# Patient Record
Sex: FEMALE | Race: WHITE | NOT HISPANIC OR LATINO | Employment: UNEMPLOYED | URBAN - NONMETROPOLITAN AREA
[De-identification: names, ages, dates, MRNs, and addresses within clinical notes are randomized per-mention and may not be internally consistent; named-entity substitution may affect disease eponyms.]

---

## 2017-01-10 ENCOUNTER — OFFICE VISIT (OUTPATIENT)
Dept: PAIN MEDICINE | Facility: CLINIC | Age: 57
End: 2017-01-10

## 2017-01-10 VITALS
BODY MASS INDEX: 36.66 KG/M2 | WEIGHT: 214.7 LBS | HEIGHT: 64 IN | DIASTOLIC BLOOD PRESSURE: 70 MMHG | SYSTOLIC BLOOD PRESSURE: 138 MMHG

## 2017-01-10 DIAGNOSIS — M79.2 NEUROPATHIC PAIN: ICD-10-CM

## 2017-01-10 DIAGNOSIS — Z79.899 HIGH RISK MEDICATIONS (NOT ANTICOAGULANTS) LONG-TERM USE: ICD-10-CM

## 2017-01-10 DIAGNOSIS — M25.9 MULTIPLE JOINT COMPLAINTS: ICD-10-CM

## 2017-01-10 DIAGNOSIS — M47.817 LUMBOSACRAL SPONDYLOSIS WITHOUT MYELOPATHY: Primary | ICD-10-CM

## 2017-01-10 PROCEDURE — 99214 OFFICE O/P EST MOD 30 MIN: CPT | Performed by: PAIN MEDICINE

## 2017-01-10 RX ORDER — CYCLOBENZAPRINE HCL 5 MG
5 TABLET ORAL 2 TIMES DAILY PRN
Qty: 60 TABLET | Refills: 1 | Status: SHIPPED | OUTPATIENT
Start: 2017-01-10 | End: 2017-02-09

## 2017-01-10 RX ORDER — OXYCODONE HYDROCHLORIDE AND ACETAMINOPHEN 5; 325 MG/1; MG/1
1 TABLET ORAL 3 TIMES DAILY
Qty: 90 TABLET | Refills: 0 | Status: SHIPPED | OUTPATIENT
Start: 2017-01-10 | End: 2017-07-27

## 2017-01-10 RX ORDER — OXYCODONE HYDROCHLORIDE AND ACETAMINOPHEN 5; 325 MG/1; MG/1
1 TABLET ORAL 3 TIMES DAILY
Qty: 90 TABLET | Refills: 0 | Status: SHIPPED | OUTPATIENT
Start: 2017-01-10 | End: 2017-02-09

## 2017-01-10 NOTE — PROGRESS NOTES
"Amira Shannon is a 56 y.o. female.   1960    HPI:   Location: right hip, bilateral knee and bilateral foot  Quality: throbbing and tingling  Severity: 7/10  Timing: constant  Alleviating: nothing  Aggravating: ambulating      Opioid still providing relief.  No side effects.  Requesting a muscle relaxant for some mild cramping at night.  C/o what sounds like bladder spasms.    The following portions of the patient's history were reviewed by me and updated as appropriate: allergies, current medications, past family history, past medical history, past social history, past surgical history and problem list.    Past Medical History   Diagnosis Date   • Altered bowel function    • Arthropathy of lumbar facet joint    • Constipation    • Corns and callus    • Disease related peripheral neuropathy    • Epigastric pain    • Hammer toe    • Headache    • Hyperlipidemia    • Knee pain    • Localized, primary osteoarthritis of the ankle and foot      Localized, primary osteoarthritis of the ankle and/or foot   • Mendoza's metatarsalgia      Mendoza's metatarsalgia - 2nd interspace on right   • Nausea and vomiting    • Neuralgia and neuritis      Neuralgia, neuritis, and radiculitis, unspecified   • Obstructive sleep apnea      Obstructive sleep apnea (adult) (pediatric)    • CHAI on CPAP      \"C-Pap at night  (unconfirmed)\"   • Osteoarthritis    • Pain in foot      Pain in unspecified foot - sees a podiatrist   • Pain in joint, ankle and foot      Joint pain in ankle and foot      • Pain radiating to back      Pain radiating to lumbar region of back   • Plantar fasciitis    • Secondary hypertension      Secondary hypertension, unspecified       Social History     Social History   • Marital status:      Spouse name: N/A   • Number of children: N/A   • Years of education: N/A     Occupational History   • Not on file.     Social History Main Topics   • Smoking status: Former Smoker     Types: Cigarettes   • " Smokeless tobacco: Not on file   • Alcohol use No   • Drug use: No   • Sexual activity: Defer      Comment: Marital status:      Other Topics Concern   • Not on file     Social History Narrative       No family history on file.      Current Outpatient Prescriptions:   •  DULoxetine (CYMBALTA) 30 MG capsule, Take 30 mg by mouth every night. at bedtime; capsule,delayed release, Disp: , Rfl:   •  esomeprazole (NEXIUM) 40 MG capsule, Take 40 mg by mouth daily. delayed release(DR/EC), Disp: , Rfl:   •  furosemide (LASIX) 20 MG tablet, Take 20 mg by mouth daily., Disp: , Rfl:   •  gabapentin (NEURONTIN) 600 MG tablet, Take  by mouth. tab(s), Disp: , Rfl:   •  linaclotide (LINZESS) 290 MCG capsule capsule, Take 145 mcg by mouth 2 (two) times a day., Disp: , Rfl:   •  metoclopramide (REGLAN) 5 MG/5ML solution, Take  by mouth 4 (four) times a day. 1 tab(s), Disp: , Rfl:   •  ondansetron (ZOFRAN) 4 MG tablet, Take 4 mg by mouth as needed., Disp: , Rfl:   •  oxyCODONE-acetaminophen (PERCOCET) 5-325 MG per tablet, Take 1 tablet by mouth 3 (Three) Times a Day., Disp: 90 tablet, Rfl: 0  •  traMADol (ULTRAM) 50 MG tablet, Take 1 tablet by mouth every 6 (six) hours as needed., Disp: , Rfl:   •  valsartan-hydrochlorothiazide (DIOVAN HCT) 160-12.5 MG per tablet, Take 1 tablet by mouth daily., Disp: , Rfl:   •  cyclobenzaprine (FLEXERIL) 5 MG tablet, Take 1 tablet by mouth 2 (Two) Times a Day As Needed for muscle spasms for up to 30 days., Disp: 60 tablet, Rfl: 1  •  oxyCODONE-acetaminophen (PERCOCET) 5-325 MG per tablet, Take 1 tablet by mouth 3 (Three) Times a Day for 30 days., Disp: 90 tablet, Rfl: 0    Allergies   Allergen Reactions   • Other      Pt states that taking steroids either in pill or injection form make her have blisters in her mouth and she feels like she is on fire on the inside   • Sulfa Antibiotics Rash     Sulfa (Sulfonamide Antibiotics)         Review of Systems   Musculoskeletal:        Right hip,  bilateral knee, bilateral  foot     10 system review of systems was reviewed and negative except for above.    Physical Exam   Constitutional: She appears well-developed and well-nourished. No distress.   Musculoskeletal:        Lumbar back: She exhibits decreased range of motion (right facet loading).   Neurological: She is alert.   Psychiatric: She has a normal mood and affect. Her behavior is normal. Judgment normal.       Amira was seen today for hip pain, knee pain and foot pain.    Diagnoses and all orders for this visit:    Lumbosacral spondylosis without myelopathy    Neuropathic pain    Multiple joint complaints    High risk medications (not anticoagulants) long-term use    Other orders  -     oxyCODONE-acetaminophen (PERCOCET) 5-325 MG per tablet; Take 1 tablet by mouth 3 (Three) Times a Day.  -     oxyCODONE-acetaminophen (PERCOCET) 5-325 MG per tablet; Take 1 tablet by mouth 3 (Three) Times a Day for 30 days.  -     cyclobenzaprine (FLEXERIL) 5 MG tablet; Take 1 tablet by mouth 2 (Two) Times a Day As Needed for muscle spasms for up to 30 days.        Medication: Patient reports no negative side effects, Patient reports appropriate usage and storage habits, Patient's opioid provides enough reflief to be more active and perform activities of daily living with less discomfort. and Refill opioid medication as above.  Flexeril prescribed.  Giving 5mg to try.  If this does not help may try 10mg.  Warned of possible side effect.  Suggested she discuss bladder sx's with pcp.    Interventional: none at this time    Rehab: none at this time    Behavioral: No aberrant behavior noted. [facility] Report #[id number]  was reviewed and is consistent with stated history    Urine drug screen Reviewed from last visit and is appropriate          This document has been electronically signed by Hua Westfall MD on January 10, 2017 2:21 PM

## 2017-03-07 ENCOUNTER — OFFICE VISIT (OUTPATIENT)
Dept: PAIN MEDICINE | Facility: CLINIC | Age: 57
End: 2017-03-07

## 2017-03-07 VITALS
WEIGHT: 222.5 LBS | HEIGHT: 64 IN | BODY MASS INDEX: 37.98 KG/M2 | DIASTOLIC BLOOD PRESSURE: 78 MMHG | SYSTOLIC BLOOD PRESSURE: 118 MMHG

## 2017-03-07 DIAGNOSIS — M47.817 LUMBOSACRAL SPONDYLOSIS WITHOUT MYELOPATHY: ICD-10-CM

## 2017-03-07 DIAGNOSIS — M79.2 NEUROPATHIC PAIN: ICD-10-CM

## 2017-03-07 DIAGNOSIS — Z79.899 HIGH RISK MEDICATIONS (NOT ANTICOAGULANTS) LONG-TERM USE: ICD-10-CM

## 2017-03-07 DIAGNOSIS — M25.9 MULTIPLE JOINT COMPLAINTS: Primary | ICD-10-CM

## 2017-03-07 PROCEDURE — 99213 OFFICE O/P EST LOW 20 MIN: CPT | Performed by: PAIN MEDICINE

## 2017-03-07 RX ORDER — OXYCODONE HYDROCHLORIDE AND ACETAMINOPHEN 5; 325 MG/1; MG/1
1 TABLET ORAL 4 TIMES DAILY
Qty: 120 TABLET | Refills: 0 | Status: SHIPPED | OUTPATIENT
Start: 2017-03-07 | End: 2017-04-06

## 2017-03-07 RX ORDER — OXYCODONE HYDROCHLORIDE AND ACETAMINOPHEN 5; 325 MG/1; MG/1
1 TABLET ORAL 3 TIMES DAILY
Qty: 90 TABLET | Refills: 0 | Status: SHIPPED | OUTPATIENT
Start: 2017-03-07 | End: 2017-03-07 | Stop reason: SDUPTHER

## 2017-03-07 RX ORDER — CYCLOBENZAPRINE HCL 10 MG
10 TABLET ORAL 2 TIMES DAILY PRN
Qty: 60 TABLET | Refills: 3 | Status: SHIPPED | OUTPATIENT
Start: 2017-03-07 | End: 2017-04-06

## 2017-03-07 NOTE — PROGRESS NOTES
"Amira Shannon is a 56 y.o. female.   1960    HPI:   Location: right hip, bilateral knee and bilateral foot  Quality: throbbing and tingling  Severity: 7-8/10  Timing: constant  Alleviating: nothing  Aggravating: ambulating     Patient reports that the opioid medication still provides her good relief and allows increased activity than she would have without the opioid medication, she states it just not cover her very well throughout the day.  She denies side effects.      The following portions of the patient's history were reviewed by me and updated as appropriate: allergies, current medications, past family history, past medical history, past social history, past surgical history and problem list.    Past Medical History   Diagnosis Date   • Altered bowel function    • Arthropathy of lumbar facet joint    • Constipation    • Corns and callus    • Disease related peripheral neuropathy    • Epigastric pain    • Hammer toe    • Headache    • Hyperlipidemia    • Knee pain    • Localized, primary osteoarthritis of the ankle and foot      Localized, primary osteoarthritis of the ankle and/or foot   • Mendoza's metatarsalgia      Mendoza's metatarsalgia - 2nd interspace on right   • Nausea and vomiting    • Neuralgia and neuritis      Neuralgia, neuritis, and radiculitis, unspecified   • Obstructive sleep apnea      Obstructive sleep apnea (adult) (pediatric)    • CHAI on CPAP      \"C-Pap at night  (unconfirmed)\"   • Osteoarthritis    • Pain in foot      Pain in unspecified foot - sees a podiatrist   • Pain in joint, ankle and foot      Joint pain in ankle and foot      • Pain radiating to back      Pain radiating to lumbar region of back   • Plantar fasciitis    • Secondary hypertension      Secondary hypertension, unspecified       Social History     Social History   • Marital status:      Spouse name: N/A   • Number of children: N/A   • Years of education: N/A     Occupational History   • Not on file. "     Social History Main Topics   • Smoking status: Former Smoker     Types: Cigarettes   • Smokeless tobacco: Not on file   • Alcohol use No   • Drug use: No   • Sexual activity: Defer      Comment: Marital status:      Other Topics Concern   • Not on file     Social History Narrative       No family history on file.      Current Outpatient Prescriptions:   •  DULoxetine (CYMBALTA) 30 MG capsule, Take 30 mg by mouth every night. at bedtime; capsule,delayed release, Disp: , Rfl:   •  esomeprazole (NEXIUM) 40 MG capsule, Take 40 mg by mouth daily. delayed release(DR/EC), Disp: , Rfl:   •  furosemide (LASIX) 20 MG tablet, Take 20 mg by mouth daily., Disp: , Rfl:   •  gabapentin (NEURONTIN) 600 MG tablet, Take  by mouth. tab(s), Disp: , Rfl:   •  linaclotide (LINZESS) 290 MCG capsule capsule, Take 145 mcg by mouth 2 (two) times a day., Disp: , Rfl:   •  oxyCODONE-acetaminophen (PERCOCET) 5-325 MG per tablet, Take 1 tablet by mouth 3 (Three) Times a Day., Disp: 90 tablet, Rfl: 0  •  valsartan-hydrochlorothiazide (DIOVAN HCT) 160-12.5 MG per tablet, Take 1 tablet by mouth daily., Disp: , Rfl:   •  cyclobenzaprine (FLEXERIL) 10 MG tablet, Take 1 tablet by mouth 2 (Two) Times a Day As Needed for muscle spasms for up to 30 days., Disp: 60 tablet, Rfl: 3  •  ondansetron (ZOFRAN) 4 MG tablet, Take 4 mg by mouth as needed., Disp: , Rfl:   •  oxyCODONE-acetaminophen (PERCOCET) 5-325 MG per tablet, Take 1 tablet by mouth 4 (Four) Times a Day for 30 days., Disp: 120 tablet, Rfl: 0  •  oxyCODONE-acetaminophen (PERCOCET) 5-325 MG per tablet, Take 1 tablet by mouth 4 (Four) Times a Day for 30 days., Disp: 120 tablet, Rfl: 0    Allergies   Allergen Reactions   • Other      Pt states that taking steroids either in pill or injection form make her have blisters in her mouth and she feels like she is on fire on the inside   • Sulfa Antibiotics Rash     Sulfa (Sulfonamide Antibiotics)         Review of Systems   Musculoskeletal:         Right hip pain  Bilateral knee pain  Bilateral foot pain     10 system review of systems was reviewed and negative except for above.    Physical Exam   Constitutional: She appears well-developed and well-nourished. No distress.   Musculoskeletal:        Lumbar back: She exhibits decreased range of motion (right facet loading).   Painful full arom b knees     Neurological: She is alert.   Psychiatric: She has a normal mood and affect. Her behavior is normal. Judgment normal.       Amira was seen today for hip pain, knee pain and foot pain.    Diagnoses and all orders for this visit:    Multiple joint complaints    Lumbosacral spondylosis without myelopathy    Neuropathic pain    High risk medications (not anticoagulants) long-term use    Other orders  -     Discontinue: oxyCODONE-acetaminophen (PERCOCET) 5-325 MG per tablet; Take 1 tablet by mouth 3 (Three) Times a Day for 30 days.  -     Discontinue: oxyCODONE-acetaminophen (PERCOCET) 5-325 MG per tablet; Take 1 tablet by mouth 3 (Three) Times a Day for 30 days.  -     cyclobenzaprine (FLEXERIL) 10 MG tablet; Take 1 tablet by mouth 2 (Two) Times a Day As Needed for muscle spasms for up to 30 days.  -     oxyCODONE-acetaminophen (PERCOCET) 5-325 MG per tablet; Take 1 tablet by mouth 4 (Four) Times a Day for 30 days.  -     oxyCODONE-acetaminophen (PERCOCET) 5-325 MG per tablet; Take 1 tablet by mouth 4 (Four) Times a Day for 30 days.        Medication: Patient reports no negative side effects, Patient reports appropriate usage and storage habits, Patient's opioid provides enough reflief to be more active and perform activities of daily living with less discomfort. and Refill opioid medication as above.  Refill flexeril as well.  This is an increase in percocet to give better coverage.    Interventional: none at this time    Rehab: none at this time    Behavioral: No aberrant behavior noted. [facility] Report #[id number]  was reviewed and is consistent with stated  history    Urine drug screen None at this time          This document has been electronically signed by Hua Westfall MD on March 7, 2017 1:46 PM

## 2017-05-10 ENCOUNTER — OFFICE VISIT (OUTPATIENT)
Dept: PAIN MEDICINE | Facility: CLINIC | Age: 57
End: 2017-05-10

## 2017-05-10 ENCOUNTER — APPOINTMENT (OUTPATIENT)
Dept: LAB | Facility: HOSPITAL | Age: 57
End: 2017-05-10

## 2017-05-10 VITALS
WEIGHT: 227.3 LBS | HEIGHT: 64 IN | BODY MASS INDEX: 38.8 KG/M2 | DIASTOLIC BLOOD PRESSURE: 90 MMHG | SYSTOLIC BLOOD PRESSURE: 140 MMHG

## 2017-05-10 DIAGNOSIS — M79.2 NEUROPATHIC PAIN: ICD-10-CM

## 2017-05-10 DIAGNOSIS — M79.672 BILATERAL FOOT PAIN: Primary | ICD-10-CM

## 2017-05-10 DIAGNOSIS — M79.671 BILATERAL FOOT PAIN: Primary | ICD-10-CM

## 2017-05-10 DIAGNOSIS — M47.817 LUMBOSACRAL SPONDYLOSIS WITHOUT MYELOPATHY: ICD-10-CM

## 2017-05-10 DIAGNOSIS — Z79.899 HIGH RISK MEDICATIONS (NOT ANTICOAGULANTS) LONG-TERM USE: ICD-10-CM

## 2017-05-10 PROCEDURE — 99214 OFFICE O/P EST MOD 30 MIN: CPT | Performed by: PAIN MEDICINE

## 2017-05-10 PROCEDURE — 80307 DRUG TEST PRSMV CHEM ANLYZR: CPT | Performed by: PAIN MEDICINE

## 2017-05-10 PROCEDURE — G0481 DRUG TEST DEF 8-14 CLASSES: HCPCS | Performed by: PAIN MEDICINE

## 2017-05-10 RX ORDER — OXYCODONE AND ACETAMINOPHEN 7.5; 325 MG/1; MG/1
1 TABLET ORAL 3 TIMES DAILY
Qty: 90 TABLET | Refills: 0 | Status: SHIPPED | OUTPATIENT
Start: 2017-05-10 | End: 2017-06-09

## 2017-05-10 RX ORDER — OXYCODONE HYDROCHLORIDE AND ACETAMINOPHEN 5; 325 MG/1; MG/1
1 TABLET ORAL 3 TIMES DAILY
Qty: 90 TABLET | Refills: 0 | Status: SHIPPED | OUTPATIENT
Start: 2017-05-10 | End: 2017-06-09

## 2017-05-19 LAB — CONV REPORT SUMMARY: NORMAL

## 2017-07-11 ENCOUNTER — OFFICE VISIT (OUTPATIENT)
Dept: PAIN MEDICINE | Facility: CLINIC | Age: 57
End: 2017-07-11

## 2017-07-11 VITALS
DIASTOLIC BLOOD PRESSURE: 84 MMHG | SYSTOLIC BLOOD PRESSURE: 138 MMHG | HEIGHT: 64 IN | BODY MASS INDEX: 39.11 KG/M2 | WEIGHT: 229.1 LBS

## 2017-07-11 DIAGNOSIS — Z79.899 HIGH RISK MEDICATIONS (NOT ANTICOAGULANTS) LONG-TERM USE: ICD-10-CM

## 2017-07-11 DIAGNOSIS — M79.672 HEEL PAIN, BILATERAL: ICD-10-CM

## 2017-07-11 DIAGNOSIS — M79.671 HEEL PAIN, BILATERAL: ICD-10-CM

## 2017-07-11 DIAGNOSIS — M79.2 NEUROPATHIC PAIN: Primary | ICD-10-CM

## 2017-07-11 PROCEDURE — 99214 OFFICE O/P EST MOD 30 MIN: CPT | Performed by: PAIN MEDICINE

## 2017-07-11 RX ORDER — OXYCODONE AND ACETAMINOPHEN 7.5; 325 MG/1; MG/1
1 TABLET ORAL 3 TIMES DAILY
Qty: 90 TABLET | Refills: 0 | Status: SHIPPED | OUTPATIENT
Start: 2017-07-11 | End: 2017-07-13

## 2017-07-11 NOTE — PROGRESS NOTES
"Amira Shannon is a 56 y.o. female.   1960    HPI:   Location: lower back, right hip, bilateral knee and bilateral foot  Quality: throbbing and tingling  Severity: 7/10  Timing: constant  Alleviating: nothing  Aggravating: ambulating     Patient reports that the opioid medication still provides her good relief and allows increased activity than she would have without the opioid medication.  She denies side effects..  Having tongue abcess removed soon.  May need supplemental opioid from surgeon.     The following portions of the patient's history were reviewed by me and updated as appropriate: allergies, current medications, past family history, past medical history, past social history, past surgical history and problem list.    Past Medical History:   Diagnosis Date   • Altered bowel function    • Arthropathy of lumbar facet joint    • Constipation    • Corns and callus    • Disease related peripheral neuropathy    • Epigastric pain    • Hammer toe    • Headache    • Hyperlipidemia    • Knee pain    • Localized, primary osteoarthritis of the ankle and foot     Localized, primary osteoarthritis of the ankle and/or foot   • Mendoza's metatarsalgia     Mendoza's metatarsalgia - 2nd interspace on right   • Nausea and vomiting    • Neuralgia and neuritis     Neuralgia, neuritis, and radiculitis, unspecified   • Obstructive sleep apnea     Obstructive sleep apnea (adult) (pediatric)    • CHAI on CPAP     \"C-Pap at night  (unconfirmed)\"   • Osteoarthritis    • Pain in foot     Pain in unspecified foot - sees a podiatrist   • Pain in joint, ankle and foot     Joint pain in ankle and foot      • Pain radiating to back     Pain radiating to lumbar region of back   • Plantar fasciitis    • Secondary hypertension     Secondary hypertension, unspecified       Social History     Social History   • Marital status:      Spouse name: N/A   • Number of children: N/A   • Years of education: N/A     Occupational History "   • Not on file.     Social History Main Topics   • Smoking status: Former Smoker     Types: Cigarettes   • Smokeless tobacco: Never Used   • Alcohol use No   • Drug use: No   • Sexual activity: Defer      Comment: Marital status:      Other Topics Concern   • Not on file     Social History Narrative       No family history on file.      Current Outpatient Prescriptions:   •  DULoxetine (CYMBALTA) 30 MG capsule, Take 30 mg by mouth every night. at bedtime; capsule,delayed release, Disp: , Rfl:   •  esomeprazole (NEXIUM) 40 MG capsule, Take 40 mg by mouth daily. delayed release(DR/EC), Disp: , Rfl:   •  furosemide (LASIX) 20 MG tablet, Take 20 mg by mouth 2 (Two) Times a Day., Disp: , Rfl:   •  gabapentin (NEURONTIN) 600 MG tablet, Take  by mouth. tab(s), Disp: , Rfl:   •  HYDROcodone-acetaminophen (VICODIN) 7.5-500 MG per tablet, Take 1 tablet by mouth., Disp: , Rfl:   •  linaclotide (LINZESS) 290 MCG capsule capsule, Take 145 mcg by mouth 2 (two) times a day., Disp: , Rfl:   •  ondansetron (ZOFRAN) 4 MG tablet, Take 4 mg by mouth as needed., Disp: , Rfl:   •  oxyCODONE-acetaminophen (PERCOCET) 5-325 MG per tablet, Take 1 tablet by mouth 3 (Three) Times a Day., Disp: 90 tablet, Rfl: 0  •  valsartan-hydrochlorothiazide (DIOVAN HCT) 160-12.5 MG per tablet, Take 1 tablet by mouth daily., Disp: , Rfl:   •  oxyCODONE-acetaminophen (PERCOCET) 7.5-325 MG per tablet, Take 1 tablet by mouth 3 (Three) Times a Day for 30 days., Disp: 90 tablet, Rfl: 0  •  oxyCODONE-acetaminophen (PERCOCET) 7.5-325 MG per tablet, Take 1 tablet by mouth 3 (Three) Times a Day., Disp: 90 tablet, Rfl: 0    Allergies   Allergen Reactions   • Other      Pt states that taking steroids either in pill or injection form make her have blisters in her mouth and she feels like she is on fire on the inside   • Sulfa Antibiotics Rash     Sulfa (Sulfonamide Antibiotics)       Review of Systems   Musculoskeletal: Positive for back pain.         R.hip,b.knee,b.foot   All other systems reviewed and are negative.    All systems reviewed and negative except for above.    Physical Exam   Constitutional: She appears well-developed and well-nourished. No distress.   Musculoskeletal:   Painful full arom b knees    Subjective foot pain in heels bilaterally with ambulation.     Neurological: She is alert.   Psychiatric: She has a normal mood and affect. Her behavior is normal. Judgment normal.       Amira was seen today for back pain and pain.    Diagnoses and all orders for this visit:    Neuropathic pain    Heel pain, bilateral    High risk medications (not anticoagulants) long-term use    Other orders  -     oxyCODONE-acetaminophen (PERCOCET) 7.5-325 MG per tablet; Take 1 tablet by mouth 3 (Three) Times a Day for 30 days.  -     oxyCODONE-acetaminophen (PERCOCET) 7.5-325 MG per tablet; Take 1 tablet by mouth 3 (Three) Times a Day.        Medication: Patient reports no negative side effects, Patient reports appropriate usage and storage habits, Patient's opioid provides enough reflief to be more active and perform activities of daily living with less discomfort. and Refill opioid medication as above.    Interventional: none at this time.  Tongue procedure soon.     Rehab: none at this time    Behavioral: No aberrant behavior noted. [facility] Report #[id number]  was reviewed and is consistent with stated history    Urine drug screen Reviewed from last visit and is appropriate          This document has been electronically signed by Hua Westfall MD on July 11, 2017 2:28 PM

## 2017-07-13 ENCOUNTER — OFFICE VISIT (OUTPATIENT)
Dept: GASTROENTEROLOGY | Facility: CLINIC | Age: 57
End: 2017-07-13

## 2017-07-13 VITALS
DIASTOLIC BLOOD PRESSURE: 79 MMHG | SYSTOLIC BLOOD PRESSURE: 129 MMHG | BODY MASS INDEX: 39.11 KG/M2 | WEIGHT: 229.1 LBS | HEART RATE: 73 BPM | HEIGHT: 64 IN

## 2017-07-13 DIAGNOSIS — K75.81 NASH (NONALCOHOLIC STEATOHEPATITIS): Primary | ICD-10-CM

## 2017-07-13 PROCEDURE — 99203 OFFICE O/P NEW LOW 30 MIN: CPT | Performed by: INTERNAL MEDICINE

## 2017-07-13 RX ORDER — OMEPRAZOLE 20 MG/1
20 CAPSULE, DELAYED RELEASE ORAL DAILY
COMMUNITY
End: 2017-07-27

## 2017-07-13 RX ORDER — GABAPENTIN 800 MG/1
800 TABLET ORAL 3 TIMES DAILY
COMMUNITY
End: 2017-07-27

## 2017-07-13 RX ORDER — AMLODIPINE BESYLATE 10 MG/1
10 TABLET ORAL DAILY
COMMUNITY
End: 2017-07-27

## 2017-07-13 RX ORDER — CLINDAMYCIN HYDROCHLORIDE 300 MG/1
300 CAPSULE ORAL 3 TIMES DAILY
COMMUNITY
End: 2017-07-27

## 2017-07-13 RX ORDER — FUROSEMIDE 40 MG/1
40 TABLET ORAL 2 TIMES DAILY
COMMUNITY
End: 2017-07-27

## 2017-07-13 RX ORDER — CYCLOBENZAPRINE HCL 10 MG
10 TABLET ORAL 2 TIMES DAILY PRN
COMMUNITY
End: 2017-07-27

## 2017-07-13 RX ORDER — POTASSIUM CHLORIDE 750 MG/1
10 TABLET, FILM COATED, EXTENDED RELEASE ORAL 2 TIMES DAILY
COMMUNITY
End: 2017-07-27

## 2017-07-13 NOTE — PROGRESS NOTES
"[facility]      Chief Complaint:   Chief Complaint   Patient presents with   • Liver Evashley Barr       Subjective     HPI:   Patient with elevated liver enzymes.  Patient is moderately obese.  Patient states she has recently gained this weight stating that episodes of bowel obstruction and constipation and made her eat more.  Patient has her mother who  of cirrhosis of liver.    Plan; we'll do ultrasound LFTs Judge fiber sure have patient follow-up will consider doing hemochromatosis evaluation and other causes of hereditary cirrhosis depending on results of current blood work.    Past Medical History:   Past Medical History:   Diagnosis Date   • Altered bowel function    • Arthropathy of lumbar facet joint    • Constipation    • Corns and callus    • Disease related peripheral neuropathy    • Epigastric pain    • Hammer toe    • Headache    • Hyperlipidemia    • Knee pain    • Localized, primary osteoarthritis of the ankle and foot     Localized, primary osteoarthritis of the ankle and/or foot   • Mendoza's metatarsalgia     Mendoza's metatarsalgia - 2nd interspace on right   • Nausea and vomiting    • Neuralgia and neuritis     Neuralgia, neuritis, and radiculitis, unspecified   • Obstructive sleep apnea     Obstructive sleep apnea (adult) (pediatric)    • CHAI on CPAP     \"C-Pap at night  (unconfirmed)\"   • Osteoarthritis    • Pain in foot     Pain in unspecified foot - sees a podiatrist   • Pain in joint, ankle and foot     Joint pain in ankle and foot      • Pain radiating to back     Pain radiating to lumbar region of back   • Plantar fasciitis    • Secondary hypertension     Secondary hypertension, unspecified       Family History:  History reviewed. No pertinent family history.    Social History:   reports that she has quit smoking. Her smoking use included Cigarettes. She has never used smokeless tobacco. She reports that she does not drink alcohol or use illicit " "drugs.    Medications:   Current Outpatient Prescriptions   Medication Sig Dispense Refill   • amLODIPine (NORVASC) 10 MG tablet Take 10 mg by mouth Daily.     • clindamycin (CLEOCIN) 300 MG capsule Take 300 mg by mouth 3 (Three) Times a Day.     • cyclobenzaprine (FLEXERIL) 10 MG tablet Take 10 mg by mouth 2 (Two) Times a Day As Needed for Muscle Spasms.     • DULoxetine (CYMBALTA) 30 MG capsule Take 30 mg by mouth every night. at bedtime; capsule,delayed release     • furosemide (LASIX) 40 MG tablet Take 40 mg by mouth 2 (Two) Times a Day.     • gabapentin (NEURONTIN) 800 MG tablet Take 800 mg by mouth 3 (Three) Times a Day.     • IRON, FERROUS GLUCONATE, PO Take  by mouth.     • linaclotide (LINZESS) 145 MCG capsule capsule Take 145 mcg by mouth Every Morning Before Breakfast.     • omeprazole (priLOSEC) 20 MG capsule Take 20 mg by mouth Daily.     • oxyCODONE-acetaminophen (PERCOCET) 5-325 MG per tablet Take 1 tablet by mouth 3 (Three) Times a Day. 90 tablet 0   • potassium chloride (K-DUR) 10 MEQ CR tablet Take 10 mEq by mouth 2 (Two) Times a Day.       No current facility-administered medications for this visit.        Allergies:  Other and Sulfa antibiotics    ROS:    Review of Systems   Constitutional: Negative for activity change, appetite change, chills, diaphoresis, fatigue, fever and unexpected weight change.   HENT: Negative for sore throat and trouble swallowing.    Respiratory: Negative for shortness of breath.    Gastrointestinal: Negative for abdominal distention, abdominal pain, anal bleeding, blood in stool, constipation, diarrhea, nausea, rectal pain and vomiting.   Musculoskeletal: Negative for arthralgias.   Skin: Negative for pallor.   Neurological: Negative for light-headedness.     Objective     Blood pressure 129/79, pulse 73, height 64\" (162.6 cm), weight 229 lb 1.6 oz (104 kg).    Physical Exam   Constitutional: She is oriented to person, place, and time. She appears well-developed and " well-nourished. No distress.   HENT:   Head: Normocephalic and atraumatic.   Cardiovascular: Normal rate, regular rhythm, normal heart sounds and intact distal pulses.  Exam reveals no gallop and no friction rub.    No murmur heard.  Pulmonary/Chest: Breath sounds normal. No respiratory distress. She has no wheezes. She has no rales. She exhibits no tenderness.   Abdominal: Soft. Bowel sounds are normal. She exhibits no distension and no mass. There is no tenderness. There is no rebound and no guarding. No hernia.   Musculoskeletal: Normal range of motion. She exhibits no edema.   Neurological: She is alert and oriented to person, place, and time.   Skin: Skin is warm and dry. No rash noted. She is not diaphoretic. No erythema. No pallor.   Psychiatric: She has a normal mood and affect. Her behavior is normal. Judgment and thought content normal.        Assessment/Plan   Amira was seen today for liver eval.    Diagnoses and all orders for this visit:    MUSA (nonalcoholic steatohepatitis)  -     Comprehensive Metabolic Panel  -     CBC & Differential  -     MUSA Fibrosure  -     US Abdomen Complete; Future        * Surgery not found *     Diagnosis Plan   1. MUSA (nonalcoholic steatohepatitis)  Comprehensive Metabolic Panel    CBC & Differential    MUSA Fibrosure    US Abdomen Complete       Anticipated Surgical Procedure:  Orders Placed This Encounter   Procedures   • US Abdomen Complete     Standing Status:   Future     Standing Expiration Date:   7/13/2018     Order Specific Question:   Reason for Exam:     Answer:   musa   • Comprehensive Metabolic Panel   • MUSA Fibrosure   • CBC & Differential     Order Specific Question:   Manual Differential     Answer:   No       The risks, benefits, and alternatives of this procedure have been discussed with the patient or the responsible party- the patient understands and agrees to proceed.

## 2017-07-17 LAB
ALBUMIN SERPL-MCNC: 3.9 G/DL (ref 3.4–4.8)
ALBUMIN/GLOB SERPL: 1.4 G/DL (ref 1.1–1.8)
ALP SERPL-CCNC: 110 U/L (ref 38–126)
ALT SERPL W P-5'-P-CCNC: 65 U/L (ref 9–52)
ANION GAP SERPL CALCULATED.3IONS-SCNC: 9 MMOL/L (ref 5–15)
AST SERPL-CCNC: 60 U/L (ref 14–36)
BASOPHILS # BLD AUTO: 0.02 10*3/MM3 (ref 0–0.2)
BASOPHILS NFR BLD AUTO: 0.5 % (ref 0–2)
BILIRUB SERPL-MCNC: 0.8 MG/DL (ref 0.2–1.3)
BUN BLD-MCNC: 10 MG/DL (ref 7–21)
BUN/CREAT SERPL: 11 (ref 7–25)
CALCIUM SPEC-SCNC: 9 MG/DL (ref 8.4–10.2)
CHLORIDE SERPL-SCNC: 105 MMOL/L (ref 95–110)
CO2 SERPL-SCNC: 26 MMOL/L (ref 22–31)
CREAT BLD-MCNC: 0.91 MG/DL (ref 0.5–1)
DEPRECATED RDW RBC AUTO: 51.6 FL (ref 36.4–46.3)
EOSINOPHIL # BLD AUTO: 0.15 10*3/MM3 (ref 0–0.7)
EOSINOPHIL NFR BLD AUTO: 3.6 % (ref 0–7)
ERYTHROCYTE [DISTWIDTH] IN BLOOD BY AUTOMATED COUNT: 17.6 % (ref 11.5–14.5)
GFR SERPL CREATININE-BSD FRML MDRD: 64 ML/MIN/1.73 (ref 51–120)
GLOBULIN UR ELPH-MCNC: 2.8 GM/DL (ref 2.3–3.5)
GLUCOSE BLD-MCNC: 86 MG/DL (ref 60–100)
HCT VFR BLD AUTO: 41.8 % (ref 35–45)
HGB BLD-MCNC: 13.3 G/DL (ref 12–15.5)
IMM GRANULOCYTES # BLD: 0.01 10*3/MM3 (ref 0–0.02)
IMM GRANULOCYTES NFR BLD: 0.2 % (ref 0–0.5)
LYMPHOCYTES # BLD AUTO: 1.1 10*3/MM3 (ref 0.6–4.2)
LYMPHOCYTES NFR BLD AUTO: 26.1 % (ref 10–50)
MCH RBC QN AUTO: 25.5 PG (ref 26.5–34)
MCHC RBC AUTO-ENTMCNC: 31.8 G/DL (ref 31.4–36)
MCV RBC AUTO: 80.1 FL (ref 80–98)
MONOCYTES # BLD AUTO: 0.28 10*3/MM3 (ref 0–0.9)
MONOCYTES NFR BLD AUTO: 6.6 % (ref 0–12)
NEUTROPHILS # BLD AUTO: 2.66 10*3/MM3 (ref 2–8.6)
NEUTROPHILS NFR BLD AUTO: 63 % (ref 37–80)
PLATELET # BLD AUTO: 58 10*3/MM3 (ref 150–450)
PMV BLD AUTO: ABNORMAL FL (ref 8–12)
POTASSIUM BLD-SCNC: 3.9 MMOL/L (ref 3.5–5.1)
PROT SERPL-MCNC: 6.7 G/DL (ref 6.3–8.6)
RBC # BLD AUTO: 5.22 10*6/MM3 (ref 3.77–5.16)
SODIUM BLD-SCNC: 140 MMOL/L (ref 137–145)
WBC NRBC COR # BLD: 4.22 10*3/MM3 (ref 3.2–9.8)

## 2017-07-17 PROCEDURE — 83883 ASSAY NEPHELOMETRY NOT SPEC: CPT | Performed by: INTERNAL MEDICINE

## 2017-07-17 PROCEDURE — 82465 ASSAY BLD/SERUM CHOLESTEROL: CPT | Performed by: INTERNAL MEDICINE

## 2017-07-17 PROCEDURE — 82947 ASSAY GLUCOSE BLOOD QUANT: CPT | Performed by: INTERNAL MEDICINE

## 2017-07-17 PROCEDURE — 85025 COMPLETE CBC W/AUTO DIFF WBC: CPT | Performed by: INTERNAL MEDICINE

## 2017-07-17 PROCEDURE — 82977 ASSAY OF GGT: CPT | Performed by: INTERNAL MEDICINE

## 2017-07-17 PROCEDURE — 82247 BILIRUBIN TOTAL: CPT | Performed by: INTERNAL MEDICINE

## 2017-07-17 PROCEDURE — 84460 ALANINE AMINO (ALT) (SGPT): CPT | Performed by: INTERNAL MEDICINE

## 2017-07-17 PROCEDURE — 80053 COMPREHEN METABOLIC PANEL: CPT | Performed by: INTERNAL MEDICINE

## 2017-07-17 PROCEDURE — 83010 ASSAY OF HAPTOGLOBIN QUANT: CPT | Performed by: INTERNAL MEDICINE

## 2017-07-17 PROCEDURE — 84450 TRANSFERASE (AST) (SGOT): CPT | Performed by: INTERNAL MEDICINE

## 2017-07-17 PROCEDURE — 84478 ASSAY OF TRIGLYCERIDES: CPT | Performed by: INTERNAL MEDICINE

## 2017-07-19 ENCOUNTER — PREP FOR SURGERY (OUTPATIENT)
Dept: OTHER | Facility: HOSPITAL | Age: 57
End: 2017-07-19

## 2017-07-19 ENCOUNTER — TELEPHONE (OUTPATIENT)
Dept: GASTROENTEROLOGY | Facility: CLINIC | Age: 57
End: 2017-07-19

## 2017-07-19 ENCOUNTER — OFFICE VISIT (OUTPATIENT)
Dept: OTOLARYNGOLOGY | Facility: CLINIC | Age: 57
End: 2017-07-19

## 2017-07-19 VITALS — BODY MASS INDEX: 37.56 KG/M2 | HEIGHT: 64 IN | WEIGHT: 220 LBS | TEMPERATURE: 98.3 F

## 2017-07-19 DIAGNOSIS — K14.8 TONGUE LESION: Primary | ICD-10-CM

## 2017-07-19 LAB
A2 MACROGLOB SERPL-MCNC: 478 MG/DL (ref 110–276)
ALT SERPL W P-5'-P-CCNC: 51 IU/L (ref 0–40)
APO A-I SERPL-MCNC: 113 MG/DL (ref 116–209)
AST SERPL W P-5'-P-CCNC: 59 IU/L (ref 0–40)
BILIRUB SERPL-MCNC: 0.6 MG/DL (ref 0–1.2)
CHOLEST SERPL-MCNC: 148 MG/DL (ref 100–199)
FIBROSIS SCORING:: ABNORMAL
FIBROSIS STAGE SERPL QL: ABNORMAL
GGT SERPL-CCNC: 48 IU/L (ref 0–60)
GLUCOSE SERPL-MCNC: 89 MG/DL (ref 65–99)
HAPTOGLOB SERPL-MCNC: 29 MG/DL (ref 34–200)
INTERPRETATIONS: (REFERENCE): ABNORMAL
LABORATORY COMMENT REPORT: ABNORMAL
LIMITATIONS: (REFERENCE): ABNORMAL
LIVER FIBR SCORE SERPL CALC.FIBROSURE: 0.88 (ref 0–0.21)
NASH GRADE (REFERENCE): ABNORMAL
NASH SCORE (REFERENCE): 0.75
NASH SCORING (REFERENCE): ABNORMAL
STEATOSIS GRADE (REFERENCE): ABNORMAL
STEATOSIS GRADING (REFERENCE): ABNORMAL
STEATOSIS SCORE (REFERENCE): 0.78 (ref 0–0.3)
TRIGL SERPL-MCNC: 121 MG/DL (ref 0–149)
WEIGHT: (REFERENCE): 229 LBS

## 2017-07-19 PROCEDURE — 99203 OFFICE O/P NEW LOW 30 MIN: CPT | Performed by: OTOLARYNGOLOGY

## 2017-07-19 NOTE — PROGRESS NOTES
Subjective   Amira Shannon is a 56 y.o. female.   Chief complaint: Persistent left tongue lesion  History of Present Illness   Patient has persistent left tongue lesion which is been biopsied that showed inflammation but no definitive diagnosis is made.  She recommended by another surgeon have it rebiopsy that she preferred not to do that in his office because of discomfort from the previous biopsy.  She is some difficulty swallowing because of this and discomfort no major voice changes no adenopathy in the neck.  She does have a history of smoking the past and not really much alcohol use..  Note cervical adenopathy or tenderness in her neck she does wear dentures and are somewhat loose.        The following portions of the patient's history were reviewed and updated as appropriate: allergies, current medications, past family history, past medical history, past social history, past surgical history and problem list.      Amira Shannon reports that she has quit smoking. Her smoking use included Cigarettes. She has never used smokeless tobacco. She reports that she does not drink alcohol or use illicit drugs.  Patient is not a tobacco user and has been counseled for use of tobacco products    Family History   Problem Relation Age of Onset   • Cancer Other    • Diabetes Other    • Heart disease Other          Current Outpatient Prescriptions:   •  amLODIPine (NORVASC) 10 MG tablet, Take 10 mg by mouth Daily., Disp: , Rfl:   •  clindamycin (CLEOCIN) 300 MG capsule, Take 300 mg by mouth 3 (Three) Times a Day., Disp: , Rfl:   •  cyclobenzaprine (FLEXERIL) 10 MG tablet, Take 10 mg by mouth 2 (Two) Times a Day As Needed for Muscle Spasms., Disp: , Rfl:   •  DULoxetine (CYMBALTA) 30 MG capsule, Take 30 mg by mouth every night. at bedtime; capsule,delayed release, Disp: , Rfl:   •  furosemide (LASIX) 40 MG tablet, Take 40 mg by mouth 2 (Two) Times a Day., Disp: , Rfl:   •  gabapentin (NEURONTIN) 800 MG tablet,  "Take 800 mg by mouth 3 (Three) Times a Day., Disp: , Rfl:   •  IRON, FERROUS GLUCONATE, PO, Take  by mouth., Disp: , Rfl:   •  linaclotide (LINZESS) 145 MCG capsule capsule, Take 145 mcg by mouth Every Morning Before Breakfast., Disp: , Rfl:   •  omeprazole (priLOSEC) 20 MG capsule, Take 20 mg by mouth Daily., Disp: , Rfl:   •  oxyCODONE-acetaminophen (PERCOCET) 5-325 MG per tablet, Take 1 tablet by mouth 3 (Three) Times a Day., Disp: 90 tablet, Rfl: 0  •  potassium chloride (K-DUR) 10 MEQ CR tablet, Take 10 mEq by mouth 2 (Two) Times a Day., Disp: , Rfl:       Past Medical History:   Diagnosis Date   • Acid reflux    • Altered bowel function    • Arthropathy of lumbar facet joint    • Constipation    • Corns and callus    • Disease related peripheral neuropathy    • Epigastric pain    • Fatty liver    • Hammer toe    • Headache    • Hiatal hernia    • Hyperlipidemia    • Knee pain    • Localized, primary osteoarthritis of the ankle and foot     Localized, primary osteoarthritis of the ankle and/or foot   • Mendoza's metatarsalgia     Mendoza's metatarsalgia - 2nd interspace on right   • Nausea and vomiting    • Neuralgia and neuritis     Neuralgia, neuritis, and radiculitis, unspecified   • Obstructive sleep apnea     Obstructive sleep apnea (adult) (pediatric)    • CHAI on CPAP     \"C-Pap at night  (unconfirmed)\"   • Osteoarthritis    • Pain in foot     Pain in unspecified foot - sees a podiatrist   • Pain in joint, ankle and foot     Joint pain in ankle and foot      • Pain radiating to back     Pain radiating to lumbar region of back   • Plantar fasciitis    • Restless leg syndrome    • Secondary hypertension     Secondary hypertension, unspecified   • Sinusitis          Review of Systems   HENT: Positive for sore throat.    Gastrointestinal: Positive for abdominal pain.   Endocrine:        Hot flashes   Genitourinary: Positive for frequency.   Musculoskeletal: Positive for back pain.   Neurological: Positive for " weakness.           Objective   Physical Exam   Constitutional: She is oriented to person, place, and time. She appears well-developed and well-nourished.   HENT:   Head: Normocephalic and atraumatic.   Right Ear: Hearing, tympanic membrane, external ear and ear canal normal.   Left Ear: Hearing, tympanic membrane, external ear and ear canal normal.   Nose: Nose normal. No mucosal edema, rhinorrhea, nasal deformity or septal deviation. No epistaxis. Right sinus exhibits no maxillary sinus tenderness and no frontal sinus tenderness. Left sinus exhibits no maxillary sinus tenderness and no frontal sinus tenderness.   Mouth/Throat: Uvula is midline, oropharynx is clear and moist and mucous membranes are normal. No trismus in the jaw. Normal dentition. No oropharyngeal exudate or posterior oropharyngeal edema. No tonsillar exudate.       Eyes: Conjunctivae and EOM are normal. Pupils are equal, round, and reactive to light.   Neck: Normal range of motion. Neck supple. No JVD present. No tracheal deviation present. No thyromegaly present.   Cardiovascular: Normal rate and regular rhythm.    Pulmonary/Chest: Effort normal and breath sounds normal.   Musculoskeletal: Normal range of motion.   Lymphadenopathy:        Head (right side): No submental, no submandibular, no tonsillar, no preauricular, no posterior auricular and no occipital adenopathy present.        Head (left side): No submental, no submandibular, no tonsillar, no preauricular, no posterior auricular and no occipital adenopathy present.     She has no cervical adenopathy.        Right cervical: No superficial cervical, no deep cervical and no posterior cervical adenopathy present.       Left cervical: No superficial cervical, no deep cervical and no posterior cervical adenopathy present.   Neurological: She is alert and oriented to person, place, and time. No cranial nerve deficit.   Skin: Skin is warm.   Psychiatric: She has a normal mood and affect. Her  speech is normal and behavior is normal. Thought content normal.   Nursing note and vitals reviewed.            Assessment/Plan   Amira was seen today for other.    Diagnoses and all orders for this visit:    Tongue lesion  -     XR Chest 2 View; Future  -     Protime-INR  -     aPTT; Future  -     CBC & Differential; Future  -     Basic Metabolic Panel; Future        Patient was to have biopsy done under general anesthesia.  Explained her the risk of bleeding infection scarring chronic pain speech and swallowing problems or need for possible further therapy pending results.  Other possible she may need new dentures as this is a traumatic lesion.  Concern is she has a prior smoker this is a nonhealing wound for several months.  She is agreement and understands risks benefits and consent was obtained   Relax the same time.  She has no palpable adenopathy that has a large neck CT Neck will done pending results of biopsy .

## 2017-07-19 NOTE — TELEPHONE ENCOUNTER
Office tried calling patient to see where she would like her ultrasound appointment made at. No voicemail set up and no answer.

## 2017-07-27 ENCOUNTER — APPOINTMENT (OUTPATIENT)
Dept: PREADMISSION TESTING | Facility: HOSPITAL | Age: 57
End: 2017-07-27

## 2017-07-27 ENCOUNTER — HOSPITAL ENCOUNTER (OUTPATIENT)
Dept: GENERAL RADIOLOGY | Facility: HOSPITAL | Age: 57
Discharge: HOME | End: 2017-07-27

## 2017-07-27 ENCOUNTER — HOSPITAL ENCOUNTER (OUTPATIENT)
Dept: ULTRASOUND IMAGING | Facility: HOSPITAL | Age: 57
Discharge: HOME | End: 2017-07-27
Admitting: INTERNAL MEDICINE

## 2017-07-27 ENCOUNTER — ANESTHESIA EVENT (OUTPATIENT)
Dept: PERIOP | Facility: HOSPITAL | Age: 57
End: 2017-07-27

## 2017-07-27 VITALS
HEART RATE: 76 BPM | OXYGEN SATURATION: 95 % | WEIGHT: 225 LBS | SYSTOLIC BLOOD PRESSURE: 124 MMHG | BODY MASS INDEX: 38.41 KG/M2 | HEIGHT: 64 IN | DIASTOLIC BLOOD PRESSURE: 78 MMHG | RESPIRATION RATE: 16 BRPM

## 2017-07-27 DIAGNOSIS — K75.81 NASH (NONALCOHOLIC STEATOHEPATITIS): ICD-10-CM

## 2017-07-27 DIAGNOSIS — K14.8 TONGUE LESION: ICD-10-CM

## 2017-07-27 LAB
ANION GAP SERPL CALCULATED.3IONS-SCNC: 15 MMOL/L (ref 5–15)
ANISOCYTOSIS BLD QL: NORMAL
APTT PPP: 32.3 SECONDS (ref 20–40.3)
BASOPHILS # BLD AUTO: 0.02 10*3/MM3 (ref 0–0.2)
BASOPHILS NFR BLD AUTO: 0.4 % (ref 0–2)
BUN BLD-MCNC: 11 MG/DL (ref 7–21)
BUN/CREAT SERPL: 12 (ref 7–25)
CALCIUM SPEC-SCNC: 9.4 MG/DL (ref 8.4–10.2)
CHLORIDE SERPL-SCNC: 105 MMOL/L (ref 95–110)
CO2 SERPL-SCNC: 20 MMOL/L (ref 22–31)
CREAT BLD-MCNC: 0.92 MG/DL (ref 0.5–1)
DEPRECATED RDW RBC AUTO: 53.3 FL (ref 36.4–46.3)
EOSINOPHIL # BLD AUTO: 0.18 10*3/MM3 (ref 0–0.7)
EOSINOPHIL NFR BLD AUTO: 3.6 % (ref 0–7)
ERYTHROCYTE [DISTWIDTH] IN BLOOD BY AUTOMATED COUNT: 18.1 % (ref 11.5–14.5)
GFR SERPL CREATININE-BSD FRML MDRD: 63 ML/MIN/1.73 (ref 60–120)
GLUCOSE BLD-MCNC: 81 MG/DL (ref 60–100)
HCT VFR BLD AUTO: 44.6 % (ref 35–45)
HGB BLD-MCNC: 14.3 G/DL (ref 12–15.5)
IMM GRANULOCYTES # BLD: 0.01 10*3/MM3 (ref 0–0.02)
IMM GRANULOCYTES NFR BLD: 0.2 % (ref 0–0.5)
INR PPP: 1.12 (ref 0.8–1.2)
LYMPHOCYTES # BLD AUTO: 1.56 10*3/MM3 (ref 0.6–4.2)
LYMPHOCYTES NFR BLD AUTO: 31.5 % (ref 10–50)
MCH RBC QN AUTO: 25.8 PG (ref 26.5–34)
MCHC RBC AUTO-ENTMCNC: 32.1 G/DL (ref 31.4–36)
MCV RBC AUTO: 80.5 FL (ref 80–98)
MONOCYTES # BLD AUTO: 0.25 10*3/MM3 (ref 0–0.9)
MONOCYTES NFR BLD AUTO: 5 % (ref 0–12)
NEUTROPHILS # BLD AUTO: 2.94 10*3/MM3 (ref 2–8.6)
NEUTROPHILS NFR BLD AUTO: 59.3 % (ref 37–80)
NRBC BLD MANUAL-RTO: 0 /100 WBC (ref 0–0)
PLATELET # BLD AUTO: 109 10*3/MM3 (ref 150–450)
PMV BLD AUTO: ABNORMAL FL (ref 8–12)
POTASSIUM BLD-SCNC: 4.2 MMOL/L (ref 3.5–5.1)
PROTHROMBIN TIME: 14.3 SECONDS (ref 11.1–15.3)
RBC # BLD AUTO: 5.54 10*6/MM3 (ref 3.77–5.16)
SMALL PLATELETS BLD QL SMEAR: NORMAL
SODIUM BLD-SCNC: 140 MMOL/L (ref 137–145)
WBC MORPH BLD: NORMAL
WBC NRBC COR # BLD: 4.96 10*3/MM3 (ref 3.2–9.8)

## 2017-07-27 PROCEDURE — 76700 US EXAM ABDOM COMPLETE: CPT

## 2017-07-27 PROCEDURE — 80048 BASIC METABOLIC PNL TOTAL CA: CPT | Performed by: OTOLARYNGOLOGY

## 2017-07-27 PROCEDURE — 93010 ELECTROCARDIOGRAM REPORT: CPT | Performed by: INTERNAL MEDICINE

## 2017-07-27 PROCEDURE — 71020 HC CHEST PA AND LATERAL: CPT

## 2017-07-27 PROCEDURE — 85007 BL SMEAR W/DIFF WBC COUNT: CPT | Performed by: OTOLARYNGOLOGY

## 2017-07-27 PROCEDURE — 85025 COMPLETE CBC W/AUTO DIFF WBC: CPT | Performed by: OTOLARYNGOLOGY

## 2017-07-27 PROCEDURE — 93005 ELECTROCARDIOGRAM TRACING: CPT

## 2017-07-27 PROCEDURE — 85730 THROMBOPLASTIN TIME PARTIAL: CPT | Performed by: OTOLARYNGOLOGY

## 2017-07-27 PROCEDURE — 36415 COLL VENOUS BLD VENIPUNCTURE: CPT

## 2017-07-27 PROCEDURE — 85610 PROTHROMBIN TIME: CPT | Performed by: OTOLARYNGOLOGY

## 2017-07-27 RX ORDER — HYDROCODONE BITARTRATE AND ACETAMINOPHEN 7.5; 325 MG/1; MG/1
1 TABLET ORAL 3 TIMES DAILY PRN
COMMUNITY
End: 2017-09-20 | Stop reason: SDUPTHER

## 2017-07-27 RX ORDER — FERROUS SULFATE 325(65) MG
325 TABLET ORAL
COMMUNITY
End: 2017-11-01 | Stop reason: ALTCHOICE

## 2017-07-27 RX ORDER — DULOXETIN HYDROCHLORIDE 30 MG/1
30 CAPSULE, DELAYED RELEASE ORAL DAILY
COMMUNITY
End: 2019-10-08

## 2017-07-27 RX ORDER — GABAPENTIN 800 MG/1
800 TABLET ORAL 4 TIMES DAILY
COMMUNITY
End: 2021-09-24 | Stop reason: HOSPADM

## 2017-07-27 RX ORDER — OMEPRAZOLE 20 MG/1
20 CAPSULE, DELAYED RELEASE ORAL DAILY
COMMUNITY
End: 2018-02-15 | Stop reason: DRUGHIGH

## 2017-07-27 RX ORDER — FUROSEMIDE 20 MG/1
20 TABLET ORAL 2 TIMES DAILY
COMMUNITY
End: 2017-09-14

## 2017-07-27 RX ORDER — CYCLOBENZAPRINE HCL 10 MG
10 TABLET ORAL 2 TIMES DAILY PRN
COMMUNITY
End: 2017-12-13 | Stop reason: SDUPTHER

## 2017-07-27 RX ORDER — SODIUM CHLORIDE, SODIUM GLUCONATE, SODIUM ACETATE, POTASSIUM CHLORIDE, AND MAGNESIUM CHLORIDE 526; 502; 368; 37; 30 MG/100ML; MG/100ML; MG/100ML; MG/100ML; MG/100ML
1000 INJECTION, SOLUTION INTRAVENOUS CONTINUOUS PRN
Status: CANCELLED | OUTPATIENT
Start: 2017-08-01

## 2017-07-27 RX ORDER — AMLODIPINE BESYLATE 10 MG/1
10 TABLET ORAL DAILY
COMMUNITY
End: 2017-10-27

## 2017-07-28 ENCOUNTER — TELEPHONE (OUTPATIENT)
Dept: OTOLARYNGOLOGY | Facility: CLINIC | Age: 57
End: 2017-07-28

## 2017-07-28 NOTE — TELEPHONE ENCOUNTER
: Spoke the patient about her abnormal platelets function she seen an hematologist -ulnar not to worry about this problem .  Dr. Curran and Darrel .for an he's told her not to worry about.  Her platelet count up now but I told her this could cause some more bleeding but we do something to help minimize that.    All questions were answered, if she is agreement with this and scheduled for procedure on Tuesday

## 2017-07-31 ENCOUNTER — TELEPHONE (OUTPATIENT)
Dept: GASTROENTEROLOGY | Facility: CLINIC | Age: 57
End: 2017-07-31

## 2017-07-31 NOTE — TELEPHONE ENCOUNTER
Office tried contacting patient with no success. We were calling to make appointment for Ultrasound closer to patients home.

## 2017-08-01 ENCOUNTER — HOSPITAL ENCOUNTER (OUTPATIENT)
Facility: HOSPITAL | Age: 57
Setting detail: HOSPITAL OUTPATIENT SURGERY
Discharge: HOME | End: 2017-08-01
Attending: OTOLARYNGOLOGY | Admitting: OTOLARYNGOLOGY

## 2017-08-01 ENCOUNTER — ANESTHESIA (OUTPATIENT)
Dept: PERIOP | Facility: HOSPITAL | Age: 57
End: 2017-08-01

## 2017-08-01 VITALS
DIASTOLIC BLOOD PRESSURE: 74 MMHG | BODY MASS INDEX: 38.43 KG/M2 | WEIGHT: 225.09 LBS | SYSTOLIC BLOOD PRESSURE: 165 MMHG | HEART RATE: 81 BPM | TEMPERATURE: 96.7 F | OXYGEN SATURATION: 95 % | RESPIRATION RATE: 16 BRPM | HEIGHT: 64 IN

## 2017-08-01 DIAGNOSIS — K14.8 TONGUE LESION: ICD-10-CM

## 2017-08-01 PROCEDURE — 88342 IMHCHEM/IMCYTCHM 1ST ANTB: CPT | Performed by: OTOLARYNGOLOGY

## 2017-08-01 PROCEDURE — 88305 TISSUE EXAM BY PATHOLOGIST: CPT | Performed by: PATHOLOGY

## 2017-08-01 PROCEDURE — 25010000002 SUCCINYLCHOLINE PER 20 MG: Performed by: NURSE ANESTHETIST, CERTIFIED REGISTERED

## 2017-08-01 PROCEDURE — 88323 CONSLTJ&REPRT MATRL PREP SLD: CPT

## 2017-08-01 PROCEDURE — 31525 DX LARYNGOSCOPY EXCL NB: CPT | Performed by: OTOLARYNGOLOGY

## 2017-08-01 PROCEDURE — 94799 UNLISTED PULMONARY SVC/PX: CPT

## 2017-08-01 PROCEDURE — 25010000002 MIDAZOLAM PER 1 MG: Performed by: NURSE ANESTHETIST, CERTIFIED REGISTERED

## 2017-08-01 PROCEDURE — 88312 SPECIAL STAINS GROUP 1: CPT

## 2017-08-01 PROCEDURE — 25010000002 NEOSTIGMINE 10 MG/10ML SOLUTION

## 2017-08-01 PROCEDURE — 25010000002 FENTANYL CITRATE (PF) 100 MCG/2ML SOLUTION: Performed by: NURSE ANESTHETIST, CERTIFIED REGISTERED

## 2017-08-01 PROCEDURE — 25010000002 ONDANSETRON PER 1 MG: Performed by: NURSE ANESTHETIST, CERTIFIED REGISTERED

## 2017-08-01 PROCEDURE — 88342 IMHCHEM/IMCYTCHM 1ST ANTB: CPT | Performed by: PATHOLOGY

## 2017-08-01 PROCEDURE — 41112 EXCISION OF TONGUE LESION: CPT | Performed by: OTOLARYNGOLOGY

## 2017-08-01 PROCEDURE — 80500 TISSUE PATHOLOGY EXAM: CPT | Performed by: PATHOLOGY

## 2017-08-01 PROCEDURE — 25010000002 PROPOFOL 10 MG/ML EMULSION: Performed by: NURSE ANESTHETIST, CERTIFIED REGISTERED

## 2017-08-01 PROCEDURE — 88305 TISSUE EXAM BY PATHOLOGIST: CPT | Performed by: OTOLARYNGOLOGY

## 2017-08-01 RX ORDER — SCOLOPAMINE TRANSDERMAL SYSTEM 1 MG/1
1 PATCH, EXTENDED RELEASE TRANSDERMAL ONCE
Status: DISCONTINUED | OUTPATIENT
Start: 2017-08-01 | End: 2017-08-01 | Stop reason: HOSPADM

## 2017-08-01 RX ORDER — ROCURONIUM BROMIDE 10 MG/ML
INJECTION, SOLUTION INTRAVENOUS AS NEEDED
Status: DISCONTINUED | OUTPATIENT
Start: 2017-08-01 | End: 2017-08-01 | Stop reason: SURG

## 2017-08-01 RX ORDER — ONDANSETRON 2 MG/ML
4 INJECTION INTRAMUSCULAR; INTRAVENOUS ONCE AS NEEDED
Status: DISCONTINUED | OUTPATIENT
Start: 2017-08-01 | End: 2017-08-01 | Stop reason: HOSPADM

## 2017-08-01 RX ORDER — LABETALOL HYDROCHLORIDE 5 MG/ML
5 INJECTION, SOLUTION INTRAVENOUS
Status: DISCONTINUED | OUTPATIENT
Start: 2017-08-01 | End: 2017-08-01 | Stop reason: HOSPADM

## 2017-08-01 RX ORDER — HYDROCODONE BITARTRATE AND ACETAMINOPHEN 5; 325 MG/1; MG/1
1 TABLET ORAL ONCE AS NEEDED
Status: DISCONTINUED | OUTPATIENT
Start: 2017-08-01 | End: 2017-08-01 | Stop reason: HOSPADM

## 2017-08-01 RX ORDER — FLUMAZENIL 0.1 MG/ML
0.2 INJECTION INTRAVENOUS AS NEEDED
Status: DISCONTINUED | OUTPATIENT
Start: 2017-08-01 | End: 2017-08-01 | Stop reason: HOSPADM

## 2017-08-01 RX ORDER — AMOXICILLIN 500 MG/1
500 CAPSULE ORAL 3 TIMES DAILY
Qty: 15 CAPSULE | Refills: 0 | Status: SHIPPED | OUTPATIENT
Start: 2017-08-01 | End: 2017-08-09

## 2017-08-01 RX ORDER — EPHEDRINE SULFATE 50 MG/ML
5 INJECTION, SOLUTION INTRAVENOUS ONCE AS NEEDED
Status: DISCONTINUED | OUTPATIENT
Start: 2017-08-01 | End: 2017-08-01 | Stop reason: HOSPADM

## 2017-08-01 RX ORDER — ACETAMINOPHEN 650 MG/1
650 SUPPOSITORY RECTAL ONCE AS NEEDED
Status: DISCONTINUED | OUTPATIENT
Start: 2017-08-01 | End: 2017-08-01 | Stop reason: HOSPADM

## 2017-08-01 RX ORDER — ACETAMINOPHEN 325 MG/1
650 TABLET ORAL ONCE AS NEEDED
Status: DISCONTINUED | OUTPATIENT
Start: 2017-08-01 | End: 2017-08-01 | Stop reason: HOSPADM

## 2017-08-01 RX ORDER — SODIUM CHLORIDE, SODIUM GLUCONATE, SODIUM ACETATE, POTASSIUM CHLORIDE, AND MAGNESIUM CHLORIDE 526; 502; 368; 37; 30 MG/100ML; MG/100ML; MG/100ML; MG/100ML; MG/100ML
1000 INJECTION, SOLUTION INTRAVENOUS CONTINUOUS PRN
Status: DISCONTINUED | OUTPATIENT
Start: 2017-08-01 | End: 2017-08-01 | Stop reason: HOSPADM

## 2017-08-01 RX ORDER — DIPHENHYDRAMINE HYDROCHLORIDE 50 MG/ML
12.5 INJECTION INTRAMUSCULAR; INTRAVENOUS
Status: DISCONTINUED | OUTPATIENT
Start: 2017-08-01 | End: 2017-08-01 | Stop reason: HOSPADM

## 2017-08-01 RX ORDER — GLYCOPYRROLATE 0.2 MG/ML
INJECTION INTRAMUSCULAR; INTRAVENOUS AS NEEDED
Status: DISCONTINUED | OUTPATIENT
Start: 2017-08-01 | End: 2017-08-01 | Stop reason: SURG

## 2017-08-01 RX ORDER — MIDAZOLAM HYDROCHLORIDE 1 MG/ML
INJECTION INTRAMUSCULAR; INTRAVENOUS AS NEEDED
Status: DISCONTINUED | OUTPATIENT
Start: 2017-08-01 | End: 2017-08-01 | Stop reason: SURG

## 2017-08-01 RX ORDER — IBUPROFEN 600 MG/1
600 TABLET ORAL EVERY 6 HOURS PRN
Status: DISCONTINUED | OUTPATIENT
Start: 2017-08-01 | End: 2017-08-01 | Stop reason: HOSPADM

## 2017-08-01 RX ORDER — FENTANYL CITRATE 50 UG/ML
INJECTION, SOLUTION INTRAMUSCULAR; INTRAVENOUS AS NEEDED
Status: DISCONTINUED | OUTPATIENT
Start: 2017-08-01 | End: 2017-08-01 | Stop reason: SURG

## 2017-08-01 RX ORDER — SUCCINYLCHOLINE CHLORIDE 20 MG/ML
INJECTION INTRAMUSCULAR; INTRAVENOUS AS NEEDED
Status: DISCONTINUED | OUTPATIENT
Start: 2017-08-01 | End: 2017-08-01 | Stop reason: SURG

## 2017-08-01 RX ORDER — ONDANSETRON 2 MG/ML
INJECTION INTRAMUSCULAR; INTRAVENOUS AS NEEDED
Status: DISCONTINUED | OUTPATIENT
Start: 2017-08-01 | End: 2017-08-01 | Stop reason: SURG

## 2017-08-01 RX ORDER — NALOXONE HCL 0.4 MG/ML
0.2 VIAL (ML) INJECTION AS NEEDED
Status: DISCONTINUED | OUTPATIENT
Start: 2017-08-01 | End: 2017-08-01 | Stop reason: HOSPADM

## 2017-08-01 RX ORDER — PROPOFOL 10 MG/ML
VIAL (ML) INTRAVENOUS AS NEEDED
Status: DISCONTINUED | OUTPATIENT
Start: 2017-08-01 | End: 2017-08-01 | Stop reason: SURG

## 2017-08-01 RX ORDER — CHLORHEXIDINE GLUCONATE 0.12 MG/ML
15 RINSE ORAL 4 TIMES DAILY
Qty: 600 ML | Refills: 0 | Status: SHIPPED | OUTPATIENT
Start: 2017-08-01 | End: 2017-09-14

## 2017-08-01 RX ADMIN — SCOPALAMINE 1 PATCH: 1 PATCH, EXTENDED RELEASE TRANSDERMAL at 09:09

## 2017-08-01 RX ADMIN — MIDAZOLAM 2 MG: 1 INJECTION INTRAMUSCULAR; INTRAVENOUS at 11:57

## 2017-08-01 RX ADMIN — GLYCOPYRROLATE 0.4 MG: 0.2 INJECTION, SOLUTION INTRAMUSCULAR; INTRAVENOUS at 12:35

## 2017-08-01 RX ADMIN — ONDANSETRON 4 MG: 2 INJECTION INTRAMUSCULAR; INTRAVENOUS at 12:31

## 2017-08-01 RX ADMIN — SODIUM CHLORIDE, SODIUM GLUCONATE, SODIUM ACETATE, POTASSIUM CHLORIDE, AND MAGNESIUM CHLORIDE 1000 ML: 526; 502; 368; 37; 30 INJECTION, SOLUTION INTRAVENOUS at 09:09

## 2017-08-01 RX ADMIN — ROCURONIUM BROMIDE 10 MG: 10 INJECTION INTRAVENOUS at 12:08

## 2017-08-01 RX ADMIN — SUCCINYLCHOLINE CHLORIDE 100 MG: 20 INJECTION, SOLUTION INTRAMUSCULAR; INTRAVENOUS at 12:08

## 2017-08-01 RX ADMIN — FENTANYL CITRATE 50 MCG: 50 INJECTION, SOLUTION INTRAMUSCULAR; INTRAVENOUS at 12:15

## 2017-08-01 RX ADMIN — FENTANYL CITRATE 50 MCG: 50 INJECTION, SOLUTION INTRAMUSCULAR; INTRAVENOUS at 12:08

## 2017-08-01 RX ADMIN — PROPOFOL 150 MG: 10 INJECTION, EMULSION INTRAVENOUS at 12:08

## 2017-08-01 NOTE — ANESTHESIA PROCEDURE NOTES
Airway  Urgency: elective    Airway not difficult    General Information and Staff    Patient location during procedure: OR    Indications and Patient Condition  Indications for airway management: airway protection    Preoxygenated: yes  Mask difficulty assessment: 2 - vent by mask + OA or adjuvant +/- NMBA    Final Airway Details  Final airway type: endotracheal airway      Successful airway: ETT  Cuffed: yes   Successful intubation technique: direct laryngoscopy  Endotracheal tube insertion site: oral  Blade: Jaspreet  Blade size: #3  ETT size: 6.5 mm  Cormack-Lehane Classification: grade I - full view of glottis  Placement verified by: chest auscultation and capnometry   Number of attempts at approach: 1

## 2017-08-01 NOTE — ANESTHESIA POSTPROCEDURE EVALUATION
Patient: Amira Shannon    Procedure Summary     Date Anesthesia Start Anesthesia Stop Room / Location    08/01/17 1202 1251  [facility] OR [facility] / [facility]       Procedure Diagnosis Surgeon Provider    DIRECT LARYNGOSCOPY AND (N/A Esophagus); EXCISION OF LEFT  TONGUE LESION WITH CLOSURE (N/A ) Tongue lesion  (Tongue lesion [K14.8]) MD Sanya Escobar MD          Anesthesia Type: general  Last vitals  BP        Temp        Pulse       Resp        SpO2          Post Anesthesia Care and Evaluation    Patient location during evaluation: PACU  Patient participation: complete - patient participated  Level of consciousness: sleepy but conscious  Pain score: 0  Pain management: adequate  Airway patency: patent  Anesthetic complications: No anesthetic complications  PONV Status: none  Cardiovascular status: acceptable  Respiratory status: acceptable  Hydration status: acceptable

## 2017-08-01 NOTE — PLAN OF CARE
Problem: Patient Care Overview (Adult)  Goal: Plan of Care Review  Outcome: Ongoing (interventions implemented as appropriate)    08/01/17 1313   Coping/Psychosocial Response Interventions   Plan Of Care Reviewed With patient   Patient Care Overview   Progress no change   Outcome Evaluation   Outcome Summary/Follow up Plan vss meets pacu dc criteria          Problem: Perioperative Period (Adult)  Goal: Signs and Symptoms of Listed Potential Problems Will be Absent or Manageable (Perioperative Period)  Outcome: Ongoing (interventions implemented as appropriate)

## 2017-08-01 NOTE — OP NOTE
OPERATIVE NOTE    Name:    Amira Shannon  YOB: 1960  Date of surgery:   8/1/2017    Pre-op Diagnosis:   Tongue lesion [K14.8]    Post-op Diagnosis:    Post-Op Diagnosis Codes:     * Tongue lesion [K14.8]    Procedure:  Procedure(s):  DIRECT LARYNGOSCOPY AND  EXCISION OF LEFT  TONGUE LESION WITH CLOSURE    Surgeon:  Live Bolton MD, AAOHNS    Anesthesia: General    Staff:   Circulator: Tanvi Chaudhari RN; Mazin Calderon RN  Scrub Person: Paola Jerome  Assistant: Sneha Montenegro    Estimated Blood Loss:   2ml    Specimens:                  ID Type Source Tests Collected by Time Destination   A : anterior stitch long, posterior stitch short, medial stitch middle Tissue Tongue TISSUE EXAM Live Bolton MD 8/1/2017 1226          Drains:           Findings:      Complications: None    IMPLANTS:   Nothing was implanted during the procedure    INDICATIONS:Has persistent lesion left time history of tobacco exposure past concerns because of nonhealing recurrent nature this may be malignancy.    PROCEDURE: She was taken to the operating room and placed in supine position.  Gen. anesthesia was carried out.  The timeout was carried out.  Mouth gag was placed mouth and using the Dedo laryngoscope the pharynx hypopharynx base of tongue epiglottis vallecula larynx were evaluated.  There is some thickened mucosa in general but no specific lesion, no pooling secretions, base of tongue was also evaluated near the lesion and no other mass was noted.    Attention was then taken tongue which was grasped and injected with 2 mL 1% lidocaine with 11 100,000 epinephrine is left pressure 10 minutes and then the lesion of concern on the left posterior tongue on the border was excised down through the skin subcutaneous tissue to the muscle.  Multilayer closure Vicryl and chromic was carried out patient will contact recovery room good condition without evidence of bleeding.     Instructions were given the  family and wound care patient tolerated procedure well without evidence complication with stable.        This document has been electronically signed by Live Bolton MD on August 1, 2017 3:55 PM

## 2017-08-01 NOTE — DISCHARGE INSTRUCTIONS
Rinse mouth post meals and at bed time for 1 week with Peridex  Call if increasing pain beyond what pain med is able to relieve  Soft diet 1 week  To ED for significant bleeding or airway breathing problems

## 2017-08-02 DIAGNOSIS — I25.2 MYOCARDIAL INFARCT, OLD: Primary | ICD-10-CM

## 2017-08-09 ENCOUNTER — OFFICE VISIT (OUTPATIENT)
Dept: OTOLARYNGOLOGY | Facility: CLINIC | Age: 57
End: 2017-08-09

## 2017-08-09 VITALS — TEMPERATURE: 97 F | BODY MASS INDEX: 38.41 KG/M2 | HEIGHT: 64 IN | WEIGHT: 225 LBS

## 2017-08-09 DIAGNOSIS — K14.8 TONGUE LESION: Primary | ICD-10-CM

## 2017-08-09 PROCEDURE — 99024 POSTOP FOLLOW-UP VISIT: CPT | Performed by: OTOLARYNGOLOGY

## 2017-08-09 NOTE — PROGRESS NOTES
Patient comes in status post left tongue lesion removal.   She has some soreness but she's taking adequate by mouth intake.    She's had no signs of infection.  Her wound is healing normally there is minimal resolving sutures no signs of infection no signs of inflammation her denture does rub on the area.    She is to minimize her   denture  usehaving readjusted so doesn't slip a move around some much irritating the tongue site.   The pathology is still not available is been sent to [facility] for second opinion.  I will call her with results when available.    Suggest that we have follow-up in 3 weeks where the other as well as her pain is improving.  Nathalie told her that she had an EKG done preoperatively which continues to show an abnormal EKG.  She is cleared by the anesthesiologist but she's not seeing cardiologist is now going to see a cardiologist about the abnormal EKG.  She has no chest pain.  Explained her that Dr. Bart Espitia she is seeing is a good cardiologist.  TIGRE Bolton M.D.

## 2017-08-15 ENCOUNTER — TELEPHONE (OUTPATIENT)
Dept: OTOLARYNGOLOGY | Facility: CLINIC | Age: 57
End: 2017-08-15

## 2017-08-15 NOTE — TELEPHONE ENCOUNTER
Patient was calling for pathology results, I spoke with path in the lab, the results are not back from the [facility] yet. The patient understood this and is to call with any questions or concerns.

## 2017-08-15 NOTE — TELEPHONE ENCOUNTER
I called the patient regarding the [facility] review that this was a benign lesion.  I explained her something worsens or recur slightly now.  I encouraged her to see her dentist about getting her dentures adjusted because I think that's her concerning factor.  Charlie brasher already  has a follow-up set up will see her back then all questions were answered.

## 2017-08-16 LAB
LAB AP CASE REPORT: NORMAL
LAB AP DIAGNOSIS COMMENT: NORMAL
LAB AP INTRADEPARTMENTAL CONSULT: NORMAL
LAB AP SPECIAL STAINS: NORMAL
Lab: NORMAL
PATH REPORT.FINAL DX SPEC: NORMAL
PATH REPORT.GROSS SPEC: NORMAL

## 2017-08-30 ENCOUNTER — OFFICE VISIT (OUTPATIENT)
Dept: OTOLARYNGOLOGY | Facility: CLINIC | Age: 57
End: 2017-08-30

## 2017-08-30 VITALS — HEIGHT: 64 IN | BODY MASS INDEX: 38.41 KG/M2 | WEIGHT: 225 LBS | TEMPERATURE: 97.4 F

## 2017-08-30 DIAGNOSIS — G47.33 OBSTRUCTIVE SLEEP APNEA SYNDROME: ICD-10-CM

## 2017-08-30 DIAGNOSIS — J34.89 NASAL VESTIBULITIS: Primary | ICD-10-CM

## 2017-08-30 PROCEDURE — 99213 OFFICE O/P EST LOW 20 MIN: CPT | Performed by: OTOLARYNGOLOGY

## 2017-08-30 RX ORDER — CETIRIZINE HYDROCHLORIDE 10 MG/1
10 TABLET ORAL DAILY PRN
Qty: 30 TABLET | Refills: 11 | COMMUNITY
End: 2017-08-31 | Stop reason: SDUPTHER

## 2017-08-30 NOTE — PROGRESS NOTES
Subjective   Amira Shannon is a 56 y.o. female.   CC: Patient seen for  other problems comes in with 2 other concerns regarding sleep apnea and nasal vestibulitis    History of Present Illness   Patient has a history of nasal vestibulitis is been treated with oral antibiotics but only in short-term response by another physician.  She like treatment for this is not having bleeding but has soreness of her nose she has no facial cellulitis or fever.  She's not complaining about headaches.  As her tongue is doing better but she has dentures in the baby irritated her tongue.  She is going to be seeking some assistance from a dentist about new dentures.  Relates that she has a known history of sleep apnea is been treated by another physician but she's not happy with the results.  She like referral to sleep specialist.  She Carlos has CPAP at home that has difficulty using it.          The following portions of the patient's history were reviewed and updated as appropriate: allergies, current medications, past family history, past medical history, past social history, past surgical history and problem list.      Current Outpatient Prescriptions:   •  amLODIPine (NORVASC) 10 MG tablet, Take 10 mg by mouth Daily., Disp: , Rfl:   •  chlorhexidine (PERIDEX) 0.12 % solution, Apply 15 mL to the mouth or throat 4 (Four) Times a Day., Disp: 600 mL, Rfl: 0  •  cyclobenzaprine (FLEXERIL) 10 MG tablet, Take 10 mg by mouth 2 (Two) Times a Day As Needed for Muscle Spasms., Disp: , Rfl:   •  DULoxetine (CYMBALTA) 30 MG capsule, Take 30 mg by mouth Daily., Disp: , Rfl:   •  ferrous sulfate 325 (65 FE) MG tablet, Take 325 mg by mouth Daily With Breakfast. Iron 65mg, Disp: , Rfl:   •  furosemide (LASIX) 20 MG tablet, Take 20 mg by mouth 2 (Two) Times a Day., Disp: , Rfl:   •  gabapentin (NEURONTIN) 800 MG tablet, Take 800 mg by mouth 4 (Four) Times a Day., Disp: , Rfl:   •  HYDROcodone-acetaminophen (NORCO) 7.5-325 MG per tablet, Take  1 tablet by mouth 3 (Three) Times a Day As Needed for Moderate Pain (4-6)., Disp: , Rfl:   •  linaclotide (LINZESS) 145 MCG capsule capsule, Take 145 mcg by mouth Every Night., Disp: , Rfl:   •  magnesium oxide (MAGOX) 400 (241.3 MG) MG tablet tablet, Take 400 mg by mouth Daily., Disp: , Rfl:   •  omeprazole (priLOSEC) 20 MG capsule, Take 20 mg by mouth Daily., Disp: , Rfl:   •  cetirizine (zyrTEC) 10 MG tablet, Take 1 tablet by mouth Daily As Needed for Allergies., Disp: 30 tablet, Rfl: 11    Current Facility-Administered Medications:   •  mupirocin (BACTROBAN) 2 % ointment, , Topical, Q12H, Live Bolton MD    Allergies   Allergen Reactions   • Other      Pt states that taking steroids either in pill or injection form make her have blisters in her mouth and she feels like she is on fire on the inside/ has hx of c diff and possible MRSA     • Sulfa Antibiotics Rash     Sulfa (Sulfonamide Antibiotics)       Review of Systems   Constitutional: Positive for fatigue. Negative for fever.   HENT: Positive for mouth sores. Negative for ear pain, nosebleeds and rhinorrhea.    Respiratory: Positive for apnea.    Hematological: Negative for adenopathy.           Objective   Physical Exam   Constitutional: She is oriented to person, place, and time. She appears well-developed and well-nourished.   HENT:   Head: Normocephalic and atraumatic.   Right Ear: Hearing, tympanic membrane, external ear and ear canal normal.   Left Ear: Hearing, tympanic membrane, external ear and ear canal normal.   Nose: Nose normal. No mucosal edema, rhinorrhea, nasal deformity or septal deviation. No epistaxis. Right sinus exhibits no maxillary sinus tenderness and no frontal sinus tenderness. Left sinus exhibits no maxillary sinus tenderness and no frontal sinus tenderness.   Mouth/Throat: Uvula is midline and oropharynx is clear and moist. No trismus in the jaw. Normal dentition. No oropharyngeal exudate or posterior oropharyngeal edema.    Eyes: Conjunctivae are normal.   Neck: Normal range of motion. Neck supple. No JVD present. No tracheal deviation present. No thyromegaly present.   Cardiovascular: Normal rate.    Pulmonary/Chest: Effort normal.   Musculoskeletal: Normal range of motion.   Lymphadenopathy:        Head (right side): No submental, no submandibular, no tonsillar, no preauricular, no posterior auricular and no occipital adenopathy present.        Head (left side): No submental, no submandibular, no tonsillar, no preauricular, no posterior auricular and no occipital adenopathy present.     She has no cervical adenopathy.        Right cervical: No superficial cervical, no deep cervical and no posterior cervical adenopathy present.       Left cervical: No superficial cervical, no deep cervical and no posterior cervical adenopathy present.   Neurological: She is alert and oriented to person, place, and time. No cranial nerve deficit.   Skin: Skin is warm.   Psychiatric: She has a normal mood and affect. Her speech is normal and behavior is normal. Thought content normal.   Nursing note and vitals reviewed.    Patient has a large tongue but no specific lesions are noted on the tongue minimal irritation her wound sites healing well.  Her nose shows no specific ulceration or purulence is just a minimal crustiness along the anterior nares     Assessment/Plan   Amira was seen today for follow-up.    Diagnoses and all orders for this visit:    Nasal vestibulitis  -     Ambulatory Referral to Sleep Medicine  -     Discontinue: mupirocin (BACTROBAN) 2 % ointment; Apply  topically Every 8 (Eight) Hours.  -     mupirocin (BACTROBAN) 2 % ointment; Apply  topically Every 12 (Twelve) Hours.    Obstructive sleep apnea syndrome      Referral to sleep specialist at her request.  I think they'll be able to help maximize her CPAP use.  I placed her on mupirocin ointment she is to use that for 4 weeks at least twice a day up to 3 times a day she is to  call if not improving within a week and we'll consider oral antibiotics otherwise will see her back in 6 weeks when she's been off of it for at least 2 weeks.   She'll call for questions or problems in the interim

## 2017-08-31 ENCOUNTER — TELEPHONE (OUTPATIENT)
Dept: OTOLARYNGOLOGY | Facility: CLINIC | Age: 57
End: 2017-08-31

## 2017-08-31 RX ORDER — CETIRIZINE HYDROCHLORIDE 10 MG/1
10 TABLET ORAL DAILY PRN
Qty: 30 TABLET | Refills: 11 | Status: SHIPPED | OUTPATIENT
Start: 2017-08-31 | End: 2018-10-29 | Stop reason: SDUPTHER

## 2017-09-12 ENCOUNTER — OFFICE VISIT (OUTPATIENT)
Dept: PAIN MEDICINE | Facility: CLINIC | Age: 57
End: 2017-09-12

## 2017-09-12 VITALS
BODY MASS INDEX: 38.8 KG/M2 | WEIGHT: 227.3 LBS | DIASTOLIC BLOOD PRESSURE: 74 MMHG | SYSTOLIC BLOOD PRESSURE: 140 MMHG | HEIGHT: 64 IN

## 2017-09-12 DIAGNOSIS — Z79.899 HIGH RISK MEDICATIONS (NOT ANTICOAGULANTS) LONG-TERM USE: ICD-10-CM

## 2017-09-12 DIAGNOSIS — M79.672 HEEL PAIN, BILATERAL: ICD-10-CM

## 2017-09-12 DIAGNOSIS — M79.2 NEUROPATHIC PAIN: Primary | ICD-10-CM

## 2017-09-12 DIAGNOSIS — M79.671 HEEL PAIN, BILATERAL: ICD-10-CM

## 2017-09-12 PROCEDURE — 99213 OFFICE O/P EST LOW 20 MIN: CPT | Performed by: PAIN MEDICINE

## 2017-09-12 RX ORDER — OXYCODONE AND ACETAMINOPHEN 7.5; 325 MG/1; MG/1
1 TABLET ORAL 3 TIMES DAILY
Qty: 90 TABLET | Refills: 0 | Status: SHIPPED | OUTPATIENT
Start: 2017-09-12 | End: 2017-09-20 | Stop reason: SDUPTHER

## 2017-09-12 RX ORDER — OXYCODONE AND ACETAMINOPHEN 7.5; 325 MG/1; MG/1
1 TABLET ORAL 3 TIMES DAILY
Qty: 90 TABLET | Refills: 0 | Status: SHIPPED | OUTPATIENT
Start: 2017-09-12 | End: 2017-10-12

## 2017-09-12 NOTE — PROGRESS NOTES
"Amira Shannon is a 56 y.o. female.   1960    HPI:   Location: lower back, right hip, bilateral knee and bilateral foot  Quality: throbbing and tingling  Severity: 7/10  Timing: constant  Alleviating: nothing  Aggravating: ambulating      Had tongue abcess removed in interim.  Patient reports that the opioid medication still provides good relief and allows the patient increased activity than without the opioid medication.  Side effects are denied.        The following portions of the patient's history were reviewed by me and updated as appropriate: allergies, current medications, past family history, past medical history, past social history, past surgical history and problem list.    Past Medical History:   Diagnosis Date   • Acid reflux    • Altered bowel function    • Arthropathy of lumbar facet joint    • Constipation    • Corns and callus    • Depression    • Disease related peripheral neuropathy    • Epigastric pain    • Fatty liver    • Hammer toe    • Headache    • Hiatal hernia    • Hyperlipidemia    • Knee pain    • Localized, primary osteoarthritis of the ankle and foot     Localized, primary osteoarthritis of the ankle and/or foot   • Mendoza's metatarsalgia     Mendoza's metatarsalgia - 2nd interspace on right   • Nausea and vomiting    • Neuralgia and neuritis     Neuralgia, neuritis, and radiculitis, unspecified   • Neuropathy    • Obstructive sleep apnea     Obstructive sleep apnea (adult) (pediatric)    • CHAI on CPAP     \"C-Pap at night  (unconfirmed)\"   • Osteoarthritis    • Pain in foot     Pain in unspecified foot - sees a podiatrist   • Pain in joint, ankle and foot     Joint pain in ankle and foot      • Pain radiating to back     Pain radiating to lumbar region of back   • Plantar fasciitis    • Restless leg syndrome    • Secondary hypertension     Secondary hypertension, unspecified   • Sinusitis    • Tongue anomaly     lesion       Social History     Social History   • Marital " status:      Spouse name: N/A   • Number of children: N/A   • Years of education: N/A     Occupational History   • Not on file.     Social History Main Topics   • Smoking status: Former Smoker     Types: Cigarettes   • Smokeless tobacco: Never Used   • Alcohol use No   • Drug use: No   • Sexual activity: Defer      Comment: Marital status:      Other Topics Concern   • Not on file     Social History Narrative       Family History   Problem Relation Age of Onset   • Cancer Other    • Diabetes Other    • Heart disease Other          Current Outpatient Prescriptions:   •  amLODIPine (NORVASC) 10 MG tablet, Take 10 mg by mouth Daily., Disp: , Rfl:   •  cetirizine (zyrTEC) 10 MG tablet, Take 1 tablet by mouth Daily As Needed for Allergies., Disp: 30 tablet, Rfl: 11  •  chlorhexidine (PERIDEX) 0.12 % solution, Apply 15 mL to the mouth or throat 4 (Four) Times a Day., Disp: 600 mL, Rfl: 0  •  cyclobenzaprine (FLEXERIL) 10 MG tablet, Take 10 mg by mouth 2 (Two) Times a Day As Needed for Muscle Spasms., Disp: , Rfl:   •  DULoxetine (CYMBALTA) 30 MG capsule, Take 30 mg by mouth Daily., Disp: , Rfl:   •  ferrous sulfate 325 (65 FE) MG tablet, Take 325 mg by mouth Daily With Breakfast. Iron 65mg, Disp: , Rfl:   •  furosemide (LASIX) 20 MG tablet, Take 20 mg by mouth 2 (Two) Times a Day., Disp: , Rfl:   •  gabapentin (NEURONTIN) 800 MG tablet, Take 800 mg by mouth 4 (Four) Times a Day., Disp: , Rfl:   •  HYDROcodone-acetaminophen (NORCO) 7.5-325 MG per tablet, Take 1 tablet by mouth 3 (Three) Times a Day As Needed for Moderate Pain (4-6)., Disp: , Rfl:   •  linaclotide (LINZESS) 145 MCG capsule capsule, Take 145 mcg by mouth Every Night., Disp: , Rfl:   •  magnesium oxide (MAGOX) 400 (241.3 MG) MG tablet tablet, Take 400 mg by mouth Daily., Disp: , Rfl:   •  mupirocin (BACTROBAN) 2 % ointment, Apply  topically 3 (Three) Times a Day., Disp: 1 g, Rfl: 2  •  omeprazole (priLOSEC) 20 MG capsule, Take 20 mg by mouth  Daily., Disp: , Rfl:   •  oxyCODONE-acetaminophen (PERCOCET) 7.5-325 MG per tablet, Take 1 tablet by mouth 3 (Three) Times a Day for 30 days., Disp: 90 tablet, Rfl: 0  •  oxyCODONE-acetaminophen (PERCOCET) 7.5-325 MG per tablet, Take 1 tablet by mouth 3 (Three) Times a Day., Disp: 90 tablet, Rfl: 0    Current Facility-Administered Medications:   •  mupirocin (BACTROBAN) 2 % ointment, , Topical, Q12H, Live Bolton MD    Allergies   Allergen Reactions   • Other      Pt states that taking steroids either in pill or injection form make her have blisters in her mouth and she feels like she is on fire on the inside/ has hx of c diff and possible MRSA     • Sulfa Antibiotics Rash     Sulfa (Sulfonamide Antibiotics)       Review of Systems   Musculoskeletal: Positive for back pain (lower).        Right hip pain bilateral knee pain bilateral  Foot pain     All other systems reviewed and are negative.    All systems reviewed and negative except for above.    Physical Exam   Constitutional: She appears well-developed and well-nourished. No distress.   Musculoskeletal:   Painful full arom b knees    Subjective foot pain in heels bilaterally with ambulation.     Neurological: She is alert.   Psychiatric: She has a normal mood and affect. Her behavior is normal. Judgment normal.       Amira was seen today for back pain, hip pain, knee pain and foot pain.    Diagnoses and all orders for this visit:    Neuropathic pain    Heel pain, bilateral    High risk medications (not anticoagulants) long-term use    Other orders  -     oxyCODONE-acetaminophen (PERCOCET) 7.5-325 MG per tablet; Take 1 tablet by mouth 3 (Three) Times a Day for 30 days.  -     oxyCODONE-acetaminophen (PERCOCET) 7.5-325 MG per tablet; Take 1 tablet by mouth 3 (Three) Times a Day.        Medication: Patient reports no negative side effects, Patient reports appropriate usage and storage habits, Patient's opioid provides enough reflief to be more active and  perform activities of daily living with less discomfort. and Refill opioid medication as above.    Interventional: none at this time    Rehab: none at this time    Behavioral: No aberrant behavior noted. NITO Report #[id number]  was reviewed and is consistent with stated history    Urine drug screen None at this time          This document has been electronically signed by Hua Westfall MD on September 12, 2017 11:21 AM

## 2017-09-14 ENCOUNTER — OFFICE VISIT (OUTPATIENT)
Dept: GASTROENTEROLOGY | Facility: CLINIC | Age: 57
End: 2017-09-14

## 2017-09-14 VITALS
HEART RATE: 78 BPM | DIASTOLIC BLOOD PRESSURE: 92 MMHG | WEIGHT: 225.25 LBS | HEIGHT: 64 IN | BODY MASS INDEX: 38.45 KG/M2 | SYSTOLIC BLOOD PRESSURE: 147 MMHG

## 2017-09-14 DIAGNOSIS — K75.81 NASH (NONALCOHOLIC STEATOHEPATITIS): Primary | ICD-10-CM

## 2017-09-14 PROCEDURE — 99213 OFFICE O/P EST LOW 20 MIN: CPT | Performed by: INTERNAL MEDICINE

## 2017-09-14 NOTE — PROGRESS NOTES
"[facility]      Chief Complaint:   Chief Complaint   Patient presents with   • MUSA       Subjective     HPI:   Patient with Musa syndrome.  Patient states that she has not been watching her weight and has been eating milk and soda.  Patient understands that she will develop cirrhosis of liver and liver failure and will attempt to lose some weight prior to next visit.  Lab repeat labwork and follow-up in 3 months.    Plan; follow-up in 3 months with repeat lab work    Past Medical History:   Past Medical History:   Diagnosis Date   • Acid reflux    • Altered bowel function    • Arthropathy of lumbar facet joint    • Constipation    • Corns and callus    • Depression    • Disease related peripheral neuropathy    • Epigastric pain    • Fatty liver    • Hammer toe    • Headache    • Hiatal hernia    • Hyperlipidemia    • Knee pain    • Localized, primary osteoarthritis of the ankle and foot     Localized, primary osteoarthritis of the ankle and/or foot   • Mendoza's metatarsalgia     Mendoza's metatarsalgia - 2nd interspace on right   • Nausea and vomiting    • Neuralgia and neuritis     Neuralgia, neuritis, and radiculitis, unspecified   • Neuropathy    • Obstructive sleep apnea     Obstructive sleep apnea (adult) (pediatric)    • CHAI on CPAP     \"C-Pap at night  (unconfirmed)\"   • Osteoarthritis    • Pain in foot     Pain in unspecified foot - sees a podiatrist   • Pain in joint, ankle and foot     Joint pain in ankle and foot      • Pain radiating to back     Pain radiating to lumbar region of back   • Plantar fasciitis    • Restless leg syndrome    • Secondary hypertension     Secondary hypertension, unspecified   • Sinusitis    • Tongue anomaly     lesion       Family History:  Family History   Problem Relation Age of Onset   • Cancer Other    • Diabetes Other    • Heart disease Other        Social History:   reports that she has quit smoking. Her smoking use included Cigarettes. She has " never used smokeless tobacco. She reports that she does not drink alcohol or use illicit drugs.    Medications:   Current Outpatient Prescriptions   Medication Sig Dispense Refill   • amLODIPine (NORVASC) 10 MG tablet Take 10 mg by mouth Daily.     • cetirizine (zyrTEC) 10 MG tablet Take 1 tablet by mouth Daily As Needed for Allergies. 30 tablet 11   • cyclobenzaprine (FLEXERIL) 10 MG tablet Take 10 mg by mouth 2 (Two) Times a Day As Needed for Muscle Spasms.     • DULoxetine (CYMBALTA) 30 MG capsule Take 30 mg by mouth Daily.     • ferrous sulfate 325 (65 FE) MG tablet Take 325 mg by mouth Daily With Breakfast. Iron 65mg     • gabapentin (NEURONTIN) 800 MG tablet Take 800 mg by mouth 4 (Four) Times a Day.     • HYDROcodone-acetaminophen (NORCO) 7.5-325 MG per tablet Take 1 tablet by mouth 3 (Three) Times a Day As Needed for Moderate Pain (4-6).     • linaclotide (LINZESS) 145 MCG capsule capsule Take 145 mcg by mouth Every Night.     • magnesium oxide (MAGOX) 400 (241.3 MG) MG tablet tablet Take 400 mg by mouth Daily.     • mupirocin (BACTROBAN) 2 % ointment Apply  topically 3 (Three) Times a Day. 1 g 2   • omeprazole (priLOSEC) 20 MG capsule Take 20 mg by mouth Daily.     • oxyCODONE-acetaminophen (PERCOCET) 7.5-325 MG per tablet Take 1 tablet by mouth 3 (Three) Times a Day for 30 days. 90 tablet 0   • oxyCODONE-acetaminophen (PERCOCET) 7.5-325 MG per tablet Take 1 tablet by mouth 3 (Three) Times a Day. 90 tablet 0     Current Facility-Administered Medications   Medication Dose Route Frequency Provider Last Rate Last Dose   • mupirocin (BACTROBAN) 2 % ointment   Topical Q12H Live oBlton MD           Allergies:  Other and Sulfa antibiotics    ROS:    Review of Systems   Constitutional: Negative for activity change, appetite change, chills, diaphoresis, fatigue, fever and unexpected weight change.   HENT: Negative for sore throat and trouble swallowing.    Respiratory: Negative for shortness of breath.   "  Gastrointestinal: Negative for abdominal distention, abdominal pain, anal bleeding, blood in stool, constipation, diarrhea, nausea, rectal pain and vomiting.   Musculoskeletal: Negative for arthralgias.   Skin: Negative for pallor.   Neurological: Negative for light-headedness.     Objective     Blood pressure 147/92, pulse 78, height 64\" (162.6 cm), weight 225 lb 4 oz (102 kg), not currently breastfeeding.    Physical Exam   Constitutional: She is oriented to person, place, and time. She appears well-developed and well-nourished. No distress.   HENT:   Head: Normocephalic and atraumatic.   Cardiovascular: Normal rate, regular rhythm, normal heart sounds and intact distal pulses.  Exam reveals no gallop and no friction rub.    No murmur heard.  Pulmonary/Chest: Breath sounds normal. No respiratory distress. She has no wheezes. She has no rales. She exhibits no tenderness.   Abdominal: Soft. Bowel sounds are normal. She exhibits no distension and no mass. There is no tenderness. There is no rebound and no guarding. No hernia.   Musculoskeletal: Normal range of motion. She exhibits no edema.   Neurological: She is alert and oriented to person, place, and time.   Skin: Skin is warm and dry. No rash noted. She is not diaphoretic. No erythema. No pallor.   Psychiatric: She has a normal mood and affect. Her behavior is normal. Judgment and thought content normal.        Assessment/Plan   Amira was seen today for lau.    Diagnoses and all orders for this visit:    LAU (nonalcoholic steatohepatitis)  -     Comprehensive Metabolic Panel  -     CBC & Differential  -     LAU Fibrosure        * Surgery not found *     Diagnosis Plan   1. LAU (nonalcoholic steatohepatitis)  Comprehensive Metabolic Panel    CBC & Differential    LAU Fibrosure       Anticipated Surgical Procedure:  Orders Placed This Encounter   Procedures   • Comprehensive Metabolic Panel   • LAU Fibrosure   • CBC & Differential     Order Specific " Question:   Manual Differential     Answer:   No       The risks, benefits, and alternatives of this procedure have been discussed with the patient or the responsible party- the patient understands and agrees to proceed.

## 2017-09-20 ENCOUNTER — APPOINTMENT (OUTPATIENT)
Dept: LAB | Facility: HOSPITAL | Age: 57
End: 2017-09-20

## 2017-09-20 ENCOUNTER — OFFICE VISIT (OUTPATIENT)
Dept: CARDIOLOGY | Facility: CLINIC | Age: 57
End: 2017-09-20

## 2017-09-20 VITALS
HEART RATE: 75 BPM | OXYGEN SATURATION: 97 % | SYSTOLIC BLOOD PRESSURE: 126 MMHG | WEIGHT: 226.6 LBS | HEIGHT: 64 IN | BODY MASS INDEX: 38.68 KG/M2 | DIASTOLIC BLOOD PRESSURE: 86 MMHG

## 2017-09-20 DIAGNOSIS — R07.2 PRECORDIAL PAIN: ICD-10-CM

## 2017-09-20 DIAGNOSIS — I73.9 CLAUDICATION (HCC): ICD-10-CM

## 2017-09-20 DIAGNOSIS — R60.0 LOCALIZED EDEMA: ICD-10-CM

## 2017-09-20 DIAGNOSIS — G47.33 OSA (OBSTRUCTIVE SLEEP APNEA): ICD-10-CM

## 2017-09-20 DIAGNOSIS — R94.31 ABNORMAL EKG: Primary | ICD-10-CM

## 2017-09-20 DIAGNOSIS — Z13.220 SCREENING FOR HYPERLIPIDEMIA: ICD-10-CM

## 2017-09-20 DIAGNOSIS — R06.09 DYSPNEA ON EXERTION: ICD-10-CM

## 2017-09-20 LAB
ALBUMIN SERPL-MCNC: 4.3 G/DL (ref 3.4–4.8)
ALBUMIN UR-MCNC: 1.7 MG/L
ALBUMIN/GLOB SERPL: 1.5 G/DL (ref 1.1–1.8)
ALP SERPL-CCNC: 125 U/L (ref 38–126)
ALT SERPL W P-5'-P-CCNC: 87 U/L (ref 9–52)
ANION GAP SERPL CALCULATED.3IONS-SCNC: 11 MMOL/L (ref 5–15)
ARTICHOKE IGE QN: 89 MG/DL (ref 1–129)
AST SERPL-CCNC: 88 U/L (ref 14–36)
BILIRUB SERPL-MCNC: 0.8 MG/DL (ref 0.2–1.3)
BUN BLD-MCNC: 11 MG/DL (ref 7–21)
BUN/CREAT SERPL: 11.2 (ref 7–25)
CALCIUM SPEC-SCNC: 9.3 MG/DL (ref 8.4–10.2)
CHLORIDE SERPL-SCNC: 102 MMOL/L (ref 95–110)
CHOLEST SERPL-MCNC: 149 MG/DL (ref 0–199)
CO2 SERPL-SCNC: 27 MMOL/L (ref 22–31)
CREAT BLD-MCNC: 0.98 MG/DL (ref 0.5–1)
GFR SERPL CREATININE-BSD FRML MDRD: 58 ML/MIN/1.73 (ref 51–120)
GLOBULIN UR ELPH-MCNC: 2.9 GM/DL (ref 2.3–3.5)
GLUCOSE BLD-MCNC: 81 MG/DL (ref 60–100)
HDLC SERPL-MCNC: 40 MG/DL (ref 60–200)
LDLC/HDLC SERPL: 1.78 {RATIO} (ref 0–3.22)
NT-PROBNP SERPL-MCNC: 129 PG/ML (ref 0–900)
POTASSIUM BLD-SCNC: 3.8 MMOL/L (ref 3.5–5.1)
PROT SERPL-MCNC: 7.2 G/DL (ref 6.3–8.6)
SODIUM BLD-SCNC: 140 MMOL/L (ref 137–145)
TRIGL SERPL-MCNC: 189 MG/DL (ref 20–199)

## 2017-09-20 PROCEDURE — 80061 LIPID PANEL: CPT | Performed by: INTERNAL MEDICINE

## 2017-09-20 PROCEDURE — 36415 COLL VENOUS BLD VENIPUNCTURE: CPT | Performed by: INTERNAL MEDICINE

## 2017-09-20 PROCEDURE — 80053 COMPREHEN METABOLIC PANEL: CPT | Performed by: INTERNAL MEDICINE

## 2017-09-20 PROCEDURE — 99202 OFFICE O/P NEW SF 15 MIN: CPT | Performed by: INTERNAL MEDICINE

## 2017-09-20 PROCEDURE — 82043 UR ALBUMIN QUANTITATIVE: CPT | Performed by: INTERNAL MEDICINE

## 2017-09-20 PROCEDURE — 83880 ASSAY OF NATRIURETIC PEPTIDE: CPT | Performed by: INTERNAL MEDICINE

## 2017-09-20 RX ORDER — POTASSIUM CHLORIDE 750 MG/1
10 TABLET, FILM COATED, EXTENDED RELEASE ORAL 2 TIMES DAILY
COMMUNITY
End: 2017-09-29 | Stop reason: SDUPTHER

## 2017-09-20 RX ORDER — FUROSEMIDE 40 MG/1
40 TABLET ORAL DAILY
COMMUNITY
End: 2017-10-27

## 2017-09-20 RX ORDER — METOLAZONE 2.5 MG/1
2.5 TABLET ORAL DAILY
Qty: 7 TABLET | Refills: 0 | Status: SHIPPED | OUTPATIENT
Start: 2017-09-20 | End: 2017-09-29

## 2017-09-20 NOTE — PROGRESS NOTES
"   Cardiovascular Medicine      Bart Espitia M.D., Ph.D., [facility]         Live Bolton MD  [address] [place] [address] [address]  [place] [address]  Thank you for asking me to see Amira Shannon for abnormal EKG.    History of Present Illness  This is a 56 y.o. female with:    1.  Abnormal EKG  A. unable to exclude inferior infarction  2.  Hypertension  3.  Former smoker  4. Edema  5. CHAI  A. CPAP non-compliant    Abnormal EKG/Dyspnea  The patient presents in consult for a possible MI. The patient was told she may have had a MI detected on EKG. She had a TTE with possible inferior infarction in 2013. This has remained on her EKGs. She was seen recently for a pre-op and had an EKG. She does have exertional dyspnea. She feels like she is \"full of fluid.\" She has had LE edema and pain. She has had mild CP. This is non-exertional. She has also had some shoulder pain.     LE pain and edema  She has been having LE pain and edema. This does worsen with walking. She is now on Lasix. She denies VTE. She is on Furosemide. She has been on Lasix for 10-12 years. She does have HTN for also about 10 years.       Review of Systems - History obtained from chart review and the patient  General ROS: negative  Respiratory ROS: positive for - shortness of breath  Cardiovascular ROS: negative for - chest pain.  All other systems were reviewed and were negative.    family history includes Cancer in her other; Diabetes in her other; Heart disease in her other.     reports that she has quit smoking. Her smoking use included Cigarettes. She has never used smokeless tobacco. She reports that she does not drink alcohol or use illicit drugs.    Allergies   Allergen Reactions   • Other      Pt states that taking steroids either in pill or injection form make her have blisters in her mouth and she feels like she is on fire on the inside/ has hx of c diff and possible MRSA     • Sulfa Antibiotics Rash     Sulfa (Sulfonamide Antibiotics) "         Current Outpatient Prescriptions:   •  amLODIPine (NORVASC) 10 MG tablet, Take 10 mg by mouth Daily., Disp: , Rfl:   •  cetirizine (zyrTEC) 10 MG tablet, Take 1 tablet by mouth Daily As Needed for Allergies., Disp: 30 tablet, Rfl: 11  •  cyclobenzaprine (FLEXERIL) 10 MG tablet, Take 10 mg by mouth 2 (Two) Times a Day As Needed for Muscle Spasms., Disp: , Rfl:   •  DULoxetine (CYMBALTA) 30 MG capsule, Take 30 mg by mouth Daily., Disp: , Rfl:   •  ferrous sulfate 325 (65 FE) MG tablet, Take 325 mg by mouth Daily With Breakfast. Iron 65mg, Disp: , Rfl:   •  gabapentin (NEURONTIN) 800 MG tablet, Take 800 mg by mouth 4 (Four) Times a Day., Disp: , Rfl:   •  HYDROcodone-acetaminophen (NORCO) 7.5-325 MG per tablet, Take 1 tablet by mouth 3 (Three) Times a Day As Needed for Moderate Pain (4-6)., Disp: , Rfl:   •  linaclotide (LINZESS) 145 MCG capsule capsule, Take 145 mcg by mouth Every Night., Disp: , Rfl:   •  magnesium oxide (MAGOX) 400 (241.3 MG) MG tablet tablet, Take 400 mg by mouth Daily., Disp: , Rfl:   •  mupirocin (BACTROBAN) 2 % ointment, Apply  topically 3 (Three) Times a Day., Disp: 1 g, Rfl: 2  •  omeprazole (priLOSEC) 20 MG capsule, Take 20 mg by mouth Daily., Disp: , Rfl:   •  oxyCODONE-acetaminophen (PERCOCET) 7.5-325 MG per tablet, Take 1 tablet by mouth 3 (Three) Times a Day for 30 days., Disp: 90 tablet, Rfl: 0  •  oxyCODONE-acetaminophen (PERCOCET) 7.5-325 MG per tablet, Take 1 tablet by mouth 3 (Three) Times a Day., Disp: 90 tablet, Rfl: 0    Current Facility-Administered Medications:   •  mupirocin (BACTROBAN) 2 % ointment, , Topical, Q12H, Live Bolton MD    Physical Exam:  Vitals:    09/20/17 1021   BP: 126/86   Pulse:    SpO2:      Body mass index is 38.9 kg/(m^2).    GEN: alert, appears stated age and cooperative  Body Habitus: overweight  Neuro: CN II-XII grossly intact.   HEENT: Head: Normocephalic, no lesions, without obvious abnormality.  Neck / Thyroid: Supple, no masses, nodes,  nodules or enlargement. No arcus senilis, xanthelasma or xanthomas. PERRL. Normal external ears. No drainage. No thyromegaly. Neck supple. No LAD. Trachea midline. Nose, normal.  JVP: 6 cm of water at 45 degrees HJR: absent      Carotid:  Upstroke: easily palpated bilaterally Volume: Normal.    Carotid Bruit:  None  Subclavian Bruit: Not present.    Lymph: No overt LAD.   Back: Normal.  Chest:  Normal Excursion: Good    I:E: Good  Pulmonary:clear to auscultation, no wheezes, rales or rhonchi, symmetric air entry. Equal chest excursion. Chest physical exam is normal. No tenderness.        Precordium:  No palpable heaves or thrusts. P2 is not palpable.   Gile:  normal size and placement Palpable S4: Not present.   Heart rate: normal  Heart Rhythm: regular     Heart Sounds: S1: normal intensity  S2: normal intensity  S3: absent   S4: absent  Opening Snap: absent  A2-OS:  N/A  Pericardial rub: absent    Ejection click: None      Murmurs: Systolic: none  Diastolic: none  Abdomen: Soft, non-tender, normal bowel sounds; no bruits, organomegaly or masses.  Extremity: no edema, cyanosis  Pulses: Right radial artery has 2+ (normal) pulse and Left radial artery has 2+ (normal) pulse    DATA REVIEWED:     EKG was personally interpreted today.  Sinus mechanism with sinus arrhythmia.  Unable to exclude prior inferior infarction.    2013  Test Reason :   Vent. Rate : 071 BPM     Atrial Rate : 071 BPM     P-R Int : 146 ms          QRS Dur : 084 ms      QT Int : 398 ms       P-R-T Axes : 050 000 031 degrees     QTc Int : 432 ms    Normal sinus rhythm  Inferior infarct , age undetermined  Abnormal ECG  No previous ECGs available  Confirmed by JAYMIE COOPER MD ([id number]),  MARQUITA JONES ([id number]) on     FINDINGS:        - lines/tubes: none    - cardiac: size within normal limits.    - mediastinum: contour within normal limits.     - lungs: no evidence of a focal air space process, pulmonary  interstitial edema. Scattered tiny  granulomata present. No  noncalcified nodules are identified    - pleura: no evidence of  fluid.      - osseous: unremarkable for age.        IMPRESSION:  CONCLUSION:  No acute cardiopulmonary process identified.       Assessment/Plan      1.  Abnormal EKG, unable to exclude prior inferior infarction.  -TTE, MPS    2. Cardiac Risk Assessment and need for statin therapy:  Unknown. Will check basic labs.    3. Dyspnea.  I suspect this is multifactorial.  -TTE, ischemia evaluation, 6MWT and PFTs  -Deferred consideration for sleep evaluation    4. LE pain and edema.  -ABIs and Duplex  -Start Zaroxolyn 2.5 mg for one week  -APRN one week    5. Tobacco status: Former smoker.    Plan for follow-up: 1 month; a week APRN          This document has been electronically signed by Bart Espitia MD PhD on September 20, 2017 10:32 AM

## 2017-09-29 ENCOUNTER — LAB (OUTPATIENT)
Dept: LAB | Facility: HOSPITAL | Age: 57
End: 2017-09-29

## 2017-09-29 ENCOUNTER — OFFICE VISIT (OUTPATIENT)
Dept: CARDIOLOGY | Facility: CLINIC | Age: 57
End: 2017-09-29

## 2017-09-29 VITALS
OXYGEN SATURATION: 96 % | HEART RATE: 93 BPM | DIASTOLIC BLOOD PRESSURE: 78 MMHG | WEIGHT: 222.06 LBS | BODY MASS INDEX: 37.91 KG/M2 | SYSTOLIC BLOOD PRESSURE: 130 MMHG | HEIGHT: 64 IN

## 2017-09-29 DIAGNOSIS — R60.0 BILATERAL LOWER EXTREMITY EDEMA: ICD-10-CM

## 2017-09-29 DIAGNOSIS — M17.0 PRIMARY OSTEOARTHRITIS OF BOTH KNEES: ICD-10-CM

## 2017-09-29 DIAGNOSIS — R60.0 BILATERAL LOWER EXTREMITY EDEMA: Primary | ICD-10-CM

## 2017-09-29 LAB
ANION GAP SERPL CALCULATED.3IONS-SCNC: 15 MMOL/L (ref 5–15)
BUN BLD-MCNC: 15 MG/DL (ref 7–21)
BUN/CREAT SERPL: 14.9 (ref 7–25)
CALCIUM SPEC-SCNC: 9.1 MG/DL (ref 8.4–10.2)
CHLORIDE SERPL-SCNC: 94 MMOL/L (ref 95–110)
CO2 SERPL-SCNC: 31 MMOL/L (ref 22–31)
CREAT BLD-MCNC: 1.01 MG/DL (ref 0.5–1)
GFR SERPL CREATININE-BSD FRML MDRD: 56 ML/MIN/1.73 (ref 60–120)
GLUCOSE BLD-MCNC: 149 MG/DL (ref 60–100)
POTASSIUM BLD-SCNC: 2.8 MMOL/L (ref 3.5–5.1)
SODIUM BLD-SCNC: 140 MMOL/L (ref 137–145)

## 2017-09-29 PROCEDURE — 80048 BASIC METABOLIC PNL TOTAL CA: CPT

## 2017-09-29 PROCEDURE — 99214 OFFICE O/P EST MOD 30 MIN: CPT | Performed by: NURSE PRACTITIONER

## 2017-09-29 PROCEDURE — 36415 COLL VENOUS BLD VENIPUNCTURE: CPT

## 2017-09-29 RX ORDER — LACTULOSE 10 G/15ML
20 SOLUTION ORAL 2 TIMES DAILY PRN
Qty: 120 ML | Refills: 0 | Status: SHIPPED | OUTPATIENT
Start: 2017-09-29 | End: 2017-11-07

## 2017-09-29 RX ORDER — ONDANSETRON 4 MG/1
4 TABLET, FILM COATED ORAL EVERY 8 HOURS PRN
COMMUNITY
Start: 2017-09-25 | End: 2018-10-12

## 2017-09-29 RX ORDER — POTASSIUM CHLORIDE 750 MG/1
10 TABLET, FILM COATED, EXTENDED RELEASE ORAL 2 TIMES DAILY
Qty: 60 TABLET | Refills: 3 | Status: SHIPPED | OUTPATIENT
Start: 2017-09-29 | End: 2017-11-07

## 2017-09-29 NOTE — PROGRESS NOTES
Subjective:     CC: Follow up edema 1wk post aggressive diuresis    History of Present Illness     LE pain and edema  She has been having LE pain and edema. This does worsen with walking. She denies VTE. She is on Furosemide. She has been on Lasix for 10-12 years. She does have HTN for also about 10 years.   Last week she saw Dr. Espitia who added Metolazone 2.5mg x7 days. She has lost 4lbs. Of note, her BNP is negative.     Constipation:  Chronic constipation worsened by aggressive diuresis. Sees Dr. Del Rio for OIC. She takes Linzess. She has a history of bowel obstructions. Had a abd xray yesterday at [facility] that was WNL per patient.       Review of Systems   Constitution: Positive for weight loss. Negative for chills, decreased appetite, fever and weakness.   HENT: Negative.    Eyes: Negative.    Cardiovascular: Positive for dyspnea on exertion and leg swelling. Negative for chest pain, claudication, irregular heartbeat and palpitations.   Respiratory: Positive for shortness of breath. Negative for cough and wheezing.    Endocrine: Negative.    Skin: Negative for dry skin, flushing and rash.   Musculoskeletal: Negative for falls and myalgias.   Gastrointestinal: Negative for abdominal pain, change in bowel habit and melena.   Genitourinary: Negative for frequency and hematuria.   Neurological: Negative for dizziness, light-headedness and loss of balance.   Psychiatric/Behavioral: Negative for altered mental status and memory loss. The patient is not nervous/anxious.        Current Outpatient Prescriptions   Medication Sig Dispense Refill   • amLODIPine (NORVASC) 10 MG tablet Take 10 mg by mouth Daily.     • cetirizine (zyrTEC) 10 MG tablet Take 1 tablet by mouth Daily As Needed for Allergies. 30 tablet 11   • cyclobenzaprine (FLEXERIL) 10 MG tablet Take 10 mg by mouth 2 (Two) Times a Day As Needed for Muscle Spasms.     • DULoxetine (CYMBALTA) 30 MG capsule Take 30 mg by mouth Daily.     • ferrous sulfate 325  "(65 FE) MG tablet Take 325 mg by mouth Daily With Breakfast. Iron 65mg     • furosemide (LASIX) 40 MG tablet Take 40 mg by mouth Daily.     • gabapentin (NEURONTIN) 800 MG tablet Take 800 mg by mouth 4 (Four) Times a Day.     • linaclotide (LINZESS) 145 MCG capsule capsule Take 145 mcg by mouth Every Night.     • magnesium oxide (MAGOX) 400 (241.3 MG) MG tablet tablet Take 400 mg by mouth Daily.     • mupirocin (BACTROBAN) 2 % ointment Apply  topically 3 (Three) Times a Day. 1 g 2   • omeprazole (priLOSEC) 20 MG capsule Take 20 mg by mouth Daily.     • ondansetron (ZOFRAN) 4 MG tablet      • oxyCODONE-acetaminophen (PERCOCET) 7.5-325 MG per tablet Take 1 tablet by mouth 3 (Three) Times a Day for 30 days. 90 tablet 0   • potassium chloride (K-DUR) 10 MEQ CR tablet Take 10 mEq by mouth 2 (Two) Times a Day.       Current Facility-Administered Medications   Medication Dose Route Frequency Provider Last Rate Last Dose   • mupirocin (BACTROBAN) 2 % ointment   Topical Q12H Live Bolton MD            Objective:     Vitals:    09/29/17 0857   BP: 130/78   BP Location: Left arm   Patient Position: Sitting   Cuff Size: Adult   Pulse: 93   SpO2: 96%   Weight: 222 lb 1 oz (101 kg)   Height: 64\" (162.6 cm)     Wt Readings from Last 3 Encounters:   09/29/17 222 lb 1 oz (101 kg)   09/20/17 226 lb 9.6 oz (103 kg)   09/14/17 225 lb 4 oz (102 kg)          Physical Exam   Constitutional: She is oriented to person, place, and time. She appears well-developed and well-nourished. No distress.   HENT:   Head: Normocephalic.   Neck: No JVD present.   Cardiovascular: Normal rate, regular rhythm, S1 normal, S2 normal, normal heart sounds and intact distal pulses.    No murmur heard.  Pulmonary/Chest: Effort normal and breath sounds normal. No respiratory distress. She has no wheezes. She has no rales.   Abdominal: Soft. Bowel sounds are normal.   Musculoskeletal: Normal range of motion. She exhibits no edema.   Neurological: She is alert " and oriented to person, place, and time.   Skin: Skin is warm and dry. No erythema.   Psychiatric: She has a normal mood and affect. Her behavior is normal. Judgment and thought content normal.       Cardiographics  Echocardiogram:   ECG:  Stress Testing:     Imaging  Chest x-ray:    Lab Review      Ref. Range 9/20/2017 11:24 9/29/2017 09:39   proBNP Latest Ref Range: 0.0 - 900.0 pg/mL 129.0    Glucose Latest Ref Range: 60 - 100 mg/dL 81 149 (H)   Sodium Latest Ref Range: 137 - 145 mmol/L 140 140   Potassium Latest Ref Range: 3.5 - 5.1 mmol/L 3.8 2.8 (L)   CO2 Latest Ref Range: 22.0 - 31.0 mmol/L 27.0 31.0   Chloride Latest Ref Range: 95 - 110 mmol/L 102 94 (L)   Anion Gap Latest Ref Range: 5.0 - 15.0 mmol/L 11.0 15.0   Creatinine Latest Ref Range: 0.50 - 1.00 mg/dL 0.98 1.01 (H)   BUN Latest Ref Range: 7 - 21 mg/dL 11 15   BUN/Creatinine Ratio Latest Ref Range: 7.0 - 25.0  11.2 14.9   Calcium Latest Ref Range: 8.4 - 10.2 mg/dL 9.3 9.1   eGFR Non African Amer Latest Ref Range: >60 mL/min/1.73 58 56 (L)          The following portions of the patient's history were reviewed and updated as appropriate: allergies, current medications, past family history, past medical history, past social history, past surgical history and problem list.     Assessment/Plan:      Diagnosis Plan   1. Bilateral lower extremity edema  Continue Lasix as prescribed previously. 40mg daily  BNP negative.  Awaiting testing ordered by Dr. Espitia for cause of edema.   At this point, diet and activity are a large culprit to her swelling. She will need to modify her lifestyle.     BMP today to check potassium and kidney function post Metolazone.     Compression stockings  Leg elevation    Hypokalemia: Potassium 10mEq tablets- take 4 now and before bed.   Take 2 tomorrow twice daily.       2. Constipation Lactulose 20gm/30ml x 4 doses. BID PRN          Follow up as scheduled with Dr. Espitia following testing.     Ortho referral for knee  arthritis with very limited mobility.

## 2017-10-02 ENCOUNTER — HOSPITAL ENCOUNTER (OUTPATIENT)
Dept: NUCLEAR MEDICINE | Facility: HOSPITAL | Age: 57
Discharge: HOME | End: 2017-10-02
Attending: INTERNAL MEDICINE

## 2017-10-02 PROCEDURE — A9500 TC99M SESTAMIBI: HCPCS | Performed by: INTERNAL MEDICINE

## 2017-10-02 PROCEDURE — 0 TECHNETIUM SESTAMIBI: Performed by: INTERNAL MEDICINE

## 2017-10-02 RX ADMIN — TECHNETIUM TC-99M SESTAMIBI 1 DOSE: 1 INJECTION INTRAVENOUS at 08:48

## 2017-10-03 ENCOUNTER — HOSPITAL ENCOUNTER (OUTPATIENT)
Dept: NUCLEAR MEDICINE | Facility: HOSPITAL | Age: 57
Discharge: HOME | End: 2017-10-03
Attending: INTERNAL MEDICINE

## 2017-10-03 ENCOUNTER — HOSPITAL ENCOUNTER (OUTPATIENT)
Dept: CARDIOLOGY | Facility: HOSPITAL | Age: 57
Discharge: HOME | End: 2017-10-03
Attending: INTERNAL MEDICINE

## 2017-10-03 LAB
BH CV STRESS BP STAGE 1: NORMAL
BH CV STRESS COMMENTS STAGE 1: NORMAL
BH CV STRESS DOSE REGADENOSON STAGE 1: 0.4
BH CV STRESS DURATION MIN STAGE 1: 0
BH CV STRESS DURATION SEC STAGE 1: 10
BH CV STRESS HR STAGE 1: 73
BH CV STRESS PROTOCOL 1: NORMAL
BH CV STRESS RECOVERY BP: NORMAL MMHG
BH CV STRESS RECOVERY HR: 80 BPM
BH CV STRESS STAGE 1: 1
LV EF NUC BP: 70 %
MAXIMAL PREDICTED HEART RATE: 163 BPM
PERCENT MAX PREDICTED HR: 50.92 %
STRESS BASELINE BP: NORMAL MMHG
STRESS BASELINE HR: 72 BPM
STRESS PERCENT HR: 60 %
STRESS POST ESTIMATED WORKLOAD: 1 METS
STRESS POST PEAK BP: NORMAL MMHG
STRESS POST PEAK HR: 83 BPM
STRESS TARGET HR: 139 BPM

## 2017-10-03 PROCEDURE — 78452 HT MUSCLE IMAGE SPECT MULT: CPT

## 2017-10-03 PROCEDURE — 25010000002 REGADENOSON 0.4 MG/5ML SOLUTION: Performed by: INTERNAL MEDICINE

## 2017-10-03 PROCEDURE — 78452 HT MUSCLE IMAGE SPECT MULT: CPT | Performed by: INTERNAL MEDICINE

## 2017-10-03 PROCEDURE — 93018 CV STRESS TEST I&R ONLY: CPT | Performed by: INTERNAL MEDICINE

## 2017-10-03 PROCEDURE — 93016 CV STRESS TEST SUPVJ ONLY: CPT | Performed by: INTERNAL MEDICINE

## 2017-10-03 PROCEDURE — A9500 TC99M SESTAMIBI: HCPCS | Performed by: INTERNAL MEDICINE

## 2017-10-03 PROCEDURE — 93017 CV STRESS TEST TRACING ONLY: CPT

## 2017-10-03 PROCEDURE — 0 TECHNETIUM SESTAMIBI: Performed by: INTERNAL MEDICINE

## 2017-10-03 RX ORDER — 0.9 % SODIUM CHLORIDE 0.9 %
10 VIAL (ML) INJECTION AS NEEDED
Status: DISCONTINUED | OUTPATIENT
Start: 2017-10-03 | End: 2017-10-04 | Stop reason: HOSPADM

## 2017-10-03 RX ADMIN — REGADENOSON 0.4 MG: 0.08 INJECTION, SOLUTION INTRAVENOUS at 09:26

## 2017-10-03 RX ADMIN — SODIUM CHLORIDE 10 ML: 9 INJECTION INTRAMUSCULAR; INTRAVENOUS; SUBCUTANEOUS at 09:27

## 2017-10-03 RX ADMIN — TECHNETIUM TC-99M SESTAMIBI 1 DOSE: 1 INJECTION INTRAVENOUS at 09:27

## 2017-10-05 DIAGNOSIS — M25.562 PAIN IN BOTH KNEES, UNSPECIFIED CHRONICITY: Primary | ICD-10-CM

## 2017-10-05 DIAGNOSIS — M25.561 PAIN IN BOTH KNEES, UNSPECIFIED CHRONICITY: Primary | ICD-10-CM

## 2017-10-06 ENCOUNTER — OFFICE VISIT (OUTPATIENT)
Dept: ORTHOPEDIC SURGERY | Facility: CLINIC | Age: 57
End: 2017-10-06

## 2017-10-06 VITALS — HEIGHT: 64 IN | BODY MASS INDEX: 38.07 KG/M2 | WEIGHT: 223 LBS

## 2017-10-06 DIAGNOSIS — M25.562 CHRONIC PAIN OF BOTH KNEES: Primary | ICD-10-CM

## 2017-10-06 DIAGNOSIS — E66.9 OBESITY WITH BODY MASS INDEX OF 30.0-39.9: ICD-10-CM

## 2017-10-06 DIAGNOSIS — M17.0 PRIMARY OSTEOARTHRITIS OF BOTH KNEES: ICD-10-CM

## 2017-10-06 DIAGNOSIS — M79.89 SWELLING OF BOTH LOWER EXTREMITIES: ICD-10-CM

## 2017-10-06 DIAGNOSIS — M77.31 BILATERAL CALCANEAL SPURS: ICD-10-CM

## 2017-10-06 DIAGNOSIS — M25.561 CHRONIC PAIN OF BOTH KNEES: Primary | ICD-10-CM

## 2017-10-06 DIAGNOSIS — M77.32 BILATERAL CALCANEAL SPURS: ICD-10-CM

## 2017-10-06 DIAGNOSIS — G89.29 CHRONIC PAIN OF BOTH KNEES: Primary | ICD-10-CM

## 2017-10-06 PROCEDURE — 99214 OFFICE O/P EST MOD 30 MIN: CPT | Performed by: NURSE PRACTITIONER

## 2017-10-06 NOTE — PROGRESS NOTES
"Amira Shannon is a 56 y.o. female returns for     Chief Complaint   Patient presents with   • Left Knee - Follow-up   • Right Knee - Follow-up   • Results     xray today.        HISTORY OF PRESENT ILLNESS: Patient presents to office for evaluation of bilateral knee pain, bilateral lower extremity swelling and bilateral foot pain.        CONCURRENT MEDICAL HISTORY:    Past Medical History:   Diagnosis Date   • Acid reflux    • Altered bowel function    • Arthropathy of lumbar facet joint    • Constipation    • Corns and callus    • Depression    • Disease related peripheral neuropathy    • Epigastric pain    • Fatty liver    • Hammer toe    • Headache    • Hiatal hernia    • Hyperlipidemia    • Knee pain    • Localized, primary osteoarthritis of the ankle and foot     Localized, primary osteoarthritis of the ankle and/or foot   • Mendoza's metatarsalgia     Mendoza's metatarsalgia - 2nd interspace on right   • Nausea and vomiting    • Neuralgia and neuritis     Neuralgia, neuritis, and radiculitis, unspecified   • Neuropathy    • Obstructive sleep apnea     Obstructive sleep apnea (adult) (pediatric)    • CHAI on CPAP     \"C-Pap at night  (unconfirmed)\"   • Osteoarthritis    • Pain in foot     Pain in unspecified foot - sees a podiatrist   • Pain in joint, ankle and foot     Joint pain in ankle and foot      • Pain radiating to back     Pain radiating to lumbar region of back   • Plantar fasciitis    • Restless leg syndrome    • Secondary hypertension     Secondary hypertension, unspecified   • Sinusitis    • Tongue anomaly     lesion       Allergies   Allergen Reactions   • Other      Pt states that taking steroids either in pill or injection form make her have blisters in her mouth and she feels like she is on fire on the inside/ has hx of c diff and possible MRSA     • Sulfa Antibiotics Rash     Sulfa (Sulfonamide Antibiotics)         Current Outpatient Prescriptions:   •  amLODIPine (NORVASC) 10 MG tablet, " Take 10 mg by mouth Daily., Disp: , Rfl:   •  cetirizine (zyrTEC) 10 MG tablet, Take 1 tablet by mouth Daily As Needed for Allergies., Disp: 30 tablet, Rfl: 11  •  cyclobenzaprine (FLEXERIL) 10 MG tablet, Take 10 mg by mouth 2 (Two) Times a Day As Needed for Muscle Spasms., Disp: , Rfl:   •  DULoxetine (CYMBALTA) 30 MG capsule, Take 30 mg by mouth Daily., Disp: , Rfl:   •  ferrous sulfate 325 (65 FE) MG tablet, Take 325 mg by mouth Daily With Breakfast. Iron 65mg, Disp: , Rfl:   •  furosemide (LASIX) 40 MG tablet, Take 40 mg by mouth Daily., Disp: , Rfl:   •  gabapentin (NEURONTIN) 800 MG tablet, Take 800 mg by mouth 4 (Four) Times a Day., Disp: , Rfl:   •  lactulose (CHRONULAC) 10 GM/15ML solution, Take 30 mL by mouth 2 (Two) Times a Day As Needed (constipation)., Disp: 120 mL, Rfl: 0  •  linaclotide (LINZESS) 145 MCG capsule capsule, Take 145 mcg by mouth Every Night., Disp: , Rfl:   •  magnesium oxide (MAGOX) 400 (241.3 MG) MG tablet tablet, Take 400 mg by mouth Daily., Disp: , Rfl:   •  mupirocin (BACTROBAN) 2 % ointment, Apply  topically 3 (Three) Times a Day., Disp: 1 g, Rfl: 2  •  omeprazole (priLOSEC) 20 MG capsule, Take 20 mg by mouth Daily., Disp: , Rfl:   •  ondansetron (ZOFRAN) 4 MG tablet, , Disp: , Rfl:   •  oxyCODONE-acetaminophen (PERCOCET) 7.5-325 MG per tablet, Take 1 tablet by mouth 3 (Three) Times a Day for 30 days., Disp: 90 tablet, Rfl: 0  •  potassium chloride (K-DUR) 10 MEQ CR tablet, Take 1 tablet by mouth 2 (Two) Times a Day., Disp: 60 tablet, Rfl: 3    Current Facility-Administered Medications:   •  mupirocin (BACTROBAN) 2 % ointment, , Topical, Q12H, Live Bolton MD    Past Surgical History:   Procedure Laterality Date   •  SECTION     • CHOLECYSTECTOMY     • COLONOSCOPY  2013   • DIRECT LARYNGOSCOPY, ESOPHAGOSCOPY, BRONCHOSCOPY N/A 2017    Procedure: DIRECT LARYNGOSCOPY AND;  Surgeon: Live Bolton MD;  Location: [facility];  Service:    • ENDOSCOPY  2013     "Colon endoscopy 73892 (1) - Internal & external hemorrhoids found. Stool found.   • ENDOSCOPY  07/01/2013    EGD w/ tube 64026 (1) - Normal esophagus. Gastritis in stomach. Biopsy taken. Normal dudoenum. Biopsy taken.   • FOOT SURGERY  02/26/2013    Foot/toes surgery procedure (1) - Arthroplasty of toes 4 and 5 of right foot.   • HERNIA REPAIR     • HERNIA REPAIR      hital   • HYSTERECTOMY     • LIVER BIOPSY     • NERVE BLOCK  07/25/2016    Injection for nerve block (1) - Lumbar medial branch block.   • OTHER SURGICAL HISTORY  12/03/2012    Inj(s) Tend-Sheath, Ligament, Single 20550 (1) - [facility] ([facility]    • OTHER SURGICAL HISTORY  06/24/2013    Small Joint Injection/Aspiration 20600 (2) - [facility] ([facility])    • OTHER SURGICAL HISTORY  2011    bowel obstruction x2   • OTHER SURGICAL HISTORY      gland removed from neck   • SUBLINGUAL SALIVARY CYST EXCISION N/A 8/1/2017    Procedure: EXCISION OF LEFT  TONGUE LESION WITH CLOSURE;  Surgeon: Live Bolton MD;  Location: [facility];  Service:    • TUBAL ABDOMINAL LIGATION     • UPPER GASTROINTESTINAL ENDOSCOPY  07/01/2013       ROS  No fevers or chills.  No chest pain or shortness of air.  No GI or  disturbances. Right foot pain. Left foot pain.     PHYSICAL EXAMINATION:       Ht 64\" (162.6 cm)  Wt 223 lb (101 kg)  LMP  (LMP Unknown)  BMI 38.28 kg/m2    Physical Exam   Constitutional: She is oriented to person, place, and time. Vital signs are normal. She appears well-developed and well-nourished.   HENT:   Head: Normocephalic.   Pulmonary/Chest: Effort normal. No respiratory distress.   Abdominal: Soft. She exhibits no distension.   Musculoskeletal:        Right knee: She exhibits no effusion.        Left knee: She exhibits no effusion.   Neurological: She is alert and oriented to person, place, and time. GCS eye subscore is 4. GCS verbal subscore is 5. GCS motor subscore is 6.   Skin: Skin is warm, dry and intact.   Psychiatric: She has " a normal mood and affect. Her speech is normal and behavior is normal. Judgment and thought content normal. Cognition and memory are normal.   Vitals reviewed.      GAIT:     []  Normal  []  Antalgic    Assistive device: []  None  []  Walker     []  Crutches  []  Cane     [x]  Wheelchair  []  Stretcher    Right Ankle Exam   Swelling: severe    Tenderness   Right ankle tenderness location: diffuse, mild.        Range of Motion   Dorsiflexion: abnormal   Plantar flexion: abnormal   Inversion: abnormal   Eversion: abnormal     Muscle Strength   Dorsiflexion:  4/5  Plantar flexion:  4/5  Other   Erythema: absent  Scars: absent  Sensation: normal  Pulse: present     Comments:  Severe swelling to lateral and medial ankle. Generalized diffuse swelling to lower extremity. Pain with ROM. Tenderness to plantar foot/heel.       Left Ankle Exam   Swelling: severe    Tenderness   Left ankle tenderness location: diffuse, mild.     Range of Motion   Dorsiflexion: abnormal   Plantar flexion: abnormal   Inversion: abnormal   Eversion: abnormal     Muscle Strength   Dorsiflexion:  4/5   Plantar flexion:  4/5     Other   Erythema: absent  Scars: absent  Sensation: normal  Pulse: present    Comments:  Severe swelling to lateral and medial ankle. Generalized diffuse swelling to lower extremity. Pain with ROM. Tenderness to plantar foot/heel.       Right Knee Exam     Tenderness   The patient is experiencing no tenderness.         Range of Motion   Extension: 0   Flexion: 120     Muscle Strength     The patient has normal right knee strength.    Tests   Farooq:  Medial - negative Lateral - negative  Varus: negative  Valgus: negative    Other   Erythema: absent  Scars: absent  Sensation: normal  Pulse: present  Swelling: mild  Other tests: no effusion present      Left Knee Exam     Tenderness   The patient is experiencing no tenderness.         Range of Motion   Extension: 0   Flexion: 120     Muscle Strength     The patient has normal  left knee strength.    Tests   Farooq:  Medial - negative Lateral - negative  Varus: negative  Valgus: negative    Other   Erythema: absent  Scars: absent  Sensation: normal  Pulse: present  Swelling: mild  Effusion: no effusion present              Xr Knee Bilateral Ap Standing    Result Date: 10/6/2017  Narrative: AP standing views of bilateral knees reveals degenerative changes bilaterally including medial compartmental joint space narrowing bilaterally, more pronounced on the left side. No evidence of fracture is noted.  No acute radiologic abnormalities are noted at this time. 10/06/17 at 1:18 PM by LIYAH Mahan     Xr Knee 1 Or 2 View Bilateral    Result Date: 10/6/2017  Narrative: Lateral views of bilateral knees reveals degenerative changes bilaterally with narrowing of the patellofemoral joint space to both left and right knee.  No evidence of fracture.  No acute radiologic abnormalities are noted at this time.10/06/17 at 1:16 PM by LIYAH Mahan         ASSESSMENT:    Diagnoses and all orders for this visit:    Chronic pain of both knees    Primary osteoarthritis of both knees    Bilateral calcaneal spurs  -     Ambulatory Referral to Podiatry    Swelling of both lower extremities    Obesity with body mass index of 30.0-39.9      PLAN    X-rays of bilateral knees reviewed today. After physical exam and long discussion with patient today, it was determined that her bilateral knee pain is mild and she primarily is complaining of chronic, bilateral lower extremity edema, pain in both her feet and ankles and severe swelling in her bilateral ankles. Patient states she is having difficulty walking due to the pain and swelling in her feet and ankles. Patient is currently in the middle of an extensive cardiac workup per Dr. Espitia to rule out cardiac sources for her lower extremity swelling. She is on diuretics, which help some, and she is supposed to be wearing supportive KARISSA hose but states she  "cannot wear them because they \"cut into her legs\". X-rays of bilateral feet done previously per Dr. Omer were reviewed and show large calcaneal spurs, a likely source of her plantar foot pain with weight-bearing. The patient is not a good candidate for oral NSAIDs due to her current problems with swelling and cardiac testing. She is not a good candidate for oral or injectable steroids due to previous adverse reactions and also stating \"they never help\". At this point, I have encouraged the patient to continue seeing Dr. Espitia so that cardiac sources of her lower extremity swelling can be ruled out. In regards to her ankle pain/swelling and calcaneal spurs, I will refer to podiatry so that she can discuss further treatment options. She reports she has taken injections into her feet before of steroids that did not improve her pain and symptoms. Follow up in about 6 weeks with orthopedics if needed for recheck of bilateral knee pain.     Return in about 6 weeks (around 11/17/2017) for Recheck.      This document has been electronically signed by LIYAH Mahan on October 9, 2017 7:45 AM      LIYAH Mahan  "

## 2017-10-08 PROBLEM — M77.32 BILATERAL CALCANEAL SPURS: Status: ACTIVE | Noted: 2017-10-08

## 2017-10-08 PROBLEM — M25.561 CHRONIC PAIN OF BOTH KNEES: Status: ACTIVE | Noted: 2017-10-08

## 2017-10-08 PROBLEM — M79.89 SWELLING OF BOTH LOWER EXTREMITIES: Status: ACTIVE | Noted: 2017-10-08

## 2017-10-08 PROBLEM — M77.31 BILATERAL CALCANEAL SPURS: Status: ACTIVE | Noted: 2017-10-08

## 2017-10-08 PROBLEM — M25.562 CHRONIC PAIN OF BOTH KNEES: Status: ACTIVE | Noted: 2017-10-08

## 2017-10-08 PROBLEM — G89.29 CHRONIC PAIN OF BOTH KNEES: Status: ACTIVE | Noted: 2017-10-08

## 2017-10-09 ENCOUNTER — HOSPITAL ENCOUNTER (OUTPATIENT)
Dept: NUCLEAR MEDICINE | Facility: HOSPITAL | Age: 57
End: 2017-10-09
Attending: INTERNAL MEDICINE

## 2017-10-09 ENCOUNTER — APPOINTMENT (OUTPATIENT)
Dept: NUCLEAR MEDICINE | Facility: HOSPITAL | Age: 57
End: 2017-10-09
Attending: INTERNAL MEDICINE

## 2017-10-09 PROBLEM — E66.9 OBESITY WITH BODY MASS INDEX OF 30.0-39.9: Status: ACTIVE | Noted: 2017-10-09

## 2017-10-10 ENCOUNTER — APPOINTMENT (OUTPATIENT)
Dept: NUCLEAR MEDICINE | Facility: HOSPITAL | Age: 57
End: 2017-10-10
Attending: INTERNAL MEDICINE

## 2017-10-10 ENCOUNTER — APPOINTMENT (OUTPATIENT)
Dept: CARDIOLOGY | Facility: HOSPITAL | Age: 57
End: 2017-10-10
Attending: INTERNAL MEDICINE

## 2017-10-18 ENCOUNTER — OFFICE VISIT (OUTPATIENT)
Dept: OTOLARYNGOLOGY | Facility: CLINIC | Age: 57
End: 2017-10-18

## 2017-10-18 VITALS — HEIGHT: 64 IN | WEIGHT: 223 LBS | TEMPERATURE: 96.8 F | BODY MASS INDEX: 38.07 KG/M2

## 2017-10-18 DIAGNOSIS — G47.33 OBSTRUCTIVE SLEEP APNEA SYNDROME: ICD-10-CM

## 2017-10-18 DIAGNOSIS — J34.89 NASAL VESTIBULITIS: Primary | ICD-10-CM

## 2017-10-18 LAB
BH CV ECHO MEAS - ACS: 2.2 CM
BH CV ECHO MEAS - AO ISTHMUS: 2.3 CM
BH CV ECHO MEAS - AO MAX PG (FULL): -0.94 MMHG
BH CV ECHO MEAS - AO MAX PG: 5.1 MMHG
BH CV ECHO MEAS - AO MEAN PG (FULL): -1 MMHG
BH CV ECHO MEAS - AO MEAN PG: 3 MMHG
BH CV ECHO MEAS - AO ROOT AREA (BSA CORRECTED): 1.2
BH CV ECHO MEAS - AO ROOT AREA: 4.9 CM^2
BH CV ECHO MEAS - AO ROOT DIAM: 2.5 CM
BH CV ECHO MEAS - AO V2 MAX: 113 CM/SEC
BH CV ECHO MEAS - AO V2 MEAN: 78.1 CM/SEC
BH CV ECHO MEAS - AO V2 VTI: 20.3 CM
BH CV ECHO MEAS - ASC AORTA: 2.5 CM
BH CV ECHO MEAS - AVA(I,A): 5 CM^2
BH CV ECHO MEAS - AVA(I,D): 5 CM^2
BH CV ECHO MEAS - AVA(V,A): 4.1 CM^2
BH CV ECHO MEAS - AVA(V,D): 4.1 CM^2
BH CV ECHO MEAS - BSA(HAYCOCK): 2.2 M^2
BH CV ECHO MEAS - BSA: 2 M^2
BH CV ECHO MEAS - BZI_BMI: 38.3 KILOGRAMS/M^2
BH CV ECHO MEAS - BZI_METRIC_HEIGHT: 162.6 CM
BH CV ECHO MEAS - BZI_METRIC_WEIGHT: 101.2 KG
BH CV ECHO MEAS - EDV(CUBED): 117.6 ML
BH CV ECHO MEAS - EDV(TEICH): 112.8 ML
BH CV ECHO MEAS - EF(CUBED): 92.1 %
BH CV ECHO MEAS - EF(TEICH): 87.2 %
BH CV ECHO MEAS - ESV(CUBED): 9.3 ML
BH CV ECHO MEAS - ESV(TEICH): 14.4 ML
BH CV ECHO MEAS - FS: 57.1 %
BH CV ECHO MEAS - IVS/LVPW: 1.3
BH CV ECHO MEAS - IVSD: 1 CM
BH CV ECHO MEAS - LA DIMENSION: 3.4 CM
BH CV ECHO MEAS - LA/AO: 1.4
BH CV ECHO MEAS - LV MASS(C)D: 153 GRAMS
BH CV ECHO MEAS - LV MASS(C)DI: 74.7 GRAMS/M^2
BH CV ECHO MEAS - LV MAX PG: 6.1 MMHG
BH CV ECHO MEAS - LV MEAN PG: 4 MMHG
BH CV ECHO MEAS - LV V1 MAX: 123 CM/SEC
BH CV ECHO MEAS - LV V1 MEAN: 93.4 CM/SEC
BH CV ECHO MEAS - LV V1 VTI: 26.5 CM
BH CV ECHO MEAS - LVIDD: 4.9 CM
BH CV ECHO MEAS - LVIDS: 2.1 CM
BH CV ECHO MEAS - LVOT AREA (M): 3.8 CM^2
BH CV ECHO MEAS - LVOT AREA: 3.8 CM^2
BH CV ECHO MEAS - LVOT DIAM: 2.2 CM
BH CV ECHO MEAS - LVPWD: 0.8 CM
BH CV ECHO MEAS - MV DEC SLOPE: 409 CM/SEC^2
BH CV ECHO MEAS - MV E MAX VEL: 112 CM/SEC
BH CV ECHO MEAS - MV MAX PG: 4.2 MMHG
BH CV ECHO MEAS - MV MEAN PG: 2 MMHG
BH CV ECHO MEAS - MV P1/2T MAX VEL: 105 CM/SEC
BH CV ECHO MEAS - MV P1/2T: 75.2 MSEC
BH CV ECHO MEAS - MV V2 MAX: 103 CM/SEC
BH CV ECHO MEAS - MV V2 MEAN: 62.9 CM/SEC
BH CV ECHO MEAS - MV V2 VTI: 33.5 CM
BH CV ECHO MEAS - MVA P1/2T LCG: 2.1 CM^2
BH CV ECHO MEAS - MVA(P1/2T): 2.9 CM^2
BH CV ECHO MEAS - MVA(VTI): 3 CM^2
BH CV ECHO MEAS - PA MAX PG: 2.8 MMHG
BH CV ECHO MEAS - PA V2 MAX: 84.2 CM/SEC
BH CV ECHO MEAS - PI END-D VEL: 117 CM/SEC
BH CV ECHO MEAS - RAP SYSTOLE: 5 MMHG
BH CV ECHO MEAS - RVDD: 3.9 CM
BH CV ECHO MEAS - RVSP: 29 MMHG
BH CV ECHO MEAS - SI(AO): 48.6 ML/M^2
BH CV ECHO MEAS - SI(CUBED): 52.9 ML/M^2
BH CV ECHO MEAS - SI(LVOT): 49.2 ML/M^2
BH CV ECHO MEAS - SI(TEICH): 48 ML/M^2
BH CV ECHO MEAS - SV(AO): 99.6 ML
BH CV ECHO MEAS - SV(CUBED): 108.4 ML
BH CV ECHO MEAS - SV(LVOT): 100.7 ML
BH CV ECHO MEAS - SV(TEICH): 98.4 ML
BH CV ECHO MEAS - TR MAX VEL: 232 CM/SEC
BH CV LOWER ARTERIAL LEFT ABI RATIO: 1.29
BH CV LOWER ARTERIAL LEFT DORSALIS PEDIS SYS MAX: 157 MMHG
BH CV LOWER ARTERIAL LEFT POST TIBIAL SYS MAX: 166 MMHG
BH CV LOWER ARTERIAL RIGHT ABI RATIO: 1.31
BH CV LOWER ARTERIAL RIGHT DORSALIS PEDIS SYS MAX: 165 MMHG
BH CV LOWER ARTERIAL RIGHT POST TIBIAL SYS MAX: 169 MMHG
BH CV LOWER VASCULAR LEFT COMMON FEMORAL AUGMENT: NORMAL
BH CV LOWER VASCULAR LEFT COMMON FEMORAL COMPETENT: NORMAL
BH CV LOWER VASCULAR LEFT COMMON FEMORAL COMPRESS: NORMAL
BH CV LOWER VASCULAR LEFT DISTAL FEMORAL AUGMENT: NORMAL
BH CV LOWER VASCULAR LEFT DISTAL FEMORAL COMPETENT: NORMAL
BH CV LOWER VASCULAR LEFT DISTAL FEMORAL COMPRESS: NORMAL
BH CV LOWER VASCULAR LEFT GREATER SAPH AK AUGMENT: NORMAL
BH CV LOWER VASCULAR LEFT GREATER SAPH AK COMPRESS: NORMAL
BH CV LOWER VASCULAR LEFT GREATER SAPH BK AUGMENT: NORMAL
BH CV LOWER VASCULAR LEFT GREATER SAPH BK COMPRESS: NORMAL
BH CV LOWER VASCULAR LEFT MID FEMORAL AUGMENT: NORMAL
BH CV LOWER VASCULAR LEFT MID FEMORAL COMPETENT: NORMAL
BH CV LOWER VASCULAR LEFT MID FEMORAL COMPRESS: NORMAL
BH CV LOWER VASCULAR LEFT PERONEAL AUGMENT: NORMAL
BH CV LOWER VASCULAR LEFT POPLITEAL AUGMENT: NORMAL
BH CV LOWER VASCULAR LEFT POPLITEAL COMPETENT: NORMAL
BH CV LOWER VASCULAR LEFT POPLITEAL COMPRESS: NORMAL
BH CV LOWER VASCULAR LEFT POSTERIOR TIBIAL AUGMENT: NORMAL
BH CV LOWER VASCULAR LEFT PROFUNDA FEMORAL AUGMENT: NORMAL
BH CV LOWER VASCULAR LEFT PROFUNDA FEMORAL COMPRESS: NORMAL
BH CV LOWER VASCULAR LEFT PROXIMAL FEMORAL AUGMENT: NORMAL
BH CV LOWER VASCULAR LEFT PROXIMAL FEMORAL COMPETENT: NORMAL
BH CV LOWER VASCULAR LEFT PROXIMAL FEMORAL COMPRESS: NORMAL
BH CV LOWER VASCULAR RIGHT COMMON FEMORAL AUGMENT: NORMAL
BH CV LOWER VASCULAR RIGHT COMMON FEMORAL COMPETENT: NORMAL
BH CV LOWER VASCULAR RIGHT COMMON FEMORAL COMPRESS: NORMAL
BH CV LOWER VASCULAR RIGHT DISTAL FEMORAL AUGMENT: NORMAL
BH CV LOWER VASCULAR RIGHT DISTAL FEMORAL COMPETENT: NORMAL
BH CV LOWER VASCULAR RIGHT DISTAL FEMORAL COMPRESS: NORMAL
BH CV LOWER VASCULAR RIGHT GREATER SAPH AK AUGMENT: NORMAL
BH CV LOWER VASCULAR RIGHT GREATER SAPH AK COMPRESS: NORMAL
BH CV LOWER VASCULAR RIGHT GREATER SAPH BK AUGMENT: NORMAL
BH CV LOWER VASCULAR RIGHT GREATER SAPH BK COMPRESS: NORMAL
BH CV LOWER VASCULAR RIGHT MID FEMORAL AUGMENT: NORMAL
BH CV LOWER VASCULAR RIGHT MID FEMORAL COMPETENT: NORMAL
BH CV LOWER VASCULAR RIGHT MID FEMORAL COMPRESS: NORMAL
BH CV LOWER VASCULAR RIGHT PERONEAL AUGMENT: NORMAL
BH CV LOWER VASCULAR RIGHT POPLITEAL AUGMENT: NORMAL
BH CV LOWER VASCULAR RIGHT POPLITEAL COMPETENT: NORMAL
BH CV LOWER VASCULAR RIGHT POPLITEAL COMPRESS: NORMAL
BH CV LOWER VASCULAR RIGHT POSTERIOR TIBIAL AUGMENT: NORMAL
BH CV LOWER VASCULAR RIGHT PROFUNDA FEMORAL AUGMENT: NORMAL
BH CV LOWER VASCULAR RIGHT PROXIMAL FEMORAL AUGMENT: NORMAL
BH CV LOWER VASCULAR RIGHT PROXIMAL FEMORAL COMPETENT: NORMAL
BH CV LOWER VASCULAR RIGHT PROXIMAL FEMORAL COMPRESS: NORMAL
UPPER ARTERIAL LEFT ARM BRACHIAL SYS MAX: 129 MMHG
UPPER ARTERIAL RIGHT ARM BRACHIAL SYS MAX: 126 MMHG

## 2017-10-18 PROCEDURE — 99213 OFFICE O/P EST LOW 20 MIN: CPT | Performed by: OTOLARYNGOLOGY

## 2017-10-18 RX ORDER — OXYCODONE AND ACETAMINOPHEN 7.5; 325 MG/1; MG/1
1 TABLET ORAL EVERY 8 HOURS PRN
COMMUNITY
Start: 2017-10-17 | End: 2017-11-22 | Stop reason: HOSPADM

## 2017-10-18 RX ORDER — FUROSEMIDE 20 MG/1
20 TABLET ORAL 2 TIMES DAILY
COMMUNITY
Start: 2017-10-11 | End: 2017-11-01 | Stop reason: ALTCHOICE

## 2017-10-18 RX ORDER — NYSTATIN 100000 [USP'U]/G
1 POWDER TOPICAL AS NEEDED
COMMUNITY
Start: 2017-10-11 | End: 2018-10-12

## 2017-10-19 NOTE — PROGRESS NOTES
Subjective   Amira Shannon is a 56 y.o. female.   Patient comes in follow-up for nasal vestibulitis    History of Present Illness     No complaints or nose no bleeding no pain discomfort no adenopathy in her neck.  Was done well she's happy with that    The following portions of the patient's history were reviewed and updated as appropriate: allergies, current medications, past family history, past medical history, past social history, past surgical history and problem list.      Current Outpatient Prescriptions:   •  amLODIPine (NORVASC) 10 MG tablet, Take 10 mg by mouth Daily., Disp: , Rfl:   •  cetirizine (zyrTEC) 10 MG tablet, Take 1 tablet by mouth Daily As Needed for Allergies., Disp: 30 tablet, Rfl: 11  •  cyclobenzaprine (FLEXERIL) 10 MG tablet, Take 10 mg by mouth 2 (Two) Times a Day As Needed for Muscle Spasms., Disp: , Rfl:   •  DULoxetine (CYMBALTA) 30 MG capsule, Take 30 mg by mouth Daily., Disp: , Rfl:   •  ferrous sulfate 325 (65 FE) MG tablet, Take 325 mg by mouth Daily With Breakfast. Iron 65mg, Disp: , Rfl:   •  furosemide (LASIX) 40 MG tablet, Take 40 mg by mouth Daily., Disp: , Rfl:   •  gabapentin (NEURONTIN) 800 MG tablet, Take 800 mg by mouth 4 (Four) Times a Day., Disp: , Rfl:   •  lactulose (CHRONULAC) 10 GM/15ML solution, Take 30 mL by mouth 2 (Two) Times a Day As Needed (constipation)., Disp: 120 mL, Rfl: 0  •  linaclotide (LINZESS) 145 MCG capsule capsule, Take 145 mcg by mouth Every Night., Disp: , Rfl:   •  magnesium oxide (MAGOX) 400 (241.3 MG) MG tablet tablet, Take 400 mg by mouth Daily., Disp: , Rfl:   •  mupirocin (BACTROBAN) 2 % ointment, Apply  topically 3 (Three) Times a Day., Disp: 1 g, Rfl: 2  •  omeprazole (priLOSEC) 20 MG capsule, Take 20 mg by mouth Daily., Disp: , Rfl:   •  ondansetron (ZOFRAN) 4 MG tablet, , Disp: , Rfl:   •  potassium chloride (K-DUR) 10 MEQ CR tablet, Take 1 tablet by mouth 2 (Two) Times a Day., Disp: 60 tablet, Rfl: 3  •  furosemide (LASIX) 20 MG  tablet, , Disp: , Rfl:   •  nystatin (MYCOSTATIN) 582720 UNIT/GM powder, , Disp: , Rfl:   •  oxyCODONE-acetaminophen (PERCOCET) 7.5-325 MG per tablet, , Disp: , Rfl:     Current Facility-Administered Medications:   •  mupirocin (BACTROBAN) 2 % ointment, , Topical, Q12H, Live Bolton MD    Allergies   Allergen Reactions   • Other      Pt states that taking steroids either in pill or injection form make her have blisters in her mouth and she feels like she is on fire on the inside/ has hx of c diff and possible MRSA     • Sulfa Antibiotics Rash     Sulfa (Sulfonamide Antibiotics)            Review of Systems   Constitutional: Negative for fever.   HENT: Negative for facial swelling, mouth sores, nosebleeds and postnasal drip.    Hematological: Negative for adenopathy.           Objective   Physical Exam   Constitutional: She is oriented to person, place, and time. She appears well-developed and well-nourished.   HENT:   Head: Normocephalic and atraumatic.   Right Ear: Hearing, tympanic membrane, external ear and ear canal normal.   Left Ear: Hearing, tympanic membrane, external ear and ear canal normal.   Nose: Nose normal. No mucosal edema, rhinorrhea, sinus tenderness, nasal deformity or septal deviation. No epistaxis. Right sinus exhibits no maxillary sinus tenderness and no frontal sinus tenderness. Left sinus exhibits no maxillary sinus tenderness and no frontal sinus tenderness.   Mouth/Throat: Uvula is midline, oropharynx is clear and moist and mucous membranes are normal. No trismus in the jaw. Normal dentition. No oropharyngeal exudate or posterior oropharyngeal edema.       Neck: Normal range of motion. Neck supple. No JVD present. No tracheal deviation present. No thyromegaly present.   Cardiovascular: Normal rate.    Pulmonary/Chest: Effort normal.   Musculoskeletal: Normal range of motion.   Lymphadenopathy:        Head (right side): No submental, no submandibular, no tonsillar, no preauricular, no  posterior auricular and no occipital adenopathy present.        Head (left side): No submental, no submandibular, no tonsillar, no preauricular, no posterior auricular and no occipital adenopathy present.     She has no cervical adenopathy.        Right cervical: No superficial cervical, no deep cervical and no posterior cervical adenopathy present.       Left cervical: No superficial cervical, no deep cervical and no posterior cervical adenopathy present.   Neurological: She is alert and oriented to person, place, and time. No cranial nerve deficit.   Skin: Skin is warm.   Psychiatric: She has a normal mood and affect. Her speech is normal and behavior is normal. Thought content normal.   Nursing note and vitals reviewed.          Assessment/Plan   Amira was seen today for follow-up.    Diagnoses and all orders for this visit:    Nasal vestibulitis    Obstructive sleep apnea syndrome    Other orders  -     mupirocin (BACTROBAN) 2 % ointment; Apply  topically 3 (Three) Times a Day.       Use Bactroban as needed call if not resolving.    All up in 3-4 months if questions or problems

## 2017-10-27 ENCOUNTER — OFFICE VISIT (OUTPATIENT)
Dept: CARDIOLOGY | Facility: CLINIC | Age: 57
End: 2017-10-27

## 2017-10-27 VITALS
HEIGHT: 64 IN | HEART RATE: 80 BPM | WEIGHT: 219.4 LBS | SYSTOLIC BLOOD PRESSURE: 124 MMHG | BODY MASS INDEX: 37.46 KG/M2 | OXYGEN SATURATION: 94 % | DIASTOLIC BLOOD PRESSURE: 66 MMHG

## 2017-10-27 DIAGNOSIS — Q20.8 ABNORMALITY OF RIGHT VENTRICLE OF HEART: ICD-10-CM

## 2017-10-27 DIAGNOSIS — R06.09 DYSPNEA ON EXERTION: Primary | ICD-10-CM

## 2017-10-27 DIAGNOSIS — M79.672 FOOT PAIN, BILATERAL: ICD-10-CM

## 2017-10-27 DIAGNOSIS — R60.0 LOCALIZED EDEMA: ICD-10-CM

## 2017-10-27 DIAGNOSIS — M79.671 FOOT PAIN, BILATERAL: ICD-10-CM

## 2017-10-27 PROCEDURE — 99214 OFFICE O/P EST MOD 30 MIN: CPT | Performed by: INTERNAL MEDICINE

## 2017-10-27 RX ORDER — CHLORTHALIDONE 25 MG/1
25 TABLET ORAL DAILY
Qty: 31 TABLET | Refills: 3 | Status: SHIPPED | OUTPATIENT
Start: 2017-10-27 | End: 2017-11-01 | Stop reason: SDUPTHER

## 2017-10-27 NOTE — PROGRESS NOTES
Cardiovascular Medicine      Bart Espitia M.D., Ph.D., [facility]           History of Present Illness  This is a 56 y.o. female with:    1.  Abnormal EKG  A. MPS low-risk, 2017  2.  Hypertension  3.  Former smoker  4. Edema  5. CHAI  A. CPAP non-compliant    Abnormal EKG/Dyspnea  The patient returns for  possible MI. The patient was told she may have had a MI detected on EKG. She had a TTE with possible inferior infarction in 2013. This has remained on her EKGs. She was seen recently for a pre-op and had an EKG. She does have exertional dyspnea. She has had LE edema and pain. She has had mild CP. This is non-exertional. She has also had some shoulder pain.     At her last visit, she was deemed to be a moderate-risk for obstructive CAD.  She was sent for an echocardiogram and myocardial perfusion stress.    LE pain and edema  She has been having LE pain and edema. This does worsen with walking. She is now on Lasix. She denies VTE. She is on Furosemide. She has been on Lasix for 10-12 years. She does have HTN for also about 10 years.     RV dilation  TTE showed mild RV dilation.       Review of Systems - History obtained from chart review and the patient  General ROS: negative  Respiratory ROS: positive for - shortness of breath  Cardiovascular ROS: negative for - chest pain.  All other systems were reviewed and were negative.    family history includes Cancer in her other; Diabetes in her other; Heart disease in her other.     reports that she has quit smoking. Her smoking use included Cigarettes. She has never used smokeless tobacco. She reports that she does not drink alcohol or use illicit drugs.    Allergies   Allergen Reactions   • Other      Pt states that taking steroids either in pill or injection form make her have blisters in her mouth and she feels like she is on fire on the inside/ has hx of c diff and possible MRSA     • Sulfa Antibiotics Rash     Sulfa (Sulfonamide Antibiotics)         Current  Outpatient Prescriptions:   •  amLODIPine (NORVASC) 10 MG tablet, Take 10 mg by mouth Daily., Disp: , Rfl:   •  cetirizine (zyrTEC) 10 MG tablet, Take 1 tablet by mouth Daily As Needed for Allergies., Disp: 30 tablet, Rfl: 11  •  cyclobenzaprine (FLEXERIL) 10 MG tablet, Take 10 mg by mouth 2 (Two) Times a Day As Needed for Muscle Spasms., Disp: , Rfl:   •  DULoxetine (CYMBALTA) 30 MG capsule, Take 30 mg by mouth Daily., Disp: , Rfl:   •  ferrous sulfate 325 (65 FE) MG tablet, Take 325 mg by mouth Daily With Breakfast. Iron 65mg, Disp: , Rfl:   •  furosemide (LASIX) 20 MG tablet, , Disp: , Rfl:   •  furosemide (LASIX) 40 MG tablet, Take 40 mg by mouth Daily., Disp: , Rfl:   •  gabapentin (NEURONTIN) 800 MG tablet, Take 800 mg by mouth 4 (Four) Times a Day., Disp: , Rfl:   •  lactulose (CHRONULAC) 10 GM/15ML solution, Take 30 mL by mouth 2 (Two) Times a Day As Needed (constipation)., Disp: 120 mL, Rfl: 0  •  linaclotide (LINZESS) 145 MCG capsule capsule, Take 145 mcg by mouth Every Night., Disp: , Rfl:   •  magnesium oxide (MAGOX) 400 (241.3 MG) MG tablet tablet, Take 400 mg by mouth Daily., Disp: , Rfl:   •  mupirocin (BACTROBAN) 2 % ointment, Apply  topically 3 (Three) Times a Day., Disp: 1 g, Rfl: 2  •  nystatin (MYCOSTATIN) 713338 UNIT/GM powder, , Disp: , Rfl:   •  omeprazole (priLOSEC) 20 MG capsule, Take 20 mg by mouth Daily., Disp: , Rfl:   •  ondansetron (ZOFRAN) 4 MG tablet, , Disp: , Rfl:   •  oxyCODONE-acetaminophen (PERCOCET) 7.5-325 MG per tablet, , Disp: , Rfl:   •  potassium chloride (K-DUR) 10 MEQ CR tablet, Take 1 tablet by mouth 2 (Two) Times a Day., Disp: 60 tablet, Rfl: 3    Current Facility-Administered Medications:   •  mupirocin (BACTROBAN) 2 % ointment, , Topical, Q12H, Live Bolton MD    Physical Exam:  Vitals:    10/27/17 1046   BP: 124/66   Pulse: 80   SpO2: 94%     Body mass index is 37.66 kg/(m^2).    GEN: alert, appears stated age and cooperative  Body Habitus: overweight  JVP: 6 cm  of water at 45 degrees HJR: absent      Pulmonary:clear to auscultation, no wheezes, rales or rhonchi, symmetric air entry. Equal chest excursion. Chest physical exam is normal. No tenderness.        Precordium:  No palpable heaves or thrusts. P2 is not palpable.   [place]:  normal size and placement Palpable S4: Not present.   Heart rate: normal  Heart Rhythm: regular     Heart Sounds: S1: normal intensity  S2: normal intensity  S3: absent   S4: absent  Opening Snap: absent  A2-OS:  N/A  Pericardial rub: absent    Ejection click: None      Murmurs: Systolic: none  Diastolic: none  Extremity: There is significant thickening of the foot just distal to the ankle which actually believe is adipose tissue.  With that said, there is 1+ lower extremity edema to the level of the ankles.  No overlying skin changes.    DATA REVIEWED:     [facility] HEART [facility] VASCULAR  [address] [place] [address]  [phone]             Amira Shannon   Echocardiogram   Order# [id number]   Reading physician: Bart Espitia MD PhD Ordering physician: Bart Espitia MD PhD Study date: 10/18/17   Patient Information   Patient Name MRN Sex  (Age)   Amira Shannon [id number] Female 1960 (56 y.o.)   Sedation Narrator Report   Sedation Narrator Report   Interpretation Summary   · Left Ventricle: Left ventricular systolic function is normal. Estimated EF appears to be in the range of 56 - 60%  · Left ventricular diastolic dysfunction is noted (grade III w/high LAP) consistent with reversible restrictive pattern. Elevated left atrial pressure.  · Right Ventricle: Normal right ventricular wall thickness, systolic function and septal motion noted. Right ventricular cavity is mildly dilated  · No evidence of pulmonary hypertension is present  · There is no evidence of pericardial effusion       [address]  [address]  [phone]             Amira Shannon   Noninvasive physiologic studies of upper/lower extremity arteries, single level, bilateral (eg, ankle/brachial indices, Doppler waveform analysis...)   Order# [id number]   Reading physician: Mazin Mills MD Ordering physician: Bart Espitia MD PhD Study date: 10/18/17   Patient Information   Patient Name MRN Sex  (Age)   Amira Shannon [id number] Female 1960 (56 y.o.)   Clinical Indication   claudication   Dx: Claudication [I73.9 (ICD-10-CM)]   Interpretation Summary   · Right Conclusion: The right CARYN appears normal.  · Left Conclusion: The left CARYN appears normal.  · Increased indices suggest calcification             EKG was personally interpreted today.  Sinus mechanism with sinus arrhythmia.  Unable to exclude prior inferior infarction.      Test Reason :   Vent. Rate : 071 BPM     Atrial Rate : 071 BPM     P-R Int : 146 ms          QRS Dur : 084 ms      QT Int : 398 ms       P-R-T Axes : 050 000 031 degrees     QTc Int : 432 ms    Normal sinus rhythm  Inferior infarct , age undetermined  Abnormal ECG  No previous ECGs available  Confirmed by ALLISON VAUGHAN, JAYMIE ([id number]),  MARQUITA JONES ([id number]) on     FINDINGS:        - lines/tubes: none    - cardiac: size within normal limits.    - mediastinum: contour within normal limits.     - lungs: no evidence of a focal air space process, pulmonary  interstitial edema. Scattered tiny granulomata present. No  noncalcified nodules are identified    - pleura: no evidence of  fluid.      - osseous: unremarkable for age.        IMPRESSION:  CONCLUSION:  No acute cardiopulmonary process identified.       Interpretation Summary   · Appears negative for DVT of the lower extremities  · Pulsatile venous flow           Interpretation Summary   · Nuclear Study Description: A 2-day rest/stress protocol myocardial perfusion imaging study was performed  · Nuclear Perfusion Images: Overall  image quality is excellent.  · Stress ECG: Stress ECG rhythm of sinus tachycardia noted. Normal ECG with no significant stress induced changes noted.  · Stress Description: A pharmacological stress test was performed using regadenoson without low-level exercise.  · Nuclear Perfusion Findings Study Impression: Myocardial perfusion imaging indicates a normal myocardial perfusion study with no evidence of ischemia  · Ventricle Size / Description: Left ventricular ejection fraction is normal (Calculated EF = 70%).  · Impressions are consistent with a low risk study.         proBNP 0.0 - 900.0 pg/mL 129.0         Assessment/Plan      1.  Abnormal EKG with low-risk MPS.  Her myocardial perfusion stress showed preserved LVEF, and no inducible ischemia and, importantly, no evidence of prior infarction.  Echocardiogram was structurally normal.  I recommended ongoing risk factor modifications.    2. Cardiac Risk Assessment and need for statin therapy: Low.  Her 10-year ASCVD risk was calculated based on her most recent lipid profile.  This was 3.4%.  -I did not recommend aspirin or statin at this time.    3. Dyspnea.  I suspect this is multifactorial.  TTE was restrictive.  I do have concerns that this could represent HFpEF. BNP was normal, but I am not reassured by this given her weight.  She's been diagnosed with CHAI in the past, but is noncompliant.  I spent some time discussing with her that her etiology is likely multifactorial, but at this time I cannot exclude a diagnosis of HFpEF, especially given her symptoms and abnormal diastolic function on most recent echocardiogram.  She also has an element of right ventricular dilation with these to be evaluated further to ensure that she does not have an ASD.  - 6MWT and PFTs  -I recommended a sleep evaluation and CPAP compliance  -I recommended a pulmonary referral  -Deferred consideration for RHC    4.  RV dilation.  This is mildly dilated.  I recommended an evaluation for  intracardiac shunt as her most recent PA pressures were normal.  -A AGUSTINA was recommended to the patient with MAC given my concerns for CHAI.   The indications and risks/benefits were discussed. This included risks of inadvertent tracheal intubation, hematoma, oropharyngeal bleeding, esophageal perforation.  The patient agreed to proceeding.  The risks of conscious sedation were also reviewed including allergy, anaphylaxis and hypoventilation requiring mechanical ventilation.      4. LE pain and edema.  ABIs were normal.  Lower extremity venous duplex without evidence of DVT.  I recommended ongoing diuretic therapy and compression stockings.  We did use Zaroxolyn for one week.  She tells me she had marked improvement in her edema, but he came back in 2 days afterwards.  I discussed with her today that amlodipine can certainly cause edema.  I think we should go ahead and discontinue this and change her diuretic regimen in the short-term.  I informed her that she should stop Lasix and start chlorthalidone with the intention of following up within one week with an APRN for evaluation of her chemistries and lower extremity edema.  She may very well require chlorthalidone and Lasix, but I would like to up-titrate her dose of chlorthalidone before we add a second medication.  -D/C Norvasc  -Start Chlorthalidone  -D/C Lasix  -f/u APRN one week    5. Tobacco status: Former smoker.    Plan for follow-up: 4 months with me; one week with APRN          This document has been electronically signed by Bart Espitia MD PhD on October 27, 2017 10:59 AM

## 2017-10-27 NOTE — PATIENT INSTRUCTIONS
Transesophageal Echocardiogram  Transesophageal echocardiography (AGUSTINA) is a special type of test that produces images of the heart by using sound waves (echocardiogram). This type of echocardiography can obtain better images of the heart than standard echocardiography. AGUSTINA is done by passing a flexible tube down the esophagus. The heart is located in front of the esophagus. Because the heart and esophagus are close to one another, your health care provider can take very clear, detailed pictures of the heart via ultrasound waves.  AGUSTINA may be done:  · If your health care provider needs more information based on standard echocardiography findings.  · If you had a stroke. This might have happened because a clot formed in your heart. AGUSTINA can visualize different areas of the heart and check for clots.  · To check valve anatomy and function.  · To check for infection on the inside of your heart (endocarditis).  · To evaluate the dividing wall (septum) of the heart and presence of a hole that did not close after birth (patent foramen ovale or atrial septal defect).  · To help diagnose a tear in the wall of the aorta (aortic dissection).  · During cardiac valve surgery. This allows the surgeon to assess the valve repair before closing the chest.  · During a variety of other cardiac procedures to guide positioning of catheters.  · Sometimes before a cardioversion, which is a shock to convert heart rhythm back to normal.  LET YOUR HEALTH CARE PROVIDER KNOW ABOUT:   · Any allergies you have.  · All medicines you are taking, including vitamins, herbs, eye drops, creams, and over-the-counter medicines.  · Previous problems you or members of your family have had with the use of anesthetics.  · Any blood disorders you have.  · Previous surgeries you have had.  · Medical conditions you have.  · Swallowing difficulties.  · An esophageal obstruction.  RISKS AND COMPLICATIONS   Generally, AGUSTINA is a safe procedure. However, as with any  procedure, complications can occur. Possible complications include an esophageal tear (rupture).  BEFORE THE PROCEDURE   · Do not eat or drink for 6 hours before the procedure or as directed by your health care provider.  · Arrange for someone to drive you home after the procedure. Do not drive yourself home. During the procedure, you will be given medicines that can continue to make you feel drowsy and can impair your reflexes.  · An IV access tube will be started in the arm.  PROCEDURE   · A medicine to help you relax (sedative) will be given through the IV access tube.  · A medicine may be sprayed or gargled to numb the back of the throat.  · Your blood pressure, heart rate, and breathing (vital signs) will be monitored during the procedure.  · The AGUSTINA probe is a long, flexible tube. The tip of the probe is placed into the back of the mouth, and you will be asked to swallow. This helps to pass the tip of the probe into the esophagus. Once the tip of the probe is in the correct area, your health care provider can take pictures of the heart.  · AGUSTINA is usually not a painful procedure. You may feel the probe press against the back of the throat. The probe does not enter the trachea and does not affect your breathing.  AFTER THE PROCEDURE   · You will be in bed, resting, until you have fully returned to consciousness.  · When you first awaken, your throat may feel slightly sore and will probably still feel numb. This will improve slowly over time.  · You will not be allowed to eat or drink until it is clear that the numbness has improved.  · Once you have been able to drink, urinate, and sit on the edge of the bed without feeling sick to your stomach (nausea) or dizzy, you may be cleared to go home.  · You should have a friend or family member with you for the next 24 hours after your procedure.     This information is not intended to replace advice given to you by your health care provider. Make sure you discuss any  "questions you have with your health care provider.     Document Released: 03/09/2004 Document Revised: 12/23/2014 Document Reviewed: 06/19/2014  [employer] Interactive Patient Education ©2017 [employer]  Pulmonary Function Tests  Pulmonary function tests (PFTs) measure how well your lungs are working. The tests can help to identify the causes of lung problems. They can also help your health care provider select the best treatment for you. Your health care provider may order pulmonary function for any of the following reasons:  · When an illness involving the lungs is suspected.  · To follow changes in your lung function over time if you are known to have a chronic lung disease.  · For industrial plant workers to examine the effects of being exposed to chemicals over a long period of time.  · To assess lung function prior to surgery or other procedures.  · For people who are smokers.  Your measured lung function will be compared to the expected lung function of someone with healthy lungs who is similar to you in age, gender, size, and other factors.  This is used to determine your \"percent predicted\" lung function, which is how your health care provider knows if your lung function is normal or abnormal. If you have had prior pulmonary function testing performed, your health care provider will also compare your current results with past tests to see if your lung function is better, worse, or staying the same. This can sometimes be useful to see if treatments are working.   LET YOUR HEALTH CARE PROVIDER KNOW ABOUT:  · Any allergies you have.  · All medicines you are taking, including inhaler or nebulizer medicines, vitamins, herbs, eye drops, creams, and over-the-counter medicines.  · Any blood disorders you have.  · Previous surgeries you have had, especially recent eye surgery, abdominal surgery, or chest surgery. These can make performing pulmonary function tests difficult or unsafe.  · Medical conditions you " have.  · Chest pain or heart problems.  · Tuberculosis or respiratory infections, such as pneumonia, a cold, or the flu.  If you think you will have difficulty performing any of the breathing maneuvers, ask your health care provider if you should reschedule the test.  RISKS AND COMPLICATIONS:  Generally, pulmonary function testing is a safe procedure. However, as with any procedure, complications can occur. Possible complications include:  · Lightheadedness due to overbreathing (hyperventilation).  · An asthmatic attack from deep breathing.  BEFORE THE PROCEDURE  · Take medicine as directed by your health care provider. If you take inhaler or nebulizer medicines, ask your health care provider which medicines you should take on the day of your test. Some inhaler medicines may interfere with pulmonary function tests, such as bronchodilator testing, if taken shortly before the test.  · Avoid eating a large meal before your test.  · Do not smoke before your test.  · Wear comfortable clothing which will not interfere with breathing.  PROCEDURE  · You will be given a soft nose clip to wear during the procedure. This is done so that all of your breaths will go through your mouth instead of your nose.  · You will be given a germ-free (sterile) mouthpiece. It will be attached to a spirometer. The spirometer is the machine that measures your breathing.  · You will be instructed to perform various breathing maneuvers. The maneuvers will be done by breathing in (inhaling) and breathing out (exhaling). Depending on what measurements are ordered, you may be asked to repeat the maneuvers several times before the test is completed.  · It is important to follow the instructions exactly to obtain accurate results. Make sure to blow as hard and as fast as you can when you are instructed to do so.  · You may be given a bronchodilator after testing has been performed. A bronchodilator is a medicine which makes the small air passages in  your lungs larger. These medicines usually make it easier to breathe. The tests are then repeated several minutes later after the bronchodilator has taken effect.  · You will be monitored carefully during the procedure for faintness, dizziness, difficulty breathing, or any other problems.  AFTER THE PROCEDURE   · You may resume your usual diet, medicines, and activities as directed by your health care provider.  · Your health care provider will go over your test results with you and determine what treatments may be helpful.     This information is not intended to replace advice given to you by your health care provider. Make sure you discuss any questions you have with your health care provider.     Document Released: 08/10/2005 Document Revised: 10/08/2014 Document Reviewed: 07/17/2014  [employer] Interactive Patient Education ©2017 [employer]  Sleep Apnea  Sleep apnea is a condition in which breathing pauses or becomes shallow during sleep. Episodes of sleep apnea usually last 10 seconds or longer, and they may occur as many as 20 times an hour. Sleep apnea disrupts your sleep and keeps your body from getting the rest that it needs. This condition can increase your risk of certain health problems, including:  · Heart attack.  · Stroke.  · Obesity.  · Diabetes.  · Heart failure.  · Irregular heartbeat.  There are three kinds of sleep apnea:  · Obstructive sleep apnea. This kind is caused by a blocked or collapsed airway.  · Central sleep apnea. This kind happens when the part of the brain that controls breathing does not send the correct signals to the muscles that control breathing.  · Mixed sleep apnea. This is a combination of obstructive and central sleep apnea.  CAUSES  The most common cause of this condition is a collapsed or blocked airway. An airway can collapse or become blocked if:  · Your throat muscles are abnormally relaxed.  · Your tongue and tonsils are larger than normal.  · You are  overweight.  · Your airway is smaller than normal.  RISK FACTORS  This condition is more likely to develop in people who:  · Are overweight.  · Smoke.  · Have a smaller than normal airway.  · Are elderly.  · Are male.  · Drink alcohol.  · Take sedatives or tranquilizers.  · Have a family history of sleep apnea.  SYMPTOMS  Symptoms of this condition include:  · Trouble staying asleep.  · Daytime sleepiness and tiredness.  · Irritability.  · Loud snoring.  · Morning headaches.  · Trouble concentrating.  · Forgetfulness.  · Decreased interest in sex.  · Unexplained sleepiness.  · Mood swings.  · Personality changes.  · Feelings of depression.  · Waking up often during the night to urinate.  · Dry mouth.  · Sore throat.  DIAGNOSIS  This condition may be diagnosed with:  · A medical history.  · A physical exam.  · A series of tests that are done while you are sleeping (sleep study). These tests are usually done in a sleep lab, but they may also be done at home.  TREATMENT  Treatment for this condition aims to restore normal breathing and to ease symptoms during sleep. It may involve managing health issues that can affect breathing, such as high blood pressure or obesity. Treatment may include:  · Sleeping on your side.  · Using a decongestant if you have nasal congestion.  · Avoiding the use of depressants, including alcohol, sedatives, and narcotics.  · Losing weight if you are overweight.  · Making changes to your diet.  · Quitting smoking.  · Using a device to open your airway while you sleep, such as:    An oral appliance. This is a custom-made mouthpiece that shifts your lower jaw forward.    A continuous positive airway pressure (CPAP) device. This device delivers oxygen to your airway through a mask.    A nasal expiratory positive airway pressure (EPAP) device. This device has valves that you put into each nostril.    A bi-level positive airway pressure (BPAP) device. This device delivers oxygen to your airway  through a mask.  · Surgery if other treatments do not work. During surgery, excess tissue is removed to create a wider airway.  It is important to get treatment for sleep apnea. Without treatment, this condition can lead to:  · High blood pressure.  · Coronary artery disease.  · (Men) An inability to achieve or maintain an erection (impotence).  · Reduced thinking abilities.  HOME CARE INSTRUCTIONS  · Make any lifestyle changes that your health care provider recommends.  · Eat a healthy, well-balanced diet.  · Take over-the-counter and prescription medicines only as told by your health care provider.  · Avoid using depressants, including alcohol, sedatives, and narcotics.  · Take steps to lose weight if you are overweight.  · If you were given a device to open your airway while you sleep, use it only as told by your health care provider.  · Do not use any tobacco products, such as cigarettes, chewing tobacco, and e-cigarettes. If you need help quitting, ask your health care provider.  · Keep all follow-up visits as told by your health care provider. This is important.  SEEK MEDICAL CARE IF:  · The device that you received to open your airway during sleep is uncomfortable or does not seem to be working.  · Your symptoms do not improve.  · Your symptoms get worse.  SEEK IMMEDIATE MEDICAL CARE IF:  · You develop chest pain.  · You develop shortness of breath.  · You develop discomfort in your back, arms, or stomach.  · You have trouble speaking.  · You have weakness on one side of your body.  · You have drooping in your face.  These symptoms may represent a serious problem that is an emergency. Do not wait to see if the symptoms will go away. Get medical help right away. Call your local emergency services (911 in the [place] Do not drive yourself to the hospital.     This information is not intended to replace advice given to you by your health care provider. Make sure you discuss any questions you have with your health  care provider.     Document Released: 12/08/2003 Document Revised: 04/10/2017 Document Reviewed: 09/26/2016  [employer] Interactive Patient Education ©2017 [employer]

## 2017-10-31 DIAGNOSIS — Z79.899 ON LONG TERM DRUG THERAPY: Primary | ICD-10-CM

## 2017-11-01 ENCOUNTER — HOSPITAL ENCOUNTER (OUTPATIENT)
Dept: PULMONOLOGY | Facility: HOSPITAL | Age: 57
Discharge: HOME | End: 2017-11-01
Attending: INTERNAL MEDICINE | Admitting: INTERNAL MEDICINE

## 2017-11-01 ENCOUNTER — LAB (OUTPATIENT)
Dept: LAB | Facility: HOSPITAL | Age: 57
End: 2017-11-01

## 2017-11-01 ENCOUNTER — OFFICE VISIT (OUTPATIENT)
Dept: CARDIOLOGY | Facility: CLINIC | Age: 57
End: 2017-11-01

## 2017-11-01 VITALS
BODY MASS INDEX: 37.42 KG/M2 | HEIGHT: 64 IN | WEIGHT: 219.2 LBS | DIASTOLIC BLOOD PRESSURE: 78 MMHG | SYSTOLIC BLOOD PRESSURE: 118 MMHG | OXYGEN SATURATION: 96 % | HEART RATE: 80 BPM

## 2017-11-01 DIAGNOSIS — R06.09 DYSPNEA ON EXERTION: ICD-10-CM

## 2017-11-01 DIAGNOSIS — Z79.899 ON LONG TERM DRUG THERAPY: ICD-10-CM

## 2017-11-01 DIAGNOSIS — Z79.899 DRUG THERAPY: ICD-10-CM

## 2017-11-01 DIAGNOSIS — R60.0 BILATERAL LOWER EXTREMITY EDEMA: Primary | ICD-10-CM

## 2017-11-01 DIAGNOSIS — I51.89 DIASTOLIC DYSFUNCTION: ICD-10-CM

## 2017-11-01 PROBLEM — R06.00 DYSPNEA: Status: ACTIVE | Noted: 2017-11-01

## 2017-11-01 LAB
ANION GAP SERPL CALCULATED.3IONS-SCNC: 14 MMOL/L (ref 5–15)
BUN BLD-MCNC: 13 MG/DL (ref 7–21)
BUN/CREAT SERPL: 13 (ref 7–25)
CALCIUM SPEC-SCNC: 9.2 MG/DL (ref 8.4–10.2)
CHLORIDE SERPL-SCNC: 100 MMOL/L (ref 95–110)
CO2 SERPL-SCNC: 29 MMOL/L (ref 22–31)
CREAT BLD-MCNC: 1 MG/DL (ref 0.5–1)
GFR SERPL CREATININE-BSD FRML MDRD: 57 ML/MIN/1.73 (ref 51–120)
GLUCOSE BLD-MCNC: 98 MG/DL (ref 60–100)
POTASSIUM BLD-SCNC: 3.7 MMOL/L (ref 3.5–5.1)
SODIUM BLD-SCNC: 143 MMOL/L (ref 137–145)

## 2017-11-01 PROCEDURE — 80048 BASIC METABOLIC PNL TOTAL CA: CPT

## 2017-11-01 PROCEDURE — 94729 DIFFUSING CAPACITY: CPT | Performed by: INTERNAL MEDICINE

## 2017-11-01 PROCEDURE — 94729 DIFFUSING CAPACITY: CPT

## 2017-11-01 PROCEDURE — 94727 GAS DIL/WSHOT DETER LNG VOL: CPT

## 2017-11-01 PROCEDURE — 94727 GAS DIL/WSHOT DETER LNG VOL: CPT | Performed by: INTERNAL MEDICINE

## 2017-11-01 PROCEDURE — 36415 COLL VENOUS BLD VENIPUNCTURE: CPT

## 2017-11-01 PROCEDURE — 94010 BREATHING CAPACITY TEST: CPT

## 2017-11-01 PROCEDURE — 94010 BREATHING CAPACITY TEST: CPT | Performed by: INTERNAL MEDICINE

## 2017-11-01 PROCEDURE — 99214 OFFICE O/P EST MOD 30 MIN: CPT | Performed by: NURSE PRACTITIONER

## 2017-11-01 RX ORDER — CHLORTHALIDONE 50 MG/1
50 TABLET ORAL DAILY
Qty: 30 TABLET | Refills: 3 | Status: SHIPPED | OUTPATIENT
Start: 2017-11-01 | End: 2018-04-02 | Stop reason: SDUPTHER

## 2017-11-01 RX ORDER — SPIRONOLACTONE 25 MG/1
25 TABLET ORAL DAILY
Qty: 30 TABLET | Refills: 3 | Status: SHIPPED | OUTPATIENT
Start: 2017-11-01 | End: 2018-04-02 | Stop reason: SDUPTHER

## 2017-11-01 NOTE — PROGRESS NOTES
Subjective:     CC: Follow up edema 1wk post aggressive diuresis    History of Present Illness     LE pain and edema  She has been having LE pain and edema. This does worsen with walking. She denies VTE. She is on Furosemide. She has been on Lasix for 10-12 years. She does have HTN for also about 10 years.   1 month ago, she saw Dr. Espitia who added Metolazone 2.5mg x7 days. She has lost 4lbs. Of note, her BNP is negative.   Follow up revealed return of edema. Dr. Espitia also ordered ischemic evaluation which was low risk. Last visit, he DC norvasc and lasix and added chlorthalidone. Improvement noted.         Review of Systems   Constitution: Positive for weight loss. Negative for chills, decreased appetite, fever and weakness.   HENT: Negative.    Eyes: Negative.    Cardiovascular: Positive for dyspnea on exertion and leg swelling. Negative for irregular heartbeat.   Respiratory: Negative for cough and wheezing.    Endocrine: Negative.    Skin: Negative for dry skin, flushing and rash.   Musculoskeletal: Negative for falls and myalgias.   Gastrointestinal: Negative for change in bowel habit and melena.   Genitourinary: Negative for frequency and hematuria.   Neurological: Negative for dizziness, light-headedness and loss of balance.   Psychiatric/Behavioral: Negative for altered mental status and memory loss. The patient is not nervous/anxious.        Current Outpatient Prescriptions   Medication Sig Dispense Refill   • cetirizine (zyrTEC) 10 MG tablet Take 1 tablet by mouth Daily As Needed for Allergies. 30 tablet 11   • chlorthalidone (HYGROTON) 25 MG tablet Take 1 tablet by mouth Daily. 31 tablet 3   • cyclobenzaprine (FLEXERIL) 10 MG tablet Take 10 mg by mouth 2 (Two) Times a Day As Needed for Muscle Spasms.     • DULoxetine (CYMBALTA) 30 MG capsule Take 30 mg by mouth Daily.     • gabapentin (NEURONTIN) 800 MG tablet Take 800 mg by mouth 4 (Four) Times a Day.     • lactulose (CHRONULAC) 10 GM/15ML  "solution Take 30 mL by mouth 2 (Two) Times a Day As Needed (constipation). 120 mL 0   • linaclotide (LINZESS) 145 MCG capsule capsule Take 145 mcg by mouth Every Night.     • magnesium oxide (MAGOX) 400 (241.3 MG) MG tablet tablet Take 400 mg by mouth Daily.     • mupirocin (BACTROBAN) 2 % ointment Apply  topically 3 (Three) Times a Day. 1 g 2   • nystatin (MYCOSTATIN) 383928 UNIT/GM powder      • omeprazole (priLOSEC) 20 MG capsule Take 20 mg by mouth Daily.     • ondansetron (ZOFRAN) 4 MG tablet      • oxyCODONE-acetaminophen (PERCOCET) 7.5-325 MG per tablet      • potassium chloride (K-DUR) 10 MEQ CR tablet Take 1 tablet by mouth 2 (Two) Times a Day. 60 tablet 3     Current Facility-Administered Medications   Medication Dose Route Frequency Provider Last Rate Last Dose   • mupirocin (BACTROBAN) 2 % ointment   Topical Q12H Live Bolton MD            Objective:     Vitals:    11/01/17 1017   BP: 118/78   BP Location: Left arm   Patient Position: Sitting   Cuff Size: Large Adult   Pulse: 80   SpO2: 96%   Weight: 219 lb 3.2 oz (99.4 kg)   Height: 64\" (162.6 cm)     Wt Readings from Last 3 Encounters:   11/01/17 219 lb 3.2 oz (99.4 kg)   10/27/17 219 lb 6.4 oz (99.5 kg)   10/18/17 223 lb (101 kg)          Physical Exam   Constitutional: She is oriented to person, place, and time. She appears well-developed and well-nourished. No distress.   HENT:   Head: Normocephalic.   Neck: No JVD present.   Cardiovascular: Normal rate, regular rhythm, S1 normal, S2 normal, normal heart sounds and intact distal pulses.    No murmur heard.  Pulmonary/Chest: Effort normal and breath sounds normal. No respiratory distress. She has no wheezes. She has no rales.   Abdominal: Soft. Bowel sounds are normal.   Musculoskeletal: Normal range of motion. She exhibits no edema.   Neurological: She is alert and oriented to person, place, and time.   Skin: Skin is warm and dry. No erythema.   Psychiatric: She has a normal mood and affect. Her " behavior is normal. Judgment and thought content normal.       DATA reviewed  ECHO 10/18/2017  Interpretation Summary   · Left Ventricle: Left ventricular systolic function is normal. Estimated EF appears to be in the range of 56 - 60%  · Left ventricular diastolic dysfunction is noted (grade III w/high LAP) consistent with reversible restrictive pattern. Elevated left atrial pressure.  · Right Ventricle: Normal right ventricular wall thickness, systolic function and septal motion noted. Right ventricular cavity is mildly dilated  · No evidence of pulmonary hypertension is present  · There is no evidence of pericardial effusion       Amira Shannon   Noninvasive physiologic studies of upper/lower extremity arteries, single level, bilateral (eg, ankle/brachial indices, Doppler waveform analysis...)   Order# [id number]   Reading physician: Mazin Mills MD Ordering physician: Bart Espitia MD PhD Study date: 10/18/17   Patient Information   Patient Name MRN Sex  (Age)   Amira Shannon [id number] Female 1960 (56 y.o.)   Clinical Indication   claudication   Dx: Claudication [I73.9 (ICD-10-CM)]   Interpretation Summary   · Right Conclusion: The right CARYN appears normal.  · Left Conclusion: The left CARYN appears normal.  · Increased indices suggest calcification             EKG was personally interpreted today.  Sinus mechanism with sinus arrhythmia.  Unable to exclude prior inferior infarction.      Test Reason :   Vent. Rate : 071 BPM     Atrial Rate : 071 BPM     P-R Int : 146 ms          QRS Dur : 084 ms      QT Int : 398 ms       P-R-T Axes : 050 000 031 degrees     QTc Int : 432 ms    Normal sinus rhythm  Inferior infarct , age undetermined  Abnormal ECG  No previous ECGs available  Confirmed by JAYMIE COOPER MD ([id number]),  MARQUITA JONES ([id number]) on     FINDINGS:        - lines/tubes: none    - cardiac: size within normal limits.    - mediastinum: contour within normal  limits.     - lungs: no evidence of a focal air space process, pulmonary  interstitial edema. Scattered tiny granulomata present. No  noncalcified nodules are identified    - pleura: no evidence of  fluid.      - osseous: unremarkable for age.        IMPRESSION:  CONCLUSION:  No acute cardiopulmonary process identified.       Interpretation Summary   · Appears negative for DVT of the lower extremities  · Pulsatile venous flow           Interpretation Summary   · Nuclear Study Description: A 2-day rest/stress protocol myocardial perfusion imaging study was performed  · Nuclear Perfusion Images: Overall image quality is excellent.  · Stress ECG: Stress ECG rhythm of sinus tachycardia noted. Normal ECG with no significant stress induced changes noted.  · Stress Description: A pharmacological stress test was performed using regadenoson without low-level exercise.  · Nuclear Perfusion Findings Study Impression: Myocardial perfusion imaging indicates a normal myocardial perfusion study with no evidence of ischemia  · Ventricle Size / Description: Left ventricular ejection fraction is normal (Calculated EF = 70%).  · Impressions are consistent with a low risk study.         proBNP 0.0 - 900.0 pg/mL 129.0                The following portions of the patient's history were reviewed and updated as appropriate: allergies, current medications, past family history, past medical history, past social history, past surgical history and problem list.     Assessment/Plan:   Weight down 4lbs.   Edema mostly in ankles, but there is a sock line under her knee.   Confirmed podiatry appointment. Esther Patrick referred and there was no appointment made. She has significant heel spurs.      Diagnosis Plan   1. Bilateral lower extremity edema  Increase Chlorthalidone to 50mg daily.   Add Aldactone 25mg   Taper off Kdur to once daily. May DC after labs on Friday.     Basic Metabolic Panel on Friday    Can try to wrap legs with ACE bandage for a  few hours during the day to see if improvement.        2. Dyspnea on exertion  Pulm referral, PFTs today. Sleep medicine appointment.  She also has an element of right ventricular dilation with these to be evaluated further to ensure that she does not have an ASD. AGUSTINA scheduled with Dr. Espitia. Dilation most likely from liver cirrhosis.        3. Diastolic dysfunction  BNP negative. Blood pressure controlled. DC Norvasc last visit. BP still stable.  Consideration for HFpEF, but edema appears to be more chronic than acute.        4. Drug therapy  Basic Metabolic Panel       Follow up with phone call on Friday about labs. Will see in 2 weeks prior to 2 other appointments.

## 2017-11-03 ENCOUNTER — LAB (OUTPATIENT)
Dept: LAB | Facility: CLINIC | Age: 57
End: 2017-11-03

## 2017-11-03 DIAGNOSIS — R60.0 BILATERAL LOWER EXTREMITY EDEMA: ICD-10-CM

## 2017-11-03 DIAGNOSIS — Z79.899 DRUG THERAPY: ICD-10-CM

## 2017-11-03 LAB
ANION GAP SERPL CALCULATED.3IONS-SCNC: 15 MMOL/L (ref 5–15)
BUN BLD-MCNC: 11 MG/DL (ref 7–21)
BUN/CREAT SERPL: 10.4 (ref 7–25)
CALCIUM SPEC-SCNC: 9.9 MG/DL (ref 8.4–10.2)
CHLORIDE SERPL-SCNC: 101 MMOL/L (ref 95–110)
CO2 SERPL-SCNC: 26 MMOL/L (ref 22–31)
CREAT BLD-MCNC: 1.06 MG/DL (ref 0.5–1)
GFR SERPL CREATININE-BSD FRML MDRD: 53 ML/MIN/1.73 (ref 51–120)
GLUCOSE BLD-MCNC: 115 MG/DL (ref 60–100)
POTASSIUM BLD-SCNC: 4 MMOL/L (ref 3.5–5.1)
SODIUM BLD-SCNC: 142 MMOL/L (ref 137–145)

## 2017-11-03 PROCEDURE — 80048 BASIC METABOLIC PNL TOTAL CA: CPT | Performed by: NURSE PRACTITIONER

## 2017-11-06 ENCOUNTER — TELEPHONE (OUTPATIENT)
Dept: CARDIOLOGY | Facility: CLINIC | Age: 57
End: 2017-11-06

## 2017-11-06 NOTE — TELEPHONE ENCOUNTER
Called with Lab results. Normal BMP on Aldactone 25mg and chlorthalidone 50mg. Potassium 4.0.     Can stop potassium.     AGUSTINA in am.

## 2017-11-07 ENCOUNTER — HOSPITAL ENCOUNTER (OUTPATIENT)
Dept: CARDIOLOGY | Facility: HOSPITAL | Age: 57
Discharge: HOME | End: 2017-11-07
Attending: INTERNAL MEDICINE | Admitting: INTERNAL MEDICINE

## 2017-11-07 ENCOUNTER — ANESTHESIA EVENT (OUTPATIENT)
Dept: CARDIOLOGY | Facility: HOSPITAL | Age: 57
End: 2017-11-07

## 2017-11-07 ENCOUNTER — ANESTHESIA (OUTPATIENT)
Dept: CARDIOLOGY | Facility: HOSPITAL | Age: 57
End: 2017-11-07

## 2017-11-07 VITALS
BODY MASS INDEX: 36.92 KG/M2 | HEART RATE: 75 BPM | RESPIRATION RATE: 18 BRPM | HEIGHT: 64 IN | WEIGHT: 216.27 LBS | SYSTOLIC BLOOD PRESSURE: 128 MMHG | TEMPERATURE: 96.8 F | DIASTOLIC BLOOD PRESSURE: 75 MMHG | OXYGEN SATURATION: 95 %

## 2017-11-07 PROCEDURE — 93312 ECHO TRANSESOPHAGEAL: CPT

## 2017-11-07 PROCEDURE — 25010000002 MIDAZOLAM PER 1 MG: Performed by: NURSE ANESTHETIST, CERTIFIED REGISTERED

## 2017-11-07 PROCEDURE — 93325 DOPPLER ECHO COLOR FLOW MAPG: CPT | Performed by: INTERNAL MEDICINE

## 2017-11-07 PROCEDURE — 93312 ECHO TRANSESOPHAGEAL: CPT | Performed by: INTERNAL MEDICINE

## 2017-11-07 PROCEDURE — 25010000002 PROPOFOL 10 MG/ML EMULSION: Performed by: NURSE ANESTHETIST, CERTIFIED REGISTERED

## 2017-11-07 PROCEDURE — 93321 DOPPLER ECHO F-UP/LMTD STD: CPT | Performed by: INTERNAL MEDICINE

## 2017-11-07 PROCEDURE — 93325 DOPPLER ECHO COLOR FLOW MAPG: CPT

## 2017-11-07 PROCEDURE — 93321 DOPPLER ECHO F-UP/LMTD STD: CPT

## 2017-11-07 RX ORDER — SCOLOPAMINE TRANSDERMAL SYSTEM 1 MG/1
1 PATCH, EXTENDED RELEASE TRANSDERMAL
Status: DISCONTINUED | OUTPATIENT
Start: 2017-11-07 | End: 2017-11-08 | Stop reason: HOSPADM

## 2017-11-07 RX ORDER — MIDAZOLAM HYDROCHLORIDE 1 MG/ML
INJECTION INTRAMUSCULAR; INTRAVENOUS AS NEEDED
Status: DISCONTINUED | OUTPATIENT
Start: 2017-11-07 | End: 2017-11-07 | Stop reason: SURG

## 2017-11-07 RX ORDER — SODIUM CHLORIDE 9 MG/ML
1000 INJECTION, SOLUTION INTRAVENOUS CONTINUOUS PRN
Status: DISCONTINUED | OUTPATIENT
Start: 2017-11-07 | End: 2017-11-08 | Stop reason: HOSPADM

## 2017-11-07 RX ORDER — PROPOFOL 10 MG/ML
VIAL (ML) INTRAVENOUS AS NEEDED
Status: DISCONTINUED | OUTPATIENT
Start: 2017-11-07 | End: 2017-11-07 | Stop reason: SURG

## 2017-11-07 RX ORDER — LIDOCAINE HYDROCHLORIDE 10 MG/ML
INJECTION, SOLUTION INFILTRATION; PERINEURAL AS NEEDED
Status: DISCONTINUED | OUTPATIENT
Start: 2017-11-07 | End: 2017-11-07 | Stop reason: SURG

## 2017-11-07 RX ADMIN — PROPOFOL 80 MG: 10 INJECTION, EMULSION INTRAVENOUS at 11:34

## 2017-11-07 RX ADMIN — SODIUM CHLORIDE 1000 ML: 9 INJECTION, SOLUTION INTRAVENOUS at 10:16

## 2017-11-07 RX ADMIN — PROPOFOL 50 MG: 10 INJECTION, EMULSION INTRAVENOUS at 11:37

## 2017-11-07 RX ADMIN — PROPOFOL 50 MG: 10 INJECTION, EMULSION INTRAVENOUS at 11:42

## 2017-11-07 RX ADMIN — LIDOCAINE HYDROCHLORIDE 60 MG: 10 INJECTION, SOLUTION INFILTRATION; PERINEURAL at 11:34

## 2017-11-07 RX ADMIN — SCOPALAMINE 1 PATCH: 1 PATCH, EXTENDED RELEASE TRANSDERMAL at 10:24

## 2017-11-07 RX ADMIN — MIDAZOLAM 2 MG: 1 INJECTION INTRAMUSCULAR; INTRAVENOUS at 11:28

## 2017-11-07 RX ADMIN — PROPOFOL 20 MG: 10 INJECTION, EMULSION INTRAVENOUS at 11:38

## 2017-11-07 NOTE — H&P (VIEW-ONLY)
Cardiovascular Medicine      Bart Espitia M.D., Ph.D., [facility]           History of Present Illness  This is a 56 y.o. female with:    1.  Abnormal EKG  A. MPS low-risk, 2017  2.  Hypertension  3.  Former smoker  4. Edema  5. CHAI  A. CPAP non-compliant    Abnormal EKG/Dyspnea  The patient returns for  possible MI. The patient was told she may have had a MI detected on EKG. She had a TTE with possible inferior infarction in 2013. This has remained on her EKGs. She was seen recently for a pre-op and had an EKG. She does have exertional dyspnea. She has had LE edema and pain. She has had mild CP. This is non-exertional. She has also had some shoulder pain.     At her last visit, she was deemed to be a moderate-risk for obstructive CAD.  She was sent for an echocardiogram and myocardial perfusion stress.    LE pain and edema  She has been having LE pain and edema. This does worsen with walking. She is now on Lasix. She denies VTE. She is on Furosemide. She has been on Lasix for 10-12 years. She does have HTN for also about 10 years.     RV dilation  TTE showed mild RV dilation.       Review of Systems - History obtained from chart review and the patient  General ROS: negative  Respiratory ROS: positive for - shortness of breath  Cardiovascular ROS: negative for - chest pain.  All other systems were reviewed and were negative.    family history includes Cancer in her other; Diabetes in her other; Heart disease in her other.     reports that she has quit smoking. Her smoking use included Cigarettes. She has never used smokeless tobacco. She reports that she does not drink alcohol or use illicit drugs.    Allergies   Allergen Reactions   • Other      Pt states that taking steroids either in pill or injection form make her have blisters in her mouth and she feels like she is on fire on the inside/ has hx of c diff and possible MRSA     • Sulfa Antibiotics Rash     Sulfa (Sulfonamide Antibiotics)         Current  Outpatient Prescriptions:   •  amLODIPine (NORVASC) 10 MG tablet, Take 10 mg by mouth Daily., Disp: , Rfl:   •  cetirizine (zyrTEC) 10 MG tablet, Take 1 tablet by mouth Daily As Needed for Allergies., Disp: 30 tablet, Rfl: 11  •  cyclobenzaprine (FLEXERIL) 10 MG tablet, Take 10 mg by mouth 2 (Two) Times a Day As Needed for Muscle Spasms., Disp: , Rfl:   •  DULoxetine (CYMBALTA) 30 MG capsule, Take 30 mg by mouth Daily., Disp: , Rfl:   •  ferrous sulfate 325 (65 FE) MG tablet, Take 325 mg by mouth Daily With Breakfast. Iron 65mg, Disp: , Rfl:   •  furosemide (LASIX) 20 MG tablet, , Disp: , Rfl:   •  furosemide (LASIX) 40 MG tablet, Take 40 mg by mouth Daily., Disp: , Rfl:   •  gabapentin (NEURONTIN) 800 MG tablet, Take 800 mg by mouth 4 (Four) Times a Day., Disp: , Rfl:   •  lactulose (CHRONULAC) 10 GM/15ML solution, Take 30 mL by mouth 2 (Two) Times a Day As Needed (constipation)., Disp: 120 mL, Rfl: 0  •  linaclotide (LINZESS) 145 MCG capsule capsule, Take 145 mcg by mouth Every Night., Disp: , Rfl:   •  magnesium oxide (MAGOX) 400 (241.3 MG) MG tablet tablet, Take 400 mg by mouth Daily., Disp: , Rfl:   •  mupirocin (BACTROBAN) 2 % ointment, Apply  topically 3 (Three) Times a Day., Disp: 1 g, Rfl: 2  •  nystatin (MYCOSTATIN) 437863 UNIT/GM powder, , Disp: , Rfl:   •  omeprazole (priLOSEC) 20 MG capsule, Take 20 mg by mouth Daily., Disp: , Rfl:   •  ondansetron (ZOFRAN) 4 MG tablet, , Disp: , Rfl:   •  oxyCODONE-acetaminophen (PERCOCET) 7.5-325 MG per tablet, , Disp: , Rfl:   •  potassium chloride (K-DUR) 10 MEQ CR tablet, Take 1 tablet by mouth 2 (Two) Times a Day., Disp: 60 tablet, Rfl: 3    Current Facility-Administered Medications:   •  mupirocin (BACTROBAN) 2 % ointment, , Topical, Q12H, Live Bolton MD    Physical Exam:  Vitals:    10/27/17 1046   BP: 124/66   Pulse: 80   SpO2: 94%     Body mass index is 37.66 kg/(m^2).    GEN: alert, appears stated age and cooperative  Body Habitus: overweight  JVP: 6 cm  of water at 45 degrees HJR: absent      Pulmonary:clear to auscultation, no wheezes, rales or rhonchi, symmetric air entry. Equal chest excursion. Chest physical exam is normal. No tenderness.        Precordium:  No palpable heaves or thrusts. P2 is not palpable.   Perkins:  normal size and placement Palpable S4: Not present.   Heart rate: normal  Heart Rhythm: regular     Heart Sounds: S1: normal intensity  S2: normal intensity  S3: absent   S4: absent  Opening Snap: absent  A2-OS:  N/A  Pericardial rub: absent    Ejection click: None      Murmurs: Systolic: none  Diastolic: none  Extremity: There is significant thickening of the foot just distal to the ankle which actually believe is adipose tissue.  With that said, there is 1+ lower extremity edema to the level of the ankles.  No overlying skin changes.    DATA REVIEWED:     [facility] HEART [facility] VASCULAR  [address] [place] [address]  [phone]             Amira Shannon   Echocardiogram   Order# [id number]   Reading physician: Bart Espitia MD PhD Ordering physician: Bart Espitia MD PhD Study date: 10/18/17   Patient Information   Patient Name MRN Sex  (Age)   Amira Shannon [id number] Female 1960 (56 y.o.)   Sedation Narrator Report   Sedation Narrator Report   Interpretation Summary   · Left Ventricle: Left ventricular systolic function is normal. Estimated EF appears to be in the range of 56 - 60%  · Left ventricular diastolic dysfunction is noted (grade III w/high LAP) consistent with reversible restrictive pattern. Elevated left atrial pressure.  · Right Ventricle: Normal right ventricular wall thickness, systolic function and septal motion noted. Right ventricular cavity is mildly dilated  · No evidence of pulmonary hypertension is present  · There is no evidence of pericardial effusion       [address]  [address]  [phone]             Amira Shannon   Noninvasive physiologic studies of upper/lower extremity arteries, single level, bilateral (eg, ankle/brachial indices, Doppler waveform analysis...)   Order# [id number]   Reading physician: Mazin Mills MD Ordering physician: Bart Espitia MD PhD Study date: 10/18/17   Patient Information   Patient Name MRN Sex  (Age)   Amira Shannon [id number] Female 1960 (56 y.o.)   Clinical Indication   claudication   Dx: Claudication [I73.9 (ICD-10-CM)]   Interpretation Summary   · Right Conclusion: The right CARYN appears normal.  · Left Conclusion: The left CARYN appears normal.  · Increased indices suggest calcification             EKG was personally interpreted today.  Sinus mechanism with sinus arrhythmia.  Unable to exclude prior inferior infarction.      Test Reason :   Vent. Rate : 071 BPM     Atrial Rate : 071 BPM     P-R Int : 146 ms          QRS Dur : 084 ms      QT Int : 398 ms       P-R-T Axes : 050 000 031 degrees     QTc Int : 432 ms    Normal sinus rhythm  Inferior infarct , age undetermined  Abnormal ECG  No previous ECGs available  Confirmed by ALLISON VAUGHAN, JAYMIE ([id number]),  MARQUITA JONES ([id number]) on     FINDINGS:        - lines/tubes: none    - cardiac: size within normal limits.    - mediastinum: contour within normal limits.     - lungs: no evidence of a focal air space process, pulmonary  interstitial edema. Scattered tiny granulomata present. No  noncalcified nodules are identified    - pleura: no evidence of  fluid.      - osseous: unremarkable for age.        IMPRESSION:  CONCLUSION:  No acute cardiopulmonary process identified.       Interpretation Summary   · Appears negative for DVT of the lower extremities  · Pulsatile venous flow           Interpretation Summary   · Nuclear Study Description: A 2-day rest/stress protocol myocardial perfusion imaging study was performed  · Nuclear Perfusion Images: Overall  image quality is excellent.  · Stress ECG: Stress ECG rhythm of sinus tachycardia noted. Normal ECG with no significant stress induced changes noted.  · Stress Description: A pharmacological stress test was performed using regadenoson without low-level exercise.  · Nuclear Perfusion Findings Study Impression: Myocardial perfusion imaging indicates a normal myocardial perfusion study with no evidence of ischemia  · Ventricle Size / Description: Left ventricular ejection fraction is normal (Calculated EF = 70%).  · Impressions are consistent with a low risk study.         proBNP 0.0 - 900.0 pg/mL 129.0         Assessment/Plan      1.  Abnormal EKG with low-risk MPS.  Her myocardial perfusion stress showed preserved LVEF, and no inducible ischemia and, importantly, no evidence of prior infarction.  Echocardiogram was structurally normal.  I recommended ongoing risk factor modifications.    2. Cardiac Risk Assessment and need for statin therapy: Low.  Her 10-year ASCVD risk was calculated based on her most recent lipid profile.  This was 3.4%.  -I did not recommend aspirin or statin at this time.    3. Dyspnea.  I suspect this is multifactorial.  TTE was restrictive.  I do have concerns that this could represent HFpEF. BNP was normal, but I am not reassured by this given her weight.  She's been diagnosed with CHAI in the past, but is noncompliant.  I spent some time discussing with her that her etiology is likely multifactorial, but at this time I cannot exclude a diagnosis of HFpEF, especially given her symptoms and abnormal diastolic function on most recent echocardiogram.  She also has an element of right ventricular dilation with these to be evaluated further to ensure that she does not have an ASD.  - 6MWT and PFTs  -I recommended a sleep evaluation and CPAP compliance  -I recommended a pulmonary referral  -Deferred consideration for RHC    4.  RV dilation.  This is mildly dilated.  I recommended an evaluation for  intracardiac shunt as her most recent PA pressures were normal.  -A AGUSTINA was recommended to the patient with MAC given my concerns for CHAI.   The indications and risks/benefits were discussed. This included risks of inadvertent tracheal intubation, hematoma, oropharyngeal bleeding, esophageal perforation.  The patient agreed to proceeding.  The risks of conscious sedation were also reviewed including allergy, anaphylaxis and hypoventilation requiring mechanical ventilation.      4. LE pain and edema.  ABIs were normal.  Lower extremity venous duplex without evidence of DVT.  I recommended ongoing diuretic therapy and compression stockings.  We did use Zaroxolyn for one week.  She tells me she had marked improvement in her edema, but he came back in 2 days afterwards.  I discussed with her today that amlodipine can certainly cause edema.  I think we should go ahead and discontinue this and change her diuretic regimen in the short-term.  I informed her that she should stop Lasix and start chlorthalidone with the intention of following up within one week with an APRN for evaluation of her chemistries and lower extremity edema.  She may very well require chlorthalidone and Lasix, but I would like to up-titrate her dose of chlorthalidone before we add a second medication.  -D/C Norvasc  -Start Chlorthalidone  -D/C Lasix  -f/u APRN one week    5. Tobacco status: Former smoker.    Plan for follow-up: 4 months with me; one week with APRN          This document has been electronically signed by Bart Espitia MD PhD on October 27, 2017 10:59 AM

## 2017-11-07 NOTE — ANESTHESIA PREPROCEDURE EVALUATION
Anesthesia Evaluation     Patient summary reviewed and Nursing notes reviewed   history of anesthetic complications (Scopalamine patch applied pre-op.): PONV  NPO Solid Status: > 8 hours       Airway   Mallampati: II  TM distance: >3 FB  Neck ROM: full  Dental    (+) upper dentures and lower dentures    Pulmonary - normal exam    breath sounds clear to auscultation  (+) a smoker Former, sleep apnea on CPAP,   Cardiovascular - normal exam    ECG reviewed  Rhythm: regular  Rate: normal    (+) hypertension well controlled, hyperlipidemia    ROS comment: EKG:NSR EF 55%    Neuro/Psych  (+) headaches, psychiatric history Depression,    GI/Hepatic/Renal/Endo    (+) obesity,  hiatal hernia, GERD well controlled, hepatitis (Non-alcoholic fatty liver disease.), liver disease (Fatty liver non-alcoholic),     ROS Comment: Platelet count 58K    Musculoskeletal     Abdominal   (+) obese,    Substance History - negative use     OB/GYN negative ob/gyn ROS         Other   (+) arthritis                                   Anesthesia Plan    ASA 3     MAC and general     intravenous induction   Anesthetic plan and risks discussed with patient and spouse/significant other.

## 2017-11-07 NOTE — ANESTHESIA POSTPROCEDURE EVALUATION
Patient: Amira Shannon    Procedure Summary     Date Anesthesia Start Anesthesia Stop Room / Location    11/07/17 1126 1145 [facility] CATH LAB       Procedure Diagnosis Scheduled Providers Provider    ADULT TRANSESOPHAGEAL ECHO (AGUSTINA) W/ CONT IF NECESSARY PER PROTOCOL Abnormality of right ventricle of heart  (Non-diagnostic Echo)  Sanya Melendez MD          Anesthesia Type: MAC, general  Last vitals  BP   158/88 (11/07/17 1128)   Temp   97.7 °F (36.5 °C) (11/07/17 0937)   Pulse   77 (11/07/17 1128)   Resp   16 (11/07/17 1128)     SpO2   99 % (11/07/17 1128)     Post Anesthesia Care and Evaluation    Patient location during evaluation: bedside  Patient participation: complete - patient participated  Level of consciousness: awake and awake and alert  Pain score: 0  Pain management: satisfactory to patient  Airway patency: patent  Anesthetic complications: No anesthetic complications  PONV Status: none  Cardiovascular status: acceptable and stable  Respiratory status: acceptable, room air, unassisted and spontaneous ventilation  Hydration status: acceptable

## 2017-11-07 NOTE — TREATMENT PLAN
CV Medicine  Status post successful and uncomplicated AGUSTINA  LVEF preserved  No evidence of ASD or VSD    Bart Espitia MD PhD

## 2017-11-09 ENCOUNTER — OFFICE VISIT (OUTPATIENT)
Dept: PAIN MEDICINE | Facility: CLINIC | Age: 57
End: 2017-11-09

## 2017-11-09 ENCOUNTER — APPOINTMENT (OUTPATIENT)
Dept: LAB | Facility: HOSPITAL | Age: 57
End: 2017-11-09

## 2017-11-09 VITALS
DIASTOLIC BLOOD PRESSURE: 90 MMHG | BODY MASS INDEX: 37.58 KG/M2 | SYSTOLIC BLOOD PRESSURE: 132 MMHG | HEIGHT: 64 IN | WEIGHT: 220.1 LBS

## 2017-11-09 DIAGNOSIS — M79.672 HEEL PAIN, BILATERAL: ICD-10-CM

## 2017-11-09 DIAGNOSIS — Z79.899 HIGH RISK MEDICATIONS (NOT ANTICOAGULANTS) LONG-TERM USE: ICD-10-CM

## 2017-11-09 DIAGNOSIS — M79.2 NEUROPATHIC PAIN: Primary | ICD-10-CM

## 2017-11-09 DIAGNOSIS — M79.671 HEEL PAIN, BILATERAL: ICD-10-CM

## 2017-11-09 PROCEDURE — 80307 DRUG TEST PRSMV CHEM ANLYZR: CPT | Performed by: PAIN MEDICINE

## 2017-11-09 PROCEDURE — G0481 DRUG TEST DEF 8-14 CLASSES: HCPCS | Performed by: PAIN MEDICINE

## 2017-11-09 PROCEDURE — 99214 OFFICE O/P EST MOD 30 MIN: CPT | Performed by: PAIN MEDICINE

## 2017-11-09 RX ORDER — OXYCODONE AND ACETAMINOPHEN 7.5; 325 MG/1; MG/1
1 TABLET ORAL 3 TIMES DAILY
Qty: 90 TABLET | Refills: 0 | Status: SHIPPED | OUTPATIENT
Start: 2017-11-09 | End: 2017-11-22 | Stop reason: HOSPADM

## 2017-11-09 RX ORDER — OXYCODONE AND ACETAMINOPHEN 7.5; 325 MG/1; MG/1
1 TABLET ORAL 3 TIMES DAILY
Qty: 90 TABLET | Refills: 0 | Status: ON HOLD | OUTPATIENT
Start: 2017-11-09 | End: 2017-11-22

## 2017-11-09 NOTE — PROGRESS NOTES
"Amira Shannon is a 57 y.o. female.   1960    HPI:   Location: lower back, bilateral hip, bilateral knee and bilateral foot  Quality: throbbing and tingling  Severity: 7/10  Timing: constant  Alleviating: nothing  Aggravating: ambulating     Patient denies side effects, she reports that her opioid medication still provides significant relief and allows her to be more active.  Going to new podiatrist soon.        The following portions of the patient's history were reviewed by me and updated as appropriate: allergies, current medications, past family history, past medical history, past social history, past surgical history and problem list.    Past Medical History:   Diagnosis Date   • Acid reflux    • Altered bowel function    • Arthropathy of lumbar facet joint    • Constipation    • Corns and callus    • Depression    • Disease related peripheral neuropathy    • Epigastric pain    • Fatty liver    • Hammer toe    • Headache    • Hiatal hernia    • Hyperlipidemia    • Knee pain    • Localized, primary osteoarthritis of the ankle and foot     Localized, primary osteoarthritis of the ankle and/or foot   • Mendoza's metatarsalgia     Mendoza's metatarsalgia - 2nd interspace on right   • Nausea and vomiting    • Neuralgia and neuritis     Neuralgia, neuritis, and radiculitis, unspecified   • Neuropathy    • Obstructive sleep apnea     Obstructive sleep apnea (adult) (pediatric)    • CHAI on CPAP     \"C-Pap at night  (unconfirmed)\"   • Osteoarthritis    • Pain in foot     Pain in unspecified foot - sees a podiatrist   • Pain in joint, ankle and foot     Joint pain in ankle and foot      • Pain radiating to back     Pain radiating to lumbar region of back   • Plantar fasciitis    • Restless leg syndrome    • Secondary hypertension     Secondary hypertension, unspecified   • Sinusitis    • Sleep apnea    • Tongue anomaly     lesion       Social History     Social History   • Marital status:      Spouse " name: N/A   • Number of children: N/A   • Years of education: N/A     Occupational History   • Not on file.     Social History Main Topics   • Smoking status: Former Smoker     Types: Cigarettes   • Smokeless tobacco: Never Used   • Alcohol use No   • Drug use: No   • Sexual activity: Defer      Comment: Marital status:      Other Topics Concern   • Not on file     Social History Narrative       Family History   Problem Relation Age of Onset   • Cancer Other    • Diabetes Other    • Heart disease Other          Current Outpatient Prescriptions:   •  cetirizine (zyrTEC) 10 MG tablet, Take 1 tablet by mouth Daily As Needed for Allergies., Disp: 30 tablet, Rfl: 11  •  chlorthalidone (HYGROTEN) 50 MG tablet, Take 1 tablet by mouth Daily., Disp: 30 tablet, Rfl: 3  •  cyclobenzaprine (FLEXERIL) 10 MG tablet, Take 10 mg by mouth 2 (Two) Times a Day As Needed for Muscle Spasms., Disp: , Rfl:   •  DULoxetine (CYMBALTA) 30 MG capsule, Take 30 mg by mouth Daily., Disp: , Rfl:   •  gabapentin (NEURONTIN) 800 MG tablet, Take 800 mg by mouth 4 (Four) Times a Day., Disp: , Rfl:   •  linaclotide (LINZESS) 145 MCG capsule capsule, Take 145 mcg by mouth Every Night., Disp: , Rfl:   •  magnesium oxide (MAGOX) 400 (241.3 MG) MG tablet tablet, Take 400 mg by mouth Daily., Disp: , Rfl:   •  mupirocin (BACTROBAN) 2 % ointment, Apply  topically 3 (Three) Times a Day. (Patient taking differently: Apply 1 application topically 3 (Three) Times a Day.), Disp: 1 g, Rfl: 2  •  nystatin (MYCOSTATIN) 426209 UNIT/GM powder, Apply 1 application topically As Needed., Disp: , Rfl:   •  omeprazole (priLOSEC) 20 MG capsule, Take 20 mg by mouth Daily., Disp: , Rfl:   •  ondansetron (ZOFRAN) 4 MG tablet, Take 4 mg by mouth Every 8 (Eight) Hours As Needed., Disp: , Rfl:   •  oxyCODONE-acetaminophen (PERCOCET) 7.5-325 MG per tablet, Take 1 tablet by mouth Every 8 (Eight) Hours As Needed., Disp: , Rfl:   •  spironolactone (ALDACTONE) 25 MG tablet,  Take 1 tablet by mouth Daily., Disp: 30 tablet, Rfl: 3  •  oxyCODONE-acetaminophen (PERCOCET) 7.5-325 MG per tablet, Take 1 tablet by mouth 3 (Three) Times a Day for 30 days., Disp: 90 tablet, Rfl: 0  •  oxyCODONE-acetaminophen (PERCOCET) 7.5-325 MG per tablet, Take 1 tablet by mouth 3 (Three) Times a Day for 30 days., Disp: 90 tablet, Rfl: 0    Allergies   Allergen Reactions   • Other      Pt states that taking steroids either in pill or injection form make her have blisters in her mouth and she feels like she is on fire on the inside/ has hx of c diff and possible MRSA     • Sulfa Antibiotics Rash     Sulfa (Sulfonamide Antibiotics)       Review of Systems   Musculoskeletal: Positive for back pain (lower).        Right hip pain  Bilateral knee pain  Bilateral foot pain   All other systems reviewed and are negative.    All systems reviewed and negative except for above.    Physical Exam   Constitutional: She appears well-developed and well-nourished. No distress.   Musculoskeletal:   Minimal pain with full arom b knees    Subjective foot pain in heels bilaterally with ambulation.     Neurological: She is alert.   Psychiatric: She has a normal mood and affect. Her behavior is normal. Judgment normal.       Amira was seen today for back pain, hip pain, knee pain and foot pain.    Diagnoses and all orders for this visit:    Neuropathic pain  -     ToxASSURE Select 13 (MW) - Urine, Clean Catch    Heel pain, bilateral  -     ToxASSURE Select 13 (MW) - Urine, Clean Catch    High risk medications (not anticoagulants) long-term use  -     ToxASSURE Select 13 (MW) - Urine, Clean Catch    Other orders  -     oxyCODONE-acetaminophen (PERCOCET) 7.5-325 MG per tablet; Take 1 tablet by mouth 3 (Three) Times a Day for 30 days.  -     oxyCODONE-acetaminophen (PERCOCET) 7.5-325 MG per tablet; Take 1 tablet by mouth 3 (Three) Times a Day for 30 days.      Medication: Patient reports no negative side effects, Patient reports  appropriate usage and storage habits, Patient's opioid provides enough relief to be more active and perform activities of daily living with less discomfort. and Refill opioid medication as above.    Interventional: none at this time.  Following with podiatry for foot pain.     Rehab: none at this time    Behavioral: No aberrant behavior noted. NITO Report #[id number] was reviewed and is consistent with stated history    Urine drug screen Ordered today to test for drugs of abuse and prescribed medications          This document has been electronically signed by Hua Westfall MD on November 9, 2017 11:58 AM

## 2017-11-13 ENCOUNTER — CONSULT (OUTPATIENT)
Dept: SLEEP MEDICINE | Facility: HOSPITAL | Age: 57
End: 2017-11-13

## 2017-11-13 ENCOUNTER — OFFICE VISIT (OUTPATIENT)
Dept: CARDIOLOGY | Facility: CLINIC | Age: 57
End: 2017-11-13

## 2017-11-13 ENCOUNTER — OFFICE VISIT (OUTPATIENT)
Dept: PODIATRY | Facility: CLINIC | Age: 57
End: 2017-11-13

## 2017-11-13 VITALS
OXYGEN SATURATION: 95 % | DIASTOLIC BLOOD PRESSURE: 86 MMHG | SYSTOLIC BLOOD PRESSURE: 131 MMHG | BODY MASS INDEX: 36.7 KG/M2 | HEIGHT: 64 IN | HEART RATE: 109 BPM | WEIGHT: 215 LBS

## 2017-11-13 VITALS
DIASTOLIC BLOOD PRESSURE: 86 MMHG | HEART RATE: 89 BPM | BODY MASS INDEX: 36.72 KG/M2 | HEIGHT: 64 IN | OXYGEN SATURATION: 97 % | WEIGHT: 215.06 LBS | SYSTOLIC BLOOD PRESSURE: 134 MMHG

## 2017-11-13 VITALS
HEIGHT: 64 IN | BODY MASS INDEX: 36.7 KG/M2 | HEART RATE: 86 BPM | WEIGHT: 215 LBS | DIASTOLIC BLOOD PRESSURE: 86 MMHG | OXYGEN SATURATION: 96 % | SYSTOLIC BLOOD PRESSURE: 131 MMHG

## 2017-11-13 DIAGNOSIS — M79.672 BILATERAL FOOT PAIN: Primary | ICD-10-CM

## 2017-11-13 DIAGNOSIS — G47.33 OBSTRUCTIVE SLEEP APNEA, ADULT: Primary | ICD-10-CM

## 2017-11-13 DIAGNOSIS — R39.15 URGENCY OF URINATION: ICD-10-CM

## 2017-11-13 DIAGNOSIS — M79.671 BILATERAL FOOT PAIN: Primary | ICD-10-CM

## 2017-11-13 DIAGNOSIS — M79.89 SWELLING OF BOTH LOWER EXTREMITIES: Primary | ICD-10-CM

## 2017-11-13 DIAGNOSIS — D69.6 THROMBOCYTOPENIA (HCC): ICD-10-CM

## 2017-11-13 DIAGNOSIS — I51.89 DIASTOLIC DYSFUNCTION: ICD-10-CM

## 2017-11-13 DIAGNOSIS — R53.82 CHRONIC FATIGUE: ICD-10-CM

## 2017-11-13 PROCEDURE — 99214 OFFICE O/P EST MOD 30 MIN: CPT | Performed by: NURSE PRACTITIONER

## 2017-11-13 PROCEDURE — 99203 OFFICE O/P NEW LOW 30 MIN: CPT | Performed by: PODIATRIST

## 2017-11-13 PROCEDURE — 99203 OFFICE O/P NEW LOW 30 MIN: CPT | Performed by: INTERNAL MEDICINE

## 2017-11-13 RX ORDER — MELOXICAM 15 MG/1
15 TABLET ORAL DAILY
Qty: 30 TABLET | Refills: 0 | Status: SHIPPED | OUTPATIENT
Start: 2017-11-13 | End: 2018-02-15 | Stop reason: SDUPTHER

## 2017-11-13 NOTE — PROGRESS NOTES
"New Patient Sleep Medicine Consultation    Encounter Date: 11/13/2017         Patient's PCP: Yobany Barr MD  Referring provider: Live Bolton MD  Reason for consultation: known CHAI , HTN, decreased libido, night sweats, sleeptalking    Amira Shannon is a 57 y.o. female who presents with above complaints for many years. She saw Dr. Orozco and was diagnosed with CHAI. She was not using the machine enough and hence could not get her supplies refilled. This was about 3-4 years ago.  She  admits to daytime fatigue, and non-restorative sleep. Her bedtime is ~ 2100. She  falls asleep after < 5 minutes, and is up 3-4 times per night. She wakes up ~ 0600. She drinks 2 cups of coffee, 1 teas, and 2 sodas per day. She drinks 0 alcoholic beverages per week. She has some drowsy driving, phone talking. She naps daily. She denies abnormal dreams, cataplexy, sleep paralysis, or hypnagogic hallucinations. She does not take sedatives or hypnotics.    [place] - 19    Past Medical History:   Diagnosis Date   • Acid reflux    • Altered bowel function    • Arthropathy of lumbar facet joint    • Bleeding disorder    • Constipation    • Corns and callus    • Depression    • Disease related peripheral neuropathy    • Epigastric pain    • Fatty liver    • Hammer toe    • Headache    • Hiatal hernia    • Hyperlipidemia    • Knee pain    • Localized, primary osteoarthritis of the ankle and foot     Localized, primary osteoarthritis of the ankle and/or foot   • Mendoza's metatarsalgia     Mendoza's metatarsalgia - 2nd interspace on right   • Nausea and vomiting    • Neuralgia and neuritis     Neuralgia, neuritis, and radiculitis, unspecified   • Neuropathy    • Obstructive sleep apnea     Obstructive sleep apnea (adult) (pediatric)    • CHAI on CPAP     \"C-Pap at night  (unconfirmed)\"   • Osteoarthritis    • Pain in foot     Pain in unspecified foot - sees a podiatrist   • Pain in joint, ankle and foot     Joint pain in ankle and " "foot      • Pain radiating to back     Pain radiating to lumbar region of back   • Plantar fasciitis    • Restless leg syndrome    • Secondary hypertension     Secondary hypertension, unspecified   • Sinusitis    • Sleep apnea    • Tongue anomaly     lesion     Social History     Social History   • Marital status:      Spouse name: N/A   • Number of children: N/A   • Years of education: N/A     Occupational History   • Not on file.     Social History Main Topics   • Smoking status: Former Smoker     Types: Cigarettes   • Smokeless tobacco: Never Used   • Alcohol use No   • Drug use: No   • Sexual activity: Defer      Comment: Marital status:      Other Topics Concern   • Not on file     Social History Narrative     Family History   Problem Relation Age of Onset   • Cancer Other    • Diabetes Other    • Heart disease Other    • Hypertension Mother    • Cancer Mother    • Diabetes Mother    • Hypertension Father    • Heart attack Father    • Cancer Father    • Heart disease Father    • Thyroid disease Maternal Aunt    , 1 son  2 sisters and 1 brother - mom  at 58 with cirrhosis and DM (non alcohol related) dad  at 77 with heart disease  Smoking history: smoked 1-2 ppds from age 15 until 36  FH of sleep disorders: none    Review of Systems:  Pertinent items are noted in HPI. Patient advised to discuss any positive ROS with PCP.      Vitals:    17 1443   BP: 131/86   Pulse: 109   SpO2: 95%     Body mass index is 36.9 kg/(m^2).  Neck circumference: 15\"            General: Alert. Cooperative. Well developed. No acute distress.             Head:  Normocephalic. Symmetrical. Atraumatic.              Eyes: Sclera clear. No icterus. PERRLA. Normal EOM.             Ears: No deformities. Normal hearing.             Nose: No septal deviation. No drainage.          Throat: No oral lesions. No thrush. Moist mucous membranes.    Tongue is normal    Dentition is fair       Pharynx: Posterior " pharyngeal pillars are narrow    Mallampati score of IV (only hard palate visible)    Pharynx is normal with unrermarkable tonsils   Chest Wall:  Normal shape. Symmetric expansion with respiration. No tenderness.             Neck:  Trachea midline.           Lungs:  Clear to auscultation bilaterally. No wheezes. No rhonchi. No rales. Respirations regular, even and unlabored.            Heart:  Regular rhythm and normal rate. Normal S1 and S2. No murmur.     Abdomen:  Soft, non-tender and non-distended. Normal bowel sounds. No masses.  Extremities:  Moves all extremities well. No edema.           Pulses: Pulses palpable and equal bilaterally.               Skin: Dry. Intact. No bleeding. No rash.           Neuro: Moves all 4 extremities and cranial nerves grossly intact.  Psychiatric: Normal mood and affect.        Current Outpatient Prescriptions:   •  cetirizine (zyrTEC) 10 MG tablet, Take 1 tablet by mouth Daily As Needed for Allergies., Disp: 30 tablet, Rfl: 11  •  chlorthalidone (HYGROTEN) 50 MG tablet, Take 1 tablet by mouth Daily., Disp: 30 tablet, Rfl: 3  •  cyclobenzaprine (FLEXERIL) 10 MG tablet, Take 10 mg by mouth 2 (Two) Times a Day As Needed for Muscle Spasms., Disp: , Rfl:   •  DULoxetine (CYMBALTA) 30 MG capsule, Take 30 mg by mouth Daily., Disp: , Rfl:   •  gabapentin (NEURONTIN) 800 MG tablet, Take 800 mg by mouth 4 (Four) Times a Day., Disp: , Rfl:   •  linaclotide (LINZESS) 145 MCG capsule capsule, Take 145 mcg by mouth Every Night., Disp: , Rfl:   •  magnesium oxide (MAGOX) 400 (241.3 MG) MG tablet tablet, Take 400 mg by mouth Daily., Disp: , Rfl:   •  meloxicam (MOBIC) 15 MG tablet, Take 1 tablet by mouth Daily. Take once daily., Disp: 30 tablet, Rfl: 0  •  mupirocin (BACTROBAN) 2 % ointment, Apply  topically 3 (Three) Times a Day. (Patient taking differently: Apply 1 application topically 3 (Three) Times a Day.), Disp: 1 g, Rfl: 2  •  nystatin (MYCOSTATIN) 989937 UNIT/GM powder, Apply 1  application topically As Needed., Disp: , Rfl:   •  omeprazole (priLOSEC) 20 MG capsule, Take 20 mg by mouth Daily., Disp: , Rfl:   •  ondansetron (ZOFRAN) 4 MG tablet, Take 4 mg by mouth Every 8 (Eight) Hours As Needed., Disp: , Rfl:   •  oxyCODONE-acetaminophen (PERCOCET) 7.5-325 MG per tablet, Take 1 tablet by mouth Every 8 (Eight) Hours As Needed., Disp: , Rfl:   •  oxyCODONE-acetaminophen (PERCOCET) 7.5-325 MG per tablet, Take 1 tablet by mouth 3 (Three) Times a Day for 30 days., Disp: 90 tablet, Rfl: 0  •  oxyCODONE-acetaminophen (PERCOCET) 7.5-325 MG per tablet, Take 1 tablet by mouth 3 (Three) Times a Day for 30 days., Disp: 90 tablet, Rfl: 0  •  spironolactone (ALDACTONE) 25 MG tablet, Take 1 tablet by mouth Daily., Disp: 30 tablet, Rfl: 3    ASSESSMENT:  2. Obstructive sleep apnea   1. Needs records from [facility]  2. Order for new supplies and compliance check  3. Follow up in 3 months       This document has been electronically signed by Nolan Barraza MD on November 13, 2017         CC: MD Hoang Muller Robert A, MD

## 2017-11-13 NOTE — PROGRESS NOTES
Subjective:     CC: Follow up edema 1wk post aggressive diuresis    Fatigue   This is a recurrent problem. The current episode started more than 1 year ago. The problem occurs daily. The problem has been rapidly worsening. Associated symptoms include arthralgias, joint swelling, myalgias and weakness. Pertinent negatives include no change in bowel habit, chills, coughing, fever or rash. The symptoms are aggravated by standing and walking. She has tried position changes, oral narcotics, heat and rest for the symptoms. The treatment provided mild relief.   Leg Swelling   This is a chronic problem. The current episode started more than 1 year ago. The problem occurs intermittently. The problem has been rapidly improving. Associated symptoms include arthralgias, joint swelling, myalgias and weakness. Pertinent negatives include no change in bowel habit, chills, coughing, fever or rash. She has tried rest and position changes (loop diuretics) for the symptoms. The treatment provided moderate relief.     She had been on Lasix for 10-12 years. She does have HTN for also about 10 years.   1 month ago, she saw Dr. Espitia who added Metolazone 2.5mg x7 days. She has lost 4lbs. Of note, her BNP is negative.   Follow up revealed return of edema after Metolazone was stopped. Dr. Espitia also ordered ischemic evaluation which was low risk.   Last visit, he DC norvasc and lasix and added chlorthalidone. Improvement noted.    I saw her on  11/1/2017 and Aldactone added and chlorthalidone increased.   Weight down and swelling has not returned.     Her complaint of fatigue and oral discomfort led me to anemia work up. Her tongue is very red and raw. She is without thrush.       Review of Systems   Constitution: Positive for weakness and weight loss. Negative for chills, decreased appetite and fever.   HENT: Negative.    Eyes: Negative.    Cardiovascular: Positive for dyspnea on exertion and leg swelling. Negative for irregular  heartbeat.   Respiratory: Negative for cough and wheezing.    Endocrine: Negative.    Skin: Negative for dry skin, flushing and rash.   Musculoskeletal: Positive for arthralgias, joint swelling and myalgias. Negative for falls.   Gastrointestinal: Negative for change in bowel habit and melena.   Genitourinary: Negative for frequency and hematuria.   Neurological: Negative for dizziness, light-headedness and loss of balance.   Psychiatric/Behavioral: Negative for altered mental status and memory loss. The patient is not nervous/anxious.        Current Outpatient Prescriptions   Medication Sig Dispense Refill   • cetirizine (zyrTEC) 10 MG tablet Take 1 tablet by mouth Daily As Needed for Allergies. 30 tablet 11   • chlorthalidone (HYGROTEN) 50 MG tablet Take 1 tablet by mouth Daily. 30 tablet 3   • cyclobenzaprine (FLEXERIL) 10 MG tablet Take 10 mg by mouth 2 (Two) Times a Day As Needed for Muscle Spasms.     • DULoxetine (CYMBALTA) 30 MG capsule Take 30 mg by mouth Daily.     • gabapentin (NEURONTIN) 800 MG tablet Take 800 mg by mouth 4 (Four) Times a Day.     • linaclotide (LINZESS) 145 MCG capsule capsule Take 145 mcg by mouth Every Night.     • magnesium oxide (MAGOX) 400 (241.3 MG) MG tablet tablet Take 400 mg by mouth Daily.     • mupirocin (BACTROBAN) 2 % ointment Apply  topically 3 (Three) Times a Day. (Patient taking differently: Apply 1 application topically 3 (Three) Times a Day.) 1 g 2   • nystatin (MYCOSTATIN) 813637 UNIT/GM powder Apply 1 application topically As Needed.     • omeprazole (priLOSEC) 20 MG capsule Take 20 mg by mouth Daily.     • ondansetron (ZOFRAN) 4 MG tablet Take 4 mg by mouth Every 8 (Eight) Hours As Needed.     • oxyCODONE-acetaminophen (PERCOCET) 7.5-325 MG per tablet Take 1 tablet by mouth Every 8 (Eight) Hours As Needed.     • oxyCODONE-acetaminophen (PERCOCET) 7.5-325 MG per tablet Take 1 tablet by mouth 3 (Three) Times a Day for 30 days. 90 tablet 0   • oxyCODONE-acetaminophen  "(PERCOCET) 7.5-325 MG per tablet Take 1 tablet by mouth 3 (Three) Times a Day for 30 days. 90 tablet 0   • spironolactone (ALDACTONE) 25 MG tablet Take 1 tablet by mouth Daily. 30 tablet 3     No current facility-administered medications for this visit.         Objective:     Vitals:    11/13/17 1037   BP: 134/86   BP Location: Left arm   Patient Position: Sitting   Cuff Size: Adult   Pulse: 89   SpO2: 97%   Weight: 215 lb 1 oz (97.6 kg)   Height: 64\" (162.6 cm)     Wt Readings from Last 3 Encounters:   11/13/17 215 lb 1 oz (97.6 kg)   11/09/17 220 lb 1.6 oz (99.8 kg)   11/07/17 216 lb 4.3 oz (98.1 kg)          Physical Exam   Constitutional: She is oriented to person, place, and time. She appears well-developed and well-nourished. No distress.   HENT:   Head: Normocephalic.   Mouth/Throat:       Neck: No JVD present.   Cardiovascular: Normal rate, regular rhythm, S1 normal, S2 normal, normal heart sounds and intact distal pulses.    No murmur heard.  Pulmonary/Chest: Effort normal and breath sounds normal. No respiratory distress. She has no wheezes. She has no rales.   Abdominal: Soft. Bowel sounds are normal.   Musculoskeletal: Normal range of motion. She exhibits no edema.   Neurological: She is alert and oriented to person, place, and time.   Skin: Skin is warm and dry. No erythema.   Psychiatric: She has a normal mood and affect. Her behavior is normal. Judgment and thought content normal.       DATA reviewed  ECHO 10/18/2017  Interpretation Summary   · Left Ventricle: Left ventricular systolic function is normal. Estimated EF appears to be in the range of 56 - 60%  · Left ventricular diastolic dysfunction is noted (grade III w/high LAP) consistent with reversible restrictive pattern. Elevated left atrial pressure.  · Right Ventricle: Normal right ventricular wall thickness, systolic function and septal motion noted. Right ventricular cavity is mildly dilated  · No evidence of pulmonary hypertension is " present  · There is no evidence of pericardial effusion       Amira Shannon   Noninvasive physiologic studies of upper/lower extremity arteries, single level, bilateral (eg, ankle/brachial indices, Doppler waveform analysis...)   Order# [id number]   Reading physician: Mazin Mills MD Ordering physician: Bart Espitia MD PhD Study date: 10/18/17   Patient Information   Patient Name MRN Sex  (Age)   Amira Shannon [id number] Female 1960 (56 y.o.)   Clinical Indication   claudication   Dx: Claudication [I73.9 (ICD-10-CM)]   Interpretation Summary   · Right Conclusion: The right CARYN appears normal.  · Left Conclusion: The left CARYN appears normal.  · Increased indices suggest calcification           EKG was personally interpreted today.  Sinus mechanism with sinus arrhythmia.  Unable to exclude prior inferior infarction.      Test Reason :   Vent. Rate : 071 BPM     Atrial Rate : 071 BPM     P-R Int : 146 ms          QRS Dur : 084 ms      QT Int : 398 ms       P-R-T Axes : 050 000 031 degrees     QTc Int : 432 ms    Normal sinus rhythm  Inferior infarct , age undetermined  Abnormal ECG  No previous ECGs available  Confirmed by JAYMIE COOPER MD ([id number]),  MARQUITA JONES ([id number]) on     FINDINGS:        - lines/tubes: none    - cardiac: size within normal limits.    - mediastinum: contour within normal limits.     - lungs: no evidence of a focal air space process, pulmonary  interstitial edema. Scattered tiny granulomata present. No  noncalcified nodules are identified    - pleura: no evidence of  fluid.      - osseous: unremarkable for age.        IMPRESSION:  CONCLUSION:  No acute cardiopulmonary process identified.       Interpretation Summary   · Appears negative for DVT of the lower extremities  · Pulsatile venous flow           Interpretation Summary   · Nuclear Study Description: A 2-day rest/stress protocol myocardial perfusion imaging study was performed  · Nuclear  Perfusion Images: Overall image quality is excellent.  · Stress ECG: Stress ECG rhythm of sinus tachycardia noted. Normal ECG with no significant stress induced changes noted.  · Stress Description: A pharmacological stress test was performed using regadenoson without low-level exercise.  · Nuclear Perfusion Findings Study Impression: Myocardial perfusion imaging indicates a normal myocardial perfusion study with no evidence of ischemia  · Ventricle Size / Description: Left ventricular ejection fraction is normal (Calculated EF = 70%).  · Impressions are consistent with a low risk study.         proBNP 0.0 - 900.0 pg/mL 129.0        Ref. Range 11/3/2017 09:57   Glucose Latest Ref Range: 60 - 100 mg/dL 115 (H)   Sodium Latest Ref Range: 137 - 145 mmol/L 142   Potassium Latest Ref Range: 3.5 - 5.1 mmol/L 4.0   CO2 Latest Ref Range: 22.0 - 31.0 mmol/L 26.0   Chloride Latest Ref Range: 95 - 110 mmol/L 101   Anion Gap Latest Ref Range: 5.0 - 15.0 mmol/L 15.0   Creatinine Latest Ref Range: 0.50 - 1.00 mg/dL 1.06 (H)   BUN Latest Ref Range: 7 - 21 mg/dL 11   BUN/Creatinine Ratio Latest Ref Range: 7.0 - 25.0  10.4   Calcium Latest Ref Range: 8.4 - 10.2 mg/dL 9.9   eGFR Non African Amer Latest Ref Range: 51 - 120 mL/min/1.73 53       The following portions of the patient's history were reviewed and updated as appropriate: allergies, current medications, past family history, past medical history, past social history, past surgical history and problem list.     Assessment/Plan:   Weight continues to stay down.  Edema mostly in ankles  Appointment today with Podiatry and Sleep medicine.  Concern for b12 deficiency with persistent fatigue and red/sore tongue.      Diagnosis Plan   1. Swelling of both lower extremities  Improved     Chlorthalidone 50mg daily.   Aldactone 25mg   DC Potassium supplements       2. Diastolic dysfunction  BNP negative. Blood pressure controlled. BP still stable without Norvasc.  Consideration for  HFpEF, but negative BNP. Her edema now looks to be from joint effusions.        3. Chronic fatigue  Vitamin B12    Folate    CBC (No Diff)    TSH    T4, Free       4. Urgency of urination  Urinalysis With Microscopic - Urine, Clean Catch  Recommend Urology visit in [facility]       5. Thrombocytopenia                                                        Follows with Dr. Curran in [place]      Follow up in 2 months during 6MWT appt.   She will CALL should any needs arise to be seen sooner rather than later.  Lab work results will be called to her and faxed to Dr. Barr in [place]- her PCP

## 2017-11-13 NOTE — PROGRESS NOTES
"Amira Shannon  1960  57 y.o. female     Patient presents today with a complaint of bilateral foot pain.    11/13/17    Chief Complaint   Patient presents with   • Left Foot - Pain   • Right Foot - Pain           History of Present Illness    Amira Shannon is a 57 y.o.female who presents to clinic today with chief complaint of bilateral foot and ankle pain.  She states the pain has been present for 4-5 years.  She has had multiple rounds of steroid injections which have not helped.  She currently rates her pain as an 8 out of 10.  She describes it as sharp, dull and achy.  She admits to numbness, burning and tingling in her feet.  Her pain is aggravated with prolonged weightbearing and relieved with rest.  She denies any recent injuries or trauma.  She has no other pedal complaints.          Past Medical History:   Diagnosis Date   • Acid reflux    • Altered bowel function    • Arthropathy of lumbar facet joint    • Bleeding disorder    • Constipation    • Corns and callus    • Depression    • Disease related peripheral neuropathy    • Epigastric pain    • Fatty liver    • Hammer toe    • Headache    • Hiatal hernia    • Hyperlipidemia    • Knee pain    • Localized, primary osteoarthritis of the ankle and foot     Localized, primary osteoarthritis of the ankle and/or foot   • Mendoza's metatarsalgia     Mendoza's metatarsalgia - 2nd interspace on right   • Nausea and vomiting    • Neuralgia and neuritis     Neuralgia, neuritis, and radiculitis, unspecified   • Neuropathy    • Obstructive sleep apnea     Obstructive sleep apnea (adult) (pediatric)    • CHAI on CPAP     \"C-Pap at night  (unconfirmed)\"   • Osteoarthritis    • Pain in foot     Pain in unspecified foot - sees a podiatrist   • Pain in joint, ankle and foot     Joint pain in ankle and foot      • Pain radiating to back     Pain radiating to lumbar region of back   • Plantar fasciitis    • Restless leg syndrome    • Secondary hypertension     " Secondary hypertension, unspecified   • Sinusitis    • Sleep apnea    • Tongue anomaly     lesion         Past Surgical History:   Procedure Laterality Date   •  SECTION     • CHOLECYSTECTOMY     • COLONOSCOPY  2013   • DIRECT LARYNGOSCOPY, ESOPHAGOSCOPY, BRONCHOSCOPY N/A 2017    Procedure: DIRECT LARYNGOSCOPY AND;  Surgeon: Live Bolton MD;  Location: [facility];  Service:    • ENDOSCOPY  2013    Colon endoscopy 65295 (1) - Internal & external hemorrhoids found. Stool found.   • ENDOSCOPY  2013    EGD w/ tube 67650 (1) - Normal esophagus. Gastritis in stomach. Biopsy taken. Normal dudoenum. Biopsy taken.   • FOOT SURGERY  2013    Foot/toes surgery procedure (1) - Arthroplasty of toes 4 and 5 of right foot.   • HERNIA REPAIR     • HERNIA REPAIR      hital   • HYSTERECTOMY     • LIVER BIOPSY     • NERVE BLOCK  2016    Injection for nerve block (1) - Lumbar medial branch block.   • OTHER SURGICAL HISTORY  2012    Inj(s) Tend-Sheath, Ligament, Single  (1) - [facility]    • OTHER SURGICAL HISTORY  2013    Small Joint Injection/Aspiration  (2) - [facility]ICK[facility] ([facility])    • OTHER SURGICAL HISTORY      bowel obstruction x2   • OTHER SURGICAL HISTORY      gland removed from neck   • SUBLINGUAL SALIVARY CYST EXCISION N/A 2017    Procedure: EXCISION OF LEFT  TONGUE LESION WITH CLOSURE;  Surgeon: Live Bolton MD;  Location: [facility];  Service:    • TUBAL ABDOMINAL LIGATION     • UPPER GASTROINTESTINAL ENDOSCOPY  2013         Family History   Problem Relation Age of Onset   • Cancer Other    • Diabetes Other    • Heart disease Other    • Hypertension Mother    • Cancer Mother    • Diabetes Mother    • Hypertension Father    • Heart attack Father    • Cancer Father    • Heart disease Father    • Thyroid disease Maternal Aunt        Allergies   Allergen Reactions   • Other      Pt states that taking steroids either in  pill or injection form make her have blisters in her mouth and she feels like she is on fire on the inside/ has hx of c diff and possible MRSA     • Sulfa Antibiotics Rash     Sulfa (Sulfonamide Antibiotics)       Social History     Social History   • Marital status:      Spouse name: N/A   • Number of children: N/A   • Years of education: N/A     Occupational History   • Not on file.     Social History Main Topics   • Smoking status: Former Smoker     Types: Cigarettes   • Smokeless tobacco: Never Used   • Alcohol use No   • Drug use: No   • Sexual activity: Defer      Comment: Marital status:      Other Topics Concern   • Not on file     Social History Narrative         Current Outpatient Prescriptions   Medication Sig Dispense Refill   • cetirizine (zyrTEC) 10 MG tablet Take 1 tablet by mouth Daily As Needed for Allergies. 30 tablet 11   • chlorthalidone (HYGROTEN) 50 MG tablet Take 1 tablet by mouth Daily. 30 tablet 3   • cyclobenzaprine (FLEXERIL) 10 MG tablet Take 10 mg by mouth 2 (Two) Times a Day As Needed for Muscle Spasms.     • DULoxetine (CYMBALTA) 30 MG capsule Take 30 mg by mouth Daily.     • gabapentin (NEURONTIN) 800 MG tablet Take 800 mg by mouth 4 (Four) Times a Day.     • linaclotide (LINZESS) 145 MCG capsule capsule Take 145 mcg by mouth Every Night.     • magnesium oxide (MAGOX) 400 (241.3 MG) MG tablet tablet Take 400 mg by mouth Daily.     • mupirocin (BACTROBAN) 2 % ointment Apply  topically 3 (Three) Times a Day. (Patient taking differently: Apply 1 application topically 3 (Three) Times a Day.) 1 g 2   • nystatin (MYCOSTATIN) 257666 UNIT/GM powder Apply 1 application topically As Needed.     • omeprazole (priLOSEC) 20 MG capsule Take 20 mg by mouth Daily.     • ondansetron (ZOFRAN) 4 MG tablet Take 4 mg by mouth Every 8 (Eight) Hours As Needed.     • oxyCODONE-acetaminophen (PERCOCET) 7.5-325 MG per tablet Take 1 tablet by mouth Every 8 (Eight) Hours As Needed.     •  "oxyCODONE-acetaminophen (PERCOCET) 7.5-325 MG per tablet Take 1 tablet by mouth 3 (Three) Times a Day for 30 days. 90 tablet 0   • oxyCODONE-acetaminophen (PERCOCET) 7.5-325 MG per tablet Take 1 tablet by mouth 3 (Three) Times a Day for 30 days. 90 tablet 0   • spironolactone (ALDACTONE) 25 MG tablet Take 1 tablet by mouth Daily. 30 tablet 3   • meloxicam (MOBIC) 15 MG tablet Take 1 tablet by mouth Daily. Take once daily. 30 tablet 0     No current facility-administered medications for this visit.          OBJECTIVE    /86  Pulse 86  Ht 64\" (162.6 cm)  Wt 215 lb (97.5 kg)  LMP  (LMP Unknown)  SpO2 96%  BMI 36.9 kg/m2      Review of Systems   Constitutional: Positive for fatigue.   HENT: Negative.    Eyes: Negative.    Respiratory: Positive for shortness of breath.    Cardiovascular: Positive for leg swelling.   Gastrointestinal: Positive for abdominal pain and constipation.   Endocrine: Negative.    Genitourinary: Positive for enuresis.   Musculoskeletal: Positive for back pain and joint swelling.        Bilateral foot/ankle pain  Joint pain   Skin: Negative.    Allergic/Immunologic: Negative.    Neurological: Negative.    Hematological: Negative.    Psychiatric/Behavioral: Negative.          Constitutional: well developed, well nourished    HEENT: Normocephalic and atraumatic, normal hearing    Respiratory: Non labored respirations noted    Cardiovascular:    DP/PT pulses palpable    CFT brisk  to all digits  Skin temp is warm to warm from proximal tibia to distal digits  Pedal hair growth decreased.   No erythema noted   Edema noted to bilateral LEs    Musculoskeletal:  Muscle strength is 4/5 for all muscle groups tested   Diffuse pain on palpation noted    POP to achillles tendon insertion bilateral  POP to medial tubercle of the calcaneus bilateral. Negative lateral squeeze test    Dermatological:   Skin is warm, dry and intact    Webspaces 1-4 bilateral are clean, dry and intact.   No subcutaneous " nodules or masses noted      Neurological:   Protective sensation decreased  Sensation intact to light touch    DTR intact    Psychiatric: A&O x 3 with normal mood and affect. NAD.     Radiographs: 3 views the right left foot were obtained today.  No acute osseous abnormalities noted.        Procedures        ASSESSMENT AND PLAN    Amira was seen today for pain and pain.    Diagnoses and all orders for this visit:    Bilateral foot pain  -     XR Foot 3+ View Bilateral    Other orders  -     meloxicam (MOBIC) 15 MG tablet; Take 1 tablet by mouth Daily. Take once daily.      - Comprehensive foot and ankle exam performed.   - x-rays taken and reviewed  - rx for mobic  - rx for custom orthotics  - Educated pt on proper stretching for heel pain  - recommended pt wear her compression stockings and take her diuretics as directed  - All questions were answered to the patients satisfaction.  - RTC 2 weeks after obtaining new orthotics.            This document has been electronically signed by Daren Marie DPM on November 14, 2017 1:07 PM     11/14/2017  1:07 PM

## 2017-11-13 NOTE — PATIENT INSTRUCTIONS
Let's leave the diuretics alone- Chlorthalidone 50mg and Spironolactone 25mg daily    Anemia work up for fatigue. Urinalysis for UTI    Follow with Urology in [place] for urgency and cramping.     Make appointment with Dr. Barr for the above.

## 2017-11-14 ENCOUNTER — TELEPHONE (OUTPATIENT)
Dept: CARDIOLOGY | Facility: CLINIC | Age: 57
End: 2017-11-14

## 2017-11-14 ENCOUNTER — TRANSCRIBE ORDERS (OUTPATIENT)
Dept: PODIATRY | Facility: CLINIC | Age: 57
End: 2017-11-14

## 2017-11-14 DIAGNOSIS — M79.672 FOOT PAIN, BILATERAL: Primary | ICD-10-CM

## 2017-11-14 DIAGNOSIS — R53.82 CHRONIC FATIGUE: Primary | ICD-10-CM

## 2017-11-14 DIAGNOSIS — M79.671 FOOT PAIN, BILATERAL: Primary | ICD-10-CM

## 2017-11-14 PROCEDURE — 81015 MICROSCOPIC EXAM OF URINE: CPT | Performed by: NURSE PRACTITIONER

## 2017-11-14 PROCEDURE — 82607 VITAMIN B-12: CPT | Performed by: NURSE PRACTITIONER

## 2017-11-14 PROCEDURE — 84443 ASSAY THYROID STIM HORMONE: CPT | Performed by: NURSE PRACTITIONER

## 2017-11-14 PROCEDURE — 84439 ASSAY OF FREE THYROXINE: CPT | Performed by: NURSE PRACTITIONER

## 2017-11-14 PROCEDURE — 82746 ASSAY OF FOLIC ACID SERUM: CPT | Performed by: NURSE PRACTITIONER

## 2017-11-14 PROCEDURE — 82306 VITAMIN D 25 HYDROXY: CPT | Performed by: NURSE PRACTITIONER

## 2017-11-14 PROCEDURE — 82728 ASSAY OF FERRITIN: CPT | Performed by: NURSE PRACTITIONER

## 2017-11-14 PROCEDURE — 85027 COMPLETE CBC AUTOMATED: CPT | Performed by: NURSE PRACTITIONER

## 2017-11-14 PROCEDURE — 80048 BASIC METABOLIC PNL TOTAL CA: CPT | Performed by: NURSE PRACTITIONER

## 2017-11-14 NOTE — TELEPHONE ENCOUNTER
Called with lab results.      Ref. Range 11/14/2017 09:46   Glucose Latest Ref Range: 60 - 100 mg/dL 124 (H)   Sodium Latest Ref Range: 137 - 145 mmol/L 139   Potassium Latest Ref Range: 3.5 - 5.1 mmol/L 3.5   CO2 Latest Ref Range: 22.0 - 31.0 mmol/L 27.0   Chloride Latest Ref Range: 95 - 110 mmol/L 101   Anion Gap Latest Ref Range: 5.0 - 15.0 mmol/L 11.0   Creatinine Latest Ref Range: 0.50 - 1.00 mg/dL 1.07 (H)   BUN Latest Ref Range: 7 - 21 mg/dL 13   BUN/Creatinine Ratio Latest Ref Range: 7.0 - 25.0  12.1   Calcium Latest Ref Range: 8.4 - 10.2 mg/dL 9.5   eGFR Non African Amer Latest Ref Range: 51 - 120 mL/min/1.73 53   TSH Baseline Latest Ref Range: 0.460 - 4.680 mIU/mL 1.560   Free T4 Latest Ref Range: 0.78 - 2.19 ng/dL 1.19   Folate Latest Ref Range: 2.76 - 21.00 ng/mL 8.17   Vitamin B-12 Latest Ref Range: 239 - 931 pg/mL 461   WBC Latest Ref Range: 3.20 - 9.80 10*3/mm3 5.49   RBC Latest Ref Range: 3.77 - 5.16 10*6/mm3 5.52 (H)   Hemoglobin Latest Ref Range: 12.0 - 15.5 g/dL 15.3   Hematocrit Latest Ref Range: 35.0 - 45.0 % 45.4 (H)   RDW Latest Ref Range: 11.5 - 14.5 % 14.8 (H)   MCV Latest Ref Range: 80.0 - 98.0 fL 82.2   MCH Latest Ref Range: 26.5 - 34.0 pg 27.7   MCHC Latest Ref Range: 31.4 - 36.0 g/dL 33.7   MPV Latest Ref Range: 8.0 - 12.0 fL See Comment   Platelets Latest Ref Range: 150 - 450 10*3/mm3 See Comment   RDW-SD Latest Ref Range: 36.4 - 46.3 fl 45.3   RBC, UA Latest Ref Range: None Seen /HPF 0-2 (A)   WBC, UA Latest Ref Range: None Seen, 0-2, 3-5 /HPF 0-2   Bacteria, UA Latest Ref Range: None Seen /HPF None Seen   Squamous Epithelial Cells, UA Latest Ref Range: None Seen, 0-2 /HPF 3-5 (A)   Hyaline Casts, UA Latest Ref Range: None Seen /LPF 7-12

## 2017-11-17 ENCOUNTER — APPOINTMENT (OUTPATIENT)
Dept: CT IMAGING | Facility: HOSPITAL | Age: 57
End: 2017-11-17

## 2017-11-17 ENCOUNTER — APPOINTMENT (OUTPATIENT)
Dept: GENERAL RADIOLOGY | Facility: HOSPITAL | Age: 57
End: 2017-11-17

## 2017-11-17 ENCOUNTER — HOSPITAL ENCOUNTER (INPATIENT)
Facility: HOSPITAL | Age: 57
LOS: 5 days | Discharge: HOME | End: 2017-11-22
Attending: EMERGENCY MEDICINE | Admitting: INTERNAL MEDICINE

## 2017-11-17 DIAGNOSIS — R10.9 ABDOMINAL PAIN, UNSPECIFIED ABDOMINAL LOCATION: Primary | ICD-10-CM

## 2017-11-17 DIAGNOSIS — K56.609 INTESTINAL OBSTRUCTION, UNSPECIFIED CAUSE, UNSPECIFIED WHETHER PARTIAL OR COMPLETE (HCC): ICD-10-CM

## 2017-11-17 DIAGNOSIS — R11.2 INTRACTABLE VOMITING WITH NAUSEA, UNSPECIFIED VOMITING TYPE: ICD-10-CM

## 2017-11-17 LAB
ALBUMIN SERPL-MCNC: 4.5 G/DL (ref 3.4–4.8)
ALBUMIN/GLOB SERPL: 1.3 G/DL (ref 1.1–1.8)
ALP SERPL-CCNC: 110 U/L (ref 38–126)
ALT SERPL W P-5'-P-CCNC: 76 U/L (ref 9–52)
ANION GAP SERPL CALCULATED.3IONS-SCNC: 15 MMOL/L (ref 5–15)
AST SERPL-CCNC: 80 U/L (ref 14–36)
BACTERIA UR QL AUTO: ABNORMAL /HPF
BASOPHILS # BLD AUTO: 0 10*3/MM3 (ref 0–0.2)
BASOPHILS NFR BLD AUTO: 0 % (ref 0–2)
BILIRUB SERPL-MCNC: 1.1 MG/DL (ref 0.2–1.3)
BILIRUB UR QL STRIP: NEGATIVE
BUN BLD-MCNC: 16 MG/DL (ref 7–21)
BUN/CREAT SERPL: 14.7 (ref 7–25)
CALCIUM SPEC-SCNC: 10.5 MG/DL (ref 8.4–10.2)
CHLORIDE SERPL-SCNC: 96 MMOL/L (ref 95–110)
CLARITY UR: CLEAR
CO2 SERPL-SCNC: 27 MMOL/L (ref 22–31)
COLOR UR: YELLOW
CREAT BLD-MCNC: 1.09 MG/DL (ref 0.5–1)
DEPRECATED RDW RBC AUTO: 42.4 FL (ref 36.4–46.3)
EOSINOPHIL # BLD AUTO: 0.11 10*3/MM3 (ref 0–0.7)
EOSINOPHIL NFR BLD AUTO: 1 % (ref 0–7)
ERYTHROCYTE [DISTWIDTH] IN BLOOD BY AUTOMATED COUNT: 14.7 % (ref 11.5–14.5)
GFR SERPL CREATININE-BSD FRML MDRD: 52 ML/MIN/1.73 (ref 51–120)
GLOBULIN UR ELPH-MCNC: 3.4 GM/DL (ref 2.3–3.5)
GLUCOSE BLD-MCNC: 124 MG/DL (ref 60–100)
GLUCOSE UR STRIP-MCNC: NEGATIVE MG/DL
HCT VFR BLD AUTO: 45.1 % (ref 35–45)
HGB BLD-MCNC: 15.7 G/DL (ref 12–15.5)
HGB UR QL STRIP.AUTO: NEGATIVE
HOLD SPECIMEN: NORMAL
HOLD SPECIMEN: NORMAL
HYALINE CASTS UR QL AUTO: ABNORMAL /LPF
IMM GRANULOCYTES # BLD: 0.02 10*3/MM3 (ref 0–0.02)
IMM GRANULOCYTES NFR BLD: 0.2 % (ref 0–0.5)
KETONES UR QL STRIP: NEGATIVE
LEUKOCYTE ESTERASE UR QL STRIP.AUTO: NEGATIVE
LIPASE SERPL-CCNC: 81 U/L (ref 23–300)
LYMPHOCYTES # BLD AUTO: 0.84 10*3/MM3 (ref 0.6–4.2)
LYMPHOCYTES NFR BLD AUTO: 7.6 % (ref 10–50)
MCH RBC QN AUTO: 27.7 PG (ref 26.5–34)
MCHC RBC AUTO-ENTMCNC: 34.8 G/DL (ref 31.4–36)
MCV RBC AUTO: 79.7 FL (ref 80–98)
MONOCYTES # BLD AUTO: 0.49 10*3/MM3 (ref 0–0.9)
MONOCYTES NFR BLD AUTO: 4.4 % (ref 0–12)
NEUTROPHILS # BLD AUTO: 9.57 10*3/MM3 (ref 2–8.6)
NEUTROPHILS NFR BLD AUTO: 86.8 % (ref 37–80)
NITRITE UR QL STRIP: NEGATIVE
PH UR STRIP.AUTO: 7 [PH] (ref 5–9)
PLATELET # BLD AUTO: 106 10*3/MM3 (ref 150–450)
PMV BLD AUTO: 11.5 FL (ref 8–12)
POTASSIUM BLD-SCNC: 3.9 MMOL/L (ref 3.5–5.1)
PROT SERPL-MCNC: 7.9 G/DL (ref 6.3–8.6)
PROT UR QL STRIP: ABNORMAL
RBC # BLD AUTO: 5.66 10*6/MM3 (ref 3.77–5.16)
RBC # UR: ABNORMAL /HPF
RBC MORPH BLD: NORMAL
REF LAB TEST METHOD: ABNORMAL
SMALL PLATELETS BLD QL SMEAR: NORMAL
SODIUM BLD-SCNC: 138 MMOL/L (ref 137–145)
SP GR UR STRIP: 1.01 (ref 1–1.03)
SQUAMOUS #/AREA URNS HPF: ABNORMAL /HPF
UROBILINOGEN UR QL STRIP: ABNORMAL
WBC MORPH BLD: NORMAL
WBC NRBC COR # BLD: 11.03 10*3/MM3 (ref 3.2–9.8)
WBC UR QL AUTO: ABNORMAL /HPF
WHOLE BLOOD HOLD SPECIMEN: NORMAL
WHOLE BLOOD HOLD SPECIMEN: NORMAL

## 2017-11-17 PROCEDURE — 99252 IP/OBS CONSLTJ NEW/EST SF 35: CPT | Performed by: SURGERY

## 2017-11-17 PROCEDURE — G0378 HOSPITAL OBSERVATION PER HR: HCPCS

## 2017-11-17 PROCEDURE — 74177 CT ABD & PELVIS W/CONTRAST: CPT

## 2017-11-17 PROCEDURE — 25010000002 MORPHINE PER 10 MG: Performed by: EMERGENCY MEDICINE

## 2017-11-17 PROCEDURE — 25010000002 ONDANSETRON PER 1 MG: Performed by: EMERGENCY MEDICINE

## 2017-11-17 PROCEDURE — 83690 ASSAY OF LIPASE: CPT | Performed by: EMERGENCY MEDICINE

## 2017-11-17 PROCEDURE — 25010000002 MORPHINE PER 10 MG: Performed by: INTERNAL MEDICINE

## 2017-11-17 PROCEDURE — 81001 URINALYSIS AUTO W/SCOPE: CPT | Performed by: EMERGENCY MEDICINE

## 2017-11-17 PROCEDURE — 85007 BL SMEAR W/DIFF WBC COUNT: CPT | Performed by: EMERGENCY MEDICINE

## 2017-11-17 PROCEDURE — 99284 EMERGENCY DEPT VISIT MOD MDM: CPT

## 2017-11-17 PROCEDURE — 0 IOPAMIDOL 61 % SOLUTION: Performed by: EMERGENCY MEDICINE

## 2017-11-17 PROCEDURE — 25010000002 HEPARIN (PORCINE) PER 1000 UNITS: Performed by: INTERNAL MEDICINE

## 2017-11-17 PROCEDURE — 74022 RADEX COMPL AQT ABD SERIES: CPT

## 2017-11-17 PROCEDURE — 80053 COMPREHEN METABOLIC PANEL: CPT | Performed by: EMERGENCY MEDICINE

## 2017-11-17 PROCEDURE — 85025 COMPLETE CBC W/AUTO DIFF WBC: CPT | Performed by: EMERGENCY MEDICINE

## 2017-11-17 RX ORDER — SODIUM CHLORIDE 9 MG/ML
125 INJECTION, SOLUTION INTRAVENOUS CONTINUOUS
Status: DISCONTINUED | OUTPATIENT
Start: 2017-11-17 | End: 2017-11-18

## 2017-11-17 RX ORDER — HYDROMORPHONE HCL IN 0.9% NACL 0.5 MG/ML
0.5 SYRINGE (ML) INTRAVENOUS EVERY 4 HOURS PRN
Status: DISCONTINUED | OUTPATIENT
Start: 2017-11-17 | End: 2017-11-17 | Stop reason: RX

## 2017-11-17 RX ORDER — SODIUM CHLORIDE 9 MG/ML
1000 INJECTION, SOLUTION INTRAVENOUS ONCE
Status: COMPLETED | OUTPATIENT
Start: 2017-11-17 | End: 2017-11-17

## 2017-11-17 RX ORDER — PANTOPRAZOLE SODIUM 40 MG/1
40 TABLET, DELAYED RELEASE ORAL
Status: DISCONTINUED | OUTPATIENT
Start: 2017-11-18 | End: 2017-11-20

## 2017-11-17 RX ORDER — MELOXICAM 15 MG/1
15 TABLET ORAL DAILY
Status: DISCONTINUED | OUTPATIENT
Start: 2017-11-18 | End: 2017-11-22 | Stop reason: HOSPADM

## 2017-11-17 RX ORDER — ONDANSETRON 2 MG/ML
4 INJECTION INTRAMUSCULAR; INTRAVENOUS EVERY 6 HOURS PRN
Status: DISCONTINUED | OUTPATIENT
Start: 2017-11-17 | End: 2017-11-22 | Stop reason: HOSPADM

## 2017-11-17 RX ORDER — DOCUSATE SODIUM 100 MG/1
200 CAPSULE, LIQUID FILLED ORAL 2 TIMES DAILY
Status: DISCONTINUED | OUTPATIENT
Start: 2017-11-18 | End: 2017-11-22 | Stop reason: HOSPADM

## 2017-11-17 RX ORDER — HYDRALAZINE HYDROCHLORIDE 20 MG/ML
10 INJECTION INTRAMUSCULAR; INTRAVENOUS EVERY 6 HOURS PRN
Status: DISCONTINUED | OUTPATIENT
Start: 2017-11-17 | End: 2017-11-17 | Stop reason: SDUPTHER

## 2017-11-17 RX ORDER — HYDRALAZINE HYDROCHLORIDE 20 MG/ML
10 INJECTION INTRAMUSCULAR; INTRAVENOUS EVERY 6 HOURS PRN
Status: DISCONTINUED | OUTPATIENT
Start: 2017-11-17 | End: 2017-11-22 | Stop reason: HOSPADM

## 2017-11-17 RX ORDER — HEPARIN SODIUM 5000 [USP'U]/ML
5000 INJECTION, SOLUTION INTRAVENOUS; SUBCUTANEOUS EVERY 8 HOURS SCHEDULED
Status: DISCONTINUED | OUTPATIENT
Start: 2017-11-17 | End: 2017-11-22 | Stop reason: HOSPADM

## 2017-11-17 RX ORDER — BISACODYL 10 MG
10 SUPPOSITORY, RECTAL RECTAL DAILY PRN
Status: DISCONTINUED | OUTPATIENT
Start: 2017-11-17 | End: 2017-11-22 | Stop reason: HOSPADM

## 2017-11-17 RX ORDER — DULOXETIN HYDROCHLORIDE 30 MG/1
30 CAPSULE, DELAYED RELEASE ORAL DAILY
Status: DISCONTINUED | OUTPATIENT
Start: 2017-11-18 | End: 2017-11-22 | Stop reason: HOSPADM

## 2017-11-17 RX ORDER — MORPHINE SULFATE 2 MG/ML
4 INJECTION, SOLUTION INTRAMUSCULAR; INTRAVENOUS ONCE
Status: COMPLETED | OUTPATIENT
Start: 2017-11-17 | End: 2017-11-17

## 2017-11-17 RX ORDER — ACETAMINOPHEN 325 MG/1
650 TABLET ORAL EVERY 4 HOURS PRN
Status: DISCONTINUED | OUTPATIENT
Start: 2017-11-17 | End: 2017-11-22 | Stop reason: HOSPADM

## 2017-11-17 RX ORDER — SODIUM CHLORIDE 0.9 % (FLUSH) 0.9 %
1-10 SYRINGE (ML) INJECTION AS NEEDED
Status: DISCONTINUED | OUTPATIENT
Start: 2017-11-17 | End: 2017-11-22 | Stop reason: HOSPADM

## 2017-11-17 RX ORDER — ONDANSETRON 4 MG/1
4 TABLET, ORALLY DISINTEGRATING ORAL EVERY 6 HOURS PRN
Status: DISCONTINUED | OUTPATIENT
Start: 2017-11-17 | End: 2017-11-22 | Stop reason: HOSPADM

## 2017-11-17 RX ORDER — OXYCODONE AND ACETAMINOPHEN 7.5; 325 MG/1; MG/1
1 TABLET ORAL 3 TIMES DAILY
Status: DISCONTINUED | OUTPATIENT
Start: 2017-11-17 | End: 2017-11-18

## 2017-11-17 RX ORDER — CHLORTHALIDONE 25 MG/1
50 TABLET ORAL DAILY
Status: DISCONTINUED | OUTPATIENT
Start: 2017-11-18 | End: 2017-11-22 | Stop reason: HOSPADM

## 2017-11-17 RX ORDER — ONDANSETRON 4 MG/1
4 TABLET, FILM COATED ORAL EVERY 6 HOURS PRN
Status: DISCONTINUED | OUTPATIENT
Start: 2017-11-17 | End: 2017-11-18

## 2017-11-17 RX ORDER — ONDANSETRON 4 MG/1
4 TABLET, FILM COATED ORAL EVERY 6 HOURS PRN
Status: DISCONTINUED | OUTPATIENT
Start: 2017-11-17 | End: 2017-11-22 | Stop reason: HOSPADM

## 2017-11-17 RX ORDER — MORPHINE SULFATE 2 MG/ML
6 INJECTION, SOLUTION INTRAMUSCULAR; INTRAVENOUS EVERY 4 HOURS PRN
Status: DISCONTINUED | OUTPATIENT
Start: 2017-11-17 | End: 2017-11-18 | Stop reason: CLARIF

## 2017-11-17 RX ORDER — OXYCODONE AND ACETAMINOPHEN 7.5; 325 MG/1; MG/1
1 TABLET ORAL 3 TIMES DAILY
Status: DISCONTINUED | OUTPATIENT
Start: 2017-11-17 | End: 2017-11-17 | Stop reason: SDUPTHER

## 2017-11-17 RX ORDER — SPIRONOLACTONE 25 MG/1
25 TABLET ORAL DAILY
Status: DISCONTINUED | OUTPATIENT
Start: 2017-11-18 | End: 2017-11-22 | Stop reason: HOSPADM

## 2017-11-17 RX ORDER — ONDANSETRON 2 MG/ML
4 INJECTION INTRAMUSCULAR; INTRAVENOUS ONCE
Status: COMPLETED | OUTPATIENT
Start: 2017-11-17 | End: 2017-11-17

## 2017-11-17 RX ORDER — FAMOTIDINE 20 MG/1
20 TABLET, FILM COATED ORAL 2 TIMES DAILY
Status: DISCONTINUED | OUTPATIENT
Start: 2017-11-18 | End: 2017-11-20

## 2017-11-17 RX ORDER — SODIUM CHLORIDE 0.9 % (FLUSH) 0.9 %
10 SYRINGE (ML) INJECTION AS NEEDED
Status: DISCONTINUED | OUTPATIENT
Start: 2017-11-17 | End: 2017-11-22 | Stop reason: HOSPADM

## 2017-11-17 RX ORDER — SODIUM CHLORIDE 9 MG/ML
100 INJECTION, SOLUTION INTRAVENOUS CONTINUOUS
Status: DISCONTINUED | OUTPATIENT
Start: 2017-11-17 | End: 2017-11-22 | Stop reason: HOSPADM

## 2017-11-17 RX ORDER — NICOTINE 21 MG/24HR
1 PATCH, TRANSDERMAL 24 HOURS TRANSDERMAL EVERY 24 HOURS
Status: DISCONTINUED | OUTPATIENT
Start: 2017-11-17 | End: 2017-11-22 | Stop reason: HOSPADM

## 2017-11-17 RX ADMIN — SODIUM CHLORIDE 125 ML/HR: 900 INJECTION, SOLUTION INTRAVENOUS at 19:35

## 2017-11-17 RX ADMIN — SODIUM CHLORIDE 125 ML/HR: 9 INJECTION, SOLUTION INTRAVENOUS at 17:26

## 2017-11-17 RX ADMIN — ONDANSETRON 4 MG: 2 INJECTION INTRAMUSCULAR; INTRAVENOUS at 17:26

## 2017-11-17 RX ADMIN — HEPARIN SODIUM 5000 UNITS: 5000 INJECTION, SOLUTION INTRAVENOUS; SUBCUTANEOUS at 23:18

## 2017-11-17 RX ADMIN — IOPAMIDOL 90 ML: 612 INJECTION, SOLUTION INTRAVENOUS at 19:02

## 2017-11-17 RX ADMIN — Medication 4 MG: at 18:43

## 2017-11-17 RX ADMIN — SODIUM CHLORIDE 125 ML/HR: 9 INJECTION, SOLUTION INTRAVENOUS at 20:35

## 2017-11-17 RX ADMIN — ONDANSETRON 4 MG: 2 INJECTION INTRAMUSCULAR; INTRAVENOUS at 20:35

## 2017-11-17 RX ADMIN — SODIUM CHLORIDE 1000 ML: 9 INJECTION, SOLUTION INTRAVENOUS at 17:26

## 2017-11-17 RX ADMIN — MORPHINE SULFATE 6 MG: 2 INJECTION, SOLUTION INTRAMUSCULAR; INTRAVENOUS at 23:48

## 2017-11-17 RX ADMIN — MORPHINE SULFATE 4 MG: 2 INJECTION, SOLUTION INTRAMUSCULAR; INTRAVENOUS at 20:34

## 2017-11-18 ENCOUNTER — APPOINTMENT (OUTPATIENT)
Dept: GENERAL RADIOLOGY | Facility: HOSPITAL | Age: 57
End: 2017-11-18

## 2017-11-18 LAB
ALBUMIN SERPL-MCNC: 3.6 G/DL (ref 3.4–4.8)
ALBUMIN/GLOB SERPL: 1.2 G/DL (ref 1.1–1.8)
ALP SERPL-CCNC: 87 U/L (ref 38–126)
ALT SERPL W P-5'-P-CCNC: 61 U/L (ref 9–52)
ANION GAP SERPL CALCULATED.3IONS-SCNC: 11 MMOL/L (ref 5–15)
AST SERPL-CCNC: 63 U/L (ref 14–36)
BASOPHILS # BLD AUTO: 0.01 10*3/MM3 (ref 0–0.2)
BASOPHILS NFR BLD AUTO: 0.1 % (ref 0–2)
BILIRUB SERPL-MCNC: 1.1 MG/DL (ref 0.2–1.3)
BILIRUB UR QL STRIP: NEGATIVE
BUN BLD-MCNC: 15 MG/DL (ref 7–21)
BUN/CREAT SERPL: 15.5 (ref 7–25)
CALCIUM SPEC-SCNC: 8.7 MG/DL (ref 8.4–10.2)
CHLORIDE SERPL-SCNC: 103 MMOL/L (ref 95–110)
CLARITY UR: CLEAR
CO2 SERPL-SCNC: 26 MMOL/L (ref 22–31)
COLOR UR: YELLOW
CONV REPORT SUMMARY: NORMAL
CREAT BLD-MCNC: 0.97 MG/DL (ref 0.5–1)
DEPRECATED RDW RBC AUTO: 45.7 FL (ref 36.4–46.3)
EOSINOPHIL # BLD AUTO: 0.2 10*3/MM3 (ref 0–0.7)
EOSINOPHIL NFR BLD AUTO: 3 % (ref 0–7)
ERYTHROCYTE [DISTWIDTH] IN BLOOD BY AUTOMATED COUNT: 15.3 % (ref 11.5–14.5)
GFR SERPL CREATININE-BSD FRML MDRD: 59 ML/MIN/1.73 (ref 51–120)
GLOBULIN UR ELPH-MCNC: 3 GM/DL (ref 2.3–3.5)
GLUCOSE BLD-MCNC: 89 MG/DL (ref 60–100)
GLUCOSE UR STRIP-MCNC: NEGATIVE MG/DL
HCT VFR BLD AUTO: 40.8 % (ref 35–45)
HGB BLD-MCNC: 13.9 G/DL (ref 12–15.5)
HGB UR QL STRIP.AUTO: NEGATIVE
IMM GRANULOCYTES # BLD: 0.01 10*3/MM3 (ref 0–0.02)
IMM GRANULOCYTES NFR BLD: 0.1 % (ref 0–0.5)
KETONES UR QL STRIP: NEGATIVE
LEUKOCYTE ESTERASE UR QL STRIP.AUTO: NEGATIVE
LYMPHOCYTES # BLD AUTO: 1.48 10*3/MM3 (ref 0.6–4.2)
LYMPHOCYTES NFR BLD AUTO: 22.1 % (ref 10–50)
MAGNESIUM SERPL-MCNC: 1.9 MG/DL (ref 1.6–2.3)
MCH RBC QN AUTO: 27.7 PG (ref 26.5–34)
MCHC RBC AUTO-ENTMCNC: 34.1 G/DL (ref 31.4–36)
MCV RBC AUTO: 81.4 FL (ref 80–98)
MONOCYTES # BLD AUTO: 0.37 10*3/MM3 (ref 0–0.9)
MONOCYTES NFR BLD AUTO: 5.5 % (ref 0–12)
NEUTROPHILS # BLD AUTO: 4.62 10*3/MM3 (ref 2–8.6)
NEUTROPHILS NFR BLD AUTO: 69.2 % (ref 37–80)
NITRITE UR QL STRIP: NEGATIVE
NRBC BLD MANUAL-RTO: 0 /100 WBC (ref 0–0)
PH UR STRIP.AUTO: 6 [PH] (ref 5–9)
PLATELET # BLD AUTO: 92 10*3/MM3 (ref 150–450)
PMV BLD AUTO: 11.9 FL (ref 8–12)
POTASSIUM BLD-SCNC: 3.4 MMOL/L (ref 3.5–5.1)
PROT SERPL-MCNC: 6.6 G/DL (ref 6.3–8.6)
PROT UR QL STRIP: NEGATIVE
RBC # BLD AUTO: 5.01 10*6/MM3 (ref 3.77–5.16)
SODIUM BLD-SCNC: 140 MMOL/L (ref 137–145)
SP GR UR STRIP: 1.02 (ref 1–1.03)
UROBILINOGEN UR QL STRIP: NORMAL
WBC NRBC COR # BLD: 6.69 10*3/MM3 (ref 3.2–9.8)

## 2017-11-18 PROCEDURE — 25010000003 MORPHINE 5 MG/ML SOLUTION: Performed by: SURGERY

## 2017-11-18 PROCEDURE — 81003 URINALYSIS AUTO W/O SCOPE: CPT | Performed by: INTERNAL MEDICINE

## 2017-11-18 PROCEDURE — 93010 ELECTROCARDIOGRAM REPORT: CPT | Performed by: INTERNAL MEDICINE

## 2017-11-18 PROCEDURE — 85025 COMPLETE CBC W/AUTO DIFF WBC: CPT | Performed by: INTERNAL MEDICINE

## 2017-11-18 PROCEDURE — G0378 HOSPITAL OBSERVATION PER HR: HCPCS

## 2017-11-18 PROCEDURE — 74022 RADEX COMPL AQT ABD SERIES: CPT

## 2017-11-18 PROCEDURE — 83735 ASSAY OF MAGNESIUM: CPT | Performed by: INTERNAL MEDICINE

## 2017-11-18 PROCEDURE — 99231 SBSQ HOSP IP/OBS SF/LOW 25: CPT | Performed by: SURGERY

## 2017-11-18 PROCEDURE — 25010000002 ONDANSETRON PER 1 MG: Performed by: INTERNAL MEDICINE

## 2017-11-18 PROCEDURE — 80053 COMPREHEN METABOLIC PANEL: CPT | Performed by: INTERNAL MEDICINE

## 2017-11-18 PROCEDURE — 93005 ELECTROCARDIOGRAM TRACING: CPT | Performed by: FAMILY MEDICINE

## 2017-11-18 PROCEDURE — 25010000002 HEPARIN (PORCINE) PER 1000 UNITS: Performed by: INTERNAL MEDICINE

## 2017-11-18 RX ORDER — MORPHINE SULFATE 5 MG/ML
6 INJECTION, SOLUTION INTRAMUSCULAR; INTRAVENOUS EVERY 4 HOURS PRN
Status: DISCONTINUED | OUTPATIENT
Start: 2017-11-18 | End: 2017-11-19 | Stop reason: CLARIF

## 2017-11-18 RX ADMIN — HEPARIN SODIUM 5000 UNITS: 5000 INJECTION, SOLUTION INTRAVENOUS; SUBCUTANEOUS at 06:51

## 2017-11-18 RX ADMIN — MORPHINE SULFATE 6 MG: 5 INJECTION, SOLUTION INTRAMUSCULAR; INTRAVENOUS at 08:46

## 2017-11-18 RX ADMIN — HEPARIN SODIUM 5000 UNITS: 5000 INJECTION, SOLUTION INTRAVENOUS; SUBCUTANEOUS at 16:56

## 2017-11-18 RX ADMIN — MORPHINE SULFATE 6 MG: 5 INJECTION, SOLUTION INTRAMUSCULAR; INTRAVENOUS at 17:12

## 2017-11-18 RX ADMIN — SODIUM CHLORIDE 100 ML/HR: 9 INJECTION, SOLUTION INTRAVENOUS at 17:12

## 2017-11-18 RX ADMIN — ONDANSETRON 4 MG: 2 INJECTION INTRAMUSCULAR; INTRAVENOUS at 17:12

## 2017-11-18 RX ADMIN — MORPHINE SULFATE 6 MG: 5 INJECTION, SOLUTION INTRAMUSCULAR; INTRAVENOUS at 12:58

## 2017-11-18 RX ADMIN — ONDANSETRON 4 MG: 2 INJECTION INTRAMUSCULAR; INTRAVENOUS at 23:39

## 2017-11-18 RX ADMIN — MORPHINE SULFATE 6 MG: 5 INJECTION, SOLUTION INTRAMUSCULAR; INTRAVENOUS at 21:13

## 2017-11-18 RX ADMIN — HEPARIN SODIUM 5000 UNITS: 5000 INJECTION, SOLUTION INTRAVENOUS; SUBCUTANEOUS at 23:28

## 2017-11-18 NOTE — PLAN OF CARE
Problem: Patient Care Overview (Adult)  Goal: Plan of Care Review  Outcome: Ongoing (interventions implemented as appropriate)    11/18/17 1506   Coping/Psychosocial Response Interventions   Plan Of Care Reviewed With patient;spouse   Patient Care Overview   Progress improving   Outcome Evaluation   Outcome Summary/Follow up Plan Patient has bowel sounds, passing gas; VSS; encouraged activity; pain controlled with morphine       Goal: Adult Individualization and Mutuality  Outcome: Ongoing (interventions implemented as appropriate)  Goal: Discharge Needs Assessment  Outcome: Ongoing (interventions implemented as appropriate)    Problem: Pain, Acute (Adult)  Goal: Identify Related Risk Factors and Signs and Symptoms  Outcome: Ongoing (interventions implemented as appropriate)  Goal: Acceptable Pain Control/Comfort Level  Outcome: Ongoing (interventions implemented as appropriate)    Problem: Fall Risk (Adult)  Goal: Identify Related Risk Factors and Signs and Symptoms  Outcome: Ongoing (interventions implemented as appropriate)  Goal: Absence of Falls  Outcome: Ongoing (interventions implemented as appropriate)

## 2017-11-18 NOTE — CONSULTS
Consults    Patient Care Team:  Yobany Barr MD as PCP - General  Daren Marie DPM as Consulting Physician (Podiatry)    Chief complaint:abdominal pain and distension    Subjective     Abdominal Pain   This is a recurrent problem. The current episode started yesterday. The onset quality is sudden. The problem occurs constantly. The problem has been unchanged. The pain is located in the generalized abdominal region. The pain is mild. The quality of the pain is colicky. The abdominal pain does not radiate. Associated symptoms include anorexia, belching, constipation, nausea and vomiting (feculant in nature). Pertinent negatives include no diarrhea, dysuria, fever, flatus, hematochezia, hematuria or weight loss. Nothing aggravates the pain. Relieved by: NG placement. She has tried nothing for the symptoms. Prior diagnostic workup includes CT scan (CT demonstarted evidence of SBO). Her past medical history is significant for abdominal surgery (multiple operations for SBO). There is no history of colon cancer, Crohn's disease, gallstones, GERD, irritable bowel syndrome, pancreatitis, PUD or ulcerative colitis.       Review of Systems   Constitutional: Negative for fever and weight loss.   Gastrointestinal: Positive for abdominal pain, anorexia, constipation, nausea and vomiting (feculant in nature). Negative for diarrhea, flatus and hematochezia.   Genitourinary: Negative for dysuria and hematuria.        Past Medical History:   Diagnosis Date   • Acid reflux    • Altered bowel function    • Arthropathy of lumbar facet joint    • Bleeding disorder    • Constipation    • Corns and callus    • Depression    • Disease related peripheral neuropathy    • Epigastric pain    • Fatty liver    • Hammer toe    • Headache    • Hiatal hernia    • Hyperlipidemia    • Knee pain    • Localized, primary osteoarthritis of the ankle and foot     Localized, primary osteoarthritis of the ankle and/or foot   • Mendoza's metatarsalgia      "Mendoza's metatarsalgia - 2nd interspace on right   • Nausea and vomiting    • Neuralgia and neuritis     Neuralgia, neuritis, and radiculitis, unspecified   • Neuropathy    • Obstructive sleep apnea     Obstructive sleep apnea (adult) (pediatric)    • CHAI on CPAP     \"C-Pap at night  (unconfirmed)\"   • Osteoarthritis    • Pain in foot     Pain in unspecified foot - sees a podiatrist   • Pain in joint, ankle and foot     Joint pain in ankle and foot      • Pain radiating to back     Pain radiating to lumbar region of back   • Plantar fasciitis    • Restless leg syndrome    • Secondary hypertension     Secondary hypertension, unspecified   • Sinusitis    • Sleep apnea    • Tongue anomaly     lesion   ,   Past Surgical History:   Procedure Laterality Date   •  SECTION     • CHOLECYSTECTOMY     • COLONOSCOPY  2013   • DIRECT LARYNGOSCOPY, ESOPHAGOSCOPY, BRONCHOSCOPY N/A 2017    Procedure: DIRECT LARYNGOSCOPY AND;  Surgeon: Live Bolton MD;  Location: [facility];  Service:    • ENDOSCOPY  2013    Colon endoscopy 69814 (1) - Internal & external hemorrhoids found. Stool found.   • ENDOSCOPY  2013    EGD w/ tube 41336 (1) - Normal esophagus. Gastritis in stomach. Biopsy taken. Normal dudoenum. Biopsy taken.   • FOOT SURGERY  2013    Foot/toes surgery procedure (1) - Arthroplasty of toes 4 and 5 of right foot.   • HERNIA REPAIR     • HERNIA REPAIR      hital   • HYSTERECTOMY     • LIVER BIOPSY     • NERVE BLOCK  2016    Injection for nerve block (1) - Lumbar medial branch block.   • OTHER SURGICAL HISTORY  2012    Inj(s) Tend-Sheath, Ligament, Single  (1) - [facility]ICK[facility] ([facility])    • OTHER SURGICAL HISTORY  2013    Small Joint Injection/Aspiration  (2) - [facility] NICKERSON ([facility])    • OTHER SURGICAL HISTORY      bowel obstruction x2   • OTHER SURGICAL HISTORY      gland removed from neck   • SUBLINGUAL SALIVARY CYST EXCISION N/A 2017    " Procedure: EXCISION OF LEFT  TONGUE LESION WITH CLOSURE;  Surgeon: Live Bolton MD;  Location: [facility];  Service:    • TUBAL ABDOMINAL LIGATION     • UPPER GASTROINTESTINAL ENDOSCOPY  07/01/2013   ,   Family History   Problem Relation Age of Onset   • Cancer Other    • Diabetes Other    • Heart disease Other    • Hypertension Mother    • Cancer Mother    • Diabetes Mother    • Hypertension Father    • Heart attack Father    • Cancer Father    • Heart disease Father    • Thyroid disease Maternal Aunt    ,   Social History   Substance Use Topics   • Smoking status: Former Smoker     Types: Cigarettes   • Smokeless tobacco: Never Used   • Alcohol use No   ,   Prescriptions Prior to Admission   Medication Sig Dispense Refill Last Dose   • cetirizine (zyrTEC) 10 MG tablet Take 1 tablet by mouth Daily As Needed for Allergies. 30 tablet 11    • chlorthalidone (HYGROTEN) 50 MG tablet Take 1 tablet by mouth Daily. 30 tablet 3    • cyclobenzaprine (FLEXERIL) 10 MG tablet Take 10 mg by mouth 2 (Two) Times a Day As Needed for Muscle Spasms.      • DULoxetine (CYMBALTA) 30 MG capsule Take 30 mg by mouth Daily.      • gabapentin (NEURONTIN) 800 MG tablet Take 800 mg by mouth 4 (Four) Times a Day.      • linaclotide (LINZESS) 145 MCG capsule capsule Take 145 mcg by mouth 2 (Two) Times a Day.      • magnesium oxide (MAGOX) 400 (241.3 MG) MG tablet tablet Take 400 mg by mouth Daily.      • meloxicam (MOBIC) 15 MG tablet Take 1 tablet by mouth Daily. Take once daily. 30 tablet 0    • mupirocin (BACTROBAN) 2 % ointment Apply  topically 3 (Three) Times a Day. (Patient taking differently: Apply 1 application topically 3 (Three) Times a Day.) 1 g 2 Taking   • nystatin (MYCOSTATIN) 031988 UNIT/GM powder Apply 1 application topically As Needed.   Taking   • omeprazole (priLOSEC) 20 MG capsule Take 20 mg by mouth Daily.      • ondansetron (ZOFRAN) 4 MG tablet Take 4 mg by mouth Every 8 (Eight) Hours As Needed.      •  oxyCODONE-acetaminophen (PERCOCET) 7.5-325 MG per tablet Take 1 tablet by mouth Every 8 (Eight) Hours As Needed.   Taking   • oxyCODONE-acetaminophen (PERCOCET) 7.5-325 MG per tablet Take 1 tablet by mouth 3 (Three) Times a Day for 30 days. 90 tablet 0 Taking   • oxyCODONE-acetaminophen (PERCOCET) 7.5-325 MG per tablet Take 1 tablet by mouth 3 (Three) Times a Day for 30 days. 90 tablet 0 Taking   • spironolactone (ALDACTONE) 25 MG tablet Take 1 tablet by mouth Daily. 30 tablet 3    , Scheduled Meds:   , Continuous Infusions:    sodium chloride 125 mL/hr Last Rate: 125 mL/hr (11/17/17 2035)   sodium chloride 125 mL/hr Last Rate: 125 mL/hr (11/17/17 1935)   , PRN Meds:  •  sodium chloride and Allergies:  Other and Sulfa antibiotics    Objective      Vital Signs  Temp:  [98.1 °F (36.7 °C)-98.2 °F (36.8 °C)] 98.2 °F (36.8 °C)  Heart Rate:  [] 83  Resp:  [18-20] 18  BP: (132-157)/(88-95) 157/88    Physical Exam   Constitutional: She is oriented to person, place, and time. She appears well-developed and well-nourished. No distress.   HENT:   Head: Normocephalic and atraumatic.   Eyes: EOM are normal. Pupils are equal, round, and reactive to light.   Neck: Normal range of motion. Neck supple. No tracheal deviation present.   Cardiovascular: Normal rate and regular rhythm.    Pulmonary/Chest: Effort normal and breath sounds normal. No respiratory distress.   Abdominal: Soft. Bowel sounds are normal. She exhibits distension. She exhibits no mass. There is no tenderness. There is no rebound and no guarding. A hernia (ventral hernia in the upper portion of the incision) is present.   Genitourinary: Rectal exam shows guaiac negative stool (no rectal masses).   Musculoskeletal: Normal range of motion.   Neurological: She is alert and oriented to person, place, and time.   Skin: She is not diaphoretic.   Psychiatric: She has a normal mood and affect. Her behavior is normal. Judgment and thought content normal.   Nursing  note and vitals reviewed.      Results Review:    I reviewed the patient's new clinical results.  I reviewed the patient's new imaging results and agree with the interpretation.        Assessment/Plan     Active Problems:    Abdominal pain      Assessment:    Condition: In stable condition.  Improving.   (1.) Small bowel obstruction, recurrent. No indication for immediate operation. ).     Plan:   NPO.  X-rays as ordered.  Give fluids.         I discussed the patients findings and my recommendations with patient, family, nursing staff and primary care team    Benjamin Troncoso MD  11/17/17  9:51 PM    Time: 20 minutes

## 2017-11-18 NOTE — H&P
[facility]  INPATIENT HISTORY AND PHYSICAL       Patient Care Team:  Yobany Barr MD as PCP - General  Daren Marie DPM as Consulting Physician (Podiatry)    Chief complaint   Chief Complaint   Patient presents with   • Abdominal Pain       Subjective     Patient is a 57 y.o. female presents with Complaint of having abdominal pain.  Patient states she is having constipation on and off for past few months however last bowel movement was 3 days ago.  Patient states she's been having abdominal pain on and off however had significantly worsened this evening which was causing her extreme discomfort and she decided to come to emergency further evaluation.  Patient denies any fever or chills associated with it.  No nausea or vomiting have been reported however persistency of time on pain was reported.  No weight loss we can have been reported.  Patient states she has been feeling well prior to this.  Patient states she has small bowel torsion and passed fresh required surgical intervention.    Review of Systems   Review of Systems   Constitutional: Positive for appetite change. Negative for chills, diaphoresis and fever.   HENT: Negative for congestion, rhinorrhea, sore throat and trouble swallowing.    Eyes: Negative for visual disturbance.   Respiratory: Negative for cough, chest tightness, shortness of breath and wheezing.    Cardiovascular: Negative for chest pain, palpitations and leg swelling.   Gastrointestinal: Positive for abdominal distention and abdominal pain. Negative for blood in stool, diarrhea, nausea and vomiting.   Endocrine: Negative for cold intolerance and heat intolerance.   Genitourinary: Negative for decreased urine volume and difficulty urinating.   Musculoskeletal: Negative for back pain, gait problem and neck pain.   Skin: Negative for rash.   Neurological: Negative for dizziness, syncope, weakness, light-headedness, numbness and  Spoke with Tonia and informed of message below. Tonia verbalized understanding and is going to  methimazole today.   RX sent to [facility] pharmacy.    "headaches.   Psychiatric/Behavioral: The patient is not nervous/anxious.        History  Past Medical History:   Diagnosis Date   • Acid reflux    • Altered bowel function    • Arthropathy of lumbar facet joint    • Bleeding disorder    • Constipation    • Corns and callus    • Depression    • Disease related peripheral neuropathy    • Epigastric pain    • Fatty liver    • Hammer toe    • Headache    • Hiatal hernia    • Hyperlipidemia    • Knee pain    • Localized, primary osteoarthritis of the ankle and foot     Localized, primary osteoarthritis of the ankle and/or foot   • Mendoza's metatarsalgia     Mendoza's metatarsalgia - 2nd interspace on right   • Nausea and vomiting    • Neuralgia and neuritis     Neuralgia, neuritis, and radiculitis, unspecified   • Neuropathy    • Obstructive sleep apnea     Obstructive sleep apnea (adult) (pediatric)    • CHAI on CPAP     \"C-Pap at night  (unconfirmed)\"   • Osteoarthritis    • Pain in foot     Pain in unspecified foot - sees a podiatrist   • Pain in joint, ankle and foot     Joint pain in ankle and foot      • Pain radiating to back     Pain radiating to lumbar region of back   • Plantar fasciitis    • Restless leg syndrome    • Secondary hypertension     Secondary hypertension, unspecified   • Sinusitis    • Sleep apnea    • Tongue anomaly     lesion     Past Surgical History:   Procedure Laterality Date   •  SECTION     • CHOLECYSTECTOMY     • COLONOSCOPY  2013   • DIRECT LARYNGOSCOPY, ESOPHAGOSCOPY, BRONCHOSCOPY N/A 2017    Procedure: DIRECT LARYNGOSCOPY AND;  Surgeon: Live Bolton MD;  Location: [facility];  Service:    • ENDOSCOPY  2013    Colon endoscopy 36196 (1) - Internal & external hemorrhoids found. Stool found.   • ENDOSCOPY  2013    EGD w/ tube 56780 (1) - Normal esophagus. Gastritis in stomach. Biopsy taken. Normal dudoenum. Biopsy taken.   • FOOT SURGERY  2013    Foot/toes surgery procedure (1) - Arthroplasty of toes 4 " and 5 of right foot.   • HERNIA REPAIR     • HERNIA REPAIR      hital   • HYSTERECTOMY     • LIVER BIOPSY     • NERVE BLOCK  07/25/2016    Injection for nerve block (1) - Lumbar medial branch block.   • OTHER SURGICAL HISTORY  12/03/2012    Inj(s) Tend-Sheath, Ligament, Single 20550 (1) - PORTER NICKERSON ([facility])    • OTHER SURGICAL HISTORY  06/24/2013    Small Joint Injection/Aspiration 20600 (2) - PORTER NICKERSON ([facility])    • OTHER SURGICAL HISTORY  2011    bowel obstruction x2   • OTHER SURGICAL HISTORY      gland removed from neck   • SUBLINGUAL SALIVARY CYST EXCISION N/A 8/1/2017    Procedure: EXCISION OF LEFT  TONGUE LESION WITH CLOSURE;  Surgeon: Live Bolton MD;  Location: [facility] [place];  Service:    • TUBAL ABDOMINAL LIGATION     • UPPER GASTROINTESTINAL ENDOSCOPY  07/01/2013     Family History   Problem Relation Age of Onset   • Cancer Other    • Diabetes Other    • Heart disease Other    • Hypertension Mother    • Cancer Mother    • Diabetes Mother    • Hypertension Father    • Heart attack Father    • Cancer Father    • Heart disease Father    • Thyroid disease Maternal Aunt      Social History   Substance Use Topics   • Smoking status: Former Smoker     Types: Cigarettes   • Smokeless tobacco: Never Used   • Alcohol use No     Prescriptions Prior to Admission   Medication Sig Dispense Refill Last Dose   • cetirizine (zyrTEC) 10 MG tablet Take 1 tablet by mouth Daily As Needed for Allergies. 30 tablet 11    • chlorthalidone (HYGROTEN) 50 MG tablet Take 1 tablet by mouth Daily. 30 tablet 3    • cyclobenzaprine (FLEXERIL) 10 MG tablet Take 10 mg by mouth 2 (Two) Times a Day As Needed for Muscle Spasms.      • DULoxetine (CYMBALTA) 30 MG capsule Take 30 mg by mouth Daily.      • gabapentin (NEURONTIN) 800 MG tablet Take 800 mg by mouth 4 (Four) Times a Day.      • linaclotide (LINZESS) 145 MCG capsule capsule Take 145 mcg by mouth 2 (Two) Times a Day.      • magnesium oxide (MAGOX) 400 (241.3 MG)  MG tablet tablet Take 400 mg by mouth Daily.      • meloxicam (MOBIC) 15 MG tablet Take 1 tablet by mouth Daily. Take once daily. 30 tablet 0    • mupirocin (BACTROBAN) 2 % ointment Apply  topically 3 (Three) Times a Day. (Patient taking differently: Apply 1 application topically 3 (Three) Times a Day.) 1 g 2 Taking   • nystatin (MYCOSTATIN) 375108 UNIT/GM powder Apply 1 application topically As Needed.   Taking   • omeprazole (priLOSEC) 20 MG capsule Take 20 mg by mouth Daily.      • ondansetron (ZOFRAN) 4 MG tablet Take 4 mg by mouth Every 8 (Eight) Hours As Needed.      • oxyCODONE-acetaminophen (PERCOCET) 7.5-325 MG per tablet Take 1 tablet by mouth Every 8 (Eight) Hours As Needed.   Taking   • oxyCODONE-acetaminophen (PERCOCET) 7.5-325 MG per tablet Take 1 tablet by mouth 3 (Three) Times a Day for 30 days. 90 tablet 0 Taking   • oxyCODONE-acetaminophen (PERCOCET) 7.5-325 MG per tablet Take 1 tablet by mouth 3 (Three) Times a Day for 30 days. 90 tablet 0 Taking   • spironolactone (ALDACTONE) 25 MG tablet Take 1 tablet by mouth Daily. 30 tablet 3      Allergies:  Other and Sulfa antibiotics  Prior to Admission medications    Medication Sig Start Date End Date Taking? Authorizing Provider   cetirizine (zyrTEC) 10 MG tablet Take 1 tablet by mouth Daily As Needed for Allergies. 8/31/17  Yes Live Bolton MD   chlorthalidone (HYGROTEN) 50 MG tablet Take 1 tablet by mouth Daily. 11/1/17  Yes LIYAH Melchor   cyclobenzaprine (FLEXERIL) 10 MG tablet Take 10 mg by mouth 2 (Two) Times a Day As Needed for Muscle Spasms.   Yes Historical Provider, MD   DULoxetine (CYMBALTA) 30 MG capsule Take 30 mg by mouth Daily.   Yes Historical Provider, MD   gabapentin (NEURONTIN) 800 MG tablet Take 800 mg by mouth 4 (Four) Times a Day.   Yes Historical Provider, MD   linaclotide (LINZESS) 145 MCG capsule capsule Take 145 mcg by mouth 2 (Two) Times a Day.   Yes Historical Provider, MD   magnesium oxide (MAGOX) 400 (241.3 MG)  MG tablet tablet Take 400 mg by mouth Daily.   Yes Historical Provider, MD   meloxicam (MOBIC) 15 MG tablet Take 1 tablet by mouth Daily. Take once daily. 11/13/17  Yes Daren Marie DPM   mupirocin (BACTROBAN) 2 % ointment Apply  topically 3 (Three) Times a Day.  Patient taking differently: Apply 1 application topically 3 (Three) Times a Day. 10/18/17  Yes Live Bolton MD   nystatin (MYCOSTATIN) 171415 UNIT/GM powder Apply 1 application topically As Needed. 10/11/17  Yes Historical Provider, MD   omeprazole (priLOSEC) 20 MG capsule Take 20 mg by mouth Daily.   Yes Historical Provider, MD   ondansetron (ZOFRAN) 4 MG tablet Take 4 mg by mouth Every 8 (Eight) Hours As Needed. 9/25/17  Yes Historical Provider, MD   oxyCODONE-acetaminophen (PERCOCET) 7.5-325 MG per tablet Take 1 tablet by mouth Every 8 (Eight) Hours As Needed. 10/17/17  Yes Historical Provider, MD   oxyCODONE-acetaminophen (PERCOCET) 7.5-325 MG per tablet Take 1 tablet by mouth 3 (Three) Times a Day for 30 days. 11/9/17 12/9/17 Yes Hua Westfall MD   oxyCODONE-acetaminophen (PERCOCET) 7.5-325 MG per tablet Take 1 tablet by mouth 3 (Three) Times a Day for 30 days. 11/9/17 12/9/17 Yes Hua Westfall MD   spironolactone (ALDACTONE) 25 MG tablet Take 1 tablet by mouth Daily. 11/1/17  Yes LIYAH Melchor       Objective     Vital Signs    Temp:  [98.1 °F (36.7 °C)-98.2 °F (36.8 °C)] 98.2 °F (36.8 °C)  Heart Rate:  [] 83  Resp:  [18-20] 18  BP: (132-157)/(88-95) 157/88    Physical Exam:      Physical Exam   Constitutional: She is oriented to person, place, and time. She appears well-developed and well-nourished.   HENT:   Head: Normocephalic and atraumatic.   Nose: Nose normal.   Eyes: Conjunctivae and EOM are normal. Pupils are equal, round, and reactive to light.   Neck: Normal range of motion. Neck supple. No JVD present. No tracheal deviation present. No thyromegaly present.   Cardiovascular: Normal rate, regular rhythm, normal  heart sounds and intact distal pulses.    Pulmonary/Chest: Effort normal and breath sounds normal. No respiratory distress. She has no wheezes. She has no rales. She exhibits no tenderness.   Abdominal: Soft. Bowel sounds are normal. She exhibits distension. She exhibits no mass. There is tenderness. There is guarding. There is no rebound.   Musculoskeletal: Normal range of motion. She exhibits no edema.   Lymphadenopathy:     She has no cervical adenopathy.   Neurological: She is alert and oriented to person, place, and time. She has normal reflexes. No cranial nerve deficit.   Skin: Skin is warm and dry.   Intact   Psychiatric: She has a normal mood and affect.   Nursing note and vitals reviewed.      Results Review:       Results from last 7 days  Lab Units 11/17/17  1740 11/14/17  0946   SODIUM mmol/L 138 139   POTASSIUM mmol/L 3.9 3.5   CHLORIDE mmol/L 96 101   CO2 mmol/L 27.0 27.0   BUN mg/dL 16 13   CREATININE mg/dL 1.09* 1.07*   GLUCOSE mg/dL 124* 124*   CALCIUM mg/dL 10.5* 9.5   BILIRUBIN mg/dL 1.1  --    ALK PHOS U/L 110  --    ALT (SGPT) U/L 76*  --    AST (SGOT) U/L 80*  --                Results from last 7 days  Lab Units 11/17/17  1713 11/14/17  0946   WBC 10*3/mm3 11.03* 5.49   HEMOGLOBIN g/dL 15.7* 15.3   HEMATOCRIT % 45.1* 45.4*   PLATELETS 10*3/mm3 106*  --            Imaging Results (last 7 days)     Procedure Component Value Units Date/Time    CT Abdomen Pelvis With Contrast [[id number]] Collected:  11/17/17 1857     Updated:  11/17/17 1936    Narrative:         EXAMINATION:  Computed Tomography           REGION:    Abdomen / Pelvis                     INDICATION:  Abdominal pain  HISTORY:    Bowel obstruction      RUTH. IMAGING:    none            TECHNIQUE:      - reconstructions:    axial, coronal, sagittal         - contrast:      oral:  No ;   intravenous: Isovue 300, 100 mL  This exam was performed according to the departmental  dose-optimization program which includes automated  exposure  control, adjustment of the mA and/or kV according to patient size  and/or use of iterative reconstruction technique.           COMMENTS:            - - - CT ABDOMEN - - -          THORAX (INFERIOR):      - LUNG BASES:  clear      - PLEURA:    no fluid or mass      - HEART:    normal size, no pericardial fluid     - MISC:      n/a          ABDOMEN:     - LIVER:    normal size / contour, no ductal dilatation , no  focal lesion   - GB:      Surgically absent    - CBD:      grossly negative   - SPLEEN:    normal size and contour   - PANCREAS:    normal in size, contour, no focal mass    - VISCERA:    - There are multiple fluid-filled small bowel loops at the upper  limits of normal/mildly enlarged in the proximal most aspect. In  the mid small bowel is a short segment of enhancing small bowel  wall of normal caliber (coronal 12 - 18/67, axial 61/111).  - Additionally, in the region of the terminal ileum there is a  suggestion of mild degree of edema associated with the wall of  the terminal ileum, and the diminished luminal caliber. No gross  evidence of serosal inflammatory changes or adjacent mesenteric  changes. (Coronal 23/67, 27/67).     - MESENTERY:  no mesenteric mass   - CAVITY:    no free abdominal fluid, no free intraperitoneal  air   - BODY WALL:  wnl   - OSSEOUS:    grossly negative for age   - MISC:                                      RETROPERITONEUM:   - KIDNEYS:    normal size / contour, no collecting system  dilation        no evidence of an enhancing mass   - URETERS:    normal course, caliber   - ADRENALS:    normal size, contour   - MISC:      no sig retroperitoneal adenopathy or mass   - VASCULAR:    aorta / iliacs: wnl for age     - - - CT PELVIS - - -      - VISCERA:    As above - MESENTERY:  no mass   - VASCULAR:    wnl for age   - CAVITY:    no free fluid / air   - BLADDER:    unremarkable   - OSSEOUS:    wnl    - MISC:   - UTERUS/OVARY:  Status post hysterectomy   .       Impression:         CONCLUSION:  1. The proximal small bowel is fluid-filled with a caliber at the  upper limits of normal/mildly enlarged.  2. There is a zone of soft transition in the mid abdomen in a  region of normal caliber bowel loop with a short segment of  focally enhancing wall.  3. Indeterminate findings in the terminal ileum suggesting mild  bowel wall edematous changes with luminal narrowing.    The constellation of findings is suspicious for the presence of  multifocal Crohn's disease.    Electronically signed by:  JONO Cobb MD  11/17/2017 7:35  PM CST Workstation: [id number]          Assessment/Plan     Active Problems:    Abdominal pain    Constipation    Acid reflux    Depression    Disease related peripheral neuropathy    Epigastric pain    Hyperlipidemia    Nausea and vomiting    Restless leg syndrome    Sleep apnea      -We'll keep NG tube.  -We'll follow surgical recommendation.  -We'll keep nothing by mouth at this point in time.  -We'll place patient on DVT and GI prophylaxis.  -We'll continue monitoring patient hospital setting and treat patient as course dictates.    I discussed the patients findings and my recommendations with patient and nursing staff.         This document has been electronically signed by Sherie Curran MD on November 17, 2017 10:24 PM        Total Time Spent: 45 mins    EMR Dragon/Transcription disclaimer:   Dictated utilizing Dragon dictation.

## 2017-11-18 NOTE — PROGRESS NOTES
"MEDICINE DAILY PROGRESS NOTE  NAME: Amira Shannon  : 1960  MRN: [id number]     LOS: 0 days     PROVIDER OF SERVICE: Josh Toth MD    Chief Complaint: Abdominal pain    Subjective:     Interval History:  History taken from: patient chart RN  . Patient feeling some better today. Still has some abdominal pain, but improving.  General surgery following.    Review of Systems:   Review of Systems   Constitutional: Positive for appetite change and fatigue. Negative for activity change and fever.   HENT: Negative for ear pain and sore throat.    Eyes: Negative for pain and visual disturbance.   Respiratory: Negative for cough and shortness of breath.    Cardiovascular: Negative for chest pain and palpitations.   Gastrointestinal: Positive for abdominal distention, abdominal pain and nausea. Negative for constipation, diarrhea and vomiting.   Endocrine: Negative for cold intolerance and heat intolerance.   Genitourinary: Negative for difficulty urinating and dysuria.   Musculoskeletal: Negative for arthralgias and gait problem.   Skin: Negative for color change and rash.   Neurological: Negative for dizziness, weakness and headaches.   Hematological: Negative for adenopathy. Does not bruise/bleed easily.   Psychiatric/Behavioral: Negative for agitation, confusion and sleep disturbance.       Objective:     Vital Signs  Vitals:    17 2120 17 0006 17 0357 17 0724   BP: 157/88  119/63    BP Location: Left arm  Left arm    Patient Position: Sitting  Lying    Pulse: 83 78 77 83   Resp: 18  18    Temp: 98.2 °F (36.8 °C)  96.4 °F (35.8 °C)    TempSrc: Temporal Artery   Oral    SpO2: 96%  91%    Weight: 218 lb 11.2 oz (99.2 kg)      Height: 64\" (162.6 cm)          Physical Exam  Physical Exam   Constitutional: She is oriented to person, place, and time. She appears well-developed and well-nourished. No distress.   HENT:   Head: Normocephalic and atraumatic.   Right Ear: External ear " normal.   Left Ear: External ear normal.   Nose: Nose normal.   Mouth/Throat: No oropharyngeal exudate.   Eyes: Conjunctivae and EOM are normal. Pupils are equal, round, and reactive to light.   Neck: Normal range of motion. Neck supple.   Cardiovascular: Normal rate, regular rhythm and normal heart sounds.  Exam reveals no gallop and no friction rub.    No murmur heard.  Pulmonary/Chest: Effort normal and breath sounds normal. No respiratory distress. She has no wheezes. She has no rales. She exhibits no tenderness.   Abdominal: Soft. Bowel sounds are normal. She exhibits distension. She exhibits no mass. There is tenderness. There is no rebound and no guarding.   Musculoskeletal: Normal range of motion.   Neurological: She is alert and oriented to person, place, and time.   Skin: Skin is warm and dry. No rash noted. She is not diaphoretic. No erythema. No pallor.   Psychiatric: She has a normal mood and affect. Her behavior is normal.   Nursing note and vitals reviewed.      Medication Review    Current Facility-Administered Medications:   •  acetaminophen (TYLENOL) tablet 650 mg, 650 mg, Oral, Q4H PRN, Sherie Curran MD  •  bisacodyl (DULCOLAX) suppository 10 mg, 10 mg, Rectal, Daily PRN, Sherie Curran MD  •  chlorthalidone (HYGROTON) tablet 50 mg, 50 mg, Oral, Daily, Sherie Curran MD  •  docusate sodium (COLACE) capsule 200 mg, 200 mg, Oral, BID, Sherie Curran MD  •  DULoxetine (CYMBALTA) DR capsule 30 mg, 30 mg, Oral, Daily, Sherie Curran MD  •  famotidine (PEPCID) tablet 20 mg, 20 mg, Oral, BID, Sherie Curran MD  •  heparin (porcine) 5000 UNIT/ML injection 5,000 Units, 5,000 Units, Subcutaneous, Q8H, Sherie Curran MD, 5,000 Units at 11/18/17 0651  •  hydrALAZINE (APRESOLINE) injection 10 mg, 10 mg, Intravenous, Q6H PRN, Sherie Curran MD  •  magnesium hydroxide (MILK OF MAGNESIA) suspension 2400 mg/10mL 10 mL, 10 mL, Oral, Daily PRN,  Sherie Curran MD  •  magnesium oxide (MAGOX) tablet 400 mg, 400 mg, Oral, Daily, Sherie Curran MD  •  meloxicam (MOBIC) tablet 15 mg, 15 mg, Oral, Daily, Sherie Curran MD  •  morphine injection 6 mg, 6 mg, Intravenous, Q4H PRN, Benjamin Troncoso MD  •  nicotine (NICODERM CQ) 21 MG/24HR patch 1 patch, 1 patch, Transdermal, Q24H, Sherie Curran MD  •  ondansetron (ZOFRAN) tablet 4 mg, 4 mg, Oral, Q6H PRN **OR** ondansetron ODT (ZOFRAN-ODT) disintegrating tablet 4 mg, 4 mg, Oral, Q6H PRN **OR** ondansetron (ZOFRAN) injection 4 mg, 4 mg, Intravenous, Q6H PRN, Sherie Curran MD  •  ondansetron (ZOFRAN) tablet 4 mg, 4 mg, Oral, Q6H PRN, Sherie Curran MD  •  oxyCODONE-acetaminophen (PERCOCET) 7.5-325 MG per tablet 1 tablet, 1 tablet, Oral, TID, Sherie Curran MD  •  pantoprazole (PROTONIX) EC tablet 40 mg, 40 mg, Oral, Q AM, Sherie Curran MD  •  sodium chloride 0.9 % flush 1-10 mL, 1-10 mL, Intravenous, PRN, Sherie Curran MD  •  sodium chloride 0.9 % flush 10 mL, 10 mL, Intravenous, PRN, Jamarcus Archibald MD  •  sodium chloride 0.9 % infusion, 125 mL/hr, Intravenous, Continuous, Jamarcus Archibald MD, Last Rate: 125 mL/hr at 11/17/17 2035, 125 mL/hr at 11/17/17 2035  •  sodium chloride 0.9 % infusion, 125 mL/hr, Intravenous, Continuous, Bimal Pederson MD, Last Rate: 125 mL/hr at 11/17/17 1935, 125 mL/hr at 11/17/17 1935  •  sodium chloride 0.9 % infusion, 100 mL/hr, Intravenous, Continuous, Sherie Curran MD, Last Rate: 100 mL/hr at 11/18/17 0120, 100 mL/hr at 11/18/17 0120  •  spironolactone (ALDACTONE) tablet 25 mg, 25 mg, Oral, Daily, Sherie Curran MD     Diagnostic Data    Lab Results (last 24 hours)     Procedure Component Value Units Date/Time    CBC Auto Differential [[id number]]  (Abnormal) Collected:  11/17/17 1713    Specimen:  Blood Updated:  11/17/17 1718     WBC 11.03 (H) 10*3/mm3      RBC 5.66 (H) 10*6/mm3       Hemoglobin 15.7 (H) g/dL      Hematocrit 45.1 (H) %      MCV 79.7 (L) fL      MCH 27.7 pg      MCHC 34.8 g/dL      RDW 14.7 (H) %      RDW-SD 42.4 fl      MPV 11.5 fL      Platelets 106 (L) 10*3/mm3      Neutrophil % 86.8 (H) %      Lymphocyte % 7.6 (L) %      Monocyte % 4.4 %      Eosinophil % 1.0 %      Basophil % 0.0 %      Immature Grans % 0.2 %      Neutrophils, Absolute 9.57 (H) 10*3/mm3      Lymphocytes, Absolute 0.84 10*3/mm3      Monocytes, Absolute 0.49 10*3/mm3      Eosinophils, Absolute 0.11 10*3/mm3      Basophils, Absolute 0.00 10*3/mm3      Immature Grans, Absolute 0.02 10*3/mm3     Lipase [[id number]]  (Normal) Collected:  11/17/17 1740    Specimen:  Blood from Arm, Left Updated:  11/17/17 1758     Lipase 81 U/L     Comprehensive Metabolic Panel [[id number]]  (Abnormal) Collected:  11/17/17 1740    Specimen:  Blood from Arm, Left Updated:  11/17/17 1759     Glucose 124 (H) mg/dL      BUN 16 mg/dL      Creatinine 1.09 (H) mg/dL      Sodium 138 mmol/L      Potassium 3.9 mmol/L      Chloride 96 mmol/L      CO2 27.0 mmol/L      Calcium 10.5 (H) mg/dL      Total Protein 7.9 g/dL      Albumin 4.50 g/dL      ALT (SGPT) 76 (H) U/L      AST (SGOT) 80 (H) U/L      Alkaline Phosphatase 110 U/L      Total Bilirubin 1.1 mg/dL      eGFR Non African Amer 52 mL/min/1.73      Globulin 3.4 gm/dL      A/G Ratio 1.3 g/dL      BUN/Creatinine Ratio 14.7     Anion Gap 15.0 mmol/L     CBC & Differential [[id number]] Collected:  11/17/17 1713    Specimen:  Blood Updated:  11/17/17 1804    Narrative:       The following orders were created for panel order CBC & Differential.  Procedure                               Abnormality         Status                     ---------                               -----------         ------                     Scan Slide[[id number]]                                       Final result               CBC Auto Differential[[id number]]        Abnormal            Final result                 Please  view results for these tests on the individual orders.    Scan Slide [[id number]] Collected:  11/17/17 1713    Specimen:  Blood Updated:  11/17/17 1804     RBC Morphology Normal     WBC Morphology Normal     Platelet Estimate Decreased    Light Blue Top [[id number]] Collected:  11/17/17 1713    Specimen:  Blood Updated:  11/17/17 1816     Extra Tube hold for add-on      Auto resulted       Lavender Top [[id number]] Collected:  11/17/17 1713    Specimen:  Blood Updated:  11/17/17 1816     Extra Tube hold for add-on      Auto resulted       Gold Top - SST [[id number]] Collected:  11/17/17 1713    Specimen:  Blood Updated:  11/17/17 1816     Extra Tube Hold for add-ons.      Auto resulted.       [place] Draw [[id number]] Collected:  11/17/17 1713    Specimen:  Blood Updated:  11/17/17 1846    Narrative:       The following orders were created for panel order [place] Draw.  Procedure                               Abnormality         Status                     ---------                               -----------         ------                     Light Blue Top[[id number]]                                   Final result               Green Top (Gel)[[id number]]                                  Final result               Lavender Top[[id number]]                                     Final result               Gold Top - SST[[id number]]                                   Final result                 Please view results for these tests on the individual orders.    Green Top (Gel) [[id number]] Collected:  11/17/17 1740    Specimen:  Blood from Arm, Left Updated:  11/17/17 1846     Extra Tube Hold for add-ons.      Auto resulted.       Urinalysis With / Culture If Indicated - Urine, Clean Catch [[id number]]  (Abnormal) Collected:  11/17/17 1831    Specimen:  Urine from Urine, Clean Catch Updated:  11/17/17 1851     Color, UA Yellow     Appearance, UA Clear     pH, UA 7.0     Specific Gravity, UA 1.015     Glucose, UA Negative     Ketones, UA  Negative     Bilirubin, UA Negative     Blood, UA Negative     Protein, UA 30 mg/dL (1+) (A)     Leuk Esterase, UA Negative     Nitrite, UA Negative     Urobilinogen, UA 0.2 E.U./dL    Urinalysis, Microscopic Only - Urine, Clean Catch [[id number]]  (Abnormal) Collected:  11/17/17 1831    Specimen:  Urine from Urine, Clean Catch Updated:  11/17/17 1859     RBC, UA 0-2 (A) /HPF      WBC, UA 0-2 /HPF      Bacteria, UA None Seen /HPF      Squamous Epithelial Cells, UA None Seen /HPF      Hyaline Casts, UA 0-2 /LPF      Methodology Automated Microscopy    Urinalysis With / Culture If Indicated - [[id number]]  (Normal) Collected:  11/18/17 0117    Specimen:  Urine Updated:  11/18/17 0148     Color, UA Yellow     Appearance, UA Clear     pH, UA 6.0     Specific Gravity, UA 1.020      Result obtained by Refractometer        Glucose, UA Negative     Ketones, UA Negative     Bilirubin, UA Negative     Blood, UA Negative     Protein, UA Negative     Leuk Esterase, UA Negative     Nitrite, UA Negative     Urobilinogen, UA 1.0 E.U./dL    Narrative:       Urine microscopic not indicated.    Comprehensive Metabolic Panel [[id number]]  (Abnormal) Collected:  11/18/17 0609    Specimen:  Blood Updated:  11/18/17 0711     Glucose 89 mg/dL      BUN 15 mg/dL      Creatinine 0.97 mg/dL      Sodium 140 mmol/L      Potassium 3.4 (L) mmol/L      Chloride 103 mmol/L      CO2 26.0 mmol/L      Calcium 8.7 mg/dL      Total Protein 6.6 g/dL      Albumin 3.60 g/dL      ALT (SGPT) 61 (H) U/L      AST (SGOT) 63 (H) U/L      Alkaline Phosphatase 87 U/L      Total Bilirubin 1.1 mg/dL      eGFR Non African Amer 59 mL/min/1.73      Globulin 3.0 gm/dL      A/G Ratio 1.2 g/dL      BUN/Creatinine Ratio 15.5     Anion Gap 11.0 mmol/L     Magnesium [[id number]]  (Normal) Collected:  11/18/17 0609    Specimen:  Blood Updated:  11/18/17 0711     Magnesium 1.9 mg/dL     CBC Auto Differential [[id number]]  (Abnormal) Collected:  11/18/17 0609    Specimen:  Blood  Updated:  11/18/17 0722     WBC 6.69 10*3/mm3      RBC 5.01 10*6/mm3      Hemoglobin 13.9 g/dL       SPECIMEN REANALYZED TO CONFIRM HGB RESULTS        Hematocrit 40.8 %      MCV 81.4 fL      MCH 27.7 pg      MCHC 34.1 g/dL      RDW 15.3 (H) %      RDW-SD 45.7 fl      MPV 11.9 fL      Platelets 92 (L) 10*3/mm3      Neutrophil % 69.2 %      Lymphocyte % 22.1 %      Monocyte % 5.5 %      Eosinophil % 3.0 %      Basophil % 0.1 %      Immature Grans % 0.1 %      Neutrophils, Absolute 4.62 10*3/mm3      Lymphocytes, Absolute 1.48 10*3/mm3      Monocytes, Absolute 0.37 10*3/mm3      Eosinophils, Absolute 0.20 10*3/mm3      Basophils, Absolute 0.01 10*3/mm3      Immature Grans, Absolute 0.01 10*3/mm3      nRBC 0.0 /100 WBC           I reviewed the patient's new clinical results.    Assessment/Plan:     Active Hospital Problems (** Indicates Principal Problem)    Diagnosis Date Noted   • **Abdominal pain [R10.9] 11/17/2017   • Constipation [K59.00]    • Acid reflux [K21.9]    • Depression [F32.9]    • Disease related peripheral neuropathy [G62.89]    • Epigastric pain [R10.13]    • Hyperlipidemia [E78.5]    • Nausea and vomiting [R11.2]    • Restless leg syndrome [G25.81]    • Sleep apnea [G47.30]       Resolved Hospital Problems    Diagnosis Date Noted Date Resolved   No resolved problems to display.     Initially admitted with abdominal pain.  Differential diagnosis includes small bowel obstruction, constipation, colitis.  General surgery has been consulted, NG tube is in place, patient started on intravenous fluids and nothing by mouth at present.  Abdominal x-ray for this morning showed no mechanical bowel obstruction, however moderate amount of retained feces in the colon.  Plan to give patient soapsuds enema in order to have her bowels move.  Patient does have bowel sounds this morning, flatus positive, no stool for 5 days.  Patient does have some abdominal distention as well however per the patient and nursing staff  this has improved overnight with the NG tube.    Patient is currently nothing by mouth as such her home by mouth medications are currently on hold.  As her diet is restarted will resume patient's home medications.    Will monitor patient's hospital course and adjust treatment as hospital course dictates.    DVT prophylaxis: Heparin  Code status is Full Code    Plan for disposition:Where: home and When:  1-3 days      Time:           This document has been electronically signed by Josh Toth MD on November 18, 2017 8:42 AM

## 2017-11-18 NOTE — PROGRESS NOTES
"   LOS: 0 days   Patient Care Team:  Yobany Barr MD as PCP - General  Daren Marie DPM as Consulting Physician (Podiatry)    Chief Complaint: Hospital day 2    Subjective     History of Present Illness    Subjective:  Symptoms:  Improved.    Diet:  NPO.  No nausea or vomiting.    Activity level: Impaired due to weakness.    Pain:  She complains of pain that is moderate.  She reports pain is improving.  Pain is well controlled.        History taken from: patient chart family    Objective     Vital Signs  Temp:  [96.4 °F (35.8 °C)-98.2 °F (36.8 °C)] 96.4 °F (35.8 °C)  Heart Rate:  [] 83  Resp:  [18-20] 18  BP: (119-157)/(63-95) 119/63    Objective:  General Appearance:  Comfortable.    Vital signs: (most recent): Blood pressure 119/63, pulse 83, temperature 96.4 °F (35.8 °C), temperature source Oral, resp. rate 18, height 64\" (162.6 cm), weight 218 lb 11.2 oz (99.2 kg), SpO2 91 %, not currently breastfeeding.  Vital signs are normal.  No fever.    Output: Producing urine.  Stool output assessment: flatus.    Abdomen: Abdomen is soft and non-distended.  (Much less distended)There is no abdominal tenderness.               Results Review:     I reviewed the patient's new clinical results.    Results for XUAN COLEMAN (MRN [id number]) as of 11/18/2017 10:41   Ref. Range 11/18/2017 06:09   Glucose Latest Ref Range: 60 - 100 mg/dL 89   Sodium Latest Ref Range: 137 - 145 mmol/L 140   Potassium Latest Ref Range: 3.5 - 5.1 mmol/L 3.4 (L)   CO2 Latest Ref Range: 22.0 - 31.0 mmol/L 26.0   Chloride Latest Ref Range: 95 - 110 mmol/L 103   Anion Gap Latest Ref Range: 5.0 - 15.0 mmol/L 11.0   Creatinine Latest Ref Range: 0.50 - 1.00 mg/dL 0.97   BUN Latest Ref Range: 7 - 21 mg/dL 15   BUN/Creatinine Ratio Latest Ref Range: 7.0 - 25.0  15.5   Calcium Latest Ref Range: 8.4 - 10.2 mg/dL 8.7   eGFR Non African Amer Latest Ref Range: 51 - 120 mL/min/1.73 59   Alkaline Phosphatase Latest Ref Range: 38 - 126 U/L 87 "   Total Protein Latest Ref Range: 6.3 - 8.6 g/dL 6.6   ALT (SGPT) Latest Ref Range: 9 - 52 U/L 61 (H)   AST (SGOT) Latest Ref Range: 14 - 36 U/L 63 (H)   Total Bilirubin Latest Ref Range: 0.2 - 1.3 mg/dL 1.1   Albumin Latest Ref Range: 3.40 - 4.80 g/dL 3.60   Globulin Latest Ref Range: 2.3 - 3.5 gm/dL 3.0   A/G Ratio Latest Ref Range: 1.1 - 1.8 g/dL 1.2   Magnesium Latest Ref Range: 1.6 - 2.3 mg/dL 1.9   WBC Latest Ref Range: 3.20 - 9.80 10*3/mm3 6.69   RBC Latest Ref Range: 3.77 - 5.16 10*6/mm3 5.01   Hemoglobin Latest Ref Range: 12.0 - 15.5 g/dL 13.9   Hematocrit Latest Ref Range: 35.0 - 45.0 % 40.8   RDW Latest Ref Range: 11.5 - 14.5 % 15.3 (H)   MCV Latest Ref Range: 80.0 - 98.0 fL 81.4   MCH Latest Ref Range: 26.5 - 34.0 pg 27.7   MCHC Latest Ref Range: 31.4 - 36.0 g/dL 34.1   MPV Latest Ref Range: 8.0 - 12.0 fL 11.9   Platelets Latest Ref Range: 150 - 450 10*3/mm3 92 (L)   RDW-SD Latest Ref Range: 36.4 - 46.3 fl 45.7   Neutrophil % Latest Ref Range: 37.0 - 80.0 % 69.2   Lymphocyte % Latest Ref Range: 10.0 - 50.0 % 22.1   Monocyte % Latest Ref Range: 0.0 - 12.0 % 5.5   Eosinophil % Latest Ref Range: 0.0 - 7.0 % 3.0   Basophil % Latest Ref Range: 0.0 - 2.0 % 0.1   Immature Grans % Latest Ref Range: 0.0 - 0.5 % 0.1   Neutrophils, Absolute Latest Ref Range: 2.00 - 8.60 10*3/mm3 4.62   Lymphocytes, Absolute Latest Ref Range: 0.60 - 4.20 10*3/mm3 1.48   Monocytes, Absolute Latest Ref Range: 0.00 - 0.90 10*3/mm3 0.37   Eosinophils, Absolute Latest Ref Range: 0.00 - 0.70 10*3/mm3 0.20   Basophils, Absolute Latest Ref Range: 0.00 - 0.20 10*3/mm3 0.01   Immature Grans, Absolute Latest Ref Range: 0.00 - 0.02 10*3/mm3 0.01   nRBC Latest Ref Range: 0.0 - 0.0 /100 WBC 0.0       Medication Review: done    Assessment/Plan     Active Problems:    Abdominal pain    Constipation    Acid reflux    Depression    Disease related peripheral neuropathy    Epigastric pain    Hyperlipidemia    Nausea and vomiting    Restless leg  syndrome    Sleep apnea      Assessment:    Condition: In stable condition.  Improving.   (Resolving SBO).     Plan:   X-rays as ordered.        Benjamin Troncoso MD  11/18/17  10:37 AM    Time: 5 minutes

## 2017-11-18 NOTE — ED PROVIDER NOTES
Subjective   HPI Comments: Patient presents with abdominal distention and abdominal pain today.  Patient has significant surgical history and history of bowel obstruction.  Patient's last obstruction was approximately one year ago.  Patient has surgery at that time with complicated by infection and wound VAC placement.  Patient has not had any recurrent obstruction since that time.  Patient notes intractable vomiting unable to tolerate by mouth at this time.  Patient notes very little gas passage nor moving her bowels.  Patient notes several hard stools in the last week.  She denies any black tarry stools.  There's been no history of fevers chills.  Has been no chest pain shortness of breath.  Symptoms in general feel like her prior obstructions.      History provided by:  Patient   used: No        Review of Systems   Constitutional: Negative.  Negative for appetite change, chills and fever.   HENT: Negative.  Negative for congestion.    Eyes: Negative.  Negative for photophobia and visual disturbance.   Respiratory: Negative.  Negative for cough, chest tightness and shortness of breath.    Cardiovascular: Negative.  Negative for chest pain and palpitations.   Gastrointestinal: Positive for abdominal pain, nausea and vomiting. Negative for constipation and diarrhea.   Endocrine: Negative.    Genitourinary: Negative.  Negative for decreased urine volume, dysuria, flank pain and hematuria.   Musculoskeletal: Negative.  Negative for arthralgias, back pain, myalgias, neck pain and neck stiffness.   Skin: Negative.  Negative for pallor.   Neurological: Negative.  Negative for dizziness, syncope, weakness, light-headedness, numbness and headaches.   Psychiatric/Behavioral: Negative.  Negative for confusion and suicidal ideas. The patient is not nervous/anxious.    All other systems reviewed and are negative.      Past Medical History:   Diagnosis Date   • Acid reflux    • Altered bowel function    •  "Arthropathy of lumbar facet joint    • Bleeding disorder    • Constipation    • Corns and callus    • Depression    • Disease related peripheral neuropathy    • Epigastric pain    • Fatty liver    • Hammer toe    • Headache    • Hiatal hernia    • Hyperlipidemia    • Knee pain    • Localized, primary osteoarthritis of the ankle and foot     Localized, primary osteoarthritis of the ankle and/or foot   • Mendoza's metatarsalgia     Mendoza's metatarsalgia - 2nd interspace on right   • Nausea and vomiting    • Neuralgia and neuritis     Neuralgia, neuritis, and radiculitis, unspecified   • Neuropathy    • Obstructive sleep apnea     Obstructive sleep apnea (adult) (pediatric)    • CHAI on CPAP     \"C-Pap at night  (unconfirmed)\"   • Osteoarthritis    • Pain in foot     Pain in unspecified foot - sees a podiatrist   • Pain in joint, ankle and foot     Joint pain in ankle and foot      • Pain radiating to back     Pain radiating to lumbar region of back   • Plantar fasciitis    • Restless leg syndrome    • Secondary hypertension     Secondary hypertension, unspecified   • Sinusitis    • Sleep apnea    • Tongue anomaly     lesion       Allergies   Allergen Reactions   • Other      Pt states that taking steroids either in pill or injection form make her have blisters in her mouth and she feels like she is on fire on the inside/ has hx of c diff and possible MRSA     • Sulfa Antibiotics Rash     Sulfa (Sulfonamide Antibiotics)       Past Surgical History:   Procedure Laterality Date   •  SECTION     • CHOLECYSTECTOMY     • COLONOSCOPY  2013   • DIRECT LARYNGOSCOPY, ESOPHAGOSCOPY, BRONCHOSCOPY N/A 2017    Procedure: DIRECT LARYNGOSCOPY AND;  Surgeon: Live Bolton MD;  Location: [facility] OR;  Service:    • ENDOSCOPY  2013    Colon endoscopy 99714 (1) - Internal & external hemorrhoids found. Stool found.   • ENDOSCOPY  2013    EGD w/ tube 07875 (1) - Normal esophagus. Gastritis in stomach. Biopsy " taken. Normal dudoenum. Biopsy taken.   • FOOT SURGERY  02/26/2013    Foot/toes surgery procedure (1) - Arthroplasty of toes 4 and 5 of right foot.   • HERNIA REPAIR     • HERNIA REPAIR      hital   • HYSTERECTOMY     • LIVER BIOPSY     • NERVE BLOCK  07/25/2016    Injection for nerve block (1) - Lumbar medial branch block.   • OTHER SURGICAL HISTORY  12/03/2012    Inj(s) Tend-Sheath, Ligament, Single 20550 (1) - [facility] NICK[facility] ([facility])    • OTHER SURGICAL HISTORY  06/24/2013    Small Joint Injection/Aspiration 20600 (2) - [facility] NICK[facility] ([facility])    • OTHER SURGICAL HISTORY  2011    bowel obstruction x2   • OTHER SURGICAL HISTORY      gland removed from neck   • SUBLINGUAL SALIVARY CYST EXCISION N/A 8/1/2017    Procedure: EXCISION OF LEFT  TONGUE LESION WITH CLOSURE;  Surgeon: Live Bolton MD;  Location: [facility];  Service:    • TUBAL ABDOMINAL LIGATION     • UPPER GASTROINTESTINAL ENDOSCOPY  07/01/2013       Family History   Problem Relation Age of Onset   • Cancer Other    • Diabetes Other    • Heart disease Other    • Hypertension Mother    • Cancer Mother    • Diabetes Mother    • Hypertension Father    • Heart attack Father    • Cancer Father    • Heart disease Father    • Thyroid disease Maternal Aunt        Social History     Social History   • Marital status:      Spouse name: N/A   • Number of children: N/A   • Years of education: N/A     Social History Main Topics   • Smoking status: Former Smoker     Types: Cigarettes   • Smokeless tobacco: Never Used   • Alcohol use No   • Drug use: No   • Sexual activity: Defer      Comment: Marital status:      Other Topics Concern   • None     Social History Narrative           Objective   Physical Exam   Constitutional: She is oriented to person, place, and time. She appears well-developed and well-nourished. No distress.   HENT:   Head: Normocephalic and atraumatic.   Nose: Nose normal.   Mouth/Throat: Oropharynx is clear and moist.    Eyes: Conjunctivae and EOM are normal. No scleral icterus.   Neck: Normal range of motion. Neck supple. No JVD present.   Cardiovascular: Regular rhythm, normal heart sounds and intact distal pulses.  Exam reveals no gallop and no friction rub.    No murmur heard.  Tachycardia   Pulmonary/Chest: Effort normal. No respiratory distress. She has no wheezes. She has no rales. She exhibits no tenderness.   Abdominal: She exhibits distension. She exhibits no mass. There is tenderness. There is no rebound and no guarding.   Patient with significant distention and dullness to percussion.  No focal peritoneal signs are noted.  Patient with negative heel tap and psoas signs.   Musculoskeletal: Normal range of motion. She exhibits no edema, tenderness or deformity.   Lymphadenopathy:     She has no cervical adenopathy.   Neurological: She is alert and oriented to person, place, and time. No cranial nerve deficit. She exhibits normal muscle tone.   Skin: Skin is warm and dry. No rash noted. She is not diaphoretic. No erythema. No pallor.   Psychiatric: She has a normal mood and affect. Her behavior is normal. Judgment and thought content normal.   Nursing note and vitals reviewed.      Procedures         ED Course  ED Course   Comment By Time   D/W  and  and patient is being admitted Bimal Pederson MD 11/17 2017      Labs Reviewed   COMPREHENSIVE METABOLIC PANEL - Abnormal; Notable for the following:        Result Value    Glucose 124 (*)     Creatinine 1.09 (*)     Calcium 10.5 (*)     ALT (SGPT) 76 (*)     AST (SGOT) 80 (*)     All other components within normal limits   CBC WITH AUTO DIFFERENTIAL - Abnormal; Notable for the following:     WBC 11.03 (*)     RBC 5.66 (*)     Hemoglobin 15.7 (*)     Hematocrit 45.1 (*)     MCV 79.7 (*)     RDW 14.7 (*)     Platelets 106 (*)     Neutrophil % 86.8 (*)     Lymphocyte % 7.6 (*)     Neutrophils, Absolute 9.57 (*)     All other components within normal limits   LIPASE  - Normal   RAINBOW DRAW    Narrative:     The following orders were created for panel order [place] Draw.  Procedure                               Abnormality         Status                     ---------                               -----------         ------                     Light Blue Top[[id number]]                                   Final result               Green Top (Gel)[[id number]]                                  Final result               Lavender Top[[id number]]                                     Final result               Gold Top - SST[[id number]]                                   Final result                 Please view results for these tests on the individual orders.   SCAN SLIDE   URINALYSIS W/ CULTURE IF INDICATED   CBC AND DIFFERENTIAL    Narrative:     The following orders were created for panel order CBC & Differential.  Procedure                               Abnormality         Status                     ---------                               -----------         ------                     Scan Slide[[id number]]                                       Final result               CBC Auto Differential[[id number]]        Abnormal            Final result                 Please view results for these tests on the individual orders.   LIGHT BLUE TOP   GREEN TOP   LAVENDER TOP   GOLD TOP - SST       CT Abdomen Pelvis With Contrast    (Results Pending)     Patient with signs and symptoms consistent with obstruction.  Awaiting CT scan.  We will proceed with NG tube should be it appear that the patient has obstruction.  To Dr. Pederson awaiting CT scan.            MDM    Final diagnoses:   Abdominal pain, unspecified abdominal location   Intestinal obstruction, unspecified cause, unspecified whether partial or complete   Intractable vomiting with nausea, unspecified vomiting type            Jamarcus Archibald MD  11/18/17 5417

## 2017-11-19 LAB
ALBUMIN SERPL-MCNC: 3.4 G/DL (ref 3.4–4.8)
ALBUMIN/GLOB SERPL: 1.1 G/DL (ref 1.1–1.8)
ALP SERPL-CCNC: 81 U/L (ref 38–126)
ALT SERPL W P-5'-P-CCNC: 56 U/L (ref 9–52)
ANION GAP SERPL CALCULATED.3IONS-SCNC: 11 MMOL/L (ref 5–15)
AST SERPL-CCNC: 55 U/L (ref 14–36)
BASOPHILS # BLD AUTO: 0.01 10*3/MM3 (ref 0–0.2)
BASOPHILS NFR BLD AUTO: 0.2 % (ref 0–2)
BILIRUB SERPL-MCNC: 0.9 MG/DL (ref 0.2–1.3)
BUN BLD-MCNC: 10 MG/DL (ref 7–21)
BUN/CREAT SERPL: 9.4 (ref 7–25)
CALCIUM SPEC-SCNC: 8.7 MG/DL (ref 8.4–10.2)
CHLORIDE SERPL-SCNC: 101 MMOL/L (ref 95–110)
CO2 SERPL-SCNC: 27 MMOL/L (ref 22–31)
CREAT BLD-MCNC: 1.06 MG/DL (ref 0.5–1)
DEPRECATED RDW RBC AUTO: 45.9 FL (ref 36.4–46.3)
EOSINOPHIL # BLD AUTO: 0.15 10*3/MM3 (ref 0–0.7)
EOSINOPHIL NFR BLD AUTO: 3.5 % (ref 0–7)
ERYTHROCYTE [DISTWIDTH] IN BLOOD BY AUTOMATED COUNT: 15 % (ref 11.5–14.5)
GFR SERPL CREATININE-BSD FRML MDRD: 53 ML/MIN/1.73 (ref 60–120)
GLOBULIN UR ELPH-MCNC: 3.1 GM/DL (ref 2.3–3.5)
GLUCOSE BLD-MCNC: 82 MG/DL (ref 60–100)
GLUCOSE BLDC GLUCOMTR-MCNC: 89 MG/DL (ref 70–130)
HCT VFR BLD AUTO: 41 % (ref 35–45)
HGB BLD-MCNC: 13.3 G/DL (ref 12–15.5)
IMM GRANULOCYTES # BLD: 0.01 10*3/MM3 (ref 0–0.02)
IMM GRANULOCYTES NFR BLD: 0.2 % (ref 0–0.5)
LYMPHOCYTES # BLD AUTO: 1.54 10*3/MM3 (ref 0.6–4.2)
LYMPHOCYTES NFR BLD AUTO: 35.9 % (ref 10–50)
MAGNESIUM SERPL-MCNC: 2 MG/DL (ref 1.6–2.3)
MCH RBC QN AUTO: 27 PG (ref 26.5–34)
MCHC RBC AUTO-ENTMCNC: 32.4 G/DL (ref 31.4–36)
MCV RBC AUTO: 83.2 FL (ref 80–98)
MONOCYTES # BLD AUTO: 0.29 10*3/MM3 (ref 0–0.9)
MONOCYTES NFR BLD AUTO: 6.8 % (ref 0–12)
NEUTROPHILS # BLD AUTO: 2.29 10*3/MM3 (ref 2–8.6)
NEUTROPHILS NFR BLD AUTO: 53.4 % (ref 37–80)
NRBC BLD MANUAL-RTO: 0 /100 WBC (ref 0–0)
PLATELET # BLD AUTO: 75 10*3/MM3 (ref 150–450)
PMV BLD AUTO: 11.4 FL (ref 8–12)
POTASSIUM BLD-SCNC: 3.6 MMOL/L (ref 3.5–5.1)
PROT SERPL-MCNC: 6.5 G/DL (ref 6.3–8.6)
RBC # BLD AUTO: 4.93 10*6/MM3 (ref 3.77–5.16)
SODIUM BLD-SCNC: 139 MMOL/L (ref 137–145)
WBC NRBC COR # BLD: 4.29 10*3/MM3 (ref 3.2–9.8)

## 2017-11-19 PROCEDURE — 85025 COMPLETE CBC W/AUTO DIFF WBC: CPT | Performed by: INTERNAL MEDICINE

## 2017-11-19 PROCEDURE — 25010000002 HEPARIN (PORCINE) PER 1000 UNITS: Performed by: INTERNAL MEDICINE

## 2017-11-19 PROCEDURE — 80053 COMPREHEN METABOLIC PANEL: CPT | Performed by: INTERNAL MEDICINE

## 2017-11-19 PROCEDURE — 82962 GLUCOSE BLOOD TEST: CPT

## 2017-11-19 PROCEDURE — 25010000002 ONDANSETRON PER 1 MG: Performed by: INTERNAL MEDICINE

## 2017-11-19 PROCEDURE — 83735 ASSAY OF MAGNESIUM: CPT | Performed by: INTERNAL MEDICINE

## 2017-11-19 PROCEDURE — 25010000002 MORPHINE PER 10 MG: Performed by: FAMILY MEDICINE

## 2017-11-19 PROCEDURE — 99232 SBSQ HOSP IP/OBS MODERATE 35: CPT | Performed by: SURGERY

## 2017-11-19 PROCEDURE — 25010000003 MORPHINE 5 MG/ML SOLUTION: Performed by: SURGERY

## 2017-11-19 RX ORDER — BISACODYL 10 MG
10 SUPPOSITORY, RECTAL RECTAL DAILY
Status: DISCONTINUED | OUTPATIENT
Start: 2017-11-19 | End: 2017-11-22 | Stop reason: HOSPADM

## 2017-11-19 RX ORDER — MORPHINE SULFATE 2 MG/ML
6 INJECTION, SOLUTION INTRAMUSCULAR; INTRAVENOUS EVERY 4 HOURS PRN
Status: DISCONTINUED | OUTPATIENT
Start: 2017-11-19 | End: 2017-11-21 | Stop reason: CLARIF

## 2017-11-19 RX ADMIN — MORPHINE SULFATE 6 MG: 2 INJECTION, SOLUTION INTRAMUSCULAR; INTRAVENOUS at 15:55

## 2017-11-19 RX ADMIN — MORPHINE SULFATE 6 MG: 2 INJECTION, SOLUTION INTRAMUSCULAR; INTRAVENOUS at 21:05

## 2017-11-19 RX ADMIN — MORPHINE SULFATE 6 MG: 5 INJECTION, SOLUTION INTRAMUSCULAR; INTRAVENOUS at 09:46

## 2017-11-19 RX ADMIN — SODIUM CHLORIDE 100 ML/HR: 9 INJECTION, SOLUTION INTRAVENOUS at 03:26

## 2017-11-19 RX ADMIN — MORPHINE SULFATE 6 MG: 5 INJECTION, SOLUTION INTRAMUSCULAR; INTRAVENOUS at 03:26

## 2017-11-19 RX ADMIN — BISACODYL 10 MG: 10 SUPPOSITORY RECTAL at 13:34

## 2017-11-19 RX ADMIN — SODIUM CHLORIDE 100 ML/HR: 9 INJECTION, SOLUTION INTRAVENOUS at 13:33

## 2017-11-19 RX ADMIN — MINERAL OIL, PETROLATUM, PHENYLEPHRINE HYDROCHLORIDE: 140; 749; 2.5 OINTMENT TOPICAL at 19:02

## 2017-11-19 RX ADMIN — HEPARIN SODIUM 5000 UNITS: 5000 INJECTION, SOLUTION INTRAVENOUS; SUBCUTANEOUS at 06:30

## 2017-11-19 RX ADMIN — ONDANSETRON 4 MG: 2 INJECTION INTRAMUSCULAR; INTRAVENOUS at 09:46

## 2017-11-19 NOTE — PLAN OF CARE
Problem: Patient Care Overview (Adult)  Goal: Plan of Care Review  Outcome: Ongoing (interventions implemented as appropriate)    11/18/17 1506 11/19/17 0346   Coping/Psychosocial Response Interventions   Plan Of Care Reviewed With --  patient;spouse   Patient Care Overview   Progress --  improving   Outcome Evaluation   Outcome Summary/Follow up Plan Patient has bowel sounds, passing gas; VSS; encouraged activity; pain controlled with morphine --        Goal: Adult Individualization and Mutuality  Outcome: Ongoing (interventions implemented as appropriate)    11/19/17 0346   Individualization   Patient Specific Interventions SSE x3 unsuccessful.        Goal: Discharge Needs Assessment  Outcome: Ongoing (interventions implemented as appropriate)    11/17/17 2128 11/17/17 2129 11/19/17 0346   Discharge Needs Assessment   Concerns To Be Addressed --  --  no discharge needs identified   Living Environment   Transportation Available --  car --    Self-Care   Equipment Currently Used at Home none --  --          Problem: Pain, Acute (Adult)  Goal: Identify Related Risk Factors and Signs and Symptoms  Outcome: Ongoing (interventions implemented as appropriate)    11/18/17 1506   Pain, Acute   Related Risk Factors (Acute Pain) disease process   Signs and Symptoms (Acute Pain) verbalization of pain descriptors       Goal: Acceptable Pain Control/Comfort Level  Outcome: Ongoing (interventions implemented as appropriate)    11/19/17 0346   Pain, Acute (Adult)   Acceptable Pain Control/Comfort Level making progress toward outcome         Problem: Fall Risk (Adult)  Goal: Identify Related Risk Factors and Signs and Symptoms  Outcome: Ongoing (interventions implemented as appropriate)    11/18/17 1506   Fall Risk   Fall Risk: Related Risk Factors sensory deficits;environment unfamiliar       Goal: Absence of Falls  Outcome: Ongoing (interventions implemented as appropriate)  No falls this shift.

## 2017-11-19 NOTE — PROGRESS NOTES
MEDICINE DAILY PROGRESS NOTE  NAME: Amira Shannon  : 1960  MRN: [id number]     LOS: 0 days     PROVIDER OF SERVICE: Josh Toth MD    Chief Complaint: Abdominal pain    Subjective:     Interval History:  History taken from: patient chart RN  . Patient improving clinically.  NGT in place.  Had small bowel movement overnight.  Nausea still present.    . Patient feeling some better today. Still has some abdominal pain, but improving.  General surgery following.    Review of Systems:   Review of Systems   Constitutional: Positive for appetite change and fatigue. Negative for activity change and fever.   HENT: Negative for ear pain and sore throat.    Eyes: Negative for pain and visual disturbance.   Respiratory: Negative for cough and shortness of breath.    Cardiovascular: Negative for chest pain and palpitations.   Gastrointestinal: Positive for abdominal distention, abdominal pain and nausea. Negative for constipation, diarrhea and vomiting.   Endocrine: Negative for cold intolerance and heat intolerance.   Genitourinary: Negative for difficulty urinating and dysuria.   Musculoskeletal: Negative for arthralgias and gait problem.   Skin: Negative for color change and rash.   Neurological: Negative for dizziness, weakness and headaches.   Hematological: Negative for adenopathy. Does not bruise/bleed easily.   Psychiatric/Behavioral: Negative for agitation, confusion and sleep disturbance.       Objective:     Vital Signs  Vitals:    17 1528 17 1941 17 0323 17 0849   BP: 114/65 125/74 127/66 124/76   BP Location: Left arm Left arm Left arm Left arm   Patient Position: Lying Lying Lying Lying   Pulse: 78 77 72 77   Resp:    Temp: 98 °F (36.7 °C) 96.7 °F (35.9 °C) 96.7 °F (35.9 °C) 98.4 °F (36.9 °C)   TempSrc: Oral Oral Oral Oral   SpO2: 94% 96% 92% 94%   Weight:       Height:           Physical Exam  Physical Exam   Constitutional: She is oriented to person,  place, and time. She appears well-developed and well-nourished. No distress.   HENT:   Head: Normocephalic and atraumatic.   Right Ear: External ear normal.   Left Ear: External ear normal.   Nose: Nose normal.   Mouth/Throat: No oropharyngeal exudate.   Eyes: Conjunctivae and EOM are normal. Pupils are equal, round, and reactive to light.   Neck: Normal range of motion. Neck supple.   Cardiovascular: Normal rate, regular rhythm and normal heart sounds.  Exam reveals no gallop and no friction rub.    No murmur heard.  Pulmonary/Chest: Effort normal and breath sounds normal. No respiratory distress. She has no wheezes. She has no rales. She exhibits no tenderness.   Abdominal: Bowel sounds are normal. She exhibits distension (Less distended today). She exhibits no mass. There is tenderness (Much less tenderness today). There is no rebound and no guarding.   Very soft abdomen today.   Musculoskeletal: Normal range of motion.   Neurological: She is alert and oriented to person, place, and time.   Skin: Skin is warm and dry. No rash noted. She is not diaphoretic. No erythema. No pallor.   Psychiatric: She has a normal mood and affect. Her behavior is normal.   Nursing note and vitals reviewed.      Medication Review    Current Facility-Administered Medications:   •  acetaminophen (TYLENOL) tablet 650 mg, 650 mg, Oral, Q4H PRN, Sherie Curran MD  •  bisacodyl (DULCOLAX) suppository 10 mg, 10 mg, Rectal, Daily PRN, Sherie Curran MD  •  chlorthalidone (HYGROTON) tablet 50 mg, 50 mg, Oral, Daily, Sherie Curran MD  •  docusate sodium (COLACE) capsule 200 mg, 200 mg, Oral, BID, Sherie Curran MD  •  DULoxetine (CYMBALTA) DR capsule 30 mg, 30 mg, Oral, Daily, Sherie Curran MD  •  famotidine (PEPCID) tablet 20 mg, 20 mg, Oral, BID, Sherie Curran MD  •  heparin (porcine) 5000 UNIT/ML injection 5,000 Units, 5,000 Units, Subcutaneous, Q8H, Sherie Curran MD,  Stopped at 11/19/17 1400  •  hydrALAZINE (APRESOLINE) injection 10 mg, 10 mg, Intravenous, Q6H PRN, Sherie Curran MD  •  influenza vac split quad (FLUZONE QUADRIVALENT) IM suspension 0.5 mL, 0.5 mL, Intramuscular, During Hospitalization, Josh Toth MD  •  magnesium hydroxide (MILK OF MAGNESIA) suspension 2400 mg/10mL 10 mL, 10 mL, Oral, Daily PRN, Sherie Curran MD  •  magnesium oxide (MAGOX) tablet 400 mg, 400 mg, Oral, Daily, Sherie Curran MD  •  meloxicam (MOBIC) tablet 15 mg, 15 mg, Oral, Daily, Sherie Curran MD  •  morphine injection 6 mg, 6 mg, Intravenous, Q4H PRN, Benjamin Troncoso MD, 6 mg at 11/19/17 0326  •  nicotine (NICODERM CQ) 21 MG/24HR patch 1 patch, 1 patch, Transdermal, Q24H, Sherie Curran MD  •  ondansetron (ZOFRAN) tablet 4 mg, 4 mg, Oral, Q6H PRN **OR** ondansetron ODT (ZOFRAN-ODT) disintegrating tablet 4 mg, 4 mg, Oral, Q6H PRN **OR** ondansetron (ZOFRAN) injection 4 mg, 4 mg, Intravenous, Q6H PRN, Sherie Curran MD, 4 mg at 11/18/17 0259  •  pantoprazole (PROTONIX) EC tablet 40 mg, 40 mg, Oral, Q AM, Sherie Curran MD  •  sodium chloride 0.9 % flush 1-10 mL, 1-10 mL, Intravenous, PRN, Sherie Curran MD  •  sodium chloride 0.9 % flush 10 mL, 10 mL, Intravenous, PRN, Jamarcus Archibald MD  •  sodium chloride 0.9 % infusion, 100 mL/hr, Intravenous, Continuous, Sherie Curran MD, Last Rate: 100 mL/hr at 11/19/17 0326, 100 mL/hr at 11/19/17 0326  •  spironolactone (ALDACTONE) tablet 25 mg, 25 mg, Oral, Daily, Sherie Curran MD     Diagnostic Data    Lab Results (last 24 hours)     Procedure Component Value Units Date/Time    CBC Auto Differential [[id number]]  (Abnormal) Collected:  11/19/17 0703    Specimen:  Blood Updated:  11/19/17 0713     WBC 4.29 10*3/mm3      RBC 4.93 10*6/mm3      Hemoglobin 13.3 g/dL      Hematocrit 41.0 %      MCV 83.2 fL      MCH 27.0 pg      MCHC 32.4 g/dL      RDW 15.0 (H) %       RDW-SD 45.9 fl      MPV 11.4 fL      Platelets 75 (L) 10*3/mm3      Neutrophil % 53.4 %      Lymphocyte % 35.9 %      Monocyte % 6.8 %      Eosinophil % 3.5 %      Basophil % 0.2 %      Immature Grans % 0.2 %      Neutrophils, Absolute 2.29 10*3/mm3      Lymphocytes, Absolute 1.54 10*3/mm3      Monocytes, Absolute 0.29 10*3/mm3      Eosinophils, Absolute 0.15 10*3/mm3      Basophils, Absolute 0.01 10*3/mm3      Immature Grans, Absolute 0.01 10*3/mm3      nRBC 0.0 /100 WBC     Comprehensive Metabolic Panel [[id number]]  (Abnormal) Collected:  11/19/17 0703    Specimen:  Blood Updated:  11/19/17 0724     Glucose 82 mg/dL      BUN 10 mg/dL      Creatinine 1.06 (H) mg/dL      Sodium 139 mmol/L      Potassium 3.6 mmol/L      Chloride 101 mmol/L      CO2 27.0 mmol/L      Calcium 8.7 mg/dL      Total Protein 6.5 g/dL      Albumin 3.40 g/dL      ALT (SGPT) 56 (H) U/L      AST (SGOT) 55 (H) U/L      Alkaline Phosphatase 81 U/L      Total Bilirubin 0.9 mg/dL      eGFR Non African Amer 53 (L) mL/min/1.73      Globulin 3.1 gm/dL      A/G Ratio 1.1 g/dL      BUN/Creatinine Ratio 9.4     Anion Gap 11.0 mmol/L     Magnesium [[id number]]  (Normal) Collected:  11/19/17 0703    Specimen:  Blood Updated:  11/19/17 0724     Magnesium 2.0 mg/dL           I reviewed the patient's new clinical results.    Assessment/Plan:     Active Hospital Problems (** Indicates Principal Problem)    Diagnosis Date Noted   • **Abdominal pain [R10.9] 11/17/2017   • Constipation [K59.00]    • Acid reflux [K21.9]    • Depression [F32.9]    • Disease related peripheral neuropathy [G62.89]    • Epigastric pain [R10.13]    • Hyperlipidemia [E78.5]    • Nausea and vomiting [R11.2]    • Restless leg syndrome [G25.81]    • Sleep apnea [G47.30]       Resolved Hospital Problems    Diagnosis Date Noted Date Resolved   No resolved problems to display.     11/19. Patient still has NG tube in place.  Abdomen is very soft today.  Still nothing by mouth.  Patient given  enemas overnight to attempt to get the stool burden eased from her large bowel.  Patient had 1 small bowel movement however still has not had a good what she calls a regular bowel movement yet.  Patient still nauseated but improved.  Clinically small bowel obstruction improving.  11/18. Initially admitted with abdominal pain.  Differential diagnosis includes small bowel obstruction, constipation, colitis.  General surgery has been consulted, NG tube is in place, patient started on intravenous fluids and nothing by mouth at present.  Abdominal x-ray for this morning showed no mechanical bowel obstruction, however moderate amount of retained feces in the colon.  Plan to give patient soapsuds enema in order to have her bowels move.  Patient does have bowel sounds this morning, flatus positive, no stool for 5 days.  Patient does have some abdominal distention as well however per the patient and nursing staff this has improved overnight with the NG tube.    11/19.  Patient still nothing by mouth however advanced ice chips.  11/18. Patient is currently nothing by mouth as such her home by mouth medications are currently on hold.  As her diet is restarted will resume patient's home medications.    Will monitor patient's hospital course and adjust treatment as hospital course dictates.    DVT prophylaxis: Heparin  Code status is Full Code    Plan for disposition:Where: home and When:  1-3 days      Time:           This document has been electronically signed by Josh Toth MD on November 19, 2017 9:04 AM

## 2017-11-19 NOTE — PROGRESS NOTES
"   LOS: 0 days   Patient Care Team:  Yobany Barr MD as PCP - General  Daren Marie DPM as Consulting Physician (Podiatry)    Chief Complaint: hospital day 3 sbo    Subjective     History of Present Illness    Subjective:  Symptoms:  Stable.  She reports cough.    Diet:  No nausea or vomiting.    Activity level: Impaired due to weakness.    Pain:  She complains of pain that is moderate.  She reports pain is improving.  Pain is well controlled.        History taken from: patient chart family    Objective     Vital Signs  Temp:  [96.7 °F (35.9 °C)-98.4 °F (36.9 °C)] 98 °F (36.7 °C)  Heart Rate:  [71-78] 71  Resp:  [18-19] 18  BP: (114-133)/(56-76) 133/67    Objective:  General Appearance:  Comfortable.    Vital signs: (most recent): Blood pressure 133/67, pulse 71, temperature 98 °F (36.7 °C), resp. rate 18, height 64\" (162.6 cm), weight 218 lb 11.2 oz (99.2 kg), SpO2 94 %, not currently breastfeeding.  Vital signs are normal.  No fever.    Output: Producing urine and no stool output.    Lungs:  Normal respiratory rate and normal effort.    Heart: Normal rate.  Regular rhythm.    Abdomen: Abdomen is soft and non-distended.  Hypoactive bowel sounds.   (Right sided).     Neurological: Patient is alert and oriented to person, place and time.              Results Review:     I reviewed the patient's new clinical results.     Results for XUAN COLEMAN (MRN [id number]) as of 11/19/2017 11:30   Ref. Range 11/19/2017 07:03   Glucose Latest Ref Range: 60 - 100 mg/dL 82   Sodium Latest Ref Range: 137 - 145 mmol/L 139   Potassium Latest Ref Range: 3.5 - 5.1 mmol/L 3.6   CO2 Latest Ref Range: 22.0 - 31.0 mmol/L 27.0   Chloride Latest Ref Range: 95 - 110 mmol/L 101   Anion Gap Latest Ref Range: 5.0 - 15.0 mmol/L 11.0   Creatinine Latest Ref Range: 0.50 - 1.00 mg/dL 1.06 (H)   BUN Latest Ref Range: 7 - 21 mg/dL 10   BUN/Creatinine Ratio Latest Ref Range: 7.0 - 25.0  9.4   Calcium Latest Ref Range: 8.4 - 10.2 mg/dL 8.7 "   eGFR Non  Amer Latest Ref Range: >60 mL/min/1.73 53 (L)   Alkaline Phosphatase Latest Ref Range: 38 - 126 U/L 81   Total Protein Latest Ref Range: 6.3 - 8.6 g/dL 6.5   ALT (SGPT) Latest Ref Range: 9 - 52 U/L 56 (H)   AST (SGOT) Latest Ref Range: 14 - 36 U/L 55 (H)   Total Bilirubin Latest Ref Range: 0.2 - 1.3 mg/dL 0.9   Albumin Latest Ref Range: 3.40 - 4.80 g/dL 3.40   Globulin Latest Ref Range: 2.3 - 3.5 gm/dL 3.1   A/G Ratio Latest Ref Range: 1.1 - 1.8 g/dL 1.1   Magnesium Latest Ref Range: 1.6 - 2.3 mg/dL 2.0   WBC Latest Ref Range: 3.20 - 9.80 10*3/mm3 4.29   RBC Latest Ref Range: 3.77 - 5.16 10*6/mm3 4.93   Hemoglobin Latest Ref Range: 12.0 - 15.5 g/dL 13.3   Hematocrit Latest Ref Range: 35.0 - 45.0 % 41.0   RDW Latest Ref Range: 11.5 - 14.5 % 15.0 (H)   MCV Latest Ref Range: 80.0 - 98.0 fL 83.2   MCH Latest Ref Range: 26.5 - 34.0 pg 27.0   MCHC Latest Ref Range: 31.4 - 36.0 g/dL 32.4   MPV Latest Ref Range: 8.0 - 12.0 fL 11.4   Platelets Latest Ref Range: 150 - 450 10*3/mm3 75 (L)   RDW-SD Latest Ref Range: 36.4 - 46.3 fl 45.9   Neutrophil % Latest Ref Range: 37.0 - 80.0 % 53.4   Lymphocyte % Latest Ref Range: 10.0 - 50.0 % 35.9   Monocyte % Latest Ref Range: 0.0 - 12.0 % 6.8   Eosinophil % Latest Ref Range: 0.0 - 7.0 % 3.5   Basophil % Latest Ref Range: 0.0 - 2.0 % 0.2   Immature Grans % Latest Ref Range: 0.0 - 0.5 % 0.2   Neutrophils, Absolute Latest Ref Range: 2.00 - 8.60 10*3/mm3 2.29   Lymphocytes, Absolute Latest Ref Range: 0.60 - 4.20 10*3/mm3 1.54   Monocytes, Absolute Latest Ref Range: 0.00 - 0.90 10*3/mm3 0.29   Eosinophils, Absolute Latest Ref Range: 0.00 - 0.70 10*3/mm3 0.15   Basophils, Absolute Latest Ref Range: 0.00 - 0.20 10*3/mm3 0.01   Immature Grans, Absolute Latest Ref Range: 0.00 - 0.02 10*3/mm3 0.01   nRBC Latest Ref Range: 0.0 - 0.0 /100 WBC 0.0       Medication Review: done    Assessment/Plan     Principal Problem:    Abdominal pain  Active Problems:    Constipation    Acid  reflux    Depression    Disease related peripheral neuropathy    Epigastric pain    Hyperlipidemia    Nausea and vomiting    Restless leg syndrome    Sleep apnea      Assessment:    Condition: In stable condition.  Improving.       Plan:   Sips/ice chips.  General Orders/Medications Plan: continue abx.   (1. dulcolax   2. Continue iv fluids).       Benjamin Troncoso MD  11/19/17  11:33 AM    Time: 10 mins

## 2017-11-19 NOTE — PAYOR COMM NOTE
"  Clinicals for review. Lea Smith RN   Phone: [phone] Fax: [phone]        Amira Coleman (57 y.o. Female)     Date of Birth Social Security Number Address Home Phone MRN    1960  [id number] [facility] [address] [phone] [id number]    Judaism Marital Status          Yarsani        Admission Date Admission Type Admitting Provider Attending Provider Department, Room/Bed    11/17/17 Emergency Josh Toth MD Gibson, Bryce, MD [facility] 3 EAST, 372/1    Discharge Date Discharge Disposition Discharge Destination                      Attending Provider: Josh Toth MD     Allergies:  Other, Sulfa Antibiotics    Isolation:  None   Infection:  None   Code Status:  FULL    Ht:  64\" (162.6 cm)   Wt:  218 lb 11.2 oz (99.2 kg)    Admission Cmt:  None   Principal Problem:  Abdominal pain [R10.9]                 Active Insurance as of 11/17/2017     Primary Coverage     Payor Plan Insurance Group Employer/Plan Group    HUMANA MEDICAID HU[employer]NA CARESOURCE [id number]     Payor Plan Address Payor Plan Phone Number Effective From Effective To    PO  [phone] 1/1/2014     [place] [address]       Subscriber Name Subscriber Birth Date Member ID       AMIRA COLEMAN 1960 [id number]                 Emergency Contacts      (Rel.) Home Phone Work Phone Mobile Phone    Ross Cloeman (Spouse) [phone]            Insurance Information                HUMANA MEDICAID/HUMANA CARESOURCE Phone: [phone]    Subscriber: Coleman Amira Starla Subscriber#: [id number]    Group#: [id number] Precert#:              History & Physical      Harshul Frank Curran MD at 11/17/2017 10:24 PM                [facility] Medicine Services  INPATIENT HISTORY AND PHYSICAL       Patient Care Team:  Yobany Barr MD as PCP - General  Daren Marie DPM as Consulting Physician " (Podiatry)    Chief complaint   Chief Complaint   Patient presents with   • Abdominal Pain       Subjective     Patient is a 57 y.o. female presents with Complaint of having abdominal pain.  Patient states she is having constipation on and off for past few months however last bowel movement was 3 days ago.  Patient states she's been having abdominal pain on and off however had significantly worsened this evening which was causing her extreme discomfort and she decided to come to emergency further evaluation.  Patient denies any fever or chills associated with it.  No nausea or vomiting have been reported however persistency of time on pain was reported.  No weight loss we can have been reported.  Patient states she has been feeling well prior to this.  Patient states she has small bowel torsion and passed fresh required surgical intervention.    Review of Systems   Review of Systems   Constitutional: Positive for appetite change. Negative for chills, diaphoresis and fever.   HENT: Negative for congestion, rhinorrhea, sore throat and trouble swallowing.    Eyes: Negative for visual disturbance.   Respiratory: Negative for cough, chest tightness, shortness of breath and wheezing.    Cardiovascular: Negative for chest pain, palpitations and leg swelling.   Gastrointestinal: Positive for abdominal distention and abdominal pain. Negative for blood in stool, diarrhea, nausea and vomiting.   Endocrine: Negative for cold intolerance and heat intolerance.   Genitourinary: Negative for decreased urine volume and difficulty urinating.   Musculoskeletal: Negative for back pain, gait problem and neck pain.   Skin: Negative for rash.   Neurological: Negative for dizziness, syncope, weakness, light-headedness, numbness and headaches.   Psychiatric/Behavioral: The patient is not nervous/anxious.        History  Past Medical History:   Diagnosis Date   • Acid reflux    • Altered bowel function    • Arthropathy of lumbar facet joint   "  • Bleeding disorder    • Constipation    • Corns and callus    • Depression    • Disease related peripheral neuropathy    • Epigastric pain    • Fatty liver    • Hammer toe    • Headache    • Hiatal hernia    • Hyperlipidemia    • Knee pain    • Localized, primary osteoarthritis of the ankle and foot     Localized, primary osteoarthritis of the ankle and/or foot   • Mendoza's metatarsalgia     Mendoza's metatarsalgia - 2nd interspace on right   • Nausea and vomiting    • Neuralgia and neuritis     Neuralgia, neuritis, and radiculitis, unspecified   • Neuropathy    • Obstructive sleep apnea     Obstructive sleep apnea (adult) (pediatric)    • CHAI on CPAP     \"C-Pap at night  (unconfirmed)\"   • Osteoarthritis    • Pain in foot     Pain in unspecified foot - sees a podiatrist   • Pain in joint, ankle and foot     Joint pain in ankle and foot      • Pain radiating to back     Pain radiating to lumbar region of back   • Plantar fasciitis    • Restless leg syndrome    • Secondary hypertension     Secondary hypertension, unspecified   • Sinusitis    • Sleep apnea    • Tongue anomaly     lesion     Past Surgical History:   Procedure Laterality Date   •  SECTION     • CHOLECYSTECTOMY     • COLONOSCOPY  2013   • DIRECT LARYNGOSCOPY, ESOPHAGOSCOPY, BRONCHOSCOPY N/A 2017    Procedure: DIRECT LARYNGOSCOPY AND;  Surgeon: Live Bolton MD;  Location: [facility];  Service:    • ENDOSCOPY  2013    Colon endoscopy 11368 (1) - Internal & external hemorrhoids found. Stool found.   • ENDOSCOPY  2013    EGD w/ tube 61171 (1) - Normal esophagus. Gastritis in stomach. Biopsy taken. Normal dudoenum. Biopsy taken.   • FOOT SURGERY  2013    Foot/toes surgery procedure (1) - Arthroplasty of toes 4 and 5 of right foot.   • HERNIA REPAIR     • HERNIA REPAIR      hital   • HYSTERECTOMY     • LIVER BIOPSY     • NERVE BLOCK  2016    Injection for nerve block (1) - Lumbar medial branch block.   • OTHER " SURGICAL HISTORY  12/03/2012    Inj(s) Tend-Sheath, Ligament, Single 20550 (1) - [facility])    • OTHER SURGICAL HISTORY  06/24/2013    Small Joint Injection/Aspiration 20600 (2) - [facility]ICK[facility] ([facility])    • OTHER SURGICAL HISTORY  2011    bowel obstruction x2   • OTHER SURGICAL HISTORY      gland removed from neck   • SUBLINGUAL SALIVARY CYST EXCISION N/A 8/1/2017    Procedure: EXCISION OF LEFT  TONGUE LESION WITH CLOSURE;  Surgeon: Live Bolton MD;  Location: [facility] OR;  Service:    • TUBAL ABDOMINAL LIGATION     • UPPER GASTROINTESTINAL ENDOSCOPY  07/01/2013     Family History   Problem Relation Age of Onset   • Cancer Other    • Diabetes Other    • Heart disease Other    • Hypertension Mother    • Cancer Mother    • Diabetes Mother    • Hypertension Father    • Heart attack Father    • Cancer Father    • Heart disease Father    • Thyroid disease Maternal Aunt      Social History   Substance Use Topics   • Smoking status: Former Smoker     Types: Cigarettes   • Smokeless tobacco: Never Used   • Alcohol use No     Prescriptions Prior to Admission   Medication Sig Dispense Refill Last Dose   • cetirizine (zyrTEC) 10 MG tablet Take 1 tablet by mouth Daily As Needed for Allergies. 30 tablet 11    • chlorthalidone (HYGROTEN) 50 MG tablet Take 1 tablet by mouth Daily. 30 tablet 3    • cyclobenzaprine (FLEXERIL) 10 MG tablet Take 10 mg by mouth 2 (Two) Times a Day As Needed for Muscle Spasms.      • DULoxetine (CYMBALTA) 30 MG capsule Take 30 mg by mouth Daily.      • gabapentin (NEURONTIN) 800 MG tablet Take 800 mg by mouth 4 (Four) Times a Day.      • linaclotide (LINZESS) 145 MCG capsule capsule Take 145 mcg by mouth 2 (Two) Times a Day.      • magnesium oxide (MAGOX) 400 (241.3 MG) MG tablet tablet Take 400 mg by mouth Daily.      • meloxicam (MOBIC) 15 MG tablet Take 1 tablet by mouth Daily. Take once daily. 30 tablet 0    • mupirocin (BACTROBAN) 2 % ointment Apply  topically 3 (Three)  Times a Day. (Patient taking differently: Apply 1 application topically 3 (Three) Times a Day.) 1 g 2 Taking   • nystatin (MYCOSTATIN) 251931 UNIT/GM powder Apply 1 application topically As Needed.   Taking   • omeprazole (priLOSEC) 20 MG capsule Take 20 mg by mouth Daily.      • ondansetron (ZOFRAN) 4 MG tablet Take 4 mg by mouth Every 8 (Eight) Hours As Needed.      • oxyCODONE-acetaminophen (PERCOCET) 7.5-325 MG per tablet Take 1 tablet by mouth Every 8 (Eight) Hours As Needed.   Taking   • oxyCODONE-acetaminophen (PERCOCET) 7.5-325 MG per tablet Take 1 tablet by mouth 3 (Three) Times a Day for 30 days. 90 tablet 0 Taking   • oxyCODONE-acetaminophen (PERCOCET) 7.5-325 MG per tablet Take 1 tablet by mouth 3 (Three) Times a Day for 30 days. 90 tablet 0 Taking   • spironolactone (ALDACTONE) 25 MG tablet Take 1 tablet by mouth Daily. 30 tablet 3      Allergies:  Other and Sulfa antibiotics  Prior to Admission medications    Medication Sig Start Date End Date Taking? Authorizing Provider   cetirizine (zyrTEC) 10 MG tablet Take 1 tablet by mouth Daily As Needed for Allergies. 8/31/17  Yes Live Bolton MD   chlorthalidone (HYGROTEN) 50 MG tablet Take 1 tablet by mouth Daily. 11/1/17  Yes LIYAH Melchor   cyclobenzaprine (FLEXERIL) 10 MG tablet Take 10 mg by mouth 2 (Two) Times a Day As Needed for Muscle Spasms.   Yes Historical Provider, MD   DULoxetine (CYMBALTA) 30 MG capsule Take 30 mg by mouth Daily.   Yes Historical Provider, MD   gabapentin (NEURONTIN) 800 MG tablet Take 800 mg by mouth 4 (Four) Times a Day.   Yes Historical Provider, MD   linaclotide (LINZESS) 145 MCG capsule capsule Take 145 mcg by mouth 2 (Two) Times a Day.   Yes Historical Provider, MD   magnesium oxide (MAGOX) 400 (241.3 MG) MG tablet tablet Take 400 mg by mouth Daily.   Yes Historical Provider, MD   meloxicam (MOBIC) 15 MG tablet Take 1 tablet by mouth Daily. Take once daily. 11/13/17  Yes Daren Marie DPM   mupirocin  (BACTROBAN) 2 % ointment Apply  topically 3 (Three) Times a Day.  Patient taking differently: Apply 1 application topically 3 (Three) Times a Day. 10/18/17  Yes Live Bolton MD   nystatin (MYCOSTATIN) 039109 UNIT/GM powder Apply 1 application topically As Needed. 10/11/17  Yes Historical Provider, MD   omeprazole (priLOSEC) 20 MG capsule Take 20 mg by mouth Daily.   Yes Historical Provider, MD   ondansetron (ZOFRAN) 4 MG tablet Take 4 mg by mouth Every 8 (Eight) Hours As Needed. 9/25/17  Yes Historical Provider, MD   oxyCODONE-acetaminophen (PERCOCET) 7.5-325 MG per tablet Take 1 tablet by mouth Every 8 (Eight) Hours As Needed. 10/17/17  Yes Historical Provider, MD   oxyCODONE-acetaminophen (PERCOCET) 7.5-325 MG per tablet Take 1 tablet by mouth 3 (Three) Times a Day for 30 days. 11/9/17 12/9/17 Yes Hua Westfall MD   oxyCODONE-acetaminophen (PERCOCET) 7.5-325 MG per tablet Take 1 tablet by mouth 3 (Three) Times a Day for 30 days. 11/9/17 12/9/17 Yes Hua Westfall MD   spironolactone (ALDACTONE) 25 MG tablet Take 1 tablet by mouth Daily. 11/1/17  Yes LIYAH Melchor       Objective     Vital Signs    Temp:  [98.1 °F (36.7 °C)-98.2 °F (36.8 °C)] 98.2 °F (36.8 °C)  Heart Rate:  [] 83  Resp:  [18-20] 18  BP: (132-157)/(88-95) 157/88    Physical Exam:      Physical Exam   Constitutional: She is oriented to person, place, and time. She appears well-developed and well-nourished.   HENT:   Head: Normocephalic and atraumatic.   Nose: Nose normal.   Eyes: Conjunctivae and EOM are normal. Pupils are equal, round, and reactive to light.   Neck: Normal range of motion. Neck supple. No JVD present. No tracheal deviation present. No thyromegaly present.   Cardiovascular: Normal rate, regular rhythm, normal heart sounds and intact distal pulses.    Pulmonary/Chest: Effort normal and breath sounds normal. No respiratory distress. She has no wheezes. She has no rales. She exhibits no tenderness.   Abdominal:  Soft. Bowel sounds are normal. She exhibits distension. She exhibits no mass. There is tenderness. There is guarding. There is no rebound.   Musculoskeletal: Normal range of motion. She exhibits no edema.   Lymphadenopathy:     She has no cervical adenopathy.   Neurological: She is alert and oriented to person, place, and time. She has normal reflexes. No cranial nerve deficit.   Skin: Skin is warm and dry.   Intact   Psychiatric: She has a normal mood and affect.   Nursing note and vitals reviewed.      Results Review:       Results from last 7 days  Lab Units 11/17/17  1740 11/14/17  0946   SODIUM mmol/L 138 139   POTASSIUM mmol/L 3.9 3.5   CHLORIDE mmol/L 96 101   CO2 mmol/L 27.0 27.0   BUN mg/dL 16 13   CREATININE mg/dL 1.09* 1.07*   GLUCOSE mg/dL 124* 124*   CALCIUM mg/dL 10.5* 9.5   BILIRUBIN mg/dL 1.1  --    ALK PHOS U/L 110  --    ALT (SGPT) U/L 76*  --    AST (SGOT) U/L 80*  --                Results from last 7 days  Lab Units 11/17/17  1713 11/14/17  0946   WBC 10*3/mm3 11.03* 5.49   HEMOGLOBIN g/dL 15.7* 15.3   HEMATOCRIT % 45.1* 45.4*   PLATELETS 10*3/mm3 106*  --            Imaging Results (last 7 days)     Procedure Component Value Units Date/Time    CT Abdomen Pelvis With Contrast [[id number]] Collected:  11/17/17 1857     Updated:  11/17/17 1936    Narrative:         EXAMINATION:  Computed Tomography           REGION:    Abdomen / Pelvis                     INDICATION:  Abdominal pain  HISTORY:    Bowel obstruction      RUTH. IMAGING:    none            TECHNIQUE:      - reconstructions:    axial, coronal, sagittal         - contrast:      oral:  No ;   intravenous: Isovue 300, 100 mL  This exam was performed according to the departmental  dose-optimization program which includes automated exposure  control, adjustment of the mA and/or kV according to patient size  and/or use of iterative reconstruction technique.           COMMENTS:            - - - CT ABDOMEN - - -          THORAX (INFERIOR):       - LUNG BASES:  clear      - PLEURA:    no fluid or mass      - HEART:    normal size, no pericardial fluid     - MISC:      n/a          ABDOMEN:     - LIVER:    normal size / contour, no ductal dilatation , no  focal lesion   - GB:      Surgically absent    - CBD:      grossly negative   - SPLEEN:    normal size and contour   - PANCREAS:    normal in size, contour, no focal mass    - VISCERA:    - There are multiple fluid-filled small bowel loops at the upper  limits of normal/mildly enlarged in the proximal most aspect. In  the mid small bowel is a short segment of enhancing small bowel  wall of normal caliber (coronal 12 - 18/67, axial 61/111).  - Additionally, in the region of the terminal ileum there is a  suggestion of mild degree of edema associated with the wall of  the terminal ileum, and the diminished luminal caliber. No gross  evidence of serosal inflammatory changes or adjacent mesenteric  changes. (Coronal 23/67, 27/67).     - MESENTERY:  no mesenteric mass   - CAVITY:    no free abdominal fluid, no free intraperitoneal  air   - BODY WALL:  wnl   - OSSEOUS:    grossly negative for age   - MISC:                                      RETROPERITONEUM:   - KIDNEYS:    normal size / contour, no collecting system  dilation        no evidence of an enhancing mass   - URETERS:    normal course, caliber   - ADRENALS:    normal size, contour   - MISC:      no sig retroperitoneal adenopathy or mass   - VASCULAR:    aorta / iliacs: wnl for age     - - - CT PELVIS - - -      - VISCERA:    As above - MESENTERY:  no mass   - VASCULAR:    wnl for age   - CAVITY:    no free fluid / air   - BLADDER:    unremarkable   - OSSEOUS:    wnl    - MISC:   - UTERUS/OVARY:  Status post hysterectomy   .       Impression:        CONCLUSION:  1. The proximal small bowel is fluid-filled with a caliber at the  upper limits of normal/mildly enlarged.  2. There is a zone of soft transition in the mid abdomen in a  region of normal  caliber bowel loop with a short segment of  focally enhancing wall.  3. Indeterminate findings in the terminal ileum suggesting mild  bowel wall edematous changes with luminal narrowing.    The constellation of findings is suspicious for the presence of  multifocal Crohn's disease.    Electronically signed by:  JONO Cobb MD  11/17/2017 7:35  PM CST Workstation: [employer]-PRIMARY          Assessment/Plan     Active Problems:    Abdominal pain    Constipation    Acid reflux    Depression    Disease related peripheral neuropathy    Epigastric pain    Hyperlipidemia    Nausea and vomiting    Restless leg syndrome    Sleep apnea      -We'll keep NG tube.  -We'll follow surgical recommendation.  -We'll keep nothing by mouth at this point in time.  -We'll place patient on DVT and GI prophylaxis.  -We'll continue monitoring patient hospital setting and treat patient as course dictates.    I discussed the patients findings and my recommendations with patient and nursing staff.         This document has been electronically signed by Sherie Curran MD on November 17, 2017 10:24 PM        Total Time Spent: 45 mins    EMR Dragon/Transcription disclaimer:   Dictated utilizing Dragon dictation.            Electronically signed by Sherie Curran MD at 11/18/2017 12:13 AM           Emergency Department Notes      Rebecca Mercedes RN at 11/17/2017  4:36 PM          Patient to ED c/o nausea and vomiting. Patient states that she was constipated last week.     Electronically signed by Rebecca Mercedes RN at 11/17/2017  4:37 PM      Jamarcus Archibald MD at 11/17/2017  6:40 PM          Subjective   HPI Comments: Patient presents with abdominal distention and abdominal pain today.  Patient has significant surgical history and history of bowel obstruction.  Patient's last obstruction was approximately one year ago.  Patient has surgery at that time with complicated by infection and wound VAC placement.  Patient has not had any  recurrent obstruction since that time.  Patient notes intractable vomiting unable to tolerate by mouth at this time.  Patient notes very little gas passage nor moving her bowels.  Patient notes several hard stools in the last week.  She denies any black tarry stools.  There's been no history of fevers chills.  Has been no chest pain shortness of breath.  Symptoms in general feel like her prior obstructions.      History provided by:  Patient   used: No        Review of Systems   Constitutional: Negative.  Negative for appetite change, chills and fever.   HENT: Negative.  Negative for congestion.    Eyes: Negative.  Negative for photophobia and visual disturbance.   Respiratory: Negative.  Negative for cough, chest tightness and shortness of breath.    Cardiovascular: Negative.  Negative for chest pain and palpitations.   Gastrointestinal: Positive for abdominal pain, nausea and vomiting. Negative for constipation and diarrhea.   Endocrine: Negative.    Genitourinary: Negative.  Negative for decreased urine volume, dysuria, flank pain and hematuria.   Musculoskeletal: Negative.  Negative for arthralgias, back pain, myalgias, neck pain and neck stiffness.   Skin: Negative.  Negative for pallor.   Neurological: Negative.  Negative for dizziness, syncope, weakness, light-headedness, numbness and headaches.   Psychiatric/Behavioral: Negative.  Negative for confusion and suicidal ideas. The patient is not nervous/anxious.    All other systems reviewed and are negative.      Past Medical History:   Diagnosis Date   • Acid reflux    • Altered bowel function    • Arthropathy of lumbar facet joint    • Bleeding disorder    • Constipation    • Corns and callus    • Depression    • Disease related peripheral neuropathy    • Epigastric pain    • Fatty liver    • Hammer toe    • Headache    • Hiatal hernia    • Hyperlipidemia    • Knee pain    • Localized, primary osteoarthritis of the ankle and foot      "Localized, primary osteoarthritis of the ankle and/or foot   • Mendoza's metatarsalgia     Mendoza's metatarsalgia - 2nd interspace on right   • Nausea and vomiting    • Neuralgia and neuritis     Neuralgia, neuritis, and radiculitis, unspecified   • Neuropathy    • Obstructive sleep apnea     Obstructive sleep apnea (adult) (pediatric)    • CHAI on CPAP     \"C-Pap at night  (unconfirmed)\"   • Osteoarthritis    • Pain in foot     Pain in unspecified foot - sees a podiatrist   • Pain in joint, ankle and foot     Joint pain in ankle and foot      • Pain radiating to back     Pain radiating to lumbar region of back   • Plantar fasciitis    • Restless leg syndrome    • Secondary hypertension     Secondary hypertension, unspecified   • Sinusitis    • Sleep apnea    • Tongue anomaly     lesion       Allergies   Allergen Reactions   • Other      Pt states that taking steroids either in pill or injection form make her have blisters in her mouth and she feels like she is on fire on the inside/ has hx of c diff and possible MRSA     • Sulfa Antibiotics Rash     Sulfa (Sulfonamide Antibiotics)       Past Surgical History:   Procedure Laterality Date   •  SECTION     • CHOLECYSTECTOMY     • COLONOSCOPY  2013   • DIRECT LARYNGOSCOPY, ESOPHAGOSCOPY, BRONCHOSCOPY N/A 2017    Procedure: DIRECT LARYNGOSCOPY AND;  Surgeon: Live Bolton MD;  Location: [facility] OR;  Service:    • ENDOSCOPY  2013    Colon endoscopy 14563 (1) - Internal & external hemorrhoids found. Stool found.   • ENDOSCOPY  2013    EGD w/ tube 69610 (1) - Normal esophagus. Gastritis in stomach. Biopsy taken. Normal dudoenum. Biopsy taken.   • FOOT SURGERY  2013    Foot/toes surgery procedure (1) - Arthroplasty of toes 4 and 5 of right foot.   • HERNIA REPAIR     • HERNIA REPAIR      hital   • HYSTERECTOMY     • LIVER BIOPSY     • NERVE BLOCK  2016    Injection for nerve block (1) - Lumbar medial branch block.   • OTHER SURGICAL " HISTORY  12/03/2012    Inj(s) Tend-Sheath, Ligament, Single 20550 (1) - [facility]TER NICKERSON ([facility])    • OTHER SURGICAL HISTORY  06/24/2013    Small Joint Injection/Aspiration 20600 (2) - [facility]TER NICKERSON ([facility])    • OTHER SURGICAL HISTORY  2011    bowel obstruction x2   • OTHER SURGICAL HISTORY      gland removed from neck   • SUBLINGUAL SALIVARY CYST EXCISION N/A 8/1/2017    Procedure: EXCISION OF LEFT  TONGUE LESION WITH CLOSURE;  Surgeon: Live Bolton MD;  Location: [facility] OR;  Service:    • TUBAL ABDOMINAL LIGATION     • UPPER GASTROINTESTINAL ENDOSCOPY  07/01/2013       Family History   Problem Relation Age of Onset   • Cancer Other    • Diabetes Other    • Heart disease Other    • Hypertension Mother    • Cancer Mother    • Diabetes Mother    • Hypertension Father    • Heart attack Father    • Cancer Father    • Heart disease Father    • Thyroid disease Maternal Aunt        Social History     Social History   • Marital status:      Spouse name: N/A   • Number of children: N/A   • Years of education: N/A     Social History Main Topics   • Smoking status: Former Smoker     Types: Cigarettes   • Smokeless tobacco: Never Used   • Alcohol use No   • Drug use: No   • Sexual activity: Defer      Comment: Marital status:      Other Topics Concern   • None     Social History Narrative           Objective   Physical Exam   Constitutional: She is oriented to person, place, and time. She appears well-developed and well-nourished. No distress.   HENT:   Head: Normocephalic and atraumatic.   Nose: Nose normal.   Mouth/Throat: Oropharynx is clear and moist.   Eyes: Conjunctivae and EOM are normal. No scleral icterus.   Neck: Normal range of motion. Neck supple. No JVD present.   Cardiovascular: Regular rhythm, normal heart sounds and intact distal pulses.  Exam reveals no gallop and no friction rub.    No murmur heard.  Tachycardia   Pulmonary/Chest: Effort normal. No respiratory distress. She has no  wheezes. She has no rales. She exhibits no tenderness.   Abdominal: She exhibits distension. She exhibits no mass. There is tenderness. There is no rebound and no guarding.   Patient with significant distention and dullness to percussion.  No focal peritoneal signs are noted.  Patient with negative heel tap and psoas signs.   Musculoskeletal: Normal range of motion. She exhibits no edema, tenderness or deformity.   Lymphadenopathy:     She has no cervical adenopathy.   Neurological: She is alert and oriented to person, place, and time. No cranial nerve deficit. She exhibits normal muscle tone.   Skin: Skin is warm and dry. No rash noted. She is not diaphoretic. No erythema. No pallor.   Psychiatric: She has a normal mood and affect. Her behavior is normal. Judgment and thought content normal.   Nursing note and vitals reviewed.      Procedures        ED Course  ED Course   Comment By Time   D/W  and  and patient is being admitted Bimal Pederson MD 11/17 2017      Labs Reviewed   COMPREHENSIVE METABOLIC PANEL - Abnormal; Notable for the following:        Result Value    Glucose 124 (*)     Creatinine 1.09 (*)     Calcium 10.5 (*)     ALT (SGPT) 76 (*)     AST (SGOT) 80 (*)     All other components within normal limits   CBC WITH AUTO DIFFERENTIAL - Abnormal; Notable for the following:     WBC 11.03 (*)     RBC 5.66 (*)     Hemoglobin 15.7 (*)     Hematocrit 45.1 (*)     MCV 79.7 (*)     RDW 14.7 (*)     Platelets 106 (*)     Neutrophil % 86.8 (*)     Lymphocyte % 7.6 (*)     Neutrophils, Absolute 9.57 (*)     All other components within normal limits   LIPASE - Normal   RAINBOW DRAW    Narrative:     The following orders were created for panel order [place] Draw.  Procedure                               Abnormality         Status                     ---------                               -----------         ------                     Light Blue Top[[id number]]                                   Final  result               Green Top (Gel)[[id number]]                                  Final result               Lavender Top[[id number]]                                     Final result               Gold Top - SST[[id number]]                                   Final result                 Please view results for these tests on the individual orders.   SCAN SLIDE   URINALYSIS W/ CULTURE IF INDICATED   CBC AND DIFFERENTIAL    Narrative:     The following orders were created for panel order CBC & Differential.  Procedure                               Abnormality         Status                     ---------                               -----------         ------                     Scan Slide[[id number]]                                       Final result               CBC Auto Differential[[id number]]        Abnormal            Final result                 Please view results for these tests on the individual orders.   LIGHT BLUE TOP   GREEN TOP   LAVENDER TOP   GOLD TOP - SST       CT Abdomen Pelvis With Contrast    (Results Pending)     Patient with signs and symptoms consistent with obstruction.  Awaiting CT scan.  We will proceed with NG tube should be it appear that the patient has obstruction.  To Dr. Pederson awaiting CT scan.            MDM    Final diagnoses:   Abdominal pain, unspecified abdominal location   Intestinal obstruction, unspecified cause, unspecified whether partial or complete   Intractable vomiting with nausea, unspecified vomiting type            Jamarcus Archibald MD  17 [phone]       Electronically signed by Jamarcus Archibald MD at 2017  7:08 AM           Physician Progress Notes (last 72 hours) (Notes from 2017  2:46 PM through 2017  2:46 PM)      Josh Toth MD at 2017  8:42 AM  Version 1 of          MEDICINE DAILY PROGRESS NOTE  NAME: Amira Shannon  : 1960  MRN: [id number]     LOS: 0 days     PROVIDER OF SERVICE: Josh Toth MD    Chief Complaint: Abdominal  "pain    Subjective:     Interval History:  History taken from: patient chart RN  11/18. Patient feeling some better today. Still has some abdominal pain, but improving.  General surgery following.    Review of Systems:   Review of Systems   Constitutional: Positive for appetite change and fatigue. Negative for activity change and fever.   HENT: Negative for ear pain and sore throat.    Eyes: Negative for pain and visual disturbance.   Respiratory: Negative for cough and shortness of breath.    Cardiovascular: Negative for chest pain and palpitations.   Gastrointestinal: Positive for abdominal distention, abdominal pain and nausea. Negative for constipation, diarrhea and vomiting.   Endocrine: Negative for cold intolerance and heat intolerance.   Genitourinary: Negative for difficulty urinating and dysuria.   Musculoskeletal: Negative for arthralgias and gait problem.   Skin: Negative for color change and rash.   Neurological: Negative for dizziness, weakness and headaches.   Hematological: Negative for adenopathy. Does not bruise/bleed easily.   Psychiatric/Behavioral: Negative for agitation, confusion and sleep disturbance.       Objective:     Vital Signs  Vitals:    11/17/17 2120 11/18/17 0006 11/18/17 0357 11/18/17 0724   BP: 157/88  119/63    BP Location: Left arm  Left arm    Patient Position: Sitting  Lying    Pulse: 83 78 77 83   Resp: 18  18    Temp: 98.2 °F (36.8 °C)  96.4 °F (35.8 °C)    TempSrc: Temporal Artery   Oral    SpO2: 96%  91%    Weight: 218 lb 11.2 oz (99.2 kg)      Height: 64\" (162.6 cm)          Physical Exam  Physical Exam   Constitutional: She is oriented to person, place, and time. She appears well-developed and well-nourished. No distress.   HENT:   Head: Normocephalic and atraumatic.   Right Ear: External ear normal.   Left Ear: External ear normal.   Nose: Nose normal.   Mouth/Throat: No oropharyngeal exudate.   Eyes: Conjunctivae and EOM are normal. Pupils are equal, round, and " reactive to light.   Neck: Normal range of motion. Neck supple.   Cardiovascular: Normal rate, regular rhythm and normal heart sounds.  Exam reveals no gallop and no friction rub.    No murmur heard.  Pulmonary/Chest: Effort normal and breath sounds normal. No respiratory distress. She has no wheezes. She has no rales. She exhibits no tenderness.   Abdominal: Soft. Bowel sounds are normal. She exhibits distension. She exhibits no mass. There is tenderness. There is no rebound and no guarding.   Musculoskeletal: Normal range of motion.   Neurological: She is alert and oriented to person, place, and time.   Skin: Skin is warm and dry. No rash noted. She is not diaphoretic. No erythema. No pallor.   Psychiatric: She has a normal mood and affect. Her behavior is normal.   Nursing note and vitals reviewed.      Medication Review    Current Facility-Administered Medications:   •  acetaminophen (TYLENOL) tablet 650 mg, 650 mg, Oral, Q4H PRN, Sherie Curran MD  •  bisacodyl (DULCOLAX) suppository 10 mg, 10 mg, Rectal, Daily PRN, Sherie Curran MD  •  chlorthalidone (HYGROTON) tablet 50 mg, 50 mg, Oral, Daily, Sherie Curran MD  •  docusate sodium (COLACE) capsule 200 mg, 200 mg, Oral, BID, Sherie Curran MD  •  DULoxetine (CYMBALTA) DR capsule 30 mg, 30 mg, Oral, Daily, Sherie Curran MD  •  famotidine (PEPCID) tablet 20 mg, 20 mg, Oral, BID, Sherie Curran MD  •  heparin (porcine) 5000 UNIT/ML injection 5,000 Units, 5,000 Units, Subcutaneous, Q8H, Sherie Curran MD, 5,000 Units at 11/18/17 0651  •  hydrALAZINE (APRESOLINE) injection 10 mg, 10 mg, Intravenous, Q6H PRN, Sherie Curran MD  •  magnesium hydroxide (MILK OF MAGNESIA) suspension 2400 mg/10mL 10 mL, 10 mL, Oral, Daily PRN, Sherie Curran MD  •  magnesium oxide (MAGOX) tablet 400 mg, 400 mg, Oral, Daily, Sherie Curran MD  •  meloxicam (MOBIC) tablet 15 mg, 15 mg, Oral, Daily,  Sherie Curran MD  •  morphine injection 6 mg, 6 mg, Intravenous, Q4H PRN, Benjamin Troncoso MD  •  nicotine (NICODERM CQ) 21 MG/24HR patch 1 patch, 1 patch, Transdermal, Q24H, Sherie Curran MD  •  ondansetron (ZOFRAN) tablet 4 mg, 4 mg, Oral, Q6H PRN **OR** ondansetron ODT (ZOFRAN-ODT) disintegrating tablet 4 mg, 4 mg, Oral, Q6H PRN **OR** ondansetron (ZOFRAN) injection 4 mg, 4 mg, Intravenous, Q6H PRN, Sherie Curran MD  •  ondansetron (ZOFRAN) tablet 4 mg, 4 mg, Oral, Q6H PRN, Sherie Curran MD  •  oxyCODONE-acetaminophen (PERCOCET) 7.5-325 MG per tablet 1 tablet, 1 tablet, Oral, TID, Sherie Curran MD  •  pantoprazole (PROTONIX) EC tablet 40 mg, 40 mg, Oral, Q AM, Sherie Curran MD  •  sodium chloride 0.9 % flush 1-10 mL, 1-10 mL, Intravenous, PRN, Sherie Curran MD  •  sodium chloride 0.9 % flush 10 mL, 10 mL, Intravenous, PRN, Jamarcus Archibald MD  •  sodium chloride 0.9 % infusion, 125 mL/hr, Intravenous, Continuous, Jamarcus Archibald MD, Last Rate: 125 mL/hr at 11/17/17 2035, 125 mL/hr at 11/17/17 2035  •  sodium chloride 0.9 % infusion, 125 mL/hr, Intravenous, Continuous, Bimal Pederson MD, Last Rate: 125 mL/hr at 11/17/17 1935, 125 mL/hr at 11/17/17 1935  •  sodium chloride 0.9 % infusion, 100 mL/hr, Intravenous, Continuous, Sherie Curran MD, Last Rate: 100 mL/hr at 11/18/17 0120, 100 mL/hr at 11/18/17 0120  •  spironolactone (ALDACTONE) tablet 25 mg, 25 mg, Oral, Daily, Sherie Curran MD     Diagnostic Data    Lab Results (last 24 hours)     Procedure Component Value Units Date/Time    CBC Auto Differential [[id number]]  (Abnormal) Collected:  11/17/17 1713    Specimen:  Blood Updated:  11/17/17 1718     WBC 11.03 (H) 10*3/mm3      RBC 5.66 (H) 10*6/mm3      Hemoglobin 15.7 (H) g/dL      Hematocrit 45.1 (H) %      MCV 79.7 (L) fL      MCH 27.7 pg      MCHC 34.8 g/dL      RDW 14.7 (H) %      RDW-SD 42.4 fl      MPV 11.5 fL       Platelets 106 (L) 10*3/mm3      Neutrophil % 86.8 (H) %      Lymphocyte % 7.6 (L) %      Monocyte % 4.4 %      Eosinophil % 1.0 %      Basophil % 0.0 %      Immature Grans % 0.2 %      Neutrophils, Absolute 9.57 (H) 10*3/mm3      Lymphocytes, Absolute 0.84 10*3/mm3      Monocytes, Absolute 0.49 10*3/mm3      Eosinophils, Absolute 0.11 10*3/mm3      Basophils, Absolute 0.00 10*3/mm3      Immature Grans, Absolute 0.02 10*3/mm3     Lipase [[id number]]  (Normal) Collected:  11/17/17 1740    Specimen:  Blood from Arm, Left Updated:  11/17/17 1758     Lipase 81 U/L     Comprehensive Metabolic Panel [[id number]]  (Abnormal) Collected:  11/17/17 1740    Specimen:  Blood from Arm, Left Updated:  11/17/17 1759     Glucose 124 (H) mg/dL      BUN 16 mg/dL      Creatinine 1.09 (H) mg/dL      Sodium 138 mmol/L      Potassium 3.9 mmol/L      Chloride 96 mmol/L      CO2 27.0 mmol/L      Calcium 10.5 (H) mg/dL      Total Protein 7.9 g/dL      Albumin 4.50 g/dL      ALT (SGPT) 76 (H) U/L      AST (SGOT) 80 (H) U/L      Alkaline Phosphatase 110 U/L      Total Bilirubin 1.1 mg/dL      eGFR Non African Amer 52 mL/min/1.73      Globulin 3.4 gm/dL      A/G Ratio 1.3 g/dL      BUN/Creatinine Ratio 14.7     Anion Gap 15.0 mmol/L     CBC & Differential [[id number]] Collected:  11/17/17 1713    Specimen:  Blood Updated:  11/17/17 1804    Narrative:       The following orders were created for panel order CBC & Differential.  Procedure                               Abnormality         Status                     ---------                               -----------         ------                     Scan Slide[[id number]]                                       Final result               CBC Auto Differential[[id number]]        Abnormal            Final result                 Please view results for these tests on the individual orders.    Scan Slide [[id number]] Collected:  11/17/17 1713    Specimen:  Blood Updated:  11/17/17 1804     RBC Morphology  Normal     WBC Morphology Normal     Platelet Estimate Decreased    Light Blue Top [[id number]] Collected:  11/17/17 1713    Specimen:  Blood Updated:  11/17/17 1816     Extra Tube hold for add-on      Auto resulted       Lavender Top [[id number]] Collected:  11/17/17 1713    Specimen:  Blood Updated:  11/17/17 1816     Extra Tube hold for add-on      Auto resulted       Gold Top - SST [[id number]] Collected:  11/17/17 1713    Specimen:  Blood Updated:  11/17/17 1816     Extra Tube Hold for add-ons.      Auto resulted.       [facility]vin Draw [[id number]] Collected:  11/17/17 1713    Specimen:  Blood Updated:  11/17/17 1846    Narrative:       The following orders were created for panel order [facility]vin Draw.  Procedure                               Abnormality         Status                     ---------                               -----------         ------                     Light Blue Top[[id number]]                                   Final result               Green Top (Gel)[[id number]]                                  Final result               Lavender Top[[id number]]                                     Final result               Gold Top - SST[[id number]]                                   Final result                 Please view results for these tests on the individual orders.    Green Top (Gel) [[id number]] Collected:  11/17/17 1740    Specimen:  Blood from Arm, Left Updated:  11/17/17 1846     Extra Tube Hold for add-ons.      Auto resulted.       Urinalysis With / Culture If Indicated - Urine, Clean Catch [[id number]]  (Abnormal) Collected:  11/17/17 1831    Specimen:  Urine from Urine, Clean Catch Updated:  11/17/17 1851     Color, UA Yellow     Appearance, UA Clear     pH, UA 7.0     Specific Gravity, UA 1.015     Glucose, UA Negative     Ketones, UA Negative     Bilirubin, UA Negative     Blood, UA Negative     Protein, UA 30 mg/dL (1+) (A)     Leuk Esterase, UA Negative     Nitrite, UA Negative     Urobilinogen, UA  0.2 E.U./dL    Urinalysis, Microscopic Only - Urine, Clean Catch [[id number]]  (Abnormal) Collected:  11/17/17 1831    Specimen:  Urine from Urine, Clean Catch Updated:  11/17/17 1859     RBC, UA 0-2 (A) /HPF      WBC, UA 0-2 /HPF      Bacteria, UA None Seen /HPF      Squamous Epithelial Cells, UA None Seen /HPF      Hyaline Casts, UA 0-2 /LPF      Methodology Automated Microscopy    Urinalysis With / Culture If Indicated - [[id number]]  (Normal) Collected:  11/18/17 0117    Specimen:  Urine Updated:  11/18/17 0148     Color, UA Yellow     Appearance, UA Clear     pH, UA 6.0     Specific Gravity, UA 1.020      Result obtained by Refractometer        Glucose, UA Negative     Ketones, UA Negative     Bilirubin, UA Negative     Blood, UA Negative     Protein, UA Negative     Leuk Esterase, UA Negative     Nitrite, UA Negative     Urobilinogen, UA 1.0 E.U./dL    Narrative:       Urine microscopic not indicated.    Comprehensive Metabolic Panel [[id number]]  (Abnormal) Collected:  11/18/17 0609    Specimen:  Blood Updated:  11/18/17 0711     Glucose 89 mg/dL      BUN 15 mg/dL      Creatinine 0.97 mg/dL      Sodium 140 mmol/L      Potassium 3.4 (L) mmol/L      Chloride 103 mmol/L      CO2 26.0 mmol/L      Calcium 8.7 mg/dL      Total Protein 6.6 g/dL      Albumin 3.60 g/dL      ALT (SGPT) 61 (H) U/L      AST (SGOT) 63 (H) U/L      Alkaline Phosphatase 87 U/L      Total Bilirubin 1.1 mg/dL      eGFR Non African Amer 59 mL/min/1.73      Globulin 3.0 gm/dL      A/G Ratio 1.2 g/dL      BUN/Creatinine Ratio 15.5     Anion Gap 11.0 mmol/L     Magnesium [[id number]]  (Normal) Collected:  11/18/17 0609    Specimen:  Blood Updated:  11/18/17 0711     Magnesium 1.9 mg/dL     CBC Auto Differential [[id number]]  (Abnormal) Collected:  11/18/17 0609    Specimen:  Blood Updated:  11/18/17 0722     WBC 6.69 10*3/mm3      RBC 5.01 10*6/mm3      Hemoglobin 13.9 g/dL       SPECIMEN REANALYZED TO CONFIRM HGB RESULTS        Hematocrit 40.8 %       MCV 81.4 fL      MCH 27.7 pg      MCHC 34.1 g/dL      RDW 15.3 (H) %      RDW-SD 45.7 fl      MPV 11.9 fL      Platelets 92 (L) 10*3/mm3      Neutrophil % 69.2 %      Lymphocyte % 22.1 %      Monocyte % 5.5 %      Eosinophil % 3.0 %      Basophil % 0.1 %      Immature Grans % 0.1 %      Neutrophils, Absolute 4.62 10*3/mm3      Lymphocytes, Absolute 1.48 10*3/mm3      Monocytes, Absolute 0.37 10*3/mm3      Eosinophils, Absolute 0.20 10*3/mm3      Basophils, Absolute 0.01 10*3/mm3      Immature Grans, Absolute 0.01 10*3/mm3      nRBC 0.0 /100 WBC           I reviewed the patient's new clinical results.    Assessment/Plan:     Active Hospital Problems (** Indicates Principal Problem)    Diagnosis Date Noted   • **Abdominal pain [R10.9] 11/17/2017   • Constipation [K59.00]    • Acid reflux [K21.9]    • Depression [F32.9]    • Disease related peripheral neuropathy [G62.89]    • Epigastric pain [R10.13]    • Hyperlipidemia [E78.5]    • Nausea and vomiting [R11.2]    • Restless leg syndrome [G25.81]    • Sleep apnea [G47.30]       Resolved Hospital Problems    Diagnosis Date Noted Date Resolved   No resolved problems to display.     Initially admitted with abdominal pain.  Differential diagnosis includes small bowel obstruction, constipation, colitis.  General surgery has been consulted, NG tube is in place, patient started on intravenous fluids and nothing by mouth at present.  Abdominal x-ray for this morning showed no mechanical bowel obstruction, however moderate amount of retained feces in the colon.  Plan to give patient soapsuds enema in order to have her bowels move.  Patient does have bowel sounds this morning, flatus positive, no stool for 5 days.  Patient does have some abdominal distention as well however per the patient and nursing staff this has improved overnight with the NG tube.    Patient is currently nothing by mouth as such her home by mouth medications are currently on hold.  As her diet is restarted  "will resume patient's home medications.    Will monitor patient's hospital course and adjust treatment as hospital course dictates.    DVT prophylaxis: Heparin  Code status is Full Code    Plan for disposition:Where: home and When:  1-3 days      Time:           This document has been electronically signed by Josh Toth MD on November 18, 2017 8:42 AM           Electronically signed by Josh Toth MD at 11/18/2017  1:55 PM      Benjamin Troncoso MD at 11/18/2017 10:37 AM  Version 1 of 1            LOS: 0 days   Patient Care Team:  Yobany Barr MD as PCP - General  Daren Marie DPM as Consulting Physician (Podiatry)    Chief Complaint: Hospital day 2    Subjective     History of Present Illness    Subjective:  Symptoms:  Improved.    Diet:  NPO.  No nausea or vomiting.    Activity level: Impaired due to weakness.    Pain:  She complains of pain that is moderate.  She reports pain is improving.  Pain is well controlled.        History taken from: patient chart family    Objective     Vital Signs  Temp:  [96.4 °F (35.8 °C)-98.2 °F (36.8 °C)] 96.4 °F (35.8 °C)  Heart Rate:  [] 83  Resp:  [18-20] 18  BP: (119-157)/(63-95) 119/63    Objective:  General Appearance:  Comfortable.    Vital signs: (most recent): Blood pressure 119/63, pulse 83, temperature 96.4 °F (35.8 °C), temperature source Oral, resp. rate 18, height 64\" (162.6 cm), weight 218 lb 11.2 oz (99.2 kg), SpO2 91 %, not currently breastfeeding.  Vital signs are normal.  No fever.    Output: Producing urine.  Stool output assessment: flatus.    Abdomen: Abdomen is soft and non-distended.  (Much less distended)There is no abdominal tenderness.               Results Review:     I reviewed the patient's new clinical results.    Results for XUAN COLEMAN (MRN [id number]) as of 11/18/2017 10:41   Ref. Range 11/18/2017 06:09   Glucose Latest Ref Range: 60 - 100 mg/dL 89   Sodium Latest Ref Range: 137 - 145 mmol/L 140   Potassium Latest Ref Range: 3.5 - " 5.1 mmol/L 3.4 (L)   CO2 Latest Ref Range: 22.0 - 31.0 mmol/L 26.0   Chloride Latest Ref Range: 95 - 110 mmol/L 103   Anion Gap Latest Ref Range: 5.0 - 15.0 mmol/L 11.0   Creatinine Latest Ref Range: 0.50 - 1.00 mg/dL 0.97   BUN Latest Ref Range: 7 - 21 mg/dL 15   BUN/Creatinine Ratio Latest Ref Range: 7.0 - 25.0  15.5   Calcium Latest Ref Range: 8.4 - 10.2 mg/dL 8.7   eGFR Non African Amer Latest Ref Range: 51 - 120 mL/min/1.73 59   Alkaline Phosphatase Latest Ref Range: 38 - 126 U/L 87   Total Protein Latest Ref Range: 6.3 - 8.6 g/dL 6.6   ALT (SGPT) Latest Ref Range: 9 - 52 U/L 61 (H)   AST (SGOT) Latest Ref Range: 14 - 36 U/L 63 (H)   Total Bilirubin Latest Ref Range: 0.2 - 1.3 mg/dL 1.1   Albumin Latest Ref Range: 3.40 - 4.80 g/dL 3.60   Globulin Latest Ref Range: 2.3 - 3.5 gm/dL 3.0   A/G Ratio Latest Ref Range: 1.1 - 1.8 g/dL 1.2   Magnesium Latest Ref Range: 1.6 - 2.3 mg/dL 1.9   WBC Latest Ref Range: 3.20 - 9.80 10*3/mm3 6.69   RBC Latest Ref Range: 3.77 - 5.16 10*6/mm3 5.01   Hemoglobin Latest Ref Range: 12.0 - 15.5 g/dL 13.9   Hematocrit Latest Ref Range: 35.0 - 45.0 % 40.8   RDW Latest Ref Range: 11.5 - 14.5 % 15.3 (H)   MCV Latest Ref Range: 80.0 - 98.0 fL 81.4   MCH Latest Ref Range: 26.5 - 34.0 pg 27.7   MCHC Latest Ref Range: 31.4 - 36.0 g/dL 34.1   MPV Latest Ref Range: 8.0 - 12.0 fL 11.9   Platelets Latest Ref Range: 150 - 450 10*3/mm3 92 (L)   RDW-SD Latest Ref Range: 36.4 - 46.3 fl 45.7   Neutrophil % Latest Ref Range: 37.0 - 80.0 % 69.2   Lymphocyte % Latest Ref Range: 10.0 - 50.0 % 22.1   Monocyte % Latest Ref Range: 0.0 - 12.0 % 5.5   Eosinophil % Latest Ref Range: 0.0 - 7.0 % 3.0   Basophil % Latest Ref Range: 0.0 - 2.0 % 0.1   Immature Grans % Latest Ref Range: 0.0 - 0.5 % 0.1   Neutrophils, Absolute Latest Ref Range: 2.00 - 8.60 10*3/mm3 4.62   Lymphocytes, Absolute Latest Ref Range: 0.60 - 4.20 10*3/mm3 1.48   Monocytes, Absolute Latest Ref Range: 0.00 - 0.90 10*3/mm3 0.37   Eosinophils,  Absolute Latest Ref Range: 0.00 - 0.70 10*3/mm3 0.20   Basophils, Absolute Latest Ref Range: 0.00 - 0.20 10*3/mm3 0.01   Immature Grans, Absolute Latest Ref Range: 0.00 - 0.02 10*3/mm3 0.01   nRBC Latest Ref Range: 0.0 - 0.0 /100 WBC 0.0       Medication Review: done    Assessment/Plan     Active Problems:    Abdominal pain    Constipation    Acid reflux    Depression    Disease related peripheral neuropathy    Epigastric pain    Hyperlipidemia    Nausea and vomiting    Restless leg syndrome    Sleep apnea      Assessment:    Condition: In stable condition.  Improving.   (Resolving SBO).     Plan:   X-rays as ordered.        Benjamin Troncoso MD  17  10:37 AM    Time: 5 minutes         Electronically signed by Benjamin Troncoso MD at 2017 10:42 AM      Josh Toth MD at 2017  9:04 AM  Version 1 of 1         MEDICINE DAILY PROGRESS NOTE  NAME: Amira Shannon  : 1960  MRN: [id number]     LOS: 0 days     PROVIDER OF SERVICE: Josh Toth MD    Chief Complaint: Abdominal pain    Subjective:     Interval History:  History taken from: patient chart RN  . Patient improving clinically.  NGT in place.  Had small bowel movement overnight.  Nausea still present.    . Patient feeling some better today. Still has some abdominal pain, but improving.  General surgery following.    Review of Systems:   Review of Systems   Constitutional: Positive for appetite change and fatigue. Negative for activity change and fever.   HENT: Negative for ear pain and sore throat.    Eyes: Negative for pain and visual disturbance.   Respiratory: Negative for cough and shortness of breath.    Cardiovascular: Negative for chest pain and palpitations.   Gastrointestinal: Positive for abdominal distention, abdominal pain and nausea. Negative for constipation, diarrhea and vomiting.   Endocrine: Negative for cold intolerance and heat intolerance.   Genitourinary: Negative for difficulty urinating and dysuria.    Musculoskeletal: Negative for arthralgias and gait problem.   Skin: Negative for color change and rash.   Neurological: Negative for dizziness, weakness and headaches.   Hematological: Negative for adenopathy. Does not bruise/bleed easily.   Psychiatric/Behavioral: Negative for agitation, confusion and sleep disturbance.       Objective:     Vital Signs  Vitals:    11/18/17 1528 11/18/17 1941 11/19/17 0323 11/19/17 0849   BP: 114/65 125/74 127/66 124/76   BP Location: Left arm Left arm Left arm Left arm   Patient Position: Lying Lying Lying Lying   Pulse: 78 77 72 77   Resp: 18 19 19 18   Temp: 98 °F (36.7 °C) 96.7 °F (35.9 °C) 96.7 °F (35.9 °C) 98.4 °F (36.9 °C)   TempSrc: Oral Oral Oral Oral   SpO2: 94% 96% 92% 94%   Weight:       Height:           Physical Exam  Physical Exam   Constitutional: She is oriented to person, place, and time. She appears well-developed and well-nourished. No distress.   HENT:   Head: Normocephalic and atraumatic.   Right Ear: External ear normal.   Left Ear: External ear normal.   Nose: Nose normal.   Mouth/Throat: No oropharyngeal exudate.   Eyes: Conjunctivae and EOM are normal. Pupils are equal, round, and reactive to light.   Neck: Normal range of motion. Neck supple.   Cardiovascular: Normal rate, regular rhythm and normal heart sounds.  Exam reveals no gallop and no friction rub.    No murmur heard.  Pulmonary/Chest: Effort normal and breath sounds normal. No respiratory distress. She has no wheezes. She has no rales. She exhibits no tenderness.   Abdominal: Bowel sounds are normal. She exhibits distension (Less distended today). She exhibits no mass. There is tenderness (Much less tenderness today). There is no rebound and no guarding.   Very soft abdomen today.   Musculoskeletal: Normal range of motion.   Neurological: She is alert and oriented to person, place, and time.   Skin: Skin is warm and dry. No rash noted. She is not diaphoretic. No erythema. No pallor.    Psychiatric: She has a normal mood and affect. Her behavior is normal.   Nursing note and vitals reviewed.      Medication Review    Current Facility-Administered Medications:   •  acetaminophen (TYLENOL) tablet 650 mg, 650 mg, Oral, Q4H PRN, Sherie Curran MD  •  bisacodyl (DULCOLAX) suppository 10 mg, 10 mg, Rectal, Daily PRN, Sherie Curran MD  •  chlorthalidone (HYGROTON) tablet 50 mg, 50 mg, Oral, Daily, Sherie Curran MD  •  docusate sodium (COLACE) capsule 200 mg, 200 mg, Oral, BID, Sherie Curran MD  •  DULoxetine (CYMBALTA) DR capsule 30 mg, 30 mg, Oral, Daily, Sherie Curran MD  •  famotidine (PEPCID) tablet 20 mg, 20 mg, Oral, BID, Sherie Curran MD  •  heparin (porcine) 5000 UNIT/ML injection 5,000 Units, 5,000 Units, Subcutaneous, Q8H, Sherie Curran MD, Stopped at 11/19/17 1400  •  hydrALAZINE (APRESOLINE) injection 10 mg, 10 mg, Intravenous, Q6H PRN, Sherie Curran MD  •  influenza vac split quad (FLUZONE QUADRIVALENT) IM suspension 0.5 mL, 0.5 mL, Intramuscular, During Hospitalization, Josh Toth MD  •  magnesium hydroxide (MILK OF MAGNESIA) suspension 2400 mg/10mL 10 mL, 10 mL, Oral, Daily PRN, Sherie Curran MD  •  magnesium oxide (MAGOX) tablet 400 mg, 400 mg, Oral, Daily, Sherie Curran MD  •  meloxicam (MOBIC) tablet 15 mg, 15 mg, Oral, Daily, Sherie Curran MD  •  morphine injection 6 mg, 6 mg, Intravenous, Q4H PRN, Benjamin Troncoso MD, 6 mg at 11/19/17 0326  •  nicotine (NICODERM CQ) 21 MG/24HR patch 1 patch, 1 patch, Transdermal, Q24H, Sherie Curran MD  •  ondansetron (ZOFRAN) tablet 4 mg, 4 mg, Oral, Q6H PRN **OR** ondansetron ODT (ZOFRAN-ODT) disintegrating tablet 4 mg, 4 mg, Oral, Q6H PRN **OR** ondansetron (ZOFRAN) injection 4 mg, 4 mg, Intravenous, Q6H PRN, Sherie Curran MD, 4 mg at 11/18/17 4975  •  pantoprazole (PROTONIX) EC tablet 40 mg, 40 mg, Oral, Q AM, Sherie  Frank Curran MD  •  sodium chloride 0.9 % flush 1-10 mL, 1-10 mL, Intravenous, PRN, Sherie Curran MD  •  sodium chloride 0.9 % flush 10 mL, 10 mL, Intravenous, PRN, Jamarcus Archibald MD  •  sodium chloride 0.9 % infusion, 100 mL/hr, Intravenous, Continuous, Sherie Curran MD, Last Rate: 100 mL/hr at 11/19/17 0326, 100 mL/hr at 11/19/17 0326  •  spironolactone (ALDACTONE) tablet 25 mg, 25 mg, Oral, Daily, Sherie Curran MD     Diagnostic Data    Lab Results (last 24 hours)     Procedure Component Value Units Date/Time    CBC Auto Differential [[id number]]  (Abnormal) Collected:  11/19/17 0703    Specimen:  Blood Updated:  11/19/17 0713     WBC 4.29 10*3/mm3      RBC 4.93 10*6/mm3      Hemoglobin 13.3 g/dL      Hematocrit 41.0 %      MCV 83.2 fL      MCH 27.0 pg      MCHC 32.4 g/dL      RDW 15.0 (H) %      RDW-SD 45.9 fl      MPV 11.4 fL      Platelets 75 (L) 10*3/mm3      Neutrophil % 53.4 %      Lymphocyte % 35.9 %      Monocyte % 6.8 %      Eosinophil % 3.5 %      Basophil % 0.2 %      Immature Grans % 0.2 %      Neutrophils, Absolute 2.29 10*3/mm3      Lymphocytes, Absolute 1.54 10*3/mm3      Monocytes, Absolute 0.29 10*3/mm3      Eosinophils, Absolute 0.15 10*3/mm3      Basophils, Absolute 0.01 10*3/mm3      Immature Grans, Absolute 0.01 10*3/mm3      nRBC 0.0 /100 WBC     Comprehensive Metabolic Panel [[id number]]  (Abnormal) Collected:  11/19/17 0703    Specimen:  Blood Updated:  11/19/17 0724     Glucose 82 mg/dL      BUN 10 mg/dL      Creatinine 1.06 (H) mg/dL      Sodium 139 mmol/L      Potassium 3.6 mmol/L      Chloride 101 mmol/L      CO2 27.0 mmol/L      Calcium 8.7 mg/dL      Total Protein 6.5 g/dL      Albumin 3.40 g/dL      ALT (SGPT) 56 (H) U/L      AST (SGOT) 55 (H) U/L      Alkaline Phosphatase 81 U/L      Total Bilirubin 0.9 mg/dL      eGFR Non African Amer 53 (L) mL/min/1.73      Globulin 3.1 gm/dL      A/G Ratio 1.1 g/dL      BUN/Creatinine Ratio 9.4     Anion Gap 11.0  mmol/L     Magnesium [[id number]]  (Normal) Collected:  11/19/17 0703    Specimen:  Blood Updated:  11/19/17 0724     Magnesium 2.0 mg/dL           I reviewed the patient's new clinical results.    Assessment/Plan:     Active Hospital Problems (** Indicates Principal Problem)    Diagnosis Date Noted   • **Abdominal pain [R10.9] 11/17/2017   • Constipation [K59.00]    • Acid reflux [K21.9]    • Depression [F32.9]    • Disease related peripheral neuropathy [G62.89]    • Epigastric pain [R10.13]    • Hyperlipidemia [E78.5]    • Nausea and vomiting [R11.2]    • Restless leg syndrome [G25.81]    • Sleep apnea [G47.30]       Resolved Hospital Problems    Diagnosis Date Noted Date Resolved   No resolved problems to display.     11/19. Patient still has NG tube in place.  Abdomen is very soft today.  Still nothing by mouth.  Patient given enemas overnight to attempt to get the stool burden eased from her large bowel.  Patient had 1 small bowel movement however still has not had a good what she calls a regular bowel movement yet.  Patient still nauseated but improved.  Clinically small bowel obstruction improving.  11/18. Initially admitted with abdominal pain.  Differential diagnosis includes small bowel obstruction, constipation, colitis.  General surgery has been consulted, NG tube is in place, patient started on intravenous fluids and nothing by mouth at present.  Abdominal x-ray for this morning showed no mechanical bowel obstruction, however moderate amount of retained feces in the colon.  Plan to give patient soapsuds enema in order to have her bowels move.  Patient does have bowel sounds this morning, flatus positive, no stool for 5 days.  Patient does have some abdominal distention as well however per the patient and nursing staff this has improved overnight with the NG tube.    11/19.  Patient still nothing by mouth however advanced ice chips.  11/18. Patient is currently nothing by mouth as such her home by mouth  "medications are currently on hold.  As her diet is restarted will resume patient's home medications.    Will monitor patient's hospital course and adjust treatment as hospital course dictates.    DVT prophylaxis: Heparin  Code status is Full Code    Plan for disposition:Where: home and When:  1-3 days      Time:           This document has been electronically signed by Josh Toth MD on November 19, 2017 9:04 AM           Electronically signed by Josh Toth MD at 11/19/2017 11:35 AM      Benjamin Troncoso MD at 11/19/2017 11:33 AM  Version 1 of 1            LOS: 0 days   Patient Care Team:  Yobany Barr MD as PCP - General  Daren Marie DPM as Consulting Physician (Podiatry)    Chief Complaint: hospital day 3 sbo    Subjective     History of Present Illness    Subjective:  Symptoms:  Stable.  She reports cough.    Diet:  No nausea or vomiting.    Activity level: Impaired due to weakness.    Pain:  She complains of pain that is moderate.  She reports pain is improving.  Pain is well controlled.        History taken from: patient chart family    Objective     Vital Signs  Temp:  [96.7 °F (35.9 °C)-98.4 °F (36.9 °C)] 98 °F (36.7 °C)  Heart Rate:  [71-78] 71  Resp:  [18-19] 18  BP: (114-133)/(56-76) 133/67    Objective:  General Appearance:  Comfortable.    Vital signs: (most recent): Blood pressure 133/67, pulse 71, temperature 98 °F (36.7 °C), resp. rate 18, height 64\" (162.6 cm), weight 218 lb 11.2 oz (99.2 kg), SpO2 94 %, not currently breastfeeding.  Vital signs are normal.  No fever.    Output: Producing urine and no stool output.    Lungs:  Normal respiratory rate and normal effort.    Heart: Normal rate.  Regular rhythm.    Abdomen: Abdomen is soft and non-distended.  Hypoactive bowel sounds.   (Right sided).     Neurological: Patient is alert and oriented to person, place and time.              Results Review:     I reviewed the patient's new clinical results.     Results for XUAN COLEMAN (MRN " [id number]) as of 11/19/2017 11:30   Ref. Range 11/19/2017 07:03   Glucose Latest Ref Range: 60 - 100 mg/dL 82   Sodium Latest Ref Range: 137 - 145 mmol/L 139   Potassium Latest Ref Range: 3.5 - 5.1 mmol/L 3.6   CO2 Latest Ref Range: 22.0 - 31.0 mmol/L 27.0   Chloride Latest Ref Range: 95 - 110 mmol/L 101   Anion Gap Latest Ref Range: 5.0 - 15.0 mmol/L 11.0   Creatinine Latest Ref Range: 0.50 - 1.00 mg/dL 1.06 (H)   BUN Latest Ref Range: 7 - 21 mg/dL 10   BUN/Creatinine Ratio Latest Ref Range: 7.0 - 25.0  9.4   Calcium Latest Ref Range: 8.4 - 10.2 mg/dL 8.7   eGFR Non African Amer Latest Ref Range: >60 mL/min/1.73 53 (L)   Alkaline Phosphatase Latest Ref Range: 38 - 126 U/L 81   Total Protein Latest Ref Range: 6.3 - 8.6 g/dL 6.5   ALT (SGPT) Latest Ref Range: 9 - 52 U/L 56 (H)   AST (SGOT) Latest Ref Range: 14 - 36 U/L 55 (H)   Total Bilirubin Latest Ref Range: 0.2 - 1.3 mg/dL 0.9   Albumin Latest Ref Range: 3.40 - 4.80 g/dL 3.40   Globulin Latest Ref Range: 2.3 - 3.5 gm/dL 3.1   A/G Ratio Latest Ref Range: 1.1 - 1.8 g/dL 1.1   Magnesium Latest Ref Range: 1.6 - 2.3 mg/dL 2.0   WBC Latest Ref Range: 3.20 - 9.80 10*3/mm3 4.29   RBC Latest Ref Range: 3.77 - 5.16 10*6/mm3 4.93   Hemoglobin Latest Ref Range: 12.0 - 15.5 g/dL 13.3   Hematocrit Latest Ref Range: 35.0 - 45.0 % 41.0   RDW Latest Ref Range: 11.5 - 14.5 % 15.0 (H)   MCV Latest Ref Range: 80.0 - 98.0 fL 83.2   MCH Latest Ref Range: 26.5 - 34.0 pg 27.0   MCHC Latest Ref Range: 31.4 - 36.0 g/dL 32.4   MPV Latest Ref Range: 8.0 - 12.0 fL 11.4   Platelets Latest Ref Range: 150 - 450 10*3/mm3 75 (L)   RDW-SD Latest Ref Range: 36.4 - 46.3 fl 45.9   Neutrophil % Latest Ref Range: 37.0 - 80.0 % 53.4   Lymphocyte % Latest Ref Range: 10.0 - 50.0 % 35.9   Monocyte % Latest Ref Range: 0.0 - 12.0 % 6.8   Eosinophil % Latest Ref Range: 0.0 - 7.0 % 3.5   Basophil % Latest Ref Range: 0.0 - 2.0 % 0.2   Immature Grans % Latest Ref Range: 0.0 - 0.5 % 0.2   Neutrophils, Absolute  Latest Ref Range: 2.00 - 8.60 10*3/mm3 2.29   Lymphocytes, Absolute Latest Ref Range: 0.60 - 4.20 10*3/mm3 1.54   Monocytes, Absolute Latest Ref Range: 0.00 - 0.90 10*3/mm3 0.29   Eosinophils, Absolute Latest Ref Range: 0.00 - 0.70 10*3/mm3 0.15   Basophils, Absolute Latest Ref Range: 0.00 - 0.20 10*3/mm3 0.01   Immature Grans, Absolute Latest Ref Range: 0.00 - 0.02 10*3/mm3 0.01   nRBC Latest Ref Range: 0.0 - 0.0 /100 WBC 0.0       Medication Review: done    Assessment/Plan     Principal Problem:    Abdominal pain  Active Problems:    Constipation    Acid reflux    Depression    Disease related peripheral neuropathy    Epigastric pain    Hyperlipidemia    Nausea and vomiting    Restless leg syndrome    Sleep apnea      Assessment:    Condition: In stable condition.  Improving.       Plan:   Sips/ice chips.  General Orders/Medications Plan: continue abx.   (1. dulcolax   2. Continue iv fluids).       Benjamin Troncoso MD  11/19/17  11:33 AM    Time: 10 mins         Electronically signed by Benjamin Troncoso MD at 11/19/2017 11:38 AM           Consult Notes (most recent note)      Benjamin Troncoso MD at 11/17/2017  9:51 PM  Version 1 of 1         Consults    Patient Care Team:  Yobany Barr MD as PCP - General  Daren Marie DPM as Consulting Physician (Podiatry)    Chief complaint:abdominal pain and distension    Subjective     Abdominal Pain   This is a recurrent problem. The current episode started yesterday. The onset quality is sudden. The problem occurs constantly. The problem has been unchanged. The pain is located in the generalized abdominal region. The pain is mild. The quality of the pain is colicky. The abdominal pain does not radiate. Associated symptoms include anorexia, belching, constipation, nausea and vomiting (feculant in nature). Pertinent negatives include no diarrhea, dysuria, fever, flatus, hematochezia, hematuria or weight loss. Nothing aggravates the pain. Relieved by: NG placement. She has tried  "nothing for the symptoms. Prior diagnostic workup includes CT scan (CT demonstarted evidence of SBO). Her past medical history is significant for abdominal surgery (multiple operations for SBO). There is no history of colon cancer, Crohn's disease, gallstones, GERD, irritable bowel syndrome, pancreatitis, PUD or ulcerative colitis.       Review of Systems   Constitutional: Negative for fever and weight loss.   Gastrointestinal: Positive for abdominal pain, anorexia, constipation, nausea and vomiting (feculant in nature). Negative for diarrhea, flatus and hematochezia.   Genitourinary: Negative for dysuria and hematuria.        Past Medical History:   Diagnosis Date   • Acid reflux    • Altered bowel function    • Arthropathy of lumbar facet joint    • Bleeding disorder    • Constipation    • Corns and callus    • Depression    • Disease related peripheral neuropathy    • Epigastric pain    • Fatty liver    • Hammer toe    • Headache    • Hiatal hernia    • Hyperlipidemia    • Knee pain    • Localized, primary osteoarthritis of the ankle and foot     Localized, primary osteoarthritis of the ankle and/or foot   • Mendoza's metatarsalgia     Mendoza's metatarsalgia - 2nd interspace on right   • Nausea and vomiting    • Neuralgia and neuritis     Neuralgia, neuritis, and radiculitis, unspecified   • Neuropathy    • Obstructive sleep apnea     Obstructive sleep apnea (adult) (pediatric)    • CHAI on CPAP     \"C-Pap at night  (unconfirmed)\"   • Osteoarthritis    • Pain in foot     Pain in unspecified foot - sees a podiatrist   • Pain in joint, ankle and foot     Joint pain in ankle and foot      • Pain radiating to back     Pain radiating to lumbar region of back   • Plantar fasciitis    • Restless leg syndrome    • Secondary hypertension     Secondary hypertension, unspecified   • Sinusitis    • Sleep apnea    • Tongue anomaly     lesion   ,   Past Surgical History:   Procedure Laterality Date   •  SECTION     • " CHOLECYSTECTOMY     • COLONOSCOPY  07/01/2013   • DIRECT LARYNGOSCOPY, ESOPHAGOSCOPY, BRONCHOSCOPY N/A 8/1/2017    Procedure: DIRECT LARYNGOSCOPY AND;  Surgeon: Live Bolton MD;  Location: [facility];  Service:    • ENDOSCOPY  07/01/2013    Colon endoscopy 46592 (1) - Internal & external hemorrhoids found. Stool found.   • ENDOSCOPY  07/01/2013    EGD w/ tube 78669 (1) - Normal esophagus. Gastritis in stomach. Biopsy taken. Normal dudoenum. Biopsy taken.   • FOOT SURGERY  02/26/2013    Foot/toes surgery procedure (1) - Arthroplasty of toes 4 and 5 of right foot.   • HERNIA REPAIR     • HERNIA REPAIR      hital   • HYSTERECTOMY     • LIVER BIOPSY     • NERVE BLOCK  07/25/2016    Injection for nerve block (1) - Lumbar medial branch block.   • OTHER SURGICAL HISTORY  12/03/2012    Inj(s) Tend-Sheath, Ligament, Single 20550 (1) - [facility] ([facility])    • OTHER SURGICAL HISTORY  06/24/2013    Small Joint Injection/Aspiration 20600 (2) - [facility]ICK[facility] ([facility])    • OTHER SURGICAL HISTORY  2011    bowel obstruction x2   • OTHER SURGICAL HISTORY      gland removed from neck   • SUBLINGUAL SALIVARY CYST EXCISION N/A 8/1/2017    Procedure: EXCISION OF LEFT  TONGUE LESION WITH CLOSURE;  Surgeon: Live Bolton MD;  Location: [facility];  Service:    • TUBAL ABDOMINAL LIGATION     • UPPER GASTROINTESTINAL ENDOSCOPY  07/01/2013   ,   Family History   Problem Relation Age of Onset   • Cancer Other    • Diabetes Other    • Heart disease Other    • Hypertension Mother    • Cancer Mother    • Diabetes Mother    • Hypertension Father    • Heart attack Father    • Cancer Father    • Heart disease Father    • Thyroid disease Maternal Aunt    ,   Social History   Substance Use Topics   • Smoking status: Former Smoker     Types: Cigarettes   • Smokeless tobacco: Never Used   • Alcohol use No   ,   Prescriptions Prior to Admission   Medication Sig Dispense Refill Last Dose   • cetirizine (zyrTEC) 10 MG tablet Take 1  tablet by mouth Daily As Needed for Allergies. 30 tablet 11    • chlorthalidone (HYGROTEN) 50 MG tablet Take 1 tablet by mouth Daily. 30 tablet 3    • cyclobenzaprine (FLEXERIL) 10 MG tablet Take 10 mg by mouth 2 (Two) Times a Day As Needed for Muscle Spasms.      • DULoxetine (CYMBALTA) 30 MG capsule Take 30 mg by mouth Daily.      • gabapentin (NEURONTIN) 800 MG tablet Take 800 mg by mouth 4 (Four) Times a Day.      • linaclotide (LINZESS) 145 MCG capsule capsule Take 145 mcg by mouth 2 (Two) Times a Day.      • magnesium oxide (MAGOX) 400 (241.3 MG) MG tablet tablet Take 400 mg by mouth Daily.      • meloxicam (MOBIC) 15 MG tablet Take 1 tablet by mouth Daily. Take once daily. 30 tablet 0    • mupirocin (BACTROBAN) 2 % ointment Apply  topically 3 (Three) Times a Day. (Patient taking differently: Apply 1 application topically 3 (Three) Times a Day.) 1 g 2 Taking   • nystatin (MYCOSTATIN) 254382 UNIT/GM powder Apply 1 application topically As Needed.   Taking   • omeprazole (priLOSEC) 20 MG capsule Take 20 mg by mouth Daily.      • ondansetron (ZOFRAN) 4 MG tablet Take 4 mg by mouth Every 8 (Eight) Hours As Needed.      • oxyCODONE-acetaminophen (PERCOCET) 7.5-325 MG per tablet Take 1 tablet by mouth Every 8 (Eight) Hours As Needed.   Taking   • oxyCODONE-acetaminophen (PERCOCET) 7.5-325 MG per tablet Take 1 tablet by mouth 3 (Three) Times a Day for 30 days. 90 tablet 0 Taking   • oxyCODONE-acetaminophen (PERCOCET) 7.5-325 MG per tablet Take 1 tablet by mouth 3 (Three) Times a Day for 30 days. 90 tablet 0 Taking   • spironolactone (ALDACTONE) 25 MG tablet Take 1 tablet by mouth Daily. 30 tablet 3    , Scheduled Meds:   , Continuous Infusions:    sodium chloride 125 mL/hr Last Rate: 125 mL/hr (11/17/17 2035)   sodium chloride 125 mL/hr Last Rate: 125 mL/hr (11/17/17 1935)   , PRN Meds:  •  sodium chloride and Allergies:  Other and Sulfa antibiotics    Objective      Vital Signs  Temp:  [98.1 °F (36.7 °C)-98.2 °F  (36.8 °C)] 98.2 °F (36.8 °C)  Heart Rate:  [] 83  Resp:  [18-20] 18  BP: (132-157)/(88-95) 157/88    Physical Exam   Constitutional: She is oriented to person, place, and time. She appears well-developed and well-nourished. No distress.   HENT:   Head: Normocephalic and atraumatic.   Eyes: EOM are normal. Pupils are equal, round, and reactive to light.   Neck: Normal range of motion. Neck supple. No tracheal deviation present.   Cardiovascular: Normal rate and regular rhythm.    Pulmonary/Chest: Effort normal and breath sounds normal. No respiratory distress.   Abdominal: Soft. Bowel sounds are normal. She exhibits distension. She exhibits no mass. There is no tenderness. There is no rebound and no guarding. A hernia (ventral hernia in the upper portion of the incision) is present.   Genitourinary: Rectal exam shows guaiac negative stool (no rectal masses).   Musculoskeletal: Normal range of motion.   Neurological: She is alert and oriented to person, place, and time.   Skin: She is not diaphoretic.   Psychiatric: She has a normal mood and affect. Her behavior is normal. Judgment and thought content normal.   Nursing note and vitals reviewed.      Results Review:    I reviewed the patient's new clinical results.  I reviewed the patient's new imaging results and agree with the interpretation.        Assessment/Plan     Active Problems:    Abdominal pain      Assessment:    Condition: In stable condition.  Improving.   (1.) Small bowel obstruction, recurrent. No indication for immediate operation. ).     Plan:   NPO.  X-rays as ordered.  Give fluids.         I discussed the patients findings and my recommendations with patient, family, nursing staff and primary care team    Benjamin Troncoso MD  11/17/17  9:51 PM    Time: 20 minutes         Electronically signed by Benjamin Troncoso MD at 11/17/2017  9:59 PM

## 2017-11-19 NOTE — PLAN OF CARE
Problem: Pain, Acute (Adult)  Goal: Identify Related Risk Factors and Signs and Symptoms  Outcome: Outcome(s) achieved Date Met:  11/19/17  Goal: Acceptable Pain Control/Comfort Level  Outcome: Ongoing (interventions implemented as appropriate)    Problem: Fall Risk (Adult)  Goal: Identify Related Risk Factors and Signs and Symptoms  Outcome: Outcome(s) achieved Date Met:  11/19/17  Goal: Absence of Falls  Outcome: Ongoing (interventions implemented as appropriate)    Problem: Bowel Obstruction (Adult)  Goal: Signs and Symptoms of Listed Potential Problems Will be Absent or Manageable (Bowel Obstruction)  Outcome: Ongoing (interventions implemented as appropriate)

## 2017-11-20 ENCOUNTER — APPOINTMENT (OUTPATIENT)
Dept: CT IMAGING | Facility: HOSPITAL | Age: 57
End: 2017-11-20

## 2017-11-20 ENCOUNTER — APPOINTMENT (OUTPATIENT)
Dept: PHYSICAL THERAPY | Facility: HOSPITAL | Age: 57
End: 2017-11-20
Attending: PODIATRIST

## 2017-11-20 ENCOUNTER — APPOINTMENT (OUTPATIENT)
Dept: GENERAL RADIOLOGY | Facility: HOSPITAL | Age: 57
End: 2017-11-20

## 2017-11-20 LAB
ALBUMIN SERPL-MCNC: 3.7 G/DL (ref 3.4–4.8)
ALBUMIN/GLOB SERPL: 1.2 G/DL (ref 1.1–1.8)
ALP SERPL-CCNC: 78 U/L (ref 38–126)
ALT SERPL W P-5'-P-CCNC: 56 U/L (ref 9–52)
ANION GAP SERPL CALCULATED.3IONS-SCNC: 10 MMOL/L (ref 5–15)
AST SERPL-CCNC: 58 U/L (ref 14–36)
BASOPHILS # BLD AUTO: 0.01 10*3/MM3 (ref 0–0.2)
BASOPHILS NFR BLD AUTO: 0.2 % (ref 0–2)
BILIRUB SERPL-MCNC: 0.9 MG/DL (ref 0.2–1.3)
BUN BLD-MCNC: 11 MG/DL (ref 7–21)
BUN/CREAT SERPL: 10.2 (ref 7–25)
CALCIUM SPEC-SCNC: 9 MG/DL (ref 8.4–10.2)
CHLORIDE SERPL-SCNC: 98 MMOL/L (ref 95–110)
CO2 SERPL-SCNC: 29 MMOL/L (ref 22–31)
CREAT BLD-MCNC: 1.08 MG/DL (ref 0.5–1)
DEPRECATED RDW RBC AUTO: 42.8 FL (ref 36.4–46.3)
EOSINOPHIL # BLD AUTO: 0.13 10*3/MM3 (ref 0–0.7)
EOSINOPHIL NFR BLD AUTO: 2.9 % (ref 0–7)
ERYTHROCYTE [DISTWIDTH] IN BLOOD BY AUTOMATED COUNT: 14.5 % (ref 11.5–14.5)
GFR SERPL CREATININE-BSD FRML MDRD: 52 ML/MIN/1.73 (ref 51–120)
GLOBULIN UR ELPH-MCNC: 3 GM/DL (ref 2.3–3.5)
GLUCOSE BLD-MCNC: 82 MG/DL (ref 60–100)
HCT VFR BLD AUTO: 42.1 % (ref 35–45)
HGB BLD-MCNC: 13.8 G/DL (ref 12–15.5)
IMM GRANULOCYTES # BLD: 0.01 10*3/MM3 (ref 0–0.02)
IMM GRANULOCYTES NFR BLD: 0.2 % (ref 0–0.5)
LYMPHOCYTES # BLD AUTO: 1.47 10*3/MM3 (ref 0.6–4.2)
LYMPHOCYTES NFR BLD AUTO: 32.6 % (ref 10–50)
MAGNESIUM SERPL-MCNC: 1.9 MG/DL (ref 1.6–2.3)
MCH RBC QN AUTO: 26.7 PG (ref 26.5–34)
MCHC RBC AUTO-ENTMCNC: 32.8 G/DL (ref 31.4–36)
MCV RBC AUTO: 81.4 FL (ref 80–98)
MONOCYTES # BLD AUTO: 0.25 10*3/MM3 (ref 0–0.9)
MONOCYTES NFR BLD AUTO: 5.5 % (ref 0–12)
NEUTROPHILS # BLD AUTO: 2.64 10*3/MM3 (ref 2–8.6)
NEUTROPHILS NFR BLD AUTO: 58.6 % (ref 37–80)
PLATELET # BLD AUTO: 90 10*3/MM3 (ref 150–450)
PMV BLD AUTO: 11.3 FL (ref 8–12)
POTASSIUM BLD-SCNC: 3.9 MMOL/L (ref 3.5–5.1)
PROT SERPL-MCNC: 6.7 G/DL (ref 6.3–8.6)
RBC # BLD AUTO: 5.17 10*6/MM3 (ref 3.77–5.16)
SODIUM BLD-SCNC: 137 MMOL/L (ref 137–145)
WBC NRBC COR # BLD: 4.51 10*3/MM3 (ref 3.2–9.8)

## 2017-11-20 PROCEDURE — 80053 COMPREHEN METABOLIC PANEL: CPT | Performed by: INTERNAL MEDICINE

## 2017-11-20 PROCEDURE — 74022 RADEX COMPL AQT ABD SERIES: CPT

## 2017-11-20 PROCEDURE — 85025 COMPLETE CBC W/AUTO DIFF WBC: CPT | Performed by: INTERNAL MEDICINE

## 2017-11-20 PROCEDURE — 99231 SBSQ HOSP IP/OBS SF/LOW 25: CPT | Performed by: SURGERY

## 2017-11-20 PROCEDURE — 25010000002 MORPHINE PER 10 MG: Performed by: FAMILY MEDICINE

## 2017-11-20 PROCEDURE — 25010000002 HEPARIN (PORCINE) PER 1000 UNITS: Performed by: INTERNAL MEDICINE

## 2017-11-20 PROCEDURE — 83735 ASSAY OF MAGNESIUM: CPT | Performed by: INTERNAL MEDICINE

## 2017-11-20 RX ORDER — PANTOPRAZOLE SODIUM 40 MG/10ML
40 INJECTION, POWDER, LYOPHILIZED, FOR SOLUTION INTRAVENOUS
Status: DISCONTINUED | OUTPATIENT
Start: 2017-11-21 | End: 2017-11-22 | Stop reason: HOSPADM

## 2017-11-20 RX ORDER — PANTOPRAZOLE SODIUM 40 MG/10ML
40 INJECTION, POWDER, LYOPHILIZED, FOR SOLUTION INTRAVENOUS ONCE
Status: COMPLETED | OUTPATIENT
Start: 2017-11-20 | End: 2017-11-20

## 2017-11-20 RX ADMIN — BISACODYL 10 MG: 10 SUPPOSITORY RECTAL at 08:26

## 2017-11-20 RX ADMIN — MORPHINE SULFATE 6 MG: 2 INJECTION, SOLUTION INTRAMUSCULAR; INTRAVENOUS at 14:12

## 2017-11-20 RX ADMIN — DULOXETINE HYDROCHLORIDE 30 MG: 30 CAPSULE, DELAYED RELEASE ORAL at 08:20

## 2017-11-20 RX ADMIN — MORPHINE SULFATE 6 MG: 2 INJECTION, SOLUTION INTRAMUSCULAR; INTRAVENOUS at 03:21

## 2017-11-20 RX ADMIN — PANTOPRAZOLE SODIUM 40 MG: 40 TABLET, DELAYED RELEASE ORAL at 05:49

## 2017-11-20 RX ADMIN — MELOXICAM 15 MG: 15 TABLET ORAL at 08:20

## 2017-11-20 RX ADMIN — SODIUM CHLORIDE 100 ML/HR: 9 INJECTION, SOLUTION INTRAVENOUS at 10:30

## 2017-11-20 RX ADMIN — PANTOPRAZOLE SODIUM 40 MG: 40 INJECTION, POWDER, FOR SOLUTION INTRAVENOUS at 12:27

## 2017-11-20 RX ADMIN — DOCUSATE SODIUM 200 MG: 100 CAPSULE, LIQUID FILLED ORAL at 08:20

## 2017-11-20 RX ADMIN — MORPHINE SULFATE 6 MG: 2 INJECTION, SOLUTION INTRAMUSCULAR; INTRAVENOUS at 07:23

## 2017-11-20 RX ADMIN — HEPARIN SODIUM 5000 UNITS: 5000 INJECTION, SOLUTION INTRAVENOUS; SUBCUTANEOUS at 05:48

## 2017-11-20 RX ADMIN — FAMOTIDINE 20 MG: 20 TABLET ORAL at 08:20

## 2017-11-20 RX ADMIN — HEPARIN SODIUM 5000 UNITS: 5000 INJECTION, SOLUTION INTRAVENOUS; SUBCUTANEOUS at 14:05

## 2017-11-20 RX ADMIN — SODIUM CHLORIDE 100 ML/HR: 9 INJECTION, SOLUTION INTRAVENOUS at 20:07

## 2017-11-20 RX ADMIN — CHLORTHALIDONE 50 MG: 25 TABLET ORAL at 08:20

## 2017-11-20 RX ADMIN — SPIRONOLACTONE 25 MG: 25 TABLET ORAL at 08:20

## 2017-11-20 RX ADMIN — MAGNESIUM OXIDE TAB 400 MG (241.3 MG ELEMENTAL MG) 400 MG: 400 (241.3 MG) TAB at 08:20

## 2017-11-20 RX ADMIN — DOCUSATE SODIUM 200 MG: 100 CAPSULE, LIQUID FILLED ORAL at 17:21

## 2017-11-20 RX ADMIN — SODIUM CHLORIDE 100 ML/HR: 9 INJECTION, SOLUTION INTRAVENOUS at 00:02

## 2017-11-20 RX ADMIN — MORPHINE SULFATE 6 MG: 2 INJECTION, SOLUTION INTRAMUSCULAR; INTRAVENOUS at 18:57

## 2017-11-20 NOTE — PLAN OF CARE
Problem: Patient Care Overview (Adult)  Goal: Plan of Care Review  Outcome: Ongoing (interventions implemented as appropriate)    11/20/17 1348   Coping/Psychosocial Response Interventions   Plan Of Care Reviewed With patient   Patient Care Overview   Progress no change   Outcome Evaluation   Outcome Summary/Follow up Plan bowel sounds active, patient complains of feeling of indigestion, NG output green/brown       Goal: Adult Individualization and Mutuality  Outcome: Ongoing (interventions implemented as appropriate)  Goal: Discharge Needs Assessment  Outcome: Ongoing (interventions implemented as appropriate)    Problem: Pain, Acute (Adult)  Goal: Acceptable Pain Control/Comfort Level  Outcome: Ongoing (interventions implemented as appropriate)    Problem: Fall Risk (Adult)  Goal: Absence of Falls  Outcome: Ongoing (interventions implemented as appropriate)    Problem: Bowel Obstruction (Adult)  Goal: Signs and Symptoms of Listed Potential Problems Will be Absent or Manageable (Bowel Obstruction)  Outcome: Ongoing (interventions implemented as appropriate)

## 2017-11-20 NOTE — PLAN OF CARE
Problem: Patient Care Overview (Adult)  Goal: Plan of Care Review  Outcome: Ongoing (interventions implemented as appropriate)    11/20/17 0310   Coping/Psychosocial Response Interventions   Plan Of Care Reviewed With patient   Patient Care Overview   Progress no change   Outcome Evaluation   Outcome Summary/Follow up Plan Bowel sounds normoactive, continuing to have bm's, pain controlled with morphine. Ng output light green.       Goal: Adult Individualization and Mutuality  Outcome: Ongoing (interventions implemented as appropriate)  Goal: Discharge Needs Assessment  Outcome: Ongoing (interventions implemented as appropriate)    Problem: Pain, Acute (Adult)  Goal: Acceptable Pain Control/Comfort Level  Outcome: Ongoing (interventions implemented as appropriate)    Problem: Fall Risk (Adult)  Goal: Absence of Falls  Outcome: Ongoing (interventions implemented as appropriate)    Problem: Bowel Obstruction (Adult)  Goal: Signs and Symptoms of Listed Potential Problems Will be Absent or Manageable (Bowel Obstruction)  Outcome: Ongoing (interventions implemented as appropriate)

## 2017-11-20 NOTE — PAYOR COMM NOTE
"Amira Coleman (57 y.o. Female)     Date of Birth Social Security Number Address Home Phone MRN    [id number]  [id number] [facility] [address] [phone] [id number]    Orthodoxy Marital Status          Jainism        Admission Date Admission Type Admitting Provider Attending Provider Department, Room/Bed    11/17/17 Emergency Kendall Chen MD Amsler, Haizia L, MD [address], 372/1    Discharge Date Discharge Disposition Discharge Destination                      Attending Provider: Kendall Chen MD     Allergies:  Other, Sulfa Antibiotics    Isolation:  None   Infection:  None   Code Status:  FULL    Ht:  64\" (162.6 cm)   Wt:  218 lb 11.2 oz (99.2 kg)    Admission Cmt:  None   Principal Problem:  Abdominal pain [R10.9]                 Active Insurance as of 11/17/2017     Primary Coverage     Payor Plan Insurance Group Employer/Plan Group    [employer] MEDICAID HU[employer]NA [employer] [facility]     Payor Plan Address Payor Plan Phone Number Effective From Effective To    PO  [phone] 1/1/2014     [place] [address]       Subscriber Name Subscriber Birth Date Member ID       AMIRA COLEMAN 1960 [id number]                 Emergency Contacts      (Rel.) Home Phone Work Phone Mobile Phone    Ross Coleman (Spouse) [phone]          Pt. Was observation 11/17/19 changed to inpatient admission 11/19/17    Call back [phone] fax [phone]      Problem List           Codes Noted - Resolved       Hospital    * (Principal)Abdominal pain ICD-10-CM: R10.9  ICD-9-CM: 789.00 11/17/2017 - Present    Constipation ICD-10-CM: K59.00  ICD-9-CM: 564.00 Unknown - Present    Acid reflux ICD-10-CM: K21.9  ICD-9-CM: 530.81 Unknown - Present    Depression ICD-10-CM: F32.9  ICD-9-CM: 311 Unknown - Present    Disease related peripheral neuropathy ICD-10-CM: G62.89  ICD-9-CM: 356.8 Unknown - Present    Epigastric pain " ICD-10-CM: R10.13  ICD-9-CM: 789.06 Unknown - Present    Hyperlipidemia ICD-10-CM: E78.5  ICD-9-CM: 272.4 Unknown - Present    Nausea and vomiting ICD-10-CM: R11.2  ICD-9-CM: 787.01 Unknown - Present    Restless leg syndrome ICD-10-CM: G25.81  ICD-9-CM: 333.94 Unknown - Present    Sleep apnea ICD-10-CM: G47.30  ICD-9-CM: 780.57 Unknown - Present       Non-Hospital    Chronic fatigue ICD-10-CM: R53.82  ICD-9-CM: 780.79 11/14/2017 - Present    Thrombocytopenia ICD-10-CM: D69.6  ICD-9-CM: 287.5 11/13/2017 - Present    Dyspnea ICD-10-CM: R06.00  ICD-9-CM: 786.09 11/1/2017 - Present    Diastolic dysfunction ICD-10-CM: I51.9  ICD-9-CM: 429.9 11/1/2017 - Present    Obesity with body mass index of 30.0-39.9 ICD-10-CM: E66.9  ICD-9-CM: 278.00 10/9/2017 - Present    Chronic pain of both knees ICD-10-CM: M25.561, M25.562, G89.29  ICD-9-CM: 719.46, 338.29 10/8/2017 - Present    Bilateral calcaneal spurs ICD-10-CM: M77.31, M77.32  ICD-9-CM: 726.73 10/8/2017 - Present    Swelling of both lower extremities ICD-10-CM: M79.89  ICD-9-CM: 729.81 10/8/2017 - Present    Bilateral lower extremity edema ICD-10-CM: R60.0  ICD-9-CM: 782.3 9/29/2017 - Present    Tongue lesion ICD-10-CM: K14.8  ICD-9-CM: 529.8 7/19/2017 - Present    MUSA (nonalcoholic steatohepatitis) ICD-10-CM: K75.81  ICD-9-CM: 571.8 7/13/2017 - Present    Bilateral foot pain ICD-10-CM: M79.671, M79.672  ICD-9-CM: 729.5 12/9/2016 - Present    Right hip pain ICD-10-CM: M25.551  ICD-9-CM: 719.45 12/9/2016 - Present    Acute pain of both knees ICD-10-CM: M25.561, M25.562  ICD-9-CM: 338.19, 719.46 12/9/2016 - Present    Plantar fasciitis, bilateral ICD-10-CM: M72.2  ICD-9-CM: 728.71 12/9/2016 - Present    Primary osteoarthritis of knee ICD-10-CM: M17.10  ICD-9-CM: 715.16 12/9/2016 - Present             History & Physical      Harshul Frank Curran MD at 11/17/2017 10:24 PM                [facility]  INPATIENT HISTORY AND  PHYSICAL       Patient Care Team:  Yobany Barr MD as PCP - General  Daren Marie DPM as Consulting Physician (Podiatry)    Chief complaint   Chief Complaint   Patient presents with   • Abdominal Pain       Subjective     Patient is a 57 y.o. female presents with Complaint of having abdominal pain.  Patient states she is having constipation on and off for past few months however last bowel movement was 3 days ago.  Patient states she's been having abdominal pain on and off however had significantly worsened this evening which was causing her extreme discomfort and she decided to come to emergency further evaluation.  Patient denies any fever or chills associated with it.  No nausea or vomiting have been reported however persistency of time on pain was reported.  No weight loss we can have been reported.  Patient states she has been feeling well prior to this.  Patient states she has small bowel torsion and passed fresh required surgical intervention.    Review of Systems   Review of Systems   Constitutional: Positive for appetite change. Negative for chills, diaphoresis and fever.   HENT: Negative for congestion, rhinorrhea, sore throat and trouble swallowing.    Eyes: Negative for visual disturbance.   Respiratory: Negative for cough, chest tightness, shortness of breath and wheezing.    Cardiovascular: Negative for chest pain, palpitations and leg swelling.   Gastrointestinal: Positive for abdominal distention and abdominal pain. Negative for blood in stool, diarrhea, nausea and vomiting.   Endocrine: Negative for cold intolerance and heat intolerance.   Genitourinary: Negative for decreased urine volume and difficulty urinating.   Musculoskeletal: Negative for back pain, gait problem and neck pain.   Skin: Negative for rash.   Neurological: Negative for dizziness, syncope, weakness, light-headedness, numbness and headaches.   Psychiatric/Behavioral: The patient is not nervous/anxious.        History  Past  "Medical History:   Diagnosis Date   • Acid reflux    • Altered bowel function    • Arthropathy of lumbar facet joint    • Bleeding disorder    • Constipation    • Corns and callus    • Depression    • Disease related peripheral neuropathy    • Epigastric pain    • Fatty liver    • Hammer toe    • Headache    • Hiatal hernia    • Hyperlipidemia    • Knee pain    • Localized, primary osteoarthritis of the ankle and foot     Localized, primary osteoarthritis of the ankle and/or foot   • Mendoza's metatarsalgia     Mendoza's metatarsalgia - 2nd interspace on right   • Nausea and vomiting    • Neuralgia and neuritis     Neuralgia, neuritis, and radiculitis, unspecified   • Neuropathy    • Obstructive sleep apnea     Obstructive sleep apnea (adult) (pediatric)    • CHAI on CPAP     \"C-Pap at night  (unconfirmed)\"   • Osteoarthritis    • Pain in foot     Pain in unspecified foot - sees a podiatrist   • Pain in joint, ankle and foot     Joint pain in ankle and foot      • Pain radiating to back     Pain radiating to lumbar region of back   • Plantar fasciitis    • Restless leg syndrome    • Secondary hypertension     Secondary hypertension, unspecified   • Sinusitis    • Sleep apnea    • Tongue anomaly     lesion     Past Surgical History:   Procedure Laterality Date   •  SECTION     • CHOLECYSTECTOMY     • COLONOSCOPY  2013   • DIRECT LARYNGOSCOPY, ESOPHAGOSCOPY, BRONCHOSCOPY N/A 2017    Procedure: DIRECT LARYNGOSCOPY AND;  Surgeon: Live Bolton MD;  Location: [facility];  Service:    • ENDOSCOPY  2013    Colon endoscopy 95653 (1) - Internal & external hemorrhoids found. Stool found.   • ENDOSCOPY  2013    EGD w/ tube 61858 (1) - Normal esophagus. Gastritis in stomach. Biopsy taken. Normal dudoenum. Biopsy taken.   • FOOT SURGERY  2013    Foot/toes surgery procedure (1) - Arthroplasty of toes 4 and 5 of right foot.   • HERNIA REPAIR     • HERNIA REPAIR      hital   • HYSTERECTOMY     • " LIVER BIOPSY     • NERVE BLOCK  07/25/2016    Injection for nerve block (1) - Lumbar medial branch block.   • OTHER SURGICAL HISTORY  12/03/2012    Inj(s) Tend-Sheath, Ligament, Single 20550 (1) - PORTER NICKERSON ([facility])    • OTHER SURGICAL HISTORY  06/24/2013    Small Joint Injection/Aspiration 20600 (2) - PORTER NICKERSON ([facility])    • OTHER SURGICAL HISTORY  2011    bowel obstruction x2   • OTHER SURGICAL HISTORY      gland removed from neck   • SUBLINGUAL SALIVARY CYST EXCISION N/A 8/1/2017    Procedure: EXCISION OF LEFT  TONGUE LESION WITH CLOSURE;  Surgeon: Live Bolton MD;  Location: [facility];  Service:    • TUBAL ABDOMINAL LIGATION     • UPPER GASTROINTESTINAL ENDOSCOPY  07/01/2013     Family History   Problem Relation Age of Onset   • Cancer Other    • Diabetes Other    • Heart disease Other    • Hypertension Mother    • Cancer Mother    • Diabetes Mother    • Hypertension Father    • Heart attack Father    • Cancer Father    • Heart disease Father    • Thyroid disease Maternal Aunt      Social History   Substance Use Topics   • Smoking status: Former Smoker     Types: Cigarettes   • Smokeless tobacco: Never Used   • Alcohol use No     Prescriptions Prior to Admission   Medication Sig Dispense Refill Last Dose   • cetirizine (zyrTEC) 10 MG tablet Take 1 tablet by mouth Daily As Needed for Allergies. 30 tablet 11    • chlorthalidone (HYGROTEN) 50 MG tablet Take 1 tablet by mouth Daily. 30 tablet 3    • cyclobenzaprine (FLEXERIL) 10 MG tablet Take 10 mg by mouth 2 (Two) Times a Day As Needed for Muscle Spasms.      • DULoxetine (CYMBALTA) 30 MG capsule Take 30 mg by mouth Daily.      • gabapentin (NEURONTIN) 800 MG tablet Take 800 mg by mouth 4 (Four) Times a Day.      • linaclotide (LINZESS) 145 MCG capsule capsule Take 145 mcg by mouth 2 (Two) Times a Day.      • magnesium oxide (MAGOX) 400 (241.3 MG) MG tablet tablet Take 400 mg by mouth Daily.      • meloxicam (MOBIC) 15 MG tablet Take 1  tablet by mouth Daily. Take once daily. 30 tablet 0    • mupirocin (BACTROBAN) 2 % ointment Apply  topically 3 (Three) Times a Day. (Patient taking differently: Apply 1 application topically 3 (Three) Times a Day.) 1 g 2 Taking   • nystatin (MYCOSTATIN) 851632 UNIT/GM powder Apply 1 application topically As Needed.   Taking   • omeprazole (priLOSEC) 20 MG capsule Take 20 mg by mouth Daily.      • ondansetron (ZOFRAN) 4 MG tablet Take 4 mg by mouth Every 8 (Eight) Hours As Needed.      • oxyCODONE-acetaminophen (PERCOCET) 7.5-325 MG per tablet Take 1 tablet by mouth Every 8 (Eight) Hours As Needed.   Taking   • oxyCODONE-acetaminophen (PERCOCET) 7.5-325 MG per tablet Take 1 tablet by mouth 3 (Three) Times a Day for 30 days. 90 tablet 0 Taking   • oxyCODONE-acetaminophen (PERCOCET) 7.5-325 MG per tablet Take 1 tablet by mouth 3 (Three) Times a Day for 30 days. 90 tablet 0 Taking   • spironolactone (ALDACTONE) 25 MG tablet Take 1 tablet by mouth Daily. 30 tablet 3      Allergies:  Other and Sulfa antibiotics  Prior to Admission medications    Medication Sig Start Date End Date Taking? Authorizing Provider   cetirizine (zyrTEC) 10 MG tablet Take 1 tablet by mouth Daily As Needed for Allergies. 8/31/17  Yes Live Bolton MD   chlorthalidone (HYGROTEN) 50 MG tablet Take 1 tablet by mouth Daily. 11/1/17  Yes LIYAH Melchor   cyclobenzaprine (FLEXERIL) 10 MG tablet Take 10 mg by mouth 2 (Two) Times a Day As Needed for Muscle Spasms.   Yes Historical Provider, MD   DULoxetine (CYMBALTA) 30 MG capsule Take 30 mg by mouth Daily.   Yes Historical Provider, MD   gabapentin (NEURONTIN) 800 MG tablet Take 800 mg by mouth 4 (Four) Times a Day.   Yes Historical Provider, MD   linaclotide (LINZESS) 145 MCG capsule capsule Take 145 mcg by mouth 2 (Two) Times a Day.   Yes Historical Provider, MD   magnesium oxide (MAGOX) 400 (241.3 MG) MG tablet tablet Take 400 mg by mouth Daily.   Yes Historical Provider, MD   meloxicam  (MOBIC) 15 MG tablet Take 1 tablet by mouth Daily. Take once daily. 11/13/17  Yes Daren Marie DPM   mupirocin (BACTROBAN) 2 % ointment Apply  topically 3 (Three) Times a Day.  Patient taking differently: Apply 1 application topically 3 (Three) Times a Day. 10/18/17  Yes Live Bolton MD   nystatin (MYCOSTATIN) 083576 UNIT/GM powder Apply 1 application topically As Needed. 10/11/17  Yes Historical Provider, MD   omeprazole (priLOSEC) 20 MG capsule Take 20 mg by mouth Daily.   Yes Historical Provider, MD   ondansetron (ZOFRAN) 4 MG tablet Take 4 mg by mouth Every 8 (Eight) Hours As Needed. 9/25/17  Yes Historical Provider, MD   oxyCODONE-acetaminophen (PERCOCET) 7.5-325 MG per tablet Take 1 tablet by mouth Every 8 (Eight) Hours As Needed. 10/17/17  Yes Historical Provider, MD   oxyCODONE-acetaminophen (PERCOCET) 7.5-325 MG per tablet Take 1 tablet by mouth 3 (Three) Times a Day for 30 days. 11/9/17 12/9/17 Yes Hua Westfall MD   oxyCODONE-acetaminophen (PERCOCET) 7.5-325 MG per tablet Take 1 tablet by mouth 3 (Three) Times a Day for 30 days. 11/9/17 12/9/17 Yes Hua Westfall MD   spironolactone (ALDACTONE) 25 MG tablet Take 1 tablet by mouth Daily. 11/1/17  Yes LIYAH Melchor       Objective     Vital Signs    Temp:  [98.1 °F (36.7 °C)-98.2 °F (36.8 °C)] 98.2 °F (36.8 °C)  Heart Rate:  [] 83  Resp:  [18-20] 18  BP: (132-157)/(88-95) 157/88    Physical Exam:      Physical Exam   Constitutional: She is oriented to person, place, and time. She appears well-developed and well-nourished.   HENT:   Head: Normocephalic and atraumatic.   Nose: Nose normal.   Eyes: Conjunctivae and EOM are normal. Pupils are equal, round, and reactive to light.   Neck: Normal range of motion. Neck supple. No JVD present. No tracheal deviation present. No thyromegaly present.   Cardiovascular: Normal rate, regular rhythm, normal heart sounds and intact distal pulses.    Pulmonary/Chest: Effort normal and breath  sounds normal. No respiratory distress. She has no wheezes. She has no rales. She exhibits no tenderness.   Abdominal: Soft. Bowel sounds are normal. She exhibits distension. She exhibits no mass. There is tenderness. There is guarding. There is no rebound.   Musculoskeletal: Normal range of motion. She exhibits no edema.   Lymphadenopathy:     She has no cervical adenopathy.   Neurological: She is alert and oriented to person, place, and time. She has normal reflexes. No cranial nerve deficit.   Skin: Skin is warm and dry.   Intact   Psychiatric: She has a normal mood and affect.   Nursing note and vitals reviewed.      Results Review:       Results from last 7 days  Lab Units 11/17/17  1740 11/14/17  0946   SODIUM mmol/L 138 139   POTASSIUM mmol/L 3.9 3.5   CHLORIDE mmol/L 96 101   CO2 mmol/L 27.0 27.0   BUN mg/dL 16 13   CREATININE mg/dL 1.09* 1.07*   GLUCOSE mg/dL 124* 124*   CALCIUM mg/dL 10.5* 9.5   BILIRUBIN mg/dL 1.1  --    ALK PHOS U/L 110  --    ALT (SGPT) U/L 76*  --    AST (SGOT) U/L 80*  --                Results from last 7 days  Lab Units 11/17/17  1713 11/14/17  0946   WBC 10*3/mm3 11.03* 5.49   HEMOGLOBIN g/dL 15.7* 15.3   HEMATOCRIT % 45.1* 45.4*   PLATELETS 10*3/mm3 106*  --            Imaging Results (last 7 days)     Procedure Component Value Units Date/Time    CT Abdomen Pelvis With Contrast [[id number]] Collected:  11/17/17 1857     Updated:  11/17/17 1936    Narrative:         EXAMINATION:  Computed Tomography           REGION:    Abdomen / Pelvis                     INDICATION:  Abdominal pain  HISTORY:    Bowel obstruction      RUTH. IMAGING:    none            TECHNIQUE:      - reconstructions:    axial, coronal, sagittal         - contrast:      oral:  No ;   intravenous: Isovue 300, 100 mL  This exam was performed according to the departmental  dose-optimization program which includes automated exposure  control, adjustment of the mA and/or kV according to patient size  and/or use of  iterative reconstruction technique.           COMMENTS:            - - - CT ABDOMEN - - -          THORAX (INFERIOR):      - LUNG BASES:  clear      - PLEURA:    no fluid or mass      - HEART:    normal size, no pericardial fluid     - MISC:      n/a          ABDOMEN:     - LIVER:    normal size / contour, no ductal dilatation , no  focal lesion   - GB:      Surgically absent    - CBD:      grossly negative   - SPLEEN:    normal size and contour   - PANCREAS:    normal in size, contour, no focal mass    - VISCERA:    - There are multiple fluid-filled small bowel loops at the upper  limits of normal/mildly enlarged in the proximal most aspect. In  the mid small bowel is a short segment of enhancing small bowel  wall of normal caliber (coronal 12 - 18/67, axial 61/111).  - Additionally, in the region of the terminal ileum there is a  suggestion of mild degree of edema associated with the wall of  the terminal ileum, and the diminished luminal caliber. No gross  evidence of serosal inflammatory changes or adjacent mesenteric  changes. (Coronal 23/67, 27/67).     - MESENTERY:  no mesenteric mass   - CAVITY:    no free abdominal fluid, no free intraperitoneal  air   - BODY WALL:  wnl   - OSSEOUS:    grossly negative for age   - MISC:                                      RETROPERITONEUM:   - KIDNEYS:    normal size / contour, no collecting system  dilation        no evidence of an enhancing mass   - URETERS:    normal course, caliber   - ADRENALS:    normal size, contour   - MISC:      no sig retroperitoneal adenopathy or mass   - VASCULAR:    aorta / iliacs: wnl for age     - - - CT PELVIS - - -      - VISCERA:    As above - MESENTERY:  no mass   - VASCULAR:    wnl for age   - CAVITY:    no free fluid / air   - BLADDER:    unremarkable   - OSSEOUS:    wnl    - MISC:   - UTERUS/OVARY:  Status post hysterectomy   .       Impression:        CONCLUSION:  1. The proximal small bowel is fluid-filled with a caliber at  the  upper limits of normal/mildly enlarged.  2. There is a zone of soft transition in the mid abdomen in a  region of normal caliber bowel loop with a short segment of  focally enhancing wall.  3. Indeterminate findings in the terminal ileum suggesting mild  bowel wall edematous changes with luminal narrowing.    The constellation of findings is suspicious for the presence of  multifocal Crohn's disease.    Electronically signed by:  JONO Cobb MD  11/17/2017 7:35  PM CST Workstation: [id number]          Assessment/Plan     Active Problems:    Abdominal pain    Constipation    Acid reflux    Depression    Disease related peripheral neuropathy    Epigastric pain    Hyperlipidemia    Nausea and vomiting    Restless leg syndrome    Sleep apnea      -We'll keep NG tube.  -We'll follow surgical recommendation.  -We'll keep nothing by mouth at this point in time.  -We'll place patient on DVT and GI prophylaxis.  -We'll continue monitoring patient hospital setting and treat patient as course dictates.    I discussed the patients findings and my recommendations with patient and nursing staff.         This document has been electronically signed by Sherie Curran MD on November 17, 2017 10:24 PM        Total Time Spent: 45 mins    EMR Dragon/Transcription disclaimer:   Dictated utilizing Dragon dictation.            Electronically signed by Sherie Curran MD at 11/18/2017 12:13 AM        Vital Signs (last 72 hrs)       11/17 0700  -  11/18 0659 11/18 0700  -  11/19 0659 11/19 0700  -  11/20 0659 11/20 0700  -  11/20 1056   Most Recent    Temp (°F) 96.4 -  98.2    96.7 -  98    97.1 -  98.4       97.1 (36.2)    Heart Rate 77 -  110    72 -  83    71 -  94       72    Resp 18 -  20 18 - 19 18       18    /63 -  157/88    114/65 -  127/66    124/60 -  142/86       130/78    SpO2 (%) 91 -  96    92 -  96    94 -  95       95          Intake & Output (last 3 days)       11/17 0701 -  11/18 0700 11/18 0701 - 11/19 0700 11/19 0701 - 11/20 0700 11/20 0701 - 11/21 0700    P.O.  0 0     I.V. (mL/kg)   1950 (19.7) 1000 (10.1)    Other 45 30      Total Intake(mL/kg) 45 (0.5) 30 (0.3) 1950 (19.7) 1000 (10.1)    Urine (mL/kg/hr) 150 900 (0.4) 900 (0.4)     Emesis/NG output  0 (0)      Other  800 (0.3) 1175 (0.5)     Stool  0 (0) 0 (0)     Total Output 150 1700 2075      Net -105 -1670 -125 +1000            Unmeasured Urine Occurrence 2 x 1 x 2 x 1 x    Unmeasured Stool Occurrence   6 x         Hospital Medications (all)       Dose Frequency Start End    acetaminophen (TYLENOL) tablet 650 mg 650 mg Every 4 Hours PRN 11/17/2017     Sig - Route: Take 2 tablets by mouth Every 4 (Four) Hours As Needed for Mild Pain . - Oral    bisacodyl (DULCOLAX) suppository 10 mg 10 mg Daily PRN 11/17/2017     Sig - Route: Insert 1 suppository into the rectum Daily As Needed for Constipation. - Rectal    bisacodyl (DULCOLAX) suppository 10 mg 10 mg Daily 11/19/2017     Sig - Route: Insert 1 suppository into the rectum Daily. - Rectal    chlorthalidone (HYGROTON) tablet 50 mg 50 mg Daily 11/18/2017     Sig - Route: Take 2 tablets by mouth Daily. - Oral    docusate sodium (COLACE) capsule 200 mg 200 mg 2 Times Daily 11/18/2017     Sig - Route: Take 2 capsules by mouth 2 (Two) Times a Day. - Oral    DULoxetine (CYMBALTA) DR capsule 30 mg 30 mg Daily 11/18/2017     Sig - Route: Take 1 capsule by mouth Daily. - Oral    famotidine (PEPCID) tablet 20 mg 20 mg 2 Times Daily 11/18/2017     Sig - Route: Take 1 tablet by mouth 2 (Two) Times a Day. - Oral    glycerin (ADULT) rectal suppository 2 g 2 g Once 11/19/2017     Sig - Route: Insert 1 each into the rectum 1 (One) Time. - Rectal    heparin (porcine) 5000 UNIT/ML injection 5,000 Units 5,000 Units Every 8 Hours Scheduled 11/17/2017     Sig - Route: Inject 1 mL under the skin Every 8 (Eight) Hours. - Subcutaneous    hydrALAZINE (APRESOLINE) injection 10 mg 10 mg Every 6 Hours PRN  11/17/2017     Sig - Route: Infuse 0.5 mL into a venous catheter Every 6 (Six) Hours As Needed for High Blood Pressure (SBP > 160). - Intravenous    influenza vac split quad (FLUZONE QUADRIVALENT) IM suspension 0.5 mL 0.5 mL During Hospitalization 11/18/2017     Sig - Route: Inject 0.5 mL into the shoulder, thigh, or buttocks During Hospitalization for Immunization. - Intramuscular    iopamidol (ISOVUE-300) 61 % injection 100 mL 100 mL Once in Imaging 11/17/2017 11/17/2017    Sig - Route: Infuse 100 mL into a venous catheter Once. - Intravenous    magnesium hydroxide (MILK OF MAGNESIA) suspension 2400 mg/10mL 10 mL 10 mL Daily PRN 11/17/2017     Sig - Route: Take 10 mL by mouth Daily As Needed for Constipation. - Oral    magnesium oxide (MAGOX) tablet 400 mg 400 mg Daily 11/18/2017     Sig - Route: Take 1 tablet by mouth Daily. - Oral    meloxicam (MOBIC) tablet 15 mg 15 mg Daily 11/18/2017     Sig - Route: Take 1 tablet by mouth Daily. - Oral    morphine injection 4 mg 4 mg Once 11/17/2017 11/17/2017    Sig - Route: Infuse 2 mL into a venous catheter 1 (One) Time. - Intravenous    morphine injection 4 mg 4 mg Once 11/17/2017 11/17/2017    Sig - Route: Infuse 2 mL into a venous catheter 1 (One) Time. - Intravenous    morphine injection 6 mg 6 mg Every 4 Hours PRN 11/19/2017 11/29/2017    Sig - Route: Infuse 3 mL into a venous catheter Every 4 (Four) Hours As Needed for Severe Pain . - Intravenous    nicotine (NICODERM CQ) 21 MG/24HR patch 1 patch 1 patch Every 24 Hours 11/17/2017     Sig - Route: Place 1 patch on the skin Daily. - Transdermal    ondansetron (ZOFRAN) injection 4 mg 4 mg Once 11/17/2017 11/17/2017    Sig - Route: Infuse 2 mL into a venous catheter 1 (One) Time. - Intravenous    ondansetron (ZOFRAN) injection 4 mg 4 mg Once 11/17/2017 11/17/2017    Sig - Route: Infuse 2 mL into a venous catheter 1 (One) Time. - Intravenous    ondansetron (ZOFRAN) injection 4 mg 4 mg Every 6 Hours PRN 11/17/2017     Sig  "- Route: Infuse 2 mL into a venous catheter Every 6 (Six) Hours As Needed for Nausea or Vomiting. - Intravenous    Linked Group 1:  \"Or\" Linked Group Details        ondansetron (ZOFRAN) tablet 4 mg 4 mg Every 6 Hours PRN 11/17/2017     Sig - Route: Take 1 tablet by mouth Every 6 (Six) Hours As Needed for Nausea or Vomiting. - Oral    Linked Group 1:  \"Or\" Linked Group Details        ondansetron ODT (ZOFRAN-ODT) disintegrating tablet 4 mg 4 mg Every 6 Hours PRN 11/17/2017     Sig - Route: Take 1 tablet by mouth Every 6 (Six) Hours As Needed for Nausea or Vomiting. - Oral    Linked Group 1:  \"Or\" Linked Group Details        pantoprazole (PROTONIX) EC tablet 40 mg 40 mg Every Early Morning 11/18/2017     Sig - Route: Take 1 tablet by mouth Every Morning. - Oral    phenylephrine-mineral oil-petrolatum 0.25-14-74.9 % hemorhoidal ointment  3 Times Daily PRN 11/19/2017     Sig - Route: Insert  into the rectum 3 (Three) Times a Day As Needed (rectal pain). - Rectal    sodium chloride 0.9 % flush 1-10 mL 1-10 mL As Needed 11/17/2017     Sig - Route: Infuse 1-10 mL into a venous catheter As Needed for Line Care. - Intravenous    sodium chloride 0.9 % flush 10 mL 10 mL As Needed 11/17/2017     Sig - Route: Infuse 10 mL into a venous catheter As Needed for Line Care. - Intravenous    sodium chloride 0.9 % infusion 1,000 mL 1,000 mL Once 11/17/2017 11/17/2017    Sig - Route: Infuse 1,000 mL into a venous catheter 1 (One) Time. - Intravenous    sodium chloride 0.9 % infusion 100 mL/hr Continuous 11/17/2017     Sig - Route: Infuse 100 mL/hr into a venous catheter Continuous. - Intravenous    spironolactone (ALDACTONE) tablet 25 mg 25 mg Daily 11/18/2017     Sig - Route: Take 1 tablet by mouth Daily. - Oral    hydrALAZINE (APRESOLINE) injection 10 mg (Discontinued) 10 mg Every 6 Hours PRN 11/17/2017 11/17/2017    Sig - Route: Infuse 0.5 mL into a venous catheter Every 6 (Six) Hours As Needed for High Blood Pressure (SBP > 160). - " Intravenous    Reason for Discontinue: Duplicate order    HYDROmorphone HCl-NaCl (DILAUDID) 0.5-0.9 MG/ML-% injection 0.5 mg (Discontinued) 0.5 mg Every 4 Hours PRN 11/17/2017 11/17/2017    Sig - Route: Infuse 1 mL into a venous catheter Every 4 (Four) Hours As Needed for Severe Pain . - Intravenous    Reason for Discontinue: Availability    morphine injection 6 mg (Discontinued) 6 mg Every 4 Hours PRN 11/17/2017 11/18/2017    Sig - Route: Infuse 3 mL into a venous catheter Every 4 (Four) Hours As Needed for Severe Pain . - Intravenous    Reason for Discontinue: Formulary change    morphine injection 6 mg (Discontinued) 6 mg Every 4 Hours PRN 11/18/2017 11/19/2017    Sig - Route: Infuse 1.2 mL into a venous catheter Every 4 (Four) Hours As Needed for Severe Pain . - Intravenous    Reason for Discontinue: Formulary change    ondansetron (ZOFRAN) tablet 4 mg (Discontinued) 4 mg Every 6 Hours PRN 11/17/2017 11/18/2017    Sig - Route: Take 1 tablet by mouth Every 6 (Six) Hours As Needed for Nausea or Vomiting. - Oral    oxyCODONE-acetaminophen (PERCOCET) 7.5-325 MG per tablet 1 tablet (Discontinued) 1 tablet 3 Times Daily 11/17/2017 11/18/2017    Sig - Route: Take 1 tablet by mouth 3 (Three) Times a Day. - Oral    oxyCODONE-acetaminophen (PERCOCET) 7.5-325 MG per tablet 1 tablet (Discontinued) 1 tablet 3 Times Daily 11/17/2017 11/17/2017    Sig - Route: Take 1 tablet by mouth 3 (Three) Times a Day. - Oral    Reason for Discontinue: Duplicate order    sodium chloride 0.9 % infusion (Discontinued) 125 mL/hr Continuous 11/17/2017 11/18/2017    Sig - Route: Infuse 125 mL/hr into a venous catheter Continuous. - Intravenous    sodium chloride 0.9 % infusion (Discontinued) 125 mL/hr Continuous 11/17/2017 11/18/2017    Sig - Route: Infuse 125 mL/hr into a venous catheter Continuous. - Intravenous          Lab Results (last 72 hours)     Procedure Component Value Units Date/Time    CBC Auto Differential [[id number]]  (Abnormal)  Collected:  11/17/17 1713    Specimen:  Blood Updated:  11/17/17 1718     WBC 11.03 (H) 10*3/mm3      RBC 5.66 (H) 10*6/mm3      Hemoglobin 15.7 (H) g/dL      Hematocrit 45.1 (H) %      MCV 79.7 (L) fL      MCH 27.7 pg      MCHC 34.8 g/dL      RDW 14.7 (H) %      RDW-SD 42.4 fl      MPV 11.5 fL      Platelets 106 (L) 10*3/mm3      Neutrophil % 86.8 (H) %      Lymphocyte % 7.6 (L) %      Monocyte % 4.4 %      Eosinophil % 1.0 %      Basophil % 0.0 %      Immature Grans % 0.2 %      Neutrophils, Absolute 9.57 (H) 10*3/mm3      Lymphocytes, Absolute 0.84 10*3/mm3      Monocytes, Absolute 0.49 10*3/mm3      Eosinophils, Absolute 0.11 10*3/mm3      Basophils, Absolute 0.00 10*3/mm3      Immature Grans, Absolute 0.02 10*3/mm3     Lipase [[id number]]  (Normal) Collected:  11/17/17 1740    Specimen:  Blood from Arm, Left Updated:  11/17/17 1758     Lipase 81 U/L     Comprehensive Metabolic Panel [[id number]]  (Abnormal) Collected:  11/17/17 1740    Specimen:  Blood from Arm, Left Updated:  11/17/17 1759     Glucose 124 (H) mg/dL      BUN 16 mg/dL      Creatinine 1.09 (H) mg/dL      Sodium 138 mmol/L      Potassium 3.9 mmol/L      Chloride 96 mmol/L      CO2 27.0 mmol/L      Calcium 10.5 (H) mg/dL      Total Protein 7.9 g/dL      Albumin 4.50 g/dL      ALT (SGPT) 76 (H) U/L      AST (SGOT) 80 (H) U/L      Alkaline Phosphatase 110 U/L      Total Bilirubin 1.1 mg/dL      eGFR Non African Amer 52 mL/min/1.73      Globulin 3.4 gm/dL      A/G Ratio 1.3 g/dL      BUN/Creatinine Ratio 14.7     Anion Gap 15.0 mmol/L     CBC & Differential [[id number]] Collected:  11/17/17 1713    Specimen:  Blood Updated:  11/17/17 1804    Narrative:       The following orders were created for panel order CBC & Differential.  Procedure                               Abnormality         Status                     ---------                               -----------         ------                     Scan Slide[[id number]]                                        Final result               CBC Auto Differential[[id number]]        Abnormal            Final result                 Please view results for these tests on the individual orders.    Scan Slide [[id number]] Collected:  11/17/17 1713    Specimen:  Blood Updated:  11/17/17 1804     RBC Morphology Normal     WBC Morphology Normal     Platelet Estimate Decreased    Light Blue Top [[id number]] Collected:  11/17/17 1713    Specimen:  Blood Updated:  11/17/17 1816     Extra Tube hold for add-on      Auto resulted       Lavender Top [[id number]] Collected:  11/17/17 1713    Specimen:  Blood Updated:  11/17/17 1816     Extra Tube hold for add-on      Auto resulted       Gold Top - SST [[id number]] Collected:  11/17/17 1713    Specimen:  Blood Updated:  11/17/17 1816     Extra Tube Hold for add-ons.      Auto resulted.       [place] Draw [[id number]] Collected:  11/17/17 1713    Specimen:  Blood Updated:  11/17/17 1846    Narrative:       The following orders were created for panel order [place] Draw.  Procedure                               Abnormality         Status                     ---------                               -----------         ------                     Light Blue Top[[id number]]                                   Final result               Green Top (Gel)[[id number]]                                  Final result               Lavender Top[[id number]]                                     Final result               Gold Top - SST[[id number]]                                   Final result                 Please view results for these tests on the individual orders.    Green Top (Gel) [[id number]] Collected:  11/17/17 1740    Specimen:  Blood from Arm, Left Updated:  11/17/17 1846     Extra Tube Hold for add-ons.      Auto resulted.       Urinalysis With / Culture If Indicated - Urine, Clean Catch [[id number]]  (Abnormal) Collected:  11/17/17 1831    Specimen:  Urine from Urine, Clean Catch Updated:  11/17/17 1851      Color, UA Yellow     Appearance, UA Clear     pH, UA 7.0     Specific Gravity, UA 1.015     Glucose, UA Negative     Ketones, UA Negative     Bilirubin, UA Negative     Blood, UA Negative     Protein, UA 30 mg/dL (1+) (A)     Leuk Esterase, UA Negative     Nitrite, UA Negative     Urobilinogen, UA 0.2 E.U./dL    Urinalysis, Microscopic Only - Urine, Clean Catch [[id number]]  (Abnormal) Collected:  11/17/17 1831    Specimen:  Urine from Urine, Clean Catch Updated:  11/17/17 1859     RBC, UA 0-2 (A) /HPF      WBC, UA 0-2 /HPF      Bacteria, UA None Seen /HPF      Squamous Epithelial Cells, UA None Seen /HPF      Hyaline Casts, UA 0-2 /LPF      Methodology Automated Microscopy    Urinalysis With / Culture If Indicated - [[id number]]  (Normal) Collected:  11/18/17 0117    Specimen:  Urine Updated:  11/18/17 0148     Color, UA Yellow     Appearance, UA Clear     pH, UA 6.0     Specific Gravity, UA 1.020      Result obtained by Refractometer        Glucose, UA Negative     Ketones, UA Negative     Bilirubin, UA Negative     Blood, UA Negative     Protein, UA Negative     Leuk Esterase, UA Negative     Nitrite, UA Negative     Urobilinogen, UA 1.0 E.U./dL    Narrative:       Urine microscopic not indicated.    Comprehensive Metabolic Panel [[id number]]  (Abnormal) Collected:  11/18/17 0609    Specimen:  Blood Updated:  11/18/17 0711     Glucose 89 mg/dL      BUN 15 mg/dL      Creatinine 0.97 mg/dL      Sodium 140 mmol/L      Potassium 3.4 (L) mmol/L      Chloride 103 mmol/L      CO2 26.0 mmol/L      Calcium 8.7 mg/dL      Total Protein 6.6 g/dL      Albumin 3.60 g/dL      ALT (SGPT) 61 (H) U/L      AST (SGOT) 63 (H) U/L      Alkaline Phosphatase 87 U/L      Total Bilirubin 1.1 mg/dL      eGFR Non African Amer 59 mL/min/1.73      Globulin 3.0 gm/dL      A/G Ratio 1.2 g/dL      BUN/Creatinine Ratio 15.5     Anion Gap 11.0 mmol/L     Magnesium [[id number]]  (Normal) Collected:  11/18/17 0609    Specimen:  Blood Updated:  11/18/17  0711     Magnesium 1.9 mg/dL     CBC Auto Differential [[id number]]  (Abnormal) Collected:  11/18/17 0609    Specimen:  Blood Updated:  11/18/17 0722     WBC 6.69 10*3/mm3      RBC 5.01 10*6/mm3      Hemoglobin 13.9 g/dL       SPECIMEN REANALYZED TO CONFIRM HGB RESULTS        Hematocrit 40.8 %      MCV 81.4 fL      MCH 27.7 pg      MCHC 34.1 g/dL      RDW 15.3 (H) %      RDW-SD 45.7 fl      MPV 11.9 fL      Platelets 92 (L) 10*3/mm3      Neutrophil % 69.2 %      Lymphocyte % 22.1 %      Monocyte % 5.5 %      Eosinophil % 3.0 %      Basophil % 0.1 %      Immature Grans % 0.1 %      Neutrophils, Absolute 4.62 10*3/mm3      Lymphocytes, Absolute 1.48 10*3/mm3      Monocytes, Absolute 0.37 10*3/mm3      Eosinophils, Absolute 0.20 10*3/mm3      Basophils, Absolute 0.01 10*3/mm3      Immature Grans, Absolute 0.01 10*3/mm3      nRBC 0.0 /100 WBC     CBC Auto Differential [[id number]]  (Abnormal) Collected:  11/19/17 0703    Specimen:  Blood Updated:  11/19/17 0713     WBC 4.29 10*3/mm3      RBC 4.93 10*6/mm3      Hemoglobin 13.3 g/dL      Hematocrit 41.0 %      MCV 83.2 fL      MCH 27.0 pg      MCHC 32.4 g/dL      RDW 15.0 (H) %      RDW-SD 45.9 fl      MPV 11.4 fL      Platelets 75 (L) 10*3/mm3      Neutrophil % 53.4 %      Lymphocyte % 35.9 %      Monocyte % 6.8 %      Eosinophil % 3.5 %      Basophil % 0.2 %      Immature Grans % 0.2 %      Neutrophils, Absolute 2.29 10*3/mm3      Lymphocytes, Absolute 1.54 10*3/mm3      Monocytes, Absolute 0.29 10*3/mm3      Eosinophils, Absolute 0.15 10*3/mm3      Basophils, Absolute 0.01 10*3/mm3      Immature Grans, Absolute 0.01 10*3/mm3      nRBC 0.0 /100 WBC     Comprehensive Metabolic Panel [[id number]]  (Abnormal) Collected:  11/19/17 0703    Specimen:  Blood Updated:  11/19/17 0724     Glucose 82 mg/dL      BUN 10 mg/dL      Creatinine 1.06 (H) mg/dL      Sodium 139 mmol/L      Potassium 3.6 mmol/L      Chloride 101 mmol/L      CO2 27.0 mmol/L      Calcium 8.7 mg/dL      Total  Protein 6.5 g/dL      Albumin 3.40 g/dL      ALT (SGPT) 56 (H) U/L      AST (SGOT) 55 (H) U/L      Alkaline Phosphatase 81 U/L      Total Bilirubin 0.9 mg/dL      eGFR Non African Amer 53 (L) mL/min/1.73      Globulin 3.1 gm/dL      A/G Ratio 1.1 g/dL      BUN/Creatinine Ratio 9.4     Anion Gap 11.0 mmol/L     Magnesium [[id number]]  (Normal) Collected:  11/19/17 0703    Specimen:  Blood Updated:  11/19/17 0724     Magnesium 2.0 mg/dL     POC Glucose Fingerstick [[id number]]  (Normal) Collected:  11/19/17 1056    Specimen:  Blood Updated:  11/19/17 1109     Glucose 89 mg/dL       Meter: [id number]: [id number] EVELIA MONCADA       CBC Auto Differential [[id number]]  (Abnormal) Collected:  11/20/17 0614    Specimen:  Blood Updated:  11/20/17 0639     WBC 4.51 10*3/mm3      RBC 5.17 (H) 10*6/mm3      Hemoglobin 13.8 g/dL      Hematocrit 42.1 %      MCV 81.4 fL      MCH 26.7 pg      MCHC 32.8 g/dL      RDW 14.5 %      RDW-SD 42.8 fl      MPV 11.3 fL      Platelets 90 (L) 10*3/mm3       Previously documented low platelet count.         Neutrophil % 58.6 %      Lymphocyte % 32.6 %      Monocyte % 5.5 %      Eosinophil % 2.9 %      Basophil % 0.2 %      Immature Grans % 0.2 %      Neutrophils, Absolute 2.64 10*3/mm3      Lymphocytes, Absolute 1.47 10*3/mm3      Monocytes, Absolute 0.25 10*3/mm3      Eosinophils, Absolute 0.13 10*3/mm3      Basophils, Absolute 0.01 10*3/mm3      Immature Grans, Absolute 0.01 10*3/mm3     Comprehensive Metabolic Panel [[id number]]  (Abnormal) Collected:  11/20/17 0614    Specimen:  Blood Updated:  11/20/17 0652     Glucose 82 mg/dL      BUN 11 mg/dL      Creatinine 1.08 (H) mg/dL      Sodium 137 mmol/L      Potassium 3.9 mmol/L      Chloride 98 mmol/L      CO2 29.0 mmol/L      Calcium 9.0 mg/dL      Total Protein 6.7 g/dL      Albumin 3.70 g/dL      ALT (SGPT) 56 (H) U/L      AST (SGOT) 58 (H) U/L      Alkaline Phosphatase 78 U/L      Total Bilirubin 0.9 mg/dL      eGFR Non  Amer  52 mL/min/1.73      Globulin 3.0 gm/dL      A/G Ratio 1.2 g/dL      BUN/Creatinine Ratio 10.2     Anion Gap 10.0 mmol/L     Magnesium [[id number]]  (Normal) Collected:  11/20/17 0614    Specimen:  Blood Updated:  11/20/17 0652     Magnesium 1.9 mg/dL           Imaging Results (last 72 hours)     Procedure Component Value Units Date/Time    CT Abdomen Pelvis With Contrast [[id number]] Collected:  11/17/17 1857     Updated:  11/17/17 1936    Narrative:         EXAMINATION:  Computed Tomography           REGION:    Abdomen / Pelvis                     INDICATION:  Abdominal pain  HISTORY:    Bowel obstruction      RUTH. IMAGING:    none            TECHNIQUE:      - reconstructions:    axial, coronal, sagittal         - contrast:      oral:  No ;   intravenous: Isovue 300, 100 mL  This exam was performed according to the departmental  dose-optimization program which includes automated exposure  control, adjustment of the mA and/or kV according to patient size  and/or use of iterative reconstruction technique.           COMMENTS:            - - - CT ABDOMEN - - -          THORAX (INFERIOR):      - LUNG BASES:  clear      - PLEURA:    no fluid or mass      - HEART:    normal size, no pericardial fluid     - MISC:      n/a          ABDOMEN:     - LIVER:    normal size / contour, no ductal dilatation , no  focal lesion   - GB:      Surgically absent    - CBD:      grossly negative   - SPLEEN:    normal size and contour   - PANCREAS:    normal in size, contour, no focal mass    - VISCERA:    - There are multiple fluid-filled small bowel loops at the upper  limits of normal/mildly enlarged in the proximal most aspect. In  the mid small bowel is a short segment of enhancing small bowel  wall of normal caliber (coronal 12 - 18/67, axial 61/111).  - Additionally, in the region of the terminal ileum there is a  suggestion of mild degree of edema associated with the wall of  the terminal ileum, and the diminished luminal caliber. No  gross  evidence of serosal inflammatory changes or adjacent mesenteric  changes. (Coronal 23/67, 27/67).     - MESENTERY:  no mesenteric mass   - CAVITY:    no free abdominal fluid, no free intraperitoneal  air   - BODY WALL:  wnl   - OSSEOUS:    grossly negative for age   - MISC:                                      RETROPERITONEUM:   - KIDNEYS:    normal size / contour, no collecting system  dilation        no evidence of an enhancing mass   - URETERS:    normal course, caliber   - ADRENALS:    normal size, contour   - MISC:      no sig retroperitoneal adenopathy or mass   - VASCULAR:    aorta / iliacs: wnl for age     - - - CT PELVIS - - -      - VISCERA:    As above - MESENTERY:  no mass   - VASCULAR:    wnl for age   - CAVITY:    no free fluid / air   - BLADDER:    unremarkable   - OSSEOUS:    wnl    - MISC:   - UTERUS/OVARY:  Status post hysterectomy   .       Impression:        CONCLUSION:  1. The proximal small bowel is fluid-filled with a caliber at the  upper limits of normal/mildly enlarged.  2. There is a zone of soft transition in the mid abdomen in a  region of normal caliber bowel loop with a short segment of  focally enhancing wall.  3. Indeterminate findings in the terminal ileum suggesting mild  bowel wall edematous changes with luminal narrowing.    The constellation of findings is suspicious for the presence of  multifocal Crohn's disease.    Electronically signed by:  JONO Cobb MD  11/17/2017 7:35  PM CST Workstation: [id number]    XR Abdomen 2 View With Chest 1 View [[id number]] Collected:  11/17/17 2221     Updated:  11/18/17 0613    Narrative:       Exam: Two views abdomen, PA chest    INDICATION: Small bowel obstruction    FINDINGS: PA chest. NG tube is in place. Heart size is normal.  Lungs are clear except for minimal left basilar atelectasis.    Two views abdomen. Status post cholecystectomy. There are mildly  dilated fluid-filled loops small bowel compatible with  partial  obstruction. Air and stool seen in colon. There is contrast in  bladder from a recent CT study. No identifiable free air.      Impression:       Findings compatible with a partial small bowel  obstruction        Electronically signed by:  Jostin Perales MD  11/18/2017 6:12 AM  CST Workstation: [id number]    XR Abdomen 2 View With Chest 1 View [[id number]] Collected:  11/18/17 1054     Updated:  11/18/17 1116    Narrative:       Acute Abdominal Series Withe Upright Chest.    History: Small bowel obstruction. Abdominal pain.    Upright frontal film of the chest and supine and upright films of  the abdomen were obtained.    Comparison: November 17, 2017    EKG leads.  The lungs are clear of an acute process.  Old granulomatous disease is present.  The heart is not enlarged.  The pulmonary vasculature is not increased.  No pleural effusion.  No pneumothorax.  No acute osseous abnormality.  Degenerative changes are present in the thoracic spine.    Nasogastric tube remains in place with tip in the distal body of  the stomach.  No free air.  No mechanical bowel obstruction.  Moderate amount retained feces in the colon.  No organomegaly.  Left pelvic phlebolith.  Minimal residual contrast in the urinary bladder from enhanced CT  11/17/2017.  No acute osseous abnormality.  Surgical clips right upper quadrant.      Impression:       Conclusion:  Nasogastric tube remains in place with tip in the distal body of  the stomach.  No mechanical bowel obstruction.  Moderate amount retained feces in the colon.  Minimal residual contrast in the urinary bladder from enhanced CT  11/17/2017.  Cholecystectomy.  No acute disease in the chest.    [id number]    Electronically signed by:  Walker Stevens MD  11/18/2017 11:15 AM  CST Workstation: [id number] Abdomen 2 View With Chest 1 View [[id number]] Updated:  11/20/17 0951             Physician Progress Notes (last 24 hours) (Notes from 11/19/2017 10:56 AM through 11/20/2017  "10:56 AM)      Benjamin Troncoso MD at 11/19/2017 11:33 AM  Version 1 of 1            LOS: 0 days   Patient Care Team:  Yobany Barr MD as PCP - General  Daren Marie DPM as Consulting Physician (Podiatry)    Chief Complaint: hospital day 3 sbo    Subjective     History of Present Illness    Subjective:  Symptoms:  Stable.  She reports cough.    Diet:  No nausea or vomiting.    Activity level: Impaired due to weakness.    Pain:  She complains of pain that is moderate.  She reports pain is improving.  Pain is well controlled.        History taken from: patient chart family    Objective     Vital Signs  Temp:  [96.7 °F (35.9 °C)-98.4 °F (36.9 °C)] 98 °F (36.7 °C)  Heart Rate:  [71-78] 71  Resp:  [18-19] 18  BP: (114-133)/(56-76) 133/67    Objective:  General Appearance:  Comfortable.    Vital signs: (most recent): Blood pressure 133/67, pulse 71, temperature 98 °F (36.7 °C), resp. rate 18, height 64\" (162.6 cm), weight 218 lb 11.2 oz (99.2 kg), SpO2 94 %, not currently breastfeeding.  Vital signs are normal.  No fever.    Output: Producing urine and no stool output.    Lungs:  Normal respiratory rate and normal effort.    Heart: Normal rate.  Regular rhythm.    Abdomen: Abdomen is soft and non-distended.  Hypoactive bowel sounds.   (Right sided).     Neurological: Patient is alert and oriented to person, place and time.              Results Review:     I reviewed the patient's new clinical results.     Results for XUAN COLEMAN (MRN [id number]) as of 11/19/2017 11:30   Ref. Range 11/19/2017 07:03   Glucose Latest Ref Range: 60 - 100 mg/dL 82   Sodium Latest Ref Range: 137 - 145 mmol/L 139   Potassium Latest Ref Range: 3.5 - 5.1 mmol/L 3.6   CO2 Latest Ref Range: 22.0 - 31.0 mmol/L 27.0   Chloride Latest Ref Range: 95 - 110 mmol/L 101   Anion Gap Latest Ref Range: 5.0 - 15.0 mmol/L 11.0   Creatinine Latest Ref Range: 0.50 - 1.00 mg/dL 1.06 (H)   BUN Latest Ref Range: 7 - 21 mg/dL 10   BUN/Creatinine Ratio Latest " Ref Range: 7.0 - 25.0  9.4   Calcium Latest Ref Range: 8.4 - 10.2 mg/dL 8.7   eGFR Non African Amer Latest Ref Range: >60 mL/min/1.73 53 (L)   Alkaline Phosphatase Latest Ref Range: 38 - 126 U/L 81   Total Protein Latest Ref Range: 6.3 - 8.6 g/dL 6.5   ALT (SGPT) Latest Ref Range: 9 - 52 U/L 56 (H)   AST (SGOT) Latest Ref Range: 14 - 36 U/L 55 (H)   Total Bilirubin Latest Ref Range: 0.2 - 1.3 mg/dL 0.9   Albumin Latest Ref Range: 3.40 - 4.80 g/dL 3.40   Globulin Latest Ref Range: 2.3 - 3.5 gm/dL 3.1   A/G Ratio Latest Ref Range: 1.1 - 1.8 g/dL 1.1   Magnesium Latest Ref Range: 1.6 - 2.3 mg/dL 2.0   WBC Latest Ref Range: 3.20 - 9.80 10*3/mm3 4.29   RBC Latest Ref Range: 3.77 - 5.16 10*6/mm3 4.93   Hemoglobin Latest Ref Range: 12.0 - 15.5 g/dL 13.3   Hematocrit Latest Ref Range: 35.0 - 45.0 % 41.0   RDW Latest Ref Range: 11.5 - 14.5 % 15.0 (H)   MCV Latest Ref Range: 80.0 - 98.0 fL 83.2   MCH Latest Ref Range: 26.5 - 34.0 pg 27.0   MCHC Latest Ref Range: 31.4 - 36.0 g/dL 32.4   MPV Latest Ref Range: 8.0 - 12.0 fL 11.4   Platelets Latest Ref Range: 150 - 450 10*3/mm3 75 (L)   RDW-SD Latest Ref Range: 36.4 - 46.3 fl 45.9   Neutrophil % Latest Ref Range: 37.0 - 80.0 % 53.4   Lymphocyte % Latest Ref Range: 10.0 - 50.0 % 35.9   Monocyte % Latest Ref Range: 0.0 - 12.0 % 6.8   Eosinophil % Latest Ref Range: 0.0 - 7.0 % 3.5   Basophil % Latest Ref Range: 0.0 - 2.0 % 0.2   Immature Grans % Latest Ref Range: 0.0 - 0.5 % 0.2   Neutrophils, Absolute Latest Ref Range: 2.00 - 8.60 10*3/mm3 2.29   Lymphocytes, Absolute Latest Ref Range: 0.60 - 4.20 10*3/mm3 1.54   Monocytes, Absolute Latest Ref Range: 0.00 - 0.90 10*3/mm3 0.29   Eosinophils, Absolute Latest Ref Range: 0.00 - 0.70 10*3/mm3 0.15   Basophils, Absolute Latest Ref Range: 0.00 - 0.20 10*3/mm3 0.01   Immature Grans, Absolute Latest Ref Range: 0.00 - 0.02 10*3/mm3 0.01   nRBC Latest Ref Range: 0.0 - 0.0 /100 WBC 0.0       Medication Review: done    Assessment/Plan      Principal Problem:    Abdominal pain  Active Problems:    Constipation    Acid reflux    Depression    Disease related peripheral neuropathy    Epigastric pain    Hyperlipidemia    Nausea and vomiting    Restless leg syndrome    Sleep apnea      Assessment:    Condition: In stable condition.  Improving.       Plan:   Sips/ice chips.  General Orders/Medications Plan: continue abx.   (1. dulcolax   2. Continue iv fluids).       Benjamin Troncoso MD  11/19/17  11:33 AM    Time: 10 mins         Electronically signed by Benjamin Troncoso MD at 11/19/2017 11:38 AM      Benjamin Troncoso MD at 11/20/2017  8:35 AM  Version 1 of 1            LOS: 1 day   Patient Care Team:  Yobany Barr MD as PCP - General  Daren Marie DPM as Consulting Physician (Podiatry)    Chief Complaint: Small bowel obstruction    Subjective     Abdominal Pain   The current episode started in the past 7 days. The onset quality is gradual. The problem occurs constantly. The problem has been gradually improving. The pain is located in the LLQ. The pain is at a severity of 4/10. The pain is mild. The quality of the pain is aching. The abdominal pain does not radiate. Associated symptoms include anorexia, constipation and weight loss (10 lbs). Pertinent negatives include no belching, diarrhea, fever, flatus, hematochezia, hematuria, nausea or vomiting. The pain is aggravated by movement and eating. The pain is relieved by nothing. She has tried oral narcotic analgesics for the symptoms. The treatment provided moderate relief. Her past medical history is significant for abdominal surgery, gallstones and GERD. There is no history of colon cancer, Crohn's disease, irritable bowel syndrome, pancreatitis, PUD or ulcerative colitis.       Subjective:  Symptoms:  She reports malaise, weakness and anorexia.  No shortness of breath, cough, chest pain, headache or diarrhea.    Diet:  NPO.  No nausea or vomiting.    Activity level: Normal with assistance.    Pain:  She  "complains of pain that is moderate.  She reports pain is improving.  Pain is well controlled.        History taken from: patient chart    Objective     Vital Signs  Temp:  [97.1 °F (36.2 °C)-98.4 °F (36.9 °C)] 97.1 °F (36.2 °C)  Heart Rate:  [71-94] 72  Resp:  [18] 18  BP: (124-142)/(60-86) 130/78    Objective:  General Appearance:  Uncomfortable.    Vital signs: (most recent): Blood pressure 130/78, pulse 72, temperature 97.1 °F (36.2 °C), temperature source Oral, resp. rate 18, height 64\" (162.6 cm), weight 218 lb 11.2 oz (99.2 kg), SpO2 95 %, not currently breastfeeding.  Vital signs are normal.  No fever.    Output: Producing urine and no stool output.    Lungs:  Normal effort.  She is not in respiratory distress.  No wheezes, rales or rhonchi.    Heart: Normal rate.  Regular rhythm.  S1 normal and S2 normal.  No murmur, gallop or friction rub.   Abdomen: Abdomen is soft and distended.  There are no signs of ascites.  Bowel sounds are normal.   There is no abdominal tenderness.     Extremities: Normal range of motion.    Pupils:  Pupils are equal, round, and reactive to light.    Skin:  Warm and dry.  No rash or ecchymosis.             Results Review:     I reviewed the patient's new clinical results.    Medication Review: Done    Assessment/Plan     Principal Problem:    Abdominal pain  Active Problems:    Constipation    Acid reflux    Depression    Disease related peripheral neuropathy    Epigastric pain    Hyperlipidemia    Nausea and vomiting    Restless leg syndrome    Sleep apnea    Results for XUAN COLEMAN (MRN [id number]) as of 11/20/2017 08:44   Ref. Range 11/20/2017 06:14   Glucose Latest Ref Range: 60 - 100 mg/dL 82   Sodium Latest Ref Range: 137 - 145 mmol/L 137   Potassium Latest Ref Range: 3.5 - 5.1 mmol/L 3.9   CO2 Latest Ref Range: 22.0 - 31.0 mmol/L 29.0   Chloride Latest Ref Range: 95 - 110 mmol/L 98   Anion Gap Latest Ref Range: 5.0 - 15.0 mmol/L 10.0   Creatinine Latest Ref Range: " 0.50 - 1.00 mg/dL 1.08 (H)   BUN Latest Ref Range: 7 - 21 mg/dL 11   BUN/Creatinine Ratio Latest Ref Range: 7.0 - 25.0  10.2   Calcium Latest Ref Range: 8.4 - 10.2 mg/dL 9.0   eGFR Non African Amer Latest Ref Range: 51 - 120 mL/min/1.73 52   Alkaline Phosphatase Latest Ref Range: 38 - 126 U/L 78   Total Protein Latest Ref Range: 6.3 - 8.6 g/dL 6.7   ALT (SGPT) Latest Ref Range: 9 - 52 U/L 56 (H)   AST (SGOT) Latest Ref Range: 14 - 36 U/L 58 (H)   Total Bilirubin Latest Ref Range: 0.2 - 1.3 mg/dL 0.9   Albumin Latest Ref Range: 3.40 - 4.80 g/dL 3.70   Globulin Latest Ref Range: 2.3 - 3.5 gm/dL 3.0   A/G Ratio Latest Ref Range: 1.1 - 1.8 g/dL 1.2   Magnesium Latest Ref Range: 1.6 - 2.3 mg/dL 1.9   WBC Latest Ref Range: 3.20 - 9.80 10*3/mm3 4.51   RBC Latest Ref Range: 3.77 - 5.16 10*6/mm3 5.17 (H)   Hemoglobin Latest Ref Range: 12.0 - 15.5 g/dL 13.8   Hematocrit Latest Ref Range: 35.0 - 45.0 % 42.1   RDW Latest Ref Range: 11.5 - 14.5 % 14.5   MCV Latest Ref Range: 80.0 - 98.0 fL 81.4   MCH Latest Ref Range: 26.5 - 34.0 pg 26.7   MCHC Latest Ref Range: 31.4 - 36.0 g/dL 32.8   MPV Latest Ref Range: 8.0 - 12.0 fL 11.3   Platelets Latest Ref Range: 150 - 450 10*3/mm3 90 (L)   RDW-SD Latest Ref Range: 36.4 - 46.3 fl 42.8   Neutrophil % Latest Ref Range: 37.0 - 80.0 % 58.6   Lymphocyte % Latest Ref Range: 10.0 - 50.0 % 32.6   Monocyte % Latest Ref Range: 0.0 - 12.0 % 5.5   Eosinophil % Latest Ref Range: 0.0 - 7.0 % 2.9   Basophil % Latest Ref Range: 0.0 - 2.0 % 0.2   Immature Grans % Latest Ref Range: 0.0 - 0.5 % 0.2   Neutrophils, Absolute Latest Ref Range: 2.00 - 8.60 10*3/mm3 2.64   Lymphocytes, Absolute Latest Ref Range: 0.60 - 4.20 10*3/mm3 1.47   Monocytes, Absolute Latest Ref Range: 0.00 - 0.90 10*3/mm3 0.25   Eosinophils, Absolute Latest Ref Range: 0.00 - 0.70 10*3/mm3 0.13   Basophils, Absolute Latest Ref Range: 0.00 - 0.20 10*3/mm3 0.01   Immature Grans, Absolute Latest Ref Range: 0.00 - 0.02 10*3/mm3 0.01        Assessment:    Condition: In stable condition.  Unchanged.       Plan:   X-rays as ordered.        Benjamin Troncoso MD  11/20/17  8:35 AM    Time: 10 min         Electronically signed by Benjamin Troncoso MD at 11/20/2017  8:44 AM

## 2017-11-20 NOTE — PROGRESS NOTES
[facility] Medicine Services  INPATIENT PROGRESS NOTE    Length of Stay: 1  Date of Admission: 11/17/2017  Primary Care Physician: Yobany Barr MD    Subjective   Chief Complaint:   Chief Complaint   Patient presents with   • Abdominal Pain       HPI  reporting heartburn     Review of Systems   Constitutional: Negative for activity change, appetite change, chills, diaphoresis, fatigue, fever and unexpected weight change.   HENT: Negative.  Negative for congestion, dental problem, drooling, ear discharge, ear pain, facial swelling, hearing loss, mouth sores, nosebleeds, postnasal drip, rhinorrhea, sinus pressure, sneezing, tinnitus, trouble swallowing and voice change.    Eyes: Negative for photophobia and discharge.   Respiratory: Negative for apnea, cough, choking, shortness of breath, wheezing and stridor.    Cardiovascular: Negative for chest pain, palpitations and leg swelling.   Gastrointestinal: Positive for constipation. Negative for abdominal pain, anal bleeding, blood in stool, diarrhea, nausea, rectal pain and vomiting.   Endocrine: Negative for cold intolerance, heat intolerance, polydipsia, polyphagia and polyuria.   Genitourinary: Negative for dysuria, enuresis, frequency, hematuria and urgency.   Musculoskeletal: Negative for arthralgias, back pain, joint swelling, myalgias, neck pain and neck stiffness.   Skin: Negative for color change, pallor, rash and wound.   Allergic/Immunologic: Negative for food allergies and immunocompromised state.   Neurological: Negative for dizziness, tremors, seizures, syncope, facial asymmetry, speech difficulty, weakness, light-headedness, numbness and headaches.   Hematological: Negative for adenopathy.   Psychiatric/Behavioral: Negative for agitation, behavioral problems, confusion, hallucinations, sleep disturbance and suicidal ideas. The patient is not nervous/anxious.         All pertinent negatives and positives are as  above. All other systems have been reviewed and are negative unless otherwise stated.     Objective    Temp:  [97.1 °F (36.2 °C)-97.9 °F (36.6 °C)] 97.1 °F (36.2 °C)  Heart Rate:  [72-94] 74  Resp:  [18] 18  BP: (124-150)/(60-86) 150/74  Physical Exam   Constitutional: She is oriented to person, place, and time. She appears well-developed and well-nourished.   HENT:   Head: Normocephalic and atraumatic.   Eyes: EOM are normal. Pupils are equal, round, and reactive to light.   Neck: Normal range of motion. Neck supple.   Pulmonary/Chest: Effort normal and breath sounds normal. She has no wheezes.   Abdominal: Soft. Bowel sounds are normal. She exhibits no distension and no mass. There is no tenderness. There is no guarding.   Musculoskeletal: Normal range of motion.   Neurological: She is alert and oriented to person, place, and time.   Skin: Skin is warm and dry.   Psychiatric: She has a normal mood and affect. Her behavior is normal. Judgment and thought content normal.           Results Review:  I have reviewed the labs, radiology results, and diagnostic studies.    Laboratory Data:   Lab Results (last 24 hours)     Procedure Component Value Units Date/Time    CBC Auto Differential [[id number]]  (Abnormal) Collected:  11/20/17 0614    Specimen:  Blood Updated:  11/20/17 0639     WBC 4.51 10*3/mm3      RBC 5.17 (H) 10*6/mm3      Hemoglobin 13.8 g/dL      Hematocrit 42.1 %      MCV 81.4 fL      MCH 26.7 pg      MCHC 32.8 g/dL      RDW 14.5 %      RDW-SD 42.8 fl      MPV 11.3 fL      Platelets 90 (L) 10*3/mm3       Previously documented low platelet count.         Neutrophil % 58.6 %      Lymphocyte % 32.6 %      Monocyte % 5.5 %      Eosinophil % 2.9 %      Basophil % 0.2 %      Immature Grans % 0.2 %      Neutrophils, Absolute 2.64 10*3/mm3      Lymphocytes, Absolute 1.47 10*3/mm3      Monocytes, Absolute 0.25 10*3/mm3      Eosinophils, Absolute 0.13 10*3/mm3      Basophils, Absolute 0.01 10*3/mm3      Immature  Grans, Absolute 0.01 10*3/mm3     Comprehensive Metabolic Panel [[id number]]  (Abnormal) Collected:  11/20/17 0614    Specimen:  Blood Updated:  11/20/17 0652     Glucose 82 mg/dL      BUN 11 mg/dL      Creatinine 1.08 (H) mg/dL      Sodium 137 mmol/L      Potassium 3.9 mmol/L      Chloride 98 mmol/L      CO2 29.0 mmol/L      Calcium 9.0 mg/dL      Total Protein 6.7 g/dL      Albumin 3.70 g/dL      ALT (SGPT) 56 (H) U/L      AST (SGOT) 58 (H) U/L      Alkaline Phosphatase 78 U/L      Total Bilirubin 0.9 mg/dL      eGFR Non African Amer 52 mL/min/1.73      Globulin 3.0 gm/dL      A/G Ratio 1.2 g/dL      BUN/Creatinine Ratio 10.2     Anion Gap 10.0 mmol/L     Magnesium [[id number]]  (Normal) Collected:  11/20/17 0614    Specimen:  Blood Updated:  11/20/17 0652     Magnesium 1.9 mg/dL           Culture Data:   No results found for: BLOODCX  No results found for: URINECX  No results found for: RESPCX  No results found for: WOUNDCX  No results found for: STOOLCX  No components found for: BODYFLD    Radiology Data:   Imaging Results (last 24 hours)     Procedure Component Value Units Date/Time    XR Abdomen 2 View With Chest 1 View [[id number]] Updated:  11/20/17 0951          I have reviewed the patient current medications.     Assessment/Plan     Active Hospital Problems (** Indicates Principal Problem)     Diagnosis Date Noted   • **Abdominal pain [R10.9] 11/17/2017   • Constipation [K59.00]     • Acid reflux [K21.9]     • Depression [F32.9]     • Disease related peripheral neuropathy [G62.89]     • Epigastric pain [R10.13]     • Hyperlipidemia [E78.5]     • Nausea and vomiting [R11.2]     • Restless leg syndrome [G25.81]     • Sleep apnea [G47.30]         Resolved Hospital Problems     Diagnosis Date Noted Date Resolved   No resolved problems to display.      11/19. Patient still has NG tube in place.  Abdomen is very soft today.  Still nothing by mouth.  Patient given enemas overnight to attempt to get the stool burden  eased from her large bowel.  Patient had 1 small bowel movement however still has not had a good what she calls a regular bowel movement yet.  Patient still nauseated but improved.  Clinically small bowel obstruction improving.  11/18. Initially admitted with abdominal pain.  Differential diagnosis includes small bowel obstruction, constipation, colitis.  General surgery has been consulted, NG tube is in place, patient started on intravenous fluids and nothing by mouth at present.  Abdominal x-ray for this morning showed no mechanical bowel obstruction, however moderate amount of retained feces in the colon.  Plan to give patient soapsuds enema in order to have her bowels move.  Patient does have bowel sounds this morning, flatus positive, no stool for 5 days.  Patient does have some abdominal distention as well however per the patient and nursing staff this has improved overnight with the NG tube.     11/19.  Patient still nothing by mouth however advanced ice chips.  11/18. Patient is currently nothing by mouth as such her home by mouth medications are currently on hold.  As her diet is restarted will resume patient's home medications.     Will monitor patient's hospital course and adjust treatment as hospital course dictates.     DVT prophylaxis: Heparin  Code status is Full Code     Plan for disposition:Where: home and When:  1-3 days       Kendall Chen MD   11/20/17   11:49 AM

## 2017-11-20 NOTE — PROGRESS NOTES
"   LOS: 1 day   Patient Care Team:  Yobany Barr MD as PCP - General  Daren Marie DPM as Consulting Physician (Podiatry)    Chief Complaint: Small bowel obstruction    Subjective     Abdominal Pain   The current episode started in the past 7 days. The onset quality is gradual. The problem occurs constantly. The problem has been gradually improving. The pain is located in the LLQ. The pain is at a severity of 4/10. The pain is mild. The quality of the pain is aching. The abdominal pain does not radiate. Associated symptoms include anorexia, constipation and weight loss (10 lbs). Pertinent negatives include no belching, diarrhea, fever, flatus, hematochezia, hematuria, nausea or vomiting. The pain is aggravated by movement and eating. The pain is relieved by nothing. She has tried oral narcotic analgesics for the symptoms. The treatment provided moderate relief. Her past medical history is significant for abdominal surgery, gallstones and GERD. There is no history of colon cancer, Crohn's disease, irritable bowel syndrome, pancreatitis, PUD or ulcerative colitis.       Subjective:  Symptoms:  She reports malaise, weakness and anorexia.  No shortness of breath, cough, chest pain, headache or diarrhea.    Diet:  NPO.  No nausea or vomiting.    Activity level: Normal with assistance.    Pain:  She complains of pain that is moderate.  She reports pain is improving.  Pain is well controlled.        History taken from: patient chart    Objective     Vital Signs  Temp:  [97.1 °F (36.2 °C)-98.4 °F (36.9 °C)] 97.1 °F (36.2 °C)  Heart Rate:  [71-94] 72  Resp:  [18] 18  BP: (124-142)/(60-86) 130/78    Objective:  General Appearance:  Uncomfortable.    Vital signs: (most recent): Blood pressure 130/78, pulse 72, temperature 97.1 °F (36.2 °C), temperature source Oral, resp. rate 18, height 64\" (162.6 cm), weight 218 lb 11.2 oz (99.2 kg), SpO2 95 %, not currently breastfeeding.  Vital signs are normal.  No fever.    Output: " Producing urine and no stool output.    Lungs:  Normal effort.  She is not in respiratory distress.  No wheezes, rales or rhonchi.    Heart: Normal rate.  Regular rhythm.  S1 normal and S2 normal.  No murmur, gallop or friction rub.   Abdomen: Abdomen is soft and distended.  There are no signs of ascites.  Bowel sounds are normal.   There is no abdominal tenderness.     Extremities: Normal range of motion.    Pupils:  Pupils are equal, round, and reactive to light.    Skin:  Warm and dry.  No rash or ecchymosis.             Results Review:     I reviewed the patient's new clinical results.    Medication Review: Done    Assessment/Plan     Principal Problem:    Abdominal pain  Active Problems:    Constipation    Acid reflux    Depression    Disease related peripheral neuropathy    Epigastric pain    Hyperlipidemia    Nausea and vomiting    Restless leg syndrome    Sleep apnea    Results for XUAN COLEMAN (MRN [id number]) as of 11/20/2017 08:44   Ref. Range 11/20/2017 06:14   Glucose Latest Ref Range: 60 - 100 mg/dL 82   Sodium Latest Ref Range: 137 - 145 mmol/L 137   Potassium Latest Ref Range: 3.5 - 5.1 mmol/L 3.9   CO2 Latest Ref Range: 22.0 - 31.0 mmol/L 29.0   Chloride Latest Ref Range: 95 - 110 mmol/L 98   Anion Gap Latest Ref Range: 5.0 - 15.0 mmol/L 10.0   Creatinine Latest Ref Range: 0.50 - 1.00 mg/dL 1.08 (H)   BUN Latest Ref Range: 7 - 21 mg/dL 11   BUN/Creatinine Ratio Latest Ref Range: 7.0 - 25.0  10.2   Calcium Latest Ref Range: 8.4 - 10.2 mg/dL 9.0   eGFR Non African Amer Latest Ref Range: 51 - 120 mL/min/1.73 52   Alkaline Phosphatase Latest Ref Range: 38 - 126 U/L 78   Total Protein Latest Ref Range: 6.3 - 8.6 g/dL 6.7   ALT (SGPT) Latest Ref Range: 9 - 52 U/L 56 (H)   AST (SGOT) Latest Ref Range: 14 - 36 U/L 58 (H)   Total Bilirubin Latest Ref Range: 0.2 - 1.3 mg/dL 0.9   Albumin Latest Ref Range: 3.40 - 4.80 g/dL 3.70   Globulin Latest Ref Range: 2.3 - 3.5 gm/dL 3.0   A/G Ratio Latest Ref  Range: 1.1 - 1.8 g/dL 1.2   Magnesium Latest Ref Range: 1.6 - 2.3 mg/dL 1.9   WBC Latest Ref Range: 3.20 - 9.80 10*3/mm3 4.51   RBC Latest Ref Range: 3.77 - 5.16 10*6/mm3 5.17 (H)   Hemoglobin Latest Ref Range: 12.0 - 15.5 g/dL 13.8   Hematocrit Latest Ref Range: 35.0 - 45.0 % 42.1   RDW Latest Ref Range: 11.5 - 14.5 % 14.5   MCV Latest Ref Range: 80.0 - 98.0 fL 81.4   MCH Latest Ref Range: 26.5 - 34.0 pg 26.7   MCHC Latest Ref Range: 31.4 - 36.0 g/dL 32.8   MPV Latest Ref Range: 8.0 - 12.0 fL 11.3   Platelets Latest Ref Range: 150 - 450 10*3/mm3 90 (L)   RDW-SD Latest Ref Range: 36.4 - 46.3 fl 42.8   Neutrophil % Latest Ref Range: 37.0 - 80.0 % 58.6   Lymphocyte % Latest Ref Range: 10.0 - 50.0 % 32.6   Monocyte % Latest Ref Range: 0.0 - 12.0 % 5.5   Eosinophil % Latest Ref Range: 0.0 - 7.0 % 2.9   Basophil % Latest Ref Range: 0.0 - 2.0 % 0.2   Immature Grans % Latest Ref Range: 0.0 - 0.5 % 0.2   Neutrophils, Absolute Latest Ref Range: 2.00 - 8.60 10*3/mm3 2.64   Lymphocytes, Absolute Latest Ref Range: 0.60 - 4.20 10*3/mm3 1.47   Monocytes, Absolute Latest Ref Range: 0.00 - 0.90 10*3/mm3 0.25   Eosinophils, Absolute Latest Ref Range: 0.00 - 0.70 10*3/mm3 0.13   Basophils, Absolute Latest Ref Range: 0.00 - 0.20 10*3/mm3 0.01   Immature Grans, Absolute Latest Ref Range: 0.00 - 0.02 10*3/mm3 0.01       Assessment:    Condition: In stable condition.  Unchanged.       Plan:   X-rays as ordered.        Benjamin Troncoso MD  11/20/17  8:35 AM    Time: 10 min

## 2017-11-21 ENCOUNTER — APPOINTMENT (OUTPATIENT)
Dept: CT IMAGING | Facility: HOSPITAL | Age: 57
End: 2017-11-21

## 2017-11-21 LAB
ALBUMIN SERPL-MCNC: 3.8 G/DL (ref 3.4–4.8)
ALBUMIN/GLOB SERPL: 1.2 G/DL (ref 1.1–1.8)
ALP SERPL-CCNC: 81 U/L (ref 38–126)
ALT SERPL W P-5'-P-CCNC: 56 U/L (ref 9–52)
ANION GAP SERPL CALCULATED.3IONS-SCNC: 14 MMOL/L (ref 5–15)
AST SERPL-CCNC: 55 U/L (ref 14–36)
BASOPHILS # BLD AUTO: 0.01 10*3/MM3 (ref 0–0.2)
BASOPHILS NFR BLD AUTO: 0.2 % (ref 0–2)
BILIRUB SERPL-MCNC: 1 MG/DL (ref 0.2–1.3)
BUN BLD-MCNC: 11 MG/DL (ref 7–21)
BUN/CREAT SERPL: 10.8 (ref 7–25)
CALCIUM SPEC-SCNC: 9.2 MG/DL (ref 8.4–10.2)
CHLORIDE SERPL-SCNC: 98 MMOL/L (ref 95–110)
CO2 SERPL-SCNC: 26 MMOL/L (ref 22–31)
CREAT BLD-MCNC: 1.02 MG/DL (ref 0.5–1)
DEPRECATED RDW RBC AUTO: 42.6 FL (ref 36.4–46.3)
EOSINOPHIL # BLD AUTO: 0.15 10*3/MM3 (ref 0–0.7)
EOSINOPHIL NFR BLD AUTO: 3 % (ref 0–7)
ERYTHROCYTE [DISTWIDTH] IN BLOOD BY AUTOMATED COUNT: 14.2 % (ref 11.5–14.5)
GFR SERPL CREATININE-BSD FRML MDRD: 56 ML/MIN/1.73 (ref 51–120)
GLOBULIN UR ELPH-MCNC: 3.1 GM/DL (ref 2.3–3.5)
GLUCOSE BLD-MCNC: 77 MG/DL (ref 60–100)
HCT VFR BLD AUTO: 42.3 % (ref 35–45)
HGB BLD-MCNC: 14.5 G/DL (ref 12–15.5)
IMM GRANULOCYTES # BLD: 0.01 10*3/MM3 (ref 0–0.02)
IMM GRANULOCYTES NFR BLD: 0.2 % (ref 0–0.5)
LYMPHOCYTES # BLD AUTO: 1.43 10*3/MM3 (ref 0.6–4.2)
LYMPHOCYTES NFR BLD AUTO: 28.9 % (ref 10–50)
MAGNESIUM SERPL-MCNC: 1.8 MG/DL (ref 1.6–2.3)
MCH RBC QN AUTO: 27.7 PG (ref 26.5–34)
MCHC RBC AUTO-ENTMCNC: 34.3 G/DL (ref 31.4–36)
MCV RBC AUTO: 80.9 FL (ref 80–98)
MONOCYTES # BLD AUTO: 0.3 10*3/MM3 (ref 0–0.9)
MONOCYTES NFR BLD AUTO: 6.1 % (ref 0–12)
NEUTROPHILS # BLD AUTO: 3.04 10*3/MM3 (ref 2–8.6)
NEUTROPHILS NFR BLD AUTO: 61.6 % (ref 37–80)
NRBC BLD MANUAL-RTO: 0 /100 WBC (ref 0–0)
PLATELET # BLD AUTO: 86 10*3/MM3 (ref 150–450)
PMV BLD AUTO: 11.7 FL (ref 8–12)
POTASSIUM BLD-SCNC: 4.1 MMOL/L (ref 3.5–5.1)
PROT SERPL-MCNC: 6.9 G/DL (ref 6.3–8.6)
RBC # BLD AUTO: 5.23 10*6/MM3 (ref 3.77–5.16)
RBC MORPH BLD: NORMAL
SMALL PLATELETS BLD QL SMEAR: NORMAL
SODIUM BLD-SCNC: 138 MMOL/L (ref 137–145)
WBC MORPH BLD: NORMAL
WBC NRBC COR # BLD: 4.94 10*3/MM3 (ref 3.2–9.8)

## 2017-11-21 PROCEDURE — 83735 ASSAY OF MAGNESIUM: CPT | Performed by: INTERNAL MEDICINE

## 2017-11-21 PROCEDURE — 0 DIATRIZOATE MEGLUMINE & SODIUM PER 1 ML: Performed by: INTERNAL MEDICINE

## 2017-11-21 PROCEDURE — 85007 BL SMEAR W/DIFF WBC COUNT: CPT | Performed by: INTERNAL MEDICINE

## 2017-11-21 PROCEDURE — 25010000002 MORPHINE PER 10 MG: Performed by: FAMILY MEDICINE

## 2017-11-21 PROCEDURE — 74177 CT ABD & PELVIS W/CONTRAST: CPT

## 2017-11-21 PROCEDURE — 85025 COMPLETE CBC W/AUTO DIFF WBC: CPT | Performed by: INTERNAL MEDICINE

## 2017-11-21 PROCEDURE — 25010000002 ONDANSETRON PER 1 MG: Performed by: INTERNAL MEDICINE

## 2017-11-21 PROCEDURE — 99231 SBSQ HOSP IP/OBS SF/LOW 25: CPT | Performed by: SURGERY

## 2017-11-21 PROCEDURE — 80053 COMPREHEN METABOLIC PANEL: CPT | Performed by: INTERNAL MEDICINE

## 2017-11-21 PROCEDURE — 25010000002 HYDROMORPHONE PER 4 MG: Performed by: INTERNAL MEDICINE

## 2017-11-21 PROCEDURE — 0 IOPAMIDOL 61 % SOLUTION: Performed by: INTERNAL MEDICINE

## 2017-11-21 RX ORDER — MORPHINE SULFATE 10 MG/ML
6 INJECTION INTRAMUSCULAR; INTRAVENOUS; SUBCUTANEOUS EVERY 4 HOURS PRN
Status: DISCONTINUED | OUTPATIENT
Start: 2017-11-21 | End: 2017-11-21

## 2017-11-21 RX ORDER — BISACODYL 10 MG
10 SUPPOSITORY, RECTAL RECTAL DAILY
Status: DISCONTINUED | OUTPATIENT
Start: 2017-11-21 | End: 2017-11-21 | Stop reason: SDUPTHER

## 2017-11-21 RX ORDER — HYDROMORPHONE HCL 110MG/55ML
1 PATIENT CONTROLLED ANALGESIA SYRINGE INTRAVENOUS EVERY 4 HOURS PRN
Status: DISCONTINUED | OUTPATIENT
Start: 2017-11-21 | End: 2017-11-22 | Stop reason: HOSPADM

## 2017-11-21 RX ADMIN — HYDROMORPHONE HYDROCHLORIDE 1 MG: 2 INJECTION, SOLUTION INTRAMUSCULAR; INTRAVENOUS; SUBCUTANEOUS at 20:40

## 2017-11-21 RX ADMIN — IOPAMIDOL 94 ML: 612 INJECTION, SOLUTION INTRAVENOUS at 06:32

## 2017-11-21 RX ADMIN — PANTOPRAZOLE SODIUM 40 MG: 40 INJECTION, POWDER, FOR SOLUTION INTRAVENOUS at 05:50

## 2017-11-21 RX ADMIN — MORPHINE SULFATE 6 MG: 2 INJECTION, SOLUTION INTRAMUSCULAR; INTRAVENOUS at 03:12

## 2017-11-21 RX ADMIN — SODIUM CHLORIDE 100 ML/HR: 9 INJECTION, SOLUTION INTRAVENOUS at 17:21

## 2017-11-21 RX ADMIN — BISACODYL 10 MG: 10 SUPPOSITORY RECTAL at 09:46

## 2017-11-21 RX ADMIN — MORPHINE SULFATE 6 MG: 2 INJECTION, SOLUTION INTRAMUSCULAR; INTRAVENOUS at 09:46

## 2017-11-21 RX ADMIN — MORPHINE SULFATE 6 MG: 10 INJECTION, SOLUTION INTRAMUSCULAR; INTRAVENOUS at 14:47

## 2017-11-21 RX ADMIN — ONDANSETRON 4 MG: 2 INJECTION INTRAMUSCULAR; INTRAVENOUS at 04:23

## 2017-11-21 RX ADMIN — DIATRIZOATE MEGLUMINE AND DIATRIZOATE SODIUM 40 ML: 660; 100 LIQUID ORAL; RECTAL at 04:00

## 2017-11-21 NOTE — PLAN OF CARE
Problem: Patient Care Overview (Adult)  Goal: Plan of Care Review  Outcome: Ongoing (interventions implemented as appropriate)    11/21/17 1512   Coping/Psychosocial Response Interventions   Plan Of Care Reviewed With patient;spouse   Patient Care Overview   Progress no change   Outcome Evaluation   Outcome Summary/Follow up Plan VSS. patient states she passed gas. output still active in NG tube.       Goal: Adult Individualization and Mutuality  Outcome: Ongoing (interventions implemented as appropriate)    11/21/17 1512   Mutuality/Individual Preferences   What Questions Do You Have About Your Health or Care? patient wanting to know when surgeon will decide if he is doing surgery or not. patient referred to MD to ask       Goal: Discharge Needs Assessment  Outcome: Ongoing (interventions implemented as appropriate)    11/21/17 1512   Discharge Needs Assessment   Concerns To Be Addressed no discharge needs identified         Problem: Pain, Acute (Adult)  Goal: Acceptable Pain Control/Comfort Level  Outcome: Ongoing (interventions implemented as appropriate)    11/21/17 1512   Pain, Acute (Adult)   Acceptable Pain Control/Comfort Level making progress toward outcome         Problem: Bowel Obstruction (Adult)  Goal: Signs and Symptoms of Listed Potential Problems Will be Absent or Manageable (Bowel Obstruction)  Outcome: Ongoing (interventions implemented as appropriate)    11/21/17 1512   Bowel Obstruction   Problems Assessed (Bowel Obstruction) all   Problems Present (Bowel Obstruction) pain   Patient states she passed gas

## 2017-11-21 NOTE — PAYOR COMM NOTE
"Amira Coleman (57 y.o. Female)     Date of Birth Social Security Number Address Home Phone MRN    [id number]  [id number] [facility] [phone] [id number]    [place] Mar[employer] Status          Bapt[employer]        Admission Date Admission Type Admitting Provider Attending Provider Department, Room/Bed    11/17/17 Emergency Kendall Chen MD Amsler, Haizia L, MD [address], 372/1    Discharge Date Discharge Disposition Discharge Destination                      Attending Provider: Kendall Chen MD     Allergies:  Other, Sulfa Antibiotics    Isolation:  None   Infection:  None   Code Status:  FULL    Ht:  64\" (162.6 cm)   Wt:  218 lb 11.2 oz (99.2 kg)    Admission Cmt:  None   Principal Problem:  Abdominal pain [R10.9]                 Active Insurance as of 11/17/2017     Primary Coverage     Payor Plan Insurance Group Employer/Plan Group    HU[employer]NA MEDICAID HU[employer]NA [employer] [facility]     Payor Plan Address Payor Plan Phone Number Effective From Effective To    PO  [phone] 1/1/2014     [place] [address]       Subscriber Name Subscriber Birth Date Member ID       AMIRA COLEMAN 1960 [id number]                 Emergency Contacts      (Rel.) Home Phone Work Phone Mobile Phone    Ross Coleman (Spouse) [phone]        Reference # [id number]  Call back [phone] fax [phone]      Problem List           Codes Noted - Resolved       Hospital    * (Principal)Abdominal pain ICD-10-CM: R10.9  ICD-9-CM: 789.00 11/17/2017 - Present    Constipation ICD-10-CM: K59.00  ICD-9-CM: 564.00 Unknown - Present    Acid reflux ICD-10-CM: K21.9  ICD-9-CM: 530.81 Unknown - Present    Depression ICD-10-CM: F32.9  ICD-9-CM: 311 Unknown - Present    Disease related peripheral neuropathy ICD-10-CM: G62.89  ICD-9-CM: 356.8 Unknown - Present    Epigastric pain ICD-10-CM: R10.13  ICD-9-CM: 789.06 Unknown - Present "    Hyperlipidemia ICD-10-CM: E78.5  ICD-9-CM: 272.4 Unknown - Present    Nausea and vomiting ICD-10-CM: R11.2  ICD-9-CM: 787.01 Unknown - Present    Restless leg syndrome ICD-10-CM: G25.81  ICD-9-CM: 333.94 Unknown - Present    Sleep apnea ICD-10-CM: G47.30  ICD-9-CM: 780.57 Unknown - Present       Non-Hospital    Chronic fatigue ICD-10-CM: R53.82  ICD-9-CM: 780.79 11/14/2017 - Present    Thrombocytopenia ICD-10-CM: D69.6  ICD-9-CM: 287.5 11/13/2017 - Present    Dyspnea ICD-10-CM: R06.00  ICD-9-CM: 786.09 11/1/2017 - Present    Diastolic dysfunction ICD-10-CM: I51.9  ICD-9-CM: 429.9 11/1/2017 - Present    Obesity with body mass index of 30.0-39.9 ICD-10-CM: E66.9  ICD-9-CM: 278.00 10/9/2017 - Present    Chronic pain of both knees ICD-10-CM: M25.561, M25.562, G89.29  ICD-9-CM: 719.46, 338.29 10/8/2017 - Present    Bilateral calcaneal spurs ICD-10-CM: M77.31, M77.32  ICD-9-CM: 726.73 10/8/2017 - Present    Swelling of both lower extremities ICD-10-CM: M79.89  ICD-9-CM: 729.81 10/8/2017 - Present    Bilateral lower extremity edema ICD-10-CM: R60.0  ICD-9-CM: 782.3 9/29/2017 - Present    Tongue lesion ICD-10-CM: K14.8  ICD-9-CM: 529.8 7/19/2017 - Present    MUSA (nonalcoholic steatohepatitis) ICD-10-CM: K75.81  ICD-9-CM: 571.8 7/13/2017 - Present    Bilateral foot pain ICD-10-CM: M79.671, M79.672  ICD-9-CM: 729.5 12/9/2016 - Present    Right hip pain ICD-10-CM: M25.551  ICD-9-CM: 719.45 12/9/2016 - Present    Acute pain of both knees ICD-10-CM: M25.561, M25.562  ICD-9-CM: 338.19, 719.46 12/9/2016 - Present    Plantar fasciitis, bilateral ICD-10-CM: M72.2  ICD-9-CM: 728.71 12/9/2016 - Present    Primary osteoarthritis of knee ICD-10-CM: M17.10  ICD-9-CM: 715.16 12/9/2016 - Present          Vital Signs (last 24 hours)       11/20 0700  -  11/21 0659 11/21 0700  -  11/21 1344   Most Recent    Temp (°F) 97 -  98.3    96.7 -  98.8     98.8 (37.1)    Heart Rate 74 -  82    70 -  75     75    Resp 16 -  18      18     18    BP  133/71 -  150/74    130/61 -  139/82     130/61    SpO2 (%) 92 -  94    91 -  92     91          Intake & Output (last day)       11/20 0701 - 11/21 0700 11/21 0701 - 11/22 0700    P.O. 480 60    I.V. (mL/kg) 1980 (20)     Other 30 30    Total Intake(mL/kg) 2490 (25.1) 90 (0.9)    Urine (mL/kg/hr) 0 (0)     Other 575 (0.2)     Stool 0 (0)     Total Output 575      Net +1915 +90          Unmeasured Urine Occurrence 8 x 1 x    Unmeasured Stool Occurrence 2 x         Lines, Drains & Airways    Active LDAs     Name:   Placement date:   Placement time:   Site:   Days:    Peripheral IV Line - Single Lumen metacarpal vein (top of hand), right 22 gauge                Naso/Oral/Gastric Tube 11/17/17 2002 nasogastric 16 right nostril  11/17/17 2002      3                Hospital Medications (active)       Dose Frequency Start End    acetaminophen (TYLENOL) tablet 650 mg 650 mg Every 4 Hours PRN 11/17/2017     Sig - Route: Take 2 tablets by mouth Every 4 (Four) Hours As Needed for Mild Pain . - Oral    bisacodyl (DULCOLAX) suppository 10 mg 10 mg Daily PRN 11/17/2017     Sig - Route: Insert 1 suppository into the rectum Daily As Needed for Constipation. - Rectal    bisacodyl (DULCOLAX) suppository 10 mg 10 mg Daily 11/19/2017     Sig - Route: Insert 1 suppository into the rectum Daily. - Rectal    chlorthalidone (HYGROTON) tablet 50 mg 50 mg Daily 11/18/2017     Sig - Route: Take 2 tablets by mouth Daily. - Oral    diatrizoate meglumine-sodium (GASTROGRAFIN) 66-10 % solution 40 mL 40 mL Once in Imaging 11/21/2017 11/21/2017    Sig - Route: Take 40 mL by mouth Once. - Oral    docusate sodium (COLACE) capsule 200 mg 200 mg 2 Times Daily 11/18/2017     Sig - Route: Take 2 capsules by mouth 2 (Two) Times a Day. - Oral    DULoxetine (CYMBALTA) DR capsule 30 mg 30 mg Daily 11/18/2017     Sig - Route: Take 1 capsule by mouth Daily. - Oral    glycerin (ADULT) rectal suppository 2 g 2 g Once 11/19/2017     Sig - Route: Insert 1 each  "into the rectum 1 (One) Time. - Rectal    heparin (porcine) 5000 UNIT/ML injection 5,000 Units 5,000 Units Every 8 Hours Scheduled 11/17/2017     Sig - Route: Inject 1 mL under the skin Every 8 (Eight) Hours. - Subcutaneous    hydrALAZINE (APRESOLINE) injection 10 mg 10 mg Every 6 Hours PRN 11/17/2017     Sig - Route: Infuse 0.5 mL into a venous catheter Every 6 (Six) Hours As Needed for High Blood Pressure (SBP > 160). - Intravenous    influenza vac split quad (FLUZONE QUADRIVALENT) IM suspension 0.5 mL 0.5 mL During Hospitalization 11/18/2017     Sig - Route: Inject 0.5 mL into the shoulder, thigh, or buttocks During Hospitalization for Immunization. - Intramuscular    iopamidol (ISOVUE-300) 61 % injection 100 mL 100 mL Once in Imaging 11/21/2017 11/21/2017    Sig - Route: Infuse 100 mL into a venous catheter Once. - Intravenous    magnesium hydroxide (MILK OF MAGNESIA) suspension 2400 mg/10mL 10 mL 10 mL Daily PRN 11/17/2017     Sig - Route: Take 10 mL by mouth Daily As Needed for Constipation. - Oral    magnesium oxide (MAGOX) tablet 400 mg 400 mg Daily 11/18/2017     Sig - Route: Take 1 tablet by mouth Daily. - Oral    meloxicam (MOBIC) tablet 15 mg 15 mg Daily 11/18/2017     Sig - Route: Take 1 tablet by mouth Daily. - Oral    morphine injection 6 mg 6 mg Every 4 Hours PRN 11/19/2017 11/29/2017    Sig - Route: Infuse 3 mL into a venous catheter Every 4 (Four) Hours As Needed for Severe Pain . - Intravenous    nicotine (NICODERM CQ) 21 MG/24HR patch 1 patch 1 patch Every 24 Hours 11/17/2017     Sig - Route: Place 1 patch on the skin Daily. - Transdermal    ondansetron (ZOFRAN) injection 4 mg 4 mg Every 6 Hours PRN 11/17/2017     Sig - Route: Infuse 2 mL into a venous catheter Every 6 (Six) Hours As Needed for Nausea or Vomiting. - Intravenous    Linked Group 1:  \"Or\" Linked Group Details        ondansetron (ZOFRAN) tablet 4 mg 4 mg Every 6 Hours PRN 11/17/2017     Sig - Route: Take 1 tablet by mouth Every 6 " "(Six) Hours As Needed for Nausea or Vomiting. - Oral    Linked Group 1:  \"Or\" Linked Group Details        ondansetron ODT (ZOFRAN-ODT) disintegrating tablet 4 mg 4 mg Every 6 Hours PRN 11/17/2017     Sig - Route: Take 1 tablet by mouth Every 6 (Six) Hours As Needed for Nausea or Vomiting. - Oral    Linked Group 1:  \"Or\" Linked Group Details        pantoprazole (PROTONIX) injection 40 mg 40 mg Every Early Morning 11/21/2017     Sig - Route: Infuse 10 mL into a venous catheter Every Morning. - Intravenous    phenylephrine-mineral oil-petrolatum 0.25-14-74.9 % hemorhoidal ointment  3 Times Daily PRN 11/19/2017     Sig - Route: Insert  into the rectum 3 (Three) Times a Day As Needed (rectal pain). - Rectal    sodium chloride 0.9 % flush 1-10 mL 1-10 mL As Needed 11/17/2017     Sig - Route: Infuse 1-10 mL into a venous catheter As Needed for Line Care. - Intravenous    sodium chloride 0.9 % flush 10 mL 10 mL As Needed 11/17/2017     Sig - Route: Infuse 10 mL into a venous catheter As Needed for Line Care. - Intravenous    sodium chloride 0.9 % infusion 100 mL/hr Continuous 11/17/2017     Sig - Route: Infuse 100 mL/hr into a venous catheter Continuous. - Intravenous    spironolactone (ALDACTONE) tablet 25 mg 25 mg Daily 11/18/2017     Sig - Route: Take 1 tablet by mouth Daily. - Oral            Lab Results (last 24 hours)     Procedure Component Value Units Date/Time    CBC Auto Differential [[id number]]  (Abnormal) Collected:  11/21/17 0610    Specimen:  Blood Updated:  11/21/17 0708     WBC 4.94 10*3/mm3      RBC 5.23 (H) 10*6/mm3      Hemoglobin 14.5 g/dL      Hematocrit 42.3 %      MCV 80.9 fL      MCH 27.7 pg      MCHC 34.3 g/dL      RDW 14.2 %      RDW-SD 42.6 fl      MPV 11.7 fL      Platelets 86 (L) 10*3/mm3       Previously documented low platelet count.         Neutrophil % 61.6 %      Lymphocyte % 28.9 %      Monocyte % 6.1 %      Eosinophil % 3.0 %      Basophil % 0.2 %      Immature Grans % 0.2 %      " Neutrophils, Absolute 3.04 10*3/mm3      Lymphocytes, Absolute 1.43 10*3/mm3      Monocytes, Absolute 0.30 10*3/mm3      Eosinophils, Absolute 0.15 10*3/mm3      Basophils, Absolute 0.01 10*3/mm3      Immature Grans, Absolute 0.01 10*3/mm3      nRBC 0.0 /100 WBC     Comprehensive Metabolic Panel [[id number]]  (Abnormal) Collected:  11/21/17 0610    Specimen:  Blood Updated:  11/21/17 0710     Glucose 77 mg/dL      BUN 11 mg/dL      Creatinine 1.02 (H) mg/dL      Sodium 138 mmol/L      Potassium 4.1 mmol/L      Chloride 98 mmol/L      CO2 26.0 mmol/L      Calcium 9.2 mg/dL      Total Protein 6.9 g/dL      Albumin 3.80 g/dL      ALT (SGPT) 56 (H) U/L      AST (SGOT) 55 (H) U/L      Alkaline Phosphatase 81 U/L      Total Bilirubin 1.0 mg/dL      eGFR Non African Amer 56 mL/min/1.73      Globulin 3.1 gm/dL      A/G Ratio 1.2 g/dL      BUN/Creatinine Ratio 10.8     Anion Gap 14.0 mmol/L     Magnesium [[id number]]  (Normal) Collected:  11/21/17 0610    Specimen:  Blood Updated:  11/21/17 0710     Magnesium 1.8 mg/dL     Scan Slide [[id number]] Collected:  11/21/17 0610    Specimen:  Blood Updated:  11/21/17 0801     RBC Morphology Normal     WBC Morphology Normal     Platelet Estimate Decreased      Few large platelets/ rare giant platelet.            Imaging Results (last 24 hours)     Procedure Component Value Units Date/Time    XR Abdomen 2 View With Chest 1 View [[id number]] Collected:  11/20/17 0937     Updated:  11/20/17 1735    Narrative:       Abdomen two view, chest       CLINICAL INDICATION: Abdominal pain, small bowel obstruction.       COMPARISON: Abdomen November 18, 2017.       FINDINGS: Upright PA chest Supine and upright views of the  abdomen (four    images).  Nasogastric tube in stomach.  Nonobstructive bowel gas pattern. No dilated loops of small bowel  or air-fluid levels and small bowel. No evidence of free air.  Diffuse increased stool in the colon.    The bones are unremarkable. Lung fields are clear.       Impression:       CONCLUSION: Unremarkable abdomen.    Similar appearance to prior  examination November 18, 2017.    Electronically signed by:  Blair Rojas MD  11/20/2017 5:34 PM CST  Workstation: [id number]    CT Abdomen Pelvis With Contrast [[id number]] Updated:  11/21/17 0636           Physician Progress Notes (last 24 hours) (Notes from 11/20/2017  1:44 PM through 11/21/2017  1:44 PM)      Benjamin Troncoso MD at 11/20/2017  6:52 PM  Version 1 of 1         Persistent SBO on films today. No bowel function to speak of. 4-500 cc of NG output.  WIll recheck a CT tomorrow.  If SBO persists, will need exploration.     Electronically signed by Benjamin Troncoso MD at 11/20/2017  6:54 PM      Kendall Chen MD at 11/21/2017 12:09 PM  Version 1 of 1             [facility] Medicine Services  INPATIENT PROGRESS NOTE    Length of Stay: 2  Date of Admission: 11/17/2017  Primary Care Physician: Yobany Barr MD    Subjective   Chief Complaint:   Chief Complaint   Patient presents with   • Abdominal Pain       HPI  Still NPo . Heart burn resolved   Review of Systems   Constitutional: Negative for activity change, appetite change, chills, diaphoresis, fatigue, fever and unexpected weight change.   HENT: Negative.  Negative for congestion, dental problem, drooling, ear discharge, ear pain, facial swelling, hearing loss, mouth sores, nosebleeds, postnasal drip, rhinorrhea, sinus pressure, sneezing, tinnitus, trouble swallowing and voice change.    Eyes: Negative for photophobia and discharge.   Respiratory: Negative for apnea, cough, choking, shortness of breath, wheezing and stridor.    Cardiovascular: Negative for chest pain, palpitations and leg swelling.   Gastrointestinal: Positive for abdominal distention and abdominal pain. Negative for anal bleeding, blood in stool, constipation, diarrhea, nausea, rectal pain and vomiting.   Endocrine: Negative for cold intolerance, heat intolerance,  polydipsia, polyphagia and polyuria.   Genitourinary: Negative for dysuria, enuresis, frequency, hematuria and urgency.   Musculoskeletal: Negative for arthralgias, back pain, joint swelling, myalgias, neck pain and neck stiffness.   Skin: Negative for color change, pallor, rash and wound.   Allergic/Immunologic: Negative for food allergies and immunocompromised state.   Neurological: Negative for dizziness, tremors, seizures, syncope, facial asymmetry, speech difficulty, weakness, light-headedness, numbness and headaches.   Hematological: Negative for adenopathy.   Psychiatric/Behavioral: Negative for agitation, behavioral problems, confusion, hallucinations, sleep disturbance and suicidal ideas. The patient is not nervous/anxious.         All pertinent negatives and positives are as above. All other systems have been reviewed and are negative unless otherwise stated.     Objective    Temp:  [96.7 °F (35.9 °C)-98.8 °F (37.1 °C)] 98.8 °F (37.1 °C)  Heart Rate:  [70-82] 75  Resp:  [16-18] 18  BP: (130-147)/(61-82) 130/61  Physical Exam   Constitutional: She is oriented to person, place, and time. She appears well-developed and well-nourished.   HENT:   Head: Normocephalic and atraumatic.   Eyes: EOM are normal. Pupils are equal, round, and reactive to light.   Neck: Normal range of motion. Neck supple.   Cardiovascular: Normal rate.  Exam reveals no friction rub.    No murmur heard.  Pulmonary/Chest: Effort normal.   Abdominal: Soft. Bowel sounds are normal. She exhibits distension. There is no tenderness.   Musculoskeletal: Normal range of motion.   Neurological: She is alert and oriented to person, place, and time.   Skin: Skin is warm and dry.   Psychiatric: She has a normal mood and affect. Her behavior is normal. Judgment and thought content normal.           Results Review:  I have reviewed the labs, radiology results, and diagnostic studies.    Laboratory Data:   Lab Results (last 24 hours)     Procedure  Component Value Units Date/Time    CBC Auto Differential [[id number]]  (Abnormal) Collected:  11/21/17 0610    Specimen:  Blood Updated:  11/21/17 0708     WBC 4.94 10*3/mm3      RBC 5.23 (H) 10*6/mm3      Hemoglobin 14.5 g/dL      Hematocrit 42.3 %      MCV 80.9 fL      MCH 27.7 pg      MCHC 34.3 g/dL      RDW 14.2 %      RDW-SD 42.6 fl      MPV 11.7 fL      Platelets 86 (L) 10*3/mm3       Previously documented low platelet count.         Neutrophil % 61.6 %      Lymphocyte % 28.9 %      Monocyte % 6.1 %      Eosinophil % 3.0 %      Basophil % 0.2 %      Immature Grans % 0.2 %      Neutrophils, Absolute 3.04 10*3/mm3      Lymphocytes, Absolute 1.43 10*3/mm3      Monocytes, Absolute 0.30 10*3/mm3      Eosinophils, Absolute 0.15 10*3/mm3      Basophils, Absolute 0.01 10*3/mm3      Immature Grans, Absolute 0.01 10*3/mm3      nRBC 0.0 /100 WBC     Comprehensive Metabolic Panel [[id number]]  (Abnormal) Collected:  11/21/17 0610    Specimen:  Blood Updated:  11/21/17 0710     Glucose 77 mg/dL      BUN 11 mg/dL      Creatinine 1.02 (H) mg/dL      Sodium 138 mmol/L      Potassium 4.1 mmol/L      Chloride 98 mmol/L      CO2 26.0 mmol/L      Calcium 9.2 mg/dL      Total Protein 6.9 g/dL      Albumin 3.80 g/dL      ALT (SGPT) 56 (H) U/L      AST (SGOT) 55 (H) U/L      Alkaline Phosphatase 81 U/L      Total Bilirubin 1.0 mg/dL      eGFR Non African Amer 56 mL/min/1.73      Globulin 3.1 gm/dL      A/G Ratio 1.2 g/dL      BUN/Creatinine Ratio 10.8     Anion Gap 14.0 mmol/L     Magnesium [[id number]]  (Normal) Collected:  11/21/17 0610    Specimen:  Blood Updated:  11/21/17 0710     Magnesium 1.8 mg/dL     Scan Slide [[id number]] Collected:  11/21/17 0610    Specimen:  Blood Updated:  11/21/17 0801     RBC Morphology Normal     WBC Morphology Normal     Platelet Estimate Decreased      Few large platelets/ rare giant platelet.              Culture Data:   No results found for: BLOODCX  No results found for: URINECX  No results found  for: RESPCX  No results found for: WOUNDCX  No results found for: STOOLCX  No components found for: BODYFLD    Radiology Data:   Imaging Results (last 24 hours)     Procedure Component Value Units Date/Time    XR Abdomen 2 View With Chest 1 View [[id number]] Collected:  11/20/17 0937     Updated:  11/20/17 1735    Narrative:       Abdomen two view, chest       CLINICAL INDICATION: Abdominal pain, small bowel obstruction.       COMPARISON: Abdomen November 18, 2017.       FINDINGS: Upright PA chest Supine and upright views of the  abdomen (four    images).  Nasogastric tube in stomach.  Nonobstructive bowel gas pattern. No dilated loops of small bowel  or air-fluid levels and small bowel. No evidence of free air.  Diffuse increased stool in the colon.    The bones are unremarkable. Lung fields are clear.      Impression:       CONCLUSION: Unremarkable abdomen.    Similar appearance to prior  examination November 18, 2017.    Electronically signed by:  Blair Rojas MD  11/20/2017 5:34 PM CST  Workstation: [employer]    CT Abdomen Pelvis With Contrast [[id number]] Updated:  11/21/17 0636          I have reviewed the patient current medications.     Assessment/Plan           Active Hospital Problems (** Indicates Principal Problem)      Diagnosis Date Noted   • **Abdominal pain [R10.9] 11/17/2017   • Constipation [K59.00]      • Acid reflux [K21.9]      • Depression [F32.9]      • Disease related peripheral neuropathy [G62.89]      • Epigastric pain [R10.13]      • Hyperlipidemia [E78.5]      • Nausea and vomiting [R11.2]      • Restless leg syndrome [G25.81]      • Sleep apnea [G47.30]           Resolved Hospital Problems      Diagnosis Date Noted Date Resolved   No resolved problems to display.     11/21 if not better will have  Surgical exploration   11/19. Patient still has NG tube in place.  Abdomen is very soft today.  Still nothing by mouth.  Patient given enemas overnight to attempt to get the stool burden eased from  her large bowel.  Patient had 1 small bowel movement however still has not had a good what she calls a regular bowel movement yet.  Patient still nauseated but improved.  Clinically small bowel obstruction improving.  11/18. Initially admitted with abdominal pain.  Differential diagnosis includes small bowel obstruction, constipation, colitis.  General surgery has been consulted, NG tube is in place, patient started on intravenous fluids and nothing by mouth at present.  Abdominal x-ray for this morning showed no mechanical bowel obstruction, however moderate amount of retained feces in the colon.  Plan to give patient soapsuds enema in order to have her bowels move.  Patient does have bowel sounds this morning, flatus positive, no stool for 5 days.  Patient does have some abdominal distention as well however per the patient and nursing staff this has improved overnight with the NG tube.      11/19.  Patient still nothing by mouth however advanced ice chips.  11/18. Patient is currently nothing by mouth as such her home by mouth medications are currently on hold.  As her diet is restarted will resume patient's home medications.      Will monitor patient's hospital course and adjust treatment as hospital course dictates.      DVT prophylaxis: Heparin  Code status is Full Code      Plan for disposition:Where: home and When:  1-3 days        Kendall Chen MD   11/21/17   12:09 PM         Electronically signed by Kendall Chen MD at 11/21/2017 12:19 PM        Consult Notes (last 24 hours) (Notes from 11/20/2017  1:44 PM through 11/21/2017  1:44 PM)     No notes of this type exist for this encounter.

## 2017-11-21 NOTE — PROGRESS NOTES
[facility] Medicine Services  INPATIENT PROGRESS NOTE    Length of Stay: 2  Date of Admission: 11/17/2017  Primary Care Physician: Yobany Barr MD    Subjective   Chief Complaint:   Chief Complaint   Patient presents with   • Abdominal Pain       HPI  Still NPo . Heart burn resolved   Review of Systems   Constitutional: Negative for activity change, appetite change, chills, diaphoresis, fatigue, fever and unexpected weight change.   HENT: Negative.  Negative for congestion, dental problem, drooling, ear discharge, ear pain, facial swelling, hearing loss, mouth sores, nosebleeds, postnasal drip, rhinorrhea, sinus pressure, sneezing, tinnitus, trouble swallowing and voice change.    Eyes: Negative for photophobia and discharge.   Respiratory: Negative for apnea, cough, choking, shortness of breath, wheezing and stridor.    Cardiovascular: Negative for chest pain, palpitations and leg swelling.   Gastrointestinal: Positive for abdominal distention and abdominal pain. Negative for anal bleeding, blood in stool, constipation, diarrhea, nausea, rectal pain and vomiting.   Endocrine: Negative for cold intolerance, heat intolerance, polydipsia, polyphagia and polyuria.   Genitourinary: Negative for dysuria, enuresis, frequency, hematuria and urgency.   Musculoskeletal: Negative for arthralgias, back pain, joint swelling, myalgias, neck pain and neck stiffness.   Skin: Negative for color change, pallor, rash and wound.   Allergic/Immunologic: Negative for food allergies and immunocompromised state.   Neurological: Negative for dizziness, tremors, seizures, syncope, facial asymmetry, speech difficulty, weakness, light-headedness, numbness and headaches.   Hematological: Negative for adenopathy.   Psychiatric/Behavioral: Negative for agitation, behavioral problems, confusion, hallucinations, sleep disturbance and suicidal ideas. The patient is not nervous/anxious.         All  pertinent negatives and positives are as above. All other systems have been reviewed and are negative unless otherwise stated.     Objective    Temp:  [96.7 °F (35.9 °C)-98.8 °F (37.1 °C)] 98.8 °F (37.1 °C)  Heart Rate:  [70-82] 75  Resp:  [16-18] 18  BP: (130-147)/(61-82) 130/61  Physical Exam   Constitutional: She is oriented to person, place, and time. She appears well-developed and well-nourished.   HENT:   Head: Normocephalic and atraumatic.   Eyes: EOM are normal. Pupils are equal, round, and reactive to light.   Neck: Normal range of motion. Neck supple.   Cardiovascular: Normal rate.  Exam reveals no friction rub.    No murmur heard.  Pulmonary/Chest: Effort normal.   Abdominal: Soft. Bowel sounds are normal. She exhibits distension. There is no tenderness.   Musculoskeletal: Normal range of motion.   Neurological: She is alert and oriented to person, place, and time.   Skin: Skin is warm and dry.   Psychiatric: She has a normal mood and affect. Her behavior is normal. Judgment and thought content normal.           Results Review:  I have reviewed the labs, radiology results, and diagnostic studies.    Laboratory Data:   Lab Results (last 24 hours)     Procedure Component Value Units Date/Time    CBC Auto Differential [[id number]]  (Abnormal) Collected:  11/21/17 0610    Specimen:  Blood Updated:  11/21/17 0708     WBC 4.94 10*3/mm3      RBC 5.23 (H) 10*6/mm3      Hemoglobin 14.5 g/dL      Hematocrit 42.3 %      MCV 80.9 fL      MCH 27.7 pg      MCHC 34.3 g/dL      RDW 14.2 %      RDW-SD 42.6 fl      MPV 11.7 fL      Platelets 86 (L) 10*3/mm3       Previously documented low platelet count.         Neutrophil % 61.6 %      Lymphocyte % 28.9 %      Monocyte % 6.1 %      Eosinophil % 3.0 %      Basophil % 0.2 %      Immature Grans % 0.2 %      Neutrophils, Absolute 3.04 10*3/mm3      Lymphocytes, Absolute 1.43 10*3/mm3      Monocytes, Absolute 0.30 10*3/mm3      Eosinophils, Absolute 0.15 10*3/mm3       Basophils, Absolute 0.01 10*3/mm3      Immature Grans, Absolute 0.01 10*3/mm3      nRBC 0.0 /100 WBC     Comprehensive Metabolic Panel [[id number]]  (Abnormal) Collected:  11/21/17 0610    Specimen:  Blood Updated:  11/21/17 0710     Glucose 77 mg/dL      BUN 11 mg/dL      Creatinine 1.02 (H) mg/dL      Sodium 138 mmol/L      Potassium 4.1 mmol/L      Chloride 98 mmol/L      CO2 26.0 mmol/L      Calcium 9.2 mg/dL      Total Protein 6.9 g/dL      Albumin 3.80 g/dL      ALT (SGPT) 56 (H) U/L      AST (SGOT) 55 (H) U/L      Alkaline Phosphatase 81 U/L      Total Bilirubin 1.0 mg/dL      eGFR Non African Amer 56 mL/min/1.73      Globulin 3.1 gm/dL      A/G Ratio 1.2 g/dL      BUN/Creatinine Ratio 10.8     Anion Gap 14.0 mmol/L     Magnesium [[id number]]  (Normal) Collected:  11/21/17 0610    Specimen:  Blood Updated:  11/21/17 0710     Magnesium 1.8 mg/dL     Scan Slide [[id number]] Collected:  11/21/17 0610    Specimen:  Blood Updated:  11/21/17 0801     RBC Morphology Normal     WBC Morphology Normal     Platelet Estimate Decreased      Few large platelets/ rare giant platelet.              Culture Data:   No results found for: BLOODCX  No results found for: URINECX  No results found for: RESPCX  No results found for: WOUNDCX  No results found for: STOOLCX  No components found for: BODYFLD    Radiology Data:   Imaging Results (last 24 hours)     Procedure Component Value Units Date/Time    XR Abdomen 2 View With Chest 1 View [[id number]] Collected:  11/20/17 0937     Updated:  11/20/17 1735    Narrative:       Abdomen two view, chest       CLINICAL INDICATION: Abdominal pain, small bowel obstruction.       COMPARISON: Abdomen November 18, 2017.       FINDINGS: Upright PA chest Supine and upright views of the  abdomen (four    images).  Nasogastric tube in stomach.  Nonobstructive bowel gas pattern. No dilated loops of small bowel  or air-fluid levels and small bowel. No evidence of free air.  Diffuse increased stool in  the colon.    The bones are unremarkable. Lung fields are clear.      Impression:       CONCLUSION: Unremarkable abdomen.    Similar appearance to prior  examination November 18, 2017.    Electronically signed by:  Blair Rojas MD  11/20/2017 5:34 PM CST  Workstation: [id number]    CT Abdomen Pelvis With Contrast [[id number]] Updated:  11/21/17 0636          I have reviewed the patient current medications.     Assessment/Plan           Active Hospital Problems (** Indicates Principal Problem)      Diagnosis Date Noted   • **Abdominal pain [R10.9] 11/17/2017   • Constipation [K59.00]      • Acid reflux [K21.9]      • Depression [F32.9]      • Disease related peripheral neuropathy [G62.89]      • Epigastric pain [R10.13]      • Hyperlipidemia [E78.5]      • Nausea and vomiting [R11.2]      • Restless leg syndrome [G25.81]      • Sleep apnea [G47.30]           Resolved Hospital Problems      Diagnosis Date Noted Date Resolved   No resolved problems to display.     11/21 if not better will have  Surgical exploration   11/19. Patient still has NG tube in place.  Abdomen is very soft today.  Still nothing by mouth.  Patient given enemas overnight to attempt to get the stool burden eased from her large bowel.  Patient had 1 small bowel movement however still has not had a good what she calls a regular bowel movement yet.  Patient still nauseated but improved.  Clinically small bowel obstruction improving.  11/18. Initially admitted with abdominal pain.  Differential diagnosis includes small bowel obstruction, constipation, colitis.  General surgery has been consulted, NG tube is in place, patient started on intravenous fluids and nothing by mouth at present.  Abdominal x-ray for this morning showed no mechanical bowel obstruction, however moderate amount of retained feces in the colon.  Plan to give patient soapsuds enema in order to have her bowels move.  Patient does have bowel sounds this morning, flatus positive, no stool  for 5 days.  Patient does have some abdominal distention as well however per the patient and nursing staff this has improved overnight with the NG tube.      11/19.  Patient still nothing by mouth however advanced ice chips.  11/18. Patient is currently nothing by mouth as such her home by mouth medications are currently on hold.  As her diet is restarted will resume patient's home medications.      Will monitor patient's hospital course and adjust treatment as hospital course dictates.      DVT prophylaxis: Heparin  Code status is Full Code      Plan for disposition:Where: home and When:  1-3 days        Kendall Chen MD   11/21/17   12:09 PM

## 2017-11-21 NOTE — PROGRESS NOTES
"   LOS: 2 days   Patient Care Team:  Yobany Barr MD as PCP - General  Daren Marie DPM as Consulting Physician (Podiatry)    Chief Complaint: SBO    Subjective     History of Present Illness    Subjective:  Symptoms:  Improved.    Diet:  NPO.  No nausea or vomiting.    Activity level: Impaired due to weakness.    Pain:  She reports no pain.        History taken from: patient chart    Objective     Vital Signs  Temp:  [96.7 °F (35.9 °C)-98.8 °F (37.1 °C)] 98.8 °F (37.1 °C)  Heart Rate:  [70-82] 75  Resp:  [16-18] 18  BP: (130-147)/(61-82) 130/61    Objective:  General Appearance:  Comfortable.    Vital signs: (most recent): Blood pressure 130/61, pulse 75, temperature 98.8 °F (37.1 °C), temperature source Temporal Artery , resp. rate 18, height 64\" (162.6 cm), weight 218 lb 11.2 oz (99.2 kg), SpO2 91 %, not currently breastfeeding.  Vital signs are normal.  No fever.    Output: Producing urine and no stool output.    Abdomen: Abdomen is soft and non-distended.  There are no signs of ascites.              Results Review:     I reviewed the patient's new clinical results.    Results for XUAN COLEMAN (MRN [id number]) as of 11/21/2017 17:59   Ref. Range 11/21/2017 06:10   Glucose Latest Ref Range: 60 - 100 mg/dL 77   Sodium Latest Ref Range: 137 - 145 mmol/L 138   Potassium Latest Ref Range: 3.5 - 5.1 mmol/L 4.1   CO2 Latest Ref Range: 22.0 - 31.0 mmol/L 26.0   Chloride Latest Ref Range: 95 - 110 mmol/L 98   Anion Gap Latest Ref Range: 5.0 - 15.0 mmol/L 14.0   Creatinine Latest Ref Range: 0.50 - 1.00 mg/dL 1.02 (H)   BUN Latest Ref Range: 7 - 21 mg/dL 11   BUN/Creatinine Ratio Latest Ref Range: 7.0 - 25.0  10.8   Calcium Latest Ref Range: 8.4 - 10.2 mg/dL 9.2   eGFR Non African Amer Latest Ref Range: 51 - 120 mL/min/1.73 56   Alkaline Phosphatase Latest Ref Range: 38 - 126 U/L 81   Total Protein Latest Ref Range: 6.3 - 8.6 g/dL 6.9   ALT (SGPT) Latest Ref Range: 9 - 52 U/L 56 (H)   AST (SGOT) Latest Ref " Range: 14 - 36 U/L 55 (H)   Total Bilirubin Latest Ref Range: 0.2 - 1.3 mg/dL 1.0   Albumin Latest Ref Range: 3.40 - 4.80 g/dL 3.80   Globulin Latest Ref Range: 2.3 - 3.5 gm/dL 3.1   A/G Ratio Latest Ref Range: 1.1 - 1.8 g/dL 1.2   Magnesium Latest Ref Range: 1.6 - 2.3 mg/dL 1.8   WBC Latest Ref Range: 3.20 - 9.80 10*3/mm3 4.94   RBC Latest Ref Range: 3.77 - 5.16 10*6/mm3 5.23 (H)   Hemoglobin Latest Ref Range: 12.0 - 15.5 g/dL 14.5   Hematocrit Latest Ref Range: 35.0 - 45.0 % 42.3   RDW Latest Ref Range: 11.5 - 14.5 % 14.2   MCV Latest Ref Range: 80.0 - 98.0 fL 80.9   MCH Latest Ref Range: 26.5 - 34.0 pg 27.7   MCHC Latest Ref Range: 31.4 - 36.0 g/dL 34.3   MPV Latest Ref Range: 8.0 - 12.0 fL 11.7   Platelets Latest Ref Range: 150 - 450 10*3/mm3 86 (L)   RDW-SD Latest Ref Range: 36.4 - 46.3 fl 42.6   Neutrophil % Latest Ref Range: 37.0 - 80.0 % 61.6   Lymphocyte % Latest Ref Range: 10.0 - 50.0 % 28.9   Monocyte % Latest Ref Range: 0.0 - 12.0 % 6.1   Eosinophil % Latest Ref Range: 0.0 - 7.0 % 3.0   Basophil % Latest Ref Range: 0.0 - 2.0 % 0.2   Immature Grans % Latest Ref Range: 0.0 - 0.5 % 0.2   Neutrophils, Absolute Latest Ref Range: 2.00 - 8.60 10*3/mm3 3.04   Lymphocytes, Absolute Latest Ref Range: 0.60 - 4.20 10*3/mm3 1.43   Monocytes, Absolute Latest Ref Range: 0.00 - 0.90 10*3/mm3 0.30   Eosinophils, Absolute Latest Ref Range: 0.00 - 0.70 10*3/mm3 0.15   Basophils, Absolute Latest Ref Range: 0.00 - 0.20 10*3/mm3 0.01   Immature Grans, Absolute Latest Ref Range: 0.00 - 0.02 10*3/mm3 0.01   WBC Morphology Latest Ref Range: Normal  Normal   RBC Morphology Latest Ref Range: Normal  Normal   Platelet Estimate Latest Ref Range: Normal  Decreased   nRBC Latest Ref Range: 0.0 - 0.0 /100 WBC 0.0     Study Result         Patient Name: XUAN COLEMAN     ORDERING: HALEIGH WATSON     ATTENDING: EDDIE AMADOR      REFERRING: HALEIGH WATSON     -----------------------  EXAM DESCRIPTION: CT ABDOMEN PELVIS W CONTRAST     HISTORY:  FU SBO, R10.9 Unspecified abdominal pain K56.609  Unspecified intestinal obstruction, unspecified as to partial  versus complete obstruction R11.2 Nausea with vomiting,  unspecified     COMPARISON: 11/17/2017     Dose Length Product: 593.70.     This exam was performed according to our departmental dose  optimization program, which includes automated exposure control,  adjustment of the mA and/or KV according to patient size and/or  use of iterative reconstruction technique.        CONTRAST:   Oral and 94 cc intravenous Isovue 300.       TECHNIQUE: Multiple contiguous contrast enhanced axial images are  obtained of the abdomen and pelvis.      FINDINGS:      LOWER CHEST: There is minimal basilar atelectasis. No parenchymal  consolidation or pleural effusion. No cardiac enlargement or  pericardial effusion.     HEPATOBILIARY: No suspicious hepatic lesion or ductal dilation.  Status post cholecystectomy. No suspicious extrahepatic biliary  dilation.  SPLEEN: Stable mild splenic enlargement.  PANCREAS: No acute or suspicious interval change. No pancreatic  ductal dilation.  ADRENAL GLANDS: Unremarkable.  KIDNEYS/URETERS: Renal cortical margins are smooth. No suspicious  renal mass. No renal/ureteral calcification, hydronephrosis or  hydroureter.     GASTROINTESTINAL: Esophagogastric tube tip is positioned within  the stomach. The stomach and duodenum otherwise are unremarkable.  The abnormal fluid distention of small bowel seen previously has  resolved. The oral contrast is seen throughout the length of the  small bowel in a nonobstructive manner. No obvious fold or wall  thickening identified. The ileocecal junction is unremarkable. No  inflammatory fat stranding about the terminal ileum. There is  moderate volume of stool scattered throughout the colon. No  findings of obstruction or wall thickening.  REPRODUCTIVE ORGANS: Status post hysterectomy.  URINARY BLADDER: Unremarkable     VASCULAR: Unremarkable  LYMPH  NODES: No pathologically enlarged nodes by size criteria.  PERITONEUM/RETROPERITONEUM: No free air or free fluid..      OSSEOUS STRUCTURES: No acute findings.     ADDITIONAL FINDINGS: None     IMPRESSION:  Esophagogastric tube tip is positioned within the stomach.     Previously noted abnormal fluid distention of proximal small  bowel has resolved. Oral contrast is seen scattered throughout  the small bowel in a nonobstructive manner.     Moderate volume of stool within the colon.     Status post cholecystectomy and hysterectomy.     Electronically signed by:  Colin Yoder MD  11/21/2017 4:46 PM  CST Workstation: [id number]       Imaging      CT Abdomen Pelvis With Contrast (Order #[id number]) on 11/20/2017 - Imaging Information           Medication Review: Done    Assessment/Plan     Principal Problem:    Abdominal pain  Active Problems:    Constipation    Acid reflux    Depression    Disease related peripheral neuropathy    Epigastric pain    Hyperlipidemia    Nausea and vomiting    Restless leg syndrome    Sleep apnea      Assessment:    Condition: In stable condition.  Improving.       Plan:   (Shade SHIN).       Benjamin Troncoso MD  11/21/17  5:58 PM    Time: 5 min

## 2017-11-21 NOTE — PROGRESS NOTES
Persistent SBO on films today. No bowel function to speak of. 4-500 cc of NG output.  WIll recheck a CT tomorrow.  If SBO persists, will need exploration.

## 2017-11-21 NOTE — PLAN OF CARE
Problem: Patient Care Overview (Adult)  Goal: Plan of Care Review  Outcome: Ongoing (interventions implemented as appropriate)    11/21/17 0402   Coping/Psychosocial Response Interventions   Plan Of Care Reviewed With patient   Patient Care Overview   Progress improving   Outcome Evaluation   Outcome Summary/Follow up Plan vss, pain controlled with IV meds, rested well tonight, CT this AM, bowel sounds active, low output from NG       Goal: Adult Individualization and Mutuality  Outcome: Ongoing (interventions implemented as appropriate)  Goal: Discharge Needs Assessment  Outcome: Ongoing (interventions implemented as appropriate)    Problem: Pain, Acute (Adult)  Goal: Acceptable Pain Control/Comfort Level  Outcome: Ongoing (interventions implemented as appropriate)    Problem: Fall Risk (Adult)  Goal: Absence of Falls  Outcome: Outcome(s) achieved Date Met:  11/21/17    Problem: Bowel Obstruction (Adult)  Goal: Signs and Symptoms of Listed Potential Problems Will be Absent or Manageable (Bowel Obstruction)  Outcome: Ongoing (interventions implemented as appropriate)

## 2017-11-21 NOTE — NURSING NOTE
Contacted radiology about forgetting to unhook suction when pt began drinking contrast. Unhooked prior to pt starting her 2nd cup. Only about 275 ml suctioned out in contanier from her first cup of contrast. Going to wait til 6am to do CT

## 2017-11-22 VITALS
RESPIRATION RATE: 18 BRPM | OXYGEN SATURATION: 94 % | BODY MASS INDEX: 37.34 KG/M2 | TEMPERATURE: 96.8 F | DIASTOLIC BLOOD PRESSURE: 67 MMHG | SYSTOLIC BLOOD PRESSURE: 139 MMHG | WEIGHT: 218.7 LBS | HEIGHT: 64 IN | HEART RATE: 76 BPM

## 2017-11-22 LAB
ALBUMIN SERPL-MCNC: 4 G/DL (ref 3.4–4.8)
ALBUMIN/GLOB SERPL: 1.3 G/DL (ref 1.1–1.8)
ALP SERPL-CCNC: 80 U/L (ref 38–126)
ALT SERPL W P-5'-P-CCNC: 53 U/L (ref 9–52)
ANION GAP SERPL CALCULATED.3IONS-SCNC: 10 MMOL/L (ref 5–15)
AST SERPL-CCNC: 55 U/L (ref 14–36)
BASOPHILS # BLD AUTO: 0.01 10*3/MM3 (ref 0–0.2)
BASOPHILS NFR BLD AUTO: 0.2 % (ref 0–2)
BILIRUB SERPL-MCNC: 1 MG/DL (ref 0.2–1.3)
BUN BLD-MCNC: 10 MG/DL (ref 7–21)
BUN/CREAT SERPL: 9.7 (ref 7–25)
CALCIUM SPEC-SCNC: 9.3 MG/DL (ref 8.4–10.2)
CHLORIDE SERPL-SCNC: 99 MMOL/L (ref 95–110)
CO2 SERPL-SCNC: 29 MMOL/L (ref 22–31)
CREAT BLD-MCNC: 1.03 MG/DL (ref 0.5–1)
DEPRECATED RDW RBC AUTO: 42.6 FL (ref 36.4–46.3)
EOSINOPHIL # BLD AUTO: 0.14 10*3/MM3 (ref 0–0.7)
EOSINOPHIL NFR BLD AUTO: 3.3 % (ref 0–7)
ERYTHROCYTE [DISTWIDTH] IN BLOOD BY AUTOMATED COUNT: 14.5 % (ref 11.5–14.5)
GFR SERPL CREATININE-BSD FRML MDRD: 55 ML/MIN/1.73 (ref 51–120)
GLOBULIN UR ELPH-MCNC: 3.1 GM/DL (ref 2.3–3.5)
GLUCOSE BLD-MCNC: 93 MG/DL (ref 60–100)
HCT VFR BLD AUTO: 43.3 % (ref 35–45)
HGB BLD-MCNC: 15 G/DL (ref 12–15.5)
IMM GRANULOCYTES # BLD: 0.01 10*3/MM3 (ref 0–0.02)
IMM GRANULOCYTES NFR BLD: 0.2 % (ref 0–0.5)
LYMPHOCYTES # BLD AUTO: 1.43 10*3/MM3 (ref 0.6–4.2)
LYMPHOCYTES NFR BLD AUTO: 33.3 % (ref 10–50)
MAGNESIUM SERPL-MCNC: 1.7 MG/DL (ref 1.6–2.3)
MCH RBC QN AUTO: 27.9 PG (ref 26.5–34)
MCHC RBC AUTO-ENTMCNC: 34.6 G/DL (ref 31.4–36)
MCV RBC AUTO: 80.6 FL (ref 80–98)
MONOCYTES # BLD AUTO: 0.34 10*3/MM3 (ref 0–0.9)
MONOCYTES NFR BLD AUTO: 7.9 % (ref 0–12)
NEUTROPHILS # BLD AUTO: 2.37 10*3/MM3 (ref 2–8.6)
NEUTROPHILS NFR BLD AUTO: 55.1 % (ref 37–80)
PLATELET # BLD AUTO: 99 10*3/MM3 (ref 150–450)
PMV BLD AUTO: 10.8 FL (ref 8–12)
POTASSIUM BLD-SCNC: 4.4 MMOL/L (ref 3.5–5.1)
PROT SERPL-MCNC: 7.1 G/DL (ref 6.3–8.6)
RBC # BLD AUTO: 5.37 10*6/MM3 (ref 3.77–5.16)
SODIUM BLD-SCNC: 138 MMOL/L (ref 137–145)
WBC NRBC COR # BLD: 4.3 10*3/MM3 (ref 3.2–9.8)

## 2017-11-22 PROCEDURE — 85025 COMPLETE CBC W/AUTO DIFF WBC: CPT | Performed by: INTERNAL MEDICINE

## 2017-11-22 PROCEDURE — 25010000002 HYDROMORPHONE PER 4 MG: Performed by: INTERNAL MEDICINE

## 2017-11-22 PROCEDURE — 80053 COMPREHEN METABOLIC PANEL: CPT | Performed by: INTERNAL MEDICINE

## 2017-11-22 PROCEDURE — 99231 SBSQ HOSP IP/OBS SF/LOW 25: CPT | Performed by: SURGERY

## 2017-11-22 PROCEDURE — 83735 ASSAY OF MAGNESIUM: CPT | Performed by: INTERNAL MEDICINE

## 2017-11-22 RX ORDER — OXYCODONE AND ACETAMINOPHEN 7.5; 325 MG/1; MG/1
1 TABLET ORAL 3 TIMES DAILY PRN
Qty: 90 TABLET | Refills: 0
Start: 2017-11-22 | End: 2017-12-22

## 2017-11-22 RX ORDER — MAGNESIUM CARB/ALUMINUM HYDROX 105-160MG
150 TABLET,CHEWABLE ORAL ONCE
Status: COMPLETED | OUTPATIENT
Start: 2017-11-22 | End: 2017-11-22

## 2017-11-22 RX ADMIN — DULOXETINE HYDROCHLORIDE 30 MG: 30 CAPSULE, DELAYED RELEASE ORAL at 08:41

## 2017-11-22 RX ADMIN — MELOXICAM 15 MG: 15 TABLET ORAL at 08:42

## 2017-11-22 RX ADMIN — SODIUM CHLORIDE 100 ML/HR: 9 INJECTION, SOLUTION INTRAVENOUS at 03:35

## 2017-11-22 RX ADMIN — PANTOPRAZOLE SODIUM 40 MG: 40 INJECTION, POWDER, FOR SOLUTION INTRAVENOUS at 05:44

## 2017-11-22 RX ADMIN — HYDROMORPHONE HYDROCHLORIDE 1 MG: 2 INJECTION, SOLUTION INTRAMUSCULAR; INTRAVENOUS; SUBCUTANEOUS at 01:34

## 2017-11-22 RX ADMIN — CHLORTHALIDONE 50 MG: 25 TABLET ORAL at 08:42

## 2017-11-22 RX ADMIN — BISACODYL 10 MG: 10 SUPPOSITORY RECTAL at 08:41

## 2017-11-22 RX ADMIN — MAGESIUM CITRATE 150 ML: 1.75 LIQUID ORAL at 08:48

## 2017-11-22 RX ADMIN — MAGNESIUM OXIDE TAB 400 MG (241.3 MG ELEMENTAL MG) 400 MG: 400 (241.3 MG) TAB at 08:41

## 2017-11-22 RX ADMIN — HYDROMORPHONE HYDROCHLORIDE 1 MG: 2 INJECTION, SOLUTION INTRAMUSCULAR; INTRAVENOUS; SUBCUTANEOUS at 12:45

## 2017-11-22 RX ADMIN — DOCUSATE SODIUM 200 MG: 100 CAPSULE, LIQUID FILLED ORAL at 08:41

## 2017-11-22 RX ADMIN — SPIRONOLACTONE 25 MG: 25 TABLET ORAL at 08:42

## 2017-11-22 NOTE — PLAN OF CARE
Problem: Patient Care Overview (Adult)  Goal: Plan of Care Review  Outcome: Ongoing (interventions implemented as appropriate)    11/22/17 0257   Coping/Psychosocial Response Interventions   Plan Of Care Reviewed With patient   Patient Care Overview   Progress improving   Outcome Evaluation   Outcome Summary/Follow up Plan bowel sounds active in all quadrents; passing gas; still no BM       Goal: Adult Individualization and Mutuality  Outcome: Ongoing (interventions implemented as appropriate)  Goal: Discharge Needs Assessment  Outcome: Ongoing (interventions implemented as appropriate)    Problem: Pain, Acute (Adult)  Goal: Acceptable Pain Control/Comfort Level  Outcome: Ongoing (interventions implemented as appropriate)    Problem: Bowel Obstruction (Adult)  Goal: Signs and Symptoms of Listed Potential Problems Will be Absent or Manageable (Bowel Obstruction)  Outcome: Ongoing (interventions implemented as appropriate)

## 2017-11-22 NOTE — CONSULTS
"Adult Nutrition  Assessment    Patient Name:  Amira Shannon  YOB: 1960  MRN: [id number]  Admit Date:  11/17/2017    Assessment Date:  11/22/2017    Comments:  RD consult r/t LOS. Pt admitted w/SBO which is resolving now. NGT is out. Pt's diet advanced to fulls today and she tolerated that at lunch, hopes to adv to solids soon. Per pt, she was eating well pta. RD will monitor and make recs pta.           Reason for Assessment       11/22/17 1332    Reason for Assessment    Reason For Assessment/Visit length of stay    Diagnosis Diagnosis    Gastrointestinal Bowel obstruction                Nutrition/Diet History       11/22/17 1338    Nutrition/Diet History    Typical Food/Fluid Intake Pt says she tolerated full liquid diet today. Denies n/v. Hopes to advance to solid foods soon. NGT out.              Labs/Tests/Procedures/Meds       11/22/17 1340    Labs/Tests/Procedures/Meds    Labs/Tests Review Reviewed    Medication Review Reviewed, pertinent              Estimated/Assessed Needs       11/22/17 1340    Calculation Measurements    Weight Used For Calculations 54.4 kg (120 lb)    Height Used for Calculations 1.626 m (5' 4\")    Estimated/Assessed Energy Needs    Energy Need Method Kcal/kg    kcal/kg 25    25 Kcal/Kg (kcal) 1360.8    Estimated Kcal Range  5766-8486    Estimated/Assessed Protein Needs    Weight Used for Protein Calculation 54.4 kg (120 lb)    Protein (gm/kg) 0.8    0.8 Gm Protein (gm) 43.55    Estimated/Assessed Fluid Needs    Fluid Need Method RDA method    RDA Method (mL)  1400            Nutrition Prescription Ordered       11/22/17 1346    Nutrition Prescription PO    Current PO Diet Full Liquid            Evaluation of Received Nutrient/Fluid Intake       11/22/17 1346    PO Evaluation    Number of Days PO Intake Evaluated Insufficient Data            Electronically signed by:  Krystyna Katz RD  11/22/17 1:49 PM  "

## 2017-11-22 NOTE — DISCHARGE SUMMARY
[facility] Medicine Services  DISCHARGE SUMMARY       Date of Admission: 11/17/2017  Date of Discharge:  11/22/2017  Primary Care Physician: Yobany Barr MD  Presenting  Problems :  Abdominal pain, unspecified abdominal location [R10.9]  Intractable vomiting with nausea, unspecified vomiting type [R11.2]  Intestinal obstruction, unspecified cause, unspecified whether partial or complete [K56.609]  Abdominal pain, unspecified abdominal location [R10.9]  Final Discharge Diagnoses:  sbo   Constipation     Consults:   Consults     Date and Time Order Name Status Description    11/17/2017 2019 Hospitalist (on-call MD unless specified)            Procedures Performed: none                 Pertinent Test Results:   Ct abdomen and pelvis with contrast   IMPRESSION:  Esophagogastric tube tip is positioned within the stomach.     Previously noted abnormal fluid distention of proximal small  bowel has resolved. Oral contrast is seen scattered throughout  the small bowel in a nonobstructive manner.     Moderate volume of stool within the colon.     Status post cholecystectomy and hysterectomy  Chief Complaint on Day of Discharge: none     Hospital Course:Patient is a 57 y.o. female presents with Complaint of having abdominal pain.  Patient states she is having constipation on and off for past few months however last bowel movement was 3 days ago.  Patient states she's been having abdominal pain on and off however had significantly worsened this evening which was causing her extreme discomfort and she decided to come to emergency further evaluation.  Patient denies any fever or chills associated with it.  No nausea or vomiting have been reported however persistency of time on pain was reported.  No weight loss we can have been reported.  Patient states she has been feeling well prior to this.  Patient states she has small bowel torsion and passed fresh required surgical  "intervention.   shewas kept NPO ,on iv fluids  And iv pain medication   we  placed an NGTube .   after a few days  We were able t o to advance her diet  And she passed some stools   She did not need any surgical intervention .  .      Condition on Discharge:  Stable     Physical Exam on Discharge:  /67 (BP Location: Left arm, Patient Position: Lying)  Pulse 76  Temp 96.8 °F (36 °C)  Resp 18  Ht 64\" (162.6 cm)  Wt 218 lb 11.2 oz (99.2 kg)  LMP  (LMP Unknown)  SpO2 94%  BMI 37.54 kg/m2  Physical Exam   Constitutional: She is oriented to person, place, and time. She appears well-developed and well-nourished.   HENT:   Head: Normocephalic and atraumatic.   Eyes: EOM are normal. Pupils are equal, round, and reactive to light.   Neck: Normal range of motion. Neck supple.   Cardiovascular: Normal rate and normal heart sounds.  Exam reveals no gallop and no friction rub.    No murmur heard.  Pulmonary/Chest: Effort normal and breath sounds normal.   Abdominal: Soft. She exhibits no distension. There is no tenderness.   Neurological: She is alert and oriented to person, place, and time.   Skin: Skin is warm and dry.   Psychiatric: She has a normal mood and affect. Her behavior is normal. Judgment and thought content normal.         Discharge Disposition:  Home or Self Care    Discharge Medications:   Amira Shannon   Home Medication Instructions DEAN:[id number]    Printed on:11/22/17 8658   Medication Information                      cetirizine (zyrTEC) 10 MG tablet  Take 1 tablet by mouth Daily As Needed for Allergies.             chlorthalidone (HYGROTEN) 50 MG tablet  Take 1 tablet by mouth Daily.             cyclobenzaprine (FLEXERIL) 10 MG tablet  Take 10 mg by mouth 2 (Two) Times a Day As Needed for Muscle Spasms.             DULoxetine (CYMBALTA) 30 MG capsule  Take 30 mg by mouth Daily.             gabapentin (NEURONTIN) 800 MG tablet  Take 800 mg by mouth 4 (Four) Times a Day.           "   linaclotide (LINZESS) 145 MCG capsule capsule  Take 145 mcg by mouth 2 (Two) Times a Day.             magnesium hydroxide (MILK OF MAGNESIA) 2400 MG/10ML suspension suspension  Take 10 mL by mouth Daily As Needed (constipation).             magnesium oxide (MAGOX) 400 (241.3 MG) MG tablet tablet  Take 400 mg by mouth Daily.             meloxicam (MOBIC) 15 MG tablet  Take 1 tablet by mouth Daily. Take once daily.             mupirocin (BACTROBAN) 2 % ointment  Apply  topically 3 (Three) Times a Day.             nystatin (MYCOSTATIN) 371913 UNIT/GM powder  Apply 1 application topically As Needed.             omeprazole (priLOSEC) 20 MG capsule  Take 20 mg by mouth Daily.             ondansetron (ZOFRAN) 4 MG tablet  Take 4 mg by mouth Every 8 (Eight) Hours As Needed.             oxyCODONE-acetaminophen (PERCOCET) 7.5-325 MG per tablet  Take 1 tablet by mouth 3 (Three) Times a Day As Needed for Severe Pain  for up to 30 days.             phenylephrine-mineral oil-petrolatum 0.25-14-74.9 % ointment hemorrhoidal ointment  Insert 1 application into the rectum 3 (Three) Times a Day As Needed (rectal pain).             spironolactone (ALDACTONE) 25 MG tablet  Take 1 tablet by mouth Daily.                 Discharge Diet:   Diet Instructions     Advance Diet As Tolerated                     Activity at Discharge:   Activity Instructions     Activity as Tolerated                     Discharge Care Plan/Instructions: take  Stool softners     Follow-up Appointments:   Future Appointments  Date Time Provider Department Center   12/13/2017 9:00 AM Hua Westfall MD [facility] PM [facility] None   12/14/2017 11:00 AM Russell Del Rio MD W GE MAD None   1/9/2018 10:00 AM Bart Espitia MD PhD [facility] CD [facility] None   1/9/2018 10:40 AM LIYAH [facility]chor [facility] [place] [facility] None   2/21/2018 10:15 AM Live Bolton MD [facility] None   3/1/2018 11:00 AM Bart Espitia MD PhD [facility] CD [facility] None   3/13/2018 11:00 AM Nolan Barraza MD [facility]  [facility] None       Test Results Pending at Discharge: none     Kendall Chen MD  11/22/17  5:19 PM    Time: 35min

## 2017-11-22 NOTE — PROGRESS NOTES
"   LOS: 3 days   Patient Care Team:  Yobany Barr MD as PCP - General  Daren Marie DPM as Consulting Physician (Podiatry)    Chief Complaint: Small bowel obstruction; resolving     Subjective     History of Present Illness    Subjective:  Symptoms:  Stable.    Pain:  She complains of pain that is moderate.  She reports pain is improving.  Pain is well controlled.        History taken from: patient chart    Objective     Vital Signs  Temp:  [96.7 °F (35.9 °C)-98.8 °F (37.1 °C)] 96.7 °F (35.9 °C)  Heart Rate:  [70-76] 73  Resp:  [18] 18  BP: (130-140)/(61-88) 138/88    Objective:  General Appearance:  Comfortable and well-appearing.    Vital signs: (most recent): Blood pressure 138/88, pulse 73, temperature 96.7 °F (35.9 °C), temperature source Oral, resp. rate 18, height 64\" (162.6 cm), weight 218 lb 11.2 oz (99.2 kg), SpO2 97 %, not currently breastfeeding.    Abdomen: Abdomen is soft and non-distended.  There are no signs of ascites.  Bowel sounds are normal.   There is left lower quadrant tenderness.            Results Review:     I reviewed the patient's new clinical results.   Results for XUAN COLEMAN (MRN [id number]) as of 11/22/2017 08:32   Ref. Range 11/22/2017 06:25   Glucose Latest Ref Range: 60 - 100 mg/dL 93   Sodium Latest Ref Range: 137 - 145 mmol/L 138   Potassium Latest Ref Range: 3.5 - 5.1 mmol/L 4.4   CO2 Latest Ref Range: 22.0 - 31.0 mmol/L 29.0   Chloride Latest Ref Range: 95 - 110 mmol/L 99   Anion Gap Latest Ref Range: 5.0 - 15.0 mmol/L 10.0   Creatinine Latest Ref Range: 0.50 - 1.00 mg/dL 1.03 (H)   BUN Latest Ref Range: 7 - 21 mg/dL 10   BUN/Creatinine Ratio Latest Ref Range: 7.0 - 25.0  9.7   Calcium Latest Ref Range: 8.4 - 10.2 mg/dL 9.3   eGFR Non African Amer Latest Ref Range: 51 - 120 mL/min/1.73 55   Alkaline Phosphatase Latest Ref Range: 38 - 126 U/L 80   Total Protein Latest Ref Range: 6.3 - 8.6 g/dL 7.1   ALT (SGPT) Latest Ref Range: 9 - 52 U/L 53 (H)   AST (SGOT) Latest " Ref Range: 14 - 36 U/L 55 (H)   Total Bilirubin Latest Ref Range: 0.2 - 1.3 mg/dL 1.0   Albumin Latest Ref Range: 3.40 - 4.80 g/dL 4.00   Globulin Latest Ref Range: 2.3 - 3.5 gm/dL 3.1   A/G Ratio Latest Ref Range: 1.1 - 1.8 g/dL 1.3   Magnesium Latest Ref Range: 1.6 - 2.3 mg/dL 1.7   WBC Latest Ref Range: 3.20 - 9.80 10*3/mm3 4.30   RBC Latest Ref Range: 3.77 - 5.16 10*6/mm3 5.37 (H)   Hemoglobin Latest Ref Range: 12.0 - 15.5 g/dL 15.0   Hematocrit Latest Ref Range: 35.0 - 45.0 % 43.3   RDW Latest Ref Range: 11.5 - 14.5 % 14.5   MCV Latest Ref Range: 80.0 - 98.0 fL 80.6   MCH Latest Ref Range: 26.5 - 34.0 pg 27.9   MCHC Latest Ref Range: 31.4 - 36.0 g/dL 34.6   MPV Latest Ref Range: 8.0 - 12.0 fL 10.8   Platelets Latest Ref Range: 150 - 450 10*3/mm3 99 (L)   RDW-SD Latest Ref Range: 36.4 - 46.3 fl 42.6   Neutrophil % Latest Ref Range: 37.0 - 80.0 % 55.1   Lymphocyte % Latest Ref Range: 10.0 - 50.0 % 33.3   Monocyte % Latest Ref Range: 0.0 - 12.0 % 7.9   Eosinophil % Latest Ref Range: 0.0 - 7.0 % 3.3   Basophil % Latest Ref Range: 0.0 - 2.0 % 0.2   Immature Grans % Latest Ref Range: 0.0 - 0.5 % 0.2   Neutrophils, Absolute Latest Ref Range: 2.00 - 8.60 10*3/mm3 2.37   Lymphocytes, Absolute Latest Ref Range: 0.60 - 4.20 10*3/mm3 1.43   Monocytes, Absolute Latest Ref Range: 0.00 - 0.90 10*3/mm3 0.34   Eosinophils, Absolute Latest Ref Range: 0.00 - 0.70 10*3/mm3 0.14   Basophils, Absolute Latest Ref Range: 0.00 - 0.20 10*3/mm3 0.01   Immature Grans, Absolute Latest Ref Range: 0.00 - 0.02 10*3/mm3 0.01       Medication Review: done    Assessment/Plan     Principal Problem:    Abdominal pain  Active Problems:    Constipation    Acid reflux    Depression    Disease related peripheral neuropathy    Epigastric pain    Hyperlipidemia    Nausea and vomiting    Restless leg syndrome    Sleep apnea      Assessment:    Condition: In stable condition.  Improving.   (1. No bowel movement ).     Plan:   (1. Full liquid diet  2.  Magnesium citrate solution ).       Benjamin Troncoso MD  11/22/17  8:30 AM    Time: 5 min

## 2017-11-29 ENCOUNTER — APPOINTMENT (OUTPATIENT)
Dept: PHYSICAL THERAPY | Facility: HOSPITAL | Age: 57
End: 2017-11-29
Attending: PODIATRIST

## 2017-12-12 ENCOUNTER — HOSPITAL ENCOUNTER (OUTPATIENT)
Dept: PHYSICAL THERAPY | Facility: HOSPITAL | Age: 57
Setting detail: THERAPIES SERIES
Discharge: HOME | End: 2017-12-12
Attending: PODIATRIST

## 2017-12-12 DIAGNOSIS — M77.31 BILATERAL CALCANEAL SPURS: ICD-10-CM

## 2017-12-12 DIAGNOSIS — M77.32 BILATERAL CALCANEAL SPURS: ICD-10-CM

## 2017-12-12 DIAGNOSIS — M72.2 PLANTAR FASCIITIS, BILATERAL: ICD-10-CM

## 2017-12-12 DIAGNOSIS — M79.672 BILATERAL FOOT PAIN: Primary | ICD-10-CM

## 2017-12-12 DIAGNOSIS — M79.671 BILATERAL FOOT PAIN: Primary | ICD-10-CM

## 2017-12-12 PROCEDURE — 97162 PT EVAL MOD COMPLEX 30 MIN: CPT | Performed by: PHYSICAL THERAPIST

## 2017-12-12 NOTE — THERAPY EVALUATION
Outpatient Physical Therapy Ortho Initial Evaluation  [facility]  Marcella Perales, PT, DPT, CSCS       Patient Name: Amira Shannon  : 1960  MRN: [id number]  Today's Date: 2017      Visit Date: 2017     Pt reports 7/10 pain pre treatment, 7/10 pain post treatment  Reports N/A% of improvement.  Attended  visits.  Insurance available: Approved for orthotics   Next MD appt: 2018.  Recertification: PRN    Patient Active Problem List   Diagnosis   • Bilateral foot pain   • Right hip pain   • Acute pain of both knees   • Plantar fasciitis, bilateral   • Primary osteoarthritis of knee   • MUSA (nonalcoholic steatohepatitis)   • Tongue lesion   • Bilateral lower extremity edema   • Chronic pain of both knees   • Bilateral calcaneal spurs   • Swelling of both lower extremities   • Obesity with body mass index of 30.0-39.9   • Dyspnea   • Diastolic dysfunction   • Thrombocytopenia   • Chronic fatigue   • Abdominal pain   • Constipation   • Acid reflux   • Depression   • Disease related peripheral neuropathy   • Epigastric pain   • Hyperlipidemia   • Nausea and vomiting   • Restless leg syndrome   • Sleep apnea        Past Medical History:   Diagnosis Date   • Acid reflux    • Altered bowel function    • Arthropathy of lumbar facet joint    • Bleeding disorder    • Constipation    • Corns and callus    • Depression    • Disease related peripheral neuropathy    • Epigastric pain    • Fatty liver    • Hammer toe    • Headache    • Hiatal hernia    • Hyperlipidemia    • Knee pain    • Localized, primary osteoarthritis of the ankle and foot     Localized, primary osteoarthritis of the ankle and/or foot   • Mendoza's metatarsalgia     Mendoza's metatarsalgia - 2nd interspace on right   • Nausea and vomiting    • Neuralgia and neuritis     Neuralgia, neuritis, and radiculitis, unspecified   • Neuropathy    • Obstructive sleep apnea     Obstructive sleep apnea (adult)  "(pediatric)    • CHAI on CPAP     \"C-Pap at night  (unconfirmed)\"   • Osteoarthritis    • Pain in foot     Pain in unspecified foot - sees a podiatrist   • Pain in joint, ankle and foot     Joint pain in ankle and foot      • Pain radiating to back     Pain radiating to lumbar region of back   • Plantar fasciitis    • Restless leg syndrome    • Secondary hypertension     Secondary hypertension, unspecified   • Sinusitis    • Sleep apnea    • Tongue anomaly     lesion        Past Surgical History:   Procedure Laterality Date   •  SECTION     • CHOLECYSTECTOMY     • COLONOSCOPY  2013   • DIRECT LARYNGOSCOPY, ESOPHAGOSCOPY, BRONCHOSCOPY N/A 2017    Procedure: DIRECT LARYNGOSCOPY AND;  Surgeon: Live oBlton MD;  Location: [facility] OR;  Service:    • ENDOSCOPY  2013    Colon endoscopy 60950 (1) - Internal & external hemorrhoids found. Stool found.   • ENDOSCOPY  2013    EGD w/ tube 35578 (1) - Normal esophagus. Gastritis in stomach. Biopsy taken. Normal dudoenum. Biopsy taken.   • FOOT SURGERY  2013    Foot/toes surgery procedure (1) - Arthroplasty of toes 4 and 5 of right foot.   • HERNIA REPAIR     • HERNIA REPAIR      hital   • HYSTERECTOMY     • LIVER BIOPSY     • NERVE BLOCK  2016    Injection for nerve block (1) - Lumbar medial branch block.   • OTHER SURGICAL HISTORY  2012    Inj(s) Tend-Sheath, Ligament, Single  (1) - [facility] NICK[facility] ([facility])    • OTHER SURGICAL HISTORY  2013    Small Joint Injection/Aspiration  (2) - PORTER NICKERSON ([facility])    • OTHER SURGICAL HISTORY      bowel obstruction x2   • OTHER SURGICAL HISTORY      gland removed from neck   • SUBLINGUAL SALIVARY CYST EXCISION N/A 2017    Procedure: EXCISION OF LEFT  TONGUE LESION WITH CLOSURE;  Surgeon: Live Bolton MD;  Location: [facility];  Service:    • TUBAL ABDOMINAL LIGATION     • UPPER GASTROINTESTINAL ENDOSCOPY  2013       Visit Dx:     ICD-10-CM " ICD-9-CM   1. Bilateral foot pain M79.671 729.5    M79.672    2. Bilateral calcaneal spurs M77.31 726.73    M77.32      Number of days off work: N/A    Patient is .    Patient has grown children.    Allergies     Sulfa AntibioticsRash     Colin as Reviewed Reviewed by You at 11:07 AM.        Outpatient Medications     cetirizine (zyrTEC) 10 MG tablet      chlorthalidone (HYGROTEN) 50 MG tablet      cyclobenzaprine (FLEXERIL) 10 MG tablet      DULoxetine (CYMBALTA) 30 MG capsule      gabapentin (NEURONTIN) 800 MG tablet      linaclotide (LINZESS) 145 MCG capsule capsule      magnesium hydroxide (MILK OF MAGNESIA) 2400 MG/10ML suspension suspension      magnesium oxide (MAGOX) 400 (241.3 MG) MG tablet tablet      meloxicam (MOBIC) 15 MG tablet      mupirocin (BACTROBAN) 2 % ointment      nystatin (MYCOSTATIN) 156539 UNIT/GM powder      omeprazole (priLOSEC) 20 MG capsule      ondansetron (ZOFRAN) 4 MG tablet      oxyCODONE-acetaminophen (PERCOCET) 7.5-325 MG per tablet      phenylephrine-mineral oil-petrolatum 0.25-14-74.9 % ointment hemorrhoidal ointment      spironolactone (ALDACTONE) 25 MG tablet                Patient History       12/12/17 1100          History    Chief Complaint Pain  -AJ      Type of Pain Foot pain  -AJ      Date Current Problem(s) Began --   4 years  -AJ      Brief Description of Current Complaint Patient reports that her foot pain has been increasing. She reports that she was told her has nerve damage in her feet from the neurologist.  -AJ      Previous treatment for THIS PROBLEM Injections;Medication  -AJ      Patient/Caregiver Goals Relieve pain  -AJ      Current Tobacco Use None  -AJ      Smoking Status Non-smoker  -AJ      Patient's Rating of General Health Good  -AJ      Occupation/sports/leisure activities Occupation: unemployed; Hobbies: gardening, flowers, mark, cleaning house  -AJ      Patient seeing anyone else for problem(s)? Yes  -AJ      How has patient tried to help  current problem? DPM  -AJ      What clinical tests have you had for this problem? X-ray  -AJ      Results of Clinical Tests B calcaneal spurs  -AJ      History of Previous Related Injuries None  -AJ      Are you or can you be pregnant No  -AJ      Pain     Pain Location Foot  -AJ      Pain at Present 7  -AJ      Pain at Best 5  -AJ      Pain at Worst 10  -AJ      Pain Frequency Constant/continuous  -AJ      Pain Description Aching;Throbbing  -AJ      What Performance Factors Make the Current Problem(s) WORSE? Walking  -AJ      What Performance Factors Make the Current Problem(s) BETTER? Rest  -AJ      Tolerance Time- Standing < 5 minutes  -AJ      Tolerance Time- Sitting Usually okay  -AJ      Tolerance Time- Walking < 5 minutes  -AJ      Is your sleep disturbed? Yes  -AJ      Is medication used to assist with sleep? Yes  -AJ      Difficulties at work? N/A  -AJ      Difficulties with ADL's? Standing to cook and shower  -AJ      Difficulties with recreational activities? Gardening, mark  -AJ      Fall Risk Assessment    Any falls in the past year: No  -AJ      Number of falls reported in the last 12 months 0  -AJ      Does patient have a fear of falling No  -AJ        User Key  (r) = Recorded By, (t) = Taken By, (c) = Cosigned By    Initials Name Provider Type    AJ Marcella Perales, PT Physical Therapist                PT Ortho       12/12/17 1100    Subjective Comments    Subjective Comments Patient is hoping to get rid of some of the pain.  -AJ    Subjective Pain    Able to rate subjective pain? yes  -AJ    Pre-Treatment Pain Level 7  -AJ    Post-Treatment Pain Level 7  -AJ    Posture/Observations    Posture/Observations Comments No acute distress.  -AJ    Special Tests/Palpation    Special Tests/Palpation --   Mod TTP at the calcaneous plantar surface  -AJ    ROM (Range of Motion)    General ROM Detail AROm for B feet and ankles all WNL.  -AJ    MMT (Manual Muscle Testing)    General MMT Assessment Detail  B ankles/feet WFL  -    Orthosis Management/Training    Orthosis Fabrication Detail B slipper casts molded for B tenderfoot orthotics.  -AJ    Orthosis Indications rest, reduce inflammation;rest, reduce pain  -    Orthosis Skin Assessment skin is intact, no issues w/ skin integrity  -    Transfers    Transfer, Comment I with all transfers  -    Gait Assessment/Treatment    Gait, [place] Level independent  -    Gait, Gait Deviations bilateral:;antalgic  -      User Key  (r) = Recorded By, (t) = Taken By, (c) = Cosigned By    Initials Name Provider Type    IRVING Perales, PT Physical Therapist            Therapy Education  Given: HEP, Symptoms/condition management (POC; Shoewear consultation)  Program: New  How Provided: Verbal  Provided to: Patient  Level of Understanding: Verbalized           PT OP Goals       12/12/17 1100       PT Short Term Goals    STG Date to Achieve 01/11/18  -AJ     STG 1 Patient ot be molded for B slipper cast orthotics.  -AJ     STG 2 Patient ot show proper shoewear choice.  -AJ     STG 3 Patient to have good fit of B orthotics.  -AJ     STG 4 patient to show understanding of use/care of B orthotics.  -AJ     STG 5 Patient to come in for adjustments prn.  -AJ     Time Calculation    PT Goal Re-Cert Due Date --   PRN  -       User Key  (r) = Recorded By, (t) = Taken By, (c) = Cosigned By    Initials Name Provider Type    IRVING Perales, PT Physical Therapist        Barriers to Rehab: Include significant or possible arthritic/degenerative changes that have occurred within the feet, The patient's obesity.    Safety Issues: None noted.          PT Assessment/Plan       12/12/17 1100       PT Assessment    Functional Limitations Impaired gait;Impaired locomotion;Limitations in community activities;Performance in leisure activities  -IRVING     Impairments Gait;Locomotion;Pain  -     Assessment Comments Patient had good mold for B orthotics.  -AJ     Rehab  Potential Excellent   for orthotics  -AJ     Patient/caregiver participated in establishment of treatment plan and goals Yes  -AJ     PT Plan    PT Frequency --   1+1 for a total of 2 visits  -AJ     Predicted Duration of Therapy Intervention (days/wks) Over a 4-8 week period.  -AJ     Planned CPT's? PT EVAL MOD COMPLELITY: 98846;PT THER SUPP EA 15 MIN   Level 2 orthotics  -AJ     Physical Therapy Interventions (Optional Details) patient/family education   Orthotic training/fit  -     PT Plan Comments Return for orthotic fitting.  -AJ       User Key  (r) = Recorded By, (t) = Taken By, (c) = Cosigned By    Initials Name Provider Type    IRVING Perales PT Physical Therapist       Other therapeutic activities and/or exercises will be prescribed depending on the patients progress or lack there of.            Exercises       12/12/17 1100          Subjective Comments    Subjective Comments Patient is hoping to get rid of some of the pain.  -IRVING      Subjective Pain    Able to rate subjective pain? yes  -AJ      Pre-Treatment Pain Level 7  -AJ      Post-Treatment Pain Level 7  -        User Key  (r) = Recorded By, (t) = Taken By, (c) = Cosigned By    Initials Name Provider Type     Marcella Perales PT Physical Therapist                                  Time Calculation:   Start Time: 1101  Stop Time: 1153  Time Calculation (min): 52 min     Therapy Charges for Today     Code Description Service Date Service Provider Modifiers Qty    00312153636  PT EVAL MOD COMPLEXITY 2 12/12/2017 Marcella Perales PT GP 1    [id number]  PT-CUSTOM ORTHOTICS-LEVEL 2 12/12/2017 Marcella Perales PT  1    [id number]  PT THER SUPP EA 15 MIN 12/12/2017 Marcella Perales PT GP 1                    Marcella Perales, PT, DPT, CSCS  12/12/2017

## 2017-12-13 ENCOUNTER — TELEPHONE (OUTPATIENT)
Dept: OTOLARYNGOLOGY | Facility: CLINIC | Age: 57
End: 2017-12-13

## 2017-12-13 ENCOUNTER — OFFICE VISIT (OUTPATIENT)
Dept: PAIN MEDICINE | Facility: CLINIC | Age: 57
End: 2017-12-13

## 2017-12-13 VITALS
DIASTOLIC BLOOD PRESSURE: 70 MMHG | BODY MASS INDEX: 37.68 KG/M2 | SYSTOLIC BLOOD PRESSURE: 120 MMHG | HEIGHT: 64 IN | WEIGHT: 220.7 LBS

## 2017-12-13 DIAGNOSIS — M79.672 HEEL PAIN, BILATERAL: ICD-10-CM

## 2017-12-13 DIAGNOSIS — M79.2 NEUROPATHIC PAIN: Primary | ICD-10-CM

## 2017-12-13 DIAGNOSIS — M79.671 HEEL PAIN, BILATERAL: ICD-10-CM

## 2017-12-13 DIAGNOSIS — Z79.899 HIGH RISK MEDICATIONS (NOT ANTICOAGULANTS) LONG-TERM USE: ICD-10-CM

## 2017-12-13 PROCEDURE — 99213 OFFICE O/P EST LOW 20 MIN: CPT | Performed by: PAIN MEDICINE

## 2017-12-13 RX ORDER — OXYCODONE AND ACETAMINOPHEN 7.5; 325 MG/1; MG/1
1 TABLET ORAL 3 TIMES DAILY
Qty: 90 TABLET | Refills: 0 | Status: SHIPPED | OUTPATIENT
Start: 2017-12-13 | End: 2018-01-12

## 2017-12-13 RX ORDER — CYCLOBENZAPRINE HCL 10 MG
10 TABLET ORAL 2 TIMES DAILY PRN
Qty: 60 TABLET | Refills: 3 | Status: SHIPPED | OUTPATIENT
Start: 2017-12-13 | End: 2018-08-07

## 2017-12-13 NOTE — TELEPHONE ENCOUNTER
----- Message from Britt Sandy sent at 12/13/2017 10:05 AM CST -----  Contact: [phone]  Patient called and said she is having trouble with her sinuses. She would like to know if she can have something called into the pharmacy.      Offered an appointment next week here in our [place] office, patient is going to call her PCP and try to see him before then. She will let us know if she wants to see us after speaking with that office.

## 2017-12-13 NOTE — PROGRESS NOTES
"Amira Shannon is a 57 y.o. female.   1960    HPI:   Location: lower back, bilateral hip, bilateral knee and bilateral foot  Quality: throbbing and tingling  Severity: 6/10  Timing: constant  Alleviating: nothing  Aggravating: ambulating        Patient reports that the opioid medication still provides her good relief and allows more activity than she would have without the opioid medication.  She denies side effects.  Pt has a script left at her pharmacy.  She is taking 2-3 per day. Was also in hospital for a week due to bowel obstruction.       The following portions of the patient's history were reviewed by me and updated as appropriate: allergies, current medications, past family history, past medical history, past social history, past surgical history and problem list.    Past Medical History:   Diagnosis Date   • Acid reflux    • Altered bowel function    • Arthropathy of lumbar facet joint    • Bleeding disorder    • Constipation    • Corns and callus    • Depression    • Disease related peripheral neuropathy    • Epigastric pain    • Fatty liver    • Hammer toe    • Headache    • Hiatal hernia    • Hyperlipidemia    • Knee pain    • Localized, primary osteoarthritis of the ankle and foot     Localized, primary osteoarthritis of the ankle and/or foot   • Mendoza's metatarsalgia     Mendoza's metatarsalgia - 2nd interspace on right   • Nausea and vomiting    • Neuralgia and neuritis     Neuralgia, neuritis, and radiculitis, unspecified   • Neuropathy    • Obstructive sleep apnea     Obstructive sleep apnea (adult) (pediatric)    • CHAI on CPAP     \"C-Pap at night  (unconfirmed)\"   • Osteoarthritis    • Pain in foot     Pain in unspecified foot - sees a podiatrist   • Pain in joint, ankle and foot     Joint pain in ankle and foot      • Pain radiating to back     Pain radiating to lumbar region of back   • Plantar fasciitis    • Restless leg syndrome    • Secondary hypertension     Secondary " hypertension, unspecified   • Sinusitis    • Sleep apnea    • Tongue anomaly     lesion       Social History     Social History   • Marital status:      Spouse name: N/A   • Number of children: N/A   • Years of education: N/A     Occupational History   • Not on file.     Social History Main Topics   • Smoking status: Former Smoker     Types: Cigarettes   • Smokeless tobacco: Never Used   • Alcohol use No   • Drug use: No   • Sexual activity: Defer      Comment: Marital status:      Other Topics Concern   • Not on file     Social History Narrative       Family History   Problem Relation Age of Onset   • Cancer Other    • Diabetes Other    • Heart disease Other    • Hypertension Mother    • Cancer Mother    • Diabetes Mother    • Hypertension Father    • Heart attack Father    • Cancer Father    • Heart disease Father    • Thyroid disease Maternal Aunt          Current Outpatient Prescriptions:   •  cetirizine (zyrTEC) 10 MG tablet, Take 1 tablet by mouth Daily As Needed for Allergies., Disp: 30 tablet, Rfl: 11  •  chlorthalidone (HYGROTEN) 50 MG tablet, Take 1 tablet by mouth Daily., Disp: 30 tablet, Rfl: 3  •  cyclobenzaprine (FLEXERIL) 10 MG tablet, Take 1 tablet by mouth 2 (Two) Times a Day As Needed for Muscle Spasms., Disp: 60 tablet, Rfl: 3  •  DULoxetine (CYMBALTA) 30 MG capsule, Take 30 mg by mouth Daily., Disp: , Rfl:   •  gabapentin (NEURONTIN) 800 MG tablet, Take 800 mg by mouth 4 (Four) Times a Day., Disp: , Rfl:   •  linaclotide (LINZESS) 145 MCG capsule capsule, Take 145 mcg by mouth 2 (Two) Times a Day., Disp: , Rfl:   •  magnesium hydroxide (MILK OF MAGNESIA) 2400 MG/10ML suspension suspension, Take 10 mL by mouth Daily As Needed (constipation)., Disp: 100 mL, Rfl: 2  •  magnesium oxide (MAGOX) 400 (241.3 MG) MG tablet tablet, Take 400 mg by mouth Daily., Disp: , Rfl:   •  meloxicam (MOBIC) 15 MG tablet, Take 1 tablet by mouth Daily. Take once daily., Disp: 30 tablet, Rfl: 0  •   mupirocin (BACTROBAN) 2 % ointment, Apply  topically 3 (Three) Times a Day. (Patient taking differently: Apply 1 application topically 3 (Three) Times a Day.), Disp: 1 g, Rfl: 2  •  nystatin (MYCOSTATIN) 927588 UNIT/GM powder, Apply 1 application topically As Needed., Disp: , Rfl:   •  omeprazole (priLOSEC) 20 MG capsule, Take 20 mg by mouth Daily., Disp: , Rfl:   •  ondansetron (ZOFRAN) 4 MG tablet, Take 4 mg by mouth Every 8 (Eight) Hours As Needed., Disp: , Rfl:   •  oxyCODONE-acetaminophen (PERCOCET) 7.5-325 MG per tablet, Take 1 tablet by mouth 3 (Three) Times a Day As Needed for Severe Pain  for up to 30 days., Disp: 90 tablet, Rfl: 0  •  phenylephrine-mineral oil-petrolatum 0.25-14-74.9 % ointment hemorrhoidal ointment, Insert 1 application into the rectum 3 (Three) Times a Day As Needed (rectal pain)., Disp: 28 g, Rfl: 1  •  spironolactone (ALDACTONE) 25 MG tablet, Take 1 tablet by mouth Daily., Disp: 30 tablet, Rfl: 3  •  oxyCODONE-acetaminophen (PERCOCET) 7.5-325 MG per tablet, Take 1 tablet by mouth 3 (Three) Times a Day for 30 days., Disp: 90 tablet, Rfl: 0  •  oxyCODONE-acetaminophen (PERCOCET) 7.5-325 MG per tablet, Take 1 tablet by mouth 3 (Three) Times a Day for 30 days., Disp: 90 tablet, Rfl: 0    Allergies   Allergen Reactions   • Other      Pt states that taking steroids either in pill or injection form make her have blisters in her mouth and she feels like she is on fire on the inside/ has hx of c diff and possible MRSA     • Sulfa Antibiotics Rash     Sulfa (Sulfonamide Antibiotics)       Review of Systems   Musculoskeletal: Positive for back pain.        B.hip b.knee b.foot   All other systems reviewed and are negative.    All systems reviewed and negative except for above.    Physical Exam   Constitutional: She appears well-developed and well-nourished. No distress.   Musculoskeletal:   Minimal pain with full arom b knees    Subjective foot pain in heels bilaterally with ambulation.      Neurological: She is alert.   Psychiatric: She has a normal mood and affect. Her behavior is normal. Judgment normal.       Amira was seen today for back pain and pain.    Diagnoses and all orders for this visit:    Neuropathic pain    High risk medications (not anticoagulants) long-term use    Heel pain, bilateral    Other orders  -     oxyCODONE-acetaminophen (PERCOCET) 7.5-325 MG per tablet; Take 1 tablet by mouth 3 (Three) Times a Day for 30 days.  -     oxyCODONE-acetaminophen (PERCOCET) 7.5-325 MG per tablet; Take 1 tablet by mouth 3 (Three) Times a Day for 30 days.  -     cyclobenzaprine (FLEXERIL) 10 MG tablet; Take 1 tablet by mouth 2 (Two) Times a Day As Needed for Muscle Spasms.        Medication: Patient reports no negative side effects, Patient reports appropriate usage and storage habits, Patient's opioid provides enough relief to be more active and perform activities of daily living with less discomfort. and Refill opioid medication as above.   Will call and cancel her second script at the pharmacy.  I will provide two new scripts. I explained that the pain clinic will be closing after the first of the year.  I let the pt know where I will be located.       Interventional: none at this time    Rehab: none at this time    Behavioral: No aberrant behavior noted. NITO Report #[id number]  was reviewed and is consistent with stated history    Urine drug screen None at this time          This document has been electronically signed by Hua Westfall MD on December 13, 2017 9:54 AM

## 2018-01-23 ENCOUNTER — OFFICE VISIT (OUTPATIENT)
Dept: CARDIOLOGY | Facility: CLINIC | Age: 58
End: 2018-01-23

## 2018-01-23 VITALS
OXYGEN SATURATION: 98 % | SYSTOLIC BLOOD PRESSURE: 142 MMHG | BODY MASS INDEX: 37.78 KG/M2 | DIASTOLIC BLOOD PRESSURE: 80 MMHG | HEART RATE: 102 BPM | HEIGHT: 64 IN | WEIGHT: 221.31 LBS

## 2018-01-23 DIAGNOSIS — I51.89 DIASTOLIC DYSFUNCTION: ICD-10-CM

## 2018-01-23 DIAGNOSIS — R60.0 BILATERAL LOWER EXTREMITY EDEMA: Primary | ICD-10-CM

## 2018-01-23 PROCEDURE — 99213 OFFICE O/P EST LOW 20 MIN: CPT | Performed by: NURSE PRACTITIONER

## 2018-01-23 RX ORDER — LEVOFLOXACIN 500 MG/1
TABLET, FILM COATED ORAL
COMMUNITY
Start: 2018-01-19 | End: 2018-01-31

## 2018-01-23 NOTE — PROGRESS NOTES
Subjective:     CC: Follow up edema 1wk post aggressive diuresis    Fatigue   This is a recurrent problem. The current episode started more than 1 year ago. The problem occurs daily. The problem has been rapidly worsening. Associated symptoms include arthralgias, fatigue, joint swelling, myalgias and weakness. Pertinent negatives include no change in bowel habit, chills, coughing, fever or rash. The symptoms are aggravated by standing and walking. She has tried position changes, oral narcotics, heat and rest for the symptoms. The treatment provided mild relief.   Leg Swelling   This is a chronic problem. The current episode started more than 1 year ago. The problem occurs intermittently. The problem has been rapidly improving. Associated symptoms include arthralgias, fatigue, joint swelling, myalgias and weakness. Pertinent negatives include no change in bowel habit, chills, coughing, fever or rash. She has tried rest and position changes (loop diuretics) for the symptoms. The treatment provided moderate relief.     She had been on Lasix for 10-12 years. She does have HTN for also about 10 years.   1 month ago, she saw Dr. Espitia who added Metolazone 2.5mg x7 days. She has lost 4lbs. Of note, her BNP is negative.   Follow up revealed return of edema after Metolazone was stopped. Dr. Espitia also ordered ischemic evaluation which was low risk.   Last visit, he DC norvasc and lasix and added chlorthalidone. Improvement noted.    I saw her on  11/1/2017 and Aldactone added and chlorthalidone increased.   Weight down and swelling has not returned.     Complaint since discharge for [facility] with presence of NG tube in right nare for 1 week with sinus pressure/green discharge/and sore throat. Has been on 3 antibiotics. Lastest one is Levaquin 500mg. Soreness in throat has improved, but sinus pressure still present. Has not had CT scan. Last fever/chill was last Friday. She is on day 4 of Levaquin. No more fever/chill  episodes. Since she is feeling better, will defer CT scan to Dr. Bolton's opinion.     Hospitalization:  11/17/2017-11/22/2017- [facility]    Review of Systems   Constitution: Positive for fatigue, weakness and weight loss. Negative for chills, decreased appetite and fever.   HENT: Negative.    Eyes: Negative.    Cardiovascular: Positive for dyspnea on exertion and leg swelling. Negative for irregular heartbeat.   Respiratory: Negative for cough and wheezing.    Endocrine: Negative.    Skin: Negative for dry skin, flushing and rash.   Musculoskeletal: Positive for arthralgias, joint swelling and myalgias. Negative for falls.   Gastrointestinal: Negative for change in bowel habit and melena.   Genitourinary: Negative for frequency and hematuria.   Neurological: Negative for dizziness, light-headedness and loss of balance.   Psychiatric/Behavioral: Negative for altered mental status and memory loss. The patient is not nervous/anxious.        Current Outpatient Prescriptions   Medication Sig Dispense Refill   • cetirizine (zyrTEC) 10 MG tablet Take 1 tablet by mouth Daily As Needed for Allergies. 30 tablet 11   • chlorthalidone (HYGROTEN) 50 MG tablet Take 1 tablet by mouth Daily. 30 tablet 3   • cyclobenzaprine (FLEXERIL) 10 MG tablet Take 1 tablet by mouth 2 (Two) Times a Day As Needed for Muscle Spasms. 60 tablet 3   • DULoxetine (CYMBALTA) 30 MG capsule Take 30 mg by mouth Daily.     • gabapentin (NEURONTIN) 800 MG tablet Take 800 mg by mouth 4 (Four) Times a Day.     • levoFLOXacin (LEVAQUIN) 500 MG tablet      • linaclotide (LINZESS) 145 MCG capsule capsule Take 145 mcg by mouth 2 (Two) Times a Day.     • magnesium hydroxide (MILK OF MAGNESIA) 2400 MG/10ML suspension suspension Take 10 mL by mouth Daily As Needed (constipation). 100 mL 2   • magnesium oxide (MAGOX) 400 (241.3 MG) MG tablet tablet Take 400 mg by mouth Daily.     • meloxicam (MOBIC) 15 MG tablet Take 1 tablet by mouth Daily. Take once daily. 30 tablet 0  "  • mupirocin (BACTROBAN) 2 % ointment Apply  topically 3 (Three) Times a Day. (Patient taking differently: Apply 1 application topically 3 (Three) Times a Day.) 1 g 2   • nystatin (MYCOSTATIN) 865225 UNIT/GM powder Apply 1 application topically As Needed.     • omeprazole (priLOSEC) 20 MG capsule Take 20 mg by mouth Daily.     • ondansetron (ZOFRAN) 4 MG tablet Take 4 mg by mouth Every 8 (Eight) Hours As Needed.     • phenylephrine-mineral oil-petrolatum 0.25-14-74.9 % ointment hemorrhoidal ointment Insert 1 application into the rectum 3 (Three) Times a Day As Needed (rectal pain). 28 g 1   • spironolactone (ALDACTONE) 25 MG tablet Take 1 tablet by mouth Daily. 30 tablet 3     No current facility-administered medications for this visit.         Objective:     Vitals:    01/23/18 1307   BP: 142/80   BP Location: Left arm   Patient Position: Sitting   Cuff Size: Adult   Pulse: 102   SpO2: 98%   Weight: 100 kg (221 lb 5 oz)   Height: 162.6 cm (64\")     Wt Readings from Last 3 Encounters:   01/23/18 100 kg (221 lb 5 oz)   12/13/17 100 kg (220 lb 11.2 oz)   11/17/17 99.2 kg (218 lb 11.2 oz)          Physical Exam   Constitutional: She is oriented to person, place, and time. She appears well-developed and well-nourished. No distress.   HENT:   Head: Normocephalic.   Mouth/Throat:       Neck: No JVD present.   Cardiovascular: Normal rate, regular rhythm, S1 normal, S2 normal, normal heart sounds and intact distal pulses.    No murmur heard.  Pulmonary/Chest: Effort normal and breath sounds normal. No respiratory distress. She has no wheezes. She has no rales.   Abdominal: Soft. Bowel sounds are normal.   Musculoskeletal: Normal range of motion. She exhibits no edema.   Neurological: She is alert and oriented to person, place, and time.   Skin: Skin is warm and dry. No erythema.   Psychiatric: She has a normal mood and affect. Her behavior is normal. Judgment and thought content normal.       DATA reviewed  ECHO " 10/18/2017  Interpretation Summary   · Left Ventricle: Left ventricular systolic function is normal. Estimated EF appears to be in the range of 56 - 60%  · Left ventricular diastolic dysfunction is noted (grade III w/high LAP) consistent with reversible restrictive pattern. Elevated left atrial pressure.  · Right Ventricle: Normal right ventricular wall thickness, systolic function and septal motion noted. Right ventricular cavity is mildly dilated  · No evidence of pulmonary hypertension is present  · There is no evidence of pericardial effusion       Amira Shannon   Noninvasive physiologic studies of upper/lower extremity arteries, single level, bilateral (eg, ankle/brachial indices, Doppler waveform analysis...)   Order# [id number]   Reading physician: Mazin Mills MD Ordering physician: Bart Espitia MD PhD Study date: 10/18/17   Patient Information   Patient Name MRN Sex  (Age)   Amira Shannon [id number] Female 1960 (56 y.o.)   Clinical Indication   claudication   Dx: Claudication [I73.9 (ICD-10-CM)]   Interpretation Summary   · Right Conclusion: The right CARYN appears normal.  · Left Conclusion: The left CARYN appears normal.  · Increased indices suggest calcification           EKG was personally interpreted today.  Sinus mechanism with sinus arrhythmia.  Unable to exclude prior inferior infarction.      Test Reason :   Vent. Rate : 071 BPM     Atrial Rate : 071 BPM     P-R Int : 146 ms          QRS Dur : 084 ms      QT Int : 398 ms       P-R-T Axes : 050 000 031 degrees     QTc Int : 432 ms    Normal sinus rhythm  Inferior infarct , age undetermined  Abnormal ECG  No previous ECGs available  Confirmed by ALLISON VAUGHAN, JAYMIE ([id number]),  MARQUITA JONES ([id number]) on     FINDINGS:        - lines/tubes: none    - cardiac: size within normal limits.    - mediastinum: contour within normal limits.     - lungs: no evidence of a focal air space process, pulmonary  interstitial  edema. Scattered tiny granulomata present. No  noncalcified nodules are identified    - pleura: no evidence of  fluid.      - osseous: unremarkable for age.        IMPRESSION:  CONCLUSION:  No acute cardiopulmonary process identified.       Interpretation Summary   · Appears negative for DVT of the lower extremities  · Pulsatile venous flow           Interpretation Summary   · Nuclear Study Description: A 2-day rest/stress protocol myocardial perfusion imaging study was performed  · Nuclear Perfusion Images: Overall image quality is excellent.  · Stress ECG: Stress ECG rhythm of sinus tachycardia noted. Normal ECG with no significant stress induced changes noted.  · Stress Description: A pharmacological stress test was performed using regadenoson without low-level exercise.  · Nuclear Perfusion Findings Study Impression: Myocardial perfusion imaging indicates a normal myocardial perfusion study with no evidence of ischemia  · Ventricle Size / Description: Left ventricular ejection fraction is normal (Calculated EF = 70%).  · Impressions are consistent with a low risk study.         proBNP 0.0 - 900.0 pg/mL 129.0       Lab Results   Component Value Date    GLUCOSE 93 11/22/2017    BUN 10 11/22/2017    CREATININE 1.03 (H) 11/22/2017    EGFRIFNONA 55 11/22/2017    BCR 9.7 11/22/2017    K 4.4 11/22/2017    CO2 29.0 11/22/2017    CALCIUM 9.3 11/22/2017    ALBUMIN 4.00 11/22/2017    LABIL2 1.3 11/22/2017    AST 55 (H) 11/22/2017    ALT 53 (H) 11/22/2017     Lab Results   Component Value Date    WBC 4.30 11/22/2017    HGB 15.0 11/22/2017    HCT 43.3 11/22/2017    MCV 80.6 11/22/2017    PLT 99 (L) 11/22/2017       The following portions of the patient's history were reviewed and updated as appropriate: allergies, current medications, past family history, past medical history, past social history, past surgical history and problem list.     Assessment/Plan:   Weight continues to stay down.  Edema mostly in ankles  Wearing  CPAP now. Finally received equipment.     Diagnosis Plan   1. Swelling of both lower extremities  Improved     Chlorthalidone 50mg daily.   Aldactone 25mg        2. Diastolic dysfunction  BNP negative. Blood pressure controlled. BP still stable without Norvasc.  Consideration for HFpEF, but negative BNP. Her edema now looks to be from joint effusions.     Unable to to complete 6MWT due to pain in feet.        Follow in 5 months. 2 months following Dr. Espitia's appt.

## 2018-01-24 ENCOUNTER — DOCUMENTATION (OUTPATIENT)
Dept: PHYSICAL THERAPY | Facility: HOSPITAL | Age: 58
End: 2018-01-24

## 2018-01-29 ENCOUNTER — HOSPITAL ENCOUNTER (OUTPATIENT)
Dept: PHYSICAL THERAPY | Facility: HOSPITAL | Age: 58
Setting detail: THERAPIES SERIES
Discharge: HOME | End: 2018-01-29
Attending: PODIATRIST

## 2018-01-29 DIAGNOSIS — M79.671 BILATERAL FOOT PAIN: ICD-10-CM

## 2018-01-29 DIAGNOSIS — M72.2 PLANTAR FASCIITIS, BILATERAL: ICD-10-CM

## 2018-01-29 DIAGNOSIS — M77.32 BILATERAL CALCANEAL SPURS: Primary | ICD-10-CM

## 2018-01-29 DIAGNOSIS — M79.672 BILATERAL FOOT PAIN: ICD-10-CM

## 2018-01-29 DIAGNOSIS — M77.31 BILATERAL CALCANEAL SPURS: Primary | ICD-10-CM

## 2018-01-29 NOTE — THERAPY DISCHARGE NOTE
"     Outpatient Physical Therapy Ortho Treatment Note/Discharge Summary  [facility]  Vale Katz PTA       Patient Name: Amira Shannon  : 1960  MRN: [id number]  Today's Date: 2018      Visit Date: 2018     Visits: 2/2  Insurance Visits Approved:   Recert Due: PRN  MD Appt: PRN  Pain: pretreatment \"my feet hurt so bad\"/10; post treatment \"does not rate\"/10  Improvement: pt is subjectively reporting N/A% improvement since initial evaluation    Visit Dx:    ICD-10-CM ICD-9-CM   1. Bilateral calcaneal spurs M77.31 726.73    M77.32    2. Bilateral foot pain M79.671 729.5    M79.672    3. Plantar fasciitis, bilateral M72.2 728.71       Patient Active Problem List   Diagnosis   • Bilateral foot pain   • Right hip pain   • Acute pain of both knees   • Plantar fasciitis, bilateral   • Primary osteoarthritis of knee   • MUSA (nonalcoholic steatohepatitis)   • Tongue lesion   • Bilateral lower extremity edema   • Chronic pain of both knees   • Bilateral calcaneal spurs   • Swelling of both lower extremities   • Obesity with body mass index of 30.0-39.9   • Dyspnea   • Diastolic dysfunction   • Thrombocytopenia   • Chronic fatigue   • Abdominal pain   • Constipation   • Acid reflux   • Depression   • Disease related peripheral neuropathy   • Epigastric pain   • Hyperlipidemia   • Nausea and vomiting   • Restless leg syndrome   • Sleep apnea        Past Medical History:   Diagnosis Date   • Acid reflux    • Altered bowel function    • Arthropathy of lumbar facet joint    • Bleeding disorder    • Constipation    • Corns and callus    • Depression    • Disease related peripheral neuropathy    • Epigastric pain    • Fatty liver    • Hammer toe    • Headache    • Hiatal hernia    • Hyperlipidemia    • Knee pain    • Localized, primary osteoarthritis of the ankle and foot     Localized, primary osteoarthritis of the ankle and/or foot   • Mendoza's metatarsalgia     Mendoza's " "metatarsalgia - 2nd interspace on right   • Nausea and vomiting    • Neuralgia and neuritis     Neuralgia, neuritis, and radiculitis, unspecified   • Neuropathy    • Obstructive sleep apnea     Obstructive sleep apnea (adult) (pediatric)    • CHAI on CPAP     \"C-Pap at night  (unconfirmed)\"   • Osteoarthritis    • Pain in foot     Pain in unspecified foot - sees a podiatrist   • Pain in joint, ankle and foot     Joint pain in ankle and foot      • Pain radiating to back     Pain radiating to lumbar region of back   • Plantar fasciitis    • Restless leg syndrome    • Secondary hypertension     Secondary hypertension, unspecified   • Sinusitis    • Sleep apnea    • Tongue anomaly     lesion        Past Surgical History:   Procedure Laterality Date   •  SECTION     • CHOLECYSTECTOMY     • COLONOSCOPY  2013   • DIRECT LARYNGOSCOPY, ESOPHAGOSCOPY, BRONCHOSCOPY N/A 2017    Procedure: DIRECT LARYNGOSCOPY AND;  Surgeon: Live Bolton MD;  Location: [facility];  Service:    • ENDOSCOPY  2013    Colon endoscopy 52228 (1) - Internal & external hemorrhoids found. Stool found.   • ENDOSCOPY  2013    EGD w/ tube 41333 (1) - Normal esophagus. Gastritis in stomach. Biopsy taken. Normal dudoenum. Biopsy taken.   • FOOT SURGERY  2013    Foot/toes surgery procedure (1) - Arthroplasty of toes 4 and 5 of right foot.   • HERNIA REPAIR     • HERNIA REPAIR      hital   • HYSTERECTOMY     • LIVER BIOPSY     • NERVE BLOCK  2016    Injection for nerve block (1) - Lumbar medial branch block.   • OTHER SURGICAL HISTORY  2012    Inj(s) Tend-Sheath, Ligament, Single  (1) - [facility]TER NICKERSON ([facility])    • OTHER SURGICAL HISTORY  2013    Small Joint Injection/Aspiration  (2) - PORTER NICKERSON ([facility])    • OTHER SURGICAL HISTORY      bowel obstruction x2   • OTHER SURGICAL HISTORY      gland removed from neck   • SUBLINGUAL SALIVARY CYST EXCISION N/A 2017    Procedure: " "EXCISION OF LEFT  TONGUE LESION WITH CLOSURE;  Surgeon: Live Bolton MD;  Location: [facility];  Service:    • TUBAL ABDOMINAL LIGATION     • UPPER GASTROINTESTINAL ENDOSCOPY  07/01/2013             PT Ortho       01/29/18 1300    Subjective Comments    Subjective Comments pt states that the condition of her feet have started affecting her knees as well as her hips. states that she really hopes that these help. states that she has been in so much pain with her feet.   -    Subjective Pain    Able to rate subjective pain? yes  -    Pre-Treatment Pain Level --   \"my feet hurt so  bad\"  -    Post-Treatment Pain Level --   does not rate  -    Posture/Observations    Posture/Observations Comments unsteady gait with knees remaining bent and apprehensive to put weight through bilateraly LE's  -    Orthosis Management/Training    Orthosis Skills Training purpose/goals of orthosis;recognizing skin issues related to orthosis;orthosis cleaning;orthosis maintenance  -    Orthosis Skills Training Comment verbalizes understanding  -    Orthosis Wear Schedule wear 2 hours on/2 hours off   increase as tolerates till wearing all day.   -    Orthosis Schedule Comment verbalizes understanding  -    Orthosis Skin Assessment skin is intact, no issues w/ skin integrity  -    Orthosis Skin Check Schedule every 2 hour skin checks   initially and then daily with orhtotic wear  -      User Key  (r) = Recorded By, (t) = Taken By, (c) = Cosigned By    Initials Name Provider Type     Vale Katz PTA Physical Therapy Assistant                            PT Assessment/Plan       01/29/18 1300       PT Assessment    Assessment Comments demonstrates good understanding of wear schedule and use and care of orthotics. good fit with no skin wear noted  -     PT Plan    PT Plan Comments discharge to independent management with orthotics, pt to call if has needs or concerns regarding orthotics  -       User Key  (r) = " "Recorded By, (t) = Taken By, (c) = Cosigned By    Initials Name Provider Type     Vale Katz PTA Physical Therapy Assistant                    Exercises       01/29/18 1300          Subjective Comments    Subjective Comments pt states that the condition of her feet have started affecting her knees as well as her hips. states that she really hopes that these help. states that she has been in so much pain with her feet.   -      Subjective Pain    Able to rate subjective pain? yes  -      Pre-Treatment Pain Level --   \"my feet hurt so  bad\"  -      Post-Treatment Pain Level --   does not rate  -        User Key  (r) = Recorded By, (t) = Taken By, (c) = Cosigned By    Initials Name Provider Type     Vale Katz PTA Physical Therapy Assistant                               PT OP Goals       01/29/18 1300       PT Short Term Goals    STG Date to Achieve 01/11/18  -     STG 1 Patient ot be molded for B slipper cast orthotics.  -     STG 1 Progress Met  -     STG 2 Patient ot show proper shoewear choice.  -     STG 2 Progress Met  -     STG 3 Patient to have good fit of B orthotics.  -     STG 3 Progress Met  -     STG 4 patient to show understanding of use/care of B orthotics.  -     STG 4 Progress Met  -     STG 5 Patient to come in for adjustments prn.  -     STG 5 Progress Met;Ongoing  -     Time Calculation    PT Goal Re-Cert Due Date --   PRN  -       User Key  (r) = Recorded By, (t) = Taken By, (c) = Cosigned By    Initials Name Provider Type     Vale Katz PTA Physical Therapy Assistant          Therapy Education  Given: Other (comment) (orthotic wear and care)  Program: New  How Provided: Verbal, Demonstration  Provided to: Patient  Level of Understanding: Verbalized              Time Calculation:   Start Time: 1300  Stop Time: 1325  Time Calculation (min): 25 min  PT Non-Billable Time (min): 25 min  Total Timed Code Minutes- PT: 0 minute(s)    Therapy Charges for " Today     Code Description Service Date Service Provider Modifiers Qty    [id number] HC PT THER SUPP EA 15 MIN 1/29/2018 Vale Katz, PTA GP 2                OP PT Discharge Summary  Date of Discharge: 01/29/18  Reason for Discharge: All goals achieved  Outcomes Achieved: Refer to plan of care for updates on goals achieved, Able to achieve all goals within established timeline  Discharge Destination: Home without follow-up, Other (comment) (orthotics only, to call if issues)      Vale Katz, LEOPOLDO  1/29/2018

## 2018-01-31 ENCOUNTER — OFFICE VISIT (OUTPATIENT)
Dept: OTOLARYNGOLOGY | Facility: CLINIC | Age: 58
End: 2018-01-31

## 2018-01-31 VITALS — WEIGHT: 222 LBS | HEIGHT: 64 IN | BODY MASS INDEX: 37.9 KG/M2 | TEMPERATURE: 98 F

## 2018-01-31 DIAGNOSIS — J04.0 LARYNGITIS: ICD-10-CM

## 2018-01-31 DIAGNOSIS — R49.0 HOARSENESS: ICD-10-CM

## 2018-01-31 DIAGNOSIS — K21.9 GASTROESOPHAGEAL REFLUX DISEASE WITHOUT ESOPHAGITIS: Primary | ICD-10-CM

## 2018-01-31 PROCEDURE — 99213 OFFICE O/P EST LOW 20 MIN: CPT | Performed by: OTOLARYNGOLOGY

## 2018-01-31 PROCEDURE — 31575 DIAGNOSTIC LARYNGOSCOPY: CPT | Performed by: OTOLARYNGOLOGY

## 2018-01-31 RX ORDER — OMEPRAZOLE 40 MG/1
40 CAPSULE, DELAYED RELEASE ORAL DAILY
Qty: 30 CAPSULE | Refills: 2 | Status: SHIPPED | OUTPATIENT
Start: 2018-01-31 | End: 2018-05-07 | Stop reason: SDUPTHER

## 2018-01-31 RX ORDER — RANITIDINE 150 MG/1
300 CAPSULE ORAL EVERY EVENING
Qty: 60 CAPSULE | Refills: 3 | Status: SHIPPED | OUTPATIENT
Start: 2018-01-31 | End: 2018-10-12 | Stop reason: CLARIF

## 2018-01-31 RX ORDER — OXYCODONE AND ACETAMINOPHEN 7.5; 325 MG/1; MG/1
1 TABLET ORAL 4 TIMES DAILY PRN
COMMUNITY
Start: 2018-01-24 | End: 2019-02-11

## 2018-01-31 NOTE — PROGRESS NOTES
Subjective   Amira Shannon is a 57 y.o. female.     Chief complaint persistent sore throat on the right side  History of Present Illness   A she has a history of being the hospital for bowel obstruction had an NG tube in 4 week when she was nothing by mouth she said she had sore throat ever since that time which was almost 8 weeks ago she's not losing weight not having hemoptysis does have some hoarseness and pain more on the right side and the left she's had no adenopathy in her neck and no fever chills she's been on 2 courses amoxicillin course of Levaquin without success.  Her bowel obstruction problem is resolved she has significant reflux symptoms    Denies nasal ear complaints or adenopathy in her neck  The following portions of the patient's history were reviewed and updated as appropriate: allergies, current medications, past family history, past medical history, past social history, past surgical history and problem list.      Current Outpatient Prescriptions:   •  cetirizine (zyrTEC) 10 MG tablet, Take 1 tablet by mouth Daily As Needed for Allergies., Disp: 30 tablet, Rfl: 11  •  chlorthalidone (HYGROTEN) 50 MG tablet, Take 1 tablet by mouth Daily., Disp: 30 tablet, Rfl: 3  •  cyclobenzaprine (FLEXERIL) 10 MG tablet, Take 1 tablet by mouth 2 (Two) Times a Day As Needed for Muscle Spasms., Disp: 60 tablet, Rfl: 3  •  DULoxetine (CYMBALTA) 30 MG capsule, Take 30 mg by mouth Daily., Disp: , Rfl:   •  gabapentin (NEURONTIN) 800 MG tablet, Take 800 mg by mouth 4 (Four) Times a Day., Disp: , Rfl:   •  linaclotide (LINZESS) 145 MCG capsule capsule, Take 145 mcg by mouth 2 (Two) Times a Day., Disp: , Rfl:   •  magnesium hydroxide (MILK OF MAGNESIA) 2400 MG/10ML suspension suspension, Take 10 mL by mouth Daily As Needed (constipation)., Disp: 100 mL, Rfl: 2  •  magnesium oxide (MAGOX) 400 (241.3 MG) MG tablet tablet, Take 400 mg by mouth Daily., Disp: , Rfl:   •  meloxicam (MOBIC) 15 MG tablet, Take 1 tablet  by mouth Daily. Take once daily., Disp: 30 tablet, Rfl: 0  •  mupirocin (BACTROBAN) 2 % ointment, Apply  topically 3 (Three) Times a Day. (Patient taking differently: Apply 1 application topically 3 (Three) Times a Day.), Disp: 1 g, Rfl: 2  •  nystatin (MYCOSTATIN) 926574 UNIT/GM powder, Apply 1 application topically As Needed., Disp: , Rfl:   •  omeprazole (priLOSEC) 20 MG capsule, Take 20 mg by mouth Daily., Disp: , Rfl:   •  ondansetron (ZOFRAN) 4 MG tablet, Take 4 mg by mouth Every 8 (Eight) Hours As Needed., Disp: , Rfl:   •  oxyCODONE-acetaminophen (PERCOCET) 7.5-325 MG per tablet, Take 1 tablet by mouth 3 (Three) Times a Day., Disp: , Rfl:   •  phenylephrine-mineral oil-petrolatum 0.25-14-74.9 % ointment hemorrhoidal ointment, Insert 1 application into the rectum 3 (Three) Times a Day As Needed (rectal pain)., Disp: 28 g, Rfl: 1  •  spironolactone (ALDACTONE) 25 MG tablet, Take 1 tablet by mouth Daily., Disp: 30 tablet, Rfl: 3  •  omeprazole (priLOSEC) 40 MG capsule, Take 1 capsule by mouth Daily., Disp: 30 capsule, Rfl: 2  •  ranitidine (ZANTAC) 150 MG capsule, Take 2 capsules by mouth Every Evening., Disp: 60 capsule, Rfl: 3    Allergies   Allergen Reactions   • Other      Pt states that taking steroids either in pill or injection form make her have blisters in her mouth and she feels like she is on fire on the inside/ has hx of c diff and possible MRSA     • Sulfa Antibiotics Rash     Sulfa (Sulfonamide Antibiotics)            Review of Systems   Constitutional: Negative for fever.   HENT: Positive for sore throat and voice change. Negative for congestion, mouth sores and trouble swallowing.    Gastrointestinal: Positive for abdominal distention.   Hematological: Negative for adenopathy.           Objective   Physical Exam   Constitutional: She is oriented to person, place, and time. She appears well-developed and well-nourished.   HENT:   Head: Normocephalic and atraumatic.   Right Ear: Hearing, tympanic  membrane, external ear and ear canal normal.   Left Ear: Hearing, tympanic membrane, external ear and ear canal normal.   Nose: Nose normal. No mucosal edema, rhinorrhea, sinus tenderness, nasal deformity or septal deviation. No epistaxis. Right sinus exhibits no maxillary sinus tenderness and no frontal sinus tenderness. Left sinus exhibits no maxillary sinus tenderness and no frontal sinus tenderness.   Mouth/Throat: Uvula is midline, oropharynx is clear and moist and mucous membranes are normal. No trismus in the jaw. Normal dentition. No oropharyngeal exudate or posterior oropharyngeal edema. Tonsils are 2+ on the right. Tonsils are 2+ on the left. No tonsillar exudate.       Eyes: Conjunctivae are normal.   Neck: Normal range of motion. Neck supple. No JVD present. No tracheal deviation present. No thyromegaly present.   Cardiovascular: Normal rate.    Pulmonary/Chest: Effort normal.   Musculoskeletal: Normal range of motion.   Lymphadenopathy:        Head (right side): No submental, no submandibular, no tonsillar, no preauricular, no posterior auricular and no occipital adenopathy present.        Head (left side): No submental, no submandibular, no tonsillar, no preauricular, no posterior auricular and no occipital adenopathy present.     She has no cervical adenopathy.        Right cervical: No superficial cervical, no deep cervical and no posterior cervical adenopathy present.       Left cervical: No superficial cervical, no deep cervical and no posterior cervical adenopathy present.   Neurological: She is alert and oriented to person, place, and time. No cranial nerve deficit.   Skin: Skin is warm.   Psychiatric: She has a normal mood and affect. Her speech is normal and behavior is normal. Thought content normal.   Nursing note and vitals reviewed.      Procedure Note    Pre-operative Diagnosis:   Chief Complaint   Patient presents with   • Follow-up     Sore Throat       Post-operative Diagnosis:  same    Anesthesia: topical with xylocaine and neosynephrine    Endoscopy Type:  Flexible Laryngoscopy    Procedure Details:    The patient was placed in the sitting position.  After topical anesthesia and decongestion, the 4 mm laryngoscope was passed.  The nasal cavities, nasopharynx, oropharynx, hypopharynx, and larynx were all examined.  Vocal cords were examined during respiration and phonation.  The following findings were noted:    Findings:  Swollen posterior larynx with on right arytenoid area vocal cords mobile with no lesions    Condition:  Stable.  Patient tolerated procedure well.    Complications:  None    Assessment/Plan   Amira was seen today for follow-up.    Diagnoses and all orders for this visit:    Gastroesophageal reflux disease without esophagitis    Hoarseness    Laryngitis    Other orders  -     omeprazole (priLOSEC) 40 MG capsule; Take 1 capsule by mouth Daily.  -     ranitidine (ZANTAC) 150 MG capsule; Take 2 capsules by mouth Every Evening.        GERD Precautions explained  , seemedication use and side effects discussed  All if No better in 2 weeks no better may need CT neck and/or barium swallow    Follow-up 4 Weeks otherwise

## 2018-02-15 ENCOUNTER — OFFICE VISIT (OUTPATIENT)
Dept: GASTROENTEROLOGY | Facility: CLINIC | Age: 58
End: 2018-02-15

## 2018-02-15 ENCOUNTER — OFFICE VISIT (OUTPATIENT)
Dept: PODIATRY | Facility: CLINIC | Age: 58
End: 2018-02-15

## 2018-02-15 VITALS — HEIGHT: 64 IN | BODY MASS INDEX: 36.7 KG/M2 | WEIGHT: 214.95 LBS

## 2018-02-15 VITALS
OXYGEN SATURATION: 97 % | DIASTOLIC BLOOD PRESSURE: 92 MMHG | BODY MASS INDEX: 36.7 KG/M2 | SYSTOLIC BLOOD PRESSURE: 138 MMHG | HEIGHT: 64 IN | HEART RATE: 94 BPM | WEIGHT: 215 LBS

## 2018-02-15 DIAGNOSIS — M72.2 PLANTAR FASCIITIS: ICD-10-CM

## 2018-02-15 DIAGNOSIS — M72.2 PLANTAR FASCIITIS, BILATERAL: ICD-10-CM

## 2018-02-15 DIAGNOSIS — K75.81 NASH (NONALCOHOLIC STEATOHEPATITIS): Primary | ICD-10-CM

## 2018-02-15 DIAGNOSIS — M79.671 RIGHT FOOT PAIN: Primary | ICD-10-CM

## 2018-02-15 DIAGNOSIS — M79.672 LEFT FOOT PAIN: ICD-10-CM

## 2018-02-15 PROCEDURE — 99213 OFFICE O/P EST LOW 20 MIN: CPT | Performed by: INTERNAL MEDICINE

## 2018-02-15 PROCEDURE — 20550 NJX 1 TENDON SHEATH/LIGAMENT: CPT | Performed by: PODIATRIST

## 2018-02-15 RX ORDER — DEXAMETHASONE SODIUM PHOSPHATE 4 MG/ML
2 INJECTION, SOLUTION INTRA-ARTICULAR; INTRALESIONAL; INTRAMUSCULAR; INTRAVENOUS; SOFT TISSUE ONCE
Status: COMPLETED | OUTPATIENT
Start: 2018-02-15 | End: 2018-02-15

## 2018-02-15 RX ORDER — TRIAMCINOLONE ACETONIDE 40 MG/ML
10 INJECTION, SUSPENSION INTRA-ARTICULAR; INTRAMUSCULAR ONCE
Status: COMPLETED | OUTPATIENT
Start: 2018-02-15 | End: 2018-02-15

## 2018-02-15 RX ORDER — MELOXICAM 15 MG/1
15 TABLET ORAL DAILY
Qty: 30 TABLET | Refills: 4 | Status: SHIPPED | OUTPATIENT
Start: 2018-02-15 | End: 2018-08-15

## 2018-02-15 RX ORDER — BUPIVACAINE HYDROCHLORIDE 5 MG/ML
1 INJECTION, SOLUTION EPIDURAL; INTRACAUDAL ONCE
Status: COMPLETED | OUTPATIENT
Start: 2018-02-15 | End: 2018-02-15

## 2018-02-15 RX ADMIN — DEXAMETHASONE SODIUM PHOSPHATE 2 MG: 4 INJECTION, SOLUTION INTRA-ARTICULAR; INTRALESIONAL; INTRAMUSCULAR; INTRAVENOUS; SOFT TISSUE at 15:38

## 2018-02-15 RX ADMIN — TRIAMCINOLONE ACETONIDE 10 MG: 40 INJECTION, SUSPENSION INTRA-ARTICULAR; INTRAMUSCULAR at 15:40

## 2018-02-15 RX ADMIN — TRIAMCINOLONE ACETONIDE 10 MG: 40 INJECTION, SUSPENSION INTRA-ARTICULAR; INTRAMUSCULAR at 15:38

## 2018-02-15 RX ADMIN — BUPIVACAINE HYDROCHLORIDE 1 ML: 5 INJECTION, SOLUTION EPIDURAL; INTRACAUDAL at 15:39

## 2018-02-15 RX ADMIN — BUPIVACAINE HYDROCHLORIDE 1 ML: 5 INJECTION, SOLUTION EPIDURAL; INTRACAUDAL at 15:36

## 2018-02-15 RX ADMIN — DEXAMETHASONE SODIUM PHOSPHATE 2 MG: 4 INJECTION, SOLUTION INTRA-ARTICULAR; INTRALESIONAL; INTRAMUSCULAR; INTRAVENOUS; SOFT TISSUE at 15:40

## 2018-02-15 NOTE — PROGRESS NOTES
"Amira Shannon  1960  57 y.o. female     Patient presents today for bilateral foot pain. She said she received her inserts, but they have made the pain worse.      Chief Complaint   Patient presents with   • Left Foot - Follow-up, Pain   • Right Foot - Follow-up, Pain           History of Present Illness    Patient presents to clinic today for follow-up of her bilateral foot pain.  She continues to have pain.  She rates her pain as 8 out of 10.  She states is located to the bottom of her heels.  The right foot hurts worse than left foot.  She has been wearing her custom orthotics.  She believes they're making her foot hurt worse.  She denies any new injuries.  She has no other pedal complaints.        Past Medical History:   Diagnosis Date   • Acid reflux    • Altered bowel function    • Arthropathy of lumbar facet joint    • Bleeding disorder    • Constipation    • Corns and callus    • Depression    • Disease related peripheral neuropathy    • Epigastric pain    • Fatty liver    • Hammer toe    • Headache    • Hiatal hernia    • Hyperlipidemia    • Knee pain    • Localized, primary osteoarthritis of the ankle and foot     Localized, primary osteoarthritis of the ankle and/or foot   • Mendoza's metatarsalgia     Mendoza's metatarsalgia - 2nd interspace on right   • Nausea and vomiting    • Neuralgia and neuritis     Neuralgia, neuritis, and radiculitis, unspecified   • Neuropathy    • Obstructive sleep apnea     Obstructive sleep apnea (adult) (pediatric)    • CHAI on CPAP     \"C-Pap at night  (unconfirmed)\"   • Osteoarthritis    • Pain in foot     Pain in unspecified foot - sees a podiatrist   • Pain in joint, ankle and foot     Joint pain in ankle and foot      • Pain radiating to back     Pain radiating to lumbar region of back   • Plantar fasciitis    • Restless leg syndrome    • Secondary hypertension     Secondary hypertension, unspecified   • Sinusitis    • Sleep apnea    • Tongue anomaly     lesion "         Past Surgical History:   Procedure Laterality Date   •  SECTION     • CHOLECYSTECTOMY     • COLONOSCOPY  2013   • DIRECT LARYNGOSCOPY, ESOPHAGOSCOPY, BRONCHOSCOPY N/A 2017    Procedure: DIRECT LARYNGOSCOPY AND;  Surgeon: Live Bolton MD;  Location: [facility];  Service:    • ENDOSCOPY  2013    Colon endoscopy 11664 (1) - Internal & external hemorrhoids found. Stool found.   • ENDOSCOPY  2013    EGD w/ tube 39894 (1) - Normal esophagus. Gastritis in stomach. Biopsy taken. Normal dudoenum. Biopsy taken.   • FOOT SURGERY  2013    Foot/toes surgery procedure (1) - Arthroplasty of toes 4 and 5 of right foot.   • HERNIA REPAIR     • HERNIA REPAIR      hital   • HYSTERECTOMY     • LIVER BIOPSY     • NERVE BLOCK  2016    Injection for nerve block (1) - Lumbar medial branch block.   • OTHER SURGICAL HISTORY  2012    Inj(s) Tend-Sheath, Ligament, Single  (1) - [facility]    • OTHER SURGICAL HISTORY  2013    Small Joint Injection/Aspiration  (2) - [facility]ICK[facility] ([facility])    • OTHER SURGICAL HISTORY      bowel obstruction x2   • OTHER SURGICAL HISTORY      gland removed from neck   • SUBLINGUAL SALIVARY CYST EXCISION N/A 2017    Procedure: EXCISION OF LEFT  TONGUE LESION WITH CLOSURE;  Surgeon: Live Bolton MD;  Location: [facility];  Service:    • TUBAL ABDOMINAL LIGATION     • UPPER GASTROINTESTINAL ENDOSCOPY  2013         Family History   Problem Relation Age of Onset   • Cancer Other    • Diabetes Other    • Heart disease Other    • Hypertension Mother    • Cancer Mother    • Diabetes Mother    • Hypertension Father    • Heart attack Father    • Cancer Father    • Heart disease Father    • Thyroid disease Maternal Aunt        Allergies   Allergen Reactions   • Other      Pt states that taking steroids either in pill or injection form make her have blisters in her mouth and she feels like she is on fire on the  inside/ has hx of c diff and possible MRSA     • Sulfa Antibiotics Rash     Sulfa (Sulfonamide Antibiotics)       Social History     Social History   • Marital status:      Spouse name: N/A   • Number of children: N/A   • Years of education: N/A     Occupational History   • Not on file.     Social History Main Topics   • Smoking status: Former Smoker     Packs/day: 2.00     Years: 15.00     Types: Cigarettes   • Smokeless tobacco: Never Used      Comment: 02/15/2018 - Patient states she ceased utilizationof tobacco products over 20 years prior.   • Alcohol use No   • Drug use: No   • Sexual activity: Defer      Comment: Marital Status:      Other Topics Concern   • Not on file     Social History Narrative         Current Outpatient Prescriptions   Medication Sig Dispense Refill   • cetirizine (zyrTEC) 10 MG tablet Take 1 tablet by mouth Daily As Needed for Allergies. 30 tablet 11   • chlorthalidone (HYGROTEN) 50 MG tablet Take 1 tablet by mouth Daily. 30 tablet 3   • cyclobenzaprine (FLEXERIL) 10 MG tablet Take 1 tablet by mouth 2 (Two) Times a Day As Needed for Muscle Spasms. 60 tablet 3   • DULoxetine (CYMBALTA) 30 MG capsule Take 30 mg by mouth Daily.     • gabapentin (NEURONTIN) 800 MG tablet Take 800 mg by mouth 4 (Four) Times a Day.     • linaclotide (LINZESS) 145 MCG capsule capsule Take 145 mcg by mouth 2 (Two) Times a Day.     • magnesium hydroxide (MILK OF MAGNESIA) 2400 MG/10ML suspension suspension Take 10 mL by mouth Daily As Needed (constipation). 100 mL 2   • magnesium oxide (MAGOX) 400 (241.3 MG) MG tablet tablet Take 400 mg by mouth Daily.     • meloxicam (MOBIC) 15 MG tablet Take 1 tablet by mouth Daily. 30 tablet 4   • mupirocin (BACTROBAN) 2 % ointment Apply  topically 3 (Three) Times a Day. (Patient taking differently: Apply 1 application topically 3 (Three) Times a Day.) 1 g 2   • nystatin (MYCOSTATIN) 021308 UNIT/GM powder Apply 1 application topically As Needed.     • omeprazole  "(priLOSEC) 40 MG capsule Take 1 capsule by mouth Daily. 30 capsule 2   • ondansetron (ZOFRAN) 4 MG tablet Take 4 mg by mouth Every 8 (Eight) Hours As Needed.     • oxyCODONE-acetaminophen (PERCOCET) 7.5-325 MG per tablet Take 1 tablet by mouth 3 (Three) Times a Day.     • phenylephrine-mineral oil-petrolatum 0.25-14-74.9 % ointment hemorrhoidal ointment Insert 1 application into the rectum 3 (Three) Times a Day As Needed (rectal pain). 28 g 1   • ranitidine (ZANTAC) 150 MG capsule Take 2 capsules by mouth Every Evening. 60 capsule 3   • spironolactone (ALDACTONE) 25 MG tablet Take 1 tablet by mouth Daily. 30 tablet 3     Current Facility-Administered Medications   Medication Dose Route Frequency Provider Last Rate Last Dose   • triamcinolone acetonide (KENALOG-40) injection 10 mg  10 mg Intramuscular Once Daren Marie DPM             OBJECTIVE    Ht 162.6 cm (64.02\")  Wt 97.5 kg (214 lb 15.2 oz)  LMP  (LMP Unknown)  BMI 36.88 kg/m2      Review of Systems   Constitutional: Positive for fatigue.   HENT: Negative.    Eyes: Negative.    Respiratory: Positive for shortness of breath.    Cardiovascular: Positive for leg swelling.   Gastrointestinal: Positive for abdominal pain and constipation.   Endocrine: Negative.    Genitourinary: Positive for enuresis.   Musculoskeletal: Positive for back pain and joint swelling.        Bilateral foot/ankle pain  Joint pain   Skin: Negative.    Allergic/Immunologic: Negative.    Neurological: Negative.    Hematological: Negative.    Psychiatric/Behavioral: Negative.          Constitutional: well developed, well nourished    HEENT: Normocephalic and atraumatic, normal hearing    Respiratory: Non labored respirations noted    Cardiovascular:    DP/PT pulses palpable    CFT brisk  to all digits  Skin temp is warm to warm from proximal tibia to distal digits  Pedal hair growth decreased.   No erythema noted   Edema noted to bilateral LEs    Musculoskeletal:  Muscle strength is 4/5 " for all muscle groups tested   Diffuse pain on palpation noted    POP to achillles tendon insertion bilateral  POP to medial tubercle of the calcaneus bilateral. Negative lateral squeeze test    Dermatological:   Skin is warm, dry and intact    Webspaces 1-4 bilateral are clean, dry and intact.   No subcutaneous nodules or masses noted      Neurological:   Protective sensation decreased  Sensation intact to light touch    DTR intact    Psychiatric: A&O x 3 with normal mood and affect. NAD.            Procedures    Plantar Fasciits Injection  Date/Time: 02/15/18  Performed by: MASSIEL PALENCIA  Authorized by: MASSIEL PALENCIA   Consent: Verbal consent obtained. Written consent obtained.  Risks and benefits: risks, benefits and alternatives were discussed  Consent given by: patient  Patient identity confirmed: verbally with patient  Indications: pain relief  Nerve block body site: left and right heel.  Sedation:  Patient sedated: no    Patient position: sitting  Needle size: 25 G  Local anesthetic: 0.5% Marcaine plain, Kenalog 40 mg/ml , Decadron 4 mg/mL.   Outcome: pain improved  Patient tolerance: Patient tolerated the procedure well with no immediate complications            ASSESSMENT AND PLAN    Amira was seen today for follow-up, pain, follow-up and pain.    Diagnoses and all orders for this visit:    Right foot pain  -     bupivacaine (PF) (MARCAINE) 0.5 % injection 1 mL; Inject 1 mL as directed 1 (One) Time.  -     dexamethasone (DECADRON) injection 2 mg; Inject 0.5 mL into the shoulder, thigh, or buttocks 1 (One) Time.  -     triamcinolone acetonide (KENALOG-40) injection 10 mg; Inject 0.25 mL into the shoulder, thigh, or buttocks 1 (One) Time.    Left foot pain  -     bupivacaine (PF) (MARCAINE) 0.5 % injection 1 mL; Inject 1 mL as directed 1 (One) Time.  -     dexamethasone (DECADRON) injection 2 mg; Inject 0.5 mL into the shoulder, thigh, or buttocks 1 (One) Time.  -     triamcinolone acetonide (KENALOG-40)  injection 10 mg; Inject 0.25 mL into the shoulder, thigh, or buttocks 1 (One) Time.    Plantar fasciitis    Plantar fasciitis, bilateral    Other orders  -     meloxicam (MOBIC) 15 MG tablet; Take 1 tablet by mouth Daily.    - Continue stretching, icing and custom orthotics.  - Recommended over-the-counter night splint  - Steroid injection to bilateral heels  - All questions were answered to the patients satisfaction.  - RTC 3 months            This document has been electronically signed by Daren Marie DPM on February 15, 2018 3:40 PM     2/15/2018  3:40 PM

## 2018-02-21 ENCOUNTER — OFFICE VISIT (OUTPATIENT)
Dept: OTOLARYNGOLOGY | Facility: CLINIC | Age: 58
End: 2018-02-21

## 2018-02-21 VITALS — WEIGHT: 214 LBS | BODY MASS INDEX: 36.54 KG/M2 | HEIGHT: 64 IN | TEMPERATURE: 97.8 F

## 2018-02-21 DIAGNOSIS — B37.9 CANDIDIASIS: ICD-10-CM

## 2018-02-21 DIAGNOSIS — K21.9 GASTROESOPHAGEAL REFLUX DISEASE WITHOUT ESOPHAGITIS: Primary | ICD-10-CM

## 2018-02-21 LAB
ALBUMIN SERPL-MCNC: 4 G/DL (ref 3.4–4.8)
ALBUMIN/GLOB SERPL: 1.3 G/DL (ref 1.1–1.8)
ALP SERPL-CCNC: 106 U/L (ref 38–126)
ALT SERPL W P-5'-P-CCNC: 70 U/L (ref 9–52)
ANION GAP SERPL CALCULATED.3IONS-SCNC: 13 MMOL/L (ref 5–15)
AST SERPL-CCNC: 80 U/L (ref 14–36)
BILIRUB SERPL-MCNC: 0.8 MG/DL (ref 0.2–1.3)
BUN BLD-MCNC: 23 MG/DL (ref 7–21)
BUN/CREAT SERPL: 24 (ref 7–25)
CALCIUM SPEC-SCNC: 9.4 MG/DL (ref 8.4–10.2)
CHLORIDE SERPL-SCNC: 103 MMOL/L (ref 95–110)
CO2 SERPL-SCNC: 26 MMOL/L (ref 22–31)
CREAT BLD-MCNC: 0.96 MG/DL (ref 0.5–1)
GFR SERPL CREATININE-BSD FRML MDRD: 60 ML/MIN/1.73 (ref 51–120)
GLOBULIN UR ELPH-MCNC: 3.2 GM/DL (ref 2.3–3.5)
GLUCOSE BLD-MCNC: 80 MG/DL (ref 60–100)
POTASSIUM BLD-SCNC: 4.1 MMOL/L (ref 3.5–5.1)
PROT SERPL-MCNC: 7.2 G/DL (ref 6.3–8.6)
SODIUM BLD-SCNC: 142 MMOL/L (ref 137–145)

## 2018-02-21 PROCEDURE — 83883 ASSAY NEPHELOMETRY NOT SPEC: CPT | Performed by: INTERNAL MEDICINE

## 2018-02-21 PROCEDURE — 82172 ASSAY OF APOLIPOPROTEIN: CPT | Performed by: INTERNAL MEDICINE

## 2018-02-21 PROCEDURE — 85025 COMPLETE CBC W/AUTO DIFF WBC: CPT | Performed by: INTERNAL MEDICINE

## 2018-02-21 PROCEDURE — 84450 TRANSFERASE (AST) (SGOT): CPT | Performed by: INTERNAL MEDICINE

## 2018-02-21 PROCEDURE — 82977 ASSAY OF GGT: CPT | Performed by: INTERNAL MEDICINE

## 2018-02-21 PROCEDURE — 82247 BILIRUBIN TOTAL: CPT | Performed by: INTERNAL MEDICINE

## 2018-02-21 PROCEDURE — 82947 ASSAY GLUCOSE BLOOD QUANT: CPT | Performed by: INTERNAL MEDICINE

## 2018-02-21 PROCEDURE — 84460 ALANINE AMINO (ALT) (SGPT): CPT | Performed by: INTERNAL MEDICINE

## 2018-02-21 PROCEDURE — 80053 COMPREHEN METABOLIC PANEL: CPT | Performed by: INTERNAL MEDICINE

## 2018-02-21 PROCEDURE — 99213 OFFICE O/P EST LOW 20 MIN: CPT | Performed by: OTOLARYNGOLOGY

## 2018-02-21 PROCEDURE — 83010 ASSAY OF HAPTOGLOBIN QUANT: CPT | Performed by: INTERNAL MEDICINE

## 2018-02-21 PROCEDURE — 85007 BL SMEAR W/DIFF WBC COUNT: CPT | Performed by: INTERNAL MEDICINE

## 2018-02-21 PROCEDURE — 82465 ASSAY BLD/SERUM CHOLESTEROL: CPT | Performed by: INTERNAL MEDICINE

## 2018-02-21 PROCEDURE — 84478 ASSAY OF TRIGLYCERIDES: CPT | Performed by: INTERNAL MEDICINE

## 2018-02-21 RX ORDER — CLOTRIMAZOLE 10 MG/1
10 LOZENGE ORAL; TOPICAL 4 TIMES DAILY
Qty: 40 TABLET | Refills: 0 | Status: SHIPPED | OUTPATIENT
Start: 2018-02-21 | End: 2018-05-23 | Stop reason: SDUPTHER

## 2018-02-21 NOTE — PROGRESS NOTES
Subjective   Amira Shannon is a 57 y.o. female.   Follow-up of hoarseness and reflux symptoms    History of Present Illness   Patient states she is doing quite well with her reflux versus sores or throat resolved.  She's been using reflux medicines carefully.  She states that she developed some mouth irritation in her tongue when she had a steroid injection she's had this happen the past she's had no treatment for this making little bit harder swallow which she has no breathing or swallowing problems      The following portions of the patient's history were reviewed and updated as appropriate: allergies, current medications, past family history, past medical history, past social history, past surgical history and problem list.      Current Outpatient Prescriptions:   •  cetirizine (zyrTEC) 10 MG tablet, Take 1 tablet by mouth Daily As Needed for Allergies., Disp: 30 tablet, Rfl: 11  •  chlorthalidone (HYGROTEN) 50 MG tablet, Take 1 tablet by mouth Daily., Disp: 30 tablet, Rfl: 3  •  cyclobenzaprine (FLEXERIL) 10 MG tablet, Take 1 tablet by mouth 2 (Two) Times a Day As Needed for Muscle Spasms., Disp: 60 tablet, Rfl: 3  •  DULoxetine (CYMBALTA) 30 MG capsule, Take 30 mg by mouth Daily., Disp: , Rfl:   •  gabapentin (NEURONTIN) 800 MG tablet, Take 800 mg by mouth 4 (Four) Times a Day., Disp: , Rfl:   •  linaclotide (LINZESS) 145 MCG capsule capsule, Take 145 mcg by mouth 2 (Two) Times a Day., Disp: , Rfl:   •  magnesium hydroxide (MILK OF MAGNESIA) 2400 MG/10ML suspension suspension, Take 10 mL by mouth Daily As Needed (constipation)., Disp: 100 mL, Rfl: 2  •  magnesium oxide (MAGOX) 400 (241.3 MG) MG tablet tablet, Take 400 mg by mouth Daily., Disp: , Rfl:   •  meloxicam (MOBIC) 15 MG tablet, Take 1 tablet by mouth Daily., Disp: 30 tablet, Rfl: 4  •  mupirocin (BACTROBAN) 2 % ointment, Apply  topically 3 (Three) Times a Day. (Patient taking differently: Apply 1 application topically 3 (Three) Times a Day.),  Disp: 1 g, Rfl: 2  •  nystatin (MYCOSTATIN) 655186 UNIT/GM powder, Apply 1 application topically As Needed., Disp: , Rfl:   •  omeprazole (priLOSEC) 40 MG capsule, Take 1 capsule by mouth Daily., Disp: 30 capsule, Rfl: 2  •  ondansetron (ZOFRAN) 4 MG tablet, Take 4 mg by mouth Every 8 (Eight) Hours As Needed., Disp: , Rfl:   •  oxyCODONE-acetaminophen (PERCOCET) 7.5-325 MG per tablet, Take 1 tablet by mouth 3 (Three) Times a Day., Disp: , Rfl:   •  phenylephrine-mineral oil-petrolatum 0.25-14-74.9 % ointment hemorrhoidal ointment, Insert 1 application into the rectum 3 (Three) Times a Day As Needed (rectal pain)., Disp: 28 g, Rfl: 1  •  ranitidine (ZANTAC) 150 MG capsule, Take 2 capsules by mouth Every Evening., Disp: 60 capsule, Rfl: 3  •  spironolactone (ALDACTONE) 25 MG tablet, Take 1 tablet by mouth Daily., Disp: 30 tablet, Rfl: 3  •  clotrimazole (MYCELEX) 10 MG dimitry, Take 1 tablet by mouth 4 (Four) Times a Day., Disp: 40 tablet, Rfl: 0    Allergies   Allergen Reactions   • Other      Pt states that taking steroids either in pill or injection form make her have blisters in her mouth and she feels like she is on fire on the inside/ has hx of c diff and possible MRSA     • Sulfa Antibiotics Rash     Sulfa (Sulfonamide Antibiotics)            Review of Systems   Constitutional: Negative for fever.   HENT: Positive for mouth sores. Negative for sore throat and voice change.    Hematological: Negative for adenopathy. Does not bruise/bleed easily.           Objective   Physical Exam   Constitutional: She is oriented to person, place, and time. She appears well-developed and well-nourished.   HENT:   Head: Normocephalic and atraumatic.   Right Ear: Hearing, tympanic membrane, external ear and ear canal normal.   Left Ear: Hearing, tympanic membrane, external ear and ear canal normal.   Nose: Nose normal. No mucosal edema, rhinorrhea, nasal deformity or septal deviation. No epistaxis. Right sinus exhibits no  maxillary sinus tenderness and no frontal sinus tenderness. Left sinus exhibits no maxillary sinus tenderness and no frontal sinus tenderness.   Mouth/Throat: Uvula is midline, oropharynx is clear and moist and mucous membranes are normal. No trismus in the jaw. Normal dentition. No oropharyngeal exudate or posterior oropharyngeal edema.       Eyes: Conjunctivae and EOM are normal.   Neck: Normal range of motion. Neck supple. No JVD present. No tracheal deviation present. No thyromegaly present.   Cardiovascular: Normal rate.    Pulmonary/Chest: Effort normal.   Musculoskeletal: Normal range of motion.   Lymphadenopathy:        Head (right side): No submental, no submandibular, no tonsillar, no preauricular, no posterior auricular and no occipital adenopathy present.        Head (left side): No submental, no submandibular, no tonsillar, no preauricular, no posterior auricular and no occipital adenopathy present.     She has no cervical adenopathy.        Right cervical: No superficial cervical, no deep cervical and no posterior cervical adenopathy present.       Left cervical: No superficial cervical, no deep cervical and no posterior cervical adenopathy present.   Neurological: She is alert and oriented to person, place, and time. No cranial nerve deficit.   Skin: Skin is warm.   Psychiatric: She has a normal mood and affect. Her speech is normal and behavior is normal. Thought content normal.   Nursing note and vitals reviewed.      Patient's voice is much improved      Indirect laryngoscopy reveals much improved laryngeal swelling essentially resolved    Assessment/Plan   Amira was seen today for follow-up.    Diagnoses and all orders for this visit:    Gastroesophageal reflux disease without esophagitis    Hoarseness    Candidiasis    Other orders  -     clotrimazole (MYCELEX) 10 MG dimitry; Take 1 tablet by mouth 4 (Four) Times a Day.    Please she's doing with reflux is going to start cutting back on her  medications in 2 months    Her voice is good now the new problems she's developed with a steroid injection is a candidiasis.  She's been placed some Mycelex dimitry she is explained she isn't side effects to call if not improving the next 5 days if she has we'll recheck her after she's cut back on reflux medicine see her back in 3 months all questions were answered she is agreement this approach

## 2018-02-22 LAB
BASOPHILS # BLD AUTO: 0.03 10*3/MM3 (ref 0–0.2)
BASOPHILS NFR BLD AUTO: 0.4 % (ref 0–2)
DEPRECATED RDW RBC AUTO: 47 FL (ref 36.4–46.3)
EOSINOPHIL # BLD AUTO: 0.23 10*3/MM3 (ref 0–0.7)
EOSINOPHIL NFR BLD AUTO: 3 % (ref 0–7)
ERYTHROCYTE [DISTWIDTH] IN BLOOD BY AUTOMATED COUNT: 15.2 % (ref 11.5–14.5)
HCT VFR BLD AUTO: 46.3 % (ref 35–45)
HGB BLD-MCNC: 15.4 G/DL (ref 12–15.5)
IMM GRANULOCYTES # BLD: 0.02 10*3/MM3 (ref 0–0.02)
IMM GRANULOCYTES NFR BLD: 0.3 % (ref 0–0.5)
LARGE PLATELETS: NORMAL
LYMPHOCYTES # BLD AUTO: 1.89 10*3/MM3 (ref 0.6–4.2)
LYMPHOCYTES NFR BLD AUTO: 24.5 % (ref 10–50)
MCH RBC QN AUTO: 27.8 PG (ref 26.5–34)
MCHC RBC AUTO-ENTMCNC: 33.3 G/DL (ref 31.4–36)
MCV RBC AUTO: 83.7 FL (ref 80–98)
MONOCYTES # BLD AUTO: 0.58 10*3/MM3 (ref 0–0.9)
MONOCYTES NFR BLD AUTO: 7.5 % (ref 0–12)
NEUTROPHILS # BLD AUTO: 4.96 10*3/MM3 (ref 2–8.6)
NEUTROPHILS NFR BLD AUTO: 64.3 % (ref 37–80)
NRBC BLD MANUAL-RTO: 0 /100 WBC (ref 0–0)
PLATELET # BLD AUTO: 71 10*3/MM3 (ref 150–450)
PMV BLD AUTO: ABNORMAL FL (ref 8–12)
RBC # BLD AUTO: 5.53 10*6/MM3 (ref 3.77–5.16)
RBC MORPH BLD: NORMAL
SMALL PLATELETS BLD QL SMEAR: NORMAL
WBC MORPH BLD: NORMAL
WBC NRBC COR # BLD: 7.71 10*3/MM3 (ref 3.2–9.8)

## 2018-02-24 LAB
A2 MACROGLOB SERPL-MCNC: 456 MG/DL (ref 110–276)
ALT SERPL W P-5'-P-CCNC: 61 IU/L (ref 0–40)
APO A-I SERPL-MCNC: 125 MG/DL (ref 116–209)
AST SERPL W P-5'-P-CCNC: 77 IU/L (ref 0–40)
BILIRUB SERPL-MCNC: 0.7 MG/DL (ref 0–1.2)
CHOLEST SERPL-MCNC: 139 MG/DL (ref 100–199)
FIBROSIS SCORING:: ABNORMAL
FIBROSIS STAGE SERPL QL: ABNORMAL
GGT SERPL-CCNC: 49 IU/L (ref 0–60)
GLUCOSE SERPL-MCNC: 79 MG/DL (ref 65–99)
HAPTOGLOB SERPL-MCNC: 57 MG/DL (ref 34–200)
INTERPRETATIONS: (REFERENCE): ABNORMAL
LABORATORY COMMENT REPORT: ABNORMAL
LIMITATIONS: (REFERENCE): ABNORMAL
LIVER FIBR SCORE SERPL CALC.FIBROSURE: 0.82 (ref 0–0.21)
NASH GRADE (REFERENCE): ABNORMAL
NASH SCORE (REFERENCE): 0.5
NASH SCORING (REFERENCE): ABNORMAL
STEATOSIS GRADE (REFERENCE): ABNORMAL
STEATOSIS GRADING (REFERENCE): ABNORMAL
STEATOSIS SCORE (REFERENCE): 0.48 (ref 0–0.3)
TRIGL SERPL-MCNC: 78 MG/DL (ref 0–149)
WEIGHT: (REFERENCE): 214 LBS

## 2018-03-01 ENCOUNTER — OFFICE VISIT (OUTPATIENT)
Dept: CARDIOLOGY | Facility: CLINIC | Age: 58
End: 2018-03-01

## 2018-03-01 VITALS
SYSTOLIC BLOOD PRESSURE: 130 MMHG | BODY MASS INDEX: 37.08 KG/M2 | WEIGHT: 217.2 LBS | HEART RATE: 92 BPM | DIASTOLIC BLOOD PRESSURE: 80 MMHG | HEIGHT: 64 IN | OXYGEN SATURATION: 98 %

## 2018-03-01 DIAGNOSIS — R60.0 LOCALIZED EDEMA: Primary | ICD-10-CM

## 2018-03-01 DIAGNOSIS — Q20.8 ABNORMALITY OF RIGHT VENTRICLE OF HEART: ICD-10-CM

## 2018-03-01 PROCEDURE — 99214 OFFICE O/P EST MOD 30 MIN: CPT | Performed by: INTERNAL MEDICINE

## 2018-04-04 RX ORDER — CHLORTHALIDONE 50 MG/1
TABLET ORAL
Qty: 30 TABLET | Refills: 3 | Status: SHIPPED | OUTPATIENT
Start: 2018-04-04 | End: 2018-08-15

## 2018-04-04 RX ORDER — SPIRONOLACTONE 25 MG/1
TABLET ORAL
Qty: 30 TABLET | Refills: 3 | Status: SHIPPED | OUTPATIENT
Start: 2018-04-04 | End: 2018-08-15

## 2018-05-07 RX ORDER — OMEPRAZOLE 40 MG/1
CAPSULE, DELAYED RELEASE ORAL
Qty: 30 CAPSULE | Refills: 2 | Status: SHIPPED | OUTPATIENT
Start: 2018-05-07 | End: 2018-08-15

## 2018-05-23 ENCOUNTER — OFFICE VISIT (OUTPATIENT)
Dept: OTOLARYNGOLOGY | Facility: CLINIC | Age: 58
End: 2018-05-23

## 2018-05-23 VITALS — TEMPERATURE: 97.5 F | HEIGHT: 64 IN | BODY MASS INDEX: 38.24 KG/M2 | WEIGHT: 224 LBS

## 2018-05-23 DIAGNOSIS — B37.9 CANDIDIASIS: Primary | ICD-10-CM

## 2018-05-23 DIAGNOSIS — K21.9 GASTROESOPHAGEAL REFLUX DISEASE WITHOUT ESOPHAGITIS: ICD-10-CM

## 2018-05-23 PROCEDURE — 99213 OFFICE O/P EST LOW 20 MIN: CPT | Performed by: OTOLARYNGOLOGY

## 2018-05-23 RX ORDER — CLOTRIMAZOLE 10 MG/1
10 LOZENGE ORAL; TOPICAL 3 TIMES DAILY
Qty: 40 TABLET | Refills: 0 | Status: SHIPPED | OUTPATIENT
Start: 2018-05-23 | End: 2018-10-12

## 2018-05-23 NOTE — PATIENT INSTRUCTIONS

## 2018-05-23 NOTE — PROGRESS NOTES
Subjective   Amira Shannon is a 57 y.o. female.     Follow-up glossitis and reflux  History of Present Illness   Doing better, says she only has minimal tongue symptoms particularly when she's spicy R or acidic foods like tomatoes.  She's not had any bleeding no neck masses he's not had any major problems swallowing she's not completely reflux or voice changes      The following portions of the patient's history were reviewed and updated as appropriate: allergies, current medications, past family history, past medical history, past social history, past surgical history and problem list.      Current Outpatient Prescriptions:   •  cetirizine (zyrTEC) 10 MG tablet, Take 1 tablet by mouth Daily As Needed for Allergies., Disp: 30 tablet, Rfl: 11  •  chlorthalidone (HYGROTEN) 50 MG tablet, TAKE ONE TABLET BY MOUTH ONCE DAILY, Disp: 30 tablet, Rfl: 3  •  clotrimazole (MYCELEX) 10 MG dimitry, Take 1 tablet by mouth 3 (Three) Times a Day., Disp: 40 tablet, Rfl: 0  •  cyclobenzaprine (FLEXERIL) 10 MG tablet, Take 1 tablet by mouth 2 (Two) Times a Day As Needed for Muscle Spasms., Disp: 60 tablet, Rfl: 3  •  DULoxetine (CYMBALTA) 30 MG capsule, Take 30 mg by mouth Daily., Disp: , Rfl:   •  gabapentin (NEURONTIN) 800 MG tablet, Take 800 mg by mouth 4 (Four) Times a Day., Disp: , Rfl:   •  linaclotide (LINZESS) 145 MCG capsule capsule, Take 145 mcg by mouth 2 (Two) Times a Day., Disp: , Rfl:   •  magnesium hydroxide (MILK OF MAGNESIA) 2400 MG/10ML suspension suspension, Take 10 mL by mouth Daily As Needed (constipation)., Disp: 100 mL, Rfl: 2  •  magnesium oxide (MAGOX) 400 (241.3 MG) MG tablet tablet, Take 400 mg by mouth Daily., Disp: , Rfl:   •  meloxicam (MOBIC) 15 MG tablet, Take 1 tablet by mouth Daily., Disp: 30 tablet, Rfl: 4  •  mupirocin (BACTROBAN) 2 % ointment, Apply  topically 3 (Three) Times a Day. (Patient taking differently: Apply 1 application topically 3 (Three) Times a Day.), Disp: 1 g, Rfl: 2  •   nystatin (MYCOSTATIN) 027102 UNIT/GM powder, Apply 1 application topically As Needed., Disp: , Rfl:   •  omeprazole (priLOSEC) 40 MG capsule, TAKE ONE CAPSULE BY MOUTH ONCE DAILY, Disp: 30 capsule, Rfl: 2  •  ondansetron (ZOFRAN) 4 MG tablet, Take 4 mg by mouth Every 8 (Eight) Hours As Needed., Disp: , Rfl:   •  oxyCODONE-acetaminophen (PERCOCET) 7.5-325 MG per tablet, Take 1 tablet by mouth 3 (Three) Times a Day., Disp: , Rfl:   •  phenylephrine-mineral oil-petrolatum 0.25-14-74.9 % ointment hemorrhoidal ointment, Insert 1 application into the rectum 3 (Three) Times a Day As Needed (rectal pain)., Disp: 28 g, Rfl: 1  •  ranitidine (ZANTAC) 150 MG capsule, Take 2 capsules by mouth Every Evening., Disp: 60 capsule, Rfl: 3  •  spironolactone (ALDACTONE) 25 MG tablet, TAKE ONE TABLET BY MOUTH ONCE DAILY, Disp: 30 tablet, Rfl: 3    Allergies   Allergen Reactions   • Other      Pt states that taking steroids either in pill or injection form make her have blisters in her mouth and she feels like she is on fire on the inside/ has hx of c diff and possible MRSA     • Corticosteroids Rash   • Kenalog  [Triamcinolone Acetonide] Rash   • Sulfa Antibiotics Rash     Sulfa (Sulfonamide Antibiotics)             Review of Systems   Constitutional: Negative for fever.   HENT: Negative for ear pain, mouth sores and trouble swallowing.    Respiratory: Negative for shortness of breath.    Allergic/Immunologic: Positive for food allergies.   Hematological: Negative for adenopathy.           Objective   Physical Exam   Constitutional: She is oriented to person, place, and time. She appears well-developed and well-nourished.   HENT:   Head: Normocephalic and atraumatic.   Right Ear: Hearing, tympanic membrane, external ear and ear canal normal.   Left Ear: Hearing, tympanic membrane, external ear and ear canal normal.   Nose: Nose normal. No mucosal edema, rhinorrhea, nasal deformity or septal deviation. No epistaxis. Right sinus exhibits  no maxillary sinus tenderness and no frontal sinus tenderness. Left sinus exhibits no maxillary sinus tenderness and no frontal sinus tenderness.   Mouth/Throat: Uvula is midline, oropharynx is clear and moist and mucous membranes are normal. No trismus in the jaw. Normal dentition. No oropharyngeal exudate or posterior oropharyngeal edema.       Eyes: Conjunctivae and EOM are normal.   Neck: Normal range of motion. Neck supple. No JVD present. No tracheal deviation present. No thyromegaly present.       Pulmonary/Chest: Effort normal.   Musculoskeletal: Normal range of motion.   Lymphadenopathy:        Head (right side): No submental, no submandibular, no tonsillar, no preauricular, no posterior auricular and no occipital adenopathy present.        Head (left side): No submental, no submandibular, no tonsillar, no preauricular, no posterior auricular and no occipital adenopathy present.     She has no cervical adenopathy.        Right cervical: No superficial cervical, no deep cervical and no posterior cervical adenopathy present.       Left cervical: No superficial cervical, no deep cervical and no posterior cervical adenopathy present.   Neurological: She is alert and oriented to person, place, and time. No cranial nerve deficit.   Skin: Skin is warm.   Psychiatric: She has a normal mood and affect. Her speech is normal and behavior is normal. Thought content normal.   Nursing note and vitals reviewed.          Assessment/Plan   Amira was seen today for follow-up.    Diagnoses and all orders for this visit:    Candidiasis    Gastroesophageal reflux disease without esophagitis    Other orders  -     clotrimazole (MYCELEX) 10 MG dimitry; Take 1 tablet by mouth 3 (Three) Times a Day.    Patient's to minimize her exposure to acidic foods    She still only using medicine for sharp rigid time was given a refill    She is to call for questions or problems in the meantime otherwise will recheck in 6 weeks

## 2018-06-15 ENCOUNTER — OFFICE VISIT (OUTPATIENT)
Dept: PODIATRY | Facility: CLINIC | Age: 58
End: 2018-06-15

## 2018-06-15 VITALS — OXYGEN SATURATION: 98 % | BODY MASS INDEX: 38.39 KG/M2 | WEIGHT: 224.87 LBS | HEART RATE: 99 BPM | HEIGHT: 64 IN

## 2018-06-15 DIAGNOSIS — M79.673 CHRONIC FOOT PAIN, UNSPECIFIED LATERALITY: Primary | ICD-10-CM

## 2018-06-15 DIAGNOSIS — G89.29 CHRONIC FOOT PAIN, UNSPECIFIED LATERALITY: Primary | ICD-10-CM

## 2018-06-15 PROCEDURE — 99213 OFFICE O/P EST LOW 20 MIN: CPT | Performed by: PODIATRIST

## 2018-06-15 NOTE — PROGRESS NOTES
"Amira Shannon  1960  57 y.o. female     Patient presents today for bilateral foot pain follow-up. She states she is still having pain when walking.  Patient rates her pain 8/10 today.        Chief Complaint   Patient presents with   • Left Foot - Follow-up, Pain   • Right Foot - Follow-up, Pain       History of Present Illness    Patient presents to clinic for follow-up of her bilateral foot pain.  States that she continues to have unbearable pain to both feet.  She rates as an 8 out of 10.  She has failed custom orthotics, steroid injections and medications.  Her neurologist sent her to [place] for additional testing.  She denies any new pedal complaints today.      Past Medical History:   Diagnosis Date   • Acid reflux    • Altered bowel function    • Arthropathy of lumbar facet joint    • Bleeding disorder    • Constipation    • Corns and callus    • Depression    • Disease related peripheral neuropathy    • Epigastric pain    • Fatty liver    • Hammer toe    • Headache    • Hiatal hernia    • Hyperlipidemia    • Knee pain    • Localized, primary osteoarthritis of the ankle and foot     Localized, primary osteoarthritis of the ankle and/or foot   • Mendoza's metatarsalgia     Mendoza's metatarsalgia - 2nd interspace on right   • Nausea and vomiting    • Neuralgia and neuritis     Neuralgia, neuritis, and radiculitis, unspecified   • Neuropathy    • Obstructive sleep apnea     Obstructive sleep apnea (adult) (pediatric)    • CHAI on CPAP     \"C-Pap at night  (unconfirmed)\"   • Osteoarthritis    • Pain in foot     Pain in unspecified foot - sees a podiatrist   • Pain in joint, ankle and foot     Joint pain in ankle and foot      • Pain radiating to back     Pain radiating to lumbar region of back   • Plantar fasciitis    • Restless leg syndrome    • Secondary hypertension     Secondary hypertension, unspecified   • Sinusitis    • Sleep apnea    • Tongue anomaly     lesion         Past Surgical History: "   Procedure Laterality Date   •  SECTION     • CHOLECYSTECTOMY     • COLONOSCOPY  2013   • DIRECT LARYNGOSCOPY, ESOPHAGOSCOPY, BRONCHOSCOPY N/A 2017    Procedure: DIRECT LARYNGOSCOPY AND;  Surgeon: Live Bolton MD;  Location: [facility];  Service:    • ENDOSCOPY  2013    Colon endoscopy 96915 (1) - Internal & external hemorrhoids found. Stool found.   • ENDOSCOPY  2013    EGD w/ tube 84670 (1) - Normal esophagus. Gastritis in stomach. Biopsy taken. Normal dudoenum. Biopsy taken.   • FOOT SURGERY  2013    Foot/toes surgery procedure (1) - Arthroplasty of toes 4 and 5 of right foot.   • HERNIA REPAIR     • HERNIA REPAIR      hital   • HYSTERECTOMY     • LIVER BIOPSY     • NERVE BLOCK  2016    Injection for nerve block (1) - Lumbar medial branch block.   • OTHER SURGICAL HISTORY  2012    Inj(s) Tend-Sheath, Ligament, Single  (1) - [facility]    • OTHER SURGICAL HISTORY  2013    Small Joint Injection/Aspiration  (2) - [facility]ICK[facility] ([facility]    • OTHER SURGICAL HISTORY      bowel obstruction x2   • OTHER SURGICAL HISTORY      gland removed from neck   • SUBLINGUAL SALIVARY CYST EXCISION N/A 2017    Procedure: EXCISION OF LEFT  TONGUE LESION WITH CLOSURE;  Surgeon: Live Bolton MD;  Location: [facility];  Service:    • TUBAL ABDOMINAL LIGATION     • UPPER GASTROINTESTINAL ENDOSCOPY  2013         Family History   Problem Relation Age of Onset   • Cancer Other    • Diabetes Other    • Heart disease Other    • Hypertension Mother    • Cancer Mother    • Diabetes Mother    • Hypertension Father    • Heart attack Father    • Cancer Father    • Heart disease Father    • Thyroid disease Maternal Aunt        Allergies   Allergen Reactions   • Other      Pt states that taking steroids either in pill or injection form make her have blisters in her mouth and she feels like she is on fire on the inside/ has hx of c diff and  possible MRSA     • Corticosteroids Rash   • Kenalog  [Triamcinolone Acetonide] Rash   • Sulfa Antibiotics Rash     Sulfa (Sulfonamide Antibiotics)       Social History     Social History   • Marital status:      Spouse name: N/A   • Number of children: N/A   • Years of education: N/A     Occupational History   • Not on file.     Social History Main Topics   • Smoking status: Former Smoker     Packs/day: 2.00     Years: 15.00     Types: Cigarettes   • Smokeless tobacco: Never Used      Comment: 02/15/2018 - Patient states she ceased utilizationof tobacco products over 20 years prior.   • Alcohol use No   • Drug use: No   • Sexual activity: Defer      Comment: Marital Status:      Other Topics Concern   • Not on file     Social History Narrative   • No narrative on file         Current Outpatient Prescriptions   Medication Sig Dispense Refill   • cetirizine (zyrTEC) 10 MG tablet Take 1 tablet by mouth Daily As Needed for Allergies. 30 tablet 11   • chlorthalidone (HYGROTEN) 50 MG tablet TAKE ONE TABLET BY MOUTH ONCE DAILY 30 tablet 3   • clotrimazole (MYCELEX) 10 MG dimitry Take 1 tablet by mouth 3 (Three) Times a Day. 40 tablet 0   • cyclobenzaprine (FLEXERIL) 10 MG tablet Take 1 tablet by mouth 2 (Two) Times a Day As Needed for Muscle Spasms. 60 tablet 3   • DULoxetine (CYMBALTA) 30 MG capsule Take 30 mg by mouth Daily.     • gabapentin (NEURONTIN) 800 MG tablet Take 800 mg by mouth 4 (Four) Times a Day.     • linaclotide (LINZESS) 145 MCG capsule capsule Take 145 mcg by mouth 2 (Two) Times a Day.     • magnesium hydroxide (MILK OF MAGNESIA) 2400 MG/10ML suspension suspension Take 10 mL by mouth Daily As Needed (constipation). 100 mL 2   • magnesium oxide (MAGOX) 400 (241.3 MG) MG tablet tablet Take 400 mg by mouth Daily.     • meloxicam (MOBIC) 15 MG tablet Take 1 tablet by mouth Daily. 30 tablet 4   • mupirocin (BACTROBAN) 2 % ointment Apply  topically 3 (Three) Times a Day. (Patient taking  "differently: Apply 1 application topically 3 (Three) Times a Day.) 1 g 2   • nystatin (MYCOSTATIN) 151456 UNIT/GM powder Apply 1 application topically As Needed.     • omeprazole (priLOSEC) 40 MG capsule TAKE ONE CAPSULE BY MOUTH ONCE DAILY 30 capsule 2   • ondansetron (ZOFRAN) 4 MG tablet Take 4 mg by mouth Every 8 (Eight) Hours As Needed.     • oxyCODONE-acetaminophen (PERCOCET) 7.5-325 MG per tablet Take 1 tablet by mouth 3 (Three) Times a Day.     • phenylephrine-mineral oil-petrolatum 0.25-14-74.9 % ointment hemorrhoidal ointment Insert 1 application into the rectum 3 (Three) Times a Day As Needed (rectal pain). 28 g 1   • ranitidine (ZANTAC) 150 MG capsule Take 2 capsules by mouth Every Evening. 60 capsule 3   • spironolactone (ALDACTONE) 25 MG tablet TAKE ONE TABLET BY MOUTH ONCE DAILY 30 tablet 3     No current facility-administered medications for this visit.          OBJECTIVE    Pulse 99   Ht 162.6 cm (64\")   Wt 102 kg (224 lb 13.9 oz)   LMP  (LMP Unknown)   SpO2 98%   BMI 38.60 kg/m²       Review of Systems   Constitutional: Positive for fatigue.   HENT: Negative.    Eyes: Negative.    Respiratory: Positive for shortness of breath.    Cardiovascular: Positive for leg swelling.   Gastrointestinal: Positive for abdominal pain and constipation.   Endocrine: Negative.    Genitourinary: Positive for enuresis.   Musculoskeletal: Positive for back pain and joint swelling.        Bilateral foot/ankle pain  Joint pain   Skin: Negative.    Allergic/Immunologic: Negative.    Neurological: Negative.    Hematological: Negative.    Psychiatric/Behavioral: Negative.          Constitutional: well developed, well nourished    HEENT: Normocephalic and atraumatic, normal hearing    Respiratory: Non labored respirations noted    Cardiovascular:    DP/PT pulses palpable    CFT brisk  to all digits  Skin temp is warm to warm from proximal tibia to distal digits  Pedal hair growth decreased.   No erythema noted   Edema " noted to bilateral LEs    Musculoskeletal:  Muscle strength is 4/5 for all muscle groups tested   Diffuse pain on palpation noted    POP to achillles tendon insertion bilateral  POP to medial tubercle of the calcaneus bilateral. Negative lateral squeeze test    Dermatological:   Skin is warm, dry and intact    Webspaces 1-4 bilateral are clean, dry and intact.   No subcutaneous nodules or masses noted      Neurological:   Protective sensation decreased  Sensation intact to light touch    DTR intact    Psychiatric: A&O x 3 with normal mood and affect. NAD.            Procedures          ASSESSMENT AND PLAN    Amira was seen today for follow-up, pain, follow-up and pain.    Diagnoses and all orders for this visit:    Chronic foot pain, unspecified laterality      - Continue custom orthotics  - We'll defer to provider in [place]  - All questions were answered to the patients satisfaction.  - RTC as needed             This document has been electronically signed by Daren Marie DPM on Kristie 15, 2018 12:52 PM     6/15/2018  12:52 PM

## 2018-06-25 ENCOUNTER — OFFICE VISIT (OUTPATIENT)
Dept: CARDIOLOGY | Facility: CLINIC | Age: 58
End: 2018-06-25

## 2018-06-25 VITALS
HEART RATE: 82 BPM | DIASTOLIC BLOOD PRESSURE: 74 MMHG | OXYGEN SATURATION: 98 % | BODY MASS INDEX: 37.47 KG/M2 | WEIGHT: 219.5 LBS | HEIGHT: 64 IN | SYSTOLIC BLOOD PRESSURE: 132 MMHG

## 2018-06-25 DIAGNOSIS — I51.89 DIASTOLIC DYSFUNCTION: ICD-10-CM

## 2018-06-25 DIAGNOSIS — R60.0 BILATERAL LOWER EXTREMITY EDEMA: Primary | ICD-10-CM

## 2018-06-25 PROCEDURE — 93000 ELECTROCARDIOGRAM COMPLETE: CPT | Performed by: INTERNAL MEDICINE

## 2018-06-25 PROCEDURE — 99214 OFFICE O/P EST MOD 30 MIN: CPT | Performed by: NURSE PRACTITIONER

## 2018-06-25 NOTE — PROGRESS NOTES
Subjective:     CC: Follow up edema 1wk post aggressive diuresis    Fatigue   This is a recurrent problem. The current episode started more than 1 year ago. The problem occurs daily. The problem has been rapidly worsening. Associated symptoms include arthralgias, fatigue, joint swelling, myalgias and weakness. Pertinent negatives include no change in bowel habit, chills, coughing, fever or rash. The symptoms are aggravated by standing and walking. She has tried position changes, oral narcotics, heat and rest for the symptoms. The treatment provided mild relief.   Leg Swelling   This is a chronic problem. The current episode started more than 1 year ago. The problem occurs intermittently. The problem has been rapidly improving. Associated symptoms include arthralgias, fatigue, joint swelling, myalgias and weakness. Pertinent negatives include no change in bowel habit, chills, coughing, fever or rash. She has tried rest and position changes (loop diuretics) for the symptoms. The treatment provided moderate relief.     She had been on Lasix for 10-12 years. She does have HTN for also about 10 years.    Of note, her BNP is negative.   Follow up revealed return of edema after Metolazone was stopped. Dr. Espitia also ordered ischemic evaluation which was low risk.   Last visit, he DC norvasc and lasix and added chlorthalidone. Improvement noted.    I saw her on  11/1/2017 and Aldactone added and chlorthalidone increased.   Weight down and swelling has not returned.     She complains of chronic fatigue. This is secondary to chronic pain and high doses of neurontin. [facility] for back and feet Thursday.     Hospitalization:  11/17/2017-11/22/2017- [facility]    Review of Systems   Constitution: Positive for fatigue, weakness and weight loss. Negative for chills, decreased appetite and fever.   HENT: Negative.    Eyes: Negative.    Cardiovascular: Positive for dyspnea on exertion and leg swelling. Negative for irregular  heartbeat.   Respiratory: Negative for cough and wheezing.    Endocrine: Negative.    Skin: Negative for dry skin, flushing and rash.   Musculoskeletal: Positive for arthralgias, joint swelling and myalgias. Negative for falls.   Gastrointestinal: Negative for change in bowel habit and melena.   Genitourinary: Negative for frequency and hematuria.   Neurological: Negative for dizziness, light-headedness and loss of balance.   Psychiatric/Behavioral: Negative for altered mental status and memory loss. The patient is not nervous/anxious.        Current Outpatient Prescriptions   Medication Sig Dispense Refill   • cetirizine (zyrTEC) 10 MG tablet Take 1 tablet by mouth Daily As Needed for Allergies. 30 tablet 11   • chlorthalidone (HYGROTEN) 50 MG tablet TAKE ONE TABLET BY MOUTH ONCE DAILY 30 tablet 3   • clotrimazole (MYCELEX) 10 MG dimitry Take 1 tablet by mouth 3 (Three) Times a Day. 40 tablet 0   • cyclobenzaprine (FLEXERIL) 10 MG tablet Take 1 tablet by mouth 2 (Two) Times a Day As Needed for Muscle Spasms. 60 tablet 3   • DULoxetine (CYMBALTA) 30 MG capsule Take 30 mg by mouth Daily.     • gabapentin (NEURONTIN) 800 MG tablet Take 800 mg by mouth 4 (Four) Times a Day.     • linaclotide (LINZESS) 145 MCG capsule capsule Take 145 mcg by mouth 2 (Two) Times a Day.     • magnesium hydroxide (MILK OF MAGNESIA) 2400 MG/10ML suspension suspension Take 10 mL by mouth Daily As Needed (constipation). 100 mL 2   • magnesium oxide (MAGOX) 400 (241.3 MG) MG tablet tablet Take 400 mg by mouth Daily.     • meloxicam (MOBIC) 15 MG tablet Take 1 tablet by mouth Daily. 30 tablet 4   • mupirocin (BACTROBAN) 2 % ointment Apply  topically 3 (Three) Times a Day. (Patient taking differently: Apply 1 application topically 3 (Three) Times a Day.) 1 g 2   • nystatin (MYCOSTATIN) 190050 UNIT/GM powder Apply 1 application topically As Needed.     • omeprazole (priLOSEC) 40 MG capsule TAKE ONE CAPSULE BY MOUTH ONCE DAILY 30 capsule 2   •  ondansetron (ZOFRAN) 4 MG tablet Take 4 mg by mouth Every 8 (Eight) Hours As Needed.     • oxyCODONE-acetaminophen (PERCOCET) 7.5-325 MG per tablet Take 1 tablet by mouth 3 (Three) Times a Day.     • phenylephrine-mineral oil-petrolatum 0.25-14-74.9 % ointment hemorrhoidal ointment Insert 1 application into the rectum 3 (Three) Times a Day As Needed (rectal pain). 28 g 1   • ranitidine (ZANTAC) 150 MG capsule Take 2 capsules by mouth Every Evening. 60 capsule 3   • spironolactone (ALDACTONE) 25 MG tablet TAKE ONE TABLET BY MOUTH ONCE DAILY 30 tablet 3     No current facility-administered medications for this visit.         Objective:     There were no vitals filed for this visit.  Wt Readings from Last 3 Encounters:   06/15/18 102 kg (224 lb 13.9 oz)   05/23/18 102 kg (224 lb)   03/01/18 98.5 kg (217 lb 3.2 oz)          Physical Exam   Constitutional: She is oriented to person, place, and time. She appears well-developed and well-nourished. No distress.   HENT:   Head: Normocephalic.   Neck: No JVD present.   Cardiovascular: Normal rate, regular rhythm, S1 normal, S2 normal, normal heart sounds and intact distal pulses.    No murmur heard.  Pulmonary/Chest: Effort normal and breath sounds normal. No respiratory distress. She has no wheezes. She has no rales.   Abdominal: Soft. Bowel sounds are normal.   Musculoskeletal: Normal range of motion. She exhibits no edema.   Neurological: She is alert and oriented to person, place, and time.   Skin: Skin is warm and dry. No erythema.   Psychiatric: She has a normal mood and affect. Her behavior is normal. Judgment and thought content normal.       DATA reviewed  ECHO 10/18/2017  Interpretation Summary   · Left Ventricle: Left ventricular systolic function is normal. Estimated EF appears to be in the range of 56 - 60%  · Left ventricular diastolic dysfunction is noted (grade III w/high LAP) consistent with reversible restrictive pattern. Elevated left atrial  pressure.  · Right Ventricle: Normal right ventricular wall thickness, systolic function and septal motion noted. Right ventricular cavity is mildly dilated  · No evidence of pulmonary hypertension is present  · There is no evidence of pericardial effusion       Amira Shannon   Noninvasive physiologic studies of upper/lower extremity arteries, single level, bilateral (eg, ankle/brachial indices, Doppler waveform analysis...)   Order# [id number]   Reading physician: Mazin Mills MD Ordering physician: Bart Espitia MD PhD Study date: 10/18/17   Patient Information   Patient Name MRN Sex  (Age)   Amira Shannon [id number] Female 1960 (56 y.o.)   Clinical Indication   claudication   Dx: Claudication [I73.9 (ICD-10-CM)]   Interpretation Summary   · Right Conclusion: The right CARYN appears normal.  · Left Conclusion: The left CARYN appears normal.  · Increased indices suggest calcification           EKG was personally interpreted today.  Sinus mechanism with sinus arrhythmia.  Unable to exclude prior inferior infarction.      Test Reason :   Vent. Rate : 071 BPM     Atrial Rate : 071 BPM     P-R Int : 146 ms          QRS Dur : 084 ms      QT Int : 398 ms       P-R-T Axes : 050 000 031 degrees     QTc Int : 432 ms    Normal sinus rhythm  Inferior infarct , age undetermined  Abnormal ECG  No previous ECGs available  Confirmed by JAYMIE COOPER MD ([id number]),  MARQUITA JONES ([id number]) on     FINDINGS:        - lines/tubes: none    - cardiac: size within normal limits.    - mediastinum: contour within normal limits.     - lungs: no evidence of a focal air space process, pulmonary  interstitial edema. Scattered tiny granulomata present. No  noncalcified nodules are identified    - pleura: no evidence of  fluid.      - osseous: unremarkable for age.        IMPRESSION:  CONCLUSION:  No acute cardiopulmonary process identified.    AGUSTINA   Interpretation Summary     · Transesophageal  Echocardiogram with limited Color and Doppler  · Left Ventricle: Left ventricular systolic function is normal. Estimated EF appears to be in the range of 61 - 65%.  · Right Ventricle: Normal right ventricular wall thickness, systolic function and septal motion noted. Right ventricular cavity is mildly dilated  · No evidence of a patent foramen ovale. No evidence of an atrial septal defect present. Saline test results are negative  · There is no evidence of pericardial effusion  · No evidence of VSD.             Interpretation Summary   · Appears negative for DVT of the lower extremities  · Pulsatile venous flow           Interpretation Summary   · Nuclear Study Description: A 2-day rest/stress protocol myocardial perfusion imaging study was performed  · Nuclear Perfusion Images: Overall image quality is excellent.  · Stress ECG: Stress ECG rhythm of sinus tachycardia noted. Normal ECG with no significant stress induced changes noted.  · Stress Description: A pharmacological stress test was performed using regadenoson without low-level exercise.  · Nuclear Perfusion Findings Study Impression: Myocardial perfusion imaging indicates a normal myocardial perfusion study with no evidence of ischemia  · Ventricle Size / Description: Left ventricular ejection fraction is normal (Calculated EF = 70%).  · Impressions are consistent with a low risk study.         proBNP 0.0 - 900.0 pg/mL 129.0       Lab Results   Component Value Date    GLUCOSE 80 02/21/2018    BUN 23 (H) 02/21/2018    CREATININE 0.96 02/21/2018    EGFRIFNONA 60 02/21/2018    BCR 24.0 02/21/2018    K 4.1 02/21/2018    CO2 26.0 02/21/2018    CALCIUM 9.4 02/21/2018    ALBUMIN 4.00 02/21/2018    LABIL2 1.3 02/21/2018    AST 80 (H) 02/21/2018    ALT 70 (H) 02/21/2018     Lab Results   Component Value Date    WBC 7.71 02/21/2018    HGB 15.4 02/21/2018    HCT 46.3 (H) 02/21/2018    MCV 83.7 02/21/2018    PLT 71 (L) 02/21/2018       The following portions of the  patient's history were reviewed and updated as appropriate: allergies, current medications, past family history, past medical history, past social history, past surgical history and problem list.     Assessment/Plan:   Weight continues to stay down.  Edema mostly in ankles  Wearing CPAP now. Finally received equipment.     Diagnosis Plan   1. Swelling of both lower extremities  Improved     Chlorthalidone 50mg daily.   Aldactone 25mg        2. Diastolic dysfunction , grade III BNP negative. Blood pressure controlled. BP still stable without Norvasc.  Consideration for HFpEF, but negative BNP. Her edema now looks to be from joint effusions.     Unable to to complete 6MWT due to pain in feet.        No CHF. Saw Dr. Espitia regarding RV dilation. AGUSTINA was without anomalies. Secondary to CHAI most likely.   Follow up in 6 months for HTN monitoring.

## 2018-07-03 ENCOUNTER — OFFICE VISIT (OUTPATIENT)
Dept: GASTROENTEROLOGY | Facility: CLINIC | Age: 58
End: 2018-07-03

## 2018-07-03 VITALS
HEART RATE: 96 BPM | WEIGHT: 218 LBS | BODY MASS INDEX: 37.22 KG/M2 | HEIGHT: 64 IN | DIASTOLIC BLOOD PRESSURE: 80 MMHG | SYSTOLIC BLOOD PRESSURE: 130 MMHG

## 2018-07-03 DIAGNOSIS — R74.8 ELEVATED LIVER ENZYMES: ICD-10-CM

## 2018-07-03 DIAGNOSIS — R11.0 NAUSEA: ICD-10-CM

## 2018-07-03 DIAGNOSIS — K59.00 CONSTIPATION, UNSPECIFIED CONSTIPATION TYPE: ICD-10-CM

## 2018-07-03 DIAGNOSIS — K75.81 NASH (NONALCOHOLIC STEATOHEPATITIS): Primary | ICD-10-CM

## 2018-07-03 DIAGNOSIS — K74.60 CIRRHOSIS OF LIVER WITHOUT ASCITES, UNSPECIFIED HEPATIC CIRRHOSIS TYPE (HCC): ICD-10-CM

## 2018-07-03 DIAGNOSIS — R10.84 GENERALIZED ABDOMINAL PAIN: ICD-10-CM

## 2018-07-03 PROCEDURE — 99214 OFFICE O/P EST MOD 30 MIN: CPT | Performed by: PHYSICIAN ASSISTANT

## 2018-07-03 NOTE — PATIENT INSTRUCTIONS
MyPlate from [employer]  The general, healthful diet is based on the 2010 Dietary Guidelines for Americans. The amount of food you need to eat from each food group depends on your age, sex, and level of physical activity and can be individualized by a dietitian. Go to ChooseMyPlate.gov for more information.  What do I need to know about the MyPlate plan?  · Enjoy your food, but eat less.  · Avoid oversized portions.  ? ½ of your plate should include fruits and vegetables.  ? ¼ of your plate should be grains.  ? ¼ of your plate should be protein.  Grains  · Make at least half of your grains whole grains.  · For a 2,000 calorie daily food plan, eat 6 oz every day.  · 1 oz is about 1 slice bread, 1 cup cereal, or ½ cup cooked rice, cereal, or pasta.  Vegetables  · Make half your plate fruits and vegetables.  · For a 2,000 calorie daily food plan, eat 2½ cups every day.  · 1 cup is about 1 cup raw or cooked vegetables or vegetable juice or 2 cups raw leafy greens.  Fruits  · Make half your plate fruits and vegetables.  · For a 2,000 calorie daily food plan, eat 2 cups every day.  · 1 cup is about 1 cup fruit or 100% fruit juice or ½ cup dried fruit.  Protein  · For a 2,000 calorie daily food plan, eat 5½ oz every day.  · 1 oz is about 1 oz meat, poultry, or fish, ¼ cup cooked beans, 1 egg, 1 Tbsp peanut butter, or ½ oz nuts or seeds.  Dairy  · Switch to fat-free or low-fat (1%) milk.  · For a 2,000 calorie daily food plan, eat 3 cups every day.  · 1 cup is about 1 cup milk or yogurt or soy milk (soy beverage), 1½ oz natural cheese, or 2 oz processed cheese.  Fats, Oils, and Empty Calories  · Only small amounts of oils are recommended.  · Empty calories are calories from solid fats or added sugars.  · Compare sodium in foods like soup, bread, and frozen meals. Choose the foods with lower numbers.  · Drink water instead of sugary drinks.  What foods can I eat?  Grains  Whole grains such as whole wheat, quinoa, millet, and  bulgur. Bread, rolls, and pasta made from whole grains. Brown or wild rice. Hot or cold cereals made from whole grains and without added sugar.  Vegetables  All fresh vegetables, especially fresh red, dark green, or orange vegetables. Peas and beans. Low-sodium frozen or canned vegetables prepared without added salt. Low-sodium vegetable juices.  Fruits  All fresh, frozen, and dried fruits. Canned fruit packed in water or fruit juice without added sugar. Fruit juices without added sugar.  Meats and Other Protein Sources  Boiled, baked, or grilled lean meat trimmed of fat. Skinless poultry. Fresh seafood and shellfish. Canned seafood packed in water. Unsalted nuts and unsalted nut butters. Tofu. Dried beans and pea. Eggs.  Dairy  Low-fat or fat-free milk, yogurt, and cheeses.  Sweets and Desserts  Frozen desserts made from low-fat milk.  Fats and Oils  Olive, peanut, and canola oils and margarine. Salad dressing and mayonnaise made from these oils.  Other  Soups and casseroles made from allowed ingredients and without added fat or salt.  The items listed above may not be a complete list of recommended foods or beverages. Contact your dietitian for more options.  What foods are not recommended?  Grains  Sweetened, low-fiber cereals. Packaged baked goods. Snack crackers and chips. Cheese crackers, butter crackers, and biscuits. Frozen waffles, sweet breads, doughnuts, pastries, packaged baking mixes, pancakes, cakes, and cookies.  Vegetables  Regular canned or frozen vegetables or vegetables prepared with salt. Canned tomatoes. Canned tomato sauce. Fried vegetables. Vegetables in cream sauce or cheese sauce.  Fruits  Fruits packed in syrup or made with added sugar.  Meats and Other Protein Sources  Marbled or fatty meats such as ribs. Poultry with skin. Fried meats, poultry, eggs, or fish. Sausages, hot dogs, and deli meats such as pastrami, bologna, or salami.  Dairy  Whole milk, cream, cheeses made from whole milk,  sour cream. Ice cream or yogurt made from whole milk or with added sugar.  Beverages  For adults, no more than one alcoholic drink per day. Regular soft drinks or other sugary beverages. Juice drinks.  Sweets and Desserts  Sugary or fatty desserts, candy, and other sweets.  Fats and Oils  Solid shortening or partially hydrogenated oils. Solid margarine. Margarine that contains trans fats. Butter.  The items listed above may not be a complete list of foods and beverages to avoid. Contact your dietitian for more information.  This information is not intended to replace advice given to you by your health care provider. Make sure you discuss any questions you have with your health care provider.  Document Released: 01/06/2009 Document Revised: 05/25/2017 Document Reviewed: 11/26/2014  [employer] Interactive Patient Education © 2018 [employer]  BMI for Adults  Body mass index (BMI) is a number that is calculated from a person's weight and height. In most adults, the number is used to find how much of an adult's weight is made up of fat. BMI is not as accurate as a direct measure of body fat.  How is BMI calculated?  BMI is calculated by dividing weight in kilograms by height in meters squared. It can also be calculated by dividing weight in pounds by height in inches squared, then multiplying the resulting number by 703. Charts are available to help you find your BMI quickly and easily without doing this calculation.  How is BMI interpreted?  Health care professionals use BMI charts to identify whether an adult is underweight, at a normal weight, or overweight based on the following guidelines:  · Underweight: BMI less than 18.5.  · Normal weight: BMI between 18.5 and 24.9.  · Overweight: BMI between 25 and 29.9.  · Obese: BMI of 30 and above.    BMI is usually interpreted the same for males and females.  Weight includes both fat and muscle, so someone with a muscular build, such as an athlete, may have a BMI that is  higher than 24.9. In cases like these, BMI may not accurately depict body fat. To determine if excess body fat is the cause of a BMI of 25 or higher, further assessments may need to be done by a health care provider.  Why is BMI a useful tool?  BMI is used to identify a possible weight problem that may be related to a medical problem or may increase the risk for medical problems. BMI can also be used to promote changes to reach a healthy weight.  This information is not intended to replace advice given to you by your health care provider. Make sure you discuss any questions you have with your health care provider.  Document Released: 08/29/2005 Document Revised: 04/27/2017 Document Reviewed: 05/15/2015  [employer] Interactive Patient Education © 2018 [employer]

## 2018-07-03 NOTE — PROGRESS NOTES
Chief Complaint   Patient presents with   • MUSA       ENDO PROCEDURE ORDERED:    Subjective    Amira Shannon is a 57 y.o. female. she is here today for follow-up.    History of Present Illness    Patient seen on a recheck of her MUSA. Previously saw Dr. Ivan on 02/15/2018. She presents today with 5 out of 10 abdominal pain. She has frequent nausea but no vomiting. She states she takes Prilosec 40 mg in the morning, Zantac 300 mg at night. She denied dysphagia. She tends to be constipated. She is apparently taking Linzess 145 mcg b.i.d. It is not clear who gave her this dosage. Weight is up 3 pounds since last visit. Last EGD/colonoscopy by Dr. Ivan on 07/01/2013 showed a gastritis with poor prep on colonoscopy with a lot of stool. Random colon biopsy was negative. Negative small bowel biopsy at that time. Patient states she has had previous bowel obstructions with surgery by Dr. Rosado and Dr. Howard several years ago. She states her last bowel obstruction was due to scar tissue. She had apparently seen Dr. Ying in 2016, although she was not forthcoming with this information. She admits she had elevated liver enzymes for at least 3-4 years. She denies in the past being told she had irritable bowel.     Last CT imaging abdomen/pelvis reviewed by Dr. Troncoso on 11/21/2017 showed mild splenic enlargement, orogastric tube, fat stranding around the terminal ileum, moderate stool, postcholecystectomy/hysterectomy. No other recent imaging.     Most recent laboratory on 02/21/2018: MUSA FibroSure 0.82/F4, steatosis 0.48/S2, and 0.50/N1. AST was 77, ALT 61. CMP showed BUN 23, AST 80, ALT 70, otherwise normal. CBC showed 71,000 platelets.     Prior MUSA FibroSure on 07/17/2017 was reviewed with the patient with 0.88/F4, steatosis 0.83/S3, and 0.75/N2.     ASSESSMENT/PLAN: Patient with cirrhosis based on previous studies. I could not find where previous evaluation was done for comorbid liver disease, although I suspect  "this has been done. I did recommend WHIT, AMA, SMA, ceruloplasmin, iron studies, alpha-1 antitrypsin, CBC, CMP, INR, hepatitis diagnostic panel, AFP with ultrasound of right upper quadrant as she is due for hepatoma screening given her cirrhosis. Would consider an EGD to evaluate for varices. Discussed possibly repeating a colonoscopy given the length of time since her last endoscopy. She would like to consider. Will see her in followup after the above, further pending clinical course and the results of the above.       The following portions of the patient's history were reviewed and updated as appropriate:   Past Medical History:   Diagnosis Date   • Acid reflux    • Altered bowel function    • Arthropathy of lumbar facet joint    • Bleeding disorder (CMS/HCC)    • Constipation    • Corns and callus    • Depression    • Disease related peripheral neuropathy    • Epigastric pain    • Fatty liver    • Hammer toe    • Headache    • Hiatal hernia    • Hyperlipidemia    • Knee pain    • Localized, primary osteoarthritis of the ankle and foot     Localized, primary osteoarthritis of the ankle and/or foot   • Mendoza's metatarsalgia     Mendoza's metatarsalgia - 2nd interspace on right   • Nausea and vomiting    • Neuralgia and neuritis     Neuralgia, neuritis, and radiculitis, unspecified   • Neuropathy    • Obstructive sleep apnea     Obstructive sleep apnea (adult) (pediatric)    • CHAI on CPAP     \"C-Pap at night  (unconfirmed)\"   • Osteoarthritis    • Pain in foot     Pain in unspecified foot - sees a podiatrist   • Pain in joint, ankle and foot     Joint pain in ankle and foot      • Pain radiating to back     Pain radiating to lumbar region of back   • Plantar fasciitis    • Restless leg syndrome    • Secondary hypertension     Secondary hypertension, unspecified   • Sinusitis    • Sleep apnea    • Tongue anomaly     lesion     Past Surgical History:   Procedure Laterality Date   •  SECTION     • " CHOLECYSTECTOMY     • COLONOSCOPY  07/01/2013   • DIRECT LARYNGOSCOPY, ESOPHAGOSCOPY, BRONCHOSCOPY N/A 8/1/2017    Procedure: DIRECT LARYNGOSCOPY AND;  Surgeon: Live Bolton MD;  Location: [facility], [place];  Service:    • ENDOSCOPY  07/01/2013    Colon endoscopy 83644 (1) - Internal & external hemorrhoids found. Stool found.   • ENDOSCOPY  07/01/2013    EGD w/ tube 85102 (1) - Normal esophagus. Gastritis in stomach. Biopsy taken. Normal dudoenum. Biopsy taken.   • FOOT SURGERY  02/26/2013    Foot/toes surgery procedure (1) - Arthroplasty of toes 4 and 5 of right foot.   • HERNIA REPAIR     • HERNIA REPAIR      hital   • HYSTERECTOMY     • LIVER BIOPSY     • NERVE BLOCK  07/25/2016    Injection for nerve block (1) - Lumbar medial branch block.   • OTHER SURGICAL HISTORY  12/03/2012    Inj(s) Tend-Sheath, Ligament, Single 20550 (1) - [facility] ([facility])    • OTHER SURGICAL HISTORY  06/24/2013    Small Joint Injection/Aspiration 20600 (2) - [facility] ([facility])    • OTHER SURGICAL HISTORY  2011    bowel obstruction x2   • OTHER SURGICAL HISTORY      gland removed from neck   • SUBLINGUAL SALIVARY CYST EXCISION N/A 8/1/2017    Procedure: EXCISION OF LEFT  TONGUE LESION WITH CLOSURE;  Surgeon: Live Bolton MD;  Location: [facility], [place];  Service:    • TUBAL ABDOMINAL LIGATION     • UPPER GASTROINTESTINAL ENDOSCOPY  07/01/2013     Family History   Problem Relation Age of Onset   • Cancer Other    • Diabetes Other    • Heart disease Other    • Hypertension Mother    • Cancer Mother    • Diabetes Mother    • Hypertension Father    • Heart attack Father    • Cancer Father    • Heart disease Father    • Thyroid disease Maternal Aunt      OB History     No data available        Allergies   Allergen Reactions   • Other      Pt states that taking steroids either in pill or injection form make her have blisters in her mouth and she feels like she is on fire on the inside/ has hx of c diff and possible MRSA     •  Corticosteroids Rash   • Kenalog  [Triamcinolone Acetonide] Rash   • Sulfa Antibiotics Rash     Sulfa (Sulfonamide Antibiotics)     Social History     Social History   • Marital status:      Social History Main Topics   • Smoking status: Former Smoker     Packs/day: 2.00     Years: 15.00     Types: Cigarettes   • Smokeless tobacco: Never Used      Comment: 02/15/2018 - Patient states she ceased utilizationof tobacco products over 20 years prior.   • Alcohol use No   • Drug use: No   • Sexual activity: Defer      Comment: Marital Status:      Other Topics Concern   • Not on file       Current Outpatient Prescriptions:   •  cetirizine (zyrTEC) 10 MG tablet, Take 1 tablet by mouth Daily As Needed for Allergies., Disp: 30 tablet, Rfl: 11  •  chlorthalidone (HYGROTEN) 50 MG tablet, TAKE ONE TABLET BY MOUTH ONCE DAILY, Disp: 30 tablet, Rfl: 3  •  clotrimazole (MYCELEX) 10 MG dimitry, Take 1 tablet by mouth 3 (Three) Times a Day., Disp: 40 tablet, Rfl: 0  •  cyclobenzaprine (FLEXERIL) 10 MG tablet, Take 1 tablet by mouth 2 (Two) Times a Day As Needed for Muscle Spasms., Disp: 60 tablet, Rfl: 3  •  DULoxetine (CYMBALTA) 30 MG capsule, Take 30 mg by mouth Daily., Disp: , Rfl:   •  gabapentin (NEURONTIN) 800 MG tablet, Take 800 mg by mouth 4 (Four) Times a Day., Disp: , Rfl:   •  linaclotide (LINZESS) 145 MCG capsule capsule, Take 145 mcg by mouth 2 (Two) Times a Day., Disp: , Rfl:   •  magnesium hydroxide (MILK OF MAGNESIA) 2400 MG/10ML suspension suspension, Take 10 mL by mouth Daily As Needed (constipation)., Disp: 100 mL, Rfl: 2  •  magnesium oxide (MAGOX) 400 (241.3 MG) MG tablet tablet, Take 400 mg by mouth Daily., Disp: , Rfl:   •  meloxicam (MOBIC) 15 MG tablet, Take 1 tablet by mouth Daily., Disp: 30 tablet, Rfl: 4  •  mupirocin (BACTROBAN) 2 % ointment, Apply  topically 3 (Three) Times a Day. (Patient taking differently: Apply 1 application topically 3 (Three) Times a Day.), Disp: 1 g, Rfl: 2  •   "nystatin (MYCOSTATIN) 426636 UNIT/GM powder, Apply 1 application topically As Needed., Disp: , Rfl:   •  omeprazole (priLOSEC) 40 MG capsule, TAKE ONE CAPSULE BY MOUTH ONCE DAILY, Disp: 30 capsule, Rfl: 2  •  ondansetron (ZOFRAN) 4 MG tablet, Take 4 mg by mouth Every 8 (Eight) Hours As Needed., Disp: , Rfl:   •  oxyCODONE-acetaminophen (PERCOCET) 7.5-325 MG per tablet, Take 1 tablet by mouth 3 (Three) Times a Day., Disp: , Rfl:   •  phenylephrine-mineral oil-petrolatum 0.25-14-74.9 % ointment hemorrhoidal ointment, Insert 1 application into the rectum 3 (Three) Times a Day As Needed (rectal pain)., Disp: 28 g, Rfl: 1  •  ranitidine (ZANTAC) 150 MG capsule, Take 2 capsules by mouth Every Evening., Disp: 60 capsule, Rfl: 3  •  spironolactone (ALDACTONE) 25 MG tablet, TAKE ONE TABLET BY MOUTH ONCE DAILY, Disp: 30 tablet, Rfl: 3  Review of Systems  Review of Systems       Objective    /80 (BP Location: Left arm, Patient Position: Sitting)   Pulse 96   Ht 162.6 cm (64\")   Wt 98.9 kg (218 lb)   LMP  (LMP Unknown)   BMI 37.42 kg/m²   Physical Exam   Constitutional: She is oriented to person, place, and time. She appears well-developed and well-nourished. No distress.   HENT:   Head: Normocephalic and atraumatic.   Eyes: EOM are normal. Pupils are equal, round, and reactive to light.   Neck: Normal range of motion.   Cardiovascular: Normal rate, regular rhythm and normal heart sounds.    Pulmonary/Chest: Effort normal and breath sounds normal.   Abdominal: Soft. Bowel sounds are normal. She exhibits no shifting dullness, no distension, no abdominal bruit, no ascites and no mass. There is no hepatosplenomegaly. There is tenderness. There is no rigidity, no rebound, no guarding and no CVA tenderness. No hernia. Hernia confirmed negative in the ventral area.   Obese, mild diffuse   Musculoskeletal: Normal range of motion.   Neurological: She is alert and oriented to person, place, and time.   Skin: Skin is warm and " dry.   Psychiatric: She has a normal mood and affect. Her behavior is normal. Judgment and thought content normal.   Nursing note and vitals reviewed.    Assessment/Plan      1. MUSA (nonalcoholic steatohepatitis)    2. Generalized abdominal pain    3. Constipation, unspecified constipation type    4. Elevated liver enzymes    5. Cirrhosis of liver without ascites, unspecified hepatic cirrhosis type (CMS/HCC)    6. Nausea    .   Amira was seen today for musa.    Diagnoses and all orders for this visit:    MUSA (nonalcoholic steatohepatitis)  -     Comprehensive Metabolic Panel  -     CBC Auto Differential  -     TSH  -     Iron and TIBC  -     Ferritin  -     AFP Tumor Marker  -     Alpha - 1 - Antitrypsin  -     Anti-Smooth Muscle Antibody Titer  -     Ceruloplasmin  -     Hepatitis Panel, Acute  -     Protime-INR  -     Nuclear Antigen Antibody, IFA  -     Mitochondrial Antibodies, M2  -     US Abdomen Limited    Generalized abdominal pain    Constipation, unspecified constipation type    Elevated liver enzymes  -     Comprehensive Metabolic Panel  -     CBC Auto Differential  -     TSH  -     Iron and TIBC  -     Ferritin  -     AFP Tumor Marker  -     Alpha - 1 - Antitrypsin  -     Anti-Smooth Muscle Antibody Titer  -     Ceruloplasmin  -     Hepatitis Panel, Acute  -     Protime-INR  -     Nuclear Antigen Antibody, IFA  -     Mitochondrial Antibodies, M2  -     US Abdomen Limited    Cirrhosis of liver without ascites, unspecified hepatic cirrhosis type (CMS/HCC)  -     Comprehensive Metabolic Panel  -     CBC Auto Differential  -     TSH  -     Iron and TIBC  -     Ferritin  -     AFP Tumor Marker  -     Alpha - 1 - Antitrypsin  -     Anti-Smooth Muscle Antibody Titer  -     Ceruloplasmin  -     Hepatitis Panel, Acute  -     Protime-INR  -     Nuclear Antigen Antibody, IFA  -     Mitochondrial Antibodies, M2  -     US Abdomen Limited    Nausea        Orders placed during this encounter include:  Orders Placed  This Encounter   Procedures   • US Abdomen Limited     Scheduling Instructions:      RUQ     Order Specific Question:   Reason for Exam:     Answer:   cirrhosis   • Comprehensive Metabolic Panel   • CBC Auto Differential   • TSH   • Iron and TIBC   • Ferritin   • AFP Tumor Marker   • Alpha - 1 - Antitrypsin   • Anti-Smooth Muscle Antibody Titer   • Ceruloplasmin   • Hepatitis Panel, Acute   • Protime-INR   • Nuclear Antigen Antibody, IFA   • Mitochondrial Antibodies, M2       Medications prescribed:  No orders of the defined types were placed in this encounter.      Requested Prescriptions      No prescriptions requested or ordered in this encounter       Review and/or summary of lab tests, radiology, procedures, medications. Review and summary of old records and obtaining of history. The risks and benefits of my recommendations, as well as other treatment options were discussed with the patient today. Questions were answered.    Follow-up: Return in about 6 weeks (around 8/14/2018), or if symptoms worsen or fail to improve.     * Surgery not found *      This document has been electronically signed by Blaine Perales PA-C on July 8, 2018 2:07 PM      Results for orders placed or performed in visit on 02/15/18   MUSA Fibrosure   Result Value Ref Range    Fibrosis Score (References) 0.82 (H) 0.00 - 0.21    Fibrosis Stage (Reference) Comment     Steatosis Score (Reference) 0.48 (H) 0.00 - 0.30    Steatosis Grade (Reference) S1 - Mild Steatosis     MUSA Score (Reference) 0.50 0.25    Musa Grade (Reference) Comment     Height: (Reference) 64 Inches    Weight: (Reference) 214 LBS    Alpha 2-Macroglobulins, Qn 456 (H) 110 - 276 mg/dL    Haptoglobin 57 34 - 200 mg/dL    Apolipoprotein A-1 125 116 - 209 mg/dL    Total Bilirubin 0.7 0.0 - 1.2 mg/dL    GGT 49 0 - 60 IU/L    ALT (SGPT) 61 (H) 0 - 40 IU/L    AST (SGOT) P5P (Reference) 77 (H) 0 - 40 IU/L    Cholesterol, Total (Reference) 139 100 - 199 mg/dL    Glucose, Serum  (Reference) 79 65 - 99 mg/dL    Triglycerides 78 0 - 149 mg/dL    Interpretations: (Reference) Comment     Fibrosis Scoring: Comment     Steatosis Grading (Reference) Comment     Judge Scoring (Reference) Comment     Limitations: (Reference) Comment     Comment (Reference) Comment    Scan Slide   Result Value Ref Range    RBC Morphology Normal Normal    WBC Morphology Normal Normal    Platelet Estimate Decreased Normal    Large Platelets Slight/1+ None Seen   CBC Auto Differential   Result Value Ref Range    WBC 7.71 3.20 - 9.80 10*3/mm3    RBC 5.53 (H) 3.77 - 5.16 10*6/mm3    Hemoglobin 15.4 12.0 - 15.5 g/dL    Hematocrit 46.3 (H) 35.0 - 45.0 %    MCV 83.7 80.0 - 98.0 fL    MCH 27.8 26.5 - 34.0 pg    MCHC 33.3 31.4 - 36.0 g/dL    RDW 15.2 (H) 11.5 - 14.5 %    RDW-SD 47.0 (H) 36.4 - 46.3 fl    MPV  8.0 - 12.0 fL    Platelets 71 (L) 150 - 450 10*3/mm3    Neutrophil % 64.3 37.0 - 80.0 %    Lymphocyte % 24.5 10.0 - 50.0 %    Monocyte % 7.5 0.0 - 12.0 %    Eosinophil % 3.0 0.0 - 7.0 %    Basophil % 0.4 0.0 - 2.0 %    Immature Grans % 0.3 0.0 - 0.5 %    Neutrophils, Absolute 4.96 2.00 - 8.60 10*3/mm3    Lymphocytes, Absolute 1.89 0.60 - 4.20 10*3/mm3    Monocytes, Absolute 0.58 0.00 - 0.90 10*3/mm3    Eosinophils, Absolute 0.23 0.00 - 0.70 10*3/mm3    Basophils, Absolute 0.03 0.00 - 0.20 10*3/mm3    Immature Grans, Absolute 0.02 0.00 - 0.02 10*3/mm3    nRBC 0.0 0.0 - 0.0 /100 WBC   Comprehensive Metabolic Panel   Result Value Ref Range    Glucose 80 60 - 100 mg/dL    BUN 23 (H) 7 - 21 mg/dL    Creatinine 0.96 0.50 - 1.00 mg/dL    Sodium 142 137 - 145 mmol/L    Potassium 4.1 3.5 - 5.1 mmol/L    Chloride 103 95 - 110 mmol/L    CO2 26.0 22.0 - 31.0 mmol/L    Calcium 9.4 8.4 - 10.2 mg/dL    Total Protein 7.2 6.3 - 8.6 g/dL    Albumin 4.00 3.40 - 4.80 g/dL    ALT (SGPT) 70 (H) 9 - 52 U/L    AST (SGOT) 80 (H) 14 - 36 U/L    Alkaline Phosphatase 106 38 - 126 U/L    Total Bilirubin 0.8 0.2 - 1.3 mg/dL    eGFR Non African Amer 60  51 - 120 mL/min/1.73    Globulin 3.2 2.3 - 3.5 gm/dL    A/G Ratio 1.3 1.1 - 1.8 g/dL    BUN/Creatinine Ratio 24.0 7.0 - 25.0    Anion Gap 13.0 5.0 - 15.0 mmol/L   Results for orders placed or performed during the hospital encounter of 11/17/17   Gold Top - SST   Result Value Ref Range    Extra Tube Hold for add-ons.    Green Top (Gel)   Result Value Ref Range    Extra Tube Hold for add-ons.    Scan Slide   Result Value Ref Range    RBC Morphology Normal Normal    WBC Morphology Normal Normal    Platelet Estimate Decreased Normal   Scan Slide   Result Value Ref Range    RBC Morphology Normal Normal    WBC Morphology Normal Normal    Platelet Estimate Decreased Normal   Urinalysis, Microscopic Only - Urine, Clean Catch   Result Value Ref Range    RBC, UA 0-2 (A) None Seen /HPF    WBC, UA 0-2 None Seen, 0-2, 3-5 /HPF    Bacteria, UA None Seen None Seen /HPF    Squamous Epithelial Cells, UA None Seen None Seen, 0-2 /HPF    Hyaline Casts, UA 0-2 None Seen /LPF    Methodology Automated Microscopy    Urinalysis With / Culture If Indicated -   Result Value Ref Range    Color, UA Yellow Yellow, Straw, Dark Yellow, Mikala    Appearance, UA Clear Clear    pH, UA 6.0 5.0 - 9.0    Specific Gravity, UA 1.020 1.003 - 1.030    Glucose, UA Negative Negative    Ketones, UA Negative Negative    Bilirubin, UA Negative Negative    Blood, UA Negative Negative    Protein, UA Negative Negative    Leuk Esterase, UA Negative Negative    Nitrite, UA Negative Negative    Urobilinogen, UA 1.0 E.U./dL 0.2 - 1.0 E.U./dL   Urinalysis With / Culture If Indicated - Urine, Clean Catch   Result Value Ref Range    Color, UA Yellow Yellow, Straw, Dark Yellow, Mikala    Appearance, UA Clear Clear    pH, UA 7.0 5.0 - 9.0    Specific [place], UA 1.015 1.003 - 1.030    Glucose, UA Negative Negative    Ketones, UA Negative Negative    Bilirubin, UA Negative Negative    Blood, UA Negative Negative    Protein, UA 30 mg/dL (1+) (A) Negative    Leuk Esterase, UA  Negative Negative    Nitrite, UA Negative Negative    Urobilinogen, UA 0.2 E.U./dL 0.2 - 1.0 E.U./dL     *Note: Due to a large number of results and/or encounters for the requested time period, some results have not been displayed. A complete set of results can be found in Results Review.       Some portions of this note have been dictated using voice recognition software and may contain errors and/or omissions.

## 2018-07-23 ENCOUNTER — TRANSCRIBE ORDERS (OUTPATIENT)
Dept: ORTHOPEDIC SURGERY | Facility: CLINIC | Age: 58
End: 2018-07-23

## 2018-07-23 DIAGNOSIS — G56.03 CARPAL TUNNEL SYNDROME, BILATERAL: Primary | ICD-10-CM

## 2018-07-31 LAB
ALBUMIN SERPL-MCNC: 4 G/DL (ref 3.4–4.8)
ALBUMIN/GLOB SERPL: 1.3 G/DL (ref 1.1–1.8)
ALP SERPL-CCNC: 83 U/L (ref 38–126)
ALT SERPL W P-5'-P-CCNC: 51 U/L (ref 9–52)
ANION GAP SERPL CALCULATED.3IONS-SCNC: 8 MMOL/L (ref 5–15)
AST SERPL-CCNC: 55 U/L (ref 14–36)
BASOPHILS # BLD AUTO: 0.01 10*3/MM3 (ref 0–0.2)
BASOPHILS NFR BLD AUTO: 0.2 % (ref 0–2)
BILIRUB SERPL-MCNC: 0.7 MG/DL (ref 0.2–1.3)
BUN BLD-MCNC: 14 MG/DL (ref 7–21)
BUN/CREAT SERPL: 15.6 (ref 7–25)
CALCIUM SPEC-SCNC: 9.1 MG/DL (ref 8.4–10.2)
CHLORIDE SERPL-SCNC: 103 MMOL/L (ref 95–110)
CO2 SERPL-SCNC: 27 MMOL/L (ref 22–31)
CREAT BLD-MCNC: 0.9 MG/DL (ref 0.5–1)
DEPRECATED RDW RBC AUTO: 42.8 FL (ref 36.4–46.3)
EOSINOPHIL # BLD AUTO: 0.13 10*3/MM3 (ref 0–0.7)
EOSINOPHIL NFR BLD AUTO: 2.8 % (ref 0–7)
ERYTHROCYTE [DISTWIDTH] IN BLOOD BY AUTOMATED COUNT: 14.7 % (ref 11.5–14.5)
FERRITIN SERPL-MCNC: 19.3 NG/ML (ref 11.1–264)
GFR SERPL CREATININE-BSD FRML MDRD: 64 ML/MIN/1.73 (ref 51–120)
GLOBULIN UR ELPH-MCNC: 3.1 GM/DL (ref 2.3–3.5)
GLUCOSE BLD-MCNC: 90 MG/DL (ref 60–100)
HCT VFR BLD AUTO: 42.1 % (ref 35–45)
HGB BLD-MCNC: 13.8 G/DL (ref 12–15.5)
IMM GRANULOCYTES # BLD: 0.01 10*3/MM3 (ref 0–0.02)
IMM GRANULOCYTES NFR BLD: 0.2 % (ref 0–0.5)
INR PPP: 1.22 (ref 0.8–1.2)
IRON 24H UR-MRATE: 81 MCG/DL (ref 37–170)
IRON SATN MFR SERPL: 17 % (ref 15–50)
LYMPHOCYTES # BLD AUTO: 1.25 10*3/MM3 (ref 0.6–4.2)
LYMPHOCYTES NFR BLD AUTO: 27.3 % (ref 10–50)
MCH RBC QN AUTO: 26.3 PG (ref 26.5–34)
MCHC RBC AUTO-ENTMCNC: 32.8 G/DL (ref 31.4–36)
MCV RBC AUTO: 80.3 FL (ref 80–98)
MONOCYTES # BLD AUTO: 0.24 10*3/MM3 (ref 0–0.9)
MONOCYTES NFR BLD AUTO: 5.2 % (ref 0–12)
NEUTROPHILS # BLD AUTO: 2.94 10*3/MM3 (ref 2–8.6)
NEUTROPHILS NFR BLD AUTO: 64.3 % (ref 37–80)
PLATELET # BLD AUTO: 67 10*3/MM3 (ref 150–450)
PMV BLD AUTO: 11.8 FL (ref 8–12)
POTASSIUM BLD-SCNC: 3.7 MMOL/L (ref 3.5–5.1)
PROT SERPL-MCNC: 7.1 G/DL (ref 6.3–8.6)
PROTHROMBIN TIME: 15.1 SECONDS (ref 11.1–15.3)
RBC # BLD AUTO: 5.24 10*6/MM3 (ref 3.77–5.16)
SODIUM BLD-SCNC: 138 MMOL/L (ref 137–145)
TIBC SERPL-MCNC: 481 MCG/DL (ref 265–497)
TSH SERPL DL<=0.05 MIU/L-ACNC: 1.95 MIU/ML (ref 0.46–4.68)
WBC NRBC COR # BLD: 4.58 10*3/MM3 (ref 3.2–9.8)

## 2018-07-31 PROCEDURE — 82105 ALPHA-FETOPROTEIN SERUM: CPT | Performed by: PHYSICIAN ASSISTANT

## 2018-07-31 PROCEDURE — 82390 ASSAY OF CERULOPLASMIN: CPT | Performed by: PHYSICIAN ASSISTANT

## 2018-07-31 PROCEDURE — 83516 IMMUNOASSAY NONANTIBODY: CPT | Performed by: PHYSICIAN ASSISTANT

## 2018-07-31 PROCEDURE — 84443 ASSAY THYROID STIM HORMONE: CPT | Performed by: PHYSICIAN ASSISTANT

## 2018-07-31 PROCEDURE — 85610 PROTHROMBIN TIME: CPT | Performed by: PHYSICIAN ASSISTANT

## 2018-07-31 PROCEDURE — 82728 ASSAY OF FERRITIN: CPT | Performed by: PHYSICIAN ASSISTANT

## 2018-07-31 PROCEDURE — 85025 COMPLETE CBC W/AUTO DIFF WBC: CPT | Performed by: PHYSICIAN ASSISTANT

## 2018-07-31 PROCEDURE — 80074 ACUTE HEPATITIS PANEL: CPT | Performed by: PHYSICIAN ASSISTANT

## 2018-07-31 PROCEDURE — 82103 ALPHA-1-ANTITRYPSIN TOTAL: CPT | Performed by: PHYSICIAN ASSISTANT

## 2018-07-31 PROCEDURE — 80053 COMPREHEN METABOLIC PANEL: CPT | Performed by: PHYSICIAN ASSISTANT

## 2018-07-31 PROCEDURE — 36415 COLL VENOUS BLD VENIPUNCTURE: CPT | Performed by: FAMILY MEDICINE

## 2018-07-31 PROCEDURE — 83540 ASSAY OF IRON: CPT | Performed by: PHYSICIAN ASSISTANT

## 2018-07-31 PROCEDURE — 83550 IRON BINDING TEST: CPT | Performed by: PHYSICIAN ASSISTANT

## 2018-07-31 PROCEDURE — 86038 ANTINUCLEAR ANTIBODIES: CPT | Performed by: PHYSICIAN ASSISTANT

## 2018-08-01 LAB
A1AT SERPL-MCNC: 99 MG/DL (ref 90–200)
ACTIN IGG SERPL-ACNC: 30 UNITS (ref 0–19)
AFP-TM SERPL-MCNC: 2.5 NG/ML (ref 0–8.3)
ANA SER QL IA: NEGATIVE
CERULOPLASMIN SERPL-MCNC: 23.2 MG/DL (ref 19–39)
DEPRECATED MITOCHONDRIA M2 IGG SER-ACNC: <20 UNITS (ref 0–20)
HAV IGM SERPL QL IA: NEGATIVE
HBV CORE IGM SERPL QL IA: NEGATIVE
HBV SURFACE AG SERPL QL IA: NEGATIVE
HCV AB SER DONR QL: NEGATIVE

## 2018-08-06 DIAGNOSIS — M25.532 BILATERAL WRIST PAIN: Primary | ICD-10-CM

## 2018-08-06 DIAGNOSIS — M25.531 BILATERAL WRIST PAIN: Primary | ICD-10-CM

## 2018-08-07 ENCOUNTER — OFFICE VISIT (OUTPATIENT)
Dept: ORTHOPEDIC SURGERY | Facility: CLINIC | Age: 58
End: 2018-08-07

## 2018-08-07 VITALS — WEIGHT: 211.6 LBS | BODY MASS INDEX: 36.12 KG/M2 | HEIGHT: 64 IN

## 2018-08-07 DIAGNOSIS — R20.2 NUMBNESS AND TINGLING IN BOTH HANDS: ICD-10-CM

## 2018-08-07 DIAGNOSIS — M25.531 BILATERAL WRIST PAIN: ICD-10-CM

## 2018-08-07 DIAGNOSIS — G56.01 CARPAL TUNNEL SYNDROME ON RIGHT: ICD-10-CM

## 2018-08-07 DIAGNOSIS — G56.02 CARPAL TUNNEL SYNDROME ON LEFT: Primary | ICD-10-CM

## 2018-08-07 DIAGNOSIS — E66.9 OBESITY WITH BODY MASS INDEX OF 30.0-39.9: ICD-10-CM

## 2018-08-07 DIAGNOSIS — M25.532 BILATERAL WRIST PAIN: ICD-10-CM

## 2018-08-07 DIAGNOSIS — R20.0 NUMBNESS AND TINGLING IN BOTH HANDS: ICD-10-CM

## 2018-08-07 DIAGNOSIS — I51.89 DIASTOLIC DYSFUNCTION: ICD-10-CM

## 2018-08-07 PROCEDURE — 99214 OFFICE O/P EST MOD 30 MIN: CPT | Performed by: ORTHOPAEDIC SURGERY

## 2018-08-07 NOTE — PROGRESS NOTES
"Amira Shannon is a 57 y.o. female   Primary provider:  Yobany Barr MD       Chief Complaint   Patient presents with   • Left Hand - Pain   • Right Hand - Pain   • Numbness   • Tingling       HISTORY OF PRESENT ILLNESS:    Pain   This is a recurrent problem. Episode onset: 4 years. The problem occurs constantly. Associated symptoms include joint swelling and numbness. Associated symptoms comments: Crushing, clicking, popping, aching burning, numbness, tingling.   Bilateral hand pain with the left side being worse than the right.  She complains of numbness and tingling as well as pain in both hands.  Her pain is worse at night and while driving.  Mostly her symptoms involve the thumb index and middle fingers on both hands.  She denies any injury.  She cannot take steroids because she is allergic.  Her symptoms have slowly been getting worse over the last several months.     CONCURRENT MEDICAL HISTORY:    Past Medical History:   Diagnosis Date   • Acid reflux    • Altered bowel function    • Arthropathy of lumbar facet joint    • Bleeding disorder (CMS/HCC)    • Constipation    • Corns and callus    • Depression    • Disease related peripheral neuropathy    • Epigastric pain    • Fatty liver    • Hammer toe    • Headache    • Hiatal hernia    • Hyperlipidemia    • Knee pain    • Localized, primary osteoarthritis of the ankle and foot     Localized, primary osteoarthritis of the ankle and/or foot   • Mendoza's metatarsalgia     Mendoza's metatarsalgia - 2nd interspace on right   • Nausea and vomiting    • Neuralgia and neuritis     Neuralgia, neuritis, and radiculitis, unspecified   • Neuropathy    • Obstructive sleep apnea     Obstructive sleep apnea (adult) (pediatric)    • CHAI on CPAP     \"C-Pap at night  (unconfirmed)\"   • Osteoarthritis    • Pain in foot     Pain in unspecified foot - sees a podiatrist   • Pain in joint, ankle and foot     Joint pain in ankle and foot      • Pain radiating to back     Pain " radiating to lumbar region of back   • Plantar fasciitis    • Restless leg syndrome    • Secondary hypertension     Secondary hypertension, unspecified   • Sinusitis    • Sleep apnea    • Tongue anomaly     lesion       Allergies   Allergen Reactions   • Other      Pt states that taking steroids either in pill or injection form make her have blisters in her mouth and she feels like she is on fire on the inside/ has hx of c diff and possible MRSA     • Corticosteroids Rash   • Kenalog  [Triamcinolone Acetonide] Rash   • Sulfa Antibiotics Rash     Sulfa (Sulfonamide Antibiotics)         Current Outpatient Prescriptions:   •  cetirizine (zyrTEC) 10 MG tablet, Take 1 tablet by mouth Daily As Needed for Allergies., Disp: 30 tablet, Rfl: 11  •  chlorthalidone (HYGROTEN) 50 MG tablet, TAKE ONE TABLET BY MOUTH ONCE DAILY, Disp: 30 tablet, Rfl: 3  •  clotrimazole (MYCELEX) 10 MG dimitry, Take 1 tablet by mouth 3 (Three) Times a Day., Disp: 40 tablet, Rfl: 0  •  DULoxetine (CYMBALTA) 30 MG capsule, Take 30 mg by mouth Daily., Disp: , Rfl:   •  gabapentin (NEURONTIN) 800 MG tablet, Take 800 mg by mouth 4 (Four) Times a Day., Disp: , Rfl:   •  linaclotide (LINZESS) 145 MCG capsule capsule, Take 145 mcg by mouth 2 (Two) Times a Day., Disp: , Rfl:   •  magnesium hydroxide (MILK OF MAGNESIA) 2400 MG/10ML suspension suspension, Take 10 mL by mouth Daily As Needed (constipation)., Disp: 100 mL, Rfl: 2  •  magnesium oxide (MAGOX) 400 (241.3 MG) MG tablet tablet, Take 400 mg by mouth Daily., Disp: , Rfl:   •  meloxicam (MOBIC) 15 MG tablet, Take 1 tablet by mouth Daily., Disp: 30 tablet, Rfl: 4  •  mupirocin (BACTROBAN) 2 % ointment, Apply  topically 3 (Three) Times a Day. (Patient taking differently: Apply 1 application topically 3 (Three) Times a Day.), Disp: 1 g, Rfl: 2  •  nystatin (MYCOSTATIN) 894982 UNIT/GM powder, Apply 1 application topically As Needed., Disp: , Rfl:   •  omeprazole (priLOSEC) 40 MG capsule, TAKE ONE CAPSULE BY  MOUTH ONCE DAILY, Disp: 30 capsule, Rfl: 2  •  ondansetron (ZOFRAN) 4 MG tablet, Take 4 mg by mouth Every 8 (Eight) Hours As Needed., Disp: , Rfl:   •  oxyCODONE-acetaminophen (PERCOCET) 7.5-325 MG per tablet, Take 1 tablet by mouth 3 (Three) Times a Day., Disp: , Rfl:   •  phenylephrine-mineral oil-petrolatum 0.25-14-74.9 % ointment hemorrhoidal ointment, Insert 1 application into the rectum 3 (Three) Times a Day As Needed (rectal pain)., Disp: 28 g, Rfl: 1  •  ranitidine (ZANTAC) 150 MG capsule, Take 2 capsules by mouth Every Evening., Disp: 60 capsule, Rfl: 3  •  spironolactone (ALDACTONE) 25 MG tablet, TAKE ONE TABLET BY MOUTH ONCE DAILY, Disp: 30 tablet, Rfl: 3    Past Surgical History:   Procedure Laterality Date   •  SECTION     • CHOLECYSTECTOMY     • COLONOSCOPY  2013   • DIRECT LARYNGOSCOPY, ESOPHAGOSCOPY, BRONCHOSCOPY N/A 2017    Procedure: DIRECT LARYNGOSCOPY AND;  Surgeon: Live Bolton MD;  Location: [facility];  Service:    • ENDOSCOPY  2013    Colon endoscopy 51053 (1) - Internal & external hemorrhoids found. Stool found.   • ENDOSCOPY  2013    EGD w/ tube 04437 (1) - Normal esophagus. Gastritis in stomach. Biopsy taken. Normal dudoenum. Biopsy taken.   • FOOT SURGERY  2013    Foot/toes surgery procedure (1) - Arthroplasty of toes 4 and 5 of right foot.   • HERNIA REPAIR     • HERNIA REPAIR      hital   • HYSTERECTOMY     • LIVER BIOPSY     • NERVE BLOCK  2016    Injection for nerve block (1) - Lumbar medial branch block.   • OTHER SURGICAL HISTORY  2012    Inj(s) Tend-Sheath, Ligament, Single  (1) - [facility]    • OTHER SURGICAL HISTORY  2013    Small Joint Injection/Aspiration  (2) - [facility] ([facility])    • OTHER SURGICAL HISTORY      bowel obstruction x2   • OTHER SURGICAL HISTORY      gland removed from neck   • SUBLINGUAL SALIVARY CYST EXCISION N/A 2017    Procedure: EXCISION OF LEFT  TONGUE  "LESION WITH CLOSURE;  Surgeon: Live Bolton MD;  Location: [facility];  Service:    • TUBAL ABDOMINAL LIGATION     • UPPER GASTROINTESTINAL ENDOSCOPY  07/01/2013       Family History   Problem Relation Age of Onset   • Cancer Other    • Diabetes Other    • Heart disease Other    • Hypertension Mother    • Cancer Mother    • Diabetes Mother    • Hypertension Father    • Heart attack Father    • Cancer Father    • Heart disease Father    • Thyroid disease Maternal Aunt         Social History     Social History   • Marital status:      Spouse name: N/A   • Number of children: N/A   • Years of education: N/A     Occupational History   • Not on file.     Social History Main Topics   • Smoking status: Former Smoker     Packs/day: 2.00     Years: 15.00     Types: Cigarettes   • Smokeless tobacco: Never Used      Comment: 02/15/2018 - Patient states she ceased utilizationof tobacco products over 20 years prior.   • Alcohol use No   • Drug use: No   • Sexual activity: Defer      Comment: Marital Status:      Other Topics Concern   • Not on file     Social History Narrative   • No narrative on file        Review of Systems   Musculoskeletal: Positive for joint swelling.   Neurological: Positive for numbness.        Tingling     Psychiatric/Behavioral: Positive for sleep disturbance.   All other systems reviewed and are negative.      PHYSICAL EXAMINATION:       Ht 162.6 cm (64\")   Wt 96 kg (211 lb 9.6 oz)   LMP  (LMP Unknown)   BMI 36.32 kg/m²     Physical Exam   Constitutional: She is oriented to person, place, and time. She appears well-developed and well-nourished. No distress.   Cardiovascular: Normal rate, regular rhythm and normal heart sounds.    Pulmonary/Chest: Effort normal and breath sounds normal.   Abdominal: Soft. Bowel sounds are normal.   Neurological: She is alert and oriented to person, place, and time.   Psychiatric: She has a normal mood and affect. Her behavior is normal. Judgment and " thought content normal.   Vitals reviewed.      GAIT:     []  Normal  [x]  Antalgic    Assistive device: [x]  None  []  Walker     []  Crutches  []  Cane     []  Wheelchair  []  Stretcher    Right Hand Exam     Tenderness   The patient is experiencing no tenderness.         Range of Motion   The patient has normal right wrist ROM.     Muscle Strength   : 4/5     Tests   Phalen’s Sign: positive  Tinel’s Sign (Medial Nerve): positive    Other   Erythema: absent  Sensation: normal  Pulse: present      Left Hand Exam     Tenderness   The patient is experiencing no tenderness.         Range of Motion   The patient has normal left wrist ROM.    Muscle Strength   :  4/5     Tests   Phalen’s Sign: positive  Tinel’s Sign (Medial Nerve): positive    Other   Erythema: absent  Sensation: normal  Pulse: present                [facility]  [facility]   [phone]    Nerve conduction test  6/28/18    Impression:  Bilateral moderate to severe distal median mononeuropathy at the wrist or carpal tunnel syndrome; the right hand is slightly worse than the left.    Right moderate to severe and chronic L5 and S1 lumbosacral radiculopathy.    Mild to moderate sensorimotor axonal peripheral neuropathy in the lower extremity.    Delgado Martinez MD      ASSESSMENT:    Diagnoses and all orders for this visit:    Carpal tunnel syndrome on left  -     Case Request; Standing  -     ceFAZolin (ANCEF) 2 g in sodium chloride 0.9 % 100 mL IVPB; Infuse 2 g into a venous catheter 1 (One) Time.  -     Case Request    Bilateral wrist pain  -     Case Request; Standing  -     ceFAZolin (ANCEF) 2 g in sodium chloride 0.9 % 100 mL IVPB; Infuse 2 g into a venous catheter 1 (One) Time.  -     Case Request    Obesity with body mass index of 30.0-39.9  -     Case Request; Standing  -     ceFAZolin (ANCEF) 2 g in sodium chloride 0.9 % 100 mL IVPB; Infuse 2 g into a venous catheter 1 (One) Time.  -     Case Request    Diastolic  dysfunction  -     Case Request; Standing  -     ceFAZolin (ANCEF) 2 g in sodium chloride 0.9 % 100 mL IVPB; Infuse 2 g into a venous catheter 1 (One) Time.  -     Case Request    Carpal tunnel syndrome on right  -     Case Request; Standing  -     ceFAZolin (ANCEF) 2 g in sodium chloride 0.9 % 100 mL IVPB; Infuse 2 g into a venous catheter 1 (One) Time.  -     Case Request    Numbness and tingling in both hands  -     Case Request; Standing  -     ceFAZolin (ANCEF) 2 g in sodium chloride 0.9 % 100 mL IVPB; Infuse 2 g into a venous catheter 1 (One) Time.  -     Case Request    Other orders  -     Follow Anesthesia Guidelines / Standing Orders; Future  -     Provide instructions to patient regarding NPO status  -     Follow Anesthesia Guidelines / Standing Orders; Standing  -     Verify NPO Status; Standing  -     Clip operative site; Standing  -     Obtain informed consent (if not collected inpatient or PAT); Standing          PLAN    The patient voiced understanding of the risks, benefits, and alternative forms of treatment that were discussed and the patient consents to proceed with surgery.  All risks, benefits and alternatives were discussed.  Risks including to but not exclusive to anesthetic complications, including death, MI, CVA, infection, bleeding DVT, fracture, residual pain and need for future surgery.  This discussion was held with the patient by Justus Lewis MD and all questions were answered.    Her symptoms are worse on the left than the right.  We discussed carpal tunnel release.  She prefers to do one side and then the other.  Proceed with carpal tunnel release on the left.    Patient's Body mass index is 36.32 kg/m². BMI is above normal parameters. Recommendations include: exercise counseling and nutrition counseling.      Return for Post-operative eval.    Justus Lewis MD

## 2018-08-09 RX ORDER — BUPIVACAINE HCL/0.9 % NACL/PF 0.1 %
2 PLASTIC BAG, INJECTION (ML) EPIDURAL ONCE
Status: CANCELLED | OUTPATIENT
Start: 2018-08-22 | End: 2018-08-22

## 2018-08-10 PROBLEM — R20.2 NUMBNESS AND TINGLING IN BOTH HANDS: Status: ACTIVE | Noted: 2018-08-10

## 2018-08-10 PROBLEM — M25.532 BILATERAL WRIST PAIN: Status: ACTIVE | Noted: 2018-08-10

## 2018-08-10 PROBLEM — R20.0 NUMBNESS AND TINGLING IN BOTH HANDS: Status: ACTIVE | Noted: 2018-08-10

## 2018-08-10 PROBLEM — G56.02 CARPAL TUNNEL SYNDROME ON LEFT: Status: ACTIVE | Noted: 2018-08-10

## 2018-08-10 PROBLEM — M25.531 BILATERAL WRIST PAIN: Status: ACTIVE | Noted: 2018-08-10

## 2018-08-10 PROBLEM — G56.01 CARPAL TUNNEL SYNDROME ON RIGHT: Status: ACTIVE | Noted: 2018-08-10

## 2018-08-14 ENCOUNTER — OFFICE VISIT (OUTPATIENT)
Dept: GASTROENTEROLOGY | Facility: CLINIC | Age: 58
End: 2018-08-14

## 2018-08-14 VITALS
HEIGHT: 64 IN | WEIGHT: 213 LBS | DIASTOLIC BLOOD PRESSURE: 80 MMHG | HEART RATE: 86 BPM | BODY MASS INDEX: 36.37 KG/M2 | SYSTOLIC BLOOD PRESSURE: 132 MMHG

## 2018-08-14 DIAGNOSIS — K74.60 CIRRHOSIS OF LIVER WITHOUT ASCITES, UNSPECIFIED HEPATIC CIRRHOSIS TYPE (HCC): ICD-10-CM

## 2018-08-14 DIAGNOSIS — K75.81 NASH (NONALCOHOLIC STEATOHEPATITIS): Primary | ICD-10-CM

## 2018-08-14 DIAGNOSIS — R74.8 ELEVATED LIVER ENZYMES: ICD-10-CM

## 2018-08-14 DIAGNOSIS — K59.00 CONSTIPATION, UNSPECIFIED CONSTIPATION TYPE: ICD-10-CM

## 2018-08-14 DIAGNOSIS — R10.84 GENERALIZED ABDOMINAL PAIN: ICD-10-CM

## 2018-08-14 PROCEDURE — 99214 OFFICE O/P EST MOD 30 MIN: CPT | Performed by: PHYSICIAN ASSISTANT

## 2018-08-14 RX ORDER — CYCLOBENZAPRINE HCL 10 MG
10 TABLET ORAL 3 TIMES DAILY PRN
COMMUNITY
End: 2018-10-12

## 2018-08-14 NOTE — PATIENT INSTRUCTIONS
Constipation, Adult  Constipation is when a person has fewer bowel movements in a week than normal, has difficulty having a bowel movement, or has stools that are dry, hard, or larger than normal. Constipation may be caused by an underlying condition. It may become worse with age if a person takes certain medicines and does not take in enough fluids.  Follow these instructions at home:  Eating and drinking    · Eat foods that have a lot of fiber, such as fresh fruits and vegetables, whole grains, and beans.  · Limit foods that are high in fat, low in fiber, or overly processed, such as french fries, hamburgers, cookies, candies, and soda.  · Drink enough fluid to keep your urine clear or pale yellow.  General instructions  · Exercise regularly or as told by your health care provider.  · Go to the restroom when you have the urge to go. Do not hold it in.  · Take over-the-counter and prescription medicines only as told by your health care provider. These include any fiber supplements.  · Practice pelvic floor retraining exercises, such as deep breathing while relaxing the lower abdomen and pelvic floor relaxation during bowel movements.  · Watch your condition for any changes.  · Keep all follow-up visits as told by your health care provider. This is important.  Contact a health care provider if:  · You have pain that gets worse.  · You have a fever.  · You do not have a bowel movement after 4 days.  · You vomit.  · You are not hungry.  · You lose weight.  · You are bleeding from the anus.  · You have thin, pencil-like stools.  Get help right away if:  · You have a fever and your symptoms suddenly get worse.  · You leak stool or have blood in your stool.  · Your abdomen is bloated.  · You have severe pain in your abdomen.  · You feel dizzy or you faint.  This information is not intended to replace advice given to you by your health care provider. Make sure you discuss any questions you have with your health care  provider.  Document Released: 09/15/2005 Document Revised: 07/07/2017 Document Reviewed: 06/07/2017  [employer] Patient Education © 2018 [employer]

## 2018-08-14 NOTE — PROGRESS NOTES
Chief Complaint   Patient presents with   • Judge   • Abdominal Pain   • Constipation   • Elevated Hepatic Enzymes       ENDO PROCEDURE ORDERED:    Subjective    Amira Shannon is a 57 y.o. female. she is here today for follow-up.    History of Present Illness    The patient is seen on a recheck of her GERD, abdominal pain, constipation, and JUDGE cirrhosis with elevated liver enzymes, F4/S2/N2. Last seen 07/03/2018. Patient has lost 5 pounds since last visit. She complains of 5/10 lower abdominal pain. She has been taking Linzess 145 mcg b.i.d. and states sometimes she has a good bowel movement, sometimes not. She is also on Mag-Ox and Milk of Magnesia and claims to be taking MiraLax. She is on Prilosec and Zantac for chronic GERD. Denied nausea, vomiting, and dysphagia. Last EGD/colonoscopy on 07/01/2013 showed gastritis with a poor prep on colonoscopy.     Patient had ultrasound of the right upper quadrant at [facility] on 07/31/2018 that showed prior cholecystectomy, fatty liver, and 4 mm common bile duct. Laboratories drawn at the clinic on the same date showed normal or negative WHIT, AMA, hepatitis diagnostic panel, ceruloplasmin, alpha-1 antitrypsin, AFP, iron studies, and TSH. CBC showed 67,000 platelets; INR 1.22, and SMA was high at 30; CMP showed an AST of 55, otherwise normal.     Patient states she is scheduled next week with Dr. Donohue to have surgery for carpal tunnel. She has a radiculopathy and neuropathy and is apparently seeing Dr. Martinez, Neurologist in [place]. Notes dated 05/24/2018 are reviewed and show a positive cryoglobulinemia, negative RA, MRI of the spine with multilevel degeneration.     A/P: Patient with mostly musculoskeletal complaints. It is not clear the significance of the positive cryoglobulinemia and positive SMA. These may be related to her musculoskeletal complaints. Certainly could be related to underlying liver disease. Would need to consider liver biopsy but have  "recommended she continue dietary modification and significant weight loss. She is encouraged to take the MiraLax more regularly for her constipation. She is overdue for colonoscopy, but given her impending surgeries it is unlikely she would be able to tolerate having a prep and procedure and be able to ambulate after her surgical treatments. She would like to defer them at present. She has a ventral diastasis and would not recommend surgery for that at this time. Will plan followup in a few months, further pending clinical course and the results of the above.     The patient was agreeable to the above.        The following portions of the patient's history were reviewed and updated as appropriate:   Past Medical History:   Diagnosis Date   • Acid reflux    • Altered bowel function    • Arthropathy of lumbar facet joint    • Bleeding disorder (CMS/HCC)    • C. difficile diarrhea 2015   • Constipation    • Corns and callus    • Depression    • Disease related peripheral neuropathy    • Epigastric pain    • Fatty liver    • Hammer toe    • Headache    • Hiatal hernia    • Hyperlipidemia    • Knee pain    • Localized, primary osteoarthritis of the ankle and foot     Localized, primary osteoarthritis of the ankle and/or foot   • Mendoza's metatarsalgia     Mendoza's metatarsalgia - 2nd interspace on right   • Nausea and vomiting    • Neuralgia and neuritis     Neuralgia, neuritis, and radiculitis, unspecified   • Neuropathy    • Obstructive sleep apnea     Obstructive sleep apnea (adult) (pediatric)    • CHAI on CPAP     \"C-Pap at night  (unconfirmed)\"   • Osteoarthritis    • Pain in foot     Pain in unspecified foot - sees a podiatrist   • Pain in joint, ankle and foot     Joint pain in ankle and foot      • Pain radiating to back     Pain radiating to lumbar region of back   • Plantar fasciitis    • PONV (postoperative nausea and vomiting)    • Restless leg syndrome    • Secondary hypertension     Secondary hypertension, " unspecified   • Sinusitis    • Sleep apnea    • Tongue anomaly     lesion     Past Surgical History:   Procedure Laterality Date   •  SECTION     • CHOLECYSTECTOMY     • COLONOSCOPY  2013   • DIRECT LARYNGOSCOPY, ESOPHAGOSCOPY, BRONCHOSCOPY N/A 2017    Procedure: DIRECT LARYNGOSCOPY AND;  Surgeon: Live Bolton MD;  Location: [facility];  Service:    • ENDOSCOPY  2013    Colon endoscopy 56461 (1) - Internal & external hemorrhoids found. Stool found.   • ENDOSCOPY  2013    EGD w/ tube 14907 (1) - Normal esophagus. Gastritis in stomach. Biopsy taken. Normal dudoenum. Biopsy taken.   • FOOT SURGERY  2013    Foot/toes surgery procedure (1) - Arthroplasty of toes 4 and 5 of right foot.   • HERNIA REPAIR     • HERNIA REPAIR      hital   • HYSTERECTOMY     • LIVER BIOPSY     • NERVE BLOCK  2016    Injection for nerve block (1) - Lumbar medial branch block.   • OTHER SURGICAL HISTORY  2012    Inj(s) Tend-Sheath, Ligament, Single  (1) - [facility]ICK[facility] ([facility]    • OTHER SURGICAL HISTORY  2013    Small Joint Injection/Aspiration  (2) - PORTER NICKERSON ([facility])    • OTHER SURGICAL HISTORY      bowel obstruction x2   • OTHER SURGICAL HISTORY      gland removed from neck   • SUBLINGUAL SALIVARY CYST EXCISION N/A 2017    Procedure: EXCISION OF LEFT  TONGUE LESION WITH CLOSURE;  Surgeon: Live Bolton MD;  Location: [facility];  Service:    • TUBAL ABDOMINAL LIGATION     • UPPER GASTROINTESTINAL ENDOSCOPY  2013     Family History   Problem Relation Age of Onset   • Cancer Other    • Diabetes Other    • Heart disease Other    • Hypertension Mother    • Cancer Mother    • Diabetes Mother    • Hypertension Father    • Heart attack Father    • Cancer Father    • Heart disease Father    • Thyroid disease Maternal Aunt      OB History     No data available        Allergies   Allergen Reactions   • Other      Pt states that taking steroids either  in pill or injection form make her have blisters in her mouth and she feels like she is on fire on the inside/ has hx of c diff and possible MRSA     • Corticosteroids Rash   • Kenalog  [Triamcinolone Acetonide] Rash   • Sulfa Antibiotics Rash     Sulfa (Sulfonamide Antibiotics)     Social History     Social History   • Marital status:      Social History Main Topics   • Smoking status: Former Smoker     Packs/day: 2.00     Years: 15.00     Types: Cigarettes     Quit date: 2000   • Smokeless tobacco: Never Used   • Alcohol use No   • Drug use: No   • Sexual activity: Defer      Comment: Marital Status:      Other Topics Concern   • Not on file       Current Outpatient Prescriptions:   •  cetirizine (zyrTEC) 10 MG tablet, Take 1 tablet by mouth Daily As Needed for Allergies., Disp: 30 tablet, Rfl: 11  •  clotrimazole (MYCELEX) 10 MG dimitry, Take 1 tablet by mouth 3 (Three) Times a Day., Disp: 40 tablet, Rfl: 0  •  cyclobenzaprine (FLEXERIL) 10 MG tablet, Take 10 mg by mouth 3 (Three) Times a Day As Needed for Muscle Spasms., Disp: , Rfl:   •  DULoxetine (CYMBALTA) 30 MG capsule, Take 30 mg by mouth Daily., Disp: , Rfl:   •  gabapentin (NEURONTIN) 800 MG tablet, Take 800 mg by mouth 4 (Four) Times a Day., Disp: , Rfl:   •  linaclotide (LINZESS) 145 MCG capsule capsule, Take 145 mcg by mouth 2 (Two) Times a Day., Disp: , Rfl:   •  magnesium hydroxide (MILK OF MAGNESIA) 2400 MG/10ML suspension suspension, Take 10 mL by mouth Daily As Needed (constipation)., Disp: 100 mL, Rfl: 2  •  magnesium oxide (MAGOX) 400 (241.3 MG) MG tablet tablet, Take 400 mg by mouth Daily., Disp: , Rfl:   •  nystatin (MYCOSTATIN) 520765 UNIT/GM powder, Apply 1 application topically As Needed., Disp: , Rfl:   •  ondansetron (ZOFRAN) 4 MG tablet, Take 4 mg by mouth Every 8 (Eight) Hours As Needed., Disp: , Rfl:   •  oxyCODONE-acetaminophen (PERCOCET) 7.5-325 MG per tablet, Take 1 tablet by mouth 3 (Three) Times a Day., Disp: , Rfl:  "  •  phenylephrine-mineral oil-petrolatum 0.25-14-74.9 % ointment hemorrhoidal ointment, Insert 1 application into the rectum 3 (Three) Times a Day As Needed (rectal pain)., Disp: 28 g, Rfl: 1  •  ranitidine (ZANTAC) 150 MG capsule, Take 2 capsules by mouth Every Evening., Disp: 60 capsule, Rfl: 3  •  chlorthalidone (HYGROTEN) 50 MG tablet, Take 50 mg by mouth Daily., Disp: , Rfl:   •  meloxicam (MOBIC) 15 MG tablet, Take 15 mg by mouth Every Night., Disp: , Rfl:   •  mupirocin (BACTROBAN) 2 % ointment, Apply  topically to the appropriate area as directed 3 (Three) Times a Day As Needed., Disp: , Rfl:   •  omeprazole (priLOSEC) 40 MG capsule, Take 40 mg by mouth Daily., Disp: , Rfl:   •  spironolactone (ALDACTONE) 25 MG tablet, Take 25 mg by mouth Daily., Disp: , Rfl:   Review of Systems  Review of Systems       Objective    /80 (BP Location: Left arm)   Pulse 86   Ht 162.6 cm (64\")   Wt 96.6 kg (213 lb)   LMP  (LMP Unknown)   BMI 36.56 kg/m²   Physical Exam   Constitutional: She is oriented to person, place, and time. She appears well-developed and well-nourished. No distress.   HENT:   Head: Normocephalic and atraumatic.   Eyes: Pupils are equal, round, and reactive to light. EOM are normal.   Neck: Normal range of motion.   Cardiovascular: Normal rate, regular rhythm and normal heart sounds.    Pulmonary/Chest: Effort normal and breath sounds normal.   Abdominal: Soft. Bowel sounds are normal. She exhibits no shifting dullness, no distension, no abdominal bruit, no ascites and no mass. There is no hepatosplenomegaly. There is tenderness. There is no rigidity, no rebound, no guarding and no CVA tenderness. No hernia. Hernia confirmed negative in the ventral area.   Obese, mild diffuse   Musculoskeletal: Normal range of motion.   Neurological: She is alert and oriented to person, place, and time.   Skin: Skin is warm and dry.   Psychiatric: She has a normal mood and affect. Her behavior is normal. " Judgment and thought content normal.   Nursing note and vitals reviewed.    Assessment/Plan      1. MUSA (nonalcoholic steatohepatitis)    2. Generalized abdominal pain    3. Constipation, unspecified constipation type    4. Elevated liver enzymes    5. Cirrhosis of liver without ascites, unspecified hepatic cirrhosis type (CMS/HCC)    .   Amira was seen today for musa, abdominal pain, constipation and elevated hepatic enzymes.    Diagnoses and all orders for this visit:    MUSA (nonalcoholic steatohepatitis)  Comments:  F4/S2/N2    Generalized abdominal pain    Constipation, unspecified constipation type    Elevated liver enzymes    Cirrhosis of liver without ascites, unspecified hepatic cirrhosis type (CMS/HCC)        Orders placed during this encounter include:  No orders of the defined types were placed in this encounter.      Medications prescribed:  No orders of the defined types were placed in this encounter.    Discontinued Medications       Reason for Discontinue    LYRICA 100 MG capsule Side effects        Requested Prescriptions      No prescriptions requested or ordered in this encounter       Review and/or summary of lab tests, radiology, procedures, medications. Review and summary of old records and obtaining of history. The risks and benefits of my recommendations, as well as other treatment options were discussed with the patient today. Questions were answered.    Follow-up: Return in about 3 months (around 11/14/2018), or if symptoms worsen or fail to improve.     * Surgery not found *      This document has been electronically signed by Blaine Perales PA-C on August 19, 2018 5:01 PM      Results for orders placed or performed in visit on 08/15/18   CBC (No Diff)   Result Value Ref Range    WBC 6.21 3.20 - 9.80 10*3/mm3    RBC 5.41 (H) 3.77 - 5.16 10*6/mm3    Hemoglobin 14.3 12.0 - 15.5 g/dL    Hematocrit 42.8 35.0 - 45.0 %    MCV 79.1 (L) 80.0 - 98.0 fL    MCH 26.4 (L) 26.5 - 34.0 pg    MCHC  33.4 31.4 - 36.0 g/dL    RDW 15.0 (H) 11.5 - 14.5 %    RDW-SD 43.1 36.4 - 46.3 fl    MPV 11.8 8.0 - 12.0 fL    Platelets 100 (L) 150 - 450 10*3/mm3   Results for orders placed or performed in visit on 07/03/18   CBC Auto Differential   Result Value Ref Range    WBC 4.58 3.20 - 9.80 10*3/mm3    RBC 5.24 (H) 3.77 - 5.16 10*6/mm3    Hemoglobin 13.8 12.0 - 15.5 g/dL    Hematocrit 42.1 35.0 - 45.0 %    MCV 80.3 80.0 - 98.0 fL    MCH 26.3 (L) 26.5 - 34.0 pg    MCHC 32.8 31.4 - 36.0 g/dL    RDW 14.7 (H) 11.5 - 14.5 %    RDW-SD 42.8 36.4 - 46.3 fl    MPV 11.8 8.0 - 12.0 fL    Platelets 67 (L) 150 - 450 10*3/mm3    Neutrophil % 64.3 37.0 - 80.0 %    Lymphocyte % 27.3 10.0 - 50.0 %    Monocyte % 5.2 0.0 - 12.0 %    Eosinophil % 2.8 0.0 - 7.0 %    Basophil % 0.2 0.0 - 2.0 %    Immature Grans % 0.2 0.0 - 0.5 %    Neutrophils, Absolute 2.94 2.00 - 8.60 10*3/mm3    Lymphocytes, Absolute 1.25 0.60 - 4.20 10*3/mm3    Monocytes, Absolute 0.24 0.00 - 0.90 10*3/mm3    Eosinophils, Absolute 0.13 0.00 - 0.70 10*3/mm3    Basophils, Absolute 0.01 0.00 - 0.20 10*3/mm3    Immature Grans, Absolute 0.01 0.00 - 0.02 10*3/mm3   Nuclear Antigen Antibody, IFA   Result Value Ref Range    WHIT Negative    Iron and TIBC   Result Value Ref Range    Iron 81 37 - 170 mcg/dL    TIBC 481 265 - 497 mcg/dL    Iron Saturation 17 15 - 50 %   Alpha - 1 - Antitrypsin   Result Value Ref Range    A-1 Antitrypsin 99 90 - 200 mg/dL   Mitochondrial Antibodies, M2   Result Value Ref Range    Mitochondrial Ab <20.0 0.0 - 20.0 Units   Ceruloplasmin   Result Value Ref Range    Ceruloplasmin 23.2 19.0 - 39.0 mg/dL   AFP Tumor Marker   Result Value Ref Range    AFP Tumor Marker 2.5 0.0 - 8.3 ng/mL   Hepatitis Panel, Acute   Result Value Ref Range    Hepatitis C Ab Negative Negative    Hep A IgM Negative Negative    Hep B C IgM Negative Negative    Hepatitis B Surface Ag Negative Negative   Anti-Smooth Muscle Antibody Titer   Result Value Ref Range    Smooth Muscle Ab 30  (H) 0 - 19 Units   Protime-INR   Result Value Ref Range    Protime 15.1 11.1 - 15.3 Seconds    INR 1.22 (H) 0.80 - 1.20   TSH   Result Value Ref Range    TSH 1.950 0.460 - 4.680 mIU/mL   Ferritin   Result Value Ref Range    Ferritin 19.30 11.10 - 264.00 ng/mL   Comprehensive Metabolic Panel   Result Value Ref Range    Glucose 90 60 - 100 mg/dL    BUN 14 7 - 21 mg/dL    Creatinine 0.90 0.50 - 1.00 mg/dL    Sodium 138 137 - 145 mmol/L    Potassium 3.7 3.5 - 5.1 mmol/L    Chloride 103 95 - 110 mmol/L    CO2 27.0 22.0 - 31.0 mmol/L    Calcium 9.1 8.4 - 10.2 mg/dL    Total Protein 7.1 6.3 - 8.6 g/dL    Albumin 4.00 3.40 - 4.80 g/dL    ALT (SGPT) 51 9 - 52 U/L    AST (SGOT) 55 (H) 14 - 36 U/L    Alkaline Phosphatase 83 38 - 126 U/L    Total Bilirubin 0.7 0.2 - 1.3 mg/dL    eGFR Non  Amer 64 51 - 120 mL/min/1.73    Globulin 3.1 2.3 - 3.5 gm/dL    A/G Ratio 1.3 1.1 - 1.8 g/dL    BUN/Creatinine Ratio 15.6 7.0 - 25.0    Anion Gap 8.0 5.0 - 15.0 mmol/L   Results for orders placed or performed in visit on 02/15/18   MUSA Fibrosure   Result Value Ref Range    Fibrosis Score (References) 0.82 (H) 0.00 - 0.21    Fibrosis Stage (Reference) Comment     Steatosis Score (Reference) 0.48 (H) 0.00 - 0.30    Steatosis Grade (Reference) S1 - Mild Steatosis     MUSA Score (Reference) 0.50 0.25    Musa Grade (Reference) Comment     Height: (Reference) 64 Inches    Weight: (Reference) 214 LBS    Alpha 2-Macroglobulins, Qn 456 (H) 110 - 276 mg/dL    Haptoglobin 57 34 - 200 mg/dL    Apolipoprotein A-1 125 116 - 209 mg/dL    Total Bilirubin 0.7 0.0 - 1.2 mg/dL    GGT 49 0 - 60 IU/L    ALT (SGPT) 61 (H) 0 - 40 IU/L    AST (SGOT) P5P (Reference) 77 (H) 0 - 40 IU/L    Cholesterol, Total (Reference) 139 100 - 199 mg/dL    Glucose, Serum (Reference) 79 65 - 99 mg/dL    Triglycerides 78 0 - 149 mg/dL    Interpretations: (Reference) Comment     Fibrosis Scoring: Comment     Steatosis Grading (Reference) Comment     Musa Scoring (Reference)  Comment     Limitations: (Reference) Comment     Comment (Reference) Comment    Scan Slide   Result Value Ref Range    RBC Morphology Normal Normal    WBC Morphology Normal Normal    Platelet Estimate Decreased Normal    Large Platelets Slight/1+ None Seen   CBC Auto Differential   Result Value Ref Range    WBC 7.71 3.20 - 9.80 10*3/mm3    RBC 5.53 (H) 3.77 - 5.16 10*6/mm3    Hemoglobin 15.4 12.0 - 15.5 g/dL    Hematocrit 46.3 (H) 35.0 - 45.0 %    MCV 83.7 80.0 - 98.0 fL    MCH 27.8 26.5 - 34.0 pg    MCHC 33.3 31.4 - 36.0 g/dL    RDW 15.2 (H) 11.5 - 14.5 %    RDW-SD 47.0 (H) 36.4 - 46.3 fl    MPV  8.0 - 12.0 fL    Platelets 71 (L) 150 - 450 10*3/mm3    Neutrophil % 64.3 37.0 - 80.0 %    Lymphocyte % 24.5 10.0 - 50.0 %    Monocyte % 7.5 0.0 - 12.0 %    Eosinophil % 3.0 0.0 - 7.0 %    Basophil % 0.4 0.0 - 2.0 %    Immature Grans % 0.3 0.0 - 0.5 %    Neutrophils, Absolute 4.96 2.00 - 8.60 10*3/mm3    Lymphocytes, Absolute 1.89 0.60 - 4.20 10*3/mm3    Monocytes, Absolute 0.58 0.00 - 0.90 10*3/mm3    Eosinophils, Absolute 0.23 0.00 - 0.70 10*3/mm3    Basophils, Absolute 0.03 0.00 - 0.20 10*3/mm3    Immature Grans, Absolute 0.02 0.00 - 0.02 10*3/mm3    nRBC 0.0 0.0 - 0.0 /100 WBC     *Note: Due to a large number of results and/or encounters for the requested time period, some results have not been displayed. A complete set of results can be found in Results Review.       Some portions of this note have been dictated using voice recognition software and may contain errors and/or omissions.

## 2018-08-15 ENCOUNTER — APPOINTMENT (OUTPATIENT)
Dept: PREADMISSION TESTING | Facility: HOSPITAL | Age: 58
End: 2018-08-15

## 2018-08-15 VITALS
RESPIRATION RATE: 12 BRPM | HEIGHT: 64 IN | DIASTOLIC BLOOD PRESSURE: 80 MMHG | BODY MASS INDEX: 36.19 KG/M2 | SYSTOLIC BLOOD PRESSURE: 116 MMHG | HEART RATE: 81 BPM | WEIGHT: 212 LBS | OXYGEN SATURATION: 96 %

## 2018-08-15 LAB
DEPRECATED RDW RBC AUTO: 43.1 FL (ref 36.4–46.3)
ERYTHROCYTE [DISTWIDTH] IN BLOOD BY AUTOMATED COUNT: 15 % (ref 11.5–14.5)
HCT VFR BLD AUTO: 42.8 % (ref 35–45)
HGB BLD-MCNC: 14.3 G/DL (ref 12–15.5)
MCH RBC QN AUTO: 26.4 PG (ref 26.5–34)
MCHC RBC AUTO-ENTMCNC: 33.4 G/DL (ref 31.4–36)
MCV RBC AUTO: 79.1 FL (ref 80–98)
PLATELET # BLD AUTO: 100 10*3/MM3 (ref 150–450)
PMV BLD AUTO: 11.8 FL (ref 8–12)
RBC # BLD AUTO: 5.41 10*6/MM3 (ref 3.77–5.16)
WBC NRBC COR # BLD: 6.21 10*3/MM3 (ref 3.2–9.8)

## 2018-08-15 PROCEDURE — 36415 COLL VENOUS BLD VENIPUNCTURE: CPT

## 2018-08-15 PROCEDURE — 85027 COMPLETE CBC AUTOMATED: CPT | Performed by: ANESTHESIOLOGY

## 2018-08-15 RX ORDER — MELOXICAM 15 MG/1
15 TABLET ORAL NIGHTLY
COMMUNITY
End: 2019-05-28

## 2018-08-15 RX ORDER — SPIRONOLACTONE 25 MG/1
25 TABLET ORAL DAILY
COMMUNITY
End: 2018-09-24 | Stop reason: SDUPTHER

## 2018-08-15 RX ORDER — CHLORTHALIDONE 50 MG/1
50 TABLET ORAL DAILY
COMMUNITY
End: 2018-09-05 | Stop reason: HOSPADM

## 2018-08-15 RX ORDER — SODIUM CHLORIDE 9 MG/ML
1000 INJECTION, SOLUTION INTRAVENOUS CONTINUOUS
Status: CANCELLED | OUTPATIENT
Start: 2018-08-22

## 2018-08-15 RX ORDER — OMEPRAZOLE 40 MG/1
40 CAPSULE, DELAYED RELEASE ORAL DAILY
COMMUNITY
End: 2021-01-26

## 2018-08-15 NOTE — DISCHARGE INSTRUCTIONS
[facility]  Pre-op Information and Guidelines    You will be called after 2 p.m. the day before your surgery (Friday for Monday surgery) and notified of your time for arrival and approximate surgery time.  If you have not received a call by 4P.M., please contact Same Day Surgery at [phone] of if outside [facility] call [phone].    Please Follow these Important Safety Guidelines:    • The morning of your procedure, take only the medications listed below with   A sip of water:___PERCOCET, ZYRTEC, CYMBALTA, __________________       __GABAPENTIN, PRILOSEC___________________________    • DO NOT eat or drink anything after 12:00 midnight the night before surgery  Specific instructions concerning drinking clear liquids will be discussed during  the pre-surgery instruction call the day before your surgery.    • If you take a blood thinner (ex. Plavix, Coumadin, aspirin), ask your doctor when to stop it before surgery  STOP DATE: _________________    • Only 2 visitors are allowed in patient rooms at a time  Your visitors will be asked to wait in the lobby until the admission process is complete with the exception of a parent with a child and patients in need of special assistance.    • YOU CANNOT DRIVE YOURSELF HOME  You must be accompanied by someone who will be responsible for driving you home after surgery and for your care at home.    • DO NOT chew gum, use breath mints, hard candy, or smoke the day of surgery  • DO NOT drink alcohol for at least 24 hours before your surgery  • DO NOT wear any jewelry and remove all body piercing before coming to the hospital  • DO NOT wear make-up to the hospital  • If you are having surgery on an extremity (arm/leg/foot) remove nail polish/artificial nails on the surgical side  • Clothing, glasses, contacts, dentures, and hairpieces must be removed before surgery  • Bathe the night before or the morning of your surgery and do not use  powders/lotions on skin.

## 2018-08-22 ENCOUNTER — ANESTHESIA (OUTPATIENT)
Dept: PERIOP | Facility: HOSPITAL | Age: 58
End: 2018-08-22

## 2018-08-22 ENCOUNTER — ANESTHESIA EVENT (OUTPATIENT)
Dept: PERIOP | Facility: HOSPITAL | Age: 58
End: 2018-08-22

## 2018-08-22 ENCOUNTER — HOSPITAL ENCOUNTER (OUTPATIENT)
Facility: HOSPITAL | Age: 58
Setting detail: HOSPITAL OUTPATIENT SURGERY
Discharge: HOME | End: 2018-08-22
Attending: ORTHOPAEDIC SURGERY | Admitting: ORTHOPAEDIC SURGERY

## 2018-08-22 VITALS
SYSTOLIC BLOOD PRESSURE: 143 MMHG | BODY MASS INDEX: 36.66 KG/M2 | DIASTOLIC BLOOD PRESSURE: 79 MMHG | RESPIRATION RATE: 18 BRPM | HEART RATE: 79 BPM | WEIGHT: 214.73 LBS | TEMPERATURE: 97.7 F | OXYGEN SATURATION: 93 % | HEIGHT: 64 IN

## 2018-08-22 DIAGNOSIS — I51.89 DIASTOLIC DYSFUNCTION: ICD-10-CM

## 2018-08-22 DIAGNOSIS — K75.81 NASH (NONALCOHOLIC STEATOHEPATITIS): ICD-10-CM

## 2018-08-22 DIAGNOSIS — M25.531 BILATERAL WRIST PAIN: ICD-10-CM

## 2018-08-22 DIAGNOSIS — G56.02 CARPAL TUNNEL SYNDROME ON LEFT: Primary | ICD-10-CM

## 2018-08-22 DIAGNOSIS — M25.532 BILATERAL WRIST PAIN: ICD-10-CM

## 2018-08-22 DIAGNOSIS — R20.2 NUMBNESS AND TINGLING IN BOTH HANDS: ICD-10-CM

## 2018-08-22 DIAGNOSIS — R20.0 NUMBNESS AND TINGLING IN BOTH HANDS: ICD-10-CM

## 2018-08-22 DIAGNOSIS — E66.9 OBESITY WITH BODY MASS INDEX OF 30.0-39.9: ICD-10-CM

## 2018-08-22 DIAGNOSIS — G56.01 CARPAL TUNNEL SYNDROME ON RIGHT: ICD-10-CM

## 2018-08-22 PROCEDURE — 64721 CARPAL TUNNEL SURGERY: CPT | Performed by: ORTHOPAEDIC SURGERY

## 2018-08-22 PROCEDURE — 25010000002 FENTANYL CITRATE (PF) 100 MCG/2ML SOLUTION: Performed by: NURSE ANESTHETIST, CERTIFIED REGISTERED

## 2018-08-22 PROCEDURE — 25010000002 ONDANSETRON PER 1 MG: Performed by: NURSE ANESTHETIST, CERTIFIED REGISTERED

## 2018-08-22 PROCEDURE — 25010000002 PROPOFOL 10 MG/ML EMULSION: Performed by: NURSE ANESTHETIST, CERTIFIED REGISTERED

## 2018-08-22 RX ORDER — DIPHENHYDRAMINE HYDROCHLORIDE 50 MG/ML
12.5 INJECTION INTRAMUSCULAR; INTRAVENOUS
Status: DISCONTINUED | OUTPATIENT
Start: 2018-08-22 | End: 2018-08-22 | Stop reason: HOSPADM

## 2018-08-22 RX ORDER — ONDANSETRON 2 MG/ML
4 INJECTION INTRAMUSCULAR; INTRAVENOUS ONCE AS NEEDED
Status: COMPLETED | OUTPATIENT
Start: 2018-08-22 | End: 2018-08-22

## 2018-08-22 RX ORDER — BACITRACIN 50000 [IU]/1
INJECTION, POWDER, FOR SOLUTION INTRAMUSCULAR AS NEEDED
Status: DISCONTINUED | OUTPATIENT
Start: 2018-08-22 | End: 2018-08-22 | Stop reason: HOSPADM

## 2018-08-22 RX ORDER — PROMETHAZINE HYDROCHLORIDE 25 MG/ML
12.5 INJECTION, SOLUTION INTRAMUSCULAR; INTRAVENOUS ONCE AS NEEDED
Status: DISCONTINUED | OUTPATIENT
Start: 2018-08-22 | End: 2018-08-22 | Stop reason: HOSPADM

## 2018-08-22 RX ORDER — ONDANSETRON 2 MG/ML
4 INJECTION INTRAMUSCULAR; INTRAVENOUS ONCE AS NEEDED
Status: DISCONTINUED | OUTPATIENT
Start: 2018-08-22 | End: 2018-08-22 | Stop reason: HOSPADM

## 2018-08-22 RX ORDER — FLUMAZENIL 0.1 MG/ML
0.2 INJECTION INTRAVENOUS AS NEEDED
Status: DISCONTINUED | OUTPATIENT
Start: 2018-08-22 | End: 2018-08-22 | Stop reason: HOSPADM

## 2018-08-22 RX ORDER — OXYCODONE AND ACETAMINOPHEN 7.5; 325 MG/1; MG/1
1 TABLET ORAL ONCE AS NEEDED
Status: DISCONTINUED | OUTPATIENT
Start: 2018-08-22 | End: 2018-08-22 | Stop reason: HOSPADM

## 2018-08-22 RX ORDER — EPHEDRINE SULFATE 50 MG/ML
5 INJECTION, SOLUTION INTRAVENOUS ONCE AS NEEDED
Status: DISCONTINUED | OUTPATIENT
Start: 2018-08-22 | End: 2018-08-22 | Stop reason: HOSPADM

## 2018-08-22 RX ORDER — ACETAMINOPHEN 650 MG/1
650 SUPPOSITORY RECTAL ONCE AS NEEDED
Status: DISCONTINUED | OUTPATIENT
Start: 2018-08-22 | End: 2018-08-22 | Stop reason: HOSPADM

## 2018-08-22 RX ORDER — SCOLOPAMINE TRANSDERMAL SYSTEM 1 MG/1
1 PATCH, EXTENDED RELEASE TRANSDERMAL ONCE
Status: DISCONTINUED | OUTPATIENT
Start: 2018-08-22 | End: 2018-08-22 | Stop reason: HOSPADM

## 2018-08-22 RX ORDER — ACETAMINOPHEN 325 MG/1
650 TABLET ORAL ONCE AS NEEDED
Status: DISCONTINUED | OUTPATIENT
Start: 2018-08-22 | End: 2018-08-22 | Stop reason: HOSPADM

## 2018-08-22 RX ORDER — MEPERIDINE HYDROCHLORIDE 50 MG/ML
12.5 INJECTION INTRAMUSCULAR; INTRAVENOUS; SUBCUTANEOUS
Status: DISCONTINUED | OUTPATIENT
Start: 2018-08-22 | End: 2018-08-22 | Stop reason: HOSPADM

## 2018-08-22 RX ORDER — BUPIVACAINE HYDROCHLORIDE 2.5 MG/ML
INJECTION, SOLUTION EPIDURAL; INFILTRATION; INTRACAUDAL AS NEEDED
Status: DISCONTINUED | OUTPATIENT
Start: 2018-08-22 | End: 2018-08-22 | Stop reason: HOSPADM

## 2018-08-22 RX ORDER — FENTANYL CITRATE 50 UG/ML
INJECTION, SOLUTION INTRAMUSCULAR; INTRAVENOUS AS NEEDED
Status: DISCONTINUED | OUTPATIENT
Start: 2018-08-22 | End: 2018-08-22 | Stop reason: SURG

## 2018-08-22 RX ORDER — LABETALOL HYDROCHLORIDE 5 MG/ML
5 INJECTION, SOLUTION INTRAVENOUS
Status: DISCONTINUED | OUTPATIENT
Start: 2018-08-22 | End: 2018-08-22 | Stop reason: HOSPADM

## 2018-08-22 RX ORDER — BUPIVACAINE HCL/0.9 % NACL/PF 0.1 %
2 PLASTIC BAG, INJECTION (ML) EPIDURAL ONCE
Status: COMPLETED | OUTPATIENT
Start: 2018-08-22 | End: 2018-08-22

## 2018-08-22 RX ORDER — PROPOFOL 10 MG/ML
VIAL (ML) INTRAVENOUS AS NEEDED
Status: DISCONTINUED | OUTPATIENT
Start: 2018-08-22 | End: 2018-08-22 | Stop reason: SURG

## 2018-08-22 RX ORDER — 0.9 % SODIUM CHLORIDE 0.9 %
VIAL (ML) INJECTION AS NEEDED
Status: DISCONTINUED | OUTPATIENT
Start: 2018-08-22 | End: 2018-08-22 | Stop reason: HOSPADM

## 2018-08-22 RX ORDER — LIDOCAINE HYDROCHLORIDE 20 MG/ML
INJECTION, SOLUTION INFILTRATION; PERINEURAL AS NEEDED
Status: DISCONTINUED | OUTPATIENT
Start: 2018-08-22 | End: 2018-08-22 | Stop reason: SURG

## 2018-08-22 RX ORDER — SODIUM CHLORIDE 9 MG/ML
1000 INJECTION, SOLUTION INTRAVENOUS CONTINUOUS
Status: DISCONTINUED | OUTPATIENT
Start: 2018-08-22 | End: 2018-08-22 | Stop reason: HOSPADM

## 2018-08-22 RX ORDER — NALOXONE HCL 0.4 MG/ML
0.2 VIAL (ML) INJECTION AS NEEDED
Status: DISCONTINUED | OUTPATIENT
Start: 2018-08-22 | End: 2018-08-22 | Stop reason: HOSPADM

## 2018-08-22 RX ADMIN — Medication 2 G: at 13:55

## 2018-08-22 RX ADMIN — SCOPALAMINE 1 PATCH: 1 PATCH, EXTENDED RELEASE TRANSDERMAL at 12:04

## 2018-08-22 RX ADMIN — ONDANSETRON HYDROCHLORIDE 4 MG: 2 INJECTION, SOLUTION INTRAMUSCULAR; INTRAVENOUS at 15:04

## 2018-08-22 RX ADMIN — PROPOFOL 200 MG: 10 INJECTION, EMULSION INTRAVENOUS at 13:50

## 2018-08-22 RX ADMIN — FENTANYL CITRATE 50 MCG: 50 INJECTION, SOLUTION INTRAMUSCULAR; INTRAVENOUS at 13:55

## 2018-08-22 RX ADMIN — SODIUM CHLORIDE: 900 INJECTION, SOLUTION INTRAVENOUS at 12:05

## 2018-08-22 RX ADMIN — LIDOCAINE HYDROCHLORIDE 50 MG: 20 INJECTION, SOLUTION INFILTRATION; PERINEURAL at 13:50

## 2018-08-22 NOTE — DISCHARGE INSTR - APPOINTMENTS
[facility] will call with physical therapy appointment. If you have not heard from them by tomorrow, please call to schedule appointment.

## 2018-08-22 NOTE — ANESTHESIA POSTPROCEDURE EVALUATION
Patient: Amira Shannon    Procedure Summary     Date:  08/22/18 Room / Location:  [facility] OR [address] [place] [address] OR    Anesthesia Start:  1343 Anesthesia Stop:  1436    Procedure:  CARPAL TUNNEL RELEASE - left (Left Wrist) Diagnosis:       Diastolic dysfunction      Carpal tunnel syndrome on left      Carpal tunnel syndrome on right      Bilateral wrist pain      Obesity with body mass index of 30.0-39.9      Numbness and tingling in both hands      (Diastolic dysfunction [I51.9])      (Carpal tunnel syndrome on left [G56.02])      (Carpal tunnel syndrome on right [G56.01])      (Bilateral wrist pain [M25.531, M25.532])      (Obesity with body mass index of 30.0-39.9 [E66.9])      (Numbness and tingling in both hands [R20.0, R20.2])    Surgeon:  Justus Lewis MD Provider:  Sanya Melendez MD    Anesthesia Type:  general ASA Status:  3          Anesthesia Type: general  Last vitals  BP   143/77 (08/22/18 1432)   Temp   97 °F (36.1 °C) (08/22/18 1432)   Pulse   89 (08/22/18 1432)   Resp   18 (08/22/18 1432)     SpO2   95 % (08/22/18 1432)     Post Anesthesia Care and Evaluation    Patient location during evaluation: bedside  Patient participation: complete - patient cannot participate  Level of consciousness: awake  Pain score: 0  Pain management: adequate  Airway patency: patent  Anesthetic complications: No anesthetic complications  PONV Status: none  Cardiovascular status: acceptable  Respiratory status: acceptable  Hydration status: acceptable

## 2018-08-22 NOTE — OP NOTE
CARPAL TUNNEL RELEASE  Procedure Note    Name:    Amira Shannon  YOB: 1960  Date of surgery:   8/22/2018    Pre-op Diagnosis:   Diastolic dysfunction [I51.9]  Carpal tunnel syndrome on left [G56.02]  Carpal tunnel syndrome on right [G56.01]  Bilateral wrist pain [M25.531, M25.532]  Obesity with body mass index of 30.0-39.9 [E66.9]  Numbness and tingling in both hands [R20.0, R20.2]    Post-op Diagnosis:    Post-Op Diagnosis Codes:     * Diastolic dysfunction [I51.9]     * Carpal tunnel syndrome on left [G56.02]     * Carpal tunnel syndrome on right [G56.01]     * Bilateral wrist pain [M25.531, M25.532]     * Obesity with body mass index of 30.0-39.9 [E66.9]     * Numbness and tingling in both hands [R20.0, R20.2]    Procedure:  Procedure(s):  CARPAL TUNNEL RELEASE - left    Surgeon:  Surgeon(s):  Justus Lewis MD    ASSISTANT:  Jessika Talley CSA    Anesthesia: Choice    Staff:   Circulator: Chuyita Chavez RN  Scrub Person: Jaylyn Kruse Samantha K  Assistant: Jessika Talley CSA    Estimated Blood Loss: minimal    Specimens:                None      Drains:  none    Findings:  As below    Complications: None    IMPLANTS:   Nothing was implanted during the procedure      PROCEDURE:    Once consent was obtained the patient was taken to the operating room and once adequate anesthesia was obtained the Left upper extremity was prepped and draped in the standard surgical fashion.  Pneumatic tourniquet was applied and inflated to 250 mmHg.  A surgical timeout was performed and verified.    An incision was then made at the base of the thenar eminence.  Dissection was carried down to the flexor retinaculum which was then incised with a #69 [place] blade.  A [place] elevator was then placed under the remainder of the flexor retinaculum distally while it was transected.  This was then repeated proximally with the [place] elevator protecting the median nerve.  The nerve was then  mobilized medially and laterally and a neuro lysis was performed to ensure that there were no adhesions on the median nerve.  The tourniquet was let down and hemostasis was obtained.  The wound was then copiously irrigated with bacitracin saline.  The wound was then closed with interrupted nylon sutures.  A bulky soft dressing was applied with a volar splint.  The patient was then awakened and taken to the recovery room in good condition having tolerated the procedure very well.  All sponge and needle counts were reported as correct prior to closure.        Justus Lewis MD     Date: 8/22/2018  Time: 2:33 PM

## 2018-08-22 NOTE — ANESTHESIA PROCEDURE NOTES
Airway  Urgency: elective    Airway not difficult    General Information and Staff    Patient location during procedure: OR  CRNA: JERICHO WEATHERS    Indications and Patient Condition  Indications for airway management: airway protection    Preoxygenated: yes  Mask difficulty assessment: 0 - not attempted    Final Airway Details  Final airway type: supraglottic airway      Successful airway: I-gel  Size 4    Number of attempts at approach: 1

## 2018-08-22 NOTE — NURSING NOTE
[facility] notified about physical therapy. Information left pertaining to appointment needed by patient as well as patient's contact information. Informed patient if physical therapy had not called to schedule appointment by tomorrow she needed to call them to set up appointment. Patient verbalized understanding.

## 2018-08-22 NOTE — ANESTHESIA PREPROCEDURE EVALUATION
Anesthesia Evaluation     Patient summary reviewed and Nursing notes reviewed   history of anesthetic complications (Scopalamine patch applied pre-op. ): PONV  NPO Solid Status: > 8 hours  NPO Liquid Status: > 4 hours           Airway   Mallampati: II  TM distance: >3 FB  Neck ROM: full  Dental    (+) upper dentures and lower dentures    Pulmonary - normal exam    breath sounds clear to auscultation  (+) a smoker Former, sleep apnea on CPAP,   Cardiovascular - normal exam    ECG reviewed  Rhythm: regular  Rate: normal    (+) hypertension well controlled, hyperlipidemia,   (-) murmur    ROS comment: · Transesophageal Echocardiogram with limited Color and Doppler  · Left Ventricle: Left ventricular systolic function is normal. Estimated EF appears to be in the range of 61 - 65%.  · Right Ventricle: Normal right ventricular wall thickness, systolic function and septal motion noted. Right ventricular cavity is mildly dilated  · No evidence of a patent foramen ovale. No evidence of an atrial septal defect present. Saline test results are negative  · There is no evidence of pericardial effusion  · No evidence of VSD.                                                                                           EKG:NSR EF 55%    Neuro/Psych  (+) headaches, numbness (Bilateral carpal tunnel.), psychiatric history Depression,     GI/Hepatic/Renal/Endo    (+) obesity,  hiatal hernia, GERD well controlled,  hepatitis (Non-alcoholic fatty liver disease.), liver disease (Fatty liver non-alcoholic),     ROS Comment: Platelet count 58K    Musculoskeletal     Abdominal   (+) obese,    Substance History - negative use     OB/GYN negative ob/gyn ROS         Other   (+) arthritis                       Anesthesia Plan    ASA 3     general     intravenous induction   Anesthetic plan and risks discussed with patient and spouse/significant other.

## 2018-08-22 NOTE — H&P (VIEW-ONLY)
"Amira Shannon is a 57 y.o. female   Primary provider:  Yobany Barr MD       Chief Complaint   Patient presents with   • Left Hand - Pain   • Right Hand - Pain   • Numbness   • Tingling       HISTORY OF PRESENT ILLNESS:    Pain   This is a recurrent problem. Episode onset: 4 years. The problem occurs constantly. Associated symptoms include joint swelling and numbness. Associated symptoms comments: Crushing, clicking, popping, aching burning, numbness, tingling.   Bilateral hand pain with the left side being worse than the right.  She complains of numbness and tingling as well as pain in both hands.  Her pain is worse at night and while driving.  Mostly her symptoms involve the thumb index and middle fingers on both hands.  She denies any injury.  She cannot take steroids because she is allergic.  Her symptoms have slowly been getting worse over the last several months.     CONCURRENT MEDICAL HISTORY:    Past Medical History:   Diagnosis Date   • Acid reflux    • Altered bowel function    • Arthropathy of lumbar facet joint    • Bleeding disorder (CMS/HCC)    • Constipation    • Corns and callus    • Depression    • Disease related peripheral neuropathy    • Epigastric pain    • Fatty liver    • Hammer toe    • Headache    • Hiatal hernia    • Hyperlipidemia    • Knee pain    • Localized, primary osteoarthritis of the ankle and foot     Localized, primary osteoarthritis of the ankle and/or foot   • Mendoza's metatarsalgia     Mendoza's metatarsalgia - 2nd interspace on right   • Nausea and vomiting    • Neuralgia and neuritis     Neuralgia, neuritis, and radiculitis, unspecified   • Neuropathy    • Obstructive sleep apnea     Obstructive sleep apnea (adult) (pediatric)    • CHAI on CPAP     \"C-Pap at night  (unconfirmed)\"   • Osteoarthritis    • Pain in foot     Pain in unspecified foot - sees a podiatrist   • Pain in joint, ankle and foot     Joint pain in ankle and foot      • Pain radiating to back     Pain " radiating to lumbar region of back   • Plantar fasciitis    • Restless leg syndrome    • Secondary hypertension     Secondary hypertension, unspecified   • Sinusitis    • Sleep apnea    • Tongue anomaly     lesion       Allergies   Allergen Reactions   • Other      Pt states that taking steroids either in pill or injection form make her have blisters in her mouth and she feels like she is on fire on the inside/ has hx of c diff and possible MRSA     • Corticosteroids Rash   • Kenalog  [Triamcinolone Acetonide] Rash   • Sulfa Antibiotics Rash     Sulfa (Sulfonamide Antibiotics)         Current Outpatient Prescriptions:   •  cetirizine (zyrTEC) 10 MG tablet, Take 1 tablet by mouth Daily As Needed for Allergies., Disp: 30 tablet, Rfl: 11  •  chlorthalidone (HYGROTEN) 50 MG tablet, TAKE ONE TABLET BY MOUTH ONCE DAILY, Disp: 30 tablet, Rfl: 3  •  clotrimazole (MYCELEX) 10 MG dimitry, Take 1 tablet by mouth 3 (Three) Times a Day., Disp: 40 tablet, Rfl: 0  •  DULoxetine (CYMBALTA) 30 MG capsule, Take 30 mg by mouth Daily., Disp: , Rfl:   •  gabapentin (NEURONTIN) 800 MG tablet, Take 800 mg by mouth 4 (Four) Times a Day., Disp: , Rfl:   •  linaclotide (LINZESS) 145 MCG capsule capsule, Take 145 mcg by mouth 2 (Two) Times a Day., Disp: , Rfl:   •  magnesium hydroxide (MILK OF MAGNESIA) 2400 MG/10ML suspension suspension, Take 10 mL by mouth Daily As Needed (constipation)., Disp: 100 mL, Rfl: 2  •  magnesium oxide (MAGOX) 400 (241.3 MG) MG tablet tablet, Take 400 mg by mouth Daily., Disp: , Rfl:   •  meloxicam (MOBIC) 15 MG tablet, Take 1 tablet by mouth Daily., Disp: 30 tablet, Rfl: 4  •  mupirocin (BACTROBAN) 2 % ointment, Apply  topically 3 (Three) Times a Day. (Patient taking differently: Apply 1 application topically 3 (Three) Times a Day.), Disp: 1 g, Rfl: 2  •  nystatin (MYCOSTATIN) 268931 UNIT/GM powder, Apply 1 application topically As Needed., Disp: , Rfl:   •  omeprazole (priLOSEC) 40 MG capsule, TAKE ONE CAPSULE BY  MOUTH ONCE DAILY, Disp: 30 capsule, Rfl: 2  •  ondansetron (ZOFRAN) 4 MG tablet, Take 4 mg by mouth Every 8 (Eight) Hours As Needed., Disp: , Rfl:   •  oxyCODONE-acetaminophen (PERCOCET) 7.5-325 MG per tablet, Take 1 tablet by mouth 3 (Three) Times a Day., Disp: , Rfl:   •  phenylephrine-mineral oil-petrolatum 0.25-14-74.9 % ointment hemorrhoidal ointment, Insert 1 application into the rectum 3 (Three) Times a Day As Needed (rectal pain)., Disp: 28 g, Rfl: 1  •  ranitidine (ZANTAC) 150 MG capsule, Take 2 capsules by mouth Every Evening., Disp: 60 capsule, Rfl: 3  •  spironolactone (ALDACTONE) 25 MG tablet, TAKE ONE TABLET BY MOUTH ONCE DAILY, Disp: 30 tablet, Rfl: 3    Past Surgical History:   Procedure Laterality Date   •  SECTION     • CHOLECYSTECTOMY     • COLONOSCOPY  2013   • DIRECT LARYNGOSCOPY, ESOPHAGOSCOPY, BRONCHOSCOPY N/A 2017    Procedure: DIRECT LARYNGOSCOPY AND;  Surgeon: Live Bolton MD;  Location: [facility];  Service:    • ENDOSCOPY  2013    Colon endoscopy 76899 (1) - Internal & external hemorrhoids found. Stool found.   • ENDOSCOPY  2013    EGD w/ tube 48423 (1) - Normal esophagus. Gastritis in stomach. Biopsy taken. Normal dudoenum. Biopsy taken.   • FOOT SURGERY  2013    Foot/toes surgery procedure (1) - Arthroplasty of toes 4 and 5 of right foot.   • HERNIA REPAIR     • HERNIA REPAIR      hital   • HYSTERECTOMY     • LIVER BIOPSY     • NERVE BLOCK  2016    Injection for nerve block (1) - Lumbar medial branch block.   • OTHER SURGICAL HISTORY  2012    Inj(s) Tend-Sheath, Ligament, Single  (1) - [facility]    • OTHER SURGICAL HISTORY  2013    Small Joint Injection/Aspiration  (2) - [facility]ICK[facility] ([facility])    • OTHER SURGICAL HISTORY      bowel obstruction x2   • OTHER SURGICAL HISTORY      gland removed from neck   • SUBLINGUAL SALIVARY CYST EXCISION N/A 2017    Procedure: EXCISION OF LEFT  TONGUE  "LESION WITH CLOSURE;  Surgeon: Live Bolton MD;  Location: [facility];  Service:    • TUBAL ABDOMINAL LIGATION     • UPPER GASTROINTESTINAL ENDOSCOPY  07/01/2013       Family History   Problem Relation Age of Onset   • Cancer Other    • Diabetes Other    • Heart disease Other    • Hypertension Mother    • Cancer Mother    • Diabetes Mother    • Hypertension Father    • Heart attack Father    • Cancer Father    • Heart disease Father    • Thyroid disease Maternal Aunt         Social History     Social History   • Marital status:      Spouse name: N/A   • Number of children: N/A   • Years of education: N/A     Occupational History   • Not on file.     Social History Main Topics   • Smoking status: Former Smoker     Packs/day: 2.00     Years: 15.00     Types: Cigarettes   • Smokeless tobacco: Never Used      Comment: 02/15/2018 - Patient states she ceased utilizationof tobacco products over 20 years prior.   • Alcohol use No   • Drug use: No   • Sexual activity: Defer      Comment: Marital Status:      Other Topics Concern   • Not on file     Social History Narrative   • No narrative on file        Review of Systems   Musculoskeletal: Positive for joint swelling.   Neurological: Positive for numbness.        Tingling     Psychiatric/Behavioral: Positive for sleep disturbance.   All other systems reviewed and are negative.      PHYSICAL EXAMINATION:       Ht 162.6 cm (64\")   Wt 96 kg (211 lb 9.6 oz)   LMP  (LMP Unknown)   BMI 36.32 kg/m²     Physical Exam   Constitutional: She is oriented to person, place, and time. She appears well-developed and well-nourished. No distress.   Cardiovascular: Normal rate, regular rhythm and normal heart sounds.    Pulmonary/Chest: Effort normal and breath sounds normal.   Abdominal: Soft. Bowel sounds are normal.   Neurological: She is alert and oriented to person, place, and time.   Psychiatric: She has a normal mood and affect. Her behavior is normal. Judgment and " thought content normal.   Vitals reviewed.      GAIT:     []  Normal  [x]  Antalgic    Assistive device: [x]  None  []  Walker     []  Crutches  []  Cane     []  Wheelchair  []  Stretcher    Right Hand Exam     Tenderness   The patient is experiencing no tenderness.         Range of Motion   The patient has normal right wrist ROM.     Muscle Strength   : 4/5     Tests   Phalen’s Sign: positive  Tinel’s Sign (Medial Nerve): positive    Other   Erythema: absent  Sensation: normal  Pulse: present      Left Hand Exam     Tenderness   The patient is experiencing no tenderness.         Range of Motion   The patient has normal left wrist ROM.    Muscle Strength   :  4/5     Tests   Phalen’s Sign: positive  Tinel’s Sign (Medial Nerve): positive    Other   Erythema: absent  Sensation: normal  Pulse: present                [facility]  [facility]   [phone]    Nerve conduction test  6/28/18    Impression:  Bilateral moderate to severe distal median mononeuropathy at the wrist or carpal tunnel syndrome; the right hand is slightly worse than the left.    Right moderate to severe and chronic L5 and S1 lumbosacral radiculopathy.    Mild to moderate sensorimotor axonal peripheral neuropathy in the lower extremity.    Delgado Martinez MD      ASSESSMENT:    Diagnoses and all orders for this visit:    Carpal tunnel syndrome on left  -     Case Request; Standing  -     ceFAZolin (ANCEF) 2 g in sodium chloride 0.9 % 100 mL IVPB; Infuse 2 g into a venous catheter 1 (One) Time.  -     Case Request    Bilateral wrist pain  -     Case Request; Standing  -     ceFAZolin (ANCEF) 2 g in sodium chloride 0.9 % 100 mL IVPB; Infuse 2 g into a venous catheter 1 (One) Time.  -     Case Request    Obesity with body mass index of 30.0-39.9  -     Case Request; Standing  -     ceFAZolin (ANCEF) 2 g in sodium chloride 0.9 % 100 mL IVPB; Infuse 2 g into a venous catheter 1 (One) Time.  -     Case Request    Diastolic  dysfunction  -     Case Request; Standing  -     ceFAZolin (ANCEF) 2 g in sodium chloride 0.9 % 100 mL IVPB; Infuse 2 g into a venous catheter 1 (One) Time.  -     Case Request    Carpal tunnel syndrome on right  -     Case Request; Standing  -     ceFAZolin (ANCEF) 2 g in sodium chloride 0.9 % 100 mL IVPB; Infuse 2 g into a venous catheter 1 (One) Time.  -     Case Request    Numbness and tingling in both hands  -     Case Request; Standing  -     ceFAZolin (ANCEF) 2 g in sodium chloride 0.9 % 100 mL IVPB; Infuse 2 g into a venous catheter 1 (One) Time.  -     Case Request    Other orders  -     Follow Anesthesia Guidelines / Standing Orders; Future  -     Provide instructions to patient regarding NPO status  -     Follow Anesthesia Guidelines / Standing Orders; Standing  -     Verify NPO Status; Standing  -     Clip operative site; Standing  -     Obtain informed consent (if not collected inpatient or PAT); Standing          PLAN    The patient voiced understanding of the risks, benefits, and alternative forms of treatment that were discussed and the patient consents to proceed with surgery.  All risks, benefits and alternatives were discussed.  Risks including to but not exclusive to anesthetic complications, including death, MI, CVA, infection, bleeding DVT, fracture, residual pain and need for future surgery.  This discussion was held with the patient by Justus Lewis MD and all questions were answered.    Her symptoms are worse on the left than the right.  We discussed carpal tunnel release.  She prefers to do one side and then the other.  Proceed with carpal tunnel release on the left.    Patient's Body mass index is 36.32 kg/m². BMI is above normal parameters. Recommendations include: exercise counseling and nutrition counseling.      Return for Post-operative eval.    Justus Lewis MD

## 2018-08-23 ENCOUNTER — TELEPHONE (OUTPATIENT)
Dept: ORTHOPEDIC SURGERY | Facility: CLINIC | Age: 58
End: 2018-08-23

## 2018-08-24 ENCOUNTER — HOSPITAL ENCOUNTER (OUTPATIENT)
Dept: PHYSICAL THERAPY | Facility: HOSPITAL | Age: 58
Setting detail: THERAPIES SERIES
Discharge: HOME | End: 2018-08-24

## 2018-08-24 DIAGNOSIS — G56.02 CARPAL TUNNEL SYNDROME ON LEFT: Primary | ICD-10-CM

## 2018-08-24 DIAGNOSIS — Z98.890 S/P CARPAL TUNNEL RELEASE: ICD-10-CM

## 2018-08-24 PROCEDURE — 97110 THERAPEUTIC EXERCISES: CPT | Performed by: PHYSICAL THERAPIST

## 2018-08-24 PROCEDURE — 97162 PT EVAL MOD COMPLEX 30 MIN: CPT | Performed by: PHYSICAL THERAPIST

## 2018-08-24 NOTE — THERAPY EVALUATION
"    Outpatient Physical Therapy Ortho Initial Evaluation  [facility]  Marcella Perales, PT, DPT, CSCS       Patient Name: Amira Shannon  : 1960  MRN: [id number]  Today's Date: 2018      Visit Date: 2018     Pt reports 5/10 pain pre treatment, \"frozen\"/10 pain post treatment  Reports N/A% of improvement.  Attended  visits.  Insurance available: 20 visits  Next MD appt: 2018.  Recertification: 2018.    Patient Active Problem List   Diagnosis   • Bilateral foot pain   • Right hip pain   • Acute pain of both knees   • Plantar fasciitis, bilateral   • Primary osteoarthritis of knee   • MUSA (nonalcoholic steatohepatitis)   • Tongue lesion   • Bilateral lower extremity edema   • Chronic pain of both knees   • Bilateral calcaneal spurs   • Swelling of both lower extremities   • Obesity with body mass index of 30.0-39.9   • Dyspnea   • Diastolic dysfunction   • Thrombocytopenia (CMS/HCC)   • Chronic fatigue   • Abdominal pain   • Constipation   • Acid reflux   • Depression   • Disease related peripheral neuropathy   • Epigastric pain   • Hyperlipidemia   • Nausea and vomiting   • Restless leg syndrome   • Sleep apnea   • Carpal tunnel syndrome on left   • Carpal tunnel syndrome on right   • Bilateral wrist pain   • Numbness and tingling in both hands        Past Medical History:   Diagnosis Date   • Acid reflux    • Altered bowel function    • Arthropathy of lumbar facet joint    • Bleeding disorder (CMS/HCC)    • C. difficile diarrhea    • Constipation    • Corns and callus    • Depression    • Disease related peripheral neuropathy    • Epigastric pain    • Fatty liver    • Hammer toe    • Headache    • Hiatal hernia    • Hyperlipidemia    • Knee pain    • Localized, primary osteoarthritis of the ankle and foot     Localized, primary osteoarthritis of the ankle and/or foot   • Mendoza's metatarsalgia     Mendoza's metatarsalgia - 2nd interspace on right   • " "Nausea and vomiting    • Neuralgia and neuritis     Neuralgia, neuritis, and radiculitis, unspecified   • Neuropathy    • Obstructive sleep apnea     Obstructive sleep apnea (adult) (pediatric)    • CHAI on CPAP     \"C-Pap at night  (unconfirmed)\"   • Osteoarthritis    • Pain in foot     Pain in unspecified foot - sees a podiatrist   • Pain in joint, ankle and foot     Joint pain in ankle and foot      • Pain radiating to back     Pain radiating to lumbar region of back   • Plantar fasciitis    • PONV (postoperative nausea and vomiting)    • Restless leg syndrome    • Secondary hypertension     Secondary hypertension, unspecified   • Sinusitis    • Sleep apnea    • Tongue anomaly     lesion        Past Surgical History:   Procedure Laterality Date   •  SECTION     • CHOLECYSTECTOMY     • COLONOSCOPY  2013   • DIRECT LARYNGOSCOPY, ESOPHAGOSCOPY, BRONCHOSCOPY N/A 2017    Procedure: DIRECT LARYNGOSCOPY AND;  Surgeon: Live Bolton MD;  Location: [facility];  Service:    • ENDOSCOPY  2013    Colon endoscopy 58087 (1) - Internal & external hemorrhoids found. Stool found.   • ENDOSCOPY  2013    EGD w/ tube 95161 (1) - Normal esophagus. Gastritis in stomach. Biopsy taken. Normal dudoenum. Biopsy taken.   • FOOT SURGERY  2013    Foot/toes surgery procedure (1) - Arthroplasty of toes 4 and 5 of right foot.   • HERNIA REPAIR     • HERNIA REPAIR      hital   • HYSTERECTOMY     • LIVER BIOPSY     • NERVE BLOCK  2016    Injection for nerve block (1) - Lumbar medial branch block.   • OTHER SURGICAL HISTORY  2012    Inj(s) Tend-Sheath, Ligament, Single  (1) - [facility]    • OTHER SURGICAL HISTORY  2013    Small Joint Injection/Aspiration  (2) - [facility] NICKERSON ([facility])    • OTHER SURGICAL HISTORY      bowel obstruction x2   • OTHER SURGICAL HISTORY      gland removed from neck   • SUBLINGUAL SALIVARY CYST EXCISION N/A 2017    Procedure: " EXCISION OF LEFT  TONGUE LESION WITH CLOSURE;  Surgeon: Live Bolton MD;  Location: [facility];  Service:    • TUBAL ABDOMINAL LIGATION     • UPPER GASTROINTESTINAL ENDOSCOPY  07/01/2013       Visit Dx:     ICD-10-CM ICD-9-CM   1. Carpal tunnel syndrome on left G56.02 354.0   2. S/P carpal tunnel release Z98.890 V45.89     Number of days off work: N/A    Patient is .    Medications (Admitted on 8/24/2018)     cetirizine (zyrTEC) 10 MG tablet     chlorthalidone (HYGROTEN) 50 MG tablet     clotrimazole (MYCELEX) 10 MG dimitry     cyclobenzaprine (FLEXERIL) 10 MG tablet     DULoxetine (CYMBALTA) 30 MG capsule     gabapentin (NEURONTIN) 800 MG tablet     linaclotide (LINZESS) 145 MCG capsule capsule     magnesium hydroxide (MILK OF MAGNESIA) 2400 MG/10ML suspension suspension     magnesium oxide (MAGOX) 400 (241.3 MG) MG tablet tablet     meloxicam (MOBIC) 15 MG tablet     mupirocin (BACTROBAN) 2 % ointment     nystatin (MYCOSTATIN) 740991 UNIT/GM powder     omeprazole (priLOSEC) 40 MG capsule     ondansetron (ZOFRAN) 4 MG tablet     oxyCODONE-acetaminophen (PERCOCET) 7.5-325 MG per tablet     phenylephrine-mineral oil-petrolatum 0.25-14-74.9 % ointment hemorrhoidal ointment     ranitidine (ZANTAC) 150 MG capsule     spironolactone (ALDACTONE) 25 MG tablet      Allergies       Other   CorticosteroidsRash   Kenalog [Triamcinolone Acetonide]Rash   Sulfa AntibioticsRash             Patient History     Row Name 08/24/18 0900             History    Chief Complaint Pain;Numbness  -AJ      Type of Pain Hand pain  -AJ      Date Current Problem(s) Began --   ~3 years  -AJ      Brief Description of Current Complaint patient reports she started having trouble with the hand about 3 years ago and she just kept putitng it off anf putitng it off. She rpeorts the numbness has been over the last 3 years and has been worsening. SHe reports it got to where it started waking her up anbd night over the last year. She reports it  just kept getting worse.  -AJ      Previous treatment for THIS PROBLEM Medication;Surgery  -AJ      Surgery Date: 08/22/18  -AJ      Patient/Caregiver Goals Relieve pain;Improve mobility;Improve strength   releive numbness  -AJ      Current Tobacco Use None  -AJ      Smoking Status FOrmer smoker  -AJ      Patient's Rating of General Health Fair  -AJ      Hand Dominance right-handed  -AJ      Occupation/sports/leisure activities Occupation: unemployed; Hobbies: gardening, flowers, mark, cleaning house  -AJ      Patient seeing anyone else for problem(s)? Yes, Ortho  -AJ      What clinical tests have you had for this problem? Nerve Conduction Test  -AJ      Results of Clinical Tests CTS  -AJ      Related/Recent Hospitalizations Yes  -AJ      Date of Hospitalization 08/22/18  -AJ      Surgery/Hospitalization L [facility]  -AJ      History of Previous Related Injuries None  -AJ      Are you or can you be pregnant No  -AJ         Pain     Pain Location Hand  -AJ      Pain at Present 5  -AJ      Pain at Best 5  -AJ      Pain at Worst 6  -AJ      Pain Frequency Constant/continuous  -AJ      Pain Description Numbness  -AJ      What Performance Factors Make the Current Problem(s) WORSE? Moving it  -AJ      What Performance Factors Make the Current Problem(s) BETTER? ice  -AJ      Is your sleep disturbed? No  -AJ      Is medication used to assist with sleep? Yes  -AJ      Difficulties at work? N/A  -AJ      Difficulties with ADL's? dresing, grooming, bating  -AJ      Difficulties with recreational activities? all listed above  -AJ        User Key  (r) = Recorded By, (t) = Taken By, (c) = Cosigned By    Initials Name Provider Type    AJ Marcella Perales, PT Physical Therapist                PT Ortho     Row Name 08/24/18 0901        Strength Right    # Reps 2  -AJ    Right Rung 4  -AJ    Right  Test 1 15  -AJ    Right  Test 2 14  -AJ     Strength Average Right 14.5  -AJ        Strength Left    # Reps 0  "  deferred due to post op status  -    Left Rung 4  -    Row Name 08/24/18 0900       Subjective Comments    Subjective Comments Patient wishes to get the hand working good again.  -       Precautions and Contraindications    Precautions/Limitations no known precautions/limitations  -       Subjective Pain    Able to rate subjective pain? yes  -    Pre-Treatment Pain Level 5  -    Post-Treatment Pain Level --   \"frozen\"  -       Posture/Observations    Observations Incision healing;Edema;Ecchymosis/bruising   sutures in place, no s/s of infection  -    Posture/Observations Comments Wearing post op dressing, no acute distress.  -       Special Tests/Palpation    Special Tests/Palpation --   Mild global incisional TTP  -       Right Upper Ext    Rt Elbow Extension/Flexion AROM WNL  -AJ    Rt Elbow Supination AROM WNL  -AJ    Rt Elbow Pronation AROM WNL  -AJ    Rt Wrist Flexion AROM 78°  -AJ    Rt Wrist Extension AROM 64°  -AJ    Rt  Ulnar Deviation AROM 42°  -AJ    Rt  Radial Deviation AROM 18°  -AJ    Rt Upper Extremity Comments  Full cmposite fost and full flex/abd/ opp of the thumb  -       Left Upper Ext    Lt Elbow Extension/Flexion AROM WNL  -AJ    Lt Elbow Supination AROM WNL  -AJ    Lt Elbow Pronation AROM WNL  -AJ    Lt Wrist Flexion AROM 58°  -AJ    Lt Wrist Extension AROM 50°  -AJ    Lt  Ulnar Deviation AROM 36°  -AJ    Lt  Radial Deviation AROM 12°  -AJ    Lt Upper Extremity Comments  Loose near composite fist ~90% of range; full AROm thumb flex/abd, opp only to 5th finger tip.  -       MMT (Manual Muscle Testing)    Additional Documentation General Assessment (Manual Muscle Testing) (Group)  -       General Assessment (Manual Muscle Testing)    Comment, General Manual Muscle Testing (MMT) Assessment R wrist 5/5; L wrist deferred due to acute post op status  -        Strength Right    # Reps 2  -    Right Rung 2  -    Right  Test 1 16  -    Right  Test 2 14  " -     Strength Average Right 15  -        Strength Left    # Reps 0   deferred due to post op status  -    Left Rung 2  -       Sensation    Light Touch Partial deficits in the RUE;Partial deficits in the LUE  -AJ    Additional Comments Numbness in L hand in first 3 digits.  -       Transfers    Comment (Transfers) I with sit to/from stand  -       Gait/Stairs Assessment/Training    Comment (Gait/Stairs) FWB, antalgic gait with absent arm swing on L, no assistive device.  -       Hand  Strength     Strength Affected Side Bilateral  -      User Key  (r) = Recorded By, (t) = Taken By, (c) = Cosigned By    Initials Name Provider Type    Marcella Domingo, PT Physical Therapist            Therapy Education  Given: HEP, Symptoms/condition management, Pain management, Posture/body mechanics, Edema management, Bandaging/dressing change  Program: New  How Provided: Verbal, Demonstration, Written  Provided to: Patient  Level of Understanding: Verbalized, Demonstrated           PT OP Goals     Row Name 08/24/18 0900          PT Short Term Goals    STG Date to Achieve 09/14/18  -     STG 1 I with HEP and have additions/changes by next recertification.  -     STG 2 AROM L wrist flexion >= 75°.  -     STG 3 AROm L wrist extension >= 60°.  -     STG 4 AROM L wrist RD >= 20°.  -     STG 5 Full composite fist L hand  -     STG 6 Able to oppost the L thumb to the 5th fat pad.  -     STG 6 Progress New  -     STG 6 Progress Comments .  -        Long Term Goals    LTG Date to Achieve 09/21/18  -     LTG 1 AROm for L wrist all WNL, no increase in pain.  -     LTG 2 L wrist 5/5.  -     LTG 3 L  @ #2 setting >= 20#.  -     LTG 4 L  @ #4 setting >= 15#.  -     LTG 5 Patient ot report some of her sensation is starting to return in first 3 digits of L hand.  -     LTG 6 I with final HEP.  -     LTG 7 D/C wih a fianl HEp and free 30 day fitness formula  memebership.  -        Time Calculation    PT Goal Re-Cert Due Date 09/14/18  -       User Key  (r) = Recorded By, (t) = Taken By, (c) = Cosigned By    Initials Name Provider Type    Marcella Domingo, PT Physical Therapist        Barriers to Rehab: Include significant or possible arthritic/degenerative changes that have occurred within the joints, The patient's obesity, length of time patient has had these s/s.    Safety Issues: None noted.            PT Assessment/Plan     Row Name 08/24/18 0900          PT Assessment    Functional Limitations Limitations in community activities;Performance in leisure activities;Performance in self-care ADL  -     Impairments Coordination;Endurance;Impaired flexibility;Impaired muscle endurance;Impaired muscle length;Impaired muscle power;Range of motion;Pain;Muscle strength;Joint mobility;Joint integrity  -     Assessment Comments Patient did well with all ther ex and given written copy of HEP exercises.  -     Rehab Potential Good  -     Patient/caregiver participated in establishment of treatment plan and goals Yes  -     Patient would benefit from skilled therapy intervention Yes  -AJ        PT Plan    PT Frequency 2x/week  -     Predicted Duration of Therapy Intervention (Therapy Eval) 3-4 weeks, 6-8 visits  -     Planned CPT's? PT EVAL MOD COMPLELITY: 68014;PT RE-EVAL: 66665;PT THER PROC EA 15 MIN: 39809;PT THER ACT EA 15 MIN: 67489;PT MANUAL THERAPY EA 15 MIN: 14010;PT PARAFFIN BATH: 25221;PT THER SUPP EA 15 MIN  -     Physical Therapy Interventions (Optional Details) fine motor skills;gross motor skills;home exercise program;manual therapy techniques;ROM (Range of Motion);strengthening;stretching;patient/family education  -     PT Plan Comments Add modified CT 1 S  -AJ       User Key  (r) = Recorded By, (t) = Taken By, (c) = Cosigned By    Initials Name Provider Type    Marcella Domingo, MIKY Physical Therapist       Other therapeutic  "activities and/or exercises will be prescribed depending on the patients progress or lack there of.          Modalities     Row Name 08/24/18 0900             Ice    Ice Applied Yes  -AJ      Location L hand  -AJ      Rx Minutes 10 mins  -      Ice S/P Rx Yes  -AJ        User Key  (r) = Recorded By, (t) = Taken By, (c) = Cosigned By    Initials Name Provider Type    Marcella Domingo, MIKY Physical Therapist              Exercises     Row Name 08/24/18 0900             Subjective Comments    Subjective Comments Patient wishes to get the hand working good again.  -AJ         Subjective Pain    Able to rate subjective pain? yes  -AJ      Pre-Treatment Pain Level 5  -AJ      Post-Treatment Pain Level --   \"frozen\"  -         Exercise 1    Exercise Name 1 6 pack  -      Reps 1 20 each  -         Exercise 2    Exercise Name 2 AROm wrist flex/ext  -      Reps 2 20  -         Exercise 3    Exercise Name 3 AROm forearm pro/sup  -      Reps 3 20 each  -         Exercise 4    Exercise Name 4 Towel squeeze  -      Time 4 5\" holds for 3 minutes  -        User Key  (r) = Recorded By, (t) = Taken By, (c) = Cosigned By    Initials Name Provider Type    Marcella Domingo, PT Physical Therapist                        Outcome Measure Options: Quick DASH  Quick DASH  Open a tight or new jar.: Severe Difficulty  Do heavy household chores (e.g., wash walls, wash floors): Severe Difficulty  Carry a shopping bag or briefcase: Moderate Difficulty  Wash your back: Severe Difficulty  Recreational activities in which you take some force or impact through your arm, should or hand (e.g. golf, hammering, tennis, etc.): Unable  During the past week, to what extent has your arm, shoulder, or hand problem interfered with your normal social activites with family, friends, neighbors or groups?: Quite a bit  During the past week, were you limited in your work or other regular daily activities as a result of your arm, " shoulder or hand problem?: Unable  Arm, Shoulder, or hand pain: Severe  Tingling (pins and needles) in your arm, shoulder, or hand: Severe  During the past week, how much difficulty have you had sleeping because of the pain in your arm, shoulder or hand?: Severe Difficulty  Number of Questions Answered: 10  Quick DASH Score: 77.5         Time Calculation:   Start Time: 0920  Stop Time: 1016  Time Calculation (min): 56 min  PT Non-Billable Time (min): 10 min  Total Timed Code Minutes- PT: 23 minute(s)     Therapy Charges for Today     Code Description Service Date Service Provider Modifiers Qty    93544031893 HC PT EVAL MOD COMPLEXITY 2 8/24/2018 Marcella Perales, PT GP 1    01496916532 HC PT THER PROC EA 15 MIN 8/24/2018 Marcella Perales, PT GP 2    80265361243 HC PT THER SUPP EA 15 MIN 8/24/2018 Marcella Perales, PT GP 1          PT G-Codes  Outcome Measure Options: Quick DASH         Marcella Perales PT, DPT, CSCS  8/24/2018

## 2018-08-27 ENCOUNTER — HOSPITAL ENCOUNTER (OUTPATIENT)
Dept: PHYSICAL THERAPY | Facility: HOSPITAL | Age: 58
Setting detail: THERAPIES SERIES
Discharge: HOME | End: 2018-08-27

## 2018-08-27 DIAGNOSIS — Z98.890 S/P CARPAL TUNNEL RELEASE: ICD-10-CM

## 2018-08-27 DIAGNOSIS — G56.02 CARPAL TUNNEL SYNDROME ON LEFT: Primary | ICD-10-CM

## 2018-08-27 PROCEDURE — 97110 THERAPEUTIC EXERCISES: CPT

## 2018-08-27 NOTE — THERAPY TREATMENT NOTE
Outpatient Physical Therapy Ortho Treatment Note  [facility]     Patient Name: Amira Shannon  : 1960  MRN: [id number]  Today's Date: 2018      Visit Date: 2018  Pt reports 6/10 pain pre treatment,0 /10 pain post treatment  Reports n/a % of improvement.  Attended 2/2 visits.  Insurance available: 20 visits  Next MD appt: 2018.  Recertification: 2018.  Visit Dx:    ICD-10-CM ICD-9-CM   1. Carpal tunnel syndrome on left G56.02 354.0   2. S/P carpal tunnel release Z98.890 V45.89       Patient Active Problem List   Diagnosis   • Bilateral foot pain   • Right hip pain   • Acute pain of both knees   • Plantar fasciitis, bilateral   • Primary osteoarthritis of knee   • MUSA (nonalcoholic steatohepatitis)   • Tongue lesion   • Bilateral lower extremity edema   • Chronic pain of both knees   • Bilateral calcaneal spurs   • Swelling of both lower extremities   • Obesity with body mass index of 30.0-39.9   • Dyspnea   • Diastolic dysfunction   • Thrombocytopenia (CMS/HCC)   • Chronic fatigue   • Abdominal pain   • Constipation   • Acid reflux   • Depression   • Disease related peripheral neuropathy   • Epigastric pain   • Hyperlipidemia   • Nausea and vomiting   • Restless leg syndrome   • Sleep apnea   • Carpal tunnel syndrome on left   • Carpal tunnel syndrome on right   • Bilateral wrist pain   • Numbness and tingling in both hands        Past Medical History:   Diagnosis Date   • Acid reflux    • Altered bowel function    • Arthropathy of lumbar facet joint    • Bleeding disorder (CMS/HCC)    • C. difficile diarrhea    • Constipation    • Corns and callus    • Depression    • Disease related peripheral neuropathy    • Epigastric pain    • Fatty liver    • Hammer toe    • Headache    • Hiatal hernia    • Hyperlipidemia    • Knee pain    • Localized, primary osteoarthritis of the ankle and foot     Localized, primary osteoarthritis of the ankle and/or foot   •  "Mendoza's metatarsalgia     Mendoza's metatarsalgia - 2nd interspace on right   • Nausea and vomiting    • Neuralgia and neuritis     Neuralgia, neuritis, and radiculitis, unspecified   • Neuropathy    • Obstructive sleep apnea     Obstructive sleep apnea (adult) (pediatric)    • CHAI on CPAP     \"C-Pap at night  (unconfirmed)\"   • Osteoarthritis    • Pain in foot     Pain in unspecified foot - sees a podiatrist   • Pain in joint, ankle and foot     Joint pain in ankle and foot      • Pain radiating to back     Pain radiating to lumbar region of back   • Plantar fasciitis    • PONV (postoperative nausea and vomiting)    • Restless leg syndrome    • Secondary hypertension     Secondary hypertension, unspecified   • Sinusitis    • Sleep apnea    • Tongue anomaly     lesion        Past Surgical History:   Procedure Laterality Date   •  SECTION     • CHOLECYSTECTOMY     • COLONOSCOPY  2013   • DIRECT LARYNGOSCOPY, ESOPHAGOSCOPY, BRONCHOSCOPY N/A 2017    Procedure: DIRECT LARYNGOSCOPY AND;  Surgeon: Live Bolton MD;  Location: [facility];  Service:    • ENDOSCOPY  2013    Colon endoscopy 95931 (1) - Internal & external hemorrhoids found. Stool found.   • ENDOSCOPY  2013    EGD w/ tube 15960 (1) - Normal esophagus. Gastritis in stomach. Biopsy taken. Normal dudoenum. Biopsy taken.   • FOOT SURGERY  2013    Foot/toes surgery procedure (1) - Arthroplasty of toes 4 and 5 of right foot.   • HERNIA REPAIR     • HERNIA REPAIR      hital   • HYSTERECTOMY     • LIVER BIOPSY     • NERVE BLOCK  2016    Injection for nerve block (1) - Lumbar medial branch block.   • OTHER SURGICAL HISTORY  2012    Inj(s) Tend-Sheath, Ligament, Single  (1) - [facility]TER NICKERSON ([facility])    • OTHER SURGICAL HISTORY  2013    Small Joint Injection/Aspiration  (2) - [facility]TER NICKERSON ([facility])    • OTHER SURGICAL HISTORY  2011    bowel obstruction x2   • OTHER SURGICAL HISTORY      gland " removed from neck   • SUBLINGUAL SALIVARY CYST EXCISION N/A 8/1/2017    Procedure: EXCISION OF LEFT  TONGUE LESION WITH CLOSURE;  Surgeon: Live Bolton MD;  Location: [facility];  Service:    • TUBAL ABDOMINAL LIGATION     • UPPER GASTROINTESTINAL ENDOSCOPY  07/01/2013             PT Ortho     Row Name 08/27/18 1500       Precautions and Contraindications    Precautions/Limitations no known precautions/limitations  -TL       Subjective Pain    Pre-Treatment Pain Level 6  -TL      User Key  (r) = Recorded By, (t) = Taken By, (c) = Cosigned By    Initials Name Provider Type    Marija Hyatt, PTA Physical Therapy Assistant                            PT Assessment/Plan     Row Name 08/27/18 1600          PT Assessment    Assessment Comments Pt tolerated CT1S well. Wound without drainage. No signs of infection. PTA changed bandage. Pt able to perform HEP. Pt reports that she has not been icing like she should. No goals met this date.   -TL        PT Plan    PT Frequency 2x/week  -TL     PT Plan Comments add pro ll UE  -TL       User Key  (r) = Recorded By, (t) = Taken By, (c) = Cosigned By    Initials Name Provider Type    Marija Hyatt, LEOPOLDO Physical Therapy Assistant                Modalities     Row Name 08/27/18 1500             Ice    Ice Applied Yes  -TL      Location left hand  -TL      Rx Minutes 15 mins  -TL      Ice S/P Rx Yes  -TL        User Key  (r) = Recorded By, (t) = Taken By, (c) = Cosigned By    Initials Name Provider Type    Marija Hyatt PTA Physical Therapy Assistant                Exercises     Row Name 08/27/18 1500             Subjective Comments    Subjective Comments Pt reported that she has not been icing like she should.  -TL         Subjective Pain    Able to rate subjective pain? yes  -TL      Pre-Treatment Pain Level 6  -TL         Exercise 1    Exercise Name 1 Mod CT1 S  -TL      Reps 1 2  -TL      Time 1 1 min  -TL         Exercise 2    Exercise Name 2 6 pack ex  -TL       Reps 2 20  -TL         Exercise 3    Exercise Name 3 Arom wrist flexion and ext  -TL      Reps 3 20  -TL         Exercise 4    Exercise Name 4 arom Forearm supination/pronation  -TL      Reps 4 20  -TL         Exercise 5    Exercise Name 5 CT3s  -TL      Reps 5 2  -TL      Time 5 1 min  -TL         Exercise 6    Exercise Name 6 UD/RD  -TL      Reps 6 20  -TL         Exercise 7    Exercise Name 7 towel squeezes  -TL      Time 7 5 mins  -TL         Exercise 8    Exercise Name 8 peg board  -TL      Time 8 3 mins  -TL         Exercise 9    Exercise Name 9 clothes pins  -TL      Time 9 3 mins  -TL        User Key  (r) = Recorded By, (t) = Taken By, (c) = Cosigned By    Initials Name Provider Type    TL Marija Bermeo, PTA Physical Therapy Assistant                               PT OP Goals     Row Name 08/27/18 1600          PT Short Term Goals    STG Date to Achieve 09/14/18  -TL     STG 1 I with HEP and have additions/changes by next recertification.  -TL     STG 1 Progress Ongoing  -TL     STG 2 AROM L wrist flexion >= 75°.  -TL     STG 2 Progress Ongoing  -TL     STG 3 AROm L wrist extension >= 60°.  -TL     STG 3 Progress Ongoing  -TL     STG 4 AROM L wrist RD >= 20°.  -TL     STG 4 Progress Ongoing  -TL     STG 5 Full composite fist L hand  -TL     STG 5 Progress Ongoing  -TL     STG 6 Able to oppost the L thumb to the 5th fat pad.  -TL     STG 6 Progress Ongoing  -TL     STG 6 Progress Comments .  -TL        Long Term Goals    LTG Date to Achieve 09/21/18  -TL     LTG 1 AROm for L wrist all WNL, no increase in pain.  -TL     LTG 2 L wrist 5/5.  -TL     LTG 3 L  @ #2 setting >= 20#.  -TL     LTG 4 L  @ #4 setting >= 15#.  -TL     LTG 5 Patient ot report some of her sensation is starting to return in first 3 digits of L hand.  -TL     LTG 6 I with final HEP.  -TL     LTG 7 D/C wih a fianl HEp and free 30 day fitness formula memebership.  -TL       User Key  (r) = Recorded By, (t) = Taken By, (c) =  Cosigned By    Initials Name Provider Type    TL Marija Bermeo PTA Physical Therapy Assistant          Therapy Education  Education Details: using ice twice a day.  Given: HEP, Symptoms/condition management, Pain management  Program: Reinforced  How Provided: Verbal, Demonstration  Provided to: Patient  Level of Understanding: Verbalized, Demonstrated              Time Calculation:   Start Time: 1540  Stop Time: 1639  Time Calculation (min): 59 min  PT Non-Billable Time (min): 15 min  Total Timed Code Minutes- PT: 44 minute(s)  Therapy Suggested Charges     Code   Minutes Charges    None           Therapy Charges for Today     Code Description Service Date Service Provider Modifiers Qty    31980612277 HC PT THER PROC EA 15 MIN 8/27/2018 Marija Bermeo PTA GP 3    90236285608 HC PT THER SUPP EA 15 MIN 8/27/2018 Marija Bermeo PTA GP 1                    Marija Bermeo PTA  8/27/2018

## 2018-08-28 RX ORDER — CHLORTHALIDONE 50 MG/1
TABLET ORAL
Qty: 90 TABLET | Refills: 3 | Status: SHIPPED | OUTPATIENT
Start: 2018-08-28 | End: 2018-10-12 | Stop reason: SDUPTHER

## 2018-08-29 ENCOUNTER — HOSPITAL ENCOUNTER (OUTPATIENT)
Dept: PHYSICAL THERAPY | Facility: HOSPITAL | Age: 58
Setting detail: THERAPIES SERIES
Discharge: HOME | End: 2018-08-29

## 2018-08-29 DIAGNOSIS — Z98.890 S/P CARPAL TUNNEL RELEASE: Primary | ICD-10-CM

## 2018-08-29 PROCEDURE — 97110 THERAPEUTIC EXERCISES: CPT | Performed by: SPECIALIST/TECHNOLOGIST

## 2018-08-29 NOTE — THERAPY TREATMENT NOTE
"    Outpatient Physical Therapy Ortho Treatment Note  [facility]       Patient Name: Amira Shannon  : 1960  MRN: [id number]  Today's Date: 2018   Patients reports pain as:  3/10   Patient reports overall % improvement as:  \"yes and no\"   Patients attendance has been:  3/3   Insurance visits available  20 visits   Recert Date is:  18   Next MD visit is:  18        Visit Date: 2018    Visit Dx:    ICD-10-CM ICD-9-CM   1. S/P carpal tunnel release Z98.890 V45.89       Patient Active Problem List   Diagnosis   • Bilateral foot pain   • Right hip pain   • Acute pain of both knees   • Plantar fasciitis, bilateral   • Primary osteoarthritis of knee   • MUSA (nonalcoholic steatohepatitis)   • Tongue lesion   • Bilateral lower extremity edema   • Chronic pain of both knees   • Bilateral calcaneal spurs   • Swelling of both lower extremities   • Obesity with body mass index of 30.0-39.9   • Dyspnea   • Diastolic dysfunction   • Thrombocytopenia (CMS/HCC)   • Chronic fatigue   • Abdominal pain   • Constipation   • Acid reflux   • Depression   • Disease related peripheral neuropathy   • Epigastric pain   • Hyperlipidemia   • Nausea and vomiting   • Restless leg syndrome   • Sleep apnea   • Carpal tunnel syndrome on left   • Carpal tunnel syndrome on right   • Bilateral wrist pain   • Numbness and tingling in both hands        Past Medical History:   Diagnosis Date   • Acid reflux    • Altered bowel function    • Arthropathy of lumbar facet joint    • Bleeding disorder (CMS/HCC)    • C. difficile diarrhea    • Constipation    • Corns and callus    • Depression    • Disease related peripheral neuropathy    • Epigastric pain    • Fatty liver    • Hammer toe    • Headache    • Hiatal hernia    • Hyperlipidemia    • Knee pain    • Localized, primary osteoarthritis of the ankle and foot     Localized, primary osteoarthritis of the ankle and/or foot   • Mendoza's metatarsalgia  " "   Mendoza's metatarsalgia - 2nd interspace on right   • Nausea and vomiting    • Neuralgia and neuritis     Neuralgia, neuritis, and radiculitis, unspecified   • Neuropathy    • Obstructive sleep apnea     Obstructive sleep apnea (adult) (pediatric)    • CHAI on CPAP     \"C-Pap at night  (unconfirmed)\"   • Osteoarthritis    • Pain in foot     Pain in unspecified foot - sees a podiatrist   • Pain in joint, ankle and foot     Joint pain in ankle and foot      • Pain radiating to back     Pain radiating to lumbar region of back   • Plantar fasciitis    • PONV (postoperative nausea and vomiting)    • Restless leg syndrome    • Secondary hypertension     Secondary hypertension, unspecified   • Sinusitis    • Sleep apnea    • Tongue anomaly     lesion        Past Surgical History:   Procedure Laterality Date   •  SECTION     • CHOLECYSTECTOMY     • COLONOSCOPY  2013   • DIRECT LARYNGOSCOPY, ESOPHAGOSCOPY, BRONCHOSCOPY N/A 2017    Procedure: DIRECT LARYNGOSCOPY AND;  Surgeon: Live Bolton MD;  Location: [facility];  Service:    • ENDOSCOPY  2013    Colon endoscopy 51691 (1) - Internal & external hemorrhoids found. Stool found.   • ENDOSCOPY  2013    EGD w/ tube 04535 (1) - Normal esophagus. Gastritis in stomach. Biopsy taken. Normal dudoenum. Biopsy taken.   • FOOT SURGERY  2013    Foot/toes surgery procedure (1) - Arthroplasty of toes 4 and 5 of right foot.   • HERNIA REPAIR     • HERNIA REPAIR      hital   • HYSTERECTOMY     • LIVER BIOPSY     • NERVE BLOCK  2016    Injection for nerve block (1) - Lumbar medial branch block.   • OTHER SURGICAL HISTORY  2012    Inj(s) Tend-Sheath, Ligament, Single  (1) - [facility]TER NICKERSON ([facility])    • OTHER SURGICAL HISTORY  2013    Small Joint Injection/Aspiration  (2) - [facility]ORTER NICKERSON ([facility])    • OTHER SURGICAL HISTORY      bowel obstruction x2   • OTHER SURGICAL HISTORY      gland removed from neck   • " SUBLINGUAL SALIVARY CYST EXCISION N/A 8/1/2017    Procedure: EXCISION OF LEFT  TONGUE LESION WITH CLOSURE;  Surgeon: Live Bolton MD;  Location: [facility];  Service:    • TUBAL ABDOMINAL LIGATION     • UPPER GASTROINTESTINAL ENDOSCOPY  07/01/2013             PT Ortho     Row Name 08/29/18 0900       Posture/Observations    Posture/Observations Comments (P)  no bracing.  coverlett covering incision site.  sutures intact.  very small (<1 cm diameter) drainage.  <1cm redness along incision site.     -ML    Row Name 08/27/18 1500       Precautions and Contraindications    Precautions/Limitations no known precautions/limitations  -TL       Subjective Pain    Pre-Treatment Pain Level 6  -TL    Post-Treatment Pain Level 0  -TL      User Key  (r) = Recorded By, (t) = Taken By, (c) = Cosigned By    Initials Name Provider Type    Reece Boyer, [employer]     TL Marija Bermeo, PTA Physical Therapy Assistant                            PT Assessment/Plan     Row Name 08/29/18 1000          PT Assessment    Assessment Comments (P)  david rx.  weakness exhibited w/ lift offs.  wrist compensation w/ table tops.  david rx well.    -ML        PT Plan    PT Frequency (P)  2x/week  -ML     PT Plan Comments (P)  check response.  monitor incision.  modif CT1/3 S.   -ML       User Key  (r) = Recorded By, (t) = Taken By, (c) = Cosigned By    Initials Name Provider Type    Reece Boyer, [employer]                 Modalities     Row Name 08/29/18 0900             Ice    Location (P)  left hand  -ML      Rx Minutes (P)  15 mins  -ML      Ice S/P Rx (P)  Yes  -ML        User Key  (r) = Recorded By, (t) = Taken By, (c) = Cosigned By    Initials Name Provider Type    Reece Boyer, [employer]                 Exercises     Row Name 08/29/18 0900             Subjective Comments    Subjective Comments (P)  1° is lack of feeling in hand/ incision area.    -ML         Subjective Pain    Able to  rate subjective pain? (P)  yes  -ML      Pre-Treatment Pain Level (P)  3  -ML      Post-Treatment Pain Level (P)  0  -ML         Exercise 1    Exercise Name 1 (P)  Pro2 UE/LE  -ML      Time 1 (P)  10 min  -ML      Additional Comments (P)  L3.0  LE to assist.   -ML         Exercise 2    Exercise Name 2 (P)  6 pack exer  -ML      Reps 2 (P)  30  -ML         Exercise 3    Exercise Name 3 (P)  AROM: wrist circles  -ML      Reps 3 (P)  10  -ML      Additional Comments (P)  b/w exer  -ML         Exercise 4    Exercise Name 4 (P)  AROM: hand lift offs table  -ML      Sets 4 (P)  2  -ML      Reps 4 (P)  10  -ML      Additional Comments (P)  Wrist ext.   -ML         Exercise 5    Exercise Name 5 (P)  AROM: Forearm pro/sup- on table  -ML      Sets 5 (P)  2  -ML      Reps 5 (P)  10  -ML         Exercise 6    Exercise Name 6 (P)  Towel Squeezes  -ML      Sets 6 (P)  2  -ML      Reps 6 (P)  10  -ML         Exercise 7    Exercise Name 7 (P)  Peg board  -ML      Reps 7 (P)  3x  -ML         Exercise 8    Exercise Name 8 (P)  cloth pins  -ML      Time 8 (P)  3 min./   -ML         Exercise 9    Exercise Name 9 (P)  AROM: RD/ UD  -ML        User Key  (r) = Recorded By, (t) = Taken By, (c) = Cosigned By    Initials Name Provider Type    ML Reece Godinez, ATC                                PT OP Goals     Row Name 08/29/18 0900          PT Short Term Goals    STG Date to Achieve (P)  09/14/18  -ML     STG 1 (P)  I with HEP and have additions/changes by next recertification.  -ML     STG 1 Progress (P)  Ongoing  -ML     STG 2 (P)  AROM L wrist flexion >= 75°.  -ML     STG 2 Progress (P)  Ongoing  -ML     STG 3 (P)  AROm L wrist extension >= 60°.  -ML     STG 3 Progress (P)  Ongoing  -ML     STG 4 (P)  AROM L wrist RD >= 20°.  -ML     STG 4 Progress (P)  Ongoing  -ML     STG 5 (P)  Full composite fist L hand  -ML     STG 5 Progress (P)  Ongoing  -ML     STG 6 (P)  Able to oppost the L thumb to the 5th fat pad.  -ML      STG 6 Progress (P)  Ongoing  -ML     STG 6 Progress Comments (P)  .  -ML        Long Term Goals    LTG Date to Achieve (P)  09/21/18  -ML     LTG 1 (P)  AROm for L wrist all WNL, no increase in pain.  -ML     LTG 2 (P)  L wrist 5/5.  -ML     LTG 3 (P)  L  @ #2 setting >= 20#.  -ML     LTG 4 (P)  L  @ #4 setting >= 15#.  -ML     LTG 5 (P)  Patient ot report some of her sensation is starting to return in first 3 digits of L hand.  -ML     LTG 6 (P)  I with final HEP.  -ML     LTG 7 (P)  D/C wih a fianl HEp and free 30 day fitness formula memebership.  -ML        Time Calculation    PT Goal Re-Cert Due Date (P)  09/14/18  -ML       User Key  (r) = Recorded By, (t) = Taken By, (c) = Cosigned By    Initials Name Provider Type    Reece Boyer ATC                          Time Calculation:   Start Time: (P) 0929  Stop Time: (P) 1027  Time Calculation (min): (P) 58 min  Therapy Suggested Charges     Code   Minutes Charges    None           Therapy Charges for Today     Code Description Service Date Service Provider Modifiers Qty    47966638892 HC PT THER SUPP EA 15 MIN 8/29/2018 Reece Godinez, ATC  1    06021143754 HC PT THER PROC EA 15 MIN 8/29/2018 Reece Godinez, UMA  3                    Reece Godinez ATC  8/29/2018

## 2018-08-31 ENCOUNTER — HOSPITAL ENCOUNTER (INPATIENT)
Facility: HOSPITAL | Age: 58
LOS: 4 days | Discharge: HOME | End: 2018-09-05
Attending: EMERGENCY MEDICINE | Admitting: FAMILY MEDICINE

## 2018-08-31 DIAGNOSIS — K56.609 SBO (SMALL BOWEL OBSTRUCTION) (HCC): Primary | ICD-10-CM

## 2018-08-31 DIAGNOSIS — K74.60 CIRRHOSIS OF LIVER WITHOUT ASCITES, UNSPECIFIED HEPATIC CIRRHOSIS TYPE (HCC): ICD-10-CM

## 2018-08-31 PROCEDURE — 99285 EMERGENCY DEPT VISIT HI MDM: CPT

## 2018-09-01 ENCOUNTER — APPOINTMENT (OUTPATIENT)
Dept: ULTRASOUND IMAGING | Facility: HOSPITAL | Age: 58
End: 2018-09-01

## 2018-09-01 ENCOUNTER — APPOINTMENT (OUTPATIENT)
Dept: CT IMAGING | Facility: HOSPITAL | Age: 58
End: 2018-09-01

## 2018-09-01 ENCOUNTER — APPOINTMENT (OUTPATIENT)
Dept: GENERAL RADIOLOGY | Facility: HOSPITAL | Age: 58
End: 2018-09-01

## 2018-09-01 PROBLEM — K56.609 SBO (SMALL BOWEL OBSTRUCTION): Status: ACTIVE | Noted: 2018-09-01

## 2018-09-01 LAB
ALBUMIN SERPL-MCNC: 4.2 G/DL (ref 3.4–4.8)
ALBUMIN/GLOB SERPL: 1.2 G/DL (ref 1.1–1.8)
ALP SERPL-CCNC: 96 U/L (ref 38–126)
ALT SERPL W P-5'-P-CCNC: 60 U/L (ref 9–52)
ANION GAP SERPL CALCULATED.3IONS-SCNC: 12 MMOL/L (ref 5–15)
AST SERPL-CCNC: 69 U/L (ref 14–36)
BASOPHILS # BLD AUTO: 0.01 10*3/MM3 (ref 0–0.2)
BASOPHILS NFR BLD AUTO: 0.1 % (ref 0–2)
BILIRUB SERPL-MCNC: 0.7 MG/DL (ref 0.2–1.3)
BILIRUB UR QL STRIP: NEGATIVE
BUN BLD-MCNC: 17 MG/DL (ref 7–21)
BUN/CREAT SERPL: 15.7 (ref 7–25)
CALCIUM SPEC-SCNC: 9.3 MG/DL (ref 8.4–10.2)
CHLORIDE SERPL-SCNC: 101 MMOL/L (ref 95–110)
CLARITY UR: CLEAR
CO2 SERPL-SCNC: 24 MMOL/L (ref 22–31)
COLOR UR: YELLOW
CREAT BLD-MCNC: 1.08 MG/DL (ref 0.5–1)
DEPRECATED RDW RBC AUTO: 43.5 FL (ref 36.4–46.3)
EOSINOPHIL # BLD AUTO: 0.13 10*3/MM3 (ref 0–0.7)
EOSINOPHIL NFR BLD AUTO: 1.6 % (ref 0–7)
ERYTHROCYTE [DISTWIDTH] IN BLOOD BY AUTOMATED COUNT: 15.1 % (ref 11.5–14.5)
GFR SERPL CREATININE-BSD FRML MDRD: 52 ML/MIN/1.73 (ref 51–120)
GLOBULIN UR ELPH-MCNC: 3.4 GM/DL (ref 2.3–3.5)
GLUCOSE BLD-MCNC: 123 MG/DL (ref 60–100)
GLUCOSE UR STRIP-MCNC: NEGATIVE MG/DL
HCT VFR BLD AUTO: 43.7 % (ref 35–45)
HGB BLD-MCNC: 15 G/DL (ref 12–15.5)
HGB UR QL STRIP.AUTO: NEGATIVE
HOLD SPECIMEN: NORMAL
HOLD SPECIMEN: NORMAL
IMM GRANULOCYTES # BLD: 0.03 10*3/MM3 (ref 0–0.02)
IMM GRANULOCYTES NFR BLD: 0.4 % (ref 0–0.5)
KETONES UR QL STRIP: NEGATIVE
LEUKOCYTE ESTERASE UR QL STRIP.AUTO: NEGATIVE
LIPASE SERPL-CCNC: 76 U/L (ref 23–300)
LYMPHOCYTES # BLD AUTO: 1.44 10*3/MM3 (ref 0.6–4.2)
LYMPHOCYTES NFR BLD AUTO: 18 % (ref 10–50)
MCH RBC QN AUTO: 26.9 PG (ref 26.5–34)
MCHC RBC AUTO-ENTMCNC: 34.3 G/DL (ref 31.4–36)
MCV RBC AUTO: 78.5 FL (ref 80–98)
MONOCYTES # BLD AUTO: 0.49 10*3/MM3 (ref 0–0.9)
MONOCYTES NFR BLD AUTO: 6.1 % (ref 0–12)
NEUTROPHILS # BLD AUTO: 5.88 10*3/MM3 (ref 2–8.6)
NEUTROPHILS NFR BLD AUTO: 73.8 % (ref 37–80)
NITRITE UR QL STRIP: NEGATIVE
PH UR STRIP.AUTO: 6 [PH] (ref 5–9)
PLATELET # BLD AUTO: 118 10*3/MM3 (ref 150–450)
PMV BLD AUTO: 11.4 FL (ref 8–12)
POTASSIUM BLD-SCNC: 3.6 MMOL/L (ref 3.5–5.1)
PROT SERPL-MCNC: 7.6 G/DL (ref 6.3–8.6)
PROT UR QL STRIP: NEGATIVE
RBC # BLD AUTO: 5.57 10*6/MM3 (ref 3.77–5.16)
SODIUM BLD-SCNC: 137 MMOL/L (ref 137–145)
SP GR UR STRIP: 1.03 (ref 1–1.03)
UROBILINOGEN UR QL STRIP: NORMAL
WBC NRBC COR # BLD: 7.98 10*3/MM3 (ref 3.2–9.8)
WHOLE BLOOD HOLD SPECIMEN: NORMAL
WHOLE BLOOD HOLD SPECIMEN: NORMAL

## 2018-09-01 PROCEDURE — 74177 CT ABD & PELVIS W/CONTRAST: CPT

## 2018-09-01 PROCEDURE — 25010000002 ONDANSETRON PER 1 MG: Performed by: FAMILY MEDICINE

## 2018-09-01 PROCEDURE — 25010000002 HEPARIN (PORCINE) PER 1000 UNITS: Performed by: FAMILY MEDICINE

## 2018-09-01 PROCEDURE — 25010000002 LORAZEPAM PER 2 MG: Performed by: EMERGENCY MEDICINE

## 2018-09-01 PROCEDURE — 94760 N-INVAS EAR/PLS OXIMETRY 1: CPT

## 2018-09-01 PROCEDURE — 85025 COMPLETE CBC W/AUTO DIFF WBC: CPT | Performed by: EMERGENCY MEDICINE

## 2018-09-01 PROCEDURE — 80053 COMPREHEN METABOLIC PANEL: CPT | Performed by: EMERGENCY MEDICINE

## 2018-09-01 PROCEDURE — 80053 COMPREHEN METABOLIC PANEL: CPT | Performed by: FAMILY MEDICINE

## 2018-09-01 PROCEDURE — 74022 RADEX COMPL AQT ABD SERIES: CPT

## 2018-09-01 PROCEDURE — 81003 URINALYSIS AUTO W/O SCOPE: CPT | Performed by: EMERGENCY MEDICINE

## 2018-09-01 PROCEDURE — 94799 UNLISTED PULMONARY SVC/PX: CPT

## 2018-09-01 PROCEDURE — 25010000002 HYDROMORPHONE PER 4 MG: Performed by: EMERGENCY MEDICINE

## 2018-09-01 PROCEDURE — 25010000002 ONDANSETRON PER 1 MG

## 2018-09-01 PROCEDURE — 25010000002 IOPAMIDOL 61 % SOLUTION: Performed by: EMERGENCY MEDICINE

## 2018-09-01 PROCEDURE — 25010000002 MORPHINE PER 10 MG: Performed by: FAMILY MEDICINE

## 2018-09-01 PROCEDURE — 83690 ASSAY OF LIPASE: CPT | Performed by: EMERGENCY MEDICINE

## 2018-09-01 PROCEDURE — 25010000002 MORPHINE PER 10 MG: Performed by: EMERGENCY MEDICINE

## 2018-09-01 PROCEDURE — 76700 US EXAM ABDOM COMPLETE: CPT

## 2018-09-01 RX ORDER — HYDROMORPHONE HCL 110MG/55ML
1 PATIENT CONTROLLED ANALGESIA SYRINGE INTRAVENOUS ONCE
Status: COMPLETED | OUTPATIENT
Start: 2018-09-01 | End: 2018-09-01

## 2018-09-01 RX ORDER — SODIUM CHLORIDE 0.9 % (FLUSH) 0.9 %
10 SYRINGE (ML) INJECTION AS NEEDED
Status: DISCONTINUED | OUTPATIENT
Start: 2018-09-01 | End: 2018-09-05 | Stop reason: HOSPADM

## 2018-09-01 RX ORDER — FAMOTIDINE 10 MG/ML
20 INJECTION, SOLUTION INTRAVENOUS ONCE
Status: COMPLETED | OUTPATIENT
Start: 2018-09-01 | End: 2018-09-01

## 2018-09-01 RX ORDER — MORPHINE SULFATE 2 MG/ML
2 INJECTION, SOLUTION INTRAMUSCULAR; INTRAVENOUS EVERY 4 HOURS PRN
Status: DISCONTINUED | OUTPATIENT
Start: 2018-09-01 | End: 2018-09-05 | Stop reason: HOSPADM

## 2018-09-01 RX ORDER — LORAZEPAM 2 MG/ML
0.5 INJECTION INTRAMUSCULAR ONCE
Status: COMPLETED | OUTPATIENT
Start: 2018-09-01 | End: 2018-09-01

## 2018-09-01 RX ORDER — LABETALOL HYDROCHLORIDE 5 MG/ML
10 INJECTION, SOLUTION INTRAVENOUS EVERY 6 HOURS PRN
Status: DISCONTINUED | OUTPATIENT
Start: 2018-09-01 | End: 2018-09-05 | Stop reason: HOSPADM

## 2018-09-01 RX ORDER — ONDANSETRON 2 MG/ML
4 INJECTION INTRAMUSCULAR; INTRAVENOUS EVERY 6 HOURS PRN
Status: DISCONTINUED | OUTPATIENT
Start: 2018-09-01 | End: 2018-09-05 | Stop reason: HOSPADM

## 2018-09-01 RX ORDER — HEPARIN SODIUM 5000 [USP'U]/ML
5000 INJECTION, SOLUTION INTRAVENOUS; SUBCUTANEOUS EVERY 8 HOURS SCHEDULED
Status: DISCONTINUED | OUTPATIENT
Start: 2018-09-01 | End: 2018-09-05 | Stop reason: HOSPADM

## 2018-09-01 RX ORDER — SODIUM CHLORIDE 0.9 % (FLUSH) 0.9 %
1-10 SYRINGE (ML) INJECTION AS NEEDED
Status: DISCONTINUED | OUTPATIENT
Start: 2018-09-01 | End: 2018-09-05 | Stop reason: HOSPADM

## 2018-09-01 RX ORDER — DEXTROSE AND SODIUM CHLORIDE 5; .45 G/100ML; G/100ML
100 INJECTION, SOLUTION INTRAVENOUS CONTINUOUS
Status: DISCONTINUED | OUTPATIENT
Start: 2018-09-01 | End: 2018-09-04

## 2018-09-01 RX ORDER — SODIUM CHLORIDE 9 MG/ML
125 INJECTION, SOLUTION INTRAVENOUS CONTINUOUS
Status: DISCONTINUED | OUTPATIENT
Start: 2018-09-01 | End: 2018-09-03

## 2018-09-01 RX ORDER — ONDANSETRON 2 MG/ML
4 INJECTION INTRAMUSCULAR; INTRAVENOUS ONCE
Status: COMPLETED | OUTPATIENT
Start: 2018-09-01 | End: 2018-09-01

## 2018-09-01 RX ORDER — PANTOPRAZOLE SODIUM 40 MG/10ML
40 INJECTION, POWDER, LYOPHILIZED, FOR SOLUTION INTRAVENOUS
Status: DISCONTINUED | OUTPATIENT
Start: 2018-09-01 | End: 2018-09-05 | Stop reason: HOSPADM

## 2018-09-01 RX ADMIN — MORPHINE SULFATE 2 MG: 2 INJECTION, SOLUTION INTRAMUSCULAR; INTRAVENOUS at 07:38

## 2018-09-01 RX ADMIN — Medication 10 ML: at 00:25

## 2018-09-01 RX ADMIN — MORPHINE SULFATE 2 MG: 2 INJECTION, SOLUTION INTRAMUSCULAR; INTRAVENOUS at 23:17

## 2018-09-01 RX ADMIN — HEPARIN SODIUM 5000 UNITS: 5000 INJECTION, SOLUTION INTRAVENOUS; SUBCUTANEOUS at 21:57

## 2018-09-01 RX ADMIN — IOPAMIDOL 93 ML: 612 INJECTION, SOLUTION INTRAVENOUS at 02:16

## 2018-09-01 RX ADMIN — HEPARIN SODIUM 5000 UNITS: 5000 INJECTION, SOLUTION INTRAVENOUS; SUBCUTANEOUS at 15:18

## 2018-09-01 RX ADMIN — MORPHINE SULFATE 4 MG: 4 INJECTION, SOLUTION INTRAMUSCULAR; INTRAVENOUS at 02:34

## 2018-09-01 RX ADMIN — ONDANSETRON HYDROCHLORIDE 4 MG: 2 INJECTION, SOLUTION INTRAMUSCULAR; INTRAVENOUS at 00:25

## 2018-09-01 RX ADMIN — LORAZEPAM 0.5 MG: 2 INJECTION INTRAMUSCULAR; INTRAVENOUS at 04:13

## 2018-09-01 RX ADMIN — HEPARIN SODIUM 5000 UNITS: 5000 INJECTION, SOLUTION INTRAVENOUS; SUBCUTANEOUS at 06:52

## 2018-09-01 RX ADMIN — ONDANSETRON HYDROCHLORIDE 4 MG: 2 INJECTION, SOLUTION INTRAMUSCULAR; INTRAVENOUS at 07:38

## 2018-09-01 RX ADMIN — HYDROMORPHONE HYDROCHLORIDE 1 MG: 2 INJECTION, SOLUTION INTRAMUSCULAR; INTRAVENOUS; SUBCUTANEOUS at 04:24

## 2018-09-01 RX ADMIN — ONDANSETRON HYDROCHLORIDE 4 MG: 2 INJECTION, SOLUTION INTRAMUSCULAR; INTRAVENOUS at 18:14

## 2018-09-01 RX ADMIN — FAMOTIDINE 20 MG: 10 INJECTION INTRAVENOUS at 02:36

## 2018-09-01 RX ADMIN — SODIUM CHLORIDE 125 ML/HR: 9 INJECTION, SOLUTION INTRAVENOUS at 02:31

## 2018-09-01 RX ADMIN — MORPHINE SULFATE 2 MG: 2 INJECTION, SOLUTION INTRAMUSCULAR; INTRAVENOUS at 11:08

## 2018-09-01 RX ADMIN — ONDANSETRON HYDROCHLORIDE 4 MG: 2 INJECTION, SOLUTION INTRAMUSCULAR; INTRAVENOUS at 23:16

## 2018-09-01 RX ADMIN — DEXTROSE AND SODIUM CHLORIDE 100 ML/HR: 5; 450 INJECTION, SOLUTION INTRAVENOUS at 18:14

## 2018-09-01 RX ADMIN — MORPHINE SULFATE 2 MG: 2 INJECTION, SOLUTION INTRAMUSCULAR; INTRAVENOUS at 19:31

## 2018-09-01 RX ADMIN — PANTOPRAZOLE SODIUM 40 MG: 40 INJECTION, POWDER, FOR SOLUTION INTRAVENOUS at 06:52

## 2018-09-01 RX ADMIN — DEXTROSE AND SODIUM CHLORIDE 100 ML/HR: 5; 450 INJECTION, SOLUTION INTRAVENOUS at 06:52

## 2018-09-01 RX ADMIN — MORPHINE SULFATE 2 MG: 2 INJECTION, SOLUTION INTRAMUSCULAR; INTRAVENOUS at 15:18

## 2018-09-01 NOTE — PLAN OF CARE
Problem: Patient Care Overview  Goal: Plan of Care Review  Outcome: Ongoing (interventions implemented as appropriate)   09/01/18 0753   Coping/Psychosocial   Plan of Care Reviewed With patient   Plan of Care Review   Progress no change   OTHER   Outcome Summary Trying to get pain under control. no other changes     Goal: Individualization and Mutuality  Outcome: Ongoing (interventions implemented as appropriate)    Goal: Discharge Needs Assessment  Outcome: Ongoing (interventions implemented as appropriate)    Goal: Interprofessional Rounds/Family Conf  Outcome: Ongoing (interventions implemented as appropriate)      Problem: Bowel Obstruction (Adult)  Goal: Signs and Symptoms of Listed Potential Problems Will be Absent, Minimized or Managed (Bowel Obstruction)  Outcome: Ongoing (interventions implemented as appropriate)      Problem: Fall Risk (Adult)  Goal: Identify Related Risk Factors and Signs and Symptoms  Outcome: Outcome(s) achieved Date Met: 09/01/18    Goal: Absence of Fall  Outcome: Ongoing (interventions implemented as appropriate)

## 2018-09-01 NOTE — PLAN OF CARE
Problem: Patient Care Overview  Goal: Plan of Care Review  Outcome: Ongoing (interventions implemented as appropriate)   09/01/18 0627   Coping/Psychosocial   Plan of Care Reviewed With patient   Plan of Care Review   Progress no change   OTHER   Outcome Summary New admit. Pt resting between care.

## 2018-09-01 NOTE — PAYOR COMM NOTE
"Amira Coleman (57 y.o. Female)     Date of Birth Social Security Number Address Home Phone MRN    [id number]  [id number] [facility] [phone] [id number]    [employer] Mar[employer] Status          [employer]        Admission Date Admission Type Admitting Provider Attending Provider Department, Room/Bed    8/31/18 Emergency Ghanshyam Gomez MD Murray, Robert Joesph, DO [address], [address]    Discharge Date Discharge Disposition Discharge Destination                       Attending Provider:  Live Gomez DO    Allergies:  Other, Corticosteroids, Kenalog  [Triamcinolone Acetonide], Sulfa Antibiotics    Isolation:  None   Infection:  None   Code Status:  CPR    Ht:  162.6 cm (64\")   Wt:  102 kg (225 lb)    Admission Cmt:  None   Principal Problem:  None                Active Insurance as of 8/31/2018     Primary Coverage     Payor Plan Insurance Group Employer/Plan Group    [place] [employer] [place]     Payor Plan Address Payor Plan Phone Number Effective From Effective To    PO  [phone] 1/1/2014     [facility] [id number]       Subscriber Name Subscriber Birth Date Member ID       AMIRA COLEMAN 1960 [id number]                 Emergency Contacts      (Rel.) Home Phone Work Phone Mobile Phone    Ross Coleman (Spouse) [phone]        Domitila Paredes [facility]  [phone]    Phone  [phone]    Fax  Admit inpatinet        History & Physical      Live Gomez DO at 9/1/2018  4:17 AM                [facility] Medicine Admission      Date of Admission: 8/31/2018      Primary Care Physician: Yobany Barr MD    Chief compliant: worsening abdominal pain    HPI: This is a 57-year-old white female that has a concurrent health history significant of multiple abdominal surgeries with adhesions and " "cirrhosis with portal hypertension.  The patient also has multiple admissions for small bowel obstruction and NG tube placement.  The patient also has a history of fatty liver disease, hypertension class II obesity, peripheral neuropathy, sleep apnea on CPAP, ROS, hypertension and is scheduled to follow with a specialist for her spine and sciatica.  Patient is present with the  at the bedside for a few day history of abdominal pain and nausea with emesis.  The patient states that she has had a bowel movement this morning.  The patient denies any fever or chills.  The patient has had poor appetite.  The patient presents for evaluation of abdominal pain.  Her CT abdomen shows a bowel obstruction with cirrhosis and portal hypertension with splenomegaly.  The patient has a surgical consultation will be admitted for further management.    Past Medical History:   Past Medical History:   Diagnosis Date   • Acid reflux    • Altered bowel function    • Arthropathy of lumbar facet joint    • Bleeding disorder (CMS/HCC)    • C. difficile diarrhea 2015   • Constipation    • Corns and callus    • Depression    • Disease related peripheral neuropathy    • Epigastric pain    • Fatty liver    • Hammer toe    • Headache    • Hiatal hernia    • Hyperlipidemia    • Knee pain    • Localized, primary osteoarthritis of the ankle and foot     Localized, primary osteoarthritis of the ankle and/or foot   • Mendoza's metatarsalgia     Mendoza's metatarsalgia - 2nd interspace on right   • Nausea and vomiting    • Neuralgia and neuritis     Neuralgia, neuritis, and radiculitis, unspecified   • Neuropathy    • Obstructive sleep apnea     Obstructive sleep apnea (adult) (pediatric)    • CHAI on CPAP     \"C-Pap at night  (unconfirmed)\"   • Osteoarthritis    • Pain in foot     Pain in unspecified foot - sees a podiatrist   • Pain in joint, ankle and foot     Joint pain in ankle and foot      • Pain radiating to back     Pain radiating to " lumbar region of back   • Plantar fasciitis    • PONV (postoperative nausea and vomiting)    • Restless leg syndrome    • Secondary hypertension     Secondary hypertension, unspecified   • Sinusitis    • Sleep apnea    • Tongue anomaly     lesion       Past Surgical History:   Past Surgical History:   Procedure Laterality Date   •  SECTION     • CHOLECYSTECTOMY     • COLONOSCOPY  2013   • DIRECT LARYNGOSCOPY, ESOPHAGOSCOPY, BRONCHOSCOPY N/A 2017    Procedure: DIRECT LARYNGOSCOPY AND;  Surgeon: Live Bolton MD;  Location: [facility];  Service:    • ENDOSCOPY  2013    Colon endoscopy 34322 (1) - Internal & external hemorrhoids found. Stool found.   • ENDOSCOPY  2013    EGD w/ tube 91102 (1) - Normal esophagus. Gastritis in stomach. Biopsy taken. Normal dudoenum. Biopsy taken.   • FOOT SURGERY  2013    Foot/toes surgery procedure (1) - Arthroplasty of toes 4 and 5 of right foot.   • HERNIA REPAIR     • HERNIA REPAIR      hital   • HYSTERECTOMY     • LIVER BIOPSY     • NERVE BLOCK  2016    Injection for nerve block (1) - Lumbar medial branch block.   • OTHER SURGICAL HISTORY  2012    Inj(s) Tend-Sheath, Ligament, Single  (1) - [facility] ([facility])    • OTHER SURGICAL HISTORY  2013    Small Joint Injection/Aspiration  (2) - [facility]ICK[facility] ([facility])    • OTHER SURGICAL HISTORY      bowel obstruction x2   • OTHER SURGICAL HISTORY      gland removed from neck   • SUBLINGUAL SALIVARY CYST EXCISION N/A 2017    Procedure: EXCISION OF LEFT  TONGUE LESION WITH CLOSURE;  Surgeon: Live Bolton MD;  Location: [facility];  Service:    • TUBAL ABDOMINAL LIGATION     • UPPER GASTROINTESTINAL ENDOSCOPY  2013       Family History:   Family History   Problem Relation Age of Onset   • Cancer Other    • Diabetes Other    • Heart disease Other    • Hypertension Mother    • Cancer Mother    • Diabetes Mother    • Hypertension Father    • Heart  attack Father    • Cancer Father    • Heart disease Father    • Thyroid disease Maternal Aunt        Social History:   Social History     Social History   • Marital status:      Social History Main Topics   • Smoking status: Former Smoker     Packs/day: 2.00     Years: 15.00     Types: Cigarettes     Quit date: 2000   • Smokeless tobacco: Never Used   • Alcohol use No   • Drug use: No   • Sexual activity: Defer      Comment: Marital Status:      Other Topics Concern   • Not on file       Allergies:   Allergies   Allergen Reactions   • Other      Pt states that taking steroids either in pill or injection form make her have blisters in her mouth and she feels like she is on fire on the inside/ has hx of c diff and possible MRSA     • Corticosteroids Rash   • Kenalog  [Triamcinolone Acetonide] Rash   • Sulfa Antibiotics Rash     Sulfa (Sulfonamide Antibiotics)       Medications:   Prior to Admission medications    Medication Sig Start Date End Date Taking? Authorizing Provider   cetirizine (zyrTEC) 10 MG tablet Take 1 tablet by mouth Daily As Needed for Allergies. 8/31/17   Live Bolton MD   chlorthalidone (HYGROTEN) 50 MG tablet Take 50 mg by mouth Daily.    ProviderPromise MD   chlorthalidone (HYGROTEN) 50 MG tablet TAKE 1 TABLET BY MOUTH ONCE DAILY 8/28/18   Maureen Cardoso APRN   clotrimazole (MYCELEX) 10 MG dimitry Take 1 tablet by mouth 3 (Three) Times a Day. 5/23/18   Live Bolton MD   cyclobenzaprine (FLEXERIL) 10 MG tablet Take 10 mg by mouth 3 (Three) Times a Day As Needed for Muscle Spasms.    ProviderPromise MD   DULoxetine (CYMBALTA) 30 MG capsule Take 30 mg by mouth Daily.    Promise Tovar MD   gabapentin (NEURONTIN) 800 MG tablet Take 800 mg by mouth 4 (Four) Times a Day.    Promise Tovar MD   linaclotide (LINZESS) 145 MCG capsule capsule Take 145 mcg by mouth 2 (Two) Times a Day.    Promise Tovar MD   magnesium hydroxide (MILK OF MAGNESIA)  2400 MG/10ML suspension suspension Take 10 mL by mouth Daily As Needed (constipation). 11/22/17   eKndall Chen MD   magnesium oxide (MAGOX) 400 (241.3 MG) MG tablet tablet Take 400 mg by mouth Daily.    Promise Tovar MD   meloxicam (MOBIC) 15 MG tablet Take 15 mg by mouth Every Night.    Promise Tovar MD   mupirocin (BACTROBAN) 2 % ointment Apply  topically to the appropriate area as directed 3 (Three) Times a Day As Needed.    Promise Tovar MD   nystatin (MYCOSTATIN) 800850 UNIT/GM powder Apply 1 application topically As Needed. 10/11/17   Promise Tovar MD   omeprazole (priLOSEC) 40 MG capsule Take 40 mg by mouth Daily.    Promise Tovar MD   ondansetron (ZOFRAN) 4 MG tablet Take 4 mg by mouth Every 8 (Eight) Hours As Needed. 9/25/17   Promise Tovar MD   oxyCODONE-acetaminophen (PERCOCET) 7.5-325 MG per tablet Take 1 tablet by mouth 3 (Three) Times a Day. 1/24/18   Promise Tovar MD   phenylephrine-mineral oil-petrolatum 0.25-14-74.9 % ointment hemorrhoidal ointment Insert 1 application into the rectum 3 (Three) Times a Day As Needed (rectal pain). 11/22/17   Kendall Chen MD   ranitidine (ZANTAC) 150 MG capsule Take 2 capsules by mouth Every Evening. 1/31/18 1/31/19  Live Bolton MD   spironolactone (ALDACTONE) 25 MG tablet Take 25 mg by mouth Daily.    ProviderPromise MD       Review of Systems:    Constitutional: Negative for activity change, positive appetite change, negative chills, negative fever.   HENT: Negative for congestion, ear discharge, ear pain, hearing loss, rhinorrhea, sneezing, sore throat and trouble swallowing.    Eyes: Negative for photophobia, pain, discharge and visual disturbance.   Respiratory: Negative for cough, chest tightness, shortness of breath and wheezing.    Cardiovascular: Negative for chest pain and palpitations.   Gastrointestinal: positive for abdominal pain, negative blood in stool, negative diarrhea,  positive nausea and vomiting.   Endocrine: Negative for polydipsia and polyuria.   Genitourinary: Negative for difficulty urinating, dysuria, frequency, hematuria and urgency.   Skin: Negative for rash.   Neurological: Negative for dizziness, syncope, weakness, light-headedness, numbness and headaches.         Physical Exam:    Temp:  [98.2 °F (36.8 °C)] 98.2 °F (36.8 °C)  Heart Rate:  [] 84  Resp:  [18-20] 18  BP: (116-142)/(67-95) 116/67    General: Overall patient is ill-appearing and in slight distress.    HEENT: Head: Normocephalic and atraumatic. Right Ear: External ear normal.   Left Ear: External ear normal. Nose: Nose normal. Mouth/Throat: Oropharynx is clear and moist.   Eyes: Conjunctivae and EOM are normal. Pupils are equal, round, and reactive to light. Right eye exhibits no discharge. Left eye exhibits no discharge.   Neck: Normal range of motion. Neck supple. No JVD present. No tracheal deviation present. No thyromegaly present.   Heart: Normal rate, regular rhythm, normal heart sounds and intact distal pulses.  Exam reveals no gallop and no friction rub. No murmur heard.  Pulmonary: Effort normal and breath sounds normal. No stridor. No respiratory distress. He has no wheezes. No rales or tenderness.   Abdominal: Patient is severely tender in all 4 quadrants.  Patient has present and normal active bowel sounds throughout.  Patient is without any peritoneal signs.  Patient does have a swollen abdomen with hepatosplenomegaly.    Musculoskeletal: No cyanosis, clubbing or edema.  Lymph: No cervical adenopathy.   Neurological: Patient is alert and oriented to person, place, and time.  Skin: Skin is warm. No rash noted. Patient is not diaphoretic. No erythema. No pallor.     Results Reviewed:  I have personally reviewed current lab, radiology, and data and agree with results.  Lab Results (last 24 hours)     Procedure Component Value Units Date/Time    [place] Draw [[id number]] Collected:  09/01/18  0021    Specimen:  Blood Updated:  09/01/18 0130    Narrative:       The following orders were created for panel order [place] Draw.  Procedure                               Abnormality         Status                     ---------                               -----------         ------                     Light Blue Top[[id number]]                                   Final result               Green Top (Gel)[[id number]]                                  Final result               Lavender Top[[id number]]                                     Final result               Gold Top - SST[[id number]]                                   Final result                 Please view results for these tests on the individual orders.    Light Blue Top [[id number]] Collected:  09/01/18 0021    Specimen:  Blood Updated:  09/01/18 0130     Extra Tube hold for add-on     Comment: Auto resulted       Green Top (Gel) [[id number]] Collected:  09/01/18 0021    Specimen:  Blood Updated:  09/01/18 0130     Extra Tube Hold for add-ons.     Comment: Auto resulted.       Lavender Top [[id number]] Collected:  09/01/18 0021    Specimen:  Blood Updated:  09/01/18 0130     Extra Tube hold for add-on     Comment: Auto resulted       Gold Top - SST [[id number]] Collected:  09/01/18 0021    Specimen:  Blood Updated:  09/01/18 0130     Extra Tube Hold for add-ons.     Comment: Auto resulted.       Urinalysis With Microscopic If Indicated (No Culture) - Urine, Clean Catch [[id number]]  (Normal) Collected:  09/01/18 0043    Specimen:  Urine from Urine, Clean Catch Updated:  09/01/18 0110     Color, UA Yellow     Appearance, UA Clear     pH, UA 6.0     Specific Gravity, UA 1.028     Glucose, UA Negative     Ketones, UA Negative     Bilirubin, UA Negative     Blood, UA Negative     Protein, UA Negative     Leuk Esterase, UA Negative     Nitrite, UA Negative     Urobilinogen, UA 1.0 E.U./dL    Narrative:       Urine microscopic not indicated.    CBC & Differential  [[id number]] Collected:  09/01/18 0021    Specimen:  Blood Updated:  09/01/18 0051    Narrative:       The following orders were created for panel order CBC & Differential.  Procedure                               Abnormality         Status                     ---------                               -----------         ------                     CBC Auto Differential[[id number]]        Abnormal            Final result                 Please view results for these tests on the individual orders.    CBC Auto Differential [[id number]]  (Abnormal) Collected:  09/01/18 0021    Specimen:  Blood Updated:  09/01/18 0051     WBC 7.98 10*3/mm3      RBC 5.57 (H) 10*6/mm3      Hemoglobin 15.0 g/dL      Hematocrit 43.7 %      MCV 78.5 (L) fL      MCH 26.9 pg      MCHC 34.3 g/dL      RDW 15.1 (H) %      RDW-SD 43.5 fl      MPV 11.4 fL      Platelets 118 (L) 10*3/mm3      Neutrophil % 73.8 %      Lymphocyte % 18.0 %      Monocyte % 6.1 %      Eosinophil % 1.6 %      Basophil % 0.1 %      Immature Grans % 0.4 %      Neutrophils, Absolute 5.88 10*3/mm3      Lymphocytes, Absolute 1.44 10*3/mm3      Monocytes, Absolute 0.49 10*3/mm3      Eosinophils, Absolute 0.13 10*3/mm3      Basophils, Absolute 0.01 10*3/mm3      Immature Grans, Absolute 0.03 (H) 10*3/mm3     Comprehensive Metabolic Panel [[id number]]  (Abnormal) Collected:  09/01/18 0021    Specimen:  Blood Updated:  09/01/18 0050     Glucose 123 (H) mg/dL      BUN 17 mg/dL      Creatinine 1.08 (H) mg/dL      Sodium 137 mmol/L      Potassium 3.6 mmol/L      Chloride 101 mmol/L      CO2 24.0 mmol/L      Calcium 9.3 mg/dL      Total Protein 7.6 g/dL      Albumin 4.20 g/dL      ALT (SGPT) 60 (H) U/L      AST (SGOT) 69 (H) U/L      Alkaline Phosphatase 96 U/L      Total Bilirubin 0.7 mg/dL      eGFR Non African Amer 52 mL/min/1.73      Globulin 3.4 gm/dL      A/G Ratio 1.2 g/dL      BUN/Creatinine Ratio 15.7     Anion Gap 12.0 mmol/L     Lipase [[id number]]  (Normal) Collected:  09/01/18  0021    Specimen:  Blood Updated:  09/01/18 0050     Lipase 76 U/L         Imaging Results (last 24 hours)     Procedure Component Value Units Date/Time    CT Abdomen Pelvis With Contrast [[id number]] Collected:  09/01/18 0205     Updated:  09/01/18 0239    Narrative:       CT abdomen and pelvis with contrast on  9/1/2018     CLINICAL INDICATION: Generalized abdominal pain, constipation,  history of obstruction    TECHNIQUE: Multiple axial images are obtained throughout the  abdomen and pelvis following the administration of IV contrast,  93 mL of Isovue-300 contrast was administered intravenously  without complication. This exam was performed according to our  departmental dose-optimization program, which includes automated  exposure control, adjustment of the mA and/or kV according to  patient size and/or use of iterative reconstruction technique.   Total DLP is 564.4 mGy*cm.    COMPARISON: 11/21/2017    FINDINGS:  Abdomen: There is minimal basilar atelectasis. There is mild  fatty infiltration of the liver. The patient is status post  cholecystectomy. Splenomegaly is noted with the spleen measuring  17 cm in greatest yghf-jp-tver length. There is a slightly  irregular contour of the liver consistent with changes of  cirrhosis. The solid abdominal organs are otherwise unremarkable.  Small esophageal and gastric varices are noted. There is no  abdominal adenopathy. There is no free fluid or free air within  the abdomen. There are dilated fluid-filled loops of mid small  bowel with decompressed distal small bowel in the pelvis  consistent with a small bowel obstruction. There are multiple  adherent loops of bowel along the anterior abdominal and anterior  pelvic wall likely related to adhesions which is most likely the  cause of this small bowel obstruction.    Pelvis: The patient is status post hysterectomy. There is no free  fluid in the pelvis. There is no pelvic adenopathy. The pelvic  portion of the GI tract is  otherwise unremarkable. No bony  abnormality is noted.      Impression:       1. Findings consistent with a small bowel obstruction most likely  related to an adhesion.  2. Changes of cirrhosis with splenomegaly and evidence of portal  hypertension.    Electronically signed by:  Chetan Huston  9/1/2018 2:38 AM CDT  Workstation: [id number]    XR Abdomen 2 View With Chest 1 View [[id number]] Collected:  09/01/18 0048     Updated:  09/01/18 0121    Narrative:       Acute abdominal series on 9/1/2018    CLINICAL INDICATION: Constipation, history of obstruction    COMPARISON: 11/20/2017    FINDINGS:    CHEST: There is mild linear atelectasis in the left lower lung.  The lungs are otherwise clear. Cardiac, hilar and mediastinal  contours are within normal limits. Pulmonary vascularity is  within normal limits.    ABDOMEN: There are multiple air-fluid levels with dilated loops  of small bowel in the midabdomen consistent with a small bowel  obstruction. No increased stool to suggest significant  constipation is noted. Calcification in left pelvis is consistent  with phlebolith. There is no free air. Minimal degenerative  changes are noted in the spine.      Impression:       1. No acute cardiopulmonary disease.  2. Findings consistent with small bowel obstruction.    Electronically signed by:  Chetan Huston  9/1/2018 1:20 AM CDT  Workstation: [id number]          Assessment/Plan         Hospital Problem List     SBO (small bowel obstruction)          Assessment / Plan:    Acute on chronic SBO secondary to adhesions - IV fluids, nothing by mouth, NG tube to suction.  General surgery has been consult.  Patient does have bowel sounds present.  We'll treat pain with morphine however when possible should avoid opiate pain medicine as this could slow her gut motility.    Fatty liver disease with cirrhosis and splenomegaly - will put ordered for GI to follow the patient.  Patient will have abdominal ultrasound to  evaluate her portal hypertension and splenomegaly.    Thrombocytopenia - is likely secondary to her spinal megaly.  We will evaluate with ultrasound.    Acute NPO status - patient is on multiple home medications.  Will have to hold these for now and restart when possible as patient is nothing by mouth and has NG tube to suction..  When possible change by mouth to IV form.    Hypertension - will cover with IV labetalol when necessary with parameters.     Ethics - full code    dvt prophylaxis  - heparin                      This document has been electronically signed by Jeff Gomez DO on September 1, 2018 4:17 AM                      Electronically signed by Live Gomez DO at 9/1/2018  7:29 AM          Emergency Department Notes      Ollie Amado MD at 9/1/2018  2:46 AM          Subjective   56yo female pmh significant cholecystectomy/appendectomy/hysterectomy/laparotomy presents ED c/o 2d hx progressive abdominal distension/cramping periumbilical pain/nausea.  ROS neg fever/vomiting/melena/hematochoezia/hematemesis/dysuria/chest pain/soa.  Pt reports (+) passage flatus/bm earlier today.        Abdominal Pain   Pain location:  Generalized  Pain quality: aching and cramping    Pain radiates to:  Does not radiate  Pain severity:  Moderate  Onset quality:  Gradual  Duration:  2 days  Timing:  Constant  Progression:  Worsening  Chronicity:  New  Associated symptoms: no diarrhea and no vomiting        Review of Systems   Constitutional: Positive for appetite change.   HENT: Negative.    Respiratory: Negative.    Cardiovascular: Negative.    Gastrointestinal: Positive for abdominal distention and abdominal pain. Negative for blood in stool, diarrhea and vomiting.   Genitourinary: Negative.    Musculoskeletal: Negative.    Skin: Negative.    All other systems reviewed and are negative.      Past Medical History:   Diagnosis Date   • Acid reflux    • Altered bowel function    • Arthropathy of lumbar  "facet joint    • Bleeding disorder (CMS/HCC)    • C. difficile diarrhea    • Constipation    • Corns and callus    • Depression    • Disease related peripheral neuropathy    • Epigastric pain    • Fatty liver    • Hammer toe    • Headache    • Hiatal hernia    • Hyperlipidemia    • Knee pain    • Localized, primary osteoarthritis of the ankle and foot     Localized, primary osteoarthritis of the ankle and/or foot   • Mendoza's metatarsalgia     Mendoza's metatarsalgia - 2nd interspace on right   • Nausea and vomiting    • Neuralgia and neuritis     Neuralgia, neuritis, and radiculitis, unspecified   • Neuropathy    • Obstructive sleep apnea     Obstructive sleep apnea (adult) (pediatric)    • CHAI on CPAP     \"C-Pap at night  (unconfirmed)\"   • Osteoarthritis    • Pain in foot     Pain in unspecified foot - sees a podiatrist   • Pain in joint, ankle and foot     Joint pain in ankle and foot      • Pain radiating to back     Pain radiating to lumbar region of back   • Plantar fasciitis    • PONV (postoperative nausea and vomiting)    • Restless leg syndrome    • Secondary hypertension     Secondary hypertension, unspecified   • Sinusitis    • Sleep apnea    • Tongue anomaly     lesion       Allergies   Allergen Reactions   • Other      Pt states that taking steroids either in pill or injection form make her have blisters in her mouth and she feels like she is on fire on the inside/ has hx of c diff and possible MRSA     • Corticosteroids Rash   • Kenalog  [Triamcinolone Acetonide] Rash   • Sulfa Antibiotics Rash     Sulfa (Sulfonamide Antibiotics)       Past Surgical History:   Procedure Laterality Date   •  SECTION     • CHOLECYSTECTOMY     • COLONOSCOPY  2013   • DIRECT LARYNGOSCOPY, ESOPHAGOSCOPY, BRONCHOSCOPY N/A 2017    Procedure: DIRECT LARYNGOSCOPY AND;  Surgeon: Live Bolton MD;  Location: [facility] OR;  Service:    • ENDOSCOPY  2013    Colon endoscopy 23037 (1) - Internal & external " hemorrhoids found. Stool found.   • ENDOSCOPY  07/01/2013    EGD w/ tube 86687 (1) - Normal esophagus. Gastritis in stomach. Biopsy taken. Normal dudoenum. Biopsy taken.   • FOOT SURGERY  02/26/2013    Foot/toes surgery procedure (1) - Arthroplasty of toes 4 and 5 of right foot.   • HERNIA REPAIR     • HERNIA REPAIR      hital   • HYSTERECTOMY     • LIVER BIOPSY     • NERVE BLOCK  07/25/2016    Injection for nerve block (1) - Lumbar medial branch block.   • OTHER SURGICAL HISTORY  12/03/2012    Inj(s) Tend-Sheath, Ligament, Single 20550 (1) - [facility]TER NICKERSON ([facility])    • OTHER SURGICAL HISTORY  06/24/2013    Small Joint Injection/Aspiration 20600 (2) - [facility]ICK[facility] ([facility])    • OTHER SURGICAL HISTORY  2011    bowel obstruction x2   • OTHER SURGICAL HISTORY      gland removed from neck   • SUBLINGUAL SALIVARY CYST EXCISION N/A 8/1/2017    Procedure: EXCISION OF LEFT  TONGUE LESION WITH CLOSURE;  Surgeon: Live Bolton MD;  Location: [facility];  Service:    • TUBAL ABDOMINAL LIGATION     • UPPER GASTROINTESTINAL ENDOSCOPY  07/01/2013       Family History   Problem Relation Age of Onset   • Cancer Other    • Diabetes Other    • Heart disease Other    • Hypertension Mother    • Cancer Mother    • Diabetes Mother    • Hypertension Father    • Heart attack Father    • Cancer Father    • Heart disease Father    • Thyroid disease Maternal Aunt        Social History     Social History   • Marital status:      Social History Main Topics   • Smoking status: Former Smoker     Packs/day: 2.00     Years: 15.00     Types: Cigarettes     Quit date: 2000   • Smokeless tobacco: Never Used   • Alcohol use No   • Drug use: No   • Sexual activity: Defer      Comment: Marital Status:      Other Topics Concern   • Not on file           Objective   Physical Exam   Constitutional: She is oriented to person, place, and time. She appears well-developed and well-nourished.   HENT:   Head: Normocephalic and  atraumatic.   Mouth/Throat: Oropharynx is clear and moist.   Eyes: Pupils are equal, round, and reactive to light.   Neck: Neck supple. No JVD present. No tracheal deviation present.   Cardiovascular: Normal rate, regular rhythm, normal heart sounds and intact distal pulses.  Exam reveals no gallop and no friction rub.    No murmur heard.  Pulmonary/Chest: Effort normal and breath sounds normal. She has no wheezes. She has no rales.   Abdominal: Soft. She exhibits distension. Bowel sounds are increased. There is generalized tenderness. There is no rigidity, no rebound, no guarding and no CVA tenderness.   Genitourinary: Rectal exam shows guaiac negative stool.   Musculoskeletal: She exhibits no edema.   Lymphadenopathy:     She has no cervical adenopathy.   Neurological: She is alert and oriented to person, place, and time.   Skin: Skin is warm and dry.   Nursing note and vitals reviewed.      Procedures          ED Course  ED Course as of Sep 01 0249   Sat Sep 01, 2018   0249 Dr. Cho consulted.  [SD]      ED Course User Index  [SD] Ollie Amado MD      Labs Reviewed   COMPREHENSIVE METABOLIC PANEL - Abnormal; Notable for the following:        Result Value    Glucose 123 (*)     Creatinine 1.08 (*)     ALT (SGPT) 60 (*)     AST (SGOT) 69 (*)     All other components within normal limits   CBC WITH AUTO DIFFERENTIAL - Abnormal; Notable for the following:     RBC 5.57 (*)     MCV 78.5 (*)     RDW 15.1 (*)     Platelets 118 (*)     Immature Grans, Absolute 0.03 (*)     All other components within normal limits   LIPASE - Normal   URINALYSIS W/ MICROSCOPIC IF INDICATED (NO CULTURE) - Normal    Narrative:     Urine microscopic not indicated.   RAINBOW DRAW    Narrative:     The following orders were created for panel order [place] Draw.  Procedure                               Abnormality         Status                     ---------                               -----------         ------                     Light  Blue Top[[id number]]                                   Final result               Green Top (Gel)[[id number]]                                  Final result               Lavender Top[[id number]]                                     Final result               Gold Top - SST[[id number]]                                   Final result                 Please view results for these tests on the individual orders.   CBC AND DIFFERENTIAL    Narrative:     The following orders were created for panel order CBC & Differential.  Procedure                               Abnormality         Status                     ---------                               -----------         ------                     CBC Auto Differential[[id number]]        Abnormal            Final result                 Please view results for these tests on the individual orders.   LIGHT BLUE TOP   GREEN TOP   LAVENDER TOP   GOLD TOP - SST     Xr Abdomen 2 View With Chest 1 View    Result Date: 9/1/2018  Narrative: Acute abdominal series on 9/1/2018 CLINICAL INDICATION: Constipation, history of obstruction COMPARISON: 11/20/2017 FINDINGS: CHEST: There is mild linear atelectasis in the left lower lung. The lungs are otherwise clear. Cardiac, hilar and mediastinal contours are within normal limits. Pulmonary vascularity is within normal limits. ABDOMEN: There are multiple air-fluid levels with dilated loops of small bowel in the midabdomen consistent with a small bowel obstruction. No increased stool to suggest significant constipation is noted. Calcification in left pelvis is consistent with phlebolith. There is no free air. Minimal degenerative changes are noted in the spine.     Impression: 1. No acute cardiopulmonary disease. 2. Findings consistent with small bowel obstruction. Electronically signed by:  Chetan Huston  9/1/2018 1:20 AM CDT Workstation: [id number]    Ct Abdomen Pelvis With Contrast    Result Date: 9/1/2018  Narrative: CT abdomen and pelvis  with contrast on  9/1/2018 CLINICAL INDICATION: Generalized abdominal pain, constipation, history of obstruction TECHNIQUE: Multiple axial images are obtained throughout the abdomen and pelvis following the administration of IV contrast, 93 mL of Isovue-300 contrast was administered intravenously without complication. This exam was performed according to our departmental dose-optimization program, which includes automated exposure control, adjustment of the mA and/or kV according to patient size and/or use of iterative reconstruction technique. Total DLP is 564.4 mGy*cm. COMPARISON: 11/21/2017 FINDINGS: Abdomen: There is minimal basilar atelectasis. There is mild fatty infiltration of the liver. The patient is status post cholecystectomy. Splenomegaly is noted with the spleen measuring 17 cm in greatest [id number] length. There is a slightly irregular contour of the liver consistent with changes of cirrhosis. The solid abdominal organs are otherwise unremarkable. Small esophageal and gastric varices are noted. There is no abdominal adenopathy. There is no free fluid or free air within the abdomen. There are dilated fluid-filled loops of mid small bowel with decompressed distal small bowel in the pelvis consistent with a small bowel obstruction. There are multiple adherent loops of bowel along the anterior abdominal and anterior pelvic wall likely related to adhesions which is most likely the cause of this small bowel obstruction. Pelvis: The patient is status post hysterectomy. There is no free fluid in the pelvis. There is no pelvic adenopathy. The pelvic portion of the GI tract is otherwise unremarkable. No bony abnormality is noted.     Impression: 1. Findings consistent with a small bowel obstruction most likely related to an adhesion. 2. Changes of cirrhosis with splenomegaly and evidence of portal hypertension. Electronically signed by:  Chetan Huston  9/1/2018 2:38 AM CDT Workstation:  [id number]    Xr Wrist 3+ View Bilateral    Result Date: 8/7/2018  Narrative: Ordering Provider:  Justus Lewis MD Ordering Diagnosis/Indication:  Bilateral wrist pain Procedure:  XR WRIST 3+ VW BILATERAL Exam Date:  8/7/18 COMPARISON:  Not applicable, no relevant images available.     Impression:  3 views of both wrists show acceptable position and alignment with no evidence of acute bony abnormality.  No fracture or dislocation is noted.  No sign of any significant arthritic change. Justus Lewis MD 8/7/18                [facility]      Final diagnoses:   SBO (small bowel obstruction)   Cirrhosis of liver without ascites, unspecified hepatic cirrhosis type (CMS/HCC)            Ollie Amado MD  09/01/18 0249       Ollie Amado MD  09/01/18 0249      Electronically signed by Ollie Amado MD at 9/1/2018  2:49 AM       Hospital Medications (active)       Dose Frequency Start End    dextrose 5 % and sodium chloride 0.45 % infusion 100 mL/hr Continuous 9/1/2018     Sig - Route: Infuse 100 mL/hr into a venous catheter Continuous. - Intravenous    famotidine (PEPCID) injection 20 mg 20 mg Once 9/1/2018 9/1/2018    Sig - Route: Infuse 2 mL into a venous catheter 1 (One) Time. - Intravenous    heparin (porcine) 5000 UNIT/ML injection 5,000 Units 5,000 Units Every 8 Hours Scheduled 9/1/2018     Sig - Route: Inject 1 mL under the skin into the appropriate area as directed Every 8 (Eight) Hours. - Subcutaneous    HYDROmorphone (DILAUDID) injection 1 mg 1 mg Once 9/1/2018 9/1/2018    Sig - Route: Infuse 0.5 mL into a venous catheter 1 (One) Time. - Intravenous    iopamidol (ISOVUE-300) 61 % injection 100 mL 100 mL Once in Imaging 9/1/2018 9/1/2018    Sig - Route: Infuse 100 mL into a venous catheter Once. - Intravenous    labetalol (NORMODYNE,TRANDATE) injection 10 mg 10 mg Every 6 Hours PRN 9/1/2018     Sig - Route: Infuse 2 mL into a venous catheter Every 6 (Six) Hours As Needed for High Blood  "Pressure. - Intravenous    LORazepam (ATIVAN) injection 0.5 mg 0.5 mg Once 9/1/2018 9/1/2018    Sig - Route: Infuse 0.25 mL into a venous catheter 1 (One) Time. - Intravenous    morphine injection 2 mg 2 mg Every 4 Hours PRN 9/1/2018 9/11/2018    Sig - Route: Infuse 1 mL into a venous catheter Every 4 (Four) Hours As Needed for Severe Pain . - Intravenous    morphine injection 4 mg 4 mg Once 9/1/2018 9/1/2018    Sig - Route: Infuse 1 mL into a venous catheter 1 (One) Time. - Intravenous    ondansetron (ZOFRAN) injection 4 mg 4 mg Once 9/1/2018 9/1/2018    Sig - Route: Infuse 2 mL into a venous catheter 1 (One) Time. - Intravenous    Cosign for Ordering: Accepted by Ollie Amado MD on 9/1/2018 12:34 AM    ondansetron (ZOFRAN) injection 4 mg 4 mg Every 6 Hours PRN 9/1/2018     Sig - Route: Infuse 2 mL into a venous catheter Every 6 (Six) Hours As Needed for Nausea or Vomiting. - Intravenous    pantoprazole (PROTONIX) injection 40 mg 40 mg Every Early Morning 9/1/2018     Sig - Route: Infuse 10 mL into a venous catheter Every Morning. - Intravenous    pneumococcal polysaccharide 23-valent (PNEUMOVAX-23) vaccine 0.5 mL 0.5 mL During Hospitalization 9/1/2018     Sig - Route: Inject 0.5 mL into the appropriate muscle as directed by prescriber During Hospitalization for Immunization. - Intramuscular    Cosign for Ordering: Required by Live Gomez DO    sodium chloride 0.9 % flush 1-10 mL 1-10 mL As Needed 9/1/2018     Sig - Route: Infuse 1-10 mL into a venous catheter As Needed for Line Care. - Intravenous    sodium chloride 0.9 % flush 10 mL 10 mL As Needed 9/1/2018     Sig - Route: Infuse 10 mL into a venous catheter As Needed for Line Care. - Intravenous    Cosign for Ordering: Accepted by Ollie Amado MD on 9/1/2018 12:34 AM    sodium chloride 0.9 % flush 10 mL 10 mL As Needed 9/1/2018     Sig - Route: Infuse 10 mL into a venous catheter As Needed for Line Care. - Intravenous    Linked Group 1:  \"And\" " Linked Group Details        sodium chloride 0.9 % infusion 125 mL/hr Continuous 9/1/2018     Sig - Route: Infuse 125 mL/hr into a venous catheter Continuous. - Intravenous            Lab Results (last 24 hours)     Procedure Component Value Units Date/Time    [place] Draw [[id number]] Collected:  09/01/18 0021    Specimen:  Blood Updated:  09/01/18 0130    Narrative:       The following orders were created for panel order [place] Draw.  Procedure                               Abnormality         Status                     ---------                               -----------         ------                     Light Blue Top[[id number]]                                   Final result               Green Top (Gel)[[id number]]                                  Final result               Lavender Top[[id number]]                                     Final result               Gold Top - SST[[id number]]                                   Final result                 Please view results for these tests on the individual orders.    Light Blue Top [[id number]] Collected:  09/01/18 0021    Specimen:  Blood Updated:  09/01/18 0130     Extra Tube hold for add-on     Comment: Auto resulted       Green Top (Gel) [[id number]] Collected:  09/01/18 0021    Specimen:  Blood Updated:  09/01/18 0130     Extra Tube Hold for add-ons.     Comment: Auto resulted.       Lavender Top [[id number]] Collected:  09/01/18 0021    Specimen:  Blood Updated:  09/01/18 0130     Extra Tube hold for add-on     Comment: Auto resulted       Gold Top - SST [[id number]] Collected:  09/01/18 0021    Specimen:  Blood Updated:  09/01/18 0130     Extra Tube Hold for add-ons.     Comment: Auto resulted.       Urinalysis With Microscopic If Indicated (No Culture) - Urine, Clean Catch [[id number]]  (Normal) Collected:  09/01/18 0043    Specimen:  Urine from Urine, Clean Catch Updated:  09/01/18 0110     Color, UA Yellow     Appearance, UA Clear     pH, UA 6.0     Specific  Gravity, UA 1.028     Glucose, UA Negative     Ketones, UA Negative     Bilirubin, UA Negative     Blood, UA Negative     Protein, UA Negative     Leuk Esterase, UA Negative     Nitrite, UA Negative     Urobilinogen, UA 1.0 E.U./dL    Narrative:       Urine microscopic not indicated.    CBC & Differential [[id number]] Collected:  09/01/18 0021    Specimen:  Blood Updated:  09/01/18 0051    Narrative:       The following orders were created for panel order CBC & Differential.  Procedure                               Abnormality         Status                     ---------                               -----------         ------                     CBC Auto Differential[[id number]]        Abnormal            Final result                 Please view results for these tests on the individual orders.    CBC Auto Differential [[id number]]  (Abnormal) Collected:  09/01/18 0021    Specimen:  Blood Updated:  09/01/18 0051     WBC 7.98 10*3/mm3      RBC 5.57 (H) 10*6/mm3      Hemoglobin 15.0 g/dL      Hematocrit 43.7 %      MCV 78.5 (L) fL      MCH 26.9 pg      MCHC 34.3 g/dL      RDW 15.1 (H) %      RDW-SD 43.5 fl      MPV 11.4 fL      Platelets 118 (L) 10*3/mm3      Neutrophil % 73.8 %      Lymphocyte % 18.0 %      Monocyte % 6.1 %      Eosinophil % 1.6 %      Basophil % 0.1 %      Immature Grans % 0.4 %      Neutrophils, Absolute 5.88 10*3/mm3      Lymphocytes, Absolute 1.44 10*3/mm3      Monocytes, Absolute 0.49 10*3/mm3      Eosinophils, Absolute 0.13 10*3/mm3      Basophils, Absolute 0.01 10*3/mm3      Immature Grans, Absolute 0.03 (H) 10*3/mm3     Comprehensive Metabolic Panel [[id number]]  (Abnormal) Collected:  09/01/18 0021    Specimen:  Blood Updated:  09/01/18 0050     Glucose 123 (H) mg/dL      BUN 17 mg/dL      Creatinine 1.08 (H) mg/dL      Sodium 137 mmol/L      Potassium 3.6 mmol/L      Chloride 101 mmol/L      CO2 24.0 mmol/L      Calcium 9.3 mg/dL      Total Protein 7.6 g/dL      Albumin 4.20 g/dL      ALT  (SGPT) 60 (H) U/L      AST (SGOT) 69 (H) U/L      Alkaline Phosphatase 96 U/L      Total Bilirubin 0.7 mg/dL      eGFR Non African Amer 52 mL/min/1.73      Globulin 3.4 gm/dL      A/G Ratio 1.2 g/dL      BUN/Creatinine Ratio 15.7     Anion Gap 12.0 mmol/L     Lipase [[id number]]  (Normal) Collected:  09/01/18 0021    Specimen:  Blood Updated:  09/01/18 0050     Lipase 76 U/L         Imaging Results (last 24 hours)     Procedure Component Value Units Date/Time    CT Abdomen Pelvis With Contrast [[id number]] Collected:  09/01/18 0205     Updated:  09/01/18 0239    Narrative:       CT abdomen and pelvis with contrast on  9/1/2018     CLINICAL INDICATION: Generalized abdominal pain, constipation,  history of obstruction    TECHNIQUE: Multiple axial images are obtained throughout the  abdomen and pelvis following the administration of IV contrast,  93 mL of Isovue-300 contrast was administered intravenously  without complication. This exam was performed according to our  departmental dose-optimization program, which includes automated  exposure control, adjustment of the mA and/or kV according to  patient size and/or use of iterative reconstruction technique.   Total DLP is 564.4 mGy*cm.    COMPARISON: 11/21/2017    FINDINGS:  Abdomen: There is minimal basilar atelectasis. There is mild  fatty infiltration of the liver. The patient is status post  cholecystectomy. Splenomegaly is noted with the spleen measuring  17 cm in greatest [id number] length. There is a slightly  irregular contour of the liver consistent with changes of  cirrhosis. The solid abdominal organs are otherwise unremarkable.  Small esophageal and gastric varices are noted. There is no  abdominal adenopathy. There is no free fluid or free air within  the abdomen. There are dilated fluid-filled loops of mid small  bowel with decompressed distal small bowel in the pelvis  consistent with a small bowel obstruction. There are multiple  adherent loops of bowel  along the anterior abdominal and anterior  pelvic wall likely related to adhesions which is most likely the  cause of this small bowel obstruction.    Pelvis: The patient is status post hysterectomy. There is no free  fluid in the pelvis. There is no pelvic adenopathy. The pelvic  portion of the GI tract is otherwise unremarkable. No bony  abnormality is noted.      Impression:       1. Findings consistent with a small bowel obstruction most likely  related to an adhesion.  2. Changes of cirrhosis with splenomegaly and evidence of portal  hypertension.    Electronically signed by:  Chetan Huston  9/1/2018 2:38 AM CDT  Workstation: [email or web address]    XR Abdomen 2 View With Chest 1 View [[id number]] Collected:  09/01/18 0048     Updated:  09/01/18 0121    Narrative:       Acute abdominal series on 9/1/2018    CLINICAL INDICATION: Constipation, history of obstruction    COMPARISON: 11/20/2017    FINDINGS:    CHEST: There is mild linear atelectasis in the left lower lung.  The lungs are otherwise clear. Cardiac, hilar and mediastinal  contours are within normal limits. Pulmonary vascularity is  within normal limits.    ABDOMEN: There are multiple air-fluid levels with dilated loops  of small bowel in the midabdomen consistent with a small bowel  obstruction. No increased stool to suggest significant  constipation is noted. Calcification in left pelvis is consistent  with phlebolith. There is no free air. Minimal degenerative  changes are noted in the spine.      Impression:       1. No acute cardiopulmonary disease.  2. Findings consistent with small bowel obstruction.    Electronically signed by:  Chetan Huston  9/1/2018 1:20 AM CDT  Workstation: [email or web address]        Operative/Procedure Notes (last 24 hours) (Notes from 8/31/2018 10:18 AM through 9/1/2018 10:18 AM)     No notes of this type exist for this encounter.        Physician Progress Notes (last 24 hours) (Notes from 8/31/2018 10:18 AM through 9/1/2018  10:18 AM)     No notes of this type exist for this encounter.        Medical Student Notes (last 24 hours) (Notes from 8/31/2018 10:18 AM through 9/1/2018 10:18 AM)     No notes of this type exist for this encounter.        Consult Notes (last 24 hours) (Notes from 8/31/2018 10:18 AM through 9/1/2018 10:18 AM)     No notes of this type exist for this encounter.

## 2018-09-01 NOTE — ED PROVIDER NOTES
"Subjective   58yo female pmh significant cholecystectomy/appendectomy/hysterectomy/laparotomy presents ED c/o 2d hx progressive abdominal distension/cramping periumbilical pain/nausea.  ROS neg fever/vomiting/melena/hematochoezia/hematemesis/dysuria/chest pain/soa.  Pt reports (+) passage flatus/bm earlier today.        Abdominal Pain   Pain location:  Generalized  Pain quality: aching and cramping    Pain radiates to:  Does not radiate  Pain severity:  Moderate  Onset quality:  Gradual  Duration:  2 days  Timing:  Constant  Progression:  Worsening  Chronicity:  New  Associated symptoms: no diarrhea and no vomiting        Review of Systems   Constitutional: Positive for appetite change.   HENT: Negative.    Respiratory: Negative.    Cardiovascular: Negative.    Gastrointestinal: Positive for abdominal distention and abdominal pain. Negative for blood in stool, diarrhea and vomiting.   Genitourinary: Negative.    Musculoskeletal: Negative.    Skin: Negative.    All other systems reviewed and are negative.      Past Medical History:   Diagnosis Date   • Acid reflux    • Altered bowel function    • Arthropathy of lumbar facet joint    • Bleeding disorder (CMS/HCC)    • C. difficile diarrhea 2015   • Constipation    • Corns and callus    • Depression    • Disease related peripheral neuropathy    • Epigastric pain    • Fatty liver    • Hammer toe    • Headache    • Hiatal hernia    • Hyperlipidemia    • Knee pain    • Localized, primary osteoarthritis of the ankle and foot     Localized, primary osteoarthritis of the ankle and/or foot   • Mendoza's metatarsalgia     Mendoza's metatarsalgia - 2nd interspace on right   • Nausea and vomiting    • Neuralgia and neuritis     Neuralgia, neuritis, and radiculitis, unspecified   • Neuropathy    • Obstructive sleep apnea     Obstructive sleep apnea (adult) (pediatric)    • CHAI on CPAP     \"C-Pap at night  (unconfirmed)\"   • Osteoarthritis    • Pain in foot     Pain in unspecified " foot - sees a podiatrist   • Pain in joint, ankle and foot     Joint pain in ankle and foot      • Pain radiating to back     Pain radiating to lumbar region of back   • Plantar fasciitis    • PONV (postoperative nausea and vomiting)    • Restless leg syndrome    • Secondary hypertension     Secondary hypertension, unspecified   • Sinusitis    • Sleep apnea    • Tongue anomaly     lesion       Allergies   Allergen Reactions   • Other      Pt states that taking steroids either in pill or injection form make her have blisters in her mouth and she feels like she is on fire on the inside/ has hx of c diff and possible MRSA     • Corticosteroids Rash   • Kenalog  [Triamcinolone Acetonide] Rash   • Sulfa Antibiotics Rash     Sulfa (Sulfonamide Antibiotics)       Past Surgical History:   Procedure Laterality Date   •  SECTION     • CHOLECYSTECTOMY     • COLONOSCOPY  2013   • DIRECT LARYNGOSCOPY, ESOPHAGOSCOPY, BRONCHOSCOPY N/A 2017    Procedure: DIRECT LARYNGOSCOPY AND;  Surgeon: Live Bolton MD;  Location: [facility];  Service:    • ENDOSCOPY  2013    Colon endoscopy 71850 (1) - Internal & external hemorrhoids found. Stool found.   • ENDOSCOPY  2013    EGD w/ tube 65465 (1) - Normal esophagus. Gastritis in stomach. Biopsy taken. Normal dudoenum. Biopsy taken.   • FOOT SURGERY  2013    Foot/toes surgery procedure (1) - Arthroplasty of toes 4 and 5 of right foot.   • HERNIA REPAIR     • HERNIA REPAIR      hital   • HYSTERECTOMY     • LIVER BIOPSY     • NERVE BLOCK  2016    Injection for nerve block (1) - Lumbar medial branch block.   • OTHER SURGICAL HISTORY  2012    Inj(s) Tend-Sheath, Ligament, Single  (1) - [facility]ICK[facility] ([facility])    • OTHER SURGICAL HISTORY  2013    Small Joint Injection/Aspiration  (2) - [facility] NICK[facility] ([facility])    • OTHER SURGICAL HISTORY      bowel obstruction x2   • OTHER SURGICAL HISTORY      gland removed from neck   •  SUBLINGUAL SALIVARY CYST EXCISION N/A 8/1/2017    Procedure: EXCISION OF LEFT  TONGUE LESION WITH CLOSURE;  Surgeon: Live Bolton MD;  Location: [facility];  Service:    • TUBAL ABDOMINAL LIGATION     • UPPER GASTROINTESTINAL ENDOSCOPY  07/01/2013       Family History   Problem Relation Age of Onset   • Cancer Other    • Diabetes Other    • Heart disease Other    • Hypertension Mother    • Cancer Mother    • Diabetes Mother    • Hypertension Father    • Heart attack Father    • Cancer Father    • Heart disease Father    • Thyroid disease Maternal Aunt        Social History     Social History   • Marital status:      Social History Main Topics   • Smoking status: Former Smoker     Packs/day: 2.00     Years: 15.00     Types: Cigarettes     Quit date: 2000   • Smokeless tobacco: Never Used   • Alcohol use No   • Drug use: No   • Sexual activity: Defer      Comment: Marital Status:      Other Topics Concern   • Not on file           Objective   Physical Exam   Constitutional: She is oriented to person, place, and time. She appears well-developed and well-nourished.   HENT:   Head: Normocephalic and atraumatic.   Mouth/Throat: Oropharynx is clear and moist.   Eyes: Pupils are equal, round, and reactive to light.   Neck: Neck supple. No JVD present. No tracheal deviation present.   Cardiovascular: Normal rate, regular rhythm, normal heart sounds and intact distal pulses.  Exam reveals no gallop and no friction rub.    No murmur heard.  Pulmonary/Chest: Effort normal and breath sounds normal. She has no wheezes. She has no rales.   Abdominal: Soft. She exhibits distension. Bowel sounds are increased. There is generalized tenderness. There is no rigidity, no rebound, no guarding and no CVA tenderness.   Genitourinary: Rectal exam shows guaiac negative stool.   Musculoskeletal: She exhibits no edema.   Lymphadenopathy:     She has no cervical adenopathy.   Neurological: She is alert and oriented to person,  place, and time.   Skin: Skin is warm and dry.   Nursing note and vitals reviewed.      Procedures           ED Course  ED Course as of Sep 01 0249   Sat Sep 01, 2018   0249 Dr. Cho consulted.  [SD]      ED Course User Index  [SD] Ollie Amado MD      Labs Reviewed   COMPREHENSIVE METABOLIC PANEL - Abnormal; Notable for the following:        Result Value    Glucose 123 (*)     Creatinine 1.08 (*)     ALT (SGPT) 60 (*)     AST (SGOT) 69 (*)     All other components within normal limits   CBC WITH AUTO DIFFERENTIAL - Abnormal; Notable for the following:     RBC 5.57 (*)     MCV 78.5 (*)     RDW 15.1 (*)     Platelets 118 (*)     Immature Grans, Absolute 0.03 (*)     All other components within normal limits   LIPASE - Normal   URINALYSIS W/ MICROSCOPIC IF INDICATED (NO CULTURE) - Normal    Narrative:     Urine microscopic not indicated.   RAINBOW DRAW    Narrative:     The following orders were created for panel order [place] Draw.  Procedure                               Abnormality         Status                     ---------                               -----------         ------                     Light Blue Top[[id number]]                                   Final result               Green Top (Gel)[[id number]]                                  Final result               Lavender Top[[id number]]                                     Final result               Gold Top - SST[[id number]]                                   Final result                 Please view results for these tests on the individual orders.   CBC AND DIFFERENTIAL    Narrative:     The following orders were created for panel order CBC & Differential.  Procedure                               Abnormality         Status                     ---------                               -----------         ------                     CBC Auto Differential[[id number]]        Abnormal            Final result                 Please view results for these tests  on the individual orders.   LIGHT BLUE TOP   GREEN TOP   LAVENDER TOP   GOLD TOP - SST     Xr Abdomen 2 View With Chest 1 View    Result Date: 9/1/2018  Narrative: Acute abdominal series on 9/1/2018 CLINICAL INDICATION: Constipation, history of obstruction COMPARISON: 11/20/2017 FINDINGS: CHEST: There is mild linear atelectasis in the left lower lung. The lungs are otherwise clear. Cardiac, hilar and mediastinal contours are within normal limits. Pulmonary vascularity is within normal limits. ABDOMEN: There are multiple air-fluid levels with dilated loops of small bowel in the midabdomen consistent with a small bowel obstruction. No increased stool to suggest significant constipation is noted. Calcification in left pelvis is consistent with phlebolith. There is no free air. Minimal degenerative changes are noted in the spine.     Impression: 1. No acute cardiopulmonary disease. 2. Findings consistent with small bowel obstruction. Electronically signed by:  Chetan Huston  9/1/2018 1:20 AM CDT Workstation: [id number]    Ct Abdomen Pelvis With Contrast    Result Date: 9/1/2018  Narrative: CT abdomen and pelvis with contrast on  9/1/2018 CLINICAL INDICATION: Generalized abdominal pain, constipation, history of obstruction TECHNIQUE: Multiple axial images are obtained throughout the abdomen and pelvis following the administration of IV contrast, 93 mL of Isovue-300 contrast was administered intravenously without complication. This exam was performed according to our departmental dose-optimization program, which includes automated exposure control, adjustment of the mA and/or kV according to patient size and/or use of iterative reconstruction technique. Total DLP is 564.4 mGy*cm. COMPARISON: 11/21/2017 FINDINGS: Abdomen: There is minimal basilar atelectasis. There is mild fatty infiltration of the liver. The patient is status post cholecystectomy. Splenomegaly is noted with the spleen measuring 17 cm in greatest  [id number] length. There is a slightly irregular contour of the liver consistent with changes of cirrhosis. The solid abdominal organs are otherwise unremarkable. Small esophageal and gastric varices are noted. There is no abdominal adenopathy. There is no free fluid or free air within the abdomen. There are dilated fluid-filled loops of mid small bowel with decompressed distal small bowel in the pelvis consistent with a small bowel obstruction. There are multiple adherent loops of bowel along the anterior abdominal and anterior pelvic wall likely related to adhesions which is most likely the cause of this small bowel obstruction. Pelvis: The patient is status post hysterectomy. There is no free fluid in the pelvis. There is no pelvic adenopathy. The pelvic portion of the GI tract is otherwise unremarkable. No bony abnormality is noted.     Impression: 1. Findings consistent with a small bowel obstruction most likely related to an adhesion. 2. Changes of cirrhosis with splenomegaly and evidence of portal hypertension. Electronically signed by:  Chetan Huston  9/1/2018 2:38 AM CDT Workstation: [id number]    Xr Wrist 3+ View Bilateral    Result Date: 8/7/2018  Narrative: Ordering Provider:  Justus Lewis MD Ordering Diagnosis/Indication:  Bilateral wrist pain Procedure:  XR WRIST 3+ VW BILATERAL Exam Date:  8/7/18 COMPARISON:  Not applicable, no relevant images available.     Impression:  3 views of both wrists show acceptable position and alignment with no evidence of acute bony abnormality.  No fracture or dislocation is noted.  No sign of any significant arthritic change. Justus Lewis MD 8/7/18                [facility]      Final diagnoses:   SBO (small bowel obstruction)   Cirrhosis of liver without ascites, unspecified hepatic cirrhosis type (CMS/HCC)            Ollie Amado MD  09/01/18 0249       Ollie Amado MD  09/01/18 0249

## 2018-09-01 NOTE — CONSULTS
Referring Provider: Dr Gomez      Patient Care Team:  Yobany Barr MD as PCP - General  Daren Marie DPM as Consulting Physician (Podiatry)      Subjective .     History of present illness:  57-year-old female admitted overnight by the hospital service with abdominal distention nausea and obstipation.  Patient said her bowel movements and decreased significantly worse she was having small bowel movements.  She also had some nausea but no vomiting or In the emergency room suggested that she had a at least a partial small bowel obstruction.  She does have some dilated loops of small bowel on her CT [place] and some normal appearing loops distally she also has a lot of stool in her colon no free air obvious perforation appreciated.  Patient has changes also on CT suggestive of cirrhosis with significantly enlarged spleen.  White count is normal and platelet count was normal.  The patient is not acidotic.  Dr. Aguila has seen the patient and his ordered enemas.    Review of Systems   Constitutional: Positive for appetite change. Negative for chills and fever.   HENT: Negative for hearing loss and trouble swallowing.    Eyes: Negative for visual disturbance.   Respiratory: Negative for apnea, cough, choking, chest tightness, shortness of breath, wheezing and stridor.    Cardiovascular: Negative for chest pain, palpitations and leg swelling.   Gastrointestinal: Positive for abdominal distention, abdominal pain and constipation. Negative for blood in stool, diarrhea, nausea and vomiting.   Endocrine: Negative for cold intolerance, heat intolerance, polydipsia, polyphagia and polyuria.   Genitourinary: Negative for difficulty urinating, dysuria, frequency, hematuria and urgency.   Musculoskeletal: Negative for arthralgias and back pain.   Skin: Negative for color change, pallor and rash.   Allergic/Immunologic: Negative for immunocompromised state.   Neurological: Negative for dizziness, seizures, syncope,  "light-headedness, numbness and headaches.   Hematological: Negative for adenopathy.   Psychiatric/Behavioral: The patient is not nervous/anxious.          History  Past Medical History:   Diagnosis Date   • Acid reflux    • Altered bowel function    • Arthropathy of lumbar facet joint    • Bleeding disorder (CMS/HCC)    • C. difficile diarrhea    • Constipation    • Corns and callus    • Depression    • Disease related peripheral neuropathy    • Epigastric pain    • Fatty liver    • Hammer toe    • Headache    • Hiatal hernia    • Hyperlipidemia    • Knee pain    • Localized, primary osteoarthritis of the ankle and foot     Localized, primary osteoarthritis of the ankle and/or foot   • Mendoza's metatarsalgia     Mendoza's metatarsalgia - 2nd interspace on right   • Nausea and vomiting    • Neuralgia and neuritis     Neuralgia, neuritis, and radiculitis, unspecified   • Neuropathy    • Obstructive sleep apnea     Obstructive sleep apnea (adult) (pediatric)    • CHAI on CPAP     \"C-Pap at night  (unconfirmed)\"   • Osteoarthritis    • Pain in foot     Pain in unspecified foot - sees a podiatrist   • Pain in joint, ankle and foot     Joint pain in ankle and foot      • Pain radiating to back     Pain radiating to lumbar region of back   • Plantar fasciitis    • PONV (postoperative nausea and vomiting)    • Restless leg syndrome    • Secondary hypertension     Secondary hypertension, unspecified   • Sinusitis    • Sleep apnea    • Tongue anomaly     lesion   , Past Surgical History:   Procedure Laterality Date   •  SECTION     • CHOLECYSTECTOMY     • COLONOSCOPY  2013   • DIRECT LARYNGOSCOPY, ESOPHAGOSCOPY, BRONCHOSCOPY N/A 2017    Procedure: DIRECT LARYNGOSCOPY AND;  Surgeon: Live Bolton MD;  Location: [facility] OR;  Service:    • ENDOSCOPY  2013    Colon endoscopy 94552 (1) - Internal & external hemorrhoids found. Stool found.   • ENDOSCOPY  2013    EGD w/ tube 98481 (1) - Normal " esophagus. Gastritis in stomach. Biopsy taken. Normal dudoenum. Biopsy taken.   • FOOT SURGERY  02/26/2013    Foot/toes surgery procedure (1) - Arthroplasty of toes 4 and 5 of right foot.   • HERNIA REPAIR     • HERNIA REPAIR      hital   • HYSTERECTOMY     • LIVER BIOPSY     • NERVE BLOCK  07/25/2016    Injection for nerve block (1) - Lumbar medial branch block.   • OTHER SURGICAL HISTORY  12/03/2012    Inj(s) Tend-Sheath, Ligament, Single 20550 (1) - [facility] NICKERSON ([facility])    • OTHER SURGICAL HISTORY  06/24/2013    Small Joint Injection/Aspiration 20600 (2) - [facility]ICK[facility] ([facility])    • OTHER SURGICAL HISTORY  2011    bowel obstruction x2   • OTHER SURGICAL HISTORY      gland removed from neck   • SUBLINGUAL SALIVARY CYST EXCISION N/A 8/1/2017    Procedure: EXCISION OF LEFT  TONGUE LESION WITH CLOSURE;  Surgeon: Live Bolton MD;  Location: [facility];  Service:    • TUBAL ABDOMINAL LIGATION     • UPPER GASTROINTESTINAL ENDOSCOPY  07/01/2013   , Family History   Problem Relation Age of Onset   • Cancer Other    • Diabetes Other    • Heart disease Other    • Hypertension Mother    • Cancer Mother    • Diabetes Mother    • Hypertension Father    • Heart attack Father    • Cancer Father    • Heart disease Father    • Thyroid disease Maternal Aunt    , Social History   Substance Use Topics   • Smoking status: Former Smoker     Packs/day: 2.00     Years: 15.00     Types: Cigarettes     Quit date: 2000   • Smokeless tobacco: Never Used   • Alcohol use No   , Home Medications:  Prior to Admission medications    Medication Sig Start Date End Date Taking? Authorizing Provider   cetirizine (zyrTEC) 10 MG tablet Take 1 tablet by mouth Daily As Needed for Allergies. 8/31/17  Yes Live Bolton MD   chlorthalidone (HYGROTEN) 50 MG tablet Take 50 mg by mouth Daily.   Yes Provider, MD Promise   clotrimazole (MYCELEX) 10 MG dimitry Take 1 tablet by mouth 3 (Three) Times a Day. 5/23/18  Yes Live Bolton MD    cyclobenzaprine (FLEXERIL) 10 MG tablet Take 10 mg by mouth 3 (Three) Times a Day As Needed for Muscle Spasms.   Yes Promise Tovar MD   DULoxetine (CYMBALTA) 30 MG capsule Take 30 mg by mouth Daily.   Yes Promise Tovar MD   gabapentin (NEURONTIN) 800 MG tablet Take 800 mg by mouth 4 (Four) Times a Day.   Yes Promise Tovar MD   linaclotide (LINZESS) 145 MCG capsule capsule Take 145 mcg by mouth 2 (Two) Times a Day.   Yes Promise Tovar MD   magnesium hydroxide (MILK OF MAGNESIA) 2400 MG/10ML suspension suspension Take 10 mL by mouth Daily As Needed (constipation). 11/22/17  Yes Kendall Chen MD   magnesium oxide (MAGOX) 400 (241.3 MG) MG tablet tablet Take 400 mg by mouth Daily.   Yes Promise Tovar MD   meloxicam (MOBIC) 15 MG tablet Take 15 mg by mouth Every Night.   Yes Promise Tovar MD   mupirocin (BACTROBAN) 2 % ointment Apply  topically to the appropriate area as directed 3 (Three) Times a Day As Needed.   Yes Promise Tovar MD   nystatin (MYCOSTATIN) 733040 UNIT/GM powder Apply 1 application topically As Needed. 10/11/17  Yes Promise Tovar MD   omeprazole (priLOSEC) 40 MG capsule Take 40 mg by mouth Daily.   Yes Promise Tovar MD   ondansetron (ZOFRAN) 4 MG tablet Take 4 mg by mouth Every 8 (Eight) Hours As Needed. 9/25/17  Yes Promise Tovar MD   oxyCODONE-acetaminophen (PERCOCET) 7.5-325 MG per tablet Take 1 tablet by mouth 3 (Three) Times a Day. 1/24/18  Yes Promise Tovar MD   phenylephrine-mineral oil-petrolatum 0.25-14-74.9 % ointment hemorrhoidal ointment Insert 1 application into the rectum 3 (Three) Times a Day As Needed (rectal pain). 11/22/17  Yes Kendall Chen MD   ranitidine (ZANTAC) 150 MG capsule Take 2 capsules by mouth Every Evening. 1/31/18 1/31/19 Yes Live Bolton MD   spironolactone (ALDACTONE) 25 MG tablet Take 25 mg by mouth Daily.   Yes Guy MD Promise   chlorthalidone (HYGROTEN) 50  MG tablet TAKE 1 TABLET BY MOUTH ONCE DAILY 8/28/18   Maureen Cardoso, APRN   , Scheduled Meds:    heparin (porcine) 5,000 Units Subcutaneous Q8H   pantoprazole 40 mg Intravenous Q AM   , Continuous Infusions:    dextrose 5 % and sodium chloride 0.45 % 100 mL/hr Last Rate: 100 mL/hr (09/01/18 0652)   sodium chloride 125 mL/hr Last Rate: Stopped (09/01/18 0615)   , PRN Meds:  labetalol  •  Morphine  •  ondansetron  •  pneumococcal polysaccharide 23-valent  •  sodium chloride  •  sodium chloride  •  Insert peripheral IV **AND** sodium chloride and Allergies:  Allergies   Allergen Reactions   • Other      Pt states that taking steroids either in pill or injection form make her have blisters in her mouth and she feels like she is on fire on the inside/ has hx of c diff and possible MRSA     • Corticosteroids Rash   • Kenalog  [Triamcinolone Acetonide] Rash   • Sulfa Antibiotics Rash     Sulfa (Sulfonamide Antibiotics)       Objective     Vital Signs   Temp:  [96.2 °F (35.7 °C)-98.2 °F (36.8 °C)] 97.4 °F (36.3 °C)  Heart Rate:  [] 78  Resp:  [16-20] 16  BP: (116-142)/(67-95) 132/76    Physical Exam:     General Appearance:    Sleepy but easily arousable cooperative, in no acute distress   Head:    Normocephalic, without obvious abnormality, atraumatic   Eyes:            Lids and lashes normal, conjunctivae and sclerae normal, no   icterus, no pallor, corneas clear   Ears:    Ears appear intact with no abnormalities noted   Throat:   No oral lesions, no thrush, oral mucosa moist        Lungs:     Clear to auscultation,respirations regular, even and                  unlabored    Heart:    Regular rhythm and normal rate, normal S1 and S2, no            murmur, no gallop, no rub, no click        Abdomen:     Normal bowel sounds, no masses, no organomegaly, soft        and mildly tender.  No obvious hernias felt.  Midline incision intact and healed    Extremities:   Moves all extremities well, no edema, no cyanosis,  no             redness   Skin:   No bleeding, bruising or rash   Lymph nodes:   No palpable adenopathy   Neurologic:  Grossly intact, sensation intact       Results Review:   I reviewed the patient's new clinical results.      Assessment/Plan     Active Problems:    SBO (small bowel obstruction)        I discussed the patients findings and my recommendations with patient and nursing staff     Suspect partial small bowel obstruction.  Agree with enemas to address retained stool in colon.  Agree with NG tube has minimal output currently.  We'll continue to follow with you.        This document has been electronically signed by Madhu Cho MD on September 1, 2018 12:15 PM     Madhu Cho MD  09/01/18  12:15 PM

## 2018-09-01 NOTE — CONSULTS
Jonny Aguila DO,[facility]  Gastroenterology  Hepatology  Endoscopy  Board Certified in Internal Medicine and gastroenterology  [address], [address]  [place]. [address]  - [phone]   F - [phone]     GASTROENTEROLOGY CONSULT NOTE   JONNY AGUILA DO.         SUBJECTIVE:   2018    Name: Amira Shannon  DOD: 1960    REASON FOR CONSULT: Cirrhosis in the setting of bowel obstruction    Chief Complaint:     Chief Complaint   Patient presents with   • Abdominal Pain   • Constipation       Subjective : Abdominal pain and distention with admission diagnosis of small bowel obstruction requiring nasogastric tube.  Constipation.  Personal history of cirrhosis of the liver    Patient is 57 y.o. 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  This abdominal pain was associated with nausea with intermittent vomiting.  Diffuse abdominal pain.  She feels that this is a recurrence of her bowel obstruction which she has had on 2 previous occasions requiring laparotomy.  The patient has had also increasing amounts of constipation.  Not improving with the use of Linzess.   No vomiting of blood.  No passage of blood through the stools..  No fevers or chills.    Strong family history of nonalcoholic steatohepatitis.  Sister. and mother both have had these.  Mother  of complications related to this.  Patient's last EGD was  and there was no evidence of esophageal varices at that time    Followed by Mr. Perales and Dr. Del Rio for the patient's liver problems.     ROS/HISTORY/ CURRENT MEDICATIONS/OBJECTIVE/VS/PE:   Review of Systems:   Review of Systems   Constitutional: Positive for activity change, appetite change and fatigue.   HENT: Negative.    Eyes: Negative.    Respiratory: Negative.    Cardiovascular: Negative.    Gastrointestinal: Positive for abdominal distention, abdominal pain, nausea and  "vomiting.   Endocrine: Negative.    Genitourinary: Negative.    Musculoskeletal: Positive for arthralgias, back pain and neck pain.   Skin: Negative.    Allergic/Immunologic: Negative.    Neurological: Positive for dizziness, weakness and light-headedness.   Hematological: Bruises/bleeds easily.   Psychiatric/Behavioral: The patient is nervous/anxious.        History:     Past Medical History:   Diagnosis Date   • Acid reflux    • Altered bowel function    • Arthropathy of lumbar facet joint    • Bleeding disorder (CMS/HCC)    • C. difficile diarrhea    • Constipation    • Corns and callus    • Depression    • Disease related peripheral neuropathy    • Epigastric pain    • Fatty liver    • Hammer toe    • Headache    • Hiatal hernia    • Hyperlipidemia    • Knee pain    • Localized, primary osteoarthritis of the ankle and foot     Localized, primary osteoarthritis of the ankle and/or foot   • Mendoza's metatarsalgia     Mendoza's metatarsalgia - 2nd interspace on right   • Nausea and vomiting    • Neuralgia and neuritis     Neuralgia, neuritis, and radiculitis, unspecified   • Neuropathy    • Obstructive sleep apnea     Obstructive sleep apnea (adult) (pediatric)    • CHAI on CPAP     \"C-Pap at night  (unconfirmed)\"   • Osteoarthritis    • Pain in foot     Pain in unspecified foot - sees a podiatrist   • Pain in joint, ankle and foot     Joint pain in ankle and foot      • Pain radiating to back     Pain radiating to lumbar region of back   • Plantar fasciitis    • PONV (postoperative nausea and vomiting)    • Restless leg syndrome    • Secondary hypertension     Secondary hypertension, unspecified   • Sinusitis    • Sleep apnea    • Tongue anomaly     lesion     Past Surgical History:   Procedure Laterality Date   •  SECTION     • CHOLECYSTECTOMY     • COLONOSCOPY  2013   • DIRECT LARYNGOSCOPY, ESOPHAGOSCOPY, BRONCHOSCOPY N/A 2017    Procedure: DIRECT LARYNGOSCOPY AND;  Surgeon: Live Bolton, " MD;  Location: [facility];  Service:    • ENDOSCOPY  07/01/2013    Colon endoscopy 31370 (1) - Internal & external hemorrhoids found. Stool found.   • ENDOSCOPY  07/01/2013    EGD w/ tube 80537 (1) - Normal esophagus. Gastritis in stomach. Biopsy taken. Normal dudoenum. Biopsy taken.   • FOOT SURGERY  02/26/2013    Foot/toes surgery procedure (1) - Arthroplasty of toes 4 and 5 of right foot.   • HERNIA REPAIR     • HERNIA REPAIR      hital   • HYSTERECTOMY     • LIVER BIOPSY     • NERVE BLOCK  07/25/2016    Injection for nerve block (1) - Lumbar medial branch block.   • OTHER SURGICAL HISTORY  12/03/2012    Inj(s) Tend-Sheath, Ligament, Single 20550 (1) - [facility]ICK[facility] ([facility])    • OTHER SURGICAL HISTORY  06/24/2013    Small Joint Injection/Aspiration 20600 (2) - [facility]ICK[facility] ([facility])    • OTHER SURGICAL HISTORY  2011    bowel obstruction x2   • OTHER SURGICAL HISTORY      gland removed from neck   • SUBLINGUAL SALIVARY CYST EXCISION N/A 8/1/2017    Procedure: EXCISION OF LEFT  TONGUE LESION WITH CLOSURE;  Surgeon: Live Bolton MD;  Location: [facility];  Service:    • TUBAL ABDOMINAL LIGATION     • UPPER GASTROINTESTINAL ENDOSCOPY  07/01/2013     Family History   Problem Relation Age of Onset   • Cancer Other    • Diabetes Other    • Heart disease Other    • Hypertension Mother    • Cancer Mother    • Diabetes Mother    • Hypertension Father    • Heart attack Father    • Cancer Father    • Heart disease Father    • Thyroid disease Maternal Aunt      Social History   Substance Use Topics   • Smoking status: Former Smoker     Packs/day: 2.00     Years: 15.00     Types: Cigarettes     Quit date: 2000   • Smokeless tobacco: Never Used   • Alcohol use No     Prescriptions Prior to Admission   Medication Sig Dispense Refill Last Dose   • cetirizine (zyrTEC) 10 MG tablet Take 1 tablet by mouth Daily As Needed for Allergies. 30 tablet 11 Past Week at Unknown time   • chlorthalidone (HYGROTEN) 50 MG tablet  Take 50 mg by mouth Daily.   8/21/2018 at 0900   • clotrimazole (MYCELEX) 10 MG dimitry Take 1 tablet by mouth 3 (Three) Times a Day. 40 tablet 0 8/21/2018 at 2100   • cyclobenzaprine (FLEXERIL) 10 MG tablet Take 10 mg by mouth 3 (Three) Times a Day As Needed for Muscle Spasms.   More than a month at Unknown time   • DULoxetine (CYMBALTA) 30 MG capsule Take 30 mg by mouth Daily.   8/22/2018 at 0900   • gabapentin (NEURONTIN) 800 MG tablet Take 800 mg by mouth 4 (Four) Times a Day.   8/22/2018 at 0900   • linaclotide (LINZESS) 145 MCG capsule capsule Take 145 mcg by mouth 2 (Two) Times a Day.   8/21/2018 at 0900   • magnesium hydroxide (MILK OF MAGNESIA) 2400 MG/10ML suspension suspension Take 10 mL by mouth Daily As Needed (constipation). 100 mL 2 Past Week at Unknown time   • magnesium oxide (MAGOX) 400 (241.3 MG) MG tablet tablet Take 400 mg by mouth Daily.   Past Month at Unknown time   • meloxicam (MOBIC) 15 MG tablet Take 15 mg by mouth Every Night.   8/21/2018 at 2100   • mupirocin (BACTROBAN) 2 % ointment Apply  topically to the appropriate area as directed 3 (Three) Times a Day As Needed.   Past Month at Unknown time   • nystatin (MYCOSTATIN) 968680 UNIT/GM powder Apply 1 application topically As Needed.   Past Month at Unknown time   • omeprazole (priLOSEC) 40 MG capsule Take 40 mg by mouth Daily.   8/22/2018 at 0900   • ondansetron (ZOFRAN) 4 MG tablet Take 4 mg by mouth Every 8 (Eight) Hours As Needed.   More than a month at Unknown time   • oxyCODONE-acetaminophen (PERCOCET) 7.5-325 MG per tablet Take 1 tablet by mouth 3 (Three) Times a Day.   8/21/2018 at 2100   • phenylephrine-mineral oil-petrolatum 0.25-14-74.9 % ointment hemorrhoidal ointment Insert 1 application into the rectum 3 (Three) Times a Day As Needed (rectal pain). 28 g 1 More than a month at Unknown time   • ranitidine (ZANTAC) 150 MG capsule Take 2 capsules by mouth Every Evening. 60 capsule 3 8/21/2018 at 2100   • spironolactone  (ALDACTONE) 25 MG tablet Take 25 mg by mouth Daily.   8/21/2018 at 0900   • chlorthalidone (HYGROTEN) 50 MG tablet TAKE 1 TABLET BY MOUTH ONCE DAILY 90 tablet 3      Allergies:  Other; Corticosteroids; Kenalog  [triamcinolone acetonide]; and Sulfa antibiotics    I have reviewed the patient's medical history, surgical history and family history in the available medical record system.     Current Medications:     Current Facility-Administered Medications   Medication Dose Route Frequency Provider Last Rate Last Dose   • dextrose 5 % and sodium chloride 0.45 % infusion  100 mL/hr Intravenous Continuous Live Gomez  mL/hr at 09/01/18 0652 100 mL/hr at 09/01/18 0652   • heparin (porcine) 5000 UNIT/ML injection 5,000 Units  5,000 Units Subcutaneous Q8H Live Gomez DO   5,000 Units at 09/01/18 0652   • labetalol (NORMODYNE,TRANDATE) injection 10 mg  10 mg Intravenous Q6H PRN Live Gomez DO       • morphine injection 2 mg  2 mg Intravenous Q4H PRN Live Gomez DO   2 mg at 09/01/18 0738   • ondansetron (ZOFRAN) injection 4 mg  4 mg Intravenous Q6H PRN Live Gomez DO   4 mg at 09/01/18 0738   • pantoprazole (PROTONIX) injection 40 mg  40 mg Intravenous Q AM Live Gomez DO   40 mg at 09/01/18 0652   • pneumococcal polysaccharide 23-valent (PNEUMOVAX-23) vaccine 0.5 mL  0.5 mL Intramuscular During Hospitalization Live Gomez DO       • sodium chloride 0.9 % flush 1-10 mL  1-10 mL Intravenous PRN Live Gomez DO       • sodium chloride 0.9 % flush 10 mL  10 mL Intravenous PRN Ollie Amado MD   10 mL at 09/01/18 0025   • sodium chloride 0.9 % flush 10 mL  10 mL Intravenous PRN Ollie Amado MD       • sodium chloride 0.9 % infusion  125 mL/hr Intravenous Continuous Ollie Amado MD   Stopped at 09/01/18 0615       Objective     Physical Exam:   Temp:  [96.2 °F (35.7 °C)-98.2 °F (36.8 °C)] 96.2 °F (35.7 °C)  Heart Rate:  []  85  Resp:  [18-20] 18  BP: (116-142)/(67-95) 131/73    Physical Exam:  General Appearance:    Alert, cooperative, in no acute distress   Head:    Normocephalic, without obvious abnormality, atraumatic   Eyes:            Lids and lashes normal, conjunctivae and sclerae normal, no   icterus, no pallor, corneas clear, PERRLA   Ears:    Ears appear intact with no abnormalities noted   Throat:   No oral lesions, no thrush, oral mucosa moist   Neck:   No adenopathy, supple, trachea midline, no thyromegaly, no     carotid bruit, no JVD   Back:     No kyphosis present, no scoliosis present, no skin lesions,       erythema or scars, no tenderness to percussion or                   palpation,   range of motion normal   Lungs:     Clear to auscultation,respirations regular, even and                   unlabored    Heart:    Regular rhythm and normal rate, normal S1 and S2, no            murmur, no gallop, no rub, no click   Breast Exam:    Deferred   Abdomen:     Diffuse tenderness.  No significant distention.  Mild tympany.  Well-healed surgical wounds with a ventral hernia at the superior aspect of the vertical incision that was done for the patient's laparotomy    Genitalia:    Deferred   Extremities:   Moves all extremities well, no edema, no cyanosis, no              redness   Pulses:   Pulses palpable and equal bilaterally   Skin:   No bleeding, bruising or rash   Lymph nodes:   No palpable adenopathy   Neurologic:   Cranial nerves 2 - 12 grossly intact, sensation intact, DTR        present and equal bilaterally      Results Review:     Lab Results   Component Value Date    WBC 7.98 09/01/2018    WBC 6.21 08/15/2018    WBC 4.58 07/31/2018    HGB 15.0 09/01/2018    HGB 14.3 08/15/2018    HGB 13.8 07/31/2018    HCT 43.7 09/01/2018    HCT 42.8 08/15/2018    HCT 42.1 07/31/2018     (L) 09/01/2018     (L) 08/15/2018    PLT 67 (L) 07/31/2018       Results from last 7 days  Lab Units 09/01/18  0021   ALK PHOS U/L  96   ALT (SGPT) U/L 60*   AST (SGOT) U/L 69*       Results from last 7 days  Lab Units 09/01/18  0021   BILIRUBIN mg/dL 0.7   ALK PHOS U/L 96     Lipase   Date Value Ref Range Status   09/01/2018 76 23 - 300 U/L Final     Lab Results   Component Value Date    INR 1.22 (H) 07/31/2018    INR 1.12 07/27/2017          Radiology Review:  Imaging Results (last 72 hours)     Procedure Component Value Units Date/Time    CT Abdomen Pelvis With Contrast [[id number]] Collected:  09/01/18 0205     Updated:  09/01/18 0239    Narrative:       CT abdomen and pelvis with contrast on  9/1/2018     CLINICAL INDICATION: Generalized abdominal pain, constipation,  history of obstruction    TECHNIQUE: Multiple axial images are obtained throughout the  abdomen and pelvis following the administration of IV contrast,  93 mL of Isovue-300 contrast was administered intravenously  without complication. This exam was performed according to our  departmental dose-optimization program, which includes automated  exposure control, adjustment of the mA and/or kV according to  patient size and/or use of iterative reconstruction technique.   Total DLP is 564.4 mGy*cm.    COMPARISON: 11/21/2017    FINDINGS:  Abdomen: There is minimal basilar atelectasis. There is mild  fatty infiltration of the liver. The patient is status post  cholecystectomy. Splenomegaly is noted with the spleen measuring  17 cm in greatest [id number]-[id number] length. There is a slightly  irregular contour of the liver consistent with changes of  cirrhosis. The solid abdominal organs are otherwise unremarkable.  Small esophageal and gastric varices are noted. There is no  abdominal adenopathy. There is no free fluid or free air within  the abdomen. There are dilated fluid-filled loops of mid small  bowel with decompressed distal small bowel in the pelvis  consistent with a small bowel obstruction. There are multiple  adherent loops of bowel along the anterior abdominal and anterior  pelvic  wall likely related to adhesions which is most likely the  cause of this small bowel obstruction.    Pelvis: The patient is status post hysterectomy. There is no free  fluid in the pelvis. There is no pelvic adenopathy. The pelvic  portion of the GI tract is otherwise unremarkable. No bony  abnormality is noted.      Impression:       1. Findings consistent with a small bowel obstruction most likely  related to an adhesion.  2. Changes of cirrhosis with splenomegaly and evidence of portal  hypertension.    Electronically signed by:  Chetan Huston  9/1/2018 2:38 AM CDT  Workstation: [id number]    XR Abdomen 2 View With Chest 1 View [[id number]] Collected:  09/01/18 0048     Updated:  09/01/18 0121    Narrative:       Acute abdominal series on 9/1/2018    CLINICAL INDICATION: Constipation, history of obstruction    COMPARISON: 11/20/2017    FINDINGS:    CHEST: There is mild linear atelectasis in the left lower lung.  The lungs are otherwise clear. Cardiac, hilar and mediastinal  contours are within normal limits. Pulmonary vascularity is  within normal limits.    ABDOMEN: There are multiple air-fluid levels with dilated loops  of small bowel in the midabdomen consistent with a small bowel  obstruction. No increased stool to suggest significant  constipation is noted. Calcification in left pelvis is consistent  with phlebolith. There is no free air. Minimal degenerative  changes are noted in the spine.      Impression:       1. No acute cardiopulmonary disease.  2. Findings consistent with small bowel obstruction.    Electronically signed by:  Chetan Huston  9/1/2018 1:20 AM CDT  Workstation: [id number]           I reviewed the patient's new clinical results.  I reviewed the patient's new imaging results and agree with the interpretation.     ASSESSMENT/PLAN:   ASSESSMENT:   1.  Small bowel obstruction secondary to adhesions and complicated by the patient's significant obstipation  2.  Cirrhosis without  alcohol complicated by splenic enlargement.  Meld score 9  3.  Ventral hernia, without evidence of incarcerated bowel  4.  Thrombocytopenia secondary to splenic enlargement secondary to cirrhosis of liver    PLAN:   1.  Empiric therapy with antibiotics in the form of Rocephin to prevent spontaneous bacterial peritonitis in the setting of bowel obstruction  2.  Will need EGD at some point time to exclude esophageal varices.  Can be done as outpatient  3.  Enemas to help reduce the patient's volume of stool  4.  Hypaque enema to make sure that there is no leading edge of obstruction in the patient's right colon causing a small bowel bacterial overgrowth  5.  Reviewed with the patient as well as the .  All questions were answered.       Ghanshyam Aguila DO  09/01/18  10:28 AM

## 2018-09-01 NOTE — H&P
[facility] Medicine Admission      Date of Admission: 8/31/2018      Primary Care Physician: Yobany Barr MD    Chief compliant: worsening abdominal pain    HPI: This is a 57-year-old white female that has a concurrent health history significant of multiple abdominal surgeries with adhesions and cirrhosis with portal hypertension.  The patient also has multiple admissions for small bowel obstruction and NG tube placement.  The patient also has a history of fatty liver disease, hypertension class II obesity, peripheral neuropathy, sleep apnea on CPAP, ROS, hypertension and is scheduled to follow with a specialist for her spine and sciatica.  Patient is present with the  at the bedside for a few day history of abdominal pain and nausea with emesis.  The patient states that she has had a bowel movement this morning.  The patient denies any fever or chills.  The patient has had poor appetite.  The patient presents for evaluation of abdominal pain.  Her CT abdomen shows a bowel obstruction with cirrhosis and portal hypertension with splenomegaly.  The patient has a surgical consultation will be admitted for further management.    Past Medical History:   Past Medical History:   Diagnosis Date   • Acid reflux    • Altered bowel function    • Arthropathy of lumbar facet joint    • Bleeding disorder (CMS/HCC)    • C. difficile diarrhea 2015   • Constipation    • Corns and callus    • Depression    • Disease related peripheral neuropathy    • Epigastric pain    • Fatty liver    • Hammer toe    • Headache    • Hiatal hernia    • Hyperlipidemia    • Knee pain    • Localized, primary osteoarthritis of the ankle and foot     Localized, primary osteoarthritis of the ankle and/or foot   • Mendoza's metatarsalgia     Mendoza's metatarsalgia - 2nd interspace on right   • Nausea and vomiting    • Neuralgia and neuritis     Neuralgia, neuritis, and radiculitis, unspecified   •  "Neuropathy    • Obstructive sleep apnea     Obstructive sleep apnea (adult) (pediatric)    • CHAI on CPAP     \"C-Pap at night  (unconfirmed)\"   • Osteoarthritis    • Pain in foot     Pain in unspecified foot - sees a podiatrist   • Pain in joint, ankle and foot     Joint pain in ankle and foot      • Pain radiating to back     Pain radiating to lumbar region of back   • Plantar fasciitis    • PONV (postoperative nausea and vomiting)    • Restless leg syndrome    • Secondary hypertension     Secondary hypertension, unspecified   • Sinusitis    • Sleep apnea    • Tongue anomaly     lesion       Past Surgical History:   Past Surgical History:   Procedure Laterality Date   •  SECTION     • CHOLECYSTECTOMY     • COLONOSCOPY  2013   • DIRECT LARYNGOSCOPY, ESOPHAGOSCOPY, BRONCHOSCOPY N/A 2017    Procedure: DIRECT LARYNGOSCOPY AND;  Surgeon: Live Bolton MD;  Location: [facility];  Service:    • ENDOSCOPY  2013    Colon endoscopy 30434 (1) - Internal & external hemorrhoids found. Stool found.   • ENDOSCOPY  2013    EGD w/ tube 08548 (1) - Normal esophagus. Gastritis in stomach. Biopsy taken. Normal dudoenum. Biopsy taken.   • FOOT SURGERY  2013    Foot/toes surgery procedure (1) - Arthroplasty of toes 4 and 5 of right foot.   • HERNIA REPAIR     • HERNIA REPAIR      hital   • HYSTERECTOMY     • LIVER BIOPSY     • NERVE BLOCK  2016    Injection for nerve block (1) - Lumbar medial branch block.   • OTHER SURGICAL HISTORY  2012    Inj(s) Tend-Sheath, Ligament, Single  (1) - [facility]ICK[facility] ([facility])    • OTHER SURGICAL HISTORY  2013    Small Joint Injection/Aspiration  (2) - [facility] NICK[facility] ([facility])    • OTHER SURGICAL HISTORY      bowel obstruction x2   • OTHER SURGICAL HISTORY      gland removed from neck   • SUBLINGUAL SALIVARY CYST EXCISION N/A 2017    Procedure: EXCISION OF LEFT  TONGUE LESION WITH CLOSURE;  Surgeon: Live Bolton MD;  " Location: [facility] OR;  Service:    • TUBAL ABDOMINAL LIGATION     • UPPER GASTROINTESTINAL ENDOSCOPY  07/01/2013       Family History:   Family History   Problem Relation Age of Onset   • Cancer Other    • Diabetes Other    • Heart disease Other    • Hypertension Mother    • Cancer Mother    • Diabetes Mother    • Hypertension Father    • Heart attack Father    • Cancer Father    • Heart disease Father    • Thyroid disease Maternal Aunt        Social History:   Social History     Social History   • Marital status:      Social History Main Topics   • Smoking status: Former Smoker     Packs/day: 2.00     Years: 15.00     Types: Cigarettes     Quit date: 2000   • Smokeless tobacco: Never Used   • Alcohol use No   • Drug use: No   • Sexual activity: Defer      Comment: Marital Status:      Other Topics Concern   • Not on file       Allergies:   Allergies   Allergen Reactions   • Other      Pt states that taking steroids either in pill or injection form make her have blisters in her mouth and she feels like she is on fire on the inside/ has hx of c diff and possible MRSA     • Corticosteroids Rash   • Kenalog  [Triamcinolone Acetonide] Rash   • Sulfa Antibiotics Rash     Sulfa (Sulfonamide Antibiotics)       Medications:   Prior to Admission medications    Medication Sig Start Date End Date Taking? Authorizing Provider   cetirizine (zyrTEC) 10 MG tablet Take 1 tablet by mouth Daily As Needed for Allergies. 8/31/17   Live Bolton MD   chlorthalidone (HYGROTEN) 50 MG tablet Take 50 mg by mouth Daily.    Provider, MD Promise   chlorthalidone (HYGROTEN) 50 MG tablet TAKE 1 TABLET BY MOUTH ONCE DAILY 8/28/18   Maureen Cardoso APRN   clotrimazole (MYCELEX) 10 MG dimitry Take 1 tablet by mouth 3 (Three) Times a Day. 5/23/18   Live Bolton MD   cyclobenzaprine (FLEXERIL) 10 MG tablet Take 10 mg by mouth 3 (Three) Times a Day As Needed for Muscle Spasms.    Provider, MD Promise   DULoxetine  (CYMBALTA) 30 MG capsule Take 30 mg by mouth Daily.    Promise Tovar MD   gabapentin (NEURONTIN) 800 MG tablet Take 800 mg by mouth 4 (Four) Times a Day.    Promise Tovar MD   linaclotide (LINZESS) 145 MCG capsule capsule Take 145 mcg by mouth 2 (Two) Times a Day.    Promise Tovar MD   magnesium hydroxide (MILK OF MAGNESIA) 2400 MG/10ML suspension suspension Take 10 mL by mouth Daily As Needed (constipation). 11/22/17   Kendall Chen MD   magnesium oxide (MAGOX) 400 (241.3 MG) MG tablet tablet Take 400 mg by mouth Daily.    Promise Tovar MD   meloxicam (MOBIC) 15 MG tablet Take 15 mg by mouth Every Night.    Promise Tovar MD   mupirocin (BACTROBAN) 2 % ointment Apply  topically to the appropriate area as directed 3 (Three) Times a Day As Needed.    Promise Tovar MD   nystatin (MYCOSTATIN) 913263 UNIT/GM powder Apply 1 application topically As Needed. 10/11/17   Promise Tovar MD   omeprazole (priLOSEC) 40 MG capsule Take 40 mg by mouth Daily.    Promise Tovar MD   ondansetron (ZOFRAN) 4 MG tablet Take 4 mg by mouth Every 8 (Eight) Hours As Needed. 9/25/17   Promise Tovar MD   oxyCODONE-acetaminophen (PERCOCET) 7.5-325 MG per tablet Take 1 tablet by mouth 3 (Three) Times a Day. 1/24/18   Promise Tovar MD   phenylephrine-mineral oil-petrolatum 0.25-14-74.9 % ointment hemorrhoidal ointment Insert 1 application into the rectum 3 (Three) Times a Day As Needed (rectal pain). 11/22/17   Kendall Chen MD   ranitidine (ZANTAC) 150 MG capsule Take 2 capsules by mouth Every Evening. 1/31/18 1/31/19  Live Bolton MD   spironolactone (ALDACTONE) 25 MG tablet Take 25 mg by mouth Daily.    Promise Tovar MD       Review of Systems:    Constitutional: Negative for activity change, positive appetite change, negative chills, negative fever.   HENT: Negative for congestion, ear discharge, ear pain, hearing loss, rhinorrhea, sneezing,  sore throat and trouble swallowing.    Eyes: Negative for photophobia, pain, discharge and visual disturbance.   Respiratory: Negative for cough, chest tightness, shortness of breath and wheezing.    Cardiovascular: Negative for chest pain and palpitations.   Gastrointestinal: positive for abdominal pain, negative blood in stool, negative diarrhea, positive nausea and vomiting.   Endocrine: Negative for polydipsia and polyuria.   Genitourinary: Negative for difficulty urinating, dysuria, frequency, hematuria and urgency.   Skin: Negative for rash.   Neurological: Negative for dizziness, syncope, weakness, light-headedness, numbness and headaches.         Physical Exam:    Temp:  [98.2 °F (36.8 °C)] 98.2 °F (36.8 °C)  Heart Rate:  [] 84  Resp:  [18-20] 18  BP: (116-142)/(67-95) 116/67    General: Overall patient is ill-appearing and in slight distress.    HEENT: Head: Normocephalic and atraumatic. Right Ear: External ear normal.   Left Ear: External ear normal. Nose: Nose normal. Mouth/Throat: Oropharynx is clear and moist.   Eyes: Conjunctivae and EOM are normal. Pupils are equal, round, and reactive to light. Right eye exhibits no discharge. Left eye exhibits no discharge.   Neck: Normal range of motion. Neck supple. No JVD present. No tracheal deviation present. No thyromegaly present.   Heart: Normal rate, regular rhythm, normal heart sounds and intact distal pulses.  Exam reveals no gallop and no friction rub. No murmur heard.  Pulmonary: Effort normal and breath sounds normal. No stridor. No respiratory distress. He has no wheezes. No rales or tenderness.   Abdominal: Patient is severely tender in all 4 quadrants.  Patient has present and normal active bowel sounds throughout.  Patient is without any peritoneal signs.  Patient does have a swollen abdomen with hepatosplenomegaly.    Musculoskeletal: No cyanosis, clubbing or edema.  Lymph: No cervical adenopathy.   Neurological: Patient is alert and  oriented to person, place, and time.  Skin: Skin is warm. No rash noted. Patient is not diaphoretic. No erythema. No pallor.     Results Reviewed:  I have personally reviewed current lab, radiology, and data and agree with results.  Lab Results (last 24 hours)     Procedure Component Value Units Date/Time    [place] Draw [[id number]] Collected:  09/01/18 0021    Specimen:  Blood Updated:  09/01/18 0130    Narrative:       The following orders were created for panel order [place] Draw.  Procedure                               Abnormality         Status                     ---------                               -----------         ------                     Light Blue Top[[id number]]                                   Final result               Green Top (Gel)[[id number]]                                  Final result               Lavender Top[[id number]]                                     Final result               Gold Top - SST[[id number]]                                   Final result                 Please view results for these tests on the individual orders.    Light Blue Top [[id number]] Collected:  09/01/18 0021    Specimen:  Blood Updated:  09/01/18 0130     Extra Tube hold for add-on     Comment: Auto resulted       Green Top (Gel) [[id number]] Collected:  09/01/18 0021    Specimen:  Blood Updated:  09/01/18 0130     Extra Tube Hold for add-ons.     Comment: Auto resulted.       Lavender Top [[id number]] Collected:  09/01/18 0021    Specimen:  Blood Updated:  09/01/18 0130     Extra Tube hold for add-on     Comment: Auto resulted       Gold Top - SST [[id number]] Collected:  09/01/18 0021    Specimen:  Blood Updated:  09/01/18 0130     Extra Tube Hold for add-ons.     Comment: Auto resulted.       Urinalysis With Microscopic If Indicated (No Culture) - Urine, Clean Catch [[id number]]  (Normal) Collected:  09/01/18 0043    Specimen:  Urine from Urine, Clean Catch Updated:  09/01/18 0110     Color, UA Yellow      Appearance, UA Clear     pH, UA 6.0     Specific Gravity, UA 1.028     Glucose, UA Negative     Ketones, UA Negative     Bilirubin, UA Negative     Blood, UA Negative     Protein, UA Negative     Leuk Esterase, UA Negative     Nitrite, UA Negative     Urobilinogen, UA 1.0 E.U./dL    Narrative:       Urine microscopic not indicated.    CBC & Differential [[id number]] Collected:  09/01/18 0021    Specimen:  Blood Updated:  09/01/18 0051    Narrative:       The following orders were created for panel order CBC & Differential.  Procedure                               Abnormality         Status                     ---------                               -----------         ------                     CBC Auto Differential[[id number]]        Abnormal            Final result                 Please view results for these tests on the individual orders.    CBC Auto Differential [[id number]]  (Abnormal) Collected:  09/01/18 0021    Specimen:  Blood Updated:  09/01/18 0051     WBC 7.98 10*3/mm3      RBC 5.57 (H) 10*6/mm3      Hemoglobin 15.0 g/dL      Hematocrit 43.7 %      MCV 78.5 (L) fL      MCH 26.9 pg      MCHC 34.3 g/dL      RDW 15.1 (H) %      RDW-SD 43.5 fl      MPV 11.4 fL      Platelets 118 (L) 10*3/mm3      Neutrophil % 73.8 %      Lymphocyte % 18.0 %      Monocyte % 6.1 %      Eosinophil % 1.6 %      Basophil % 0.1 %      Immature Grans % 0.4 %      Neutrophils, Absolute 5.88 10*3/mm3      Lymphocytes, Absolute 1.44 10*3/mm3      Monocytes, Absolute 0.49 10*3/mm3      Eosinophils, Absolute 0.13 10*3/mm3      Basophils, Absolute 0.01 10*3/mm3      Immature Grans, Absolute 0.03 (H) 10*3/mm3     Comprehensive Metabolic Panel [[id number]]  (Abnormal) Collected:  09/01/18 0021    Specimen:  Blood Updated:  09/01/18 0050     Glucose 123 (H) mg/dL      BUN 17 mg/dL      Creatinine 1.08 (H) mg/dL      Sodium 137 mmol/L      Potassium 3.6 mmol/L      Chloride 101 mmol/L      CO2 24.0 mmol/L      Calcium 9.3 mg/dL      Total  Protein 7.6 g/dL      Albumin 4.20 g/dL      ALT (SGPT) 60 (H) U/L      AST (SGOT) 69 (H) U/L      Alkaline Phosphatase 96 U/L      Total Bilirubin 0.7 mg/dL      eGFR Non African Amer 52 mL/min/1.73      Globulin 3.4 gm/dL      A/G Ratio 1.2 g/dL      BUN/Creatinine Ratio 15.7     Anion Gap 12.0 mmol/L     Lipase [[id number]]  (Normal) Collected:  09/01/18 0021    Specimen:  Blood Updated:  09/01/18 0050     Lipase 76 U/L         Imaging Results (last 24 hours)     Procedure Component Value Units Date/Time    CT Abdomen Pelvis With Contrast [[id number]] Collected:  09/01/18 0205     Updated:  09/01/18 0239    Narrative:       CT abdomen and pelvis with contrast on  9/1/2018     CLINICAL INDICATION: Generalized abdominal pain, constipation,  history of obstruction    TECHNIQUE: Multiple axial images are obtained throughout the  abdomen and pelvis following the administration of IV contrast,  93 mL of Isovue-300 contrast was administered intravenously  without complication. This exam was performed according to our  departmental dose-optimization program, which includes automated  exposure control, adjustment of the mA and/or kV according to  patient size and/or use of iterative reconstruction technique.   Total DLP is 564.4 mGy*cm.    COMPARISON: 11/21/2017    FINDINGS:  Abdomen: There is minimal basilar atelectasis. There is mild  fatty infiltration of the liver. The patient is status post  cholecystectomy. Splenomegaly is noted with the spleen measuring  17 cm in greatest wcxs-of-[id number] length. There is a slightly  irregular contour of the liver consistent with changes of  cirrhosis. The solid abdominal organs are otherwise unremarkable.  Small esophageal and gastric varices are noted. There is no  abdominal adenopathy. There is no free fluid or free air within  the abdomen. There are dilated fluid-filled loops of mid small  bowel with decompressed distal small bowel in the pelvis  consistent with a small bowel  obstruction. There are multiple  adherent loops of bowel along the anterior abdominal and anterior  pelvic wall likely related to adhesions which is most likely the  cause of this small bowel obstruction.    Pelvis: The patient is status post hysterectomy. There is no free  fluid in the pelvis. There is no pelvic adenopathy. The pelvic  portion of the GI tract is otherwise unremarkable. No bony  abnormality is noted.      Impression:       1. Findings consistent with a small bowel obstruction most likely  related to an adhesion.  2. Changes of cirrhosis with splenomegaly and evidence of portal  hypertension.    Electronically signed by:  Chetan Huston  9/1/2018 2:38 AM CDT  Workstation: [id number]    XR Abdomen 2 View With Chest 1 View [[id number]] Collected:  09/01/18 0048     Updated:  09/01/18 0121    Narrative:       Acute abdominal series on 9/1/2018    CLINICAL INDICATION: Constipation, history of obstruction    COMPARISON: 11/20/2017    FINDINGS:    CHEST: There is mild linear atelectasis in the left lower lung.  The lungs are otherwise clear. Cardiac, hilar and mediastinal  contours are within normal limits. Pulmonary vascularity is  within normal limits.    ABDOMEN: There are multiple air-fluid levels with dilated loops  of small bowel in the midabdomen consistent with a small bowel  obstruction. No increased stool to suggest significant  constipation is noted. Calcification in left pelvis is consistent  with phlebolith. There is no free air. Minimal degenerative  changes are noted in the spine.      Impression:       1. No acute cardiopulmonary disease.  2. Findings consistent with small bowel obstruction.    Electronically signed by:  Chetan Huston  9/1/2018 1:20 AM CDT  Workstation: [id number]          Assessment/Plan         Hospital Problem List     SBO (small bowel obstruction)          Assessment / Plan:    Acute on chronic SBO secondary to adhesions - IV fluids, nothing by mouth, NG  tube to suction.  General surgery has been consult.  Patient does have bowel sounds present.  We'll treat pain with morphine however when possible should avoid opiate pain medicine as this could slow her gut motility.    Fatty liver disease with cirrhosis and splenomegaly - will put ordered for GI to follow the patient.  Patient will have abdominal ultrasound to evaluate her portal hypertension and splenomegaly.    Thrombocytopenia - is likely secondary to her spinal megaly.  We will evaluate with ultrasound.    Acute NPO status - patient is on multiple home medications.  Will have to hold these for now and restart when possible as patient is nothing by mouth and has NG tube to suction..  When possible change by mouth to IV form.    Hypertension - will cover with IV labetalol when necessary with parameters.     Ethics - full code    dvt prophylaxis  - heparin                      This document has been electronically signed by Jeff Gomez DO on September 1, 2018 4:17 AM

## 2018-09-02 ENCOUNTER — APPOINTMENT (OUTPATIENT)
Dept: GENERAL RADIOLOGY | Facility: HOSPITAL | Age: 58
End: 2018-09-02

## 2018-09-02 LAB
ALBUMIN SERPL-MCNC: 3.4 G/DL (ref 3.4–4.8)
ALBUMIN/GLOB SERPL: 1.1 G/DL (ref 1.1–1.8)
ALP SERPL-CCNC: 77 U/L (ref 38–126)
ALT SERPL W P-5'-P-CCNC: 46 U/L (ref 9–52)
ANION GAP SERPL CALCULATED.3IONS-SCNC: 8 MMOL/L (ref 5–15)
AST SERPL-CCNC: 49 U/L (ref 14–36)
BASOPHILS # BLD AUTO: 0.02 10*3/MM3 (ref 0–0.2)
BASOPHILS NFR BLD AUTO: 0.3 % (ref 0–2)
BILIRUB SERPL-MCNC: 1.2 MG/DL (ref 0.2–1.3)
BUN BLD-MCNC: 10 MG/DL (ref 7–21)
BUN/CREAT SERPL: 9.7 (ref 7–25)
CALCIUM SPEC-SCNC: 8.3 MG/DL (ref 8.4–10.2)
CHLORIDE SERPL-SCNC: 100 MMOL/L (ref 95–110)
CO2 SERPL-SCNC: 29 MMOL/L (ref 22–31)
CREAT BLD-MCNC: 1.03 MG/DL (ref 0.5–1)
DEPRECATED RDW RBC AUTO: 45.1 FL (ref 36.4–46.3)
EOSINOPHIL # BLD AUTO: 0.13 10*3/MM3 (ref 0–0.7)
EOSINOPHIL NFR BLD AUTO: 2.1 % (ref 0–7)
ERYTHROCYTE [DISTWIDTH] IN BLOOD BY AUTOMATED COUNT: 15.3 % (ref 11.5–14.5)
GFR SERPL CREATININE-BSD FRML MDRD: 55 ML/MIN/1.73 (ref 51–120)
GLOBULIN UR ELPH-MCNC: 3.1 GM/DL (ref 2.3–3.5)
GLUCOSE BLD-MCNC: 113 MG/DL (ref 60–100)
HCT VFR BLD AUTO: 42.7 % (ref 35–45)
HGB BLD-MCNC: 14.2 G/DL (ref 12–15.5)
IMM GRANULOCYTES # BLD: 0.01 10*3/MM3 (ref 0–0.02)
IMM GRANULOCYTES NFR BLD: 0.2 % (ref 0–0.5)
LYMPHOCYTES # BLD AUTO: 1.42 10*3/MM3 (ref 0.6–4.2)
LYMPHOCYTES NFR BLD AUTO: 22.8 % (ref 10–50)
MCH RBC QN AUTO: 26.7 PG (ref 26.5–34)
MCHC RBC AUTO-ENTMCNC: 33.3 G/DL (ref 31.4–36)
MCV RBC AUTO: 80.3 FL (ref 80–98)
MONOCYTES # BLD AUTO: 0.39 10*3/MM3 (ref 0–0.9)
MONOCYTES NFR BLD AUTO: 6.3 % (ref 0–12)
NEUTROPHILS # BLD AUTO: 4.27 10*3/MM3 (ref 2–8.6)
NEUTROPHILS NFR BLD AUTO: 68.3 % (ref 37–80)
NRBC BLD MANUAL-RTO: 0 /100 WBC (ref 0–0)
PLATELET # BLD AUTO: 91 10*3/MM3 (ref 150–450)
PMV BLD AUTO: ABNORMAL FL (ref 8–12)
POTASSIUM BLD-SCNC: 3.5 MMOL/L (ref 3.5–5.1)
PROT SERPL-MCNC: 6.5 G/DL (ref 6.3–8.6)
RBC # BLD AUTO: 5.32 10*6/MM3 (ref 3.77–5.16)
RBC MORPH BLD: NORMAL
SMALL PLATELETS BLD QL SMEAR: NORMAL
SODIUM BLD-SCNC: 137 MMOL/L (ref 137–145)
WBC MORPH BLD: NORMAL
WBC NRBC COR # BLD: 6.24 10*3/MM3 (ref 3.2–9.8)

## 2018-09-02 PROCEDURE — 0 DIATRIZOATE MEGLUMINE & SODIUM PER 1 ML: Performed by: INTERNAL MEDICINE

## 2018-09-02 PROCEDURE — 25010000002 HEPARIN (PORCINE) PER 1000 UNITS: Performed by: FAMILY MEDICINE

## 2018-09-02 PROCEDURE — 25010000002 ONDANSETRON PER 1 MG: Performed by: FAMILY MEDICINE

## 2018-09-02 PROCEDURE — 85025 COMPLETE CBC W/AUTO DIFF WBC: CPT | Performed by: FAMILY MEDICINE

## 2018-09-02 PROCEDURE — 74270 X-RAY XM COLON 1CNTRST STD: CPT

## 2018-09-02 PROCEDURE — 85007 BL SMEAR W/DIFF WBC COUNT: CPT | Performed by: FAMILY MEDICINE

## 2018-09-02 PROCEDURE — 25010000002 MORPHINE PER 10 MG: Performed by: FAMILY MEDICINE

## 2018-09-02 RX ADMIN — HEPARIN SODIUM 5000 UNITS: 5000 INJECTION, SOLUTION INTRAVENOUS; SUBCUTANEOUS at 06:06

## 2018-09-02 RX ADMIN — ONDANSETRON HYDROCHLORIDE 4 MG: 2 INJECTION, SOLUTION INTRAMUSCULAR; INTRAVENOUS at 10:16

## 2018-09-02 RX ADMIN — DIATRIZOATE MEGLUMINE AND DIATRIZOATE SODIUM 240 ML: 660; 100 LIQUID ORAL; RECTAL at 10:00

## 2018-09-02 RX ADMIN — HEPARIN SODIUM 5000 UNITS: 5000 INJECTION, SOLUTION INTRAVENOUS; SUBCUTANEOUS at 13:35

## 2018-09-02 RX ADMIN — MORPHINE SULFATE 2 MG: 2 INJECTION, SOLUTION INTRAMUSCULAR; INTRAVENOUS at 18:11

## 2018-09-02 RX ADMIN — MORPHINE SULFATE 2 MG: 2 INJECTION, SOLUTION INTRAMUSCULAR; INTRAVENOUS at 13:33

## 2018-09-02 RX ADMIN — PANTOPRAZOLE SODIUM 40 MG: 40 INJECTION, POWDER, FOR SOLUTION INTRAVENOUS at 06:06

## 2018-09-02 RX ADMIN — MORPHINE SULFATE 2 MG: 2 INJECTION, SOLUTION INTRAMUSCULAR; INTRAVENOUS at 09:40

## 2018-09-02 RX ADMIN — MORPHINE SULFATE 2 MG: 2 INJECTION, SOLUTION INTRAMUSCULAR; INTRAVENOUS at 06:06

## 2018-09-02 RX ADMIN — DEXTROSE AND SODIUM CHLORIDE 100 ML/HR: 5; 450 INJECTION, SOLUTION INTRAVENOUS at 14:24

## 2018-09-02 RX ADMIN — MORPHINE SULFATE 2 MG: 2 INJECTION, SOLUTION INTRAMUSCULAR; INTRAVENOUS at 21:59

## 2018-09-02 NOTE — PLAN OF CARE
Problem: Patient Care Overview  Goal: Plan of Care Review  Outcome: Ongoing (interventions implemented as appropriate)   09/02/18 0256   Coping/Psychosocial   Plan of Care Reviewed With patient   Plan of Care Review   Progress no change   OTHER   Outcome Summary last enema to be done at 3 am in prep for test today. resting between care, ng has little output

## 2018-09-02 NOTE — PROGRESS NOTES
"  Subjective:  Feeling better.  Had multiple enemas and barium enema this am.  650 out ngt.       /70 (BP Location: Left arm, Patient Position: Lying)   Pulse 84   Temp 98.6 °F (37 °C) (Oral)   Resp 18   Ht 162.6 cm (64\")   Wt 102 kg (225 lb)   LMP  (LMP Unknown)   SpO2 93%   BMI 38.62 kg/m²     Lab Results (last 24 hours)     Procedure Component Value Units Date/Time    CBC & Differential [[id number]] Collected:  09/02/18 0538    Specimen:  Blood Updated:  09/02/18 0757    Narrative:       The following orders were created for panel order CBC & Differential.  Procedure                               Abnormality         Status                     ---------                               -----------         ------                     Scan Slide[[id number]]                                       Final result               CBC Auto Differential[[id number]]        Abnormal            Final result                 Please view results for these tests on the individual orders.    Scan Slide [[id number]] Collected:  09/02/18 0538    Specimen:  Blood Updated:  09/02/18 0757     RBC Morphology Normal     WBC Morphology Normal     Platelet Estimate Decreased    CBC Auto Differential [[id number]]  (Abnormal) Collected:  09/02/18 0538    Specimen:  Blood Updated:  09/02/18 0654     WBC 6.24 10*3/mm3      RBC 5.32 (H) 10*6/mm3      Hemoglobin 14.2 g/dL      Hematocrit 42.7 %      MCV 80.3 fL      MCH 26.7 pg      MCHC 33.3 g/dL      RDW 15.3 (H) %      RDW-SD 45.1 fl      MPV -- fL      Comment: Unable to verify        Platelets 91 (L) 10*3/mm3      Neutrophil % 68.3 %      Lymphocyte % 22.8 %      Monocyte % 6.3 %      Eosinophil % 2.1 %      Basophil % 0.3 %      Immature Grans % 0.2 %      Neutrophils, Absolute 4.27 10*3/mm3      Lymphocytes, Absolute 1.42 10*3/mm3      Monocytes, Absolute 0.39 10*3/mm3      Eosinophils, Absolute 0.13 10*3/mm3      Basophils, Absolute 0.02 10*3/mm3      Immature Grans, Absolute 0.01 " 10*3/mm3      nRBC 0.0 /100 WBC     Comprehensive Metabolic Panel [[id number]]  (Abnormal) Collected:  09/01/18 2352    Specimen:  Blood Updated:  09/02/18 0022     Glucose 113 (H) mg/dL      BUN 10 mg/dL      Creatinine 1.03 (H) mg/dL      Sodium 137 mmol/L      Potassium 3.5 mmol/L      Chloride 100 mmol/L      CO2 29.0 mmol/L      Calcium 8.3 (L) mg/dL      Total Protein 6.5 g/dL      Albumin 3.40 g/dL      ALT (SGPT) 46 U/L      AST (SGOT) 49 (H) U/L      Alkaline Phosphatase 77 U/L      Total Bilirubin 1.2 mg/dL      eGFR Non African Amer 55 mL/min/1.73      Globulin 3.1 gm/dL      A/G Ratio 1.1 g/dL      BUN/Creatinine Ratio 9.7     Anion Gap 8.0 mmol/L           Current Medications:  Current Facility-Administered Medications   Medication Dose Route Frequency Provider Last Rate Last Dose   • dextrose 5 % and sodium chloride 0.45 % infusion  100 mL/hr Intravenous Continuous Live Gomez  mL/hr at 09/01/18 1814 100 mL/hr at 09/01/18 1814   • [COMPLETED] diatrizoate meglumine-sodium (GASTROGRAFIN) 66-10 % solution 120 mL  120 mL Rectal Once in imaging Ghanshyam Aguila DO   240 mL at 09/02/18 1000   • heparin (porcine) 5000 UNIT/ML injection 5,000 Units  5,000 Units Subcutaneous Q8H Live Gomez DO   5,000 Units at 09/02/18 0606   • labetalol (NORMODYNE,TRANDATE) injection 10 mg  10 mg Intravenous Q6H PRN Live Gomez DO       • morphine injection 2 mg  2 mg Intravenous Q4H PRN Live Gomez DO   2 mg at 09/02/18 0606   • ondansetron (ZOFRAN) injection 4 mg  4 mg Intravenous Q6H PRN Live Gomez DO   4 mg at 09/01/18 2316   • pantoprazole (PROTONIX) injection 40 mg  40 mg Intravenous Q AM Live Gomez DO   40 mg at 09/02/18 0606   • pneumococcal polysaccharide 23-valent (PNEUMOVAX-23) vaccine 0.5 mL  0.5 mL Intramuscular During Hospitalization Live Gomez DO       • sodium chloride 0.9 % flush 1-10 mL  1-10 mL Intravenous PRN Jason  Live Mercado,        • sodium chloride 0.9 % flush 10 mL  10 mL Intravenous PRN Ollie Amado MD   10 mL at 09/01/18 0025   • sodium chloride 0.9 % flush 10 mL  10 mL Intravenous PRN Ollie Amado MD       • sodium chloride 0.9 % infusion  125 mL/hr Intravenous Continuous Ollie Amado MD   Stopped at 09/01/18 0615       Prior to admission medications:  Prescriptions Prior to Admission   Medication Sig Dispense Refill Last Dose   • cetirizine (zyrTEC) 10 MG tablet Take 1 tablet by mouth Daily As Needed for Allergies. 30 tablet 11 Past Week at Unknown time   • chlorthalidone (HYGROTEN) 50 MG tablet Take 50 mg by mouth Daily.   8/21/2018 at 0900   • clotrimazole (MYCELEX) 10 MG dimitry Take 1 tablet by mouth 3 (Three) Times a Day. 40 tablet 0 8/21/2018 at 2100   • cyclobenzaprine (FLEXERIL) 10 MG tablet Take 10 mg by mouth 3 (Three) Times a Day As Needed for Muscle Spasms.   More than a month at Unknown time   • DULoxetine (CYMBALTA) 30 MG capsule Take 30 mg by mouth Daily.   8/22/2018 at 0900   • gabapentin (NEURONTIN) 800 MG tablet Take 800 mg by mouth 4 (Four) Times a Day.   8/22/2018 at 0900   • linaclotide (LINZESS) 145 MCG capsule capsule Take 145 mcg by mouth 2 (Two) Times a Day.   8/21/2018 at 0900   • magnesium hydroxide (MILK OF MAGNESIA) 2400 MG/10ML suspension suspension Take 10 mL by mouth Daily As Needed (constipation). 100 mL 2 Past Week at Unknown time   • magnesium oxide (MAGOX) 400 (241.3 MG) MG tablet tablet Take 400 mg by mouth Daily.   Past Month at Unknown time   • meloxicam (MOBIC) 15 MG tablet Take 15 mg by mouth Every Night.   8/21/2018 at 2100   • mupirocin (BACTROBAN) 2 % ointment Apply  topically to the appropriate area as directed 3 (Three) Times a Day As Needed.   Past Month at Unknown time   • nystatin (MYCOSTATIN) 979761 UNIT/GM powder Apply 1 application topically As Needed.   Past Month at Unknown time   • omeprazole (priLOSEC) 40 MG capsule Take 40 mg by mouth Daily.    8/22/2018 at 0900   • ondansetron (ZOFRAN) 4 MG tablet Take 4 mg by mouth Every 8 (Eight) Hours As Needed.   More than a month at Unknown time   • oxyCODONE-acetaminophen (PERCOCET) 7.5-325 MG per tablet Take 1 tablet by mouth 3 (Three) Times a Day.   8/21/2018 at 2100   • phenylephrine-mineral oil-petrolatum 0.25-14-74.9 % ointment hemorrhoidal ointment Insert 1 application into the rectum 3 (Three) Times a Day As Needed (rectal pain). 28 g 1 More than a month at Unknown time   • ranitidine (ZANTAC) 150 MG capsule Take 2 capsules by mouth Every Evening. 60 capsule 3 8/21/2018 at 2100   • spironolactone (ALDACTONE) 25 MG tablet Take 25 mg by mouth Daily.   8/21/2018 at 0900   • chlorthalidone (HYGROTEN) 50 MG tablet TAKE 1 TABLET BY MOUTH ONCE DAILY 90 tablet 3        Physical exam:  Nontoxic  Abd softer, positive bs    Assessment : Improving, partial small bowel resection, cirrhosis    Plan: Continue present care

## 2018-09-02 NOTE — PROGRESS NOTES
[facility] Medicine Services  INPATIENT PROGRESS NOTE    Length of Stay: 0  Date of Admission: 8/31/2018  Primary Care Physician: Yobany Barr MD    Subjective   Chief Complaint: Worsening abdominal pain  HPI:  Patient complains of abdominal pain that is slightly improved. She has an NG tube in situ but drainage has been reduced.    Review of Systems   Constitutional: Positive for activity change and appetite change. Negative for chills and fever.   HENT: Negative for congestion and mouth sores.    Eyes: Negative for discharge and redness.   Respiratory: Negative for cough and shortness of breath.    Cardiovascular: Negative for chest pain, palpitations and leg swelling.   Gastrointestinal: Positive for abdominal distention, abdominal pain, constipation and nausea. Negative for vomiting.   Genitourinary: Negative for dysuria and hematuria.   Musculoskeletal: Negative for arthralgias and joint swelling.   Neurological: Negative for dizziness and headaches.   Psychiatric/Behavioral: Negative for agitation and confusion.         Objective    Temp:  [96.2 °F (35.7 °C)-98.5 °F (36.9 °C)] 96.8 °F (36 °C)  Heart Rate:  [] 80  Resp:  [16-20] 20  BP: (114-142)/(65-95) 114/65    Physical Exam   Constitutional: She is oriented to person, place, and time. She appears well-developed and well-nourished. No distress.   HENT:   Head: Normocephalic.   Mouth/Throat: Oropharynx is clear and moist.   Eyes: Pupils are equal, round, and reactive to light. Conjunctivae and EOM are normal.   Neck: Normal range of motion. Neck supple. No JVD present. No tracheal deviation present. No thyromegaly present.   Cardiovascular: Normal rate, regular rhythm, normal heart sounds and intact distal pulses.  Exam reveals no gallop and no friction rub.    No murmur heard.  Pulmonary/Chest: Effort normal and breath sounds normal. No stridor. No respiratory distress. She has no wheezes. She has no rales.  She exhibits no tenderness.   Abdominal: Soft. Bowel sounds are normal. She exhibits distension. She exhibits no mass. There is tenderness. There is no rebound and no guarding. No hernia.   Genitourinary: Rectal exam shows guaiac negative stool.   Musculoskeletal: Normal range of motion. She exhibits no edema, tenderness or deformity.   Lymphadenopathy:     She has no cervical adenopathy.   Neurological: She is alert and oriented to person, place, and time. She displays normal reflexes. No cranial nerve deficit or sensory deficit. She exhibits normal muscle tone. Coordination normal.   Skin: Skin is warm and dry. Capillary refill takes less than 2 seconds. No rash noted. She is not diaphoretic. No erythema. No pallor.   Psychiatric: She has a normal mood and affect. Her behavior is normal. Judgment and thought content normal.         Medication Review:    Current Facility-Administered Medications:   •  dextrose 5 % and sodium chloride 0.45 % infusion, 100 mL/hr, Intravenous, Continuous, Live Gomez DO, Last Rate: 100 mL/hr at 09/01/18 1814, 100 mL/hr at 09/01/18 1814  •  heparin (porcine) 5000 UNIT/ML injection 5,000 Units, 5,000 Units, Subcutaneous, Q8H, Live Gomez DO, 5,000 Units at 09/01/18 1518  •  labetalol (NORMODYNE,TRANDATE) injection 10 mg, 10 mg, Intravenous, Q6H PRN, Live Gomez DO  •  morphine injection 2 mg, 2 mg, Intravenous, Q4H PRN, Live Gomez DO, 2 mg at 09/01/18 1931  •  ondansetron (ZOFRAN) injection 4 mg, 4 mg, Intravenous, Q6H PRN, Live Gomez DO, 4 mg at 09/01/18 1814  •  pantoprazole (PROTONIX) injection 40 mg, 40 mg, Intravenous, Q AM, Live Gomez DO, 40 mg at 09/01/18 0652  •  pneumococcal polysaccharide 23-valent (PNEUMOVAX-23) vaccine 0.5 mL, 0.5 mL, Intramuscular, During Hospitalization, Live Gomez DO  •  sodium chloride 0.9 % flush 1-10 mL, 1-10 mL, Intravenous, PRN, Live Gomez, DO  •  sodium  chloride 0.9 % flush 10 mL, 10 mL, Intravenous, PRN, Ollie Amado MD, 10 mL at 09/01/18 0025  •  Insert peripheral IV, , , Once **AND** sodium chloride 0.9 % flush 10 mL, 10 mL, Intravenous, PRN, Ollie Amado MD  •  sodium chloride 0.9 % infusion, 125 mL/hr, Intravenous, Continuous, Ollie Amado MD, Stopped at 09/01/18 0615    Results Review:  I have reviewed the labs, radiology results, and diagnostic studies.    Laboratory Data:     Results from last 7 days  Lab Units 09/01/18  0021   SODIUM mmol/L 137   POTASSIUM mmol/L 3.6   CHLORIDE mmol/L 101   CO2 mmol/L 24.0   BUN mg/dL 17   CREATININE mg/dL 1.08*   GLUCOSE mg/dL 123*   CALCIUM mg/dL 9.3   BILIRUBIN mg/dL 0.7   ALK PHOS U/L 96   ALT (SGPT) U/L 60*   AST (SGOT) U/L 69*   ANION GAP mmol/L 12.0     Estimated Creatinine Clearance: 66 mL/min (A) (by C-G formula based on SCr of 1.08 mg/dL (H)).            Results from last 7 days  Lab Units 09/01/18  0021   WBC 10*3/mm3 7.98   HEMOGLOBIN g/dL 15.0   HEMATOCRIT % 43.7   PLATELETS 10*3/mm3 118*           Culture Data:   No results found for: BLOODCX  No results found for: URINECX  No results found for: RESPCX  No results found for: WOUNDCX  No results found for: STOOLCX  No components found for: BODYFLD    Radiology Data:   Imaging Results (last 24 hours)     Procedure Component Value Units Date/Time    US Abdomen Complete [[id number]] Collected:  09/01/18 0848     Updated:  09/01/18 1308    Narrative:         EXAMINATION:  ultrasound abdomen, complete    CLINICAL INDICATION / HISTORY:  portal hypertension, K56.609  Unspecified intestinal obstruction, unspecified as to partial  versus complete obstruction K74.60 Unspecified cirrhosis of liver     DATE:  9/1/2018 12:56 PM CDT  COMPARISON:  none  TECHNIQUE:  standard protocol      FINDINGS:        Liver:      size: limited evaluation, grossly negative      echotexture:  Consistent with diffuse fibrotic and/or fatty  changes.      misc.:   no focal mass or  intrahepatic biliary ductal  dilatation       Biliary:      Gall bladder:  Surgically absent      Common bile duct:  normal caliber      Pancreas (visualized portions):          normal size and echotexture for age  , no focal mass or  ductal dilatation        Kidney, right (limited):      size:  normal        echotexture:  normal        misc.:  no nephrolithiasis, solid mass, or collecting system  dilation       Spleen:   Enlarged measuring 15 cm    Kidney, left:      size:  normal      echotexture:  normal        misc.:  no nephrolithiasis, solid mass, or collecting system  dilation       Vascular (visualized portions of - remainder obscured by  overlying bowel gas):      Aorta (proximal):  normal caliber      IVC:  normal caliber, no intraluminal defect(s)      Misc:  Antegrade flow in the portal vein.        Impression:       CONCLUSION:          1.  Status post cholecystectomy.  2. Hepatic parenchymal echotexture consistent with diffuse  fibrotic and/or to fatty changes.  3. Antegrade flow in the portal vein.  4. Splenomegaly.                                Electronically signed by:  JONO Cobb MD  9/1/2018 1:07 PM  CDT Workstation: [id number]    CT Abdomen Pelvis With Contrast [[id number]] Collected:  09/01/18 0205     Updated:  09/01/18 0239    Narrative:       CT abdomen and pelvis with contrast on  9/1/2018     CLINICAL INDICATION: Generalized abdominal pain, constipation,  history of obstruction    TECHNIQUE: Multiple axial images are obtained throughout the  abdomen and pelvis following the administration of IV contrast,  93 mL of Isovue-300 contrast was administered intravenously  without complication. This exam was performed according to our  departmental dose-optimization program, which includes automated  exposure control, adjustment of the mA and/or kV according to  patient size and/or use of iterative reconstruction technique.   Total DLP is 564.4 mGy*cm.    COMPARISON:  11/21/2017    FINDINGS:  Abdomen: There is minimal basilar atelectasis. There is mild  fatty infiltration of the liver. The patient is status post  cholecystectomy. Splenomegaly is noted with the spleen measuring  17 cm in greatest [id number] length. There is a slightly  irregular contour of the liver consistent with changes of  cirrhosis. The solid abdominal organs are otherwise unremarkable.  Small esophageal and gastric varices are noted. There is no  abdominal adenopathy. There is no free fluid or free air within  the abdomen. There are dilated fluid-filled loops of mid small  bowel with decompressed distal small bowel in the pelvis  consistent with a small bowel obstruction. There are multiple  adherent loops of bowel along the anterior abdominal and anterior  pelvic wall likely related to adhesions which is most likely the  cause of this small bowel obstruction.    Pelvis: The patient is status post hysterectomy. There is no free  fluid in the pelvis. There is no pelvic adenopathy. The pelvic  portion of the GI tract is otherwise unremarkable. No bony  abnormality is noted.      Impression:       1. Findings consistent with a small bowel obstruction most likely  related to an adhesion.  2. Changes of cirrhosis with splenomegaly and evidence of portal  hypertension.    Electronically signed by:  Chetan Huston  9/1/2018 2:38 AM CDT  Workstation: [id number]    XR Abdomen 2 View With Chest 1 View [[id number]] Collected:  09/01/18 0048     Updated:  09/01/18 0121    Narrative:       Acute abdominal series on 9/1/2018    CLINICAL INDICATION: Constipation, history of obstruction    COMPARISON: 11/20/2017    FINDINGS:    CHEST: There is mild linear atelectasis in the left lower lung.  The lungs are otherwise clear. Cardiac, hilar and mediastinal  contours are within normal limits. Pulmonary vascularity is  within normal limits.    ABDOMEN: There are multiple air-fluid levels with dilated loops  of small bowel  in the midabdomen consistent with a small bowel  obstruction. No increased stool to suggest significant  constipation is noted. Calcification in left pelvis is consistent  with phlebolith. There is no free air. Minimal degenerative  changes are noted in the spine.      Impression:       1. No acute cardiopulmonary disease.  2. Findings consistent with small bowel obstruction.    Electronically signed by:  Chetan Huston  9/1/2018 1:20 AM CDT  Workstation: [id number]          I have reviewed the patient's current medications.     Assessment/Plan     Hospital Problem List:  Active Problems:     1. Acute on chronic SBO secondary to adhesions   -Continue IV fluids, nothing by mouth, NG tube to suction. General surgery and gastroenterology consult appreciated. Continue with NG tube drainage. Also continue with Enemas for constipation. Patient does have bowel sounds present. Will treat pain with morphine cautiously as this could slow her gut motility.     2. Fatty liver disease with cirrhosis and splenomegaly - Patient had abdominal ultrasound which showed antegrade flow in her portal vein with hepatic diffuse fibrotic and fatty changes and splenomegaly.     3. Thrombocytopenia - Likely secondary to her splenomegaly.      4. NPO status - Patient is on multiple home medications. Will continue to hold these for now and restart when possible as patient is nothing by mouth and has NG tube to suction.     5. Hypertension - Cover with IV labetalol when necessary with parameters.      Code status - Full code     Dvt prophylaxis  - Heparin         Discharge Planning: I anticipate patient will be inpatient for at least 2-3 days    Shaylee Ford MD   09/01/18   8:59 PM

## 2018-09-02 NOTE — PLAN OF CARE
Problem: Patient Care Overview  Goal: Plan of Care Review  Outcome: Ongoing (interventions implemented as appropriate)   09/02/18 0740   Coping/Psychosocial   Plan of Care Reviewed With patient   Plan of Care Review   Progress no change   OTHER   Outcome Summary No acute changes.      Goal: Individualization and Mutuality  Outcome: Ongoing (interventions implemented as appropriate)    Goal: Discharge Needs Assessment  Outcome: Ongoing (interventions implemented as appropriate)    Goal: Interprofessional Rounds/Family Conf  Outcome: Ongoing (interventions implemented as appropriate)

## 2018-09-02 NOTE — PROGRESS NOTES
[facility] Medicine Services  INPATIENT PROGRESS NOTE    Length of Stay: 1  Date of Admission: 8/31/2018  Primary Care Physician: Yobany Barr MD    Subjective   Chief Complaint: Worsening abdominal pain  HPI:  Patient complains of abdominal pain that is slightly improved. She has an NG tube in situ but with minimal drainage. She denies vomiting.    Review of Systems   Constitutional: Positive for activity change and appetite change. Negative for chills and fever.   HENT: Negative for congestion and mouth sores.    Eyes: Negative for discharge and redness.   Respiratory: Negative for cough and shortness of breath.    Cardiovascular: Negative for chest pain, palpitations and leg swelling.   Gastrointestinal: Positive for abdominal distention, abdominal pain, constipation and nausea. Negative for blood in stool and vomiting.   Genitourinary: Negative for dysuria and hematuria.   Musculoskeletal: Negative for arthralgias and joint swelling.   Neurological: Negative for dizziness and headaches.   Psychiatric/Behavioral: Negative for agitation and confusion. The patient is not nervous/anxious.          Objective    Temp:  [96.6 °F (35.9 °C)-98.9 °F (37.2 °C)] 96.6 °F (35.9 °C)  Heart Rate:  [80-84] 80  Resp:  [16-20] 18  BP: (114-143)/(65-81) 143/81    Physical Exam   Constitutional: She is oriented to person, place, and time. She appears well-developed and well-nourished. No distress.   HENT:   Head: Normocephalic.   Mouth/Throat: Oropharynx is clear and moist.   Eyes: Pupils are equal, round, and reactive to light. Conjunctivae and EOM are normal.   Neck: Normal range of motion. Neck supple. No JVD present. No tracheal deviation present. No thyromegaly present.   Cardiovascular: Normal rate, regular rhythm, normal heart sounds and intact distal pulses.  Exam reveals no gallop and no friction rub.    No murmur heard.  Pulmonary/Chest: Effort normal and breath sounds normal. No  stridor. No respiratory distress. She has no wheezes. She has no rales. She exhibits no tenderness.   Abdominal: Soft. Bowel sounds are normal. She exhibits distension. She exhibits no mass. There is tenderness. There is no rebound and no guarding. No hernia.   Musculoskeletal: Normal range of motion. She exhibits no edema, tenderness or deformity.   Lymphadenopathy:     She has no cervical adenopathy.   Neurological: She is alert and oriented to person, place, and time. She displays normal reflexes. No cranial nerve deficit or sensory deficit. She exhibits normal muscle tone. Coordination normal.   Skin: Skin is warm and dry. Capillary refill takes less than 2 seconds. No rash noted. She is not diaphoretic. No erythema. No pallor.   Psychiatric: She has a normal mood and affect. Her behavior is normal. Judgment and thought content normal.         Medication Review:    Current Facility-Administered Medications:   •  dextrose 5 % and sodium chloride 0.45 % infusion, 100 mL/hr, Intravenous, Continuous, Live Gomez DO, Last Rate: 100 mL/hr at 09/02/18 1424, 100 mL/hr at 09/02/18 1424  •  heparin (porcine) 5000 UNIT/ML injection 5,000 Units, 5,000 Units, Subcutaneous, Q8H, Live Gomez DO, 5,000 Units at 09/02/18 1335  •  labetalol (NORMODYNE,TRANDATE) injection 10 mg, 10 mg, Intravenous, Q6H PRN, Live Gomez DO  •  morphine injection 2 mg, 2 mg, Intravenous, Q4H PRN, Live Gomez DO, 2 mg at 09/02/18 1333  •  ondansetron (ZOFRAN) injection 4 mg, 4 mg, Intravenous, Q6H PRN, Live Gomez DO, 4 mg at 09/02/18 1016  •  pantoprazole (PROTONIX) injection 40 mg, 40 mg, Intravenous, Q AM, Live Gomez DO, 40 mg at 09/02/18 0606  •  pneumococcal polysaccharide 23-valent (PNEUMOVAX-23) vaccine 0.5 mL, 0.5 mL, Intramuscular, During Hospitalization, Live Gomez DO  •  sodium chloride 0.9 % flush 1-10 mL, 1-10 mL, Intravenous, PRN, Live Gomez, DO  •   sodium chloride 0.9 % flush 10 mL, 10 mL, Intravenous, PRN, Ollie Amado MD, 10 mL at 09/01/18 0025  •  Insert peripheral IV, , , Once **AND** sodium chloride 0.9 % flush 10 mL, 10 mL, Intravenous, PRN, Ollie Amado MD  •  sodium chloride 0.9 % infusion, 125 mL/hr, Intravenous, Continuous, Ollie Amado MD, Stopped at 09/01/18 0615    Results Review:  I have reviewed the labs, radiology results, and diagnostic studies.    Laboratory Data:     Results from last 7 days  Lab Units 09/01/18  2352 09/01/18  0021   SODIUM mmol/L 137 137   POTASSIUM mmol/L 3.5 3.6   CHLORIDE mmol/L 100 101   CO2 mmol/L 29.0 24.0   BUN mg/dL 10 17   CREATININE mg/dL 1.03* 1.08*   GLUCOSE mg/dL 113* 123*   CALCIUM mg/dL 8.3* 9.3   BILIRUBIN mg/dL 1.2 0.7   ALK PHOS U/L 77 96   ALT (SGPT) U/L 46 60*   AST (SGOT) U/L 49* 69*   ANION GAP mmol/L 8.0 12.0     Estimated Creatinine Clearance: 69.2 mL/min (A) (by C-G formula based on SCr of 1.03 mg/dL (H)).            Results from last 7 days  Lab Units 09/02/18  0538 09/01/18  0021   WBC 10*3/mm3 6.24 7.98   HEMOGLOBIN g/dL 14.2 15.0   HEMATOCRIT % 42.7 43.7   PLATELETS 10*3/mm3 91* 118*           Culture Data:   No results found for: BLOODCX  No results found for: URINECX  No results found for: RESPCX  No results found for: WOUNDCX  No results found for: STOOLCX  No components found for: BODYFLD    Radiology Data:   Imaging Results (last 24 hours)     Procedure Component Value Units Date/Time    FL Barium Enema Water Soluble [[id number]] Collected:  09/02/18 0759     Updated:  09/02/18 1212    Narrative:       Fluoroscopy enema examination water-soluble.    HISTORY: Small bowel obstruction.    Two minutes and 15 seconds of fluoroscopy disease. 13 images are  obtained.    Preliminary film demonstrates a nonspecific bowel gas pattern.    240 mL of Gastrografin was used.    There is unobstructed retrograde flow of contrast from the rectum  to the cecum. There is a moderate amount of retained  fecal  residue. There is no evidence of any annular or obstructing  lesions.      Impression:       CONCLUSION: Moderate amount retained fecal residue. Otherwise  unremarkable examination of the colon. No evidence of any colonic  obstruction.    Electronically signed by:  Blair Rojas MD  9/2/2018 12:11 PM CDT  Workstation: [id number]          I have reviewed the patient's current medications.     Assessment/Plan     Hospital Problem List:  Active Problems:     1. Acute on chronic SBO secondary to adhesions   -Continue IV fluids, Continue nothing by mouth, NG tube to suction. General surgery and gastroenterology consult appreciated. Dr Aguila plans for abdominal x-ray tomorrow and attempt at trial of liquids and NGT clamping. She had hypaqe enema this morning that showed no leading edge of obstruction. Continue with Enemas for constipation. Will treat pain with morphine cautiously as this could slow her gut motility.  Monitor BEP for electrolytes.     2. Fatty liver disease with cirrhosis and splenomegaly - Patient had abdominal ultrasound which showed antegrade flow in her portal vein with hepatic diffuse fibrotic and fatty changes and splenomegaly.     3. Thrombocytopenia - Likely secondary to her splenomegaly.      4. NPO status - Patient is on multiple home medications. Will continue to hold these for now and restart when possible as patient is nothing by mouth and has NG tube to suction.     5. Hypertension - Cover with IV labetalol when necessary with parameters.      Code status - Full code     Dvt prophylaxis  - Heparin         Discharge Planning: I anticipate patient will be inpatient for at least 2-3 days    Shaylee Ford MD   09/02/18   5:57 PM

## 2018-09-02 NOTE — PROGRESS NOTES
Jonny Grace DO,[facility]  Gastroenterology  Hepatology  Endoscopy  Board Certified in Internal Medicine and gastroenterology  [address], [address]  [place]. [address]  - [phone]   F - [phone]     GASTROENTEROLOGY PROGRESS NOTE   JONNY GRACE DO.         SUBJECTIVE:   9/2/2018  Chief Complaint:     Subjective  : No evidence of any complications.  Still diffuse abdominal pain.  Bowel movements with the constipation treatment.  Hypaque enema shows the patient to have no evidence of any leading edge of the obstruction.  The Hypaque refluxes into a normal terminal ileum.    Reviewed with the  and patient.  Nasogastric tube continues to be in place.  Minimal output.      CURRENT MEDICATIONS/OBJECTIVE/VS/PE:     Current Medications:     Current Facility-Administered Medications   Medication Dose Route Frequency Provider Last Rate Last Dose   • dextrose 5 % and sodium chloride 0.45 % infusion  100 mL/hr Intravenous Continuous Live Gomez  mL/hr at 09/01/18 1814 100 mL/hr at 09/01/18 1814   • heparin (porcine) 5000 UNIT/ML injection 5,000 Units  5,000 Units Subcutaneous Q8H Live Gomez DO   5,000 Units at 09/02/18 0606   • labetalol (NORMODYNE,TRANDATE) injection 10 mg  10 mg Intravenous Q6H PRN Live Gomez DO       • morphine injection 2 mg  2 mg Intravenous Q4H PRN Live Gomez DO   2 mg at 09/02/18 0940   • ondansetron (ZOFRAN) injection 4 mg  4 mg Intravenous Q6H PRN Live Gomez DO   4 mg at 09/02/18 1016   • pantoprazole (PROTONIX) injection 40 mg  40 mg Intravenous Q AM Live Gomez DO   40 mg at 09/02/18 0606   • pneumococcal polysaccharide 23-valent (PNEUMOVAX-23) vaccine 0.5 mL  0.5 mL Intramuscular During Hospitalization Live Gomez DO       • sodium chloride 0.9 % flush 1-10 mL  1-10 mL Intravenous PRN Live Gomez DO       • sodium chloride 0.9 % flush 10 mL  10 mL Intravenous PRN  Ollie Amado MD   10 mL at 09/01/18 0025   • sodium chloride 0.9 % flush 10 mL  10 mL Intravenous PRN Ollie Amado MD       • sodium chloride 0.9 % infusion  125 mL/hr Intravenous Continuous Ollie Amado MD   Stopped at 09/01/18 0615       Objective     Physical Exam:   Temp:  [96.8 °F (36 °C)-98.9 °F (37.2 °C)] 98.9 °F (37.2 °C)  Heart Rate:  [78-84] 82  Resp:  [16-20] 16  BP: (114-132)/(65-73) 131/73     Physical Exam:  General Appearance:    Alert, cooperative, in no acute distress   Head:    Normocephalic, without obvious abnormality, atraumatic   Eyes:            Lids and lashes normal, conjunctivae and sclerae normal, no   icterus, no pallor, corneas clear, PERRLA   Ears:    Ears appear intact with no abnormalities noted   Throat:   No oral lesions, no thrush, oral mucosa moist   Neck:   No adenopathy, supple, trachea midline, no thyromegaly, no     carotid bruit, no JVD   Back:     No kyphosis present, no scoliosis present, no skin lesions,       erythema or scars, no tenderness to percussion or                   palpation,   range of motion normal   Lungs:     Clear to auscultation,respirations regular, even and                   unlabored    Heart:    Regular rhythm and normal rate, normal S1 and S2, no            murmur, no gallop, no rub, no click   Breast Exam:    Deferred   Abdomen:     Normal bowel sounds, no masses, no organomegaly, soft        non-tender, non-distended, no guarding, no rebound                 tenderness   Genitalia:    Deferred   Extremities:   Moves all extremities well, no edema, no cyanosis, no              redness   Pulses:   Pulses palpable and equal bilaterally   Skin:   No bleeding, bruising or rash   Lymph nodes:   No palpable adenopathy   Neurologic:   Cranial nerves 2 - 12 grossly intact, sensation intact, DTR        present and equal bilaterally      Results Review:     Lab Results (last 24 hours)     Procedure Component Value Units Date/Time    CBC & Differential  [[id number]] Collected:  09/02/18 0538    Specimen:  Blood Updated:  09/02/18 0757    Narrative:       The following orders were created for panel order CBC & Differential.  Procedure                               Abnormality         Status                     ---------                               -----------         ------                     Scan Slide[[id number]]                                       Final result               CBC Auto Differential[[id number]]        Abnormal            Final result                 Please view results for these tests on the individual orders.    Scan Slide [[id number]] Collected:  09/02/18 0538    Specimen:  Blood Updated:  09/02/18 0757     RBC Morphology Normal     WBC Morphology Normal     Platelet Estimate Decreased    CBC Auto Differential [[id number]]  (Abnormal) Collected:  09/02/18 0538    Specimen:  Blood Updated:  09/02/18 0654     WBC 6.24 10*3/mm3      RBC 5.32 (H) 10*6/mm3      Hemoglobin 14.2 g/dL      Hematocrit 42.7 %      MCV 80.3 fL      MCH 26.7 pg      MCHC 33.3 g/dL      RDW 15.3 (H) %      RDW-SD 45.1 fl      MPV -- fL      Comment: Unable to verify        Platelets 91 (L) 10*3/mm3      Neutrophil % 68.3 %      Lymphocyte % 22.8 %      Monocyte % 6.3 %      Eosinophil % 2.1 %      Basophil % 0.3 %      Immature Grans % 0.2 %      Neutrophils, Absolute 4.27 10*3/mm3      Lymphocytes, Absolute 1.42 10*3/mm3      Monocytes, Absolute 0.39 10*3/mm3      Eosinophils, Absolute 0.13 10*3/mm3      Basophils, Absolute 0.02 10*3/mm3      Immature Grans, Absolute 0.01 10*3/mm3      nRBC 0.0 /100 WBC     Comprehensive Metabolic Panel [[id number]]  (Abnormal) Collected:  09/01/18 2352    Specimen:  Blood Updated:  09/02/18 0022     Glucose 113 (H) mg/dL      BUN 10 mg/dL      Creatinine 1.03 (H) mg/dL      Sodium 137 mmol/L      Potassium 3.5 mmol/L      Chloride 100 mmol/L      CO2 29.0 mmol/L      Calcium 8.3 (L) mg/dL      Total Protein 6.5 g/dL      Albumin 3.40 g/dL       ALT (SGPT) 46 U/L      AST (SGOT) 49 (H) U/L      Alkaline Phosphatase 77 U/L      Total Bilirubin 1.2 mg/dL      eGFR Non African Amer 55 mL/min/1.73      Globulin 3.1 gm/dL      A/G Ratio 1.1 g/dL      BUN/Creatinine Ratio 9.7     Anion Gap 8.0 mmol/L            I reviewed the patient's new clinical results.  I reviewed the patient's new imaging results and agree with the interpretation.     ASSESSMENT/PLAN:   ASSESSMENT:   1.  Small bowel obstruction secondary to adhesions, improving  2.  Cirrhosis without alcohol with no evidence of any complications.    PLAN:   1.  Repeat x-rays in the morning  2.  If improving, then we will give the patient trial of liquids and NG tube clamping      Ghanshyam Aguila DO  09/02/18  12:44 PM

## 2018-09-03 ENCOUNTER — APPOINTMENT (OUTPATIENT)
Dept: GENERAL RADIOLOGY | Facility: HOSPITAL | Age: 58
End: 2018-09-03

## 2018-09-03 LAB
ANION GAP SERPL CALCULATED.3IONS-SCNC: 9 MMOL/L (ref 5–15)
BASOPHILS # BLD AUTO: 0.02 10*3/MM3 (ref 0–0.2)
BASOPHILS NFR BLD AUTO: 0.3 % (ref 0–2)
BUN BLD-MCNC: 9 MG/DL (ref 7–21)
BUN/CREAT SERPL: 9.8 (ref 7–25)
CALCIUM SPEC-SCNC: 9.1 MG/DL (ref 8.4–10.2)
CHLORIDE SERPL-SCNC: 101 MMOL/L (ref 95–110)
CO2 SERPL-SCNC: 28 MMOL/L (ref 22–31)
CREAT BLD-MCNC: 0.92 MG/DL (ref 0.5–1)
DEPRECATED RDW RBC AUTO: 44.1 FL (ref 36.4–46.3)
EOSINOPHIL # BLD AUTO: 0.19 10*3/MM3 (ref 0–0.7)
EOSINOPHIL NFR BLD AUTO: 2.4 % (ref 0–7)
ERYTHROCYTE [DISTWIDTH] IN BLOOD BY AUTOMATED COUNT: 15.2 % (ref 11.5–14.5)
GFR SERPL CREATININE-BSD FRML MDRD: 63 ML/MIN/1.73 (ref 51–120)
GLUCOSE BLD-MCNC: 115 MG/DL (ref 60–100)
HCT VFR BLD AUTO: 44 % (ref 35–45)
HGB BLD-MCNC: 15.1 G/DL (ref 12–15.5)
IMM GRANULOCYTES # BLD: 0.03 10*3/MM3 (ref 0–0.02)
IMM GRANULOCYTES NFR BLD: 0.4 % (ref 0–0.5)
LYMPHOCYTES # BLD AUTO: 1.95 10*3/MM3 (ref 0.6–4.2)
LYMPHOCYTES NFR BLD AUTO: 24.5 % (ref 10–50)
MCH RBC QN AUTO: 27.1 PG (ref 26.5–34)
MCHC RBC AUTO-ENTMCNC: 34.3 G/DL (ref 31.4–36)
MCV RBC AUTO: 78.9 FL (ref 80–98)
MONOCYTES # BLD AUTO: 0.56 10*3/MM3 (ref 0–0.9)
MONOCYTES NFR BLD AUTO: 7 % (ref 0–12)
NEUTROPHILS # BLD AUTO: 5.21 10*3/MM3 (ref 2–8.6)
NEUTROPHILS NFR BLD AUTO: 65.4 % (ref 37–80)
NRBC BLD MANUAL-RTO: 0 /100 WBC (ref 0–0)
PLATELET # BLD AUTO: 100 10*3/MM3 (ref 150–450)
PMV BLD AUTO: 10.8 FL (ref 8–12)
POTASSIUM BLD-SCNC: 3.6 MMOL/L (ref 3.5–5.1)
RBC # BLD AUTO: 5.58 10*6/MM3 (ref 3.77–5.16)
SODIUM BLD-SCNC: 138 MMOL/L (ref 137–145)
WBC NRBC COR # BLD: 7.96 10*3/MM3 (ref 3.2–9.8)

## 2018-09-03 PROCEDURE — 74018 RADEX ABDOMEN 1 VIEW: CPT

## 2018-09-03 PROCEDURE — 25010000002 HEPARIN (PORCINE) PER 1000 UNITS: Performed by: FAMILY MEDICINE

## 2018-09-03 PROCEDURE — 85025 COMPLETE CBC W/AUTO DIFF WBC: CPT | Performed by: FAMILY MEDICINE

## 2018-09-03 PROCEDURE — 25010000002 MORPHINE PER 10 MG: Performed by: FAMILY MEDICINE

## 2018-09-03 PROCEDURE — 80048 BASIC METABOLIC PNL TOTAL CA: CPT | Performed by: INTERNAL MEDICINE

## 2018-09-03 PROCEDURE — 25010000002 ONDANSETRON PER 1 MG: Performed by: FAMILY MEDICINE

## 2018-09-03 RX ORDER — OXYCODONE AND ACETAMINOPHEN 7.5; 325 MG/1; MG/1
1 TABLET ORAL 3 TIMES DAILY
Status: DISCONTINUED | OUTPATIENT
Start: 2018-09-03 | End: 2018-09-03

## 2018-09-03 RX ORDER — OXYCODONE AND ACETAMINOPHEN 7.5; 325 MG/1; MG/1
1 TABLET ORAL EVERY 8 HOURS PRN
Status: DISCONTINUED | OUTPATIENT
Start: 2018-09-03 | End: 2018-09-05 | Stop reason: HOSPADM

## 2018-09-03 RX ADMIN — ONDANSETRON HYDROCHLORIDE 4 MG: 2 INJECTION, SOLUTION INTRAMUSCULAR; INTRAVENOUS at 21:43

## 2018-09-03 RX ADMIN — MORPHINE SULFATE 2 MG: 2 INJECTION, SOLUTION INTRAMUSCULAR; INTRAVENOUS at 10:23

## 2018-09-03 RX ADMIN — HEPARIN SODIUM 5000 UNITS: 5000 INJECTION, SOLUTION INTRAVENOUS; SUBCUTANEOUS at 13:46

## 2018-09-03 RX ADMIN — DEXTROSE AND SODIUM CHLORIDE 100 ML/HR: 5; 450 INJECTION, SOLUTION INTRAVENOUS at 12:58

## 2018-09-03 RX ADMIN — MORPHINE SULFATE 2 MG: 2 INJECTION, SOLUTION INTRAMUSCULAR; INTRAVENOUS at 05:51

## 2018-09-03 RX ADMIN — DEXTROSE AND SODIUM CHLORIDE 100 ML/HR: 5; 450 INJECTION, SOLUTION INTRAVENOUS at 01:08

## 2018-09-03 RX ADMIN — MORPHINE SULFATE 2 MG: 2 INJECTION, SOLUTION INTRAMUSCULAR; INTRAVENOUS at 02:04

## 2018-09-03 RX ADMIN — OXYCODONE HYDROCHLORIDE AND ACETAMINOPHEN 1 TABLET: 7.5; 325 TABLET ORAL at 18:19

## 2018-09-03 RX ADMIN — PANTOPRAZOLE SODIUM 40 MG: 40 INJECTION, POWDER, FOR SOLUTION INTRAVENOUS at 05:51

## 2018-09-03 RX ADMIN — MORPHINE SULFATE 2 MG: 2 INJECTION, SOLUTION INTRAMUSCULAR; INTRAVENOUS at 15:15

## 2018-09-03 NOTE — PLAN OF CARE
Problem: Patient Care Overview  Goal: Plan of Care Review  Outcome: Ongoing (interventions implemented as appropriate)   09/03/18 0122   Coping/Psychosocial   Plan of Care Reviewed With patient   Plan of Care Review   Progress no change   OTHER   Outcome Summary Pt continues to complain of pain, insistent she wants NG removed.

## 2018-09-03 NOTE — PROGRESS NOTES
MEDICINE DAILY PROGRESS NOTE  NAME: Amira Shannon  : 1960  MRN: [id number]     LOS: 2 days     PROVIDER OF SERVICE: Josh Toth MD    Chief Complaint: <principal problem not specified>    Subjective:   HPI: Patient admitted with abdominal pain suspected to have SBO.    Interval History:  History taken from: patient chart family RN  NGT still in place. Patient complaining with irritation from NGT.    Review of Systems:   Review of Systems   Constitutional: Positive for activity change, appetite change and fatigue. Negative for fever.   HENT: Negative for ear pain and sore throat.    Eyes: Negative for pain and visual disturbance.   Respiratory: Negative for cough and shortness of breath.    Cardiovascular: Negative for chest pain and palpitations.   Gastrointestinal: Positive for nausea. Negative for abdominal pain, diarrhea and vomiting.   Endocrine: Negative for cold intolerance and heat intolerance.   Genitourinary: Negative for difficulty urinating and dysuria.   Musculoskeletal: Positive for arthralgias. Negative for gait problem.   Skin: Negative for color change and rash.   Neurological: Negative for dizziness, weakness and headaches.   Hematological: Negative for adenopathy. Does not bruise/bleed easily.   Psychiatric/Behavioral: Negative for agitation, confusion and sleep disturbance.       Objective:     Vital Signs  Vitals:    18 0342 18 0411 18 0819 18 1120   BP: 178/80 148/82 126/73 129/66   BP Location: Right arm  Left arm Left arm   Patient Position: Lying  Lying Lying   Pulse: 84  80 82   Resp:    Temp: 98.2 °F (36.8 °C)  97.2 °F (36.2 °C) 98.6 °F (37 °C)   TempSrc: Oral  Oral Oral   SpO2: 94%  95% 94%   Weight:       Height:           Physical Exam  Physical Exam   Constitutional: She is oriented to person, place, and time. She appears well-developed and well-nourished. No distress.   HENT:   Head: Normocephalic and atraumatic.   Right Ear: External  ear normal.   Left Ear: External ear normal.   Nose: Nose normal.   NGT in place.   Eyes: Pupils are equal, round, and reactive to light. Conjunctivae and EOM are normal.   Neck: Normal range of motion. Neck supple.   Cardiovascular: Normal rate, regular rhythm, normal heart sounds and intact distal pulses.    Pulmonary/Chest: Effort normal and breath sounds normal. No respiratory distress. She has no wheezes. She has no rales. She exhibits no tenderness.   Abdominal: Soft. Bowel sounds are normal. She exhibits no distension and no mass. There is tenderness. There is no rebound and no guarding.   Musculoskeletal: Normal range of motion. She exhibits no edema.   Neurological: She is alert and oriented to person, place, and time.   Skin: Skin is warm and dry. No rash noted. She is not diaphoretic. No erythema. No pallor.   Psychiatric: She has a normal mood and affect. Her behavior is normal.   Nursing note and vitals reviewed.      Medication Review    Current Facility-Administered Medications:   •  dextrose 5 % and sodium chloride 0.45 % infusion, 100 mL/hr, Intravenous, Continuous, Live Gomez DO, Last Rate: 100 mL/hr at 09/03/18 0108, 100 mL/hr at 09/03/18 0108  •  heparin (porcine) 5000 UNIT/ML injection 5,000 Units, 5,000 Units, Subcutaneous, Q8H, Live Gomez DO, 5,000 Units at 09/02/18 1335  •  labetalol (NORMODYNE,TRANDATE) injection 10 mg, 10 mg, Intravenous, Q6H PRN, Live Gomez DO  •  morphine injection 2 mg, 2 mg, Intravenous, Q4H PRN, Live Gomez DO, 2 mg at 09/03/18 1023  •  ondansetron (ZOFRAN) injection 4 mg, 4 mg, Intravenous, Q6H PRN, Live Gomez DO, 4 mg at 09/02/18 1016  •  pantoprazole (PROTONIX) injection 40 mg, 40 mg, Intravenous, Q AM, Live Gomez DO, 40 mg at 09/03/18 0551  •  phenol (CHLORASEPTIC) 1.4 % liquid 2 spray, 2 spray, Mouth/Throat, Q2H PRN, Mukesh Hui MD  •  pneumococcal polysaccharide 23-valent (PNEUMOVAX-23)  vaccine 0.5 mL, 0.5 mL, Intramuscular, During Hospitalization, Live Gomez DO  •  sodium chloride 0.9 % flush 1-10 mL, 1-10 mL, Intravenous, PRN, Live Gomez DO  •  sodium chloride 0.9 % flush 10 mL, 10 mL, Intravenous, PRN, Ollie Amado MD, 10 mL at 09/01/18 0025  •  Insert peripheral IV, , , Once **AND** sodium chloride 0.9 % flush 10 mL, 10 mL, Intravenous, PRN, Ollie Amado MD  •  sodium chloride 0.9 % infusion, 125 mL/hr, Intravenous, Continuous, Ollie Amado MD, Stopped at 09/01/18 0615     Diagnostic Data    Lab Results (last 24 hours)     Procedure Component Value Units Date/Time    Basic Metabolic Panel [[id number]]  (Abnormal) Collected:  09/03/18 0623    Specimen:  Blood Updated:  09/03/18 0644     Glucose 115 (H) mg/dL      BUN 9 mg/dL      Creatinine 0.92 mg/dL      Sodium 138 mmol/L      Potassium 3.6 mmol/L      Chloride 101 mmol/L      CO2 28.0 mmol/L      Calcium 9.1 mg/dL      eGFR Non African Amer 63 mL/min/1.73      BUN/Creatinine Ratio 9.8     Anion Gap 9.0 mmol/L     CBC & Differential [[id number]] Collected:  09/03/18 0623    Specimen:  Blood Updated:  09/03/18 0642    Narrative:       The following orders were created for panel order CBC & Differential.  Procedure                               Abnormality         Status                     ---------                               -----------         ------                     CBC Auto Differential[[id number]]        Abnormal            Final result                 Please view results for these tests on the individual orders.    CBC Auto Differential [[id number]]  (Abnormal) Collected:  09/03/18 0623    Specimen:  Blood Updated:  09/03/18 0642     WBC 7.96 10*3/mm3      RBC 5.58 (H) 10*6/mm3      Hemoglobin 15.1 g/dL      Hematocrit 44.0 %      MCV 78.9 (L) fL      MCH 27.1 pg      MCHC 34.3 g/dL      RDW 15.2 (H) %      RDW-SD 44.1 fl      MPV 10.8 fL      Platelets 100 (L) 10*3/mm3      Neutrophil % 65.4 %       Lymphocyte % 24.5 %      Monocyte % 7.0 %      Eosinophil % 2.4 %      Basophil % 0.3 %      Immature Grans % 0.4 %      Neutrophils, Absolute 5.21 10*3/mm3      Lymphocytes, Absolute 1.95 10*3/mm3      Monocytes, Absolute 0.56 10*3/mm3      Eosinophils, Absolute 0.19 10*3/mm3      Basophils, Absolute 0.02 10*3/mm3      Immature Grans, Absolute 0.03 (H) 10*3/mm3      nRBC 0.0 /100 WBC           I reviewed the patient's new clinical results.    Assessment/Plan:     Active Hospital Problems    Diagnosis Date Noted   • SBO (small bowel obstruction) [K56.609] 09/01/2018       #. SBO. Surgery following. GI following. Patient with flatus and small BM noted. NGT still in place.   #. Fatty liver with cirrhosis. Monitor.  #. Thrombocytopenia secondary to cirrhosis. Stabel. Monitor.    Results from last 7 days  Lab Units 09/03/18  0623 09/02/18  0538 09/01/18  0021   PLATELETS 10*3/mm3 100* 91* 118*   #. HTN. Restart home meds once tolerating PO.    Will monitor patient's hospital course and adjust treatment as hospital course dictates.    DVT prophylaxis: Heparin  Code status is   Code Status and Medical Interventions:   Ordered at: 09/01/18 0411     Level Of Support Discussed With:    Patient     Code Status:    CPR     Medical Interventions (Level of Support Prior to Arrest):    Full       Plan for disposition:Where: home and When:  2-3days      Time:           This document has been electronically signed by Josh Toth MD on September 3, 2018 12:15 PM

## 2018-09-03 NOTE — PROGRESS NOTES
"  Subjective:   Well feeling better.  Had a bowel movement.  Passing flatus.  No nausea or vomiting.       /73 (BP Location: Left arm, Patient Position: Lying)   Pulse 80   Temp 97.2 °F (36.2 °C) (Oral)   Resp 18   Ht 162.6 cm (64\")   Wt 102 kg (225 lb)   LMP  (LMP Unknown)   SpO2 95%   BMI 38.62 kg/m²     Lab Results (last 24 hours)     Procedure Component Value Units Date/Time    Basic Metabolic Panel [[id number]]  (Abnormal) Collected:  09/03/18 0623    Specimen:  Blood Updated:  09/03/18 0644     Glucose 115 (H) mg/dL      BUN 9 mg/dL      Creatinine 0.92 mg/dL      Sodium 138 mmol/L      Potassium 3.6 mmol/L      Chloride 101 mmol/L      CO2 28.0 mmol/L      Calcium 9.1 mg/dL      eGFR Non African Amer 63 mL/min/1.73      BUN/Creatinine Ratio 9.8     Anion Gap 9.0 mmol/L     CBC & Differential [[id number]] Collected:  09/03/18 0623    Specimen:  Blood Updated:  09/03/18 0642    Narrative:       The following orders were created for panel order CBC & Differential.  Procedure                               Abnormality         Status                     ---------                               -----------         ------                     CBC Auto Differential[[id number]]        Abnormal            Final result                 Please view results for these tests on the individual orders.    CBC Auto Differential [[id number]]  (Abnormal) Collected:  09/03/18 0623    Specimen:  Blood Updated:  09/03/18 0642     WBC 7.96 10*3/mm3      RBC 5.58 (H) 10*6/mm3      Hemoglobin 15.1 g/dL      Hematocrit 44.0 %      MCV 78.9 (L) fL      MCH 27.1 pg      MCHC 34.3 g/dL      RDW 15.2 (H) %      RDW-SD 44.1 fl      MPV 10.8 fL      Platelets 100 (L) 10*3/mm3      Neutrophil % 65.4 %      Lymphocyte % 24.5 %      Monocyte % 7.0 %      Eosinophil % 2.4 %      Basophil % 0.3 %      Immature Grans % 0.4 %      Neutrophils, Absolute 5.21 10*3/mm3      Lymphocytes, Absolute 1.95 10*3/mm3      Monocytes, Absolute 0.56 " 10*3/mm3      Eosinophils, Absolute 0.19 10*3/mm3      Basophils, Absolute 0.02 10*3/mm3      Immature Grans, Absolute 0.03 (H) 10*3/mm3      nRBC 0.0 /100 WBC           Current Medications:  Current Facility-Administered Medications   Medication Dose Route Frequency Provider Last Rate Last Dose   • dextrose 5 % and sodium chloride 0.45 % infusion  100 mL/hr Intravenous Continuous Live Gomez  mL/hr at 09/03/18 0108 100 mL/hr at 09/03/18 0108   • heparin (porcine) 5000 UNIT/ML injection 5,000 Units  5,000 Units Subcutaneous Q8H Live Gomez DO   5,000 Units at 09/02/18 1335   • labetalol (NORMODYNE,TRANDATE) injection 10 mg  10 mg Intravenous Q6H PRN Live Gomez DO       • morphine injection 2 mg  2 mg Intravenous Q4H PRN Live Gomez DO   2 mg at 09/03/18 1023   • ondansetron (ZOFRAN) injection 4 mg  4 mg Intravenous Q6H PRN Live Gomez DO   4 mg at 09/02/18 1016   • pantoprazole (PROTONIX) injection 40 mg  40 mg Intravenous Q AM Live Gomez DO   40 mg at 09/03/18 0551   • phenol (CHLORASEPTIC) 1.4 % liquid 2 spray  2 spray Mouth/Throat Q2H PRN Mukesh Hui MD       • pneumococcal polysaccharide 23-valent (PNEUMOVAX-23) vaccine 0.5 mL  0.5 mL Intramuscular During Hospitalization Live Gomez DO       • sodium chloride 0.9 % flush 1-10 mL  1-10 mL Intravenous PRN Live Gomez DO       • sodium chloride 0.9 % flush 10 mL  10 mL Intravenous PRN Ollie Amado MD   10 mL at 09/01/18 0025   • sodium chloride 0.9 % flush 10 mL  10 mL Intravenous PRN Ollie Amado MD       • sodium chloride 0.9 % infusion  125 mL/hr Intravenous Continuous Ollie Amado MD   Stopped at 09/01/18 0615       Prior to admission medications:  Prescriptions Prior to Admission   Medication Sig Dispense Refill Last Dose   • cetirizine (zyrTEC) 10 MG tablet Take 1 tablet by mouth Daily As Needed for Allergies. 30 tablet 11 Past Week at Unknown time    • chlorthalidone (HYGROTEN) 50 MG tablet Take 50 mg by mouth Daily.   8/21/2018 at 0900   • clotrimazole (MYCELEX) 10 MG dimitry Take 1 tablet by mouth 3 (Three) Times a Day. 40 tablet 0 8/21/2018 at 2100   • cyclobenzaprine (FLEXERIL) 10 MG tablet Take 10 mg by mouth 3 (Three) Times a Day As Needed for Muscle Spasms.   More than a month at Unknown time   • DULoxetine (CYMBALTA) 30 MG capsule Take 30 mg by mouth Daily.   8/22/2018 at 0900   • gabapentin (NEURONTIN) 800 MG tablet Take 800 mg by mouth 4 (Four) Times a Day.   8/22/2018 at 0900   • linaclotide (LINZESS) 145 MCG capsule capsule Take 145 mcg by mouth 2 (Two) Times a Day.   8/21/2018 at 0900   • magnesium hydroxide (MILK OF MAGNESIA) 2400 MG/10ML suspension suspension Take 10 mL by mouth Daily As Needed (constipation). 100 mL 2 Past Week at Unknown time   • magnesium oxide (MAGOX) 400 (241.3 MG) MG tablet tablet Take 400 mg by mouth Daily.   Past Month at Unknown time   • meloxicam (MOBIC) 15 MG tablet Take 15 mg by mouth Every Night.   8/21/2018 at 2100   • mupirocin (BACTROBAN) 2 % ointment Apply  topically to the appropriate area as directed 3 (Three) Times a Day As Needed.   Past Month at Unknown time   • nystatin (MYCOSTATIN) 803810 UNIT/GM powder Apply 1 application topically As Needed.   Past Month at Unknown time   • omeprazole (priLOSEC) 40 MG capsule Take 40 mg by mouth Daily.   8/22/2018 at 0900   • ondansetron (ZOFRAN) 4 MG tablet Take 4 mg by mouth Every 8 (Eight) Hours As Needed.   More than a month at Unknown time   • oxyCODONE-acetaminophen (PERCOCET) 7.5-325 MG per tablet Take 1 tablet by mouth 3 (Three) Times a Day.   8/21/2018 at 2100   • phenylephrine-mineral oil-petrolatum 0.25-14-74.9 % ointment hemorrhoidal ointment Insert 1 application into the rectum 3 (Three) Times a Day As Needed (rectal pain). 28 g 1 More than a month at Unknown time   • ranitidine (ZANTAC) 150 MG capsule Take 2 capsules by mouth Every Evening. 60 capsule 3  8/21/2018 at 2100   • spironolactone (ALDACTONE) 25 MG tablet Take 25 mg by mouth Daily.   8/21/2018 at 0900   • chlorthalidone (HYGROTEN) 50 MG tablet TAKE 1 TABLET BY MOUTH ONCE DAILY 90 tablet 3        Physical exam:  Alert nontoxic-appearing    abdomen soft with active bowel sounds      Assessment :  Resolved partial small bowel obstruction     Plan: DC NG tube and start diet

## 2018-09-03 NOTE — PLAN OF CARE
Problem: Patient Care Overview  Goal: Plan of Care Review  Outcome: Ongoing (interventions implemented as appropriate)   09/03/18 1341   Coping/Psychosocial   Plan of Care Reviewed With patient   Plan of Care Review   Progress improving   OTHER   Outcome Summary vss, working on pain control, encouraing ambulation, NG removed, started and tolerating diet       Problem: Bowel Obstruction (Adult)  Goal: Signs and Symptoms of Listed Potential Problems Will be Absent, Minimized or Managed (Bowel Obstruction)  Outcome: Ongoing (interventions implemented as appropriate)

## 2018-09-04 ENCOUNTER — TELEPHONE (OUTPATIENT)
Dept: PHYSICAL THERAPY | Facility: HOSPITAL | Age: 58
End: 2018-09-04

## 2018-09-04 LAB
BASOPHILS # BLD AUTO: 0.02 10*3/MM3 (ref 0–0.2)
BASOPHILS NFR BLD AUTO: 0.3 % (ref 0–2)
DEPRECATED RDW RBC AUTO: 43 FL (ref 36.4–46.3)
EOSINOPHIL # BLD AUTO: 0.14 10*3/MM3 (ref 0–0.7)
EOSINOPHIL NFR BLD AUTO: 1.9 % (ref 0–7)
ERYTHROCYTE [DISTWIDTH] IN BLOOD BY AUTOMATED COUNT: 15 % (ref 11.5–14.5)
HCT VFR BLD AUTO: 41.7 % (ref 35–45)
HGB BLD-MCNC: 14.2 G/DL (ref 12–15.5)
HOLD SPECIMEN: NORMAL
IMM GRANULOCYTES # BLD: 0.02 10*3/MM3 (ref 0–0.02)
IMM GRANULOCYTES NFR BLD: 0.3 % (ref 0–0.5)
LYMPHOCYTES # BLD AUTO: 1.76 10*3/MM3 (ref 0.6–4.2)
LYMPHOCYTES NFR BLD AUTO: 23.7 % (ref 10–50)
MCH RBC QN AUTO: 26.7 PG (ref 26.5–34)
MCHC RBC AUTO-ENTMCNC: 34.1 G/DL (ref 31.4–36)
MCV RBC AUTO: 78.4 FL (ref 80–98)
MONOCYTES # BLD AUTO: 0.47 10*3/MM3 (ref 0–0.9)
MONOCYTES NFR BLD AUTO: 6.3 % (ref 0–12)
NEUTROPHILS # BLD AUTO: 5.01 10*3/MM3 (ref 2–8.6)
NEUTROPHILS NFR BLD AUTO: 67.5 % (ref 37–80)
PLATELET # BLD AUTO: 103 10*3/MM3 (ref 150–450)
PMV BLD AUTO: 11.1 FL (ref 8–12)
RBC # BLD AUTO: 5.32 10*6/MM3 (ref 3.77–5.16)
WBC NRBC COR # BLD: 7.42 10*3/MM3 (ref 3.2–9.8)

## 2018-09-04 PROCEDURE — 25010000002 HEPARIN (PORCINE) PER 1000 UNITS: Performed by: FAMILY MEDICINE

## 2018-09-04 PROCEDURE — 85025 COMPLETE CBC W/AUTO DIFF WBC: CPT | Performed by: FAMILY MEDICINE

## 2018-09-04 PROCEDURE — 99231 SBSQ HOSP IP/OBS SF/LOW 25: CPT | Performed by: INTERNAL MEDICINE

## 2018-09-04 RX ORDER — SPIRONOLACTONE 25 MG/1
25 TABLET ORAL DAILY
Status: DISCONTINUED | OUTPATIENT
Start: 2018-09-04 | End: 2018-09-05 | Stop reason: HOSPADM

## 2018-09-04 RX ADMIN — OXYCODONE HYDROCHLORIDE AND ACETAMINOPHEN 1 TABLET: 7.5; 325 TABLET ORAL at 12:36

## 2018-09-04 RX ADMIN — HEPARIN SODIUM 5000 UNITS: 5000 INJECTION, SOLUTION INTRAVENOUS; SUBCUTANEOUS at 22:11

## 2018-09-04 RX ADMIN — DEXTROSE AND SODIUM CHLORIDE 100 ML/HR: 5; 450 INJECTION, SOLUTION INTRAVENOUS at 08:47

## 2018-09-04 RX ADMIN — SPIRONOLACTONE 25 MG: 25 TABLET ORAL at 17:37

## 2018-09-04 RX ADMIN — PANTOPRAZOLE SODIUM 40 MG: 40 INJECTION, POWDER, FOR SOLUTION INTRAVENOUS at 06:47

## 2018-09-04 RX ADMIN — HEPARIN SODIUM 5000 UNITS: 5000 INJECTION, SOLUTION INTRAVENOUS; SUBCUTANEOUS at 13:18

## 2018-09-04 RX ADMIN — HEPARIN SODIUM 5000 UNITS: 5000 INJECTION, SOLUTION INTRAVENOUS; SUBCUTANEOUS at 06:47

## 2018-09-04 RX ADMIN — OXYCODONE HYDROCHLORIDE AND ACETAMINOPHEN 1 TABLET: 7.5; 325 TABLET ORAL at 20:18

## 2018-09-04 RX ADMIN — OXYCODONE HYDROCHLORIDE AND ACETAMINOPHEN 1 TABLET: 7.5; 325 TABLET ORAL at 05:10

## 2018-09-04 NOTE — PLAN OF CARE
Problem: Patient Care Overview  Goal: Plan of Care Review  Outcome: Ongoing (interventions implemented as appropriate)   09/04/18 1403   Coping/Psychosocial   Plan of Care Reviewed With patient   Plan of Care Review   Progress improving   OTHER   Outcome Summary pt. having no nausea or vomiting at this time; VS stable     Goal: Individualization and Mutuality  Outcome: Ongoing (interventions implemented as appropriate)    Goal: Discharge Needs Assessment  Outcome: Ongoing (interventions implemented as appropriate)    Goal: Interprofessional Rounds/Family Conf  Outcome: Ongoing (interventions implemented as appropriate)      Problem: Bowel Obstruction (Adult)  Goal: Signs and Symptoms of Listed Potential Problems Will be Absent, Minimized or Managed (Bowel Obstruction)  Outcome: Ongoing (interventions implemented as appropriate)      Problem: Fall Risk (Adult)  Goal: Absence of Fall  Outcome: Ongoing (interventions implemented as appropriate)

## 2018-09-04 NOTE — PROGRESS NOTES
MEDICINE DAILY PROGRESS NOTE  NAME: Amira Shannon  : 1960  MRN: [id number]     LOS: 3 days     PROVIDER OF SERVICE: Josh Toth MD    Chief Complaint: SBO (small bowel obstruction)    Subjective:   HPI: Patient admitted with abdominal pain suspected to have SBO.    Interval History:  History taken from: patient chart family RN  . NGT out. Patient tolerated diet. Good BM yesterday. Minimal nausea overnight. No vomiting.  9/3. NGT still in place. Patient complaining with irritation from NGT.    Review of Systems:   Review of Systems   Constitutional: Positive for activity change, appetite change and fatigue. Negative for fever.   HENT: Negative for ear pain and sore throat.    Eyes: Negative for pain and visual disturbance.   Respiratory: Negative for cough and shortness of breath.    Cardiovascular: Negative for chest pain and palpitations.   Gastrointestinal: Positive for nausea. Negative for abdominal pain, diarrhea and vomiting.   Endocrine: Negative for cold intolerance and heat intolerance.   Genitourinary: Negative for difficulty urinating and dysuria.   Musculoskeletal: Positive for arthralgias. Negative for gait problem.   Skin: Negative for color change and rash.   Neurological: Negative for dizziness, weakness and headaches.   Hematological: Negative for adenopathy. Does not bruise/bleed easily.   Psychiatric/Behavioral: Negative for agitation, confusion and sleep disturbance.       Objective:     Vital Signs  Vitals:    18 0011 18 0323 18 0840 18 1200   BP: 138/88 130/86 123/71 130/76   BP Location: Left arm Left arm Left arm Left arm   Patient Position: Lying Lying Lying Lying   Pulse: 78 65 79 78   Resp:    Temp: 96.6 °F (35.9 °C) 98.9 °F (37.2 °C) 97.6 °F (36.4 °C) 97.2 °F (36.2 °C)   TempSrc: Oral Oral Oral    SpO2: 93% 95% 98% 96%   Weight:       Height:           Physical Exam  Physical Exam   Constitutional: She is oriented to person, place, and  time. She appears well-developed and well-nourished. No distress.   HENT:   Head: Normocephalic and atraumatic.   Right Ear: External ear normal.   Left Ear: External ear normal.   Nose: Nose normal.   Eyes: Pupils are equal, round, and reactive to light. Conjunctivae and EOM are normal.   Neck: Normal range of motion. Neck supple.   Cardiovascular: Normal rate, regular rhythm, normal heart sounds and intact distal pulses.    Pulmonary/Chest: Effort normal and breath sounds normal. No respiratory distress. She has no wheezes. She has no rales. She exhibits no tenderness.   Abdominal: Soft. Bowel sounds are normal. She exhibits no distension and no mass. There is tenderness. There is no rebound and no guarding.   Musculoskeletal: Normal range of motion. She exhibits no edema.   Neurological: She is alert and oriented to person, place, and time.   Skin: Skin is warm and dry. No rash noted. She is not diaphoretic. No erythema. No pallor.   Psychiatric: She has a normal mood and affect. Her behavior is normal.   Nursing note and vitals reviewed.      Medication Review    Current Facility-Administered Medications:   •  dextrose 5 % and sodium chloride 0.45 % infusion, 100 mL/hr, Intravenous, Continuous, Live Gomez DO, Last Rate: 100 mL/hr at 09/04/18 0847, 100 mL/hr at 09/04/18 0847  •  heparin (porcine) 5000 UNIT/ML injection 5,000 Units, 5,000 Units, Subcutaneous, Q8H, Live Gomez DO, 5,000 Units at 09/04/18 1318  •  labetalol (NORMODYNE,TRANDATE) injection 10 mg, 10 mg, Intravenous, Q6H PRN, Live Gomez DO  •  morphine injection 2 mg, 2 mg, Intravenous, Q4H PRN, Live Gomez DO, 2 mg at 09/03/18 1515  •  ondansetron (ZOFRAN) injection 4 mg, 4 mg, Intravenous, Q6H PRN, Live Gomez DO, 4 mg at 09/03/18 2143  •  oxyCODONE-acetaminophen (PERCOCET) 7.5-325 MG per tablet 1 tablet, 1 tablet, Oral, Q8H PRN, Josh Toth MD, 1 tablet at 09/04/18 1236  •  pantoprazole  (PROTONIX) injection 40 mg, 40 mg, Intravenous, Q AM, Live Gomez DO, 40 mg at 09/04/18 0647  •  phenol (CHLORASEPTIC) 1.4 % liquid 2 spray, 2 spray, Mouth/Throat, Q2H PRN, Mukesh Hui MD  •  pneumococcal polysaccharide 23-valent (PNEUMOVAX-23) vaccine 0.5 mL, 0.5 mL, Intramuscular, During Hospitalization, Live Gomez DO  •  sodium chloride 0.9 % flush 1-10 mL, 1-10 mL, Intravenous, PRN, Live Gomez DO  •  sodium chloride 0.9 % flush 10 mL, 10 mL, Intravenous, PRN, Ollie Amado MD, 10 mL at 09/01/18 0025  •  Insert peripheral IV, , , Once **AND** sodium chloride 0.9 % flush 10 mL, 10 mL, Intravenous, PRN, Ollie Amado MD     Diagnostic Data    Lab Results (last 24 hours)     Procedure Component Value Units Date/Time    Extra Tubes [[id number]] Collected:  09/04/18 0601    Specimen:  Blood from Blood, Venous Line Updated:  09/04/18 0715    Narrative:       The following orders were created for panel order Extra Tubes.  Procedure                               Abnormality         Status                     ---------                               -----------         ------                     Green Top (Gel)[[id number]]                                  Final result                 Please view results for these tests on the individual orders.    Green Top (Gel) [[id number]] Collected:  09/04/18 0601    Specimen:  Blood Updated:  09/04/18 0715     Extra Tube Hold for add-ons.     Comment: Auto resulted.       CBC & Differential [[id number]] Collected:  09/04/18 0601    Specimen:  Blood Updated:  09/04/18 0622    Narrative:       The following orders were created for panel order CBC & Differential.  Procedure                               Abnormality         Status                     ---------                               -----------         ------                     CBC Auto Differential[[id number]]        Abnormal            Final result                 Please view results for  these tests on the individual orders.    CBC Auto Differential [[id number]]  (Abnormal) Collected:  09/04/18 0601    Specimen:  Blood Updated:  09/04/18 0622     WBC 7.42 10*3/mm3      RBC 5.32 (H) 10*6/mm3      Hemoglobin 14.2 g/dL      Hematocrit 41.7 %      MCV 78.4 (L) fL      MCH 26.7 pg      MCHC 34.1 g/dL      RDW 15.0 (H) %      RDW-SD 43.0 fl      MPV 11.1 fL      Platelets 103 (L) 10*3/mm3      Neutrophil % 67.5 %      Lymphocyte % 23.7 %      Monocyte % 6.3 %      Eosinophil % 1.9 %      Basophil % 0.3 %      Immature Grans % 0.3 %      Neutrophils, Absolute 5.01 10*3/mm3      Lymphocytes, Absolute 1.76 10*3/mm3      Monocytes, Absolute 0.47 10*3/mm3      Eosinophils, Absolute 0.14 10*3/mm3      Basophils, Absolute 0.02 10*3/mm3      Immature Grans, Absolute 0.02 10*3/mm3           I reviewed the patient's new clinical results.    Assessment/Plan:     Active Hospital Problems    Diagnosis Date Noted   • **SBO (small bowel obstruction) [K56.609] 09/01/2018       #. SBO.   9/4. Appears to have resolved. Diet advanced to full. Monitor.  9/3. Surgery following. GI following. Patient with flatus and small BM noted. NGT still in place.   #. Fatty liver with cirrhosis. Monitor.  #. Thrombocytopenia secondary to cirrhosis. Stabel. Monitor.    Results from last 7 days  Lab Units 09/04/18  0601 09/03/18  0623 09/02/18  0538 09/01/18  0021   PLATELETS 10*3/mm3 103* 100* 91* 118*   #. HTN. Restart home meds once tolerating PO.    Will monitor patient's hospital course and adjust treatment as hospital course dictates.    DVT prophylaxis: Heparin  Code status is   Code Status and Medical Interventions:   Ordered at: 09/01/18 0411     Level Of Support Discussed With:    Patient     Code Status:    CPR     Medical Interventions (Level of Support Prior to Arrest):    Full       Plan for disposition:Where: home and When:  tomorrow      Time:           This document has been electronically signed by Josh Toth MD on  September 4, 2018 3:52 PM

## 2018-09-04 NOTE — PROGRESS NOTES
SUBJECTIVE:   9/4/2018  Chief Complaint:     Subjective      Patient is 57 y.o. female who states she feels well no abdominal pain nausea vomiting or diarrhea at this time.  Patient is tolerating liquid diet well.     CURRENT MEDICATIONS/OBJECTIVE/VS/PE:     Current Medications:     Current Facility-Administered Medications   Medication Dose Route Frequency Provider Last Rate Last Dose   • heparin (porcine) 5000 UNIT/ML injection 5,000 Units  5,000 Units Subcutaneous Q8H Live Gomez DO   5,000 Units at 09/04/18 1318   • labetalol (NORMODYNE,TRANDATE) injection 10 mg  10 mg Intravenous Q6H PRN Live Gomez DO       • morphine injection 2 mg  2 mg Intravenous Q4H PRN Live Gomez DO   2 mg at 09/03/18 1515   • ondansetron (ZOFRAN) injection 4 mg  4 mg Intravenous Q6H PRN Live Gomez DO   4 mg at 09/03/18 2143   • oxyCODONE-acetaminophen (PERCOCET) 7.5-325 MG per tablet 1 tablet  1 tablet Oral Q8H PRN Josh Toth MD   1 tablet at 09/04/18 1236   • pantoprazole (PROTONIX) injection 40 mg  40 mg Intravenous Q AM Live Gomez DO   40 mg at 09/04/18 0647   • phenol (CHLORASEPTIC) 1.4 % liquid 2 spray  2 spray Mouth/Throat Q2H PRN Mukesh Hui MD       • pneumococcal polysaccharide 23-valent (PNEUMOVAX-23) vaccine 0.5 mL  0.5 mL Intramuscular During Hospitalization Live Gomez DO       • sodium chloride 0.9 % flush 1-10 mL  1-10 mL Intravenous PRN Live Gmoez DO       • sodium chloride 0.9 % flush 10 mL  10 mL Intravenous PRN Ollie Amado MD   10 mL at 09/01/18 0025   • sodium chloride 0.9 % flush 10 mL  10 mL Intravenous PRN Ollie Amado MD       • spironolactone (ALDACTONE) tablet 25 mg  25 mg Oral Daily Josh Toth MD           Objective     Physical Exam:   Temp:  [96.5 °F (35.8 °C)-98.9 °F (37.2 °C)] 97.2 °F (36.2 °C)  Heart Rate:  [65-79] 78  Resp:  [16-18] 18  BP: (123-139)/(71-89) 130/76     Physical Exam:  General  Appearance:    Alert, cooperative, in no acute distress   Head:    Normocephalic, without obvious abnormality, atraumatic   Eyes:            Lids and lashes normal, conjunctivae and sclerae normal, no   icterus, no pallor, corneas clear, PERRLA   Ears:    Ears appear intact with no abnormalities noted   Throat:   No oral lesions, no thrush, oral mucosa moist   Neck:   No adenopathy, supple, trachea midline, no thyromegaly, no     carotid bruit, no JVD   Back:     No kyphosis present, no scoliosis present, no skin lesions,       erythema or scars, no tenderness to percussion or                   palpation,   range of motion normal   Lungs:     Clear to auscultation,respirations regular, even and                   unlabored    Heart:    Regular rhythm and normal rate, normal S1 and S2, no            murmur, no gallop, no rub, no click   Breast Exam:    Deferred   Abdomen:     Normal bowel sounds, no masses, no organomegaly, soft        non-tender, non-distended, no guarding, no rebound                 tenderness   Genitalia:    Deferred   Extremities:   Moves all extremities well, no edema, no cyanosis, no              redness   Pulses:   Pulses palpable and equal bilaterally   Skin:   No bleeding, bruising or rash   Lymph nodes:   No palpable adenopathy   Neurologic:   Cranial nerves 2 - 12 grossly intact, sensation intact, DTR        present and equal bilaterally      Results Review:     Lab Results (last 24 hours)     Procedure Component Value Units Date/Time    Extra Tubes [[id number]] Collected:  09/04/18 0601    Specimen:  Blood from Blood, Venous Line Updated:  09/04/18 0715    Narrative:       The following orders were created for panel order Extra Tubes.  Procedure                               Abnormality         Status                     ---------                               -----------         ------                     Green Top (Gel)[[id number]]                                  Final result                  Please view results for these tests on the individual orders.    Green Top (Gel) [[id number]] Collected:  09/04/18 0601    Specimen:  Blood Updated:  09/04/18 0715     Extra Tube Hold for add-ons.     Comment: Auto resulted.       CBC & Differential [[id number]] Collected:  09/04/18 0601    Specimen:  Blood Updated:  09/04/18 0622    Narrative:       The following orders were created for panel order CBC & Differential.  Procedure                               Abnormality         Status                     ---------                               -----------         ------                     CBC Auto Differential[[id number]]        Abnormal            Final result                 Please view results for these tests on the individual orders.    CBC Auto Differential [[id number]]  (Abnormal) Collected:  09/04/18 0601    Specimen:  Blood Updated:  09/04/18 0622     WBC 7.42 10*3/mm3      RBC 5.32 (H) 10*6/mm3      Hemoglobin 14.2 g/dL      Hematocrit 41.7 %      MCV 78.4 (L) fL      MCH 26.7 pg      MCHC 34.1 g/dL      RDW 15.0 (H) %      RDW-SD 43.0 fl      MPV 11.1 fL      Platelets 103 (L) 10*3/mm3      Neutrophil % 67.5 %      Lymphocyte % 23.7 %      Monocyte % 6.3 %      Eosinophil % 1.9 %      Basophil % 0.3 %      Immature Grans % 0.3 %      Neutrophils, Absolute 5.01 10*3/mm3      Lymphocytes, Absolute 1.76 10*3/mm3      Monocytes, Absolute 0.47 10*3/mm3      Eosinophils, Absolute 0.14 10*3/mm3      Basophils, Absolute 0.02 10*3/mm3      Immature Grans, Absolute 0.02 10*3/mm3            I reviewed the patient's new clinical results.  I reviewed the patient's new imaging results and agree with the interpretation.     ASSESSMENT/PLAN:   ASSESSMENT: Patient recently admitted for small bowel obstruction.  Patient states she holder longer having abdominal pain is passing stool and is tolerating clear liquids well this is been the second episode of a bowel obstruction secondary to adhesions.    PLAN: #1 advance  patient's diet as tolerated  #2 please have patient follow up with Blaine Perales after discharge.  The risks, benefits, and alternatives of this procedure have been discussed with the patient or the responsible party- the patient understands and agrees to proceed.         Russell Del Rio MD  09/04/18  5:10 PM           This document has been electronically signed by Russell Del Rio MD on September 4, 2018 5:10 PM

## 2018-09-05 VITALS
OXYGEN SATURATION: 98 % | HEART RATE: 75 BPM | DIASTOLIC BLOOD PRESSURE: 78 MMHG | BODY MASS INDEX: 38.41 KG/M2 | SYSTOLIC BLOOD PRESSURE: 128 MMHG | HEIGHT: 64 IN | TEMPERATURE: 97.6 F | RESPIRATION RATE: 18 BRPM | WEIGHT: 225 LBS

## 2018-09-05 LAB
ANION GAP SERPL CALCULATED.3IONS-SCNC: 12 MMOL/L (ref 5–15)
BASOPHILS # BLD AUTO: 0.01 10*3/MM3 (ref 0–0.2)
BASOPHILS NFR BLD AUTO: 0.2 % (ref 0–2)
BUN BLD-MCNC: 11 MG/DL (ref 7–21)
BUN/CREAT SERPL: 12.1 (ref 7–25)
CALCIUM SPEC-SCNC: 9.1 MG/DL (ref 8.4–10.2)
CHLORIDE SERPL-SCNC: 103 MMOL/L (ref 95–110)
CO2 SERPL-SCNC: 24 MMOL/L (ref 22–31)
CREAT BLD-MCNC: 0.91 MG/DL (ref 0.5–1)
DEPRECATED RDW RBC AUTO: 44.2 FL (ref 36.4–46.3)
EOSINOPHIL # BLD AUTO: 0.17 10*3/MM3 (ref 0–0.7)
EOSINOPHIL NFR BLD AUTO: 3 % (ref 0–7)
ERYTHROCYTE [DISTWIDTH] IN BLOOD BY AUTOMATED COUNT: 15.3 % (ref 11.5–14.5)
GFR SERPL CREATININE-BSD FRML MDRD: 63 ML/MIN/1.73 (ref 51–120)
GLUCOSE BLD-MCNC: 103 MG/DL (ref 60–100)
HCT VFR BLD AUTO: 42.4 % (ref 35–45)
HGB BLD-MCNC: 14.7 G/DL (ref 12–15.5)
IMM GRANULOCYTES # BLD: 0.02 10*3/MM3 (ref 0–0.02)
IMM GRANULOCYTES NFR BLD: 0.3 % (ref 0–0.5)
LYMPHOCYTES # BLD AUTO: 1.5 10*3/MM3 (ref 0.6–4.2)
LYMPHOCYTES NFR BLD AUTO: 26.1 % (ref 10–50)
MCH RBC QN AUTO: 27.4 PG (ref 26.5–34)
MCHC RBC AUTO-ENTMCNC: 34.7 G/DL (ref 31.4–36)
MCV RBC AUTO: 79 FL (ref 80–98)
MONOCYTES # BLD AUTO: 0.3 10*3/MM3 (ref 0–0.9)
MONOCYTES NFR BLD AUTO: 5.2 % (ref 0–12)
NEUTROPHILS # BLD AUTO: 3.75 10*3/MM3 (ref 2–8.6)
NEUTROPHILS NFR BLD AUTO: 65.2 % (ref 37–80)
NRBC BLD MANUAL-RTO: 0.9 /100 WBC (ref 0–0)
PLATELET # BLD AUTO: 95 10*3/MM3 (ref 150–450)
PMV BLD AUTO: ABNORMAL FL (ref 8–12)
POTASSIUM BLD-SCNC: 3.8 MMOL/L (ref 3.5–5.1)
RBC # BLD AUTO: 5.37 10*6/MM3 (ref 3.77–5.16)
SODIUM BLD-SCNC: 139 MMOL/L (ref 137–145)
WBC NRBC COR # BLD: 5.75 10*3/MM3 (ref 3.2–9.8)

## 2018-09-05 PROCEDURE — 25010000002 HEPARIN (PORCINE) PER 1000 UNITS: Performed by: FAMILY MEDICINE

## 2018-09-05 PROCEDURE — 85025 COMPLETE CBC W/AUTO DIFF WBC: CPT | Performed by: FAMILY MEDICINE

## 2018-09-05 PROCEDURE — 80048 BASIC METABOLIC PNL TOTAL CA: CPT | Performed by: FAMILY MEDICINE

## 2018-09-05 RX ADMIN — OXYCODONE HYDROCHLORIDE AND ACETAMINOPHEN 1 TABLET: 7.5; 325 TABLET ORAL at 06:27

## 2018-09-05 RX ADMIN — PANTOPRAZOLE SODIUM 40 MG: 40 INJECTION, POWDER, FOR SOLUTION INTRAVENOUS at 06:11

## 2018-09-05 RX ADMIN — SPIRONOLACTONE 25 MG: 25 TABLET ORAL at 08:52

## 2018-09-05 RX ADMIN — HEPARIN SODIUM 5000 UNITS: 5000 INJECTION, SOLUTION INTRAVENOUS; SUBCUTANEOUS at 06:12

## 2018-09-05 NOTE — PAYOR COMM NOTE
"Amira Coleman (57 y.o. Female)     Date of Birth Social Security Number Address Home Phone MRN    1960  [id number] [facility] [phone] [id number]    Ep[employer] Marital Status          Religion        Admission Date Admission Type Admitting Provider Attending Provider Department, Room/Bed    8/31/18 Emergency Ghanshyam Gomez MD Murray, Robert Joesph, DO [address], 378/1    Discharge Date Discharge Disposition Discharge Destination                       Attending Provider:  Live Gomez DO    Allergies:  Other, Corticosteroids, Kenalog  [Triamcinolone Acetonide], Sulfa Antibiotics    Isolation:  None   Infection:  None   Code Status:  CPR    Ht:  162.6 cm (64\")   Wt:  102 kg (225 lb)    Admission Cmt:  None   Principal Problem:  SBO (small bowel obstruction) [K56.609]                 Active Insurance as of 8/31/2018     Primary Coverage     Payor Plan Insurance Group Employer/Plan Group    [employer] MEDICAID [employer] [facility]     Payor Plan Address Payor Plan Phone Number Effective From Effective To    PO  [phone] 1/1/2014     [facility] [id number]       Subscriber Name Subscriber Birth Date Member ID       AMIRA COLEMAN 1960 [id number]                 Emergency Contacts      (Rel.) Home Phone Work Phone Mobile Phone    Ross Coleman (Spouse) [phone]          Ref. # [id number]  Call back [phone]      Problem List           Codes Noted - Resolved       Hospital    * (Principal)SBO (small bowel obstruction) ICD-10-CM: K56.609  ICD-9-CM: 560.9 9/1/2018 - Present       Non-Hospital    Carpal tunnel syndrome on left ICD-10-CM: G56.02  ICD-9-CM: 354.0 8/10/2018 - Present    Carpal tunnel syndrome on right ICD-10-CM: G56.01  ICD-9-CM: 354.0 8/10/2018 - Present    Bilateral wrist pain ICD-10-CM: M25.531, M25.532  ICD-9-CM: 719.43 8/10/2018 - Present    Numbness and " tingling in both hands ICD-10-CM: R20.0, R20.2  ICD-9-CM: 782.0 8/10/2018 - Present    Abdominal pain ICD-10-CM: R10.9  ICD-9-CM: 789.00 11/17/2017 - Present    Constipation ICD-10-CM: K59.00  ICD-9-CM: 564.00 Unknown - Present    Acid reflux ICD-10-CM: K21.9  ICD-9-CM: 530.81 Unknown - Present    Depression ICD-10-CM: F32.9  ICD-9-CM: 311 Unknown - Present    Disease related peripheral neuropathy ICD-10-CM: G62.89  ICD-9-CM: 356.8 Unknown - Present    Epigastric pain ICD-10-CM: R10.13  ICD-9-CM: 789.06 Unknown - Present    Hyperlipidemia ICD-10-CM: E78.5  ICD-9-CM: 272.4 Unknown - Present    Nausea and vomiting ICD-10-CM: R11.2  ICD-9-CM: 787.01 Unknown - Present    Restless leg syndrome ICD-10-CM: G25.81  ICD-9-CM: 333.94 Unknown - Present    Sleep apnea ICD-10-CM: G47.30  ICD-9-CM: 780.57 Unknown - Present    Chronic fatigue ICD-10-CM: R53.82  ICD-9-CM: 780.79 11/14/2017 - Present    Thrombocytopenia (CMS/HCC) ICD-10-CM: D69.6  ICD-9-CM: 287.5 11/13/2017 - Present    Dyspnea ICD-10-CM: R06.00  ICD-9-CM: 786.09 11/1/2017 - Present    Diastolic dysfunction ICD-10-CM: I51.9  ICD-9-CM: 429.9 11/1/2017 - Present    Obesity with body mass index of 30.0-39.9 ICD-10-CM: E66.9  ICD-9-CM: 278.00 10/9/2017 - Present    Chronic pain of both knees ICD-10-CM: M25.561, M25.562, G89.29  ICD-9-CM: 719.46, 338.29 10/8/2017 - Present    Bilateral calcaneal spurs ICD-10-CM: M77.31, M77.32  ICD-9-CM: 726.73 10/8/2017 - Present    Swelling of both lower extremities ICD-10-CM: M79.89  ICD-9-CM: 729.81 10/8/2017 - Present    Bilateral lower extremity edema ICD-10-CM: R60.0  ICD-9-CM: 782.3 9/29/2017 - Present    Tongue lesion ICD-10-CM: K14.8  ICD-9-CM: 529.8 7/19/2017 - Present    MUSA (nonalcoholic steatohepatitis) ICD-10-CM: K75.81  ICD-9-CM: 571.8 7/13/2017 - Present    Bilateral foot pain ICD-10-CM: M79.671, M79.672  ICD-9-CM: 729.5 12/9/2016 - Present    Right hip pain ICD-10-CM: M25.551  ICD-9-CM: 719.45 12/9/2016 - Present     Acute pain of both knees ICD-10-CM: M25.561, M25.562  ICD-9-CM: 338.19, 719.46 12/9/2016 - Present    Plantar fasciitis, bilateral ICD-10-CM: M72.2  ICD-9-CM: 728.71 12/9/2016 - Present    Primary osteoarthritis of knee ICD-10-CM: M17.10  ICD-9-CM: 715.16 12/9/2016 - Present                ICU Vital Signs     Row Name 09/05/18 0851 09/05/18 0317 09/04/18 2035 09/04/18 1200 09/04/18 1000       Vitals    Temp 96.3 °F (35.7 °C) 97.1 °F (36.2 °C) 96.6 °F (35.9 °C) 97.2 °F (36.2 °C)  --    Temp src Oral Oral Oral  --  --    Pulse 75 67 78 78  --    Heart Rate Source Monitor Monitor Monitor  --  --    Resp 18 18 18 18  --    Resp Rate Source Visual Visual Visual  --  --    /68 118/70 144/81 130/76  --    Noninvasive MAP (mmHg)  -- 88 96  --  --    BP Location Left arm Left arm Left arm Left arm  --    BP Method Automatic Automatic Automatic Automatic  --    Patient Position Lying Lying Lying Lying  --       Oxygen Therapy    SpO2 97 % 97 % 95 % 96 %  --    Pulse Oximetry Type  -- Intermittent Intermittent  --  --    Device (Oxygen Therapy) room air room air room air room air room air        Intake & Output (last day)       09/04 0701 - 09/05 0700 09/05 0701 - 09/06 0700    P.O. 720     Total Intake(mL/kg) 720 (7.1)     Urine (mL/kg/hr) 600 (0.2)     Total Output 600      Net +120            Unmeasured Urine Occurrence 4 x     Unmeasured Stool Occurrence 1 x         Lines, Drains & Airways    Active LDAs     Name:   Placement date:   Placement time:   Site:   Days:    Peripheral IV 09/01/18 0021 Right Forearm  09/01/18    0021    Forearm    4                Hospital Medications (active)       Dose Frequency Start End    heparin (porcine) 5000 UNIT/ML injection 5,000 Units 5,000 Units Every 8 Hours Scheduled 9/1/2018     Sig - Route: Inject 1 mL under the skin into the appropriate area as directed Every 8 (Eight) Hours. - Subcutaneous    labetalol (NORMODYNE,TRANDATE) injection 10 mg 10 mg Every 6 Hours PRN 9/1/2018      Sig - Route: Infuse 2 mL into a venous catheter Every 6 (Six) Hours As Needed for High Blood Pressure. - Intravenous    morphine injection 2 mg 2 mg Every 4 Hours PRN 9/1/2018 9/11/2018    Sig - Route: Infuse 1 mL into a venous catheter Every 4 (Four) Hours As Needed for Severe Pain . - Intravenous    ondansetron (ZOFRAN) injection 4 mg 4 mg Every 6 Hours PRN 9/1/2018     Sig - Route: Infuse 2 mL into a venous catheter Every 6 (Six) Hours As Needed for Nausea or Vomiting. - Intravenous    oxyCODONE-acetaminophen (PERCOCET) 7.5-325 MG per tablet 1 tablet 1 tablet Every 8 Hours PRN 9/3/2018     Sig - Route: Take 1 tablet by mouth Every 8 (Eight) Hours As Needed for Severe Pain . - Oral    pantoprazole (PROTONIX) injection 40 mg 40 mg Every Early Morning 9/1/2018     Sig - Route: Infuse 10 mL into a venous catheter Every Morning. - Intravenous    phenol (CHLORASEPTIC) 1.4 % liquid 2 spray 2 spray Every 2 Hours PRN 9/2/2018     Sig - Route: Apply 2 sprays to the mouth or throat Every 2 (Two) Hours As Needed for Sore Throat. - Mouth/Throat    pneumococcal polysaccharide 23-valent (PNEUMOVAX-23) vaccine 0.5 mL 0.5 mL During Hospitalization 9/1/2018     Sig - Route: Inject 0.5 mL into the appropriate muscle as directed by prescriber During Hospitalization for Immunization. - Intramuscular    Cosign for Ordering: Accepted by Live Gomez DO on 9/1/2018 11:13 AM    sodium chloride 0.9 % flush 1-10 mL 1-10 mL As Needed 9/1/2018     Sig - Route: Infuse 1-10 mL into a venous catheter As Needed for Line Care. - Intravenous    sodium chloride 0.9 % flush 10 mL 10 mL As Needed 9/1/2018     Sig - Route: Infuse 10 mL into a venous catheter As Needed for Line Care. - Intravenous    Cosign for Ordering: Accepted by Ollie Amado MD on 9/1/2018 12:34 AM    sodium chloride 0.9 % flush 10 mL 10 mL As Needed 9/1/2018     Sig - Route: Infuse 10 mL into a venous catheter As Needed for Line Care. - Intravenous    Linked Group  "1:  \"And\" Linked Group Details        spironolactone (ALDACTONE) tablet 25 mg 25 mg Daily 9/4/2018     Sig - Route: Take 1 tablet by mouth Daily. - Oral    dextrose 5 % and sodium chloride 0.45 % infusion (Discontinued) 100 mL/hr Continuous 9/1/2018 9/4/2018    Sig - Route: Infuse 100 mL/hr into a venous catheter Continuous. - Intravenous            Lab Results (last 24 hours)     Procedure Component Value Units Date/Time    Basic Metabolic Panel [[id number]]  (Abnormal) Collected:  09/05/18 0706    Specimen:  Blood Updated:  09/05/18 0805     Glucose 103 (H) mg/dL      BUN 11 mg/dL      Creatinine 0.91 mg/dL      Sodium 139 mmol/L      Potassium 3.8 mmol/L      Chloride 103 mmol/L      CO2 24.0 mmol/L      Calcium 9.1 mg/dL      eGFR Non African Amer 63 mL/min/1.73      BUN/Creatinine Ratio 12.1     Anion Gap 12.0 mmol/L     CBC & Differential [[id number]] Collected:  09/05/18 0706    Specimen:  Blood Updated:  09/05/18 0759    Narrative:       The following orders were created for panel order CBC & Differential.  Procedure                               Abnormality         Status                     ---------                               -----------         ------                     Scan Slide[[id number]]                                                                  CBC Auto Differential[[id number]]        Abnormal            Final result                 Please view results for these tests on the individual orders.    CBC Auto Differential [[id number]]  (Abnormal) Collected:  09/05/18 0706    Specimen:  Blood Updated:  09/05/18 0759     WBC 5.75 10*3/mm3      RBC 5.37 (H) 10*6/mm3      Hemoglobin 14.7 g/dL      Hematocrit 42.4 %      MCV 79.0 (L) fL      MCH 27.4 pg      MCHC 34.7 g/dL      RDW 15.3 (H) %      RDW-SD 44.2 fl      MPV -- fL      Comment: INSTRUMENT UNABLE TO CALCULATE MPV        Platelets 95 (L) 10*3/mm3      Neutrophil % 65.2 %      Lymphocyte % 26.1 %      Monocyte % 5.2 %      Eosinophil % 3.0 %  "     Basophil % 0.2 %      Immature Grans % 0.3 %      Neutrophils, Absolute 3.75 10*3/mm3      Lymphocytes, Absolute 1.50 10*3/mm3      Monocytes, Absolute 0.30 10*3/mm3      Eosinophils, Absolute 0.17 10*3/mm3      Basophils, Absolute 0.01 10*3/mm3      Immature Grans, Absolute 0.02 10*3/mm3      nRBC 0.9 (H) /100 WBC         Imaging Results (last 24 hours)     ** No results found for the last 24 hours. **           Physician Progress Notes (last 24 hours) (Notes from 9/4/2018  9:37 AM through 9/5/2018  9:37 AM)      Russell Del Rio MD at 9/4/2018  5:10 PM                  SUBJECTIVE:   9/4/2018  Chief Complaint:     Subjective      Patient is 57 y.o. female who states she feels well no abdominal pain nausea vomiting or diarrhea at this time.  Patient is tolerating liquid diet well.     CURRENT MEDICATIONS/OBJECTIVE/VS/PE:     Current Medications:     Current Facility-Administered Medications   Medication Dose Route Frequency Provider Last Rate Last Dose   • heparin (porcine) 5000 UNIT/ML injection 5,000 Units  5,000 Units Subcutaneous Q8H Live Gomez DO   5,000 Units at 09/04/18 1318   • labetalol (NORMODYNE,TRANDATE) injection 10 mg  10 mg Intravenous Q6H PRN Live Gomez DO       • morphine injection 2 mg  2 mg Intravenous Q4H PRN Live Gomez DO   2 mg at 09/03/18 1515   • ondansetron (ZOFRAN) injection 4 mg  4 mg Intravenous Q6H PRN Live Gomez DO   4 mg at 09/03/18 2143   • oxyCODONE-acetaminophen (PERCOCET) 7.5-325 MG per tablet 1 tablet  1 tablet Oral Q8H PRN Josh Toth MD   1 tablet at 09/04/18 1236   • pantoprazole (PROTONIX) injection 40 mg  40 mg Intravenous Q AM Live Gomez DO   40 mg at 09/04/18 0647   • phenol (CHLORASEPTIC) 1.4 % liquid 2 spray  2 spray Mouth/Throat Q2H PRN Mukesh Hui MD       • pneumococcal polysaccharide 23-valent (PNEUMOVAX-23) vaccine 0.5 mL  0.5 mL Intramuscular During Hospitalization Live Gomez DO        • sodium chloride 0.9 % flush 1-10 mL  1-10 mL Intravenous PRN Live Gomez, DO       • sodium chloride 0.9 % flush 10 mL  10 mL Intravenous PRN Ollie Amado MD   10 mL at 09/01/18 0025   • sodium chloride 0.9 % flush 10 mL  10 mL Intravenous PRN Ollie Amado MD       • spironolactone (ALDACTONE) tablet 25 mg  25 mg Oral Daily Josh Toth MD           Objective     Physical Exam:   Temp:  [96.5 °F (35.8 °C)-98.9 °F (37.2 °C)] 97.2 °F (36.2 °C)  Heart Rate:  [65-79] 78  Resp:  [16-18] 18  BP: (123-139)/(71-89) 130/76     Physical Exam:  General Appearance:    Alert, cooperative, in no acute distress   Head:    Normocephalic, without obvious abnormality, atraumatic   Eyes:            Lids and lashes normal, conjunctivae and sclerae normal, no   icterus, no pallor, corneas clear, PERRLA   Ears:    Ears appear intact with no abnormalities noted   Throat:   No oral lesions, no thrush, oral mucosa moist   Neck:   No adenopathy, supple, trachea midline, no thyromegaly, no     carotid bruit, no JVD   Back:     No kyphosis present, no scoliosis present, no skin lesions,       erythema or scars, no tenderness to percussion or                   palpation,   range of motion normal   Lungs:     Clear to auscultation,respirations regular, even and                   unlabored    Heart:    Regular rhythm and normal rate, normal S1 and S2, no            murmur, no gallop, no rub, no click   Breast Exam:    Deferred   Abdomen:     Normal bowel sounds, no masses, no organomegaly, soft        non-tender, non-distended, no guarding, no rebound                 tenderness   Genitalia:    Deferred   Extremities:   Moves all extremities well, no edema, no cyanosis, no              redness   Pulses:   Pulses palpable and equal bilaterally   Skin:   No bleeding, bruising or rash   Lymph nodes:   No palpable adenopathy   Neurologic:   Cranial nerves 2 - 12 grossly intact, sensation intact, DTR        present and equal  bilaterally      Results Review:     Lab Results (last 24 hours)     Procedure Component Value Units Date/Time    Extra Tubes [[id number]] Collected:  09/04/18 0601    Specimen:  Blood from Blood, Venous Line Updated:  09/04/18 0715    Narrative:       The following orders were created for panel order Extra Tubes.  Procedure                               Abnormality         Status                     ---------                               -----------         ------                     Green Top (Gel)[[id number]]                                  Final result                 Please view results for these tests on the individual orders.    Green Top (Gel) [[id number]] Collected:  09/04/18 0601    Specimen:  Blood Updated:  09/04/18 0715     Extra Tube Hold for add-ons.     Comment: Auto resulted.       CBC & Differential [[id number]] Collected:  09/04/18 0601    Specimen:  Blood Updated:  09/04/18 0622    Narrative:       The following orders were created for panel order CBC & Differential.  Procedure                               Abnormality         Status                     ---------                               -----------         ------                     CBC Auto Differential[[id number]]        Abnormal            Final result                 Please view results for these tests on the individual orders.    CBC Auto Differential [[id number]]  (Abnormal) Collected:  09/04/18 0601    Specimen:  Blood Updated:  09/04/18 0622     WBC 7.42 10*3/mm3      RBC 5.32 (H) 10*6/mm3      Hemoglobin 14.2 g/dL      Hematocrit 41.7 %      MCV 78.4 (L) fL      MCH 26.7 pg      MCHC 34.1 g/dL      RDW 15.0 (H) %      RDW-SD 43.0 fl      MPV 11.1 fL      Platelets 103 (L) 10*3/mm3      Neutrophil % 67.5 %      Lymphocyte % 23.7 %      Monocyte % 6.3 %      Eosinophil % 1.9 %      Basophil % 0.3 %      Immature Grans % 0.3 %      Neutrophils, Absolute 5.01 10*3/mm3      Lymphocytes, Absolute 1.76 10*3/mm3      Monocytes, Absolute 0.47  10*3/mm3      Eosinophils, Absolute 0.14 10*3/mm3      Basophils, Absolute 0.02 10*3/mm3      Immature Grans, Absolute 0.02 10*3/mm3            I reviewed the patient's new clinical results.  I reviewed the patient's new imaging results and agree with the interpretation.     ASSESSMENT/PLAN:   ASSESSMENT: Patient recently admitted for small bowel obstruction.  Patient states she holder longer having abdominal pain is passing stool and is tolerating clear liquids well this is been the second episode of a bowel obstruction secondary to adhesions.    PLAN: #1 advance patient's diet as tolerated  #2 please have patient follow up with Blaine Perales after discharge.  The risks, benefits, and alternatives of this procedure have been discussed with the patient or the responsible party- the patient understands and agrees to proceed.         Russell Del Rio MD  18  5:10 PM           This document has been electronically signed by Russell Del Rio MD on 2018 5:10 PM      Electronically signed by Russell Del Rio MD at 2018  5:11 PM     Josh Toth MD at 2018  3:52 PM          MEDICINE DAILY PROGRESS NOTE  NAME: Amira Shannon  : 1960  MRN: [id number]     LOS: 3 days     PROVIDER OF SERVICE: Josh Toth MD    Chief Complaint: SBO (small bowel obstruction)    Subjective:   HPI: Patient admitted with abdominal pain suspected to have SBO.    Interval History:  History taken from: patient chart family RN  . NGT out. Patient tolerated diet. Good BM yesterday. Minimal nausea overnight. No vomiting.  9/3. NGT still in place. Patient complaining with irritation from NGT.    Review of Systems:   Review of Systems   Constitutional: Positive for activity change, appetite change and fatigue. Negative for fever.   HENT: Negative for ear pain and sore throat.    Eyes: Negative for pain and visual disturbance.   Respiratory: Negative for cough and shortness of breath.    Cardiovascular: Negative  for chest pain and palpitations.   Gastrointestinal: Positive for nausea. Negative for abdominal pain, diarrhea and vomiting.   Endocrine: Negative for cold intolerance and heat intolerance.   Genitourinary: Negative for difficulty urinating and dysuria.   Musculoskeletal: Positive for arthralgias. Negative for gait problem.   Skin: Negative for color change and rash.   Neurological: Negative for dizziness, weakness and headaches.   Hematological: Negative for adenopathy. Does not bruise/bleed easily.   Psychiatric/Behavioral: Negative for agitation, confusion and sleep disturbance.       Objective:     Vital Signs  Vitals:    09/04/18 0011 09/04/18 0323 09/04/18 0840 09/04/18 1200   BP: 138/88 130/86 123/71 130/76   BP Location: Left arm Left arm Left arm Left arm   Patient Position: Lying Lying Lying Lying   Pulse: 78 65 79 78   Resp: 18 18 16 18   Temp: 96.6 °F (35.9 °C) 98.9 °F (37.2 °C) 97.6 °F (36.4 °C) 97.2 °F (36.2 °C)   TempSrc: Oral Oral Oral    SpO2: 93% 95% 98% 96%   Weight:       Height:           Physical Exam  Physical Exam   Constitutional: She is oriented to person, place, and time. She appears well-developed and well-nourished. No distress.   HENT:   Head: Normocephalic and atraumatic.   Right Ear: External ear normal.   Left Ear: External ear normal.   Nose: Nose normal.   Eyes: Pupils are equal, round, and reactive to light. Conjunctivae and EOM are normal.   Neck: Normal range of motion. Neck supple.   Cardiovascular: Normal rate, regular rhythm, normal heart sounds and intact distal pulses.    Pulmonary/Chest: Effort normal and breath sounds normal. No respiratory distress. She has no wheezes. She has no rales. She exhibits no tenderness.   Abdominal: Soft. Bowel sounds are normal. She exhibits no distension and no mass. There is tenderness. There is no rebound and no guarding.   Musculoskeletal: Normal range of motion. She exhibits no edema.   Neurological: She is alert and oriented to person,  place, and time.   Skin: Skin is warm and dry. No rash noted. She is not diaphoretic. No erythema. No pallor.   Psychiatric: She has a normal mood and affect. Her behavior is normal.   Nursing note and vitals reviewed.      Medication Review    Current Facility-Administered Medications:   •  dextrose 5 % and sodium chloride 0.45 % infusion, 100 mL/hr, Intravenous, Continuous, Live Gomez DO, Last Rate: 100 mL/hr at 09/04/18 0847, 100 mL/hr at 09/04/18 0847  •  heparin (porcine) 5000 UNIT/ML injection 5,000 Units, 5,000 Units, Subcutaneous, Q8H, Live Gomez DO, 5,000 Units at 09/04/18 1318  •  labetalol (NORMODYNE,TRANDATE) injection 10 mg, 10 mg, Intravenous, Q6H PRN, Live Gomez DO  •  morphine injection 2 mg, 2 mg, Intravenous, Q4H PRN, Live Gomez DO, 2 mg at 09/03/18 1515  •  ondansetron (ZOFRAN) injection 4 mg, 4 mg, Intravenous, Q6H PRN, Live Gomez DO, 4 mg at 09/03/18 2143  •  oxyCODONE-acetaminophen (PERCOCET) 7.5-325 MG per tablet 1 tablet, 1 tablet, Oral, Q8H PRN, Josh Toth MD, 1 tablet at 09/04/18 1236  •  pantoprazole (PROTONIX) injection 40 mg, 40 mg, Intravenous, Q AM, Live Gomez DO, 40 mg at 09/04/18 0647  •  phenol (CHLORASEPTIC) 1.4 % liquid 2 spray, 2 spray, Mouth/Throat, Q2H PRN, Mukesh Hui MD  •  pneumococcal polysaccharide 23-valent (PNEUMOVAX-23) vaccine 0.5 mL, 0.5 mL, Intramuscular, During Hospitalization, Live Gomez DO  •  sodium chloride 0.9 % flush 1-10 mL, 1-10 mL, Intravenous, PRN, Live Gomez DO  •  sodium chloride 0.9 % flush 10 mL, 10 mL, Intravenous, PRN, Ollie Amado MD, 10 mL at 09/01/18 0025  •  Insert peripheral IV, , , Once **AND** sodium chloride 0.9 % flush 10 mL, 10 mL, Intravenous, PRN, Ollie Amado MD     Diagnostic Data    Lab Results (last 24 hours)     Procedure Component Value Units Date/Time    Extra Tubes [[id number]] Collected:  09/04/18 0601    Specimen:  Blood  from Blood, Venous Line Updated:  09/04/18 0715    Narrative:       The following orders were created for panel order Extra Tubes.  Procedure                               Abnormality         Status                     ---------                               -----------         ------                     Green Top (Gel)[[id number]]                                  Final result                 Please view results for these tests on the individual orders.    Green Top (Gel) [[id number]] Collected:  09/04/18 0601    Specimen:  Blood Updated:  09/04/18 0715     Extra Tube Hold for add-ons.     Comment: Auto resulted.       CBC & Differential [[id number]] Collected:  09/04/18 0601    Specimen:  Blood Updated:  09/04/18 0622    Narrative:       The following orders were created for panel order CBC & Differential.  Procedure                               Abnormality         Status                     ---------                               -----------         ------                     CBC Auto Differential[[id number]]        Abnormal            Final result                 Please view results for these tests on the individual orders.    CBC Auto Differential [[id number]]  (Abnormal) Collected:  09/04/18 0601    Specimen:  Blood Updated:  09/04/18 0622     WBC 7.42 10*3/mm3      RBC 5.32 (H) 10*6/mm3      Hemoglobin 14.2 g/dL      Hematocrit 41.7 %      MCV 78.4 (L) fL      MCH 26.7 pg      MCHC 34.1 g/dL      RDW 15.0 (H) %      RDW-SD 43.0 fl      MPV 11.1 fL      Platelets 103 (L) 10*3/mm3      Neutrophil % 67.5 %      Lymphocyte % 23.7 %      Monocyte % 6.3 %      Eosinophil % 1.9 %      Basophil % 0.3 %      Immature Grans % 0.3 %      Neutrophils, Absolute 5.01 10*3/mm3      Lymphocytes, Absolute 1.76 10*3/mm3      Monocytes, Absolute 0.47 10*3/mm3      Eosinophils, Absolute 0.14 10*3/mm3      Basophils, Absolute 0.02 10*3/mm3      Immature Grans, Absolute 0.02 10*3/mm3           I reviewed the patient's new clinical  results.    Assessment/Plan:     Active Hospital Problems    Diagnosis Date Noted   • **SBO (small bowel obstruction) [K56.609] 09/01/2018       #. SBO.   9/4. Appears to have resolved. Diet advanced to full. Monitor.  9/3. Surgery following. GI following. Patient with flatus and small BM noted. NGT still in place.   #. Fatty liver with cirrhosis. Monitor.  #. Thrombocytopenia secondary to cirrhosis. Stabel. Monitor.    Results from last 7 days  Lab Units 09/04/18  0601 09/03/18  0623 09/02/18  0538 09/01/18  0021   PLATELETS 10*3/mm3 103* 100* 91* 118*   #. HTN. Restart home meds once tolerating PO.    Will monitor patient's hospital course and adjust treatment as hospital course dictates.    DVT prophylaxis: Heparin  Code status is   Code Status and Medical Interventions:   Ordered at: 09/01/18 0411     Level Of Support Discussed With:    Patient     Code Status:    CPR     Medical Interventions (Level of Support Prior to Arrest):    Full       Plan for disposition:Where: home and When:  tomorrow      Time:           This document has been electronically signed by Josh Toth MD on September 4, 2018 3:52 PM          Electronically signed by Josh Toth MD at 9/4/2018  3:55 PM       Consult Notes (last 24 hours) (Notes from 9/4/2018  9:37 AM through 9/5/2018  9:37 AM)     No notes of this type exist for this encounter.

## 2018-09-05 NOTE — DISCHARGE SUMMARY
DISCHARGE SUMMARY    NAME: Amira Shannon   PHYSICIAN: Josh Toth MD  : 1960  MRN: [id number]    ADMITTED: 2018   DISCHARGED:  2018    ADMISSION DIAGNOSES:   Present on Admission:  • SBO (small bowel obstruction)  • MUSA (nonalcoholic steatohepatitis)  • Thrombocytopenia (CMS/HCC)    DISCHARGE DIAGNOSES:   Principal Problem:    SBO (small bowel obstruction)  Active Problems:    MUSA (nonalcoholic steatohepatitis)    Thrombocytopenia (CMS/HCC)      SERVICE: Medicine. Attending  Josh Toth MD    CONSULTS:   Consult Orders (all)     Start     Ordered    18  Inpatient General Surgery Consult  Once     Comments:  From ER physician   Specialty:  General Surgery  Provider:  Madhu Cho MD    18  Inpatient Gastroenterology Consult  Once     Specialty:  Gastroenterology  Provider:  Ghanshyam Aguila DO    18  Hospitalist (on-call MD unless specified)  Once     Specialty:  Hospitalist  Provider:  (Not yet assigned)    18  Surgery (on-call MD unless specified)  Once     Specialty:  General Surgery  Provider:  (Not yet assigned)    18          PROCEDURES:   Imaging Results (last 7 days)     Procedure Component Value Units Date/Time    XR Abdomen KUB [[id number]] Collected:  18     Updated:  18    Narrative:       Abdomen KUB.    HISTORY: Follow-up bowel obstruction.    Prior exam: Water-soluble enema examination 2018.    TECHNIQUE: Two supine views the abdomen are obtained.    There is still a significant amount of contrast, Gastrografin  throughout the colon in comparison to prior exam. This suggests  colonic ileus.     Nasogastric tube in stomach. There is no small bowel dilatation.  Surgical clips right upper quadrant from prior cholecystectomy.      Impression:       CONCLUSION: As above.    Electronically signed by:  Blair Rojas MD  9/3/2018 9:51 AM  CDT  Workstation: [id number]    FL Barium Enema Water Soluble [[id number]] Collected:  09/02/18 0759     Updated:  09/02/18 1212    Narrative:       Fluoroscopy enema examination water-soluble.    HISTORY: Small bowel obstruction.    Two minutes and 15 seconds of fluoroscopy disease. 13 images are  obtained.    Preliminary film demonstrates a nonspecific bowel gas pattern.    240 mL of Gastrografin was used.    There is unobstructed retrograde flow of contrast from the rectum  to the cecum. There is a moderate amount of retained fecal  residue. There is no evidence of any annular or obstructing  lesions.      Impression:       CONCLUSION: Moderate amount retained fecal residue. Otherwise  unremarkable examination of the colon. No evidence of any colonic  obstruction.    Electronically signed by:  Blair Rojas MD  9/2/2018 12:11 PM CDT  Workstation: [id number]    US Abdomen Complete [[id number]] Collected:  09/01/18 0848     Updated:  09/01/18 1308    Narrative:         EXAMINATION:  ultrasound abdomen, complete    CLINICAL INDICATION / HISTORY:  portal hypertension, K56.609  Unspecified intestinal obstruction, unspecified as to partial  versus complete obstruction K74.60 Unspecified cirrhosis of liver     DATE:  9/1/2018 12:56 PM CDT  COMPARISON:  none  TECHNIQUE:  standard protocol      FINDINGS:        Liver:      size: limited evaluation, grossly negative      echotexture:  Consistent with diffuse fibrotic and/or fatty  changes.      misc.:   no focal mass or intrahepatic biliary ductal  dilatation       Biliary:      Gall bladder:  Surgically absent      Common bile duct:  normal caliber      Pancreas (visualized portions):          normal size and echotexture for age  , no focal mass or  ductal dilatation        Kidney, right (limited):      size:  normal        echotexture:  normal        misc.:  no nephrolithiasis, solid mass, or collecting system  dilation       Spleen:   Enlarged measuring 15 cm    Kidney, left:       size:  normal      echotexture:  normal        misc.:  no nephrolithiasis, solid mass, or collecting system  dilation       Vascular (visualized portions of - remainder obscured by  overlying bowel gas):      Aorta (proximal):  normal caliber      IVC:  normal caliber, no intraluminal defect(s)      Misc:  Antegrade flow in the portal vein.        Impression:       CONCLUSION:          1.  Status post cholecystectomy.  2. Hepatic parenchymal echotexture consistent with diffuse  fibrotic and/or to fatty changes.  3. Antegrade flow in the portal vein.  4. Splenomegaly.                                Electronically signed by:  JONO Cobb MD  9/1/2018 1:07 PM  CDT Workstation: [id number]    CT Abdomen Pelvis With Contrast [[id number]] Collected:  09/01/18 0205     Updated:  09/01/18 0239    Narrative:       CT abdomen and pelvis with contrast on  9/1/2018     CLINICAL INDICATION: Generalized abdominal pain, constipation,  history of obstruction    TECHNIQUE: Multiple axial images are obtained throughout the  abdomen and pelvis following the administration of IV contrast,  93 mL of Isovue-300 contrast was administered intravenously  without complication. This exam was performed according to our  departmental dose-optimization program, which includes automated  exposure control, adjustment of the mA and/or kV according to  patient size and/or use of iterative reconstruction technique.   Total DLP is 564.4 mGy*cm.    COMPARISON: 11/21/2017    FINDINGS:  Abdomen: There is minimal basilar atelectasis. There is mild  fatty infiltration of the liver. The patient is status post  cholecystectomy. Splenomegaly is noted with the spleen measuring  17 cm in greatest mtop-oy-rwpk length. There is a slightly  irregular contour of the liver consistent with changes of  cirrhosis. The solid abdominal organs are otherwise unremarkable.  Small esophageal and gastric varices are noted. There is no  abdominal adenopathy. There is no  free fluid or free air within  the abdomen. There are dilated fluid-filled loops of mid small  bowel with decompressed distal small bowel in the pelvis  consistent with a small bowel obstruction. There are multiple  adherent loops of bowel along the anterior abdominal and anterior  pelvic wall likely related to adhesions which is most likely the  cause of this small bowel obstruction.    Pelvis: The patient is status post hysterectomy. There is no free  fluid in the pelvis. There is no pelvic adenopathy. The pelvic  portion of the GI tract is otherwise unremarkable. No bony  abnormality is noted.      Impression:       1. Findings consistent with a small bowel obstruction most likely  related to an adhesion.  2. Changes of cirrhosis with splenomegaly and evidence of portal  hypertension.    Electronically signed by:  Chetan Huston  9/1/2018 2:38 AM CDT  Workstation: [id number]    XR Abdomen 2 View With Chest 1 View [[id number]] Collected:  09/01/18 0048     Updated:  09/01/18 0121    Narrative:       Acute abdominal series on 9/1/2018    CLINICAL INDICATION: Constipation, history of obstruction    COMPARISON: 11/20/2017    FINDINGS:    CHEST: There is mild linear atelectasis in the left lower lung.  The lungs are otherwise clear. Cardiac, hilar and mediastinal  contours are within normal limits. Pulmonary vascularity is  within normal limits.    ABDOMEN: There are multiple air-fluid levels with dilated loops  of small bowel in the midabdomen consistent with a small bowel  obstruction. No increased stool to suggest significant  constipation is noted. Calcification in left pelvis is consistent  with phlebolith. There is no free air. Minimal degenerative  changes are noted in the spine.      Impression:       1. No acute cardiopulmonary disease.  2. Findings consistent with small bowel obstruction.    Electronically signed by:  Chetan Huston  9/1/2018 1:20 AM CDT  Workstation: [email or web address]ALEX          HISTORY  OF PRESENT ILLNESS: *Copied from Live Gomez MD's H&P*  This is a 57-year-old white female that has a concurrent health history significant of multiple abdominal surgeries with adhesions and cirrhosis with portal hypertension.  The patient also has multiple admissions for small bowel obstruction and NG tube placement.  The patient also has a history of fatty liver disease, hypertension class II obesity, peripheral neuropathy, sleep apnea on CPAP, ROS, hypertension and is scheduled to follow with a specialist for her spine and sciatica.  Patient is present with the  at the bedside for a few day history of abdominal pain and nausea with emesis.  The patient states that she has had a bowel movement this morning.  The patient denies any fever or chills.  The patient has had poor appetite.  The patient presents for evaluation of abdominal pain.  Her CT abdomen shows a bowel obstruction with cirrhosis and portal hypertension with splenomegaly.  The patient has a surgical consultation will be admitted for further management.    DIAGNOSTIC DATA:   Lab Results (last 7 days)     Procedure Component Value Units Date/Time    Basic Metabolic Panel [[id number]]  (Abnormal) Collected:  09/05/18 0706    Specimen:  Blood Updated:  09/05/18 0805     Glucose 103 (H) mg/dL      BUN 11 mg/dL      Creatinine 0.91 mg/dL      Sodium 139 mmol/L      Potassium 3.8 mmol/L      Chloride 103 mmol/L      CO2 24.0 mmol/L      Calcium 9.1 mg/dL      eGFR Non African Amer 63 mL/min/1.73      BUN/Creatinine Ratio 12.1     Anion Gap 12.0 mmol/L     CBC & Differential [[id number]] Collected:  09/05/18 0706    Specimen:  Blood Updated:  09/05/18 0759    Narrative:       The following orders were created for panel order CBC & Differential.  Procedure                               Abnormality         Status                     ---------                               -----------         ------                     Scan Slide[[id number]]                                                                   CBC Auto Differential[[id number]]        Abnormal            Final result                 Please view results for these tests on the individual orders.    CBC Auto Differential [[id number]]  (Abnormal) Collected:  09/05/18 0706    Specimen:  Blood Updated:  09/05/18 0759     WBC 5.75 10*3/mm3      RBC 5.37 (H) 10*6/mm3      Hemoglobin 14.7 g/dL      Hematocrit 42.4 %      MCV 79.0 (L) fL      MCH 27.4 pg      MCHC 34.7 g/dL      RDW 15.3 (H) %      RDW-SD 44.2 fl      MPV -- fL      Comment: INSTRUMENT UNABLE TO CALCULATE MPV        Platelets 95 (L) 10*3/mm3      Neutrophil % 65.2 %      Lymphocyte % 26.1 %      Monocyte % 5.2 %      Eosinophil % 3.0 %      Basophil % 0.2 %      Immature Grans % 0.3 %      Neutrophils, Absolute 3.75 10*3/mm3      Lymphocytes, Absolute 1.50 10*3/mm3      Monocytes, Absolute 0.30 10*3/mm3      Eosinophils, Absolute 0.17 10*3/mm3      Basophils, Absolute 0.01 10*3/mm3      Immature Grans, Absolute 0.02 10*3/mm3      nRBC 0.9 (H) /100 WBC     Extra Tubes [[id number]] Collected:  09/04/18 0601    Specimen:  Blood from Blood, Venous Line Updated:  09/04/18 0715    Narrative:       The following orders were created for panel order Extra Tubes.  Procedure                               Abnormality         Status                     ---------                               -----------         ------                     Green Top (Gel)[[id number]]                                  Final result                 Please view results for these tests on the individual orders.    Green Top (Gel) [[id number]] Collected:  09/04/18 0601    Specimen:  Blood Updated:  09/04/18 0715     Extra Tube Hold for add-ons.     Comment: Auto resulted.       CBC & Differential [[id number]] Collected:  09/04/18 0601    Specimen:  Blood Updated:  09/04/18 0622    Narrative:       The following orders were created for panel order CBC & Differential.  Procedure                                Abnormality         Status                     ---------                               -----------         ------                     CBC Auto Differential[[id number]]        Abnormal            Final result                 Please view results for these tests on the individual orders.    CBC Auto Differential [[id number]]  (Abnormal) Collected:  09/04/18 0601    Specimen:  Blood Updated:  09/04/18 0622     WBC 7.42 10*3/mm3      RBC 5.32 (H) 10*6/mm3      Hemoglobin 14.2 g/dL      Hematocrit 41.7 %      MCV 78.4 (L) fL      MCH 26.7 pg      MCHC 34.1 g/dL      RDW 15.0 (H) %      RDW-SD 43.0 fl      MPV 11.1 fL      Platelets 103 (L) 10*3/mm3      Neutrophil % 67.5 %      Lymphocyte % 23.7 %      Monocyte % 6.3 %      Eosinophil % 1.9 %      Basophil % 0.3 %      Immature Grans % 0.3 %      Neutrophils, Absolute 5.01 10*3/mm3      Lymphocytes, Absolute 1.76 10*3/mm3      Monocytes, Absolute 0.47 10*3/mm3      Eosinophils, Absolute 0.14 10*3/mm3      Basophils, Absolute 0.02 10*3/mm3      Immature Grans, Absolute 0.02 10*3/mm3     Basic Metabolic Panel [[id number]]  (Abnormal) Collected:  09/03/18 0623    Specimen:  Blood Updated:  09/03/18 0644     Glucose 115 (H) mg/dL      BUN 9 mg/dL      Creatinine 0.92 mg/dL      Sodium 138 mmol/L      Potassium 3.6 mmol/L      Chloride 101 mmol/L      CO2 28.0 mmol/L      Calcium 9.1 mg/dL      eGFR Non African Amer 63 mL/min/1.73      BUN/Creatinine Ratio 9.8     Anion Gap 9.0 mmol/L     CBC & Differential [[id number]] Collected:  09/03/18 0623    Specimen:  Blood Updated:  09/03/18 0642    Narrative:       The following orders were created for panel order CBC & Differential.  Procedure                               Abnormality         Status                     ---------                               -----------         ------                     CBC Auto Differential[[id number]]        Abnormal            Final result                 Please view results for these  tests on the individual orders.    CBC Auto Differential [[id number]]  (Abnormal) Collected:  09/03/18 0623    Specimen:  Blood Updated:  09/03/18 0642     WBC 7.96 10*3/mm3      RBC 5.58 (H) 10*6/mm3      Hemoglobin 15.1 g/dL      Hematocrit 44.0 %      MCV 78.9 (L) fL      MCH 27.1 pg      MCHC 34.3 g/dL      RDW 15.2 (H) %      RDW-SD 44.1 fl      MPV 10.8 fL      Platelets 100 (L) 10*3/mm3      Neutrophil % 65.4 %      Lymphocyte % 24.5 %      Monocyte % 7.0 %      Eosinophil % 2.4 %      Basophil % 0.3 %      Immature Grans % 0.4 %      Neutrophils, Absolute 5.21 10*3/mm3      Lymphocytes, Absolute 1.95 10*3/mm3      Monocytes, Absolute 0.56 10*3/mm3      Eosinophils, Absolute 0.19 10*3/mm3      Basophils, Absolute 0.02 10*3/mm3      Immature Grans, Absolute 0.03 (H) 10*3/mm3      nRBC 0.0 /100 WBC     CBC & Differential [[id number]] Collected:  09/02/18 0538    Specimen:  Blood Updated:  09/02/18 0757    Narrative:       The following orders were created for panel order CBC & Differential.  Procedure                               Abnormality         Status                     ---------                               -----------         ------                     Scan Slide[[id number]]                                       Final result               CBC Auto Differential[[id number]]        Abnormal            Final result                 Please view results for these tests on the individual orders.    Scan Slide [[id number]] Collected:  09/02/18 0538    Specimen:  Blood Updated:  09/02/18 0757     RBC Morphology Normal     WBC Morphology Normal     Platelet Estimate Decreased    CBC Auto Differential [[id number]]  (Abnormal) Collected:  09/02/18 0538    Specimen:  Blood Updated:  09/02/18 0654     WBC 6.24 10*3/mm3      RBC 5.32 (H) 10*6/mm3      Hemoglobin 14.2 g/dL      Hematocrit 42.7 %      MCV 80.3 fL      MCH 26.7 pg      MCHC 33.3 g/dL      RDW 15.3 (H) %      RDW-SD 45.1 fl      MPV -- fL      Comment: Unable  to verify        Platelets 91 (L) 10*3/mm3      Neutrophil % 68.3 %      Lymphocyte % 22.8 %      Monocyte % 6.3 %      Eosinophil % 2.1 %      Basophil % 0.3 %      Immature Grans % 0.2 %      Neutrophils, Absolute 4.27 10*3/mm3      Lymphocytes, Absolute 1.42 10*3/mm3      Monocytes, Absolute 0.39 10*3/mm3      Eosinophils, Absolute 0.13 10*3/mm3      Basophils, Absolute 0.02 10*3/mm3      Immature Grans, Absolute 0.01 10*3/mm3      nRBC 0.0 /100 WBC     Comprehensive Metabolic Panel [[id number]]  (Abnormal) Collected:  09/01/18 2352    Specimen:  Blood Updated:  09/02/18 0022     Glucose 113 (H) mg/dL      BUN 10 mg/dL      Creatinine 1.03 (H) mg/dL      Sodium 137 mmol/L      Potassium 3.5 mmol/L      Chloride 100 mmol/L      CO2 29.0 mmol/L      Calcium 8.3 (L) mg/dL      Total Protein 6.5 g/dL      Albumin 3.40 g/dL      ALT (SGPT) 46 U/L      AST (SGOT) 49 (H) U/L      Alkaline Phosphatase 77 U/L      Total Bilirubin 1.2 mg/dL      eGFR Non African Amer 55 mL/min/1.73      Globulin 3.1 gm/dL      A/G Ratio 1.1 g/dL      BUN/Creatinine Ratio 9.7     Anion Gap 8.0 mmol/L     [place] Draw [[id number]] Collected:  09/01/18 0021    Specimen:  Blood Updated:  09/01/18 0130    Narrative:       The following orders were created for panel order [place] Draw.  Procedure                               Abnormality         Status                     ---------                               -----------         ------                     Light Blue Top[[id number]]                                   Final result               Green Top (Gel)[[id number]]                                  Final result               Lavender Top[[id number]]                                     Final result               Gold Top - SST[[id number]]                                   Final result                 Please view results for these tests on the individual orders.    Light Blue Top [[id number]] Collected:  09/01/18 0021    Specimen:  Blood Updated:   09/01/18 0130     Extra Tube hold for add-on     Comment: Auto resulted       Green Top (Gel) [[id number]] Collected:  09/01/18 0021    Specimen:  Blood Updated:  09/01/18 0130     Extra Tube Hold for add-ons.     Comment: Auto resulted.       Lavender Top [[id number]] Collected:  09/01/18 0021    Specimen:  Blood Updated:  09/01/18 0130     Extra Tube hold for add-on     Comment: Auto resulted       Gold Top - SST [[id number]] Collected:  09/01/18 0021    Specimen:  Blood Updated:  09/01/18 0130     Extra Tube Hold for add-ons.     Comment: Auto resulted.       Urinalysis With Microscopic If Indicated (No Culture) - Urine, Clean Catch [[id number]]  (Normal) Collected:  09/01/18 0043    Specimen:  Urine from Urine, Clean Catch Updated:  09/01/18 0110     Color, UA Yellow     Appearance, UA Clear     pH, UA 6.0     Specific Gravity, UA 1.028     Glucose, UA Negative     Ketones, UA Negative     Bilirubin, UA Negative     Blood, UA Negative     Protein, UA Negative     Leuk Esterase, UA Negative     Nitrite, UA Negative     Urobilinogen, UA 1.0 E.U./dL    Narrative:       Urine microscopic not indicated.    CBC & Differential [[id number]] Collected:  09/01/18 0021    Specimen:  Blood Updated:  09/01/18 0051    Narrative:       The following orders were created for panel order CBC & Differential.  Procedure                               Abnormality         Status                     ---------                               -----------         ------                     CBC Auto Differential[[id number]]        Abnormal            Final result                 Please view results for these tests on the individual orders.    CBC Auto Differential [[id number]]  (Abnormal) Collected:  09/01/18 0021    Specimen:  Blood Updated:  09/01/18 0051     WBC 7.98 10*3/mm3      RBC 5.57 (H) 10*6/mm3      Hemoglobin 15.0 g/dL      Hematocrit 43.7 %      MCV 78.5 (L) fL      MCH 26.9 pg      MCHC 34.3 g/dL      RDW 15.1 (H) %      RDW-SD 43.5  fl      MPV 11.4 fL      Platelets 118 (L) 10*3/mm3      Neutrophil % 73.8 %      Lymphocyte % 18.0 %      Monocyte % 6.1 %      Eosinophil % 1.6 %      Basophil % 0.1 %      Immature Grans % 0.4 %      Neutrophils, Absolute 5.88 10*3/mm3      Lymphocytes, Absolute 1.44 10*3/mm3      Monocytes, Absolute 0.49 10*3/mm3      Eosinophils, Absolute 0.13 10*3/mm3      Basophils, Absolute 0.01 10*3/mm3      Immature Grans, Absolute 0.03 (H) 10*3/mm3     Comprehensive Metabolic Panel [[id number]]  (Abnormal) Collected:  09/01/18 0021    Specimen:  Blood Updated:  09/01/18 0050     Glucose 123 (H) mg/dL      BUN 17 mg/dL      Creatinine 1.08 (H) mg/dL      Sodium 137 mmol/L      Potassium 3.6 mmol/L      Chloride 101 mmol/L      CO2 24.0 mmol/L      Calcium 9.3 mg/dL      Total Protein 7.6 g/dL      Albumin 4.20 g/dL      ALT (SGPT) 60 (H) U/L      AST (SGOT) 69 (H) U/L      Alkaline Phosphatase 96 U/L      Total Bilirubin 0.7 mg/dL      eGFR Non African Amer 52 mL/min/1.73      Globulin 3.4 gm/dL      A/G Ratio 1.2 g/dL      BUN/Creatinine Ratio 15.7     Anion Gap 12.0 mmol/L     Lipase [[id number]]  (Normal) Collected:  09/01/18 0021    Specimen:  Blood Updated:  09/01/18 0050     Lipase 76 U/L           HOSPITAL COURSE:  Active Hospital Problems    Diagnosis Date Noted   • **SBO (small bowel obstruction) [K56.609] 09/01/2018   • Thrombocytopenia (CMS/HCC) [D69.6] 11/13/2017     Dr. Curran in [place]     • MUSA (nonalcoholic steatohepatitis) [K75.81] 07/13/2017     Patient was admitted with partial SBO secondary to adhesions from previous bowel surgeries. She was evaluated by surgery and GI. Patient was treated conservatively with a NGT. She responded well. She had a BM and her NGT was discontinued and her diet advanced. She tolerated a diet for two meals at her baseline without nausea or abdominal pain. She was stable for discharge to have close follow up with GI.    Patient's MUSA and thrombocytopenia were monitored  "and stable throughout her stay. She will follow up with Blaine Perales PA-C on discharge.    Patient's chronic pain was at baseline on discharge. She was continued on her home dose of pain medication.    PHYSICAL EXAM ON DISCHARGE:  /68 (BP Location: Left arm, Patient Position: Lying)   Pulse 75   Temp 96.3 °F (35.7 °C) (Oral)   Resp 18   Ht 162.6 cm (64\")   Wt 102 kg (225 lb)   LMP  (LMP Unknown)   SpO2 97%   BMI 38.62 kg/m²      Physical Exam   Constitutional: She is oriented to person, place, and time. She appears well-developed and well-nourished. No distress.   HENT:   Head: Normocephalic and atraumatic.   Right Ear: External ear normal.   Left Ear: External ear normal.   Nose: Nose normal.   Eyes: Pupils are equal, round, and reactive to light. Conjunctivae and EOM are normal.   Neck: Normal range of motion. Neck supple.   Cardiovascular: Normal rate, regular rhythm, normal heart sounds and intact distal pulses.    Pulmonary/Chest: Effort normal and breath sounds normal. No respiratory distress. She has no wheezes. She has no rales. She exhibits no tenderness.   Abdominal: Soft. Bowel sounds are normal. She exhibits no distension and no mass. There is no tenderness. There is no rebound and no guarding.   Musculoskeletal: Normal range of motion. She exhibits no edema.   Neurological: She is alert and oriented to person, place, and time.   Skin: Skin is warm and dry. No rash noted. She is not diaphoretic. No erythema. No pallor.   Psychiatric: She has a normal mood and affect. Her behavior is normal.   Nursing note and vitals reviewed.      CONDITION ON DISCHARGE:   Stable    Review of Systems   Constitutional: Positive for activity change and fatigue. Negative for appetite change and fever.   HENT: Negative for ear pain and sore throat.    Eyes: Negative for pain and visual disturbance.   Respiratory: Negative for cough and shortness of breath.    Cardiovascular: Negative for chest pain and " palpitations.   Gastrointestinal: Negative for abdominal pain and nausea.   Endocrine: Negative for cold intolerance and heat intolerance.   Genitourinary: Negative for difficulty urinating and dysuria.   Musculoskeletal: Positive for arthralgias and back pain. Negative for gait problem.   Skin: Negative for color change and rash.   Neurological: Negative for dizziness, weakness and headaches.   Hematological: Negative for adenopathy. Does not bruise/bleed easily.   Psychiatric/Behavioral: Negative for agitation, confusion and sleep disturbance.       DISPOSITION:  Home or Self Care      DISCHARGE MEDICATIONS     Discharge Medications      Changes to Medications      Instructions Start Date   chlorthalidone 50 MG tablet  Commonly known as:  HYGROTEN  What changed:  Another medication with the same name was removed. Continue taking this medication, and follow the directions you see here.   TAKE 1 TABLET BY MOUTH ONCE DAILY         Continue These Medications      Instructions Start Date   cetirizine 10 MG tablet  Commonly known as:  zyrTEC   10 mg, Oral, Daily PRN      clotrimazole 10 MG dimitry  Commonly known as:  MYCELEX   10 mg, Oral, 3 Times Daily      cyclobenzaprine 10 MG tablet  Commonly known as:  FLEXERIL   10 mg, Oral, 3 Times Daily PRN      DULoxetine 30 MG capsule  Commonly known as:  CYMBALTA   30 mg, Oral, Daily      gabapentin 800 MG tablet  Commonly known as:  NEURONTIN   800 mg, Oral, 4 Times Daily      LINZESS 145 MCG capsule capsule  Generic drug:  linaclotide   145 mcg, Oral, 2 Times Daily      magnesium hydroxide 2400 MG/10ML suspension suspension  Commonly known as:  MILK OF MAGNESIA   10 mL, Oral, Daily PRN      magnesium oxide 400 (241.3 Mg) MG tablet tablet  Commonly known as:  MAGOX   400 mg, Oral, Daily      meloxicam 15 MG tablet  Commonly known as:  MOBIC   15 mg, Oral, Nightly      mupirocin 2 % ointment  Commonly known as:  BACTROBAN   Topical, 3 Times Daily PRN      nystatin [id number]  UNIT/GM powder  Commonly known as:  MYCOSTATIN   1 application, Topical, As Needed      omeprazole 40 MG capsule  Commonly known as:  priLOSEC   40 mg, Oral, Daily      ondansetron 4 MG tablet  Commonly known as:  ZOFRAN   4 mg, Oral, Every 8 Hours PRN      oxyCODONE-acetaminophen 7.5-325 MG per tablet  Commonly known as:  PERCOCET   1 tablet, Oral, 3 Times Daily      phenylephrine-mineral oil-petrolatum 0.25-14-74.9 % ointment hemorrhoidal ointment   1 application, Rectal, 3 Times Daily PRN      ranitidine 150 MG capsule  Commonly known as:  ZANTAC   300 mg, Oral, Every Evening      spironolactone 25 MG tablet  Commonly known as:  ALDACTONE   25 mg, Oral, Daily             INSTRUCTIONS:  Activity:   Activity Instructions     Activity as Tolerated           Diet:   Diet Instructions     Advance Diet As Tolerated           Special instructions: Patient instructed to call MD or return to ED with worsening shortness of breath, chest pain, fever greater than 100.4 degrees F or any other medical concerns..      FOLLOW UP:   Follow-up Information     Yobany Barr MD. Schedule an appointment as soon as possible for a visit in 1 week(s).    Specialty:  Family Medicine  Why:  SEPTEMBER 17, 2018 AT 2:30 P.M.  Contact information:  [address] [facility] [address] [phone]             Blaine Perales PA-C. Schedule an appointment as soon as possible for a visit in 2 week(s).    Specialty:  Gastroenterology  Contact information:  [address] [place] [address] [address]  [facility] [address] [phone]                 PENDING TEST RESULTS AT DISCHARGE      Time: Discharge 35 min          This document has been electronically signed by Josh Toth MD on September 5, 2018 11:36 AM

## 2018-09-06 ENCOUNTER — READMISSION MANAGEMENT (OUTPATIENT)
Dept: CALL CENTER | Facility: HOSPITAL | Age: 58
End: 2018-09-06

## 2018-09-06 ENCOUNTER — TELEPHONE (OUTPATIENT)
Dept: PHYSICAL THERAPY | Facility: HOSPITAL | Age: 58
End: 2018-09-06

## 2018-09-06 ENCOUNTER — APPOINTMENT (OUTPATIENT)
Dept: PHYSICAL THERAPY | Facility: HOSPITAL | Age: 58
End: 2018-09-06

## 2018-09-06 NOTE — OUTREACH NOTE
Prep Survey      Responses   Facility patient discharged from?  [facility]   Is patient eligible?  Yes   Discharge diagnosis  SBO   Does the patient have one of the following disease processes/diagnoses(primary or secondary)?  Other   Does the patient have Home health ordered?  No   Is there a DME ordered?  No   Prep survey completed?  Yes          Dea Montenegro RN

## 2018-09-06 NOTE — PAYOR COMM NOTE
"Amira Coleman (57 y.o. Female)     Date of Birth Social Security Number Address Home Phone MRN    1960  [id number] [facility] [place] [address] [phone] [id number]    Bahai Marital Status          Zoroastrian        Admission Date Admission Type Admitting Provider Attending Provider Department, Room/Bed    8/31/18 Emergency Ghanshyam Gomez MD  [address], [facility]/1    Discharge Date Discharge Disposition Discharge Destination        9/5/2018 Home or Self Care              Attending Provider:  (none)   Allergies:  Other, Corticosteroids, Kenalog  [Triamcinolone Acetonide], Sulfa Antibiotics    Isolation:  None   Infection:  None   Code Status:  Prior    Ht:  162.6 cm (64\")   Wt:  102 kg (225 lb)    Admission Cmt:  None   Principal Problem:  SBO (small bowel obstruction) [K56.609]                 Active Insurance as of 8/31/2018     Primary Coverage     Payor Plan Insurance Group Employer/Plan Group    [employer] MED[employer] [id number]     Payor Plan Address Payor Plan Phone Number Effective From Effective To    PO  [phone] 1/1/2014     [facility] [id number]       Subscriber Name Subscriber Birth Date Member ID       AMIRA COLEMAN 1960 [id number]                 Emergency Contacts      (Rel.) Home Phone Work Phone Mobile Phone    Ross Coleman (Spouse) [phone]            Discharge Order     Start     Ordered    09/05/18 1136  Discharge patient  Once     Expected Discharge Date:  09/05/18    Discharge Disposition:  Home or Self Care    Physician of Record for Attribution - Please select from Treatment Team:  CAMILA MARTINEZ [[id number]]    Review needed by CMO to determine Physician of Record:  No       Question Answer Comment   Physician of Record for Attribution - Please select from Treatment Team CAMILA MARTINEZ    Review needed by CMO to determine Physician of Record No        09/05/18 1135    "

## 2018-09-11 ENCOUNTER — READMISSION MANAGEMENT (OUTPATIENT)
Dept: CALL CENTER | Facility: HOSPITAL | Age: 58
End: 2018-09-11

## 2018-09-11 NOTE — OUTREACH NOTE
Medical Week 1 Survey      Responses   Facility patient discharged from?  [place]   Does the patient have one of the following disease processes/diagnoses(primary or secondary)?  Other   Is there a successful TCM telephone encounter documented?  No   Week 1 attempt successful?  Yes   Call start time  1450   Call end time  1455   Meds reviewed with patient/caregiver?  Yes   Is the patient having any side effects they believe may be caused by any medication additions or changes?  No   Does the patient have all medications ordered at discharge?  N/A   Is the patient taking all medications as directed (includes completed medication regime)?  Yes   Does the patient have a primary care provider?   Yes   Does the patient have an appointment with their PCP within 7 days of discharge?  Greater than 7 days   Comments regarding PCP  Dr David   What is preventing the patient from scheduling follow up appointments within 7 days of discharge?  Earlier appointment not available   Nursing Interventions  Verified appointment date/time/provider   Has the patient kept scheduled appointments due by today?  N/A   Has home health visited the patient within 72 hours of discharge?  N/A   Psychosocial issues?  No   Did the patient receive a copy of their discharge instructions?  Yes   Nursing interventions  Reviewed instructions with patient   What is the patient's perception of their health status since discharge?  Improving   Is the patient/caregiver able to teach back signs and symptoms related to disease process for when to call PCP?  Yes   Is the patient/caregiver able to teach back signs and symptoms related to disease process for when to call 911?  Yes   Is the patient/caregiver able to teach back the hierarchy of who to call/visit for symptoms/problems? PCP, Specialist, Home health nurse, Urgent Care, ED, 911  Yes   Week 1 call completed?  Yes   Wrap up additional comments  States is feeling better-denies any problems with  bowels currently. States appetite has improved.          Janie Stephen RN

## 2018-09-19 ENCOUNTER — OFFICE VISIT (OUTPATIENT)
Dept: GASTROENTEROLOGY | Facility: CLINIC | Age: 58
End: 2018-09-19

## 2018-09-19 VITALS
BODY MASS INDEX: 37.01 KG/M2 | SYSTOLIC BLOOD PRESSURE: 118 MMHG | WEIGHT: 216.8 LBS | DIASTOLIC BLOOD PRESSURE: 82 MMHG | HEIGHT: 64 IN | HEART RATE: 81 BPM

## 2018-09-19 DIAGNOSIS — R11.0 NAUSEA: ICD-10-CM

## 2018-09-19 DIAGNOSIS — R10.84 GENERALIZED ABDOMINAL PAIN: ICD-10-CM

## 2018-09-19 DIAGNOSIS — R74.8 ELEVATED LIVER ENZYMES: ICD-10-CM

## 2018-09-19 DIAGNOSIS — K74.60 CIRRHOSIS OF LIVER WITHOUT ASCITES, UNSPECIFIED HEPATIC CIRRHOSIS TYPE (HCC): ICD-10-CM

## 2018-09-19 DIAGNOSIS — K75.81 NASH (NONALCOHOLIC STEATOHEPATITIS): Primary | ICD-10-CM

## 2018-09-19 DIAGNOSIS — Z87.19 HISTORY OF SMALL BOWEL OBSTRUCTION: ICD-10-CM

## 2018-09-19 PROCEDURE — 99214 OFFICE O/P EST MOD 30 MIN: CPT | Performed by: PHYSICIAN ASSISTANT

## 2018-09-19 RX ORDER — POLYETHYLENE GLYCOL 3350 17 G/17G
17 POWDER, FOR SOLUTION ORAL DAILY
Status: ON HOLD | COMMUNITY
End: 2019-02-13

## 2018-09-19 RX ORDER — DEXTROSE AND SODIUM CHLORIDE 5; .45 G/100ML; G/100ML
30 INJECTION, SOLUTION INTRAVENOUS CONTINUOUS PRN
Status: CANCELLED | OUTPATIENT
Start: 2018-10-17

## 2018-09-19 RX ORDER — SODIUM, POTASSIUM,MAG SULFATES 17.5-3.13G
1 SOLUTION, RECONSTITUTED, ORAL ORAL ONCE
Qty: 1 BOTTLE | Refills: 0 | Status: SHIPPED | OUTPATIENT
Start: 2018-09-19 | End: 2018-09-20

## 2018-09-19 NOTE — PATIENT INSTRUCTIONS
Constipation, Adult  Constipation is when a person has fewer bowel movements in a week than normal, has difficulty having a bowel movement, or has stools that are dry, hard, or larger than normal. Constipation may be caused by an underlying condition. It may become worse with age if a person takes certain medicines and does not take in enough fluids.  Follow these instructions at home:  Eating and drinking    · Eat foods that have a lot of fiber, such as fresh fruits and vegetables, whole grains, and beans.  · Limit foods that are high in fat, low in fiber, or overly processed, such as french fries, hamburgers, cookies, candies, and soda.  · Drink enough fluid to keep your urine clear or pale yellow.  General instructions  · Exercise regularly or as told by your health care provider.  · Go to the restroom when you have the urge to go. Do not hold it in.  · Take over-the-counter and prescription medicines only as told by your health care provider. These include any fiber supplements.  · Practice pelvic floor retraining exercises, such as deep breathing while relaxing the lower abdomen and pelvic floor relaxation during bowel movements.  · Watch your condition for any changes.  · Keep all follow-up visits as told by your health care provider. This is important.  Contact a health care provider if:  · You have pain that gets worse.  · You have a fever.  · You do not have a bowel movement after 4 days.  · You vomit.  · You are not hungry.  · You lose weight.  · You are bleeding from the anus.  · You have thin, pencil-like stools.  Get help right away if:  · You have a fever and your symptoms suddenly get worse.  · You leak stool or have blood in your stool.  · Your abdomen is bloated.  · You have severe pain in your abdomen.  · You feel dizzy or you faint.  This information is not intended to replace advice given to you by your health care provider. Make sure you discuss any questions you have with your health care  provider.  Document Released: 09/15/2005 Document Revised: 07/07/2017 Document Reviewed: 06/07/2017  [employer] Patient Education © 2018 [employer]

## 2018-09-19 NOTE — PROGRESS NOTES
Chief Complaint   Patient presents with   • Heartburn   • Abdominal Pain   • Constipation   • Judge       ENDO PROCEDURE ORDERED: EGD eval varices, COLON, hx SBO, cirrhosis, anemia    Subjective    Amira Shannon is a 57 y.o. female. she is here today for follow-up.    History of Present Illness    The patient is seen on a recheck of her GERD, abdominal pain, constipation, and JUDGE/cirrhosis F4/S2/N2. Last seen 08/14/2018. Patient has been having problems with abdominal pain and bowel obstruction. She states she had a resection about 4 years ago. Her last bowel obstruction was 2 years ago but did not require surgery. She was recently hospitalized 08/31/2018 to 09/05/2018 with small bowel obstruction and was evaluated by Dr. Cho and Dr. Aguila. She had an NG tube placed. Barium enema showed moderate stool on 09/02/2018. Studies on 09/01/2018: Abdominal ultrasound was grossly negative with fibrotic fatty liver, postcholecystectomy, antegrade flow in the portal vein, and splenomegaly. CT scan abdomen and pelvis with contrast showed a fatty liver, postcholecystectomy, splenomegaly, cirrhotic liver, and dilated fluid-filled mid-small bowel with small bowel obstruction felt secondary to multiple adhesions. Acute abdominal series showed a small bowel obstruction. CMP showed a glucose 113, creatinine 1.03, calcium 8.3, AST 49, otherwise normal. Normal urinalysis and lipase. CBC showed 118,000 platelets. BMP on the day of discharge showed glucose 103, otherwise normal. CBC showed 95,000 platelets.     Patient currently denies abdominal pain. She is having some nausea, sometimes fairly severe at times. Not clearly related to what she eats. She is on Prilosec and Zantac for chronic heartburn. Bowels are moving with Linzess, milk of magnesia, and MiraLax. Her bowels are without blood. Weight is up 3.8 pounds since last visit. Last EGD/colonoscopy on 07/01/2013 showed gastritis with a poor prep. She does have family  "history of colon cancer.     A/P: Patient with incisional hernia with fullness in the left upper quadrant, possibly related to her previous bowel obstructions felt secondary to adhesions. She does have cirrhosis, at risk for varices with some early portal hypertensive changes, recommend EGD to evaluate for varices as well as her nausea. Will schedule for colonoscopy as she is overdue with family history. She states she did have a recent left carpal tunnel release that has not healed well. Will see her in followup after the above, further pending clinical course and the results of the above.        The following portions of the patient's history were reviewed and updated as appropriate:   Past Medical History:   Diagnosis Date   • Acid reflux    • Altered bowel function    • Arthropathy of lumbar facet joint    • Bleeding disorder (CMS/HCC)    • C. difficile diarrhea 2015   • Constipation    • Corns and callus    • Depression    • Disease related peripheral neuropathy    • Epigastric pain    • Fatty liver    • Hammer toe    • Headache    • Hiatal hernia    • Hyperlipidemia    • Knee pain    • Localized, primary osteoarthritis of the ankle and foot     Localized, primary osteoarthritis of the ankle and/or foot   • Mendoza's metatarsalgia     Mendoza's metatarsalgia - 2nd interspace on right   • Nausea and vomiting    • Neuralgia and neuritis     Neuralgia, neuritis, and radiculitis, unspecified   • Neuropathy    • Obstructive sleep apnea     Obstructive sleep apnea (adult) (pediatric)    • CHAI on CPAP     \"C-Pap at night  (unconfirmed)\"   • Osteoarthritis    • Pain in foot     Pain in unspecified foot - sees a podiatrist   • Pain in joint, ankle and foot     Joint pain in ankle and foot      • Pain radiating to back     Pain radiating to lumbar region of back   • Plantar fasciitis    • PONV (postoperative nausea and vomiting)    • Restless leg syndrome    • Secondary hypertension     Secondary hypertension, unspecified "   • Sinusitis    • Sleep apnea    • Tongue anomaly     lesion     Past Surgical History:   Procedure Laterality Date   • CARPAL TUNNEL RELEASE Left 2018    Procedure: CARPAL TUNNEL RELEASE - left;  Surgeon: Justus Lewis MD;  Location: [facility];  Service: Orthopedics   •  SECTION     • CHOLECYSTECTOMY     • COLONOSCOPY  2013   • DIRECT LARYNGOSCOPY, ESOPHAGOSCOPY, BRONCHOSCOPY N/A 2017    Procedure: DIRECT LARYNGOSCOPY AND;  Surgeon: Live Bolton MD;  Location: [facility];  Service:    • ENDOSCOPY  2013    Colon endoscopy 98018 (1) - Internal & external hemorrhoids found. Stool found.   • ENDOSCOPY  2013    EGD w/ tube 16402 (1) - Normal esophagus. Gastritis in stomach. Biopsy taken. Normal dudoenum. Biopsy taken.   • FOOT SURGERY  2013    Foot/toes surgery procedure (1) - Arthroplasty of toes 4 and 5 of right foot.   • HERNIA REPAIR     • HERNIA REPAIR      hital   • HYSTERECTOMY     • LIVER BIOPSY     • NERVE BLOCK  2016    Injection for nerve block (1) - Lumbar medial branch block.   • OTHER SURGICAL HISTORY  2012    Inj(s) Tend-Sheath, Ligament, Single  (1) - [facility]    • OTHER SURGICAL HISTORY  2013    Small Joint Injection/Aspiration  (2) - [facility]ICK[facility] ([facility])    • OTHER SURGICAL HISTORY      bowel obstruction x2   • OTHER SURGICAL HISTORY      gland removed from neck   • SUBLINGUAL SALIVARY CYST EXCISION N/A 2017    Procedure: EXCISION OF LEFT  TONGUE LESION WITH CLOSURE;  Surgeon: Live Bolton MD;  Location: [facility];  Service:    • TUBAL ABDOMINAL LIGATION     • UPPER GASTROINTESTINAL ENDOSCOPY  2013     Family History   Problem Relation Age of Onset   • Cancer Other    • Diabetes Other    • Heart disease Other    • Hypertension Mother    • Cancer Mother    • Diabetes Mother    • Hypertension Father    • Heart attack Father    • Cancer Father    • Heart disease Father    •  Thyroid disease Maternal Aunt      OB History     No data available        Allergies   Allergen Reactions   • Other      Pt states that taking steroids either in pill or injection form make her have blisters in her mouth and she feels like she is on fire on the inside/ has hx of c diff and possible MRSA     • Corticosteroids Rash   • Kenalog  [Triamcinolone Acetonide] Rash   • Sulfa Antibiotics Rash     Sulfa (Sulfonamide Antibiotics)     Social History     Social History   • Marital status:      Social History Main Topics   • Smoking status: Former Smoker     Packs/day: 2.00     Years: 15.00     Types: Cigarettes     Quit date: 2000   • Smokeless tobacco: Never Used   • Alcohol use No   • Drug use: No   • Sexual activity: Defer      Comment: Marital Status:      Other Topics Concern   • Not on file       Current Outpatient Prescriptions:   •  cetirizine (zyrTEC) 10 MG tablet, Take 1 tablet by mouth Daily As Needed for Allergies., Disp: 30 tablet, Rfl: 11  •  chlorthalidone (HYGROTEN) 50 MG tablet, TAKE 1 TABLET BY MOUTH ONCE DAILY, Disp: 90 tablet, Rfl: 3  •  clotrimazole (MYCELEX) 10 MG dimitry, Take 1 tablet by mouth 3 (Three) Times a Day., Disp: 40 tablet, Rfl: 0  •  cyclobenzaprine (FLEXERIL) 10 MG tablet, Take 10 mg by mouth 3 (Three) Times a Day As Needed for Muscle Spasms., Disp: , Rfl:   •  DULoxetine (CYMBALTA) 30 MG capsule, Take 30 mg by mouth Daily., Disp: , Rfl:   •  gabapentin (NEURONTIN) 800 MG tablet, Take 800 mg by mouth 4 (Four) Times a Day., Disp: , Rfl:   •  linaclotide (LINZESS) 145 MCG capsule capsule, Take 145 mcg by mouth 2 (Two) Times a Day., Disp: , Rfl:   •  magnesium oxide (MAGOX) 400 (241.3 MG) MG tablet tablet, Take 400 mg by mouth Daily., Disp: , Rfl:   •  meloxicam (MOBIC) 15 MG tablet, Take 15 mg by mouth Every Night., Disp: , Rfl:   •  mupirocin (BACTROBAN) 2 % ointment, Apply  topically to the appropriate area as directed 3 (Three) Times a Day As Needed., Disp: , Rfl:  "  •  nystatin (MYCOSTATIN) 761199 UNIT/GM powder, Apply 1 application topically As Needed., Disp: , Rfl:   •  omeprazole (priLOSEC) 40 MG capsule, Take 40 mg by mouth Daily., Disp: , Rfl:   •  ondansetron (ZOFRAN) 4 MG tablet, Take 4 mg by mouth Every 8 (Eight) Hours As Needed., Disp: , Rfl:   •  oxyCODONE-acetaminophen (PERCOCET) 7.5-325 MG per tablet, Take 1 tablet by mouth 3 (Three) Times a Day., Disp: , Rfl:   •  phenylephrine-mineral oil-petrolatum 0.25-14-74.9 % ointment hemorrhoidal ointment, Insert 1 application into the rectum 3 (Three) Times a Day As Needed (rectal pain)., Disp: 28 g, Rfl: 1  •  polyethylene glycol (MIRALAX) powder, Take 17 g by mouth Daily., Disp: , Rfl:   •  ranitidine (ZANTAC) 150 MG capsule, Take 2 capsules by mouth Every Evening., Disp: 60 capsule, Rfl: 3  •  spironolactone (ALDACTONE) 25 MG tablet, Take 25 mg by mouth Daily., Disp: , Rfl:   •  polyethylene glycol (GoLYTELY) 236 g solution, As directed per instruction sheet for colonoscopy, Disp: 4000 mL, Rfl: 0  Review of Systems  Review of Systems       Objective    /82 (BP Location: Left arm)   Pulse 81   Ht 162.6 cm (64\")   Wt 98.3 kg (216 lb 12.8 oz)   LMP  (LMP Unknown)   BMI 37.21 kg/m²   Physical Exam   Constitutional: She is oriented to person, place, and time. She appears well-developed and well-nourished. No distress.   HENT:   Head: Normocephalic and atraumatic.   Eyes: Pupils are equal, round, and reactive to light. EOM are normal.   Neck: Normal range of motion.   Cardiovascular: Normal rate, regular rhythm and normal heart sounds.    Pulmonary/Chest: Effort normal and breath sounds normal.   Abdominal: Soft. Bowel sounds are normal. She exhibits no shifting dullness, no distension, no abdominal bruit, no ascites and no mass. There is no hepatosplenomegaly. There is tenderness. There is no rigidity, no rebound, no guarding and no CVA tenderness. No hernia. Hernia confirmed negative in the ventral area. "   Obese, mild diffuse   Musculoskeletal: Normal range of motion.   Neurological: She is alert and oriented to person, place, and time.   Skin: Skin is warm and dry.   Psychiatric: She has a normal mood and affect. Her behavior is normal. Judgment and thought content normal.   Nursing note and vitals reviewed.    Assessment/Plan      1. MUSA (nonalcoholic steatohepatitis)    2. Cirrhosis of liver without ascites, unspecified hepatic cirrhosis type (CMS/HCC)    3. Generalized abdominal pain    4. Elevated liver enzymes    5. Nausea    6. History of small bowel obstruction    .   Amira was seen today for heartburn, abdominal pain, constipation and musa.    Diagnoses and all orders for this visit:    MUSA (nonalcoholic steatohepatitis)  -     Case Request; Standing  -     dextrose 5 % and sodium chloride 0.45 % infusion; Infuse 30 mL/hr into a venous catheter Continuous As Needed (Start Prior to Procedure).  -     Case Request    Cirrhosis of liver without ascites, unspecified hepatic cirrhosis type (CMS/HCC)  -     Case Request; Standing  -     dextrose 5 % and sodium chloride 0.45 % infusion; Infuse 30 mL/hr into a venous catheter Continuous As Needed (Start Prior to Procedure).  -     Case Request    Generalized abdominal pain  -     Case Request; Standing  -     dextrose 5 % and sodium chloride 0.45 % infusion; Infuse 30 mL/hr into a venous catheter Continuous As Needed (Start Prior to Procedure).  -     Case Request    Elevated liver enzymes  -     Case Request; Standing  -     dextrose 5 % and sodium chloride 0.45 % infusion; Infuse 30 mL/hr into a venous catheter Continuous As Needed (Start Prior to Procedure).  -     Case Request    Nausea  -     Case Request; Standing  -     dextrose 5 % and sodium chloride 0.45 % infusion; Infuse 30 mL/hr into a venous catheter Continuous As Needed (Start Prior to Procedure).  -     Case Request    History of small bowel obstruction  -     Case Request; Standing  -      dextrose 5 % and sodium chloride 0.45 % infusion; Infuse 30 mL/hr into a venous catheter Continuous As Needed (Start Prior to Procedure).  -     Case Request    Other orders  -     Follow Anesthesia Guidelines / Standing Orders; Future  -     Obtain Informed Consent; Standing  -     POC Glucose Once; Standing  -     Discontinue: sodium-potassium-magnesium sulfates (SUPREP BOWEL PREP KIT) 17.5-3.13-1.6 GM/180ML solution oral solution; Take 1 bottle by mouth 1 (One) Time for 1 dose. Take as per instruction sheet for colonoscopy prep.  -     polyethylene glycol (GoLYTELY) 236 g solution; As directed per instruction sheet for colonoscopy        Orders placed during this encounter include:  Orders Placed This Encounter   Procedures   • Follow Anesthesia Guidelines / Standing Orders     Standing Status:   Future       Medications prescribed:  New Medications Ordered This Visit   Medications   • polyethylene glycol (GoLYTELY) 236 g solution     Sig: As directed per instruction sheet for colonoscopy     Dispense:  4000 mL     Refill:  0     Discontinued Medications       Reason for Discontinue    magnesium hydroxide (MILK OF MAGNESIA) 2400 MG/10ML suspension suspension *Therapy completed        Requested Prescriptions     Signed Prescriptions Disp Refills   • polyethylene glycol (GoLYTELY) 236 g solution 4000 mL 0     Sig: As directed per instruction sheet for colonoscopy       Review and/or summary of lab tests, radiology, procedures, medications. Review and summary of old records and obtaining of history. The risks and benefits of my recommendations, as well as other treatment options were discussed with the patient today. Questions were answered.    Follow-up: Return if symptoms worsen or fail to improve, for After the above.     ESOPHAGOGASTRODUODENOSCOPY--eval varices (N/A), COLONOSCOPY (N/A)      This document has been electronically signed by Blaine Perales PA-C on September 21, 2018 12:14 PM      Results for  orders placed or performed during the hospital encounter of 08/31/18   Gold Top - SST   Result Value Ref Range    Extra Tube Hold for add-ons.    Green Top (Gel)   Result Value Ref Range    Extra Tube Hold for add-ons.    Green Top (Gel)   Result Value Ref Range    Extra Tube Hold for add-ons.    Scan Slide   Result Value Ref Range    RBC Morphology Normal Normal    WBC Morphology Normal Normal    Platelet Estimate Decreased Normal   Urinalysis With Microscopic If Indicated (No Culture) - Urine, Clean Catch   Result Value Ref Range    Color, UA Yellow Yellow, Straw, Dark Yellow, Mikala    Appearance, UA Clear Clear    pH, UA 6.0 5.0 - 9.0    Specific Gravity, UA 1.028 1.003 - 1.030    Glucose, UA Negative Negative    Ketones, UA Negative Negative    Bilirubin, UA Negative Negative    Blood, UA Negative Negative    Protein, UA Negative Negative    Leuk Esterase, UA Negative Negative    Nitrite, UA Negative Negative    Urobilinogen, UA 1.0 E.U./dL 0.2 - 1.0 E.U./dL   CBC Auto Differential   Result Value Ref Range    WBC 5.75 3.20 - 9.80 10*3/mm3    RBC 5.37 (H) 3.77 - 5.16 10*6/mm3    Hemoglobin 14.7 12.0 - 15.5 g/dL    Hematocrit 42.4 35.0 - 45.0 %    MCV 79.0 (L) 80.0 - 98.0 fL    MCH 27.4 26.5 - 34.0 pg    MCHC 34.7 31.4 - 36.0 g/dL    RDW 15.3 (H) 11.5 - 14.5 %    RDW-SD 44.2 36.4 - 46.3 fl    MPV  8.0 - 12.0 fL    Platelets 95 (L) 150 - 450 10*3/mm3    Neutrophil % 65.2 37.0 - 80.0 %    Lymphocyte % 26.1 10.0 - 50.0 %    Monocyte % 5.2 0.0 - 12.0 %    Eosinophil % 3.0 0.0 - 7.0 %    Basophil % 0.2 0.0 - 2.0 %    Immature Grans % 0.3 0.0 - 0.5 %    Neutrophils, Absolute 3.75 2.00 - 8.60 10*3/mm3    Lymphocytes, Absolute 1.50 0.60 - 4.20 10*3/mm3    Monocytes, Absolute 0.30 0.00 - 0.90 10*3/mm3    Eosinophils, Absolute 0.17 0.00 - 0.70 10*3/mm3    Basophils, Absolute 0.01 0.00 - 0.20 10*3/mm3    Immature Grans, Absolute 0.02 0.00 - 0.02 10*3/mm3    nRBC 0.9 (H) 0.0 - 0.0 /100 WBC   CBC Auto Differential   Result  Value Ref Range    WBC 7.42 3.20 - 9.80 10*3/mm3    RBC 5.32 (H) 3.77 - 5.16 10*6/mm3    Hemoglobin 14.2 12.0 - 15.5 g/dL    Hematocrit 41.7 35.0 - 45.0 %    MCV 78.4 (L) 80.0 - 98.0 fL    MCH 26.7 26.5 - 34.0 pg    MCHC 34.1 31.4 - 36.0 g/dL    RDW 15.0 (H) 11.5 - 14.5 %    RDW-SD 43.0 36.4 - 46.3 fl    MPV 11.1 8.0 - 12.0 fL    Platelets 103 (L) 150 - 450 10*3/mm3    Neutrophil % 67.5 37.0 - 80.0 %    Lymphocyte % 23.7 10.0 - 50.0 %    Monocyte % 6.3 0.0 - 12.0 %    Eosinophil % 1.9 0.0 - 7.0 %    Basophil % 0.3 0.0 - 2.0 %    Immature Grans % 0.3 0.0 - 0.5 %    Neutrophils, Absolute 5.01 2.00 - 8.60 10*3/mm3    Lymphocytes, Absolute 1.76 0.60 - 4.20 10*3/mm3    Monocytes, Absolute 0.47 0.00 - 0.90 10*3/mm3    Eosinophils, Absolute 0.14 0.00 - 0.70 10*3/mm3    Basophils, Absolute 0.02 0.00 - 0.20 10*3/mm3    Immature Grans, Absolute 0.02 0.00 - 0.02 10*3/mm3   CBC Auto Differential   Result Value Ref Range    WBC 7.96 3.20 - 9.80 10*3/mm3    RBC 5.58 (H) 3.77 - 5.16 10*6/mm3    Hemoglobin 15.1 12.0 - 15.5 g/dL    Hematocrit 44.0 35.0 - 45.0 %    MCV 78.9 (L) 80.0 - 98.0 fL    MCH 27.1 26.5 - 34.0 pg    MCHC 34.3 31.4 - 36.0 g/dL    RDW 15.2 (H) 11.5 - 14.5 %    RDW-SD 44.1 36.4 - 46.3 fl    MPV 10.8 8.0 - 12.0 fL    Platelets 100 (L) 150 - 450 10*3/mm3    Neutrophil % 65.4 37.0 - 80.0 %    Lymphocyte % 24.5 10.0 - 50.0 %    Monocyte % 7.0 0.0 - 12.0 %    Eosinophil % 2.4 0.0 - 7.0 %    Basophil % 0.3 0.0 - 2.0 %    Immature Grans % 0.4 0.0 - 0.5 %    Neutrophils, Absolute 5.21 2.00 - 8.60 10*3/mm3    Lymphocytes, Absolute 1.95 0.60 - 4.20 10*3/mm3    Monocytes, Absolute 0.56 0.00 - 0.90 10*3/mm3    Eosinophils, Absolute 0.19 0.00 - 0.70 10*3/mm3    Basophils, Absolute 0.02 0.00 - 0.20 10*3/mm3    Immature Grans, Absolute 0.03 (H) 0.00 - 0.02 10*3/mm3    nRBC 0.0 0.0 - 0.0 /100 WBC   CBC Auto Differential   Result Value Ref Range    WBC 6.24 3.20 - 9.80 10*3/mm3    RBC 5.32 (H) 3.77 - 5.16 10*6/mm3    Hemoglobin  14.2 12.0 - 15.5 g/dL    Hematocrit 42.7 35.0 - 45.0 %    MCV 80.3 80.0 - 98.0 fL    MCH 26.7 26.5 - 34.0 pg    MCHC 33.3 31.4 - 36.0 g/dL    RDW 15.3 (H) 11.5 - 14.5 %    RDW-SD 45.1 36.4 - 46.3 fl    MPV  8.0 - 12.0 fL    Platelets 91 (L) 150 - 450 10*3/mm3    Neutrophil % 68.3 37.0 - 80.0 %    Lymphocyte % 22.8 10.0 - 50.0 %    Monocyte % 6.3 0.0 - 12.0 %    Eosinophil % 2.1 0.0 - 7.0 %    Basophil % 0.3 0.0 - 2.0 %    Immature Grans % 0.2 0.0 - 0.5 %    Neutrophils, Absolute 4.27 2.00 - 8.60 10*3/mm3    Lymphocytes, Absolute 1.42 0.60 - 4.20 10*3/mm3    Monocytes, Absolute 0.39 0.00 - 0.90 10*3/mm3    Eosinophils, Absolute 0.13 0.00 - 0.70 10*3/mm3    Basophils, Absolute 0.02 0.00 - 0.20 10*3/mm3    Immature Grans, Absolute 0.01 0.00 - 0.02 10*3/mm3    nRBC 0.0 0.0 - 0.0 /100 WBC     *Note: Due to a large number of results and/or encounters for the requested time period, some results have not been displayed. A complete set of results can be found in Results Review.       Some portions of this note have been dictated using voice recognition software and may contain errors and/or omissions.

## 2018-09-20 ENCOUNTER — TELEPHONE (OUTPATIENT)
Dept: GASTROENTEROLOGY | Facility: CLINIC | Age: 58
End: 2018-09-20

## 2018-09-20 PROBLEM — R10.84 GENERALIZED ABDOMINAL PAIN: Status: ACTIVE | Noted: 2018-09-20

## 2018-09-20 PROBLEM — Z87.19 HISTORY OF SMALL BOWEL OBSTRUCTION: Status: ACTIVE | Noted: 2018-09-20

## 2018-09-20 PROBLEM — R11.0 NAUSEA: Status: ACTIVE | Noted: 2018-09-20

## 2018-09-20 PROBLEM — R74.8 ELEVATED LIVER ENZYMES: Status: ACTIVE | Noted: 2018-09-20

## 2018-09-20 PROBLEM — K74.60 CIRRHOSIS OF LIVER WITHOUT ASCITES: Status: ACTIVE | Noted: 2018-09-20

## 2018-09-20 NOTE — TELEPHONE ENCOUNTER
----- Message from Rosie Jack MA sent at 9/20/2018  8:58 AM CDT -----      ----- Message -----  From: Suzanne Madrid  Sent: 9/20/2018   8:55 AM  To: Rosie Jack MA    1400 arrival October 17th     ----- Message -----  From: Rosie Jack MA  Sent: 9/19/2018   5:25 PM  To: Suzanne Madrid    Please schedule

## 2018-09-20 NOTE — TELEPHONE ENCOUNTER
Patient has been contacted and made aware of her appointment date and time for her endoscopy procedure. Prep instructions were also given to the patient. Patient voiced understanding.

## 2018-09-21 ENCOUNTER — READMISSION MANAGEMENT (OUTPATIENT)
Dept: CALL CENTER | Facility: HOSPITAL | Age: 58
End: 2018-09-21

## 2018-09-21 NOTE — OUTREACH NOTE
Medical Week 2 Survey      Responses   Facility patient discharged from?  [facility]   Does the patient have one of the following disease processes/diagnoses(primary or secondary)?  Other   Week 2 attempt successful?  No   Unsuccessful attempts  Attempt 1          Reina Hughes RN

## 2018-09-24 ENCOUNTER — READMISSION MANAGEMENT (OUTPATIENT)
Dept: CALL CENTER | Facility: HOSPITAL | Age: 58
End: 2018-09-24

## 2018-09-24 RX ORDER — SPIRONOLACTONE 25 MG/1
25 TABLET ORAL DAILY
Qty: 30 TABLET | Refills: 5 | Status: SHIPPED | OUTPATIENT
Start: 2018-09-24 | End: 2019-06-11 | Stop reason: SDUPTHER

## 2018-09-24 NOTE — OUTREACH NOTE
Medical Week 2 Survey      Responses   Facility patient discharged from?  [facility]   Does the patient have one of the following disease processes/diagnoses(primary or secondary)?  Other   Week 2 attempt successful?  Yes   Call start time  1256   Discharge diagnosis  SBO   Call end time  1301   List who call center can speak with     Meds reviewed with patient/caregiver?  Yes   Is the patient taking all medications as directed (includes completed medication regime)?  Yes   Does the patient have an appointment with their PCP within 7 days of discharge?  Greater than 7 days   Comments  Pt states she has appointment for a scope in October.   Nursing interventions  Reviewed instructions with patient, Educated on MyChart   What is the patient's perception of their health status since discharge?  Same   Week 2 Call Completed?  Yes   Wrap up additional comments  Pt 's appetite has improved but still has some discomfort after meals.          Ladan Cho, RN

## 2018-10-02 ENCOUNTER — READMISSION MANAGEMENT (OUTPATIENT)
Dept: CALL CENTER | Facility: HOSPITAL | Age: 58
End: 2018-10-02

## 2018-10-02 NOTE — OUTREACH NOTE
Medical Week 3 Survey      Responses   Facility patient discharged from?  [facility]   Does the patient have one of the following disease processes/diagnoses(primary or secondary)?  Other   Week 3 attempt successful?  Yes   Call start time  1002   Call end time  1007   Meds reviewed with patient/caregiver?  Yes   Is the patient having any side effects they believe may be caused by any medication additions or changes?  No   Does the patient have all medications ordered at discharge?  N/A   Is the patient taking all medications as directed (includes completed medication regime)?  Yes   Comments regarding appointments  Dr Leon on 10/16/18, colonoscopy on 10/17/18   Does the patient have a primary care provider?   Yes   Has the patient kept scheduled appointments due by today?  Yes   Has home health visited the patient within 72 hours of discharge?  N/A   Psychosocial issues?  No   Did the patient receive a copy of their discharge instructions?  Yes   What is the patient's perception of their health status since discharge?  Same   Is the patient/caregiver able to teach back signs and symptoms related to disease process for when to call PCP?  Yes   Is the patient/caregiver able to teach back signs and symptoms related to disease process for when to call 911?  Yes   Is the patient/caregiver able to teach back the hierarchy of who to call/visit for symptoms/problems? PCP, Specialist, Home health nurse, Urgent Care, ED, 911  Yes   Week 3 Call Completed?  Yes   Wrap up additional comments  States feeling about the same-continues with occasional nausea. States bowels move normally at times with occasional difficulty. [facility] has colonoscopy on 10/17/18.          Janie Stephen RN

## 2018-10-09 ENCOUNTER — READMISSION MANAGEMENT (OUTPATIENT)
Dept: CALL CENTER | Facility: HOSPITAL | Age: 58
End: 2018-10-09

## 2018-10-09 NOTE — OUTREACH NOTE
Medical Week 4 Survey      Responses   Facility patient discharged from?  [facility]   Does the patient have one of the following disease processes/diagnoses(primary or secondary)?  Other   Week 4 attempt successful?  Yes   Call start time  1454   Call end time  1459   Discharge diagnosis  SBO   List who call center can speak with     Medication alerts for this patient  dx with UTI today and abx called in   Meds reviewed with patient/caregiver?  Yes   Is the patient having any side effects they believe may be caused by any medication additions or changes?  No   Is the patient taking all medications as directed (includes completed medication regime)?  Yes   Has the patient kept scheduled appointments due by today?  Yes   Is the patient still receiving Home Health Services?  N/A   Psychosocial issues?  No   What is the patient's perception of their health status since discharge?  New symptoms unrelated to diagnosis [dx with UTI and started on abx]   Is the patient/caregiver able to teach back signs and symptoms related to disease process for when to call PCP?  Yes   Is the patient/caregiver able to teach back signs and symptoms related to disease process for when to call 911?  Yes   Is the patient/caregiver able to teach back the hierarchy of who to call/visit for symptoms/problems? PCP, Specialist, Home health nurse, Urgent Care, ED, 911  Yes   Week 4 Call Completed?  Yes   Would the patient like one additional call?  No   Graduated  Yes   Did the patient feel the follow up calls were helpful during their recovery period?  Yes   Was the number of calls appropriate?  Yes          Darlene Raman RN

## 2018-10-12 RX ORDER — CHLORTHALIDONE 50 MG/1
50 TABLET ORAL DAILY
COMMUNITY
End: 2019-01-07 | Stop reason: SDUPTHER

## 2018-10-17 ENCOUNTER — ANESTHESIA EVENT (OUTPATIENT)
Dept: GASTROENTEROLOGY | Facility: HOSPITAL | Age: 58
End: 2018-10-17

## 2018-10-17 ENCOUNTER — ANESTHESIA (OUTPATIENT)
Dept: GASTROENTEROLOGY | Facility: HOSPITAL | Age: 58
End: 2018-10-17

## 2018-10-17 ENCOUNTER — HOSPITAL ENCOUNTER (OUTPATIENT)
Facility: HOSPITAL | Age: 58
Setting detail: HOSPITAL OUTPATIENT SURGERY
Discharge: HOME | End: 2018-10-17
Attending: INTERNAL MEDICINE | Admitting: INTERNAL MEDICINE

## 2018-10-17 VITALS
HEIGHT: 64 IN | RESPIRATION RATE: 18 BRPM | TEMPERATURE: 96.6 F | OXYGEN SATURATION: 94 % | SYSTOLIC BLOOD PRESSURE: 131 MMHG | BODY MASS INDEX: 35.53 KG/M2 | WEIGHT: 208.11 LBS | DIASTOLIC BLOOD PRESSURE: 87 MMHG | HEART RATE: 85 BPM

## 2018-10-17 DIAGNOSIS — R10.84 GENERALIZED ABDOMINAL PAIN: ICD-10-CM

## 2018-10-17 DIAGNOSIS — K74.60 CIRRHOSIS OF LIVER WITHOUT ASCITES, UNSPECIFIED HEPATIC CIRRHOSIS TYPE (HCC): ICD-10-CM

## 2018-10-17 DIAGNOSIS — R11.0 NAUSEA: ICD-10-CM

## 2018-10-17 DIAGNOSIS — R74.8 ELEVATED LIVER ENZYMES: ICD-10-CM

## 2018-10-17 DIAGNOSIS — Z87.19 HISTORY OF SMALL BOWEL OBSTRUCTION: ICD-10-CM

## 2018-10-17 DIAGNOSIS — K75.81 NASH (NONALCOHOLIC STEATOHEPATITIS): ICD-10-CM

## 2018-10-17 PROCEDURE — 88342 IMHCHEM/IMCYTCHM 1ST ANTB: CPT | Performed by: PATHOLOGY

## 2018-10-17 PROCEDURE — 88342 IMHCHEM/IMCYTCHM 1ST ANTB: CPT | Performed by: INTERNAL MEDICINE

## 2018-10-17 PROCEDURE — 43239 EGD BIOPSY SINGLE/MULTIPLE: CPT | Performed by: INTERNAL MEDICINE

## 2018-10-17 PROCEDURE — 88305 TISSUE EXAM BY PATHOLOGIST: CPT | Performed by: INTERNAL MEDICINE

## 2018-10-17 PROCEDURE — 88305 TISSUE EXAM BY PATHOLOGIST: CPT | Performed by: PATHOLOGY

## 2018-10-17 PROCEDURE — 25010000002 PROPOFOL 10 MG/ML EMULSION: Performed by: NURSE ANESTHETIST, CERTIFIED REGISTERED

## 2018-10-17 PROCEDURE — 45380 COLONOSCOPY AND BIOPSY: CPT | Performed by: INTERNAL MEDICINE

## 2018-10-17 RX ORDER — LIDOCAINE HYDROCHLORIDE 10 MG/ML
INJECTION, SOLUTION INFILTRATION; PERINEURAL AS NEEDED
Status: DISCONTINUED | OUTPATIENT
Start: 2018-10-17 | End: 2018-10-17 | Stop reason: SURG

## 2018-10-17 RX ORDER — PROPOFOL 10 MG/ML
VIAL (ML) INTRAVENOUS AS NEEDED
Status: DISCONTINUED | OUTPATIENT
Start: 2018-10-17 | End: 2018-10-17 | Stop reason: SURG

## 2018-10-17 RX ORDER — DEXTROSE AND SODIUM CHLORIDE 5; .45 G/100ML; G/100ML
30 INJECTION, SOLUTION INTRAVENOUS CONTINUOUS PRN
Status: DISCONTINUED | OUTPATIENT
Start: 2018-10-17 | End: 2018-10-17 | Stop reason: HOSPADM

## 2018-10-17 RX ADMIN — PROPOFOL 20 MG: 10 INJECTION, EMULSION INTRAVENOUS at 15:55

## 2018-10-17 RX ADMIN — PROPOFOL 20 MG: 10 INJECTION, EMULSION INTRAVENOUS at 16:04

## 2018-10-17 RX ADMIN — DEXTROSE AND SODIUM CHLORIDE 30 ML/HR: 5; 450 INJECTION, SOLUTION INTRAVENOUS at 14:43

## 2018-10-17 RX ADMIN — PROPOFOL 50 MG: 10 INJECTION, EMULSION INTRAVENOUS at 15:42

## 2018-10-17 RX ADMIN — PROPOFOL 100 MG: 10 INJECTION, EMULSION INTRAVENOUS at 15:38

## 2018-10-17 RX ADMIN — PROPOFOL 50 MG: 10 INJECTION, EMULSION INTRAVENOUS at 15:45

## 2018-10-17 RX ADMIN — LIDOCAINE HYDROCHLORIDE 80 MG: 10 INJECTION, SOLUTION INFILTRATION; PERINEURAL at 15:38

## 2018-10-17 NOTE — H&P (VIEW-ONLY)
Chief Complaint   Patient presents with   • Heartburn   • Abdominal Pain   • Constipation   • Judge       ENDO PROCEDURE ORDERED: EGD eval varices, COLON, hx SBO, cirrhosis, anemia    Subjective    Amira Shannon is a 57 y.o. female. she is here today for follow-up.    History of Present Illness    The patient is seen on a recheck of her GERD, abdominal pain, constipation, and JUDGE/cirrhosis F4/S2/N2. Last seen 08/14/2018. Patient has been having problems with abdominal pain and bowel obstruction. She states she had a resection about 4 years ago. Her last bowel obstruction was 2 years ago but did not require surgery. She was recently hospitalized 08/31/2018 to 09/05/2018 with small bowel obstruction and was evaluated by Dr. Cho and Dr. Aguila. She had an NG tube placed. Barium enema showed moderate stool on 09/02/2018. Studies on 09/01/2018: Abdominal ultrasound was grossly negative with fibrotic fatty liver, postcholecystectomy, antegrade flow in the portal vein, and splenomegaly. CT scan abdomen and pelvis with contrast showed a fatty liver, postcholecystectomy, splenomegaly, cirrhotic liver, and dilated fluid-filled mid-small bowel with small bowel obstruction felt secondary to multiple adhesions. Acute abdominal series showed a small bowel obstruction. CMP showed a glucose 113, creatinine 1.03, calcium 8.3, AST 49, otherwise normal. Normal urinalysis and lipase. CBC showed 118,000 platelets. BMP on the day of discharge showed glucose 103, otherwise normal. CBC showed 95,000 platelets.     Patient currently denies abdominal pain. She is having some nausea, sometimes fairly severe at times. Not clearly related to what she eats. She is on Prilosec and Zantac for chronic heartburn. Bowels are moving with Linzess, milk of magnesia, and MiraLax. Her bowels are without blood. Weight is up 3.8 pounds since last visit. Last EGD/colonoscopy on 07/01/2013 showed gastritis with a poor prep. She does have family  "history of colon cancer.     A/P: Patient with incisional hernia with fullness in the left upper quadrant, possibly related to her previous bowel obstructions felt secondary to adhesions. She does have cirrhosis, at risk for varices with some early portal hypertensive changes, recommend EGD to evaluate for varices as well as her nausea. Will schedule for colonoscopy as she is overdue with family history. She states she did have a recent left carpal tunnel release that has not healed well. Will see her in followup after the above, further pending clinical course and the results of the above.        The following portions of the patient's history were reviewed and updated as appropriate:   Past Medical History:   Diagnosis Date   • Acid reflux    • Altered bowel function    • Arthropathy of lumbar facet joint    • Bleeding disorder (CMS/HCC)    • C. difficile diarrhea 2015   • Constipation    • Corns and callus    • Depression    • Disease related peripheral neuropathy    • Epigastric pain    • Fatty liver    • Hammer toe    • Headache    • Hiatal hernia    • Hyperlipidemia    • Knee pain    • Localized, primary osteoarthritis of the ankle and foot     Localized, primary osteoarthritis of the ankle and/or foot   • Mendoza's metatarsalgia     Mendoza's metatarsalgia - 2nd interspace on right   • Nausea and vomiting    • Neuralgia and neuritis     Neuralgia, neuritis, and radiculitis, unspecified   • Neuropathy    • Obstructive sleep apnea     Obstructive sleep apnea (adult) (pediatric)    • CHAI on CPAP     \"C-Pap at night  (unconfirmed)\"   • Osteoarthritis    • Pain in foot     Pain in unspecified foot - sees a podiatrist   • Pain in joint, ankle and foot     Joint pain in ankle and foot      • Pain radiating to back     Pain radiating to lumbar region of back   • Plantar fasciitis    • PONV (postoperative nausea and vomiting)    • Restless leg syndrome    • Secondary hypertension     Secondary hypertension, unspecified "   • Sinusitis    • Sleep apnea    • Tongue anomaly     lesion     Past Surgical History:   Procedure Laterality Date   • CARPAL TUNNEL RELEASE Left 2018    Procedure: CARPAL TUNNEL RELEASE - left;  Surgeon: Justus Lewis MD;  Location: [facility];  Service: Orthopedics   •  SECTION     • CHOLECYSTECTOMY     • COLONOSCOPY  2013   • DIRECT LARYNGOSCOPY, ESOPHAGOSCOPY, BRONCHOSCOPY N/A 2017    Procedure: DIRECT LARYNGOSCOPY AND;  Surgeon: Live Bolton MD;  Location: [facility];  Service:    • ENDOSCOPY  2013    Colon endoscopy 99018 (1) - Internal & external hemorrhoids found. Stool found.   • ENDOSCOPY  2013    EGD w/ tube 88239 (1) - Normal esophagus. Gastritis in stomach. Biopsy taken. Normal dudoenum. Biopsy taken.   • FOOT SURGERY  2013    Foot/toes surgery procedure (1) - Arthroplasty of toes 4 and 5 of right foot.   • HERNIA REPAIR     • HERNIA REPAIR      hital   • HYSTERECTOMY     • LIVER BIOPSY     • NERVE BLOCK  2016    Injection for nerve block (1) - Lumbar medial branch block.   • OTHER SURGICAL HISTORY  2012    Inj(s) Tend-Sheath, Ligament, Single  (1) - [facility]    • OTHER SURGICAL HISTORY  2013    Small Joint Injection/Aspiration  (2) - [facility] ([facility])    • OTHER SURGICAL HISTORY      bowel obstruction x2   • OTHER SURGICAL HISTORY      gland removed from neck   • SUBLINGUAL SALIVARY CYST EXCISION N/A 2017    Procedure: EXCISION OF LEFT  TONGUE LESION WITH CLOSURE;  Surgeon: Live Bolton MD;  Location: [facility];  Service:    • TUBAL ABDOMINAL LIGATION     • UPPER GASTROINTESTINAL ENDOSCOPY  2013     Family History   Problem Relation Age of Onset   • Cancer Other    • Diabetes Other    • Heart disease Other    • Hypertension Mother    • Cancer Mother    • Diabetes Mother    • Hypertension Father    • Heart attack Father    • Cancer Father    • Heart disease Father    •  Thyroid disease Maternal Aunt      OB History     No data available        Allergies   Allergen Reactions   • Other      Pt states that taking steroids either in pill or injection form make her have blisters in her mouth and she feels like she is on fire on the inside/ has hx of c diff and possible MRSA     • Corticosteroids Rash   • Kenalog  [Triamcinolone Acetonide] Rash   • Sulfa Antibiotics Rash     Sulfa (Sulfonamide Antibiotics)     Social History     Social History   • Marital status:      Social History Main Topics   • Smoking status: Former Smoker     Packs/day: 2.00     Years: 15.00     Types: Cigarettes     Quit date: 2000   • Smokeless tobacco: Never Used   • Alcohol use No   • Drug use: No   • Sexual activity: Defer      Comment: Marital Status:      Other Topics Concern   • Not on file       Current Outpatient Prescriptions:   •  cetirizine (zyrTEC) 10 MG tablet, Take 1 tablet by mouth Daily As Needed for Allergies., Disp: 30 tablet, Rfl: 11  •  chlorthalidone (HYGROTEN) 50 MG tablet, TAKE 1 TABLET BY MOUTH ONCE DAILY, Disp: 90 tablet, Rfl: 3  •  clotrimazole (MYCELEX) 10 MG dimitry, Take 1 tablet by mouth 3 (Three) Times a Day., Disp: 40 tablet, Rfl: 0  •  cyclobenzaprine (FLEXERIL) 10 MG tablet, Take 10 mg by mouth 3 (Three) Times a Day As Needed for Muscle Spasms., Disp: , Rfl:   •  DULoxetine (CYMBALTA) 30 MG capsule, Take 30 mg by mouth Daily., Disp: , Rfl:   •  gabapentin (NEURONTIN) 800 MG tablet, Take 800 mg by mouth 4 (Four) Times a Day., Disp: , Rfl:   •  linaclotide (LINZESS) 145 MCG capsule capsule, Take 145 mcg by mouth 2 (Two) Times a Day., Disp: , Rfl:   •  magnesium oxide (MAGOX) 400 (241.3 MG) MG tablet tablet, Take 400 mg by mouth Daily., Disp: , Rfl:   •  meloxicam (MOBIC) 15 MG tablet, Take 15 mg by mouth Every Night., Disp: , Rfl:   •  mupirocin (BACTROBAN) 2 % ointment, Apply  topically to the appropriate area as directed 3 (Three) Times a Day As Needed., Disp: , Rfl:  "  •  nystatin (MYCOSTATIN) 130387 UNIT/GM powder, Apply 1 application topically As Needed., Disp: , Rfl:   •  omeprazole (priLOSEC) 40 MG capsule, Take 40 mg by mouth Daily., Disp: , Rfl:   •  ondansetron (ZOFRAN) 4 MG tablet, Take 4 mg by mouth Every 8 (Eight) Hours As Needed., Disp: , Rfl:   •  oxyCODONE-acetaminophen (PERCOCET) 7.5-325 MG per tablet, Take 1 tablet by mouth 3 (Three) Times a Day., Disp: , Rfl:   •  phenylephrine-mineral oil-petrolatum 0.25-14-74.9 % ointment hemorrhoidal ointment, Insert 1 application into the rectum 3 (Three) Times a Day As Needed (rectal pain)., Disp: 28 g, Rfl: 1  •  polyethylene glycol (MIRALAX) powder, Take 17 g by mouth Daily., Disp: , Rfl:   •  ranitidine (ZANTAC) 150 MG capsule, Take 2 capsules by mouth Every Evening., Disp: 60 capsule, Rfl: 3  •  spironolactone (ALDACTONE) 25 MG tablet, Take 25 mg by mouth Daily., Disp: , Rfl:   •  polyethylene glycol (GoLYTELY) 236 g solution, As directed per instruction sheet for colonoscopy, Disp: 4000 mL, Rfl: 0  Review of Systems  Review of Systems       Objective    /82 (BP Location: Left arm)   Pulse 81   Ht 162.6 cm (64\")   Wt 98.3 kg (216 lb 12.8 oz)   LMP  (LMP Unknown)   BMI 37.21 kg/m²   Physical Exam   Constitutional: She is oriented to person, place, and time. She appears well-developed and well-nourished. No distress.   HENT:   Head: Normocephalic and atraumatic.   Eyes: Pupils are equal, round, and reactive to light. EOM are normal.   Neck: Normal range of motion.   Cardiovascular: Normal rate, regular rhythm and normal heart sounds.    Pulmonary/Chest: Effort normal and breath sounds normal.   Abdominal: Soft. Bowel sounds are normal. She exhibits no shifting dullness, no distension, no abdominal bruit, no ascites and no mass. There is no hepatosplenomegaly. There is tenderness. There is no rigidity, no rebound, no guarding and no CVA tenderness. No hernia. Hernia confirmed negative in the ventral area. "   Obese, mild diffuse   Musculoskeletal: Normal range of motion.   Neurological: She is alert and oriented to person, place, and time.   Skin: Skin is warm and dry.   Psychiatric: She has a normal mood and affect. Her behavior is normal. Judgment and thought content normal.   Nursing note and vitals reviewed.    Assessment/Plan      1. MUSA (nonalcoholic steatohepatitis)    2. Cirrhosis of liver without ascites, unspecified hepatic cirrhosis type (CMS/HCC)    3. Generalized abdominal pain    4. Elevated liver enzymes    5. Nausea    6. History of small bowel obstruction    .   Amira was seen today for heartburn, abdominal pain, constipation and musa.    Diagnoses and all orders for this visit:    MUSA (nonalcoholic steatohepatitis)  -     Case Request; Standing  -     dextrose 5 % and sodium chloride 0.45 % infusion; Infuse 30 mL/hr into a venous catheter Continuous As Needed (Start Prior to Procedure).  -     Case Request    Cirrhosis of liver without ascites, unspecified hepatic cirrhosis type (CMS/HCC)  -     Case Request; Standing  -     dextrose 5 % and sodium chloride 0.45 % infusion; Infuse 30 mL/hr into a venous catheter Continuous As Needed (Start Prior to Procedure).  -     Case Request    Generalized abdominal pain  -     Case Request; Standing  -     dextrose 5 % and sodium chloride 0.45 % infusion; Infuse 30 mL/hr into a venous catheter Continuous As Needed (Start Prior to Procedure).  -     Case Request    Elevated liver enzymes  -     Case Request; Standing  -     dextrose 5 % and sodium chloride 0.45 % infusion; Infuse 30 mL/hr into a venous catheter Continuous As Needed (Start Prior to Procedure).  -     Case Request    Nausea  -     Case Request; Standing  -     dextrose 5 % and sodium chloride 0.45 % infusion; Infuse 30 mL/hr into a venous catheter Continuous As Needed (Start Prior to Procedure).  -     Case Request    History of small bowel obstruction  -     Case Request; Standing  -      dextrose 5 % and sodium chloride 0.45 % infusion; Infuse 30 mL/hr into a venous catheter Continuous As Needed (Start Prior to Procedure).  -     Case Request    Other orders  -     Follow Anesthesia Guidelines / Standing Orders; Future  -     Obtain Informed Consent; Standing  -     POC Glucose Once; Standing  -     Discontinue: sodium-potassium-magnesium sulfates (SUPREP BOWEL PREP KIT) 17.5-3.13-1.6 GM/180ML solution oral solution; Take 1 bottle by mouth 1 (One) Time for 1 dose. Take as per instruction sheet for colonoscopy prep.  -     polyethylene glycol (GoLYTELY) 236 g solution; As directed per instruction sheet for colonoscopy        Orders placed during this encounter include:  Orders Placed This Encounter   Procedures   • Follow Anesthesia Guidelines / Standing Orders     Standing Status:   Future       Medications prescribed:  New Medications Ordered This Visit   Medications   • polyethylene glycol (GoLYTELY) 236 g solution     Sig: As directed per instruction sheet for colonoscopy     Dispense:  4000 mL     Refill:  0     Discontinued Medications       Reason for Discontinue    magnesium hydroxide (MILK OF MAGNESIA) 2400 MG/10ML suspension suspension *Therapy completed        Requested Prescriptions     Signed Prescriptions Disp Refills   • polyethylene glycol (GoLYTELY) 236 g solution 4000 mL 0     Sig: As directed per instruction sheet for colonoscopy       Review and/or summary of lab tests, radiology, procedures, medications. Review and summary of old records and obtaining of history. The risks and benefits of my recommendations, as well as other treatment options were discussed with the patient today. Questions were answered.    Follow-up: Return if symptoms worsen or fail to improve, for After the above.     ESOPHAGOGASTRODUODENOSCOPY--eval varices (N/A), COLONOSCOPY (N/A)      This document has been electronically signed by Blaine Perales PA-C on September 21, 2018 12:14 PM      Results for  orders placed or performed during the hospital encounter of 08/31/18   Gold Top - SST   Result Value Ref Range    Extra Tube Hold for add-ons.    Green Top (Gel)   Result Value Ref Range    Extra Tube Hold for add-ons.    Green Top (Gel)   Result Value Ref Range    Extra Tube Hold for add-ons.    Scan Slide   Result Value Ref Range    RBC Morphology Normal Normal    WBC Morphology Normal Normal    Platelet Estimate Decreased Normal   Urinalysis With Microscopic If Indicated (No Culture) - Urine, Clean Catch   Result Value Ref Range    Color, UA Yellow Yellow, Straw, Dark Yellow, Mikala    Appearance, UA Clear Clear    pH, UA 6.0 5.0 - 9.0    Specific Gravity, UA 1.028 1.003 - 1.030    Glucose, UA Negative Negative    Ketones, UA Negative Negative    Bilirubin, UA Negative Negative    Blood, UA Negative Negative    Protein, UA Negative Negative    Leuk Esterase, UA Negative Negative    Nitrite, UA Negative Negative    Urobilinogen, UA 1.0 E.U./dL 0.2 - 1.0 E.U./dL   CBC Auto Differential   Result Value Ref Range    WBC 5.75 3.20 - 9.80 10*3/mm3    RBC 5.37 (H) 3.77 - 5.16 10*6/mm3    Hemoglobin 14.7 12.0 - 15.5 g/dL    Hematocrit 42.4 35.0 - 45.0 %    MCV 79.0 (L) 80.0 - 98.0 fL    MCH 27.4 26.5 - 34.0 pg    MCHC 34.7 31.4 - 36.0 g/dL    RDW 15.3 (H) 11.5 - 14.5 %    RDW-SD 44.2 36.4 - 46.3 fl    MPV  8.0 - 12.0 fL    Platelets 95 (L) 150 - 450 10*3/mm3    Neutrophil % 65.2 37.0 - 80.0 %    Lymphocyte % 26.1 10.0 - 50.0 %    Monocyte % 5.2 0.0 - 12.0 %    Eosinophil % 3.0 0.0 - 7.0 %    Basophil % 0.2 0.0 - 2.0 %    Immature Grans % 0.3 0.0 - 0.5 %    Neutrophils, Absolute 3.75 2.00 - 8.60 10*3/mm3    Lymphocytes, Absolute 1.50 0.60 - 4.20 10*3/mm3    Monocytes, Absolute 0.30 0.00 - 0.90 10*3/mm3    Eosinophils, Absolute 0.17 0.00 - 0.70 10*3/mm3    Basophils, Absolute 0.01 0.00 - 0.20 10*3/mm3    Immature Grans, Absolute 0.02 0.00 - 0.02 10*3/mm3    nRBC 0.9 (H) 0.0 - 0.0 /100 WBC   CBC Auto Differential   Result  Value Ref Range    WBC 7.42 3.20 - 9.80 10*3/mm3    RBC 5.32 (H) 3.77 - 5.16 10*6/mm3    Hemoglobin 14.2 12.0 - 15.5 g/dL    Hematocrit 41.7 35.0 - 45.0 %    MCV 78.4 (L) 80.0 - 98.0 fL    MCH 26.7 26.5 - 34.0 pg    MCHC 34.1 31.4 - 36.0 g/dL    RDW 15.0 (H) 11.5 - 14.5 %    RDW-SD 43.0 36.4 - 46.3 fl    MPV 11.1 8.0 - 12.0 fL    Platelets 103 (L) 150 - 450 10*3/mm3    Neutrophil % 67.5 37.0 - 80.0 %    Lymphocyte % 23.7 10.0 - 50.0 %    Monocyte % 6.3 0.0 - 12.0 %    Eosinophil % 1.9 0.0 - 7.0 %    Basophil % 0.3 0.0 - 2.0 %    Immature Grans % 0.3 0.0 - 0.5 %    Neutrophils, Absolute 5.01 2.00 - 8.60 10*3/mm3    Lymphocytes, Absolute 1.76 0.60 - 4.20 10*3/mm3    Monocytes, Absolute 0.47 0.00 - 0.90 10*3/mm3    Eosinophils, Absolute 0.14 0.00 - 0.70 10*3/mm3    Basophils, Absolute 0.02 0.00 - 0.20 10*3/mm3    Immature Grans, Absolute 0.02 0.00 - 0.02 10*3/mm3   CBC Auto Differential   Result Value Ref Range    WBC 7.96 3.20 - 9.80 10*3/mm3    RBC 5.58 (H) 3.77 - 5.16 10*6/mm3    Hemoglobin 15.1 12.0 - 15.5 g/dL    Hematocrit 44.0 35.0 - 45.0 %    MCV 78.9 (L) 80.0 - 98.0 fL    MCH 27.1 26.5 - 34.0 pg    MCHC 34.3 31.4 - 36.0 g/dL    RDW 15.2 (H) 11.5 - 14.5 %    RDW-SD 44.1 36.4 - 46.3 fl    MPV 10.8 8.0 - 12.0 fL    Platelets 100 (L) 150 - 450 10*3/mm3    Neutrophil % 65.4 37.0 - 80.0 %    Lymphocyte % 24.5 10.0 - 50.0 %    Monocyte % 7.0 0.0 - 12.0 %    Eosinophil % 2.4 0.0 - 7.0 %    Basophil % 0.3 0.0 - 2.0 %    Immature Grans % 0.4 0.0 - 0.5 %    Neutrophils, Absolute 5.21 2.00 - 8.60 10*3/mm3    Lymphocytes, Absolute 1.95 0.60 - 4.20 10*3/mm3    Monocytes, Absolute 0.56 0.00 - 0.90 10*3/mm3    Eosinophils, Absolute 0.19 0.00 - 0.70 10*3/mm3    Basophils, Absolute 0.02 0.00 - 0.20 10*3/mm3    Immature Grans, Absolute 0.03 (H) 0.00 - 0.02 10*3/mm3    nRBC 0.0 0.0 - 0.0 /100 WBC   CBC Auto Differential   Result Value Ref Range    WBC 6.24 3.20 - 9.80 10*3/mm3    RBC 5.32 (H) 3.77 - 5.16 10*6/mm3    Hemoglobin  14.2 12.0 - 15.5 g/dL    Hematocrit 42.7 35.0 - 45.0 %    MCV 80.3 80.0 - 98.0 fL    MCH 26.7 26.5 - 34.0 pg    MCHC 33.3 31.4 - 36.0 g/dL    RDW 15.3 (H) 11.5 - 14.5 %    RDW-SD 45.1 36.4 - 46.3 fl    MPV  8.0 - 12.0 fL    Platelets 91 (L) 150 - 450 10*3/mm3    Neutrophil % 68.3 37.0 - 80.0 %    Lymphocyte % 22.8 10.0 - 50.0 %    Monocyte % 6.3 0.0 - 12.0 %    Eosinophil % 2.1 0.0 - 7.0 %    Basophil % 0.3 0.0 - 2.0 %    Immature Grans % 0.2 0.0 - 0.5 %    Neutrophils, Absolute 4.27 2.00 - 8.60 10*3/mm3    Lymphocytes, Absolute 1.42 0.60 - 4.20 10*3/mm3    Monocytes, Absolute 0.39 0.00 - 0.90 10*3/mm3    Eosinophils, Absolute 0.13 0.00 - 0.70 10*3/mm3    Basophils, Absolute 0.02 0.00 - 0.20 10*3/mm3    Immature Grans, Absolute 0.01 0.00 - 0.02 10*3/mm3    nRBC 0.0 0.0 - 0.0 /100 WBC     *Note: Due to a large number of results and/or encounters for the requested time period, some results have not been displayed. A complete set of results can be found in Results Review.       Some portions of this note have been dictated using voice recognition software and may contain errors and/or omissions.

## 2018-10-17 NOTE — ANESTHESIA PREPROCEDURE EVALUATION
Anesthesia Evaluation     Patient summary reviewed and Nursing notes reviewed   NPO Solid Status: > 8 hours  NPO Liquid Status: > 2 hours           Airway   Mallampati: II  TM distance: >3 FB  Neck ROM: full  No difficulty expected  Dental    (+) poor dentition    Pulmonary - normal exam   Cardiovascular     Rhythm: regular  Rate: normal        Neuro/Psych  GI/Hepatic/Renal/Endo      Musculoskeletal (-) negative ROS    Abdominal    Substance History      OB/GYN          Other                        Anesthesia Plan    ASA 3     MAC     intravenous induction   Anesthetic plan, all risks, benefits, and alternatives have been provided, discussed and informed consent has been obtained with: patient.    Plan discussed with CRNA.

## 2018-10-17 NOTE — ANESTHESIA POSTPROCEDURE EVALUATION
Patient: Amira Shannon    Procedure Summary     Date:  10/17/18 Room / Location:  [facility] ENDOSCOPY 1 / [facility] ENDOSCOPY    Anesthesia Start:  1536 Anesthesia Stop:  1611    Procedures:       ESOPHAGOGASTRODUODENOSCOPY--eval varices (N/A )      COLONOSCOPY (N/A ) Diagnosis:       Nausea      Generalized abdominal pain      MUSA (nonalcoholic steatohepatitis)      Elevated liver enzymes      History of small bowel obstruction      Cirrhosis of liver without ascites, unspecified hepatic cirrhosis type (CMS/HCC)      (Nausea [R11.0])      (Generalized abdominal pain [R10.84])      (MUSA (nonalcoholic steatohepatitis) [K75.81])      (Elevated liver enzymes [R74.8])      (History of small bowel obstruction [Z87.19])      (Cirrhosis of liver without ascites, unspecified hepatic cirrhosis type (CMS/HCC) [K74.60])    Surgeon:  Russell Del Rio MD Provider:  Miller Hedrick CRNA    Anesthesia Type:  MAC ASA Status:  3          Anesthesia Type: MAC  Last vitals  BP   (!) 187/82 (10/17/18 1422)   Temp   96.4 °F (35.8 °C) (10/17/18 1422)   Pulse   81 (10/17/18 1422)   Resp   18 (10/17/18 1422)     SpO2   97 % (10/17/18 1422)     Post Anesthesia Care and Evaluation    Patient location during evaluation: bedside  Patient participation: complete - patient participated  Level of consciousness: awake and alert  Pain score: 0  Pain management: adequate  Airway patency: patent  Anesthetic complications: No anesthetic complications  PONV Status: none  Cardiovascular status: acceptable  Respiratory status: acceptable  Hydration status: acceptable

## 2018-10-19 LAB
LAB AP CASE REPORT: NORMAL
LAB AP SPECIAL STAINS: NORMAL
PATH REPORT.FINAL DX SPEC: NORMAL
PATH REPORT.GROSS SPEC: NORMAL

## 2018-10-29 RX ORDER — CETIRIZINE HYDROCHLORIDE 10 MG/1
TABLET ORAL
Qty: 30 TABLET | Refills: 11 | Status: SHIPPED | OUTPATIENT
Start: 2018-10-29 | End: 2018-11-28 | Stop reason: SDUPTHER

## 2018-11-28 ENCOUNTER — OFFICE VISIT (OUTPATIENT)
Dept: OTOLARYNGOLOGY | Facility: CLINIC | Age: 58
End: 2018-11-28

## 2018-11-28 VITALS
HEART RATE: 88 BPM | OXYGEN SATURATION: 96 % | BODY MASS INDEX: 36.37 KG/M2 | WEIGHT: 213 LBS | TEMPERATURE: 97.9 F | HEIGHT: 64 IN

## 2018-11-28 DIAGNOSIS — J30.1 SEASONAL ALLERGIC RHINITIS DUE TO POLLEN: ICD-10-CM

## 2018-11-28 DIAGNOSIS — K21.9 GASTROESOPHAGEAL REFLUX DISEASE WITHOUT ESOPHAGITIS: Primary | ICD-10-CM

## 2018-11-28 PROCEDURE — 99213 OFFICE O/P EST LOW 20 MIN: CPT | Performed by: OTOLARYNGOLOGY

## 2018-11-28 RX ORDER — CETIRIZINE HYDROCHLORIDE 10 MG/1
10 TABLET ORAL DAILY PRN
Qty: 30 TABLET | Refills: 11 | COMMUNITY
End: 2020-01-09 | Stop reason: SDUPTHER

## 2018-11-28 NOTE — PROGRESS NOTES
Subjective   Amira Shannon is a 58 y.o. female.    f/u tongue and rhinitis    History of Present Illness   No tongue problems , swallowing  Problems has rhintis and wants refill of Zyrtec  No neck mass or dysphagia  Says that Zyrtec helps her rhinitis well  The following portions of the patient's history were reviewed and updated as appropriate: allergies, current medications, past family history, past medical history, past social history, past surgical history and problem list.      Current Outpatient Medications:   •  chlorthalidone (HYGROTEN) 50 MG tablet, Take 50 mg by mouth Daily., Disp: , Rfl:   •  DULoxetine (CYMBALTA) 30 MG capsule, Take 30 mg by mouth Daily., Disp: , Rfl:   •  gabapentin (NEURONTIN) 800 MG tablet, Take 800 mg by mouth 4 (Four) Times a Day., Disp: , Rfl:   •  meloxicam (MOBIC) 15 MG tablet, Take 15 mg by mouth Every Night., Disp: , Rfl:   •  omeprazole (priLOSEC) 40 MG capsule, Take 40 mg by mouth Daily., Disp: , Rfl:   •  oxyCODONE-acetaminophen (PERCOCET) 7.5-325 MG per tablet, Take 1 tablet by mouth 4 (Four) Times a Day As Needed., Disp: , Rfl:   •  phenylephrine-mineral oil-petrolatum 0.25-14-74.9 % ointment hemorrhoidal ointment, Insert 1 application into the rectum 3 (Three) Times a Day As Needed (rectal pain)., Disp: 28 g, Rfl: 1  •  polyethylene glycol (MIRALAX) powder, Take 17 g by mouth Daily., Disp: , Rfl:   •  spironolactone (ALDACTONE) 25 MG tablet, Take 1 tablet by mouth Daily., Disp: 30 tablet, Rfl: 5  •  cetirizine (zyrTEC) 10 MG tablet, Take 1 tablet by mouth Daily As Needed for Allergies., Disp: 30 tablet, Rfl: 11    Allergies   Allergen Reactions   • Other      Pt states that taking steroids either in pill or injection form make her have blisters in her mouth and she feels like she is on fire on the inside/ has hx of c diff and possible MRSA     • Corticosteroids Rash   • Kenalog  [Triamcinolone Acetonide] Rash   • Sulfa Antibiotics Rash     Sulfa (Sulfonamide  Antibiotics)             Review of Systems   Constitutional: Negative for fever.   HENT: Positive for congestion and rhinorrhea. Negative for sore throat, trouble swallowing and voice change.    Allergic/Immunologic: Positive for environmental allergies.   Hematological: Negative for adenopathy.           Objective   Physical Exam   Constitutional: She is oriented to person, place, and time. She appears well-developed and well-nourished.   HENT:   Head: Normocephalic and atraumatic.   Right Ear: Hearing, tympanic membrane, external ear and ear canal normal.   Left Ear: Hearing, tympanic membrane, external ear and ear canal normal.   Nose: Mucosal edema present. No rhinorrhea, nasal deformity or septal deviation. No epistaxis. Right sinus exhibits no maxillary sinus tenderness and no frontal sinus tenderness. Left sinus exhibits no maxillary sinus tenderness and no frontal sinus tenderness.   Mouth/Throat: Uvula is midline, oropharynx is clear and moist and mucous membranes are normal. No trismus in the jaw. Normal dentition. No oropharyngeal exudate or posterior oropharyngeal edema.       Eyes: Conjunctivae and EOM are normal.   Neck: Normal range of motion. Neck supple. No JVD present. No tracheal deviation present. No thyromegaly present.       Pulmonary/Chest: Effort normal.   Musculoskeletal: Normal range of motion.   Lymphadenopathy:        Head (right side): No submental, no submandibular, no tonsillar, no preauricular, no posterior auricular and no occipital adenopathy present.        Head (left side): No submental, no submandibular, no tonsillar, no preauricular, no posterior auricular and no occipital adenopathy present.     She has no cervical adenopathy.        Right cervical: No superficial cervical, no deep cervical and no posterior cervical adenopathy present.       Left cervical: No superficial cervical, no deep cervical and no posterior cervical adenopathy present.   Neurological: She is alert and  oriented to person, place, and time. No cranial nerve deficit.   Skin: Skin is warm.   Psychiatric: She has a normal mood and affect. Her speech is normal and behavior is normal. Thought content normal.   Nursing note and vitals reviewed.        Mirror ewxa  Assessment/Plan   Amira was seen today for 6 month follow up.    Diagnoses and all orders for this visit:    Gastroesophageal reflux disease without esophagitis    Seasonal allergic rhinitis due to pollen     she will continue Zyrtec consist working well, without complications    Her tongue is doing well and reflexes not bothersome she'll continue her current medications occult or problems otherwise will see her on a yearly basis she has problems in the meantime.

## 2019-01-07 ENCOUNTER — OFFICE VISIT (OUTPATIENT)
Dept: CARDIOLOGY | Facility: CLINIC | Age: 59
End: 2019-01-07

## 2019-01-07 VITALS
SYSTOLIC BLOOD PRESSURE: 120 MMHG | HEIGHT: 64 IN | OXYGEN SATURATION: 99 % | BODY MASS INDEX: 36.64 KG/M2 | HEART RATE: 80 BPM | DIASTOLIC BLOOD PRESSURE: 76 MMHG | WEIGHT: 214.6 LBS

## 2019-01-07 DIAGNOSIS — I51.89 DIASTOLIC DYSFUNCTION: Primary | ICD-10-CM

## 2019-01-07 DIAGNOSIS — R06.09 DYSPNEA ON EXERTION: ICD-10-CM

## 2019-01-07 DIAGNOSIS — M79.89 SWELLING OF BOTH LOWER EXTREMITIES: ICD-10-CM

## 2019-01-07 DIAGNOSIS — E78.2 MIXED HYPERLIPIDEMIA: ICD-10-CM

## 2019-01-07 PROCEDURE — 99214 OFFICE O/P EST MOD 30 MIN: CPT | Performed by: NURSE PRACTITIONER

## 2019-01-07 RX ORDER — MELATONIN 10 MG
CAPSULE ORAL NIGHTLY
COMMUNITY
End: 2021-06-24

## 2019-01-07 RX ORDER — CHLORTHALIDONE 25 MG/1
12.5 TABLET ORAL DAILY
Qty: 15 TABLET | Refills: 11 | Status: SHIPPED | OUTPATIENT
Start: 2019-01-07 | End: 2019-07-08 | Stop reason: CLARIF

## 2019-01-07 NOTE — PROGRESS NOTES
Subjective:     CC: HTN, leg swelling    Fatigue   This is a chronic problem. The current episode started more than 1 year ago. The problem occurs daily. The problem has been rapidly worsening. Associated symptoms include arthralgias, fatigue, joint swelling, myalgias and weakness. Pertinent negatives include no change in bowel habit, chills, coughing, fever or rash. The symptoms are aggravated by standing and walking. She has tried position changes, oral narcotics, heat and rest for the symptoms. The treatment provided mild relief.   Leg Swelling   This is a chronic problem. The current episode started more than 1 year ago. The problem occurs intermittently. The problem has been rapidly improving. Associated symptoms include arthralgias, fatigue, joint swelling, myalgias and weakness. Pertinent negatives include no change in bowel habit, chills, coughing, fever or rash. She has tried rest and position changes (loop diuretics) for the symptoms. The treatment provided moderate relief.     She had been on Lasix for 10-12 years. She does have HTN for also about 10 years.    Of note, her BNP is negative.   Follow up revealed return of edema after Metolazone was stopped. Dr. Espitia also ordered ischemic evaluation which was low risk.   Last visit, he DC norvasc and lasix and added chlorthalidone. Improvement noted.    I saw her on  11/1/2017 and Aldactone added and chlorthalidone increased.   Weight down and swelling has not returned.     She complains of chronic fatigue. This is secondary to chronic pain and high doses of neurontin. Jordi for back and feet Thursday.     1/7/19: 6 month follow up. She has had hospitalization for SBO. She has not had recurrence of leg edema on current medications. Still has significant gait disturbance due to podiatry issues.    Hospitalization:  8/31/18-9/5/18- SBO  11/17/2017-11/22/2017- SBO    Review of Systems   Constitution: Positive for fatigue, weakness and weight loss.  Negative for chills, decreased appetite and fever.   HENT: Negative.    Eyes: Negative.    Cardiovascular: Positive for dyspnea on exertion and leg swelling. Negative for irregular heartbeat.   Respiratory: Negative for cough and wheezing.    Endocrine: Negative.    Skin: Negative for dry skin, flushing and rash.   Musculoskeletal: Positive for arthralgias, joint swelling and myalgias. Negative for falls.   Gastrointestinal: Negative for change in bowel habit and melena.   Genitourinary: Negative for frequency and hematuria.   Neurological: Negative for dizziness, light-headedness and loss of balance.   Psychiatric/Behavioral: Negative for altered mental status and memory loss. The patient is not nervous/anxious.        Current Outpatient Medications   Medication Sig Dispense Refill   • cetirizine (zyrTEC) 10 MG tablet Take 1 tablet by mouth Daily As Needed for Allergies. 30 tablet 11   • chlorthalidone (HYGROTEN) 50 MG tablet Take 50 mg by mouth Daily.     • DULoxetine (CYMBALTA) 30 MG capsule Take 30 mg by mouth Daily.     • gabapentin (NEURONTIN) 800 MG tablet Take 800 mg by mouth 4 (Four) Times a Day.     • Melatonin 10 MG capsule Take  by mouth Every Night.     • meloxicam (MOBIC) 15 MG tablet Take 15 mg by mouth Every Night.     • omeprazole (priLOSEC) 40 MG capsule Take 40 mg by mouth Daily.     • oxyCODONE-acetaminophen (PERCOCET) 7.5-325 MG per tablet Take 1 tablet by mouth 4 (Four) Times a Day As Needed.     • phenylephrine-mineral oil-petrolatum 0.25-14-74.9 % ointment hemorrhoidal ointment Insert 1 application into the rectum 3 (Three) Times a Day As Needed (rectal pain). 28 g 1   • polyethylene glycol (MIRALAX) powder Take 17 g by mouth Daily.     • spironolactone (ALDACTONE) 25 MG tablet Take 1 tablet by mouth Daily. 30 tablet 5     No current facility-administered medications for this visit.         Objective:     Vitals:    01/07/19 1009   BP: 120/76   BP Location: Left arm   Patient Position:  "Sitting   Cuff Size: Adult   Pulse: 80   SpO2: 99%   Weight: 97.3 kg (214 lb 9.6 oz)   Height: 162.6 cm (64\")     Wt Readings from Last 3 Encounters:   01/07/19 97.3 kg (214 lb 9.6 oz)   11/28/18 96.6 kg (213 lb)   10/17/18 94.4 kg (208 lb 1.8 oz)          Physical Exam   Constitutional: She is oriented to person, place, and time. She appears well-developed and well-nourished. No distress.   HENT:   Head: Normocephalic.   Neck: No JVD present.   Cardiovascular: Normal rate, regular rhythm, S1 normal, S2 normal, normal heart sounds and intact distal pulses.   No murmur heard.  Pulmonary/Chest: Effort normal and breath sounds normal. No respiratory distress. She has no wheezes. She has no rales.   Abdominal: Soft. Bowel sounds are normal.   Musculoskeletal: Normal range of motion. She exhibits no edema.   Neurological: She is alert and oriented to person, place, and time.   Skin: Skin is warm and dry. No erythema.   Psychiatric: She has a normal mood and affect. Her behavior is normal. Judgment and thought content normal.       DATA reviewed  ECHO 10/18/2017  Interpretation Summary   · Left Ventricle: Left ventricular systolic function is normal. Estimated EF appears to be in the range of 56 - 60%  · Left ventricular diastolic dysfunction is noted (grade III w/high LAP) consistent with reversible restrictive pattern. Elevated left atrial pressure.  · Right Ventricle: Normal right ventricular wall thickness, systolic function and septal motion noted. Right ventricular cavity is mildly dilated  · No evidence of pulmonary hypertension is present  · There is no evidence of pericardial effusion     AGUSTINA 11/7/17  Interpretation Summary     · Transesophageal Echocardiogram with limited Color and Doppler  · Left Ventricle: Left ventricular systolic function is normal. Estimated EF appears to be in the range of 61 - 65%.  · Right Ventricle: Normal right ventricular wall thickness, systolic function and septal motion noted. " Right ventricular cavity is mildly dilated  · No evidence of a patent foramen ovale. No evidence of an atrial septal defect present. Saline test results are negative  · There is no evidence of pericardial effusion  · No evidence of VSD.          ABIs 10/18/17  Interpretation Summary   · Right Conclusion: The right CARYN appears normal.  · Left Conclusion: The left CARYN appears normal.  · Increased indices suggest calcification         Venous Duplex 10/18/17   Interpretation Summary   · Appears negative for DVT of the lower extremities  · Pulsatile venous flow         STRESS TEST: 10/3/17  Interpretation Summary   · Nuclear Study Description: A 2-day rest/stress protocol myocardial perfusion imaging study was performed  · Nuclear Perfusion Images: Overall image quality is excellent.  · Stress ECG: Stress ECG rhythm of sinus tachycardia noted. Normal ECG with no significant stress induced changes noted.  · Stress Description: A pharmacological stress test was performed using regadenoson without low-level exercise.  · Nuclear Perfusion Findings Study Impression: Myocardial perfusion imaging indicates a normal myocardial perfusion study with no evidence of ischemia  · Ventricle Size / Description: Left ventricular ejection fraction is normal (Calculated EF = 70%).  · Impressions are consistent with a low risk study.           Lab Results   Component Value Date    GLUCOSE 103 (H) 09/05/2018    BUN 11 09/05/2018    CREATININE 0.91 09/05/2018    EGFRIFNONA 63 09/05/2018    BCR 12.1 09/05/2018    K 3.8 09/05/2018    CO2 24.0 09/05/2018    CALCIUM 9.1 09/05/2018    ALBUMIN 3.40 09/01/2018    AST 49 (H) 09/01/2018    ALT 46 09/01/2018     Lab Results   Component Value Date    WBC 5.75 09/05/2018    HGB 14.7 09/05/2018    HCT 42.4 09/05/2018    MCV 79.0 (L) 09/05/2018    PLT 95 (L) 09/05/2018      Ref. Range 9/20/2017 11:24   proBNP Latest Ref Range: 0.0 - 900.0 pg/mL 129.0       The following portions of the patient's history  were reviewed and updated as appropriate: allergies, current medications, past family history, past medical history, past social history, past surgical history and problem list.     Assessment/Plan:   Weight continues to stay down.  Edema mostly in ankles secondary to arthritis  Not wearing CPAP. Insomnia.  Will begin to reduce dosage of diuretic. Hopefully to improve chronic constipation. We spoke today about the importance of 64oz of fluid daily.      Diagnosis Plan   1. Swelling of both lower extremities  Improved     Chlorthalidone 50mg daily. Will begin to reduce dosage. 1/2 tablet daily then progress 12.5mg daily    Aldactone 25mg     Pain with compression stockings.        2. Diastolic dysfunction , grade III BNP negative. Blood pressure controlled. BP still stable without Norvasc.  Consideration for HFpEF, but negative BNP. Her edema now looks to be from joint effusions.     Unable to to complete 6MWT due to pain in feet.          No CHF. Saw Dr. Espitia regarding RV dilation. AGUSTINA was without anomalies. Secondary to CHAI.     Follow up in 6 months for HTN monitoring.

## 2019-01-07 NOTE — PATIENT INSTRUCTIONS
Half your current Chlorthalidone tablet- that will make it 25mg daily.     NEW prescription is for 12.5mg. This will be a half tablet of 25mg     Would like to walk down the dose of the water pill due to muscle cramps and possibility of dehydration.     Continue Aldactone 25mg daily.     CALL me if we need to change the dosages.

## 2019-01-15 ENCOUNTER — OFFICE VISIT (OUTPATIENT)
Dept: GASTROENTEROLOGY | Facility: CLINIC | Age: 59
End: 2019-01-15

## 2019-01-15 VITALS
HEART RATE: 90 BPM | BODY MASS INDEX: 36.54 KG/M2 | DIASTOLIC BLOOD PRESSURE: 84 MMHG | HEIGHT: 64 IN | SYSTOLIC BLOOD PRESSURE: 112 MMHG | WEIGHT: 214 LBS

## 2019-01-15 DIAGNOSIS — R10.84 GENERALIZED ABDOMINAL PAIN: ICD-10-CM

## 2019-01-15 DIAGNOSIS — Z80.0 FAMILY HISTORY OF COLON CANCER: ICD-10-CM

## 2019-01-15 DIAGNOSIS — K75.81 NASH (NONALCOHOLIC STEATOHEPATITIS): Primary | ICD-10-CM

## 2019-01-15 DIAGNOSIS — K74.60 CIRRHOSIS OF LIVER WITHOUT ASCITES, UNSPECIFIED HEPATIC CIRRHOSIS TYPE (HCC): ICD-10-CM

## 2019-01-15 DIAGNOSIS — K59.04 CHRONIC IDIOPATHIC CONSTIPATION: ICD-10-CM

## 2019-01-15 PROCEDURE — 99214 OFFICE O/P EST MOD 30 MIN: CPT | Performed by: PHYSICIAN ASSISTANT

## 2019-01-15 NOTE — PROGRESS NOTES
Chief Complaint   Patient presents with   • Musa   • Cirrhosis   • Abdominal Pain   • Elevated Hepatic Enzymes       ENDO PROCEDURE ORDERED:    Francesco Shannon is a 58 y.o. female. she is here today for follow-up.    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 Last seen 09/19/2018. The patient did not return for a followup until today. She did undergo EGD/colonoscopy on 10/17/2018 that showed a diffuse gastritis. Antral biopsy showed chronic gastritis, negative H. pylori. Duodenal biopsy was negative. Colonoscopy showed internal/external hemorrhoids with an adequate prep. Random colon biopsy was negative. Recommend repeat colonoscopy in 5 years.    The patient states GERD is doing fairly well on Prilosec 40 mg daily. She denied nausea, vomiting, dysphagia. She states she is still having a lot of constipation and has to take a lot of medication in order to help it. She has tried MiraLAX, she takes Linzess 145 mcg but has to take 2 a day for a few days, then she has severe cramping and it does not always work. She cannot tolerate it every day. She states she tried Amitiza in the past without improvement. She would like to try something else. Weight is up 3 pounds since last visit. She does have a family history of colon cancer.     ASSESSMENT/PLAN: Patient with significant gastritis, GERD appears to be doing well on the Prilosec. She was encouraged to avoid gastric irritants. IBS-C with negative biopsies. She is not well controlled on current regimen. I suggested a trial on Trulance 3 mg daily. She states she has been trying to reach Dr. Donohue' office to follow up on her carpal tunnel, she states she needs to have it done on her right wrist, we will try to get her connected with her office. As long as she is doing well we will plan followup in 3 months, sooner if needed, further pending clinical course and the  "results of the above.           The following portions of the patient's history were reviewed and updated as appropriate:   Past Medical History:   Diagnosis Date   • Acid reflux    • Altered bowel function    • Arthropathy of lumbar facet joint    • Bleeding disorder (CMS/HCC)    • C. difficile diarrhea    • Constipation    • Corns and callus    • Depression    • Disease related peripheral neuropathy    • Epigastric pain    • Fatty liver    • Hammer toe    • Headache    • Hiatal hernia    • History of transfusion    • Hyperlipidemia    • Knee pain    • Localized, primary osteoarthritis of the ankle and foot     Localized, primary osteoarthritis of the ankle and/or foot   • Mendoza's metatarsalgia     Mendoza's metatarsalgia - 2nd interspace on right   • Nausea and vomiting    • Neuralgia and neuritis     Neuralgia, neuritis, and radiculitis, unspecified   • Neuropathy    • Obstructive sleep apnea     Obstructive sleep apnea (adult) (pediatric)    • CHAI on CPAP     \"C-Pap at night  (unconfirmed)\"   • Osteoarthritis    • Pain in foot     Pain in unspecified foot - sees a podiatrist   • Pain in joint, ankle and foot     Joint pain in ankle and foot      • Pain radiating to back     Pain radiating to lumbar region of back   • Plantar fasciitis    • PONV (postoperative nausea and vomiting)    • Restless leg syndrome    • Secondary hypertension     Secondary hypertension, unspecified   • Sinusitis    • Sleep apnea    • Tongue anomaly     lesion     Past Surgical History:   Procedure Laterality Date   • CARPAL TUNNEL RELEASE Left 2018    Procedure: CARPAL TUNNEL RELEASE - left;  Surgeon: Justus Lewis MD;  Location: [facility] OR;  Service: Orthopedics   •  SECTION     • CHOLECYSTECTOMY     • COLONOSCOPY  2013   • COLONOSCOPY N/A 10/17/2018    Procedure: COLONOSCOPY;  Surgeon: Russell Del Rio MD;  Location: [facility] ENDOSCOPY;  Service: Gastroenterology   • DIRECT LARYNGOSCOPY, ESOPHAGOSCOPY, " BRONCHOSCOPY N/A 8/1/2017    Procedure: DIRECT LARYNGOSCOPY AND;  Surgeon: Live Bolton MD;  Location: [facility] OR;  Service:    • ENDOSCOPY  07/01/2013    Colon endoscopy 58721 (1) - Internal & external hemorrhoids found. Stool found.   • ENDOSCOPY  07/01/2013    EGD w/ tube 31122 (1) - Normal esophagus. Gastritis in stomach. Biopsy taken. Normal dudoenum. Biopsy taken.   • ENDOSCOPY N/A 10/17/2018    Procedure: ESOPHAGOGASTRODUODENOSCOPY--eval varices;  Surgeon: Russell Del Rio MD;  Location: [facility] ENDOSCOPY;  Service: Gastroenterology   • FOOT SURGERY  02/26/2013    Foot/toes surgery procedure (1) - Arthroplasty of toes 4 and 5 of right foot.   • HERNIA REPAIR     • HERNIA REPAIR      hital   • HYSTERECTOMY     • LIVER BIOPSY     • NERVE BLOCK  07/25/2016    Injection for nerve block (1) - Lumbar medial branch block.   • OTHER SURGICAL HISTORY  12/03/2012    Inj(s) Tend-Sheath, Ligament, Single 20550 (1) - [facility]    • OTHER SURGICAL HISTORY  06/24/2013    Small Joint Injection/Aspiration 20600 (2) - [facility] ([facility])    • OTHER SURGICAL HISTORY  2011    bowel obstruction x2   • OTHER SURGICAL HISTORY      gland removed from neck   • SUBLINGUAL SALIVARY CYST EXCISION N/A 8/1/2017    Procedure: EXCISION OF LEFT  TONGUE LESION WITH CLOSURE;  Surgeon: Live Bolton MD;  Location: [facility];  Service:    • TUBAL ABDOMINAL LIGATION     • UPPER GASTROINTESTINAL ENDOSCOPY  07/01/2013   • UPPER GASTROINTESTINAL ENDOSCOPY  10/17/2018     Family History   Problem Relation Age of Onset   • Cancer Other    • Diabetes Other    • Heart disease Other    • Hypertension Mother    • Cancer Mother    • Diabetes Mother    • Hypertension Father    • Heart attack Father    • Cancer Father    • Heart disease Father    • Thyroid disease Maternal Aunt      OB History     No data available        Allergies   Allergen Reactions   • Other      Pt states that taking steroids either in pill or injection  form make her have blisters in her mouth and she feels like she is on fire on the inside/ has hx of c diff and possible MRSA     • Corticosteroids Rash   • Kenalog  [Triamcinolone Acetonide] Rash   • Sulfa Antibiotics Rash     Sulfa (Sulfonamide Antibiotics)     Social History     Socioeconomic History   • Marital status:      Spouse name: Not on file   • Number of children: Not on file   • Years of education: Not on file   • Highest education level: Not on file   Tobacco Use   • Smoking status: Former Smoker     Packs/day: 2.00     Years: 15.00     Pack years: 30.00     Types: Cigarettes     Last attempt to quit: 2000     Years since quittin.0   • Smokeless tobacco: Never Used   Substance and Sexual Activity   • Alcohol use: No   • Drug use: No   • Sexual activity: Defer     Comment: Marital Status:        Current Outpatient Medications:   •  cetirizine (zyrTEC) 10 MG tablet, Take 1 tablet by mouth Daily As Needed for Allergies., Disp: 30 tablet, Rfl: 11  •  chlorthalidone (HYGROTON) 25 MG tablet, Take 0.5 tablets by mouth Daily., Disp: 15 tablet, Rfl: 11  •  DULoxetine (CYMBALTA) 30 MG capsule, Take 30 mg by mouth Daily., Disp: , Rfl:   •  gabapentin (NEURONTIN) 800 MG tablet, Take 800 mg by mouth 4 (Four) Times a Day., Disp: , Rfl:   •  Melatonin 10 MG capsule, Take  by mouth Every Night., Disp: , Rfl:   •  meloxicam (MOBIC) 15 MG tablet, Take 15 mg by mouth Every Night., Disp: , Rfl:   •  omeprazole (priLOSEC) 40 MG capsule, Take 40 mg by mouth Daily., Disp: , Rfl:   •  oxyCODONE-acetaminophen (PERCOCET) 7.5-325 MG per tablet, Take 1 tablet by mouth 4 (Four) Times a Day As Needed., Disp: , Rfl:   •  phenylephrine-mineral oil-petrolatum 0.25-14-74.9 % ointment hemorrhoidal ointment, Insert 1 application into the rectum 3 (Three) Times a Day As Needed (rectal pain)., Disp: 28 g, Rfl: 1  •  polyethylene glycol (MIRALAX) powder, Take 17 g by mouth Daily., Disp: , Rfl:   •  spironolactone  "(ALDACTONE) 25 MG tablet, Take 1 tablet by mouth Daily., Disp: 30 tablet, Rfl: 5  •  Plecanatide (TRULANCE) 3 MG tablet, Take 1 tablet by mouth Daily., Disp: 30 tablet, Rfl: 2  Review of Systems  Review of Systems       Objective    /84 (BP Location: Left arm)   Pulse 90   Ht 162.6 cm (64\")   Wt 97.1 kg (214 lb)   LMP  (LMP Unknown)   BMI 36.73 kg/m²   Physical Exam   Constitutional: She is oriented to person, place, and time. She appears well-developed and well-nourished. No distress.   HENT:   Head: Normocephalic and atraumatic.   Eyes: EOM are normal. Pupils are equal, round, and reactive to light.   Neck: Normal range of motion.   Cardiovascular: Normal rate, regular rhythm and normal heart sounds.   Pulmonary/Chest: Effort normal and breath sounds normal.   Abdominal: Soft. Bowel sounds are normal. She exhibits no shifting dullness, no distension, no abdominal bruit, no ascites and no mass. There is no hepatosplenomegaly. There is tenderness. There is no rigidity, no rebound, no guarding and no CVA tenderness. No hernia. Hernia confirmed negative in the ventral area.   Obese, mild diffuse   Musculoskeletal: Normal range of motion.   Neurological: She is alert and oriented to person, place, and time.   Skin: Skin is warm and dry.   Psychiatric: She has a normal mood and affect. Her behavior is normal. Judgment and thought content normal.   Nursing note and vitals reviewed.    Assessment/Plan      1. MUSA (nonalcoholic steatohepatitis)    2. Cirrhosis of liver without ascites, unspecified hepatic cirrhosis type (CMS/HCC)    3. Generalized abdominal pain    4. Chronic idiopathic constipation    5. Family history of colon cancer    .   Amira was seen today for musa, cirrhosis, abdominal pain and elevated hepatic enzymes.    Diagnoses and all orders for this visit:    MUSA (nonalcoholic steatohepatitis)    Cirrhosis of liver without ascites, unspecified hepatic cirrhosis type (CMS/HCC)    Generalized " abdominal pain    Chronic idiopathic constipation    Family history of colon cancer    Other orders  -     Plecanatide (TRULANCE) 3 MG tablet; Take 1 tablet by mouth Daily.        Orders placed during this encounter include:  No orders of the defined types were placed in this encounter.      Medications prescribed:  New Medications Ordered This Visit   Medications   • Plecanatide (TRULANCE) 3 MG tablet     Sig: Take 1 tablet by mouth Daily.     Dispense:  30 tablet     Refill:  2       Requested Prescriptions     Signed Prescriptions Disp Refills   • Plecanatide (TRULANCE) 3 MG tablet 30 tablet 2     Sig: Take 1 tablet by mouth Daily.       Review and/or summary of lab tests, radiology, procedures, medications. Review and summary of old records and obtaining of history. The risks and benefits of my recommendations, as well as other treatment options were discussed with the patient today. Questions were answered.    Follow-up: Return in about 3 months (around 4/15/2019), or if symptoms worsen or fail to improve.     * Surgery not found *      This document has been electronically signed by Blaine Perales PA-C on January 16, 2019 5:50 PM      Results for orders placed or performed during the hospital encounter of 10/17/18   Tissue Pathology Exam   Result Value Ref Range    Case Report       Surgical Pathology Report                         Case: [id number]                                  Authorizing Provider:  Rusesll Del Rio MD        Collected:           10/17/2018 03:59 PM          Ordering Location:     [facility]             Received:            10/18/2018 08:23 AM                                 [facility]                                                     Pathologist:           Miguel Lubin MD                                                         Specimens:   1) - Gastric, Antrum, antrum bx                                                                     2) - Small Intestine,  Duodenum, small bowel bx                                                      3) - Large Intestine, colonic mucosa bx                                                    Final Diagnosis       1.  MUCOSA, ANTRUM OF STOMACH:   CHRONIC GASTRITIS.   NEGATIVE FOR HELICOBACTER PYLORI (HP IMMUNOSTAIN).     2.  MUCOSA, DUODENUM:   NO SIGNIFICANT HISTOLOGIC ABNORMALITY.     3.  MUCOSA, COLON:   NO SIGNIFICANT HISTOLOGIC ABNORMALITY.       Gross Description       Received for examination are 3 containers, each of which have nodular bits of white soft tissue measuring 0.3-0.5 cc in aggregate.  All specimens are embedded as labeled:  1A antrum of stomach; 2A duodenum; 3A mucosa of colon.       Special Stains       Helicobacter pylori (HP) immunostain is performed (block 1) because an appropriate inflammatory milieu is present and organisms are not seen on H & E stained slides.     HP immunostain was developed and [facility]  It has not been cleared or approved by the U.S.Food and Drug Administration.  The FDA has determined that such clearance of approval is not necessary.  This test is used for clinical purposes.  It should not be regarded as investigational or for research.  This laboratory is certified under the Clinical Laboratory Amendments of 1988 (CLIA-88) as qualified to perform high complexity clinical laboratory testing.        Results for orders placed or performed during the hospital encounter of 08/31/18   Gold Top - SST   Result Value Ref Range    Extra Tube Hold for add-ons.    Green Top (Gel)   Result Value Ref Range    Extra Tube Hold for add-ons.    Green Top (Gel)   Result Value Ref Range    Extra Tube Hold for add-ons.    Scan Slide   Result Value Ref Range    RBC Morphology Normal Normal    WBC Morphology Normal Normal    Platelet Estimate Decreased Normal   Urinalysis With Microscopic If Indicated (No Culture) - Urine, Clean  Catch   Result Value Ref Range    Color, UA Yellow Yellow, Straw, Dark Yellow, Mikala    Appearance, UA Clear Clear    pH, UA 6.0 5.0 - 9.0    Specific Gravity, UA 1.028 1.003 - 1.030    Glucose, UA Negative Negative    Ketones, UA Negative Negative    Bilirubin, UA Negative Negative    Blood, UA Negative Negative    Protein, UA Negative Negative    Leuk Esterase, UA Negative Negative    Nitrite, UA Negative Negative    Urobilinogen, UA 1.0 E.U./dL 0.2 - 1.0 E.U./dL   CBC Auto Differential   Result Value Ref Range    WBC 5.75 3.20 - 9.80 10*3/mm3    RBC 5.37 (H) 3.77 - 5.16 10*6/mm3    Hemoglobin 14.7 12.0 - 15.5 g/dL    Hematocrit 42.4 35.0 - 45.0 %    MCV 79.0 (L) 80.0 - 98.0 fL    MCH 27.4 26.5 - 34.0 pg    MCHC 34.7 31.4 - 36.0 g/dL    RDW 15.3 (H) 11.5 - 14.5 %    RDW-SD 44.2 36.4 - 46.3 fl    MPV  8.0 - 12.0 fL    Platelets 95 (L) 150 - 450 10*3/mm3    Neutrophil % 65.2 37.0 - 80.0 %    Lymphocyte % 26.1 10.0 - 50.0 %    Monocyte % 5.2 0.0 - 12.0 %    Eosinophil % 3.0 0.0 - 7.0 %    Basophil % 0.2 0.0 - 2.0 %    Immature Grans % 0.3 0.0 - 0.5 %    Neutrophils, Absolute 3.75 2.00 - 8.60 10*3/mm3    Lymphocytes, Absolute 1.50 0.60 - 4.20 10*3/mm3    Monocytes, Absolute 0.30 0.00 - 0.90 10*3/mm3    Eosinophils, Absolute 0.17 0.00 - 0.70 10*3/mm3    Basophils, Absolute 0.01 0.00 - 0.20 10*3/mm3    Immature Grans, Absolute 0.02 0.00 - 0.02 10*3/mm3    nRBC 0.9 (H) 0.0 - 0.0 /100 WBC   CBC Auto Differential   Result Value Ref Range    WBC 7.42 3.20 - 9.80 10*3/mm3    RBC 5.32 (H) 3.77 - 5.16 10*6/mm3    Hemoglobin 14.2 12.0 - 15.5 g/dL    Hematocrit 41.7 35.0 - 45.0 %    MCV 78.4 (L) 80.0 - 98.0 fL    MCH 26.7 26.5 - 34.0 pg    MCHC 34.1 31.4 - 36.0 g/dL    RDW 15.0 (H) 11.5 - 14.5 %    RDW-SD 43.0 36.4 - 46.3 fl    MPV 11.1 8.0 - 12.0 fL    Platelets 103 (L) 150 - 450 10*3/mm3    Neutrophil % 67.5 37.0 - 80.0 %    Lymphocyte % 23.7 10.0 - 50.0 %    Monocyte % 6.3 0.0 - 12.0 %    Eosinophil % 1.9 0.0 - 7.0 %     Basophil % 0.3 0.0 - 2.0 %    Immature Grans % 0.3 0.0 - 0.5 %    Neutrophils, Absolute 5.01 2.00 - 8.60 10*3/mm3    Lymphocytes, Absolute 1.76 0.60 - 4.20 10*3/mm3    Monocytes, Absolute 0.47 0.00 - 0.90 10*3/mm3    Eosinophils, Absolute 0.14 0.00 - 0.70 10*3/mm3    Basophils, Absolute 0.02 0.00 - 0.20 10*3/mm3    Immature Grans, Absolute 0.02 0.00 - 0.02 10*3/mm3   CBC Auto Differential   Result Value Ref Range    WBC 7.96 3.20 - 9.80 10*3/mm3    RBC 5.58 (H) 3.77 - 5.16 10*6/mm3    Hemoglobin 15.1 12.0 - 15.5 g/dL    Hematocrit 44.0 35.0 - 45.0 %    MCV 78.9 (L) 80.0 - 98.0 fL    MCH 27.1 26.5 - 34.0 pg    MCHC 34.3 31.4 - 36.0 g/dL    RDW 15.2 (H) 11.5 - 14.5 %    RDW-SD 44.1 36.4 - 46.3 fl    MPV 10.8 8.0 - 12.0 fL    Platelets 100 (L) 150 - 450 10*3/mm3    Neutrophil % 65.4 37.0 - 80.0 %    Lymphocyte % 24.5 10.0 - 50.0 %    Monocyte % 7.0 0.0 - 12.0 %    Eosinophil % 2.4 0.0 - 7.0 %    Basophil % 0.3 0.0 - 2.0 %    Immature Grans % 0.4 0.0 - 0.5 %    Neutrophils, Absolute 5.21 2.00 - 8.60 10*3/mm3    Lymphocytes, Absolute 1.95 0.60 - 4.20 10*3/mm3    Monocytes, Absolute 0.56 0.00 - 0.90 10*3/mm3    Eosinophils, Absolute 0.19 0.00 - 0.70 10*3/mm3    Basophils, Absolute 0.02 0.00 - 0.20 10*3/mm3    Immature Grans, Absolute 0.03 (H) 0.00 - 0.02 10*3/mm3    nRBC 0.0 0.0 - 0.0 /100 WBC   CBC Auto Differential   Result Value Ref Range    WBC 6.24 3.20 - 9.80 10*3/mm3    RBC 5.32 (H) 3.77 - 5.16 10*6/mm3    Hemoglobin 14.2 12.0 - 15.5 g/dL    Hematocrit 42.7 35.0 - 45.0 %    MCV 80.3 80.0 - 98.0 fL    MCH 26.7 26.5 - 34.0 pg    MCHC 33.3 31.4 - 36.0 g/dL    RDW 15.3 (H) 11.5 - 14.5 %    RDW-SD 45.1 36.4 - 46.3 fl    MPV  8.0 - 12.0 fL    Platelets 91 (L) 150 - 450 10*3/mm3    Neutrophil % 68.3 37.0 - 80.0 %    Lymphocyte % 22.8 10.0 - 50.0 %    Monocyte % 6.3 0.0 - 12.0 %    Eosinophil % 2.1 0.0 - 7.0 %    Basophil % 0.3 0.0 - 2.0 %    Immature Grans % 0.2 0.0 - 0.5 %    Neutrophils, Absolute 4.27 2.00 - 8.60  10*3/mm3    Lymphocytes, Absolute 1.42 0.60 - 4.20 10*3/mm3    Monocytes, Absolute 0.39 0.00 - 0.90 10*3/mm3    Eosinophils, Absolute 0.13 0.00 - 0.70 10*3/mm3    Basophils, Absolute 0.02 0.00 - 0.20 10*3/mm3    Immature Grans, Absolute 0.01 0.00 - 0.02 10*3/mm3    nRBC 0.0 0.0 - 0.0 /100 WBC     *Note: Due to a large number of results and/or encounters for the requested time period, some results have not been displayed. A complete set of results can be found in Results Review.       Some portions of this note have been dictated using voice recognition software and may contain errors and/or omissions.

## 2019-01-15 NOTE — PATIENT INSTRUCTIONS

## 2019-01-24 ENCOUNTER — TELEPHONE (OUTPATIENT)
Dept: GASTROENTEROLOGY | Facility: CLINIC | Age: 59
End: 2019-01-24

## 2019-01-24 NOTE — TELEPHONE ENCOUNTER
Patients Rx for Trulance has been approved through her insurance company from 1/18/2019 to 1/18/2020. [facility] and the patient have both been notified.

## 2019-01-29 ENCOUNTER — OFFICE VISIT (OUTPATIENT)
Dept: ORTHOPEDIC SURGERY | Facility: CLINIC | Age: 59
End: 2019-01-29

## 2019-01-29 VITALS — BODY MASS INDEX: 36.7 KG/M2 | WEIGHT: 215 LBS | HEIGHT: 64 IN

## 2019-01-29 DIAGNOSIS — R20.0 NUMBNESS AND TINGLING IN BOTH HANDS: ICD-10-CM

## 2019-01-29 DIAGNOSIS — G56.01 CARPAL TUNNEL SYNDROME ON RIGHT: Primary | ICD-10-CM

## 2019-01-29 DIAGNOSIS — R20.2 NUMBNESS AND TINGLING IN BOTH HANDS: ICD-10-CM

## 2019-01-29 DIAGNOSIS — K75.81 NASH (NONALCOHOLIC STEATOHEPATITIS): ICD-10-CM

## 2019-01-29 PROCEDURE — 99214 OFFICE O/P EST MOD 30 MIN: CPT | Performed by: ORTHOPAEDIC SURGERY

## 2019-01-29 RX ORDER — BUPIVACAINE HCL/0.9 % NACL/PF 0.1 %
2 PLASTIC BAG, INJECTION (ML) EPIDURAL ONCE
Status: CANCELLED | OUTPATIENT
Start: 2019-02-13 | End: 2019-01-29

## 2019-01-29 NOTE — PROGRESS NOTES
"Amira Shannon is a 58 y.o. female returns for     Chief Complaint   Patient presents with   • Right Wrist - Carpal Tunnel       HISTORY OF PRESENT ILLNESS: f/u right carpal tunnel syndrome, patient is ready to have release done, left side 8/22/2018.   Having pain at night with numbness or tingling.  Did not get any improvement by wearing brace at night.  Pain and n/t with driving.  Weakness with   No specific injury       CONCURRENT MEDICAL HISTORY:    Past Medical History:   Diagnosis Date   • Acid reflux    • Altered bowel function    • Arthropathy of lumbar facet joint    • Bleeding disorder (CMS/HCC)    • C. difficile diarrhea 2015   • Constipation    • Corns and callus    • Depression    • Disease related peripheral neuropathy    • Epigastric pain    • Fatty liver    • Hammer toe    • Headache    • Hiatal hernia    • History of transfusion    • Hyperlipidemia    • Knee pain    • Localized, primary osteoarthritis of the ankle and foot     Localized, primary osteoarthritis of the ankle and/or foot   • Mendoza's metatarsalgia     Mendoza's metatarsalgia - 2nd interspace on right   • Nausea and vomiting    • Neuralgia and neuritis     Neuralgia, neuritis, and radiculitis, unspecified   • Neuropathy    • Obstructive sleep apnea     Obstructive sleep apnea (adult) (pediatric)    • CHAI on CPAP     \"C-Pap at night  (unconfirmed)\"   • Osteoarthritis    • Pain in foot     Pain in unspecified foot - sees a podiatrist   • Pain in joint, ankle and foot     Joint pain in ankle and foot      • Pain radiating to back     Pain radiating to lumbar region of back   • Plantar fasciitis    • PONV (postoperative nausea and vomiting)    • Restless leg syndrome    • Secondary hypertension     Secondary hypertension, unspecified   • Sinusitis    • Sleep apnea    • Tongue anomaly     lesion       Allergies   Allergen Reactions   • Other      Pt states that taking steroids either in pill or injection form make her have blisters " in her mouth and she feels like she is on fire on the inside/ has hx of c diff and possible MRSA     • Corticosteroids Rash   • Kenalog  [Triamcinolone Acetonide] Rash   • Sulfa Antibiotics Rash     Sulfa (Sulfonamide Antibiotics)       Current Outpatient Medications on File Prior to Visit   Medication Sig   • cetirizine (zyrTEC) 10 MG tablet Take 1 tablet by mouth Daily As Needed for Allergies.   • chlorthalidone (HYGROTON) 25 MG tablet Take 0.5 tablets by mouth Daily.   • DULoxetine (CYMBALTA) 30 MG capsule Take 30 mg by mouth Daily.   • gabapentin (NEURONTIN) 800 MG tablet Take 800 mg by mouth 4 (Four) Times a Day.   • Melatonin 10 MG capsule Take  by mouth Every Night.   • meloxicam (MOBIC) 15 MG tablet Take 15 mg by mouth Every Night.   • omeprazole (priLOSEC) 40 MG capsule Take 40 mg by mouth Daily.   • oxyCODONE-acetaminophen (PERCOCET) 7.5-325 MG per tablet Take 1 tablet by mouth 4 (Four) Times a Day As Needed.   • phenylephrine-mineral oil-petrolatum 0.25-14-74.9 % ointment hemorrhoidal ointment Insert 1 application into the rectum 3 (Three) Times a Day As Needed (rectal pain).   • Plecanatide (TRULANCE) 3 MG tablet Take 1 tablet by mouth Daily.   • polyethylene glycol (MIRALAX) powder Take 17 g by mouth Daily.   • spironolactone (ALDACTONE) 25 MG tablet Take 1 tablet by mouth Daily.     No current facility-administered medications on file prior to visit.        Past Surgical History:   Procedure Laterality Date   • CARPAL TUNNEL RELEASE Left 2018    Procedure: CARPAL TUNNEL RELEASE - left;  Surgeon: Justus Lewis MD;  Location: [facility] OR;  Service: Orthopedics   •  SECTION     • CHOLECYSTECTOMY     • COLONOSCOPY  2013   • COLONOSCOPY N/A 10/17/2018    Procedure: COLONOSCOPY;  Surgeon: Russell Del Rio MD;  Location: [facility] ENDOSCOPY;  Service: Gastroenterology   • DIRECT LARYNGOSCOPY, ESOPHAGOSCOPY, BRONCHOSCOPY N/A 2017    Procedure: DIRECT LARYNGOSCOPY AND;  Surgeon:  Live Bolton MD;  Location: [facility];  Service:    • ENDOSCOPY  07/01/2013    Colon endoscopy 25829 (1) - Internal & external hemorrhoids found. Stool found.   • ENDOSCOPY  07/01/2013    EGD w/ tube 43041 (1) - Normal esophagus. Gastritis in stomach. Biopsy taken. Normal dudoenum. Biopsy taken.   • ENDOSCOPY N/A 10/17/2018    Procedure: ESOPHAGOGASTRODUODENOSCOPY--eval varices;  Surgeon: Russell Del Rio MD;  Location: [facility] ENDOSCOPY;  Service: Gastroenterology   • FOOT SURGERY  02/26/2013    Foot/toes surgery procedure (1) - Arthroplasty of toes 4 and 5 of right foot.   • HERNIA REPAIR     • HERNIA REPAIR      hital   • HYSTERECTOMY     • LIVER BIOPSY     • NERVE BLOCK  07/25/2016    Injection for nerve block (1) - Lumbar medial branch block.   • OTHER SURGICAL HISTORY  12/03/2012    Inj(s) Tend-Sheath, Ligament, Single 20550 (1) - [facility]    • OTHER SURGICAL HISTORY  06/24/2013    Small Joint Injection/Aspiration 20600 (2) - [facility]ICK[facility] ([facility])    • OTHER SURGICAL HISTORY  2011    bowel obstruction x2   • OTHER SURGICAL HISTORY      gland removed from neck   • SUBLINGUAL SALIVARY CYST EXCISION N/A 8/1/2017    Procedure: EXCISION OF LEFT  TONGUE LESION WITH CLOSURE;  Surgeon: Live Bolton MD;  Location: [facility];  Service:    • TUBAL ABDOMINAL LIGATION     • UPPER GASTROINTESTINAL ENDOSCOPY  07/01/2013   • UPPER GASTROINTESTINAL ENDOSCOPY  10/17/2018       Family History   Problem Relation Age of Onset   • Cancer Other    • Diabetes Other    • Heart disease Other    • Hypertension Mother    • Cancer Mother    • Diabetes Mother    • Hypertension Father    • Heart attack Father    • Cancer Father    • Heart disease Father    • Thyroid disease Maternal Aunt        Social History     Socioeconomic History   • Marital status:      Spouse name: Not on file   • Number of children: Not on file   • Years of education: Not on file   • Highest education level: Not on file  "  Social Needs   • Financial resource strain: Not on file   • Food insecurity - worry: Not on file   • Food insecurity - inability: Not on file   • Transportation needs - medical: Not on file   • Transportation needs - non-medical: Not on file   Occupational History   • Not on file   Tobacco Use   • Smoking status: Former Smoker     Packs/day: 2.00     Years: 15.00     Pack years: 30.00     Types: Cigarettes     Last attempt to quit: 2000     Years since quittin.0   • Smokeless tobacco: Never Used   Substance and Sexual Activity   • Alcohol use: No   • Drug use: No   • Sexual activity: Defer     Comment: Marital Status:    Other Topics Concern   • Not on file   Social History Narrative   • Not on file           ROS  No fevers or chills.  No chest pain or shortness of air.  No GI or  disturbances.  Other than numbness and tingling in right hand, all other systems reviewed as negative.    PHYSICAL EXAMINATION:       Ht 162.6 cm (64\")   Wt 97.5 kg (215 lb)   LMP  (LMP Unknown)   BMI 36.90 kg/m²     Physical Exam   Constitutional: She is oriented to person, place, and time. She appears well-developed and well-nourished. No distress.   Cardiovascular: Normal rate, regular rhythm and normal heart sounds.   Pulmonary/Chest: Effort normal and breath sounds normal.   Abdominal: Soft. Bowel sounds are normal.   Neurological: She is alert and oriented to person, place, and time.   Psychiatric: She has a normal mood and affect. Her behavior is normal. Judgment and thought content normal.   Vitals reviewed.      GAIT:     [x]  Normal  []  Antalgic    Assistive device: [x]  None  []  Walker     []  Crutches  []  Cane     []  Wheelchair  []  Stretcher    Right Hand Exam     Tenderness   The patient is experiencing no tenderness.     Range of Motion   The patient has normal right wrist ROM.     Muscle Strength   : 4/5     Tests   Phalen’s sign: positive  Tinel's sign (median nerve): positive    Other "   Erythema: absent  Sensation: normal  Pulse: present      Left Hand Exam     Tenderness   The patient is experiencing no tenderness.     Range of Motion   The patient has normal left wrist ROM.    Muscle Strength   The patient has normal left wrist strength.    Tests   Phalen’s Sign: negative  Tinel's sign (median nerve): negative    Other   Erythema: absent  Scars: present  Sensation: normal  Pulse: present                [facility]  [facility]   [phone]     Nerve conduction test  6/28/18     Impression:  Bilateral moderate to severe distal median mononeuropathy at the wrist or carpal tunnel syndrome; the right hand is slightly worse than the left.     Right moderate to severe and chronic L5 and S1 lumbosacral radiculopathy.     Mild to moderate sensorimotor axonal peripheral neuropathy in the lower extremity.     Delgado Martinez MD            ASSESSMENT:    Diagnoses and all orders for this visit:    Carpal tunnel syndrome on right  -     Case Request; Standing  -     ceFAZolin (ANCEF) 2 g in sodium chloride 0.9 % 100 mL IVPB; Infuse 2 g into a venous catheter 1 (One) Time.    Numbness and tingling in both hands  -     Case Request; Standing  -     ceFAZolin (ANCEF) 2 g in sodium chloride 0.9 % 100 mL IVPB; Infuse 2 g into a venous catheter 1 (One) Time.    MUSA (nonalcoholic steatohepatitis)  -     Case Request; Standing  -     ceFAZolin (ANCEF) 2 g in sodium chloride 0.9 % 100 mL IVPB; Infuse 2 g into a venous catheter 1 (One) Time.    Other orders  -     Follow Anesthesia Guidelines / Standing Orders; Future  -     Provide instructions to patient regarding NPO status  -     Follow Anesthesia Guidelines / Standing Orders; Standing  -     Verify NPO Status; Standing  -     Clip operative site; Standing  -     Obtain informed consent (if not collected inpatient or PAT); Standing  -     NPO After Midnight          PLAN    The patient voiced understanding of the risks, benefits, and alternative  forms of treatment that were discussed and the patient consents to proceed with surgery.  All risks, benefits and alternatives were discussed.  Risks including but not exclusive to anesthetic complications, including death, MI, CVA, infection, bleeding DVT, fracture, residual pain and need for future surgery.    This discussion was held with the patient by Justus Lewis MD and all questions were answered.    Plan carpal tunnel release right hand with local/MAC      Patient's Body mass index is 36.9 kg/m². BMI is above normal parameters. Recommendations include: exercise counseling and nutrition counseling.    Return for Post-operative eval.    Justus Lewis MD

## 2019-02-11 ENCOUNTER — APPOINTMENT (OUTPATIENT)
Dept: PREADMISSION TESTING | Facility: HOSPITAL | Age: 59
End: 2019-02-11

## 2019-02-11 VITALS
DIASTOLIC BLOOD PRESSURE: 70 MMHG | WEIGHT: 212 LBS | OXYGEN SATURATION: 93 % | BODY MASS INDEX: 36.19 KG/M2 | SYSTOLIC BLOOD PRESSURE: 130 MMHG | HEIGHT: 64 IN | RESPIRATION RATE: 12 BRPM | HEART RATE: 99 BPM

## 2019-02-11 PROCEDURE — 93005 ELECTROCARDIOGRAM TRACING: CPT

## 2019-02-11 PROCEDURE — 93010 ELECTROCARDIOGRAM REPORT: CPT | Performed by: INTERNAL MEDICINE

## 2019-02-11 RX ORDER — OXYCODONE AND ACETAMINOPHEN 10; 325 MG/1; MG/1
1 TABLET ORAL EVERY 6 HOURS PRN
COMMUNITY
End: 2019-07-08 | Stop reason: ALTCHOICE

## 2019-02-11 RX ORDER — NYSTATIN 100000 [USP'U]/G
POWDER TOPICAL 4 TIMES DAILY PRN
COMMUNITY
End: 2021-03-10 | Stop reason: HOSPADM

## 2019-02-11 RX ORDER — SODIUM CHLORIDE 9 MG/ML
1000 INJECTION, SOLUTION INTRAVENOUS CONTINUOUS
Status: CANCELLED | OUTPATIENT
Start: 2019-02-13

## 2019-02-11 RX ORDER — CYCLOBENZAPRINE HCL 10 MG
10 TABLET ORAL 3 TIMES DAILY PRN
COMMUNITY
End: 2021-09-24 | Stop reason: HOSPADM

## 2019-02-11 RX ORDER — POLYETHYLENE GLYCOL 3350 17 G/17G
17 POWDER, FOR SOLUTION ORAL DAILY PRN
COMMUNITY
End: 2021-03-10 | Stop reason: HOSPADM

## 2019-02-11 NOTE — DISCHARGE INSTRUCTIONS
[facility]  Pre-op Information and Guidelines    You will be called after 2 p.m. the day before your surgery (Friday for Monday surgery) and notified of your time for arrival and approximate surgery time.  If you have not received a call by 4P.M., please contact Same Day Surgery at [phone] of if outside [facility] call [phone].    Please Follow these Important Safety Guidelines:    • The morning of your procedure, take only the medications listed below with   A sip of water:___ZYRTEC, DULOXETINE, GABAPENTIN,_______________       __OMEPRAZOLE, OXYCODONE_______________________    • DO NOT eat or drink anything after 12:00 midnight the night before surgery  Specific instructions concerning drinking clear liquids will be discussed during  the pre-surgery instruction call the day before your surgery.    • If you take a blood thinner (ex. Plavix, Coumadin, aspirin), ask your doctor when to stop it before surgery  STOP DATE: _________________    • Only 2 visitors are allowed in patient rooms at a time  Your visitors will be asked to wait in the lobby until the admission process is complete with the exception of a parent with a child and patients in need of special assistance.    • YOU CANNOT DRIVE YOURSELF HOME  You must be accompanied by someone who will be responsible for driving you home after surgery and for your care at home.    • DO NOT chew gum, use breath mints, hard candy, or smoke the day of surgery  • DO NOT drink alcohol for at least 24 hours before your surgery  • DO NOT wear any jewelry and remove all body piercing before coming to the hospital  • DO NOT wear make-up to the hospital  • If you are having surgery on an extremity (arm/leg/foot) remove nail polish/artificial nails on the surgical side  • Clothing, glasses, contacts, dentures, and hairpieces must be removed before surgery  • Bathe the night before or the morning of your surgery and do not use powders/lotions  on skin.

## 2019-02-13 ENCOUNTER — ANESTHESIA (OUTPATIENT)
Dept: PERIOP | Facility: HOSPITAL | Age: 59
End: 2019-02-13

## 2019-02-13 ENCOUNTER — HOSPITAL ENCOUNTER (OUTPATIENT)
Facility: HOSPITAL | Age: 59
Setting detail: HOSPITAL OUTPATIENT SURGERY
Discharge: HOME | End: 2019-02-13
Attending: ORTHOPAEDIC SURGERY | Admitting: ORTHOPAEDIC SURGERY

## 2019-02-13 ENCOUNTER — ANESTHESIA EVENT (OUTPATIENT)
Dept: PERIOP | Facility: HOSPITAL | Age: 59
End: 2019-02-13

## 2019-02-13 VITALS
TEMPERATURE: 97.5 F | WEIGHT: 214.73 LBS | SYSTOLIC BLOOD PRESSURE: 141 MMHG | HEART RATE: 73 BPM | RESPIRATION RATE: 20 BRPM | HEIGHT: 64 IN | BODY MASS INDEX: 36.66 KG/M2 | OXYGEN SATURATION: 94 % | DIASTOLIC BLOOD PRESSURE: 83 MMHG

## 2019-02-13 DIAGNOSIS — R20.0 NUMBNESS AND TINGLING IN BOTH HANDS: ICD-10-CM

## 2019-02-13 DIAGNOSIS — R20.2 NUMBNESS AND TINGLING IN BOTH HANDS: ICD-10-CM

## 2019-02-13 DIAGNOSIS — G56.01 CARPAL TUNNEL SYNDROME ON RIGHT: Primary | ICD-10-CM

## 2019-02-13 DIAGNOSIS — K75.81 NASH (NONALCOHOLIC STEATOHEPATITIS): ICD-10-CM

## 2019-02-13 DIAGNOSIS — E66.9 OBESITY WITH BODY MASS INDEX OF 30.0-39.9: ICD-10-CM

## 2019-02-13 LAB
ANION GAP SERPL CALCULATED.3IONS-SCNC: 8 MMOL/L (ref 5–15)
BUN BLD-MCNC: 17 MG/DL (ref 7–21)
BUN/CREAT SERPL: 18.5 (ref 7–25)
CALCIUM SPEC-SCNC: 9.3 MG/DL (ref 8.4–10.2)
CHLORIDE SERPL-SCNC: 102 MMOL/L (ref 95–110)
CO2 SERPL-SCNC: 28 MMOL/L (ref 22–31)
CREAT BLD-MCNC: 0.92 MG/DL (ref 0.5–1)
GFR SERPL CREATININE-BSD FRML MDRD: 62 ML/MIN/1.73 (ref 51–120)
GLUCOSE BLD-MCNC: 100 MG/DL (ref 60–100)
POTASSIUM BLD-SCNC: 3.7 MMOL/L (ref 3.5–5.1)
SODIUM BLD-SCNC: 138 MMOL/L (ref 137–145)

## 2019-02-13 PROCEDURE — 25010000002 FENTANYL CITRATE (PF) 100 MCG/2ML SOLUTION: Performed by: NURSE ANESTHETIST, CERTIFIED REGISTERED

## 2019-02-13 PROCEDURE — 64721 CARPAL TUNNEL SURGERY: CPT | Performed by: ORTHOPAEDIC SURGERY

## 2019-02-13 PROCEDURE — 80048 BASIC METABOLIC PNL TOTAL CA: CPT | Performed by: ANESTHESIOLOGY

## 2019-02-13 PROCEDURE — 25010000002 PROPOFOL 1000 MG/ML EMULSION: Performed by: NURSE ANESTHETIST, CERTIFIED REGISTERED

## 2019-02-13 PROCEDURE — 25010000002 PROPOFOL 10 MG/ML EMULSION: Performed by: NURSE ANESTHETIST, CERTIFIED REGISTERED

## 2019-02-13 PROCEDURE — 25010000002 ONDANSETRON PER 1 MG: Performed by: NURSE ANESTHETIST, CERTIFIED REGISTERED

## 2019-02-13 RX ORDER — SODIUM CHLORIDE, SODIUM GLUCONATE, SODIUM ACETATE, POTASSIUM CHLORIDE, AND MAGNESIUM CHLORIDE 526; 502; 368; 37; 30 MG/100ML; MG/100ML; MG/100ML; MG/100ML; MG/100ML
1000 INJECTION, SOLUTION INTRAVENOUS CONTINUOUS
Status: DISCONTINUED | OUTPATIENT
Start: 2019-02-13 | End: 2019-02-13 | Stop reason: HOSPADM

## 2019-02-13 RX ORDER — PROMETHAZINE HYDROCHLORIDE 25 MG/ML
12.5 INJECTION, SOLUTION INTRAMUSCULAR; INTRAVENOUS ONCE AS NEEDED
Status: DISCONTINUED | OUTPATIENT
Start: 2019-02-13 | End: 2019-02-13 | Stop reason: HOSPADM

## 2019-02-13 RX ORDER — BUPIVACAINE HYDROCHLORIDE 2.5 MG/ML
INJECTION, SOLUTION EPIDURAL; INFILTRATION; INTRACAUDAL AS NEEDED
Status: DISCONTINUED | OUTPATIENT
Start: 2019-02-13 | End: 2019-02-13 | Stop reason: HOSPADM

## 2019-02-13 RX ORDER — PROPOFOL 10 MG/ML
VIAL (ML) INTRAVENOUS AS NEEDED
Status: DISCONTINUED | OUTPATIENT
Start: 2019-02-13 | End: 2019-02-13 | Stop reason: SURG

## 2019-02-13 RX ORDER — LIDOCAINE HYDROCHLORIDE 10 MG/ML
INJECTION, SOLUTION INFILTRATION; PERINEURAL AS NEEDED
Status: DISCONTINUED | OUTPATIENT
Start: 2019-02-13 | End: 2019-02-13 | Stop reason: SURG

## 2019-02-13 RX ORDER — OXYCODONE HYDROCHLORIDE AND ACETAMINOPHEN 5; 325 MG/1; MG/1
1 TABLET ORAL ONCE AS NEEDED
Status: DISCONTINUED | OUTPATIENT
Start: 2019-02-13 | End: 2019-02-13 | Stop reason: HOSPADM

## 2019-02-13 RX ORDER — FENTANYL CITRATE 50 UG/ML
INJECTION, SOLUTION INTRAMUSCULAR; INTRAVENOUS AS NEEDED
Status: DISCONTINUED | OUTPATIENT
Start: 2019-02-13 | End: 2019-02-13 | Stop reason: SURG

## 2019-02-13 RX ORDER — MAGNESIUM HYDROXIDE 1200 MG/15ML
LIQUID ORAL AS NEEDED
Status: DISCONTINUED | OUTPATIENT
Start: 2019-02-13 | End: 2019-02-13 | Stop reason: HOSPADM

## 2019-02-13 RX ORDER — BUPIVACAINE HCL/0.9 % NACL/PF 0.1 %
2 PLASTIC BAG, INJECTION (ML) EPIDURAL ONCE
Status: COMPLETED | OUTPATIENT
Start: 2019-02-13 | End: 2019-02-13

## 2019-02-13 RX ORDER — SODIUM CHLORIDE 9 MG/ML
1000 INJECTION, SOLUTION INTRAVENOUS CONTINUOUS
Status: DISCONTINUED | OUTPATIENT
Start: 2019-02-13 | End: 2019-02-13

## 2019-02-13 RX ORDER — ONDANSETRON 2 MG/ML
INJECTION INTRAMUSCULAR; INTRAVENOUS AS NEEDED
Status: DISCONTINUED | OUTPATIENT
Start: 2019-02-13 | End: 2019-02-13 | Stop reason: SURG

## 2019-02-13 RX ORDER — ONDANSETRON 2 MG/ML
4 INJECTION INTRAMUSCULAR; INTRAVENOUS ONCE AS NEEDED
Status: DISCONTINUED | OUTPATIENT
Start: 2019-02-13 | End: 2019-02-13 | Stop reason: HOSPADM

## 2019-02-13 RX ADMIN — PROPOFOL 70 MCG/KG/MIN: 10 INJECTION, EMULSION INTRAVENOUS at 12:17

## 2019-02-13 RX ADMIN — FENTANYL CITRATE 50 MCG: 50 INJECTION, SOLUTION INTRAMUSCULAR; INTRAVENOUS at 12:17

## 2019-02-13 RX ADMIN — Medication 2 G: at 12:20

## 2019-02-13 RX ADMIN — SODIUM CHLORIDE, SODIUM GLUCONATE, SODIUM ACETATE, POTASSIUM CHLORIDE, AND MAGNESIUM CHLORIDE 1000 ML: 526; 502; 368; 37; 30 INJECTION, SOLUTION INTRAVENOUS at 11:06

## 2019-02-13 RX ADMIN — ONDANSETRON 4 MG: 2 INJECTION INTRAMUSCULAR; INTRAVENOUS at 12:28

## 2019-02-13 RX ADMIN — FENTANYL CITRATE 25 MCG: 50 INJECTION, SOLUTION INTRAMUSCULAR; INTRAVENOUS at 12:38

## 2019-02-13 RX ADMIN — FENTANYL CITRATE 25 MCG: 50 INJECTION, SOLUTION INTRAMUSCULAR; INTRAVENOUS at 12:43

## 2019-02-13 RX ADMIN — LIDOCAINE HYDROCHLORIDE 50 MG: 10 INJECTION, SOLUTION INFILTRATION; PERINEURAL at 12:17

## 2019-02-13 RX ADMIN — PROPOFOL 100 MG: 10 INJECTION, EMULSION INTRAVENOUS at 12:17

## 2019-02-13 NOTE — DISCHARGE INSTR - APPOINTMENTS
Physical therapy appt on 02/15/19 at [facility] at 10:15, Please be there at 10am.

## 2019-02-13 NOTE — ANESTHESIA PREPROCEDURE EVALUATION
Anesthesia Evaluation     Patient summary reviewed and Nursing notes reviewed   history of anesthetic complications: PONV  NPO Solid Status: > 8 hours  NPO Liquid Status: > 8 hours           Airway   Mallampati: II  TM distance: >3 FB  Neck ROM: full  No difficulty expected  Dental    (+) poor dentition and lower dentures    Pulmonary - normal exam   (+) shortness of breath, sleep apnea,   Cardiovascular     ECG reviewed  Rhythm: regular  Rate: normal    (+) hypertension, hyperlipidemia,       Neuro/Psych  (+) headaches, numbness, psychiatric history Anxiety,     GI/Hepatic/Renal/Endo    (+) obesity, morbid obesity, hiatal hernia, GERD poorly controlled,  hepatitis, liver disease fatty liver disease,     Musculoskeletal     Abdominal    Substance History      OB/GYN          Other   (+) arthritis                       Anesthesia Plan    ASA 3     MAC     intravenous induction   Anesthetic plan, all risks, benefits, and alternatives have been provided, discussed and informed consent has been obtained with: patient.

## 2019-02-13 NOTE — OP NOTE
CARPAL TUNNEL RELEASE  Procedure Note    Name:    Amira Shannon  YOB: 1960  Date of surgery:   2/13/2019    Pre-op Diagnosis:   Carpal tunnel syndrome on right [G56.01]  Numbness and tingling in both hands [R20.0, R20.2]  MUSA (nonalcoholic steatohepatitis) [K75.81]    Post-op Diagnosis:    Post-Op Diagnosis Codes:     * Carpal tunnel syndrome on right [G56.01]     * Numbness and tingling in both hands [R20.0, R20.2]     * MUSA (nonalcoholic steatohepatitis) [K75.81]    Procedure:  Procedure(s):  carpal tunnel release right hand with local/monitored anesthesia care    Surgeon:  Surgeon(s):  Justus Lewis MD    ASSISTANT:  Jessika Talley CSA    Anesthesia: Choice    Staff:   Circulator: Elba Reid RN  Scrub Person: Paola Jerome; Britt Branham  Assistant: Avtar Fang; Jessika Talley CSA    Estimated Blood Loss: minimal    Specimens:                None      Drains:  none    Findings:  As above    Complications: None    IMPLANTS:   Nothing was implanted during the procedure      PROCEDURE:    Once consent was obtained the patient was taken to the operating room and once adequate anesthesia was obtained the Right upper extremity was prepped and draped in the standard surgical fashion.  Pneumatic tourniquet was applied and inflated to 250 mmHg.  A surgical timeout was performed and verified.    An incision was then made at the base of the thenar eminence.  Dissection was carried down to the flexor retinaculum which was then incised with a #69 [place] blade.  A [place] elevator was then placed under the remainder of the flexor retinaculum distally while it was transected.  This was then repeated proximally with the [place] elevator protecting the median nerve.  The nerve was then mobilized medially and laterally and a neuro lysis was performed to ensure that there were no adhesions on the median nerve.  The tourniquet was let down and hemostasis was obtained.  The  wound was then copiously irrigated with bacitracin saline.  The wound was then closed with interrupted nylon sutures.  A bulky soft dressing was applied with a volar splint.  The patient was then awakened and taken to the recovery room in good condition having tolerated the procedure very well.  All sponge and needle counts were reported as correct prior to closure.        Justus Lewis MD     Date: 2/13/2019  Time: 1:01 PM

## 2019-02-13 NOTE — ANESTHESIA POSTPROCEDURE EVALUATION
Patient: Amira Shannon    Procedure Summary     Date:  02/13/19 Room / Location:  [facility] OR 09 / [facility] OR    Anesthesia Start:  1213 Anesthesia Stop:  1321    Procedure:  carpal tunnel release right hand with local/monitored anesthesia care (Right Wrist) Diagnosis:       Carpal tunnel syndrome on right      Numbness and tingling in both hands      MUSA (nonalcoholic steatohepatitis)      (Carpal tunnel syndrome on right [G56.01])      (Numbness and tingling in both hands [R20.0, R20.2])      (MUSA (nonalcoholic steatohepatitis) [K75.81])    Surgeon:  Justus Lewis MD Provider:  Terry Hernandez MD    Anesthesia Type:  MAC ASA Status:  3          Anesthesia Type: MAC  Last vitals  BP   129/74 (02/13/19 1309)   Temp   97.7 °F (36.5 °C) (02/13/19 1309)   Pulse   78 (02/13/19 1309)   Resp   20 (02/13/19 1309)     SpO2   95 % (02/13/19 1309)     Post Anesthesia Care and Evaluation    Patient location during evaluation: bedside  Patient participation: complete - patient participated  Level of consciousness: awake and alert  Pain score: 0  Pain management: adequate  Airway patency: patent  Anesthetic complications: No anesthetic complications  PONV Status: none  Cardiovascular status: acceptable  Respiratory status: acceptable  Hydration status: acceptable

## 2019-02-15 ENCOUNTER — HOSPITAL ENCOUNTER (OUTPATIENT)
Dept: PHYSICAL THERAPY | Facility: HOSPITAL | Age: 59
Setting detail: THERAPIES SERIES
Discharge: HOME | End: 2019-02-15

## 2019-02-15 DIAGNOSIS — R20.2 NUMBNESS AND TINGLING IN BOTH HANDS: ICD-10-CM

## 2019-02-15 DIAGNOSIS — K75.81 NONALCOHOLIC STEATOHEPATITIS (NASH): ICD-10-CM

## 2019-02-15 DIAGNOSIS — R20.0 NUMBNESS AND TINGLING IN BOTH HANDS: ICD-10-CM

## 2019-02-15 DIAGNOSIS — E66.9 OBESITY, UNSPECIFIED CLASSIFICATION, UNSPECIFIED OBESITY TYPE, UNSPECIFIED WHETHER SERIOUS COMORBIDITY PRESENT: ICD-10-CM

## 2019-02-15 DIAGNOSIS — G56.01 CARPAL TUNNEL SYNDROME ON RIGHT: Primary | ICD-10-CM

## 2019-02-15 PROCEDURE — 97110 THERAPEUTIC EXERCISES: CPT | Performed by: PHYSICAL THERAPIST

## 2019-02-15 PROCEDURE — 97162 PT EVAL MOD COMPLEX 30 MIN: CPT | Performed by: PHYSICAL THERAPIST

## 2019-02-15 NOTE — THERAPY EVALUATION
"    Outpatient Physical Therapy Ortho Initial Evaluation  [facility]  Marcella Perales, PT, DPT, CSCS       Patient Name: Amira Shannon  : 1960  MRN: [id number]  Today's Date: 2/15/2019      Visit Date: 02/15/2019     Pt reports 2/10 pain pre treatment, \"numb\"/10 pain post treatment  Reports N/A% of improvement.  Attended  visits.  Insurance available: 20 visits  Next MD appt: 2019.  Recertification: 2019.    Patient Active Problem List   Diagnosis   • Bilateral foot pain   • Right hip pain   • Acute pain of both knees   • Plantar fasciitis, bilateral   • Primary osteoarthritis of knee   • MUSA (nonalcoholic steatohepatitis)   • Tongue lesion   • Bilateral lower extremity edema   • Chronic pain of both knees   • Bilateral calcaneal spurs   • Swelling of both lower extremities   • Obesity with body mass index of 30.0-39.9   • Dyspnea   • Diastolic dysfunction   • Thrombocytopenia (CMS/HCC)   • Chronic fatigue   • Abdominal pain   • Constipation   • Acid reflux   • Depression   • Disease related peripheral neuropathy   • Epigastric pain   • Hyperlipidemia   • Nausea and vomiting   • Restless leg syndrome   • Sleep apnea   • Carpal tunnel syndrome on left   • Carpal tunnel syndrome on right   • Bilateral wrist pain   • Numbness and tingling in both hands   • SBO (small bowel obstruction) (CMS/HCC)   • Nausea   • Generalized abdominal pain   • Elevated liver enzymes   • History of small bowel obstruction   • Cirrhosis of liver without ascites (CMS/HCC)        Past Medical History:   Diagnosis Date   • Acid reflux    • Altered bowel function    • Arthropathy of lumbar facet joint    • Bleeding disorder (CMS/HCC)    • C. difficile diarrhea    • Constipation    • Corns and callus    • Depression    • Disease related peripheral neuropathy    • Epigastric pain    • Fatty liver    • Hammer toe    • Headache    • Hiatal hernia    • History of transfusion    • " "Hyperlipidemia    • Knee pain    • Localized, primary osteoarthritis of the ankle and foot     Localized, primary osteoarthritis of the ankle and/or foot   • Mendoza's metatarsalgia     Mendoza's metatarsalgia - 2nd interspace on right   • Nausea and vomiting    • Neuralgia and neuritis     Neuralgia, neuritis, and radiculitis, unspecified   • Neuropathy    • Obstructive sleep apnea     Obstructive sleep apnea (adult) (pediatric)    • CHAI on CPAP     \"C-Pap at night  (unconfirmed)\"   • Osteoarthritis    • Pain in foot     Pain in unspecified foot - sees a podiatrist   • Pain in joint, ankle and foot     Joint pain in ankle and foot      • Pain radiating to back     Pain radiating to lumbar region of back   • Plantar fasciitis    • PONV (postoperative nausea and vomiting)    • Restless leg syndrome    • Secondary hypertension     Secondary hypertension, unspecified   • Sinusitis    • Sleep apnea    • Tongue anomaly     lesion        Past Surgical History:   Procedure Laterality Date   • CARPAL TUNNEL RELEASE Left 2018    Procedure: CARPAL TUNNEL RELEASE - left;  Surgeon: Justus Lewis MD;  Location: [facility] OR;  Service: Orthopedics   • CARPAL TUNNEL RELEASE Right 2019    Procedure: carpal tunnel release right hand with local/monitored anesthesia care;  Surgeon: Justus Lewis MD;  Location: [facility] OR;  Service: Orthopedics   •  SECTION     • CHOLECYSTECTOMY     • COLONOSCOPY  2013   • COLONOSCOPY N/A 10/17/2018    Procedure: COLONOSCOPY;  Surgeon: Russell Del Rio MD;  Location: [facility] ENDOSCOPY;  Service: Gastroenterology   • DIRECT LARYNGOSCOPY, ESOPHAGOSCOPY, BRONCHOSCOPY N/A 2017    Procedure: DIRECT LARYNGOSCOPY AND;  Surgeon: Live Bolton MD;  Location: [facility] OR;  Service:    • ENDOSCOPY  2013    Colon endoscopy 30454 (1) - Internal & external hemorrhoids found. Stool found.   • ENDOSCOPY  2013    EGD w/ tube 93559 (1) - Normal esophagus. Gastritis " in stomach. Biopsy taken. Normal dudoenum. Biopsy taken.   • ENDOSCOPY N/A 10/17/2018    Procedure: ESOPHAGOGASTRODUODENOSCOPY--eval varices;  Surgeon: Russell Del Rio MD;  Location: [facility] ENDOSCOPY;  Service: Gastroenterology   • FOOT SURGERY  02/26/2013    Foot/toes surgery procedure (1) - Arthroplasty of toes 4 and 5 of right foot.   • HERNIA REPAIR     • HERNIA REPAIR      hital   • HYSTERECTOMY     • LIVER BIOPSY     • NERVE BLOCK  07/25/2016    Injection for nerve block (1) - Lumbar medial branch block.   • OTHER SURGICAL HISTORY  12/03/2012    Inj(s) Tend-Sheath, Ligament, Single 20550 (1) - [facility] ([facility])    • OTHER SURGICAL HISTORY  06/24/2013    Small Joint Injection/Aspiration 20600 (2) - [facility]ICK[facility] ([facility])    • OTHER SURGICAL HISTORY  2011    bowel obstruction x2   • OTHER SURGICAL HISTORY      gland removed from neck   • SUBLINGUAL SALIVARY CYST EXCISION N/A 8/1/2017    Procedure: EXCISION OF LEFT  TONGUE LESION WITH CLOSURE;  Surgeon: Live Bolton MD;  Location: [facility] OR;  Service:    • TUBAL ABDOMINAL LIGATION     • UPPER GASTROINTESTINAL ENDOSCOPY  07/01/2013   • UPPER GASTROINTESTINAL ENDOSCOPY  10/17/2018       Visit Dx:     ICD-10-CM ICD-9-CM   1. Carpal tunnel syndrome on right G56.01 354.0   2. Numbness and tingling in both hands R20.0 782.0    R20.2    3. Nonalcoholic steatohepatitis (MUSA) K75.81 571.8   4. Obesity, unspecified classification, unspecified obesity type, unspecified whether serious comorbidity present E66.9 278.00     Number of days off work: N/A    Patient is .    Patient has a grown child.    Allergies       Other   CorticosteroidsRash   Kenalog [Triamcinolone Acetonide]Rash   Sulfa AntibioticsRash         Medications      cetirizine (zyrTEC) 10 MG tablet     chlorthalidone (HYGROTON) 25 MG tablet     cyclobenzaprine (FLEXERIL) 10 MG tablet     DULoxetine (CYMBALTA) 30 MG capsule     gabapentin (NEURONTIN) 800 MG tablet     Melatonin 10 MG  capsule     meloxicam (MOBIC) 15 MG tablet     nystatin (MYCOSTATIN) 620028 UNIT/GM powder     omeprazole (priLOSEC) 40 MG capsule     oxyCODONE-acetaminophen (PERCOCET)  MG per tablet     Plecanatide (TRULANCE) 3 MG tablet     polyethylene glycol (MIRALAX) packet     spironolactone (ALDACTONE) 25 MG tablet          Patient History     Row Name 02/15/19 1000             History    Chief Complaint  Pain;Numbness  -AJ      Type of Pain  Hand pain  -AJ      Date Current Problem(s) Began  02/13/19 Chonic for CTS  -AJ      Brief Description of Current Complaint  Patient perorts her R hand has been giving her more trouble than her L and for longer periods of time. She rpeorts she reports her fingers still feel numb and on/off throbing.  -AJ      Previous treatment for THIS PROBLEM  Rehabilitation;Medication;Surgery  -AJ      Surgery Date:  02/13/19  -AJ      Patient/Caregiver Goals  Relieve pain;Improve mobility;Improve strength  -AJ      Current Tobacco Use  None  -AJ      Smoking Status  FOrmer smoker  -AJ      Patient's Rating of General Health  Fair  -AJ      Hand Dominance  right-handed  -AJ      Occupation/sports/leisure activities  Occupation: unemployed; Hobbies: gardening, flowers, mark, cleaning house  -AJ      Patient seeing anyone else for problem(s)?  Yes, Ortho  -AJ      What clinical tests have you had for this problem?  Nerve Conduction Test  -AJ      Results of Clinical Tests  B CTS  -AJ      Related/Recent Hospitalizations  Yes  -AJ      Date of Hospitalization  02/13/19  -AJ      Surgery/Hospitalization  [facility]  -AJ      History of Previous Related Injuries  None  -AJ      Are you or can you be pregnant  No  -AJ         Pain     Pain Location  Hand  -AJ      Pain at Present  3  -AJ      Pain at Best  1  -AJ      Pain at Worst  8  -AJ      Pain Frequency  Constant/continuous  -AJ      Pain Description  Burning;Sore  -AJ      What Performance Factors Make the Current Problem(s) WORSE?  liftng  "objects with the right hand, hanging hand down.  -AJ      What Performance Factors Make the Current Problem(s) BETTER?  rest, holding it up, pain meds  -AJ      Is your sleep disturbed?  Yes  -AJ      Is medication used to assist with sleep?  Yes  -AJ      Difficulties at work?  N/A  -AJ      Difficulties with ADL's?  dressing, grooming  -AJ      Difficulties with recreational activities?  \"All of them\"  -AJ        User Key  (r) = Recorded By, (t) = Taken By, (c) = Cosigned By    Initials Name Provider Type    AJ Marcella Perales, PT Physical Therapist          PT Ortho     Row Name 02/15/19 1000       Subjective Comments    Subjective Comments  Patient wishes ot get her hand back to good use.  -AJ       Precautions and Contraindications    Precautions/Limitations  fall precautions  -AJ       Subjective Pain    Able to rate subjective pain?  yes  -AJ    Pre-Treatment Pain Level  3  -AJ    Post-Treatment Pain Level  -- \"numb\"  -       Posture/Observations    Posture- WNL  Posture is WNL for B UEs  -    Observations  Incision healing  -    Posture/Observations Comments  No acute distress, wearing post op dressing on R hand, removed and incision healing with sutures in place. Incision recovered with band-aid and redressed at the end of treatment.  -AJ       Special Tests/Palpation    Special Tests/Palpation  -- Mod TTP at incision site.  -AJ       Right Upper Ext    Rt Elbow Supination AROM  WNL, ~80-90°  -AJ    Rt Elbow Pronation AROM  WNL, ~80-90°  -AJ    Rt Wrist Flexion AROM  70°  -AJ    Rt Wrist Extension AROM  40°  -AJ    Rt  Ulnar Deviation AROM  18°  -AJ    Rt  Radial Deviation AROM  6°  -AJ       Left Upper Ext    Lt Elbow Supination AROM  WNL, ~80-90°  -AJ    Lt Elbow Pronation AROM  WNL, ~80-90°  -AJ    Lt Wrist Flexion AROM  88°  -AJ    Lt Wrist Extension AROM  80°  -AJ    Lt  Ulnar Deviation AROM  34°  -AJ    Lt  Radial Deviation AROM  18°  -AJ       MMT (Manual Muscle Testing)    General MMT " Comments  L wrist 5/5; R not tested.  -AJ        Strength Right    # Reps  0  -AJ    Right Rung  2  -AJ        Strength Left    # Reps  2  -AJ    Left Rung  2  -AJ    Left  Test 1  40  -AJ    Left  Test 2  32  -     Strength Average Left  36  -AJ       Sensation    Sensation WNL?  WFL  -AJ    Light Touch  Partial deficits in the RUE;Partial deficits in the LUE  -AJ    Sharp/Dull  No apparent deficits  -    Additional Comments  Reports that she still has some decreased sensation in L hand in first 2 digits and first 3 in R hand.  -       Transfers    Comment (Transfers)  I with all transfers.  -       Gait/Stairs Assessment/Training    Comment (Gait/Stairs)  FWB, slowed antalgic gait, with hort step lengthe, normal arm swing B UEs with gait.  -AJ       Hand  Strength     Strength Affected Side  Bilateral  -      User Key  (r) = Recorded By, (t) = Taken By, (c) = Cosigned By    Initials Name Provider Type    Marcella Domingo, PT Physical Therapist                      Therapy Education  Given: HEP, Symptoms/condition management, Pain management(POC)  Program: New  How Provided: Verbal, Demonstration, Written  Provided to: Patient  Level of Understanding: Verbalized, Demonstrated     PT OP Goals     Row Name 02/15/19 1000          PT Short Term Goals    STG Date to Achieve  03/08/19  -     STG 1  I with HEP and have additions/changes by next recertification.  -     STG 2  AROM R wrist flexion >= 80°.  -     STG 3  AROm R wrist extension >= 60°.  -     STG 4  AROM R wrist RD >= 20°.  -     STG 5  Full composite fist L hand  -     STG 6  Able to oppost the L thumb to the 5th fat pad.  -AJ     STG 6 Progress  New  -     STG 6 Progress Comments  .  -     STG 7  AROm R wrist UD >= 30°.  -AJ     STG 7 Progress  New  -     STG 7 Progress Comments  .  -        Long Term Goals    LTG Date to Achieve  03/15/19  -     LTG 1  AROm for R wrist all WNL, no increase  in pain.  -     LTG 2  R wrist 5/5.  -     LTG 3  L  @ #2 setting >= 25#.  -     LTG 4  Patient ot report some of her sensation is starting to return in first 3 digits of L hand.  -     LTG 5  I with final HEP.  -     LTG 6  D/C wih a fianl HEp and free 30 day fitness formula memebership.  -        Time Calculation    PT Goal Re-Cert Due Date  03/08/19  -       User Key  (r) = Recorded By, (t) = Taken By, (c) = Cosigned By    Initials Name Provider Type    AJ Marcella Perales, PT Physical Therapist         Barriers to Rehab: Include significant or possible arthritic/degenerative changes that have occurred within the joints, The chronicity of this issue.    Safety Issues: None noted.      PT Assessment/Plan     Row Name 02/15/19 1000          PT Assessment    Functional Limitations  Limitations in community activities;Performance in leisure activities;Performance in self-care ADL  -     Impairments  Coordination;Endurance;Impaired flexibility;Impaired muscle endurance;Impaired muscle length;Impaired muscle power;Range of motion;Pain;Muscle strength;Joint mobility;Joint integrity  -     Assessment Comments  Patient did well with all ther ex and given written copy of HEP exercises.  -     Rehab Potential  Good  -     Patient/caregiver participated in establishment of treatment plan and goals  Yes  -     Patient would benefit from skilled therapy intervention  Yes  -        PT Plan    PT Frequency  2x/week  -     Predicted Duration of Therapy Intervention (Therapy Eval)  3-5 weeks, 6-10 visits  -     Planned CPT's?  PT EVAL MOD COMPLELITY: 53982;PT RE-EVAL: 60393;PT THER PROC EA 15 MIN: 97230;PT THER ACT EA 15 MIN: 97718;PT MANUAL THERAPY EA 15 MIN: 24832;PT THER SUPP EA 15 MIN  -     Physical Therapy Interventions (Optional Details)  fine motor skills;gross motor skills;home exercise program;manual therapy techniques;modalities;patient/family education;ROM (Range of  "Motion);strengthening;stretching  -     PT Plan Comments  Progress overall ROM, strength, function, dexterity.  -AJ       User Key  (r) = Recorded By, (t) = Taken By, (c) = Cosigned By    Initials Name Provider Type    Marcella Domingo, PT Physical Therapist       Other therapeutic activities and/or exercises will be prescribed depending on the patients progress or lack there of.    Modalities     Row Name 02/15/19 1000             Ice    Ice Applied  Yes  -AJ      Location  R hand  -AJ      Rx Minutes  10 mins  -AJ      Ice S/P Rx  Yes  -AJ        User Key  (r) = Recorded By, (t) = Taken By, (c) = Cosigned By    Initials Name Provider Type    Marcella Domingo, PT Physical Therapist        Exercises     Row Name 02/15/19 1000             Subjective Comments    Subjective Comments  Patient wishes ot get her hand back to good use.  -         Subjective Pain    Able to rate subjective pain?  yes  -AJ      Pre-Treatment Pain Level  3  -AJ      Post-Treatment Pain Level  -- \"numb\"  -         Exercise 1    Exercise Name 1  6 pack  -AJ      Reps 1  20 each  -AJ         Exercise 2    Exercise Name 2  AROm wrist flex/ext  -AJ      Reps 2  20 each  -AJ         Exercise 3    Exercise Name 3  AROM wrist UD/RD  -AJ      Reps 3  20 each  -AJ         Exercise 4    Exercise Name 4  AROM  forearm pro/sup  -         Exercise 5    Exercise Name 5  Mod CT 1 S (elbow bent at side)  -AJ      Reps 5  2  -AJ      Time 5  1 minute  -AJ         Exercise 6    Exercise Name 6  Towel squeeze  -      Time 6  5\" holds for 5 minutes  -AJ        User Key  (r) = Recorded By, (t) = Taken By, (c) = Cosigned By    Initials Name Provider Type    Marcella Domingo, PT Physical Therapist                        Outcome Measure Options: Quick DASH  Quick DASH  Open a tight or new jar.: Unable  Do heavy household chores (e.g., wash walls, wash floors): Unable  Carry a shopping bag or briefcase: Unable  Wash your back: " Unable  Use a knife to cut food: Unable  Recreational activities in which you take some force or impact through your arm, should or hand (e.g. golf, hammering, tennis, etc.): Unable  During the past week, to what extent has your arm, shoulder, or hand problem interfered with your normal social activites with family, friends, neighbors or groups?: Extremely  During the past week, were you limited in your work or other regular daily activities as a result of your arm, shoulder or hand problem?: Unable  Arm, Shoulder, or hand pain: Mild  Tingling (pins and needles) in your arm, shoulder, or hand: Mild  During the past week, how much difficulty have you had sleeping because of the pain in your arm, shoulder or hand?: Mild Difficulty  Number of Questions Answered: 11  Quick DASH Score: 79.55         Time Calculation:   Start Time: 1013  Stop Time: 1118  Time Calculation (min): 65 min  PT Non-Billable Time (min): 10 min  Total Timed Code Minutes- PT: 40 minute(s)     Therapy Charges for Today     Code Description Service Date Service Provider Modifiers Qty    58599964740 HC PT EVAL MOD COMPLEXITY 1 2/15/2019 Marcella Perales, PT GP 1    55022189699 HC PT THER PROC EA 15 MIN 2/15/2019 Marcella Perales, PT GP 3    53390894282 HC PT THER SUPP EA 15 MIN 2/15/2019 Marcella Perales, PT GP 1          PT G-Codes  Outcome Measure Options: Quick DASH  Quick DASH Score: 79.55         Marcella Perales PT, DPT, CSCS  2/15/2019

## 2019-02-19 ENCOUNTER — HOSPITAL ENCOUNTER (OUTPATIENT)
Dept: PHYSICAL THERAPY | Facility: HOSPITAL | Age: 59
Setting detail: THERAPIES SERIES
Discharge: HOME | End: 2019-02-19

## 2019-02-19 DIAGNOSIS — G56.01 CARPAL TUNNEL SYNDROME ON RIGHT: Primary | ICD-10-CM

## 2019-02-19 PROCEDURE — 97110 THERAPEUTIC EXERCISES: CPT | Performed by: PHYSICAL THERAPIST

## 2019-02-19 NOTE — THERAPY TREATMENT NOTE
Outpatient Physical Therapy Ortho Treatment Note  [facility]     Patient Name: Amira Shannon  : 1960  MRN: [id number]  Today's Date: 2019      Visit Date: 2019  Pt reports 4/10 pain pre treatment, 3/10 pain post treatment  Reports N/A% of improvement.  Attended 2/2 visits.  Insurance available: 20 visits  Next MD appt: 2019.  Recertification: 2019.      Visit Dx:    ICD-10-CM ICD-9-CM   1. Carpal tunnel syndrome on right G56.01 354.0       Patient Active Problem List   Diagnosis   • Bilateral foot pain   • Right hip pain   • Acute pain of both knees   • Plantar fasciitis, bilateral   • Primary osteoarthritis of knee   • MUSA (nonalcoholic steatohepatitis)   • Tongue lesion   • Bilateral lower extremity edema   • Chronic pain of both knees   • Bilateral calcaneal spurs   • Swelling of both lower extremities   • Obesity with body mass index of 30.0-39.9   • Dyspnea   • Diastolic dysfunction   • Thrombocytopenia (CMS/HCC)   • Chronic fatigue   • Abdominal pain   • Constipation   • Acid reflux   • Depression   • Disease related peripheral neuropathy   • Epigastric pain   • Hyperlipidemia   • Nausea and vomiting   • Restless leg syndrome   • Sleep apnea   • Carpal tunnel syndrome on left   • Carpal tunnel syndrome on right   • Bilateral wrist pain   • Numbness and tingling in both hands   • SBO (small bowel obstruction) (CMS/HCC)   • Nausea   • Generalized abdominal pain   • Elevated liver enzymes   • History of small bowel obstruction   • Cirrhosis of liver without ascites (CMS/HCC)        Past Medical History:   Diagnosis Date   • Acid reflux    • Altered bowel function    • Arthropathy of lumbar facet joint    • Bleeding disorder (CMS/HCC)    • C. difficile diarrhea    • Constipation    • Corns and callus    • Depression    • Disease related peripheral neuropathy    • Epigastric pain    • Fatty liver    • Hammer toe    • Headache    • Hiatal hernia    •  "History of transfusion    • Hyperlipidemia    • Knee pain    • Localized, primary osteoarthritis of the ankle and foot     Localized, primary osteoarthritis of the ankle and/or foot   • Mendoza's metatarsalgia     Mendoza's metatarsalgia - 2nd interspace on right   • Nausea and vomiting    • Neuralgia and neuritis     Neuralgia, neuritis, and radiculitis, unspecified   • Neuropathy    • Obstructive sleep apnea     Obstructive sleep apnea (adult) (pediatric)    • CHAI on CPAP     \"C-Pap at night  (unconfirmed)\"   • Osteoarthritis    • Pain in foot     Pain in unspecified foot - sees a podiatrist   • Pain in joint, ankle and foot     Joint pain in ankle and foot      • Pain radiating to back     Pain radiating to lumbar region of back   • Plantar fasciitis    • PONV (postoperative nausea and vomiting)    • Restless leg syndrome    • Secondary hypertension     Secondary hypertension, unspecified   • Sinusitis    • Sleep apnea    • Tongue anomaly     lesion        Past Surgical History:   Procedure Laterality Date   • CARPAL TUNNEL RELEASE Left 2018    Procedure: CARPAL TUNNEL RELEASE - left;  Surgeon: Justus Lewis MD;  Location: [facility] OR;  Service: Orthopedics   • CARPAL TUNNEL RELEASE Right 2019    Procedure: carpal tunnel release right hand with local/monitored anesthesia care;  Surgeon: Justus Lewis MD;  Location: [facility] OR;  Service: Orthopedics   •  SECTION     • CHOLECYSTECTOMY     • COLONOSCOPY  2013   • COLONOSCOPY N/A 10/17/2018    Procedure: COLONOSCOPY;  Surgeon: Russell Del Rio MD;  Location: [facility] ENDOSCOPY;  Service: Gastroenterology   • DIRECT LARYNGOSCOPY, ESOPHAGOSCOPY, BRONCHOSCOPY N/A 2017    Procedure: DIRECT LARYNGOSCOPY AND;  Surgeon: Live Bolton MD;  Location: [facility] OR;  Service:    • ENDOSCOPY  2013    Colon endoscopy 82935 (1) - Internal & external hemorrhoids found. Stool found.   • ENDOSCOPY  2013    EGD w/ tube 21140 (1) - " Normal esophagus. Gastritis in stomach. Biopsy taken. Normal dudoenum. Biopsy taken.   • ENDOSCOPY N/A 10/17/2018    Procedure: ESOPHAGOGASTRODUODENOSCOPY--eval varices;  Surgeon: Russell Del Rio MD;  Location: [facility] ENDOSCOPY;  Service: Gastroenterology   • FOOT SURGERY  02/26/2013    Foot/toes surgery procedure (1) - Arthroplasty of toes 4 and 5 of right foot.   • HERNIA REPAIR     • HERNIA REPAIR      hital   • HYSTERECTOMY     • LIVER BIOPSY     • NERVE BLOCK  07/25/2016    Injection for nerve block (1) - Lumbar medial branch block.   • OTHER SURGICAL HISTORY  12/03/2012    Inj(s) Tend-Sheath, Ligament, Single 20550 (1) - [facility] NICKERSON ([facility])    • OTHER SURGICAL HISTORY  06/24/2013    Small Joint Injection/Aspiration 20600 (2) - [facility]ICK[facility] ([facility])    • OTHER SURGICAL HISTORY  2011    bowel obstruction x2   • OTHER SURGICAL HISTORY      gland removed from neck   • SUBLINGUAL SALIVARY CYST EXCISION N/A 8/1/2017    Procedure: EXCISION OF LEFT  TONGUE LESION WITH CLOSURE;  Surgeon: Live Bolton MD;  Location: [facility] OR;  Service:    • TUBAL ABDOMINAL LIGATION     • UPPER GASTROINTESTINAL ENDOSCOPY  07/01/2013   • UPPER GASTROINTESTINAL ENDOSCOPY  10/17/2018       PT Ortho     Row Name 02/19/19 7436       Precautions and Contraindications    Precautions/Limitations  fall precautions  -BS      User Key  (r) = Recorded By, (t) = Taken By, (c) = Cosigned By    Initials Name Provider Type    Lenard Tarango, PT Physical Therapist                      PT Assessment/Plan     Row Name 02/19/19 9516          PT Assessment    Assessment Comments  pt with good activity tolerance, no increase in pain post tx.  -BS        PT Plan    PT Frequency  2x/week  -BS     PT Plan Comments  continue per poc.  -BS       User Key  (r) = Recorded By, (t) = Taken By, (c) = Cosigned By    Initials Name Provider Type    Lenard Tarango, PT Physical Therapist          Modalities     Row Name 02/19/19 7855              "Subjective Comments    Subjective Comments  pt reports feels the right hand is healing since surgery.  -BS         Subjective Pain    Able to rate subjective pain?  yes  -BS      Pre-Treatment Pain Level  4  -BS         Ice    Ice Applied  Yes  -BS      Location  R hand  -BS      Rx Minutes  10 mins  -BS      Ice S/P Rx  Yes  -BS        User Key  (r) = Recorded By, (t) = Taken By, (c) = Cosigned By    Initials Name Provider Type    BS Lenard Covington, PT Physical Therapist          Exercises     Row Name 02/19/19 1700             Subjective Comments    Subjective Comments  pt reports feels the right hand is healing since surgery.  -BS         Subjective Pain    Able to rate subjective pain?  yes  -BS      Pre-Treatment Pain Level  4  -BS         Exercise 1    Exercise Name 1  6 pack  -BS      Reps 1  20 each  -BS         Exercise 2    Exercise Name 2  R wrist flex/ext S  -BS      Sets 2  1  -BS      Reps 2  2  -BS      Time 2  30\" hold  -BS      Additional Comments  gentle  -BS         Exercise 3    Exercise Name 3  AROM wrist UD/RD  -BS      Reps 3  20 each  -BS         Exercise 4    Exercise Name 4  AROM  forearm pro/sup  -BS      Reps 4  20 ea  -BS         Exercise 5    Exercise Name 5  prayer stretch  -BS      Sets 5  1  -BS      Reps 5  2  -BS      Time 5  30\" hold  -BS         Exercise 6    Exercise Name 6  Towel squeeze  -BS      Time 6  5\" holds for 5 minutes  -BS        User Key  (r) = Recorded By, (t) = Taken By, (c) = Cosigned By    Initials Name Provider Type    BS Lenard Covington, PT Physical Therapist                         PT OP Goals     Row Name 02/19/19 1654          PT Short Term Goals    STG Date to Achieve  03/08/19  -BS     STG 1  I with HEP and have additions/changes by next recertification.  -BS     STG 2  AROM R wrist flexion >= 80°.  -BS     STG 2 Progress  Ongoing  -BS     STG 3  AROm R wrist extension >= 60°.  -BS     STG 3 Progress  Ongoing  -BS     STG 4  AROM R wrist RD >= 20°.  -BS     " STG 4 Progress  Ongoing  -BS     STG 5  Full composite fist L hand  -BS     STG 5 Progress  Ongoing  -BS     STG 6  Able to oppost the L thumb to the 5th fat pad.  -BS     STG 6 Progress  Ongoing  -BS     STG 6 Progress Comments  .  -BS     STG 7  AROm R wrist UD >= 30°.  -BS     STG 7 Progress  Ongoing  -BS     STG 7 Progress Comments  .  -BS        Long Term Goals    LTG Date to Achieve  03/15/19  -BS     LTG 1  AROm for R wrist all WNL, no increase in pain.  -BS     LTG 1 Progress  Ongoing  -BS     LTG 2  R wrist 5/5.  -BS     LTG 2 Progress  Ongoing  -BS     LTG 3  L  @ #2 setting >= 25#.  -BS     LTG 3 Progress  Ongoing  -BS     LTG 4  Patient ot report some of her sensation is starting to return in first 3 digits of L hand.  -BS     LTG 4 Progress  Progressing  -BS     LTG 5  I with final HEP.  -BS     LTG 6  D/C wih a fianl HEp and free 30 day fitness formula memebership.  -BS        Time Calculation    PT Goal Re-Cert Due Date  03/08/19  -BS       User Key  (r) = Recorded By, (t) = Taken By, (c) = Cosigned By    Initials Name Provider Type    Lenard Tarango, PT Physical Therapist          Therapy Education  Education Details: cande SHAH  Given: HEP, Symptoms/condition management, Pain management  Program: New, Reinforced  How Provided: Verbal, Demonstration  Provided to: Patient  Level of Understanding: Teach back education performed              Time Calculation:   Start Time: 1654  Stop Time: 1735  Time Calculation (min): 41 min  PT Non-Billable Time (min): 10 min  Total Timed Code Minutes- PT: 31 minute(s)  Therapy Suggested Charges     Code   Minutes Charges    None           Therapy Charges for Today     Code Description Service Date Service Provider Modifiers Qty    39738216106 HC PT THER PROC EA 15 MIN 2/19/2019 Lenard Covington, PT GP 2    17219315527 HC PT THER SUPP EA 15 MIN 2/19/2019 Lenard Covington, PT GP 1                    Lenard Covington, PT  2/19/2019

## 2019-02-22 ENCOUNTER — HOSPITAL ENCOUNTER (OUTPATIENT)
Dept: PHYSICAL THERAPY | Facility: HOSPITAL | Age: 59
Setting detail: THERAPIES SERIES
Discharge: HOME | End: 2019-02-22

## 2019-02-22 DIAGNOSIS — R20.0 NUMBNESS AND TINGLING IN BOTH HANDS: ICD-10-CM

## 2019-02-22 DIAGNOSIS — G56.01 CARPAL TUNNEL SYNDROME ON RIGHT: Primary | ICD-10-CM

## 2019-02-22 DIAGNOSIS — R20.2 NUMBNESS AND TINGLING IN BOTH HANDS: ICD-10-CM

## 2019-02-22 PROCEDURE — 97110 THERAPEUTIC EXERCISES: CPT | Performed by: SPECIALIST/TECHNOLOGIST

## 2019-02-22 NOTE — THERAPY TREATMENT NOTE
"    [place]    Patients reports pain as:  3-4/10   Patient reports overall % improvement as:  \"yes\"   Patients attendance has been:  3/3   Insurance visits available  20  visits   Recert Date is:  19   Next MD visit is:  next week         Outpatient Physical Therapy Ortho Treatment Note  [facility]     Patient Name: Amira Shannon  : 1960  MRN: [id number]  Today's Date: 2019      Visit Date: 2019    Visit Dx:    ICD-10-CM ICD-9-CM   1. Carpal tunnel syndrome on right G56.01 354.0   2. Numbness and tingling in both hands R20.0 782.0    R20.2        Patient Active Problem List   Diagnosis   • Bilateral foot pain   • Right hip pain   • Acute pain of both knees   • Plantar fasciitis, bilateral   • Primary osteoarthritis of knee   • MUSA (nonalcoholic steatohepatitis)   • Tongue lesion   • Bilateral lower extremity edema   • Chronic pain of both knees   • Bilateral calcaneal spurs   • Swelling of both lower extremities   • Obesity with body mass index of 30.0-39.9   • Dyspnea   • Diastolic dysfunction   • Thrombocytopenia (CMS/HCC)   • Chronic fatigue   • Abdominal pain   • Constipation   • Acid reflux   • Depression   • Disease related peripheral neuropathy   • Epigastric pain   • Hyperlipidemia   • Nausea and vomiting   • Restless leg syndrome   • Sleep apnea   • Carpal tunnel syndrome on left   • Carpal tunnel syndrome on right   • Bilateral wrist pain   • Numbness and tingling in both hands   • SBO (small bowel obstruction) (CMS/HCC)   • Nausea   • Generalized abdominal pain   • Elevated liver enzymes   • History of small bowel obstruction   • Cirrhosis of liver without ascites (CMS/HCC)        Past Medical History:   Diagnosis Date   • Acid reflux    • Altered bowel function    • Arthropathy of lumbar facet joint    • Bleeding disorder (CMS/HCC)    • C. difficile diarrhea    • Constipation    • Corns and callus    • Depression    • Disease related peripheral neuropathy  " "  • Epigastric pain    • Fatty liver    • Hammer toe    • Headache    • Hiatal hernia    • History of transfusion    • Hyperlipidemia    • Knee pain    • Localized, primary osteoarthritis of the ankle and foot     Localized, primary osteoarthritis of the ankle and/or foot   • Mendoza's metatarsalgia     Mendoza's metatarsalgia - 2nd interspace on right   • Nausea and vomiting    • Neuralgia and neuritis     Neuralgia, neuritis, and radiculitis, unspecified   • Neuropathy    • Obstructive sleep apnea     Obstructive sleep apnea (adult) (pediatric)    • CHAI on CPAP     \"C-Pap at night  (unconfirmed)\"   • Osteoarthritis    • Pain in foot     Pain in unspecified foot - sees a podiatrist   • Pain in joint, ankle and foot     Joint pain in ankle and foot      • Pain radiating to back     Pain radiating to lumbar region of back   • Plantar fasciitis    • PONV (postoperative nausea and vomiting)    • Restless leg syndrome    • Secondary hypertension     Secondary hypertension, unspecified   • Sinusitis    • Sleep apnea    • Tongue anomaly     lesion        Past Surgical History:   Procedure Laterality Date   • CARPAL TUNNEL RELEASE Left 2018    Procedure: CARPAL TUNNEL RELEASE - left;  Surgeon: Justus Lewis MD;  Location: [facility] OR;  Service: Orthopedics   • CARPAL TUNNEL RELEASE Right 2019    Procedure: carpal tunnel release right hand with local/monitored anesthesia care;  Surgeon: Justus Lewis MD;  Location: [facility] OR;  Service: Orthopedics   •  SECTION     • CHOLECYSTECTOMY     • COLONOSCOPY  2013   • COLONOSCOPY N/A 10/17/2018    Procedure: COLONOSCOPY;  Surgeon: Russell Del Rio MD;  Location: [facility] ENDOSCOPY;  Service: Gastroenterology   • DIRECT LARYNGOSCOPY, ESOPHAGOSCOPY, BRONCHOSCOPY N/A 2017    Procedure: DIRECT LARYNGOSCOPY AND;  Surgeon: Live Bolton MD;  Location: [facility] OR;  Service:    • ENDOSCOPY  2013    Colon endoscopy 07477 (1) - Internal & " external hemorrhoids found. Stool found.   • ENDOSCOPY  07/01/2013    EGD w/ tube 53699 (1) - Normal esophagus. Gastritis in stomach. Biopsy taken. Normal dudoenum. Biopsy taken.   • ENDOSCOPY N/A 10/17/2018    Procedure: ESOPHAGOGASTRODUODENOSCOPY--eval varices;  Surgeon: Russell Del Rio MD;  Location: [facility] ENDOSCOPY;  Service: Gastroenterology   • FOOT SURGERY  02/26/2013    Foot/toes surgery procedure (1) - Arthroplasty of toes 4 and 5 of right foot.   • HERNIA REPAIR     • HERNIA REPAIR      hital   • HYSTERECTOMY     • LIVER BIOPSY     • NERVE BLOCK  07/25/2016    Injection for nerve block (1) - Lumbar medial branch block.   • OTHER SURGICAL HISTORY  12/03/2012    Inj(s) Tend-Sheath, Ligament, Single 20550 (1) - PORTER NICKERSON ([facility])    • OTHER SURGICAL HISTORY  06/24/2013    Small Joint Injection/Aspiration 20600 (2) - [facility]ICK[facility] ([facility])    • OTHER SURGICAL HISTORY  2011    bowel obstruction x2   • OTHER SURGICAL HISTORY      gland removed from neck   • SUBLINGUAL SALIVARY CYST EXCISION N/A 8/1/2017    Procedure: EXCISION OF LEFT  TONGUE LESION WITH CLOSURE;  Surgeon: Live Bolton MD;  Location: [facility] OR;  Service:    • TUBAL ABDOMINAL LIGATION     • UPPER GASTROINTESTINAL ENDOSCOPY  07/01/2013   • UPPER GASTROINTESTINAL ENDOSCOPY  10/17/2018       PT Ortho     Row Name 02/22/19 1000       Precautions and Contraindications    Precautions/Limitations  fall precautions  (Pended)   -ML       Posture/Observations    Posture/Observations Comments  no acute distress.  presents w/ ace wrap and cock up splint w/ band aid over incision.  minor redness around incsion.  sutures intact.  very minor drainage on band-aid approx 10% over dressing.  changed band-aid and redress w/ 4 wing coverlett.    (Pended)   -ML       Right Upper Ext    Rt Wrist Flexion AROM  80°  (Pended)   -ML    Rt Wrist Extension AROM  60°  (Pended)   -ML    Row Name 02/19/19 1700       Precautions and Contraindications     Precautions/Limitations  fall precautions  -BS      User Key  (r) = Recorded By, (t) = Taken By, (c) = Cosigned By    Initials Name Provider Type    Reece Boyer, [place]     BS Lenard Covington, PT Physical Therapist                      PT Assessment/Plan     Row Name 02/22/19 1000          PT Assessment    Assessment Comments  per 1° able to wear splint and ace wrap at night only.  met STG 2,3,5.  partially met STG 6. felt good w/ ther-ex.  no > pain w/ rx.    (Pended)   -ML        PT Plan    PT Frequency  2x/week  (Pended)   -ML     PT Plan Comments  check response.  see if able to meet STG 6.  progress overall.  MD visit soon to remove sutures.    (Pended)   -ML       User Key  (r) = Recorded By, (t) = Taken By, (c) = Cosigned By    Initials Name Provider Type    Reece Boyer, [place]           Modalities     Row Name 02/22/19 1000             Ice    Location  R hand  (Pended)   -ML      Rx Minutes  10 mins  (Pended)   -ML      Ice S/P Rx  Yes  (Pended)   -ML        User Key  (r) = Recorded By, (t) = Taken By, (c) = Cosigned By    Initials Name Provider Type    Reece Boyer, [place]           Exercises     Row Name 02/22/19 1000             Subjective Comments    Subjective Comments  pt inquires if able to stop wearing splint and ace wrap.  feels hand/wrist is improving.  has some concern with wound drainage.    (Pended)   -ML         Subjective Pain    Able to rate subjective pain?  yes  (Pended)   -ML      Pre-Treatment Pain Level  3  (Pended)   -ML      Post-Treatment Pain Level  0  (Pended)   -ML         Exercise 1    Exercise Name 1  Wrist AROM cirlces  (Pended)   -ML      Reps 1  20  (Pended)   -ML         Exercise 2    Exercise Name 2  Fist to stars  (Pended)   -ML      Reps 2  20  (Pended)   -ML         Exercise 3    Exercise Name 3  Wrist ext (lift offs)  (Pended)   -ML      Reps 3  20  (Pended)   -ML      Additional Comments  palm flat on table   (Pended)   -ML         Exercise 4    Exercise Name 4  Wrist UD/RD  (Pended)   -ML      Reps 4  20  (Pended)   -ML         Exercise 5    Exercise Name 5  Cloth pin on/off using all digits  (Pended)   -ML      Reps 5  1x ea  (Pended)   -ML         Exercise 6    Exercise Name 6  foam ball incision roll  (Pended)   -ML      Time 6  3 min.  (Pended)   -ML      Additional Comments  gentle desensitization. incision covered w/ band air  (Pended)   -ML         Exercise 7    Exercise Name 7  claws/ table tops  (Pended)   -ML      Reps 7  20  (Pended)   -ML         Exercise 8    Exercise Name 8  Towel squeeze  (Pended)   -ML      Sets 8  2  (Pended)   -ML      Time 8  2 min  (Pended)   -ML         Exercise 9    Exercise Name 9  Thumb circles  (Pended)   -ML      Reps 9  20  (Pended)   -ML      Additional Comments  cw/ccw  (Pended)   -ML         Exercise 10    Exercise Name 10  Prayer S  (Pended)   -ML      Sets 10  2  (Pended)   -ML      Time 10  30 sec  (Pended)   -ML         Exercise 11    Exercise Name 11  Wrist ext S (CT1)  (Pended)   -ML      Reps 11  1 min  (Pended)   -ML      Additional Comments  gentle pull at digits- no stress to sutures.    (Pended)   -ML        User Key  (r) = Recorded By, (t) = Taken By, (c) = Cosigned By    Initials Name Provider Type    Reece Boyer, ATC                          PT OP Goals     Row Name 02/22/19 1000          PT Short Term Goals    STG Date to Achieve  03/08/19  (Pended)   -ML     STG 1  I with HEP and have additions/changes by next recertification.  (Pended)   -ML     STG 2  AROM R wrist flexion >= 80°.  (Pended)   -ML     STG 2 Progress  Met  (Pended)   -ML     STG 3  AROm R wrist extension >= 60°.  (Pended)   -ML     STG 3 Progress  Met  (Pended)   -ML     STG 4  AROM R wrist RD >= 20°.  (Pended)   -ML     STG 4 Progress  Ongoing  (Pended)   -ML     STG 5  Full composite fist L hand  (Pended)   -ML     STG 5 Progress  Met  (Pended)   -ML     STG 6   Able to oppost the L thumb to the 5th fat pad.  (Pended)   -ML     STG 6 Progress  Partially Met  (Pended)   -ML     STG 6 Progress Comments  .  (Pended)   -ML     STG 7  AROm R wrist UD >= 30°.  (Pended)   -ML     STG 7 Progress  Ongoing  (Pended)   -ML     STG 7 Progress Comments  .  (Pended)   -ML        Long Term Goals    LTG Date to Achieve  03/15/19  (Pended)   -ML     LTG 1  AROm for R wrist all WNL, no increase in pain.  (Pended)   -ML     LTG 1 Progress  Ongoing  (Pended)   -ML     LTG 2  R wrist 5/5.  (Pended)   -ML     LTG 2 Progress  Ongoing  (Pended)   -ML     LTG 3  L  @ #2 setting >= 25#.  (Pended)   -ML     LTG 3 Progress  Ongoing  (Pended)   -ML     LTG 4  Patient ot report some of her sensation is starting to return in first 3 digits of L hand.  (Pended)   -ML     LTG 4 Progress  Progressing  (Pended)   -ML     LTG 5  I with final HEP.  (Pended)   -ML     LTG 6  D/C wih a fianl HEp and free 30 day fitness formula memebership.  (Pended)   -ML        Time Calculation    PT Goal Re-Cert Due Date  03/08/19  (Pended)   -ML       User Key  (r) = Recorded By, (t) = Taken By, (c) = Cosigned By    Initials Name Provider Type    Reece Boyer ATC           Therapy Education  Education Details: (P) advised 6 pack exercises in veritcal hand position if swelling c/o's > w/ d/c ace wrap/splint   Given: (P) Symptoms/condition management  Program: (P) Reinforced  How Provided: (P) Verbal  Provided to: (P) Patient  Level of Understanding: (P) Verbalized              Time Calculation:   Start Time: (P) 1010  Stop Time: (P) 1055  Time Calculation (min): (P) 45 min  Therapy Suggested Charges     Code   Minutes Charges    None           Therapy Charges for Today     Code Description Service Date Service Provider Modifiers Qty    83683507133 HC PT THER SUPP EA 15 MIN 2/22/2019 Reece Godinez, ATC  1    15097360699 HC PT THER PROC EA 15 MIN 2/22/2019 Reece Godinez, ATC  2                     Reece Godinez, ATC  2/22/2019

## 2019-02-26 ENCOUNTER — OFFICE VISIT (OUTPATIENT)
Dept: ORTHOPEDIC SURGERY | Facility: CLINIC | Age: 59
End: 2019-02-26

## 2019-02-26 ENCOUNTER — HOSPITAL ENCOUNTER (OUTPATIENT)
Dept: PHYSICAL THERAPY | Facility: HOSPITAL | Age: 59
Setting detail: THERAPIES SERIES
Discharge: HOME | End: 2019-02-26

## 2019-02-26 DIAGNOSIS — Z98.890 STATUS POST CARPAL TUNNEL RELEASE: Primary | ICD-10-CM

## 2019-02-26 DIAGNOSIS — G56.01 CARPAL TUNNEL SYNDROME ON RIGHT: Primary | ICD-10-CM

## 2019-02-26 PROCEDURE — 29125 APPL SHORT ARM SPLINT STATIC: CPT | Performed by: NURSE PRACTITIONER

## 2019-02-26 PROCEDURE — 97110 THERAPEUTIC EXERCISES: CPT | Performed by: PHYSICAL THERAPIST

## 2019-02-26 PROCEDURE — 99024 POSTOP FOLLOW-UP VISIT: CPT | Performed by: NURSE PRACTITIONER

## 2019-02-26 RX ORDER — CEPHALEXIN 500 MG/1
500 CAPSULE ORAL 3 TIMES DAILY
Qty: 40 CAPSULE | Refills: 0 | Status: SHIPPED | OUTPATIENT
Start: 2019-02-26 | End: 2019-03-12

## 2019-02-26 NOTE — PROGRESS NOTES
Amira Shannon is a 58 y.o. female is s/p       Chief Complaint   Patient presents with   • Right Wrist - Post-op   • Suture / Staple Removal       HISTORY OF PRESENT ILLNESS:     58-year-old  female in the office today for a postop wound check of her rotator cuff surgery.  She reports the at the incision started gapping open a few days ago and that now she has some yellow colored drainage from the incision.  She denies any fever chills or evidence of sepsis    Allergies   Allergen Reactions   • Other      Pt states that taking steroids either in pill or injection form make her have blisters in her mouth and she feels like she is on fire on the inside/ has hx of c diff and possible MRSA     • Corticosteroids Rash   • Kenalog  [Triamcinolone Acetonide] Rash   • Sulfa Antibiotics Rash     Sulfa (Sulfonamide Antibiotics)         Current Outpatient Medications:   •  cephalexin (KEFLEX) 500 MG capsule, Take 1 capsule by mouth 3 (Three) Times a Day., Disp: 40 capsule, Rfl: 0  •  cetirizine (zyrTEC) 10 MG tablet, Take 1 tablet by mouth Daily As Needed for Allergies., Disp: 30 tablet, Rfl: 11  •  chlorthalidone (HYGROTON) 25 MG tablet, Take 0.5 tablets by mouth Daily., Disp: 15 tablet, Rfl: 11  •  cyclobenzaprine (FLEXERIL) 10 MG tablet, Take 10 mg by mouth 3 (Three) Times a Day As Needed for Muscle Spasms., Disp: , Rfl:   •  DULoxetine (CYMBALTA) 30 MG capsule, Take 30 mg by mouth Daily., Disp: , Rfl:   •  gabapentin (NEURONTIN) 800 MG tablet, Take 800 mg by mouth 4 (Four) Times a Day., Disp: , Rfl:   •  Melatonin 10 MG capsule, Take  by mouth Every Night., Disp: , Rfl:   •  meloxicam (MOBIC) 15 MG tablet, Take 15 mg by mouth Every Night., Disp: , Rfl:   •  nystatin (MYCOSTATIN) 703037 UNIT/GM powder, Apply  topically to the appropriate area as directed 4 (Four) Times a Day As Needed., Disp: , Rfl:   •  omeprazole (priLOSEC) 40 MG capsule, Take 40 mg by mouth Daily., Disp: , Rfl:   •  oxyCODONE-acetaminophen  (PERCOCET)  MG per tablet, Take 1 tablet by mouth Every 6 (Six) Hours As Needed for Moderate Pain ., Disp: , Rfl:   •  Plecanatide (TRULANCE) 3 MG tablet, Take 1 tablet by mouth Daily., Disp: 30 tablet, Rfl: 2  •  polyethylene glycol (MIRALAX) packet, Take 17 g by mouth Daily As Needed., Disp: , Rfl:   •  spironolactone (ALDACTONE) 25 MG tablet, Take 1 tablet by mouth Daily., Disp: 30 tablet, Rfl: 5    No fevers or chills.  No nausea or vomiting.      PHYSICAL EXAMINATION:       Amira Shannon is a 58 y.o. female    Patient is awake and alert, answers questions appropriately and is in no apparent distress.    GAIT:     []  Normal  []  Antalgic    Assistive device: []  None  []  Walker     []  Crutches  []  Cane     []  Wheelchair  []  Stretcher    Right Hand Exam     Range of Motion   Wrist   Extension: normal   Flexion: normal   Pronation: normal   Supination: normal     Other   Erythema: present  Sensation: normal  Pulse: present    Comments:  Edges of the incision are not approximated but the sutures remained in place with some yellow discharge noted from the incision.  There is mild surrounding erythema              No results found.        ASSESSMENT:    Diagnoses and all orders for this visit:    Status post carpal tunnel release  -      Wrist Hand Orthosis, Wrist Extension Control Cock-up    Other orders  -     cephalexin (KEFLEX) 500 MG capsule; Take 1 capsule by mouth 3 (Three) Times a Day.          PLAN  We proceeded with suture removal today and I encouraged a light gauze dressing to be placed on the incision, daily washing with soapy water and follow-up in 2 weeks for recheck.  The patient was also placed on a 3 times a day dosing of Keflex in the meantime.  She was instructed to return to office for worsening symptoms earlier if needed.  And she was placed in a forearm-based wrist splint prior to discharge  No Follow-up on file.    [place], APRN

## 2019-02-26 NOTE — THERAPY TREATMENT NOTE
Outpatient Physical Therapy Ortho Treatment Note  [facility]     Patient Name: Amira Shannon  : 1960  MRN: [id number]  Today's Date: 2019      Visit Date: 2019    Patients reports pain as:  5/10   Patient reports overall % improvement as:  70%   Patients attendance has been:     Insurance visits available  20  visits   Recert Date is:  19   Next MD visit is:  next week            Visit Dx:    ICD-10-CM ICD-9-CM   1. Carpal tunnel syndrome on right G56.01 354.0       Patient Active Problem List   Diagnosis   • Bilateral foot pain   • Right hip pain   • Acute pain of both knees   • Plantar fasciitis, bilateral   • Primary osteoarthritis of knee   • MUSA (nonalcoholic steatohepatitis)   • Tongue lesion   • Bilateral lower extremity edema   • Chronic pain of both knees   • Bilateral calcaneal spurs   • Swelling of both lower extremities   • Obesity with body mass index of 30.0-39.9   • Dyspnea   • Diastolic dysfunction   • Thrombocytopenia (CMS/HCC)   • Chronic fatigue   • Abdominal pain   • Constipation   • Acid reflux   • Depression   • Disease related peripheral neuropathy   • Epigastric pain   • Hyperlipidemia   • Nausea and vomiting   • Restless leg syndrome   • Sleep apnea   • Carpal tunnel syndrome on left   • Carpal tunnel syndrome on right   • Bilateral wrist pain   • Numbness and tingling in both hands   • SBO (small bowel obstruction) (CMS/HCC)   • Nausea   • Generalized abdominal pain   • Elevated liver enzymes   • History of small bowel obstruction   • Cirrhosis of liver without ascites (CMS/HCC)   • Status post carpal tunnel release        Past Medical History:   Diagnosis Date   • Acid reflux    • Altered bowel function    • Arthropathy of lumbar facet joint    • Bleeding disorder (CMS/HCC)    • C. difficile diarrhea    • Constipation    • Corns and callus    • Depression    • Disease related peripheral neuropathy    • Epigastric pain    •  "Fatty liver    • Hammer toe    • Headache    • Hiatal hernia    • History of transfusion    • Hyperlipidemia    • Knee pain    • Localized, primary osteoarthritis of the ankle and foot     Localized, primary osteoarthritis of the ankle and/or foot   • Mendoza's metatarsalgia     Mendoza's metatarsalgia - 2nd interspace on right   • Nausea and vomiting    • Neuralgia and neuritis     Neuralgia, neuritis, and radiculitis, unspecified   • Neuropathy    • Obstructive sleep apnea     Obstructive sleep apnea (adult) (pediatric)    • CHAI on CPAP     \"C-Pap at night  (unconfirmed)\"   • Osteoarthritis    • Pain in foot     Pain in unspecified foot - sees a podiatrist   • Pain in joint, ankle and foot     Joint pain in ankle and foot      • Pain radiating to back     Pain radiating to lumbar region of back   • Plantar fasciitis    • PONV (postoperative nausea and vomiting)    • Restless leg syndrome    • Secondary hypertension     Secondary hypertension, unspecified   • Sinusitis    • Sleep apnea    • Tongue anomaly     lesion        Past Surgical History:   Procedure Laterality Date   • CARPAL TUNNEL RELEASE Left 2018    Procedure: CARPAL TUNNEL RELEASE - left;  Surgeon: Justus Lewis MD;  Location: [facility] OR;  Service: Orthopedics   • CARPAL TUNNEL RELEASE Right 2019    Procedure: carpal tunnel release right hand with local/monitored anesthesia care;  Surgeon: Justus Lewis MD;  Location: [facility] OR;  Service: Orthopedics   •  SECTION     • CHOLECYSTECTOMY     • COLONOSCOPY  2013   • COLONOSCOPY N/A 10/17/2018    Procedure: COLONOSCOPY;  Surgeon: Russell Del Rio MD;  Location: [facility] ENDOSCOPY;  Service: Gastroenterology   • DIRECT LARYNGOSCOPY, ESOPHAGOSCOPY, BRONCHOSCOPY N/A 2017    Procedure: DIRECT LARYNGOSCOPY AND;  Surgeon: Live Bolton MD;  Location: [facility] OR;  Service:    • ENDOSCOPY  2013    Colon endoscopy 19423 (1) - Internal & external hemorrhoids found. " Stool found.   • ENDOSCOPY  07/01/2013    EGD w/ tube 56284 (1) - Normal esophagus. Gastritis in stomach. Biopsy taken. Normal dudoenum. Biopsy taken.   • ENDOSCOPY N/A 10/17/2018    Procedure: ESOPHAGOGASTRODUODENOSCOPY--eval varices;  Surgeon: Russell Del Rio MD;  Location: [facility] ENDOSCOPY;  Service: Gastroenterology   • FOOT SURGERY  02/26/2013    Foot/toes surgery procedure (1) - Arthroplasty of toes 4 and 5 of right foot.   • HERNIA REPAIR     • HERNIA REPAIR      hital   • HYSTERECTOMY     • LIVER BIOPSY     • NERVE BLOCK  07/25/2016    Injection for nerve block (1) - Lumbar medial branch block.   • OTHER SURGICAL HISTORY  12/03/2012    Inj(s) Tend-Sheath, Ligament, Single 20550 (1) - [facility] NICK[facility] ([facility])    • OTHER SURGICAL HISTORY  06/24/2013    Small Joint Injection/Aspiration 20600 (2) - [facility]TER NICKERSON ([facility])    • OTHER SURGICAL HISTORY  2011    bowel obstruction x2   • OTHER SURGICAL HISTORY      gland removed from neck   • SUBLINGUAL SALIVARY CYST EXCISION N/A 8/1/2017    Procedure: EXCISION OF LEFT  TONGUE LESION WITH CLOSURE;  Surgeon: Live Bolton MD;  Location: [facility] OR;  Service:    • TUBAL ABDOMINAL LIGATION     • UPPER GASTROINTESTINAL ENDOSCOPY  07/01/2013   • UPPER GASTROINTESTINAL ENDOSCOPY  10/17/2018       PT Ortho     Row Name 02/26/19 1000       Subjective Comments    Subjective Comments  pt just had staples removed from [place] er[place]. Reports increased pain from having staples just removed. 70% improvement overall.    -BS       Precautions and Contraindications    Precautions/Limitations  fall precautions  -BS       Subjective Pain    Able to rate subjective pain?  yes  -BS    Pre-Treatment Pain Level  5  -BS    Post-Treatment Pain Level  2  -BS       Right Upper Ext    Rt  Ulnar Deviation AROM  36°  -BS    Rt  Radial Deviation AROM  26°  -BS      User Key  (r) = Recorded By, (t) = Taken By, (c) = Cosigned By    Initials Name Provider Type    Lenard Tarango  "PT Physical Therapist                      PT Assessment/Plan     Row Name 02/26/19 1100          PT Assessment    Assessment Comments  progressing toward all goals. Met goals for ulnar/radial deviation AROM and  strength. Will instruct in scar tissue massage tx next session.   -BS        PT Plan    PT Frequency  2x/week  -BS     PT Plan Comments  anticipate d/c after next session.  -BS       User Key  (r) = Recorded By, (t) = Taken By, (c) = Cosigned By    Initials Name Provider Type    Lenard Tarango, PT Physical Therapist          Modalities     Row Name 02/26/19 1000             Ice    Ice Applied  Yes  -BS      Location  R hand  -BS      Rx Minutes  10 mins  -BS      Ice S/P Rx  Yes  -BS        User Key  (r) = Recorded By, (t) = Taken By, (c) = Cosigned By    Initials Name Provider Type    Lenard Tarango, PT Physical Therapist          Exercises     Row Name 02/26/19 1000             Subjective Comments    Subjective Comments  pt just had staples removed from [place] APRFANNY. Reports increased pain from having staples just removed. 70% improvement overall.    -BS         Subjective Pain    Able to rate subjective pain?  yes  -BS      Pre-Treatment Pain Level  5  -BS      Post-Treatment Pain Level  2  -BS         Exercise 1    Exercise Name 1  6 pack  -BS      Reps 1  20  -BS         Exercise 2    Exercise Name 2  Prayer S  -BS      Sets 2  1  -BS      Reps 2  2  -BS      Time 2  30\" hold  -BS         Exercise 3    Exercise Name 3  RD/UD AROM  -BS      Sets 3  1  -BS      Reps 3  20  -BS      Time 3  R wrist  -BS         Exercise 4    Exercise Name 4  foam pill:finger to thumb opposition  -BS      Reps 4  2 minutes  -BS         Exercise 5    Exercise Name 5  Cloth pin on/off using all digits  -BS      Reps 5  1x a  -BS         Exercise 6    Exercise Name 6  Towel squeeze  -BS      Time 6  4 minutes, 5 sec hold  -BS         Exercise 7    Exercise Name 7  R wrist flex/ext S  -BS      Sets 7  1  -BS  "     Reps 7  1  -BS      Time 7  1 minute hold  -BS         Exercise 8    Exercise Name 8  digiflex: red  -BS      Reps 8  3 min (R hand)  -BS        User Key  (r) = Recorded By, (t) = Taken By, (c) = Cosigned By    Initials Name Provider Type    Lenard Tarango, PT Physical Therapist                         PT OP Goals     Row Name 02/26/19 1000          PT Short Term Goals    STG Date to Achieve  03/08/19  -BS     STG 1  I with HEP and have additions/changes by next recertification.  -BS     STG 2  AROM R wrist flexion >= 80°.  -BS     STG 2 Progress  Met  -BS     STG 3  AROm R wrist extension >= 60°.  -BS     STG 3 Progress  Met  -BS     STG 4  AROM R wrist RD >= 20°.  -BS     STG 4 Progress  Met  -BS     STG 5  Full composite fist L hand  -BS     STG 5 Progress  Met  -BS     STG 6  Able to oppost the L thumb to the 5th fat pad.  -BS     STG 6 Progress  Partially Met  -BS     STG 6 Progress Comments  .  -BS     STG 7  AROm R wrist UD >= 30°.  -BS     STG 7 Progress  Met  -BS     STG 7 Progress Comments  .  -BS        Long Term Goals    LTG Date to Achieve  03/15/19  -BS     LTG 1  AROm for R wrist all WNL, no increase in pain.  -BS     LTG 1 Progress  Ongoing  -BS     LTG 2  R wrist 5/5.  -BS     LTG 2 Progress  Ongoing  -BS     LTG 3  L  @ #2 setting >= 25#.  -BS     LTG 3 Progress  Met  -BS     LTG 4  Patient ot report some of her sensation is starting to return in first 3 digits of L hand.  -BS     LTG 4 Progress  Progressing  -BS     LTG 5  I with final HEP.  -BS     LTG 6  D/C wih a fianl HEp and free 30 day fitness formula memebership.  -BS        Time Calculation    PT Goal Re-Cert Due Date  03/08/19  -BS       User Key  (r) = Recorded By, (t) = Taken By, (c) = Cosigned By    Initials Name Provider Type    Lenard Tarango, PT Physical Therapist          Therapy Education  Given: Symptoms/condition management  Program: Reinforced  How Provided: Verbal  Provided to: Patient  Level of Understanding:  Verbalized              Time Calculation:   Start Time: 1055  Stop Time: 1150  Time Calculation (min): 55 min  PT Non-Billable Time (min): 10 min  Total Timed Code Minutes- PT: 45 minute(s)  Therapy Suggested Charges     Code   Minutes Charges    None           Therapy Charges for Today     Code Description Service Date Service Provider Modifiers Qty    73110626868 HC PT THER PROC EA 15 MIN 2/26/2019 Lenard Covington, PT GP 3    41971633067 HC PT THER SUPP EA 15 MIN 2/26/2019 Lenard Covington, PT GP 1                    Lenard Covington, PT  2/26/2019

## 2019-03-01 ENCOUNTER — APPOINTMENT (OUTPATIENT)
Dept: PHYSICAL THERAPY | Facility: HOSPITAL | Age: 59
End: 2019-03-01

## 2019-03-07 ENCOUNTER — HOSPITAL ENCOUNTER (OUTPATIENT)
Dept: PHYSICAL THERAPY | Facility: HOSPITAL | Age: 59
Setting detail: THERAPIES SERIES
Discharge: HOME | End: 2019-03-07

## 2019-03-07 DIAGNOSIS — R20.2 NUMBNESS AND TINGLING IN BOTH HANDS: ICD-10-CM

## 2019-03-07 DIAGNOSIS — R20.0 NUMBNESS AND TINGLING IN BOTH HANDS: ICD-10-CM

## 2019-03-07 DIAGNOSIS — G56.01 CARPAL TUNNEL SYNDROME ON RIGHT: Primary | ICD-10-CM

## 2019-03-07 PROCEDURE — 97110 THERAPEUTIC EXERCISES: CPT | Performed by: PHYSICAL THERAPIST

## 2019-03-07 NOTE — THERAPY DISCHARGE NOTE
Outpatient Physical Therapy Ortho Progress Note/Discharge Summary  [facility]  Marcella Perales, PT, DPT, CSCS       Patient Name: Amira Shannon  : 1960  MRN: [id number]  Today's Date: 3/7/2019      Visit Date: 2019     Pt reports 0/10 pain pre treatment, 0/10 pain post treatment  Reports 90% of improvement.  Attended 5/7 visits.  Insurance available: 20 visits  Next MD appt: prn .  Recertification: N/A.    Visit Dx:    ICD-10-CM ICD-9-CM   1. Carpal tunnel syndrome on right G56.01 354.0   2. Numbness and tingling in both hands R20.0 782.0    R20.2        Patient Active Problem List   Diagnosis   • Bilateral foot pain   • Right hip pain   • Acute pain of both knees   • Plantar fasciitis, bilateral   • Primary osteoarthritis of knee   • MUSA (nonalcoholic steatohepatitis)   • Tongue lesion   • Bilateral lower extremity edema   • Chronic pain of both knees   • Bilateral calcaneal spurs   • Swelling of both lower extremities   • Obesity with body mass index of 30.0-39.9   • Dyspnea   • Diastolic dysfunction   • Thrombocytopenia (CMS/HCC)   • Chronic fatigue   • Abdominal pain   • Constipation   • Acid reflux   • Depression   • Disease related peripheral neuropathy   • Epigastric pain   • Hyperlipidemia   • Nausea and vomiting   • Restless leg syndrome   • Sleep apnea   • Carpal tunnel syndrome on left   • Carpal tunnel syndrome on right   • Bilateral wrist pain   • Numbness and tingling in both hands   • SBO (small bowel obstruction) (CMS/HCC)   • Nausea   • Generalized abdominal pain   • Elevated liver enzymes   • History of small bowel obstruction   • Cirrhosis of liver without ascites (CMS/HCC)   • Status post carpal tunnel release        Past Medical History:   Diagnosis Date   • Acid reflux    • Altered bowel function    • Arthropathy of lumbar facet joint    • Bleeding disorder (CMS/HCC)    • C. difficile diarrhea    • Constipation    • Corns and callus   "  • Depression    • Disease related peripheral neuropathy    • Epigastric pain    • Fatty liver    • Hammer toe    • Headache    • Hiatal hernia    • History of transfusion    • Hyperlipidemia    • Knee pain    • Localized, primary osteoarthritis of the ankle and foot     Localized, primary osteoarthritis of the ankle and/or foot   • Mendoza's metatarsalgia     Mendoza's metatarsalgia - 2nd interspace on right   • Nausea and vomiting    • Neuralgia and neuritis     Neuralgia, neuritis, and radiculitis, unspecified   • Neuropathy    • Obstructive sleep apnea     Obstructive sleep apnea (adult) (pediatric)    • CHAI on CPAP     \"C-Pap at night  (unconfirmed)\"   • Osteoarthritis    • Pain in foot     Pain in unspecified foot - sees a podiatrist   • Pain in joint, ankle and foot     Joint pain in ankle and foot      • Pain radiating to back     Pain radiating to lumbar region of back   • Plantar fasciitis    • PONV (postoperative nausea and vomiting)    • Restless leg syndrome    • Secondary hypertension     Secondary hypertension, unspecified   • Sinusitis    • Sleep apnea    • Tongue anomaly     lesion        Past Surgical History:   Procedure Laterality Date   • CARPAL TUNNEL RELEASE Left 2018    Procedure: CARPAL TUNNEL RELEASE - left;  Surgeon: Justus Lewis MD;  Location: [facility] OR;  Service: Orthopedics   • CARPAL TUNNEL RELEASE Right 2019    Procedure: carpal tunnel release right hand with local/monitored anesthesia care;  Surgeon: Justus Lewis MD;  Location: [facility] OR;  Service: Orthopedics   •  SECTION     • CHOLECYSTECTOMY     • COLONOSCOPY  2013   • COLONOSCOPY N/A 10/17/2018    Procedure: COLONOSCOPY;  Surgeon: Russell Del Rio MD;  Location: [facility] ENDOSCOPY;  Service: Gastroenterology   • DIRECT LARYNGOSCOPY, ESOPHAGOSCOPY, BRONCHOSCOPY N/A 2017    Procedure: DIRECT LARYNGOSCOPY AND;  Surgeon: Live Bolton MD;  Location: [facility] OR;  Service:    • ENDOSCOPY "  07/01/2013    Colon endoscopy 22584 (1) - Internal & external hemorrhoids found. Stool found.   • ENDOSCOPY  07/01/2013    EGD w/ tube 98894 (1) - Normal esophagus. Gastritis in stomach. Biopsy taken. Normal dudoenum. Biopsy taken.   • ENDOSCOPY N/A 10/17/2018    Procedure: ESOPHAGOGASTRODUODENOSCOPY--eval varices;  Surgeon: Russell Del Rio MD;  Location: [facility] ENDOSCOPY;  Service: Gastroenterology   • FOOT SURGERY  02/26/2013    Foot/toes surgery procedure (1) - Arthroplasty of toes 4 and 5 of right foot.   • HERNIA REPAIR     • HERNIA REPAIR      hital   • HYSTERECTOMY     • LIVER BIOPSY     • NERVE BLOCK  07/25/2016    Injection for nerve block (1) - Lumbar medial branch block.   • OTHER SURGICAL HISTORY  12/03/2012    Inj(s) Tend-Sheath, Ligament, Single 20550 (1) - [facility])    • OTHER SURGICAL HISTORY  06/24/2013    Small Joint Injection/Aspiration 20600 (2) - [facility]ICK[facility] ([facility])    • OTHER SURGICAL HISTORY  2011    bowel obstruction x2   • OTHER SURGICAL HISTORY      gland removed from neck   • SUBLINGUAL SALIVARY CYST EXCISION N/A 8/1/2017    Procedure: EXCISION OF LEFT  TONGUE LESION WITH CLOSURE;  Surgeon: Live Bolton MD;  Location: [facility] OR;  Service:    • TUBAL ABDOMINAL LIGATION     • UPPER GASTROINTESTINAL ENDOSCOPY  07/01/2013   • UPPER GASTROINTESTINAL ENDOSCOPY  10/17/2018     Changes to medications: None noted.    Changes to MD orders: None noted.    PT Ortho     Row Name 03/07/19 0900       Subjective Comments    Subjective Comments  Patient reports the hand is doing really well and her sensation has returned.  -       Precautions and Contraindications    Precautions/Limitations  fall precautions  -       Subjective Pain    Able to rate subjective pain?  yes  -    Post-Treatment Pain Level  0  -       Posture/Observations    Posture- WNL  Posture is WNL for B UEs  -    Observations  Incision healing  -    Posture/Observations Comments  No distress,  waterproof bandage over incision. Removed and almost completely healed.  -AJ       Special Tests/Palpation    Special Tests/Palpation  -- No areas of TTP.  -AJ       General ROM    GENERAL ROM COMMENTS  Oppposition to the 5th fat bad and full hand ROM, full composite fist.  -AJ       Right Upper Ext    Rt Elbow Supination AROM  WNL, ~80-90°  -AJ    Rt Elbow Pronation AROM  WNL, ~80-90°  -AJ    Rt Wrist Flexion AROM  80°  -AJ    Rt Wrist Extension AROM  74°  -AJ    Rt  Ulnar Deviation AROM  38°  -AJ    Rt  Radial Deviation AROM  22°  -AJ       MMT (Manual Muscle Testing)    General MMT Comments  B wrists 5/5  -AJ        Strength Right    # Reps  2  -AJ    Right Rung  2  -    Right  Test 1  48  -    Right  Test 2  42  -AJ     Strength Average Right  45  -AJ        Strength Left    # Reps  2  -    Left Rung  2  -    Left  Test 1  42  -    Left  Test 2  38  -     Strength Average Left  40  -AJ       Sensation    Sensation WNL?  WNL  -AJ    Light Touch  No apparent deficits  -AJ    Sharp/Dull  No apparent deficits  -AJ    Additional Comments  patient reports sensation has returned ot her fingers for B hands.  -       Transfers    Comment (Transfers)  I with all transfers.  -       Gait/Stairs Assessment/Training    Comment (Gait/Stairs)  FWB, slowed antalgic gait due to feet, normal arm swing with B UEs.  -       Hand  Strength     Strength Affected Side  Bilateral  -      User Key  (r) = Recorded By, (t) = Taken By, (c) = Cosigned By    Initials Name Provider Type    AJ Marcella Perales, PT Physical Therapist         Barriers to Rehab: Include significant or possible arthritic/degenerative changes that have occurred within the joints.    Safety Issues: Fall risk    PT Assessment/Plan     Row Name 03/07/19 0900          PT Assessment    Functional Limitations  Limitations in community activities;Performance in leisure activities;Performance in self-care ADL   -AJ     Impairments  Impaired flexibility;Impaired muscle endurance;Joint integrity  -AJ     Assessment Comments  Patient met all goals and I with final HEP.  -AJ     Rehab Potential  Good  -AJ     Patient/caregiver participated in establishment of treatment plan and goals  Yes  -AJ     Patient would benefit from skilled therapy intervention  No  -AJ        PT Plan    PT Frequency  -- N/A  -AJ     Predicted Duration of Therapy Intervention (Therapy Eval)  N/A  -AJ     PT Plan Comments  D/C today with a final HEP and free 30 day fitness formula membership.  -AJ       User Key  (r) = Recorded By, (t) = Taken By, (c) = Cosigned By    Initials Name Provider Type    Marcella Domingo, PT Physical Therapist          Modalities     Row Name 03/07/19 0900             Ice    Ice S/P Rx  No  -AJ        User Key  (r) = Recorded By, (t) = Taken By, (c) = Cosigned By    Initials Name Provider Type    Marcella Domingo, PT Physical Therapist          Exercises     Row Name 03/07/19 0900             Subjective Comments    Subjective Comments  Patient reports the hand is doing really well and her sensation has returned.  -AJ         Subjective Pain    Able to rate subjective pain?  yes  -AJ      Pre-Treatment Pain Level  0  -AJ      Post-Treatment Pain Level  0  -AJ         Exercise 1    Exercise Name 1  CT 1 S  -AJ      Reps 1  2  -AJ      Time 1  1 minute  -AJ         Exercise 2    Exercise Name 2  CT 3 S  -AJ      Reps 2  2  -AJ      Time 2  1 minute  -AJ         Exercise 3    Exercise Name 3  fists  -AJ      Reps 3  20  -AJ         Exercise 4    Exercise Name 4  Putty:   -AJ      Time 4  5 minutes  -AJ      Additional Comments  Red  -AJ         Exercise 5    Exercise Name 5  Putty: Pinch  -AJ      Time 5  3 minutes  -AJ      Additional Comments  Red  -AJ         Exercise 6    Exercise Name 6  RTB wrist flex/ext  -AJ      Reps 6  20 each  -AJ         Exercise 7    Exercise Name 7  RTB wrist RD/UD  -AJ       Reps 7  20 each  -         Exercise 8    Exercise Name 8  Discussion of scar massage for when incision completely heals.  -        User Key  (r) = Recorded By, (t) = Taken By, (c) = Cosigned By    Initials Name Provider Type    Marcella Domingo, PT Physical Therapist                         PT OP Goals     Row Name 03/07/19 0900          PT Short Term Goals    STG Date to Achieve  03/08/19  -     STG 1  I with HEP and have additions/changes by next recertification.  -     STG 1 Progress  Met  -     STG 2  AROM R wrist flexion >= 80°.  -     STG 2 Progress  Met  -     STG 3  AROm R wrist extension >= 60°.  -     STG 3 Progress  Met  -     STG 4  AROM R wrist RD >= 20°.  -     STG 4 Progress  Met  -     STG 5  Full composite fist R hand  -     STG 5 Progress  Met  -     STG 6  Able to oppost the R thumb to the 5th fat pad.  -     STG 6 Progress  Met  -     STG 6 Progress Comments  .  -     STG 7  AROm R wrist UD >= 30°.  -     STG 7 Progress  Met  -     STG 7 Progress Comments  .  -        Long Term Goals    LTG Date to Achieve  03/15/19  -     LTG 1  AROm for R wrist all WNL, no increase in pain.  -     LTG 1 Progress  Met  -     LTG 2  R wrist 5/5.  -     LTG 2 Progress  Met  -     LTG 3  R  @ #2 setting >= 25#.  -     LTG 3 Progress  Met  -     LTG 4  Patient ot report some of her sensation is starting to return in first 3 digits of R hand.  -     LTG 4 Progress  Met  -     LTG 5  I with final HEP.  -     LTG 5 Progress  Met  -     LTG 6  D/C wih a fianl HEp and free 30 day fitness formula memebership.  -     LTG 6 Progress  Met  -        Time Calculation    PT Goal Re-Cert Due Date  -- N/A  -       User Key  (r) = Recorded By, (t) = Taken By, (c) = Cosigned By    Initials Name Provider Type    Marcella Domingo, MIKY Physical Therapist          Therapy Education  Given: HEP, Symptoms/condition management(POC)  Program:  Reinforced  How Provided: Verbal, Demonstration  Provided to: Patient  Level of Understanding: Verbalized, Demonstrated    Outcome Measure Options: Quick DASH  Quick DASH  Open a tight or new jar.: Mild Difficulty  Do heavy household chores (e.g., wash walls, wash floors): Moderate Difficulty  Carry a shopping bag or briefcase: Severe Difficulty  Wash your back: Unable  Use a knife to cut food: Moderate Difficulty  Recreational activities in which you take some force or impact through your arm, should or hand (e.g. golf, hammering, tennis, etc.): Severe Difficulty  During the past week, to what extent has your arm, shoulder, or hand problem interfered with your normal social activites with family, friends, neighbors or groups?: Moderately  During the past week, were you limited in your work or other regular daily activities as a result of your arm, shoulder or hand problem?: Unable  Arm, Shoulder, or hand pain: Mild  Tingling (pins and needles) in your arm, shoulder, or hand: None  During the past week, how much difficulty have you had sleeping because of the pain in your arm, shoulder or hand?: No difficulty  Number of Questions Answered: 11  Quick DASH Score: 50         Time Calculation:   Start Time: 0930  Stop Time: 1009  Time Calculation (min): 39 min  Total Timed Code Minutes- PT: 39 minute(s)    Therapy Charges for Today     Code Description Service Date Service Provider Modifiers Qty    25845280200 HC PT THER PROC EA 15 MIN 3/7/2019 Marcella Perales, PT GP 3    27378841566 HC PT THER SUPP EA 15 MIN 3/7/2019 Marcella Perales, PT GP 1     [place] [employer] SUPPLY OR ACCESSORY, NOS 3/7/2019 Marcella Perales, PT  2          PT G-Codes  Outcome Measure Options: Quick DASH  Quick DASH Score: 50     OP PT Discharge Summary  Date of Discharge: 03/07/19  Reason for Discharge: All goals achieved, Independent  Outcomes Achieved: Able to achieve all goals within established timeline  Discharge Destination: Home  with home program      Marcella Perales, PT, DPT, CSCS  3/7/2019

## 2019-03-12 ENCOUNTER — OFFICE VISIT (OUTPATIENT)
Dept: ORTHOPEDIC SURGERY | Facility: CLINIC | Age: 59
End: 2019-03-12

## 2019-03-12 DIAGNOSIS — Z98.890 STATUS POST CARPAL TUNNEL RELEASE: Primary | ICD-10-CM

## 2019-03-12 PROCEDURE — 99024 POSTOP FOLLOW-UP VISIT: CPT | Performed by: NURSE PRACTITIONER

## 2019-03-12 NOTE — PROGRESS NOTES
Amira Shannon is a 58 y.o. female is s/p       Chief Complaint   Patient presents with   • Right Wrist - Follow-up       HISTORY OF PRESENT ILLNESS: f/u right wrist, ctr done on 2/13/2019. Patient completed round of keflex is doing better. Patient completed physical therapy last week.        Allergies   Allergen Reactions   • Other      Pt states that taking steroids either in pill or injection form make her have blisters in her mouth and she feels like she is on fire on the inside/ has hx of c diff and possible MRSA     • Corticosteroids Rash   • Kenalog  [Triamcinolone Acetonide] Rash   • Sulfa Antibiotics Rash     Sulfa (Sulfonamide Antibiotics)         Current Outpatient Medications:   •  cetirizine (zyrTEC) 10 MG tablet, Take 1 tablet by mouth Daily As Needed for Allergies., Disp: 30 tablet, Rfl: 11  •  chlorthalidone (HYGROTON) 25 MG tablet, Take 0.5 tablets by mouth Daily., Disp: 15 tablet, Rfl: 11  •  cyclobenzaprine (FLEXERIL) 10 MG tablet, Take 10 mg by mouth 3 (Three) Times a Day As Needed for Muscle Spasms., Disp: , Rfl:   •  DULoxetine (CYMBALTA) 30 MG capsule, Take 30 mg by mouth Daily., Disp: , Rfl:   •  gabapentin (NEURONTIN) 800 MG tablet, Take 800 mg by mouth 4 (Four) Times a Day., Disp: , Rfl:   •  Melatonin 10 MG capsule, Take  by mouth Every Night., Disp: , Rfl:   •  meloxicam (MOBIC) 15 MG tablet, Take 15 mg by mouth Every Night., Disp: , Rfl:   •  nystatin (MYCOSTATIN) 671963 UNIT/GM powder, Apply  topically to the appropriate area as directed 4 (Four) Times a Day As Needed., Disp: , Rfl:   •  omeprazole (priLOSEC) 40 MG capsule, Take 40 mg by mouth Daily., Disp: , Rfl:   •  oxyCODONE-acetaminophen (PERCOCET)  MG per tablet, Take 1 tablet by mouth Every 6 (Six) Hours As Needed for Moderate Pain ., Disp: , Rfl:   •  Plecanatide (TRULANCE) 3 MG tablet, Take 1 tablet by mouth Daily., Disp: 30 tablet, Rfl: 2  •  polyethylene glycol (MIRALAX) packet, Take 17 g by mouth Daily As Needed.,  Disp: , Rfl:   •  spironolactone (ALDACTONE) 25 MG tablet, Take 1 tablet by mouth Daily., Disp: 30 tablet, Rfl: 5    No fevers or chills.  No nausea or vomiting.      PHYSICAL EXAMINATION:       Amira Shannon is a 58 y.o. female    Patient is awake and alert, answers questions appropriately and is in no apparent distress.    GAIT:     []  Normal  []  Antalgic    Assistive device: [x]  None  []  Walker     []  Crutches  []  Cane     []  Wheelchair  []  Stretcher    Right Hand Exam     Range of Motion   Wrist   Extension: normal   Flexion: normal   Pronation: normal   Supination: normal     Muscle Strength   Wrist extension: 5/5   Wrist flexion: 5/5     Other   Scars: present    Comments:   There is a small gap in the incision that is scabbed with no evidence of infection noted.      Left Hand Exam   Left hand exam is normal.              No results found.        ASSESSMENT:    Diagnoses and all orders for this visit:    Status post carpal tunnel release          PLAN  Recommend to follow-up in 1 month for recheck continue to progress range of motion activity as tolerated based on pain  No Follow-up on file.    [place], APRN

## 2019-03-19 DIAGNOSIS — M25.571 BILATERAL ANKLE PAIN, UNSPECIFIED CHRONICITY: Primary | ICD-10-CM

## 2019-03-19 DIAGNOSIS — M25.572 BILATERAL ANKLE PAIN, UNSPECIFIED CHRONICITY: Primary | ICD-10-CM

## 2019-03-19 DIAGNOSIS — M72.2 PLANTAR FASCIITIS, BILATERAL: ICD-10-CM

## 2019-03-21 ENCOUNTER — LAB (OUTPATIENT)
Dept: LAB | Facility: HOSPITAL | Age: 59
End: 2019-03-21

## 2019-03-21 ENCOUNTER — OFFICE VISIT (OUTPATIENT)
Dept: ORTHOPEDIC SURGERY | Facility: CLINIC | Age: 59
End: 2019-03-21

## 2019-03-21 VITALS — BODY MASS INDEX: 36.19 KG/M2 | WEIGHT: 212 LBS | HEIGHT: 64 IN

## 2019-03-21 DIAGNOSIS — M92.62 HAGLUND'S DEFORMITY OF BOTH HEELS: ICD-10-CM

## 2019-03-21 DIAGNOSIS — M19.071 ARTHRITIS OF BOTH ANKLES: ICD-10-CM

## 2019-03-21 DIAGNOSIS — M77.31 BILATERAL CALCANEAL SPURS: ICD-10-CM

## 2019-03-21 DIAGNOSIS — M19.072 ARTHRITIS OF BOTH ANKLES: ICD-10-CM

## 2019-03-21 DIAGNOSIS — M25.572 CHRONIC PAIN OF BOTH ANKLES: ICD-10-CM

## 2019-03-21 DIAGNOSIS — G89.29 CHRONIC PAIN OF BOTH ANKLES: ICD-10-CM

## 2019-03-21 DIAGNOSIS — M92.61 HAGLUND'S DEFORMITY OF BOTH HEELS: ICD-10-CM

## 2019-03-21 DIAGNOSIS — M72.2 PLANTAR FASCIITIS, BILATERAL: ICD-10-CM

## 2019-03-21 DIAGNOSIS — M77.31 BILATERAL CALCANEAL SPURS: Primary | ICD-10-CM

## 2019-03-21 DIAGNOSIS — M25.571 CHRONIC PAIN OF BOTH ANKLES: ICD-10-CM

## 2019-03-21 DIAGNOSIS — M77.32 BILATERAL CALCANEAL SPURS: Primary | ICD-10-CM

## 2019-03-21 DIAGNOSIS — M25.579 PAIN IN JOINT INVOLVING ANKLE AND FOOT, UNSPECIFIED LATERALITY: ICD-10-CM

## 2019-03-21 DIAGNOSIS — M77.32 BILATERAL CALCANEAL SPURS: ICD-10-CM

## 2019-03-21 PROCEDURE — 83036 HEMOGLOBIN GLYCOSYLATED A1C: CPT

## 2019-03-21 PROCEDURE — 99214 OFFICE O/P EST MOD 30 MIN: CPT | Performed by: ORTHOPAEDIC SURGERY

## 2019-03-21 NOTE — PROGRESS NOTES
Amira Shannon is a 58 y.o. female   Primary provider:  Yobany Barr MD       Chief Complaint   Patient presents with   • Left Ankle - Pain   • Right Ankle - Pain   • Left Foot - Pain   • Right Foot - Pain     X-rays done today in office   HISTORY OF PRESENT ILLNESS: bilateral foot and ankle pain for 10 years,no acute injury.  Pain scale today 8/10   no specific injury in regards to her feet.  She has had multiple prior injections in her ankles by multiple different practitioners.  She has had pain for over 10 years.  She intermittently uses a cane.  Mostly she has chronic dull aches but she has occasional sharp stabbing pains in both feet.    Pain   This is a chronic problem. The current episode started more than 1 year ago. The problem occurs daily. The problem has been gradually worsening. Associated symptoms include joint swelling. Associated symptoms comments: Crushing,aching and burning bilateral feet and ankles.  Redness clicking popping and snapping with bruising and swelling both feet and ankles . The symptoms are aggravated by walking and standing (sitting). She has tried rest for the symptoms. The treatment provided no relief.        CONCURRENT MEDICAL HISTORY:    Past Medical History:   Diagnosis Date   • Acid reflux    • Altered bowel function    • Arthropathy of lumbar facet joint    • Bleeding disorder (CMS/HCC)    • C. difficile diarrhea 2015   • Constipation    • Corns and callus    • Depression    • Disease related peripheral neuropathy    • Epigastric pain    • Fatty liver    • Hammer toe    • Headache    • Hiatal hernia    • History of transfusion    • Hyperlipidemia    • Knee pain    • Localized, primary osteoarthritis of the ankle and foot     Localized, primary osteoarthritis of the ankle and/or foot   • Mendoza's metatarsalgia     Mendoza's metatarsalgia - 2nd interspace on right   • Nausea and vomiting    • Neuralgia and neuritis     Neuralgia, neuritis, and radiculitis, unspecified   •  "Neuropathy    • Obstructive sleep apnea     Obstructive sleep apnea (adult) (pediatric)    • CHAI on CPAP     \"C-Pap at night  (unconfirmed)\"   • Osteoarthritis    • Pain in foot     Pain in unspecified foot - sees a podiatrist   • Pain in joint, ankle and foot     Joint pain in ankle and foot      • Pain radiating to back     Pain radiating to lumbar region of back   • Plantar fasciitis    • PONV (postoperative nausea and vomiting)    • Restless leg syndrome    • Secondary hypertension     Secondary hypertension, unspecified   • Sinusitis    • Sleep apnea    • Tongue anomaly     lesion       Allergies   Allergen Reactions   • Other      Pt states that taking steroids either in pill or injection form make her have blisters in her mouth and she feels like she is on fire on the inside/ has hx of c diff and possible MRSA     • Corticosteroids Rash   • Kenalog  [Triamcinolone Acetonide] Rash   • Sulfa Antibiotics Rash     Sulfa (Sulfonamide Antibiotics)         Current Outpatient Medications:   •  cetirizine (zyrTEC) 10 MG tablet, Take 1 tablet by mouth Daily As Needed for Allergies., Disp: 30 tablet, Rfl: 11  •  chlorthalidone (HYGROTON) 25 MG tablet, Take 0.5 tablets by mouth Daily., Disp: 15 tablet, Rfl: 11  •  cyclobenzaprine (FLEXERIL) 10 MG tablet, Take 10 mg by mouth 3 (Three) Times a Day As Needed for Muscle Spasms., Disp: , Rfl:   •  DULoxetine (CYMBALTA) 30 MG capsule, Take 30 mg by mouth Daily., Disp: , Rfl:   •  gabapentin (NEURONTIN) 800 MG tablet, Take 800 mg by mouth 4 (Four) Times a Day., Disp: , Rfl:   •  Melatonin 10 MG capsule, Take  by mouth Every Night., Disp: , Rfl:   •  meloxicam (MOBIC) 15 MG tablet, Take 15 mg by mouth Every Night., Disp: , Rfl:   •  nystatin (MYCOSTATIN) 662839 UNIT/GM powder, Apply  topically to the appropriate area as directed 4 (Four) Times a Day As Needed., Disp: , Rfl:   •  omeprazole (priLOSEC) 40 MG capsule, Take 40 mg by mouth Daily., Disp: , Rfl:   •  " oxyCODONE-acetaminophen (PERCOCET)  MG per tablet, Take 1 tablet by mouth Every 6 (Six) Hours As Needed for Moderate Pain ., Disp: , Rfl:   •  Plecanatide (TRULANCE) 3 MG tablet, Take 1 tablet by mouth Daily., Disp: 30 tablet, Rfl: 2  •  polyethylene glycol (MIRALAX) packet, Take 17 g by mouth Daily As Needed., Disp: , Rfl:   •  spironolactone (ALDACTONE) 25 MG tablet, Take 1 tablet by mouth Daily., Disp: 30 tablet, Rfl: 5    Past Surgical History:   Procedure Laterality Date   • CARPAL TUNNEL RELEASE Left 2018    Procedure: CARPAL TUNNEL RELEASE - left;  Surgeon: Justus Lewis MD;  Location: [facility] OR;  Service: Orthopedics   • CARPAL TUNNEL RELEASE Right 2019    Procedure: carpal tunnel release right hand with local/monitored anesthesia care;  Surgeon: Justus Lewis MD;  Location: [facility] OR;  Service: Orthopedics   •  SECTION     • CHOLECYSTECTOMY     • COLONOSCOPY  2013   • COLONOSCOPY N/A 10/17/2018    Procedure: COLONOSCOPY;  Surgeon: Russell Del Rio MD;  Location: [facility] ENDOSCOPY;  Service: Gastroenterology   • DIRECT LARYNGOSCOPY, ESOPHAGOSCOPY, BRONCHOSCOPY N/A 2017    Procedure: DIRECT LARYNGOSCOPY AND;  Surgeon: Live Bolton MD;  Location: [facility] OR;  Service:    • ENDOSCOPY  2013    Colon endoscopy 33384 (1) - Internal & external hemorrhoids found. Stool found.   • ENDOSCOPY  2013    EGD w/ tube 10341 (1) - Normal esophagus. Gastritis in stomach. Biopsy taken. Normal dudoenum. Biopsy taken.   • ENDOSCOPY N/A 10/17/2018    Procedure: ESOPHAGOGASTRODUODENOSCOPY--eval varices;  Surgeon: Russell Del Rio MD;  Location: [facility] ENDOSCOPY;  Service: Gastroenterology   • FOOT SURGERY  2013    Foot/toes surgery procedure (1) - Arthroplasty of toes 4 and 5 of right foot.   • HERNIA REPAIR     • HERNIA REPAIR      hital   • HYSTERECTOMY     • LIVER BIOPSY     • NERVE BLOCK  2016    Injection for nerve block (1) - Lumbar medial branch  "block.   • OTHER SURGICAL HISTORY  2012    Inj(s) Tend-Sheath, Ligament, Single  (1) - [facility]    • OTHER SURGICAL HISTORY  2013    Small Joint Injection/Aspiration  (2) - [facility]ICK[facility] ([facility])    • OTHER SURGICAL HISTORY      bowel obstruction x2   • OTHER SURGICAL HISTORY      gland removed from neck   • SUBLINGUAL SALIVARY CYST EXCISION N/A 2017    Procedure: EXCISION OF LEFT  TONGUE LESION WITH CLOSURE;  Surgeon: Live Bolton MD;  Location: [facility];  Service:    • TUBAL ABDOMINAL LIGATION     • UPPER GASTROINTESTINAL ENDOSCOPY  2013   • UPPER GASTROINTESTINAL ENDOSCOPY  10/17/2018       Family History   Problem Relation Age of Onset   • Cancer Other    • Diabetes Other    • Heart disease Other    • Hypertension Mother    • Cancer Mother    • Diabetes Mother    • Hypertension Father    • Heart attack Father    • Cancer Father    • Heart disease Father    • Thyroid disease Maternal Aunt        Social History     Socioeconomic History   • Marital status:      Spouse name: Not on file   • Number of children: Not on file   • Years of education: Not on file   • Highest education level: Not on file   Tobacco Use   • Smoking status: Former Smoker     Packs/day: 2.00     Years: 15.00     Pack years: 30.00     Types: Cigarettes     Last attempt to quit: 2000     Years since quittin.2   • Smokeless tobacco: Never Used   Substance and Sexual Activity   • Alcohol use: No   • Drug use: No   • Sexual activity: Defer     Comment: Marital Status:         Review of Systems   Constitutional: Positive for unexpected weight change.   HENT: Positive for postnasal drip.    Endocrine:        Night sweats   Musculoskeletal: Positive for joint swelling.   Psychiatric/Behavioral: Positive for sleep disturbance.       PHYSICAL EXAMINATION:       Ht 162.6 cm (64\")   Wt 96.2 kg (212 lb)   LMP  (LMP Unknown)   BMI 36.39 kg/m²     Physical Exam "   Constitutional: She is oriented to person, place, and time. She appears well-developed and well-nourished.   Neurological: She is alert and oriented to person, place, and time.   Psychiatric: She has a normal mood and affect. Her behavior is normal. Judgment and thought content normal.       GAIT:     []  Normal  []  Antalgic    Assistive device: []  None  []  Walker     []  Crutches  []  Cane     [x]  Wheelchair  []  Stretcher    Right Ankle Exam     Tenderness   The patient is experiencing no tenderness.  Swelling: none    Comments:  She has flatfoot deformity.  Too many toes sign when standing.  Her foot is in slight valgus and external rotation with weightbearing.  Good capillary refill as well as distal pulses and sensation.  Good muscle tone and strength.  Stable joint exam.      Left Ankle Exam     Tenderness   The patient is experiencing no tenderness.   Swelling: none    Comments:  She has flatfoot deformity.  Too many toes sign when standing.  Her foot is in slight valgus and external rotation with weightbearing.  Good capillary refill as well as distal pulses and sensation.  Good muscle tone and strength.  Stable joint exam.                  Xr Ankle 3+ View Bilateral    Result Date: 3/21/2019  Narrative: Ordering Provider:  Justus Lewis MD Ordering Diagnosis/Indication:  Bilateral ankle pain, unspecified chronicity Procedure:  XR ANKLE 3+ VW BILATERAL Exam Date:  3/21/19 COMPARISON:  Todays X-rays were compared to previous images dated November 13, 2017.     Impression:  3 views of both feet show severe, end-stage arthritic changes in the tibiotalar joint as well as in the subtalar joints of both feet.  There is flattening of the plantar arch as well as loss of height in the talar dome in both ankles.  No acute bony abnormality is noted.  No fracture or dislocation is noted.  Significant arthritic changes noted throughout the ankle mortise in both ankles.  Progression of arthritic process  noted in comparison to prior x-ray. Justus Lewis MD 3/21/19     Xr Calcaneus 2+ View Bilateral    Result Date: 3/21/2019  Narrative: Ordering Provider:  Justus Lewis MD Ordering Diagnosis/Indication:  Plantar fasciitis, bilateral Procedure:  XR CALCANEUS 2+ VW BILATERAL Exam Date:  3/21/19 COMPARISON:  Not applicable, no relevant images available.     Impression:  Axial and lateral calcaneus views in both ankles show significant arthritic change in the subtalar joint in both ankles.  Significant loss of height of the talar dome in both ankles.  There is a plantar calcaneal spur as well on both sides.  No acute bony abnormality is noted. Justus Lewis MD 3/21/19           ASSESSMENT:    Diagnoses and all orders for this visit:    Bilateral calcaneal spurs  -     Hemoglobin A1c; Future  -     Miscellaneous DME    Chronic pain of both ankles  -     Hemoglobin A1c; Future  -     Miscellaneous DME    Pain in joint involving ankle and foot, unspecified laterality  -     Hemoglobin A1c; Future  -     Miscellaneous DME    Lakesha's deformity of both heels  -     Hemoglobin A1c; Future  -     Miscellaneous DME    Plantar fasciitis, bilateral  -     Hemoglobin A1c; Future  -     Miscellaneous DME    Arthritis of both ankles  -     Hemoglobin A1c; Future  -     Miscellaneous DME          PLAN    She has primary ankle arthritis in both ankles and subtalar joints.  She has significant flattening of the talus on both sides.  No acute bony abnormalities noted on x-ray.  We discussed using anti-inflammatory medication.  We also discussed checking A1c to ensure that she did not have chronic elevated blood sugar.  We discussed use of an [place] brace on the left as that is her worst side.  We discussed about the possibility of eventual ankle fusion if symptoms do not improve.  We discussed continued use of cane and possibly even walker for support.    Return in about 6 weeks (around 5/2/2019) for  tejinder.    Justus Lewis MD

## 2019-03-22 LAB — HBA1C MFR BLD: 5.7 % (ref 4–5.6)

## 2019-03-28 ENCOUNTER — TELEPHONE (OUTPATIENT)
Dept: ORTHOPEDIC SURGERY | Facility: CLINIC | Age: 59
End: 2019-03-28

## 2019-03-28 NOTE — TELEPHONE ENCOUNTER
Her A1c was slightly elevated indicating a chronically elevated blood sugar.  She probably does need to talk with her primary care physician about diabetes and further blood sugar management.  jeremy

## 2019-04-23 ENCOUNTER — OFFICE VISIT (OUTPATIENT)
Dept: GASTROENTEROLOGY | Facility: CLINIC | Age: 59
End: 2019-04-23

## 2019-04-23 ENCOUNTER — OFFICE VISIT (OUTPATIENT)
Dept: ORTHOPEDIC SURGERY | Facility: CLINIC | Age: 59
End: 2019-04-23

## 2019-04-23 VITALS
SYSTOLIC BLOOD PRESSURE: 118 MMHG | HEART RATE: 76 BPM | WEIGHT: 213 LBS | DIASTOLIC BLOOD PRESSURE: 82 MMHG | HEIGHT: 64 IN | BODY MASS INDEX: 36.37 KG/M2

## 2019-04-23 VITALS — HEIGHT: 64 IN | BODY MASS INDEX: 36.37 KG/M2 | WEIGHT: 213 LBS

## 2019-04-23 DIAGNOSIS — K59.04 CHRONIC IDIOPATHIC CONSTIPATION: ICD-10-CM

## 2019-04-23 DIAGNOSIS — R10.84 GENERALIZED ABDOMINAL PAIN: ICD-10-CM

## 2019-04-23 DIAGNOSIS — K59.01 SLOW TRANSIT CONSTIPATION: Primary | ICD-10-CM

## 2019-04-23 DIAGNOSIS — K75.81 NASH (NONALCOHOLIC STEATOHEPATITIS): ICD-10-CM

## 2019-04-23 DIAGNOSIS — G56.01 CARPAL TUNNEL SYNDROME ON RIGHT: Primary | ICD-10-CM

## 2019-04-23 DIAGNOSIS — K74.60 CIRRHOSIS OF LIVER WITHOUT ASCITES, UNSPECIFIED HEPATIC CIRRHOSIS TYPE (HCC): ICD-10-CM

## 2019-04-23 PROCEDURE — 99024 POSTOP FOLLOW-UP VISIT: CPT | Performed by: NURSE PRACTITIONER

## 2019-04-23 PROCEDURE — 99214 OFFICE O/P EST MOD 30 MIN: CPT | Performed by: PHYSICIAN ASSISTANT

## 2019-04-23 RX ORDER — LACTULOSE 10 G/15ML
20 SOLUTION ORAL; RECTAL 3 TIMES DAILY
Qty: 946 ML | Refills: 3 | Status: SHIPPED | OUTPATIENT
Start: 2019-04-23 | End: 2019-06-07

## 2019-04-23 NOTE — PATIENT INSTRUCTIONS

## 2019-04-23 NOTE — PROGRESS NOTES
Amira Shannon is a 58 y.o. female is s/p       Chief Complaint   Patient presents with   • Right Hand - Follow-up       HISTORY OF PRESENT ILLNESS: f/u right hand, CTR done on 2/13/2019 by Dr. Lewis.        Allergies   Allergen Reactions   • Other      Pt states that taking steroids either in pill or injection form make her have blisters in her mouth and she feels like she is on fire on the inside/ has hx of c diff and possible MRSA     • Corticosteroids Rash   • Kenalog  [Triamcinolone Acetonide] Rash   • Sulfa Antibiotics Rash     Sulfa (Sulfonamide Antibiotics)         Current Outpatient Medications:   •  cetirizine (zyrTEC) 10 MG tablet, Take 1 tablet by mouth Daily As Needed for Allergies., Disp: 30 tablet, Rfl: 11  •  chlorthalidone (HYGROTON) 25 MG tablet, Take 0.5 tablets by mouth Daily., Disp: 15 tablet, Rfl: 11  •  cyclobenzaprine (FLEXERIL) 10 MG tablet, Take 10 mg by mouth 3 (Three) Times a Day As Needed for Muscle Spasms., Disp: , Rfl:   •  DULoxetine (CYMBALTA) 30 MG capsule, Take 30 mg by mouth Daily., Disp: , Rfl:   •  gabapentin (NEURONTIN) 800 MG tablet, Take 800 mg by mouth 4 (Four) Times a Day., Disp: , Rfl:   •  Melatonin 10 MG capsule, Take  by mouth Every Night., Disp: , Rfl:   •  meloxicam (MOBIC) 15 MG tablet, Take 15 mg by mouth Every Night., Disp: , Rfl:   •  nystatin (MYCOSTATIN) 408952 UNIT/GM powder, Apply  topically to the appropriate area as directed 4 (Four) Times a Day As Needed., Disp: , Rfl:   •  omeprazole (priLOSEC) 40 MG capsule, Take 40 mg by mouth Daily., Disp: , Rfl:   •  oxyCODONE-acetaminophen (PERCOCET)  MG per tablet, Take 1 tablet by mouth Every 6 (Six) Hours As Needed for Moderate Pain ., Disp: , Rfl:   •  Plecanatide (TRULANCE) 3 MG tablet, Take 1 tablet by mouth Daily., Disp: 30 tablet, Rfl: 2  •  polyethylene glycol (MIRALAX) packet, Take 17 g by mouth Daily As Needed., Disp: , Rfl:   •  spironolactone (ALDACTONE) 25 MG tablet, Take 1 tablet by mouth  Daily., Disp: 30 tablet, Rfl: 5    No fevers or chills.  No nausea or vomiting.      PHYSICAL EXAMINATION:       Amira Shannon is a 58 y.o. female    Patient is awake and alert, answers questions appropriately and is in no apparent distress.    GAIT:     []  Normal  []  Antalgic    Assistive device: [x]  None  []  Walker     []  Crutches  []  Cane     []  Wheelchair  []  Stretcher    Right Hand Exam     Tenderness   The patient is experiencing no tenderness.     Range of Motion   Wrist   Extension: normal   Flexion: normal   Pronation: normal   Supination: normal     Muscle Strength   Wrist extension: 5/5   Wrist flexion: 5/5   : 4/5     Other   Erythema: absent  Scars: present  Sensation: normal  Pulse: present      Left Hand Exam     Tenderness   The patient is experiencing no tenderness.     Range of Motion   Wrist   Extension: normal   Flexion: normal   Pronation: normal   Supination: normal     Muscle Strength   Wrist extension: 5/5   Wrist flexion: 5/5   :  4/5     Other   Erythema: absent  Scars: present  Sensation: normal  Pulse: present              No results found.        ASSESSMENT:    Diagnoses and all orders for this visit:    Carpal tunnel syndrome on right          PLAN  Wounds have all healed  Progress rom and activity as tolerated based on pain and follow up for hands as needed.   No Follow-up on file.    [place], LIYAH

## 2019-04-23 NOTE — PROGRESS NOTES
Chief Complaint   Patient presents with   • Musa   • Cirrhosis   • Abdominal Pain       ENDO PROCEDURE ORDERED:    Subjective    Amira Shannon is a 58 y.o. female. she is here today for follow-up.    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 Last seen 01/15/2019. She was given a trial on Trulance for the constipation, but states it really does not help. She is still not having good bowel movements. She gets a lot of cramping. She states stools are very large when they do pass. Her constipation has been more severe since she has been on opiates, but she states she has been constipated all of her life. She has previously tried Amitiza, Linzess and MiraLAX without much improvement. Weight is down 1 pound since last visit. GERD is doing well on Prilosec 40 mg daily. She denied nausea, vomiting. Last EGD/colonoscopy 10/17/2018 showed gastritis and hemorrhoids. BMP was normal on 02/13/2019. A1c was 5.7% 03/21/2019.    ASSESSMENT/PLAN: Patient with chronic constipation, I suggested a trial on lactulose. I am not certain how well she will tolerate it, but I did explain it is dose dependent. Consider a trial on Relistor but it is not clear if that would help her anymore than the other medication. She is due for hepatoma screening for her MUSA cirrhosis and I recommended a right upper quadrant ultrasound, CBC, CMP, AFP, MUSA FibroSure and INR. Further pending clinical course and the results of the above.      The following portions of the patient's history were reviewed and updated as appropriate:   Past Medical History:   Diagnosis Date   • Acid reflux    • Altered bowel function    • Arthropathy of lumbar facet joint    • Bleeding disorder (CMS/HCC)    • C. difficile diarrhea 2015   • Constipation    • Corns and callus    • Depression    • Disease related peripheral neuropathy    • Epigastric pain    • Fatty liver    • Hammer toe    •  "Headache    • Hiatal hernia    • History of transfusion    • Hyperlipidemia    • Knee pain    • Localized, primary osteoarthritis of the ankle and foot     Localized, primary osteoarthritis of the ankle and/or foot   • Mendoza's metatarsalgia     Mendoza's metatarsalgia - 2nd interspace on right   • Nausea and vomiting    • Neuralgia and neuritis     Neuralgia, neuritis, and radiculitis, unspecified   • Neuropathy    • Obstructive sleep apnea     Obstructive sleep apnea (adult) (pediatric)    • CHAI on CPAP     \"C-Pap at night  (unconfirmed)\"   • Osteoarthritis    • Pain in foot     Pain in unspecified foot - sees a podiatrist   • Pain in joint, ankle and foot     Joint pain in ankle and foot      • Pain radiating to back     Pain radiating to lumbar region of back   • Plantar fasciitis    • PONV (postoperative nausea and vomiting)    • Restless leg syndrome    • Secondary hypertension     Secondary hypertension, unspecified   • Sinusitis    • Sleep apnea    • Tongue anomaly     lesion     Past Surgical History:   Procedure Laterality Date   • CARPAL TUNNEL RELEASE Left 2018    Procedure: CARPAL TUNNEL RELEASE - left;  Surgeon: Justus Lewis MD;  Location: [facility] OR;  Service: Orthopedics   • CARPAL TUNNEL RELEASE Right 2019    Procedure: carpal tunnel release right hand with local/monitored anesthesia care;  Surgeon: Justus Lewis MD;  Location: [facility] OR;  Service: Orthopedics   •  SECTION     • CHOLECYSTECTOMY     • COLONOSCOPY  2013   • COLONOSCOPY N/A 10/17/2018    Procedure: COLONOSCOPY;  Surgeon: Russell Del Rio MD;  Location: [facility] ENDOSCOPY;  Service: Gastroenterology   • DIRECT LARYNGOSCOPY, ESOPHAGOSCOPY, BRONCHOSCOPY N/A 2017    Procedure: DIRECT LARYNGOSCOPY AND;  Surgeon: Live Bolton MD;  Location: [facility] OR;  Service:    • ENDOSCOPY  2013    Colon endoscopy 88835 (1) - Internal & external hemorrhoids found. Stool found.   • ENDOSCOPY  " 07/01/2013    EGD w/ tube 90105 (1) - Normal esophagus. Gastritis in stomach. Biopsy taken. Normal dudoenum. Biopsy taken.   • ENDOSCOPY N/A 10/17/2018    Procedure: ESOPHAGOGASTRODUODENOSCOPY--eval varices;  Surgeon: Russell Del Rio MD;  Location: [facility] ENDOSCOPY;  Service: Gastroenterology   • FOOT SURGERY  02/26/2013    Foot/toes surgery procedure (1) - Arthroplasty of toes 4 and 5 of right foot.   • HERNIA REPAIR     • HERNIA REPAIR      hital   • HYSTERECTOMY     • LIVER BIOPSY     • NERVE BLOCK  07/25/2016    Injection for nerve block (1) - Lumbar medial branch block.   • OTHER SURGICAL HISTORY  12/03/2012    Inj(s) Tend-Sheath, Ligament, Single 20550 (1) - [facility]    • OTHER SURGICAL HISTORY  06/24/2013    Small Joint Injection/Aspiration 20600 (2) - [facility] ([facility])    • OTHER SURGICAL HISTORY  2011    bowel obstruction x2   • OTHER SURGICAL HISTORY      gland removed from neck   • SUBLINGUAL SALIVARY CYST EXCISION N/A 8/1/2017    Procedure: EXCISION OF LEFT  TONGUE LESION WITH CLOSURE;  Surgeon: Live Bolton MD;  Location: [facility] OR;  Service:    • TUBAL ABDOMINAL LIGATION     • UPPER GASTROINTESTINAL ENDOSCOPY  07/01/2013   • UPPER GASTROINTESTINAL ENDOSCOPY  10/17/2018     Family History   Problem Relation Age of Onset   • Cancer Other    • Diabetes Other    • Heart disease Other    • Hypertension Mother    • Cancer Mother    • Diabetes Mother    • Hypertension Father    • Heart attack Father    • Cancer Father    • Heart disease Father    • Thyroid disease Maternal Aunt      OB History     No data available        Allergies   Allergen Reactions   • Other      Pt states that taking steroids either in pill or injection form make her have blisters in her mouth and she feels like she is on fire on the inside/ has hx of c diff and possible MRSA     • Corticosteroids Rash   • Kenalog  [Triamcinolone Acetonide] Rash   • Sulfa Antibiotics Rash     Sulfa (Sulfonamide  Antibiotics)     Social History     Socioeconomic History   • Marital status:      Spouse name: Not on file   • Number of children: Not on file   • Years of education: Not on file   • Highest education level: Not on file   Tobacco Use   • Smoking status: Former Smoker     Packs/day: 2.00     Years: 15.00     Pack years: 30.00     Types: Cigarettes     Last attempt to quit: 2000     Years since quittin.3   • Smokeless tobacco: Never Used   Substance and Sexual Activity   • Alcohol use: No   • Drug use: No   • Sexual activity: Defer     Comment: Marital Status:        Current Outpatient Medications:   •  cetirizine (zyrTEC) 10 MG tablet, Take 1 tablet by mouth Daily As Needed for Allergies., Disp: 30 tablet, Rfl: 11  •  chlorthalidone (HYGROTON) 25 MG tablet, Take 0.5 tablets by mouth Daily., Disp: 15 tablet, Rfl: 11  •  cyclobenzaprine (FLEXERIL) 10 MG tablet, Take 10 mg by mouth 3 (Three) Times a Day As Needed for Muscle Spasms., Disp: , Rfl:   •  DULoxetine (CYMBALTA) 30 MG capsule, Take 30 mg by mouth Daily., Disp: , Rfl:   •  gabapentin (NEURONTIN) 800 MG tablet, Take 800 mg by mouth 4 (Four) Times a Day., Disp: , Rfl:   •  Melatonin 10 MG capsule, Take  by mouth Every Night., Disp: , Rfl:   •  meloxicam (MOBIC) 15 MG tablet, Take 15 mg by mouth Every Night., Disp: , Rfl:   •  nystatin (MYCOSTATIN) 798948 UNIT/GM powder, Apply  topically to the appropriate area as directed 4 (Four) Times a Day As Needed., Disp: , Rfl:   •  omeprazole (priLOSEC) 40 MG capsule, Take 40 mg by mouth Daily., Disp: , Rfl:   •  oxyCODONE-acetaminophen (PERCOCET)  MG per tablet, Take 1 tablet by mouth Every 6 (Six) Hours As Needed for Moderate Pain ., Disp: , Rfl:   •  Plecanatide (TRULANCE) 3 MG tablet, Take 1 tablet by mouth Daily., Disp: 30 tablet, Rfl: 2  •  polyethylene glycol (MIRALAX) packet, Take 17 g by mouth Daily As Needed., Disp: , Rfl:   •  spironolactone (ALDACTONE) 25 MG tablet, Take 1 tablet by  "mouth Daily., Disp: 30 tablet, Rfl: 5  •  lactulose (CHRONULAC) 10 GM/15ML solution solution (encephalopathy), Take 30 mL by mouth 3 (Three) Times a Day., Disp: 946 mL, Rfl: 3  Review of Systems  Review of Systems       Objective    /82 (BP Location: Left arm)   Pulse 76   Ht 162.6 cm (64\")   Wt 96.6 kg (213 lb)   LMP  (LMP Unknown)   BMI 36.56 kg/m²   Physical Exam   Constitutional: She is oriented to person, place, and time. She appears well-developed and well-nourished. No distress.   HENT:   Head: Normocephalic and atraumatic.   Eyes: EOM are normal. Pupils are equal, round, and reactive to light.   Neck: Normal range of motion.   Cardiovascular: Normal rate, regular rhythm and normal heart sounds.   Pulmonary/Chest: Effort normal and breath sounds normal.   Abdominal: Soft. Bowel sounds are normal. She exhibits no shifting dullness, no distension, no abdominal bruit, no ascites and no mass. There is no hepatosplenomegaly. There is tenderness. There is no rigidity, no rebound, no guarding and no CVA tenderness. No hernia. Hernia confirmed negative in the ventral area.   Obese, mild diffuse   Musculoskeletal: Normal range of motion.   Neurological: She is alert and oriented to person, place, and time.   Skin: Skin is warm and dry.   Psychiatric: She has a normal mood and affect. Her behavior is normal. Judgment and thought content normal.   Nursing note and vitals reviewed.    Assessment/Plan      1. Slow transit constipation    2. Generalized abdominal pain    3. Chronic idiopathic constipation    4. Cirrhosis of liver without ascites, unspecified hepatic cirrhosis type (CMS/HCC)    5. MUSA (nonalcoholic steatohepatitis)    .   Amira was seen today for musa, cirrhosis and abdominal pain.    Diagnoses and all orders for this visit:    Slow transit constipation  -     AFP Tumor Marker  -     CBC & Differential  -     Comprehensive Metabolic Panel  -     Protime-INR  -     MUSA Fibrosure  -     US " Liver    Generalized abdominal pain  -     AFP Tumor Marker  -     CBC & Differential  -     Comprehensive Metabolic Panel  -     Protime-INR  -     MUSA Fibrosure  -     US Liver    Chronic idiopathic constipation  -     AFP Tumor Marker  -     CBC & Differential  -     Comprehensive Metabolic Panel  -     Protime-INR  -     MUSA Fibrosure  -     US Liver    Cirrhosis of liver without ascites, unspecified hepatic cirrhosis type (CMS/HCC)  -     AFP Tumor Marker  -     CBC & Differential  -     Comprehensive Metabolic Panel  -     Protime-INR  -     MUSA Fibrosure  -     US Liver    MUSA (nonalcoholic steatohepatitis)  -     AFP Tumor Marker  -     CBC & Differential  -     Comprehensive Metabolic Panel  -     Protime-INR  -     MUSA Fibrosure  -     US Liver    Other orders  -     lactulose (CHRONULAC) 10 GM/15ML solution solution (encephalopathy); Take 30 mL by mouth 3 (Three) Times a Day.        Orders placed during this encounter include:  Orders Placed This Encounter   Procedures   • US Liver     Scheduling Instructions:      RUQ     Order Specific Question:   Reason for Exam:     Answer:   cirrhosis, MUSA     Order Specific Question:   Will this be performed with Elastography? (BETHEL and KEV ONLY)     Answer:   No   • AFP Tumor Marker   • Comprehensive Metabolic Panel   • Protime-INR   • MUSA Fibrosure   • CBC & Differential     Order Specific Question:   Manual Differential     Answer:   No       Medications prescribed:  New Medications Ordered This Visit   Medications   • lactulose (CHRONULAC) 10 GM/15ML solution solution (encephalopathy)     Sig: Take 30 mL by mouth 3 (Three) Times a Day.     Dispense:  946 mL     Refill:  3       Requested Prescriptions     Signed Prescriptions Disp Refills   • lactulose (CHRONULAC) 10 GM/15ML solution solution (encephalopathy) 946 mL 3     Sig: Take 30 mL by mouth 3 (Three) Times a Day.       Review and/or summary of lab tests, radiology, procedures, medications.  Review and summary of old records and obtaining of history. The risks and benefits of my recommendations, as well as other treatment options were discussed with the patient today. Questions were answered.    Follow-up: Return in about 6 weeks (around 6/4/2019), or if symptoms worsen or fail to improve.     * Surgery not found *      This document has been electronically signed by Blaine Perales PA-C on April 24, 2019 7:31 PM      Results for orders placed or performed in visit on 03/21/19   Hemoglobin A1c   Result Value Ref Range    Hemoglobin A1C 5.7 (H) 4 - 5.6 %   Results for orders placed or performed during the hospital encounter of 02/13/19   Basic Metabolic Panel   Result Value Ref Range    Glucose 100 60 - 100 mg/dL    BUN 17 7 - 21 mg/dL    Creatinine 0.92 0.50 - 1.00 mg/dL    Sodium 138 137 - 145 mmol/L    Potassium 3.7 3.5 - 5.1 mmol/L    Chloride 102 95 - 110 mmol/L    CO2 28.0 22.0 - 31.0 mmol/L    Calcium 9.3 8.4 - 10.2 mg/dL    eGFR Non  Amer 62 51 - 120 mL/min/1.73    BUN/Creatinine Ratio 18.5 7.0 - 25.0    Anion Gap 8.0 5.0 - 15.0 mmol/L   Results for orders placed or performed during the hospital encounter of 10/17/18   Tissue Pathology Exam   Result Value Ref Range    Case Report       Surgical Pathology Report                         Case: [id number]                                  Authorizing Provider:  Russell Del Rio MD        Collected:           10/17/2018 03:59 PM          Ordering Location:     [facility]             Received:            10/18/2018 08:23 AM                                 [facility]                                                     Pathologist:           Miguel Lubin MD                                                         Specimens:   1) - Gastric, Antrum, antrum bx                                                                     2) - Small Intestine, Duodenum, small bowel bx                                                      3)  - Large Intestine, colonic mucosa bx                                                    Final Diagnosis       1.  MUCOSA, ANTRUM OF STOMACH:   CHRONIC GASTRITIS.   NEGATIVE FOR HELICOBACTER PYLORI (HP IMMUNOSTAIN).     2.  MUCOSA, DUODENUM:   NO SIGNIFICANT HISTOLOGIC ABNORMALITY.     3.  MUCOSA, COLON:   NO SIGNIFICANT HISTOLOGIC ABNORMALITY.       Gross Description       Received for examination are 3 containers, each of which have nodular bits of white soft tissue measuring 0.3-0.5 cc in aggregate.  All specimens are embedded as labeled:  1A antrum of stomach; 2A duodenum; 3A mucosa of colon.       Special Stains       Helicobacter pylori (HP) immunostain is performed (block 1) because an appropriate inflammatory milieu is present and organisms are not seen on H & E stained slides.     HP immunostain was developed and [facility]  It has not been cleared or approved by the U.[place].Food and Drug Administration.  The FDA has determined that such clearance of approval is not necessary.  This test is used for clinical purposes.  It should not be regarded as investigational or for research.  This laboratory is certified under the Clinical Laboratory Amendments of 1988 (CLIA-88) as qualified to perform high complexity clinical laboratory testing.        Results for orders placed or performed during the hospital encounter of 08/31/18   Gold Top - SST   Result Value Ref Range    Extra Tube Hold for add-ons.    Green Top (Gel)   Result Value Ref Range    Extra Tube Hold for add-ons.    Green Top (Gel)   Result Value Ref Range    Extra Tube Hold for add-ons.    Scan Slide   Result Value Ref Range    RBC Morphology Normal Normal    WBC Morphology Normal Normal    Platelet Estimate Decreased Normal   Urinalysis With Microscopic If Indicated (No Culture) - Urine, Clean Catch   Result Value Ref Range    Color, UA Yellow Yellow, Straw, Dark Yellow,  Mikala    Appearance, UA Clear Clear    pH, UA 6.0 5.0 - 9.0    Specific Gravity, UA 1.028 1.003 - 1.030    Glucose, UA Negative Negative    Ketones, UA Negative Negative    Bilirubin, UA Negative Negative    Blood, UA Negative Negative    Protein, UA Negative Negative    Leuk Esterase, UA Negative Negative    Nitrite, UA Negative Negative    Urobilinogen, UA 1.0 E.U./dL 0.2 - 1.0 E.U./dL   CBC Auto Differential   Result Value Ref Range    WBC 5.75 3.20 - 9.80 10*3/mm3    RBC 5.37 (H) 3.77 - 5.16 10*6/mm3    Hemoglobin 14.7 12.0 - 15.5 g/dL    Hematocrit 42.4 35.0 - 45.0 %    MCV 79.0 (L) 80.0 - 98.0 fL    MCH 27.4 26.5 - 34.0 pg    MCHC 34.7 31.4 - 36.0 g/dL    RDW 15.3 (H) 11.5 - 14.5 %    RDW-SD 44.2 36.4 - 46.3 fl    MPV  8.0 - 12.0 fL    Platelets 95 (L) 150 - 450 10*3/mm3    Neutrophil % 65.2 37.0 - 80.0 %    Lymphocyte % 26.1 10.0 - 50.0 %    Monocyte % 5.2 0.0 - 12.0 %    Eosinophil % 3.0 0.0 - 7.0 %    Basophil % 0.2 0.0 - 2.0 %    Immature Grans % 0.3 0.0 - 0.5 %    Neutrophils, Absolute 3.75 2.00 - 8.60 10*3/mm3    Lymphocytes, Absolute 1.50 0.60 - 4.20 10*3/mm3    Monocytes, Absolute 0.30 0.00 - 0.90 10*3/mm3    Eosinophils, Absolute 0.17 0.00 - 0.70 10*3/mm3    Basophils, Absolute 0.01 0.00 - 0.20 10*3/mm3    Immature Grans, Absolute 0.02 0.00 - 0.02 10*3/mm3    nRBC 0.9 (H) 0.0 - 0.0 /100 WBC   CBC Auto Differential   Result Value Ref Range    WBC 7.42 3.20 - 9.80 10*3/mm3    RBC 5.32 (H) 3.77 - 5.16 10*6/mm3    Hemoglobin 14.2 12.0 - 15.5 g/dL    Hematocrit 41.7 35.0 - 45.0 %    MCV 78.4 (L) 80.0 - 98.0 fL    MCH 26.7 26.5 - 34.0 pg    MCHC 34.1 31.4 - 36.0 g/dL    RDW 15.0 (H) 11.5 - 14.5 %    RDW-SD 43.0 36.4 - 46.3 fl    MPV 11.1 8.0 - 12.0 fL    Platelets 103 (L) 150 - 450 10*3/mm3    Neutrophil % 67.5 37.0 - 80.0 %    Lymphocyte % 23.7 10.0 - 50.0 %    Monocyte % 6.3 0.0 - 12.0 %    Eosinophil % 1.9 0.0 - 7.0 %    Basophil % 0.3 0.0 - 2.0 %    Immature Grans % 0.3 0.0 - 0.5 %    Neutrophils,  Absolute 5.01 2.00 - 8.60 10*3/mm3    Lymphocytes, Absolute 1.76 0.60 - 4.20 10*3/mm3    Monocytes, Absolute 0.47 0.00 - 0.90 10*3/mm3    Eosinophils, Absolute 0.14 0.00 - 0.70 10*3/mm3    Basophils, Absolute 0.02 0.00 - 0.20 10*3/mm3    Immature Grans, Absolute 0.02 0.00 - 0.02 10*3/mm3   CBC Auto Differential   Result Value Ref Range    WBC 7.96 3.20 - 9.80 10*3/mm3    RBC 5.58 (H) 3.77 - 5.16 10*6/mm3    Hemoglobin 15.1 12.0 - 15.5 g/dL    Hematocrit 44.0 35.0 - 45.0 %    MCV 78.9 (L) 80.0 - 98.0 fL    MCH 27.1 26.5 - 34.0 pg    MCHC 34.3 31.4 - 36.0 g/dL    RDW 15.2 (H) 11.5 - 14.5 %    RDW-SD 44.1 36.4 - 46.3 fl    MPV 10.8 8.0 - 12.0 fL    Platelets 100 (L) 150 - 450 10*3/mm3    Neutrophil % 65.4 37.0 - 80.0 %    Lymphocyte % 24.5 10.0 - 50.0 %    Monocyte % 7.0 0.0 - 12.0 %    Eosinophil % 2.4 0.0 - 7.0 %    Basophil % 0.3 0.0 - 2.0 %    Immature Grans % 0.4 0.0 - 0.5 %    Neutrophils, Absolute 5.21 2.00 - 8.60 10*3/mm3    Lymphocytes, Absolute 1.95 0.60 - 4.20 10*3/mm3    Monocytes, Absolute 0.56 0.00 - 0.90 10*3/mm3    Eosinophils, Absolute 0.19 0.00 - 0.70 10*3/mm3    Basophils, Absolute 0.02 0.00 - 0.20 10*3/mm3    Immature Grans, Absolute 0.03 (H) 0.00 - 0.02 10*3/mm3    nRBC 0.0 0.0 - 0.0 /100 WBC     *Note: Due to a large number of results and/or encounters for the requested time period, some results have not been displayed. A complete set of results can be found in Results Review.       Some portions of this note have been dictated using voice recognition software and may contain errors and/or omissions.

## 2019-05-21 ENCOUNTER — HOSPITAL ENCOUNTER (EMERGENCY)
Facility: HOSPITAL | Age: 59
Discharge: HOME | End: 2019-05-21
Attending: EMERGENCY MEDICINE | Admitting: EMERGENCY MEDICINE

## 2019-05-21 ENCOUNTER — APPOINTMENT (OUTPATIENT)
Dept: CT IMAGING | Facility: HOSPITAL | Age: 59
End: 2019-05-21

## 2019-05-21 ENCOUNTER — APPOINTMENT (OUTPATIENT)
Dept: GENERAL RADIOLOGY | Facility: HOSPITAL | Age: 59
End: 2019-05-21

## 2019-05-21 VITALS
SYSTOLIC BLOOD PRESSURE: 128 MMHG | HEART RATE: 86 BPM | BODY MASS INDEX: 36.19 KG/M2 | WEIGHT: 212 LBS | HEIGHT: 64 IN | TEMPERATURE: 98.3 F | RESPIRATION RATE: 20 BRPM | OXYGEN SATURATION: 99 % | DIASTOLIC BLOOD PRESSURE: 68 MMHG

## 2019-05-21 DIAGNOSIS — R10.9 ABDOMINAL PAIN, UNSPECIFIED ABDOMINAL LOCATION: Primary | ICD-10-CM

## 2019-05-21 DIAGNOSIS — K74.60 CIRRHOSIS OF LIVER WITHOUT ASCITES, UNSPECIFIED HEPATIC CIRRHOSIS TYPE (HCC): ICD-10-CM

## 2019-05-21 DIAGNOSIS — K59.00 CONSTIPATION, UNSPECIFIED CONSTIPATION TYPE: ICD-10-CM

## 2019-05-21 LAB
ALBUMIN SERPL-MCNC: 4.1 G/DL (ref 3.5–5.2)
ALBUMIN/GLOB SERPL: 1.1 G/DL
ALP SERPL-CCNC: 108 U/L (ref 39–117)
ALT SERPL W P-5'-P-CCNC: 29 U/L (ref 1–33)
ANION GAP SERPL CALCULATED.3IONS-SCNC: 11 MMOL/L
AST SERPL-CCNC: 47 U/L (ref 1–32)
BASOPHILS # BLD AUTO: 0.01 10*3/MM3 (ref 0–0.2)
BASOPHILS NFR BLD AUTO: 0.2 % (ref 0–1.5)
BILIRUB SERPL-MCNC: 0.6 MG/DL (ref 0.2–1.2)
BILIRUB UR QL STRIP: NEGATIVE
BUN BLD-MCNC: 10 MG/DL (ref 6–20)
BUN/CREAT SERPL: 10.6 (ref 7–25)
CALCIUM SPEC-SCNC: 10 MG/DL (ref 8.6–10.5)
CHLORIDE SERPL-SCNC: 104 MMOL/L (ref 98–107)
CLARITY UR: CLEAR
CO2 SERPL-SCNC: 28 MMOL/L (ref 22–29)
COLOR UR: YELLOW
CREAT BLD-MCNC: 0.94 MG/DL (ref 0.57–1)
D-LACTATE SERPL-SCNC: 1.4 MMOL/L (ref 0.5–2)
DEPRECATED RDW RBC AUTO: 43.8 FL (ref 37–54)
EOSINOPHIL # BLD AUTO: 0.09 10*3/MM3 (ref 0–0.4)
EOSINOPHIL NFR BLD AUTO: 2.2 % (ref 0.3–6.2)
ERYTHROCYTE [DISTWIDTH] IN BLOOD BY AUTOMATED COUNT: 15.3 % (ref 12.3–15.4)
GFR SERPL CREATININE-BSD FRML MDRD: 61 ML/MIN/1.73
GLOBULIN UR ELPH-MCNC: 3.7 GM/DL
GLUCOSE BLD-MCNC: 106 MG/DL (ref 65–99)
GLUCOSE UR STRIP-MCNC: NEGATIVE MG/DL
HCT VFR BLD AUTO: 43.2 % (ref 34–46.6)
HGB BLD-MCNC: 14 G/DL (ref 12–15.9)
HGB UR QL STRIP.AUTO: NEGATIVE
HOLD SPECIMEN: NORMAL
IMM GRANULOCYTES # BLD AUTO: 0.01 10*3/MM3 (ref 0–0.05)
IMM GRANULOCYTES NFR BLD AUTO: 0.2 % (ref 0–0.5)
KETONES UR QL STRIP: NEGATIVE
LEUKOCYTE ESTERASE UR QL STRIP.AUTO: NEGATIVE
LIPASE SERPL-CCNC: 25 U/L (ref 13–60)
LYMPHOCYTES # BLD AUTO: 1.1 10*3/MM3 (ref 0.7–3.1)
LYMPHOCYTES NFR BLD AUTO: 26.8 % (ref 19.6–45.3)
MCH RBC QN AUTO: 25.9 PG (ref 26.6–33)
MCHC RBC AUTO-ENTMCNC: 32.4 G/DL (ref 31.5–35.7)
MCV RBC AUTO: 80 FL (ref 79–97)
MONOCYTES # BLD AUTO: 0.2 10*3/MM3 (ref 0.1–0.9)
MONOCYTES NFR BLD AUTO: 4.9 % (ref 5–12)
NEUTROPHILS # BLD AUTO: 2.69 10*3/MM3 (ref 1.7–7)
NEUTROPHILS NFR BLD AUTO: 65.7 % (ref 42.7–76)
NITRITE UR QL STRIP: NEGATIVE
NRBC BLD AUTO-RTO: 0 /100 WBC (ref 0–0.2)
PH UR STRIP.AUTO: 7.5 [PH] (ref 5–9)
PLATELET # BLD AUTO: 81 10*3/MM3 (ref 140–450)
PMV BLD AUTO: 11.7 FL (ref 6–12)
POTASSIUM BLD-SCNC: 4.1 MMOL/L (ref 3.5–5.2)
PROT SERPL-MCNC: 7.8 G/DL (ref 6–8.5)
PROT UR QL STRIP: NEGATIVE
RBC # BLD AUTO: 5.4 10*6/MM3 (ref 3.77–5.28)
RBC MORPH BLD: NORMAL
SMALL PLATELETS BLD QL SMEAR: NORMAL
SODIUM BLD-SCNC: 143 MMOL/L (ref 136–145)
SP GR UR STRIP: 1 (ref 1–1.03)
UROBILINOGEN UR QL STRIP: ABNORMAL
WBC MORPH BLD: NORMAL
WBC NRBC COR # BLD: 4.1 10*3/MM3 (ref 3.4–10.8)
WHOLE BLOOD HOLD SPECIMEN: NORMAL

## 2019-05-21 PROCEDURE — 93005 ELECTROCARDIOGRAM TRACING: CPT | Performed by: EMERGENCY MEDICINE

## 2019-05-21 PROCEDURE — 0 DIATRIZOATE MEGLUMINE & SODIUM PER 1 ML: Performed by: EMERGENCY MEDICINE

## 2019-05-21 PROCEDURE — 96361 HYDRATE IV INFUSION ADD-ON: CPT

## 2019-05-21 PROCEDURE — 80053 COMPREHEN METABOLIC PANEL: CPT | Performed by: EMERGENCY MEDICINE

## 2019-05-21 PROCEDURE — 74177 CT ABD & PELVIS W/CONTRAST: CPT

## 2019-05-21 PROCEDURE — 83690 ASSAY OF LIPASE: CPT | Performed by: EMERGENCY MEDICINE

## 2019-05-21 PROCEDURE — 93010 ELECTROCARDIOGRAM REPORT: CPT | Performed by: INTERNAL MEDICINE

## 2019-05-21 PROCEDURE — 25010000002 IOPAMIDOL 61 % SOLUTION: Performed by: EMERGENCY MEDICINE

## 2019-05-21 PROCEDURE — 85007 BL SMEAR W/DIFF WBC COUNT: CPT | Performed by: EMERGENCY MEDICINE

## 2019-05-21 PROCEDURE — 74022 RADEX COMPL AQT ABD SERIES: CPT

## 2019-05-21 PROCEDURE — 25010000002 ONDANSETRON PER 1 MG: Performed by: EMERGENCY MEDICINE

## 2019-05-21 PROCEDURE — 96374 THER/PROPH/DIAG INJ IV PUSH: CPT

## 2019-05-21 PROCEDURE — 83605 ASSAY OF LACTIC ACID: CPT | Performed by: EMERGENCY MEDICINE

## 2019-05-21 PROCEDURE — 81003 URINALYSIS AUTO W/O SCOPE: CPT | Performed by: EMERGENCY MEDICINE

## 2019-05-21 PROCEDURE — 25010000002 MORPHINE PER 10 MG: Performed by: EMERGENCY MEDICINE

## 2019-05-21 PROCEDURE — 99284 EMERGENCY DEPT VISIT MOD MDM: CPT

## 2019-05-21 PROCEDURE — 85025 COMPLETE CBC W/AUTO DIFF WBC: CPT | Performed by: EMERGENCY MEDICINE

## 2019-05-21 PROCEDURE — 96375 TX/PRO/DX INJ NEW DRUG ADDON: CPT

## 2019-05-21 RX ORDER — MINERAL OIL 100 G/100G
1 OIL RECTAL ONCE
Qty: 1 ENEMA | Refills: 0 | Status: SHIPPED | OUTPATIENT
Start: 2019-05-21 | End: 2019-05-21

## 2019-05-21 RX ORDER — SODIUM CHLORIDE 0.9 % (FLUSH) 0.9 %
10 SYRINGE (ML) INJECTION AS NEEDED
Status: DISCONTINUED | OUTPATIENT
Start: 2019-05-21 | End: 2019-05-21 | Stop reason: HOSPADM

## 2019-05-21 RX ORDER — ONDANSETRON 2 MG/ML
4 INJECTION INTRAMUSCULAR; INTRAVENOUS ONCE
Status: COMPLETED | OUTPATIENT
Start: 2019-05-21 | End: 2019-05-21

## 2019-05-21 RX ORDER — SODIUM CHLORIDE 9 MG/ML
125 INJECTION, SOLUTION INTRAVENOUS CONTINUOUS
Status: DISCONTINUED | OUTPATIENT
Start: 2019-05-21 | End: 2019-05-21 | Stop reason: HOSPADM

## 2019-05-21 RX ADMIN — MORPHINE SULFATE 4 MG: 4 INJECTION INTRAVENOUS at 15:34

## 2019-05-21 RX ADMIN — IOPAMIDOL 92 ML: 612 INJECTION, SOLUTION INTRAVENOUS at 16:40

## 2019-05-21 RX ADMIN — ONDANSETRON 4 MG: 2 INJECTION INTRAMUSCULAR; INTRAVENOUS at 15:35

## 2019-05-21 RX ADMIN — DIATRIZOATE MEGLUMINE AND DIATRIZOATE SODIUM 30 ML: 660; 100 LIQUID ORAL; RECTAL at 14:40

## 2019-05-21 RX ADMIN — SODIUM CHLORIDE 500 ML: 9 INJECTION, SOLUTION INTRAVENOUS at 15:31

## 2019-05-28 ENCOUNTER — OFFICE VISIT (OUTPATIENT)
Dept: ORTHOPEDIC SURGERY | Facility: CLINIC | Age: 59
End: 2019-05-28

## 2019-05-28 VITALS — BODY MASS INDEX: 36.19 KG/M2 | HEIGHT: 64 IN | WEIGHT: 212 LBS

## 2019-05-28 DIAGNOSIS — M25.552 LEFT HIP PAIN: Primary | ICD-10-CM

## 2019-05-28 DIAGNOSIS — M25.562 ACUTE PAIN OF BOTH KNEES: ICD-10-CM

## 2019-05-28 DIAGNOSIS — M25.561 ACUTE PAIN OF BOTH KNEES: ICD-10-CM

## 2019-05-28 PROCEDURE — 99214 OFFICE O/P EST MOD 30 MIN: CPT | Performed by: NURSE PRACTITIONER

## 2019-05-28 RX ORDER — NABUMETONE 500 MG/1
500 TABLET, FILM COATED ORAL EVERY 8 HOURS PRN
Qty: 45 TABLET | Refills: 1 | Status: SHIPPED | OUTPATIENT
Start: 2019-05-28 | End: 2019-06-12

## 2019-05-28 NOTE — PROGRESS NOTES
"Amira Shannon is a 58 y.o. female returns for     Chief Complaint   Patient presents with   • Left Knee - Follow-up, Pain   • Right Knee - Pain, Follow-up       HISTORY OF PRESENT ILLNESS:    58-year-old  female in the office today for bilateral knee pain and left hip pain which is worsened over the past several months.  She reports that her bilateral knee pain to the worse however recently has experienced some left hip pain as well.  She is been taking medication as directed by her primary care provider which has not improved her symptoms.    CONCURRENT MEDICAL HISTORY:    The following portions of the patient's history were reviewed and updated as appropriate: allergies, current medications, past family history, past medical history, past social history, past surgical history and problem list.     ROS  No fevers or chills.  No chest pain or shortness of air.  No GI or  disturbances.    PHYSICAL EXAMINATION:       Ht 162.6 cm (64\")   Wt 96.2 kg (212 lb)   LMP  (LMP Unknown)   BMI 36.39 kg/m²     Physical Exam   Constitutional: She is oriented to person, place, and time. Vital signs are normal. She appears well-developed and well-nourished. She is cooperative.   HENT:   Head: Normocephalic and atraumatic.   Neck: Trachea normal and phonation normal.   Pulmonary/Chest: Effort normal. No respiratory distress.   Abdominal: Soft. Normal appearance. She exhibits no distension.   Musculoskeletal:        Right knee: She exhibits no effusion.        Left knee: She exhibits no effusion.   Neurological: She is alert and oriented to person, place, and time. GCS eye subscore is 4. GCS verbal subscore is 5. GCS motor subscore is 6.   Skin: Skin is warm, dry and intact. Capillary refill takes less than 2 seconds.   Psychiatric: She has a normal mood and affect. Her speech is normal and behavior is normal. Judgment and thought content normal. Cognition and memory are normal.   Vitals reviewed.      GAIT:  "    [x]  Normal  []  Antalgic    Assistive device: [x]  None  []  Walker     []  Crutches  []  Cane     []  Wheelchair  []  Stretcher    Right Knee Exam     Tenderness   The patient is experiencing tenderness in the medial joint line and lateral joint line.    Range of Motion   Extension: normal   Flexion: abnormal     Other   Erythema: absent  Scars: absent  Sensation: normal  Pulse: present  Swelling: mild  Effusion: no effusion present      Left Knee Exam     Tenderness   The patient is experiencing tenderness in the lateral joint line and medial joint line.    Range of Motion   Extension: normal   Flexion: abnormal     Other   Erythema: absent  Scars: absent  Sensation: normal  Pulse: present  Swelling: mild  Effusion: no effusion present      Right Hip Exam   Right hip exam is normal.       Left Hip Exam     Tenderness   The patient is experiencing tenderness in the greater trochanter.    Range of Motion   Abduction: normal   Flexion: normal   External rotation: normal   Internal rotation: normal     Muscle Strength   Abduction: 4/5   Adduction: 4/5     Other   Erythema: absent  Scars: absent  Sensation: normal  Pulse: present                        ASSESSMENT:    Diagnoses and all orders for this visit:    Left hip pain  -     Ambulatory Referral to Physical Therapy Evaluate and treat    Acute pain of both knees  -     XR Knee Bilateral AP Standing  -     XR Knee 1 or 2 View Bilateral  -     Ambulatory Referral to Physical Therapy Evaluate and treat    Other orders  -     nabumetone (RELAFEN) 500 MG tablet; Take 1 tablet by mouth Every 8 (Eight) Hours As Needed for Mild Pain  for up to 15 days.          PLAN  Reviewed the x-rays with the patient today and I recommended a course of physical therapy for the bilateral knees and left hip pain and we switched her to Relafen 500 mg 3 times daily for a trial dose if this appears to improve her symptoms then we will proceed with a full month's prescription.  Patient  verbalized understanding of the follow-up in 4 to 6 weeks for recheck and will plan on an MRI of one if not both of her knees of her pain continues without improvement.  No Follow-up on file.    [place], APRN

## 2019-05-29 ENCOUNTER — APPOINTMENT (OUTPATIENT)
Dept: LAB | Facility: HOSPITAL | Age: 59
End: 2019-05-29

## 2019-05-29 LAB
ALBUMIN SERPL-MCNC: 4.4 G/DL (ref 3.5–5.2)
ALBUMIN/GLOB SERPL: 1.5 G/DL
ALP SERPL-CCNC: 100 U/L (ref 39–117)
ALPHA-FETOPROTEIN: 2.24 NG/ML (ref 0–8.3)
ALT SERPL W P-5'-P-CCNC: 39 U/L (ref 1–33)
ANION GAP SERPL CALCULATED.3IONS-SCNC: 12.9 MMOL/L
AST SERPL-CCNC: 64 U/L (ref 1–32)
BASOPHILS # BLD AUTO: 0.02 10*3/MM3 (ref 0–0.2)
BASOPHILS NFR BLD AUTO: 0.5 % (ref 0–1.5)
BILIRUB SERPL-MCNC: 0.8 MG/DL (ref 0.2–1.2)
BUN BLD-MCNC: 16 MG/DL (ref 6–20)
BUN/CREAT SERPL: 17.8 (ref 7–25)
CALCIUM SPEC-SCNC: 9.2 MG/DL (ref 8.6–10.5)
CHLORIDE SERPL-SCNC: 103 MMOL/L (ref 98–107)
CO2 SERPL-SCNC: 23.1 MMOL/L (ref 22–29)
CREAT BLD-MCNC: 0.9 MG/DL (ref 0.57–1)
DEPRECATED RDW RBC AUTO: 46.1 FL (ref 37–54)
DIFFERENTIAL METHOD BLD: ABNORMAL
EOSINOPHIL # BLD AUTO: 0.12 10*3/MM3 (ref 0–0.4)
EOSINOPHIL NFR BLD AUTO: 2.9 % (ref 0.3–6.2)
ERYTHROCYTE [DISTWIDTH] IN BLOOD BY AUTOMATED COUNT: 15.5 % (ref 12.3–15.4)
GFR SERPL CREATININE-BSD FRML MDRD: 64 ML/MIN/1.73
GLOBULIN UR ELPH-MCNC: 3 GM/DL
GLUCOSE BLD-MCNC: 98 MG/DL (ref 65–99)
HCT VFR BLD AUTO: 43.8 % (ref 34–46.6)
HGB BLD-MCNC: 13.9 G/DL (ref 12–15.9)
IMM GRANULOCYTES # BLD AUTO: 0.01 10*3/MM3 (ref 0–0.05)
IMM GRANULOCYTES NFR BLD AUTO: 0.2 % (ref 0–0.5)
INR PPP: 1.26 (ref 0.8–1.2)
LYMPHOCYTES # BLD AUTO: 1.31 10*3/MM3 (ref 0.7–3.1)
LYMPHOCYTES NFR BLD AUTO: 31.6 % (ref 19.6–45.3)
MCH RBC QN AUTO: 25.9 PG (ref 26.6–33)
MCHC RBC AUTO-ENTMCNC: 31.7 G/DL (ref 31.5–35.7)
MCV RBC AUTO: 81.7 FL (ref 79–97)
MONOCYTES # BLD AUTO: 0.24 10*3/MM3 (ref 0.1–0.9)
MONOCYTES NFR BLD AUTO: 5.8 % (ref 5–12)
NEUTROPHILS # BLD AUTO: 2.44 10*3/MM3 (ref 1.7–7)
NEUTROPHILS NFR BLD AUTO: 59 % (ref 42.7–76)
NRBC BLD AUTO-RTO: 0 /100 WBC (ref 0–0.2)
PLATELET # BLD AUTO: 64 10*3/MM3 (ref 140–450)
POTASSIUM BLD-SCNC: 4 MMOL/L (ref 3.5–5.2)
PROT SERPL-MCNC: 7.4 G/DL (ref 6–8.5)
PROTHROMBIN TIME: 15.5 SECONDS (ref 11.1–15.3)
RBC # BLD AUTO: 5.36 10*6/MM3 (ref 3.77–5.28)
SODIUM BLD-SCNC: 139 MMOL/L (ref 136–145)
WBC # BLD AUTO: 4.14 10*3/MM3 (ref 3.4–10.8)
WBC NRBC COR # BLD: 4.14 10*3/MM3 (ref 3.4–10.8)

## 2019-05-29 PROCEDURE — 83883 ASSAY NEPHELOMETRY NOT SPEC: CPT | Performed by: PHYSICIAN ASSISTANT

## 2019-05-29 PROCEDURE — 85610 PROTHROMBIN TIME: CPT | Performed by: PHYSICIAN ASSISTANT

## 2019-05-29 PROCEDURE — 82947 ASSAY GLUCOSE BLOOD QUANT: CPT | Performed by: PHYSICIAN ASSISTANT

## 2019-05-29 PROCEDURE — 82247 BILIRUBIN TOTAL: CPT | Performed by: PHYSICIAN ASSISTANT

## 2019-05-29 PROCEDURE — 84460 ALANINE AMINO (ALT) (SGPT): CPT | Performed by: PHYSICIAN ASSISTANT

## 2019-05-29 PROCEDURE — 84478 ASSAY OF TRIGLYCERIDES: CPT | Performed by: PHYSICIAN ASSISTANT

## 2019-05-29 PROCEDURE — 82105 ALPHA-FETOPROTEIN SERUM: CPT | Performed by: PHYSICIAN ASSISTANT

## 2019-05-29 PROCEDURE — 84450 TRANSFERASE (AST) (SGOT): CPT | Performed by: PHYSICIAN ASSISTANT

## 2019-05-29 PROCEDURE — 85025 COMPLETE CBC W/AUTO DIFF WBC: CPT | Performed by: PHYSICIAN ASSISTANT

## 2019-05-29 PROCEDURE — 80053 COMPREHEN METABOLIC PANEL: CPT | Performed by: PHYSICIAN ASSISTANT

## 2019-05-29 PROCEDURE — 82172 ASSAY OF APOLIPOPROTEIN: CPT | Performed by: PHYSICIAN ASSISTANT

## 2019-05-29 PROCEDURE — 82465 ASSAY BLD/SERUM CHOLESTEROL: CPT | Performed by: PHYSICIAN ASSISTANT

## 2019-05-29 PROCEDURE — 82977 ASSAY OF GGT: CPT | Performed by: PHYSICIAN ASSISTANT

## 2019-05-29 PROCEDURE — 83010 ASSAY OF HAPTOGLOBIN QUANT: CPT | Performed by: PHYSICIAN ASSISTANT

## 2019-05-31 LAB
A2 MACROGLOB SERPL-MCNC: 428 MG/DL (ref 110–276)
ALT SERPL W P-5'-P-CCNC: 42 IU/L (ref 0–40)
APO A-I SERPL-MCNC: 119 MG/DL (ref 116–209)
AST SERPL W P-5'-P-CCNC: 70 IU/L (ref 0–40)
BILIRUB SERPL-MCNC: 0.6 MG/DL (ref 0–1.2)
CHOLEST SERPL-MCNC: 132 MG/DL (ref 100–199)
FIBROSIS SCORING:: ABNORMAL
FIBROSIS STAGE SERPL QL: ABNORMAL
GGT SERPL-CCNC: 52 IU/L (ref 0–60)
GLUCOSE SERPL-MCNC: 103 MG/DL (ref 65–99)
HAPTOGLOB SERPL-MCNC: 46 MG/DL (ref 34–200)
INTERPRETATIONS: (REFERENCE): ABNORMAL
LABORATORY COMMENT REPORT: ABNORMAL
LIMITATIONS: (REFERENCE): ABNORMAL
LIVER FIBR SCORE SERPL CALC.FIBROSURE: 0.82 (ref 0–0.21)
NASH GRADE (REFERENCE): ABNORMAL
NASH SCORE (REFERENCE): 0.75
NASH SCORING (REFERENCE): ABNORMAL
STEATOSIS GRADE (REFERENCE): ABNORMAL
STEATOSIS GRADING (REFERENCE): ABNORMAL
STEATOSIS SCORE (REFERENCE): 0.57 (ref 0–0.3)
TRIGL SERPL-MCNC: 114 MG/DL (ref 0–149)
WEIGHT: (REFERENCE): 212 LBS

## 2019-06-04 ENCOUNTER — OFFICE VISIT (OUTPATIENT)
Dept: GASTROENTEROLOGY | Facility: CLINIC | Age: 59
End: 2019-06-04

## 2019-06-04 VITALS
BODY MASS INDEX: 36.02 KG/M2 | WEIGHT: 211 LBS | HEIGHT: 64 IN | DIASTOLIC BLOOD PRESSURE: 80 MMHG | SYSTOLIC BLOOD PRESSURE: 118 MMHG | HEART RATE: 87 BPM

## 2019-06-04 DIAGNOSIS — K59.01 SLOW TRANSIT CONSTIPATION: Primary | ICD-10-CM

## 2019-06-04 DIAGNOSIS — K74.60 CIRRHOSIS OF LIVER WITHOUT ASCITES, UNSPECIFIED HEPATIC CIRRHOSIS TYPE (HCC): ICD-10-CM

## 2019-06-04 DIAGNOSIS — R10.84 GENERALIZED ABDOMINAL PAIN: ICD-10-CM

## 2019-06-04 DIAGNOSIS — K75.81 NASH (NONALCOHOLIC STEATOHEPATITIS): ICD-10-CM

## 2019-06-04 DIAGNOSIS — K59.04 CHRONIC IDIOPATHIC CONSTIPATION: ICD-10-CM

## 2019-06-04 PROCEDURE — 99214 OFFICE O/P EST MOD 30 MIN: CPT | Performed by: PHYSICIAN ASSISTANT

## 2019-06-04 NOTE — PATIENT INSTRUCTIONS

## 2019-06-04 NOTE — PROGRESS NOTES
Chief Complaint   Patient presents with   • Abdominal Pain   • Musa   • Constipation   • Cirrhosis       ENDO PROCEDURE ORDERED:    Subjective    Amira Shannon is a 58 y.o. female. she is here today for follow-up.    History of Present Illness    Patient is seen on a recheck of her GERD, abdominal pain, MUSA cirrhosis F4/S2/N2, and constipation.  Last seen 04/23/2019.  Patient states the lactulose did not help.  It is not clear if she has ever been on Relistor.  She tried Trulance, but it also did not help.  She has been currently taking the lactulose, Linzess, and MiraLAX, but they were not working.  She went to the emergency room with severe left lower quadrant pain on 05/21/2019.  Acute abdominal series was okay.  CT scan of abdomen and pelvis showed nodularity of the liver, enlarged spleen, prominent portal vein.  Normal lipase.  CMP showed a glucose of 106, AST 47, otherwise normal.  CBC showed 81,000 platelets.  Normal urinalysis.  Weight is down 2 pounds since last visit.  Last EGD/colonoscopy on 10/17/2018 showed gastritis and hemorrhoids.               Patient had liver ultrasound at [facility] on 05/01/2019 that showed a fatty liver, postcholecystectomy with 5.2 mm common bile duct.      Laboratories on 05/29/2019 showed normal AFP.  CMP showed an AST of 64, ALT 39, otherwise normal.  INR 1.26.  MUSA FibroSure 0.82/F4, steatosis 0.57/S1-S2, 0.75/N2.  CBC showed 64,000 platelets.       A/P:  Patient with chronic GERD, MUSA cirrhosis.      Encouraged to continue dietary modification and significant weight loss, avoiding gastric irritants.  Encouraged to avoid fructose.  Will give a trial on Motegrity 2 mg daily for the constipation.  She will be due for hepatoma screening in November.  Will plan follow-up in 6 weeks, further pending clinical course and the results of the above.             The following portions of the patient's history were reviewed and updated as appropriate:   Past Medical  "History:   Diagnosis Date   • Acid reflux    • Altered bowel function    • Arthropathy of lumbar facet joint    • Bleeding disorder (CMS/HCC)    • C. difficile diarrhea    • Constipation    • Corns and callus    • Depression    • Disease related peripheral neuropathy    • Epigastric pain    • Fatty liver    • Hammer toe    • Headache    • Hiatal hernia    • History of transfusion    • Hyperlipidemia    • Knee pain    • Localized, primary osteoarthritis of the ankle and foot     Localized, primary osteoarthritis of the ankle and/or foot   • Mendoza's metatarsalgia     Mendoza's metatarsalgia - 2nd interspace on right   • Nausea and vomiting    • Neuralgia and neuritis     Neuralgia, neuritis, and radiculitis, unspecified   • Neuropathy    • Obstructive sleep apnea     Obstructive sleep apnea (adult) (pediatric)    • CHAI on CPAP     \"C-Pap at night  (unconfirmed)\"   • Osteoarthritis    • Pain in foot     Pain in unspecified foot - sees a podiatrist   • Pain in joint, ankle and foot     Joint pain in ankle and foot      • Pain radiating to back     Pain radiating to lumbar region of back   • Plantar fasciitis    • PONV (postoperative nausea and vomiting)    • Restless leg syndrome    • Secondary hypertension     Secondary hypertension, unspecified   • Sinusitis    • Sleep apnea    • Tongue anomaly     lesion     Past Surgical History:   Procedure Laterality Date   • CARPAL TUNNEL RELEASE Left 2018    Procedure: CARPAL TUNNEL RELEASE - left;  Surgeon: Justus Lewis MD;  Location: [facility];  Service: Orthopedics   • CARPAL TUNNEL RELEASE Right 2019    Procedure: carpal tunnel release right hand with local/monitored anesthesia care;  Surgeon: Justus Lewis MD;  Location: [facility];  Service: Orthopedics   •  SECTION     • CHOLECYSTECTOMY     • COLONOSCOPY  2013   • COLONOSCOPY N/A 10/17/2018    Procedure: COLONOSCOPY;  Surgeon: Russell Del Rio MD;  Location: [facility]" ENDOSCOPY;  Service: Gastroenterology   • DIRECT LARYNGOSCOPY, ESOPHAGOSCOPY, BRONCHOSCOPY N/A 8/1/2017    Procedure: DIRECT LARYNGOSCOPY AND;  Surgeon: Live Bolton MD;  Location: [facility];  Service:    • ENDOSCOPY  07/01/2013    Colon endoscopy 94768 (1) - Internal & external hemorrhoids found. Stool found.   • ENDOSCOPY  07/01/2013    EGD w/ tube 37115 (1) - Normal esophagus. Gastritis in stomach. Biopsy taken. Normal dudoenum. Biopsy taken.   • ENDOSCOPY N/A 10/17/2018    Procedure: ESOPHAGOGASTRODUODENOSCOPY--eval varices;  Surgeon: Russell Del Rio MD;  Location: [facility] ENDOSCOPY;  Service: Gastroenterology   • FOOT SURGERY  02/26/2013    Foot/toes surgery procedure (1) - Arthroplasty of toes 4 and 5 of right foot.   • HERNIA REPAIR     • HERNIA REPAIR      hital   • HYSTERECTOMY     • LIVER BIOPSY     • NERVE BLOCK  07/25/2016    Injection for nerve block (1) - Lumbar medial branch block.   • OTHER SURGICAL HISTORY  12/03/2012    Inj(s) Tend-Sheath, Ligament, Single 20550 (1) - [facility]    • OTHER SURGICAL HISTORY  06/24/2013    Small Joint Injection/Aspiration 20600 (2) - [facility] ([facility])    • OTHER SURGICAL HISTORY  2011    bowel obstruction x2   • OTHER SURGICAL HISTORY      gland removed from neck   • SUBLINGUAL SALIVARY CYST EXCISION N/A 8/1/2017    Procedure: EXCISION OF LEFT  TONGUE LESION WITH CLOSURE;  Surgeon: Live Bolton MD;  Location: [facility];  Service:    • TUBAL ABDOMINAL LIGATION     • UPPER GASTROINTESTINAL ENDOSCOPY  07/01/2013   • UPPER GASTROINTESTINAL ENDOSCOPY  10/17/2018     Family History   Problem Relation Age of Onset   • Cancer Other    • Diabetes Other    • Heart disease Other    • Hypertension Mother    • Cancer Mother    • Diabetes Mother    • Hypertension Father    • Heart attack Father    • Cancer Father    • Heart disease Father    • Thyroid disease Maternal Aunt      OB History     No data available        Allergies   Allergen  Reactions   • Other      Pt states that taking steroids either in pill or injection form make her have blisters in her mouth and she feels like she is on fire on the inside/ has hx of c diff and possible MRSA     • Corticosteroids Rash   • Kenalog  [Triamcinolone Acetonide] Rash   • Sulfa Antibiotics Rash     Sulfa (Sulfonamide Antibiotics)     Social History     Socioeconomic History   • Marital status:      Spouse name: Not on file   • Number of children: Not on file   • Years of education: Not on file   • Highest education level: Not on file   Tobacco Use   • Smoking status: Former Smoker     Packs/day: 2.00     Years: 15.00     Pack years: 30.00     Types: Cigarettes     Last attempt to quit: 2000     Years since quittin.4   • Smokeless tobacco: Never Used   Substance and Sexual Activity   • Alcohol use: No   • Drug use: No   • Sexual activity: Defer     Comment: Marital Status:      Current Medications:  Prior to Admission medications    Medication Sig Start Date End Date Taking? Authorizing Provider   cetirizine (zyrTEC) 10 MG tablet Take 1 tablet by mouth Daily As Needed for Allergies.   Yes Live Bolton MD   chlorthalidone (HYGROTON) 25 MG tablet Take 0.5 tablets by mouth Daily. 19  Yes Maureen Cardoso APRN   cyclobenzaprine (FLEXERIL) 10 MG tablet Take 10 mg by mouth 3 (Three) Times a Day As Needed for Muscle Spasms.   Yes Promise Tovar MD   DULoxetine (CYMBALTA) 30 MG capsule Take 30 mg by mouth Daily.   Yes Promise Tovar MD   gabapentin (NEURONTIN) 800 MG tablet Take 800 mg by mouth 4 (Four) Times a Day.   Yes Promise Tovar MD   lactulose (CHRONULAC) 10 GM/15ML solution solution (encephalopathy) Take 30 mL by mouth 3 (Three) Times a Day. 19  Yes Blaine Perales PA-C   Melatonin 10 MG capsule Take  by mouth Every Night.   Yes Promise Tovar MD   nabumetone (RELAFEN) 500 MG tablet Take 1 tablet by mouth Every 8 (Eight) Hours As Needed for  "Mild Pain  for up to 15 days. 5/28/19 6/12/19 Yes Los Riojas APRN   nystatin (MYCOSTATIN) 734897 UNIT/GM powder Apply  topically to the appropriate area as directed 4 (Four) Times a Day As Needed.   Yes ProviderPromise MD   omeprazole (priLOSEC) 40 MG capsule Take 40 mg by mouth Daily.   Yes Promise Tovar MD   oxyCODONE-acetaminophen (PERCOCET)  MG per tablet Take 1 tablet by mouth Every 6 (Six) Hours As Needed for Moderate Pain .   Yes Promise Tovar MD   polyethylene glycol (MIRALAX) packet Take 17 g by mouth Daily As Needed.   Yes Promise Tovar MD   spironolactone (ALDACTONE) 25 MG tablet Take 1 tablet by mouth Daily. 9/24/18  Yes Maureen Cardoso APRN   linaclotide (LINZESS) 145 MCG capsule capsule Take 145 mcg by mouth Daily.  6/4/19 Yes Promise Tovar MD   Plecanatide (TRULANCE) 3 MG tablet Take 1 tablet by mouth Daily. 1/15/19 6/4/19 Yes Blaine Perales PA-C   Prucalopride Succinate (MOTEGRITY) 2 MG tablet Take 2 mg by mouth Daily. 6/4/19   Blaine Perales PA-C     Review of Systems  Review of Systems       Objective    /80 (BP Location: Left arm)   Pulse 87   Ht 162.6 cm (64\")   Wt 95.7 kg (211 lb)   LMP  (LMP Unknown)   BMI 36.22 kg/m²   Physical Exam   Constitutional: She is oriented to person, place, and time. She appears well-developed and well-nourished. No distress.   HENT:   Head: Normocephalic and atraumatic.   Eyes: EOM are normal. Pupils are equal, round, and reactive to light.   Neck: Normal range of motion.   Cardiovascular: Normal rate, regular rhythm and normal heart sounds.   Pulmonary/Chest: Effort normal and breath sounds normal.   Abdominal: Soft. Bowel sounds are normal. She exhibits no shifting dullness, no distension, no abdominal bruit, no ascites and no mass. There is no hepatosplenomegaly. There is tenderness. There is no rigidity, no rebound, no guarding and no CVA tenderness. No hernia. Hernia confirmed negative in " the ventral area.   Obese, mild diffuse   Musculoskeletal: Normal range of motion.   Neurological: She is alert and oriented to person, place, and time.   Skin: Skin is warm and dry.   Psychiatric: She has a normal mood and affect. Her behavior is normal. Judgment and thought content normal.   Nursing note and vitals reviewed.    Assessment/Plan      1. Slow transit constipation    2. Chronic idiopathic constipation    3. Generalized abdominal pain    4. Cirrhosis of liver without ascites, unspecified hepatic cirrhosis type (CMS/HCC)    5. MUSA (nonalcoholic steatohepatitis)    .   Amira was seen today for abdominal pain, musa, constipation and cirrhosis.    Diagnoses and all orders for this visit:    Slow transit constipation    Chronic idiopathic constipation    Generalized abdominal pain    Cirrhosis of liver without ascites, unspecified hepatic cirrhosis type (CMS/HCC)    MUSA (nonalcoholic steatohepatitis)  Comments:  F4/S1-S2/N2    Other orders  -     Prucalopride Succinate (MOTEGRITY) 2 MG tablet; Take 2 mg by mouth Daily.        Orders placed during this encounter include:  No orders of the defined types were placed in this encounter.      Medications prescribed:  New Medications Ordered This Visit   Medications   • Prucalopride Succinate (MOTEGRITY) 2 MG tablet     Sig: Take 2 mg by mouth Daily.     Dispense:  30 tablet     Refill:  2     Discontinued Medications       Reason for Discontinue    Plecanatide (TRULANCE) 3 MG tablet Not Efficacious    linaclotide (LINZESS) 145 MCG capsule capsule Not Efficacious        Requested Prescriptions     Signed Prescriptions Disp Refills   • Prucalopride Succinate (MOTEGRITY) 2 MG tablet 30 tablet 2     Sig: Take 2 mg by mouth Daily.       Review and/or summary of lab tests, radiology, procedures, medications. Review and summary of old records and obtaining of history. The risks and benefits of my recommendations, as well as other treatment options were discussed with  the patient today. Questions were answered.    Follow-up: Return in about 6 weeks (around 7/16/2019), or if symptoms worsen or fail to improve.     * Surgery not found *      This document has been electronically signed by Blaine Perales PA-C on June 7, 2019 5:05 PM      Results for orders placed or performed during the hospital encounter of 05/21/19   Gold Top - SST   Result Value Ref Range    Extra Tube Hold for add-ons.    Scan Slide   Result Value Ref Range    RBC Morphology Normal Normal    WBC Morphology Normal Normal    Platelet Estimate Decreased Normal   Urinalysis With Microscopic If Indicated (No Culture) - Urine, Clean Catch   Result Value Ref Range    Color, UA Yellow Yellow, Straw, Dark Yellow, Mikala    Appearance, UA Clear Clear    pH, UA 7.5 5.0 - 9.0    Specific Gravity, UA 1.002 (L) 1.003 - 1.030    Glucose, UA Negative Negative    Ketones, UA Negative Negative    Bilirubin, UA Negative Negative    Blood, UA Negative Negative    Protein, UA Negative Negative    Leuk Esterase, UA Negative Negative    Nitrite, UA Negative Negative    Urobilinogen, UA 1.0 E.U./dL 0.2 - 1.0 E.U./dL   CBC Auto Differential   Result Value Ref Range    WBC 4.10 3.40 - 10.80 10*3/mm3    RBC 5.40 (H) 3.77 - 5.28 10*6/mm3    Hemoglobin 14.0 12.0 - 15.9 g/dL    Hematocrit 43.2 34.0 - 46.6 %    MCV 80.0 79.0 - 97.0 fL    MCH 25.9 (L) 26.6 - 33.0 pg    MCHC 32.4 31.5 - 35.7 g/dL    RDW 15.3 12.3 - 15.4 %    RDW-SD 43.8 37.0 - 54.0 fl    MPV 11.7 6.0 - 12.0 fL    Platelets 81 (L) 140 - 450 10*3/mm3    Neutrophil % 65.7 42.7 - 76.0 %    Lymphocyte % 26.8 19.6 - 45.3 %    Monocyte % 4.9 (L) 5.0 - 12.0 %    Eosinophil % 2.2 0.3 - 6.2 %    Basophil % 0.2 0.0 - 1.5 %    Immature Grans % 0.2 0.0 - 0.5 %    Neutrophils, Absolute 2.69 1.70 - 7.00 10*3/mm3    Lymphocytes, Absolute 1.10 0.70 - 3.10 10*3/mm3    Monocytes, Absolute 0.20 0.10 - 0.90 10*3/mm3    Eosinophils, Absolute 0.09 0.00 - 0.40 10*3/mm3    Basophils, Absolute 0.01  0.00 - 0.20 10*3/mm3    Immature Grans, Absolute 0.01 0.00 - 0.05 10*3/mm3    nRBC 0.0 0.0 - 0.2 /100 WBC   Light Blue Top   Result Value Ref Range    Extra Tube hold for add-on    Lipase   Result Value Ref Range    Lipase 25 13 - 60 U/L   Lactic Acid, Plasma   Result Value Ref Range    Lactate 1.4 0.5 - 2.0 mmol/L   Comprehensive Metabolic Panel   Result Value Ref Range    Glucose 106 (H) 65 - 99 mg/dL    BUN 10 6 - 20 mg/dL    Creatinine 0.94 0.57 - 1.00 mg/dL    Sodium 143 136 - 145 mmol/L    Potassium 4.1 3.5 - 5.2 mmol/L    Chloride 104 98 - 107 mmol/L    CO2 28.0 22.0 - 29.0 mmol/L    Calcium 10.0 8.6 - 10.5 mg/dL    Total Protein 7.8 6.0 - 8.5 g/dL    Albumin 4.10 3.50 - 5.20 g/dL    ALT (SGPT) 29 1 - 33 U/L    AST (SGOT) 47 (H) 1 - 32 U/L    Alkaline Phosphatase 108 39 - 117 U/L    Total Bilirubin 0.6 0.2 - 1.2 mg/dL    eGFR Non African Amer 61 >60 mL/min/1.73    Globulin 3.7 gm/dL    A/G Ratio 1.1 g/dL    BUN/Creatinine Ratio 10.6 7.0 - 25.0    Anion Gap 11.0 mmol/L   Results for orders placed or performed in visit on 04/23/19   MUSA Fibrosure   Result Value Ref Range    Fibrosis Score (References) 0.82 (H) 0.00 - 0.21    Fibrosis Stage (Reference) Comment     Steatosis Score (Reference) 0.57 (H) 0.00 - 0.30    Steatosis Grade (Reference) Comment     MUSA Score (Reference) 0.75 (H) 0.25    Musa Grade (Reference) Comment     Height: (Reference) 64 in    Weight: (Reference) 212 LBS    Alpha 2-Macroglobulins, Qn 428 (H) 110 - 276 mg/dL    Haptoglobin 46 34 - 200 mg/dL    Apolipoprotein A-1 119 116 - 209 mg/dL    Total Bilirubin 0.6 0.0 - 1.2 mg/dL    GGT 52 0 - 60 IU/L    ALT (SGPT) 42 (H) 0 - 40 IU/L    AST (SGOT) P5P (Reference) 70 (H) 0 - 40 IU/L    Cholesterol, Total (Reference) 132 100 - 199 mg/dL    Glucose, Serum (Reference) 103 (H) 65 - 99 mg/dL    Triglycerides 114 0 - 149 mg/dL    Interpretations: (Reference) Comment     Fibrosis Scoring: Comment     Steatosis Grading (Reference) Comment     Musa  Scoring (Reference) Comment     Limitations: (Reference) Comment     Comment (Reference) Comment    CBC Auto Differential   Result Value Ref Range    WBC 4.14 3.40 - 10.80 10*3/mm3    RBC 5.36 (H) 3.77 - 5.28 10*6/mm3    Hemoglobin 13.9 12.0 - 15.9 g/dL    Hematocrit 43.8 34.0 - 46.6 %    MCV 81.7 79.0 - 97.0 fL    MCH 25.9 (L) 26.6 - 33.0 pg    MCHC 31.7 31.5 - 35.7 g/dL    RDW 15.5 (H) 12.3 - 15.4 %    RDW-SD 46.1 37.0 - 54.0 fl    Platelets 64 (L) 140 - 450 10*3/mm3    Neutrophil % 59.0 42.7 - 76.0 %    Lymphocyte % 31.6 19.6 - 45.3 %    Monocyte % 5.8 5.0 - 12.0 %    Eosinophil % 2.9 0.3 - 6.2 %    Basophil % 0.5 0.0 - 1.5 %    Immature Grans % 0.2 0.0 - 0.5 %    Neutrophils, Absolute 2.44 1.70 - 7.00 10*3/mm3    Lymphocytes, Absolute 1.31 0.70 - 3.10 10*3/mm3    Monocytes, Absolute 0.24 0.10 - 0.90 10*3/mm3    Eosinophils, Absolute 0.12 0.00 - 0.40 10*3/mm3    Basophils, Absolute 0.02 0.00 - 0.20 10*3/mm3    Immature Grans, Absolute 0.01 0.00 - 0.05 10*3/mm3    nRBC 0.0 0.0 - 0.2 /100 WBC    Differential Type      WBC 4.14 3.40 - 10.80 10*3/mm3     *Note: Due to a large number of results and/or encounters for the requested time period, some results have not been displayed. A complete set of results can be found in Results Review.       Some portions of this note have been dictated using voice recognition software and may contain errors and/or omissions.

## 2019-06-07 ENCOUNTER — HOSPITAL ENCOUNTER (OUTPATIENT)
Dept: PHYSICAL THERAPY | Facility: HOSPITAL | Age: 59
Setting detail: THERAPIES SERIES
Discharge: HOME | End: 2019-06-07

## 2019-06-07 ENCOUNTER — TELEPHONE (OUTPATIENT)
Dept: GASTROENTEROLOGY | Facility: CLINIC | Age: 59
End: 2019-06-07

## 2019-06-07 DIAGNOSIS — M25.561 ACUTE PAIN OF BOTH KNEES: Primary | ICD-10-CM

## 2019-06-07 DIAGNOSIS — M25.562 ACUTE PAIN OF BOTH KNEES: Primary | ICD-10-CM

## 2019-06-07 DIAGNOSIS — M25.552 LEFT HIP PAIN: ICD-10-CM

## 2019-06-07 DIAGNOSIS — M17.0 PRIMARY OSTEOARTHRITIS OF BOTH KNEES: ICD-10-CM

## 2019-06-07 PROCEDURE — 97162 PT EVAL MOD COMPLEX 30 MIN: CPT | Performed by: PHYSICAL THERAPIST

## 2019-06-07 NOTE — THERAPY EVALUATION
Outpatient Physical Therapy Ortho Initial Evaluation  [facility]  Marcella Perales, PT, DPT, CSCS       Patient Name: Amira Shannon  : 1960  MRN: [id number]  Today's Date: 2019      Visit Date: 2019     Pt reports 6/10 pain pre treatment, 7/10 pain post treatment  Reports N/A% of improvement.  Attended  visits.  Insurance available: 15 visits  Next MD appt: 2019.  Recertification: 2019.    Patient Active Problem List   Diagnosis   • Bilateral foot pain   • Right hip pain   • Acute pain of both knees   • Plantar fasciitis, bilateral   • Primary osteoarthritis of knee   • MUSA (nonalcoholic steatohepatitis)   • Tongue lesion   • Bilateral lower extremity edema   • Chronic pain of both knees   • Bilateral calcaneal spurs   • Swelling of both lower extremities   • Obesity with body mass index of 30.0-39.9   • Dyspnea   • Diastolic dysfunction   • Thrombocytopenia (CMS/HCC)   • Chronic fatigue   • Abdominal pain   • Constipation   • Acid reflux   • Depression   • Disease related peripheral neuropathy   • Epigastric pain   • Hyperlipidemia   • Nausea and vomiting   • Restless leg syndrome   • Sleep apnea   • Carpal tunnel syndrome on left   • Carpal tunnel syndrome on right   • Bilateral wrist pain   • Numbness and tingling in both hands   • SBO (small bowel obstruction) (CMS/HCC)   • Nausea   • Generalized abdominal pain   • Elevated liver enzymes   • History of small bowel obstruction   • Cirrhosis of liver without ascites (CMS/HCC)   • Status post carpal tunnel release   • Bilateral ankle pain   • Lakesha's deformity of both heels   • Pain in joint, ankle and foot        Past Medical History:   Diagnosis Date   • Acid reflux    • Altered bowel function    • Arthropathy of lumbar facet joint    • Bleeding disorder (CMS/HCC)    • C. difficile diarrhea    • Constipation    • Corns and callus    • Depression    • Disease related peripheral neuropathy   "  • Epigastric pain    • Fatty liver    • Hammer toe    • Headache    • Hiatal hernia    • History of transfusion    • Hyperlipidemia    • Knee pain    • Localized, primary osteoarthritis of the ankle and foot     Localized, primary osteoarthritis of the ankle and/or foot   • Mendoza's metatarsalgia     Mendoza's metatarsalgia - 2nd interspace on right   • Nausea and vomiting    • Neuralgia and neuritis     Neuralgia, neuritis, and radiculitis, unspecified   • Neuropathy    • Obstructive sleep apnea     Obstructive sleep apnea (adult) (pediatric)    • CHAI on CPAP     \"C-Pap at night  (unconfirmed)\"   • Osteoarthritis    • Pain in foot     Pain in unspecified foot - sees a podiatrist   • Pain in joint, ankle and foot     Joint pain in ankle and foot      • Pain radiating to back     Pain radiating to lumbar region of back   • Plantar fasciitis    • PONV (postoperative nausea and vomiting)    • Restless leg syndrome    • Secondary hypertension     Secondary hypertension, unspecified   • Sinusitis    • Sleep apnea    • Tongue anomaly     lesion        Past Surgical History:   Procedure Laterality Date   • CARPAL TUNNEL RELEASE Left 2018    Procedure: CARPAL TUNNEL RELEASE - left;  Surgeon: Justus Lewis MD;  Location: [facility] OR;  Service: Orthopedics   • CARPAL TUNNEL RELEASE Right 2019    Procedure: carpal tunnel release right hand with local/monitored anesthesia care;  Surgeon: Justus Lewis MD;  Location: [facility] OR;  Service: Orthopedics   •  SECTION     • CHOLECYSTECTOMY     • COLONOSCOPY  2013   • COLONOSCOPY N/A 10/17/2018    Procedure: COLONOSCOPY;  Surgeon: Russell Del Rio MD;  Location: [facility] ENDOSCOPY;  Service: Gastroenterology   • DIRECT LARYNGOSCOPY, ESOPHAGOSCOPY, BRONCHOSCOPY N/A 2017    Procedure: DIRECT LARYNGOSCOPY AND;  Surgeon: Live Bolton MD;  Location: [facility] OR;  Service:    • ENDOSCOPY  2013    Colon endoscopy 90928 (1) - Internal & " external hemorrhoids found. Stool found.   • ENDOSCOPY  07/01/2013    EGD w/ tube 80632 (1) - Normal esophagus. Gastritis in stomach. Biopsy taken. Normal dudoenum. Biopsy taken.   • ENDOSCOPY N/A 10/17/2018    Procedure: ESOPHAGOGASTRODUODENOSCOPY--eval varices;  Surgeon: Russell Del Rio MD;  Location: [facility] ENDOSCOPY;  Service: Gastroenterology   • FOOT SURGERY  02/26/2013    Foot/toes surgery procedure (1) - Arthroplasty of toes 4 and 5 of right foot.   • HERNIA REPAIR     • HERNIA REPAIR      hital   • HYSTERECTOMY     • LIVER BIOPSY     • NERVE BLOCK  07/25/2016    Injection for nerve block (1) - Lumbar medial branch block.   • OTHER SURGICAL HISTORY  12/03/2012    Inj(s) Tend-Sheath, Ligament, Single 20550 (1) - [facility] NICK[facility] ([facility])    • OTHER SURGICAL HISTORY  06/24/2013    Small Joint Injection/Aspiration 20600 (2) - [facility]TER NICKERSON ([facility])    • OTHER SURGICAL HISTORY  2011    bowel obstruction x2   • OTHER SURGICAL HISTORY      gland removed from neck   • SUBLINGUAL SALIVARY CYST EXCISION N/A 8/1/2017    Procedure: EXCISION OF LEFT  TONGUE LESION WITH CLOSURE;  Surgeon: Live Bolton MD;  Location: [facility] OR;  Service:    • TUBAL ABDOMINAL LIGATION     • UPPER GASTROINTESTINAL ENDOSCOPY  07/01/2013   • UPPER GASTROINTESTINAL ENDOSCOPY  10/17/2018       Visit Dx:     ICD-10-CM ICD-9-CM   1. Acute pain of both knees M25.561 338.19    M25.562 719.46   2. Left hip pain M25.552 719.45     Number of days off work: N/A    Patient is .    Allergies   Other   Not Specified  11/9/2016  Past Updates...   Pt states that taking steroids either in pill or injection form make her have blisters in her mouth and she feels like she is on fire on the inside/ has hx of c diff and possible MRSA      Corticosteroids  Rash Low  4/13/2018  Past Updates...   Kenalog  [Triamcinolone Acetonide]  Rash Low    Past Updates...   Sulfa Antibiotics  Rash Low  9/8/2016  Past Updates...   Sulfa (Sulfonamide  Antibiotics)        Medications      cetirizine (zyrTEC) 10 MG tablet     chlorthalidone (HYGROTON) 25 MG tablet     cyclobenzaprine (FLEXERIL) 10 MG tablet     DULoxetine (CYMBALTA) 30 MG capsule     gabapentin (NEURONTIN) 800 MG tablet     lactulose (CHRONULAC) 10 GM/15ML solution solution (encephalopathy)     Melatonin 10 MG capsule     nabumetone (RELAFEN) 500 MG tablet     nystatin (MYCOSTATIN) 438939 UNIT/GM powder     omeprazole (priLOSEC) 40 MG capsule     oxyCODONE-acetaminophen (PERCOCET)  MG per tablet     polyethylene glycol (MIRALAX) packet     Prucalopride Succinate (MOTEGRITY) 2 MG tablet     spironolactone (ALDACTONE) 25 MG tablet          Patient History     Row Name 06/07/19 1000             History    Chief Complaint  Pain  -AJ      Type of Pain  Knee pain;Hip pain  -AJ      Date Current Problem(s) Began  -- CHronic, years  -AJ      Brief Description of Current Complaint  Patient reports her knees and legs throb. SHe reports she uses a heating pad at home. She reports R hip hurts worse than L currently, but L was initial trouble side.  patient reports years of pain but worsening. She rpeorts she has had injections in the past, but can't have then anymore due to allergies. Reports previous PT yeas ago for knees and hips with no relief.  -AJ      Previous treatment for THIS PROBLEM  Injections;Medication;Rehabilitation  -AJ      Patient/Caregiver Goals  Relieve pain;Improve mobility;Improve strength  -AJ      Current Tobacco Use  None  -AJ      Smoking Status  FOrmer smoker  -AJ      Patient's Rating of General Health  Fair  -AJ      Hand Dominance  right-handed  -AJ      Occupation/sports/leisure activities  Occupation: unemployed; Hobbies: gardening, flowers, mark, cleaning house  -AJ      Patient seeing anyone else for problem(s)?  Yes, Ortho  -AJ      How has patient tried to help current problem?  Yes, Ortho  -AJ      What clinical tests have you had for this problem?  X-ray knees only   -AJ      Results of Clinical Tests  IMPRESSION: Standing AP of both knees reveal mild degenerative changes of both knees with no evidence of fracture, dislocation or other acute radiological abnormality.  -AJ      History of Previous Related Injuries  None recent  -AJ      Are you or can you be pregnant  No  -AJ         Pain     Pain Location  Knee;Hip  -AJ      Pain at Present  6  -AJ      Pain at Best  6  -AJ      Pain at Worst  10  -AJ      Pain Frequency  Constant/continuous  -AJ      Pain Description  Aching;Tightness  -AJ      What Performance Factors Make the Current Problem(s) WORSE?  Walking  -AJ      What Performance Factors Make the Current Problem(s) BETTER?  heating bad  -AJ      Tolerance Time- Standing  15 minutes  -AJ      Tolerance Time- Sitting  15 minutes  -AJ      Tolerance Time- Walking  immediate  -AJ      Is your sleep disturbed?  Yes  -AJ      Is medication used to assist with sleep?  Yes  -AJ      Difficulties at work?  N/A  -AJ      Difficulties with ADL's?  dressing, grooming  -AJ      Difficulties with recreational activities?  flowers/gardening  -AJ         Fall Risk Assessment    Any falls in the past year:  Yes  -AJ      Number of falls reported in the last 12 months  1  -AJ      Factors that contributed to the fall:  Lost balance  -AJ      Does patient have a fear of falling  Yes (comment) due to pain  -AJ        User Key  (r) = Recorded By, (t) = Taken By, (c) = Cosigned By    Initials Name Provider Type    Marcella Domingo, PT DPT Physical Therapist          PT Ortho     Row Name 06/07/19 1000       Subjective Comments    Subjective Comments  Patient wishes to get walking better.  -AJ       Precautions and Contraindications    Precautions/Limitations  fall precautions  -AJ       Subjective Pain    Able to rate subjective pain?  yes  -AJ    Pre-Treatment Pain Level  6  -AJ    Post-Treatment Pain Level  7  -AJ       Posture/Observations    Alignment Options  Forward  head;Cervical lordosis;Thoracic kyphosis;Rounded shoulders;Lumbar lordosis;Iliac crests;Genu varus  -AJ    Forward Head  Mild;Moderate;Increased  -AJ    Cervical Lordosis  Mild;Moderate;Increased  -AJ    Thoracic Kyphosis  Mild;Moderate;Increased  -AJ    Rounded Shoulders  Bilateral:;Mild;Increased  -AJ    Lumbar lordosis  Normal  -AJ    Iliac crests  Bilateral:;Normal  -AJ    Genu varus  Bilateral:;Mild;Increased  -AJ    Observations  Edema pitting edema B LEs  -AJ    Posture/Observations Comments  No acute distress, comes in with no assistive device, significant gait deviations, see gait section below.  -AJ       Special Tests/Palpation    Special Tests/Palpation  -- Multiple areas of TTP in B Les/LB  -AJ       Hip Special Tests    FRANK (hip vs SI pathology)  Bilateral:;Positive  -AJ    Mazin test (tightness of ITB)  Bilateral:;Positive  -AJ    Marcelo’s test (tightness of ITB)  Bilateral:;Negative  -AJ    Hip scour test (labral vs hip pathology)  Bilateral:;Positive  -AJ    FAIR test (piriformis syndrome)  Bilateral:;Negative  -AJ       Leg Length Test    Apparent  Equal  -AJ       Knee Special Tests    Anterior drawer (ACL lesion)  Bilateral:;Negative  -AJ    Posterior drawer (PCL lesion)  Bilateral:;Negative  -AJ    Posterior sag sign (PCL lesion)  Bilateral:;Negative  -AJ    Valgus stress (MCL lesion)  Bilateral:;Negative  -AJ    Varus stress (LCL lesion)  Bilateral:;Negative  -AJ    Pivot shift test (ACL lesion)  --  -AJ    Knee Special Tests Comments  Crepitus present in B knees.  -AJ       General ROM    GENERAL ROM COMMENTS  AAROM B hips all WFL, some limitations due to degenerative changes but painful for patient and symetrical.  -AJ       Right Lower Ext    Rt Knee Extension/Flexion AROM  5°-92°  -AJ       Left Lower Ext    Lt Knee Extension/Flexion AROM  3°-102°  -AJ       MMT (Manual Muscle Testing)    General MMT Comments  B LE 4/5 with pain for all.  -AJ       Sensation    Sensation WNL?  WNL  -AJ     Light Touch  No apparent deficits  -AJ    Additional Comments  Denies any numbness or tingling, but reports pain.  -AJ       Pathomechanics    Lower Extremity Pathomechanics  Antalgic with midstance  -AJ       Transfers    Comment (Transfers)  I with transfers with use of B UEs  -AJ       Gait/Stairs Assessment/Training    [place] Level (Gait)  independent  -AJ    Pattern (Gait)  step-through  -AJ    Deviations/Abnormal Patterns (Gait)  bilateral deviations;ataxic;base of support, narrow;jareth decreased;festinating/shuffling;stride length decreased  -AJ    Bilateral Gait Deviations  weight shift ability decreased Keeps knees slightly flexed with gait.  -AJ    Comment (Gait/Stairs)  Slow shufflying gait with B knees flexed, bent forward posture and short step/strifde lengths.  -AJ      User Key  (r) = Recorded By, (t) = Taken By, (c) = Cosigned By    Initials Name Provider Type    Marcella Domingo, PT DPT Physical Therapist                Therapy Education  Education Details: Patient encouraged to utalize SC for improved gait safety.  Given: Symptoms/condition management, Pain management, Fall prevention and home safety(POC)  Program: New  How Provided: Verbal  Provided to: Patient  Level of Understanding: Verbalized     PT OP Goals     Row Name 06/07/19 1200          PT Short Term Goals    STG Date to Achieve  06/28/19  -AJ     STG 1  I with HEP and have additions/changes by next recertification.  -AJ     STG 2  Patient able ot tolerate 45 minutes of aquatic ther ex with no increase in pain.  -AJ     STG 3  Patient able to perform 5 minutes of F/R/L walking aquatically with no increase in pain.  -AJ     STG 4  AROm B knees 0° extension.  -AJ     STG 5  AROm B knee flexion >= 105°.  -AJ        Long Term Goals    LTG Date to Achieve  07/05/19  -AJ     LTG 1  AROm B knees 0°>= 115°.  -AJ     LTG 2  B LE 4+/5 or greater.  -AJ     LTG 3  Patient able ot  perform 15 reps of each aquatic ther ex.  -AJ      LTG 4  I with all aquatics.  -AJ     LTG 5  D/C with a final HEp and free 30 day fitness formula mememberip.  -        Time Calculation    PT Goal Re-Cert Due Date  06/28/19  -       User Key  (r) = Recorded By, (t) = Taken By, (c) = Cosigned By    Initials Name Provider Type    AJ Marcella Perales, PT DPT Physical Therapist         Barriers to Rehab: Include significant or possible arthritic/degenerative changes that have occurred within the joints, The chronicity of this issue, The patient's obesity, The patient's generally deconditioned state.      Safety Issues: Fall risk      PT Assessment/Plan     Row Name 06/07/19 1200          PT Assessment    Functional Limitations  Limitations in community activities;Performance in leisure activities;Performance in self-care ADL;Impaired gait;Limitation in home management  -     Impairments  Balance;Edema;Endurance;Gait;Impaired flexibility;Impaired muscle endurance;Impaired muscle length;Impaired muscle power;Range of motion;Pain;Muscle strength;Locomotion;Joint mobility;Joint integrity  -     Assessment Comments  Patient has reported poor tolerance to previous land ther ex and poor otlerance even 100' ambulation  back to exam room. patient agreed to attempt aquatics for improved overall strength/endurance.  -     Rehab Potential  Poor  -     Patient/caregiver participated in establishment of treatment plan and goals  Yes  -     Patient would benefit from skilled therapy intervention  Yes  -AJ        PT Plan    PT Frequency  2x/week pool only  -     Predicted Duration of Therapy Intervention (Therapy Eval)  3-4 weeks, 6-8 visits  -AJ     Planned CPT's?  PT EVAL MOD COMPLELITY: 75705;PT RE-EVAL: 07814;PT THER PROC EA 15 MIN: 75695;PT THER ACT EA 15 MIN: 45640;PT THER SUPP EA 15 MIN  -AJ     Physical Therapy Interventions (Optional Details)  aquatics exercise;balance training;gait training;gross motor skills;home exercise program;modalities;ROM (Range  of Motion);stair training;strengthening;stretching  -IRVING     PT Plan Comments  Progress to an I aquatic program.  -IRVING       User Key  (r) = Recorded By, (t) = Taken By, (c) = Cosigned By    Initials Name Provider Type    Marcella Domingo, PT DPT Physical Therapist       Other therapeutic activities and/or exercises will be prescribed depending on the patients progress or lack there of.      Exercises     Row Name 06/07/19 1000             Subjective Comments    Subjective Comments  Patient wishes to get walking better.  -IRVING         Subjective Pain    Able to rate subjective pain?  yes  -IRVING      Pre-Treatment Pain Level  6  -IRVING      Post-Treatment Pain Level  7  -IRVING        User Key  (r) = Recorded By, (t) = Taken By, (c) = Cosigned By    Initials Name Provider Type    Marcella Domingo, PT DPT Physical Therapist                        Outcome Measure Options: Lower Extremity Functional Scale (LEFS)  Lower Extremity Functional Index  Any of your usual work, housework or school activities: Quite a bit of difficulty  Your usual hobbies, recreational or sporting activities: Quite a bit of difficulty  Getting into or out of the bath: Moderate difficulty  Walking between rooms: Moderate difficulty  Putting on your shoes or socks: A little bit of difficulty  Squatting: Quite a bit of difficulty  Lifting an object, like a bag of groceries from the floor: Moderate difficulty  Performing light activities around your home: Moderate difficulty  Performing heavy activities around your home: Moderate difficulty  Getting into or out of a car: Moderate difficulty  Walking 2 blocks: Extreme difficulty or unable to perform activity  Walking a mile: Extreme difficulty or unable to perform activity  Going up or down 10 stairs (about 1 flight of stairs): Extreme difficulty or unable to perform activity  Standing for 1 hour: Moderate difficulty  Sitting for 1 hour: Moderate difficulty  Running on even ground: Extreme  difficulty or unable to perform activity  Running on uneven ground: Extreme difficulty or unable to perform activity  Making sharp turns while running fast: Extreme difficulty or unable to perform activity  Hopping: Extreme difficulty or unable to perform activity  Rolling over in bed: Moderate difficulty  Total: 24      Time Calculation:     Start Time: 1015  Stop Time: 1053  Time Calculation (min): 38 min     Therapy Charges for Today     Code Description Service Date Service Provider Modifiers Qty    98948775867 HC PT EVAL MOD COMPLEXITY 3 6/7/2019 Marcella Perales, PT DPT GP 1    [id number]  PT THER SUPP EA 15 MIN 6/7/2019 Marcella Perales, PT DPT GP 1          PT G-Codes  Outcome Measure Options: Lower Extremity Functional Scale (LEFS)  Total: 24         Marcella Perales PT DPT, [facility]  6/7/2019

## 2019-06-07 NOTE — TELEPHONE ENCOUNTER
06/07/2019, 1241 - Patient's pharmacy of [place], [place] telephoned per this staff member [phone] with notification of Prior Authorization regarding prescription medication Motegrity 2 MG Tablets.  Spoke with Pharmacy TechnicianLilliana.  Patient co-pay $4.00.  Prescription medication will be ordered and available for patient collection Monday, Kristie 10, 2019.    06/07/2019, 1247 - Patient telephoned per this staff member [phone]. Zero answer.  Voice message submitted with date, time, office contact information, and request to contact office at earliest convenience.      Rationale for speaking with patient - Notification of Prior Authorization approval regarding prescription medication Motegrity 2 MG Tablets as prescribed 06/04/2019 per Blaine Perales PA-C, and notification patient's pharmacy of [facility] made aware of Prior Authorization with patient co-pay $4.00 with prescription medication available for collection Monday, Kristie 10, 2019.    Note - Blaine Perales PA-C made aware verbally.    06/07/2019, 1407 - Patient returned this staff member's telephone call.  Patient made aware of Prior Authorization approval regarding prescription medication Motegrity 2 MG Tablets as prescribed 06/04/2019 per Blaine Perales PA-C.  Patient also made aware patient's pharmacy of [facility], [facility], [place], [place] had been notified of Prior Authorization with patient co-pay $4.00.  Patient made aware prescription medication will be available for collection from pharmacy Monday, Kristie 10, 2019 as pharmacy will need to order prescription medication.  Patient verbalized understanding.

## 2019-06-08 ENCOUNTER — HOSPITAL ENCOUNTER (EMERGENCY)
Facility: HOSPITAL | Age: 59
Discharge: HOME | End: 2019-06-08
Attending: FAMILY MEDICINE | Admitting: FAMILY MEDICINE

## 2019-06-08 ENCOUNTER — APPOINTMENT (OUTPATIENT)
Dept: GENERAL RADIOLOGY | Facility: HOSPITAL | Age: 59
End: 2019-06-08

## 2019-06-08 VITALS
DIASTOLIC BLOOD PRESSURE: 81 MMHG | HEART RATE: 79 BPM | WEIGHT: 211 LBS | RESPIRATION RATE: 18 BRPM | OXYGEN SATURATION: 95 % | BODY MASS INDEX: 36.02 KG/M2 | TEMPERATURE: 97.6 F | HEIGHT: 64 IN | SYSTOLIC BLOOD PRESSURE: 135 MMHG

## 2019-06-08 DIAGNOSIS — K59.00 CONSTIPATION, UNSPECIFIED CONSTIPATION TYPE: Primary | ICD-10-CM

## 2019-06-08 PROCEDURE — 96374 THER/PROPH/DIAG INJ IV PUSH: CPT

## 2019-06-08 PROCEDURE — 99284 EMERGENCY DEPT VISIT MOD MDM: CPT

## 2019-06-08 PROCEDURE — 99283 EMERGENCY DEPT VISIT LOW MDM: CPT

## 2019-06-08 PROCEDURE — 25010000002 MORPHINE PER 10 MG: Performed by: NURSE PRACTITIONER

## 2019-06-08 PROCEDURE — 96375 TX/PRO/DX INJ NEW DRUG ADDON: CPT

## 2019-06-08 PROCEDURE — 25010000002 HYDROMORPHONE PER 4 MG: Performed by: NURSE PRACTITIONER

## 2019-06-08 PROCEDURE — 74022 RADEX COMPL AQT ABD SERIES: CPT

## 2019-06-08 RX ORDER — MAGNESIUM CARB/ALUMINUM HYDROX 105-160MG
300 TABLET,CHEWABLE ORAL ONCE
Status: COMPLETED | OUTPATIENT
Start: 2019-06-08 | End: 2019-06-08

## 2019-06-08 RX ORDER — SODIUM CHLORIDE 0.9 % (FLUSH) 0.9 %
10 SYRINGE (ML) INJECTION AS NEEDED
Status: DISCONTINUED | OUTPATIENT
Start: 2019-06-08 | End: 2019-06-08 | Stop reason: HOSPADM

## 2019-06-08 RX ORDER — HYDROMORPHONE HCL 110MG/55ML
1 PATIENT CONTROLLED ANALGESIA SYRINGE INTRAVENOUS ONCE
Status: COMPLETED | OUTPATIENT
Start: 2019-06-08 | End: 2019-06-08

## 2019-06-08 RX ADMIN — MAGESIUM CITRATE 300 ML: 1.75 LIQUID ORAL at 16:11

## 2019-06-08 RX ADMIN — MORPHINE SULFATE 4 MG: 4 INJECTION, SOLUTION INTRAMUSCULAR; INTRAVENOUS at 15:03

## 2019-06-08 RX ADMIN — HYDROMORPHONE HYDROCHLORIDE 1 MG: 2 INJECTION INTRAMUSCULAR; INTRAVENOUS; SUBCUTANEOUS at 16:11

## 2019-06-08 NOTE — ED NOTES
Attempt x2 to obtain iv access, unsuccessful, donald rn (charge nuirse asked to attempt u/s guided iv site      Yajaira Boyd RN  06/08/19 8713

## 2019-06-08 NOTE — ED PROVIDER NOTES
"Subjective   58-year-old female in the emergency department today complaining of abdominal pain and constipation.  Patient has a history of constipation, possibly opiate induced constipation on Linzess.  Patient also tells me she has a history of small bowel obstruction as well.        History provided by:  Patient   used: No        Review of Systems   Constitutional: Negative for fatigue and fever.   HENT: Negative for facial swelling.    Respiratory: Negative for shortness of breath.    Cardiovascular: Negative for chest pain and palpitations.   Gastrointestinal: Positive for abdominal pain, constipation and nausea. Negative for diarrhea and vomiting.   Genitourinary: Negative for flank pain.   Musculoskeletal: Negative for gait problem.   Skin: Negative for rash.   Allergic/Immunologic: Negative for immunocompromised state.   Neurological: Negative for dizziness and weakness.   Hematological: Negative for adenopathy.   Psychiatric/Behavioral: Negative for confusion.   All other systems reviewed and are negative.      Past Medical History:   Diagnosis Date   • Acid reflux    • Altered bowel function    • Arthropathy of lumbar facet joint    • Bleeding disorder (CMS/HCC)    • C. difficile diarrhea 2015   • Constipation    • Corns and callus    • Depression    • Disease related peripheral neuropathy    • Epigastric pain    • Fatty liver    • Hammer toe    • Headache    • Hiatal hernia    • History of transfusion    • Hyperlipidemia    • Knee pain    • Localized, primary osteoarthritis of the ankle and foot     Localized, primary osteoarthritis of the ankle and/or foot   • Mendoza's metatarsalgia     Mendoza's metatarsalgia - 2nd interspace on right   • Nausea and vomiting    • Neuralgia and neuritis     Neuralgia, neuritis, and radiculitis, unspecified   • Neuropathy    • Obstructive sleep apnea     Obstructive sleep apnea (adult) (pediatric)    • CHAI on CPAP     \"C-Pap at night  (unconfirmed)\"   • " Osteoarthritis    • Pain in foot     Pain in unspecified foot - sees a podiatrist   • Pain in joint, ankle and foot     Joint pain in ankle and foot      • Pain radiating to back     Pain radiating to lumbar region of back   • Plantar fasciitis    • PONV (postoperative nausea and vomiting)    • Restless leg syndrome    • Secondary hypertension     Secondary hypertension, unspecified   • Sinusitis    • Sleep apnea    • Tongue anomaly     lesion       Allergies   Allergen Reactions   • Other      Pt states that taking steroids either in pill or injection form make her have blisters in her mouth and she feels like she is on fire on the inside/ has hx of c diff and possible MRSA     • Corticosteroids Rash   • Kenalog  [Triamcinolone Acetonide] Rash   • Sulfa Antibiotics Rash     Sulfa (Sulfonamide Antibiotics)       Past Surgical History:   Procedure Laterality Date   • CARPAL TUNNEL RELEASE Left 2018    Procedure: CARPAL TUNNEL RELEASE - left;  Surgeon: Justus Lewis MD;  Location: [facility] OR;  Service: Orthopedics   • CARPAL TUNNEL RELEASE Right 2019    Procedure: carpal tunnel release right hand with local/monitored anesthesia care;  Surgeon: Justus Lewis MD;  Location: [facility] OR;  Service: Orthopedics   •  SECTION     • CHOLECYSTECTOMY     • COLONOSCOPY  2013   • COLONOSCOPY N/A 10/17/2018    Procedure: COLONOSCOPY;  Surgeon: Russell Del Rio MD;  Location: [facility] ENDOSCOPY;  Service: Gastroenterology   • DIRECT LARYNGOSCOPY, ESOPHAGOSCOPY, BRONCHOSCOPY N/A 2017    Procedure: DIRECT LARYNGOSCOPY AND;  Surgeon: Live Bolton MD;  Location: [facility] OR;  Service:    • ENDOSCOPY  2013    Colon endoscopy 70623 (1) - Internal & external hemorrhoids found. Stool found.   • ENDOSCOPY  2013    EGD w/ tube 91663 (1) - Normal esophagus. Gastritis in stomach. Biopsy taken. Normal dudoenum. Biopsy taken.   • ENDOSCOPY N/A 10/17/2018    Procedure:  ESOPHAGOGASTRODUODENOSCOPY--eval varices;  Surgeon: Russell Del Rio MD;  Location: [facility] ENDOSCOPY;  Service: Gastroenterology   • FOOT SURGERY  2013    Foot/toes surgery procedure (1) - Arthroplasty of toes 4 and 5 of right foot.   • HERNIA REPAIR     • HERNIA REPAIR      hital   • HYSTERECTOMY     • LIVER BIOPSY     • NERVE BLOCK  2016    Injection for nerve block (1) - Lumbar medial branch block.   • OTHER SURGICAL HISTORY  2012    Inj(s) Tend-Sheath, Ligament, Single  (1) - [facility] NICK[facility] ([facility])    • OTHER SURGICAL HISTORY  2013    Small Joint Injection/Aspiration  (2) - [facility]TER NICKERSON ([facility])    • OTHER SURGICAL HISTORY      bowel obstruction x2   • OTHER SURGICAL HISTORY      gland removed from neck   • SUBLINGUAL SALIVARY CYST EXCISION N/A 2017    Procedure: EXCISION OF LEFT  TONGUE LESION WITH CLOSURE;  Surgeon: Live Bolton MD;  Location: [facility] OR;  Service:    • TUBAL ABDOMINAL LIGATION     • UPPER GASTROINTESTINAL ENDOSCOPY  2013   • UPPER GASTROINTESTINAL ENDOSCOPY  10/17/2018       Family History   Problem Relation Age of Onset   • Cancer Other    • Diabetes Other    • Heart disease Other    • Hypertension Mother    • Cancer Mother    • Diabetes Mother    • Hypertension Father    • Heart attack Father    • Cancer Father    • Heart disease Father    • Thyroid disease Maternal Aunt        Social History     Socioeconomic History   • Marital status:      Spouse name: Not on file   • Number of children: Not on file   • Years of education: Not on file   • Highest education level: Not on file   Tobacco Use   • Smoking status: Former Smoker     Packs/day: 2.00     Years: 15.00     Pack years: 30.00     Types: Cigarettes     Last attempt to quit: 2000     Years since quittin.4   • Smokeless tobacco: Never Used   Substance and Sexual Activity   • Alcohol use: No   • Drug use: No   • Sexual activity: Defer     Comment: Marital Status:             Objective   Physical Exam   Constitutional: She is oriented to person, place, and time. Vital signs are normal. She appears well-developed and well-nourished.   HENT:   Head: Normocephalic.   Nose: Nose normal.   Eyes: Conjunctivae are normal. Pupils are equal, round, and reactive to light.   Neck: Normal range of motion.   Cardiovascular: Normal rate, regular rhythm and normal heart sounds.   Pulmonary/Chest: Effort normal and breath sounds normal.   Abdominal: Soft. There is generalized tenderness.   Musculoskeletal: Normal range of motion.   Neurological: She is alert and oriented to person, place, and time. GCS eye subscore is 4. GCS verbal subscore is 5. GCS motor subscore is 6.   Skin: Skin is warm and dry.   Psychiatric: She has a normal mood and affect.   Nursing note and vitals reviewed.      Procedures           ED Course      XR Abdomen 2 View With Chest 1 View   Final Result      1. Increased stool from the splenic flexure to the rectum,   consistent with constipation.    2. Multiple small air-fluid levels in small bowel and the right:,   Could represent enteritis/colitis, but clinical correlation is   needed.         If pain or symptoms persist beyond reasonable expectations, a CT   examination is suggested, as is deemed clinically appropriate.      Electronically signed by:  Chandrika Grigsby MD  6/8/2019 3:39 PM CDT   Workstation: [id number]                    MDM  Number of Diagnoses or Management Options  Constipation, unspecified constipation type: new and requires workup     Amount and/or Complexity of Data Reviewed  Tests in the radiology section of CPT®: ordered and reviewed    Risk of Complications, Morbidity, and/or Mortality  Presenting problems: moderate  Diagnostic procedures: low  Management options: moderate    Patient Progress  Patient progress: stable        Final diagnoses:   Constipation, unspecified constipation type            Jeff Rivas, APRN  06/08/19 1552

## 2019-06-09 ENCOUNTER — HOSPITAL ENCOUNTER (EMERGENCY)
Facility: HOSPITAL | Age: 59
Discharge: HOME | End: 2019-06-09
Attending: FAMILY MEDICINE | Admitting: FAMILY MEDICINE

## 2019-06-09 ENCOUNTER — APPOINTMENT (OUTPATIENT)
Dept: GENERAL RADIOLOGY | Facility: HOSPITAL | Age: 59
End: 2019-06-09

## 2019-06-09 VITALS
WEIGHT: 211 LBS | HEART RATE: 73 BPM | OXYGEN SATURATION: 94 % | DIASTOLIC BLOOD PRESSURE: 81 MMHG | HEIGHT: 64 IN | RESPIRATION RATE: 20 BRPM | TEMPERATURE: 98.8 F | SYSTOLIC BLOOD PRESSURE: 134 MMHG | BODY MASS INDEX: 36.02 KG/M2

## 2019-06-09 DIAGNOSIS — K59.00 CONSTIPATION, UNSPECIFIED CONSTIPATION TYPE: Primary | ICD-10-CM

## 2019-06-09 LAB
BILIRUB UR QL STRIP: NEGATIVE
CLARITY UR: CLEAR
COLOR UR: YELLOW
GLUCOSE UR STRIP-MCNC: NEGATIVE MG/DL
HGB UR QL STRIP.AUTO: NEGATIVE
KETONES UR QL STRIP: NEGATIVE
LEUKOCYTE ESTERASE UR QL STRIP.AUTO: NEGATIVE
NITRITE UR QL STRIP: NEGATIVE
PH UR STRIP.AUTO: 8 [PH] (ref 5–9)
PROT UR QL STRIP: NEGATIVE
SP GR UR STRIP: 1.01 (ref 1–1.03)
UROBILINOGEN UR QL STRIP: NORMAL

## 2019-06-09 PROCEDURE — 74018 RADEX ABDOMEN 1 VIEW: CPT

## 2019-06-09 PROCEDURE — 25010000002 LORAZEPAM PER 2 MG: Performed by: FAMILY MEDICINE

## 2019-06-09 PROCEDURE — 81003 URINALYSIS AUTO W/O SCOPE: CPT | Performed by: FAMILY MEDICINE

## 2019-06-09 PROCEDURE — 96372 THER/PROPH/DIAG INJ SC/IM: CPT

## 2019-06-09 RX ORDER — LORAZEPAM 2 MG/ML
2 INJECTION INTRAMUSCULAR ONCE
Status: COMPLETED | OUTPATIENT
Start: 2019-06-09 | End: 2019-06-09

## 2019-06-09 RX ADMIN — LORAZEPAM 2 MG: 2 INJECTION, SOLUTION INTRAMUSCULAR; INTRAVENOUS at 00:35

## 2019-06-09 NOTE — ED PROVIDER NOTES
Subjective   58-year-old white female presents the emergency department with complaint of constipation and pain.  She was seen in this emergency department earlier today for the same complaint and was told to drink mag citrate.  She states that she drank a bottle before she came and then drink another bottle after she left.  She has not been able to have a bowel movement.  She is having continued abdominal pain but thinks that if she could poop that she would feel much better.  She has not had a bowel movement in over a week.  She does have a history of small bowel obstruction as well.            Review of Systems   Constitutional: Negative for activity change, appetite change, chills, fatigue, fever and unexpected weight change.   HENT: Negative for nosebleeds, rhinorrhea, sore throat, trouble swallowing and voice change.    Eyes: Negative for photophobia, pain and visual disturbance.   Respiratory: Negative for apnea, cough, chest tightness, shortness of breath, wheezing and stridor.    Cardiovascular: Negative for chest pain, palpitations and leg swelling.   Gastrointestinal: Negative for abdominal distention, abdominal pain, blood in stool, constipation, diarrhea, nausea and vomiting.   Endocrine: Negative for cold intolerance, heat intolerance, polydipsia and polyuria.   Genitourinary: Negative for decreased urine volume, difficulty urinating, dysuria, flank pain, hematuria and urgency.   Musculoskeletal: Negative for arthralgias, myalgias, neck pain and neck stiffness.   Skin: Negative for color change, pallor and rash.   Allergic/Immunologic: Negative for immunocompromised state.   Neurological: Negative for dizziness, seizures, syncope, weakness, light-headedness and numbness.   Hematological: Negative for adenopathy.   Psychiatric/Behavioral: Negative for agitation, confusion, dysphoric mood and suicidal ideas. The patient is not nervous/anxious.        Past Medical History:   Diagnosis Date   • Acid reflux  "   • Altered bowel function    • Arthropathy of lumbar facet joint    • Bleeding disorder (CMS/HCC)    • C. difficile diarrhea 2015   • Constipation    • Corns and callus    • Depression    • Disease related peripheral neuropathy    • Epigastric pain    • Fatty liver    • Hammer toe    • Headache    • Hiatal hernia    • History of transfusion    • Hyperlipidemia    • Knee pain    • Localized, primary osteoarthritis of the ankle and foot     Localized, primary osteoarthritis of the ankle and/or foot   • Mendoza's metatarsalgia     Mendoza's metatarsalgia - 2nd interspace on right   • Nausea and vomiting    • Neuralgia and neuritis     Neuralgia, neuritis, and radiculitis, unspecified   • Neuropathy    • Obstructive sleep apnea     Obstructive sleep apnea (adult) (pediatric)    • CHAI on CPAP     \"C-Pap at night  (unconfirmed)\"   • Osteoarthritis    • Pain in foot     Pain in unspecified foot - sees a podiatrist   • Pain in joint, ankle and foot     Joint pain in ankle and foot      • Pain radiating to back     Pain radiating to lumbar region of back   • Plantar fasciitis    • PONV (postoperative nausea and vomiting)    • Restless leg syndrome    • Secondary hypertension     Secondary hypertension, unspecified   • Sinusitis    • Sleep apnea    • Tongue anomaly     lesion       Allergies   Allergen Reactions   • Other      Pt states that taking steroids either in pill or injection form make her have blisters in her mouth and she feels like she is on fire on the inside/ has hx of c diff and possible MRSA     • Corticosteroids Rash   • Kenalog  [Triamcinolone Acetonide] Rash   • Sulfa Antibiotics Rash     Sulfa (Sulfonamide Antibiotics)       Past Surgical History:   Procedure Laterality Date   • CARPAL TUNNEL RELEASE Left 8/22/2018    Procedure: CARPAL TUNNEL RELEASE - left;  Surgeon: Justus Lewis MD;  Location: [facility];  Service: Orthopedics   • CARPAL TUNNEL RELEASE Right 2/13/2019    Procedure: carpal " tunnel release right hand with local/monitored anesthesia care;  Surgeon: Justus Lewis MD;  Location: [facility] OR;  Service: Orthopedics   •  SECTION     • CHOLECYSTECTOMY     • COLONOSCOPY  2013   • COLONOSCOPY N/A 10/17/2018    Procedure: COLONOSCOPY;  Surgeon: Russell Del Rio MD;  Location: [facility] ENDOSCOPY;  Service: Gastroenterology   • DIRECT LARYNGOSCOPY, ESOPHAGOSCOPY, BRONCHOSCOPY N/A 2017    Procedure: DIRECT LARYNGOSCOPY AND;  Surgeon: Live Bolton MD;  Location: [facility] OR;  Service:    • ENDOSCOPY  2013    Colon endoscopy 46594 (1) - Internal & external hemorrhoids found. Stool found.   • ENDOSCOPY  2013    EGD w/ tube 37183 (1) - Normal esophagus. Gastritis in stomach. Biopsy taken. Normal dudoenum. Biopsy taken.   • ENDOSCOPY N/A 10/17/2018    Procedure: ESOPHAGOGASTRODUODENOSCOPY--eval varices;  Surgeon: Russell Del Rio MD;  Location: [facility] ENDOSCOPY;  Service: Gastroenterology   • FOOT SURGERY  2013    Foot/toes surgery procedure (1) - Arthroplasty of toes 4 and 5 of right foot.   • HERNIA REPAIR     • HERNIA REPAIR      hital   • HYSTERECTOMY     • LIVER BIOPSY     • NERVE BLOCK  2016    Injection for nerve block (1) - Lumbar medial branch block.   • OTHER SURGICAL HISTORY  2012    Inj(s) Tend-Sheath, Ligament, Single  (1) - [facility]TER NICKERSON ([facility])    • OTHER SURGICAL HISTORY  2013    Small Joint Injection/Aspiration  (2) - [facility]TER NICKERSON ([facility])    • OTHER SURGICAL HISTORY      bowel obstruction x2   • OTHER SURGICAL HISTORY      gland removed from neck   • SUBLINGUAL SALIVARY CYST EXCISION N/A 2017    Procedure: EXCISION OF LEFT  TONGUE LESION WITH CLOSURE;  Surgeon: Live Bolton MD;  Location: [facility] OR;  Service:    • TUBAL ABDOMINAL LIGATION     • UPPER GASTROINTESTINAL ENDOSCOPY  2013   • UPPER GASTROINTESTINAL ENDOSCOPY  10/17/2018       Family History   Problem Relation Age of Onset    • Cancer Other    • Diabetes Other    • Heart disease Other    • Hypertension Mother    • Cancer Mother    • Diabetes Mother    • Hypertension Father    • Heart attack Father    • Cancer Father    • Heart disease Father    • Thyroid disease Maternal Aunt        Social History     Socioeconomic History   • Marital status:      Spouse name: Not on file   • Number of children: Not on file   • Years of education: Not on file   • Highest education level: Not on file   Tobacco Use   • Smoking status: Former Smoker     Packs/day: 2.00     Years: 15.00     Pack years: 30.00     Types: Cigarettes     Last attempt to quit: 2000     Years since quittin.4   • Smokeless tobacco: Never Used   Substance and Sexual Activity   • Alcohol use: No   • Drug use: No   • Sexual activity: Defer     Comment: Marital Status:            Objective   Physical Exam   Constitutional: She is oriented to person, place, and time. She appears well-developed and well-nourished. No distress.   HENT:   Head: Normocephalic and atraumatic.   Right Ear: External ear normal.   Left Ear: External ear normal.   Mouth/Throat: Oropharynx is clear and moist. No oropharyngeal exudate.   Eyes: Conjunctivae and EOM are normal. Pupils are equal, round, and reactive to light. Right eye exhibits no discharge. Left eye exhibits no discharge. No scleral icterus.   Neck: Neck supple. No JVD present. No tracheal deviation present. No thyromegaly present.   Cardiovascular: Normal rate, regular rhythm, normal heart sounds and intact distal pulses. Exam reveals no friction rub.   No murmur heard.  Pulmonary/Chest: Effort normal and breath sounds normal. No stridor. No respiratory distress. She has no wheezes. She has no rales. She exhibits no tenderness.   Abdominal: Soft. Bowel sounds are normal. She exhibits no distension and no mass. There is tenderness (diffusely TTP. No focal tenderness.). There is no rebound and no guarding.   Slightly  hyperresonant to percussion in the RUQ only. NABS.    Musculoskeletal: She exhibits no edema, tenderness or deformity.   Lymphadenopathy:     She has no cervical adenopathy.   Neurological: She is alert and oriented to person, place, and time. No cranial nerve deficit. She exhibits normal muscle tone. Coordination normal.   Skin: Skin is warm and dry. Capillary refill takes 2 to 3 seconds. No rash noted. She is not diaphoretic. No erythema.   Psychiatric: She has a normal mood and affect. Her behavior is normal. Judgment and thought content normal.   Nursing note and vitals reviewed.      Procedures           ED Course  ED Course as of Jun 09 0511   Sun Jun 09, 2019   0223 Patient was placed in room 1 and evaluated by me.  Physical exam was not concerning.  She was given a soapsuds enema which resulted in a large volume stool.  At this point I believe she is medically stable for discharge and will follow up with her primary care provider as an outpatient.  [CE]   0505 On reevaluation, patient states that she is feeling better and is ready to go home after 2 significant bowel movements.  At this point I believe she is medically stable for discharge and will follow up with her primary care provider as an outpatient.  [CE]      ED Course User Index  [CE] Reyes Peña, DO          Labs Reviewed   URINALYSIS W/ MICROSCOPIC IF INDICATED (NO CULTURE) - Normal    Narrative:     Urine microscopic not indicated.     Xr Knee Bilateral Ap Standing    Result Date: 5/29/2019  Narrative: Ordering Provider:  Los Riojas APRN Ordering Diagnosis/Indication:  Acute pain of both knees Procedure:  XR KNEE BILATERAL AP STANDING Exam Date:  5/28/19 COMPARISON:  Todays X-rays were compared to previous images dated 10/2017.     Impression:  Standing AP of both knees reveal mild degenerative changes of both knees with no evidence of fracture, dislocation or other acute radiological abnormality. LIYAH Bridges 5/28/19      Xr Abdomen 2 View With Chest 1 View    Result Date: 6/8/2019  Narrative: PROCEDURE: XR ABDOMEN 2 VW W CHEST 1 VW INDICATION:  Abdominal pain/discomfort COMPARISON VIEWS:  None FINDINGS: An acute abdominal series was performed by obtaining a frontal chest radiograph and three abdominal radiographs.  CHEST: Heart size: Within normal limits Mediastinal contour: Within normal limits Lungs: No evidence of focal air space disease, grossly negative Pleura: No pleural fluid Osseous: Limited assessment of the osseous structures is unremarkable for age. ABDOMEN: Bowel gas pattern: Nonobstructive. There is small air-fluid levels within small bowel and probably within the ascending colon. There is increased stool from the splenic flexure to the rectum. No gross evidence of organomegaly. There is no evidence of free intraperitoneal air. Osseous:  Limited assessment of the osseous structures is unremarkable for age.     Impression: 1. Increased stool from the splenic flexure to the rectum, consistent with constipation. 2. Multiple small air-fluid levels in small bowel and the right:, Could represent enteritis/colitis, but clinical correlation is needed. If pain or symptoms persist beyond reasonable expectations, a CT examination is suggested, as is deemed clinically appropriate. Electronically signed by:  Chandrika Grigsby MD  6/8/2019 3:39 PM CDT Workstation: [id number]    Xr Abdomen 2 View With Chest 1 View    Result Date: 5/21/2019  Narrative: PROCEDURE: XR ABDOMEN 2 VW W CHEST 1 VW INDICATION:  Abdominal pain/discomfort COMPARISON VIEWS:  CT dated 9/1/2018 FINDINGS: An acute abdominal series was performed by obtaining a frontal chest radiograph and three abdominal radiographs.  CHEST: Heart size: Within normal limits Mediastinal contour: Within normal limits Lungs: No evidence of focal air space disease, grossly negative Pleura: No pleural fluid Osseous: Limited assessment of the osseous structures is unremarkable for age.  ABDOMEN: Bowel gas pattern: Nonobstructive. There is gas and stool is for distally as the and rectum No gross evidence of organomegaly. There is no evidence of free intraperitoneal air. Osseous:  Limited assessment of the osseous structures is unremarkable for age.     Impression: No acute abnormality identified. No distended bowel seen. No free air.. If pain or symptoms persist beyond reasonable expectations, a CT examination is suggested, as is deemed clinically appropriate. Electronically signed by:  Chandrika Grigsby MD  5/21/2019 3:19 PM CDT Workstation: [id number]    Ct Abdomen Pelvis With Contrast    Result Date: 5/21/2019  Narrative: CT abdomen, pelvis with contrast. CLINICAL INDICATION: Abdominal pain, constipation. COMPARISON: Abdomen supine, erect May 21, 2019. CT abdomen September 1, 2018. TECHNIQUE: Oral and nonionic IV contrast. 92 mL Isovue-300. Helical scanning with axial and coronal reformations. Soft tissue, lung, and bone windows reviewed. This exam was performed according to our departmental dose-optimization program, which includes automated exposure control, adjustment of the mA and/or kV according to patient size and/or use of iterative reconstruction technique. ABDOMEN CT FINDINGS: The visualized lung bases show minor dependent changes. The gallbladder surgically absent. Liver has a slightly nodular surface which may just early changes of cirrhosis. The spleen is borderline enlarged 17 cm from the diaphragm to the tip. There is also slight prominence of the portal vein. The portal vein however is patent. There is no evidence of ascites. The pancreas, adrenal glands and kidneys are normal in appearance. Diffuse increased stool in the colon. Bowel otherwise unremarkable. No evidence of pathologically enlarged nodes, free air, or free fluid. Mild degenerative changes lumbar spine. The bones are otherwise unremarkable. PELVIS CT FINDINGS: There are no pelvic masses.    The uterus is surgically absent.  No evidence of pathologically enlarged nodes, free air, or free fluid. The bones are grossly unremarkable.     Impression: CONCLUSION: Prior cholecystectomy. Prior hysterectomy. Diffuse increased stool in the colon. Slight nodularity of the surface of liver and borderline splenomegaly. This may suggest early changes of cirrhosis. No evidence of ascites. Portal vein is slightly prominent but patent. CT abdomen, pelvis with contrast is otherwise unremarkable. Electronically signed by:  Blair Rojas MD  5/21/2019 5:18 PM CDT Workstation: [id number]    Xr Knee 1 Or 2 View Bilateral    Result Date: 5/29/2019  Narrative: Ordering Provider:  Los Riojas APRN Ordering Diagnosis/Indication:  Acute pain of both knees Procedure:  XR KNEE 1 OR 2 VW BILATERAL Exam Date:  5/28/19 COMPARISON:  Todays X-rays were compared to previous images dated 10/2017.     Impression:  Lateral views of both knees reveal moderate amount of patellofemoral compartmental degenerative changes with no evidence of fracture, dislocation or other acute radiological abnormality. LIYAH Bridges 5/28/19     Xr Abdomen Kub    Result Date: 6/9/2019  Narrative: Abdomen single view on 6/9/2019 CLINICAL INDICATION: Constipation COMPARISON: 6/8/2018 FINDINGS: Calcifications in the left pelvis are consistent with phleboliths. Bowel gas pattern is unremarkable. No increased stool to suggest significant constipation is noted now. Mild degenerative changes are noted in the spine.     Impression: Nonspecific abdomen Electronically signed by:  Chetan Huston  6/9/2019 3:45 AM CDT Workstation: [id number]          MDM      Final diagnoses:   Constipation, unspecified constipation type            Reyes Peña DO  06/09/19 0511

## 2019-06-09 NOTE — ED NOTES
Pt complains of trouble urinating and stated she thought she might have a UTI, MD ordered lab     Sonia Potts, RN  06/09/19 5169

## 2019-06-09 NOTE — ED NOTES
Pt states that she feels better. Was noted sleeping when signee arrived in room. Pt stated that she has Miralax at home, advised to use.  Sonia Jimenez, RN  06/09/19 8432

## 2019-06-09 NOTE — ED PROVIDER NOTES
Subjective   58-year-old white female presents the emergency department with complaint of constipation and pain.  She was seen in this emergency department earlier today for the same complaint and was told to drink mag citrate.  She states that she drank a bottle before she came and then drink another bottle after she left.  She has not been able to have a bowel movement.  She is having continued abdominal pain but thinks that if she could poop that she would feel much better.  She has not had a bowel movement in over a week.  She does have a history of small bowel obstruction as well.            Review of Systems   Constitutional: Negative for activity change, appetite change, chills, fatigue, fever and unexpected weight change.   HENT: Negative for nosebleeds, rhinorrhea, sore throat, trouble swallowing and voice change.    Eyes: Negative for photophobia, pain and visual disturbance.   Respiratory: Negative for apnea, cough, chest tightness, shortness of breath, wheezing and stridor.    Cardiovascular: Negative for chest pain, palpitations and leg swelling.   Gastrointestinal: Negative for abdominal distention, abdominal pain, blood in stool, constipation, diarrhea, nausea and vomiting.   Endocrine: Negative for cold intolerance, heat intolerance, polydipsia and polyuria.   Genitourinary: Negative for decreased urine volume, difficulty urinating, dysuria, flank pain, hematuria and urgency.   Musculoskeletal: Negative for arthralgias, myalgias, neck pain and neck stiffness.   Skin: Negative for color change, pallor and rash.   Allergic/Immunologic: Negative for immunocompromised state.   Neurological: Negative for dizziness, seizures, syncope, weakness, light-headedness and numbness.   Hematological: Negative for adenopathy.   Psychiatric/Behavioral: Negative for agitation, confusion, dysphoric mood and suicidal ideas. The patient is not nervous/anxious.        Past Medical History:   Diagnosis Date   • Acid reflux  "   • Altered bowel function    • Arthropathy of lumbar facet joint    • Bleeding disorder (CMS/HCC)    • C. difficile diarrhea 2015   • Constipation    • Corns and callus    • Depression    • Disease related peripheral neuropathy    • Epigastric pain    • Fatty liver    • Hammer toe    • Headache    • Hiatal hernia    • History of transfusion    • Hyperlipidemia    • Knee pain    • Localized, primary osteoarthritis of the ankle and foot     Localized, primary osteoarthritis of the ankle and/or foot   • Mendoza's metatarsalgia     Mendoza's metatarsalgia - 2nd interspace on right   • Nausea and vomiting    • Neuralgia and neuritis     Neuralgia, neuritis, and radiculitis, unspecified   • Neuropathy    • Obstructive sleep apnea     Obstructive sleep apnea (adult) (pediatric)    • CHAI on CPAP     \"C-Pap at night  (unconfirmed)\"   • Osteoarthritis    • Pain in foot     Pain in unspecified foot - sees a podiatrist   • Pain in joint, ankle and foot     Joint pain in ankle and foot      • Pain radiating to back     Pain radiating to lumbar region of back   • Plantar fasciitis    • PONV (postoperative nausea and vomiting)    • Restless leg syndrome    • Secondary hypertension     Secondary hypertension, unspecified   • Sinusitis    • Sleep apnea    • Tongue anomaly     lesion       Allergies   Allergen Reactions   • Other      Pt states that taking steroids either in pill or injection form make her have blisters in her mouth and she feels like she is on fire on the inside/ has hx of c diff and possible MRSA     • Corticosteroids Rash   • Kenalog  [Triamcinolone Acetonide] Rash   • Sulfa Antibiotics Rash     Sulfa (Sulfonamide Antibiotics)       Past Surgical History:   Procedure Laterality Date   • CARPAL TUNNEL RELEASE Left 8/22/2018    Procedure: CARPAL TUNNEL RELEASE - left;  Surgeon: Justus Lewis MD;  Location: [facility];  Service: Orthopedics   • CARPAL TUNNEL RELEASE Right 2/13/2019    Procedure: carpal " tunnel release right hand with local/monitored anesthesia care;  Surgeon: Justus Lewis MD;  Location: [facility] OR;  Service: Orthopedics   •  SECTION     • CHOLECYSTECTOMY     • COLONOSCOPY  2013   • COLONOSCOPY N/A 10/17/2018    Procedure: COLONOSCOPY;  Surgeon: Russell Del Rio MD;  Location: [facility] ENDOSCOPY;  Service: Gastroenterology   • DIRECT LARYNGOSCOPY, ESOPHAGOSCOPY, BRONCHOSCOPY N/A 2017    Procedure: DIRECT LARYNGOSCOPY AND;  Surgeon: Live Bolton MD;  Location: [facility] OR;  Service:    • ENDOSCOPY  2013    Colon endoscopy 00324 (1) - Internal & external hemorrhoids found. Stool found.   • ENDOSCOPY  2013    EGD w/ tube 80130 (1) - Normal esophagus. Gastritis in stomach. Biopsy taken. Normal dudoenum. Biopsy taken.   • ENDOSCOPY N/A 10/17/2018    Procedure: ESOPHAGOGASTRODUODENOSCOPY--eval varices;  Surgeon: Russell Del Rio MD;  Location: [facility] ENDOSCOPY;  Service: Gastroenterology   • FOOT SURGERY  2013    Foot/toes surgery procedure (1) - Arthroplasty of toes 4 and 5 of right foot.   • HERNIA REPAIR     • HERNIA REPAIR      hital   • HYSTERECTOMY     • LIVER BIOPSY     • NERVE BLOCK  2016    Injection for nerve block (1) - Lumbar medial branch block.   • OTHER SURGICAL HISTORY  2012    Inj(s) Tend-Sheath, Ligament, Single  (1) - [facility]    • OTHER SURGICAL HISTORY  2013    Small Joint Injection/Aspiration  (2) - [facility]ICK[facility] ([facility])    • OTHER SURGICAL HISTORY      bowel obstruction x2   • OTHER SURGICAL HISTORY      gland removed from neck   • SUBLINGUAL SALIVARY CYST EXCISION N/A 2017    Procedure: EXCISION OF LEFT  TONGUE LESION WITH CLOSURE;  Surgeon: Live Bolton MD;  Location: [facility] OR;  Service:    • TUBAL ABDOMINAL LIGATION     • UPPER GASTROINTESTINAL ENDOSCOPY  2013   • UPPER GASTROINTESTINAL ENDOSCOPY  10/17/2018       Family History   Problem Relation Age of Onset    • Cancer Other    • Diabetes Other    • Heart disease Other    • Hypertension Mother    • Cancer Mother    • Diabetes Mother    • Hypertension Father    • Heart attack Father    • Cancer Father    • Heart disease Father    • Thyroid disease Maternal Aunt        Social History     Socioeconomic History   • Marital status:      Spouse name: Not on file   • Number of children: Not on file   • Years of education: Not on file   • Highest education level: Not on file   Tobacco Use   • Smoking status: Former Smoker     Packs/day: 2.00     Years: 15.00     Pack years: 30.00     Types: Cigarettes     Last attempt to quit: 2000     Years since quittin.4   • Smokeless tobacco: Never Used   Substance and Sexual Activity   • Alcohol use: No   • Drug use: No   • Sexual activity: Defer     Comment: Marital Status:            Objective   Physical Exam   Constitutional: She is oriented to person, place, and time. She appears well-developed and well-nourished. No distress.   HENT:   Head: Normocephalic and atraumatic.   Right Ear: External ear normal.   Left Ear: External ear normal.   Mouth/Throat: Oropharynx is clear and moist. No oropharyngeal exudate.   Eyes: Conjunctivae and EOM are normal. Pupils are equal, round, and reactive to light. Right eye exhibits no discharge. Left eye exhibits no discharge. No scleral icterus.   Neck: Neck supple. No JVD present. No tracheal deviation present. No thyromegaly present.   Cardiovascular: Normal rate, regular rhythm, normal heart sounds and intact distal pulses. Exam reveals no friction rub.   No murmur heard.  Pulmonary/Chest: Effort normal and breath sounds normal. No stridor. No respiratory distress. She has no wheezes. She has no rales. She exhibits no tenderness.   Abdominal: Soft. Bowel sounds are normal. She exhibits no distension and no mass. There is tenderness (diffusely TTP. No focal tenderness.). There is no rebound and no guarding.   Slightly  hyperresonant to percussion in the RUQ only. NABS.    Musculoskeletal: She exhibits no edema, tenderness or deformity.   Lymphadenopathy:     She has no cervical adenopathy.   Neurological: She is alert and oriented to person, place, and time. No cranial nerve deficit. She exhibits normal muscle tone. Coordination normal.   Skin: Skin is warm and dry. Capillary refill takes 2 to 3 seconds. No rash noted. She is not diaphoretic. No erythema.   Psychiatric: She has a normal mood and affect. Her behavior is normal. Judgment and thought content normal.   Nursing note and vitals reviewed.      Procedures           ED Course  ED Course as of Jun 09 0229   Sun Jun 09, 2019 0223 Patient was placed in room 1 and evaluated by me.  Physical exam was not concerning.  She was given a soapsuds enema which resulted in a large volume stool.  At this point I believe she is medically stable for discharge and will follow up with her primary care provider as an outpatient.  [CE]      ED Course User Index  [CE] Reyes Peña DO MDM      Final diagnoses:   Constipation, unspecified constipation type

## 2019-06-11 RX ORDER — SPIRONOLACTONE 25 MG/1
TABLET ORAL
Qty: 30 TABLET | Refills: 5 | Status: SHIPPED | OUTPATIENT
Start: 2019-06-11 | End: 2022-10-28 | Stop reason: SDUPTHER

## 2019-06-12 ENCOUNTER — HOSPITAL ENCOUNTER (OUTPATIENT)
Dept: PHYSICAL THERAPY | Facility: HOSPITAL | Age: 59
Setting detail: THERAPIES SERIES
Discharge: HOME | End: 2019-06-12

## 2019-06-12 DIAGNOSIS — M25.562 ACUTE PAIN OF BOTH KNEES: Primary | ICD-10-CM

## 2019-06-12 DIAGNOSIS — M25.561 ACUTE PAIN OF BOTH KNEES: Primary | ICD-10-CM

## 2019-06-12 DIAGNOSIS — M17.0 PRIMARY OSTEOARTHRITIS OF BOTH KNEES: ICD-10-CM

## 2019-06-12 DIAGNOSIS — M25.552 LEFT HIP PAIN: ICD-10-CM

## 2019-06-12 PROCEDURE — 97110 THERAPEUTIC EXERCISES: CPT

## 2019-06-12 NOTE — THERAPY TREATMENT NOTE
Outpatient Physical Therapy Ortho Treatment Note  [facility]     Patient Name: Amira Shannon  : 1960  MRN: [id number]  Today's Date: 2019      Visit Date: 2019  Pt reports 9/10 pain pre treatment, 6/10 pain post treatment  Reports 0% of improvement.  Attended 2/2 visits.  Insurance available: 15 visits  Next MD appt:2019.  Recertification: 2019.  Visit Dx:    ICD-10-CM ICD-9-CM   1. Acute pain of both knees M25.561 338.19    M25.562 719.46   2. Left hip pain M25.552 719.45   3. Primary osteoarthritis of both knees M17.0 715.16       Patient Active Problem List   Diagnosis   • Bilateral foot pain   • Right hip pain   • Acute pain of both knees   • Plantar fasciitis, bilateral   • Primary osteoarthritis of knee   • MUSA (nonalcoholic steatohepatitis)   • Tongue lesion   • Bilateral lower extremity edema   • Chronic pain of both knees   • Bilateral calcaneal spurs   • Swelling of both lower extremities   • Obesity with body mass index of 30.0-39.9   • Dyspnea   • Diastolic dysfunction   • Thrombocytopenia (CMS/HCC)   • Chronic fatigue   • Abdominal pain   • Constipation   • Acid reflux   • Depression   • Disease related peripheral neuropathy   • Epigastric pain   • Hyperlipidemia   • Nausea and vomiting   • Restless leg syndrome   • Sleep apnea   • Carpal tunnel syndrome on left   • Carpal tunnel syndrome on right   • Bilateral wrist pain   • Numbness and tingling in both hands   • SBO (small bowel obstruction) (CMS/HCC)   • Nausea   • Generalized abdominal pain   • Elevated liver enzymes   • History of small bowel obstruction   • Cirrhosis of liver without ascites (CMS/HCC)   • Status post carpal tunnel release   • Bilateral ankle pain   • Lakesha's deformity of both heels   • Pain in joint, ankle and foot        Past Medical History:   Diagnosis Date   • Acid reflux    • Altered bowel function    • Arthropathy of lumbar facet joint    • Bleeding disorder  "(CMS/[facility])    • C. difficile diarrhea    • Constipation    • Corns and callus    • Depression    • Disease related peripheral neuropathy    • Epigastric pain    • Fatty liver    • Hammer toe    • Headache    • Hiatal hernia    • History of transfusion    • Hyperlipidemia    • Knee pain    • Localized, primary osteoarthritis of the ankle and foot     Localized, primary osteoarthritis of the ankle and/or foot   • Mendoza's metatarsalgia     Mendoza's metatarsalgia - 2nd interspace on right   • Nausea and vomiting    • Neuralgia and neuritis     Neuralgia, neuritis, and radiculitis, unspecified   • Neuropathy    • Obstructive sleep apnea     Obstructive sleep apnea (adult) (pediatric)    • CHAI on CPAP     \"C-Pap at night  (unconfirmed)\"   • Osteoarthritis    • Pain in foot     Pain in unspecified foot - sees a podiatrist   • Pain in joint, ankle and foot     Joint pain in ankle and foot      • Pain radiating to back     Pain radiating to lumbar region of back   • Plantar fasciitis    • PONV (postoperative nausea and vomiting)    • Restless leg syndrome    • Secondary hypertension     Secondary hypertension, unspecified   • Sinusitis    • Sleep apnea    • Tongue anomaly     lesion        Past Surgical History:   Procedure Laterality Date   • CARPAL TUNNEL RELEASE Left 2018    Procedure: CARPAL TUNNEL RELEASE - left;  Surgeon: Justus Lewis MD;  Location: [facility] OR;  Service: Orthopedics   • CARPAL TUNNEL RELEASE Right 2019    Procedure: carpal tunnel release right hand with local/monitored anesthesia care;  Surgeon: Justus Lewis MD;  Location: [facility] OR;  Service: Orthopedics   •  SECTION     • CHOLECYSTECTOMY     • COLONOSCOPY  2013   • COLONOSCOPY N/A 10/17/2018    Procedure: COLONOSCOPY;  Surgeon: Russell Del Rio MD;  Location: [facility] ENDOSCOPY;  Service: Gastroenterology   • DIRECT LARYNGOSCOPY, ESOPHAGOSCOPY, BRONCHOSCOPY N/A 2017    Procedure: DIRECT LARYNGOSCOPY " AND;  Surgeon: Live Bolton MD;  Location: [facility] [place];  Service:    • ENDOSCOPY  07/01/2013    Colon endoscopy 41112 (1) - Internal & external hemorrhoids found. Stool found.   • ENDOSCOPY  07/01/2013    EGD w/ tube 39623 (1) - Normal esophagus. Gastritis in stomach. Biopsy taken. Normal dudoenum. Biopsy taken.   • ENDOSCOPY N/A 10/17/2018    Procedure: ESOPHAGOGASTRODUODENOSCOPY--eval varices;  Surgeon: Russell Del Rio MD;  Location: [facility] ENDOSCOPY;  Service: Gastroenterology   • FOOT SURGERY  02/26/2013    Foot/toes surgery procedure (1) - Arthroplasty of toes 4 and 5 of right foot.   • HERNIA REPAIR     • HERNIA REPAIR      hital   • HYSTERECTOMY     • LIVER BIOPSY     • NERVE BLOCK  07/25/2016    Injection for nerve block (1) - Lumbar medial branch block.   • OTHER SURGICAL HISTORY  12/03/2012    Inj(s) Tend-Sheath, Ligament, Single 20550 (1) - [facility] NICK[facility] ([facility])    • OTHER SURGICAL HISTORY  06/24/2013    Small Joint Injection/Aspiration 20600 (2) - [facility]ICK[facility] ([facility])    • OTHER SURGICAL HISTORY  2011    bowel obstruction x2   • OTHER SURGICAL HISTORY      gland removed from neck   • SUBLINGUAL SALIVARY CYST EXCISION N/A 8/1/2017    Procedure: EXCISION OF LEFT  TONGUE LESION WITH CLOSURE;  Surgeon: Live Bolton MD;  Location: [facility] [place];  Service:    • TUBAL ABDOMINAL LIGATION     • UPPER GASTROINTESTINAL ENDOSCOPY  07/01/2013   • UPPER GASTROINTESTINAL ENDOSCOPY  10/17/2018       PT Ortho     Row Name 06/12/19 1300       Subjective Comments    Subjective Comments  pt struggling to walk. PTA  went and got pt a rw to help her get to the pool safely. pt stated that she has been to ER secondary to her bowels.  -TL       Precautions and Contraindications    Precautions/Limitations  fall precautions  -TL       Subjective Pain    Able to rate subjective pain?  yes  -TL    Pre-Treatment Pain Level  9  -TL    Post-Treatment Pain Level  6  -TL      User Key  (r) = Recorded By, (t) = Taken By,  (c) = Cosigned By    Initials Name Provider Type    Marija Hyatt PTA Physical Therapy Assistant                      PT Assessment/Plan     Row Name 06/12/19 1300          PT Assessment    Assessment Comments  pt with increase pain today. pt with decrease stide length with both legs. pt with very slow jareth and muscle guarding. pt wanted to try the pool to see if it would help. pt repored after being in the pool her pain decreased.  Instructed pt to bring in her assisted device for safety. No new goals met at this time. pt did tolerate low reps with ex in the pool.  -TL        PT Plan    PT Frequency  2x/week pool only  -TL     PT Plan Comments  add increase reps as pt tolerates and deep bike.  -TL       User Key  (r) = Recorded By, (t) = Taken By, (c) = Cosigned By    Initials Name Provider Type    Marija Hyatt PTA Physical Therapy Assistant            Exercises     Row Name 06/12/19 1300             Subjective Comments    Subjective Comments  pt struggling to walk. PTA  went and got pt a rw to help her get to the pool safely. pt stated that she has been to ER secondary to her bowels.  -TL         Subjective Pain    Able to rate subjective pain?  yes  -TL      Pre-Treatment Pain Level  9  -TL      Post-Treatment Pain Level  6  -TL         Aquatics    Aquatics performed?  Yes  -TL         Exercise 1    Exercise Name 1  aqua;walk fwd/back/lateral  -TL      Time 1  5 mins each walk  -TL         Exercise 2    Exercise Name 2  aqua; march  -TL      Reps 2  10  -TL         Exercise 3    Exercise Name 3  aqua; ham curls  -TL      Reps 3  10  -TL         Exercise 4    Exercise Name 4  aqua;3-way SLR  -TL      Reps 4  5  -TL         Exercise 5    Exercise Name 5  aqua; ms  -TL      Reps 5  10  -TL         Exercise 6    Exercise Name 6  aqua;hang  -TL      Time 6  5 mins  -TL        User Key  (r) = Recorded By, (t) = Taken By, (c) = Cosigned By    Initials Name Provider Type    Marija Hyatt PTA  Physical Therapy Assistant                       PT OP Goals     Row Name 06/12/19 1400          PT Short Term Goals    STG Date to Achieve  06/28/19  -TL     STG 1  I with HEP and have additions/changes by next recertification.  -TL     STG 1 Progress  Ongoing  -TL     STG 2  Patient able ot tolerate 45 minutes of aquatic ther ex with no increase in pain.  -TL     STG 2 Progress  Ongoing  -TL     STG 3  Patient able to perform 5 minutes of F/R/L walking aquatically with no increase in pain.  -TL     STG 3 Progress  Ongoing  -TL     STG 4  AROm B knees 0° extension.  -TL     STG 4 Progress  Ongoing  -TL     STG 5  AROm B knee flexion >= 105°.  -TL     STG 5 Progress  Ongoing  -TL        Long Term Goals    LTG Date to Achieve  07/05/19  -TL     LTG 1  AROm B knees 0°>= 115°.  -TL     LTG 2  B LE 4+/5 or greater.  -TL     LTG 3  Patient able ot  perform 15 reps of each aquatic ther ex.  -TL     LTG 4  I with all aquatics.  -TL     LTG 5  D/C with a final HEp and [facility]  -TL        Time Calculation    PT Goal Re-Cert Due Date  06/28/19  -TL       User Key  (r) = Recorded By, (t) = Taken By, (c) = Cosigned By    Initials Name Provider Type    Marija Hyatt PTA Physical Therapy Assistant          Therapy Education  Education Details: encourage pt to bring assisted device next visit.  Given: HEP, Symptoms/condition management, Pain management, Fall prevention and home safety  Program: New, Reinforced  How Provided: Verbal  Provided to: Patient  Level of Understanding: Verbalized, Demonstrated              Time Calculation:   Start Time: 1259  Stop Time: 1348  Time Calculation (min): 49 min  Total Timed Code Minutes- PT: 49 minute(s)  Therapy Charges for Today     Code Description Service Date Service Provider Modifiers Qty    [id number] HC PT THER PROC EA 15 MIN 6/12/2019 Marija Bermeo PTA GP 3                    Marija Bermeo PTA  6/12/2019

## 2019-06-18 ENCOUNTER — HOSPITAL ENCOUNTER (OUTPATIENT)
Dept: PHYSICAL THERAPY | Facility: HOSPITAL | Age: 59
Setting detail: THERAPIES SERIES
Discharge: HOME | End: 2019-06-18

## 2019-06-18 DIAGNOSIS — M25.562 ACUTE PAIN OF BOTH KNEES: Primary | ICD-10-CM

## 2019-06-18 DIAGNOSIS — M25.552 LEFT HIP PAIN: ICD-10-CM

## 2019-06-18 DIAGNOSIS — M17.0 PRIMARY OSTEOARTHRITIS OF BOTH KNEES: ICD-10-CM

## 2019-06-18 DIAGNOSIS — M25.561 ACUTE PAIN OF BOTH KNEES: Primary | ICD-10-CM

## 2019-06-18 PROCEDURE — 97110 THERAPEUTIC EXERCISES: CPT

## 2019-06-18 NOTE — THERAPY TREATMENT NOTE
Outpatient Physical Therapy Ortho Treatment Note  [facility]     Patient Name: Amira Shannon  : 1960  MRN: [id number]  Today's Date: 2019      Visit Date: 2019  Pt reports 7/10 pain pre treatment,5 /10 pain post treatment  Reports 0% of improvement.  Attended 3/3 visits.  Insurance available:15 visits   Next MD appt: 2019.  Recertification: 2019.  Visit Dx:    ICD-10-CM ICD-9-CM   1. Acute pain of both knees M25.561 338.19    M25.562 719.46   2. Left hip pain M25.552 719.45   3. Primary osteoarthritis of both knees M17.0 715.16       Patient Active Problem List   Diagnosis   • Bilateral foot pain   • Right hip pain   • Acute pain of both knees   • Plantar fasciitis, bilateral   • Primary osteoarthritis of knee   • MUSA (nonalcoholic steatohepatitis)   • Tongue lesion   • Bilateral lower extremity edema   • Chronic pain of both knees   • Bilateral calcaneal spurs   • Swelling of both lower extremities   • Obesity with body mass index of 30.0-39.9   • Dyspnea   • Diastolic dysfunction   • Thrombocytopenia (CMS/HCC)   • Chronic fatigue   • Abdominal pain   • Constipation   • Acid reflux   • Depression   • Disease related peripheral neuropathy   • Epigastric pain   • Hyperlipidemia   • Nausea and vomiting   • Restless leg syndrome   • Sleep apnea   • Carpal tunnel syndrome on left   • Carpal tunnel syndrome on right   • Bilateral wrist pain   • Numbness and tingling in both hands   • SBO (small bowel obstruction) (CMS/HCC)   • Nausea   • Generalized abdominal pain   • Elevated liver enzymes   • History of small bowel obstruction   • Cirrhosis of liver without ascites (CMS/HCC)   • Status post carpal tunnel release   • Bilateral ankle pain   • Lakesha's deformity of both heels   • Pain in joint, ankle and foot        Past Medical History:   Diagnosis Date   • Acid reflux    • Altered bowel function    • Arthropathy of lumbar facet joint    • Bleeding disorder  "(CMS/[facility])    • C. difficile diarrhea    • Constipation    • Corns and callus    • Depression    • Disease related peripheral neuropathy    • Epigastric pain    • Fatty liver    • Hammer toe    • Headache    • Hiatal hernia    • History of transfusion    • Hyperlipidemia    • Knee pain    • Localized, primary osteoarthritis of the ankle and foot     Localized, primary osteoarthritis of the ankle and/or foot   • Mendoza's metatarsalgia     Mendoza's metatarsalgia - 2nd interspace on right   • Nausea and vomiting    • Neuralgia and neuritis     Neuralgia, neuritis, and radiculitis, unspecified   • Neuropathy    • Obstructive sleep apnea     Obstructive sleep apnea (adult) (pediatric)    • CHAI on CPAP     \"C-Pap at night  (unconfirmed)\"   • Osteoarthritis    • Pain in foot     Pain in unspecified foot - sees a podiatrist   • Pain in joint, ankle and foot     Joint pain in ankle and foot      • Pain radiating to back     Pain radiating to lumbar region of back   • Plantar fasciitis    • PONV (postoperative nausea and vomiting)    • Restless leg syndrome    • Secondary hypertension     Secondary hypertension, unspecified   • Sinusitis    • Sleep apnea    • Tongue anomaly     lesion        Past Surgical History:   Procedure Laterality Date   • CARPAL TUNNEL RELEASE Left 2018    Procedure: CARPAL TUNNEL RELEASE - left;  Surgeon: Justus Lewis MD;  Location: [facility] OR;  Service: Orthopedics   • CARPAL TUNNEL RELEASE Right 2019    Procedure: carpal tunnel release right hand with local/monitored anesthesia care;  Surgeon: Justus Lewis MD;  Location: [facility] OR;  Service: Orthopedics   •  SECTION     • CHOLECYSTECTOMY     • COLONOSCOPY  2013   • COLONOSCOPY N/A 10/17/2018    Procedure: COLONOSCOPY;  Surgeon: Russell Del Rio MD;  Location: [facility] ENDOSCOPY;  Service: Gastroenterology   • DIRECT LARYNGOSCOPY, ESOPHAGOSCOPY, BRONCHOSCOPY N/A 2017    Procedure: DIRECT LARYNGOSCOPY " AND;  Surgeon: Live Bolton MD;  Location: [facility];  Service:    • ENDOSCOPY  07/01/2013    Colon endoscopy 21142 (1) - Internal & external hemorrhoids found. Stool found.   • ENDOSCOPY  07/01/2013    EGD w/ tube 38173 (1) - Normal esophagus. Gastritis in stomach. Biopsy taken. Normal dudoenum. Biopsy taken.   • ENDOSCOPY N/A 10/17/2018    Procedure: ESOPHAGOGASTRODUODENOSCOPY--eval varices;  Surgeon: Russell Del Rio MD;  Location: [facility] ENDOSCOPY;  Service: Gastroenterology   • FOOT SURGERY  02/26/2013    Foot/toes surgery procedure (1) - Arthroplasty of toes 4 and 5 of right foot.   • HERNIA REPAIR     • HERNIA REPAIR      hital   • HYSTERECTOMY     • LIVER BIOPSY     • NERVE BLOCK  07/25/2016    Injection for nerve block (1) - Lumbar medial branch block.   • OTHER SURGICAL HISTORY  12/03/2012    Inj(s) Tend-Sheath, Ligament, Single 20550 (1) - [facility] NICK[facility] ([facility])    • OTHER SURGICAL HISTORY  06/24/2013    Small Joint Injection/Aspiration 20600 (2) - [facility]TER NICKERSON ([facility])    • OTHER SURGICAL HISTORY  2011    bowel obstruction x2   • OTHER SURGICAL HISTORY      gland removed from neck   • SUBLINGUAL SALIVARY CYST EXCISION N/A 8/1/2017    Procedure: EXCISION OF LEFT  TONGUE LESION WITH CLOSURE;  Surgeon: Live Bolton MD;  Location: [facility];  Service:    • TUBAL ABDOMINAL LIGATION     • UPPER GASTROINTESTINAL ENDOSCOPY  07/01/2013   • UPPER GASTROINTESTINAL ENDOSCOPY  10/17/2018       PT Ortho     Row Name 06/18/19 1400       Subjective Comments    Subjective Comments  Pt stated that she is feeling better. pt c/o feet still swelling.  -TL       Precautions and Contraindications    Precautions/Limitations  fall precautions  -TL       Subjective Pain    Able to rate subjective pain?  yes  -TL    Pre-Treatment Pain Level  7  -TL    Post-Treatment Pain Level  5  -TL      User Key  (r) = Recorded By, (t) = Taken By, (c) = Cosigned By    Initials Name Provider Type    TL Marija Bermeo, PTA  Physical Therapy Assistant                      PT Assessment/Plan     Row Name 06/18/19 1300          PT Assessment    Assessment Comments  pt tolerated treatment with increase reps and activity today. Pt still walking with slow jareth using cane. Pt working toward goals.  pt has met short term goals 2 and 3 today. Pt tolerated 55mins of aquatic therapy today with decrease pain.  -TL        PT Plan    PT Frequency  2x/week pool only  -TL     PT Plan Comments  add flutters.  -TL       User Key  (r) = Recorded By, (t) = Taken By, (c) = Cosigned By    Initials Name Provider Type    TL Marija Bermeo, LEOPOLDO Physical Therapy Assistant            Exercises     Row Name 06/18/19 1400 06/18/19 1335          Subjective Comments    Subjective Comments  Pt stated that she is feeling better. pt c/o feet still swelling.  -TL  --        Subjective Pain    Able to rate subjective pain?  yes  -TL  --     Pre-Treatment Pain Level  7  -TL  --     Post-Treatment Pain Level  5  -TL  --        Aquatics    Aquatics performed?  --  Yes  -TL        Exercise 1    Exercise Name 1  --  aqua: fwd/back/lateral walk  -TL     Time 1  --  5 mins each  -TL        Exercise 2    Exercise Name 2  --  aqua; March  -TL     Reps 2  --  15  -TL        Exercise 3    Exercise Name 3  --  aqua; ham curls   -TL     Reps 3  --  15  -TL        Exercise 4    Exercise Name 4  --  aqua; 3-way SLR  -TL     Reps 4  --  10  -TL        Exercise 5    Exercise Name 5  --  aqua; ms  -TL     Reps 5  --  15  -TL        Exercise 6    Exercise Name 6  --  Aqua; bike  -TL     Time 6  --  5 mins  -TL        Exercise 7    Exercise Name 7  --  aqua; scissors  -TL     Time 7  --  5 mins  -TL        Exercise 8    Exercise Name 8  --  aqua; hang  -TL     Time 8  --  5 mins  -TL        Exercise 9    Exercise Name 9  --  st ham S Both  -TL     Reps 9  --  1  -TL     Time 9  --  30 sec hold  -TL       User Key  (r) = Recorded By, (t) = Taken By, (c) = Cosigned By    Initials Name  Provider Type    Marija Hyatt PTA Physical Therapy Assistant                       PT OP Goals     Row Name 06/18/19 1300          PT Short Term Goals    STG Date to Achieve  06/28/19  -TL     STG 1  I with HEP and have additions/changes by next recertification.  -TL     STG 1 Progress  Ongoing  -TL     STG 2  Patient able ot tolerate 45 minutes of aquatic ther ex with no increase in pain.  -TL     STG 2 Progress  Met  -TL     STG 3  Patient able to perform 5 minutes of F/R/L walking aquatically with no increase in pain.  -TL     STG 3 Progress  Met  -TL     STG 4  AROm B knees 0° extension.  -TL     STG 4 Progress  Ongoing  -TL     STG 5  AROm B knee flexion >= 105°.  -TL     STG 5 Progress  Ongoing  -TL        Long Term Goals    LTG Date to Achieve  07/05/19  -TL     LTG 1  AROm B knees 0°>= 115°.  -TL     LTG 1 Progress  Ongoing  -TL     LTG 2  B LE 4+/5 or greater.  -TL     LTG 2 Progress  Ongoing  -TL     LTG 3  Patient able ot  perform 15 reps of each aquatic ther ex.  -TL     LTG 3 Progress  Ongoing;Progressing  -TL     LTG 4  I with all aquatics.  -TL     LTG 4 Progress  Ongoing  -TL     LTG 5  D/C with a final HEp and free 30 day fitness formula memembership.  -TL     LTG 5 Progress  Ongoing  -TL        Time Calculation    PT Goal Re-Cert Due Date  06/28/19  -TL       User Key  (r) = Recorded By, (t) = Taken By, (c) = Cosigned By    Initials Name Provider Type    Marija Hyatt PTA Physical Therapy Assistant          Therapy Education  Given: HEP, Symptoms/condition management, Pain management, Posture/body mechanics  Program: Reinforced  How Provided: Verbal, Demonstration  Provided to: Patient  Level of Understanding: Verbalized, Demonstrated              Time Calculation:   Start Time: 1335  Stop Time: 1430  Time Calculation (min): 55 min  Total Timed Code Minutes- PT: 55 minute(s)  Therapy Charges for Today     Code Description Service Date Service Provider Modifiers Qty    [id number]   PT THER PROC EA 15 MIN 6/18/2019 Marija Bermeo, PTA GP 4                    Marija Bermeo, LEOPOLDO  6/18/2019

## 2019-06-21 ENCOUNTER — TELEPHONE (OUTPATIENT)
Dept: PHYSICAL THERAPY | Facility: HOSPITAL | Age: 59
End: 2019-06-21

## 2019-06-21 ENCOUNTER — APPOINTMENT (OUTPATIENT)
Dept: PHYSICAL THERAPY | Facility: HOSPITAL | Age: 59
End: 2019-06-21

## 2019-06-24 ENCOUNTER — TELEPHONE (OUTPATIENT)
Dept: PHYSICAL THERAPY | Facility: HOSPITAL | Age: 59
End: 2019-06-24

## 2019-06-25 ENCOUNTER — APPOINTMENT (OUTPATIENT)
Dept: PHYSICAL THERAPY | Facility: HOSPITAL | Age: 59
End: 2019-06-25

## 2019-06-25 ENCOUNTER — OFFICE VISIT (OUTPATIENT)
Dept: ORTHOPEDIC SURGERY | Facility: CLINIC | Age: 59
End: 2019-06-25

## 2019-06-25 VITALS — HEIGHT: 64 IN | BODY MASS INDEX: 36.02 KG/M2 | WEIGHT: 211 LBS

## 2019-06-25 DIAGNOSIS — M25.561 ACUTE PAIN OF BOTH KNEES: ICD-10-CM

## 2019-06-25 DIAGNOSIS — M25.561 CHRONIC PAIN OF BOTH KNEES: Primary | ICD-10-CM

## 2019-06-25 DIAGNOSIS — M25.562 ACUTE PAIN OF BOTH KNEES: ICD-10-CM

## 2019-06-25 DIAGNOSIS — G89.29 CHRONIC PAIN OF BOTH KNEES: Primary | ICD-10-CM

## 2019-06-25 DIAGNOSIS — M25.562 CHRONIC PAIN OF BOTH KNEES: Primary | ICD-10-CM

## 2019-06-25 PROCEDURE — 99214 OFFICE O/P EST MOD 30 MIN: CPT | Performed by: NURSE PRACTITIONER

## 2019-06-25 NOTE — PROGRESS NOTES
"Amira Shannon is a 58 y.o. female returns for     Chief Complaint   Patient presents with   • Left Knee - Follow-up   • Right Knee - Follow-up       HISTORY OF PRESENT ILLNESS: f/u on bilateral knees pain is a 7 today,      CONCURRENT MEDICAL HISTORY:    The following portions of the patient's history were reviewed and updated as appropriate: allergies, current medications, past family history, past medical history, past social history, past surgical history and problem list.     ROS  No fevers or chills.  No chest pain or shortness of air.  No GI or  disturbances.    PHYSICAL EXAMINATION:       Ht 162.6 cm (64\")   Wt 95.7 kg (211 lb)   LMP  (LMP Unknown)   BMI 36.22 kg/m²     Physical Exam   Constitutional: She is oriented to person, place, and time. Vital signs are normal. She appears well-developed and well-nourished. She is cooperative.   HENT:   Head: Normocephalic and atraumatic.   Neck: Trachea normal and phonation normal.   Pulmonary/Chest: Effort normal. No respiratory distress.   Abdominal: Soft. Normal appearance. She exhibits no distension.   Musculoskeletal:        Right knee: She exhibits no effusion.        Left knee: She exhibits no effusion.   Neurological: She is alert and oriented to person, place, and time. GCS eye subscore is 4. GCS verbal subscore is 5. GCS motor subscore is 6.   Skin: Skin is warm, dry and intact. Capillary refill takes less than 2 seconds.   Psychiatric: She has a normal mood and affect. Her speech is normal and behavior is normal. Judgment and thought content normal. Cognition and memory are normal.   Vitals reviewed.      GAIT:     []  Normal  [x]  Antalgic    Assistive device: []  None  []  Walker     []  Crutches  []  Cane     []  Wheelchair  []  Stretcher    Right Knee Exam     Tenderness   The patient is experiencing tenderness in the medial joint line and lateral joint line.    Range of Motion   Extension: normal   Flexion: abnormal     Other   Erythema: " absent  Scars: absent  Sensation: normal  Pulse: present  Swelling: mild  Effusion: no effusion present      Left Knee Exam     Tenderness   The patient is experiencing tenderness in the lateral joint line and medial joint line.    Range of Motion   Extension: normal   Flexion: abnormal     Other   Erythema: absent  Scars: absent  Sensation: normal  Pulse: present  Swelling: mild  Effusion: no effusion present      Right Hip Exam   Right hip exam is normal.       Left Hip Exam     Tenderness   The patient is experiencing tenderness in the greater trochanter.    Range of Motion   Abduction: normal   Flexion: normal   External rotation: normal   Internal rotation: normal     Muscle Strength   Abduction: 4/5   Adduction: 4/5     Other   Erythema: absent  Scars: absent  Sensation: normal  Pulse: present              Xr Knee Bilateral Ap Standing    Result Date: 5/29/2019  Narrative: Ordering Provider:  Los Riojas APRN Ordering Diagnosis/Indication:  Acute pain of both knees Procedure:  XR KNEE BILATERAL AP STANDING Exam Date:  5/28/19 COMPARISON:  Todays X-rays were compared to previous images dated 10/2017.     Impression:  Standing AP of both knees reveal mild degenerative changes of both knees with no evidence of fracture, dislocation or other acute radiological abnormality. LIYAH Bridges 5/28/19     Xr Abdomen 2 View With Chest 1 View    Result Date: 6/8/2019  Narrative: PROCEDURE: XR ABDOMEN 2 VW W CHEST 1 VW INDICATION:  Abdominal pain/discomfort COMPARISON VIEWS:  None FINDINGS: An acute abdominal series was performed by obtaining a frontal chest radiograph and three abdominal radiographs.  CHEST: Heart size: Within normal limits Mediastinal contour: Within normal limits Lungs: No evidence of focal air space disease, grossly negative Pleura: No pleural fluid Osseous: Limited assessment of the osseous structures is unremarkable for age. ABDOMEN: Bowel gas pattern: Nonobstructive. There is small  air-fluid levels within small bowel and probably within the ascending colon. There is increased stool from the splenic flexure to the rectum. No gross evidence of organomegaly. There is no evidence of free intraperitoneal air. Osseous:  Limited assessment of the osseous structures is unremarkable for age.     Impression: 1. Increased stool from the splenic flexure to the rectum, consistent with constipation. 2. Multiple small air-fluid levels in small bowel and the right:, Could represent enteritis/colitis, but clinical correlation is needed. If pain or symptoms persist beyond reasonable expectations, a CT examination is suggested, as is deemed clinically appropriate. Electronically signed by:  Chandrika Grigsby MD  6/8/2019 3:39 PM CDT Workstation: [id number]    Xr Knee 1 Or 2 View Bilateral    Result Date: 5/29/2019  Narrative: Ordering Provider:  Los Riojas APRN Ordering Diagnosis/Indication:  Acute pain of both knees Procedure:  XR KNEE 1 OR 2 VW BILATERAL Exam Date:  5/28/19 COMPARISON:  Todays X-rays were compared to previous images dated 10/2017.     Impression:  Lateral views of both knees reveal moderate amount of patellofemoral compartmental degenerative changes with no evidence of fracture, dislocation or other acute radiological abnormality. LIYAH Bridges 5/28/19     Xr Abdomen Kub    Result Date: 6/9/2019  Narrative: Abdomen single view on 6/9/2019 CLINICAL INDICATION: Constipation COMPARISON: 6/8/2018 FINDINGS: Calcifications in the left pelvis are consistent with phleboliths. Bowel gas pattern is unremarkable. No increased stool to suggest significant constipation is noted now. Mild degenerative changes are noted in the spine.     Impression: Nonspecific abdomen Electronically signed by:  Chetan Huston  6/9/2019 3:45 AM CDT Workstation: [id number]             ASSESSMENT:    Diagnoses and all orders for this visit:    Chronic pain of both knees  -     MRI Knee Left Without Contrast;  Future  -     MRI Knee Right Without Contrast; Future    Acute pain of both knees  -     MRI Knee Left Without Contrast; Future  -     MRI Knee Right Without Contrast; Future          PLAN  Failed conservative treatment with pt/hep and injection, recommend mri of both knee for further evaluation of pain .         No Follow-up on file.    Los Riojas, APRN

## 2019-06-26 ENCOUNTER — HOSPITAL ENCOUNTER (OUTPATIENT)
Dept: PHYSICAL THERAPY | Facility: HOSPITAL | Age: 59
Setting detail: THERAPIES SERIES
Discharge: HOME | End: 2019-06-26

## 2019-06-26 DIAGNOSIS — M25.552 LEFT HIP PAIN: ICD-10-CM

## 2019-06-26 DIAGNOSIS — M25.561 ACUTE PAIN OF BOTH KNEES: Primary | ICD-10-CM

## 2019-06-26 DIAGNOSIS — M25.562 ACUTE PAIN OF BOTH KNEES: Primary | ICD-10-CM

## 2019-06-26 DIAGNOSIS — M17.0 PRIMARY OSTEOARTHRITIS OF BOTH KNEES: ICD-10-CM

## 2019-06-26 PROCEDURE — 97110 THERAPEUTIC EXERCISES: CPT

## 2019-06-26 PROCEDURE — 97110 THERAPEUTIC EXERCISES: CPT | Performed by: PHYSICAL THERAPIST

## 2019-06-26 NOTE — THERAPY PROGRESS REPORT/RE-CERT
Outpatient Physical Therapy Ortho Progress Note  [facility]  Marcella Perales, PT, DPT, CSCS       Patient Name: Amira Shannon  : 1960  MRN: [id number]  Today's Date: 2019      Visit Date: 2019    Visit Dx:    ICD-10-CM ICD-9-CM   1. Acute pain of both knees M25.561 338.19    M25.562 719.46   2. Left hip pain M25.552 719.45   3. Primary osteoarthritis of both knees M17.0 715.16       Patient Active Problem List   Diagnosis   • Bilateral foot pain   • Right hip pain   • Acute pain of both knees   • Plantar fasciitis, bilateral   • Primary osteoarthritis of knee   • MUSA (nonalcoholic steatohepatitis)   • Tongue lesion   • Bilateral lower extremity edema   • Chronic pain of both knees   • Bilateral calcaneal spurs   • Swelling of both lower extremities   • Obesity with body mass index of 30.0-39.9   • Dyspnea   • Diastolic dysfunction   • Thrombocytopenia (CMS/HCC)   • Chronic fatigue   • Abdominal pain   • Constipation   • Acid reflux   • Depression   • Disease related peripheral neuropathy   • Epigastric pain   • Hyperlipidemia   • Nausea and vomiting   • Restless leg syndrome   • Sleep apnea   • Carpal tunnel syndrome on left   • Carpal tunnel syndrome on right   • Bilateral wrist pain   • Numbness and tingling in both hands   • SBO (small bowel obstruction) (CMS/HCC)   • Nausea   • Generalized abdominal pain   • Elevated liver enzymes   • History of small bowel obstruction   • Cirrhosis of liver without ascites (CMS/HCC)   • Status post carpal tunnel release   • Bilateral ankle pain   • Lakesha's deformity of both heels   • Pain in joint, ankle and foot        Past Medical History:   Diagnosis Date   • Acid reflux    • Altered bowel function    • Arthropathy of lumbar facet joint    • Bleeding disorder (CMS/HCC)    • C. difficile diarrhea    • Constipation    • Corns and callus    • Depression    • Disease related peripheral neuropathy    • Epigastric pain   "  • Fatty liver    • Hammer toe    • Headache    • Hiatal hernia    • History of transfusion    • Hyperlipidemia    • Knee pain    • Localized, primary osteoarthritis of the ankle and foot     Localized, primary osteoarthritis of the ankle and/or foot   • Mendoza's metatarsalgia     Mendoza's metatarsalgia - 2nd interspace on right   • Nausea and vomiting    • Neuralgia and neuritis     Neuralgia, neuritis, and radiculitis, unspecified   • Neuropathy    • Obstructive sleep apnea     Obstructive sleep apnea (adult) (pediatric)    • CHAI on CPAP     \"C-Pap at night  (unconfirmed)\"   • Osteoarthritis    • Pain in foot     Pain in unspecified foot - sees a podiatrist   • Pain in joint, ankle and foot     Joint pain in ankle and foot      • Pain radiating to back     Pain radiating to lumbar region of back   • Plantar fasciitis    • PONV (postoperative nausea and vomiting)    • Restless leg syndrome    • Secondary hypertension     Secondary hypertension, unspecified   • Sinusitis    • Sleep apnea    • Tongue anomaly     lesion        Past Surgical History:   Procedure Laterality Date   • CARPAL TUNNEL RELEASE Left 2018    Procedure: CARPAL TUNNEL RELEASE - left;  Surgeon: Justus Lewis MD;  Location: [facility] OR;  Service: Orthopedics   • CARPAL TUNNEL RELEASE Right 2019    Procedure: carpal tunnel release right hand with local/monitored anesthesia care;  Surgeon: Justus Lewis MD;  Location: [facility] OR;  Service: Orthopedics   •  SECTION     • CHOLECYSTECTOMY     • COLONOSCOPY  2013   • COLONOSCOPY N/A 10/17/2018    Procedure: COLONOSCOPY;  Surgeon: Russell Del Rio MD;  Location: [facility] ENDOSCOPY;  Service: Gastroenterology   • DIRECT LARYNGOSCOPY, ESOPHAGOSCOPY, BRONCHOSCOPY N/A 2017    Procedure: DIRECT LARYNGOSCOPY AND;  Surgeon: Live Bolton MD;  Location: [facility] OR;  Service:    • ENDOSCOPY  2013    Colon endoscopy 77909 (1) - Internal & external hemorrhoids " found. Stool found.   • ENDOSCOPY  07/01/2013    EGD w/ tube 60960 (1) - Normal esophagus. Gastritis in stomach. Biopsy taken. Normal dudoenum. Biopsy taken.   • ENDOSCOPY N/A 10/17/2018    Procedure: ESOPHAGOGASTRODUODENOSCOPY--eval varices;  Surgeon: Russell Del Rio MD;  Location: [facility] ENDOSCOPY;  Service: Gastroenterology   • FOOT SURGERY  02/26/2013    Foot/toes surgery procedure (1) - Arthroplasty of toes 4 and 5 of right foot.   • HERNIA REPAIR     • HERNIA REPAIR      hital   • HYSTERECTOMY     • LIVER BIOPSY     • NERVE BLOCK  07/25/2016    Injection for nerve block (1) - Lumbar medial branch block.   • OTHER SURGICAL HISTORY  12/03/2012    Inj(s) Tend-Sheath, Ligament, Single 20550 (1) - [facility] ([facility])    • OTHER SURGICAL HISTORY  06/24/2013    Small Joint Injection/Aspiration 20600 (2) - PORTER NICKERSON ([facility])    • OTHER SURGICAL HISTORY  2011    bowel obstruction x2   • OTHER SURGICAL HISTORY      gland removed from neck   • SUBLINGUAL SALIVARY CYST EXCISION N/A 8/1/2017    Procedure: EXCISION OF LEFT  TONGUE LESION WITH CLOSURE;  Surgeon: Live Bolton MD;  Location: [facility] OR;  Service:    • TUBAL ABDOMINAL LIGATION     • UPPER GASTROINTESTINAL ENDOSCOPY  07/01/2013   • UPPER GASTROINTESTINAL ENDOSCOPY  10/17/2018     Number of days off work: N/A    Changes to medications: None noted.    Changes to MD orders: None noted.    PT Ortho     Row Name 06/26/19 1500       Subjective Comments    Subjective Comments  Patient rpeorts she feels good while in the pool, but painful afterwards. She rrports she likes the pool and is comfortable in the water.  -AJ       Precautions and Contraindications    Precautions/Limitations  fall precautions  -AJ       Subjective Pain    Post-Treatment Pain Level  6  -       Posture/Observations    Alignment Options  Forward head;Cervical lordosis;Thoracic kyphosis;Rounded shoulders;Lumbar lordosis;Iliac crests;Genu varus  -AJ    Forward Head   Mild;Moderate;Increased  -AJ    Cervical Lordosis  Mild;Moderate;Increased  -AJ    Thoracic Kyphosis  Mild;Moderate;Increased  -AJ    Rounded Shoulders  Bilateral:;Mild;Increased  -AJ    Lumbar lordosis  Normal  -AJ    Iliac crests  Bilateral:;Normal  -AJ    Genu varus  Bilateral:;Mild;Increased  -AJ    Observations  Edema pititng in B LEs  -AJ    Posture/Observations Comments  NO acute distress, comes in utalizing SC in R UE, slightly too tall for her, was adjusted to more correct heigt  -AJ       Special Tests/Palpation    Special Tests/Palpation  -- Multiple TTP oer B LEs  -AJ       Hip Special Tests    FRANK (hip vs SI pathology)  Bilateral:;Positive  -AJ    Mazin test (tightness of ITB)  Bilateral:;Positive  -AJ    Marcelo’s test (tightness of ITB)  Bilateral:;Negative  -AJ    Hip scour test (labral vs hip pathology)  Bilateral:;Positive  -AJ    FAIR test (piriformis syndrome)  Bilateral:;Negative  -AJ       Leg Length Test    Apparent  Equal  -AJ       Knee Special Tests    Anterior drawer (ACL lesion)  Bilateral:;Negative  -AJ    Posterior drawer (PCL lesion)  Bilateral:;Negative  -AJ    Posterior sag sign (PCL lesion)  Bilateral:;Negative  -AJ    Valgus stress (MCL lesion)  Bilateral:;Negative  -AJ    Varus stress (LCL lesion)  Bilateral:;Negative  -AJ    Knee Special Tests Comments  Crepitus present in B knees.  -AJ       General ROM    GENERAL ROM COMMENTS  AAROM B hips all WFL, some limitations due to degenerative changes but painful for patient and symetrical.  -AJ       Right Lower Ext    Rt Knee Extension/Flexion AROM  0°-105°  -AJ       Left Lower Ext    Lt Knee Extension/Flexion AROM  0°-107°  -AJ       MMT (Manual Muscle Testing)    General MMT Comments  B LE 4/5 with pain for all.  -AJ       Sensation    Sensation WNL?  WNL  -AJ    Light Touch  No apparent deficits  -AJ    Additional Comments  Denies any numbness or tingling, but reports pain.  -AJ       Pathomechanics    Lower Extremity  Pathomechanics  Antalgic with midstance  -       Transfers    Comment (Transfers)  I with transfers with use of B UEs  -       Gait/Stairs Assessment/Training    Comment (Gait/Stairs)  Slow shufflying gait with B knees flexed, bent forward posture and short step/strifde lengths.  -      User Key  (r) = Recorded By, (t) = Taken By, (c) = Cosigned By    Initials Name Provider Type    AJ Marcella Perales, PT DPT Physical Therapist         Barriers to Rehab: Include significant or possible arthritic/degenerative changes that have occurred within the joints, The chronicity of this issue, The patient's obesity.      Safety Issues: Fall risk    PT Assessment/Plan     Row Name 06/26/19 1500          PT Assessment    Functional Limitations  Limitations in community activities;Performance in leisure activities;Performance in self-care ADL;Impaired gait;Limitation in home management  -     Impairments  Balance;Edema;Endurance;Gait;Impaired flexibility;Impaired muscle endurance;Impaired muscle length;Impaired muscle power;Range of motion;Pain;Muscle strength;Locomotion;Joint mobility;Joint integrity  -     Assessment Comments  Patient has improved ROM, but no significant changes in strength. Progressing towards I with aquatics.  -AJ     Rehab Potential  Poor  -AJ     Patient/caregiver participated in establishment of treatment plan and goals  Yes  -        PT Plan    PT Frequency  1x/week Pool only  -AJ     Predicted Duration of Therapy Intervention (Therapy Eval)  1 week, D/C at next visist  -AJ     Planned CPT's?  PT THER PROC EA 15 MIN: 23860;PT THER SUPP EA 15 MIN  -     Physical Therapy Interventions (Optional Details)  aquatics exercise;patient/family education;home exercise program;strengthening;stretching  -     PT Plan Comments  D/C at next visist with a final HEP and free 30 day fitness formula memembership.  -       User Key  (r) = Recorded By, (t) = Taken By, (c) = Cosigned By    Initials Name  Provider Type    Marcella Domingo, PT DPT Physical Therapist       Other therapeutic activities and/or exercises will be prescribed depending on the patients progress or lack there of.      Exercises     Row Name 06/26/19 1500             Subjective Comments    Subjective Comments  Patient rpeorts she feels good while in the pool, but painful afterwards. She rrports she likes the pool and is comfortable in the water.  -AJ         Subjective Pain    Able to rate subjective pain?  yes  -AJ      Pre-Treatment Pain Level  7  -      Post-Treatment Pain Level  6  -AJ         Exercise 1    Exercise Name 1  AROM/MMT/Special testing/Progress update  -         Exercise 2    Exercise Name 2  aqua: ambulation F/R/L  -AJ      Time 2  5 minutes each  -AJ         Exercise 3    Exercise Name 3  Aqua: Marching in place  -AJ      Time 3  3 minutes  -AJ         Exercise 4    Exercise Name 4  Aqua: B HS curls  -AJ      Reps 4  20  -AJ         Exercise 5    Exercise Name 5  aqua: MS  -AJ      Reps 5  20  -AJ         Exercise 6    Exercise Name 6  aqua: B 3-way SLR  -      Reps 6  15 each  -AJ         Exercise 7    Exercise Name 7  aqua: dw bike  -AJ      Time 7  5 minutes  -AJ         Exercise 8    Exercise Name 8  aqua: dw scissors  -AJ      Time 8  5 minutes  -AJ         Exercise 9    Exercise Name 9  aqua: dw hang  -AJ      Time 9  5 minutes  -AJ        User Key  (r) = Recorded By, (t) = Taken By, (c) = Cosigned By    Initials Name Provider Type    Marcella Domingo, PT DPT Physical Therapist                       PT OP Goals     Row Name 06/26/19 1500          PT Short Term Goals    STG Date to Achieve  06/28/19  -     STG 1  I with HEP and have additions/changes by next recertification.  -AJ     STG 1 Progress  Met  -     STG 2  Patient able ot tolerate 45 minutes of aquatic ther ex with no increase in pain.  -     STG 2 Progress  Met  -     STG 3  Patient able to perform 5 minutes of F/R/L walking  aquatically with no increase in pain.  -     STG 3 Progress  Met  -     STG 4  AROm B knees 0° extension.  -     STG 4 Progress  Met  -     STG 5  AROm B knee flexion >= 105°.  -     STG 5 Progress  Met  -        Long Term Goals    LTG Date to Achieve  07/05/19  -     LTG 1  AROm B knees 0°>= 115°.  -     LTG 1 Progress  Met  -     LTG 2  B LE 4+/5 or greater.  -     LTG 2 Progress  Ongoing;Not Met  -     LTG 3  Patient able ot  perform 15 reps of each aquatic ther ex.  -     LTG 3 Progress  Met  -     LTG 4  I with all aquatics.  -     LTG 4 Progress  Ongoing;Partially Met  -     LTG 5  D/C with a final HEp and free 30 day fitness formula memembership.  -     LTG 5 Progress  Ongoing  -        Time Calculation    PT Goal Re-Cert Due Date  -- N/A  -       User Key  (r) = Recorded By, (t) = Taken By, (c) = Cosigned By    Initials Name Provider Type    Marcella Domingo, PT DPT Physical Therapist          Therapy Education  Given: HEP, Symptoms/condition management, Pain management, Fall prevention and home safety(POC)  Program: Reinforced  How Provided: Verbal, Demonstration  Provided to: Patient  Level of Understanding: Verbalized, Demonstrated    Outcome Measure Options: Lower Extremity Functional Scale (LEFS)  Lower Extremity Functional Index  Any of your usual work, housework or school activities: Quite a bit of difficulty  Your usual hobbies, recreational or sporting activities: Quite a bit of difficulty  Getting into or out of the bath: Quite a bit of difficulty  Walking between rooms: Moderate difficulty  Putting on your shoes or socks: Quite a bit of difficulty  Squatting: Moderate difficulty  Lifting an object, like a bag of groceries from the floor: Moderate difficulty  Performing light activities around your home: Moderate difficulty  Performing heavy activities around your home: Quite a bit of difficulty  Getting into or out of a car: Moderate difficulty  Walking  2 blocks: Extreme difficulty or unable to perform activity  Walking a mile: Extreme difficulty or unable to perform activity  Going up or down 10 stairs (about 1 flight of stairs): Quite a bit of difficulty  Standing for 1 hour: Quite a bit of difficulty  Sitting for 1 hour: Moderate difficulty  Running on even ground: Extreme difficulty or unable to perform activity  Running on uneven ground: Extreme difficulty or unable to perform activity  Making sharp turns while running fast: Extreme difficulty or unable to perform activity  Hopping: Extreme difficulty or unable to perform activity  Rolling over in bed: Moderate difficulty  Total: 21      Time Calculation:   Start Time: 1520  Stop Time: 1626  Time Calculation (min): 66 min  PT Non-Billable Time (min): 5 min(Change time for pool)  Total Timed Code Minutes- PT: 61 minute(s)  Therapy Charges for Today     Code Description Service Date Service Provider Modifiers Qty    98135142103 HC PT THER PROC EA 15 MIN 6/26/2019 Marcella Perales, PT DPT GP 3    [id number] HC PT THER SUPP EA 15 MIN 6/26/2019 Marcella Perales, PT DPT GP 1          PT G-Codes  Outcome Measure Options: Lower Extremity Functional Scale (LEFS)  Total: 21         Marcella Perales PT DPT, CSCS  6/26/2019

## 2019-07-02 ENCOUNTER — HOSPITAL ENCOUNTER (OUTPATIENT)
Dept: PHYSICAL THERAPY | Facility: HOSPITAL | Age: 59
Setting detail: THERAPIES SERIES
Discharge: HOME | End: 2019-07-02

## 2019-07-02 DIAGNOSIS — M25.552 LEFT HIP PAIN: ICD-10-CM

## 2019-07-02 DIAGNOSIS — M25.561 ACUTE PAIN OF BOTH KNEES: Primary | ICD-10-CM

## 2019-07-02 DIAGNOSIS — M25.562 ACUTE PAIN OF BOTH KNEES: Primary | ICD-10-CM

## 2019-07-02 DIAGNOSIS — M17.0 PRIMARY OSTEOARTHRITIS OF BOTH KNEES: ICD-10-CM

## 2019-07-02 PROCEDURE — 97110 THERAPEUTIC EXERCISES: CPT

## 2019-07-02 NOTE — THERAPY TREATMENT NOTE
Outpatient Physical Therapy Ortho Treatment Note  [facility]  Vale Katz PTA       Patient Name: Amira Shannon  : 1960  MRN: [id number]  Today's Date: 2019      Visit Date: 2019     Visits:   Insurance Visits Approved: 15 visits  Recert Due: N/A  MD Appt: TBD  Pain: pretreatment 8/10; post treatment 6/10  Improvement: pt is subjectively reporting 0% improvement since initial evaluation    Visit Dx:    ICD-10-CM ICD-9-CM   1. Acute pain of both knees M25.561 338.19    M25.562 719.46   2. Left hip pain M25.552 719.45   3. Primary osteoarthritis of both knees M17.0 715.16       Patient Active Problem List   Diagnosis   • Bilateral foot pain   • Right hip pain   • Acute pain of both knees   • Plantar fasciitis, bilateral   • Primary osteoarthritis of knee   • MUSA (nonalcoholic steatohepatitis)   • Tongue lesion   • Bilateral lower extremity edema   • Chronic pain of both knees   • Bilateral calcaneal spurs   • Swelling of both lower extremities   • Obesity with body mass index of 30.0-39.9   • Dyspnea   • Diastolic dysfunction   • Thrombocytopenia (CMS/HCC)   • Chronic fatigue   • Abdominal pain   • Constipation   • Acid reflux   • Depression   • Disease related peripheral neuropathy   • Epigastric pain   • Hyperlipidemia   • Nausea and vomiting   • Restless leg syndrome   • Sleep apnea   • Carpal tunnel syndrome on left   • Carpal tunnel syndrome on right   • Bilateral wrist pain   • Numbness and tingling in both hands   • SBO (small bowel obstruction) (CMS/HCC)   • Nausea   • Generalized abdominal pain   • Elevated liver enzymes   • History of small bowel obstruction   • Cirrhosis of liver without ascites (CMS/HCC)   • Status post carpal tunnel release   • Bilateral ankle pain   • Lakesha's deformity of both heels   • Pain in joint, ankle and foot        Past Medical History:   Diagnosis Date   • Acid reflux    • Altered bowel function    • Arthropathy of lumbar  "facet joint    • Bleeding disorder (CMS/HCC)    • C. difficile diarrhea    • Constipation    • Corns and callus    • Depression    • Disease related peripheral neuropathy    • Epigastric pain    • Fatty liver    • Hammer toe    • Headache    • Hiatal hernia    • History of transfusion    • Hyperlipidemia    • Knee pain    • Localized, primary osteoarthritis of the ankle and foot     Localized, primary osteoarthritis of the ankle and/or foot   • Mendoza's metatarsalgia     Mendoza's metatarsalgia - 2nd interspace on right   • Nausea and vomiting    • Neuralgia and neuritis     Neuralgia, neuritis, and radiculitis, unspecified   • Neuropathy    • Obstructive sleep apnea     Obstructive sleep apnea (adult) (pediatric)    • CHAI on CPAP     \"C-Pap at night  (unconfirmed)\"   • Osteoarthritis    • Pain in foot     Pain in unspecified foot - sees a podiatrist   • Pain in joint, ankle and foot     Joint pain in ankle and foot      • Pain radiating to back     Pain radiating to lumbar region of back   • Plantar fasciitis    • PONV (postoperative nausea and vomiting)    • Restless leg syndrome    • Secondary hypertension     Secondary hypertension, unspecified   • Sinusitis    • Sleep apnea    • Tongue anomaly     lesion        Past Surgical History:   Procedure Laterality Date   • CARPAL TUNNEL RELEASE Left 2018    Procedure: CARPAL TUNNEL RELEASE - left;  Surgeon: Justus Lewis MD;  Location: [facility] OR;  Service: Orthopedics   • CARPAL TUNNEL RELEASE Right 2019    Procedure: carpal tunnel release right hand with local/monitored anesthesia care;  Surgeon: Justus Lewis MD;  Location: [facility] OR;  Service: Orthopedics   •  SECTION     • CHOLECYSTECTOMY     • COLONOSCOPY  2013   • COLONOSCOPY N/A 10/17/2018    Procedure: COLONOSCOPY;  Surgeon: Russell Del Rio MD;  Location: [facility] ENDOSCOPY;  Service: Gastroenterology   • DIRECT LARYNGOSCOPY, ESOPHAGOSCOPY, BRONCHOSCOPY N/A 2017 "    Procedure: DIRECT LARYNGOSCOPY AND;  Surgeon: Live Bolton MD;  Location: [facility];  Service:    • ENDOSCOPY  07/01/2013    Colon endoscopy 82891 (1) - Internal & external hemorrhoids found. Stool found.   • ENDOSCOPY  07/01/2013    EGD w/ tube 59193 (1) - Normal esophagus. Gastritis in stomach. Biopsy taken. Normal dudoenum. Biopsy taken.   • ENDOSCOPY N/A 10/17/2018    Procedure: ESOPHAGOGASTRODUODENOSCOPY--eval varices;  Surgeon: Russell Del Rio MD;  Location: [facility] ENDOSCOPY;  Service: Gastroenterology   • FOOT SURGERY  02/26/2013    Foot/toes surgery procedure (1) - Arthroplasty of toes 4 and 5 of right foot.   • HERNIA REPAIR     • HERNIA REPAIR      hital   • HYSTERECTOMY     • LIVER BIOPSY     • NERVE BLOCK  07/25/2016    Injection for nerve block (1) - Lumbar medial branch block.   • OTHER SURGICAL HISTORY  12/03/2012    Inj(s) Tend-Sheath, Ligament, Single 20550 (1) - [facility]TER NICKERSON ([facility])    • OTHER SURGICAL HISTORY  06/24/2013    Small Joint Injection/Aspiration 20600 (2) - [facility]TER NICKERSON ([facility])    • OTHER SURGICAL HISTORY  2011    bowel obstruction x2   • OTHER SURGICAL HISTORY      gland removed from neck   • SUBLINGUAL SALIVARY CYST EXCISION N/A 8/1/2017    Procedure: EXCISION OF LEFT  TONGUE LESION WITH CLOSURE;  Surgeon: Live Bolton MD;  Location: [facility];  Service:    • TUBAL ABDOMINAL LIGATION     • UPPER GASTROINTESTINAL ENDOSCOPY  07/01/2013   • UPPER GASTROINTESTINAL ENDOSCOPY  10/17/2018       PT Ortho     Row Name 07/02/19 1400       Subjective Comments    Subjective Comments  pt states that she is aware that this is her discharge day. has pain. wishes her swelling would go away.   -       Precautions and Contraindications    Precautions/Limitations  fall precautions  -       Subjective Pain    Able to rate subjective pain?  yes  -    Pre-Treatment Pain Level  8  -MH    Post-Treatment Pain Level  6  -MH      User Key  (r) = Recorded By, (t) = Taken By, (c)  = Cosigned By    Initials Name Provider Type     Vale Katz PTA Physical Therapy Assistant                      PT Assessment/Plan     Row Name 07/02/19 1400          PT Assessment    Assessment Comments  patient able to demonstrate independence with aquatic program.   -        PT Plan    PT Frequency  1x/week  -     PT Plan Comments  discharge to independent managment with patient having the option to joint fitness if she chooses.   -       User Key  (r) = Recorded By, (t) = Taken By, (c) = Cosigned By    Initials Name Provider Type     Vale Katz PTA Physical Therapy Assistant            Exercises     Row Name 07/02/19 1400             Subjective Comments    Subjective Comments  pt states that she is aware that this is her discharge day. has pain. wishes her swelling would go away.   -         Subjective Pain    Able to rate subjective pain?  yes  -      Pre-Treatment Pain Level  8  -      Post-Treatment Pain Level  6  -         Exercise 1    Exercise Name 1  aqua: Walk 3 way  -      Time 1  5 minutes  -         Exercise 2    Exercise Name 2  aqua: Cookie lunge stance push/pull  -      Reps 2  20  -MH         Exercise 3    Exercise Name 3  aqua: B St. 3 way hip  -      Reps 3  20  -MH         Exercise 4    Exercise Name 4  aqua: St. Marching  -      Time 4  5 minutes  -         Exercise 5    Exercise Name 5  aqua: mini squats  -      Reps 5  20  -MH         Exercise 6    Exercise Name 6  aqua: Hamcurls  -      Reps 6  20  -MH         Exercise 7    Exercise Name 7  aqua: bicycle  -      Time 7  5 minutes  -         Exercise 8    Exercise Name 8  aqua: Flutter kicks  -      Time 8  5 minutes  -         Exercise 9    Exercise Name 9  aqua: Scissor kicks  -      Time 9  5 minutes  -         Exercise 10    Exercise Name 10  aqua: Deep Hang  -        User Key  (r) = Recorded By, (t) = Taken By, (c) = Cosigned By    Initials Name Provider Type     Vale Katz  LEOPOLDO BLANCO Physical Therapy Assistant                       PT OP Goals     Row Name 07/02/19 1400          PT Short Term Goals    STG Date to Achieve  06/28/19  -     STG 1  I with HEP and have additions/changes by next recertification.  -     STG 1 Progress  Met  -     STG 2  Patient able ot tolerate 45 minutes of aquatic ther ex with no increase in pain.  -     STG 2 Progress  Met  -     STG 3  Patient able to perform 5 minutes of F/R/L walking aquatically with no increase in pain.  -     STG 3 Progress  Met  -     STG 4  AROm B knees 0° extension.  -     STG 4 Progress  Met  -     STG 5  AROm B knee flexion >= 105°.  -     STG 5 Progress  Met  [facility]        Long Term Goals    LTG Date to Achieve  07/05/19  -     LTG 1  AROm B knees 0°>= 115°.  -     LTG 1 Progress  Met  [facility]     LTG 2  B LE 4+/5 or greater.  -     LTG 2 Progress  Ongoing;Not Met  -     LTG 3  Patient able ot  perform 15 reps of each aquatic ther ex.  -     LTG 3 Progress  Met  [facility]     LTG 4  I with all aquatics.  -     LTG 4 Progress  Met  [facility]     LTG 5  D/C with a final HEp and [facility]  -     LTG 5 Progress  Partially Met  [facility]        Time Calculation    PT Goal Re-Cert Due Date  — N/A  -       User Key  (r) = Recorded By, (t) = Taken By, (c) = Cosigned By    Initials Name Provider Type     Vale Katz PTA Physical Therapy Assistant                         Time Calculation:   Start Time: 1300  Stop Time: 1410  Time Calculation (min): 70 min  Total Timed Code Minutes- PT: 70 minute(s)  Therapy Charges for Today     Code Description Service Date Service Provider Modifiers Qty    54352751831 HC PT THER PROC EA 15 MIN 7/2/2019 Vale Katz PTA GP 5    52106638543 HC PT THER SUPP EA 15 MIN 7/2/2019 Vale Katz PTA GP 1                    Vale Katz PTA  7/2/2019

## 2019-07-02 NOTE — THERAPY DISCHARGE NOTE
Outpatient Physical Therapy Ortho Treatment Note/Discharge Summary  [facility]  Vale Katz PTA       Patient Name: Amira Shannon  : 1960  MRN: [id number]  Today's Date: 2019      Visit Date: 2019     Visits:   Insurance Visits Approved: 15 visits  Recert Due: N/A  MD Appt: TBD  Pain: pretreatment 8/10; post treatment 6/10  Improvement: pt is subjectively reporting 0% improvement since initial evaluation        Visit Dx:    ICD-10-CM ICD-9-CM   1. Acute pain of both knees M25.561 338.19    M25.562 719.46   2. Left hip pain M25.552 719.45   3. Primary osteoarthritis of both knees M17.0 715.16       Patient Active Problem List   Diagnosis   • Bilateral foot pain   • Right hip pain   • Acute pain of both knees   • Plantar fasciitis, bilateral   • Primary osteoarthritis of knee   • MUSA (nonalcoholic steatohepatitis)   • Tongue lesion   • Bilateral lower extremity edema   • Chronic pain of both knees   • Bilateral calcaneal spurs   • Swelling of both lower extremities   • Obesity with body mass index of 30.0-39.9   • Dyspnea   • Diastolic dysfunction   • Thrombocytopenia (CMS/HCC)   • Chronic fatigue   • Abdominal pain   • Constipation   • Acid reflux   • Depression   • Disease related peripheral neuropathy   • Epigastric pain   • Hyperlipidemia   • Nausea and vomiting   • Restless leg syndrome   • Sleep apnea   • Carpal tunnel syndrome on left   • Carpal tunnel syndrome on right   • Bilateral wrist pain   • Numbness and tingling in both hands   • SBO (small bowel obstruction) (CMS/HCC)   • Nausea   • Generalized abdominal pain   • Elevated liver enzymes   • History of small bowel obstruction   • Cirrhosis of liver without ascites (CMS/HCC)   • Status post carpal tunnel release   • Bilateral ankle pain   • Lakesha's deformity of both heels   • Pain in joint, ankle and foot        Past Medical History:   Diagnosis Date   • Acid reflux    • Altered bowel function   "  • Arthropathy of lumbar facet joint    • Bleeding disorder (CMS/HCC)    • C. difficile diarrhea    • Constipation    • Corns and callus    • Depression    • Disease related peripheral neuropathy    • Epigastric pain    • Fatty liver    • Hammer toe    • Headache    • Hiatal hernia    • History of transfusion    • Hyperlipidemia    • Knee pain    • Localized, primary osteoarthritis of the ankle and foot     Localized, primary osteoarthritis of the ankle and/or foot   • Mendoza's metatarsalgia     Mendoza's metatarsalgia - 2nd interspace on right   • Nausea and vomiting    • Neuralgia and neuritis     Neuralgia, neuritis, and radiculitis, unspecified   • Neuropathy    • Obstructive sleep apnea     Obstructive sleep apnea (adult) (pediatric)    • CHAI on CPAP     \"C-Pap at night  (unconfirmed)\"   • Osteoarthritis    • Pain in foot     Pain in unspecified foot - sees a podiatrist   • Pain in joint, ankle and foot     Joint pain in ankle and foot      • Pain radiating to back     Pain radiating to lumbar region of back   • Plantar fasciitis    • PONV (postoperative nausea and vomiting)    • Restless leg syndrome    • Secondary hypertension     Secondary hypertension, unspecified   • Sinusitis    • Sleep apnea    • Tongue anomaly     lesion        Past Surgical History:   Procedure Laterality Date   • CARPAL TUNNEL RELEASE Left 2018    Procedure: CARPAL TUNNEL RELEASE - left;  Surgeon: Justus Lewis MD;  Location: [facility] OR;  Service: Orthopedics   • CARPAL TUNNEL RELEASE Right 2019    Procedure: carpal tunnel release right hand with local/monitored anesthesia care;  Surgeon: Justus Lewis MD;  Location: [facility] OR;  Service: Orthopedics   •  SECTION     • CHOLECYSTECTOMY     • COLONOSCOPY  2013   • COLONOSCOPY N/A 10/17/2018    Procedure: COLONOSCOPY;  Surgeon: Russell Del Rio MD;  Location: [facility] ENDOSCOPY;  Service: Gastroenterology   • DIRECT LARYNGOSCOPY, ESOPHAGOSCOPY, " BRONCHOSCOPY N/A 8/1/2017    Procedure: DIRECT LARYNGOSCOPY AND;  Surgeon: Live Bolton MD;  Location: [facility] OR;  Service:    • ENDOSCOPY  07/01/2013    Colon endoscopy 84370 (1) - Internal & external hemorrhoids found. Stool found.   • ENDOSCOPY  07/01/2013    EGD w/ tube 62640 (1) - Normal esophagus. Gastritis in stomach. Biopsy taken. Normal dudoenum. Biopsy taken.   • ENDOSCOPY N/A 10/17/2018    Procedure: ESOPHAGOGASTRODUODENOSCOPY--eval varices;  Surgeon: Russell Del Rio MD;  Location: [facility] ENDOSCOPY;  Service: Gastroenterology   • FOOT SURGERY  02/26/2013    Foot/toes surgery procedure (1) - Arthroplasty of toes 4 and 5 of right foot.   • HERNIA REPAIR     • HERNIA REPAIR      hital   • HYSTERECTOMY     • LIVER BIOPSY     • NERVE BLOCK  07/25/2016    Injection for nerve block (1) - Lumbar medial branch block.   • OTHER SURGICAL HISTORY  12/03/2012    Inj(s) Tend-Sheath, Ligament, Single 20550 (1) - PORTER NICKERSON ([facility])    • OTHER SURGICAL HISTORY  06/24/2013    Small Joint Injection/Aspiration 20600 (2) - [facility]TER NICKERSON ([facility])    • OTHER SURGICAL HISTORY  2011    bowel obstruction x2   • OTHER SURGICAL HISTORY      gland removed from neck   • SUBLINGUAL SALIVARY CYST EXCISION N/A 8/1/2017    Procedure: EXCISION OF LEFT  TONGUE LESION WITH CLOSURE;  Surgeon: Live Bolton MD;  Location: [facility];  Service:    • TUBAL ABDOMINAL LIGATION     • UPPER GASTROINTESTINAL ENDOSCOPY  07/01/2013   • UPPER GASTROINTESTINAL ENDOSCOPY  10/17/2018       PT Ortho     Row Name 07/02/19 1400       Subjective Comments    Subjective Comments  pt states that she is aware that this is her discharge day. has pain. wishes her swelling would go away.   -       Precautions and Contraindications    Precautions/Limitations  fall precautions  -       Subjective Pain    Able to rate subjective pain?  yes  -    Pre-Treatment Pain Level  8  -    Post-Treatment Pain Level  6  -MH      User Key  (r) =  Recorded By, (t) = Taken By, (c) = Cosigned By    Initials Name Provider Type     Vale Katz, LEOPOLDO Physical Therapy Assistant                      PT Assessment/Plan     Row Name 07/02/19 1400          PT Assessment    Assessment Comments  patient able to demonstrate independence with aquatic program.   -        PT Plan    PT Frequency  1x/week  -     PT Plan Comments  discharge to independent managment with patient having the option to joint fitness if she chooses.   -       User Key  (r) = Recorded By, (t) = Taken By, (c) = Cosigned By    Initials Name Provider Type     SadiqvenitaVale PTA Physical Therapy Assistant              Exercises     Row Name 07/02/19 1400             Subjective Comments    Subjective Comments  pt states that she is aware that this is her discharge day. has pain. wishes her swelling would go away.   -         Subjective Pain    Able to rate subjective pain?  yes  -      Pre-Treatment Pain Level  8  -      Post-Treatment Pain Level  6  -         Exercise 1    Exercise Name 1  aqua: Walk 3 way  -      Time 1  5 minutes  -         Exercise 2    Exercise Name 2  aqua: Cookie lunge stance push/pull  -      Reps 2  20  -MH         Exercise 3    Exercise Name 3  aqua: B St. 3 way hip  -      Reps 3  20  -MH         Exercise 4    Exercise Name 4  aqua: St. Marching  -      Time 4  5 minutes  -MH         Exercise 5    Exercise Name 5  aqua: mini squats  -      Reps 5  20  -MH         Exercise 6    Exercise Name 6  aqua: Hamcurls  -      Reps 6  20  -MH         Exercise 7    Exercise Name 7  aqua: bicycle  -      Time 7  5 minutes  -MH         Exercise 8    Exercise Name 8  aqua: Flutter kicks  -      Time 8  5 minutes  -MH         Exercise 9    Exercise Name 9  aqua: Scissor kicks  -      Time 9  5 minutes  -MH         Exercise 10    Exercise Name 10  aqua: Deep Hang  -        User Key  (r) = Recorded By, (t) = Taken By, (c) = Cosigned By    Initials Name  Provider Type     Vale Katz PTA Physical Therapy Assistant                         PT OP Goals     Row Name 07/02/19 1400          PT Short Term Goals    STG Date to Achieve  06/28/19  -     STG 1  I with HEP and have additions/changes by next recertification.  -     STG 1 Progress  Met  -     STG 2  Patient able ot tolerate 45 minutes of aquatic ther ex with no increase in pain.  -     STG 2 Progress  Met  -     STG 3  Patient able to perform 5 minutes of F/R/L walking aquatically with no increase in pain.  -     STG 3 Progress  Met  -     STG 4  AROm B knees 0° extension.  -     STG 4 Progress  Met  -     STG 5  AROm B knee flexion >= 105°.  -     STG 5 Progress  Met  [facility]        Long Term Goals    LTG Date to Achieve  07/05/19  -     LTG 1  AROm B knees 0°>= 115°.  -     LTG 1 Progress  Met  [facility]     LTG 2  B LE 4+/5 or greater.  -     LTG 2 Progress  Ongoing;Not Met  -     LTG 3  Patient able ot  perform 15 reps of each aquatic ther ex.  -     LTG 3 Progress  Met  [facility]     LTG 4  I with all aquatics.  -     LTG 4 Progress  Met  [facility]     LTG 5  D/C with a final HEp and [facility].  -     LTG 5 Progress  Partially Met  [facility]        Time Calculation    PT Goal Re-Cert Due Date  -- N/A  -       User Key  (r) = Recorded By, (t) = Taken By, (c) = Cosigned By    Initials Name Provider Type     Vale Katz PTA Physical Therapy Assistant                         Time Calculation:   Start Time: 1300  Stop Time: 1410  Time Calculation (min): 70 min  Total Timed Code Minutes- PT: 70 minute(s)  Therapy Charges for Today     Code Description Service Date Service Provider Modifiers Qty    60891253862 HC PT THER PROC EA 15 MIN 7/2/2019 Vale Katz PTA GP 5    75280733539 HC PT THER SUPP EA 15 MIN 7/2/2019 Vale Katz PTA GP 1                OP PT Discharge Summary  Date of Discharge: 07/02/19  Reason for Discharge: Maximum functional potential  achieved  Outcomes Achieved: Refer to plan of care for updates on goals achieved  Discharge Destination: Home with home program      Vale Katz, PTA  7/2/2019

## 2019-07-08 ENCOUNTER — OFFICE VISIT (OUTPATIENT)
Dept: CARDIOLOGY | Facility: CLINIC | Age: 59
End: 2019-07-08

## 2019-07-08 VITALS
HEIGHT: 64 IN | DIASTOLIC BLOOD PRESSURE: 86 MMHG | WEIGHT: 208.8 LBS | BODY MASS INDEX: 35.65 KG/M2 | SYSTOLIC BLOOD PRESSURE: 132 MMHG | OXYGEN SATURATION: 95 % | HEART RATE: 91 BPM

## 2019-07-08 DIAGNOSIS — R60.0 BILATERAL LOWER EXTREMITY EDEMA: ICD-10-CM

## 2019-07-08 DIAGNOSIS — I51.89 DIASTOLIC DYSFUNCTION: Primary | ICD-10-CM

## 2019-07-08 PROCEDURE — 99214 OFFICE O/P EST MOD 30 MIN: CPT | Performed by: NURSE PRACTITIONER

## 2019-07-08 RX ORDER — TORSEMIDE 10 MG/1
10 TABLET ORAL DAILY
Qty: 30 TABLET | Refills: 6 | Status: SHIPPED | OUTPATIENT
Start: 2019-07-08 | End: 2020-01-09 | Stop reason: SDUPTHER

## 2019-07-08 NOTE — PROGRESS NOTES
Subjective:     CC: HTN, leg swelling    Fatigue   This is a chronic problem. The current episode started more than 1 year ago. The problem occurs daily. The problem has been rapidly worsening. Associated symptoms include arthralgias, fatigue, joint swelling, myalgias and weakness. Pertinent negatives include no change in bowel habit, chills, coughing, fever or rash. The symptoms are aggravated by standing and walking. She has tried position changes, oral narcotics, heat and rest for the symptoms. The treatment provided mild relief.   Leg Swelling   This is a chronic problem. The current episode started more than 1 year ago. The problem occurs intermittently. The problem has been rapidly improving. Associated symptoms include arthralgias, fatigue, joint swelling, myalgias and weakness. Pertinent negatives include no change in bowel habit, chills, coughing, fever or rash. She has tried rest and position changes (loop diuretics) for the symptoms. The treatment provided moderate relief.     She had been on Lasix for 10-12 years. She does have HTN for also about 10 years.    Of note, her BNP is negative.   Follow up revealed return of edema after Metolazone was stopped. Dr. Espitia also ordered ischemic evaluation which was low risk.   Last visit, he DC norvasc and lasix and added chlorthalidone. Improvement noted.    I saw her on  11/1/2017 and Aldactone added and chlorthalidone increased.   Weight down and swelling has not returned.     She complains of chronic fatigue. This is secondary to chronic pain and high doses of neurontin. Jordi for back and feet Thursday.     1/7/19: 6 month follow up. She has had hospitalization for SBO. She has not had recurrence of leg edema on current medications. Still has significant gait disturbance due to podiatry issues.    7/8/19: 6 month follow up, HTN. Doing well with current meds. Wants to try different diuretic medicine.  She has been on chlorthalidone for a couple  years.  We try decreasing down to 12.5 mg and then discontinuing, however she does not feel like she is ready to discontinue.  We spent time discussing that her swelling is not secondary to volume overload and the diuretics are probably not helping at all.  She would like to try different medication; that will be arranged today.    Hospitalization:  8/31/18-9/5/18- SBO  11/17/2017-11/22/2017- SBO    Review of Systems   Constitution: Positive for fatigue, weakness and weight loss. Negative for chills, decreased appetite and fever.   HENT: Negative.    Eyes: Negative.    Cardiovascular: Positive for dyspnea on exertion and leg swelling. Negative for irregular heartbeat.   Respiratory: Negative for cough and wheezing.    Endocrine: Negative.    Skin: Negative for dry skin, flushing and rash.   Musculoskeletal: Positive for arthralgias, joint swelling and myalgias. Negative for falls.   Gastrointestinal: Negative for change in bowel habit and melena.   Genitourinary: Negative for frequency and hematuria.   Neurological: Negative for dizziness, light-headedness and loss of balance.   Psychiatric/Behavioral: Negative for altered mental status and memory loss. The patient is not nervous/anxious.        Current Outpatient Medications   Medication Sig Dispense Refill   • cetirizine (zyrTEC) 10 MG tablet Take 1 tablet by mouth Daily As Needed for Allergies. 30 tablet 11   • cyclobenzaprine (FLEXERIL) 10 MG tablet Take 10 mg by mouth 3 (Three) Times a Day As Needed for Muscle Spasms.     • DULoxetine (CYMBALTA) 30 MG capsule Take 30 mg by mouth Daily.     • gabapentin (NEURONTIN) 800 MG tablet Take 800 mg by mouth 4 (Four) Times a Day.     • Melatonin 10 MG capsule Take  by mouth Every Night.     • nystatin (MYCOSTATIN) 632175 UNIT/GM powder Apply  topically to the appropriate area as directed 4 (Four) Times a Day As Needed.     • omeprazole (priLOSEC) 40 MG capsule Take 40 mg by mouth Daily.     • oxyCODONE (oxyCONTIN) 10 MG  "12 hr tablet Take 10 mg by mouth 4 (Four) Times a Day As Needed.     • polyethylene glycol (MIRALAX) packet Take 17 g by mouth Daily As Needed.     • Prucalopride Succinate (MOTEGRITY) 2 MG tablet Take 2 mg by mouth Daily. 30 tablet 2   • spironolactone (ALDACTONE) 25 MG tablet TAKE 1 TABLET BY MOUTH ONCE DAILY 30 tablet 5   • metFORMIN (GLUCOPHAGE) 500 MG tablet Take 1 tablet by mouth 2 (Two) Times a Day With Meals. 60 tablet 6   • torsemide (DEMADEX) 10 MG tablet Take 1 tablet by mouth Daily. 30 tablet 6     No current facility-administered medications for this visit.         Objective:     Vitals:    07/08/19 1016   BP: 132/86   BP Location: Left arm   Patient Position: Sitting   Cuff Size: Adult   Pulse: 91   SpO2: 95%   Weight: 94.7 kg (208 lb 12.8 oz)   Height: 162.6 cm (64\")     Wt Readings from Last 3 Encounters:   07/08/19 94.7 kg (208 lb 12.8 oz)   06/25/19 95.7 kg (211 lb)   06/08/19 95.7 kg (211 lb)          Physical Exam   Constitutional: She is oriented to person, place, and time. She appears well-developed and well-nourished. No distress.   HENT:   Head: Normocephalic.   Neck: No JVD present.   Cardiovascular: Normal rate, regular rhythm, S1 normal, S2 normal, normal heart sounds and intact distal pulses.   No murmur heard.  Pulmonary/Chest: Effort normal and breath sounds normal. No respiratory distress. She has no wheezes. She has no rales.   Abdominal: Soft. Bowel sounds are normal.   Musculoskeletal: Normal range of motion. She exhibits no edema.   Neurological: She is alert and oriented to person, place, and time.   Skin: Skin is warm and dry. No erythema.   Psychiatric: She has a normal mood and affect. Her behavior is normal. Judgment and thought content normal.       DATA reviewed  ECHO 10/18/2017  Interpretation Summary   · Left Ventricle: Left ventricular systolic function is normal. Estimated EF appears to be in the range of 56 - 60%  · Left ventricular diastolic dysfunction is noted (grade " III w/high LAP) consistent with reversible restrictive pattern. Elevated left atrial pressure.  · Right Ventricle: Normal right ventricular wall thickness, systolic function and septal motion noted. Right ventricular cavity is mildly dilated  · No evidence of pulmonary hypertension is present  · There is no evidence of pericardial effusion     AGUSTINA 11/7/17  Interpretation Summary     · Transesophageal Echocardiogram with limited Color and Doppler  · Left Ventricle: Left ventricular systolic function is normal. Estimated EF appears to be in the range of 61 - 65%.  · Right Ventricle: Normal right ventricular wall thickness, systolic function and septal motion noted. Right ventricular cavity is mildly dilated  · No evidence of a patent foramen ovale. No evidence of an atrial septal defect present. Saline test results are negative  · There is no evidence of pericardial effusion  · No evidence of VSD.          ABIs 10/18/17  Interpretation Summary   · Right Conclusion: The right CARYN appears normal.  · Left Conclusion: The left CARYN appears normal.  · Increased indices suggest calcification         Venous Duplex 10/18/17   Interpretation Summary   · Appears negative for DVT of the lower extremities  · Pulsatile venous flow         STRESS TEST: 10/3/17  Interpretation Summary   · Nuclear Study Description: A 2-day rest/stress protocol myocardial perfusion imaging study was performed  · Nuclear Perfusion Images: Overall image quality is excellent.  · Stress ECG: Stress ECG rhythm of sinus tachycardia noted. Normal ECG with no significant stress induced changes noted.  · Stress Description: A pharmacological stress test was performed using regadenoson without low-level exercise.  · Nuclear Perfusion Findings Study Impression: Myocardial perfusion imaging indicates a normal myocardial perfusion study with no evidence of ischemia  · Ventricle Size / Description: Left ventricular ejection fraction is normal (Calculated EF =  70%).  · Impressions are consistent with a low risk study.           Lab Results   Component Value Date    GLUCOSE 98 05/29/2019    BUN 16 05/29/2019    CREATININE 0.90 05/29/2019    EGFRIFNONA 64 05/29/2019    BCR 17.8 05/29/2019    K 4.0 05/29/2019    CO2 23.1 05/29/2019    CALCIUM 9.2 05/29/2019    ALBUMIN 4.40 05/29/2019    AST 64 (H) 05/29/2019    ALT 39 (H) 05/29/2019     Lab Results   Component Value Date    WBC 4.14 05/29/2019    WBC 4.14 05/29/2019    HGB 13.9 05/29/2019    HCT 43.8 05/29/2019    MCV 81.7 05/29/2019    PLT 64 (L) 05/29/2019      Ref. Range 9/20/2017 11:24   proBNP Latest Ref Range: 0.0 - 900.0 pg/mL 129.0       The following portions of the patient's history were reviewed and updated as appropriate: allergies, current medications, past family history, past medical history, past social history, past surgical history and problem list.     Assessment/Plan:   Weight continues to stay down.  Edema in ankles secondary to arthritis  Not wearing CPAP. Insomnia.  Will begin to reduce dosage of diuretic. Hopefully to improve chronic constipation. We spoke today about the importance of 64oz of fluid daily.      Diagnosis Plan   1. Swelling of both lower extremities  Improved     Demadex 10mg daily. Wants to try something other than Chlorthalidone.   Does not want to come off of diuretic.    Aldactone 25mg     Pain with compression stockings.        2. Diastolic dysfunction , grade III BNP negative. Blood pressure controlled. BP still stable without Norvasc.  Consideration for HFpEF, but negative BNP. Her edema now looks to be from joint effusions.     Unable to to complete 6MWT due to pain in feet.        3. Obesity, BMI 35 Patient's Body mass index is 35.84 kg/m². BMI is above normal parameters. Recommendations include: pharmacological intervention   - Metformin 500mg BID         No CHF. Saw Dr. Espitia regarding RV dilation. AGUSTINA was without anomalies. Secondary to CHAI.     Follow up in 6 months  for HTN monitoring.

## 2019-07-30 ENCOUNTER — OFFICE VISIT (OUTPATIENT)
Dept: GASTROENTEROLOGY | Facility: CLINIC | Age: 59
End: 2019-07-30

## 2019-07-30 VITALS
HEIGHT: 64 IN | WEIGHT: 213 LBS | DIASTOLIC BLOOD PRESSURE: 82 MMHG | SYSTOLIC BLOOD PRESSURE: 134 MMHG | HEART RATE: 84 BPM | BODY MASS INDEX: 36.37 KG/M2

## 2019-07-30 DIAGNOSIS — K74.60 CIRRHOSIS OF LIVER WITHOUT ASCITES, UNSPECIFIED HEPATIC CIRRHOSIS TYPE (HCC): Primary | ICD-10-CM

## 2019-07-30 DIAGNOSIS — K75.81 NASH (NONALCOHOLIC STEATOHEPATITIS): ICD-10-CM

## 2019-07-30 DIAGNOSIS — R10.84 GENERALIZED ABDOMINAL PAIN: ICD-10-CM

## 2019-07-30 DIAGNOSIS — K59.04 CHRONIC IDIOPATHIC CONSTIPATION: ICD-10-CM

## 2019-07-30 PROCEDURE — 99213 OFFICE O/P EST LOW 20 MIN: CPT | Performed by: PHYSICIAN ASSISTANT

## 2019-07-30 NOTE — PROGRESS NOTES
Chief Complaint   Patient presents with   • Constipation   • Abdominal Pain   • Cirrhosis   • Musa       ENDO PROCEDURE ORDERED:    Subjective    Amira Shannon is a 58 y.o. female. she is here today for follow-up.    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  F4/S2/N2.  Last seen 06/04/2019.  I had given her a trial of Motegrity 2 mg daily for chronic constipation.  She states it initially seemed to work, now she has to rock and strain and has severe cramping.  She has tried MiraLax.  She has had to go to the ER.  She has even tried taking the Motegrity twice a day with MiraLax and it did not help her bowel movements.  She claims she has drank Magnesium Citrate without improvement.  She thinks she has tried all the prescriptive medications without much improvement.  Weight is up 2 pounds since last visit.  She has had a number of bowel blockages she states with hospitalizations and resections.  She complains of 6 out of 10 lower abdominal pain.  She is on Prilosec 40 mg daily.  Denied nausea, vomiting or dysphagia.  Last EGD/colonoscopy 10/17/2018 showed gastritis and hemorrhoids.      Patient had an ultrasound at [facility] showing fatty liver on 05/01/2019.  She had an acute abdominal series on 06/08/2019 that showed air fluid levels of the small bowel and ascending colon.  She had drank a bottle of Magnesium Citrate before and after this and tried to have a bowel movement.  She did not appear to have any significantly dilated intestine on that study.  She had gone back to the emergency room with constipation on 06/19/2019.  A KUB showed mild degeneration of the spine.  She did not appear to have a significant amount of stool, there were a few areas with increased stool however.  She had a negative urinalysis.      ASSESSMENT AND PLAN:  Patient with questionable functional constipation.  She has not had a lot of  "stool retention in her radiographic studies.  I did suggest a sitz marker study to try to look at her transit. I am suspicious she may have a small-bowel abnormality given her sensation she needs to move her bowels, and may need to consider MRE or CTE to further evaluate her intestines.  She does not appear to have a blockage, but maybe could have some adhesive disease related to her previous small-bowel obstruction.  I suggested she try some probiotics to see if these would help.  Will give a consideration to a trial on Xifaxan.  She will be due for hepatoma screening in November for her cirrhosis.  We will plan followup after the above, further pending clinical course and the results of the above.         The following portions of the patient's history were reviewed and updated as appropriate:   Past Medical History:   Diagnosis Date   • Acid reflux    • Altered bowel function    • Arthropathy of lumbar facet joint    • Bleeding disorder (CMS/HCC)    • C. difficile diarrhea 2015   • Constipation    • Corns and callus    • Depression    • Disease related peripheral neuropathy    • Epigastric pain    • Fatty liver    • Hammer toe    • Headache    • Hiatal hernia    • History of transfusion    • Hyperlipidemia    • Knee pain    • Localized, primary osteoarthritis of the ankle and foot     Localized, primary osteoarthritis of the ankle and/or foot   • Mendoza's metatarsalgia     Mendoza's metatarsalgia - 2nd interspace on right   • Nausea and vomiting    • Neuralgia and neuritis     Neuralgia, neuritis, and radiculitis, unspecified   • Neuropathy    • Obstructive sleep apnea     Obstructive sleep apnea (adult) (pediatric)    • CHAI on CPAP     \"C-Pap at night  (unconfirmed)\"   • Osteoarthritis    • Pain in foot     Pain in unspecified foot - sees a podiatrist   • Pain in joint, ankle and foot     Joint pain in ankle and foot      • Pain radiating to back     Pain radiating to lumbar region of back   • Plantar fasciitis  "   • PONV (postoperative nausea and vomiting)    • Restless leg syndrome    • Secondary hypertension     Secondary hypertension, unspecified   • Sinusitis    • Sleep apnea    • Tongue anomaly     lesion     Past Surgical History:   Procedure Laterality Date   • CARPAL TUNNEL RELEASE Left 2018    Procedure: CARPAL TUNNEL RELEASE - left;  Surgeon: Justus Lewis MD;  Location: [facility] OR;  Service: Orthopedics   • CARPAL TUNNEL RELEASE Right 2019    Procedure: carpal tunnel release right hand with local/monitored anesthesia care;  Surgeon: Justus Lewis MD;  Location: [facility] OR;  Service: Orthopedics   •  SECTION     • CHOLECYSTECTOMY     • COLONOSCOPY  2013   • COLONOSCOPY N/A 10/17/2018    Procedure: COLONOSCOPY;  Surgeon: Russell Del Rio MD;  Location: [facility] ENDOSCOPY;  Service: Gastroenterology   • DIRECT LARYNGOSCOPY, ESOPHAGOSCOPY, BRONCHOSCOPY N/A 2017    Procedure: DIRECT LARYNGOSCOPY AND;  Surgeon: Live Bolton MD;  Location: [facility] OR;  Service:    • ENDOSCOPY  2013    Colon endoscopy 82108 (1) - Internal & external hemorrhoids found. Stool found.   • ENDOSCOPY  2013    EGD w/ tube 10594 (1) - Normal esophagus. Gastritis in stomach. Biopsy taken. Normal dudoenum. Biopsy taken.   • ENDOSCOPY N/A 10/17/2018    Procedure: ESOPHAGOGASTRODUODENOSCOPY--eval varices;  Surgeon: Russell Del Rio MD;  Location: [facility] ENDOSCOPY;  Service: Gastroenterology   • FOOT SURGERY  2013    Foot/toes surgery procedure (1) - Arthroplasty of toes 4 and 5 of right foot.   • HERNIA REPAIR     • HERNIA REPAIR      hital   • HYSTERECTOMY     • LIVER BIOPSY     • NERVE BLOCK  2016    Injection for nerve block (1) - Lumbar medial branch block.   • OTHER SURGICAL HISTORY  2012    Inj(s) Tend-Sheath, Ligament, Single  (1) - [facility]    • OTHER SURGICAL HISTORY  2013    Small Joint Injection/Aspiration  (2) - [facility] NICKERSON  ([facility])    • OTHER SURGICAL HISTORY      bowel obstruction x2   • OTHER SURGICAL HISTORY      gland removed from neck   • SUBLINGUAL SALIVARY CYST EXCISION N/A 2017    Procedure: EXCISION OF LEFT  TONGUE LESION WITH CLOSURE;  Surgeon: Live Bolton MD;  Location: [facility];  Service:    • TUBAL ABDOMINAL LIGATION     • UPPER GASTROINTESTINAL ENDOSCOPY  2013   • UPPER GASTROINTESTINAL ENDOSCOPY  10/17/2018     Family History   Problem Relation Age of Onset   • Cancer Other    • Diabetes Other    • Heart disease Other    • Hypertension Mother    • Cancer Mother    • Diabetes Mother    • Hypertension Father    • Heart attack Father    • Cancer Father    • Heart disease Father    • Thyroid disease Maternal Aunt      OB History     No data available        Allergies   Allergen Reactions   • Other      Pt states that taking steroids either in pill or injection form make her have blisters in her mouth and she feels like she is on fire on the inside/ has hx of c diff and possible MRSA     • Corticosteroids Rash   • Kenalog  [Triamcinolone Acetonide] Rash   • Sulfa Antibiotics Rash     Sulfa (Sulfonamide Antibiotics)     Social History     Socioeconomic History   • Marital status:      Spouse name: Not on file   • Number of children: Not on file   • Years of education: Not on file   • Highest education level: Not on file   Tobacco Use   • Smoking status: Former Smoker     Packs/day: 2.00     Years: 15.00     Pack years: 30.00     Types: Cigarettes     Last attempt to quit: 2000     Years since quittin.5   • Smokeless tobacco: Never Used   Substance and Sexual Activity   • Alcohol use: No   • Drug use: No   • Sexual activity: Defer     Comment: Marital Status:      Current Medications:  Prior to Admission medications    Medication Sig Start Date End Date Taking? Authorizing Provider   cetirizine (zyrTEC) 10 MG tablet Take 1 tablet by mouth Daily As Needed for Allergies.   Yes  "Live Bolton MD   cyclobenzaprine (FLEXERIL) 10 MG tablet Take 10 mg by mouth 3 (Three) Times a Day As Needed for Muscle Spasms.   Yes Promise Tovar MD   DULoxetine (CYMBALTA) 30 MG capsule Take 30 mg by mouth Daily.   Yes Promise Tovar MD   gabapentin (NEURONTIN) 800 MG tablet Take 800 mg by mouth 4 (Four) Times a Day.   Yes Promise Tovar MD   Melatonin 10 MG capsule Take  by mouth Every Night.   Yes Promise Tovar MD   metFORMIN (GLUCOPHAGE) 500 MG tablet Take 1 tablet by mouth 2 (Two) Times a Day With Meals. 7/8/19  Yes Maureen Cardoso APRN   nystatin (MYCOSTATIN) 856066 UNIT/GM powder Apply  topically to the appropriate area as directed 4 (Four) Times a Day As Needed.   Yes Promise Tovar MD   omeprazole (priLOSEC) 40 MG capsule Take 40 mg by mouth Daily.   Yes Promise Tovar MD   oxyCODONE (oxyCONTIN) 10 MG 12 hr tablet Take 10 mg by mouth 4 (Four) Times a Day As Needed.   Yes Promise Tovar MD   polyethylene glycol (MIRALAX) packet Take 17 g by mouth Daily As Needed.   Yes Promise Tovar MD   Prucalopride Succinate (MOTEGRITY) 2 MG tablet Take 2 mg by mouth Daily. 6/4/19  Yes Blaine Perales PA-C   spironolactone (ALDACTONE) 25 MG tablet TAKE 1 TABLET BY MOUTH ONCE DAILY 6/11/19  Yes Maureen Cardoso APRN   torsemide (DEMADEX) 10 MG tablet Take 1 tablet by mouth Daily. 7/8/19  Yes Maureen Cardoso APRN     Review of Systems  Review of Systems       Objective    /82 (BP Location: Left arm)   Pulse 84   Ht 162.6 cm (64\")   Wt 96.6 kg (213 lb)   LMP  (LMP Unknown)   BMI 36.56 kg/m²   Physical Exam   Constitutional: She is oriented to person, place, and time. She appears well-developed and well-nourished. No distress.   HENT:   Head: Normocephalic and atraumatic.   Eyes: EOM are normal. Pupils are equal, round, and reactive to light.   Neck: Normal range of motion.   Cardiovascular: Normal rate, regular rhythm and normal heart " sounds.   Pulmonary/Chest: Effort normal and breath sounds normal.   Abdominal: Soft. Bowel sounds are normal. She exhibits no shifting dullness, no distension, no abdominal bruit, no ascites and no mass. There is no hepatosplenomegaly. There is tenderness. There is no rigidity, no rebound, no guarding and no CVA tenderness. No hernia. Hernia confirmed negative in the ventral area.   Obese, mild diffuse   Musculoskeletal: Normal range of motion.   Neurological: She is alert and oriented to person, place, and time.   Skin: Skin is warm and dry.   Psychiatric: She has a normal mood and affect. Her behavior is normal. Judgment and thought content normal.   Nursing note and vitals reviewed.    Assessment/Plan      1. Cirrhosis of liver without ascites, unspecified hepatic cirrhosis type (CMS/HCC)    2. MUSA (nonalcoholic steatohepatitis)    3. Generalized abdominal pain    4. Chronic idiopathic constipation    .   Amira was seen today for constipation, abdominal pain, cirrhosis and musa.    Diagnoses and all orders for this visit:    Cirrhosis of liver without ascites, unspecified hepatic cirrhosis type (CMS/HCC)    MUSA (nonalcoholic steatohepatitis)    Generalized abdominal pain  -     XR Abdomen KUB; Standing    Chronic idiopathic constipation  -     XR Abdomen KUB; Standing        Orders placed during this encounter include:  Orders Placed This Encounter   Procedures   • XR Abdomen KUB     Standing Status:   Standing     Number of Occurrences:   2     Standing Expiration Date:   9/20/2019     Scheduling Instructions:      Please note number and location of sitzmarkers     Order Specific Question:   Reason for Exam:     Answer:   constipation, sitzmarker study       Medications prescribed:  No orders of the defined types were placed in this encounter.      Requested Prescriptions      No prescriptions requested or ordered in this encounter       Review and/or summary of lab tests, radiology, procedures, medications.  Review and summary of old records and obtaining of history. The risks and benefits of my recommendations, as well as other treatment options were discussed with the patient today. Questions were answered.    Follow-up: Return in about 1 month (around 8/30/2019), or if symptoms worsen or fail to improve.     * Surgery not found *      This document has been electronically signed by Blaine Perales PA-C on July 31, 2019 5:32 PM      Results for orders placed or performed during the hospital encounter of 06/09/19   Urinalysis With Microscopic If Indicated (No Culture) - Urine, Clean Catch   Result Value Ref Range    Color, UA Yellow Yellow, Straw, Dark Yellow, Mikala    Appearance, UA Clear Clear    pH, UA 8.0 5.0 - 9.0    Specific Gravity, UA 1.006 1.003 - 1.030    Glucose, UA Negative Negative    Ketones, UA Negative Negative    Bilirubin, UA Negative Negative    Blood, UA Negative Negative    Protein, UA Negative Negative    Leuk Esterase, UA Negative Negative    Nitrite, UA Negative Negative    Urobilinogen, UA 1.0 E.U./dL 0.2 - 1.0 E.U./dL   Results for orders placed or performed during the hospital encounter of 05/21/19   Gold Top - SST   Result Value Ref Range    Extra Tube Hold for add-ons.    Scan Slide   Result Value Ref Range    RBC Morphology Normal Normal    WBC Morphology Normal Normal    Platelet Estimate Decreased Normal   Urinalysis With Microscopic If Indicated (No Culture) - Urine, Clean Catch   Result Value Ref Range    Color, UA Yellow Yellow, Straw, Dark Yellow, Mikala    Appearance, UA Clear Clear    pH, UA 7.5 5.0 - 9.0    Specific Gravity, UA 1.002 (L) 1.003 - 1.030    Glucose, UA Negative Negative    Ketones, UA Negative Negative    Bilirubin, UA Negative Negative    Blood, UA Negative Negative    Protein, UA Negative Negative    Leuk Esterase, UA Negative Negative    Nitrite, UA Negative Negative    Urobilinogen, UA 1.0 E.U./dL 0.2 - 1.0 E.U./dL   CBC Auto Differential   Result Value Ref  Range    WBC 4.10 3.40 - 10.80 10*3/mm3    RBC 5.40 (H) 3.77 - 5.28 10*6/mm3    Hemoglobin 14.0 12.0 - 15.9 g/dL    Hematocrit 43.2 34.0 - 46.6 %    MCV 80.0 79.0 - 97.0 fL    MCH 25.9 (L) 26.6 - 33.0 pg    MCHC 32.4 31.5 - 35.7 g/dL    RDW 15.3 12.3 - 15.4 %    RDW-SD 43.8 37.0 - 54.0 fl    MPV 11.7 6.0 - 12.0 fL    Platelets 81 (L) 140 - 450 10*3/mm3    Neutrophil % 65.7 42.7 - 76.0 %    Lymphocyte % 26.8 19.6 - 45.3 %    Monocyte % 4.9 (L) 5.0 - 12.0 %    Eosinophil % 2.2 0.3 - 6.2 %    Basophil % 0.2 0.0 - 1.5 %    Immature Grans % 0.2 0.0 - 0.5 %    Neutrophils, Absolute 2.69 1.70 - 7.00 10*3/mm3    Lymphocytes, Absolute 1.10 0.70 - 3.10 10*3/mm3    Monocytes, Absolute 0.20 0.10 - 0.90 10*3/mm3    Eosinophils, Absolute 0.09 0.00 - 0.40 10*3/mm3    Basophils, Absolute 0.01 0.00 - 0.20 10*3/mm3    Immature Grans, Absolute 0.01 0.00 - 0.05 10*3/mm3    nRBC 0.0 0.0 - 0.2 /100 WBC   Light Blue Top   Result Value Ref Range    Extra Tube hold for add-on    Lipase   Result Value Ref Range    Lipase 25 13 - 60 U/L   Lactic Acid, Plasma   Result Value Ref Range    Lactate 1.4 0.5 - 2.0 mmol/L   Comprehensive Metabolic Panel   Result Value Ref Range    Glucose 106 (H) 65 - 99 mg/dL    BUN 10 6 - 20 mg/dL    Creatinine 0.94 0.57 - 1.00 mg/dL    Sodium 143 136 - 145 mmol/L    Potassium 4.1 3.5 - 5.2 mmol/L    Chloride 104 98 - 107 mmol/L    CO2 28.0 22.0 - 29.0 mmol/L    Calcium 10.0 8.6 - 10.5 mg/dL    Total Protein 7.8 6.0 - 8.5 g/dL    Albumin 4.10 3.50 - 5.20 g/dL    ALT (SGPT) 29 1 - 33 U/L    AST (SGOT) 47 (H) 1 - 32 U/L    Alkaline Phosphatase 108 39 - 117 U/L    Total Bilirubin 0.6 0.2 - 1.2 mg/dL    eGFR Non African Amer 61 >60 mL/min/1.73    Globulin 3.7 gm/dL    A/G Ratio 1.1 g/dL    BUN/Creatinine Ratio 10.6 7.0 - 25.0    Anion Gap 11.0 mmol/L   Results for orders placed or performed in visit on 04/23/19   MUSA Fibrosure   Result Value Ref Range    Fibrosis Score (References) 0.82 (H) 0.00 - 0.21    Fibrosis  Stage (Reference) Comment     Steatosis Score (Reference) 0.57 (H) 0.00 - 0.30    Steatosis Grade (Reference) Comment     MUSA Score (Reference) 0.75 (H) 0.25    Musa Grade (Reference) Comment     Height: (Reference) 64 in    Weight: (Reference) 212 LBS    Alpha 2-Macroglobulins, Qn 428 (H) 110 - 276 mg/dL    Haptoglobin 46 34 - 200 mg/dL    Apolipoprotein A-1 119 116 - 209 mg/dL    Total Bilirubin 0.6 0.0 - 1.2 mg/dL    GGT 52 0 - 60 IU/L    ALT (SGPT) 42 (H) 0 - 40 IU/L    AST (SGOT) P5P (Reference) 70 (H) 0 - 40 IU/L    Cholesterol, Total (Reference) 132 100 - 199 mg/dL    Glucose, Serum (Reference) 103 (H) 65 - 99 mg/dL    Triglycerides 114 0 - 149 mg/dL    Interpretations: (Reference) Comment     Fibrosis Scoring: Comment     Steatosis Grading (Reference) Comment     Musa Scoring (Reference) Comment     Limitations: (Reference) Comment     Comment (Reference) Comment    CBC Auto Differential   Result Value Ref Range    WBC 4.14 3.40 - 10.80 10*3/mm3    RBC 5.36 (H) 3.77 - 5.28 10*6/mm3    Hemoglobin 13.9 12.0 - 15.9 g/dL    Hematocrit 43.8 34.0 - 46.6 %    MCV 81.7 79.0 - 97.0 fL    MCH 25.9 (L) 26.6 - 33.0 pg    MCHC 31.7 31.5 - 35.7 g/dL    RDW 15.5 (H) 12.3 - 15.4 %    RDW-SD 46.1 37.0 - 54.0 fl    Platelets 64 (L) 140 - 450 10*3/mm3    Neutrophil % 59.0 42.7 - 76.0 %    Lymphocyte % 31.6 19.6 - 45.3 %    Monocyte % 5.8 5.0 - 12.0 %    Eosinophil % 2.9 0.3 - 6.2 %    Basophil % 0.5 0.0 - 1.5 %    Immature Grans % 0.2 0.0 - 0.5 %    Neutrophils, Absolute 2.44 1.70 - 7.00 10*3/mm3    Lymphocytes, Absolute 1.31 0.70 - 3.10 10*3/mm3    Monocytes, Absolute 0.24 0.10 - 0.90 10*3/mm3    Eosinophils, Absolute 0.12 0.00 - 0.40 10*3/mm3    Basophils, Absolute 0.02 0.00 - 0.20 10*3/mm3    Immature Grans, Absolute 0.01 0.00 - 0.05 10*3/mm3    nRBC 0.0 0.0 - 0.2 /100 WBC    Differential Type      WBC 4.14 3.40 - 10.80 10*3/mm3     *Note: Due to a large number of results and/or encounters for the requested time period,  some results have not been displayed. A complete set of results can be found in Results Review.       Some portions of this note have been dictated using voice recognition software and may contain errors and/or omissions.

## 2019-07-30 NOTE — PATIENT INSTRUCTIONS

## 2019-08-02 ENCOUNTER — OFFICE VISIT (OUTPATIENT)
Dept: PODIATRY | Facility: CLINIC | Age: 59
End: 2019-08-02

## 2019-08-02 ENCOUNTER — RESULTS ENCOUNTER (OUTPATIENT)
Dept: GASTROENTEROLOGY | Facility: CLINIC | Age: 59
End: 2019-08-02

## 2019-08-02 VITALS — BODY MASS INDEX: 36.37 KG/M2 | HEIGHT: 64 IN | WEIGHT: 213 LBS | OXYGEN SATURATION: 98 % | HEART RATE: 111 BPM

## 2019-08-02 DIAGNOSIS — M25.571 CHRONIC PAIN OF BOTH ANKLES: ICD-10-CM

## 2019-08-02 DIAGNOSIS — M19.071 PRIMARY OSTEOARTHRITIS OF BOTH ANKLES: ICD-10-CM

## 2019-08-02 DIAGNOSIS — M19.072 PRIMARY OSTEOARTHRITIS OF BOTH ANKLES: ICD-10-CM

## 2019-08-02 DIAGNOSIS — M79.673 CHRONIC FOOT PAIN, UNSPECIFIED LATERALITY: Primary | ICD-10-CM

## 2019-08-02 DIAGNOSIS — M25.572 CHRONIC PAIN OF BOTH ANKLES: ICD-10-CM

## 2019-08-02 DIAGNOSIS — G89.29 CHRONIC FOOT PAIN, UNSPECIFIED LATERALITY: Primary | ICD-10-CM

## 2019-08-02 DIAGNOSIS — R10.84 GENERALIZED ABDOMINAL PAIN: ICD-10-CM

## 2019-08-02 DIAGNOSIS — K59.04 CHRONIC IDIOPATHIC CONSTIPATION: ICD-10-CM

## 2019-08-02 DIAGNOSIS — G89.29 CHRONIC PAIN OF BOTH ANKLES: ICD-10-CM

## 2019-08-02 PROCEDURE — 99213 OFFICE O/P EST LOW 20 MIN: CPT | Performed by: PODIATRIST

## 2019-08-02 NOTE — PROGRESS NOTES
"Amira Starla Alemankins  1960  58 y.o. female     Patient presents today with a complaint of continued bilateral foot/ankle pain.    08/02/2019     Chief Complaint   Patient presents with   • Left Foot - Pain   • Right Foot - Pain       History of Present Illness    Patient presents to clinic with chief complaint of severe bilateral foot and ankle pain.  She rates her pain as a 7 out of 10.  Pain is constant and worse with weightbearing.  She denies any injuries or trauma.  She does see pain management.  No other complaints.      Past Medical History:   Diagnosis Date   • Acid reflux    • Altered bowel function    • Arthropathy of lumbar facet joint    • Bleeding disorder (CMS/HCC)    • C. difficile diarrhea 2015   • Constipation    • Corns and callus    • Depression    • Disease related peripheral neuropathy    • Epigastric pain    • Fatty liver    • Hammer toe    • Headache    • Hiatal hernia    • History of transfusion    • Hyperlipidemia    • Knee pain    • Localized, primary osteoarthritis of the ankle and foot     Localized, primary osteoarthritis of the ankle and/or foot   • Mendoza's metatarsalgia     Mendoza's metatarsalgia - 2nd interspace on right   • Nausea and vomiting    • Neuralgia and neuritis     Neuralgia, neuritis, and radiculitis, unspecified   • Neuropathy    • Obstructive sleep apnea     Obstructive sleep apnea (adult) (pediatric)    • CHAI on CPAP     \"C-Pap at night  (unconfirmed)\"   • Osteoarthritis    • Pain in foot     Pain in unspecified foot - sees a podiatrist   • Pain in joint, ankle and foot     Joint pain in ankle and foot      • Pain radiating to back     Pain radiating to lumbar region of back   • Plantar fasciitis    • PONV (postoperative nausea and vomiting)    • Restless leg syndrome    • Secondary hypertension     Secondary hypertension, unspecified   • Sinusitis    • Sleep apnea    • Tongue anomaly     lesion         Past Surgical History:   Procedure Laterality Date   • CARPAL " TUNNEL RELEASE Left 2018    Procedure: CARPAL TUNNEL RELEASE - left;  Surgeon: Justus Lewis MD;  Location: [facility] OR;  Service: Orthopedics   • CARPAL TUNNEL RELEASE Right 2019    Procedure: carpal tunnel release right hand with local/monitored anesthesia care;  Surgeon: Justus Lewis MD;  Location: [facility] OR;  Service: Orthopedics   •  SECTION     • CHOLECYSTECTOMY     • COLONOSCOPY  2013   • COLONOSCOPY N/A 10/17/2018    Procedure: COLONOSCOPY;  Surgeon: Russell Del Rio MD;  Location: [facility] ENDOSCOPY;  Service: Gastroenterology   • DIRECT LARYNGOSCOPY, ESOPHAGOSCOPY, BRONCHOSCOPY N/A 2017    Procedure: DIRECT LARYNGOSCOPY AND;  Surgeon: Live Bolton MD;  Location: [facility] OR;  Service:    • ENDOSCOPY  2013    Colon endoscopy 54575 (1) - Internal & external hemorrhoids found. Stool found.   • ENDOSCOPY  2013    EGD w/ tube 03210 (1) - Normal esophagus. Gastritis in stomach. Biopsy taken. Normal dudoenum. Biopsy taken.   • ENDOSCOPY N/A 10/17/2018    Procedure: ESOPHAGOGASTRODUODENOSCOPY--eval varices;  Surgeon: Russell Del Rio MD;  Location: [facility] ENDOSCOPY;  Service: Gastroenterology   • FOOT SURGERY  2013    Foot/toes surgery procedure (1) - Arthroplasty of toes 4 and 5 of right foot.   • HERNIA REPAIR     • HERNIA REPAIR      hital   • HYSTERECTOMY     • LIVER BIOPSY     • NERVE BLOCK  2016    Injection for nerve block (1) - Lumbar medial branch block.   • OTHER SURGICAL HISTORY  2012    Inj(s) Tend-Sheath, Ligament, Single  (1) - [facility]    • OTHER SURGICAL HISTORY  2013    Small Joint Injection/Aspiration  (2) - [facility]ICK[facility] ([facility])    • OTHER SURGICAL HISTORY  2011    bowel obstruction x2   • OTHER SURGICAL HISTORY      gland removed from neck   • SUBLINGUAL SALIVARY CYST EXCISION N/A 2017    Procedure: EXCISION OF LEFT  TONGUE LESION WITH CLOSURE;  Surgeon: Live Bolton,  MD;  Location: [facility];  Service:    • TUBAL ABDOMINAL LIGATION     • UPPER GASTROINTESTINAL ENDOSCOPY  2013   • UPPER GASTROINTESTINAL ENDOSCOPY  10/17/2018         Family History   Problem Relation Age of Onset   • Cancer Other    • Diabetes Other    • Heart disease Other    • Hypertension Mother    • Cancer Mother    • Diabetes Mother    • Hypertension Father    • Heart attack Father    • Cancer Father    • Heart disease Father    • Thyroid disease Maternal Aunt        Allergies   Allergen Reactions   • Other      Pt states that taking steroids either in pill or injection form make her have blisters in her mouth and she feels like she is on fire on the inside/ has hx of c diff and possible MRSA     • Corticosteroids Rash   • Kenalog  [Triamcinolone Acetonide] Rash   • Sulfa Antibiotics Rash     Sulfa (Sulfonamide Antibiotics)       Social History     Socioeconomic History   • Marital status:      Spouse name: Not on file   • Number of children: Not on file   • Years of education: Not on file   • Highest education level: Not on file   Tobacco Use   • Smoking status: Former Smoker     Packs/day: 2.00     Years: 15.00     Pack years: 30.00     Types: Cigarettes     Last attempt to quit: 2000     Years since quittin.5   • Smokeless tobacco: Never Used   Substance and Sexual Activity   • Alcohol use: No   • Drug use: No   • Sexual activity: Defer     Comment: Marital Status:          Current Outpatient Medications   Medication Sig Dispense Refill   • cetirizine (zyrTEC) 10 MG tablet Take 1 tablet by mouth Daily As Needed for Allergies. 30 tablet 11   • cyclobenzaprine (FLEXERIL) 10 MG tablet Take 10 mg by mouth 3 (Three) Times a Day As Needed for Muscle Spasms.     • DULoxetine (CYMBALTA) 30 MG capsule Take 30 mg by mouth Daily.     • gabapentin (NEURONTIN) 800 MG tablet Take 800 mg by mouth 4 (Four) Times a Day.     • Melatonin 10 MG capsule Take  by mouth Every Night.     • metFORMIN  "(GLUCOPHAGE) 500 MG tablet Take 1 tablet by mouth 2 (Two) Times a Day With Meals. 60 tablet 6   • nystatin (MYCOSTATIN) 369958 UNIT/GM powder Apply  topically to the appropriate area as directed 4 (Four) Times a Day As Needed.     • omeprazole (priLOSEC) 40 MG capsule Take 40 mg by mouth Daily.     • oxyCODONE (oxyCONTIN) 10 MG 12 hr tablet Take 10 mg by mouth 4 (Four) Times a Day As Needed.     • polyethylene glycol (MIRALAX) packet Take 17 g by mouth Daily As Needed.     • Prucalopride Succinate (MOTEGRITY) 2 MG tablet Take 2 mg by mouth Daily. 30 tablet 2   • spironolactone (ALDACTONE) 25 MG tablet TAKE 1 TABLET BY MOUTH ONCE DAILY 30 tablet 5   • torsemide (DEMADEX) 10 MG tablet Take 1 tablet by mouth Daily. 30 tablet 6     No current facility-administered medications for this visit.          OBJECTIVE    Pulse 111   Ht 162.6 cm (64\")   Wt 96.6 kg (213 lb)   LMP  (LMP Unknown)   SpO2 98%   BMI 36.56 kg/m²       Review of Systems   Constitutional: Positive for activity change and fatigue.   HENT: Negative.    Eyes: Negative.    Respiratory: Positive for shortness of breath.    Cardiovascular: Positive for leg swelling.   Gastrointestinal: Positive for abdominal pain and constipation.   Endocrine: Negative.    Genitourinary: Positive for enuresis.   Musculoskeletal: Positive for back pain and joint swelling.        Bilateral foot/ankle pain  Joint pain   Skin: Negative.    Neurological: Negative.    Psychiatric/Behavioral: Negative.          Physical Exam   Constitutional: She is oriented to person, place, and time. She appears well-developed and well-nourished. No distress.   HENT:   Head: Normocephalic and atraumatic.   Nose: Nose normal.   Eyes: Conjunctivae and EOM are normal. Pupils are equal, round, and reactive to light.   Pulmonary/Chest: Effort normal. No respiratory distress. She has no wheezes.   Neurological: She is alert and oriented to person, place, and time. She displays normal reflexes. "   Skin: Skin is warm and dry. Capillary refill takes less than 2 seconds.   Psychiatric: She has a normal mood and affect. Her behavior is normal.   Vitals reviewed.    Lower Extremity:     Cardiovascular:    DP/PT pulses palpable    CFT brisk  to all digits  Skin temp is warm to warm from proximal tibia to distal digits  Pedal hair growth decreased.   No erythema noted   Edema noted to bilateral LEs    Musculoskeletal:  Muscle strength is 4/5 for all muscle groups tested   Diffuse pain on palpation noted    Ankle joint range of motion is decreased with pain bilateral  Subtalar joint range of motion is decreased with pain bilateral  Midtarsal joint range of motion is decreased with pain bilateral    Dermatological:   Skin is warm, dry and intact    Webspaces 1-4 bilateral are clean, dry and intact.   No subcutaneous nodules or masses noted      Neurological:   Protective sensation decreased  Sensation intact to light touch           Procedures          ASSESSMENT AND PLAN    Amira was seen today for pain and pain.    Diagnoses and all orders for this visit:    Chronic foot pain, unspecified laterality    Chronic pain of both ankles    Primary osteoarthritis of both ankles      - Comprehensive foot and ankle exam performed.  Patient has significant degenerative changes noted throughout the hindfoot and ankle.  Conservative and surgical treatment options discussed in detail with the patient.  Dispensed lace up ankle braces.  If patient obtains some relief from lace up ankle braces will consider custom AFOs.  Recommended patient wear compression stockings for edema control.  - All questions were answered to the patients satisfaction.  - Recheck            This document has been electronically signed by Daren Marie DPM on August 2, 2019 12:00 PM     8/2/2019  12:00 PM

## 2019-08-05 ENCOUNTER — OFFICE VISIT (OUTPATIENT)
Dept: GASTROENTEROLOGY | Facility: CLINIC | Age: 59
End: 2019-08-05

## 2019-08-05 DIAGNOSIS — K59.00 CONSTIPATION, UNSPECIFIED CONSTIPATION TYPE: Primary | ICD-10-CM

## 2019-08-05 PROCEDURE — 99211 OFF/OP EST MAY X REQ PHY/QHP: CPT | Performed by: PHYSICIAN ASSISTANT

## 2019-08-05 NOTE — PROGRESS NOTES
Patient came into office today for a sitz marker study only. Patient swallowed the sitz marker capsule without difficulty. Patient understands that she may not take any type of laxatives for the next 5 days. And that she must have an x-ray done on Wednesday August 7 and Friday August 9. Patient voiced understanding.

## 2019-08-06 ENCOUNTER — RESULTS ENCOUNTER (OUTPATIENT)
Dept: GASTROENTEROLOGY | Facility: CLINIC | Age: 59
End: 2019-08-06

## 2019-08-06 DIAGNOSIS — K59.04 CHRONIC IDIOPATHIC CONSTIPATION: ICD-10-CM

## 2019-08-06 DIAGNOSIS — R10.84 GENERALIZED ABDOMINAL PAIN: ICD-10-CM

## 2019-08-12 DIAGNOSIS — K59.04 CHRONIC IDIOPATHIC CONSTIPATION: Primary | ICD-10-CM

## 2019-08-12 DIAGNOSIS — R10.84 ABDOMINAL PAIN, GENERALIZED: ICD-10-CM

## 2019-08-12 NOTE — PROGRESS NOTES
"Amira Shannon is a 58 y.o. female returns for     Chief Complaint   Patient presents with   • Left Knee - Follow-up, Pain   • Right Knee - Pain, Follow-up   • Results     mri done @ [facility] on 7/8/2019       HISTORY OF PRESENT ILLNESS:  58-year-old  female in the office today for follow-up evaluation of bilateral knee pain.  She is obtained her MRIs as directed and her pain continues without improvement.     CONCURRENT MEDICAL HISTORY:    The following portions of the patient's history were reviewed and updated as appropriate: allergies, current medications, past family history, past medical history, past social history, past surgical history and problem list.     ROS  No fevers or chills.  No chest pain or shortness of air.  No GI or  disturbances.    PHYSICAL EXAMINATION:       Ht 162.6 cm (64\")   Wt 95.3 kg (210 lb)   LMP  (LMP Unknown)   BMI 36.05 kg/m²     Physical Exam   Constitutional: She is oriented to person, place, and time. Vital signs are normal. She appears well-developed and well-nourished. She is cooperative.   HENT:   Head: Normocephalic and atraumatic.   Neck: Trachea normal and phonation normal.   Pulmonary/Chest: Effort normal. No respiratory distress.   Abdominal: Soft. Normal appearance. She exhibits no distension.   Musculoskeletal:        Right knee: She exhibits no effusion.        Left knee: She exhibits no effusion.   Neurological: She is alert and oriented to person, place, and time. GCS eye subscore is 4. GCS verbal subscore is 5. GCS motor subscore is 6.   Skin: Skin is warm, dry and intact. Capillary refill takes less than 2 seconds.   Psychiatric: She has a normal mood and affect. Her speech is normal and behavior is normal. Judgment and thought content normal. Cognition and memory are normal.   Vitals reviewed.      GAIT:     []  Normal  [x]  Antalgic    Assistive device: []  None  []  Walker     []  Crutches  [x]  Cane     []  Wheelchair  []  " Stretcher    Right Knee Exam     Tenderness   The patient is experiencing tenderness in the medial joint line and lateral joint line.    Range of Motion   Extension: normal   Flexion: abnormal     Other   Erythema: absent  Scars: absent  Sensation: normal  Pulse: present  Swelling: mild  Effusion: no effusion present      Left Knee Exam     Tenderness   The patient is experiencing tenderness in the lateral joint line and medial joint line.    Range of Motion   Extension: normal   Flexion: abnormal     Other   Erythema: absent  Scars: absent  Sensation: normal  Pulse: present  Swelling: mild  Effusion: no effusion present      Right Hip Exam   Right hip exam is normal.       Left Hip Exam     Tenderness   The patient is experiencing tenderness in the greater trochanter.    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 )    Acute pain of both knees  -     Ambulatory Referral to Physical Therapy (mobility evaluation for powerchair. )    Primary osteoarthritis of both knees  -     Ambulatory Referral to Physical Therapy (mobility evaluation for powerchair. )          PLAN  Reviewed the x-rays with the patient as well as MRI and recommended follow-up with Dr. Lewis to discuss further treatment options.      No Follow-up on file.    LIYAH Bridges

## 2019-08-13 ENCOUNTER — TELEPHONE (OUTPATIENT)
Dept: GASTROENTEROLOGY | Facility: CLINIC | Age: 59
End: 2019-08-13

## 2019-08-13 ENCOUNTER — OFFICE VISIT (OUTPATIENT)
Dept: GASTROENTEROLOGY | Facility: CLINIC | Age: 59
End: 2019-08-13

## 2019-08-13 ENCOUNTER — OFFICE VISIT (OUTPATIENT)
Dept: ORTHOPEDIC SURGERY | Facility: CLINIC | Age: 59
End: 2019-08-13

## 2019-08-13 VITALS — BODY MASS INDEX: 35.85 KG/M2 | HEIGHT: 64 IN | WEIGHT: 210 LBS

## 2019-08-13 VITALS
WEIGHT: 210 LBS | BODY MASS INDEX: 35.85 KG/M2 | DIASTOLIC BLOOD PRESSURE: 82 MMHG | HEIGHT: 64 IN | HEART RATE: 85 BPM | SYSTOLIC BLOOD PRESSURE: 118 MMHG

## 2019-08-13 DIAGNOSIS — M23.300 DEGENERATION OF LATERAL MENISCUS OF RIGHT KNEE: Primary | ICD-10-CM

## 2019-08-13 DIAGNOSIS — M25.561 ACUTE PAIN OF BOTH KNEES: ICD-10-CM

## 2019-08-13 DIAGNOSIS — M25.562 ACUTE PAIN OF BOTH KNEES: ICD-10-CM

## 2019-08-13 DIAGNOSIS — K75.81 NASH (NONALCOHOLIC STEATOHEPATITIS): ICD-10-CM

## 2019-08-13 DIAGNOSIS — K59.04 CHRONIC IDIOPATHIC CONSTIPATION: Primary | ICD-10-CM

## 2019-08-13 DIAGNOSIS — R10.84 ABDOMINAL PAIN, GENERALIZED: ICD-10-CM

## 2019-08-13 DIAGNOSIS — K74.60 CIRRHOSIS OF LIVER WITHOUT ASCITES, UNSPECIFIED HEPATIC CIRRHOSIS TYPE (HCC): ICD-10-CM

## 2019-08-13 DIAGNOSIS — K59.00 CONSTIPATION, UNSPECIFIED CONSTIPATION TYPE: ICD-10-CM

## 2019-08-13 DIAGNOSIS — M17.0 PRIMARY OSTEOARTHRITIS OF BOTH KNEES: ICD-10-CM

## 2019-08-13 PROCEDURE — 99213 OFFICE O/P EST LOW 20 MIN: CPT | Performed by: PHYSICIAN ASSISTANT

## 2019-08-13 PROCEDURE — 99214 OFFICE O/P EST MOD 30 MIN: CPT | Performed by: NURSE PRACTITIONER

## 2019-08-13 RX ORDER — SENNA AND DOCUSATE SODIUM 50; 8.6 MG/1; MG/1
2 TABLET, FILM COATED ORAL DAILY
Qty: 60 TABLET | Refills: 1 | Status: SHIPPED | OUTPATIENT
Start: 2019-08-13 | End: 2020-06-02

## 2019-08-13 RX ORDER — ONDANSETRON HYDROCHLORIDE 8 MG/1
8 TABLET, FILM COATED ORAL EVERY 8 HOURS PRN
Qty: 30 TABLET | Refills: 1 | Status: SHIPPED | OUTPATIENT
Start: 2019-08-13 | End: 2020-06-10 | Stop reason: SDUPTHER

## 2019-08-13 NOTE — PROGRESS NOTES
Chief Complaint   Patient presents with   • Chronic Idiopathic Constipation   • Cirrhosis   • Judge   • Abdominal Pain       ENDO PROCEDURE ORDERED:    Subjective    Amira Shannon is a 58 y.o. female. she is here today for follow-up.    History of Present Illness    Patient seen on a recheck of her abdominal pain, constipation, GERD. Last seen 07/30/2019. I had given her a trial on Motegrity. She does not think that helped her bowel movements. She had previously tried Movantik as well as Trulance without improvement. She states she has not had a bowel movement since Monday/Tuesday of last week. She states normally she will have a large, hard stool. Weight is down 3 pounds since last visit. She complains of 7 out of 10 abdominal pain. She wanted to be seen sooner because she had been severely constipated. No nausea, vomiting, dysphagia. Weight is down 3 pounds since last visit. Last EGD/colonoscopy 10/17/2018 showed gastritis and hemorrhoids.     Laboratory on 08/07/2019: She had a sitz marker study, showed 3 markers in the ascending colon, 19 in the left colon, 1 in the possible sigmoid or small bowel with a moderate amount of stool. Repeat films on 08/09/2019 showed 23 markers in the distal descending colon to rectum. I had her repeat it because there were still so many markers on 08/12/2019, showed 22 markers in the midtransverse to rectum.     Patient by my review had very limited stool in the rectum. She had sitz markers in her rectum on the 1st x-ray as well as the last. There was very little stool in the rectum. Most of the stool was in the right colon. It is not clear why she is not having bowel movements based on this.    ASSESSMENT/PLAN: Patient with significant constipation with abnormal sitz marker study. We will give her Ernestine-Colace and I suggested she take at least a glassful of GoLYTELY daily. Would consider surgical intervention. I gave her some Zofran for nausea. We will plan followup in 4-6  "weeks, further pending clinical course and the results of the above.        The following portions of the patient's history were reviewed and updated as appropriate:   Past Medical History:   Diagnosis Date   • Acid reflux    • Altered bowel function    • Arthropathy of lumbar facet joint    • Bleeding disorder (CMS/HCC)    • C. difficile diarrhea 2015   • Constipation    • Corns and callus    • Depression    • Disease related peripheral neuropathy    • Epigastric pain    • Fatty liver    • Hammer toe    • Headache    • Hiatal hernia    • History of transfusion    • Hyperlipidemia    • Knee pain    • Localized, primary osteoarthritis of the ankle and foot     Localized, primary osteoarthritis of the ankle and/or foot   • Mendoza's metatarsalgia     Mendoza's metatarsalgia - 2nd interspace on right   • Nausea and vomiting    • Neuralgia and neuritis     Neuralgia, neuritis, and radiculitis, unspecified   • Neuropathy    • Obstructive sleep apnea     Obstructive sleep apnea (adult) (pediatric)    • CHAI on CPAP     \"C-Pap at night  (unconfirmed)\"   • Osteoarthritis    • Pain in foot     Pain in unspecified foot - sees a podiatrist   • Pain in joint, ankle and foot     Joint pain in ankle and foot      • Pain radiating to back     Pain radiating to lumbar region of back   • Plantar fasciitis    • PONV (postoperative nausea and vomiting)    • Restless leg syndrome    • Secondary hypertension     Secondary hypertension, unspecified   • Sinusitis    • Sleep apnea    • Tongue anomaly     lesion     Past Surgical History:   Procedure Laterality Date   • CARPAL TUNNEL RELEASE Left 8/22/2018    Procedure: CARPAL TUNNEL RELEASE - left;  Surgeon: Justus Lewis MD;  Location: [facility];  Service: Orthopedics   • CARPAL TUNNEL RELEASE Right 2/13/2019    Procedure: carpal tunnel release right hand with local/monitored anesthesia care;  Surgeon: Justus Lewis MD;  Location: [facility];  Service: Orthopedics   • "  SECTION     • CHOLECYSTECTOMY     • COLONOSCOPY  2013   • COLONOSCOPY N/A 10/17/2018    Procedure: COLONOSCOPY;  Surgeon: Russell Del Rio MD;  Location: [facility] ENDOSCOPY;  Service: Gastroenterology   • DIRECT LARYNGOSCOPY, ESOPHAGOSCOPY, BRONCHOSCOPY N/A 2017    Procedure: DIRECT LARYNGOSCOPY AND;  Surgeon: Live Bolton MD;  Location: [facility] OR;  Service:    • ENDOSCOPY  2013    Colon endoscopy 04076 (1) - Internal & external hemorrhoids found. Stool found.   • ENDOSCOPY  2013    EGD w/ tube 26346 (1) - Normal esophagus. Gastritis in stomach. Biopsy taken. Normal dudoenum. Biopsy taken.   • ENDOSCOPY N/A 10/17/2018    Procedure: ESOPHAGOGASTRODUODENOSCOPY--eval varices;  Surgeon: Russell Del Rio MD;  Location: [facility] ENDOSCOPY;  Service: Gastroenterology   • FOOT SURGERY  2013    Foot/toes surgery procedure (1) - Arthroplasty of toes 4 and 5 of right foot.   • HERNIA REPAIR     • HERNIA REPAIR      hital   • HYSTERECTOMY     • LIVER BIOPSY     • NERVE BLOCK  2016    Injection for nerve block (1) - Lumbar medial branch block.   • OTHER SURGICAL HISTORY  2012    Inj(s) Tend-Sheath, Ligament, Single  (1) - [facility]    • OTHER SURGICAL HISTORY  2013    Small Joint Injection/Aspiration  (2) - [facility]ICK[facility] ([facility])    • OTHER SURGICAL HISTORY      bowel obstruction x2   • OTHER SURGICAL HISTORY      gland removed from neck   • SUBLINGUAL SALIVARY CYST EXCISION N/A 2017    Procedure: EXCISION OF LEFT  TONGUE LESION WITH CLOSURE;  Surgeon: Live Bolton MD;  Location: [facility];  Service:    • TUBAL ABDOMINAL LIGATION     • UPPER GASTROINTESTINAL ENDOSCOPY  2013   • UPPER GASTROINTESTINAL ENDOSCOPY  10/17/2018     Family History   Problem Relation Age of Onset   • Cancer Other    • Diabetes Other    • Heart disease Other    • Hypertension Mother    • Cancer Mother    • Diabetes Mother    • Hypertension Father     • Heart attack Father    • Cancer Father    • Heart disease Father    • Thyroid disease Maternal Aunt      OB History     No data available        Allergies   Allergen Reactions   • Other      Pt states that taking steroids either in pill or injection form make her have blisters in her mouth and she feels like she is on fire on the inside/ has hx of c diff and possible MRSA     • Corticosteroids Rash   • Kenalog  [Triamcinolone Acetonide] Rash   • Sulfa Antibiotics Rash     Sulfa (Sulfonamide Antibiotics)     Social History     Socioeconomic History   • Marital status:      Spouse name: Not on file   • Number of children: Not on file   • Years of education: Not on file   • Highest education level: Not on file   Tobacco Use   • Smoking status: Former Smoker     Packs/day: 2.00     Years: 15.00     Pack years: 30.00     Types: Cigarettes     Last attempt to quit: 2000     Years since quittin.6   • Smokeless tobacco: Never Used   Substance and Sexual Activity   • Alcohol use: No   • Drug use: No   • Sexual activity: Defer     Comment: Marital Status:      Current Medications:  Prior to Admission medications    Medication Sig Start Date End Date Taking? Authorizing Provider   cetirizine (zyrTEC) 10 MG tablet Take 1 tablet by mouth Daily As Needed for Allergies.   Yes Live Bolton MD   cyclobenzaprine (FLEXERIL) 10 MG tablet Take 10 mg by mouth 3 (Three) Times a Day As Needed for Muscle Spasms.   Yes Promise Tovar MD   DULoxetine (CYMBALTA) 30 MG capsule Take 30 mg by mouth Daily.   Yes Promise Tovar MD   gabapentin (NEURONTIN) 800 MG tablet Take 800 mg by mouth 4 (Four) Times a Day.   Yes Promise Tovar MD   Melatonin 10 MG capsule Take  by mouth Every Night.   Yes Promise Tovar MD   metFORMIN (GLUCOPHAGE) 500 MG tablet Take 1 tablet by mouth 2 (Two) Times a Day With Meals. 19  Yes Maureen Cardoso APRN   nystatin (MYCOSTATIN) 945748 UNIT/GM powder Apply   "topically to the appropriate area as directed 4 (Four) Times a Day As Needed.   Yes Promise Tovar MD   omeprazole (priLOSEC) 40 MG capsule Take 40 mg by mouth Daily.   Yes ProviderPromise MD   oxyCODONE (oxyCONTIN) 10 MG 12 hr tablet Take 10 mg by mouth 4 (Four) Times a Day As Needed.   Yes Promise Tovar MD   polyethylene glycol (MIRALAX) packet Take 17 g by mouth Daily As Needed.   Yes Promise Tovar MD   Prucalopride Succinate (MOTEGRITY) 2 MG tablet Take 2 mg by mouth Daily. 6/4/19  Yes Blaine Perales PA-C   spironolactone (ALDACTONE) 25 MG tablet TAKE 1 TABLET BY MOUTH ONCE DAILY 6/11/19  Yes Maureen Cardoso APRN   torsemide (DEMADEX) 10 MG tablet Take 1 tablet by mouth Daily. 7/8/19  Yes Maureen Cardoso APRN     Review of Systems  Review of Systems       Objective    /82 (BP Location: Left arm)   Pulse 85   Ht 162.6 cm (64\")   Wt 95.3 kg (210 lb)   LMP  (LMP Unknown)   BMI 36.05 kg/m²   Physical Exam   Constitutional: She is oriented to person, place, and time. She appears well-developed and well-nourished. No distress.   HENT:   Head: Normocephalic and atraumatic.   Eyes: EOM are normal. Pupils are equal, round, and reactive to light.   Neck: Normal range of motion.   Cardiovascular: Normal rate, regular rhythm and normal heart sounds.   Pulmonary/Chest: Effort normal and breath sounds normal.   Abdominal: Soft. Bowel sounds are normal. She exhibits no shifting dullness, no distension, no abdominal bruit, no ascites and no mass. There is no hepatosplenomegaly. There is tenderness. There is no rigidity, no rebound, no guarding and no CVA tenderness. No hernia. Hernia confirmed negative in the ventral area.   Obese, mild diffuse   Musculoskeletal: Normal range of motion.   Neurological: She is alert and oriented to person, place, and time.   Skin: Skin is warm and dry.   Psychiatric: She has a normal mood and affect. Her behavior is normal. Judgment and thought " content normal.   Nursing note and vitals reviewed.    Assessment/Plan      1. Chronic idiopathic constipation    2. Abdominal pain, generalized    3. Constipation, unspecified constipation type    4. MUSA (nonalcoholic steatohepatitis)    5. Cirrhosis of liver without ascites, unspecified hepatic cirrhosis type (CMS/HCC)    .   Amira was seen today for chronic idiopathic constipation, cirrhosis, musa and abdominal pain.    Diagnoses and all orders for this visit:    Chronic idiopathic constipation    Abdominal pain, generalized    Constipation, unspecified constipation type    MUSA (nonalcoholic steatohepatitis)    Cirrhosis of liver without ascites, unspecified hepatic cirrhosis type (CMS/HCC)    Other orders  -     sennosides-docusate sodium (SENOKOT-S) 8.6-50 MG tablet; Take 2 tablets by mouth Daily.  -     polyethylene glycol (GOLYTELY) 236 g solution; Take 4,000 mL by mouth 1 (One) Time for 1 dose. Take as per instruction sheet for colonoscopy prep.  -     ondansetron (ZOFRAN) 8 MG tablet; Take 1 tablet by mouth Every 8 (Eight) Hours As Needed for Nausea or Vomiting.        Orders placed during this encounter include:  No orders of the defined types were placed in this encounter.      Medications prescribed:  New Medications Ordered This Visit   Medications   • sennosides-docusate sodium (SENOKOT-S) 8.6-50 MG tablet     Sig: Take 2 tablets by mouth Daily.     Dispense:  60 tablet     Refill:  1   • polyethylene glycol (GOLYTELY) 236 g solution     Sig: Take 4,000 mL by mouth 1 (One) Time for 1 dose. Take as per instruction sheet for colonoscopy prep.     Dispense:  4000 mL     Refill:  0   • ondansetron (ZOFRAN) 8 MG tablet     Sig: Take 1 tablet by mouth Every 8 (Eight) Hours As Needed for Nausea or Vomiting.     Dispense:  30 tablet     Refill:  1       Requested Prescriptions     Signed Prescriptions Disp Refills   • sennosides-docusate sodium (SENOKOT-S) 8.6-50 MG tablet 60 tablet 1     Sig: Take 2  tablets by mouth Daily.   • polyethylene glycol (GOLYTELY) 236 g solution 4000 mL 0     Sig: Take 4,000 mL by mouth 1 (One) Time for 1 dose. Take as per instruction sheet for colonoscopy prep.   • ondansetron (ZOFRAN) 8 MG tablet 30 tablet 1     Sig: Take 1 tablet by mouth Every 8 (Eight) Hours As Needed for Nausea or Vomiting.       Review and/or summary of lab tests, radiology, procedures, medications. Review and summary of old records and obtaining of history. The risks and benefits of my recommendations, as well as other treatment options were discussed with the patient today. Questions were answered.    Follow-up: Return in about 1 month (around 9/13/2019), or if symptoms worsen or fail to improve.     * Surgery not found *      This document has been electronically signed by Blaine Perales PA-C on August 16, 2019 2:39 PM      Results for orders placed or performed during the hospital encounter of 06/09/19   Urinalysis With Microscopic If Indicated (No Culture) - Urine, Clean Catch   Result Value Ref Range    Color, UA Yellow Yellow, Straw, Dark Yellow, Mikala    Appearance, UA Clear Clear    pH, UA 8.0 5.0 - 9.0    Specific Gravity, UA 1.006 1.003 - 1.030    Glucose, UA Negative Negative    Ketones, UA Negative Negative    Bilirubin, UA Negative Negative    Blood, UA Negative Negative    Protein, UA Negative Negative    Leuk Esterase, UA Negative Negative    Nitrite, UA Negative Negative    Urobilinogen, UA 1.0 E.U./dL 0.2 - 1.0 E.U./dL   Results for orders placed or performed during the hospital encounter of 05/21/19   Gold Top - SST   Result Value Ref Range    Extra Tube Hold for add-ons.    Scan Slide   Result Value Ref Range    RBC Morphology Normal Normal    WBC Morphology Normal Normal    Platelet Estimate Decreased Normal   Urinalysis With Microscopic If Indicated (No Culture) - Urine, Clean Catch   Result Value Ref Range    Color, UA Yellow Yellow, Straw, Dark Yellow, Mikala    Appearance, UA Clear  Clear    pH, UA 7.5 5.0 - 9.0    Specific Gravity, UA 1.002 (L) 1.003 - 1.030    Glucose, UA Negative Negative    Ketones, UA Negative Negative    Bilirubin, UA Negative Negative    Blood, UA Negative Negative    Protein, UA Negative Negative    Leuk Esterase, UA Negative Negative    Nitrite, UA Negative Negative    Urobilinogen, UA 1.0 E.U./dL 0.2 - 1.0 E.U./dL   CBC Auto Differential   Result Value Ref Range    WBC 4.10 3.40 - 10.80 10*3/mm3    RBC 5.40 (H) 3.77 - 5.28 10*6/mm3    Hemoglobin 14.0 12.0 - 15.9 g/dL    Hematocrit 43.2 34.0 - 46.6 %    MCV 80.0 79.0 - 97.0 fL    MCH 25.9 (L) 26.6 - 33.0 pg    MCHC 32.4 31.5 - 35.7 g/dL    RDW 15.3 12.3 - 15.4 %    RDW-SD 43.8 37.0 - 54.0 fl    MPV 11.7 6.0 - 12.0 fL    Platelets 81 (L) 140 - 450 10*3/mm3    Neutrophil % 65.7 42.7 - 76.0 %    Lymphocyte % 26.8 19.6 - 45.3 %    Monocyte % 4.9 (L) 5.0 - 12.0 %    Eosinophil % 2.2 0.3 - 6.2 %    Basophil % 0.2 0.0 - 1.5 %    Immature Grans % 0.2 0.0 - 0.5 %    Neutrophils, Absolute 2.69 1.70 - 7.00 10*3/mm3    Lymphocytes, Absolute 1.10 0.70 - 3.10 10*3/mm3    Monocytes, Absolute 0.20 0.10 - 0.90 10*3/mm3    Eosinophils, Absolute 0.09 0.00 - 0.40 10*3/mm3    Basophils, Absolute 0.01 0.00 - 0.20 10*3/mm3    Immature Grans, Absolute 0.01 0.00 - 0.05 10*3/mm3    nRBC 0.0 0.0 - 0.2 /100 WBC   Light Blue Top   Result Value Ref Range    Extra Tube hold for add-on    Lipase   Result Value Ref Range    Lipase 25 13 - 60 U/L   Lactic Acid, Plasma   Result Value Ref Range    Lactate 1.4 0.5 - 2.0 mmol/L   Comprehensive Metabolic Panel   Result Value Ref Range    Glucose 106 (H) 65 - 99 mg/dL    BUN 10 6 - 20 mg/dL    Creatinine 0.94 0.57 - 1.00 mg/dL    Sodium 143 136 - 145 mmol/L    Potassium 4.1 3.5 - 5.2 mmol/L    Chloride 104 98 - 107 mmol/L    CO2 28.0 22.0 - 29.0 mmol/L    Calcium 10.0 8.6 - 10.5 mg/dL    Total Protein 7.8 6.0 - 8.5 g/dL    Albumin 4.10 3.50 - 5.20 g/dL    ALT (SGPT) 29 1 - 33 U/L    AST (SGOT) 47 (H) 1 - 32  U/L    Alkaline Phosphatase 108 39 - 117 U/L    Total Bilirubin 0.6 0.2 - 1.2 mg/dL    eGFR Non African Amer 61 >60 mL/min/1.73    Globulin 3.7 gm/dL    A/G Ratio 1.1 g/dL    BUN/Creatinine Ratio 10.6 7.0 - 25.0    Anion Gap 11.0 mmol/L   Results for orders placed or performed in visit on 04/23/19   MUSA Fibrosure   Result Value Ref Range    Fibrosis Score (References) 0.82 (H) 0.00 - 0.21    Fibrosis Stage (Reference) Comment     Steatosis Score (Reference) 0.57 (H) 0.00 - 0.30    Steatosis Grade (Reference) Comment     MUSA Score (Reference) 0.75 (H) 0.25    Musa Grade (Reference) Comment     Height: (Reference) 64 in    Weight: (Reference) 212 LBS    Alpha 2-Macroglobulins, Qn 428 (H) 110 - 276 mg/dL    Haptoglobin 46 34 - 200 mg/dL    Apolipoprotein A-1 119 116 - 209 mg/dL    Total Bilirubin 0.6 0.0 - 1.2 mg/dL    GGT 52 0 - 60 IU/L    ALT (SGPT) 42 (H) 0 - 40 IU/L    AST (SGOT) P5P (Reference) 70 (H) 0 - 40 IU/L    Cholesterol, Total (Reference) 132 100 - 199 mg/dL    Glucose, Serum (Reference) 103 (H) 65 - 99 mg/dL    Triglycerides 114 0 - 149 mg/dL    Interpretations: (Reference) Comment     Fibrosis Scoring: Comment     Steatosis Grading (Reference) Comment     Musa Scoring (Reference) Comment     Limitations: (Reference) Comment     Comment (Reference) Comment    CBC Auto Differential   Result Value Ref Range    WBC 4.14 3.40 - 10.80 10*3/mm3    RBC 5.36 (H) 3.77 - 5.28 10*6/mm3    Hemoglobin 13.9 12.0 - 15.9 g/dL    Hematocrit 43.8 34.0 - 46.6 %    MCV 81.7 79.0 - 97.0 fL    MCH 25.9 (L) 26.6 - 33.0 pg    MCHC 31.7 31.5 - 35.7 g/dL    RDW 15.5 (H) 12.3 - 15.4 %    RDW-SD 46.1 37.0 - 54.0 fl    Platelets 64 (L) 140 - 450 10*3/mm3    Neutrophil % 59.0 42.7 - 76.0 %    Lymphocyte % 31.6 19.6 - 45.3 %    Monocyte % 5.8 5.0 - 12.0 %    Eosinophil % 2.9 0.3 - 6.2 %    Basophil % 0.5 0.0 - 1.5 %    Immature Grans % 0.2 0.0 - 0.5 %    Neutrophils, Absolute 2.44 1.70 - 7.00 10*3/mm3    Lymphocytes, Absolute 1.31  0.70 - 3.10 10*3/mm3    Monocytes, Absolute 0.24 0.10 - 0.90 10*3/mm3    Eosinophils, Absolute 0.12 0.00 - 0.40 10*3/mm3    Basophils, Absolute 0.02 0.00 - 0.20 10*3/mm3    Immature Grans, Absolute 0.01 0.00 - 0.05 10*3/mm3    nRBC 0.0 0.0 - 0.2 /100 WBC    Differential Type      WBC 4.14 3.40 - 10.80 10*3/mm3     *Note: Due to a large number of results and/or encounters for the requested time period, some results have not been displayed. A complete set of results can be found in Results Review.       Some portions of this note have been dictated using voice recognition software and may contain errors and/or omissions.

## 2019-08-13 NOTE — TELEPHONE ENCOUNTER
----- Message from Blaine Perales PA-C sent at 8/12/2019  4:52 PM CDT -----  If she is seeing Milagros in [place] tomorrow, let me see her there.  ----- Message -----  From: Violeta Toth MA  Sent: 8/12/2019   4:31 PM  To: Blaine Perales PA-C    Patient requested x-ray results from today and recommendations since she has not had a bm since last Tuesday?  ----- Message -----  From: Jacqueline Conner  Sent: 8/12/2019   4:05 PM  To: Violeta Toth MA    PATIENT CALLED IN REGARDS TO HER XRAY. PLEASE CALL BACK. SHE STATED THAT SHE WILL BE AT  TOMORROW MORNING WITH AN APPT WITH MILAGROS [facility]. THANKS!

## 2019-08-13 NOTE — TELEPHONE ENCOUNTER
A voicemail has been left for the patient to make her aware that she will see Blaine Perales MS PALalitoC today after her visit with [facility].

## 2019-08-13 NOTE — PATIENT INSTRUCTIONS

## 2019-08-15 ENCOUNTER — TELEPHONE (OUTPATIENT)
Dept: GASTROENTEROLOGY | Facility: CLINIC | Age: 59
End: 2019-08-15

## 2019-08-15 NOTE — TELEPHONE ENCOUNTER
----- Message from Blaine Perales PA-C sent at 8/15/2019  9:59 AM CDT -----  Contact: [phone]  Keep drinking the golytely. Did she get the pericolace I Rx?  ----- Message -----  From: Violeta Toth MA  Sent: 8/15/2019   9:15 AM  To: Blaine Perales PA-C    Patient states that she has drank 1-12 oz glass and 1-16 oz glass of Golyt and still has not had a BM. She is still taking her medication and even took an old Amitiza and has had no movement at all. She is passing a little bit of gas but not much. She would like some other recommendations?

## 2019-08-15 NOTE — TELEPHONE ENCOUNTER
Patient has been contacted and made aware. Patient voiced understanding. Patient will call back with an update at 1630 today.

## 2019-08-26 ENCOUNTER — HOSPITAL ENCOUNTER (OUTPATIENT)
Dept: PHYSICAL THERAPY | Facility: HOSPITAL | Age: 59
Setting detail: THERAPIES SERIES
Discharge: HOME | End: 2019-08-26

## 2019-08-26 DIAGNOSIS — G56.02 CARPAL TUNNEL SYNDROME ON LEFT: ICD-10-CM

## 2019-08-26 DIAGNOSIS — M25.561 ACUTE PAIN OF BOTH KNEES: Primary | ICD-10-CM

## 2019-08-26 DIAGNOSIS — R20.0 NUMBNESS AND TINGLING IN BOTH HANDS: ICD-10-CM

## 2019-08-26 DIAGNOSIS — M25.552 LEFT HIP PAIN: ICD-10-CM

## 2019-08-26 DIAGNOSIS — E66.9 OBESITY, UNSPECIFIED CLASSIFICATION, UNSPECIFIED OBESITY TYPE, UNSPECIFIED WHETHER SERIOUS COMORBIDITY PRESENT: ICD-10-CM

## 2019-08-26 DIAGNOSIS — M72.2 PLANTAR FASCIITIS, BILATERAL: ICD-10-CM

## 2019-08-26 DIAGNOSIS — M77.31 BILATERAL CALCANEAL SPURS: ICD-10-CM

## 2019-08-26 DIAGNOSIS — M79.672 BILATERAL FOOT PAIN: ICD-10-CM

## 2019-08-26 DIAGNOSIS — R20.2 NUMBNESS AND TINGLING IN BOTH HANDS: ICD-10-CM

## 2019-08-26 DIAGNOSIS — M17.0 PRIMARY OSTEOARTHRITIS OF BOTH KNEES: ICD-10-CM

## 2019-08-26 DIAGNOSIS — M77.32 BILATERAL CALCANEAL SPURS: ICD-10-CM

## 2019-08-26 DIAGNOSIS — M25.562 ACUTE PAIN OF BOTH KNEES: Primary | ICD-10-CM

## 2019-08-26 DIAGNOSIS — M79.671 BILATERAL FOOT PAIN: ICD-10-CM

## 2019-08-26 DIAGNOSIS — G56.01 CARPAL TUNNEL SYNDROME ON RIGHT: ICD-10-CM

## 2019-08-26 PROCEDURE — 97162 PT EVAL MOD COMPLEX 30 MIN: CPT | Performed by: PHYSICAL THERAPIST

## 2019-08-27 ENCOUNTER — OFFICE VISIT (OUTPATIENT)
Dept: ORTHOPEDIC SURGERY | Facility: CLINIC | Age: 59
End: 2019-08-27

## 2019-08-27 VITALS — BODY MASS INDEX: 35.85 KG/M2 | WEIGHT: 210 LBS | HEIGHT: 64 IN

## 2019-08-27 DIAGNOSIS — M25.561 CHRONIC PAIN OF BOTH KNEES: ICD-10-CM

## 2019-08-27 DIAGNOSIS — M25.562 CHRONIC PAIN OF BOTH KNEES: ICD-10-CM

## 2019-08-27 DIAGNOSIS — M17.0 PRIMARY OSTEOARTHRITIS OF BOTH KNEES: ICD-10-CM

## 2019-08-27 DIAGNOSIS — G89.29 CHRONIC PAIN OF BOTH KNEES: ICD-10-CM

## 2019-08-27 DIAGNOSIS — M25.562 ACUTE PAIN OF BOTH KNEES: ICD-10-CM

## 2019-08-27 DIAGNOSIS — M23.252 DERANGEMENT OF POSTERIOR HORN OF LATERAL MENISCUS DUE TO OLD TEAR OR INJURY, LEFT KNEE: Primary | ICD-10-CM

## 2019-08-27 DIAGNOSIS — M25.561 ACUTE PAIN OF BOTH KNEES: ICD-10-CM

## 2019-08-27 PROCEDURE — 99213 OFFICE O/P EST LOW 20 MIN: CPT | Performed by: ORTHOPAEDIC SURGERY

## 2019-08-27 RX ORDER — MELOXICAM 15 MG/1
15 TABLET ORAL DAILY
Qty: 30 TABLET | Refills: 2 | Status: SHIPPED | OUTPATIENT
Start: 2019-08-27 | End: 2020-04-27

## 2019-08-27 NOTE — PROGRESS NOTES
"Amira Shannon is a 58 y.o. female returns for     Chief Complaint   Patient presents with   • Left Knee - Follow-up   • Right Knee - Follow-up       HISTORY OF PRESENT ILLNESS:  F/u bilateral knee pain, patient states pain score is at a 7 today,   Cannot take steroids due to allergies.  Pain with activity  Pain with prolonged standing and walking.  No specific.  Pain with activity of daily living  Mostly a dull ache but occasional sharp pains.       CONCURRENT MEDICAL HISTORY:    The following portions of the patient's history were reviewed and updated as appropriate: allergies, current medications, past family history, past medical history, past social history, past surgical history and problem list.     ROS  No fevers or chills.  No chest pain or shortness of air.  No GI or  disturbances.    PHYSICAL EXAMINATION:       Ht 162.6 cm (64\")   Wt 95.3 kg (210 lb)   LMP  (LMP Unknown)   BMI 36.05 kg/m²     Physical Exam   Constitutional: She is oriented to person, place, and time. She appears well-developed and well-nourished.   Musculoskeletal:        Right knee: She exhibits no effusion.        Left knee: She exhibits no effusion.   Neurological: She is alert and oriented to person, place, and time.   Skin: Capillary refill takes less than 2 seconds.   Psychiatric: She has a normal mood and affect. Her behavior is normal. Judgment and thought content normal.       GAIT:     []  Normal  [x]  Antalgic    Assistive device: [x]  None  []  Walker     []  Crutches  []  Cane     []  Wheelchair  []  Stretcher    Right Knee Exam     Tenderness   The patient is experiencing tenderness in the medial joint line and lateral joint line.    Range of Motion   Extension: normal   Flexion: 110     Other   Erythema: absent  Scars: absent  Sensation: normal  Pulse: present  Swelling: mild  Effusion: no effusion present      Left Knee Exam     Tenderness   The patient is experiencing tenderness in the lateral joint line and " medial joint line.    Range of Motion   Extension: normal   Flexion: 110     Other   Erythema: absent  Scars: absent  Sensation: normal  Pulse: present  Swelling: mild  Effusion: no effusion present      Left Hip Exam     Tenderness   The patient is experiencing tenderness in the greater trochanter.    Range of Motion   Abduction: normal   Flexion: normal   External rotation: normal   Internal rotation: normal     Muscle Strength   Abduction: 4/5   Adduction: 4/5     Other   Erythema: absent  Scars: absent  Sensation: normal  Pulse: present              Xr Abdomen Kub    Result Date: 8/12/2019  Narrative: PROCEDURE: XR ABDOMEN KUB VIEWS:  2 INDICATION: Sitzmarker study COMPARISON: 8/9/2019 FINDINGS:   - Bowel gas pattern: Nonobstructive. A total of 22 Sitzmarks markers are present, seen from the mid transverse colon to the rectum.   - Free air: None   - Soft tissue:No gross evidence of organomegaly,     an abdominal mass,or ascites   - Calculi:None projecting over gallbladder      fossa, renal shadows, or anticipated course of the ureters.   - Osseous:Limited assessment, unremarkable for age.   - Misc: Surgical clips are present in the right upper quadrant. Tiny phlebolith is present in the left pelvis.     Impression: 22 Sitzmarkers seen from the mid transverse colon to the rectum. Electronically signed by:  Chandrika Grigsby MD  8/12/2019 3:13 PM CDT Workstation: [id number]    Xr Abdomen Kub    Result Date: 8/9/2019  Narrative: Exam:  KUB History:  Constipation. Sitzmarker study. Generalized abdominal pain. Supine film of the abdomen was obtained. Comparison: August 7, 2019 23 markers present extending from the distal descending colon to the rectum. Moderate amount retained feces in the colon. No mechanical bowel obstruction. No organomegaly. Pelvic phleboliths. No acute osseous abnormality. Surgical clips right upper quadrant.     Impression: Conclusion: 23 markers present extending from the distal descending colon to  the rectum. [id number] Electronically signed by:  Walker Stevens MD  8/9/2019 5:57 PM CDT Workstation: "[email or web address]"    Xr Abdomen Kub    Result Date: 8/7/2019  Narrative: Exam:  KUB History:  Constipation, sitz marker study, R10.84 Generalized abdominal pain K59.04 Chronic idiopathic constipation Supine film of the abdomen was obtained. Comparison: June 9, 2019 3 markers in the ascending colon. 19 markers in the left side of the colon. Single marker medial right lower quadrant which may be within the sigmoid colon or the distal small bowel. Small to moderate amount retained feces in the colon. No mechanical bowel obstruction. No organomegaly. Pelvic phleboliths. No acute osseous abnormality. Surgical clips right upper quadrant of the abdomen.     Impression: Conclusion: 3 markers in the ascending colon. 19 markers in the left side of the colon. Single marker medial right lower quadrant which may be within the sigmoid colon or the distal small bowel. Small to moderate amount retained feces in the colon. [id number] Electronically signed by:  Walker Stevens MD  8/7/2019 12:18 PM CDT Workstation: "[email or web address]"            Office Visit on 8/13/2019          Revision History          Detailed Report          ASSESSMENT:    Diagnoses and all orders for this visit:    Derangement of posterior horn of lateral meniscus due to old tear or injury, left knee  -     Ambulatory Referral to Physical Therapy Evaluate and treat    Acute pain of both knees  -     Ambulatory Referral to Physical Therapy Evaluate and treat    Primary osteoarthritis of both knees  -     Ambulatory Referral to Physical Therapy Evaluate and treat    Chronic pain of both knees  -     Ambulatory Referral to Physical Therapy Evaluate and treat    Other orders  -     meloxicam (MOBIC) 15 MG tablet; Take 1 tablet by mouth Daily.          PLAN    Will try meloxicam for NSAIDS  Discussed patient cannot have steroid injection due to allergy  Start PT for ROm ;STR  exercises   2-4 visits for HEP   Slowly progress as tolerated.  Discussed possible arthroscopy with debridement of lateral meniscus.    Patient's Body mass index is 36.05 kg/m². BMI is above normal parameters. Recommendations include: exercise counseling and referral to a nutritionist.      Return in about 4 weeks (around 9/24/2019) for recheck.    Justus Lewis MD

## 2019-09-10 ENCOUNTER — OFFICE VISIT (OUTPATIENT)
Dept: GASTROENTEROLOGY | Facility: CLINIC | Age: 59
End: 2019-09-10

## 2019-09-10 VITALS
SYSTOLIC BLOOD PRESSURE: 126 MMHG | HEART RATE: 81 BPM | BODY MASS INDEX: 35.34 KG/M2 | WEIGHT: 207 LBS | DIASTOLIC BLOOD PRESSURE: 80 MMHG | HEIGHT: 64 IN

## 2019-09-10 DIAGNOSIS — R10.84 ABDOMINAL PAIN, GENERALIZED: ICD-10-CM

## 2019-09-10 DIAGNOSIS — K59.04 CHRONIC IDIOPATHIC CONSTIPATION: Primary | ICD-10-CM

## 2019-09-10 DIAGNOSIS — K59.00 CONSTIPATION, UNSPECIFIED CONSTIPATION TYPE: ICD-10-CM

## 2019-09-10 DIAGNOSIS — K75.81 NASH (NONALCOHOLIC STEATOHEPATITIS): ICD-10-CM

## 2019-09-10 DIAGNOSIS — K74.60 CIRRHOSIS OF LIVER WITHOUT ASCITES, UNSPECIFIED HEPATIC CIRRHOSIS TYPE (HCC): ICD-10-CM

## 2019-09-10 PROCEDURE — 99214 OFFICE O/P EST MOD 30 MIN: CPT | Performed by: PHYSICIAN ASSISTANT

## 2019-09-10 NOTE — PATIENT INSTRUCTIONS

## 2019-09-10 NOTE — PROGRESS NOTES
Chief Complaint   Patient presents with   • Abdominal Pain   • Judge   • Cirrhosis   • Constipation       ENDO PROCEDURE ORDERED:    Subjective    Amira Shannon is a 58 y.o. female. she is here today for follow-up.    History of Present Illness    Patient seen on a recheck of her chronic idiopathic constipation, JUDGE cirrhosis with abdominal pain with abnormal motility, F4/S2/N2. Last seen 08/13/2019. Because of her severe constipation, we put her on Golytely and Senokot. She has been doing the Ernestine-Colace and states she will have a bowel movement about every 3 days. She still feels very bloated and will not pass very much stool. She had very abnormal sitz marker study with delayed transit. She still has to strain extremely hard in order to have a bowel movement. She states sometimes she can strain hard enough she will pass a long stool, but as soon as she stops straining, the stool will stop coming out. Weight is down 3 pounds since last visit. Last EGD/colonoscopy 10/17/2018 showed gastritis and hemorrhoids. GERD is doing well on Prilosec 40 mg daily. She denied nausea or vomiting.     A/P: Patient with chronic idiopathic constipation. She has tried most every laxative we have available without much improvement. She had a markedly abnormal sitz marker study showing poor colonic transit. I did review with Dr. Troncoso, who did not feel surgery should be pursued at this time. He suggested a trial with InterStim and I did briefly talk to Dr. D'Amico, who stated she would be willing to see the patient for this.     Patient will be due for hepatoma screening in November, is scheduled for right upper quadrant ultrasound, CBC, CMP, INR, AFP prior. Further pending clinical course and the results of the above.        The following portions of the patient's history were reviewed and updated as appropriate:   Past Medical History:   Diagnosis Date   • Acid reflux    • Altered bowel function    • Arthropathy of lumbar facet  "joint    • Bleeding disorder (CMS/HCC)    • C. difficile diarrhea    • Constipation    • Corns and callus    • Depression    • Disease related peripheral neuropathy    • Epigastric pain    • Fatty liver    • Hammer toe    • Headache    • Hiatal hernia    • History of transfusion    • Hyperlipidemia    • Knee pain    • Localized, primary osteoarthritis of the ankle and foot     Localized, primary osteoarthritis of the ankle and/or foot   • Mendoza's metatarsalgia     Mendoza's metatarsalgia - 2nd interspace on right   • Nausea and vomiting    • Neuralgia and neuritis     Neuralgia, neuritis, and radiculitis, unspecified   • Neuropathy    • Obstructive sleep apnea     Obstructive sleep apnea (adult) (pediatric)    • CHAI on CPAP     \"C-Pap at night  (unconfirmed)\"   • Osteoarthritis    • Pain in foot     Pain in unspecified foot - sees a podiatrist   • Pain in joint, ankle and foot     Joint pain in ankle and foot      • Pain radiating to back     Pain radiating to lumbar region of back   • Plantar fasciitis    • PONV (postoperative nausea and vomiting)    • Restless leg syndrome    • Secondary hypertension     Secondary hypertension, unspecified   • Sinusitis    • Sleep apnea    • Tongue anomaly     lesion     Past Surgical History:   Procedure Laterality Date   • CARPAL TUNNEL RELEASE Left 2018    Procedure: CARPAL TUNNEL RELEASE - left;  Surgeon: Justus Lewis MD;  Location: [facility] OR;  Service: Orthopedics   • CARPAL TUNNEL RELEASE Right 2019    Procedure: carpal tunnel release right hand with local/monitored anesthesia care;  Surgeon: Justus Lewis MD;  Location: [facility] OR;  Service: Orthopedics   •  SECTION     • CHOLECYSTECTOMY     • COLONOSCOPY  2013   • COLONOSCOPY N/A 10/17/2018    Procedure: COLONOSCOPY;  Surgeon: Russell Del Rio MD;  Location: [facility] ENDOSCOPY;  Service: Gastroenterology   • DIRECT LARYNGOSCOPY, ESOPHAGOSCOPY, BRONCHOSCOPY N/A 2017    " Procedure: DIRECT LARYNGOSCOPY AND;  Surgeon: Live Bolton MD;  Location: [facility];  Service:    • ENDOSCOPY  07/01/2013    Colon endoscopy 34351 (1) - Internal & external hemorrhoids found. Stool found.   • ENDOSCOPY  07/01/2013    EGD w/ tube 64742 (1) - Normal esophagus. Gastritis in stomach. Biopsy taken. Normal dudoenum. Biopsy taken.   • ENDOSCOPY N/A 10/17/2018    Procedure: ESOPHAGOGASTRODUODENOSCOPY--eval varices;  Surgeon: Russell Del Rio MD;  Location: [facility] ENDOSCOPY;  Service: Gastroenterology   • FOOT SURGERY  02/26/2013    Foot/toes surgery procedure (1) - Arthroplasty of toes 4 and 5 of right foot.   • HERNIA REPAIR     • HERNIA REPAIR      hital   • HYSTERECTOMY     • LIVER BIOPSY     • NERVE BLOCK  07/25/2016    Injection for nerve block (1) - Lumbar medial branch block.   • OTHER SURGICAL HISTORY  12/03/2012    Inj(s) Tend-Sheath, Ligament, Single 20550 (1) - [facility]    • OTHER SURGICAL HISTORY  06/24/2013    Small Joint Injection/Aspiration 20600 (2) - [facility] ([facility])    • OTHER SURGICAL HISTORY  2011    bowel obstruction x2   • OTHER SURGICAL HISTORY      gland removed from neck   • SUBLINGUAL SALIVARY CYST EXCISION N/A 8/1/2017    Procedure: EXCISION OF LEFT  TONGUE LESION WITH CLOSURE;  Surgeon: Live Bolton MD;  Location: [facility];  Service:    • TUBAL ABDOMINAL LIGATION     • UPPER GASTROINTESTINAL ENDOSCOPY  07/01/2013   • UPPER GASTROINTESTINAL ENDOSCOPY  10/17/2018     Family History   Problem Relation Age of Onset   • Cancer Other    • Diabetes Other    • Heart disease Other    • Hypertension Mother    • Cancer Mother    • Diabetes Mother    • Hypertension Father    • Heart attack Father    • Cancer Father    • Heart disease Father    • Thyroid disease Maternal Aunt      OB History     No data available        Allergies   Allergen Reactions   • Other      Pt states that taking steroids either in pill or injection form make her have blisters  in her mouth and she feels like she is on fire on the inside/ has hx of c diff and possible MRSA     • Corticosteroids Rash   • Kenalog  [Triamcinolone Acetonide] Rash   • Sulfa Antibiotics Rash     Sulfa (Sulfonamide Antibiotics)     Social History     Socioeconomic History   • Marital status:      Spouse name: Not on file   • Number of children: Not on file   • Years of education: Not on file   • Highest education level: Not on file   Tobacco Use   • Smoking status: Former Smoker     Packs/day: 2.00     Years: 15.00     Pack years: 30.00     Types: Cigarettes     Last attempt to quit: 2000     Years since quittin.7   • Smokeless tobacco: Never Used   Substance and Sexual Activity   • Alcohol use: No   • Drug use: No   • Sexual activity: Defer     Comment: Marital Status:      Current Medications:  Prior to Admission medications    Medication Sig Start Date End Date Taking? Authorizing Provider   cetirizine (zyrTEC) 10 MG tablet Take 1 tablet by mouth Daily As Needed for Allergies.   Yes Live Bolton MD   cyclobenzaprine (FLEXERIL) 10 MG tablet Take 10 mg by mouth 3 (Three) Times a Day As Needed for Muscle Spasms.   Yes Promise Tovar MD   DULoxetine (CYMBALTA) 30 MG capsule Take 30 mg by mouth Daily.   Yes Promise Tovar MD   gabapentin (NEURONTIN) 800 MG tablet Take 800 mg by mouth 4 (Four) Times a Day.   Yes Promise Tovar MD   Melatonin 10 MG capsule Take  by mouth Every Night.   Yes Promise Tovar MD   meloxicam (MOBIC) 15 MG tablet Take 1 tablet by mouth Daily. 19  Yes Justus Lewis MD   metFORMIN (GLUCOPHAGE) 500 MG tablet Take 1 tablet by mouth 2 (Two) Times a Day With Meals. 19  Yes Maureen Cardoso APRN   nystatin (MYCOSTATIN) 244665 UNIT/GM powder Apply  topically to the appropriate area as directed 4 (Four) Times a Day As Needed.   Yes Promise Tovar MD   omeprazole (priLOSEC) 40 MG capsule Take 40 mg by mouth Daily.   Yes  "ProviderPromise MD   ondansetron (ZOFRAN) 8 MG tablet Take 1 tablet by mouth Every 8 (Eight) Hours As Needed for Nausea or Vomiting. 8/13/19  Yes Blaine Perales PA-C   oxyCODONE (oxyCONTIN) 10 MG 12 hr tablet Take 10 mg by mouth 4 (Four) Times a Day As Needed.   Yes Promise Tovar MD   polyethylene glycol (MIRALAX) packet Take 17 g by mouth Daily As Needed.   Yes Promise Tovar MD   sennosides-docusate sodium (SENOKOT-S) 8.6-50 MG tablet Take 2 tablets by mouth Daily. 8/13/19  Yes Blaine Perales PA-C   spironolactone (ALDACTONE) 25 MG tablet TAKE 1 TABLET BY MOUTH ONCE DAILY 6/11/19  Yes Maureen Cardoso APRN   torsemide (DEMADEX) 10 MG tablet Take 1 tablet by mouth Daily. 7/8/19  Yes Maureen Cardoso APRN   Prucalopride Succinate (MOTEGRITY) 2 MG tablet Take 2 mg by mouth Daily. 6/4/19 9/10/19  Blaine Perales PA-C     Review of Systems  Review of Systems       Objective    /80 (BP Location: Left arm)   Pulse 81   Ht 162.6 cm (64\")   Wt 93.9 kg (207 lb)   LMP  (LMP Unknown)   BMI 35.53 kg/m²   Physical Exam   Constitutional: She is oriented to person, place, and time. She appears well-developed and well-nourished. No distress.   HENT:   Head: Normocephalic and atraumatic.   Eyes: EOM are normal. Pupils are equal, round, and reactive to light.   Neck: Normal range of motion.   Cardiovascular: Normal rate, regular rhythm and normal heart sounds.   Pulmonary/Chest: Effort normal and breath sounds normal.   Abdominal: Soft. Bowel sounds are normal. She exhibits no shifting dullness, no distension, no abdominal bruit, no ascites and no mass. There is no hepatosplenomegaly. There is tenderness. There is no rigidity, no rebound, no guarding and no CVA tenderness. No hernia. Hernia confirmed negative in the ventral area.   Obese, mild diffuse   Musculoskeletal: Normal range of motion.   Neurological: She is alert and oriented to person, place, and time.   Skin: Skin is warm and " dry.   Psychiatric: She has a normal mood and affect. Her behavior is normal. Judgment and thought content normal.   Nursing note and vitals reviewed.    Assessment/Plan      1. Chronic idiopathic constipation    2. Cirrhosis of liver without ascites, unspecified hepatic cirrhosis type (CMS/HCC)    3. MUSA (nonalcoholic steatohepatitis)    4. Constipation, unspecified constipation type    5. Abdominal pain, generalized    .   Amira was seen today for abdominal pain, musa, cirrhosis and constipation.    Diagnoses and all orders for this visit:    Chronic idiopathic constipation  -     AFP Tumor Marker; Future  -     CBC Auto Differential; Future  -     Comprehensive Metabolic Panel; Future  -     Protime-INR; Future  -     US Liver; Future  -     Ambulatory Referral to Urology    Cirrhosis of liver without ascites, unspecified hepatic cirrhosis type (CMS/HCC)  -     AFP Tumor Marker; Future  -     CBC Auto Differential; Future  -     Comprehensive Metabolic Panel; Future  -     Protime-INR; Future  -     US Liver; Future    MUSA (nonalcoholic steatohepatitis)  -     AFP Tumor Marker; Future  -     CBC Auto Differential; Future  -     Comprehensive Metabolic Panel; Future  -     Protime-INR; Future  -     US Liver; Future    Constipation, unspecified constipation type  -     AFP Tumor Marker; Future  -     CBC Auto Differential; Future  -     Comprehensive Metabolic Panel; Future  -     Protime-INR; Future  -     US Liver; Future  -     Ambulatory Referral to Urology    Abdominal pain, generalized  -     AFP Tumor Marker; Future  -     CBC Auto Differential; Future  -     Comprehensive Metabolic Panel; Future  -     Protime-INR; Future  -     US Liver; Future  -     Ambulatory Referral to Urology        Orders placed during this encounter include:  Orders Placed This Encounter   Procedures   • US Liver     Due before follow up in NOv     Standing Status:   Future     Standing Expiration Date:   11/30/2019      Scheduling Instructions:      RUQ     Order Specific Question:   Reason for Exam:     Answer:   cirrhsos     Order Specific Question:   Will this be performed with Elastography? ([facility] and [facility] ONLY)     Answer:   No   • AFP Tumor Marker     Due before follow up in NOv     Standing Status:   Future     Standing Expiration Date:   11/30/2019   • CBC Auto Differential     Due before follow up in NOv     Standing Status:   Future     Standing Expiration Date:   11/30/2019   • Comprehensive Metabolic Panel     Due before follow up in NOv     Standing Status:   Future     Standing Expiration Date:   11/30/2019   • Protime-INR     Due before follow up in NOv     Standing Status:   Future     Standing Expiration Date:   11/30/2019   • Ambulatory Referral to Urology     Referral Priority:   Routine     Referral Type:   Consultation     Referral Reason:   Specialty Services Required     Referred to Provider:   D'Amico, Anna M., MD     Requested Specialty:   Urology     Number of Visits Requested:   1       Medications prescribed:  No orders of the defined types were placed in this encounter.    Discontinued Medications       Reason for Discontinue    Prucalopride Succinate (MOTEGRITY) 2 MG tablet Not Efficacious        Requested Prescriptions      No prescriptions requested or ordered in this encounter       Review and/or summary of lab tests, radiology, procedures, medications. Review and summary of old records and obtaining of history. The risks and benefits of my recommendations, as well as other treatment options were discussed with the patient today. Questions were answered.    Follow-up: Return in about 2 months (around 11/10/2019), or if symptoms worsen or fail to improve, for lab/US prior to follow up in Nov.     * Surgery not found *      This document has been electronically signed by Blaine Perales PA-C on September 13, 2019 2:37 PM      Results for orders placed or performed during the hospital  encounter of 06/09/19   Urinalysis With Microscopic If Indicated (No Culture) - Urine, Clean Catch   Result Value Ref Range    Color, UA Yellow Yellow, Straw, Dark Yellow, Mikala    Appearance, UA Clear Clear    pH, UA 8.0 5.0 - 9.0    Specific Gravity, UA 1.006 1.003 - 1.030    Glucose, UA Negative Negative    Ketones, UA Negative Negative    Bilirubin, UA Negative Negative    Blood, UA Negative Negative    Protein, UA Negative Negative    Leuk Esterase, UA Negative Negative    Nitrite, UA Negative Negative    Urobilinogen, UA 1.0 E.U./dL 0.2 - 1.0 E.U./dL   Results for orders placed or performed during the hospital encounter of 05/21/19   Gold Top - SST   Result Value Ref Range    Extra Tube Hold for add-ons.    Scan Slide   Result Value Ref Range    RBC Morphology Normal Normal    WBC Morphology Normal Normal    Platelet Estimate Decreased Normal   Urinalysis With Microscopic If Indicated (No Culture) - Urine, Clean Catch   Result Value Ref Range    Color, UA Yellow Yellow, Straw, Dark Yellow, Mikala    Appearance, UA Clear Clear    pH, UA 7.5 5.0 - 9.0    Specific Gravity, UA 1.002 (L) 1.003 - 1.030    Glucose, UA Negative Negative    Ketones, UA Negative Negative    Bilirubin, UA Negative Negative    Blood, UA Negative Negative    Protein, UA Negative Negative    Leuk Esterase, UA Negative Negative    Nitrite, UA Negative Negative    Urobilinogen, UA 1.0 E.U./dL 0.2 - 1.0 E.U./dL   CBC Auto Differential   Result Value Ref Range    WBC 4.10 3.40 - 10.80 10*3/mm3    RBC 5.40 (H) 3.77 - 5.28 10*6/mm3    Hemoglobin 14.0 12.0 - 15.9 g/dL    Hematocrit 43.2 34.0 - 46.6 %    MCV 80.0 79.0 - 97.0 fL    MCH 25.9 (L) 26.6 - 33.0 pg    MCHC 32.4 31.5 - 35.7 g/dL    RDW 15.3 12.3 - 15.4 %    RDW-SD 43.8 37.0 - 54.0 fl    MPV 11.7 6.0 - 12.0 fL    Platelets 81 (L) 140 - 450 10*3/mm3    Neutrophil % 65.7 42.7 - 76.0 %    Lymphocyte % 26.8 19.6 - 45.3 %    Monocyte % 4.9 (L) 5.0 - 12.0 %    Eosinophil % 2.2 0.3 - 6.2 %     Basophil % 0.2 0.0 - 1.5 %    Immature Grans % 0.2 0.0 - 0.5 %    Neutrophils, Absolute 2.69 1.70 - 7.00 10*3/mm3    Lymphocytes, Absolute 1.10 0.70 - 3.10 10*3/mm3    Monocytes, Absolute 0.20 0.10 - 0.90 10*3/mm3    Eosinophils, Absolute 0.09 0.00 - 0.40 10*3/mm3    Basophils, Absolute 0.01 0.00 - 0.20 10*3/mm3    Immature Grans, Absolute 0.01 0.00 - 0.05 10*3/mm3    nRBC 0.0 0.0 - 0.2 /100 WBC   Light Blue Top   Result Value Ref Range    Extra Tube hold for add-on    Lipase   Result Value Ref Range    Lipase 25 13 - 60 U/L   Lactic Acid, Plasma   Result Value Ref Range    Lactate 1.4 0.5 - 2.0 mmol/L   Comprehensive Metabolic Panel   Result Value Ref Range    Glucose 106 (H) 65 - 99 mg/dL    BUN 10 6 - 20 mg/dL    Creatinine 0.94 0.57 - 1.00 mg/dL    Sodium 143 136 - 145 mmol/L    Potassium 4.1 3.5 - 5.2 mmol/L    Chloride 104 98 - 107 mmol/L    CO2 28.0 22.0 - 29.0 mmol/L    Calcium 10.0 8.6 - 10.5 mg/dL    Total Protein 7.8 6.0 - 8.5 g/dL    Albumin 4.10 3.50 - 5.20 g/dL    ALT (SGPT) 29 1 - 33 U/L    AST (SGOT) 47 (H) 1 - 32 U/L    Alkaline Phosphatase 108 39 - 117 U/L    Total Bilirubin 0.6 0.2 - 1.2 mg/dL    eGFR Non African Amer 61 >60 mL/min/1.73    Globulin 3.7 gm/dL    A/G Ratio 1.1 g/dL    BUN/Creatinine Ratio 10.6 7.0 - 25.0    Anion Gap 11.0 mmol/L   Results for orders placed or performed in visit on 04/23/19   MUSA Fibrosure   Result Value Ref Range    Fibrosis Score (References) 0.82 (H) 0.00 - 0.21    Fibrosis Stage (Reference) Comment     Steatosis Score (Reference) 0.57 (H) 0.00 - 0.30    Steatosis Grade (Reference) Comment     MUSA Score (Reference) 0.75 (H) 0.25    Musa Grade (Reference) Comment     Height: (Reference) 64 in    Weight: (Reference) 212 LBS    Alpha 2-Macroglobulins, Qn 428 (H) 110 - 276 mg/dL    Haptoglobin 46 34 - 200 mg/dL    Apolipoprotein A-1 119 116 - 209 mg/dL    Total Bilirubin 0.6 0.0 - 1.2 mg/dL    GGT 52 0 - 60 IU/L    ALT (SGPT) 42 (H) 0 - 40 IU/L    AST (SGOT) P5P  (Reference) 70 (H) 0 - 40 IU/L    Cholesterol, Total (Reference) 132 100 - 199 mg/dL    Glucose, Serum (Reference) 103 (H) 65 - 99 mg/dL    Triglycerides 114 0 - 149 mg/dL    Interpretations: (Reference) Comment     Fibrosis Scoring: Comment     Steatosis Grading (Reference) Comment     Judge Scoring (Reference) Comment     Limitations: (Reference) Comment     Comment (Reference) Comment    CBC Auto Differential   Result Value Ref Range    WBC 4.14 3.40 - 10.80 10*3/mm3    RBC 5.36 (H) 3.77 - 5.28 10*6/mm3    Hemoglobin 13.9 12.0 - 15.9 g/dL    Hematocrit 43.8 34.0 - 46.6 %    MCV 81.7 79.0 - 97.0 fL    MCH 25.9 (L) 26.6 - 33.0 pg    MCHC 31.7 31.5 - 35.7 g/dL    RDW 15.5 (H) 12.3 - 15.4 %    RDW-SD 46.1 37.0 - 54.0 fl    Platelets 64 (L) 140 - 450 10*3/mm3    Neutrophil % 59.0 42.7 - 76.0 %    Lymphocyte % 31.6 19.6 - 45.3 %    Monocyte % 5.8 5.0 - 12.0 %    Eosinophil % 2.9 0.3 - 6.2 %    Basophil % 0.5 0.0 - 1.5 %    Immature Grans % 0.2 0.0 - 0.5 %    Neutrophils, Absolute 2.44 1.70 - 7.00 10*3/mm3    Lymphocytes, Absolute 1.31 0.70 - 3.10 10*3/mm3    Monocytes, Absolute 0.24 0.10 - 0.90 10*3/mm3    Eosinophils, Absolute 0.12 0.00 - 0.40 10*3/mm3    Basophils, Absolute 0.02 0.00 - 0.20 10*3/mm3    Immature Grans, Absolute 0.01 0.00 - 0.05 10*3/mm3    nRBC 0.0 0.0 - 0.2 /100 WBC    Differential Type      WBC 4.14 3.40 - 10.80 10*3/mm3     *Note: Due to a large number of results and/or encounters for the requested time period, some results have not been displayed. A complete set of results can be found in Results Review.       Some portions of this note have been dictated using voice recognition software and may contain errors and/or omissions.

## 2019-09-16 ENCOUNTER — OFFICE VISIT (OUTPATIENT)
Dept: PHYSICAL THERAPY | Facility: CLINIC | Age: 59
End: 2019-09-16

## 2019-09-16 DIAGNOSIS — M25.562 CHRONIC PAIN OF BOTH KNEES: ICD-10-CM

## 2019-09-16 DIAGNOSIS — M25.561 ACUTE PAIN OF BOTH KNEES: Primary | ICD-10-CM

## 2019-09-16 DIAGNOSIS — M23.252 DERANGEMENT OF POSTERIOR HORN OF LATERAL MENISCUS OF LEFT KNEE DUE TO OLD INJURY: ICD-10-CM

## 2019-09-16 DIAGNOSIS — M17.0 PRIMARY OSTEOARTHRITIS OF BOTH KNEES: ICD-10-CM

## 2019-09-16 DIAGNOSIS — G89.29 CHRONIC PAIN OF BOTH KNEES: ICD-10-CM

## 2019-09-16 DIAGNOSIS — M25.561 CHRONIC PAIN OF BOTH KNEES: ICD-10-CM

## 2019-09-16 DIAGNOSIS — M25.562 ACUTE PAIN OF BOTH KNEES: Primary | ICD-10-CM

## 2019-09-16 PROCEDURE — 97110 THERAPEUTIC EXERCISES: CPT | Performed by: PHYSICAL THERAPIST

## 2019-09-16 PROCEDURE — 97162 PT EVAL MOD COMPLEX 30 MIN: CPT | Performed by: PHYSICAL THERAPIST

## 2019-09-16 NOTE — PROGRESS NOTES
"  Physical Therapy Initial Evaluation and Plan of Care      Patient: Amira Shannon   : 1960  Diagnosis/ICD-10 Code:  Acute pain of both knees [M25.561, M25.562]  Referring practitioner: Justus Lewis, *  Date of Initial Visit: 2019  Today's Date: 2019  Patient seen for 1 sessions    Next MD appt: 2019.  Recertification: N/A         Subjective Questionnaire: LEFS:       Subjective Evaluation    History of Present Illness  Onset date: Chronic.    Subjective comment: Patient rpeorts B knee pain for a long time. She reports she has never had injections in her knees. She has had multiple bouts of physical therapy to include aquatic PT over the years.  Patient Occupation: Retired Quality of life: fair    Pain  Current pain ratin  At best pain ratin  At worst pain rating: 10  Location: B knees, feet, back  Quality: dull ache, knife-like and throbbing  Relieving factors: rest  Aggravating factors: squatting, movement, stairs, standing, ambulation and prolonged positioning  Progression: no change    Social Support  Lives in: one-story house  Lives with: spouse    Diagnostic Tests  X-ray: abnormal (B knee DJD- mild)  MRI studies: abnormal (L knee torn meniscus)    Treatments  Previous treatment: physical therapy and medication  Patient Goals  Patient goals for therapy: increased strength, independence with ADLs/IADLs and increased motion  Patient goal: \"To walk better\"           Objective       Observations   Left Knee   Positive for edema.     Tenderness   Left Knee   Tenderness in the lateral joint line and popliteal fossa.     Right Knee   Tenderness in the popliteal fossa.     Active Range of Motion   Left Knee   Flexion: 102 degrees with pain  Extension: 0 degrees     Right Knee   Flexion: 102 degrees with pain  Extension: 0 degrees     Patellar Mobility   Left Knee Patellar tendons within functional limits include the medial, lateral, superior and inferior.     Right " Knee Patellar tendons within functional limits include the medial, lateral, superior and inferior.     Additional Patellar Mobility Details  Painful for patient inferior/superior B.    Patellar Static Positioning   Left Knee: WFL  Right Knee: WFL    Strength/Myotome Testing     Left Hip   Planes of Motion   Flexion: 4+  Extension: 4+  Abduction: 4+  Adduction: 4+    Right Hip   Planes of Motion   Flexion: 4+  Extension: 4+  Abduction: 4+  Adduction: 4+    Left Knee   Flexion: 4  Extension: 4+    Right Knee   Flexion: 4  Extension: 4+    Left Ankle/Foot   Dorsiflexion: 5  Plantar flexion: 4    Right Ankle/Foot   Dorsiflexion: 5  Plantar flexion: 4    Swelling     Left Knee Girth Measurement (cm)   Joint line: 40.9.    Right Knee Girth Measurement (cm)   Joint line: 39.0.    Ambulation   Weight-Bearing Status   Weight-Bearing Status (Right): weight-bearing as tolerated    Assistive device used: single point cane    Additional Weight-Bearing Status Details  In R UE, too tall for patient and was adjusted to correct height for proper use.    Ambulation: Level Surfaces   Ambulation with assistive device: independent    Observational Gait   Gait: antalgic   Decreased walking speed and stride length.   Left foot contact pattern: foot flat  Right foot contact pattern: foot flat  Base of support: normal    Quality of Movement During Gait   Trunk  Forward lean.     Knee    Knee (Left): Positive increased flexion during stance.   Knee (Right): Positive increased flexion during stance.     Foot Alignment    Foot Alignment (Left): Positive flat foot.   Foot Alignment (Right): Positive flat foot.     Additional Quality of Movement During Gait Details  Wearing B tennis shoes.         Assessment & Plan     Assessment  Impairments: abnormal gait, abnormal or restricted ROM, activity intolerance, impaired balance, impaired physical strength, lacks appropriate home exercise program, pain with function, safety issue and weight-bearing  intolerance  Assessment details: Patient has chronic knee pain with poor gait and balance and would benefit from PT for instruction in an HEP for the patient to continue.  Prognosis: poor  Prognosis details: Barriers to Rehab: Include significant or possible arthritic/degenerative changes that have occurred within the joints, The chronicity of this issue, The patient's obesity.    Safety Issues: Fall risk    Functional Limitations: lifting, walking, uncomfortable because of pain, moving in bed, sitting, standing and stooping  Goals  Plan Goals: Short Term Goals:  1) Patient I with HEP and have additoins/changes by next recertification.    2) AROM B knee flexion >= 110°.    3) Patient able to ambulate with SC, good heel to toe gait cycle >= 100', non-antalgicaly.    4) Patient able to perform sit to/from stand with 1 UE A = WB B LE x10, no increase in pain.    5) B LE 5/5.    6) Patient to be I with final HEP and progression of HEP.    7) D/C with a final HEP and free 30 day fitness formula membership.    Plan  Therapy options: will be seen for skilled physical therapy services  Planned modality interventions: cryotherapy and high voltage pulsed current (pain management)  Planned therapy interventions: ADL retraining, balance/weight-bearing training, body mechanics training, flexibility, functional ROM exercises, gait training, home exercise program, IADL retraining, neuromuscular re-education, strengthening, stretching, therapeutic activities and transfer training  Duration in visits: 20  Treatment plan discussed with: patient  Plan details: Patient to have 3-4 visits including initial evaluation day for instruction in a HEP patient can continue I.        Visit Diagnoses:    ICD-10-CM ICD-9-CM   1. Acute pain of both knees M25.561 338.19    M25.562 719.46   2. Primary osteoarthritis of both knees M17.0 715.16   3. Chronic pain of both knees M25.561 719.46    M25.562 338.29    G89.29    4. 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 M23.252 717.43       Timed:  Manual Therapy:         mins  35218;  Therapeutic Exercise:    46     mins  74165;     Neuromuscular Dalila:        mins  04916;    Therapeutic Activity:          mins  82536;     Gait Training:           mins  99647;     Ultrasound:          mins  98145;    Electrical Stimulation:         mins  55446 ( );    Untimed:  Electrical Stimulation:         mins  58171 ( );  Mechanical Traction:         mins  86891;     Timed Treatment:   46   mins   Total Treatment:     64   mins    PT SIGNATURE: Marcella Perales, PT DPT, [facility]   DATE TREATMENT INITIATED: 9/16/2019    Initial Certification  Certification Period: 12/15/2019  I certify that the therapy services are furnished while this patient is under my care.  The services outlined above are required by this patient, and will be reviewed every 90 days.     PHYSICIAN: Justsu Lewis MD      DATE:     Please sign and return via fax to  .. Thank you, [facility].

## 2019-09-18 ENCOUNTER — TREATMENT (OUTPATIENT)
Dept: PHYSICAL THERAPY | Facility: CLINIC | Age: 59
End: 2019-09-18

## 2019-09-18 DIAGNOSIS — M79.672 BILATERAL FOOT PAIN: ICD-10-CM

## 2019-09-18 DIAGNOSIS — M72.2 PLANTAR FASCIITIS, BILATERAL: ICD-10-CM

## 2019-09-18 DIAGNOSIS — M25.561 ACUTE PAIN OF BOTH KNEES: ICD-10-CM

## 2019-09-18 DIAGNOSIS — M79.671 BILATERAL FOOT PAIN: ICD-10-CM

## 2019-09-18 DIAGNOSIS — M17.0 PRIMARY OSTEOARTHRITIS OF BOTH KNEES: ICD-10-CM

## 2019-09-18 DIAGNOSIS — M77.32 BILATERAL CALCANEAL SPURS: Primary | ICD-10-CM

## 2019-09-18 DIAGNOSIS — M23.252 DERANGEMENT OF POSTERIOR HORN OF LATERAL MENISCUS OF LEFT KNEE DUE TO OLD INJURY: ICD-10-CM

## 2019-09-18 DIAGNOSIS — G89.29 CHRONIC PAIN OF BOTH KNEES: ICD-10-CM

## 2019-09-18 DIAGNOSIS — M25.561 CHRONIC PAIN OF BOTH KNEES: ICD-10-CM

## 2019-09-18 DIAGNOSIS — K75.81 NONALCOHOLIC STEATOHEPATITIS (NASH): ICD-10-CM

## 2019-09-18 DIAGNOSIS — M25.562 CHRONIC PAIN OF BOTH KNEES: ICD-10-CM

## 2019-09-18 DIAGNOSIS — M25.562 ACUTE PAIN OF BOTH KNEES: ICD-10-CM

## 2019-09-18 DIAGNOSIS — M77.31 BILATERAL CALCANEAL SPURS: Primary | ICD-10-CM

## 2019-09-18 PROCEDURE — 97110 THERAPEUTIC EXERCISES: CPT | Performed by: PHYSICAL THERAPIST

## 2019-09-18 PROCEDURE — 97116 GAIT TRAINING THERAPY: CPT | Performed by: PHYSICAL THERAPIST

## 2019-09-18 NOTE — PROGRESS NOTES
Physical Therapy Daily Progress Note      Patient: Amira Shannon   : 1960  Referring practitioner: Justus Lewis, *  Date of Initial Visit: Type: THERAPY  Noted: 2019  Today's Date: 2019  Patient seen for 2 sessions  Next MD appt: 2019.              Subjective     Objective       Ambulation     Ambulation: Level Surfaces     Additional Level Surfaces Ambulation Details  Pt ambulated with cane with shorten stride length with step too gait with slow jareth. Pt required verbal cues on proper technique with step through sequence. Pt ambulated 150x2 one rest break     See Exercise, Manual, and Modality Logs for complete treatment.       Assessment & Plan     Assessment  Assessment details: Pt working toward goals. No goals met at this time. Pt tolerated new ex LAQ and new stretches of st ham S, Lunge S, incline S. Pt given HEP with pictures with written instruction.. Pt verbalized and demonstrated understanding of HEP. Pt ambulated with cane with slow jareth and step too sequence. Pt had to have demo/verbal cues for proper step through gait sequence.     Goals  Plan Goals: Goals  Plan Goals: Short Term Goals:  1) Patient I with HEP and have additoins/changes by next recertification. (Ongoing/Progressing)    2) AROM B knee flexion >= 110°. (Ongoing/Progressing)    3) Patient able to ambulate with SC, good heel to toe gait cycle >= 100', non-antalgicaly. (Ongoing/Progressing)    4) Patient able to perform sit to/from stand with 1 UE A = WB B LE x10, no increase in pain.(Ongoing/Progressing)    5) B LE 5/5. ( ongoing)    6) Patient to be I with final HEP and progression of HEP.(ongoing)    7) D/C with a final HEP and free 30 day fitness formula membership. (ongoing)    Plan  Plan details: Add standing marching and mini squats.        Visit Diagnoses:    ICD-10-CM ICD-9-CM   1. Bilateral calcaneal spurs M77.31 726.73    M77.32    2. Bilateral foot pain M79.671 729.5    M79.672     3. Plantar fasciitis, bilateral M72.2 728.71   4. Nonalcoholic steatohepatitis (MUSA) K75.81 571.8   5. Acute pain of both knees M25.561 338.19    M25.562 719.46   6. Primary osteoarthritis of both knees M17.0 715.16   7. Chronic pain of both knees M25.561 719.46    M25.562 338.29    G89.29    8. Derangement of posterior horn of lateral meniscus of left knee due to old injury M23.252 717.43       Progress per Plan of Care and Progress strengthening /stabilization /functional activity           Timed:  Manual Therapy:         mins  64949;  Therapeutic Exercise:      36   mins  66367;     Neuromuscular Dalila:        mins  09256;    Therapeutic Activity:          mins  17946;     Gait Training:      10     mins  39001;     Ultrasound:          mins  86311;    Electrical Stimulation:         mins  65261 ( );    Untimed:  Electrical Stimulation:         mins  84872 ( );  Mechanical Traction:         mins  34409;     Timed Treatment:    46  mins   Total Treatment:     61  mins  Marija Bermeo PTA  Physical Therapist Assistant

## 2019-09-23 ENCOUNTER — TREATMENT (OUTPATIENT)
Dept: PHYSICAL THERAPY | Facility: CLINIC | Age: 59
End: 2019-09-23

## 2019-09-23 DIAGNOSIS — M25.562 ACUTE PAIN OF BOTH KNEES: Primary | ICD-10-CM

## 2019-09-23 DIAGNOSIS — M25.561 ACUTE PAIN OF BOTH KNEES: Primary | ICD-10-CM

## 2019-09-23 DIAGNOSIS — M25.561 CHRONIC PAIN OF BOTH KNEES: ICD-10-CM

## 2019-09-23 DIAGNOSIS — G89.29 CHRONIC PAIN OF BOTH KNEES: ICD-10-CM

## 2019-09-23 DIAGNOSIS — M25.562 CHRONIC PAIN OF BOTH KNEES: ICD-10-CM

## 2019-09-23 DIAGNOSIS — M23.252 DERANGEMENT OF POSTERIOR HORN OF LATERAL MENISCUS OF LEFT KNEE DUE TO OLD INJURY: ICD-10-CM

## 2019-09-23 DIAGNOSIS — M17.0 PRIMARY OSTEOARTHRITIS OF BOTH KNEES: ICD-10-CM

## 2019-09-23 PROCEDURE — 97110 THERAPEUTIC EXERCISES: CPT | Performed by: PHYSICAL THERAPIST

## 2019-09-23 NOTE — PROGRESS NOTES
Physical Therapy Daily Progress Note    Patient: Amira Shannon   : 1960  Diagnosis/ICD-10 Code:     Diagnosis Plan   1. Acute pain of both knees     2. Primary osteoarthritis of both knees     3. Chronic pain of both knees     4. Derangement of posterior horn of lateral meniscus of left knee due to old injury       Referring practitioner: Justus Lewis, *  Date of Initial Visit: Type: THERAPY  Noted: 2019  Today's Date: 2019  Patient seen for 3 sessions      PT Recheck Due: N/A  PT MD Visit:        Amira Shannon        Subjective Evaluation    History of Present Illness    Subjective comment: reports that she has a lot of pain in her legs and in her heels. prefers not to attempt the pro II bike because of her feet pain. Pain  Current pain ratin             Objective       Ambulation     Comments   Patient has decreased jareth with decreased heel strike bilaterally, decreased toe off bilaterally, and decreased knee extension with walking. Utilizes a rolling walker for gait.      See Exercise, Manual, and Modality Logs for complete treatment.       Assessment & Plan     Assessment  Assessment details: Patient able to complete new therex today in standing. Good effort throughout treatment.     Goals  Plan Goals: Short Term Goals:  1) Patient I with HEP and have additoins/changes by next recertification. (Met)    2) AROM B knee flexion >= 110°. (Ongoing/Progressing)    3) Patient able to ambulate with SC, good heel to toe gait cycle >= 100', non-antalgicaly. (Ongoing/Progressing)    4) Patient able to perform sit to/from stand with 1 UE A = WB B LE x10, no increase in pain.(Ongoing/Progressing)    5) B LE 5/5. ( ongoing)    6) Patient to be I with final HEP and progression of HEP.(ongoing)    7) D/C with a final HEP and free 30 day fitness formula membership. (ongoing)    Plan  Plan details: Per POC, one more visit. Finalize HEP to assure independence and ability to  complete on her own.       Progress strengthening /stabilization /functional activity and Anticipate DC next Visit            Timed:  Manual Therapy:         mins  16393;  Therapeutic Exercise:    48     mins  64122;     Neuromuscular Dalila:        mins  71449;    Therapeutic Activity:          mins  33574;     Gait Training:           mins  64212;     Ultrasound:          mins  08984;    Electrical Stimulation:         mins  08705 ( );    Untimed:  Electrical Stimulation:         mins  86267 ( );  Mechanical Traction:         mins  36033;     Timed Treatment:   48   mins   Total Treatment:     63   mins  Cathleen Katz PTA  Physical Therapist Assistant

## 2019-09-25 ENCOUNTER — TREATMENT (OUTPATIENT)
Dept: PHYSICAL THERAPY | Facility: CLINIC | Age: 59
End: 2019-09-25

## 2019-09-25 DIAGNOSIS — M25.562 ACUTE PAIN OF BOTH KNEES: Primary | ICD-10-CM

## 2019-09-25 DIAGNOSIS — M25.561 CHRONIC PAIN OF BOTH KNEES: ICD-10-CM

## 2019-09-25 DIAGNOSIS — M17.0 PRIMARY OSTEOARTHRITIS OF BOTH KNEES: ICD-10-CM

## 2019-09-25 DIAGNOSIS — M25.561 ACUTE PAIN OF BOTH KNEES: Primary | ICD-10-CM

## 2019-09-25 DIAGNOSIS — M23.252 DERANGEMENT OF POSTERIOR HORN OF LATERAL MENISCUS OF LEFT KNEE DUE TO OLD INJURY: ICD-10-CM

## 2019-09-25 DIAGNOSIS — M25.562 CHRONIC PAIN OF BOTH KNEES: ICD-10-CM

## 2019-09-25 DIAGNOSIS — G89.29 CHRONIC PAIN OF BOTH KNEES: ICD-10-CM

## 2019-09-25 PROCEDURE — 97110 THERAPEUTIC EXERCISES: CPT | Performed by: PHYSICAL THERAPIST

## 2019-09-25 NOTE — PROGRESS NOTES
Physical Therapy Daily Progress Note/Discharge Note    Patient: Amira Shannon   : 1960  Diagnosis/ICD-10 Code:     Diagnosis Plan   1. Acute pain of both knees     2. Primary osteoarthritis of both knees     3. Chronic pain of both knees     4. Derangement of posterior horn of lateral meniscus of left knee due to old injury       Referring practitioner: Justus Lewis, *  Date of Initial Visit: Type: THERAPY  Noted: 2019  Today's Date: 2019  Patient seen for 4 sessions      PT Recheck Due: N/A  PT MD Visit: TBD       Amira Shannon reports: very little improvement        Subjective Evaluation    History of Present Illness    Subjective comment: states that she has a hard time after she has been on her feet during the day. reports that she has to use heat to get her legs to relax in the back of them at night. Pain  Current pain ratin             Objective       Active Range of Motion   Left Knee   Flexion: 129 degrees   Extension: Left knee active extension: 2° of hyperextension.     Right Knee   Flexion: 124 degrees   Extension: Right knee active extension: 2° of hyperextension.     Strength/Myotome Testing     Left Hip   Planes of Motion   Flexion: WFL  Extension: WFL  Abduction: WFL  Adduction: WFL    Right Hip   Planes of Motion   Flexion: WFL  Extension: WFL  Abduction: WFL  Adduction: WFL    Left Knee   Flexion: WFL  Extension: WFL    Right Knee   Flexion: WFL  Extension: WFL     See Exercise, Manual, and Modality Logs for complete treatment.       Assessment & Plan     Assessment  Assessment details: Patient has met goals for AROM and for strengthening for LE's. Patient continues to have pain in bilateral knees and is limited secondary to pain in bilateral heels as well due to heel spurs. Patient unable to tolerate gait with a standard cane at this time. Demonstrates independence with HEP.     Goals  Plan Goals: Short Term Goals:  1) Patient I with HEP and have  additoins/changes by next recertification. (Met)    2) AROM B knee flexion >= 110°. (met)    3) Patient able to ambulate with SC, good heel to toe gait cycle >= 100', non-antalgicaly. (unmet)    4) Patient able to perform sit to/from stand with 1 UE A = WB B LE x10, no increase in pain.(met)    5) B LE 5/5. ( met)    6) Patient to be I with final HEP and progression of HEP.(met)    7) D/C with a final HEP and free 30 day fitness formula membership. (met)     Plan  Plan details: Discharge to independent [facility] with free 30 days fitness membership      Other Discharge            Timed:  Manual Therapy:         mins  92589;  Therapeutic Exercise:    41     mins  01606;     Neuromuscular Dalila:        mins  36890;    Therapeutic Activity:          mins  04878;     Gait Training:           mins  54115;     Ultrasound:          mins  38824;    Electrical Stimulation:         mins  14707 ( );    Untimed:  Electrical Stimulation:         mins  46304 ( );  Mechanical Traction:         mins  69828;     Timed Treatment:   41   mins   Total Treatment:     56   mins  Cathleen Katz PTA  Physical Therapist Assistant

## 2019-10-01 RX ORDER — PRUCALOPRIDE 2 MG/1
TABLET, FILM COATED ORAL
Qty: 30 TABLET | Refills: 1 | Status: SHIPPED | OUTPATIENT
Start: 2019-10-01 | End: 2019-11-19

## 2019-10-04 ENCOUNTER — TELEPHONE (OUTPATIENT)
Dept: GASTROENTEROLOGY | Facility: CLINIC | Age: 59
End: 2019-10-04

## 2019-10-04 ENCOUNTER — OFFICE VISIT (OUTPATIENT)
Dept: PODIATRY | Facility: CLINIC | Age: 59
End: 2019-10-04

## 2019-10-04 VITALS — WEIGHT: 207 LBS | HEIGHT: 64 IN | HEART RATE: 78 BPM | OXYGEN SATURATION: 94 % | BODY MASS INDEX: 35.34 KG/M2

## 2019-10-04 DIAGNOSIS — I87.2 VENOUS INSUFFICIENCY: Primary | ICD-10-CM

## 2019-10-04 DIAGNOSIS — M79.605 PAIN IN BOTH LOWER EXTREMITIES: ICD-10-CM

## 2019-10-04 DIAGNOSIS — M79.604 PAIN IN BOTH LOWER EXTREMITIES: ICD-10-CM

## 2019-10-04 PROCEDURE — 99213 OFFICE O/P EST LOW 20 MIN: CPT | Performed by: PODIATRIST

## 2019-10-04 NOTE — TELEPHONE ENCOUNTER
Patient has been contacted and made aware that Blaine Perales MS, PA-C recommends she try Zelnorm 6mg bid. This Rx has been sent to [facility]. Patient is aware to call our office to let us know if that Rx is not covered by her insurance. Patient voiced understanding.

## 2019-10-04 NOTE — PROGRESS NOTES
"Amiraluis Shannon  1960  58 y.o. female     Patient presents today with a complaint of continued bilateral foot/ankle pain.      10/04/2019     Chief Complaint   Patient presents with   • Left Ankle - Follow-up, Heel Spurs   • Right Ankle - Follow-up, Heel Spurs   • Skin Ulcer       History of Present Illness    Patient presents to clinic with continued complaint of lower extremity pain and swelling.  She currently rates her pain as a 7 out of 10.  She is not wearing her compression stockings or ankle braces.  She states that both are too tight.  She has no other complaints today.      Past Medical History:   Diagnosis Date   • Acid reflux    • Altered bowel function    • Arthropathy of lumbar facet joint    • Bleeding disorder (CMS/HCC)    • C. difficile diarrhea 2015   • Constipation    • Corns and callus    • Depression    • Disease related peripheral neuropathy    • Epigastric pain    • Fatty liver    • Hammer toe    • Headache    • Hiatal hernia    • History of transfusion    • Hyperlipidemia    • Knee pain    • Localized, primary osteoarthritis of the ankle and foot     Localized, primary osteoarthritis of the ankle and/or foot   • Mendoza's metatarsalgia     Mendoza's metatarsalgia - 2nd interspace on right   • Nausea and vomiting    • Neuralgia and neuritis     Neuralgia, neuritis, and radiculitis, unspecified   • Neuropathy    • Obstructive sleep apnea     Obstructive sleep apnea (adult) (pediatric)    • CHAI on CPAP     \"C-Pap at night  (unconfirmed)\"   • Osteoarthritis    • Pain in foot     Pain in unspecified foot - sees a podiatrist   • Pain in joint, ankle and foot     Joint pain in ankle and foot      • Pain radiating to back     Pain radiating to lumbar region of back   • Plantar fasciitis    • PONV (postoperative nausea and vomiting)    • Restless leg syndrome    • Secondary hypertension     Secondary hypertension, unspecified   • Sinusitis    • Sleep apnea    • Tongue anomaly     lesion "         Past Surgical History:   Procedure Laterality Date   • CARPAL TUNNEL RELEASE Left 2018    Procedure: CARPAL TUNNEL RELEASE - left;  Surgeon: Justus Lewis MD;  Location: [facility] OR;  Service: Orthopedics   • CARPAL TUNNEL RELEASE Right 2019    Procedure: carpal tunnel release right hand with local/monitored anesthesia care;  Surgeon: Justus Lewis MD;  Location: [facility] OR;  Service: Orthopedics   •  SECTION     • CHOLECYSTECTOMY     • COLONOSCOPY  2013   • COLONOSCOPY N/A 10/17/2018    Procedure: COLONOSCOPY;  Surgeon: Russell Del Rio MD;  Location: [facility] ENDOSCOPY;  Service: Gastroenterology   • DIRECT LARYNGOSCOPY, ESOPHAGOSCOPY, BRONCHOSCOPY N/A 2017    Procedure: DIRECT LARYNGOSCOPY AND;  Surgeon: Live Bolton MD;  Location: [facility] OR;  Service:    • ENDOSCOPY  2013    Colon endoscopy 50917 (1) - Internal & external hemorrhoids found. Stool found.   • ENDOSCOPY  2013    EGD w/ tube 74648 (1) - Normal esophagus. Gastritis in stomach. Biopsy taken. Normal dudoenum. Biopsy taken.   • ENDOSCOPY N/A 10/17/2018    Procedure: ESOPHAGOGASTRODUODENOSCOPY--eval varices;  Surgeon: Russell Del Rio MD;  Location: [facility] ENDOSCOPY;  Service: Gastroenterology   • FOOT SURGERY  2013    Foot/toes surgery procedure (1) - Arthroplasty of toes 4 and 5 of right foot.   • HERNIA REPAIR     • HERNIA REPAIR      hital   • HYSTERECTOMY     • LIVER BIOPSY     • NERVE BLOCK  2016    Injection for nerve block (1) - Lumbar medial branch block.   • OTHER SURGICAL HISTORY  2012    Inj(s) Tend-Sheath, Ligament, Single  (1) - [facility]    • OTHER SURGICAL HISTORY  2013    Small Joint Injection/Aspiration  (2) - [facility]ICK[facility] ([facility])    • OTHER SURGICAL HISTORY      bowel obstruction x2   • OTHER SURGICAL HISTORY      gland removed from neck   • SUBLINGUAL SALIVARY CYST EXCISION N/A 2017    Procedure:  EXCISION OF LEFT  TONGUE LESION WITH CLOSURE;  Surgeon: Live Bolton MD;  Location: [facility];  Service:    • TUBAL ABDOMINAL LIGATION     • UPPER GASTROINTESTINAL ENDOSCOPY  2013   • UPPER GASTROINTESTINAL ENDOSCOPY  10/17/2018         Family History   Problem Relation Age of Onset   • Cancer Other    • Diabetes Other    • Heart disease Other    • Hypertension Mother    • Cancer Mother    • Diabetes Mother    • Hypertension Father    • Heart attack Father    • Cancer Father    • Heart disease Father    • Thyroid disease Maternal Aunt        Allergies   Allergen Reactions   • Other      Pt states that taking steroids either in pill or injection form make her have blisters in her mouth and she feels like she is on fire on the inside/ has hx of c diff and possible MRSA     • Corticosteroids Rash   • Kenalog  [Triamcinolone Acetonide] Rash   • Sulfa Antibiotics Rash     Sulfa (Sulfonamide Antibiotics)       Social History     Socioeconomic History   • Marital status:      Spouse name: Not on file   • Number of children: Not on file   • Years of education: Not on file   • Highest education level: Not on file   Tobacco Use   • Smoking status: Former Smoker     Packs/day: 2.00     Years: 15.00     Pack years: 30.00     Types: Cigarettes     Last attempt to quit: 2000     Years since quittin.7   • Smokeless tobacco: Never Used   Substance and Sexual Activity   • Alcohol use: No   • Drug use: No   • Sexual activity: Defer     Comment: Marital Status:          Current Outpatient Medications   Medication Sig Dispense Refill   • cetirizine (zyrTEC) 10 MG tablet Take 1 tablet by mouth Daily As Needed for Allergies. 30 tablet 11   • cyclobenzaprine (FLEXERIL) 10 MG tablet Take 10 mg by mouth 3 (Three) Times a Day As Needed for Muscle Spasms.     • DULoxetine (CYMBALTA) 30 MG capsule Take 30 mg by mouth Daily.     • gabapentin (NEURONTIN) 800 MG tablet Take 800 mg by mouth 4 (Four) Times a Day.     •  "Melatonin 10 MG capsule Take  by mouth Every Night.     • meloxicam (MOBIC) 15 MG tablet Take 1 tablet by mouth Daily. 30 tablet 2   • metFORMIN (GLUCOPHAGE) 500 MG tablet Take 1 tablet by mouth 2 (Two) Times a Day With Meals. 60 tablet 6   • MOTEGRITY 2 MG tablet TAKE 1 TABLET BY MOUTH ONCE DAILY 30 tablet 1   • nystatin (MYCOSTATIN) 489230 UNIT/GM powder Apply  topically to the appropriate area as directed 4 (Four) Times a Day As Needed.     • omeprazole (priLOSEC) 40 MG capsule Take 40 mg by mouth Daily.     • ondansetron (ZOFRAN) 8 MG tablet Take 1 tablet by mouth Every 8 (Eight) Hours As Needed for Nausea or Vomiting. 30 tablet 1   • oxyCODONE (oxyCONTIN) 10 MG 12 hr tablet Take 10 mg by mouth 4 (Four) Times a Day As Needed.     • polyethylene glycol (MIRALAX) packet Take 17 g by mouth Daily As Needed.     • sennosides-docusate sodium (SENOKOT-S) 8.6-50 MG tablet Take 2 tablets by mouth Daily. 60 tablet 1   • spironolactone (ALDACTONE) 25 MG tablet TAKE 1 TABLET BY MOUTH ONCE DAILY 30 tablet 5   • tegaserod (ZELNORM) 6 MG tablet Take 1 tablet by mouth 2 (Two) Times a Day Before Meals for 30 days. 60 tablet 0   • torsemide (DEMADEX) 10 MG tablet Take 1 tablet by mouth Daily. 30 tablet 6     No current facility-administered medications for this visit.          OBJECTIVE    Pulse 78   Ht 162.6 cm (64\")   Wt 93.9 kg (207 lb)   LMP  (LMP Unknown)   SpO2 94%   BMI 35.53 kg/m²       Review of Systems   Constitutional: Positive for activity change and fatigue.   HENT: Negative.    Eyes: Negative.    Respiratory: Positive for shortness of breath.    Cardiovascular: Positive for leg swelling.   Gastrointestinal: Positive for abdominal pain and constipation.   Endocrine: Negative.    Genitourinary: Positive for enuresis.   Musculoskeletal: Positive for back pain and joint swelling.        Bilateral foot/ankle pain  Joint pain   Skin: Negative.    Allergic/Immunologic: Negative.    Neurological: Negative.  "   Hematological: Negative.    Psychiatric/Behavioral: Negative.          Physical Exam   Constitutional: She is oriented to person, place, and time. She appears well-developed and well-nourished. No distress.   HENT:   Head: Normocephalic and atraumatic.   Nose: Nose normal.   Eyes: Conjunctivae and EOM are normal. Pupils are equal, round, and reactive to light.   Pulmonary/Chest: Effort normal. No respiratory distress. She has no wheezes.   Neurological: She is alert and oriented to person, place, and time. She displays normal reflexes.   Skin: Skin is warm and dry. Capillary refill takes less than 2 seconds.   Psychiatric: She has a normal mood and affect. Her behavior is normal.   Vitals reviewed.    Lower Extremity:     Cardiovascular:    DP/PT pulses palpable    CFT brisk  to all digits  Skin temp is warm to warm from proximal tibia to distal digits  Pedal hair growth decreased.   No erythema noted   Edema noted to bilateral LEs    Musculoskeletal:  Muscle strength is 4/5 for all muscle groups tested   Diffuse pain on palpation noted    Ankle joint range of motion is decreased with pain bilateral  Subtalar joint range of motion is decreased with pain bilateral  Midtarsal joint range of motion is decreased with pain bilateral    Dermatological:   Skin is warm, dry and intact    Webspaces 1-4 bilateral are clean, dry and intact.   No subcutaneous nodules or masses noted      Neurological:   Protective sensation decreased  Sensation intact to light touch           Procedures          ASSESSMENT AND PLAN    Amira was seen today for skin ulcer, follow-up, heel spurs, follow-up and heel spurs.    Diagnoses and all orders for this visit:    Venous insufficiency    Pain in both lower extremities      - Patient with continued lower extremity pain and significant swelling.  - Rx for lymphedema pumps and compression stockings  - All questions were answered to the patients satisfaction.  - Recheck 8 weeks            This  document has been electronically signed by Daren Marie DPM on October 4, 2019 12:41 PM     10/4/2019  12:41 PM

## 2019-10-04 NOTE — TELEPHONE ENCOUNTER
----- Message from Blaine Perales PA-C sent at 10/3/2019  4:50 PM CDT -----  I think we have tried everything? Zelnorm?  ----- Message -----  From: Violeta Toth MA  Sent: 10/3/2019   9:57 AM  To: Blaine Perales PA-C    Patient states that she is still feeling full at the top of her abd and she also has a lot of nausea she has not heard from Urology yet which I am checking with [facility] about. She just wanted to know if you had any further recommendations to help her?

## 2019-10-07 DIAGNOSIS — M54.50 LOW BACK PAIN, UNSPECIFIED BACK PAIN LATERALITY, UNSPECIFIED CHRONICITY, UNSPECIFIED WHETHER SCIATICA PRESENT: Primary | ICD-10-CM

## 2019-10-08 ENCOUNTER — OFFICE VISIT (OUTPATIENT)
Dept: ORTHOPEDIC SURGERY | Facility: CLINIC | Age: 59
End: 2019-10-08

## 2019-10-08 VITALS — WEIGHT: 209 LBS | BODY MASS INDEX: 35.68 KG/M2 | HEIGHT: 64 IN

## 2019-10-08 DIAGNOSIS — M54.50 LOW BACK PAIN, UNSPECIFIED BACK PAIN LATERALITY, UNSPECIFIED CHRONICITY, UNSPECIFIED WHETHER SCIATICA PRESENT: Primary | ICD-10-CM

## 2019-10-08 PROCEDURE — 99214 OFFICE O/P EST MOD 30 MIN: CPT | Performed by: NURSE PRACTITIONER

## 2019-10-08 RX ORDER — ESCITALOPRAM OXALATE 5 MG/1
5 TABLET ORAL DAILY
COMMUNITY
End: 2021-03-29 | Stop reason: ALTCHOICE

## 2019-10-08 NOTE — PROGRESS NOTES
"Amira Shannon is a 58 y.o. female   Primary provider:  Yobany Barr MD       Chief Complaint   Patient presents with   • Lumbar Spine - Back Pain       HISTORY OF PRESENT ILLNESS:    Back Pain   This is a chronic (pain started about 10 years ago, no known injury. ) problem. The current episode started more than 1 year ago. The problem occurs constantly. The problem is unchanged. The pain is present in the lumbar spine. The quality of the pain is described as aching and stabbing. The pain is moderate. The pain is the same all the time. The symptoms are aggravated by standing and sitting (driving, walking). Associated symptoms include abdominal pain. She has tried ice, heat and bed rest (xrays done today. ) for the symptoms.        CONCURRENT MEDICAL HISTORY:    Past Medical History:   Diagnosis Date   • Acid reflux    • Altered bowel function    • Arthropathy of lumbar facet joint    • Bleeding disorder (CMS/HCC)    • C. difficile diarrhea 2015   • Constipation    • Corns and callus    • Depression    • Disease related peripheral neuropathy    • Epigastric pain    • Fatty liver    • Hammer toe    • Headache    • Hiatal hernia    • History of transfusion    • Hyperlipidemia    • Knee pain    • Localized, primary osteoarthritis of the ankle and foot     Localized, primary osteoarthritis of the ankle and/or foot   • Mendoza's metatarsalgia     Mendoza's metatarsalgia - 2nd interspace on right   • Nausea and vomiting    • Neuralgia and neuritis     Neuralgia, neuritis, and radiculitis, unspecified   • Neuropathy    • Obstructive sleep apnea     Obstructive sleep apnea (adult) (pediatric)    • CHAI on CPAP     \"C-Pap at night  (unconfirmed)\"   • Osteoarthritis    • Pain in foot     Pain in unspecified foot - sees a podiatrist   • Pain in joint, ankle and foot     Joint pain in ankle and foot      • Pain radiating to back     Pain radiating to lumbar region of back   • Plantar fasciitis    • PONV (postoperative nausea " and vomiting)    • Restless leg syndrome    • Secondary hypertension     Secondary hypertension, unspecified   • Sinusitis    • Sleep apnea    • Tongue anomaly     lesion       Allergies   Allergen Reactions   • Other      Pt states that taking steroids either in pill or injection form make her have blisters in her mouth and she feels like she is on fire on the inside/ has hx of c diff and possible MRSA     • Corticosteroids Rash   • Kenalog  [Triamcinolone Acetonide] Rash   • Sulfa Antibiotics Rash     Sulfa (Sulfonamide Antibiotics)         Current Outpatient Medications:   •  cetirizine (zyrTEC) 10 MG tablet, Take 1 tablet by mouth Daily As Needed for Allergies., Disp: 30 tablet, Rfl: 11  •  cyclobenzaprine (FLEXERIL) 10 MG tablet, Take 10 mg by mouth 3 (Three) Times a Day As Needed for Muscle Spasms., Disp: , Rfl:   •  escitalopram (LEXAPRO) 5 MG tablet, Take 5 mg by mouth Daily., Disp: , Rfl:   •  gabapentin (NEURONTIN) 800 MG tablet, Take 800 mg by mouth 4 (Four) Times a Day., Disp: , Rfl:   •  Melatonin 10 MG capsule, Take  by mouth Every Night., Disp: , Rfl:   •  meloxicam (MOBIC) 15 MG tablet, Take 1 tablet by mouth Daily., Disp: 30 tablet, Rfl: 2  •  metFORMIN (GLUCOPHAGE) 500 MG tablet, Take 1 tablet by mouth 2 (Two) Times a Day With Meals., Disp: 60 tablet, Rfl: 6  •  MOTEGRITY 2 MG tablet, TAKE 1 TABLET BY MOUTH ONCE DAILY, Disp: 30 tablet, Rfl: 1  •  nystatin (MYCOSTATIN) 966209 UNIT/GM powder, Apply  topically to the appropriate area as directed 4 (Four) Times a Day As Needed., Disp: , Rfl:   •  omeprazole (priLOSEC) 40 MG capsule, Take 40 mg by mouth Daily., Disp: , Rfl:   •  ondansetron (ZOFRAN) 8 MG tablet, Take 1 tablet by mouth Every 8 (Eight) Hours As Needed for Nausea or Vomiting., Disp: 30 tablet, Rfl: 1  •  oxyCODONE (oxyCONTIN) 10 MG 12 hr tablet, Take 10 mg by mouth 4 (Four) Times a Day As Needed., Disp: , Rfl:   •  polyethylene glycol (MIRALAX) packet, Take 17 g by mouth Daily As Needed.,  Disp: , Rfl:   •  sennosides-docusate sodium (SENOKOT-S) 8.6-50 MG tablet, Take 2 tablets by mouth Daily., Disp: 60 tablet, Rfl: 1  •  spironolactone (ALDACTONE) 25 MG tablet, TAKE 1 TABLET BY MOUTH ONCE DAILY, Disp: 30 tablet, Rfl: 5  •  tegaserod (ZELNORM) 6 MG tablet, Take 1 tablet by mouth 2 (Two) Times a Day Before Meals for 30 days., Disp: 60 tablet, Rfl: 0  •  torsemide (DEMADEX) 10 MG tablet, Take 1 tablet by mouth Daily., Disp: 30 tablet, Rfl: 6    Past Surgical History:   Procedure Laterality Date   • CARPAL TUNNEL RELEASE Left 2018    Procedure: CARPAL TUNNEL RELEASE - left;  Surgeon: Justus Lewis MD;  Location: [facility] OR;  Service: Orthopedics   • CARPAL TUNNEL RELEASE Right 2019    Procedure: carpal tunnel release right hand with local/monitored anesthesia care;  Surgeon: Justus Lewis MD;  Location: [facility] OR;  Service: Orthopedics   •  SECTION     • CHOLECYSTECTOMY     • COLONOSCOPY  2013   • COLONOSCOPY N/A 10/17/2018    Procedure: COLONOSCOPY;  Surgeon: Russell Del Rio MD;  Location: [facility] ENDOSCOPY;  Service: Gastroenterology   • DIRECT LARYNGOSCOPY, ESOPHAGOSCOPY, BRONCHOSCOPY N/A 2017    Procedure: DIRECT LARYNGOSCOPY AND;  Surgeon: Live Bolton MD;  Location: [facility] OR;  Service:    • ENDOSCOPY  2013    Colon endoscopy 91453 (1) - Internal & external hemorrhoids found. Stool found.   • ENDOSCOPY  2013    EGD w/ tube 64875 (1) - Normal esophagus. Gastritis in stomach. Biopsy taken. Normal dudoenum. Biopsy taken.   • ENDOSCOPY N/A 10/17/2018    Procedure: ESOPHAGOGASTRODUODENOSCOPY--eval varices;  Surgeon: Russell Del Rio MD;  Location: [facility] ENDOSCOPY;  Service: Gastroenterology   • FOOT SURGERY  2013    Foot/toes surgery procedure (1) - Arthroplasty of toes 4 and 5 of right foot.   • HERNIA REPAIR     • HERNIA REPAIR      hital   • HYSTERECTOMY     • LIVER BIOPSY     • NERVE BLOCK  2016    Injection for nerve block  "(1) - Lumbar medial branch block.   • OTHER SURGICAL HISTORY  2012    Inj(s) Tend-Sheath, Ligament, Single  (1) - [facility]ICK[facility] ([facility])    • OTHER SURGICAL HISTORY  2013    Small Joint Injection/Aspiration  (2) - [facility] NICKERSON ([facility])    • OTHER SURGICAL HISTORY      bowel obstruction x2   • OTHER SURGICAL HISTORY      gland removed from neck   • SUBLINGUAL SALIVARY CYST EXCISION N/A 2017    Procedure: EXCISION OF LEFT  TONGUE LESION WITH CLOSURE;  Surgeon: Live Bolton MD;  Location: [facility];  Service:    • TUBAL ABDOMINAL LIGATION     • UPPER GASTROINTESTINAL ENDOSCOPY  2013   • UPPER GASTROINTESTINAL ENDOSCOPY  10/17/2018       Family History   Problem Relation Age of Onset   • Cancer Other    • Diabetes Other    • Heart disease Other    • Hypertension Mother    • Cancer Mother    • Diabetes Mother    • Hypertension Father    • Heart attack Father    • Cancer Father    • Heart disease Father    • Thyroid disease Maternal Aunt        Social History     Socioeconomic History   • Marital status:      Spouse name: Not on file   • Number of children: Not on file   • Years of education: Not on file   • Highest education level: Not on file   Tobacco Use   • Smoking status: Former Smoker     Packs/day: 2.00     Years: 15.00     Pack years: 30.00     Types: Cigarettes     Last attempt to quit: 2000     Years since quittin.7   • Smokeless tobacco: Never Used   Substance and Sexual Activity   • Alcohol use: No   • Drug use: No   • Sexual activity: Defer     Comment: Marital Status:         Review of Systems   Gastrointestinal: Positive for abdominal pain, nausea and vomiting.   Endocrine: Positive for heat intolerance.   Musculoskeletal: Positive for back pain and joint swelling.   Psychiatric/Behavioral: Positive for sleep disturbance.   All other systems reviewed and are negative.      PHYSICAL EXAMINATION:       Ht 162.6 cm (64\")   Wt 94.8 kg " (209 lb)   LMP  (LMP Unknown)   BMI 35.87 kg/m²     Physical Exam   Constitutional: She is oriented to person, place, and time. Vital signs are normal. She appears well-developed and well-nourished. She is cooperative.   HENT:   Head: Normocephalic and atraumatic.   Neck: Trachea normal and phonation normal.   Pulmonary/Chest: Effort normal. No respiratory distress.   Abdominal: Soft. Normal appearance. She exhibits no distension.   Neurological: She is alert and oriented to person, place, and time. GCS eye subscore is 4. GCS verbal subscore is 5. GCS motor subscore is 6.   Skin: Skin is warm, dry and intact.   Psychiatric: She has a normal mood and affect. Her speech is normal and behavior is normal. Judgment and thought content normal. Cognition and memory are normal.   Vitals reviewed.      GAIT:     []  Normal  []  Antalgic    Assistive device: []  None  [x]  Walker     []  Crutches  []  Cane     []  Wheelchair  []  Stretcher    Back Exam     Tenderness   The patient is experiencing tenderness in the sacroiliac and lumbar.    Range of Motion   Extension: abnormal   Flexion: abnormal   Lateral bend right: abnormal   Lateral bend left: abnormal   Rotation right: abnormal   Rotation left: abnormal     Muscle Strength   Right Quadriceps:  4/5   Left Quadriceps:  4/5   Right Hamstrings:  4/5   Left Hamstrings:  4/5     Tests   Straight leg raise right: negative  Straight leg raise left: negative    Reflexes   Patellar: abnormal  Achilles: abnormal    Other   Toe walk: abnormal  Heel walk: abnormal  Sensation: normal  Gait: antalgic   Erythema: no back redness  Scars: absent                  No results found.        ASSESSMENT:    Diagnoses and all orders for this visit:    Low back pain, unspecified back pain laterality, unspecified chronicity, unspecified whether sciatica present  -     MRI Lumbar Spine Without Contrast; Future    Other orders  -     escitalopram (LEXAPRO) 5 MG tablet; Take 5 mg by mouth  Daily.          PLAN  Patient is experiencing worsening lumbar spine right hip pain with progressive neurological weakness.  Will recommend an MRI of the lumbar spine to rule out acute disc herniation and/or spinal stenosis.  Meantime the patient was instructed to continue modified weightbearing with a walker and continue her current pain management regimen.    No Follow-up on file.    [place], APRN

## 2019-10-11 DIAGNOSIS — M54.50 LOW BACK PAIN, UNSPECIFIED BACK PAIN LATERALITY, UNSPECIFIED CHRONICITY, UNSPECIFIED WHETHER SCIATICA PRESENT: Primary | ICD-10-CM

## 2019-10-11 NOTE — PROGRESS NOTES
MRI denied per insurance. Insurance requires 6 weeks of PT. Order sent to [facility]

## 2019-10-16 ENCOUNTER — TELEPHONE (OUTPATIENT)
Dept: GASTROENTEROLOGY | Facility: CLINIC | Age: 59
End: 2019-10-16

## 2019-10-16 NOTE — TELEPHONE ENCOUNTER
Patients telephone call has been returned. Patient states that she has not had a bm in 7 days. She has been taking Zelnorm since 10- and has not had any results as of today. Recommend patient proceed with fleet enemas tonight. A ref has be placed for her to be seen by General Surgery for a possible colon resection. Patient voiced understanding.

## 2019-10-22 ENCOUNTER — TREATMENT (OUTPATIENT)
Dept: PHYSICAL THERAPY | Facility: CLINIC | Age: 59
End: 2019-10-22

## 2019-10-22 DIAGNOSIS — M54.50 LOW BACK PAIN, UNSPECIFIED BACK PAIN LATERALITY, UNSPECIFIED CHRONICITY, UNSPECIFIED WHETHER SCIATICA PRESENT: Primary | ICD-10-CM

## 2019-10-22 PROCEDURE — 97162 PT EVAL MOD COMPLEX 30 MIN: CPT | Performed by: PHYSICAL THERAPIST

## 2019-10-22 PROCEDURE — 97110 THERAPEUTIC EXERCISES: CPT | Performed by: PHYSICAL THERAPIST

## 2019-10-22 NOTE — PROGRESS NOTES
Physical Therapy Initial Evaluation and Plan of Care      Patient: Amira Shannon   : 1960  Diagnosis/ICD-10 Code:  Low back pain, unspecified back pain laterality, unspecified chronicity, unspecified whether sciatica present [M54.5]  Referring practitioner: LIYAH Welch  Date of Initial Visit: 10/22/2019  Today's Date: 10/22/2019  Patient seen for 1 sessions    Next MD appt: PRN  Recertification: 2019, 6th visit           Subjective Questionnaire: Oswestry: 35/50 = 70%      Subjective Evaluation    History of Present Illness  Onset date: Chronic, years.  Mechanism of injury: None that patient can recall.    Subjective comment: Patient erports her low back has hurt as long as her feet have. She reports he R side of her back hurts if she tries to sweep or mop.  Patient Occupation: Unemployed. Quality of life: poor    Pain  Current pain ratin  At best pain ratin  At worst pain ratin  Location: low back  Quality: throbbing (catches)  Alleviating factors: stretching out in the bed.  Aggravating factors: prolonged positioning and ambulation (sweeping/mopping)  Progression: no change    Social Support  Lives in: one-story house  Lives with: spouse    Diagnostic Tests  X-ray: abnormal (Degenerative changes at L5/S1)    Treatments  Previous treatment: chiropractic and physical therapy  Patient Goals  Patient goals for therapy: decreased pain and increased motion  Patient goal: Get an MRI           Objective       Postural Observations  Seated posture: poor  Standing posture: poor        Neurological Testing     Sensation     Lumbar   Left   Diminished: light touch    Right   Diminished: light touch    Additional Neurological Details  diminished due to neuropathy.    Active Range of Motion     Lumbar   Flexion: 85 degrees   Extension: 15 degrees   Left lateral flexion: Active left lumbar lateral flexion: 75% of range.   Right lateral flexion: Active right lumbar lateral flexion:  50% of range.   Left rotation: Active left lumbar rotation: 75% of range.   Right rotation: Active right lumbar rotation: 75% of range.     Strength/Myotome Testing     Left Hip   Planes of Motion   Flexion: 5  Extension: 4+  Abduction: 4+  Adduction: 5    Right Hip   Planes of Motion   Flexion: 5  Extension: 4+  Abduction: 4+  Adduction: 5    Left Knee   Flexion: 4-  Extension: 5    Right Knee   Flexion: 4-  Extension: 5    Tests       Thoracic   Negative slump.     Lumbar     Left   Negative crossed SLR, passive SLR, quadrant and valsalva.     Right   Negative crossed SLR, passive SLR, quadrant and valsalva.     Left Pelvic Girdle/Sacrum   Negative: sacrum compression and gapping.     Right Pelvic Girdle/Sacrum   Negative: sacrum compression and gapping.     Left Hip   Negative FRANK, FADIR, piriformis, SI compression and SI distraction.   SLR: Negative.     Right Hip   Positive FRANK.   Negative FADIR, piriformis, SI compression and SI distraction.   SLR: Negative.     Additional Tests Details  Tani's Symptoms  1) Tenderness- positive  2) Simulation- negative  3) Distraction- negative  4) Regional- negative  5) Overreaction- negative    Ambulation   Weight-Bearing Status   Weight-Bearing Status (Left): weight-bearing as tolerated   Weight-Bearing Status (Right): weight-bearing as tolerated    Assistive device used: two-wheeled walker    Ambulation: Level Surfaces   Ambulation with assistive device: independent    Observational Gait   Gait: antalgic and crouched   Decreased walking speed and stride length.   Left foot contact pattern: foot flat  Right foot contact pattern: foot flat    Quality of Movement During Gait   Trunk  Forward lean.     Knee    Knee (Left): Positive increased flexion during stance, increased flexion during swing and valgus.   Knee (Right): Positive increased flexion during stance, increased flexion during swing and valgus.     Foot Alignment    Foot Alignment (Left): Positive rigid  "hindfoot.   Foot Alignment (Right): Positive rigid hindfoot.          Assessment & Plan     Assessment  Impairments: abnormal gait, abnormal or restricted ROM, activity intolerance, impaired balance, impaired physical strength, lacks appropriate home exercise program, pain with function and weight-bearing intolerance  Assessment details: Patient presents with chronic low back pain, with significant overall weakness and LE/core tightness. Poor tolerance of standing activities. Defers aquatics secondary to cold weather coming up and previous visits of aquatics for knees, so patient as an I program.  Prognosis: fair  Prognosis details: Barriers to Rehab: Include significant or possible arthritic/degenerative changes that have occurred within the spine, The chronicity of this issue, The patient's obesity, The patient's generally deconditioned state.    Safety Issues: Fall risk    Functional Limitations: carrying objects, lifting, sleeping, uncomfortable because of pain, sitting, standing and unable to perform repetitive tasks  Goals  Plan Goals: Short Term Goals:  1) I with HEP and have additions/changes by next recertification    2) Patient able to show proper log roll technique.    3) Patient to be more aware of posture and posture correction techniques    4) AROM for lumbar extension >= 20°.     5) AROm B Lumbar SB/ROT WNL      Long Term Goals:  1) Patient to have AROM for the lumbar spine all WNL, no increase in pain.    2) B LE 5/5.    3) Patient able to perform 20 Bridges with UE \"X\" with no increase in pain.    4) Patient able to show proper lifting technique floor to waist with no increase in pain.    5) Patient able to show proper ergonomics for mopping/sweeping/vacumming.    6) I with final HEP.    7) D/C with a final HEP and free 30 day fitness formula membership.      Plan  Therapy options: will be seen for skilled physical therapy services  Planned modality interventions: cryotherapy, high voltage pulsed " current (pain management) and thermotherapy (hydrocollator packs)  Planned therapy interventions: abdominal trunk stabilization, body mechanics training, flexibility, functional ROM exercises, home exercise program, transfer training, therapeutic activities, stretching, strengthening, spinal/joint mobilization, soft tissue mobilization, postural training and manual therapy  Duration in visits: 6  Treatment plan discussed with: patient  Plan details: Progress ROM, strength, core stab, body mechanics to an I HEP that the patient can continue for long term pain management and spinal protection. Recert on 6th visit.       Other therapeutic activities and/or exercises will be prescribed depending on the patients progress or lack there of.      Visit Diagnoses:    ICD-10-CM ICD-9-CM   1. Low back pain, unspecified back pain laterality, unspecified chronicity, unspecified whether sciatica present M54.5 724.2       Timed:  Manual Therapy:         mins  15606;  Therapeutic Exercise:    12     mins  14178;     Neuromuscular Dalila:        mins  90902;    Therapeutic Activity:          mins  83457;     Gait Training:           mins  46303;     Ultrasound:          mins  79076;    Electrical Stimulation:         mins  64597 ( );    Untimed:  Electrical Stimulation:         mins  98111 ( );  Mechanical Traction:         mins  83367;     Timed Treatment:   12   mins   Total Treatment:     40   mins    PT SIGNATURE: Marcella Perales, PT DPT, CSCS   DATE TREATMENT INITIATED: 10/22/2019    Initial Certification  Certification Period: 1/20/2020  I certify that the therapy services are furnished while this patient is under my care.  The services outlined above are required by this patient, and will be reviewed every 90 days.     PHYSICIAN: [place]jas, APRN      DATE:     Please sign and return via fax to  .. Thank you, [facility].

## 2019-10-28 ENCOUNTER — TREATMENT (OUTPATIENT)
Dept: PHYSICAL THERAPY | Facility: CLINIC | Age: 59
End: 2019-10-28

## 2019-10-28 DIAGNOSIS — M54.50 LOW BACK PAIN, UNSPECIFIED BACK PAIN LATERALITY, UNSPECIFIED CHRONICITY, UNSPECIFIED WHETHER SCIATICA PRESENT: Primary | ICD-10-CM

## 2019-10-28 PROCEDURE — 97110 THERAPEUTIC EXERCISES: CPT | Performed by: PHYSICAL THERAPIST

## 2019-10-28 NOTE — PROGRESS NOTES
"   Physical Therapy Daily Progress Note      Patient: Amira Shannon   : 1960  Referring practitioner: LIYAH Welch  Date of Initial Visit: No linked episodes  Today's Date: 10/28/2019  Patient seen for Visit count could not be calculated. Make sure you are using a visit which is associated with an episode. sessions    Next MD appt: PRN .  Recertification: 2019             Subjective     Objective   See Exercise, Manual, and Modality Logs for complete treatment.       Assessment & Plan     Assessment  Assessment details: Pt met short term goals #2 log rolling technique. Pt tolerated bridges this date. Pt did not lift very high. Pt also able to get in KAEL this date without increase pain. Pt declined ice this afternoon. Pt reported that she is doing her HEP.    Goals  Plan Goals: Plan Goals: Short Term Goals:  1) I with HEP and have additions/changes by next recertification (ongoing)    2) Patient able to show proper log roll technique.(met)    3) Patient to be more aware of posture and posture correction techniques( ongoing/progressing)    4) AROM for lumbar extension >= 20°. (ongoing)    5) AROm B Lumbar SB/ROT WNL (ongoing)      Long Term Goals:  1) Patient to have AROM for the lumbar spine all WNL, no increase in pain.    2) B LE 5/5.    3) Patient able to perform 20 Bridges with UE \"X\" with no increase in pain.    4) Patient able to show proper lifting technique floor to waist with no increase in pain.    5) Patient able to show proper ergonomics for mopping/sweeping/vacumming.    6) I with final HEP.    7) D/C with a final HEP and free 30 day fitness formula membership.     Plan  Plan details: Add prone TKE with glutes,         Visit Diagnoses:  No diagnosis found.    Progress per Plan of Care and Progress strengthening /stabilization /functional activity           Timed:  Manual Therapy:         mins  14046;  Therapeutic Exercise:    41     mins  72859;     Neuromuscular Dalila:  "       mins  99436;    Therapeutic Activity:          mins  41863;     Gait Training:           mins  36599;     Ultrasound:          mins  83012;    Electrical Stimulation:         mins  85048 ( );    Untimed:  Electrical Stimulation:         mins  53118 ( );  Mechanical Traction:         mins  91147;     Timed Treatment:   41   mins   Total Treatment:     41   mins  Marija Bermeo, [facility]  Physical Therapist Assistant

## 2019-11-01 ENCOUNTER — TREATMENT (OUTPATIENT)
Dept: PHYSICAL THERAPY | Facility: CLINIC | Age: 59
End: 2019-11-01

## 2019-11-01 DIAGNOSIS — M54.50 LOW BACK PAIN, UNSPECIFIED BACK PAIN LATERALITY, UNSPECIFIED CHRONICITY, UNSPECIFIED WHETHER SCIATICA PRESENT: Primary | ICD-10-CM

## 2019-11-01 PROCEDURE — 97110 THERAPEUTIC EXERCISES: CPT | Performed by: PHYSICAL THERAPIST

## 2019-11-01 NOTE — PROGRESS NOTES
"   Physical Therapy Daily Progress Note      Patient: Amira Shannon   : 1960  Referring practitioner: LIYAH Welch  Date of Initial Visit: Type: THERAPY  Noted: 10/22/2019  Today's Date: 2019  Patient seen for 3 sessions    Next MD appt: PRN .  Recertification: 2019     Amira Shannon reports: Pt feels that her right hip and back has improved some.        Subjective Evaluation    History of Present Illness    Subjective comment: Pt reports hurting today in low back. pt feels that she has had some improvement in right hip and backPain  Current pain ratin  Location: low back    Treatments  Current treatment: physical therapy         Objective   See Exercise, Manual, and Modality Logs for complete treatment.       Assessment & Plan     Assessment  Assessment details: Pt tolerated nyasia disc activities with few cues for upright posture. Pt tolerated new ex of Prone TKE , prone heel squeezes, prone Ham curls. Pt I with current HEP. Pt has met short term goals 1 and 2.     Goals  Plan Goals: Plan Goals: Plan Goals: Short Term Goals:  1) I with HEP and have additions/changes by next recertification (Met/ongoing)    2) Patient able to show proper log roll technique.(met)    3) Patient to be more aware of posture and posture correction techniques( ongoing/progressing)    4) AROM for lumbar extension >= 20°. (ongoing)    5) AROm B Lumbar SB/ROT WNL (ongoing)      Long Term Goals:  1) Patient to have AROM for the lumbar spine all WNL, no increase in pain.    2) B LE 5/5.    3) Patient able to perform 20 Bridges with UE \"X\" with no increase in pain.    4) Patient able to show proper lifting technique floor to waist with no increase in pain.    5) Patient able to show proper ergonomics for mopping/sweeping/vacumming.    6) I with final HEP.    7) D/C with a final HEP and free 30 day fitness formula membership.      Plan  Plan details: Seated nyasia disc trunk rotation with pball.  Also, " add trunk SB        Visit Diagnoses:    ICD-10-CM ICD-9-CM   1. Low back pain, unspecified back pain laterality, unspecified chronicity, unspecified whether sciatica present M54.5 724.2       Progress per Plan of Care and Progress strengthening /stabilization /functional activity           Timed:  Manual Therapy:         mins  34124;  Therapeutic Exercise:    47     mins  22863;     Neuromuscular Dalila:        mins  34165;    Therapeutic Activity:          mins  91814;     Gait Training:           mins  11723;     Ultrasound:          mins  32179;    Electrical Stimulation:         mins  50456 ( );    Untimed:  Electrical Stimulation:         mins  84752 ( );  Mechanical Traction:         mins  70068;     Timed Treatment:  47    mins   Total Treatment:    47    mins  Marija Bermeo, [facility]  Physical Therapist Assistant

## 2019-11-04 ENCOUNTER — OFFICE VISIT (OUTPATIENT)
Dept: SURGERY | Facility: CLINIC | Age: 59
End: 2019-11-04

## 2019-11-04 VITALS
TEMPERATURE: 98 F | HEART RATE: 90 BPM | SYSTOLIC BLOOD PRESSURE: 124 MMHG | WEIGHT: 208 LBS | BODY MASS INDEX: 35.51 KG/M2 | DIASTOLIC BLOOD PRESSURE: 78 MMHG | HEIGHT: 64 IN

## 2019-11-04 DIAGNOSIS — K59.09 OTHER CONSTIPATION: Primary | ICD-10-CM

## 2019-11-04 PROCEDURE — 99213 OFFICE O/P EST LOW 20 MIN: CPT | Performed by: SURGERY

## 2019-11-04 RX ORDER — CHLORTHALIDONE 25 MG/1
TABLET ORAL DAILY
Refills: 11 | COMMUNITY
Start: 2019-09-11 | End: 2020-01-09

## 2019-11-04 RX ORDER — PHENOL 1.4 %
AEROSOL, SPRAY (ML) MUCOUS MEMBRANE
COMMUNITY
Start: 2019-02-11 | End: 2020-01-09 | Stop reason: SDUPTHER

## 2019-11-04 RX ORDER — CETIRIZINE HYDROCHLORIDE 10 MG/1
1 TABLET ORAL DAILY
COMMUNITY
Start: 2017-09-25 | End: 2021-09-24 | Stop reason: HOSPADM

## 2019-11-04 NOTE — PROGRESS NOTES
"Chief Complaint   Patient presents with   • Abdominal Pain     Possible Colon resection        HPI  59 year old with a hx of constipation. Negative colonoscopy. Sitz marker study demonstrated:  Study Result     PROCEDURE: XR ABDOMEN KUB     VIEWS:  2     INDICATION: Sitzmarker study     COMPARISON: 8/9/2019     FINDINGS:       - Bowel gas pattern: Nonobstructive. A total of 22 Sitzmarks  markers are present, seen from the mid transverse colon to the  rectum.    - Free air: None    - Soft tissue:No gross evidence of organomegaly,      an abdominal mass,or ascites    - Calculi:None projecting over gallbladder       fossa, renal shadows, or anticipated course of the ureters.    - Osseous:Limited assessment, unremarkable for age.    - Misc: Surgical clips are present in the right upper quadrant.  Tiny phlebolith is present in the left pelvis.        IMPRESSION:  22 Sitzmarkers seen from the mid transverse colon to the rectum.     Electronically signed by:  Chandrika Grigsby MD  8/12/2019 3:13 PM CDT  Workstation: [id number]       Past Medical History:   Diagnosis Date   • Acid reflux    • Altered bowel function    • Arthropathy of lumbar facet joint    • Bleeding disorder (CMS/HCC)    • C. difficile diarrhea 2015   • Constipation    • Corns and callus    • Depression    • Disease related peripheral neuropathy    • Epigastric pain    • Fatty liver    • Hammer toe    • Headache    • Hiatal hernia    • History of transfusion    • Hyperlipidemia    • Knee pain    • Localized, primary osteoarthritis of the ankle and foot     Localized, primary osteoarthritis of the ankle and/or foot   • Mendoza's metatarsalgia     Mendoza's metatarsalgia - 2nd interspace on right   • Nausea and vomiting    • Neuralgia and neuritis     Neuralgia, neuritis, and radiculitis, unspecified   • Neuropathy    • Obstructive sleep apnea     Obstructive sleep apnea (adult) (pediatric)    • CHAI on CPAP     \"C-Pap at night  (unconfirmed)\"   • Osteoarthritis    • " Pain in foot     Pain in unspecified foot - sees a podiatrist   • Pain in joint, ankle and foot     Joint pain in ankle and foot      • Pain radiating to back     Pain radiating to lumbar region of back   • Plantar fasciitis    • PONV (postoperative nausea and vomiting)    • Restless leg syndrome    • Secondary hypertension     Secondary hypertension, unspecified   • Sinusitis    • Sleep apnea    • Tongue anomaly     lesion       Past Surgical History:   Procedure Laterality Date   • CARPAL TUNNEL RELEASE Left 2018    Procedure: CARPAL TUNNEL RELEASE - left;  Surgeon: Justus Lewis MD;  Location: [facility] OR;  Service: Orthopedics   • CARPAL TUNNEL RELEASE Right 2019    Procedure: carpal tunnel release right hand with local/monitored anesthesia care;  Surgeon: Justus Lewis MD;  Location: [facility] OR;  Service: Orthopedics   •  SECTION     • CHOLECYSTECTOMY     • COLONOSCOPY  2013   • COLONOSCOPY N/A 10/17/2018    Procedure: COLONOSCOPY;  Surgeon: Russell Del Rio MD;  Location: [facility] ENDOSCOPY;  Service: Gastroenterology   • DIRECT LARYNGOSCOPY, ESOPHAGOSCOPY, BRONCHOSCOPY N/A 2017    Procedure: DIRECT LARYNGOSCOPY AND;  Surgeon: Live Bolton MD;  Location: [facility] OR;  Service:    • ENDOSCOPY  2013    Colon endoscopy 85274 (1) - Internal & external hemorrhoids found. Stool found.   • ENDOSCOPY  2013    EGD w/ tube 87497 (1) - Normal esophagus. Gastritis in stomach. Biopsy taken. Normal dudoenum. Biopsy taken.   • ENDOSCOPY N/A 10/17/2018    Procedure: ESOPHAGOGASTRODUODENOSCOPY--eval varices;  Surgeon: Russell Del Rio MD;  Location: [facility] ENDOSCOPY;  Service: Gastroenterology   • FOOT SURGERY  2013    Foot/toes surgery procedure (1) - Arthroplasty of toes 4 and 5 of right foot.   • HERNIA REPAIR     • HERNIA REPAIR      hital   • HYSTERECTOMY     • LIVER BIOPSY     • NERVE BLOCK  2016    Injection for nerve block (1) - Lumbar medial branch  block.   • OTHER SURGICAL HISTORY  12/03/2012    Inj(s) Tend-Sheath, Ligament, Single 20550 (1) - [facility]    • OTHER SURGICAL HISTORY  06/24/2013    Small Joint Injection/Aspiration 20600 (2) - [facility] ([facility])    • OTHER SURGICAL HISTORY  2011    bowel obstruction x2   • OTHER SURGICAL HISTORY      gland removed from neck   • SUBLINGUAL SALIVARY CYST EXCISION N/A 8/1/2017    Procedure: EXCISION OF LEFT  TONGUE LESION WITH CLOSURE;  Surgeon: Live Bolton MD;  Location: [facility];  Service:    • TUBAL ABDOMINAL LIGATION     • UPPER GASTROINTESTINAL ENDOSCOPY  07/01/2013   • UPPER GASTROINTESTINAL ENDOSCOPY  10/17/2018         Current Outpatient Medications:   •  cetirizine (zyrTEC) 10 MG tablet, Take 1 tablet by mouth Daily As Needed for Allergies., Disp: 30 tablet, Rfl: 11  •  cetirizine (zyrTEC) 10 MG tablet, Take 1 tablet by mouth Daily., Disp: , Rfl:   •  cyclobenzaprine (FLEXERIL) 10 MG tablet, Take 10 mg by mouth 3 (Three) Times a Day As Needed for Muscle Spasms., Disp: , Rfl:   •  escitalopram (LEXAPRO) 5 MG tablet, Take 5 mg by mouth Daily., Disp: , Rfl:   •  gabapentin (NEURONTIN) 800 MG tablet, Take 800 mg by mouth 4 (Four) Times a Day., Disp: , Rfl:   •  Melatonin 10 MG capsule, Take  by mouth Every Night., Disp: , Rfl:   •  Melatonin 10 MG tablet, oral  1 po qhs, Disp: , Rfl:   •  meloxicam (MOBIC) 15 MG tablet, Take 1 tablet by mouth Daily., Disp: 30 tablet, Rfl: 2  •  metFORMIN (GLUCOPHAGE) 500 MG tablet, Take 1 tablet by mouth 2 (Two) Times a Day With Meals., Disp: 60 tablet, Rfl: 6  •  MOTEGRITY 2 MG tablet, TAKE 1 TABLET BY MOUTH ONCE DAILY, Disp: 30 tablet, Rfl: 1  •  nystatin (MYCOSTATIN) 341734 UNIT/GM powder, Apply  topically to the appropriate area as directed 4 (Four) Times a Day As Needed., Disp: , Rfl:   •  Nystatin powder, powder  compounding apply bid on abdomen, Disp: , Rfl:   •  omeprazole (priLOSEC) 40 MG capsule, Take 40 mg by mouth Daily., Disp: ,  Rfl:   •  ondansetron (ZOFRAN) 8 MG tablet, Take 1 tablet by mouth Every 8 (Eight) Hours As Needed for Nausea or Vomiting., Disp: 30 tablet, Rfl: 1  •  oxyCODONE (oxyCONTIN) 10 MG 12 hr tablet, Take 10 mg by mouth 4 (Four) Times a Day As Needed., Disp: , Rfl:   •  spironolactone (ALDACTONE) 25 MG tablet, TAKE 1 TABLET BY MOUTH ONCE DAILY, Disp: 30 tablet, Rfl: 5  •  torsemide (DEMADEX) 10 MG tablet, Take 1 tablet by mouth Daily., Disp: 30 tablet, Rfl: 6  •  chlorthalidone (HYGROTON) 25 MG tablet, Daily., Disp: , Rfl: 11  •  polyethylene glycol (MIRALAX) packet, Take 17 g by mouth Daily As Needed., Disp: , Rfl:   •  sennosides-docusate sodium (SENOKOT-S) 8.6-50 MG tablet, Take 2 tablets by mouth Daily., Disp: 60 tablet, Rfl: 1    Allergies   Allergen Reactions   • Other      Pt states that taking steroids either in pill or injection form make her have blisters in her mouth and she feels like she is on fire on the inside/ has hx of c diff and possible MRSA     • Corticosteroids Rash   • Kenalog  [Triamcinolone Acetonide] Rash   • Sulfa Antibiotics Rash     Sulfa (Sulfonamide Antibiotics)       Family History   Problem Relation Age of Onset   • Cancer Other    • Diabetes Other    • Heart disease Other    • Hypertension Mother    • Cancer Mother    • Diabetes Mother    • Hypertension Father    • Heart attack Father    • Cancer Father    • Heart disease Father    • Thyroid disease Maternal Aunt        Social History     Socioeconomic History   • Marital status:      Spouse name: Not on file   • Number of children: Not on file   • Years of education: Not on file   • Highest education level: Not on file   Tobacco Use   • Smoking status: Former Smoker     Packs/day: 2.00     Years: 15.00     Pack years: 30.00     Types: Cigarettes     Last attempt to quit: 2000     Years since quittin.8   • Smokeless tobacco: Never Used   Substance and Sexual Activity   • Alcohol use: No   • Drug use: No   • Sexual activity:  Defer     Comment: Marital Status:        Review of Systems   Constitutional: Negative.    Eyes: Negative.    Respiratory: Negative.    Cardiovascular: Positive for leg swelling.   Gastrointestinal: Positive for abdominal pain and constipation.        Heartburn, Bowel incontinence   Endocrine:        Hot flashes   Genitourinary: Positive for frequency.        Nighttime urination   Musculoskeletal: Positive for arthralgias and back pain.   Skin: Negative.    Allergic/Immunologic: Negative.    Neurological: Positive for numbness and headaches.   Hematological: Negative.    Psychiatric/Behavioral:        Depression       Physical Exam   Constitutional: She is oriented to person, place, and time. She appears well-developed and well-nourished. No distress.   HENT:   Head: Normocephalic and atraumatic.   Mouth/Throat: No oropharyngeal exudate.   Eyes: EOM are normal. Pupils are equal, round, and reactive to light. No scleral icterus.   Neck: Normal range of motion. Neck supple. No JVD present. No tracheal deviation present. No thyromegaly present.   Cardiovascular: Normal rate, regular rhythm and normal heart sounds.   Pulmonary/Chest: Effort normal and breath sounds normal. No stridor. No respiratory distress. She has no wheezes. She has no rales.   Abdominal: Soft. Bowel sounds are normal. She exhibits no distension and no mass. There is no tenderness. There is no rebound and no guarding. No hernia.   Musculoskeletal: Normal range of motion. She exhibits no edema, tenderness or deformity.   Lymphadenopathy:     She has no cervical adenopathy.     She has no axillary adenopathy.   Neurological: She is alert and oriented to person, place, and time.   Skin: Skin is warm and dry. No rash noted. She is not diaphoretic. No erythema. No pallor.   Psychiatric: She has a normal mood and affect. Her behavior is normal. Judgment normal.   Vitals reviewed.        ASSESSMENT    Amira was seen today for abdominal  pain.    Diagnoses and all orders for this visit:    Other constipation        PLAN    1. Needs pelvic floor evaluation and possible rectal stimulator if positive              This document has been electronically signed by Benjamin Troncoso MD on November 9, 2019 5:48 PM

## 2019-11-04 NOTE — PATIENT INSTRUCTIONS

## 2019-11-05 ENCOUNTER — LAB (OUTPATIENT)
Dept: LAB | Facility: HOSPITAL | Age: 59
End: 2019-11-05

## 2019-11-05 ENCOUNTER — TELEPHONE (OUTPATIENT)
Dept: GASTROENTEROLOGY | Facility: CLINIC | Age: 59
End: 2019-11-05

## 2019-11-05 DIAGNOSIS — K59.00 CONSTIPATION, UNSPECIFIED CONSTIPATION TYPE: ICD-10-CM

## 2019-11-05 DIAGNOSIS — K59.04 CHRONIC IDIOPATHIC CONSTIPATION: ICD-10-CM

## 2019-11-05 DIAGNOSIS — K75.81 NASH (NONALCOHOLIC STEATOHEPATITIS): ICD-10-CM

## 2019-11-05 DIAGNOSIS — R10.84 ABDOMINAL PAIN, GENERALIZED: ICD-10-CM

## 2019-11-05 DIAGNOSIS — K74.60 CIRRHOSIS OF LIVER WITHOUT ASCITES, UNSPECIFIED HEPATIC CIRRHOSIS TYPE (HCC): ICD-10-CM

## 2019-11-05 LAB
ALBUMIN SERPL-MCNC: 4.3 G/DL (ref 3.5–5.2)
ALBUMIN/GLOB SERPL: 1.4 G/DL
ALP SERPL-CCNC: 93 U/L (ref 39–117)
ALPHA-FETOPROTEIN: 2.45 NG/ML (ref 0–8.3)
ALT SERPL W P-5'-P-CCNC: 25 U/L (ref 1–33)
ANION GAP SERPL CALCULATED.3IONS-SCNC: 10.6 MMOL/L (ref 5–15)
AST SERPL-CCNC: 34 U/L (ref 1–32)
BILIRUB SERPL-MCNC: 0.9 MG/DL (ref 0.2–1.2)
BUN BLD-MCNC: 12 MG/DL (ref 8–23)
BUN/CREAT SERPL: 11 (ref 7–25)
CALCIUM SPEC-SCNC: 9.4 MG/DL (ref 8.6–10.5)
CHLORIDE SERPL-SCNC: 100 MMOL/L (ref 98–107)
CO2 SERPL-SCNC: 27.4 MMOL/L (ref 22–29)
CREAT BLD-MCNC: 1.09 MG/DL (ref 0.57–1)
GFR SERPL CREATININE-BSD FRML MDRD: 51 ML/MIN/1.73
GLOBULIN UR ELPH-MCNC: 3.1 GM/DL
GLUCOSE BLD-MCNC: 90 MG/DL (ref 65–99)
INR PPP: 1.19 (ref 0.8–1.2)
POTASSIUM BLD-SCNC: 4.3 MMOL/L (ref 3.5–5.2)
PROT SERPL-MCNC: 7.4 G/DL (ref 6–8.5)
PROTHROMBIN TIME: 14.9 SECONDS (ref 11.1–15.3)
SODIUM BLD-SCNC: 138 MMOL/L (ref 136–145)

## 2019-11-05 PROCEDURE — 80053 COMPREHEN METABOLIC PANEL: CPT

## 2019-11-05 PROCEDURE — 82105 ALPHA-FETOPROTEIN SERUM: CPT

## 2019-11-05 PROCEDURE — 85610 PROTHROMBIN TIME: CPT

## 2019-11-05 NOTE — TELEPHONE ENCOUNTER
A voicemail has been left for the patient to make her aware that her appointment with Dr. Galeana has been scheduled for November 19 at 2:45. His office is located at [address] [place] [address]. Encouraged the patient via voicemail to call our office back if she has any questions regarding this appointment. Note that her appointment with Dr. Galeana was scheduled on October 22 however Dr. Galeana office claimed they did not have our fax number to fax the appointment info over on the patient,  claimed they tried to reach our office for that info and was unable to do so.

## 2019-11-06 ENCOUNTER — TREATMENT (OUTPATIENT)
Dept: PHYSICAL THERAPY | Facility: CLINIC | Age: 59
End: 2019-11-06

## 2019-11-06 DIAGNOSIS — M54.50 LOW BACK PAIN, UNSPECIFIED BACK PAIN LATERALITY, UNSPECIFIED CHRONICITY, UNSPECIFIED WHETHER SCIATICA PRESENT: Primary | ICD-10-CM

## 2019-11-06 PROCEDURE — 97110 THERAPEUTIC EXERCISES: CPT | Performed by: PHYSICAL THERAPIST

## 2019-11-06 NOTE — PROGRESS NOTES
"Physical Therapy Daily Progress Note    Patient: Amira Shannon   : 1960  Diagnosis/ICD-10 Code:     Diagnosis Plan   1. Low back pain, unspecified back pain laterality, unspecified chronicity, unspecified whether sciatica present       Referring practitioner: LIYAH Welch  Date of Initial Visit: Type: THERAPY  Noted: 10/22/2019  Today's Date: 2019  Patient seen for 4 sessions      PT Recheck Due: 2019  PT MD Visit: PRFANNY       Amira Alemankins reports: feels like he is maybe 25% improved overall        Subjective Evaluation    History of Present Illness    Subjective comment: states that she had to have vaccinations on monday including the flu shot and the shingles vaccine. ran a bit of a fever yesterday after the vaccines. feel better today. state shyla had to have an ultrasound on her liver yesterday and they are goign to send her to [facility] over her stomach and her history of bowl obstructions. now she is getting impacted.  Pain  Current pain ratin             Objective   See Exercise, Manual, and Modality Logs for complete treatment.       Assessment & Plan     Assessment  Assessment details: Able to complete 20 bridges with arms across chest with no increase in complaints of pain. Has leg cramping during seated nyasia disc activities.     Goals  Plan Goals: Short Term Goals:  1) I with HEP and have additions/changes by next recertification (met)    2) Patient able to show proper log roll technique. (met)    3) Patient to be more aware of posture and posture correction techniques (met)    4) AROM for lumbar extension >= 20°.     5) AROm B Lumbar SB/ROT WNL      Long Term Goals:  1) Patient to have AROM for the lumbar spine all WNL, no increase in pain.    2) B LE 5/5.    3) Patient able to perform 20 Bridges with UE \"X\" with no increase in pain. (met)    4) Patient able to show proper lifting technique floor to waist with no increase in pain.    5) Patient able to show proper " ergonomics for mopping/sweeping/vacumming.    6) I with final HEP.    7) D/C with a final HEP and free 30 day fitness formula membership.    Plan  Plan details: Review ergonomics next visit for household chores      Progress strengthening /stabilization /functional activity            Timed:  Manual Therapy:         mins  44791;  Therapeutic Exercise:    55     mins  59056;   Aquatic Therex :        mins  45967    Neuromuscular Dalila:        mins  71974;    Therapeutic Activity:          mins  65896;     Gait Training:           mins  82214;     Ultrasound:          mins  81644;    Electrical Stimulation:         mins  44000 ( );    Untimed:  Electrical Stimulation:         mins  88378 ( );  Mechanical Traction:         mins  91824;     Timed Treatment:   55   mins   Total Treatment:     55   mins  Cathleen Katz PTA  Physical Therapist Assistant

## 2019-11-08 ENCOUNTER — TREATMENT (OUTPATIENT)
Dept: PHYSICAL THERAPY | Facility: CLINIC | Age: 59
End: 2019-11-08

## 2019-11-08 ENCOUNTER — LAB (OUTPATIENT)
Dept: LAB | Facility: HOSPITAL | Age: 59
End: 2019-11-08

## 2019-11-08 DIAGNOSIS — K59.00 CONSTIPATION, UNSPECIFIED CONSTIPATION TYPE: ICD-10-CM

## 2019-11-08 DIAGNOSIS — K59.04 CHRONIC IDIOPATHIC CONSTIPATION: ICD-10-CM

## 2019-11-08 DIAGNOSIS — K74.60 CIRRHOSIS OF LIVER WITHOUT ASCITES, UNSPECIFIED HEPATIC CIRRHOSIS TYPE (HCC): ICD-10-CM

## 2019-11-08 DIAGNOSIS — K75.81 NASH (NONALCOHOLIC STEATOHEPATITIS): ICD-10-CM

## 2019-11-08 DIAGNOSIS — M54.50 LOW BACK PAIN, UNSPECIFIED BACK PAIN LATERALITY, UNSPECIFIED CHRONICITY, UNSPECIFIED WHETHER SCIATICA PRESENT: Primary | ICD-10-CM

## 2019-11-08 DIAGNOSIS — R10.84 ABDOMINAL PAIN, GENERALIZED: ICD-10-CM

## 2019-11-08 PROCEDURE — 97535 SELF CARE MNGMENT TRAINING: CPT | Performed by: PHYSICAL THERAPIST

## 2019-11-08 PROCEDURE — 97110 THERAPEUTIC EXERCISES: CPT | Performed by: PHYSICAL THERAPIST

## 2019-11-08 PROCEDURE — 85025 COMPLETE CBC W/AUTO DIFF WBC: CPT

## 2019-11-08 NOTE — PROGRESS NOTES
"   Physical Therapy Daily Progress Note/Discharge      Patient: Amira Shannno   : 1960  Referring practitioner: LIYAH Welch  Date of Initial Visit: Type: THERAPY  Noted: 10/22/2019  Today's Date: 2019  Patient seen for 5 sessions         Amira Shannon reports: pt reports 25% improved.        Subjective     Objective       Active Range of Motion   Cervical/Thoracic Spine     Thoracic   Extension: 25 degrees     Strength/Myotome Testing     Left Hip   Planes of Motion   Flexion: 5  Abduction: 5  Adduction: 5    Right Hip   Planes of Motion   Flexion: 5  Abduction: 5  Adduction: 5    Left Knee   Flexion: 4+  Extension: 5    Right Knee   Flexion: 4+  Extension: 5     See Exercise, Manual, and Modality Logs for complete treatment.       Assessment & Plan     Assessment  Assessment details: Pt met all short term goals. Pt has met long term goals 3,5 and 6 goals. Pt has partially met LTG 1 and 2. Pt unable to lift but was educated on how to lift. Pt pain is self limiting. Pt aware of d/c today . Pt earned a free 30 day membership to continue strengthening.  D/c today. Pt I with HEP. Pt has copies of HEP with pictures and written instructions    Goals  Plan Goals: Plan Goals: Short Term Goals:  1) I with HEP and have additions/changes by next recertification (met)    2) Patient able to show proper log roll technique. (met)    3) Patient to be more aware of posture and posture correction techniques (met)    4) AROM for lumbar extension >= 20°. (met)    5) AROm B Lumbar SB/ROT WNL  (met)      Long Term Goals:  1) Patient to have AROM for the lumbar spine all WNL, no increase in pain. (partial met) pain with fwd flexion    2) B LE 5/5.( Partial met)  Except left hamstrings 4+/5    3) Patient able to perform 20 Bridges with UE \"X\" with no increase in pain. (met)    4) Patient able to show proper lifting technique floor to waist with no increase in pain.(not met) Pt not safe to lift in standing " waist to floor . Pt does not lift at all secondary to have help at home.     5) Patient able to show proper ergonomics for mopping/sweeping/vacumming.(pt able to perform in seated position but unsteady in standing) (met)    6) I with final HEP. (met)    Plan  Plan details: D/c        Visit Diagnoses:    ICD-10-CM ICD-9-CM   1. Low back pain, unspecified back pain laterality, unspecified chronicity, unspecified whether sciatica present M54.5 724.2       Other           Timed:  Manual Therapy:         mins  77728;  Therapeutic Exercise:     30    mins  80524;     Neuromuscular Dalila:        mins  36534;    Therapeutic Activity:          mins  15001;     Gait Training:           mins  83888;     Ultrasound:          mins  68778;    Electrical Stimulation:         mins  71493 ( );  Self management ed   15min  Untimed:  Electrical Stimulation:         mins  26896 ( );  Mechanical Traction:         mins  34692;     Timed Treatment:   45   mins   Total Treatment:     45   mins  Marija Bermeo PTA  Physical Therapist Assistant

## 2019-11-09 LAB
BASOPHILS # BLD AUTO: 0.03 10*3/MM3 (ref 0–0.2)
BASOPHILS NFR BLD AUTO: 0.7 % (ref 0–1.5)
DEPRECATED RDW RBC AUTO: 43.8 FL (ref 37–54)
EOSINOPHIL # BLD AUTO: 0.15 10*3/MM3 (ref 0–0.4)
EOSINOPHIL NFR BLD AUTO: 3.6 % (ref 0.3–6.2)
ERYTHROCYTE [DISTWIDTH] IN BLOOD BY AUTOMATED COUNT: 15.3 % (ref 12.3–15.4)
HCT VFR BLD AUTO: 41.3 % (ref 34–46.6)
HGB BLD-MCNC: 13.5 G/DL (ref 12–15.9)
IMM GRANULOCYTES # BLD AUTO: 0.01 10*3/MM3 (ref 0–0.05)
IMM GRANULOCYTES NFR BLD AUTO: 0.2 % (ref 0–0.5)
LYMPHOCYTES # BLD AUTO: 1 10*3/MM3 (ref 0.7–3.1)
LYMPHOCYTES NFR BLD AUTO: 24 % (ref 19.6–45.3)
MCH RBC QN AUTO: 25.9 PG (ref 26.6–33)
MCHC RBC AUTO-ENTMCNC: 32.7 G/DL (ref 31.5–35.7)
MCV RBC AUTO: 79.1 FL (ref 79–97)
MONOCYTES # BLD AUTO: 0.2 10*3/MM3 (ref 0.1–0.9)
MONOCYTES NFR BLD AUTO: 4.8 % (ref 5–12)
NEUTROPHILS # BLD AUTO: 2.77 10*3/MM3 (ref 1.7–7)
NEUTROPHILS NFR BLD AUTO: 66.7 % (ref 42.7–76)
NRBC BLD AUTO-RTO: 0 /100 WBC (ref 0–0.2)
PLATELET # BLD AUTO: 58 10*3/MM3 (ref 140–450)
PMV BLD AUTO: 12 FL (ref 6–12)
RBC # BLD AUTO: 5.22 10*6/MM3 (ref 3.77–5.28)
WBC NRBC COR # BLD: 4.16 10*3/MM3 (ref 3.4–10.8)

## 2019-11-12 NOTE — TELEPHONE ENCOUNTER
PATIENT IS REQUESTING A PRESCRIPTION FOR GABAPENTIN 800 MG TO BE SENT TO [facility] CALL BACK NUMBER [phone]

## 2019-11-13 RX ORDER — GABAPENTIN 800 MG/1
800 TABLET ORAL 3 TIMES DAILY
Qty: 40 TABLET | Refills: 0 | OUTPATIENT
Start: 2019-11-13

## 2019-11-13 NOTE — TELEPHONE ENCOUNTER
I cannot see where however provided this patient with a prescription for Neurontin therefore I do not feel that it would be appropriate for me to proceed especially since I will no longer be here and 2-1/2 weeks.  She is welcome to follow-up with Dr. Lewis and/or the podiatrist to discuss the need for gabapentin however this would be most likely dealt with in a pain management clinic.

## 2019-11-19 ENCOUNTER — APPOINTMENT (OUTPATIENT)
Dept: LAB | Facility: HOSPITAL | Age: 59
End: 2019-11-19

## 2019-11-19 ENCOUNTER — OFFICE VISIT (OUTPATIENT)
Dept: GASTROENTEROLOGY | Facility: CLINIC | Age: 59
End: 2019-11-19

## 2019-11-19 VITALS
DIASTOLIC BLOOD PRESSURE: 82 MMHG | HEART RATE: 78 BPM | SYSTOLIC BLOOD PRESSURE: 132 MMHG | HEIGHT: 64 IN | BODY MASS INDEX: 35.17 KG/M2 | WEIGHT: 206 LBS

## 2019-11-19 DIAGNOSIS — K74.60 CIRRHOSIS OF LIVER WITHOUT ASCITES, UNSPECIFIED HEPATIC CIRRHOSIS TYPE (HCC): ICD-10-CM

## 2019-11-19 DIAGNOSIS — K59.04 CHRONIC IDIOPATHIC CONSTIPATION: Primary | ICD-10-CM

## 2019-11-19 DIAGNOSIS — K75.81 NASH (NONALCOHOLIC STEATOHEPATITIS): ICD-10-CM

## 2019-11-19 DIAGNOSIS — K59.00 CONSTIPATION, UNSPECIFIED CONSTIPATION TYPE: ICD-10-CM

## 2019-11-19 DIAGNOSIS — R10.84 ABDOMINAL PAIN, GENERALIZED: ICD-10-CM

## 2019-11-19 DIAGNOSIS — R19.7 DIARRHEA, UNSPECIFIED TYPE: ICD-10-CM

## 2019-11-19 PROCEDURE — 99214 OFFICE O/P EST MOD 30 MIN: CPT | Performed by: PHYSICIAN ASSISTANT

## 2019-11-19 NOTE — PATIENT INSTRUCTIONS

## 2019-11-19 NOTE — PROGRESS NOTES
Chief Complaint   Patient presents with   • Chronic Idiopathis Constipation   • Cirrhosis   • Judge       ENDO PROCEDURE ORDERED:    Subjective    Amira Shannon is a 59 y.o. female. she is here today for follow-up.    History of Present Illness    Patient seen on recheck of her chronic constipation, JUDGE cirrhosis, F4/S2/N2. Last seen 09/10/2019. I had sent the patient for evaluation for her severe constipation to Dr. Troncoso who saw the patient on 11/04/2019. He recommended a pelvic evaluation. We had previously talked about seeing if the patient could get an InterStim. Apparently Dr. D'Amico would not accept the patient's insurance and she had not been rescheduled with another provider. She does now have an appointment to see Dr. Galeana this afternoon to evaluate for possible InterStim. She presents with 7 out of 10 abdominal pain today with fatigue. She states she has had crampy abdominal pain. She was having explosive diarrhea with a strong odor. She states her primary care gave her a collection kit but she has not really been able to turn in a stool sample. She did not really have much time on the Zelnorm before developing the diarrhea apparently. She is on Prilosec 40 mg daily. Denied blood in her stool. Weight is down 1 pound since last visit. Last EGD/colonoscopy 10/17/2018 showed gastritis and hemorrhoids.    Patient had right upper quadrant ultrasound on 11/05/2019. Showed a fatty liver with cirrhosis, prior cholecystectomy with 3.4 mm common bile duct.    Laboratory on 11/08/2019: CBC showed 58,000 platelets, INR 1.19. Normal AFP. CMP showed creatinine 1.09, AST 34, GFR 51, otherwise normal.     ASSESSMENT AND PLAN: Patient with longstanding chronic constipation. She has failed multiple medications. She was encouraged to keep her appointment with Dr. Galeana for possible InterStim. We did send her for a stool study. The stool sample she turned in was too formed and could not be evaluated for infectious  "because they cannot process solid stool. Will await Dr. Galeana' recommendation. Recommend followup in 4-6 weeks, further pending clinical course and the results of the above. She may retry the Zelnorm.      The following portions of the patient's history were reviewed and updated as appropriate:   Past Medical History:   Diagnosis Date   • Acid reflux    • Altered bowel function    • Arthropathy of lumbar facet joint    • Bleeding disorder (CMS/HCC)    • C. difficile diarrhea 2015   • Constipation    • Corns and callus    • Depression    • Disease related peripheral neuropathy    • Epigastric pain    • Fatty liver    • Hammer toe    • Headache    • Hiatal hernia    • History of transfusion    • Hyperlipidemia    • Knee pain    • Localized, primary osteoarthritis of the ankle and foot     Localized, primary osteoarthritis of the ankle and/or foot   • Mendoza's metatarsalgia     Mendoza's metatarsalgia - 2nd interspace on right   • Nausea and vomiting    • Neuralgia and neuritis     Neuralgia, neuritis, and radiculitis, unspecified   • Neuropathy    • Obstructive sleep apnea     Obstructive sleep apnea (adult) (pediatric)    • CHAI on CPAP     \"C-Pap at night  (unconfirmed)\"   • Osteoarthritis    • Pain in foot     Pain in unspecified foot - sees a podiatrist   • Pain in joint, ankle and foot     Joint pain in ankle and foot      • Pain radiating to back     Pain radiating to lumbar region of back   • Plantar fasciitis    • PONV (postoperative nausea and vomiting)    • Restless leg syndrome    • Secondary hypertension     Secondary hypertension, unspecified   • Sinusitis    • Sleep apnea    • Tongue anomaly     lesion     Past Surgical History:   Procedure Laterality Date   • CARPAL TUNNEL RELEASE Left 8/22/2018    Procedure: CARPAL TUNNEL RELEASE - left;  Surgeon: Justus Lewis MD;  Location: [facility];  Service: Orthopedics   • CARPAL TUNNEL RELEASE Right 2/13/2019    Procedure: carpal tunnel release right " hand with local/monitored anesthesia care;  Surgeon: Justus Lewis MD;  Location: [facility] OR;  Service: Orthopedics   •  SECTION     • CHOLECYSTECTOMY     • COLONOSCOPY  2013   • COLONOSCOPY N/A 10/17/2018    Procedure: COLONOSCOPY;  Surgeon: Russell Del Rio MD;  Location: [facility] ENDOSCOPY;  Service: Gastroenterology   • DIRECT LARYNGOSCOPY, ESOPHAGOSCOPY, BRONCHOSCOPY N/A 2017    Procedure: DIRECT LARYNGOSCOPY AND;  Surgeon: Live Bolton MD;  Location: [facility] OR;  Service:    • ENDOSCOPY  2013    Colon endoscopy 34288 (1) - Internal & external hemorrhoids found. Stool found.   • ENDOSCOPY  2013    EGD w/ tube 33623 (1) - Normal esophagus. Gastritis in stomach. Biopsy taken. Normal dudoenum. Biopsy taken.   • ENDOSCOPY N/A 10/17/2018    Procedure: ESOPHAGOGASTRODUODENOSCOPY--eval varices;  Surgeon: Russell Del Rio MD;  Location: [facility] ENDOSCOPY;  Service: Gastroenterology   • FOOT SURGERY  2013    Foot/toes surgery procedure (1) - Arthroplasty of toes 4 and 5 of right foot.   • HERNIA REPAIR     • HERNIA REPAIR      hital   • HYSTERECTOMY     • LIVER BIOPSY     • NERVE BLOCK  2016    Injection for nerve block (1) - Lumbar medial branch block.   • OTHER SURGICAL HISTORY  2012    Inj(s) Tend-Sheath, Ligament, Single  (1) - [facility]    • OTHER SURGICAL HISTORY  2013    Small Joint Injection/Aspiration  (2) - [facility] NICKERSON ([facility])    • OTHER SURGICAL HISTORY      bowel obstruction x2   • OTHER SURGICAL HISTORY      gland removed from neck   • SUBLINGUAL SALIVARY CYST EXCISION N/A 2017    Procedure: EXCISION OF LEFT  TONGUE LESION WITH CLOSURE;  Surgeon: Live Bolton MD;  Location: [facility] OR;  Service:    • TUBAL ABDOMINAL LIGATION     • UPPER GASTROINTESTINAL ENDOSCOPY  2013   • UPPER GASTROINTESTINAL ENDOSCOPY  10/17/2018     Family History   Problem Relation Age of Onset   • Cancer Other    •  Diabetes Other    • Heart disease Other    • Hypertension Mother    • Cancer Mother    • Diabetes Mother    • Hypertension Father    • Heart attack Father    • Cancer Father    • Heart disease Father    • Thyroid disease Maternal Aunt      OB History     No data available        Allergies   Allergen Reactions   • Other      Pt states that taking steroids either in pill or injection form make her have blisters in her mouth and she feels like she is on fire on the inside/ has hx of c diff and possible MRSA     • Corticosteroids Rash   • Kenalog  [Triamcinolone Acetonide] Rash   • Sulfa Antibiotics Rash     Sulfa (Sulfonamide Antibiotics)     Social History     Socioeconomic History   • Marital status:      Spouse name: Not on file   • Number of children: Not on file   • Years of education: Not on file   • Highest education level: Not on file   Tobacco Use   • Smoking status: Former Smoker     Packs/day: 2.00     Years: 15.00     Pack years: 30.00     Types: Cigarettes     Last attempt to quit: 2000     Years since quittin.9   • Smokeless tobacco: Never Used   Substance and Sexual Activity   • Alcohol use: No   • Drug use: No   • Sexual activity: Defer     Comment: Marital Status:      Current Medications:  Prior to Admission medications    Medication Sig Start Date End Date Taking? Authorizing Provider   cetirizine (zyrTEC) 10 MG tablet Take 1 tablet by mouth Daily As Needed for Allergies.   Yes Live Bolton MD   cetirizine (zyrTEC) 10 MG tablet Take 1 tablet by mouth Daily. 17  Yes ProviderPromise MD   chlorthalidone (HYGROTON) 25 MG tablet Daily. 19  Yes Promise Tovar MD   escitalopram (LEXAPRO) 5 MG tablet Take 5 mg by mouth Daily.   Yes ProviderPromise MD   Melatonin 10 MG capsule Take  by mouth Every Night.   Yes ProviderPromise MD   Melatonin 10 MG tablet oral  1 po qhs 19  Yes ProviderPromise MD   meloxicam (MOBIC) 15 MG tablet Take 1 tablet  "by mouth Daily. 8/27/19  Yes Justus Lewis MD   metFORMIN (GLUCOPHAGE) 500 MG tablet Take 1 tablet by mouth 2 (Two) Times a Day With Meals. 7/8/19  Yes Maureen Cardoso APRN   nystatin (MYCOSTATIN) 775274 UNIT/GM powder Apply  topically to the appropriate area as directed 4 (Four) Times a Day As Needed.   Yes Promise Tovar MD   Nystatin powder powder  compounding apply bid on abdomen 10/29/18  Yes Promise Tovar MD   omeprazole (priLOSEC) 40 MG capsule Take 40 mg by mouth Daily.   Yes Promise Tovar MD   ondansetron (ZOFRAN) 8 MG tablet Take 1 tablet by mouth Every 8 (Eight) Hours As Needed for Nausea or Vomiting. 8/13/19  Yes Blaine Perales PA-C   oxyCODONE (oxyCONTIN) 10 MG 12 hr tablet Take 10 mg by mouth 4 (Four) Times a Day As Needed.   Yes Promise Tovar MD   polyethylene glycol (MIRALAX) packet Take 17 g by mouth Daily As Needed.   Yes Promise Tovar MD   sennosides-docusate sodium (SENOKOT-S) 8.6-50 MG tablet Take 2 tablets by mouth Daily. 8/13/19  Yes Blaine Perales PA-C   spironolactone (ALDACTONE) 25 MG tablet TAKE 1 TABLET BY MOUTH ONCE DAILY 6/11/19  Yes Maureen Cardoso APRN   tegaserod (ZELNORM) 6 MG tablet Take 6 mg by mouth 2 (Two) Times a Day Before Meals.   Yes Promise Tovar MD   torsemide (DEMADEX) 10 MG tablet Take 1 tablet by mouth Daily. 7/8/19  Yes Maureen Cardoso APRN   MOTEGRITY 2 MG tablet TAKE 1 TABLET BY MOUTH ONCE DAILY 10/1/19 11/19/19 Yes Blaine Perales PA-C   cyclobenzaprine (FLEXERIL) 10 MG tablet Take 10 mg by mouth 3 (Three) Times a Day As Needed for Muscle Spasms.    Promise Tovar MD   gabapentin (NEURONTIN) 800 MG tablet Take 800 mg by mouth 4 (Four) Times a Day.    Promise Tovar MD     Review of Systems  Review of Systems       Objective    /82 (BP Location: Left arm)   Pulse 78   Ht 162.6 cm (64\")   Wt 93.4 kg (206 lb)   LMP  (LMP Unknown)   BMI 35.36 kg/m²   Physical Exam "   Constitutional: She is oriented to person, place, and time. She appears well-developed and well-nourished. No distress.   HENT:   Head: Normocephalic and atraumatic.   Eyes: EOM are normal. Pupils are equal, round, and reactive to light.   Neck: Normal range of motion.   Cardiovascular: Normal rate, regular rhythm and normal heart sounds.   Pulmonary/Chest: Effort normal and breath sounds normal.   Abdominal: Soft. Bowel sounds are normal. She exhibits no shifting dullness, no distension, no abdominal bruit, no ascites and no mass. There is no hepatosplenomegaly. There is tenderness. There is no rigidity, no rebound, no guarding and no CVA tenderness. No hernia. Hernia confirmed negative in the ventral area.   Obese, moderate diffuse   Musculoskeletal: Normal range of motion.   Neurological: She is alert and oriented to person, place, and time.   Skin: Skin is warm and dry.   Psychiatric: She has a normal mood and affect. Her behavior is normal. Judgment and thought content normal.   Nursing note and vitals reviewed.    Assessment/Plan      1. Chronic idiopathic constipation    2. Cirrhosis of liver without ascites, unspecified hepatic cirrhosis type (CMS/HCC)    3. MUSA (nonalcoholic steatohepatitis)    4. Constipation, unspecified constipation type    5. Abdominal pain, generalized    6. Diarrhea, unspecified type    .   Amira was seen today for chronic idiopathis constipation, cirrhosis and musa.    Diagnoses and all orders for this visit:    Chronic idiopathic constipation    Cirrhosis of liver without ascites, unspecified hepatic cirrhosis type (CMS/HCC)    MUSA (nonalcoholic steatohepatitis)    Constipation, unspecified constipation type    Abdominal pain, generalized  -     Cancel: Gastrointestinal Panel, PCR - Stool, Per Rectum    Diarrhea, unspecified type  -     Cancel: Gastrointestinal Panel, PCR - Stool, Per Rectum        Orders placed during this encounter include:  No orders of the defined types  were placed in this encounter.      Medications prescribed:  No orders of the defined types were placed in this encounter.    Discontinued Medications       Reason for Discontinue    MOTEGRITY 2 MG tablet Not Efficacious        Requested Prescriptions      No prescriptions requested or ordered in this encounter       Review and/or summary of lab tests, radiology, procedures, medications. Review and summary of old records and obtaining of history. The risks and benefits of my recommendations, as well as other treatment options were discussed with the patient today. Questions were answered.    Follow-up: Return in about 6 weeks (around 12/31/2019), or if symptoms worsen or fail to improve.     * Surgery not found *      This document has been electronically signed by Blaine Perales PA-C on November 22, 2019 1:56 PM      Results for orders placed or performed in visit on 11/08/19   CBC Auto Differential   Result Value Ref Range    WBC 4.16 3.40 - 10.80 10*3/mm3    RBC 5.22 3.77 - 5.28 10*6/mm3    Hemoglobin 13.5 12.0 - 15.9 g/dL    Hematocrit 41.3 34.0 - 46.6 %    MCV 79.1 79.0 - 97.0 fL    MCH 25.9 (L) 26.6 - 33.0 pg    MCHC 32.7 31.5 - 35.7 g/dL    RDW 15.3 12.3 - 15.4 %    RDW-SD 43.8 37.0 - 54.0 fl    MPV 12.0 6.0 - 12.0 fL    Platelets 58 (L) 140 - 450 10*3/mm3    Neutrophil % 66.7 42.7 - 76.0 %    Lymphocyte % 24.0 19.6 - 45.3 %    Monocyte % 4.8 (L) 5.0 - 12.0 %    Eosinophil % 3.6 0.3 - 6.2 %    Basophil % 0.7 0.0 - 1.5 %    Immature Grans % 0.2 0.0 - 0.5 %    Neutrophils, Absolute 2.77 1.70 - 7.00 10*3/mm3    Lymphocytes, Absolute 1.00 0.70 - 3.10 10*3/mm3    Monocytes, Absolute 0.20 0.10 - 0.90 10*3/mm3    Eosinophils, Absolute 0.15 0.00 - 0.40 10*3/mm3    Basophils, Absolute 0.03 0.00 - 0.20 10*3/mm3    Immature Grans, Absolute 0.01 0.00 - 0.05 10*3/mm3    nRBC 0.0 0.0 - 0.2 /100 WBC   Results for orders placed or performed in visit on 11/05/19   AFP Tumor Marker   Result Value Ref Range     ALPHA-FETOPROTEIN 2.45 0 - 8.3 ng/mL   Protime-INR   Result Value Ref Range    Protime 14.9 11.1 - 15.3 Seconds    INR 1.19 0.80 - 1.20   Comprehensive Metabolic Panel   Result Value Ref Range    Glucose 90 65 - 99 mg/dL    BUN 12 8 - 23 mg/dL    Creatinine 1.09 (H) 0.57 - 1.00 mg/dL    Sodium 138 136 - 145 mmol/L    Potassium 4.3 3.5 - 5.2 mmol/L    Chloride 100 98 - 107 mmol/L    CO2 27.4 22.0 - 29.0 mmol/L    Calcium 9.4 8.6 - 10.5 mg/dL    Total Protein 7.4 6.0 - 8.5 g/dL    Albumin 4.30 3.50 - 5.20 g/dL    ALT (SGPT) 25 1 - 33 U/L    AST (SGOT) 34 (H) 1 - 32 U/L    Alkaline Phosphatase 93 39 - 117 U/L    Total Bilirubin 0.9 0.2 - 1.2 mg/dL    eGFR Non African Amer 51 (L) >60 mL/min/1.73    Globulin 3.1 gm/dL    A/G Ratio 1.4 g/dL    BUN/Creatinine Ratio 11.0 7.0 - 25.0    Anion Gap 10.6 5.0 - 15.0 mmol/L   Results for orders placed or performed during the hospital encounter of 06/09/19   Urinalysis With Microscopic If Indicated (No Culture) - Urine, Clean Catch   Result Value Ref Range    Color, UA Yellow Yellow, Straw, Dark Yellow, Mikala    Appearance, UA Clear Clear    pH, UA 8.0 5.0 - 9.0    Specific Gravity, UA 1.006 1.003 - 1.030    Glucose, UA Negative Negative    Ketones, UA Negative Negative    Bilirubin, UA Negative Negative    Blood, UA Negative Negative    Protein, UA Negative Negative    Leuk Esterase, UA Negative Negative    Nitrite, UA Negative Negative    Urobilinogen, UA 1.0 E.U./dL 0.2 - 1.0 E.U./dL   Results for orders placed or performed during the hospital encounter of 05/21/19   Gold Top - SST   Result Value Ref Range    Extra Tube Hold for add-ons.    Scan Slide   Result Value Ref Range    RBC Morphology Normal Normal    WBC Morphology Normal Normal    Platelet Estimate Decreased Normal   Urinalysis With Microscopic If Indicated (No Culture) - Urine, Clean Catch   Result Value Ref Range    Color, UA Yellow Yellow, Straw, Dark Yellow, Mikala    Appearance, UA Clear Clear    pH, UA 7.5 5.0 -  9.0    Specific Gravity, UA 1.002 (L) 1.003 - 1.030    Glucose, UA Negative Negative    Ketones, UA Negative Negative    Bilirubin, UA Negative Negative    Blood, UA Negative Negative    Protein, UA Negative Negative    Leuk Esterase, UA Negative Negative    Nitrite, UA Negative Negative    Urobilinogen, UA 1.0 E.U./dL 0.2 - 1.0 E.U./dL   CBC Auto Differential   Result Value Ref Range    WBC 4.10 3.40 - 10.80 10*3/mm3    RBC 5.40 (H) 3.77 - 5.28 10*6/mm3    Hemoglobin 14.0 12.0 - 15.9 g/dL    Hematocrit 43.2 34.0 - 46.6 %    MCV 80.0 79.0 - 97.0 fL    MCH 25.9 (L) 26.6 - 33.0 pg    MCHC 32.4 31.5 - 35.7 g/dL    RDW 15.3 12.3 - 15.4 %    RDW-SD 43.8 37.0 - 54.0 fl    MPV 11.7 6.0 - 12.0 fL    Platelets 81 (L) 140 - 450 10*3/mm3    Neutrophil % 65.7 42.7 - 76.0 %    Lymphocyte % 26.8 19.6 - 45.3 %    Monocyte % 4.9 (L) 5.0 - 12.0 %    Eosinophil % 2.2 0.3 - 6.2 %    Basophil % 0.2 0.0 - 1.5 %    Immature Grans % 0.2 0.0 - 0.5 %    Neutrophils, Absolute 2.69 1.70 - 7.00 10*3/mm3    Lymphocytes, Absolute 1.10 0.70 - 3.10 10*3/mm3    Monocytes, Absolute 0.20 0.10 - 0.90 10*3/mm3    Eosinophils, Absolute 0.09 0.00 - 0.40 10*3/mm3    Basophils, Absolute 0.01 0.00 - 0.20 10*3/mm3    Immature Grans, Absolute 0.01 0.00 - 0.05 10*3/mm3    nRBC 0.0 0.0 - 0.2 /100 WBC   Light Blue Top   Result Value Ref Range    Extra Tube hold for add-on    Lipase   Result Value Ref Range    Lipase 25 13 - 60 U/L     *Note: Due to a large number of results and/or encounters for the requested time period, some results have not been displayed. A complete set of results can be found in Results Review.       Some portions of this note have been dictated using voice recognition software and may contain errors and/or omissions.

## 2019-11-24 ENCOUNTER — APPOINTMENT (OUTPATIENT)
Dept: CT IMAGING | Facility: HOSPITAL | Age: 59
End: 2019-11-24

## 2019-11-24 ENCOUNTER — HOSPITAL ENCOUNTER (EMERGENCY)
Facility: HOSPITAL | Age: 59
Discharge: HOME | End: 2019-11-24
Attending: FAMILY MEDICINE | Admitting: FAMILY MEDICINE

## 2019-11-24 VITALS
HEART RATE: 70 BPM | TEMPERATURE: 98 F | OXYGEN SATURATION: 98 % | HEIGHT: 64 IN | RESPIRATION RATE: 18 BRPM | SYSTOLIC BLOOD PRESSURE: 186 MMHG | BODY MASS INDEX: 35.36 KG/M2 | DIASTOLIC BLOOD PRESSURE: 96 MMHG

## 2019-11-24 DIAGNOSIS — R10.11 RIGHT UPPER QUADRANT ABDOMINAL PAIN: Primary | ICD-10-CM

## 2019-11-24 LAB
ALBUMIN SERPL-MCNC: 4 G/DL (ref 3.5–5.2)
ALBUMIN/GLOB SERPL: 1.3 G/DL
ALP SERPL-CCNC: 93 U/L (ref 39–117)
ALT SERPL W P-5'-P-CCNC: 21 U/L (ref 1–33)
ANION GAP SERPL CALCULATED.3IONS-SCNC: 11 MMOL/L (ref 5–15)
AST SERPL-CCNC: 31 U/L (ref 1–32)
BASOPHILS # BLD AUTO: 0.01 10*3/MM3 (ref 0–0.2)
BASOPHILS NFR BLD AUTO: 0.2 % (ref 0–1.5)
BILIRUB SERPL-MCNC: 0.4 MG/DL (ref 0.2–1.2)
BILIRUB UR QL STRIP: NEGATIVE
BUN BLD-MCNC: 13 MG/DL (ref 8–23)
BUN/CREAT SERPL: 11.3 (ref 7–25)
CALCIUM SPEC-SCNC: 9.3 MG/DL (ref 8.6–10.5)
CHLORIDE SERPL-SCNC: 102 MMOL/L (ref 98–107)
CLARITY UR: CLEAR
CO2 SERPL-SCNC: 28 MMOL/L (ref 22–29)
COLOR UR: YELLOW
CREAT BLD-MCNC: 1.15 MG/DL (ref 0.57–1)
DEPRECATED RDW RBC AUTO: 42.1 FL (ref 37–54)
EOSINOPHIL # BLD AUTO: 0.14 10*3/MM3 (ref 0–0.4)
EOSINOPHIL NFR BLD AUTO: 3.1 % (ref 0.3–6.2)
ERYTHROCYTE [DISTWIDTH] IN BLOOD BY AUTOMATED COUNT: 14.9 % (ref 12.3–15.4)
GFR SERPL CREATININE-BSD FRML MDRD: 48 ML/MIN/1.73
GLOBULIN UR ELPH-MCNC: 3.1 GM/DL
GLUCOSE BLD-MCNC: 97 MG/DL (ref 65–99)
GLUCOSE UR STRIP-MCNC: NEGATIVE MG/DL
HCT VFR BLD AUTO: 39.7 % (ref 34–46.6)
HGB BLD-MCNC: 12.9 G/DL (ref 12–15.9)
HGB UR QL STRIP.AUTO: NEGATIVE
IMM GRANULOCYTES # BLD AUTO: 0.01 10*3/MM3 (ref 0–0.05)
IMM GRANULOCYTES NFR BLD AUTO: 0.2 % (ref 0–0.5)
KETONES UR QL STRIP: NEGATIVE
LEUKOCYTE ESTERASE UR QL STRIP.AUTO: NEGATIVE
LIPASE SERPL-CCNC: 31 U/L (ref 13–60)
LYMPHOCYTES # BLD AUTO: 1.58 10*3/MM3 (ref 0.7–3.1)
LYMPHOCYTES NFR BLD AUTO: 34.6 % (ref 19.6–45.3)
MCH RBC QN AUTO: 25.4 PG (ref 26.6–33)
MCHC RBC AUTO-ENTMCNC: 32.5 G/DL (ref 31.5–35.7)
MCV RBC AUTO: 78.1 FL (ref 79–97)
MICROCYTES BLD QL: NORMAL
MONOCYTES # BLD AUTO: 0.27 10*3/MM3 (ref 0.1–0.9)
MONOCYTES NFR BLD AUTO: 5.9 % (ref 5–12)
NEUTROPHILS # BLD AUTO: 2.55 10*3/MM3 (ref 1.7–7)
NEUTROPHILS NFR BLD AUTO: 56 % (ref 42.7–76)
NITRITE UR QL STRIP: NEGATIVE
NRBC BLD AUTO-RTO: 0 /100 WBC (ref 0–0.2)
PH UR STRIP.AUTO: 6 [PH] (ref 5–9)
PLATELET # BLD AUTO: 93 10*3/MM3 (ref 140–450)
PMV BLD AUTO: 11.6 FL (ref 6–12)
POTASSIUM BLD-SCNC: 4.2 MMOL/L (ref 3.5–5.2)
PROT SERPL-MCNC: 7.1 G/DL (ref 6–8.5)
PROT UR QL STRIP: NEGATIVE
RBC # BLD AUTO: 5.08 10*6/MM3 (ref 3.77–5.28)
SMALL PLATELETS BLD QL SMEAR: NORMAL
SODIUM BLD-SCNC: 141 MMOL/L (ref 136–145)
SP GR UR STRIP: 1.01 (ref 1–1.03)
UROBILINOGEN UR QL STRIP: NORMAL
WBC MORPH BLD: NORMAL
WBC NRBC COR # BLD: 4.56 10*3/MM3 (ref 3.4–10.8)

## 2019-11-24 PROCEDURE — 25010000002 MORPHINE PER 10 MG: Performed by: NURSE PRACTITIONER

## 2019-11-24 PROCEDURE — 81003 URINALYSIS AUTO W/O SCOPE: CPT

## 2019-11-24 PROCEDURE — 83690 ASSAY OF LIPASE: CPT | Performed by: NURSE PRACTITIONER

## 2019-11-24 PROCEDURE — 74177 CT ABD & PELVIS W/CONTRAST: CPT

## 2019-11-24 PROCEDURE — 96374 THER/PROPH/DIAG INJ IV PUSH: CPT

## 2019-11-24 PROCEDURE — 96375 TX/PRO/DX INJ NEW DRUG ADDON: CPT

## 2019-11-24 PROCEDURE — 99284 EMERGENCY DEPT VISIT MOD MDM: CPT

## 2019-11-24 PROCEDURE — 25010000002 ONDANSETRON PER 1 MG: Performed by: NURSE PRACTITIONER

## 2019-11-24 PROCEDURE — 80053 COMPREHEN METABOLIC PANEL: CPT | Performed by: NURSE PRACTITIONER

## 2019-11-24 PROCEDURE — 25010000002 HYDROMORPHONE 1 MG/ML SOLUTION: Performed by: NURSE PRACTITIONER

## 2019-11-24 PROCEDURE — 96376 TX/PRO/DX INJ SAME DRUG ADON: CPT

## 2019-11-24 PROCEDURE — 85007 BL SMEAR W/DIFF WBC COUNT: CPT | Performed by: NURSE PRACTITIONER

## 2019-11-24 PROCEDURE — 85025 COMPLETE CBC W/AUTO DIFF WBC: CPT | Performed by: NURSE PRACTITIONER

## 2019-11-24 PROCEDURE — 25010000002 IOPAMIDOL 61 % SOLUTION: Performed by: FAMILY MEDICINE

## 2019-11-24 RX ORDER — ONDANSETRON 2 MG/ML
4 INJECTION INTRAMUSCULAR; INTRAVENOUS ONCE
Status: COMPLETED | OUTPATIENT
Start: 2019-11-24 | End: 2019-11-24

## 2019-11-24 RX ORDER — DICYCLOMINE HCL 20 MG
20 TABLET ORAL EVERY 6 HOURS PRN
Qty: 20 TABLET | Refills: 0 | Status: SHIPPED | OUTPATIENT
Start: 2019-11-24 | End: 2021-01-26

## 2019-11-24 RX ORDER — SODIUM CHLORIDE 0.9 % (FLUSH) 0.9 %
10 SYRINGE (ML) INJECTION AS NEEDED
Status: DISCONTINUED | OUTPATIENT
Start: 2019-11-24 | End: 2019-11-24 | Stop reason: HOSPADM

## 2019-11-24 RX ORDER — PROMETHAZINE HYDROCHLORIDE 25 MG/1
25 SUPPOSITORY RECTAL EVERY 6 HOURS PRN
Qty: 10 SUPPOSITORY | Refills: 0 | Status: SHIPPED | OUTPATIENT
Start: 2019-11-24 | End: 2021-03-10 | Stop reason: HOSPADM

## 2019-11-24 RX ORDER — ONDANSETRON 4 MG/1
4 TABLET, ORALLY DISINTEGRATING ORAL EVERY 8 HOURS PRN
Qty: 10 TABLET | Refills: 0 | Status: SHIPPED | OUTPATIENT
Start: 2019-11-24 | End: 2020-01-09 | Stop reason: SDUPTHER

## 2019-11-24 RX ADMIN — MORPHINE SULFATE 4 MG: 4 INJECTION INTRAVENOUS at 18:58

## 2019-11-24 RX ADMIN — HYDROMORPHONE HYDROCHLORIDE 0.5 MG: 1 INJECTION, SOLUTION INTRAMUSCULAR; INTRAVENOUS; SUBCUTANEOUS at 20:26

## 2019-11-24 RX ADMIN — HYDROMORPHONE HYDROCHLORIDE 1 MG: 1 INJECTION, SOLUTION INTRAMUSCULAR; INTRAVENOUS; SUBCUTANEOUS at 21:36

## 2019-11-24 RX ADMIN — SODIUM CHLORIDE 1000 ML: 9 INJECTION, SOLUTION INTRAVENOUS at 18:56

## 2019-11-24 RX ADMIN — ONDANSETRON 4 MG: 2 INJECTION INTRAMUSCULAR; INTRAVENOUS at 20:26

## 2019-11-24 RX ADMIN — ONDANSETRON 4 MG: 2 INJECTION INTRAMUSCULAR; INTRAVENOUS at 18:56

## 2019-11-24 RX ADMIN — IOPAMIDOL 95 ML: 612 INJECTION, SOLUTION INTRAVENOUS at 19:43

## 2019-11-24 NOTE — ED NOTES
Patient reports having right upper abd pain, nausea but has taken zofran for it. Reports has had c-diff in the past. Reports abd pain off and on for about one month.     Anayeli Seth, LPN  11/24/19 1731

## 2019-11-25 ENCOUNTER — TELEPHONE (OUTPATIENT)
Dept: ORTHOPEDIC SURGERY | Facility: CLINIC | Age: 59
End: 2019-11-25

## 2019-11-25 NOTE — ED NOTES
IV Attempt LAC unsuccessful, dressing applied. Apologized to patient     Anayeli Seth, LPN  11/24/19 8930

## 2019-11-25 NOTE — ED PROVIDER NOTES
Subjective   59 year-old female in the office today complaining of abdominal pain and diarrhea that wax and wane.  Onset of complaint started 1 month ago.  Nontoxic-appearing        Abdominal Pain   Pain location:  Generalized  Pain quality: gnawing and sharp    Pain radiates to:  Does not radiate  Pain severity:  Moderate  Onset quality:  Gradual  Duration:  4 weeks  Timing:  Constant  Progression:  Worsening  Relieved by:  Nothing  Worsened by:  Nothing  Ineffective treatments:  None tried  Associated symptoms: constipation, diarrhea and nausea    Associated symptoms: no fatigue and no shortness of breath        Review of Systems   Constitutional: Negative for diaphoresis and fatigue.   HENT: Negative for congestion.    Respiratory: Negative for shortness of breath.    Gastrointestinal: Positive for abdominal pain, constipation, diarrhea and nausea.   Genitourinary: Negative for flank pain.   Musculoskeletal: Negative for arthralgias.   Skin: Negative for wound.   Neurological: Positive for weakness.   Hematological: Negative for adenopathy.   Psychiatric/Behavioral: Negative for confusion.   All other systems reviewed and are negative.      Past Medical History:   Diagnosis Date   • Acid reflux    • Altered bowel function    • Arthropathy of lumbar facet joint    • Bleeding disorder (CMS/HCC)    • C. difficile diarrhea 2015   • Constipation    • Corns and callus    • Depression    • Disease related peripheral neuropathy    • Epigastric pain    • Fatty liver    • Hammer toe    • Headache    • Hiatal hernia    • History of transfusion    • Hyperlipidemia    • Knee pain    • Localized, primary osteoarthritis of the ankle and foot     Localized, primary osteoarthritis of the ankle and/or foot   • Mendoza's metatarsalgia     Mendoza's metatarsalgia - 2nd interspace on right   • Nausea and vomiting    • Neuralgia and neuritis     Neuralgia, neuritis, and radiculitis, unspecified   • Neuropathy    • Obstructive sleep  "apnea     Obstructive sleep apnea (adult) (pediatric)    • CHAI on CPAP     \"C-Pap at night  (unconfirmed)\"   • Osteoarthritis    • Pain in foot     Pain in unspecified foot - sees a podiatrist   • Pain in joint, ankle and foot     Joint pain in ankle and foot      • Pain radiating to back     Pain radiating to lumbar region of back   • Plantar fasciitis    • PONV (postoperative nausea and vomiting)    • Restless leg syndrome    • Secondary hypertension     Secondary hypertension, unspecified   • Sinusitis    • Sleep apnea    • Tongue anomaly     lesion       Allergies   Allergen Reactions   • Other      Pt states that taking steroids either in pill or injection form make her have blisters in her mouth and she feels like she is on fire on the inside/ has hx of c diff and possible MRSA     • Corticosteroids Rash   • Kenalog  [Triamcinolone Acetonide] Rash   • Sulfa Antibiotics Rash     Sulfa (Sulfonamide Antibiotics)       Past Surgical History:   Procedure Laterality Date   • CARPAL TUNNEL RELEASE Left 2018    Procedure: CARPAL TUNNEL RELEASE - left;  Surgeon: Justus Lewis MD;  Location: [facility] OR;  Service: Orthopedics   • CARPAL TUNNEL RELEASE Right 2019    Procedure: carpal tunnel release right hand with local/monitored anesthesia care;  Surgeon: Justus Lewis MD;  Location: [facility] OR;  Service: Orthopedics   •  SECTION     • CHOLECYSTECTOMY     • COLONOSCOPY  2013   • COLONOSCOPY N/A 10/17/2018    Procedure: COLONOSCOPY;  Surgeon: Russell Del Rio MD;  Location: [facility] ENDOSCOPY;  Service: Gastroenterology   • DIRECT LARYNGOSCOPY, ESOPHAGOSCOPY, BRONCHOSCOPY N/A 2017    Procedure: DIRECT LARYNGOSCOPY AND;  Surgeon: Live Bolton MD;  Location: [facility] OR;  Service:    • ENDOSCOPY  2013    Colon endoscopy 67390 (1) - Internal & external hemorrhoids found. Stool found.   • ENDOSCOPY  2013    EGD w/ tube 85533 (1) - Normal esophagus. Gastritis in " stomach. Biopsy taken. Normal dudoenum. Biopsy taken.   • ENDOSCOPY N/A 10/17/2018    Procedure: ESOPHAGOGASTRODUODENOSCOPY--eval varices;  Surgeon: Russell Del Rio MD;  Location: [facility] ENDOSCOPY;  Service: Gastroenterology   • FOOT SURGERY  2013    Foot/toes surgery procedure (1) - Arthroplasty of toes 4 and 5 of right foot.   • HERNIA REPAIR     • HERNIA REPAIR      hital   • HYSTERECTOMY     • LIVER BIOPSY     • NERVE BLOCK  2016    Injection for nerve block (1) - Lumbar medial branch block.   • OTHER SURGICAL HISTORY  2012    Inj(s) Tend-Sheath, Ligament, Single  (1) - [facility]    • OTHER SURGICAL HISTORY  2013    Small Joint Injection/Aspiration  (2) - [facility] NICK[facility] ([facility])    • OTHER SURGICAL HISTORY      bowel obstruction x2   • OTHER SURGICAL HISTORY      gland removed from neck   • SUBLINGUAL SALIVARY CYST EXCISION N/A 2017    Procedure: EXCISION OF LEFT  TONGUE LESION WITH CLOSURE;  Surgeon: Live Bolton MD;  Location: [facility] OR;  Service:    • TUBAL ABDOMINAL LIGATION     • UPPER GASTROINTESTINAL ENDOSCOPY  2013   • UPPER GASTROINTESTINAL ENDOSCOPY  10/17/2018       Family History   Problem Relation Age of Onset   • Cancer Other    • Diabetes Other    • Heart disease Other    • Hypertension Mother    • Cancer Mother    • Diabetes Mother    • Hypertension Father    • Heart attack Father    • Cancer Father    • Heart disease Father    • Thyroid disease Maternal Aunt        Social History     Socioeconomic History   • Marital status:      Spouse name: Not on file   • Number of children: Not on file   • Years of education: Not on file   • Highest education level: Not on file   Tobacco Use   • Smoking status: Former Smoker     Packs/day: 2.00     Years: 15.00     Pack years: 30.00     Types: Cigarettes     Last attempt to quit: 2000     Years since quittin.9   • Smokeless tobacco: Never Used   Substance and Sexual  Activity   • Alcohol use: No   • Drug use: No   • Sexual activity: Defer     Comment: Marital Status:            Objective   Physical Exam   Constitutional: She appears well-developed and well-nourished.  Non-toxic appearance. She does not appear ill.   HENT:   Head: Normocephalic.   Eyes: Pupils are equal, round, and reactive to light.   Cardiovascular: Normal rate and regular rhythm.   Pulmonary/Chest: Effort normal and breath sounds normal.   Abdominal: Soft. Normal appearance and bowel sounds are normal. There is tenderness in the right upper quadrant and epigastric area.   Neurological: She is alert.   Skin: Skin is warm and dry. Capillary refill takes less than 2 seconds.   Psychiatric: She has a normal mood and affect.   Nursing note and vitals reviewed.      Procedures           ED Course      CT Abdomen Pelvis With Contrast   Final Result   1. No acute abnormality identified   2. Cirrhotic appearance of liver   3. Increasing prominence of the spleen, now measuring 18.5 cm in   greatest dimension.   4. Status post cholecystectomy and hysterectomy   5. The appendix is not well visualized, but no particular right   lower quadrant inflammatory stranding is evident   6. Please see findings section above for further details.       Electronically signed by:  Chandrika Grigsby MD  11/24/2019 8:16 PM   CST Workstation: [id number]        Results for orders placed or performed during the hospital encounter of 11/24/19   Urinalysis With Microscopic If Indicated (No Culture) - Urine, Clean Catch   Result Value Ref Range    Color, UA Yellow Yellow, Straw, Dark Yellow, Mikala    Appearance, UA Clear Clear    pH, UA 6.0 5.0 - 9.0    Specific Gravity, UA 1.009 1.003 - 1.030    Glucose, UA Negative Negative    Ketones, UA Negative Negative    Bilirubin, UA Negative Negative    Blood, UA Negative Negative    Protein, UA Negative Negative    Leuk Esterase, UA Negative Negative    Nitrite, UA Negative Negative    Urobilinogen,  UA 0.2 E.U./dL 0.2 - 1.0 E.U./dL   Comprehensive Metabolic Panel   Result Value Ref Range    Glucose 97 65 - 99 mg/dL    BUN 13 8 - 23 mg/dL    Creatinine 1.15 (H) 0.57 - 1.00 mg/dL    Sodium 141 136 - 145 mmol/L    Potassium 4.2 3.5 - 5.2 mmol/L    Chloride 102 98 - 107 mmol/L    CO2 28.0 22.0 - 29.0 mmol/L    Calcium 9.3 8.6 - 10.5 mg/dL    Total Protein 7.1 6.0 - 8.5 g/dL    Albumin 4.00 3.50 - 5.20 g/dL    ALT (SGPT) 21 1 - 33 U/L    AST (SGOT) 31 1 - 32 U/L    Alkaline Phosphatase 93 39 - 117 U/L    Total Bilirubin 0.4 0.2 - 1.2 mg/dL    eGFR Non African Amer 48 (L) >60 mL/min/1.73    Globulin 3.1 gm/dL    A/G Ratio 1.3 g/dL    BUN/Creatinine Ratio 11.3 7.0 - 25.0    Anion Gap 11.0 5.0 - 15.0 mmol/L   Lipase   Result Value Ref Range    Lipase 31 13 - 60 U/L   CBC Auto Differential   Result Value Ref Range    WBC 4.56 3.40 - 10.80 10*3/mm3    RBC 5.08 3.77 - 5.28 10*6/mm3    Hemoglobin 12.9 12.0 - 15.9 g/dL    Hematocrit 39.7 34.0 - 46.6 %    MCV 78.1 (L) 79.0 - 97.0 fL    MCH 25.4 (L) 26.6 - 33.0 pg    MCHC 32.5 31.5 - 35.7 g/dL    RDW 14.9 12.3 - 15.4 %    RDW-SD 42.1 37.0 - 54.0 fl    MPV 11.6 6.0 - 12.0 fL    Platelets 93 (L) 140 - 450 10*3/mm3    Neutrophil % 56.0 42.7 - 76.0 %    Lymphocyte % 34.6 19.6 - 45.3 %    Monocyte % 5.9 5.0 - 12.0 %    Eosinophil % 3.1 0.3 - 6.2 %    Basophil % 0.2 0.0 - 1.5 %    Immature Grans % 0.2 0.0 - 0.5 %    Neutrophils, Absolute 2.55 1.70 - 7.00 10*3/mm3    Lymphocytes, Absolute 1.58 0.70 - 3.10 10*3/mm3    Monocytes, Absolute 0.27 0.10 - 0.90 10*3/mm3    Eosinophils, Absolute 0.14 0.00 - 0.40 10*3/mm3    Basophils, Absolute 0.01 0.00 - 0.20 10*3/mm3    Immature Grans, Absolute 0.01 0.00 - 0.05 10*3/mm3    nRBC 0.0 0.0 - 0.2 /100 WBC   Scan Slide   Result Value Ref Range    Microcytes Slight/1+ None Seen    WBC Morphology Normal Normal    Platelet Estimate Decreased Normal                 MDM  Number of Diagnoses or Management Options  Right upper quadrant abdominal pain:  new and requires workup     Amount and/or Complexity of Data Reviewed  Clinical lab tests: reviewed  Tests in the radiology section of CPT®: ordered and reviewed    Risk of Complications, Morbidity, and/or Mortality  Presenting problems: moderate  Diagnostic procedures: moderate  Management options: moderate  General comments: Follow up with GI and PCP.     Patient Progress  Patient progress: stable      Final diagnoses:   Right upper quadrant abdominal pain              Jeff Rivas, APRN  11/24/19 [id number]

## 2019-11-25 NOTE — ED NOTES
"Pt arrived back from CT scan. Pt c/o abd pain 7/10. Pt asks \"Can I have Dilaudid, that Morphine doesn't work for me.\" I explained to pt that I would ask the MD for more pain meds.     Chi Aburto RN  11/1960    "

## 2019-11-26 ENCOUNTER — LAB (OUTPATIENT)
Dept: LAB | Facility: HOSPITAL | Age: 59
End: 2019-11-26

## 2019-11-26 DIAGNOSIS — R10.84 ABDOMINAL PAIN, GENERALIZED: ICD-10-CM

## 2019-11-26 DIAGNOSIS — R19.7 DIARRHEA, UNSPECIFIED TYPE: ICD-10-CM

## 2019-11-26 LAB
ADV 40+41 DNA STL QL NAA+NON-PROBE: NOT DETECTED
ASTRO TYP 1-8 RNA STL QL NAA+NON-PROBE: NOT DETECTED
C CAYETANENSIS DNA STL QL NAA+NON-PROBE: NOT DETECTED
C DIFF TOX GENS STL QL NAA+PROBE: NOT DETECTED
CAMPY SP DNA.DIARRHEA STL QL NAA+PROBE: NOT DETECTED
CRYPTOSP STL CULT: NOT DETECTED
E COLI DNA SPEC QL NAA+PROBE: NOT DETECTED
E HISTOLYT AG STL-ACNC: NOT DETECTED
EAEC PAA PLAS AGGR+AATA ST NAA+NON-PRB: NOT DETECTED
EC STX1 + STX2 GENES STL NAA+PROBE: NOT DETECTED
EPEC EAE GENE STL QL NAA+NON-PROBE: NOT DETECTED
ETEC LTA+ST1A+ST1B TOX ST NAA+NON-PROBE: NOT DETECTED
G LAMBLIA DNA SPEC QL NAA+PROBE: NOT DETECTED
NOROVIRUS GI+II RNA STL QL NAA+NON-PROBE: NOT DETECTED
P SHIGELLOIDES DNA STL QL NAA+PROBE: NOT DETECTED
RV RNA STL NAA+PROBE: NOT DETECTED
SALMONELLA DNA SPEC QL NAA+PROBE: NOT DETECTED
SAPO I+II+IV+V RNA STL QL NAA+NON-PROBE: NOT DETECTED
SHIGELLA SP+EIEC IPAH STL QL NAA+PROBE: NOT DETECTED
V CHOLERAE DNA SPEC QL NAA+PROBE: NOT DETECTED
VIBRIO DNA SPEC NAA+PROBE: NOT DETECTED
YERSINIA STL CULT: NOT DETECTED

## 2019-11-26 PROCEDURE — 0097U HC BIOFIRE FILMARRAY GI PANEL: CPT

## 2019-12-27 ENCOUNTER — TELEPHONE (OUTPATIENT)
Dept: ORTHOPEDIC SURGERY | Facility: CLINIC | Age: 59
End: 2019-12-27

## 2019-12-27 NOTE — TELEPHONE ENCOUNTER
Receive failed fax from in basket. Called [facility] pharmacy and gave prescription over the phone.

## 2020-01-09 ENCOUNTER — OFFICE VISIT (OUTPATIENT)
Dept: CARDIOLOGY | Facility: CLINIC | Age: 60
End: 2020-01-09

## 2020-01-09 VITALS
WEIGHT: 213.6 LBS | HEART RATE: 72 BPM | HEIGHT: 64 IN | BODY MASS INDEX: 36.47 KG/M2 | SYSTOLIC BLOOD PRESSURE: 138 MMHG | DIASTOLIC BLOOD PRESSURE: 78 MMHG | OXYGEN SATURATION: 99 %

## 2020-01-09 DIAGNOSIS — I51.89 DIASTOLIC DYSFUNCTION: Primary | ICD-10-CM

## 2020-01-09 DIAGNOSIS — E78.2 MIXED HYPERLIPIDEMIA: ICD-10-CM

## 2020-01-09 PROCEDURE — 99213 OFFICE O/P EST LOW 20 MIN: CPT | Performed by: NURSE PRACTITIONER

## 2020-01-09 RX ORDER — TORSEMIDE 10 MG/1
10 TABLET ORAL DAILY
Qty: 30 TABLET | Refills: 12 | Status: SHIPPED | OUTPATIENT
Start: 2020-01-09 | End: 2020-07-16

## 2020-01-09 NOTE — PROGRESS NOTES
Subjective:     CC: HTN, leg swelling    Fatigue   This is a chronic problem. The current episode started more than 1 year ago. The problem occurs daily. The problem has been rapidly worsening. Associated symptoms include arthralgias, fatigue, joint swelling, myalgias and weakness. Pertinent negatives include no change in bowel habit, chills, coughing, fever or rash. The symptoms are aggravated by standing and walking. She has tried position changes, oral narcotics, heat and rest for the symptoms. The treatment provided mild relief.   Leg Swelling   This is a chronic problem. The current episode started more than 1 year ago. The problem occurs intermittently. The problem has been rapidly improving. Associated symptoms include arthralgias, fatigue, joint swelling, myalgias and weakness. Pertinent negatives include no change in bowel habit, chills, coughing, fever or rash. She has tried rest and position changes (loop diuretics) for the symptoms. The treatment provided moderate relief.     She had been on Lasix for 10-12 years. She does have HTN for also about 10 years.    Of note, her BNP is negative.   Follow up revealed return of edema after Metolazone was stopped. Dr. Espitia also ordered ischemic evaluation which was low risk.   Last visit, he DC norvasc and lasix and added chlorthalidone. Improvement noted.    I saw her on  11/1/2017 and Aldactone added and chlorthalidone increased.   Weight down and swelling has not returned.     She complains of chronic fatigue. This is secondary to chronic pain and high doses of neurontin. Jordi for back and feet Thursday.     1/7/19: 6 month follow up. She has had hospitalization for SBO. She has not had recurrence of leg edema on current medications. Still has significant gait disturbance due to podiatry issues.    7/8/19: 6 month follow up, HTN. Doing well with current meds. Wants to try different diuretic medicine.  She has been on chlorthalidone for a couple  years.  We try decreasing down to 12.5 mg and then discontinuing, however she does not feel like she is ready to discontinue.  We spent time discussing that her swelling is not secondary to volume overload and the diuretics are probably not helping at all.  She would like to try different medication; that will be arranged today.    1/9/2020: HTN controlled. Doing well with Demadex. Still feels like she needs more fluid medication. Not going to order more. BP controlled today. On Metformin BID for weight loss. Awaiting further work up and for Interstim for constipation. Seeing Dr. Galeana. Now using a wheelchair the house. Cane for other times.       Hospitalization:  8/31/18-9/5/18- SBO  11/17/2017-11/22/2017- SBO    Review of Systems   Constitution: Positive for fatigue and weight loss. Negative for chills, decreased appetite and fever.   HENT: Negative.    Eyes: Negative.    Cardiovascular: Positive for dyspnea on exertion and leg swelling. Negative for irregular heartbeat.   Respiratory: Negative for cough and wheezing.    Endocrine: Negative.    Skin: Negative for dry skin, flushing and rash.   Musculoskeletal: Positive for arthralgias, joint swelling and myalgias. Negative for falls.   Gastrointestinal: Negative for change in bowel habit and melena.   Genitourinary: Negative for frequency and hematuria.   Neurological: Positive for weakness. Negative for dizziness, light-headedness and loss of balance.   Psychiatric/Behavioral: Negative for altered mental status and memory loss. The patient is not nervous/anxious.        Current Outpatient Medications   Medication Sig Dispense Refill   • cetirizine (zyrTEC) 10 MG tablet Take 1 tablet by mouth Daily.     • cyclobenzaprine (FLEXERIL) 10 MG tablet Take 10 mg by mouth 3 (Three) Times a Day As Needed for Muscle Spasms.     • dicyclomine (BENTYL) 20 MG tablet Take 1 tablet by mouth Every 6 (Six) Hours As Needed (ABD CRAMPING). 20 tablet 0   • escitalopram (LEXAPRO) 5  "MG tablet Take 5 mg by mouth Daily.     • gabapentin (NEURONTIN) 800 MG tablet Take 800 mg by mouth 4 (Four) Times a Day.     • Melatonin 10 MG capsule Take  by mouth Every Night.     • meloxicam (MOBIC) 15 MG tablet Take 1 tablet by mouth Daily. 30 tablet 2   • metFORMIN (GLUCOPHAGE) 500 MG tablet Take 1 tablet by mouth 2 (Two) Times a Day With Meals. 60 tablet 6   • nystatin (MYCOSTATIN) 456792 UNIT/GM powder Apply  topically to the appropriate area as directed 4 (Four) Times a Day As Needed.     • Nystatin powder powder  compounding apply bid on abdomen     • omeprazole (priLOSEC) 40 MG capsule Take 40 mg by mouth Daily.     • ondansetron (ZOFRAN) 8 MG tablet Take 1 tablet by mouth Every 8 (Eight) Hours As Needed for Nausea or Vomiting. 30 tablet 1   • oxyCODONE (oxyCONTIN) 10 MG 12 hr tablet Take 10 mg by mouth 4 (Four) Times a Day As Needed.     • polyethylene glycol (MIRALAX) packet Take 17 g by mouth Daily As Needed.     • promethazine (PHENERGAN) 25 MG suppository Insert 1 suppository into the rectum Every 6 (Six) Hours As Needed for Nausea or Vomiting. 10 suppository 0   • sennosides-docusate sodium (SENOKOT-S) 8.6-50 MG tablet Take 2 tablets by mouth Daily. 60 tablet 1   • spironolactone (ALDACTONE) 25 MG tablet TAKE 1 TABLET BY MOUTH ONCE DAILY 30 tablet 5   • tegaserod (ZELNORM) 6 MG tablet Take 6 mg by mouth 2 (Two) Times a Day Before Meals.     • torsemide (DEMADEX) 10 MG tablet Take 1 tablet by mouth Daily. 30 tablet 12     No current facility-administered medications for this visit.         Objective:     Vitals:    01/09/20 1033   BP: 138/78   BP Location: Left arm   Patient Position: Sitting   Cuff Size: Adult   Pulse: 72   SpO2: 99%   Weight: 96.9 kg (213 lb 9.6 oz)   Height: 162.6 cm (64\")     Wt Readings from Last 3 Encounters:   01/09/20 96.9 kg (213 lb 9.6 oz)   11/19/19 93.4 kg (206 lb)   11/04/19 94.3 kg (208 lb)          Physical Exam   Constitutional: She is oriented to person, place, and " time. She appears well-developed and well-nourished. No distress.   HENT:   Head: Normocephalic.   Neck: No JVD present.   Cardiovascular: Normal rate, regular rhythm, S1 normal, S2 normal, normal heart sounds and intact distal pulses.   No murmur heard.  Pulmonary/Chest: Effort normal and breath sounds normal. No respiratory distress. She has no wheezes. She has no rales.   Abdominal: Soft. Bowel sounds are normal.   Musculoskeletal: Normal range of motion. She exhibits no edema.   Neurological: She is alert and oriented to person, place, and time.   Skin: Skin is warm and dry. No erythema.   Psychiatric: She has a normal mood and affect. Her behavior is normal. Judgment and thought content normal.       DATA reviewed  ECHO 10/18/2017  Interpretation Summary   · Left Ventricle: Left ventricular systolic function is normal. Estimated EF appears to be in the range of 56 - 60%  · Left ventricular diastolic dysfunction is noted (grade III w/high LAP) consistent with reversible restrictive pattern. Elevated left atrial pressure.  · Right Ventricle: Normal right ventricular wall thickness, systolic function and septal motion noted. Right ventricular cavity is mildly dilated  · No evidence of pulmonary hypertension is present  · There is no evidence of pericardial effusion     AGUSTINA 11/7/17  Interpretation Summary     · Transesophageal Echocardiogram with limited Color and Doppler  · Left Ventricle: Left ventricular systolic function is normal. Estimated EF appears to be in the range of 61 - 65%.  · Right Ventricle: Normal right ventricular wall thickness, systolic function and septal motion noted. Right ventricular cavity is mildly dilated  · No evidence of a patent foramen ovale. No evidence of an atrial septal defect present. Saline test results are negative  · There is no evidence of pericardial effusion  · No evidence of VSD.          ABIs 10/18/17  Interpretation Summary   · Right Conclusion: The right CARYN appears  normal.  · Left Conclusion: The left CARYN appears normal.  · Increased indices suggest calcification         Venous Duplex 10/18/17   Interpretation Summary   · Appears negative for DVT of the lower extremities  · Pulsatile venous flow         STRESS TEST: 10/3/17  Interpretation Summary   · Nuclear Study Description: A 2-day rest/stress protocol myocardial perfusion imaging study was performed  · Nuclear Perfusion Images: Overall image quality is excellent.  · Stress ECG: Stress ECG rhythm of sinus tachycardia noted. Normal ECG with no significant stress induced changes noted.  · Stress Description: A pharmacological stress test was performed using regadenoson without low-level exercise.  · Nuclear Perfusion Findings Study Impression: Myocardial perfusion imaging indicates a normal myocardial perfusion study with no evidence of ischemia  · Ventricle Size / Description: Left ventricular ejection fraction is normal (Calculated EF = 70%).  · Impressions are consistent with a low risk study.           Lab Results   Component Value Date    GLUCOSE 97 11/24/2019    BUN 13 11/24/2019    CREATININE 1.15 (H) 11/24/2019    EGFRIFNONA 48 (L) 11/24/2019    BCR 11.3 11/24/2019    K 4.2 11/24/2019    CO2 28.0 11/24/2019    CALCIUM 9.3 11/24/2019    ALBUMIN 4.00 11/24/2019    AST 31 11/24/2019    ALT 21 11/24/2019     Lab Results   Component Value Date    WBC 4.56 11/24/2019    HGB 12.9 11/24/2019    HCT 39.7 11/24/2019    MCV 78.1 (L) 11/24/2019    PLT 93 (L) 11/24/2019      Ref. Range 9/20/2017 11:24   proBNP Latest Ref Range: 0.0 - 900.0 pg/mL 129.0       The following portions of the patient's history were reviewed and updated as appropriate: allergies, current medications, past family history, past medical history, past social history, past surgical history and problem list.     Assessment/Plan:   Weight continues to stay down.  Edema in ankles secondary to arthritis  Wearing CPAP. Insomnia.  Will begin to reduce dosage of  diuretic. Hopefully to improve chronic constipation. We spoke today about the importance of 64oz of fluid daily.   Multiple vague complaints not related to cardiology.      Diagnosis Plan   1. Swelling of both lower extremities  Absent.    Demadex 10mg daily.     Aldactone 25mg     Pain with compression stockings.     Labs with PCP        2. Diastolic dysfunction , grade III BNP negative. Blood pressure controlled. BP still stable without Norvasc.  Consideration for HFpEF, but negative BNP. Her edema now looks to be from joint effusions.     Unable to to complete 6MWT due to pain in feet.        3. Obesity, BMI 35 Patient's Body mass index is 36.66 kg/m². BMI is above normal parameters. Recommendations include: pharmacological intervention   - Metformin 500mg BID         No CHF. Saw Dr. Espitia regarding RV dilation. AGUSTINA was without anomalies. Secondary to CHAI.     Follow up in 12 months for HTN monitoring.           This document has been electronically signed by LIYAH Rodriguez on January 9, 2020 12:40 PM

## 2020-01-14 ENCOUNTER — OFFICE VISIT (OUTPATIENT)
Dept: GASTROENTEROLOGY | Facility: CLINIC | Age: 60
End: 2020-01-14

## 2020-01-14 VITALS
HEIGHT: 64 IN | SYSTOLIC BLOOD PRESSURE: 156 MMHG | BODY MASS INDEX: 35.92 KG/M2 | HEART RATE: 68 BPM | DIASTOLIC BLOOD PRESSURE: 95 MMHG | WEIGHT: 210.4 LBS

## 2020-01-14 DIAGNOSIS — K59.04 CHRONIC IDIOPATHIC CONSTIPATION: Primary | ICD-10-CM

## 2020-01-14 DIAGNOSIS — K74.60 CIRRHOSIS OF LIVER WITHOUT ASCITES, UNSPECIFIED HEPATIC CIRRHOSIS TYPE (HCC): ICD-10-CM

## 2020-01-14 DIAGNOSIS — K75.81 NASH (NONALCOHOLIC STEATOHEPATITIS): ICD-10-CM

## 2020-01-14 DIAGNOSIS — K59.00 CONSTIPATION, UNSPECIFIED CONSTIPATION TYPE: ICD-10-CM

## 2020-01-14 PROCEDURE — 99213 OFFICE O/P EST LOW 20 MIN: CPT | Performed by: PHYSICIAN ASSISTANT

## 2020-01-14 NOTE — PATIENT INSTRUCTIONS

## 2020-01-14 NOTE — PROGRESS NOTES
12-Jun-2017 Chief Complaint   Patient presents with   • Constipation   • Cirrhosis   • Musa   • Abdominal Pain       ENDO PROCEDURE ORDERED:    Subjective    Amira Shannon is a 59 y.o. female. she is here today for follow-up.    History of Present Illness    Patient seen on a recheck of her abdominal pain, constipation, F4/S2/N2, with MUSA cirrhosis. Last seen 11/19/2019. She claims the Zelnorm did not work. Stool pathogen was negative on 11/26/2019. She states she is taking the Motegrity 2 mg daily as well as using enemas to keep her bowels moving. She complains of 5 out of 10 low abdominal pain. She is on Prilosec 40 mg daily for chronic heartburn. Denied nausea/vomiting. Weight is up 4.4 pounds since last visit. Last EGD/colonoscopy showed gastritis and hemorrhoids on 10/17/2018.     She did apparently see Dr. Galeana. She describes having what sounds like a cystoscopy. We were not able to get records from Dr. Galeana’ office. She was sent for evaluation for a possible InterStim. She states she was scheduled to have another procedure, but they canceled it saying they did not have enough patients. I believe this was probably likely that she was scheduled for the InterStim placement. She was encouraged to follow up with Dr. Galeana. We will continue to try to get her records.     Patient went to the emergency room with abdominal pain 11/24/2019. CT scan abdomen and pelvis with contrast showed a small hiatal hernia, cirrhotic fatty liver, enlarged spleen 18.5 cm, otherwise grossly negative with hysterectomy. CBC was okay with 93,000 platelets. Normal lipase, urinalysis. CMP showed creatinine 1.15, with a GFR of 48, otherwise normal.     ASSESSMENT/PLAN: Patient with chronic idiopathic constipation. We will continue to try to see if she is a candidate for InterStim. She may continue on the Motegrity and enemas for now. I had previously sent her to Surgery. She will be due for hepatoma screening in May. Encouraged dietary  "modification and significant weight loss. We will plan followup in 3 months, further pending clinical course and the results of the above.        The following portions of the patient's history were reviewed and updated as appropriate:   Past Medical History:   Diagnosis Date   • Acid reflux    • Altered bowel function    • Arthropathy of lumbar facet joint    • Bleeding disorder (CMS/HCC)    • C. difficile diarrhea 2015   • Constipation    • Corns and callus    • Depression    • Disease related peripheral neuropathy    • Epigastric pain    • Fatty liver    • Hammer toe    • Headache    • Hiatal hernia    • History of transfusion    • Hyperlipidemia    • Knee pain    • Localized, primary osteoarthritis of the ankle and foot     Localized, primary osteoarthritis of the ankle and/or foot   • Mendoza's metatarsalgia     Mendoza's metatarsalgia - 2nd interspace on right   • Nausea and vomiting    • Neuralgia and neuritis     Neuralgia, neuritis, and radiculitis, unspecified   • Neuropathy    • Obstructive sleep apnea     Obstructive sleep apnea (adult) (pediatric)    • CHAI on CPAP     \"C-Pap at night  (unconfirmed)\"   • Osteoarthritis    • Pain in foot     Pain in unspecified foot - sees a podiatrist   • Pain in joint, ankle and foot     Joint pain in ankle and foot      • Pain radiating to back     Pain radiating to lumbar region of back   • Plantar fasciitis    • PONV (postoperative nausea and vomiting)    • Restless leg syndrome    • Secondary hypertension     Secondary hypertension, unspecified   • Sinusitis    • Sleep apnea    • Tongue anomaly     lesion     Past Surgical History:   Procedure Laterality Date   • CARPAL TUNNEL RELEASE Left 8/22/2018    Procedure: CARPAL TUNNEL RELEASE - left;  Surgeon: Justus Lewis MD;  Location: [facility];  Service: Orthopedics   • CARPAL TUNNEL RELEASE Right 2/13/2019    Procedure: carpal tunnel release right hand with local/monitored anesthesia care;  Surgeon: Joshua" Justus Jovel MD;  Location: [facility] OR;  Service: Orthopedics   •  SECTION     • CHOLECYSTECTOMY     • COLONOSCOPY  2013   • COLONOSCOPY N/A 10/17/2018    Procedure: COLONOSCOPY;  Surgeon: Russell Del Rio MD;  Location: [facility] ENDOSCOPY;  Service: Gastroenterology   • DIRECT LARYNGOSCOPY, ESOPHAGOSCOPY, BRONCHOSCOPY N/A 2017    Procedure: DIRECT LARYNGOSCOPY AND;  Surgeon: Live Bolton MD;  Location: [facility] OR;  Service:    • ENDOSCOPY  2013    Colon endoscopy 46714 (1) - Internal & external hemorrhoids found. Stool found.   • ENDOSCOPY  2013    EGD w/ tube 66822 (1) - Normal esophagus. Gastritis in stomach. Biopsy taken. Normal dudoenum. Biopsy taken.   • ENDOSCOPY N/A 10/17/2018    Procedure: ESOPHAGOGASTRODUODENOSCOPY--eval varices;  Surgeon: Russell Del Rio MD;  Location: [facility] ENDOSCOPY;  Service: Gastroenterology   • FOOT SURGERY  2013    Foot/toes surgery procedure (1) - Arthroplasty of toes 4 and 5 of right foot.   • HERNIA REPAIR     • HERNIA REPAIR      hital   • HYSTERECTOMY     • LIVER BIOPSY     • NERVE BLOCK  2016    Injection for nerve block (1) - Lumbar medial branch block.   • OTHER SURGICAL HISTORY  2012    Inj(s) Tend-Sheath, Ligament, Single  (1) - [facility]    • OTHER SURGICAL HISTORY  2013    Small Joint Injection/Aspiration  (2) - [facility] NICK[facility] ([facility])    • OTHER SURGICAL HISTORY      bowel obstruction x2   • OTHER SURGICAL HISTORY      gland removed from neck   • SUBLINGUAL SALIVARY CYST EXCISION N/A 2017    Procedure: EXCISION OF LEFT  TONGUE LESION WITH CLOSURE;  Surgeon: Live Bolton MD;  Location: [facility] OR;  Service:    • TUBAL ABDOMINAL LIGATION     • UPPER GASTROINTESTINAL ENDOSCOPY  2013   • UPPER GASTROINTESTINAL ENDOSCOPY  10/17/2018     Family History   Problem Relation Age of Onset   • Cancer Other    • Diabetes Other    • Heart disease Other    • Hypertension  Mother    • Cancer Mother    • Diabetes Mother    • Hypertension Father    • Heart attack Father    • Cancer Father    • Heart disease Father    • Thyroid disease Maternal Aunt      OB History    None       Allergies   Allergen Reactions   • Other      Pt states that taking steroids either in pill or injection form make her have blisters in her mouth and she feels like she is on fire on the inside/ has hx of c diff and possible MRSA     • Corticosteroids Rash   • Kenalog  [Triamcinolone Acetonide] Rash   • Sulfa Antibiotics Rash     Sulfa (Sulfonamide Antibiotics)     Social History     Socioeconomic History   • Marital status:      Spouse name: Not on file   • Number of children: Not on file   • Years of education: Not on file   • Highest education level: Not on file   Tobacco Use   • Smoking status: Former Smoker     Packs/day: 2.00     Years: 15.00     Pack years: 30.00     Types: Cigarettes     Last attempt to quit: 2000     Years since quittin.0   • Smokeless tobacco: Never Used   Substance and Sexual Activity   • Alcohol use: No   • Drug use: No   • Sexual activity: Defer     Comment: Marital Status:      Current Medications:  Prior to Admission medications    Medication Sig Start Date End Date Taking? Authorizing Provider   cetirizine (zyrTEC) 10 MG tablet Take 1 tablet by mouth Daily. 17  Yes Promise Tovar MD   cyclobenzaprine (FLEXERIL) 10 MG tablet Take 10 mg by mouth 3 (Three) Times a Day As Needed for Muscle Spasms.   Yes Promise Tovar MD   dicyclomine (BENTYL) 20 MG tablet Take 1 tablet by mouth Every 6 (Six) Hours As Needed (ABD CRAMPING). 19  Yes Jeff Rivas APRN   escitalopram (LEXAPRO) 5 MG tablet Take 5 mg by mouth Daily.   Yes Promise Tovar MD   gabapentin (NEURONTIN) 800 MG tablet Take 800 mg by mouth 4 (Four) Times a Day.   Yes Promise Tovar MD   Melatonin 10 MG capsule Take  by mouth Every Night.   Yes Promise Tovar MD  "  meloxicam (MOBIC) 15 MG tablet Take 1 tablet by mouth Daily. 8/27/19  Yes Justus Lewis MD   metFORMIN (GLUCOPHAGE) 500 MG tablet Take 1 tablet by mouth 2 (Two) Times a Day With Meals. 7/8/19  Yes Maureen Cardoso APRN   nystatin (MYCOSTATIN) 227278 UNIT/GM powder Apply  topically to the appropriate area as directed 4 (Four) Times a Day As Needed.   Yes Promise Tovar MD   Nystatin powder powder  compounding apply bid on abdomen 10/29/18  Yes Promise Tovar MD   omeprazole (priLOSEC) 40 MG capsule Take 40 mg by mouth Daily.   Yes Promise Tovar MD   ondansetron (ZOFRAN) 8 MG tablet Take 1 tablet by mouth Every 8 (Eight) Hours As Needed for Nausea or Vomiting. 8/13/19  Yes Blaine Perales PA-C   oxyCODONE (oxyCONTIN) 10 MG 12 hr tablet Take 10 mg by mouth 4 (Four) Times a Day As Needed.   Yes Promise Tovar MD   polyethylene glycol (MIRALAX) packet Take 17 g by mouth Daily As Needed.   Yes ProviderPromise MD   promethazine (PHENERGAN) 25 MG suppository Insert 1 suppository into the rectum Every 6 (Six) Hours As Needed for Nausea or Vomiting. 11/24/19  Yes Jeff Rivas APRN   Prucalopride Succinate (MOTEGRITY) 2 MG tablet Take  by mouth Daily.   Yes ProviderPromise MD   sennosides-docusate sodium (SENOKOT-S) 8.6-50 MG tablet Take 2 tablets by mouth Daily. 8/13/19  Yes Blaine Perales PA-C   spironolactone (ALDACTONE) 25 MG tablet TAKE 1 TABLET BY MOUTH ONCE DAILY 6/11/19  Yes Maureen Cardoso APRN   torsemide (DEMADEX) 10 MG tablet Take 1 tablet by mouth Daily. 1/9/20  Yes Maureen Cardoso APRN   tegaserod (ZELNORM) 6 MG tablet Take 6 mg by mouth 2 (Two) Times a Day Before Meals.  1/14/20 Yes Promise Tovar MD     Review of Systems  Review of Systems       Objective    /95 (BP Location: Left arm)   Pulse 68   Ht 162.6 cm (64\")   Wt 95.4 kg (210 lb 6.4 oz)   LMP  (LMP Unknown)   BMI 36.12 kg/m²   Physical Exam   Constitutional: She is " oriented to person, place, and time. She appears well-developed and well-nourished. No distress.   HENT:   Head: Normocephalic and atraumatic.   Eyes: Pupils are equal, round, and reactive to light. EOM are normal.   Neck: Normal range of motion.   Cardiovascular: Normal rate, regular rhythm and normal heart sounds.   Pulmonary/Chest: Effort normal and breath sounds normal.   Abdominal: Soft. Bowel sounds are normal. She exhibits no shifting dullness, no distension, no abdominal bruit, no ascites and no mass. There is no hepatosplenomegaly. There is tenderness. There is no rigidity, no rebound, no guarding and no CVA tenderness. No hernia. Hernia confirmed negative in the ventral area.   Obese, moderate diffuse   Musculoskeletal: Normal range of motion.   Neurological: She is alert and oriented to person, place, and time.   Skin: Skin is warm and dry.   Psychiatric: She has a normal mood and affect. Her behavior is normal. Judgment and thought content normal.   Nursing note and vitals reviewed.    Assessment/Plan      1. Chronic idiopathic constipation    2. Cirrhosis of liver without ascites, unspecified hepatic cirrhosis type (CMS/HCC)    3. MUSA (nonalcoholic steatohepatitis)    4. Constipation, unspecified constipation type    .   Amira was seen today for constipation, cirrhosis, musa and abdominal pain.    Diagnoses and all orders for this visit:    Chronic idiopathic constipation    Cirrhosis of liver without ascites, unspecified hepatic cirrhosis type (CMS/HCC)    MUSA (nonalcoholic steatohepatitis)    Constipation, unspecified constipation type        Orders placed during this encounter include:  No orders of the defined types were placed in this encounter.      Medications prescribed:  No orders of the defined types were placed in this encounter.    Discontinued Medications       Reason for Discontinue     tegaserod (ZELNORM) 6 MG tablet    Not Efficacious        Requested Prescriptions      No  prescriptions requested or ordered in this encounter       Review and/or summary of lab tests, radiology, procedures, medications. Review and summary of old records and obtaining of history. The risks and benefits of my recommendations, as well as other treatment options were discussed with the patient today. Questions were answered.    Follow-up: Return in about 3 months (around 4/14/2020), or if symptoms worsen or fail to improve.     * Surgery not found *      This document has been electronically signed by Blaine Perales PA-C on January 17, 2020 3:08 PM      Results for orders placed or performed in visit on 11/26/19   Gastrointestinal Panel, PCR - Stool, Per Rectum   Result Value Ref Range    Campylobacter Not Detected Not Detected, Invalid    Clostridium difficile (toxin A/B) Not Detected Not Detected, N/A    Plesiomonas shigelloides Not Detected Not Detected    Salmonella Not Detected Not Detected    Vibrio Not Detected Not Detected    Vibrio cholerae Not Detected Not Detected    Yersinia enterocolitica Not Detected Not Detected    Enteroaggregative E. coli (EAEC) Not Detected Not Detected    Enteropathogenic E. coli (EPEC) Not Detected Not Detected    Enterotoxigenic E. coli (ETEC) lt/st Not Detected Not Detected    Shiga-like toxin-producing E. coli (STEC) stx1/stx2 Not Detected Not Detected    E. coli O157 Not Detected Not Detected    Shigella/Enteroinvasive E. coli (EIEC) Not Detected Not Detected    Cryptosporidium Not Detected Not Detected, Invalid    Cyclospora cayetanensis Not Detected Not Detected    Entamoeba histolytica Not Detected Not Detected    Giardia lamblia Not Detected Not Detected    Adenovirus F40/41 Not Detected Not Detected    Astrovirus Not Detected Not Detected    Norovirus GI/GII Not Detected Not Detected    Rotavirus A Not Detected Not Detected    Sapovirus (I, II, IV or V) Not Detected Not Detected   Results for orders placed or performed during the hospital encounter of  11/24/19   Scan Slide   Result Value Ref Range    Microcytes Slight/1+ None Seen    WBC Morphology Normal Normal    Platelet Estimate Decreased Normal   Urinalysis With Microscopic If Indicated (No Culture) - Urine, Clean Catch   Result Value Ref Range    Color, UA Yellow Yellow, Straw, Dark Yellow, Mikala    Appearance, UA Clear Clear    pH, UA 6.0 5.0 - 9.0    Specific Gravity, UA 1.009 1.003 - 1.030    Glucose, UA Negative Negative    Ketones, UA Negative Negative    Bilirubin, UA Negative Negative    Blood, UA Negative Negative    Protein, UA Negative Negative    Leuk Esterase, UA Negative Negative    Nitrite, UA Negative Negative    Urobilinogen, UA 0.2 E.U./dL 0.2 - 1.0 E.U./dL   CBC Auto Differential   Result Value Ref Range    WBC 4.56 3.40 - 10.80 10*3/mm3    RBC 5.08 3.77 - 5.28 10*6/mm3    Hemoglobin 12.9 12.0 - 15.9 g/dL    Hematocrit 39.7 34.0 - 46.6 %    MCV 78.1 (L) 79.0 - 97.0 fL    MCH 25.4 (L) 26.6 - 33.0 pg    MCHC 32.5 31.5 - 35.7 g/dL    RDW 14.9 12.3 - 15.4 %    RDW-SD 42.1 37.0 - 54.0 fl    MPV 11.6 6.0 - 12.0 fL    Platelets 93 (L) 140 - 450 10*3/mm3    Neutrophil % 56.0 42.7 - 76.0 %    Lymphocyte % 34.6 19.6 - 45.3 %    Monocyte % 5.9 5.0 - 12.0 %    Eosinophil % 3.1 0.3 - 6.2 %    Basophil % 0.2 0.0 - 1.5 %    Immature Grans % 0.2 0.0 - 0.5 %    Neutrophils, Absolute 2.55 1.70 - 7.00 10*3/mm3    Lymphocytes, Absolute 1.58 0.70 - 3.10 10*3/mm3    Monocytes, Absolute 0.27 0.10 - 0.90 10*3/mm3    Eosinophils, Absolute 0.14 0.00 - 0.40 10*3/mm3    Basophils, Absolute 0.01 0.00 - 0.20 10*3/mm3    Immature Grans, Absolute 0.01 0.00 - 0.05 10*3/mm3    nRBC 0.0 0.0 - 0.2 /100 WBC   Lipase   Result Value Ref Range    Lipase 31 13 - 60 U/L   Comprehensive Metabolic Panel   Result Value Ref Range    Glucose 97 65 - 99 mg/dL    BUN 13 8 - 23 mg/dL    Creatinine 1.15 (H) 0.57 - 1.00 mg/dL    Sodium 141 136 - 145 mmol/L    Potassium 4.2 3.5 - 5.2 mmol/L    Chloride 102 98 - 107 mmol/L    CO2 28.0 22.0 -  29.0 mmol/L    Calcium 9.3 8.6 - 10.5 mg/dL    Total Protein 7.1 6.0 - 8.5 g/dL    Albumin 4.00 3.50 - 5.20 g/dL    ALT (SGPT) 21 1 - 33 U/L    AST (SGOT) 31 1 - 32 U/L    Alkaline Phosphatase 93 39 - 117 U/L    Total Bilirubin 0.4 0.2 - 1.2 mg/dL    eGFR Non African Amer 48 (L) >60 mL/min/1.73    Globulin 3.1 gm/dL    A/G Ratio 1.3 g/dL    BUN/Creatinine Ratio 11.3 7.0 - 25.0    Anion Gap 11.0 5.0 - 15.0 mmol/L   Results for orders placed or performed in visit on 11/08/19   CBC Auto Differential   Result Value Ref Range    WBC 4.16 3.40 - 10.80 10*3/mm3    RBC 5.22 3.77 - 5.28 10*6/mm3    Hemoglobin 13.5 12.0 - 15.9 g/dL    Hematocrit 41.3 34.0 - 46.6 %    MCV 79.1 79.0 - 97.0 fL    MCH 25.9 (L) 26.6 - 33.0 pg    MCHC 32.7 31.5 - 35.7 g/dL    RDW 15.3 12.3 - 15.4 %    RDW-SD 43.8 37.0 - 54.0 fl    MPV 12.0 6.0 - 12.0 fL    Platelets 58 (L) 140 - 450 10*3/mm3    Neutrophil % 66.7 42.7 - 76.0 %    Lymphocyte % 24.0 19.6 - 45.3 %    Monocyte % 4.8 (L) 5.0 - 12.0 %    Eosinophil % 3.6 0.3 - 6.2 %    Basophil % 0.7 0.0 - 1.5 %    Immature Grans % 0.2 0.0 - 0.5 %    Neutrophils, Absolute 2.77 1.70 - 7.00 10*3/mm3    Lymphocytes, Absolute 1.00 0.70 - 3.10 10*3/mm3    Monocytes, Absolute 0.20 0.10 - 0.90 10*3/mm3    Eosinophils, Absolute 0.15 0.00 - 0.40 10*3/mm3    Basophils, Absolute 0.03 0.00 - 0.20 10*3/mm3    Immature Grans, Absolute 0.01 0.00 - 0.05 10*3/mm3    nRBC 0.0 0.0 - 0.2 /100 WBC     *Note: Due to a large number of results and/or encounters for the requested time period, some results have not been displayed. A complete set of results can be found in Results Review.       Some portions of this note have been dictated using voice recognition software and may contain errors and/or omissions.

## 2020-04-27 RX ORDER — MELOXICAM 15 MG/1
TABLET ORAL
Qty: 30 TABLET | Refills: 0 | Status: SHIPPED | OUTPATIENT
Start: 2020-04-27 | End: 2020-05-26

## 2020-05-26 RX ORDER — MELOXICAM 15 MG/1
TABLET ORAL
Qty: 30 TABLET | Refills: 0 | Status: SHIPPED | OUTPATIENT
Start: 2020-05-26 | End: 2020-06-30 | Stop reason: SDUPTHER

## 2020-06-02 ENCOUNTER — OFFICE VISIT (OUTPATIENT)
Dept: GASTROENTEROLOGY | Facility: CLINIC | Age: 60
End: 2020-06-02

## 2020-06-02 VITALS
HEART RATE: 77 BPM | HEIGHT: 64 IN | WEIGHT: 211.6 LBS | BODY MASS INDEX: 36.12 KG/M2 | OXYGEN SATURATION: 95 % | SYSTOLIC BLOOD PRESSURE: 140 MMHG | DIASTOLIC BLOOD PRESSURE: 88 MMHG

## 2020-06-02 DIAGNOSIS — K74.60 CIRRHOSIS OF LIVER WITHOUT ASCITES, UNSPECIFIED HEPATIC CIRRHOSIS TYPE (HCC): ICD-10-CM

## 2020-06-02 DIAGNOSIS — K59.04 CHRONIC IDIOPATHIC CONSTIPATION: Primary | ICD-10-CM

## 2020-06-02 DIAGNOSIS — K59.00 CONSTIPATION, UNSPECIFIED CONSTIPATION TYPE: ICD-10-CM

## 2020-06-02 DIAGNOSIS — K75.81 NASH (NONALCOHOLIC STEATOHEPATITIS): ICD-10-CM

## 2020-06-02 PROCEDURE — 99214 OFFICE O/P EST MOD 30 MIN: CPT | Performed by: PHYSICIAN ASSISTANT

## 2020-06-02 RX ORDER — DOXYCYCLINE 100 MG/1
100 TABLET ORAL DAILY
COMMUNITY
Start: 2020-05-26 | End: 2020-06-30

## 2020-06-02 RX ORDER — OXYBUTYNIN CHLORIDE 5 MG/1
5 TABLET ORAL DAILY
COMMUNITY
Start: 2020-05-04 | End: 2020-06-30

## 2020-06-02 NOTE — PROGRESS NOTES
Chief Complaint   Patient presents with   • Chronic Idiopathic constipation   • Cirrhosis   • Musa       ENDO PROCEDURE ORDERED:    Subjective    Amira Shannon is a 59 y.o. female. she is here today for follow-up.    History of Present Illness    Patient seen on a recheck of her chronic idiopathic constipation, cirrhosis, MUSA, F4/S2/N2. Last seen 01/14/2020. Patient did not keep a followup until today. She has not had any laboratories drawn. She had a previous CT in 11/2020 showing cirrhosis. She states she did see Dr. Galeana, he gave her some medication that seemed to help with her bowel movements. She has been on oxybutynin and doxycycline. She states Motegrity will give her bowel movements but it seems to hurt her stomach. She is still having problems with constipation. She is complaining of 6 out of 10 abdominal pain. GERD is doing okay on Prilosec 40 mg daily. She denied vomiting. Weight is up 1 pound since last visit. Last EGD/colonoscopy 10/17/2018 showed gastritis and hemorrhoids.     ASSESSMENT/PLAN: Patient with MUSA cirrhosis, due for surveillance for hepatoma screening. Recommend MUSA FibroSure, right upper quadrant ultrasound, AFP, CBC, CMP, pro-time. Will try adjusting her medications. Encouraged high fiber diet. Encouraged dietary modification and weight loss. She will keep her followup with Dr. Galeana. Will need to consider repeat endoscopy. Will see her in followup after the above, further pending clinical course and the results of the above.       The following portions of the patient's history were reviewed and updated as appropriate:   Past Medical History:   Diagnosis Date   • Acid reflux    • Altered bowel function    • Arthropathy of lumbar facet joint    • Bleeding disorder (CMS/HCC)    • C. difficile diarrhea 2015   • Constipation    • Corns and callus    • Depression    • Disease related peripheral neuropathy    • Epigastric pain    • Fatty liver    • Hammer toe    • Headache    •  "Hiatal hernia    • History of transfusion    • Hyperlipidemia    • Knee pain    • Localized, primary osteoarthritis of the ankle and foot     Localized, primary osteoarthritis of the ankle and/or foot   • Mendoza's metatarsalgia     Mendoza's metatarsalgia - 2nd interspace on right   • Nausea and vomiting    • Neuralgia and neuritis     Neuralgia, neuritis, and radiculitis, unspecified   • Neuropathy    • Obstructive sleep apnea     Obstructive sleep apnea (adult) (pediatric)    • CHAI on CPAP     \"C-Pap at night  (unconfirmed)\"   • Osteoarthritis    • Pain in foot     Pain in unspecified foot - sees a podiatrist   • Pain in joint, ankle and foot     Joint pain in ankle and foot      • Pain radiating to back     Pain radiating to lumbar region of back   • Plantar fasciitis    • PONV (postoperative nausea and vomiting)    • Restless leg syndrome    • Secondary hypertension     Secondary hypertension, unspecified   • Sinusitis    • Sleep apnea    • Tongue anomaly     lesion     Past Surgical History:   Procedure Laterality Date   • CARPAL TUNNEL RELEASE Left 2018    Procedure: CARPAL TUNNEL RELEASE - left;  Surgeon: Justus Lewis MD;  Location: [facility] OR;  Service: Orthopedics   • CARPAL TUNNEL RELEASE Right 2019    Procedure: carpal tunnel release right hand with local/monitored anesthesia care;  Surgeon: Justus Lewis MD;  Location: [facility] OR;  Service: Orthopedics   •  SECTION     • CHOLECYSTECTOMY     • COLONOSCOPY  2013   • COLONOSCOPY N/A 10/17/2018    Procedure: COLONOSCOPY;  Surgeon: Russell Del Rio MD;  Location: [facility] ENDOSCOPY;  Service: Gastroenterology   • DIRECT LARYNGOSCOPY, ESOPHAGOSCOPY, BRONCHOSCOPY N/A 2017    Procedure: DIRECT LARYNGOSCOPY AND;  Surgeon: Live Bolton MD;  Location: [facility] OR;  Service:    • ENDOSCOPY  2013    Colon endoscopy 39382 (1) - Internal & external hemorrhoids found. Stool found.   • ENDOSCOPY  2013    EGD w/ " tube 12957 (1) - Normal esophagus. Gastritis in stomach. Biopsy taken. Normal dudoenum. Biopsy taken.   • ENDOSCOPY N/A 10/17/2018    Procedure: ESOPHAGOGASTRODUODENOSCOPY--eval varices;  Surgeon: Russell Del Rio MD;  Location: [facility] ENDOSCOPY;  Service: Gastroenterology   • FOOT SURGERY  02/26/2013    Foot/toes surgery procedure (1) - Arthroplasty of toes 4 and 5 of right foot.   • HERNIA REPAIR     • HERNIA REPAIR      hital   • HYSTERECTOMY     • LIVER BIOPSY     • NERVE BLOCK  07/25/2016    Injection for nerve block (1) - Lumbar medial branch block.   • OTHER SURGICAL HISTORY  12/03/2012    Inj(s) Tend-Sheath, Ligament, Single 20550 (1) - [facility] ([facility])    • OTHER SURGICAL HISTORY  06/24/2013    Small Joint Injection/Aspiration 20600 (2) - [facility] ([facility])    • OTHER SURGICAL HISTORY  2011    bowel obstruction x2   • OTHER SURGICAL HISTORY      gland removed from neck   • SUBLINGUAL SALIVARY CYST EXCISION N/A 8/1/2017    Procedure: EXCISION OF LEFT  TONGUE LESION WITH CLOSURE;  Surgeon: Live Bolton MD;  Location: [facility] OR;  Service:    • TUBAL ABDOMINAL LIGATION     • UPPER GASTROINTESTINAL ENDOSCOPY  07/01/2013   • UPPER GASTROINTESTINAL ENDOSCOPY  10/17/2018     Family History   Problem Relation Age of Onset   • Cancer Other    • Diabetes Other    • Heart disease Other    • Hypertension Mother    • Cancer Mother    • Diabetes Mother    • Hypertension Father    • Heart attack Father    • Cancer Father    • Heart disease Father    • Thyroid disease Maternal Aunt      OB History    None       Allergies   Allergen Reactions   • Other      Pt states that taking steroids either in pill or injection form make her have blisters in her mouth and she feels like she is on fire on the inside/ has hx of c diff and possible MRSA     • Corticosteroids Rash   • Kenalog  [Triamcinolone Acetonide] Rash   • Sulfa Antibiotics Rash     Sulfa (Sulfonamide Antibiotics)     Social History      Socioeconomic History   • Marital status:      Spouse name: Not on file   • Number of children: Not on file   • Years of education: Not on file   • Highest education level: Not on file   Tobacco Use   • Smoking status: Former Smoker     Packs/day: 2.00     Years: 15.00     Pack years: 30.00     Types: Cigarettes     Last attempt to quit: 2000     Years since quittin.4   • Smokeless tobacco: Never Used   Substance and Sexual Activity   • Alcohol use: No   • Drug use: No   • Sexual activity: Defer     Comment: Marital Status:      Current Medications:  Prior to Admission medications    Medication Sig Start Date End Date Taking? Authorizing Provider   cetirizine (zyrTEC) 10 MG tablet Take 1 tablet by mouth Daily. 17  Yes Promise Tovar MD   cyclobenzaprine (FLEXERIL) 10 MG tablet Take 10 mg by mouth 3 (Three) Times a Day As Needed for Muscle Spasms.   Yes Promise Tovar MD   dicyclomine (BENTYL) 20 MG tablet Take 1 tablet by mouth Every 6 (Six) Hours As Needed (ABD CRAMPING). 19  Yes Jeff Rivas APRN   doxycycline (ADOXA) 100 MG tablet Take 100 mg by mouth Daily. 20  Yes Promise Tovar MD   escitalopram (LEXAPRO) 5 MG tablet Take 5 mg by mouth Daily.   Yes Promise Tovar MD   gabapentin (NEURONTIN) 800 MG tablet Take 800 mg by mouth 4 (Four) Times a Day.   Yes Promise Tovar MD   Melatonin 10 MG capsule Take  by mouth Every Night.   Yes Promise Tovar MD   meloxicam (MOBIC) 15 MG tablet Take 1 tablet by mouth once daily 20  Yes Justus Lweis MD   metFORMIN (GLUCOPHAGE) 500 MG tablet Take 1 tablet by mouth 2 (Two) Times a Day With Meals. 19  Yes Maureen Cardoso APRN   nystatin (MYCOSTATIN) 890561 UNIT/GM powder Apply  topically to the appropriate area as directed 4 (Four) Times a Day As Needed.   Yes Promise Tovar MD   omeprazole (priLOSEC) 40 MG capsule Take 40 mg by mouth Daily.   Yes Guy  "MD Proimse   ondansetron (ZOFRAN) 8 MG tablet Take 1 tablet by mouth Every 8 (Eight) Hours As Needed for Nausea or Vomiting. 8/13/19  Yes Blaine Perales PA-C   oxybutynin (DITROPAN) 5 MG tablet Take 5 mg by mouth Daily. 5/4/20  Yes Promise Tovar MD   oxyCODONE (oxyCONTIN) 10 MG 12 hr tablet Take 10 mg by mouth 4 (Four) Times a Day As Needed.   Yes Promise Tovar MD   polyethylene glycol (MIRALAX) packet Take 17 g by mouth Daily As Needed.   Yes Promise Tovar MD   promethazine (PHENERGAN) 25 MG suppository Insert 1 suppository into the rectum Every 6 (Six) Hours As Needed for Nausea or Vomiting. 11/24/19  Yes Jeff Rivas APRN   Prucalopride Succinate (MOTEGRITY) 2 MG tablet Take  by mouth Daily.   Yes Promise Tovar MD   spironolactone (ALDACTONE) 25 MG tablet TAKE 1 TABLET BY MOUTH ONCE DAILY 6/11/19  Yes Maureen Cardoso APRN   Nystatin powder powder  compounding apply bid on abdomen 10/29/18   ProviderPromise MD   torsemide (DEMADEX) 10 MG tablet Take 1 tablet by mouth Daily. 1/9/20   Maureen Cardoso APRN   sennosides-docusate sodium (SENOKOT-S) 8.6-50 MG tablet Take 2 tablets by mouth Daily. 8/13/19 6/2/20  Blaine Perales PA-C     Review of Systems  Review of Systems       Objective    /88 (BP Location: Left arm, Patient Position: Sitting)   Pulse 77   Ht 162.6 cm (64\")   Wt 96 kg (211 lb 9.6 oz)   LMP  (LMP Unknown)   SpO2 95%   BMI 36.32 kg/m²   Physical Exam   Constitutional: She is oriented to person, place, and time. She appears well-developed and well-nourished. No distress.   HENT:   Head: Normocephalic and atraumatic.   Eyes: Pupils are equal, round, and reactive to light. EOM are normal.   Neck: Normal range of motion.   Cardiovascular: Normal rate, regular rhythm and normal heart sounds.   Pulmonary/Chest: Effort normal and breath sounds normal.   Abdominal: Soft. Bowel sounds are normal. She exhibits no shifting dullness, no " distension, no abdominal bruit, no ascites and no mass. There is no hepatosplenomegaly. There is tenderness. There is no rigidity, no rebound, no guarding and no CVA tenderness. No hernia. Hernia confirmed negative in the ventral area.   Obese, moderate diffuse   Musculoskeletal: Normal range of motion. She exhibits edema.   BLE   Neurological: She is alert and oriented to person, place, and time.   Skin: Skin is warm and dry.   Psychiatric: She has a normal mood and affect. Her behavior is normal. Judgment and thought content normal.   Nursing note and vitals reviewed.    Assessment/Plan      1. Chronic idiopathic constipation    2. Cirrhosis of liver without ascites, unspecified hepatic cirrhosis type (CMS/HCC)    3. MUSA (nonalcoholic steatohepatitis)    4. Constipation, unspecified constipation type    .   Amira was seen today for chronic idiopathic constipation, cirrhosis and musa.    Diagnoses and all orders for this visit:    Chronic idiopathic constipation  -     CBC & Differential  -     Comprehensive Metabolic Panel  -     Protime-INR  -     AFP Tumor Marker  -     MUSA Fibrosure  -     US Liver    Cirrhosis of liver without ascites, unspecified hepatic cirrhosis type (CMS/HCC)  -     CBC & Differential  -     Comprehensive Metabolic Panel  -     Protime-INR  -     AFP Tumor Marker  -     MUSA Fibrosure  -     US Liver    MUSA (nonalcoholic steatohepatitis)  -     CBC & Differential  -     Comprehensive Metabolic Panel  -     Protime-INR  -     AFP Tumor Marker  -     MUSA Fibrosure  -     US Liver    Constipation, unspecified constipation type    Other orders  -     Prucalopride Succinate (Motegrity) 2 MG tablet; Take 1 tablet by mouth Daily.        Orders placed during this encounter include:  Orders Placed This Encounter   Procedures   • US Liver     Scheduling Instructions:      RUQ     Order Specific Question:   Reason for Exam:     Answer:   cirrhosis     Order Specific Question:   Will this be  performed with Elastography? (BETHEL and KEV ONLY)     Answer:   No   • Comprehensive Metabolic Panel   • Protime-INR   • AFP Tumor Marker   • MUSA Fibrosure   • CBC & Differential     Order Specific Question:   Manual Differential     Answer:   No       Medications prescribed:  New Medications Ordered This Visit   Medications   • Prucalopride Succinate (Motegrity) 2 MG tablet     Sig: Take 1 tablet by mouth Daily.     Dispense:  30 tablet     Refill:  2     Discontinued Medications       Reason for Discontinue     sennosides-docusate sodium (SENOKOT-S) 8.6-50 MG tablet    *Therapy completed        Requested Prescriptions     Signed Prescriptions Disp Refills   • Prucalopride Succinate (Motegrity) 2 MG tablet 30 tablet 2     Sig: Take 1 tablet by mouth Daily.       Review and/or summary of lab tests, radiology, procedures, medications. Review and summary of old records and obtaining of history. The risks and benefits of my recommendations, as well as other treatment options were discussed with the patient today. Questions were answered.    Follow-up: Return in about 1 month (around 7/2/2020), or if symptoms worsen or fail to improve.     * Surgery not found *      This document has been electronically signed by Blaine Perales PA-C on Kristie 15, 2020 17:42      Results for orders placed or performed in visit on 11/26/19   Gastrointestinal Panel, PCR - Stool, Per Rectum   Result Value Ref Range    Campylobacter Not Detected Not Detected, Invalid    Clostridium difficile (toxin A/B) Not Detected Not Detected, N/A    Plesiomonas shigelloides Not Detected Not Detected    Salmonella Not Detected Not Detected    Vibrio Not Detected Not Detected    Vibrio cholerae Not Detected Not Detected    Yersinia enterocolitica Not Detected Not Detected    Enteroaggregative E. coli (EAEC) Not Detected Not Detected    Enteropathogenic E. coli (EPEC) Not Detected Not Detected    Enterotoxigenic E. coli (ETEC) lt/st Not Detected  Not Detected    Shiga-like toxin-producing E. coli (STEC) stx1/stx2 Not Detected Not Detected    E. coli O157 Not Detected Not Detected    Shigella/Enteroinvasive E. coli (EIEC) Not Detected Not Detected    Cryptosporidium Not Detected Not Detected, Invalid    Cyclospora cayetanensis Not Detected Not Detected    Entamoeba histolytica Not Detected Not Detected    Giardia lamblia Not Detected Not Detected    Adenovirus F40/41 Not Detected Not Detected    Astrovirus Not Detected Not Detected    Norovirus GI/GII Not Detected Not Detected    Rotavirus A Not Detected Not Detected    Sapovirus (I, II, IV or V) Not Detected Not Detected   Results for orders placed or performed during the hospital encounter of 11/24/19   Scan Slide   Result Value Ref Range    Microcytes Slight/1+ None Seen    WBC Morphology Normal Normal    Platelet Estimate Decreased Normal   Urinalysis With Microscopic If Indicated (No Culture) - Urine, Clean Catch   Result Value Ref Range    Color, UA Yellow Yellow, Straw, Dark Yellow, Mikala    Appearance, UA Clear Clear    pH, UA 6.0 5.0 - 9.0    Specific Gravity, UA 1.009 1.003 - 1.030    Glucose, UA Negative Negative    Ketones, UA Negative Negative    Bilirubin, UA Negative Negative    Blood, UA Negative Negative    Protein, UA Negative Negative    Leuk Esterase, UA Negative Negative    Nitrite, UA Negative Negative    Urobilinogen, UA 0.2 E.U./dL 0.2 - 1.0 E.U./dL   CBC Auto Differential   Result Value Ref Range    WBC 4.56 3.40 - 10.80 10*3/mm3    RBC 5.08 3.77 - 5.28 10*6/mm3    Hemoglobin 12.9 12.0 - 15.9 g/dL    Hematocrit 39.7 34.0 - 46.6 %    MCV 78.1 (L) 79.0 - 97.0 fL    MCH 25.4 (L) 26.6 - 33.0 pg    MCHC 32.5 31.5 - 35.7 g/dL    RDW 14.9 12.3 - 15.4 %    RDW-SD 42.1 37.0 - 54.0 fl    MPV 11.6 6.0 - 12.0 fL    Platelets 93 (L) 140 - 450 10*3/mm3    Neutrophil % 56.0 42.7 - 76.0 %    Lymphocyte % 34.6 19.6 - 45.3 %    Monocyte % 5.9 5.0 - 12.0 %    Eosinophil % 3.1 0.3 - 6.2 %    Basophil %  0.2 0.0 - 1.5 %    Immature Grans % 0.2 0.0 - 0.5 %    Neutrophils, Absolute 2.55 1.70 - 7.00 10*3/mm3    Lymphocytes, Absolute 1.58 0.70 - 3.10 10*3/mm3    Monocytes, Absolute 0.27 0.10 - 0.90 10*3/mm3    Eosinophils, Absolute 0.14 0.00 - 0.40 10*3/mm3    Basophils, Absolute 0.01 0.00 - 0.20 10*3/mm3    Immature Grans, Absolute 0.01 0.00 - 0.05 10*3/mm3    nRBC 0.0 0.0 - 0.2 /100 WBC   Lipase   Result Value Ref Range    Lipase 31 13 - 60 U/L   Comprehensive Metabolic Panel   Result Value Ref Range    Glucose 97 65 - 99 mg/dL    BUN 13 8 - 23 mg/dL    Creatinine 1.15 (H) 0.57 - 1.00 mg/dL    Sodium 141 136 - 145 mmol/L    Potassium 4.2 3.5 - 5.2 mmol/L    Chloride 102 98 - 107 mmol/L    CO2 28.0 22.0 - 29.0 mmol/L    Calcium 9.3 8.6 - 10.5 mg/dL    Total Protein 7.1 6.0 - 8.5 g/dL    Albumin 4.00 3.50 - 5.20 g/dL    ALT (SGPT) 21 1 - 33 U/L    AST (SGOT) 31 1 - 32 U/L    Alkaline Phosphatase 93 39 - 117 U/L    Total Bilirubin 0.4 0.2 - 1.2 mg/dL    eGFR Non African Amer 48 (L) >60 mL/min/1.73    Globulin 3.1 gm/dL    A/G Ratio 1.3 g/dL    BUN/Creatinine Ratio 11.3 7.0 - 25.0    Anion Gap 11.0 5.0 - 15.0 mmol/L   Results for orders placed or performed in visit on 11/08/19   CBC Auto Differential   Result Value Ref Range    WBC 4.16 3.40 - 10.80 10*3/mm3    RBC 5.22 3.77 - 5.28 10*6/mm3    Hemoglobin 13.5 12.0 - 15.9 g/dL    Hematocrit 41.3 34.0 - 46.6 %    MCV 79.1 79.0 - 97.0 fL    MCH 25.9 (L) 26.6 - 33.0 pg    MCHC 32.7 31.5 - 35.7 g/dL    RDW 15.3 12.3 - 15.4 %    RDW-SD 43.8 37.0 - 54.0 fl    MPV 12.0 6.0 - 12.0 fL    Platelets 58 (L) 140 - 450 10*3/mm3    Neutrophil % 66.7 42.7 - 76.0 %    Lymphocyte % 24.0 19.6 - 45.3 %    Monocyte % 4.8 (L) 5.0 - 12.0 %    Eosinophil % 3.6 0.3 - 6.2 %    Basophil % 0.7 0.0 - 1.5 %    Immature Grans % 0.2 0.0 - 0.5 %    Neutrophils, Absolute 2.77 1.70 - 7.00 10*3/mm3    Lymphocytes, Absolute 1.00 0.70 - 3.10 10*3/mm3    Monocytes, Absolute 0.20 0.10 - 0.90 10*3/mm3     Eosinophils, Absolute 0.15 0.00 - 0.40 10*3/mm3    Basophils, Absolute 0.03 0.00 - 0.20 10*3/mm3    Immature Grans, Absolute 0.01 0.00 - 0.05 10*3/mm3    nRBC 0.0 0.0 - 0.2 /100 WBC     *Note: Due to a large number of results and/or encounters for the requested time period, some results have not been displayed. A complete set of results can be found in Results Review.       Some portions of this note have been dictated using voice recognition software and may contain errors and/or omissions.

## 2020-06-02 NOTE — PATIENT INSTRUCTIONS

## 2020-06-10 RX ORDER — ONDANSETRON HYDROCHLORIDE 8 MG/1
8 TABLET, FILM COATED ORAL EVERY 8 HOURS PRN
Qty: 30 TABLET | Refills: 0 | Status: SHIPPED | OUTPATIENT
Start: 2020-06-10 | End: 2020-10-26

## 2020-06-19 ENCOUNTER — TELEPHONE (OUTPATIENT)
Dept: GASTROENTEROLOGY | Facility: CLINIC | Age: 60
End: 2020-06-19

## 2020-06-19 NOTE — TELEPHONE ENCOUNTER
Prior auth for Motegr[employer]iy has been approved through the patients insurance company. This auth is good until 12/31/2020.

## 2020-06-22 ENCOUNTER — LAB (OUTPATIENT)
Dept: LAB | Facility: HOSPITAL | Age: 60
End: 2020-06-22

## 2020-06-22 LAB
ALBUMIN SERPL-MCNC: 4.2 G/DL (ref 3.5–5.2)
ALBUMIN/GLOB SERPL: 1.5 G/DL
ALP SERPL-CCNC: 87 U/L (ref 39–117)
ALPHA-FETOPROTEIN: 2.57 NG/ML (ref 0–8.3)
ALT SERPL W P-5'-P-CCNC: 23 U/L (ref 1–33)
ANION GAP SERPL CALCULATED.3IONS-SCNC: 10.1 MMOL/L (ref 5–15)
AST SERPL-CCNC: 37 U/L (ref 1–32)
BASOPHILS # BLD AUTO: 0.01 10*3/MM3 (ref 0–0.2)
BASOPHILS NFR BLD AUTO: 0.2 % (ref 0–1.5)
BILIRUB SERPL-MCNC: 0.9 MG/DL (ref 0.2–1.2)
BUN BLD-MCNC: 11 MG/DL (ref 8–23)
BUN/CREAT SERPL: 9.8 (ref 7–25)
CALCIUM SPEC-SCNC: 9.2 MG/DL (ref 8.6–10.5)
CHLORIDE SERPL-SCNC: 103 MMOL/L (ref 98–107)
CO2 SERPL-SCNC: 24.9 MMOL/L (ref 22–29)
CREAT BLD-MCNC: 1.12 MG/DL (ref 0.57–1)
DEPRECATED RDW RBC AUTO: 46.1 FL (ref 37–54)
DIFFERENTIAL METHOD BLD: ABNORMAL
EOSINOPHIL # BLD AUTO: 0.11 10*3/MM3 (ref 0–0.4)
EOSINOPHIL NFR BLD AUTO: 2.5 % (ref 0.3–6.2)
ERYTHROCYTE [DISTWIDTH] IN BLOOD BY AUTOMATED COUNT: 16.2 % (ref 12.3–15.4)
GFR SERPL CREATININE-BSD FRML MDRD: 50 ML/MIN/1.73
GLOBULIN UR ELPH-MCNC: 2.8 GM/DL
GLUCOSE BLD-MCNC: 101 MG/DL (ref 65–99)
HCT VFR BLD AUTO: 41.4 % (ref 34–46.6)
HGB BLD-MCNC: 13.6 G/DL (ref 12–15.9)
IMM GRANULOCYTES # BLD AUTO: 0.01 10*3/MM3 (ref 0–0.05)
IMM GRANULOCYTES NFR BLD AUTO: 0.2 % (ref 0–0.5)
INR PPP: 1.26 (ref 0.8–1.2)
LYMPHOCYTES # BLD AUTO: 1.2 10*3/MM3 (ref 0.7–3.1)
LYMPHOCYTES NFR BLD AUTO: 27.5 % (ref 19.6–45.3)
MCH RBC QN AUTO: 26 PG (ref 26.6–33)
MCHC RBC AUTO-ENTMCNC: 32.9 G/DL (ref 31.5–35.7)
MCV RBC AUTO: 79.2 FL (ref 79–97)
MONOCYTES # BLD AUTO: 0.23 10*3/MM3 (ref 0.1–0.9)
MONOCYTES NFR BLD AUTO: 5.3 % (ref 5–12)
NEUTROPHILS # BLD AUTO: 2.8 10*3/MM3 (ref 1.7–7)
NEUTROPHILS NFR BLD AUTO: 64.3 % (ref 42.7–76)
NRBC BLD AUTO-RTO: 0 /100 WBC (ref 0–0.2)
PLATELET # BLD AUTO: 73 10*3/MM3 (ref 140–450)
POTASSIUM BLD-SCNC: 4.5 MMOL/L (ref 3.5–5.2)
PROT SERPL-MCNC: 7 G/DL (ref 6–8.5)
PROTHROMBIN TIME: 15.6 SECONDS (ref 11.1–15.3)
RBC # BLD AUTO: 5.23 10*6/MM3 (ref 3.77–5.28)
SODIUM BLD-SCNC: 138 MMOL/L (ref 136–145)
WBC # BLD AUTO: 4.36 10*3/MM3 (ref 3.4–10.8)
WBC NRBC COR # BLD: 4.36 10*3/MM3 (ref 3.4–10.8)

## 2020-06-22 PROCEDURE — 85025 COMPLETE CBC W/AUTO DIFF WBC: CPT | Performed by: PHYSICIAN ASSISTANT

## 2020-06-22 PROCEDURE — 83883 ASSAY NEPHELOMETRY NOT SPEC: CPT | Performed by: PHYSICIAN ASSISTANT

## 2020-06-22 PROCEDURE — 82465 ASSAY BLD/SERUM CHOLESTEROL: CPT | Performed by: PHYSICIAN ASSISTANT

## 2020-06-22 PROCEDURE — 83010 ASSAY OF HAPTOGLOBIN QUANT: CPT | Performed by: PHYSICIAN ASSISTANT

## 2020-06-22 PROCEDURE — 84478 ASSAY OF TRIGLYCERIDES: CPT | Performed by: PHYSICIAN ASSISTANT

## 2020-06-22 PROCEDURE — 82247 BILIRUBIN TOTAL: CPT | Performed by: PHYSICIAN ASSISTANT

## 2020-06-22 PROCEDURE — 82977 ASSAY OF GGT: CPT | Performed by: PHYSICIAN ASSISTANT

## 2020-06-22 PROCEDURE — 84450 TRANSFERASE (AST) (SGOT): CPT | Performed by: PHYSICIAN ASSISTANT

## 2020-06-22 PROCEDURE — 82947 ASSAY GLUCOSE BLOOD QUANT: CPT | Performed by: PHYSICIAN ASSISTANT

## 2020-06-22 PROCEDURE — 84460 ALANINE AMINO (ALT) (SGPT): CPT | Performed by: PHYSICIAN ASSISTANT

## 2020-06-22 PROCEDURE — 85610 PROTHROMBIN TIME: CPT | Performed by: PHYSICIAN ASSISTANT

## 2020-06-22 PROCEDURE — 82105 ALPHA-FETOPROTEIN SERUM: CPT | Performed by: PHYSICIAN ASSISTANT

## 2020-06-22 PROCEDURE — 82172 ASSAY OF APOLIPOPROTEIN: CPT | Performed by: PHYSICIAN ASSISTANT

## 2020-06-22 PROCEDURE — 80053 COMPREHEN METABOLIC PANEL: CPT | Performed by: PHYSICIAN ASSISTANT

## 2020-06-24 LAB
A2 MACROGLOB SERPL-MCNC: 432 MG/DL (ref 110–276)
ALT SERPL W P-5'-P-CCNC: 27 IU/L (ref 0–40)
APO A-I SERPL-MCNC: 106 MG/DL (ref 116–209)
AST SERPL W P-5'-P-CCNC: 44 IU/L (ref 0–40)
BILIRUB SERPL-MCNC: 0.8 MG/DL (ref 0–1.2)
CHOLEST SERPL-MCNC: 132 MG/DL (ref 100–199)
FIBROSIS SCORING:: ABNORMAL
FIBROSIS STAGE SERPL QL: ABNORMAL
GGT SERPL-CCNC: 36 IU/L (ref 0–60)
GLUCOSE SERPL-MCNC: 96 MG/DL (ref 65–99)
HAPTOGLOB SERPL-MCNC: 41 MG/DL (ref 33–346)
INTERPRETATIONS: (REFERENCE): ABNORMAL
LABORATORY COMMENT REPORT: ABNORMAL
LIMITATIONS: (REFERENCE): ABNORMAL
LIVER FIBR SCORE SERPL CALC.FIBROSURE: 0.86 (ref 0–0.21)
NASH GRADE (REFERENCE): ABNORMAL
NASH SCORE (REFERENCE): 0.75
NASH SCORING (REFERENCE): ABNORMAL
STEATOSIS GRADE (REFERENCE): ABNORMAL
STEATOSIS GRADING (REFERENCE): ABNORMAL
STEATOSIS SCORE (REFERENCE): 0.47 (ref 0–0.3)
TRIGL SERPL-MCNC: 94 MG/DL (ref 0–149)
WEIGHT: (REFERENCE): 211 LBS

## 2020-06-30 ENCOUNTER — TELEPHONE (OUTPATIENT)
Dept: ORTHOPEDIC SURGERY | Facility: CLINIC | Age: 60
End: 2020-06-30

## 2020-06-30 ENCOUNTER — OFFICE VISIT (OUTPATIENT)
Dept: GASTROENTEROLOGY | Facility: CLINIC | Age: 60
End: 2020-06-30

## 2020-06-30 VITALS
DIASTOLIC BLOOD PRESSURE: 98 MMHG | BODY MASS INDEX: 36.02 KG/M2 | OXYGEN SATURATION: 98 % | HEIGHT: 64 IN | HEART RATE: 78 BPM | SYSTOLIC BLOOD PRESSURE: 140 MMHG | WEIGHT: 211 LBS

## 2020-06-30 DIAGNOSIS — R11.0 NAUSEA: ICD-10-CM

## 2020-06-30 DIAGNOSIS — K74.60 CIRRHOSIS OF LIVER WITHOUT ASCITES, UNSPECIFIED HEPATIC CIRRHOSIS TYPE (HCC): ICD-10-CM

## 2020-06-30 DIAGNOSIS — K75.81 NASH (NONALCOHOLIC STEATOHEPATITIS): ICD-10-CM

## 2020-06-30 DIAGNOSIS — K59.04 CHRONIC IDIOPATHIC CONSTIPATION: Primary | ICD-10-CM

## 2020-06-30 DIAGNOSIS — R10.84 ABDOMINAL PAIN, GENERALIZED: ICD-10-CM

## 2020-06-30 DIAGNOSIS — R74.8 ELEVATED LIVER ENZYMES: ICD-10-CM

## 2020-06-30 PROCEDURE — 99214 OFFICE O/P EST MOD 30 MIN: CPT | Performed by: PHYSICIAN ASSISTANT

## 2020-06-30 RX ORDER — LACTULOSE 10 G/15ML
20 SOLUTION ORAL 2 TIMES DAILY
Qty: 946 ML | Refills: 1 | Status: SHIPPED | OUTPATIENT
Start: 2020-06-30 | End: 2021-01-26

## 2020-06-30 RX ORDER — MELOXICAM 15 MG/1
15 TABLET ORAL DAILY
Qty: 30 TABLET | Refills: 2 | Status: SHIPPED | OUTPATIENT
Start: 2020-06-30 | End: 2020-08-07 | Stop reason: SDUPTHER

## 2020-07-14 DIAGNOSIS — M17.0 PRIMARY OSTEOARTHRITIS OF BOTH KNEES: ICD-10-CM

## 2020-07-14 DIAGNOSIS — M25.562 ACUTE PAIN OF BOTH KNEES: Primary | ICD-10-CM

## 2020-07-14 DIAGNOSIS — M25.561 ACUTE PAIN OF BOTH KNEES: Primary | ICD-10-CM

## 2020-07-16 ENCOUNTER — OFFICE VISIT (OUTPATIENT)
Dept: ORTHOPEDIC SURGERY | Facility: CLINIC | Age: 60
End: 2020-07-16

## 2020-07-16 VITALS — WEIGHT: 213 LBS | BODY MASS INDEX: 36.37 KG/M2 | HEIGHT: 64 IN

## 2020-07-16 DIAGNOSIS — M23.252 DERANGEMENT OF POSTERIOR HORN OF LATERAL MENISCUS DUE TO OLD TEAR OR INJURY, LEFT KNEE: ICD-10-CM

## 2020-07-16 DIAGNOSIS — G89.29 CHRONIC PAIN OF BOTH KNEES: ICD-10-CM

## 2020-07-16 DIAGNOSIS — M23.300 DEGENERATION OF LATERAL MENISCUS OF RIGHT KNEE: ICD-10-CM

## 2020-07-16 DIAGNOSIS — M17.0 PRIMARY OSTEOARTHRITIS OF BOTH KNEES: Primary | ICD-10-CM

## 2020-07-16 DIAGNOSIS — M25.561 CHRONIC PAIN OF BOTH KNEES: ICD-10-CM

## 2020-07-16 DIAGNOSIS — M25.562 CHRONIC PAIN OF BOTH KNEES: ICD-10-CM

## 2020-07-16 PROCEDURE — 99213 OFFICE O/P EST LOW 20 MIN: CPT | Performed by: ORTHOPAEDIC SURGERY

## 2020-07-16 NOTE — PROGRESS NOTES
"Amira Shannon is a 59 y.o. female returns for     Chief Complaint   Patient presents with   • Left Knee - Follow-up   • Right Knee - Follow-up       HISTORY OF PRESENT ILLNESS: Patient being seen for bilateral knee follow up. X-rays done today.   She cannot take anti-inflammatory medications.  She has pain with activities of daily living pain  She is using a cane for support.  She has pain with prolonged standing and walking.  Mostly she has dull achy pain but she does have occasional sharp stabbing pains as well.     CONCURRENT MEDICAL HISTORY:    The following portions of the patient's history were reviewed and updated as appropriate: allergies, current medications, past family history, past medical history, past social history, past surgical history and problem list.     ROS  No fevers or chills.  No chest pain or shortness of air.  No GI or  disturbances.    PHYSICAL EXAMINATION:       Ht 162.6 cm (64\")   Wt 96.6 kg (213 lb)   LMP  (LMP Unknown)   BMI 36.56 kg/m²     Physical Exam   Constitutional: She is oriented to person, place, and time. She appears well-developed and well-nourished.   Neurological: She is alert and oriented to person, place, and time.   Psychiatric: She has a normal mood and affect. Her behavior is normal. Judgment and thought content normal.       GAIT:     []  Normal  [x]  Antalgic    Assistive device: [x]  None  []  Walker     []  Crutches  []  Cane     []  Wheelchair  []  Stretcher    Left Knee Exam     Muscle Strength   The patient has normal left knee strength.    Tenderness   Left knee tenderness location: diffuse.    Range of Motion   Extension: -5   Flexion: 110     Tests   Varus: negative Valgus: negative  Drawer:  Anterior - negative     Posterior - negative    Other   Sensation: normal  Pulse: present  Swelling: none    Comments:  Crepitation with motion  Mild to moderate pain through arc of motion              Xr Knee Bilateral Ap Standing    Result Date: " 7/16/2020  Narrative: Ordering Provider:  Justus Lewis MD Ordering Diagnosis/Indication:  Acute pain of both knees, Primary osteoarthritis of both knees Procedure:  XR KNEE BILATERAL AP STANDING Exam Date:  7/16/20 COMPARISON:  Todays X-rays were compared to previous images dated May 28, 2019.     Impression:   AP bilateral standing of the knees with lateral of both knees show acceptable position and alignment with no evidence of acute bony abnormality.  No significant arthritic change noted in the right knee.  The left knee shows medial joint space narrowing and lateral subluxation of the tibia to a mild degree.  There are marginal osteophytes present in the lateral aspect of the joint as well as along the medial tibial plateau.  Mild worsening of the arthritic changes noted in the left knee as compared to prior x-ray.  Mild to moderate degenerative changes in the patellofemoral compartment of the left knee.  Mild cystic changes noted in the patella noted in the right knee that is progressive in comparison to prior x-ray.  No acute findings Justus Lewis MD 7/16/20     Xr Knee 1 Or 2 View Bilateral    Result Date: 7/16/2020  Narrative: Ordering Provider:  Justus Lewis MD Ordering Diagnosis/Indication:  Acute pain of both knees, Primary osteoarthritis of both knees Procedure:  XR KNEE 1 OR 2 VW BILATERAL Exam Date:  7/16/20 COMPARISON:  Todays X-rays were compared to previous images dated May 28, 2019.     Impression:   AP bilateral standing of the knees with lateral of both knees show acceptable position and alignment with no evidence of acute bony abnormality.  No significant arthritic change noted in the right knee.  The left knee shows medial joint space narrowing and lateral subluxation of the tibia to a mild degree.  There are marginal osteophytes present in the lateral aspect of the joint as well as along the medial tibial plateau.  Mild worsening of the arthritic changes noted  in the left knee as compared to prior x-ray.  Mild to moderate degenerative changes in the patellofemoral compartment of the left knee.  Mild cystic changes noted in the patella noted in the right knee that is progressive in comparison to prior x-ray.  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  She will continue use of a cane for support.  We also discussed strengthening conditioning exercises in both legs.  Slowly progress motion and activity as pain allows.  We discussed the possibility of eventual total knee arthroplasty.    Patient's Body mass index is 36.56 kg/m². BMI is above normal parameters. Recommendations include: exercise counseling and nutrition counseling.      Return for visco-supplementation.    Justus Lewis MD     abdominal pain

## 2020-08-07 ENCOUNTER — CLINICAL SUPPORT (OUTPATIENT)
Dept: ORTHOPEDIC SURGERY | Facility: CLINIC | Age: 60
End: 2020-08-07

## 2020-08-07 DIAGNOSIS — M17.12 PRIMARY OSTEOARTHRITIS OF LEFT KNEE: Primary | ICD-10-CM

## 2020-08-07 PROCEDURE — 20610 DRAIN/INJ JOINT/BURSA W/O US: CPT | Performed by: ORTHOPAEDIC SURGERY

## 2020-08-07 RX ORDER — MELOXICAM 15 MG/1
15 TABLET ORAL DAILY
Qty: 30 TABLET | Refills: 2 | Status: SHIPPED | OUTPATIENT
Start: 2020-08-07 | End: 2020-11-20

## 2020-08-07 NOTE — PROGRESS NOTES
Knee Joint Injection      Patient: Amira Shannon    YOB: 1960    Chief Complaint   Patient presents with   • Left Knee - Follow-up   • Injections     monovisc left knee.        History of Present Illness:  Patient is here for an injection.  No new complaints.    Physical Exam: 59 y.o. female  General Appearance:    Alert, cooperative, in no acute distress                   There were no vitals filed for this visit.     Patient is alert and oriented, ×3 no acute distress.  Affect is normal respiratory rate is normal unlabored.  Exam and complaints are unchanged.    Procedure:  Large Joint Arthrocentesis: L knee  Date/Time: 8/7/2020 8:13 AM  Consent given by: patient  Site marked: site marked  Timeout: Immediately prior to procedure a time out was called to verify the correct patient, procedure, equipment, support staff and site/side marked as required   Supporting Documentation  Indications: pain   Procedure Details  Location: knee - L knee  Preparation: Patient was prepped and draped in the usual sterile fashion  Needle size: 20 G  Approach: anteromedial  Medications administered: 88 mg Hyaluronan 88 MG/4ML  Patient tolerance: patient tolerated the procedure well with no immediate complications          Assessment:    Diagnoses and all orders for this visit:    Primary osteoarthritis of left knee  -     Large Joint Arthrocentesis: L knee    Other orders  -     meloxicam (MOBIC) 15 MG tablet; Take 1 tablet by mouth Daily.        Plan:   Slowly increase activity as tolerated.  Discussed importance of leg strengthening and general conditioning.  Discussed warning signs of injection.  Discussed that purpose of injections was symptom improvement and improved activity.  Also discussed that further treatment options depended on symptoms at followup and length of time of improvement after injections.    Return if symptoms worsen or fail to improve, for recheck.    Justus Lewis MD

## 2020-08-25 ENCOUNTER — HOSPITAL ENCOUNTER (EMERGENCY)
Facility: HOSPITAL | Age: 60
End: 2020-08-25

## 2020-08-25 VITALS
BODY MASS INDEX: 29.02 KG/M2 | OXYGEN SATURATION: 95 % | WEIGHT: 170 LBS | SYSTOLIC BLOOD PRESSURE: 204 MMHG | RESPIRATION RATE: 18 BRPM | DIASTOLIC BLOOD PRESSURE: 109 MMHG | HEART RATE: 117 BPM | TEMPERATURE: 99.3 F | HEIGHT: 64 IN

## 2020-10-20 ENCOUNTER — OFFICE VISIT (OUTPATIENT)
Dept: GASTROENTEROLOGY | Facility: CLINIC | Age: 60
End: 2020-10-20

## 2020-10-20 VITALS
HEIGHT: 64 IN | SYSTOLIC BLOOD PRESSURE: 134 MMHG | BODY MASS INDEX: 35.65 KG/M2 | WEIGHT: 208.8 LBS | HEART RATE: 79 BPM | DIASTOLIC BLOOD PRESSURE: 87 MMHG

## 2020-10-20 DIAGNOSIS — K59.04 CHRONIC IDIOPATHIC CONSTIPATION: Primary | ICD-10-CM

## 2020-10-20 DIAGNOSIS — K75.81 NASH (NONALCOHOLIC STEATOHEPATITIS): ICD-10-CM

## 2020-10-20 DIAGNOSIS — R10.84 ABDOMINAL PAIN, GENERALIZED: ICD-10-CM

## 2020-10-20 DIAGNOSIS — K74.60 CIRRHOSIS OF LIVER WITHOUT ASCITES, UNSPECIFIED HEPATIC CIRRHOSIS TYPE (HCC): ICD-10-CM

## 2020-10-20 PROCEDURE — 99213 OFFICE O/P EST LOW 20 MIN: CPT | Performed by: PHYSICIAN ASSISTANT

## 2020-10-20 NOTE — PROGRESS NOTES
Chief Complaint   Patient presents with   • Cirrhosis   • MUSA   • Abdominal Pain   • Nausea       ENDO PROCEDURE ORDERED:    Subjective    Amira Shannon is a 59 y.o. female. she is here today for follow-up.    History of Present Illness    Patient seen on a recheck of her MUSA cirrhosis, abdominal pain, nausea, F4/S1/N1. Last seen 06/30/2020. Patient was given Lactulose for the constipation. She states she takes the Motegrity as needed. She is apparently taking the Lactulose. She feels her bowels are moving better currently. Her stomach has been actually bothering her more, she has had more nausea. She is on Prilosec 40 mg daily. She thinks some of the medicine urology is giving her may be causing her to be more nauseated. I had sent her to Dr. Galeana to be evaluated for possible Interstim. There are evaluating her bladder, etcetera. She sees nurse practitioner that is apparently in [place]. I told her to ask about it again since she is having some urinary difficulties that would possibly benefit both issues, but her bowels currently are doing better. Weight is down 2.2 pounds since last visit. Last colonoscopy 10/17/2018.     A/P: Patient did not get her blood work done as requested. She is moderately tender in the left abdomen. Will continue current medications. I encouraged her to follow-up with urology. She will be due for hepatoma screening in June. Will plan CBC, CMP, INR prior to next follow-up, will plan follow-up in 3 months, sooner if needed.        The following portions of the patient's history were reviewed and updated as appropriate:   Past Medical History:   Diagnosis Date   • Acid reflux    • Altered bowel function    • Arthropathy of lumbar facet joint    • Bleeding disorder (CMS/HCC)    • C. difficile diarrhea 2015   • Constipation    • Corns and callus    • Depression    • Disease related peripheral neuropathy    • Epigastric pain    • Fatty liver    • Hammer toe    • Headache    • Hiatal  "hernia    • History of transfusion    • Hyperlipidemia    • Knee pain    • Localized, primary osteoarthritis of the ankle and foot     Localized, primary osteoarthritis of the ankle and/or foot   • Mendoza's metatarsalgia     Mendoza's metatarsalgia - 2nd interspace on right   • Nausea and vomiting    • Neuralgia and neuritis     Neuralgia, neuritis, and radiculitis, unspecified   • Neuropathy    • Obstructive sleep apnea     Obstructive sleep apnea (adult) (pediatric)    • CHAI on CPAP     \"C-Pap at night  (unconfirmed)\"   • Osteoarthritis    • Pain in foot     Pain in unspecified foot - sees a podiatrist   • Pain in joint, ankle and foot     Joint pain in ankle and foot      • Pain radiating to back     Pain radiating to lumbar region of back   • Plantar fasciitis    • PONV (postoperative nausea and vomiting)    • Restless leg syndrome    • Secondary hypertension     Secondary hypertension, unspecified   • Sinusitis    • Sleep apnea    • Tongue anomaly     lesion     Past Surgical History:   Procedure Laterality Date   • CARPAL TUNNEL RELEASE Left 2018    Procedure: CARPAL TUNNEL RELEASE - left;  Surgeon: Justus Lewis MD;  Location: Health system OR;  Service: Orthopedics   • CARPAL TUNNEL RELEASE Right 2019    Procedure: carpal tunnel release right hand with local/monitored anesthesia care;  Surgeon: Justus Lewis MD;  Location: Health system OR;  Service: Orthopedics   •  SECTION     • CHOLECYSTECTOMY     • COLONOSCOPY  2013   • COLONOSCOPY N/A 10/17/2018    Procedure: COLONOSCOPY;  Surgeon: Russell Del Rio MD;  Location: Health system ENDOSCOPY;  Service: Gastroenterology   • DIRECT LARYNGOSCOPY, ESOPHAGOSCOPY, BRONCHOSCOPY N/A 2017    Procedure: DIRECT LARYNGOSCOPY AND;  Surgeon: Live Bolton MD;  Location: Health system OR;  Service:    • ENDOSCOPY  2013    Colon endoscopy 64545 (1) - Internal & external hemorrhoids found. Stool found.   • ENDOSCOPY  2013    EGD w/ tube " 90314 (1) - Normal esophagus. Gastritis in stomach. Biopsy taken. Normal dudoenum. Biopsy taken.   • ENDOSCOPY N/A 10/17/2018    Procedure: ESOPHAGOGASTRODUODENOSCOPY--eval varices;  Surgeon: Russell Del Rio MD;  Location: [facility] ENDOSCOPY;  Service: Gastroenterology   • FOOT SURGERY  02/26/2013    Foot/toes surgery procedure (1) - Arthroplasty of toes 4 and 5 of right foot.   • HERNIA REPAIR     • HERNIA REPAIR      hital   • HYSTERECTOMY     • LIVER BIOPSY     • NERVE BLOCK  07/25/2016    Injection for nerve block (1) - Lumbar medial branch block.   • OTHER SURGICAL HISTORY  12/03/2012    Inj(s) Tend-Sheath, Ligament, Single 20550 (1) - [facility]    • OTHER SURGICAL HISTORY  06/24/2013    Small Joint Injection/Aspiration 20600 (2) - [facility] ([facility])    • OTHER SURGICAL HISTORY  2011    bowel obstruction x2   • OTHER SURGICAL HISTORY      gland removed from neck   • SUBLINGUAL SALIVARY CYST EXCISION N/A 8/1/2017    Procedure: EXCISION OF LEFT  TONGUE LESION WITH CLOSURE;  Surgeon: Live Bolton MD;  Location: [facility] OR;  Service:    • TUBAL ABDOMINAL LIGATION     • UPPER GASTROINTESTINAL ENDOSCOPY  07/01/2013   • UPPER GASTROINTESTINAL ENDOSCOPY  10/17/2018     Family History   Problem Relation Age of Onset   • Cancer Other    • Diabetes Other    • Heart disease Other    • Hypertension Mother    • Cancer Mother    • Diabetes Mother    • Hypertension Father    • Heart attack Father    • Cancer Father    • Heart disease Father    • Thyroid disease Maternal Aunt      OB History    No obstetric history on file.       Allergies   Allergen Reactions   • Other      Pt states that taking steroids either in pill or injection form make her have blisters in her mouth and she feels like she is on fire on the inside/ has hx of c diff and possible MRSA     • Corticosteroids Rash   • Kenalog  [Triamcinolone Acetonide] Rash   • Sulfa Antibiotics Rash     Sulfa (Sulfonamide Antibiotics)     Social  History     Socioeconomic History   • Marital status:      Spouse name: Not on file   • Number of children: Not on file   • Years of education: Not on file   • Highest education level: Not on file   Tobacco Use   • Smoking status: Former Smoker     Packs/day: 2.00     Years: 15.00     Pack years: 30.00     Types: Cigarettes     Quit date:      Years since quittin.8   • Smokeless tobacco: Never Used   Substance and Sexual Activity   • Alcohol use: No   • Drug use: No   • Sexual activity: Defer     Comment: Marital Status:      Current Medications:  Prior to Admission medications    Medication Sig Start Date End Date Taking? Authorizing Provider   cetirizine (zyrTEC) 10 MG tablet Take 1 tablet by mouth Daily. 17  Yes Promise Tovar MD   cyclobenzaprine (FLEXERIL) 10 MG tablet Take 10 mg by mouth 3 (Three) Times a Day As Needed for Muscle Spasms.   Yes ProviderPromise MD   dicyclomine (BENTYL) 20 MG tablet Take 1 tablet by mouth Every 6 (Six) Hours As Needed (ABD CRAMPING). 19  Yes Jeff Rivas APRN   escitalopram (LEXAPRO) 5 MG tablet Take 5 mg by mouth Daily.   Yes ProviderPromise MD   gabapentin (NEURONTIN) 800 MG tablet Take 800 mg by mouth 4 (Four) Times a Day.   Yes ProviderPromise MD   lactulose (CHRONULAC) 10 GM/15ML solution Take 30 mL by mouth 2 (Two) Times a Day. 20  Yes Blaine Perales PA-C   Melatonin 10 MG capsule Take  by mouth Every Night.   Yes Promise Tovar MD   meloxicam (MOBIC) 15 MG tablet Take 1 tablet by mouth Daily. 20  Yes Justus Lewis MD   metFORMIN (GLUCOPHAGE) 500 MG tablet Take 1 tablet by mouth 2 (Two) Times a Day With Meals. 19  Yes Maureen Cardoso APRN   nystatin (MYCOSTATIN) 759199 UNIT/GM powder Apply  topically to the appropriate area as directed 4 (Four) Times a Day As Needed.   Yes ProviderPromise MD   omeprazole (priLOSEC) 40 MG capsule Take 40 mg by mouth Daily.   Yes  "ProviderPromise MD   ondansetron (Zofran) 8 MG tablet Take 1 tablet by mouth Every 8 (Eight) Hours As Needed for Nausea or Vomiting. 6/10/20  Yes Blaine Perales PA-C   oxyCODONE (oxyCONTIN) 10 MG 12 hr tablet Take 10 mg by mouth 4 (Four) Times a Day As Needed.   Yes Promise Tovar MD   polyethylene glycol (MIRALAX) packet Take 17 g by mouth Daily As Needed.   Yes Promise Tovar MD   promethazine (PHENERGAN) 25 MG suppository Insert 1 suppository into the rectum Every 6 (Six) Hours As Needed for Nausea or Vomiting. 11/24/19  Yes Jeff Rivas APRN   Prucalopride Succinate (Motegrity) 2 MG tablet Take 1 tablet by mouth Daily. 6/2/20  Yes Blaine Perales PA-C   spironolactone (ALDACTONE) 25 MG tablet TAKE 1 TABLET BY MOUTH ONCE DAILY 6/11/19  Yes Maureen Cardoso APRN     Review of Systems  Review of Systems       Objective    /87   Pulse 79   Ht 162.6 cm (64\")   Wt 94.7 kg (208 lb 12.8 oz)   LMP  (LMP Unknown)   BMI 35.84 kg/m²   Physical Exam  Vitals signs and nursing note reviewed.   Constitutional:       General: She is not in acute distress.     Appearance: She is well-developed.   HENT:      Head: Normocephalic and atraumatic.   Eyes:      Pupils: Pupils are equal, round, and reactive to light.   Neck:      Musculoskeletal: Normal range of motion.   Cardiovascular:      Rate and Rhythm: Normal rate and regular rhythm.      Heart sounds: Normal heart sounds.   Pulmonary:      Effort: Pulmonary effort is normal.      Breath sounds: Normal breath sounds.   Abdominal:      General: Bowel sounds are normal. There is no distension or abdominal bruit.      Palpations: Abdomen is soft. Abdomen is not rigid. There is no shifting dullness or mass.      Tenderness: There is abdominal tenderness. There is no guarding or rebound.      Hernia: No hernia is present. There is no hernia in the ventral area.      Comments: Obese, mild diffuse moderate LUQ   Musculoskeletal: Normal range of " motion.   Skin:     General: Skin is warm and dry.   Neurological:      Mental Status: She is alert and oriented to person, place, and time.   Psychiatric:         Behavior: Behavior normal.         Thought Content: Thought content normal.         Judgment: Judgment normal.       Assessment/Plan      1. Chronic idiopathic constipation    2. Cirrhosis of liver without ascites, unspecified hepatic cirrhosis type (CMS/HCC)    3. MUSA (nonalcoholic steatohepatitis)    4. Abdominal pain, generalized    .   Diagnoses and all orders for this visit:    1. Chronic idiopathic constipation (Primary)  -     CBC & Differential  -     Comprehensive Metabolic Panel  -     Protime-INR    2. Cirrhosis of liver without ascites, unspecified hepatic cirrhosis type (CMS/HCC)  -     CBC & Differential  -     Comprehensive Metabolic Panel  -     Protime-INR    3. MUSA (nonalcoholic steatohepatitis)  -     CBC & Differential  -     Comprehensive Metabolic Panel  -     Protime-INR    4. Abdominal pain, generalized  -     CBC & Differential  -     Comprehensive Metabolic Panel  -     Protime-INR        Orders placed during this encounter include:  Orders Placed This Encounter   Procedures   • Comprehensive Metabolic Panel   • Protime-INR   • CBC & Differential     Order Specific Question:   Manual Differential     Answer:   No       Medications prescribed:  No orders of the defined types were placed in this encounter.      Requested Prescriptions      No prescriptions requested or ordered in this encounter       Review and/or summary of lab tests, radiology, procedures, medications. Review and summary of old records and obtaining of history. The risks and benefits of my recommendations, as well as other treatment options were discussed with the patient today. Questions were answered.    Follow-up: Return in about 3 months (around 1/20/2021), or if symptoms worsen or fail to improve, for lab prior.     * Surgery not found *      This document  has been electronically signed by Blaine Perales PA-C on October 21, 2020 18:41 CDT      Results for orders placed or performed in visit on 06/02/20   MUSA Fibrosure    Specimen: Blood   Result Value Ref Range    Fibrosis Score (References) 0.86 (H) 0.00 - 0.21    Fibrosis Stage (Reference) Comment     Steatosis Score (Reference) 0.47 (H) 0.00 - 0.30    Steatosis Grade (Reference) S1 - Mild Steatosis     MUSA Score (Reference) 0.75 (H) 0.25    Musa Grade (Reference) Comment     Height: (Reference) 64 in    Weight: (Reference) 211 LBS    Alpha 2-Macroglobulins, Qn 432 (H) 110 - 276 mg/dL    Haptoglobin 41 33 - 346 mg/dL    Apolipoprotein A-1 106 (L) 116 - 209 mg/dL    Total Bilirubin 0.8 0.0 - 1.2 mg/dL    GGT 36 0 - 60 IU/L    ALT (SGPT) 27 0 - 40 IU/L    AST (SGOT) P5P (Reference) 44 (H) 0 - 40 IU/L    Cholesterol, Total (Reference) 132 100 - 199 mg/dL    Glucose, Serum (Reference) 96 65 - 99 mg/dL    Triglycerides 94 0 - 149 mg/dL    Interpretations: (Reference) Comment     Fibrosis Scoring: Comment     Steatosis Grading (Reference) Comment     Musa Scoring (Reference) Comment     Limitations: (Reference) Comment     Comment (Reference) Comment    CBC Auto Differential    Specimen: Blood   Result Value Ref Range    WBC 4.36 3.40 - 10.80 10*3/mm3    RBC 5.23 3.77 - 5.28 10*6/mm3    Hemoglobin 13.6 12.0 - 15.9 g/dL    Hematocrit 41.4 34.0 - 46.6 %    MCV 79.2 79.0 - 97.0 fL    MCH 26.0 (L) 26.6 - 33.0 pg    MCHC 32.9 31.5 - 35.7 g/dL    RDW 16.2 (H) 12.3 - 15.4 %    RDW-SD 46.1 37.0 - 54.0 fl    Platelets 73 (L) 140 - 450 10*3/mm3    Neutrophil % 64.3 42.7 - 76.0 %    Lymphocyte % 27.5 19.6 - 45.3 %    Monocyte % 5.3 5.0 - 12.0 %    Eosinophil % 2.5 0.3 - 6.2 %    Basophil % 0.2 0.0 - 1.5 %    Immature Grans % 0.2 0.0 - 0.5 %    Neutrophils, Absolute 2.80 1.70 - 7.00 10*3/mm3    Lymphocytes, Absolute 1.20 0.70 - 3.10 10*3/mm3    Monocytes, Absolute 0.23 0.10 - 0.90 10*3/mm3    Eosinophils, Absolute 0.11 0.00 -  0.40 10*3/mm3    Basophils, Absolute 0.01 0.00 - 0.20 10*3/mm3    Immature Grans, Absolute 0.01 0.00 - 0.05 10*3/mm3    nRBC 0.0 0.0 - 0.2 /100 WBC    Differential Type      WBC 4.36 3.40 - 10.80 10*3/mm3   AFP Tumor Marker    Specimen: Blood   Result Value Ref Range    ALPHA-FETOPROTEIN 2.57 0 - 8.3 ng/mL   Protime-INR    Specimen: Blood   Result Value Ref Range    Protime 15.6 (H) 11.1 - 15.3 Seconds    INR 1.26 (H) 0.80 - 1.20   Comprehensive Metabolic Panel    Specimen: Blood   Result Value Ref Range    Glucose 101 (H) 65 - 99 mg/dL    BUN 11 8 - 23 mg/dL    Creatinine 1.12 (H) 0.57 - 1.00 mg/dL    Sodium 138 136 - 145 mmol/L    Potassium 4.5 3.5 - 5.2 mmol/L    Chloride 103 98 - 107 mmol/L    CO2 24.9 22.0 - 29.0 mmol/L    Calcium 9.2 8.6 - 10.5 mg/dL    Total Protein 7.0 6.0 - 8.5 g/dL    Albumin 4.20 3.50 - 5.20 g/dL    ALT (SGPT) 23 1 - 33 U/L    AST (SGOT) 37 (H) 1 - 32 U/L    Alkaline Phosphatase 87 39 - 117 U/L    Total Bilirubin 0.9 0.2 - 1.2 mg/dL    eGFR Non African Amer 50 (L) >60 mL/min/1.73    Globulin 2.8 gm/dL    A/G Ratio 1.5 g/dL    BUN/Creatinine Ratio 9.8 7.0 - 25.0    Anion Gap 10.1 5.0 - 15.0 mmol/L   Results for orders placed or performed in visit on 11/26/19   Gastrointestinal Panel, PCR - Stool, Per Rectum    Specimen: Per Rectum; Stool   Result Value Ref Range    Campylobacter Not Detected Not Detected, Invalid    Clostridium difficile (toxin A/B) Not Detected Not Detected, N/A    Plesiomonas shigelloides Not Detected Not Detected    Salmonella Not Detected Not Detected    Vibrio Not Detected Not Detected    Vibrio cholerae Not Detected Not Detected    Yersinia enterocolitica Not Detected Not Detected    Enteroaggregative E. coli (EAEC) Not Detected Not Detected    Enteropathogenic E. coli (EPEC) Not Detected Not Detected    Enterotoxigenic E. coli (ETEC) lt/st Not Detected Not Detected    Shiga-like toxin-producing E. coli (STEC) stx1/stx2 Not Detected Not Detected    E. coli O157 Not  Detected Not Detected    Shigella/Enteroinvasive E. coli (EIEC) Not Detected Not Detected    Cryptosporidium Not Detected Not Detected, Invalid    Cyclospora cayetanensis Not Detected Not Detected    Entamoeba histolytica Not Detected Not Detected    Giardia lamblia Not Detected Not Detected    Adenovirus F40/41 Not Detected Not Detected    Astrovirus Not Detected Not Detected    Norovirus GI/GII Not Detected Not Detected    Rotavirus A Not Detected Not Detected    Sapovirus (I, II, IV or V) Not Detected Not Detected   Results for orders placed or performed during the hospital encounter of 11/24/19   Scan Slide    Specimen: Arm, Right; Blood   Result Value Ref Range    Microcytes Slight/1+ None Seen    WBC Morphology Normal Normal    Platelet Estimate Decreased Normal   Urinalysis With Microscopic If Indicated (No Culture) - Urine, Clean Catch    Specimen: Urine, Clean Catch   Result Value Ref Range    Color, UA Yellow Yellow, Straw, Dark Yellow, Mikala    Appearance, UA Clear Clear    pH, UA 6.0 5.0 - 9.0    Specific Gravity, UA 1.009 1.003 - 1.030    Glucose, UA Negative Negative    Ketones, UA Negative Negative    Bilirubin, UA Negative Negative    Blood, UA Negative Negative    Protein, UA Negative Negative    Leuk Esterase, UA Negative Negative    Nitrite, UA Negative Negative    Urobilinogen, UA 0.2 E.U./dL 0.2 - 1.0 E.U./dL     *Note: Due to a large number of results and/or encounters for the requested time period, some results have not been displayed. A complete set of results can be found in Results Review.       Some portions of this note have been dictated using voice recognition software and may contain errors and/or omissions.

## 2020-10-26 RX ORDER — ONDANSETRON HYDROCHLORIDE 8 MG/1
TABLET, FILM COATED ORAL
Qty: 30 TABLET | Refills: 0 | Status: SHIPPED | OUTPATIENT
Start: 2020-10-26 | End: 2021-01-26 | Stop reason: SDUPTHER

## 2020-11-18 DIAGNOSIS — M25.551 RIGHT HIP PAIN: Primary | ICD-10-CM

## 2020-11-20 ENCOUNTER — OFFICE VISIT (OUTPATIENT)
Dept: ORTHOPEDIC SURGERY | Facility: CLINIC | Age: 60
End: 2020-11-20

## 2020-11-20 VITALS — BODY MASS INDEX: 34.83 KG/M2 | WEIGHT: 204 LBS | HEIGHT: 64 IN

## 2020-11-20 DIAGNOSIS — M25.551 RIGHT HIP PAIN: ICD-10-CM

## 2020-11-20 DIAGNOSIS — M25.571 CHRONIC PAIN OF BOTH ANKLES: ICD-10-CM

## 2020-11-20 DIAGNOSIS — M79.671 RIGHT FOOT PAIN: Primary | ICD-10-CM

## 2020-11-20 DIAGNOSIS — M25.572 CHRONIC PAIN OF BOTH ANKLES: ICD-10-CM

## 2020-11-20 DIAGNOSIS — G89.29 CHRONIC PAIN OF BOTH ANKLES: ICD-10-CM

## 2020-11-20 DIAGNOSIS — R20.0 NUMBNESS IN FEET: ICD-10-CM

## 2020-11-20 DIAGNOSIS — M21.6X1: ICD-10-CM

## 2020-11-20 DIAGNOSIS — Z01.89 ROUTINE LAB DRAW: ICD-10-CM

## 2020-11-20 DIAGNOSIS — M70.61 TROCHANTERIC BURSITIS OF RIGHT HIP: ICD-10-CM

## 2020-11-20 PROCEDURE — 99214 OFFICE O/P EST MOD 30 MIN: CPT | Performed by: NURSE PRACTITIONER

## 2020-11-20 RX ORDER — DICLOFENAC SODIUM 75 MG/1
75 TABLET, DELAYED RELEASE ORAL DAILY
Qty: 30 TABLET | Refills: 1 | Status: SHIPPED | OUTPATIENT
Start: 2020-11-20 | End: 2021-01-19

## 2020-11-23 ENCOUNTER — LAB (OUTPATIENT)
Dept: LAB | Facility: HOSPITAL | Age: 60
End: 2020-11-23

## 2020-11-23 DIAGNOSIS — Z01.89 ROUTINE LAB DRAW: ICD-10-CM

## 2020-11-23 DIAGNOSIS — R20.0 NUMBNESS IN FEET: ICD-10-CM

## 2020-11-23 PROCEDURE — 85610 PROTHROMBIN TIME: CPT | Performed by: PHYSICIAN ASSISTANT

## 2020-11-23 PROCEDURE — 85007 BL SMEAR W/DIFF WBC COUNT: CPT | Performed by: PHYSICIAN ASSISTANT

## 2020-11-23 PROCEDURE — 80053 COMPREHEN METABOLIC PANEL: CPT | Performed by: PHYSICIAN ASSISTANT

## 2020-11-23 PROCEDURE — 85025 COMPLETE CBC W/AUTO DIFF WBC: CPT | Performed by: PHYSICIAN ASSISTANT

## 2020-11-23 PROCEDURE — 83036 HEMOGLOBIN GLYCOSYLATED A1C: CPT

## 2020-11-23 NOTE — PROGRESS NOTES
Cardiovascular Medicine      Bart Espitia M.D., Ph.D., [facility]           History of Present Illness  This is a 57 y.o. female with:    1.  Abnormal EKG  A. MPS low-risk, 2017  2.  Hypertension  3.  Former smoker  4. Edema  5. CHAI  A. CPAP non-compliant    LE pain and edema  She has been having LE pain and edema. This does worsen with walking. She is now on Lasix. She denies VTE. She does have HTN for also about 10 years. ABIs and LE venous Duplex were normal. I placed her on Chlorthalidone. She has had adequate resolution of her LE edema. She states that she no longer has edema.     RV dilation  TTE showed mild RV dilation. AGUSTINA showed no ASD/PFO.       Review of Systems - History obtained from chart review and the patient  General ROS: negative  Respiratory ROS: positive for - shortness of breath  Cardiovascular ROS: negative for - chest pain.  All other systems were reviewed and were negative.    family history includes Cancer in her father, mother, and other; Diabetes in her mother and other; Heart attack in her father; Heart disease in her father and other; Hypertension in her father and mother; Thyroid disease in her maternal aunt.     reports that she has quit smoking. Her smoking use included Cigarettes. She has a 30.00 pack-year smoking history. She has never used smokeless tobacco. She reports that she does not drink alcohol or use illicit drugs.    Allergies   Allergen Reactions   • Other      Pt states that taking steroids either in pill or injection form make her have blisters in her mouth and she feels like she is on fire on the inside/ has hx of c diff and possible MRSA     • Sulfa Antibiotics Rash     Sulfa (Sulfonamide Antibiotics)         Current Outpatient Prescriptions:   •  cetirizine (zyrTEC) 10 MG tablet, Take 1 tablet by mouth Daily As Needed for Allergies., Disp: 30 tablet, Rfl: 11  •  chlorthalidone (HYGROTEN) 50 MG tablet, Take 1 tablet by mouth Daily., Disp: 30 tablet, Rfl: 3  •   clotrimazole (MYCELEX) 10 MG dimitry, Take 1 tablet by mouth 4 (Four) Times a Day., Disp: 40 tablet, Rfl: 0  •  cyclobenzaprine (FLEXERIL) 10 MG tablet, Take 1 tablet by mouth 2 (Two) Times a Day As Needed for Muscle Spasms., Disp: 60 tablet, Rfl: 3  •  DULoxetine (CYMBALTA) 30 MG capsule, Take 30 mg by mouth Daily., Disp: , Rfl:   •  gabapentin (NEURONTIN) 800 MG tablet, Take 800 mg by mouth 4 (Four) Times a Day., Disp: , Rfl:   •  linaclotide (LINZESS) 145 MCG capsule capsule, Take 145 mcg by mouth 2 (Two) Times a Day., Disp: , Rfl:   •  magnesium hydroxide (MILK OF MAGNESIA) 2400 MG/10ML suspension suspension, Take 10 mL by mouth Daily As Needed (constipation)., Disp: 100 mL, Rfl: 2  •  magnesium oxide (MAGOX) 400 (241.3 MG) MG tablet tablet, Take 400 mg by mouth Daily., Disp: , Rfl:   •  meloxicam (MOBIC) 15 MG tablet, Take 1 tablet by mouth Daily., Disp: 30 tablet, Rfl: 4  •  mupirocin (BACTROBAN) 2 % ointment, Apply  topically 3 (Three) Times a Day. (Patient taking differently: Apply 1 application topically 3 (Three) Times a Day.), Disp: 1 g, Rfl: 2  •  nystatin (MYCOSTATIN) 793426 UNIT/GM powder, Apply 1 application topically As Needed., Disp: , Rfl:   •  omeprazole (priLOSEC) 40 MG capsule, Take 1 capsule by mouth Daily., Disp: 30 capsule, Rfl: 2  •  ondansetron (ZOFRAN) 4 MG tablet, Take 4 mg by mouth Every 8 (Eight) Hours As Needed., Disp: , Rfl:   •  oxyCODONE-acetaminophen (PERCOCET) 7.5-325 MG per tablet, Take 1 tablet by mouth 3 (Three) Times a Day., Disp: , Rfl:   •  phenylephrine-mineral oil-petrolatum 0.25-14-74.9 % ointment hemorrhoidal ointment, Insert 1 application into the rectum 3 (Three) Times a Day As Needed (rectal pain)., Disp: 28 g, Rfl: 1  •  ranitidine (ZANTAC) 150 MG capsule, Take 2 capsules by mouth Every Evening., Disp: 60 capsule, Rfl: 3  •  spironolactone (ALDACTONE) 25 MG tablet, Take 1 tablet by mouth Daily., Disp: 30 tablet, Rfl: 3    Physical Exam:  Vitals:    03/01/18 1104   BP:  130/80   Pulse: 92   SpO2: 98%     Body mass index is 37.28 kg/(m^2).    GEN: alert, appears stated age and cooperative  Body Habitus: overweight  JVP: 6 cm of water at 45 degrees HJR: absent      Pulmonary:clear to auscultation, no wheezes, rales or rhonchi, symmetric air entry. Equal chest excursion. Chest physical exam is normal. No tenderness.        Precordium:  No palpable heaves or thrusts. P2 is not palpable.   [facility]:  normal size and placement Palpable S4: Not present.   Heart rate: normal  Heart Rhythm: regular     Heart Sounds: S1: normal intensity  S2: normal intensity  S3: absent   S4: absent  Opening Snap: absent  A2-OS:  N/A  Pericardial rub: absent    Ejection click: None      Murmurs: Systolic: none  Diastolic: none  Extremity: There is significant thickening of the foot just distal to the ankle which actually believe is adipose tissue.  With that said, there is 1+ lower extremity edema to the level of the ankles.  No overlying skin changes.    DATA REVIEWED:     [facility]  [address] [place] [address]   [place] [id number] [phone]             Amira Shannon   Echocardiogram   Order# [id number]   Reading physician: Bart Espitia MD PhD Ordering physician: Bart Espitia MD PhD Study date: 10/18/17   Patient Information   Patient Name MRN Sex  (Age)   Amira Shannon [id number] Female 1960 (56 y.o.)   Sedation Narrator Report   Sedation Narrator Report   Interpretation Summary   · Left Ventricle: Left ventricular systolic function is normal. Estimated EF appears to be in the range of 56 - 60%  · Left ventricular diastolic dysfunction is noted (grade III w/high LAP) consistent with reversible restrictive pattern. Elevated left atrial pressure.  · Right Ventricle: Normal right ventricular wall thickness, systolic function and septal motion noted. Right ventricular cavity is mildly dilated  · No evidence of pulmonary hypertension is  present  · There is no evidence of pericardial effusion       [facility]  [address] [place] [address] [phone]             Amira Shannon   Noninvasive physiologic studies of upper/lower extremity arteries, single level, bilateral (eg, ankle/brachial indices, Doppler waveform analysis...)   Order# [id number]   Reading physician: Mazin Mills MD Ordering physician: Bart Espitia MD PhD Study date: 10/18/17   Patient Information   Patient Name MRN Sex  (Age)   Amira Shannon [id number] Female 1960 (56 y.o.)   Clinical Indication   claudication   Dx: Claudication [I73.9 (ICD-10-CM)]   Interpretation Summary   · Right Conclusion: The right CARYN appears normal.  · Left Conclusion: The left CARYN appears normal.  · Increased indices suggest calcification             EKG was personally interpreted today.  Sinus mechanism with sinus arrhythmia.  Unable to exclude prior inferior infarction.      Test Reason :   Vent. Rate : 071 BPM     Atrial Rate : 071 BPM     P-R Int : 146 ms          QRS Dur : 084 ms      QT Int : 398 ms       P-R-T Axes : 050 000 031 degrees     QTc Int : 432 ms    Normal sinus rhythm  Inferior infarct , age undetermined  Abnormal ECG  No previous ECGs available  Confirmed by ALLISON VAUGHAN, JAYMIE ([id number]),  MARQUITA JONES ([id number]) on     FINDINGS:        - lines/tubes: none    - cardiac: size within normal limits.    - mediastinum: contour within normal limits.     - lungs: no evidence of a focal air space process, pulmonary  interstitial edema. Scattered tiny granulomata present. No  noncalcified nodules are identified    - pleura: no evidence of  fluid.      - osseous: unremarkable for age.        IMPRESSION:  CONCLUSION:  No acute cardiopulmonary process identified.       Interpretation Summary   · Appears negative for DVT of the lower extremities  · Pulsatile venous flow           Interpretation Summary    · Nuclear Study Description: A 2-day rest/stress protocol myocardial perfusion imaging study was performed  · Nuclear Perfusion Images: Overall image quality is excellent.  · Stress ECG: Stress ECG rhythm of sinus tachycardia noted. Normal ECG with no significant stress induced changes noted.  · Stress Description: A pharmacological stress test was performed using regadenoson without low-level exercise.  · Nuclear Perfusion Findings Study Impression: Myocardial perfusion imaging indicates a normal myocardial perfusion study with no evidence of ischemia  · Ventricle Size / Description: Left ventricular ejection fraction is normal (Calculated EF = 70%).  · Impressions are consistent with a low risk study.         proBNP 0.0 - 900.0 pg/mL 129.0         Assessment/Plan        1. LE pain and edema.  ABIs were normal.  Lower extremity venous duplex without evidence of DVT.  I recommended ongoing diuretic therapy and compression stockings.  Her edema markedly improved with chlorthalidone and removal of CCBs. There has been no indication of HFpEF. No vascular etiology.   -Continue medications  -PCP follow-up    2.  RV dilation.  This is mildly dilated.  AGUSTINA showed no cardiac etiology.   -PCP follow-up    3. Cardiac Risk Assessment and need for statin therapy: Low.  Her 10-year ASCVD risk was calculated based on her most recent lipid profile.  This was 3.4%.  -I did not recommend aspirin or statin at this time.    4. Tobacco status: Former smoker.    Plan for follow-up: PCP          This document has been electronically signed by Bart Espitia MD PhD on March 1, 2018 11:11 AM                   No

## 2020-11-24 LAB
ALBUMIN SERPL-MCNC: 3.9 G/DL (ref 3.5–5.2)
ALBUMIN/GLOB SERPL: 1.3 G/DL
ALP SERPL-CCNC: 100 U/L (ref 39–117)
ALT SERPL W P-5'-P-CCNC: 31 U/L (ref 1–33)
ANION GAP SERPL CALCULATED.3IONS-SCNC: 9.7 MMOL/L (ref 5–15)
AST SERPL-CCNC: 51 U/L (ref 1–32)
BILIRUB SERPL-MCNC: 1.1 MG/DL (ref 0–1.2)
BUN SERPL-MCNC: 12 MG/DL (ref 8–23)
BUN/CREAT SERPL: 12.4 (ref 7–25)
CALCIUM SPEC-SCNC: 8.9 MG/DL (ref 8.6–10.5)
CHLORIDE SERPL-SCNC: 105 MMOL/L (ref 98–107)
CO2 SERPL-SCNC: 25.3 MMOL/L (ref 22–29)
CREAT SERPL-MCNC: 0.97 MG/DL (ref 0.57–1)
DEPRECATED RDW RBC AUTO: 44.3 FL (ref 37–54)
EOSINOPHIL # BLD MANUAL: 0.11 10*3/MM3 (ref 0–0.4)
EOSINOPHIL NFR BLD MANUAL: 3 % (ref 0.3–6.2)
ERYTHROCYTE [DISTWIDTH] IN BLOOD BY AUTOMATED COUNT: 15.8 % (ref 12.3–15.4)
GFR SERPL CREATININE-BSD FRML MDRD: 58 ML/MIN/1.73
GLOBULIN UR ELPH-MCNC: 3 GM/DL
GLUCOSE SERPL-MCNC: 82 MG/DL (ref 65–99)
HBA1C MFR BLD: 5.1 % (ref 4.8–5.6)
HCT VFR BLD AUTO: 40.5 % (ref 34–46.6)
HGB BLD-MCNC: 13.7 G/DL (ref 12–15.9)
INR PPP: 1.55 (ref 0.8–1.2)
LYMPHOCYTES # BLD MANUAL: 1.1 10*3/MM3 (ref 0.7–3.1)
LYMPHOCYTES NFR BLD MANUAL: 30 % (ref 19.6–45.3)
LYMPHOCYTES NFR BLD MANUAL: 7 % (ref 5–12)
MCH RBC QN AUTO: 26.2 PG (ref 26.6–33)
MCHC RBC AUTO-ENTMCNC: 33.8 G/DL (ref 31.5–35.7)
MCV RBC AUTO: 77.6 FL (ref 79–97)
MONOCYTES # BLD AUTO: 0.26 10*3/MM3 (ref 0.1–0.9)
NEUTROPHILS # BLD AUTO: 2.19 10*3/MM3 (ref 1.7–7)
NEUTROPHILS NFR BLD MANUAL: 60 % (ref 42.7–76)
PLAT MORPH BLD: NORMAL
PLATELET # BLD AUTO: 58 10*3/MM3 (ref 140–450)
POTASSIUM SERPL-SCNC: 4.2 MMOL/L (ref 3.5–5.2)
PROT SERPL-MCNC: 6.9 G/DL (ref 6–8.5)
PROTHROMBIN TIME: 19.4 SECONDS (ref 11.1–15.3)
RBC # BLD AUTO: 5.22 10*6/MM3 (ref 3.77–5.28)
RBC MORPH BLD: NORMAL
SODIUM SERPL-SCNC: 140 MMOL/L (ref 136–145)
WBC # BLD AUTO: 3.65 10*3/MM3 (ref 3.4–10.8)
WBC MORPH BLD: NORMAL

## 2020-12-18 ENCOUNTER — OFFICE VISIT (OUTPATIENT)
Dept: ORTHOPEDIC SURGERY | Facility: CLINIC | Age: 60
End: 2020-12-18

## 2020-12-18 VITALS — BODY MASS INDEX: 34.91 KG/M2 | HEIGHT: 64 IN | WEIGHT: 204.5 LBS

## 2020-12-18 DIAGNOSIS — M25.551 RIGHT HIP PAIN: Primary | ICD-10-CM

## 2020-12-18 DIAGNOSIS — M70.61 TROCHANTERIC BURSITIS OF RIGHT HIP: ICD-10-CM

## 2020-12-18 PROCEDURE — 99213 OFFICE O/P EST LOW 20 MIN: CPT | Performed by: NURSE PRACTITIONER

## 2020-12-18 NOTE — PROGRESS NOTES
"Amira Shannon is a 60 y.o. female      Chief Complaint   Patient presents with   • Right Hip - Follow-up       HISTORY OF PRESENT ILLNESS: Patient is a 60-year-old female who presents today for recheck of right hip pain.  Patient was last seen on 11/20/2020 at which time it was felt that she had trochanteric bursitis as most of her pain was on her lateral hip directly overlying her greater trochanter.  Patient continues to have some localized tenderness directly over greater trochanter, she is also having some pain localized to her SI joint as well.  Patient reports that she has seen some improvement in her pain since doing home exercises.  Patient reports her pain is a 6 out of 10, this is a 20% improvement.  Patient was also taking Mobic at that time which did not seem to be helping much, patient was switched to Voltaren which she thinks may be working a little better.  Patient cannot take steroids due to allergic reaction.  She was also not interested in physical therapy at that time.  Patient had also had complaints of bilateral feet/ankle pain, podiatry furl was made and patient has an appointment with podiatry on 12/22/2020.       CONCURRENT MEDICAL HISTORY:    The following portions of the patient's history were reviewed and updated as appropriate: allergies, current medications, past family history, past medical history, past social history, past surgical history and problem list.     ROS  No fevers or chills.  No chest pain or shortness of air.  No GI or  disturbances.  Right hip pain, right SI joint pain.    PHYSICAL EXAMINATION:       Ht 162.6 cm (64\")   Wt 92.8 kg (204 lb 8 oz)   LMP  (LMP Unknown)   BMI 35.10 kg/m²     Physical Exam  Vitals signs and nursing note reviewed.   Constitutional:       General: She is not in acute distress.     Appearance: She is well-developed. She is not toxic-appearing.   HENT:      Head: Normocephalic.   Pulmonary:      Effort: Pulmonary effort is normal. No " respiratory distress.   Skin:     General: Skin is warm and dry.   Neurological:      Mental Status: She is alert and oriented to person, place, and time.   Psychiatric:         Behavior: Behavior normal.         Thought Content: Thought content normal.         Judgment: Judgment normal.         GAIT:     []  Normal  [x]  Antalgic    Assistive device: [x]  None  []  Walker     []  Crutches  []  Cane     []  Wheelchair  []  Stretcher    Right Hip Exam     Tenderness   The patient is experiencing tenderness in the greater trochanter.    Other   Erythema: absent  Sensation: normal  Pulse: present      Back Exam     Tenderness   The patient is experiencing tenderness in the sacroiliac.              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  There is loss of arch as well as partial collapse of talus that appears slightly progressed from comparison imaging.  No acute bony abnormality identified. Colleen Meadows APRN 11/20/2020     Xr Foot 2 View Right    Result Date: 11/20/2020  Narrative: Study: X-ray foot 1-2 VW, right Comparison: None Narrative: Position alignment of foot is acceptable.  No evidence of fracture or dislocation.  Severe arthritic changes are noted about the ankle. Colleen Meadows APRN 11/20/2020    Xr Hip With Or Without Pelvis 2 - 3 View Right    Result Date: 11/20/2020  Narrative: Study: XR hip with or without pelvis, right Comparison: None Narrative: Bilateral hips are acceptable in alignment and position.  Joint spaces are maintained.  No evidence of fracture or dislocation.  Mild degenerative changes noted in visible portion of lumbar spine. Colleen Meadows APRN 11/20/2020             ASSESSMENT:    Diagnoses and all orders for this visit:    Right hip pain    Trochanteric bursitis of right  hip          PLAN    Further options reviewed with patient including physical therapy referral.  Patient reports that she has begun seeing pain management who is writing her for a muscle relaxer which she reports has also helped symptoms.  She reports that at this time she prefers to continue stretches/home exercises and pain management.  Patient given new handouts and exercises to do for hip bursitis and SI joint dysfunction.  She reports that if progress plateaus or symptoms worsen she will return for physical therapy referral and new assessment.  However at this time patient desires to return on an as-needed basis and does not desire any further intervention at this time.  Patient instructed to return as needed, patient also instructed that she may call for physical therapy referral if desired.  Patient instructed to keep appointment with podiatry as scheduled.  Patient verbalized understanding.    Return if symptoms worsen or fail to improve.    Colleen Meadows, APRN

## 2020-12-22 ENCOUNTER — OFFICE VISIT (OUTPATIENT)
Dept: PODIATRY | Facility: CLINIC | Age: 60
End: 2020-12-22

## 2020-12-22 VITALS — WEIGHT: 204 LBS | HEART RATE: 78 BPM | HEIGHT: 64 IN | BODY MASS INDEX: 34.83 KG/M2 | OXYGEN SATURATION: 98 %

## 2020-12-22 DIAGNOSIS — M19.071 PRIMARY OSTEOARTHRITIS OF BOTH ANKLES: ICD-10-CM

## 2020-12-22 DIAGNOSIS — M79.605 PAIN IN BOTH LOWER EXTREMITIES: ICD-10-CM

## 2020-12-22 DIAGNOSIS — I87.2 VENOUS INSUFFICIENCY: Primary | ICD-10-CM

## 2020-12-22 DIAGNOSIS — M79.604 PAIN IN BOTH LOWER EXTREMITIES: ICD-10-CM

## 2020-12-22 DIAGNOSIS — M19.072 PRIMARY OSTEOARTHRITIS OF BOTH ANKLES: ICD-10-CM

## 2020-12-22 PROCEDURE — 99213 OFFICE O/P EST LOW 20 MIN: CPT | Performed by: PODIATRIST

## 2020-12-22 NOTE — PROGRESS NOTES
"Amira Starla Alemankins  1960  60 y.o. female     Patient presents today with a complaint of continued bilateral foot/ankle pain.      12/22/2020    Chief Complaint   Patient presents with   • Left Foot - Pain   • Right Foot - Pain       History of Present Illness    60-year-old female presents to clinic today with continued complaint of severe right and left ankle pain.  Patient is well-known to me and has been evaluated on multiple occasions for the same complaint.  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  Conservative and surgical treatment options have been discussed in detail.  She refuses to consider surgery as an option.  She also refuses to wear ankle braces.  She also has significant lower extremity edema.  She refuses to wear compression stockings to control edema.  She is on Lasix.  On her last appointment here lymphedema pumps were prescribed to her which she did not get.    Past Medical History:   Diagnosis Date   • Acid reflux    • Altered bowel function    • Arthropathy of lumbar facet joint    • Bleeding disorder (CMS/HCC)    • C. difficile diarrhea 2015   • Constipation    • Corns and callus    • Depression    • Disease related peripheral neuropathy    • Epigastric pain    • Fatty liver    • Hammer toe    • Headache    • Hiatal hernia    • History of transfusion    • Hyperlipidemia    • Knee pain    • Localized, primary osteoarthritis of the ankle and foot     Localized, primary osteoarthritis of the ankle and/or foot   • Mendoza's metatarsalgia     Mendoza's metatarsalgia - 2nd interspace on right   • Nausea and vomiting    • Neuralgia and neuritis     Neuralgia, neuritis, and radiculitis, unspecified   • Neuropathy    • Obstructive sleep apnea     Obstructive sleep apnea (adult) (pediatric)    • CHAI on CPAP     \"C-Pap at night  (unconfirmed)\"   • Osteoarthritis    • Pain in foot     Pain in unspecified foot - sees a podiatrist   • Pain in joint, ankle and foot     Joint " pain in ankle and foot      • Pain radiating to back     Pain radiating to lumbar region of back   • Plantar fasciitis    • PONV (postoperative nausea and vomiting)    • Restless leg syndrome    • Secondary hypertension     Secondary hypertension, unspecified   • Sinusitis    • Sleep apnea    • Tongue anomaly     lesion         Past Surgical History:   Procedure Laterality Date   • CARPAL TUNNEL RELEASE Left 2018    Procedure: CARPAL TUNNEL RELEASE - left;  Surgeon: Justus Lewis MD;  Location: [facility] OR;  Service: Orthopedics   • CARPAL TUNNEL RELEASE Right 2019    Procedure: carpal tunnel release right hand with local/monitored anesthesia care;  Surgeon: Justus Lewis MD;  Location: [facility] OR;  Service: Orthopedics   •  SECTION     • CHOLECYSTECTOMY     • COLONOSCOPY  2013   • COLONOSCOPY N/A 10/17/2018    Procedure: COLONOSCOPY;  Surgeon: Russell Del Rio MD;  Location: [facility] ENDOSCOPY;  Service: Gastroenterology   • DIRECT LARYNGOSCOPY, ESOPHAGOSCOPY, BRONCHOSCOPY N/A 2017    Procedure: DIRECT LARYNGOSCOPY AND;  Surgeon: Live Bolton MD;  Location: [facility] OR;  Service:    • ENDOSCOPY  2013    Colon endoscopy 38614 (1) - Internal & external hemorrhoids found. Stool found.   • ENDOSCOPY  2013    EGD w/ tube 78660 (1) - Normal esophagus. Gastritis in stomach. Biopsy taken. Normal dudoenum. Biopsy taken.   • ENDOSCOPY N/A 10/17/2018    Procedure: ESOPHAGOGASTRODUODENOSCOPY--eval varices;  Surgeon: Rsusell Del Rio MD;  Location: [facility] ENDOSCOPY;  Service: Gastroenterology   • FOOT SURGERY  2013    Foot/toes surgery procedure (1) - Arthroplasty of toes 4 and 5 of right foot.   • HERNIA REPAIR     • HERNIA REPAIR      hital   • HYSTERECTOMY     • LIVER BIOPSY     • NERVE BLOCK  2016    Injection for nerve block (1) - Lumbar medial branch block.   • OTHER SURGICAL HISTORY  2012    Inj(s) Tend-Sheath, Ligament, Single 35003 (1) - G.  LIYA ([facility])    • OTHER SURGICAL HISTORY  2013    Small Joint Injection/Aspiration  (2) - PORTER SLATERLIYA ([facility])    • OTHER SURGICAL HISTORY      bowel obstruction x2   • OTHER SURGICAL HISTORY      gland removed from neck   • SUBLINGUAL SALIVARY CYST EXCISION N/A 2017    Procedure: EXCISION OF LEFT  TONGUE LESION WITH CLOSURE;  Surgeon: Live Bolton MD;  Location: [facility];  Service:    • TUBAL ABDOMINAL LIGATION     • UPPER GASTROINTESTINAL ENDOSCOPY  2013   • UPPER GASTROINTESTINAL ENDOSCOPY  10/17/2018         Family History   Problem Relation Age of Onset   • Cancer Other    • Diabetes Other    • Heart disease Other    • Hypertension Mother    • Cancer Mother    • Diabetes Mother    • Hypertension Father    • Heart attack Father    • Cancer Father    • Heart disease Father    • Thyroid disease Maternal Aunt        Allergies   Allergen Reactions   • Other      Pt states that taking steroids either in pill or injection form make her have blisters in her mouth and she feels like she is on fire on the inside/ has hx of c diff and possible MRSA     • Corticosteroids Rash   • Kenalog  [Triamcinolone Acetonide] Rash   • Sulfa Antibiotics Rash     Sulfa (Sulfonamide Antibiotics)       Social History     Socioeconomic History   • Marital status:      Spouse name: Not on file   • Number of children: Not on file   • Years of education: Not on file   • Highest education level: Not on file   Tobacco Use   • Smoking status: Former Smoker     Packs/day: 2.00     Years: 15.00     Pack years: 30.00     Types: Cigarettes     Quit date: 2000     Years since quittin.9   • Smokeless tobacco: Never Used   Substance and Sexual Activity   • Alcohol use: No   • Drug use: No   • Sexual activity: Defer     Comment: Marital Status:          Current Outpatient Medications   Medication Sig Dispense Refill   • cetirizine (zyrTEC) 10 MG tablet Take 1 tablet by mouth Daily.    "  • cyclobenzaprine (FLEXERIL) 10 MG tablet Take 10 mg by mouth 3 (Three) Times a Day As Needed for Muscle Spasms.     • diclofenac (VOLTAREN) 75 MG EC tablet Take 1 tablet by mouth Daily for 60 days. 30 tablet 1   • dicyclomine (BENTYL) 20 MG tablet Take 1 tablet by mouth Every 6 (Six) Hours As Needed (ABD CRAMPING). 20 tablet 0   • escitalopram (LEXAPRO) 5 MG tablet Take 5 mg by mouth Daily.     • gabapentin (NEURONTIN) 800 MG tablet Take 800 mg by mouth 4 (Four) Times a Day.     • lactulose (CHRONULAC) 10 GM/15ML solution Take 30 mL by mouth 2 (Two) Times a Day. 946 mL 1   • Melatonin 10 MG capsule Take  by mouth Every Night.     • metFORMIN (GLUCOPHAGE) 500 MG tablet Take 1 tablet by mouth 2 (Two) Times a Day With Meals. 60 tablet 6   • nystatin (MYCOSTATIN) 543375 UNIT/GM powder Apply  topically to the appropriate area as directed 4 (Four) Times a Day As Needed.     • omeprazole (priLOSEC) 40 MG capsule Take 40 mg by mouth Daily.     • ondansetron (ZOFRAN) 8 MG tablet TAKE 1 TABLET BY MOUTH EVERY 8 HOURS AS NEEDED FOR NAUSEA FOR VOMITING 30 tablet 0   • oxyCODONE (oxyCONTIN) 10 MG 12 hr tablet Take 10 mg by mouth 4 (Four) Times a Day As Needed.     • polyethylene glycol (MIRALAX) packet Take 17 g by mouth Daily As Needed.     • promethazine (PHENERGAN) 25 MG suppository Insert 1 suppository into the rectum Every 6 (Six) Hours As Needed for Nausea or Vomiting. 10 suppository 0   • Prucalopride Succinate (Motegrity) 2 MG tablet Take 1 tablet by mouth Daily. 30 tablet 2   • spironolactone (ALDACTONE) 25 MG tablet TAKE 1 TABLET BY MOUTH ONCE DAILY 30 tablet 5     No current facility-administered medications for this visit.          OBJECTIVE    Pulse 78   Ht 162.6 cm (64\")   Wt 92.5 kg (204 lb)   LMP  (LMP Unknown)   SpO2 98%   BMI 35.02 kg/m²       Review of Systems   Constitutional: Positive for activity change and fatigue.   HENT: Negative.    Eyes: Negative.    Respiratory: Positive for shortness of " breath.    Cardiovascular: Positive for leg swelling.   Gastrointestinal: Positive for abdominal pain and constipation.   Endocrine: Negative.    Genitourinary: Positive for enuresis.   Musculoskeletal: Positive for back pain and joint swelling.        Bilateral foot/ankle pain  Joint pain   Skin: Negative.    Allergic/Immunologic: Negative.    Neurological: Negative.    Hematological: Negative.    Psychiatric/Behavioral: Negative.          Physical Exam   Constitutional: She is oriented to person, place, and time. She appears well-developed. No distress.   HENT:   Head: Normocephalic and atraumatic.   Nose: Nose normal.   Eyes: Pupils are equal, round, and reactive to light. Conjunctivae are normal.   Pulmonary/Chest: Effort normal. No respiratory distress. She has no wheezes.   Musculoskeletal: Swelling and tenderness present.   Neurological: She is alert and oriented to person, place, and time. She displays normal reflexes.   Skin: Skin is warm and dry. Capillary refill takes less than 2 seconds.   Psychiatric: Her behavior is normal.   Vitals reviewed.    Lower Extremity:     Cardiovascular:    DP/PT pulses palpable    CFT brisk  to all digits  Skin temp is warm to warm from proximal tibia to distal digits  Pedal hair growth decreased.   No erythema noted   Edema noted to bilateral LEs  Musculoskeletal:  Muscle strength is 4/5 for all muscle groups tested   Global pain on palpation noted    Ankle joint range of motion is decreased with pain bilateral  Subtalar joint range of motion is decreased with pain bilateral  Midtarsal joint range of motion is decreased with pain bilateral  Dermatological:   Skin is warm, dry and intact    Webspaces 1-4 bilateral are clean, dry and intact.   No subcutaneous nodules or masses noted    Neurological:   Protective sensation decreased  Sensation intact to light touch           Procedures          ASSESSMENT AND PLAN    Diagnoses and all orders for this visit:    1. Venous  insufficiency (Primary)    2. Pain in both lower extremities    3. Primary osteoarthritis of both ankles        - Comprehensive foot and ankle exam performed.   - Radiographs reviewed.  Degenerative changes noted to the tibiotalar joint, subtalar joint and talonavicular joint.  - Again discussed conservative and surgical treatment options.  Patient refuses to consider surgery as an option and she also refuses to wear custom orthotics or ankle braces.  - Stressed the importance of weight control and edema control.  Rx for lymphedema pumps.  Encourage patient to wear compression stockings.  - All questions were answered to the patients satisfaction.  - RTC as needed          This document has been electronically signed by Daren Marie DPM on December 22, 2020 13:43 CST     12/22/2020  13:43 CST

## 2021-01-07 ENCOUNTER — OFFICE VISIT (OUTPATIENT)
Dept: CARDIOLOGY | Facility: CLINIC | Age: 61
End: 2021-01-07

## 2021-01-07 VITALS
HEART RATE: 95 BPM | DIASTOLIC BLOOD PRESSURE: 90 MMHG | SYSTOLIC BLOOD PRESSURE: 145 MMHG | BODY MASS INDEX: 35.34 KG/M2 | WEIGHT: 207 LBS | HEIGHT: 64 IN

## 2021-01-07 DIAGNOSIS — R60.0 BILATERAL LOWER EXTREMITY EDEMA: ICD-10-CM

## 2021-01-07 DIAGNOSIS — I51.89 DIASTOLIC DYSFUNCTION: Primary | ICD-10-CM

## 2021-01-07 PROCEDURE — 99213 OFFICE O/P EST LOW 20 MIN: CPT | Performed by: NURSE PRACTITIONER

## 2021-01-07 NOTE — PROGRESS NOTES
Subjective:     CC: HTN, leg swelling    Fatigue  This is a chronic problem. The current episode started more than 1 year ago. The problem occurs daily. The problem has been rapidly worsening. Associated symptoms include arthralgias, fatigue, joint swelling, myalgias and weakness. Pertinent negatives include no change in bowel habit, chills, coughing, fever or rash. The symptoms are aggravated by standing and walking. She has tried position changes, oral narcotics, heat and rest for the symptoms. The treatment provided mild relief.   Leg Swelling  This is a chronic problem. The current episode started more than 1 year ago. The problem occurs intermittently. The problem has been rapidly improving. Associated symptoms include arthralgias, fatigue, joint swelling, myalgias and weakness. Pertinent negatives include no change in bowel habit, chills, coughing, fever or rash. She has tried rest and position changes (loop diuretics) for the symptoms. The treatment provided moderate relief.     She had been on Lasix for 10-12 years. She does have HTN for also about 10 years.    Of note, her BNP is negative.   Follow up revealed return of edema after Metolazone was stopped. Dr. Espitia also ordered ischemic evaluation which was low risk.   Last visit, he DC norvasc and lasix and added chlorthalidone. Improvement noted.    I saw her on  11/1/2017 and Aldactone added and chlorthalidone increased.   Weight down and swelling has not returned.     She complains of chronic fatigue. This is secondary to chronic pain and high doses of neurontin. Jordi for back and feet Thursday.     1/7/19: 6 month follow up. She has had hospitalization for SBO. She has not had recurrence of leg edema on current medications. Still has significant gait disturbance due to podiatry issues.    7/8/19: 6 month follow up, HTN. Doing well with current meds. Wants to try different diuretic medicine.  She has been on chlorthalidone for a couple years.   We try decreasing down to 12.5 mg and then discontinuing, however she does not feel like she is ready to discontinue.  We spent time discussing that her swelling is not secondary to volume overload and the diuretics are probably not helping at all.  She would like to try different medication; that will be arranged today.    1/9/2020: HTN controlled. Doing well with Demadex. Still feels like she needs more fluid medication. Not going to order more. BP controlled today. On Metformin BID for weight loss. Awaiting further work up and for Interstim for constipation. Seeing Dr. Galeana. Now using a wheelchair the house. Cane for other times.     1/7/2021: TELEVISIT You have chosen to receive care through a telephone visit. Do you consent to use a telephone visit for your medical care today? Yes  This visit has been rescheduled as a phone visit to comply with patient safety concerns in accordance with CDC recommendations. Total time of discussion was 16 minutes.    HTN follow up. BP is fairly controlled today at 145/90. Med rec- she is only on Aldactone now. No loop diuretics. Still having a lot of mobility issue. Awaiting leg pumps for her lymphedema. On Voltaren now for arthritis. Legs and feet are still killing her. Having a tremor- likely a gabapentin side effect.         Hospitalization:  8/31/18-9/5/18- SBO  11/17/2017-11/22/2017- SBO    Review of Systems   Constitution: Positive for fatigue and malaise/fatigue. Negative for chills, decreased appetite, fever and weight loss.   HENT: Negative.    Eyes: Negative.    Cardiovascular: Negative for dyspnea on exertion, irregular heartbeat and leg swelling.   Respiratory: Negative for cough and wheezing.    Endocrine: Negative.    Skin: Negative for dry skin, flushing and rash.   Musculoskeletal: Positive for arthralgias, joint swelling and myalgias. Negative for falls.   Gastrointestinal: Negative for change in bowel habit and melena.   Genitourinary: Negative for  frequency and hematuria.   Neurological: Positive for weakness. Negative for dizziness, light-headedness and loss of balance.   Psychiatric/Behavioral: Negative for altered mental status and memory loss. The patient is not nervous/anxious.        Current Outpatient Medications   Medication Sig Dispense Refill   • cetirizine (zyrTEC) 10 MG tablet Take 1 tablet by mouth Daily.     • cyclobenzaprine (FLEXERIL) 10 MG tablet Take 10 mg by mouth 3 (Three) Times a Day As Needed for Muscle Spasms.     • diclofenac (VOLTAREN) 75 MG EC tablet Take 1 tablet by mouth Daily for 60 days. 30 tablet 1   • dicyclomine (BENTYL) 20 MG tablet Take 1 tablet by mouth Every 6 (Six) Hours As Needed (ABD CRAMPING). 20 tablet 0   • escitalopram (LEXAPRO) 5 MG tablet Take 5 mg by mouth Daily.     • gabapentin (NEURONTIN) 800 MG tablet Take 800 mg by mouth 4 (Four) Times a Day.     • lactulose (CHRONULAC) 10 GM/15ML solution Take 30 mL by mouth 2 (Two) Times a Day. 946 mL 1   • Melatonin 10 MG capsule Take  by mouth Every Night.     • metFORMIN (GLUCOPHAGE) 500 MG tablet Take 1 tablet by mouth 2 (Two) Times a Day With Meals. 60 tablet 6   • nystatin (MYCOSTATIN) 999065 UNIT/GM powder Apply  topically to the appropriate area as directed 4 (Four) Times a Day As Needed.     • omeprazole (priLOSEC) 40 MG capsule Take 40 mg by mouth Daily.     • ondansetron (ZOFRAN) 8 MG tablet TAKE 1 TABLET BY MOUTH EVERY 8 HOURS AS NEEDED FOR NAUSEA FOR VOMITING 30 tablet 0   • oxyCODONE (oxyCONTIN) 10 MG 12 hr tablet Take 10 mg by mouth 4 (Four) Times a Day As Needed.     • polyethylene glycol (MIRALAX) packet Take 17 g by mouth Daily As Needed.     • promethazine (PHENERGAN) 25 MG suppository Insert 1 suppository into the rectum Every 6 (Six) Hours As Needed for Nausea or Vomiting. 10 suppository 0   • Prucalopride Succinate (Motegrity) 2 MG tablet Take 1 tablet by mouth Daily. 30 tablet 2   • spironolactone (ALDACTONE) 25 MG tablet TAKE 1 TABLET BY MOUTH ONCE  "DAILY 30 tablet 5     No current facility-administered medications for this visit.         Objective:     Vitals:    01/07/21 1053   BP: 145/90   BP Location: Left arm   Patient Position: Sitting   Cuff Size: Adult   Pulse: 95   Weight: 93.9 kg (207 lb)   Height: 162.6 cm (64\")     Wt Readings from Last 3 Encounters:   01/07/21 93.9 kg (207 lb)   12/22/20 92.5 kg (204 lb)   12/18/20 92.8 kg (204 lb 8 oz)          Physical Exam       DATA reviewed  ECHO 10/18/2017  Interpretation Summary   · Left Ventricle: Left ventricular systolic function is normal. Estimated EF appears to be in the range of 56 - 60%  · Left ventricular diastolic dysfunction is noted (grade III w/high LAP) consistent with reversible restrictive pattern. Elevated left atrial pressure.  · Right Ventricle: Normal right ventricular wall thickness, systolic function and septal motion noted. Right ventricular cavity is mildly dilated  · No evidence of pulmonary hypertension is present  · There is no evidence of pericardial effusion     AGUSTINA 11/7/17  Interpretation Summary     · Transesophageal Echocardiogram with limited Color and Doppler  · Left Ventricle: Left ventricular systolic function is normal. Estimated EF appears to be in the range of 61 - 65%.  · Right Ventricle: Normal right ventricular wall thickness, systolic function and septal motion noted. Right ventricular cavity is mildly dilated  · No evidence of a patent foramen ovale. No evidence of an atrial septal defect present. Saline test results are negative  · There is no evidence of pericardial effusion  · No evidence of VSD.          ABIs 10/18/17  Interpretation Summary   · Right Conclusion: The right CARYN appears normal.  · Left Conclusion: The left CARYN appears normal.  · Increased indices suggest calcification         Venous Duplex 10/18/17   Interpretation Summary   · Appears negative for DVT of the lower extremities  · Pulsatile venous flow         STRESS TEST: 10/3/17  Interpretation " Summary   · Nuclear Study Description: A 2-day rest/stress protocol myocardial perfusion imaging study was performed  · Nuclear Perfusion Images: Overall image quality is excellent.  · Stress ECG: Stress ECG rhythm of sinus tachycardia noted. Normal ECG with no significant stress induced changes noted.  · Stress Description: A pharmacological stress test was performed using regadenoson without low-level exercise.  · Nuclear Perfusion Findings Study Impression: Myocardial perfusion imaging indicates a normal myocardial perfusion study with no evidence of ischemia  · Ventricle Size / Description: Left ventricular ejection fraction is normal (Calculated EF = 70%).  · Impressions are consistent with a low risk study.           Lab Results   Component Value Date    GLUCOSE 82 11/23/2020    BUN 12 11/23/2020    CREATININE 0.97 11/23/2020    EGFRIFNONA 58 (L) 11/23/2020    BCR 12.4 11/23/2020    K 4.2 11/23/2020    CO2 25.3 11/23/2020    CALCIUM 8.9 11/23/2020    ALBUMIN 3.90 11/23/2020    AST 51 (H) 11/23/2020    ALT 31 11/23/2020     Lab Results   Component Value Date    WBC 3.65 11/23/2020    HGB 13.7 11/23/2020    HCT 40.5 11/23/2020    MCV 77.6 (L) 11/23/2020    PLT 58 (L) 11/23/2020      Ref. Range 9/20/2017 11:24   proBNP Latest Ref Range: 0.0 - 900.0 pg/mL 129.0       The following portions of the patient's history were reviewed and updated as appropriate: allergies, current medications, past family history, past medical history, past social history, past surgical history and problem list.     Assessment/Plan:        Diagnosis Plan   1. Swelling of both lower extremities  Absent.    Aldactone 25mg     Pain with compression stockings.     Labs with PCP        2. Diastolic dysfunction , grade III BNP negative. Blood pressure controlled. BP still stable  Consideration for HFpEF, but negative BNP. Her edema now looks to be from joint effusions.     CHAI with CPAP       3. Obesity, BMI 35 Patient's Body mass index is  35.53 kg/m². BMI is above normal parameters. Recommendations include: pharmacological intervention   - Metformin 500mg BID         No CHF. Saw Dr. Espitia regarding RV dilation. AGUSTINA was without anomalies. Secondary to CHAI.     Follow up as needed.           This document has been electronically signed by LIYAH Rodriguez on January 7, 2021 11:04 CST

## 2021-01-26 ENCOUNTER — OFFICE VISIT (OUTPATIENT)
Dept: GASTROENTEROLOGY | Facility: CLINIC | Age: 61
End: 2021-01-26

## 2021-01-26 VITALS
SYSTOLIC BLOOD PRESSURE: 141 MMHG | WEIGHT: 209.6 LBS | HEART RATE: 77 BPM | DIASTOLIC BLOOD PRESSURE: 76 MMHG | HEIGHT: 64 IN | BODY MASS INDEX: 35.78 KG/M2

## 2021-01-26 DIAGNOSIS — K75.81 NASH (NONALCOHOLIC STEATOHEPATITIS): ICD-10-CM

## 2021-01-26 DIAGNOSIS — K59.04 CHRONIC IDIOPATHIC CONSTIPATION: Primary | ICD-10-CM

## 2021-01-26 DIAGNOSIS — R74.8 ELEVATED LIVER ENZYMES: ICD-10-CM

## 2021-01-26 DIAGNOSIS — K74.60 CIRRHOSIS OF LIVER WITHOUT ASCITES, UNSPECIFIED HEPATIC CIRRHOSIS TYPE (HCC): ICD-10-CM

## 2021-01-26 PROCEDURE — 99214 OFFICE O/P EST MOD 30 MIN: CPT | Performed by: PHYSICIAN ASSISTANT

## 2021-01-26 RX ORDER — PRUCALOPRIDE 2 MG/1
1 TABLET, FILM COATED ORAL DAILY
Qty: 30 TABLET | Refills: 2 | Status: SHIPPED | OUTPATIENT
Start: 2021-01-26 | End: 2021-04-19

## 2021-01-26 RX ORDER — MECLIZINE HYDROCHLORIDE 25 MG/1
50 TABLET ORAL AS NEEDED
COMMUNITY
End: 2021-06-11

## 2021-01-26 RX ORDER — PANTOPRAZOLE SODIUM 40 MG/1
40 TABLET, DELAYED RELEASE ORAL DAILY
Qty: 30 TABLET | Refills: 3 | Status: SHIPPED | OUTPATIENT
Start: 2021-01-26 | End: 2021-08-20

## 2021-01-26 RX ORDER — ONDANSETRON HYDROCHLORIDE 8 MG/1
8 TABLET, FILM COATED ORAL EVERY 8 HOURS PRN
Qty: 30 TABLET | Refills: 0 | Status: SHIPPED | OUTPATIENT
Start: 2021-01-26

## 2021-01-28 DIAGNOSIS — Z01.89 ROUTINE LAB DRAW: Primary | ICD-10-CM

## 2021-01-28 RX ORDER — DICLOFENAC SODIUM 75 MG/1
75 TABLET, DELAYED RELEASE ORAL DAILY
Qty: 30 TABLET | Refills: 2 | Status: SHIPPED | OUTPATIENT
Start: 2021-01-28 | End: 2021-09-24 | Stop reason: HOSPADM

## 2021-01-28 NOTE — TELEPHONE ENCOUNTER
I have approved the refill. Will you let her know that I have also ordered her a CMP to have done in the next 2-3 months so we can recheck her kidney function after starting voltaren. As long as kidney function is good Ill start giving her more refills and we can just check it on a yearly basis.

## 2021-02-22 ENCOUNTER — TELEPHONE (OUTPATIENT)
Dept: ORTHOPEDIC SURGERY | Facility: CLINIC | Age: 61
End: 2021-02-22

## 2021-02-22 NOTE — TELEPHONE ENCOUNTER
IMELDA.  PATIENT CALLED STATING SHE HAS STILL NOT RECEIVED SOME TYPE OF COMPRESSION DEVICE OR HOSE FOR HER LEGS THAT WAS ORDERED LAST November, 2020

## 2021-02-23 NOTE — TELEPHONE ENCOUNTER
Called patient and told her that the hose may have been supposed to be ordered by Podiatry being that Colleen has only seen her for her hip bursitis. She said that she would call them tomorrow.

## 2021-03-05 ENCOUNTER — LAB (OUTPATIENT)
Dept: LAB | Facility: HOSPITAL | Age: 61
End: 2021-03-05

## 2021-03-05 ENCOUNTER — TRANSCRIBE ORDERS (OUTPATIENT)
Dept: LAB | Facility: HOSPITAL | Age: 61
End: 2021-03-05

## 2021-03-05 DIAGNOSIS — N39.0 URINARY TRACT INFECTION WITHOUT HEMATURIA, SITE UNSPECIFIED: Primary | ICD-10-CM

## 2021-03-05 DIAGNOSIS — N39.0 URINARY TRACT INFECTION WITHOUT HEMATURIA, SITE UNSPECIFIED: ICD-10-CM

## 2021-03-05 PROCEDURE — 87186 SC STD MICRODIL/AGAR DIL: CPT

## 2021-03-05 PROCEDURE — 87088 URINE BACTERIA CULTURE: CPT

## 2021-03-05 PROCEDURE — 87086 URINE CULTURE/COLONY COUNT: CPT

## 2021-03-05 RX ORDER — POLYETHYLENE GLYCOL 3350, SODIUM SULFATE ANHYDROUS, SODIUM BICARBONATE, SODIUM CHLORIDE, POTASSIUM CHLORIDE 236; 22.74; 6.74; 5.86; 2.97 G/4L; G/4L; G/4L; G/4L; G/4L
240 POWDER, FOR SOLUTION ORAL DAILY
Qty: 4000 ML | Refills: 2 | Status: SHIPPED | OUTPATIENT
Start: 2021-03-05 | End: 2021-03-10 | Stop reason: HOSPADM

## 2021-03-06 ENCOUNTER — APPOINTMENT (OUTPATIENT)
Dept: GENERAL RADIOLOGY | Facility: HOSPITAL | Age: 61
End: 2021-03-06

## 2021-03-06 ENCOUNTER — HOSPITAL ENCOUNTER (INPATIENT)
Facility: HOSPITAL | Age: 61
LOS: 4 days | Discharge: HOME | End: 2021-03-10
Attending: HOSPITALIST | Admitting: HOSPITALIST

## 2021-03-06 ENCOUNTER — APPOINTMENT (OUTPATIENT)
Dept: CT IMAGING | Facility: HOSPITAL | Age: 61
End: 2021-03-06

## 2021-03-06 DIAGNOSIS — K76.89 LIVER NODULE: ICD-10-CM

## 2021-03-06 DIAGNOSIS — K56.609 SBO (SMALL BOWEL OBSTRUCTION) (HCC): Primary | ICD-10-CM

## 2021-03-06 DIAGNOSIS — K74.60 HEPATIC CIRRHOSIS, UNSPECIFIED HEPATIC CIRRHOSIS TYPE, UNSPECIFIED WHETHER ASCITES PRESENT (HCC): ICD-10-CM

## 2021-03-06 LAB
ACANTHOCYTES BLD QL SMEAR: NORMAL
ALBUMIN SERPL-MCNC: 3.8 G/DL (ref 3.5–5.2)
ALBUMIN/GLOB SERPL: 1.1 G/DL
ALP SERPL-CCNC: 118 U/L (ref 39–117)
ALT SERPL W P-5'-P-CCNC: 32 U/L (ref 1–33)
AMYLASE SERPL-CCNC: 29 U/L (ref 28–100)
ANION GAP SERPL CALCULATED.3IONS-SCNC: 13 MMOL/L (ref 5–15)
AST SERPL-CCNC: 51 U/L (ref 1–32)
BASOPHILS # BLD AUTO: 0.03 10*3/MM3 (ref 0–0.2)
BASOPHILS NFR BLD AUTO: 0.3 % (ref 0–1.5)
BILIRUB SERPL-MCNC: 1.7 MG/DL (ref 0–1.2)
BILIRUB UR QL STRIP: ABNORMAL
BUN SERPL-MCNC: 11 MG/DL (ref 8–23)
BUN/CREAT SERPL: 11.6 (ref 7–25)
CALCIUM SPEC-SCNC: 9.3 MG/DL (ref 8.6–10.5)
CHLORIDE SERPL-SCNC: 102 MMOL/L (ref 98–107)
CLARITY UR: CLEAR
CLUMPED PLATELETS: NORMAL
CO2 SERPL-SCNC: 23 MMOL/L (ref 22–29)
COLOR UR: ABNORMAL
CREAT SERPL-MCNC: 0.95 MG/DL (ref 0.57–1)
D-LACTATE SERPL-SCNC: 1.4 MMOL/L (ref 0.5–2)
DEPRECATED RDW RBC AUTO: 46.3 FL (ref 37–54)
EOSINOPHIL # BLD AUTO: 0.14 10*3/MM3 (ref 0–0.4)
EOSINOPHIL NFR BLD AUTO: 1.5 % (ref 0.3–6.2)
ERYTHROCYTE [DISTWIDTH] IN BLOOD BY AUTOMATED COUNT: 17.6 % (ref 12.3–15.4)
FLUAV RNA RESP QL NAA+PROBE: NOT DETECTED
FLUBV RNA RESP QL NAA+PROBE: NOT DETECTED
GFR SERPL CREATININE-BSD FRML MDRD: 60 ML/MIN/1.73
GLOBULIN UR ELPH-MCNC: 3.4 GM/DL
GLUCOSE SERPL-MCNC: 105 MG/DL (ref 65–99)
GLUCOSE UR STRIP-MCNC: NEGATIVE MG/DL
HCT VFR BLD AUTO: 45.2 % (ref 34–46.6)
HGB BLD-MCNC: 15.5 G/DL (ref 12–15.9)
HGB UR QL STRIP.AUTO: NEGATIVE
IMM GRANULOCYTES # BLD AUTO: 0.02 10*3/MM3 (ref 0–0.05)
IMM GRANULOCYTES NFR BLD AUTO: 0.2 % (ref 0–0.5)
KETONES UR QL STRIP: ABNORMAL
LEUKOCYTE ESTERASE UR QL STRIP.AUTO: NEGATIVE
LIPASE SERPL-CCNC: 16 U/L (ref 13–60)
LYMPHOCYTES # BLD AUTO: 1.2 10*3/MM3 (ref 0.7–3.1)
LYMPHOCYTES NFR BLD AUTO: 12.6 % (ref 19.6–45.3)
MCH RBC QN AUTO: 26.5 PG (ref 26.6–33)
MCHC RBC AUTO-ENTMCNC: 34.3 G/DL (ref 31.5–35.7)
MCV RBC AUTO: 77.3 FL (ref 79–97)
MONOCYTES # BLD AUTO: 0.43 10*3/MM3 (ref 0.1–0.9)
MONOCYTES NFR BLD AUTO: 4.5 % (ref 5–12)
NEUTROPHILS NFR BLD AUTO: 7.74 10*3/MM3 (ref 1.7–7)
NEUTROPHILS NFR BLD AUTO: 80.9 % (ref 42.7–76)
NITRITE UR QL STRIP: NEGATIVE
NRBC BLD AUTO-RTO: 0 /100 WBC (ref 0–0.2)
PH UR STRIP.AUTO: 6 [PH] (ref 5–9)
PLATELET # BLD AUTO: 89 10*3/MM3 (ref 140–450)
PMV BLD AUTO: 10 FL (ref 6–12)
POTASSIUM SERPL-SCNC: 3.9 MMOL/L (ref 3.5–5.2)
PROT SERPL-MCNC: 7.2 G/DL (ref 6–8.5)
PROT UR QL STRIP: ABNORMAL
RBC # BLD AUTO: 5.85 10*6/MM3 (ref 3.77–5.28)
SARS-COV-2 RNA RESP QL NAA+PROBE: NOT DETECTED
SMALL PLATELETS BLD QL SMEAR: NORMAL
SODIUM SERPL-SCNC: 138 MMOL/L (ref 136–145)
SP GR UR STRIP: 1.03 (ref 1–1.03)
UROBILINOGEN UR QL STRIP: ABNORMAL
WBC # BLD AUTO: 9.56 10*3/MM3 (ref 3.4–10.8)
WBC MORPH BLD: NORMAL

## 2021-03-06 PROCEDURE — 25010000002 IOPAMIDOL 61 % SOLUTION: Performed by: EMERGENCY MEDICINE

## 2021-03-06 PROCEDURE — 82150 ASSAY OF AMYLASE: CPT | Performed by: EMERGENCY MEDICINE

## 2021-03-06 PROCEDURE — 74177 CT ABD & PELVIS W/CONTRAST: CPT

## 2021-03-06 PROCEDURE — 25010000002 HYDROMORPHONE 1 MG/ML SOLUTION: Performed by: EMERGENCY MEDICINE

## 2021-03-06 PROCEDURE — G0378 HOSPITAL OBSERVATION PER HR: HCPCS

## 2021-03-06 PROCEDURE — 99285 EMERGENCY DEPT VISIT HI MDM: CPT

## 2021-03-06 PROCEDURE — 81003 URINALYSIS AUTO W/O SCOPE: CPT | Performed by: EMERGENCY MEDICINE

## 2021-03-06 PROCEDURE — 85025 COMPLETE CBC W/AUTO DIFF WBC: CPT | Performed by: EMERGENCY MEDICINE

## 2021-03-06 PROCEDURE — 93005 ELECTROCARDIOGRAM TRACING: CPT | Performed by: EMERGENCY MEDICINE

## 2021-03-06 PROCEDURE — 83690 ASSAY OF LIPASE: CPT | Performed by: EMERGENCY MEDICINE

## 2021-03-06 PROCEDURE — 0D9670Z DRAINAGE OF STOMACH WITH DRAINAGE DEVICE, VIA NATURAL OR ARTIFICIAL OPENING: ICD-10-PCS | Performed by: HOSPITALIST

## 2021-03-06 PROCEDURE — 80053 COMPREHEN METABOLIC PANEL: CPT | Performed by: EMERGENCY MEDICINE

## 2021-03-06 PROCEDURE — 25010000002 ONDANSETRON PER 1 MG: Performed by: EMERGENCY MEDICINE

## 2021-03-06 PROCEDURE — 93010 ELECTROCARDIOGRAM REPORT: CPT | Performed by: INTERNAL MEDICINE

## 2021-03-06 PROCEDURE — 87636 SARSCOV2 & INF A&B AMP PRB: CPT | Performed by: EMERGENCY MEDICINE

## 2021-03-06 PROCEDURE — 36415 COLL VENOUS BLD VENIPUNCTURE: CPT | Performed by: EMERGENCY MEDICINE

## 2021-03-06 PROCEDURE — 85007 BL SMEAR W/DIFF WBC COUNT: CPT | Performed by: EMERGENCY MEDICINE

## 2021-03-06 PROCEDURE — 71045 X-RAY EXAM CHEST 1 VIEW: CPT

## 2021-03-06 PROCEDURE — 83605 ASSAY OF LACTIC ACID: CPT | Performed by: EMERGENCY MEDICINE

## 2021-03-06 RX ORDER — FUROSEMIDE 20 MG/1
20 TABLET ORAL DAILY
COMMUNITY
End: 2021-05-14 | Stop reason: SDUPTHER

## 2021-03-06 RX ORDER — SODIUM CHLORIDE 9 MG/ML
50 INJECTION, SOLUTION INTRAVENOUS CONTINUOUS
Status: DISCONTINUED | OUTPATIENT
Start: 2021-03-06 | End: 2021-03-10

## 2021-03-06 RX ORDER — PANTOPRAZOLE SODIUM 40 MG/10ML
40 INJECTION, POWDER, LYOPHILIZED, FOR SOLUTION INTRAVENOUS ONCE
Status: COMPLETED | OUTPATIENT
Start: 2021-03-06 | End: 2021-03-06

## 2021-03-06 RX ORDER — SODIUM CHLORIDE 0.9 % (FLUSH) 0.9 %
10 SYRINGE (ML) INJECTION AS NEEDED
Status: DISCONTINUED | OUTPATIENT
Start: 2021-03-06 | End: 2021-03-10 | Stop reason: HOSPADM

## 2021-03-06 RX ORDER — ONDANSETRON 2 MG/ML
4 INJECTION INTRAMUSCULAR; INTRAVENOUS ONCE
Status: COMPLETED | OUTPATIENT
Start: 2021-03-06 | End: 2021-03-06

## 2021-03-06 RX ADMIN — IOPAMIDOL 90 ML: 612 INJECTION, SOLUTION INTRAVENOUS at 22:23

## 2021-03-06 RX ADMIN — PANTOPRAZOLE SODIUM 40 MG: 40 INJECTION, POWDER, FOR SOLUTION INTRAVENOUS at 20:05

## 2021-03-06 RX ADMIN — HYDROMORPHONE HYDROCHLORIDE 1 MG: 1 INJECTION, SOLUTION INTRAMUSCULAR; INTRAVENOUS; SUBCUTANEOUS at 23:03

## 2021-03-06 RX ADMIN — HYDROMORPHONE HYDROCHLORIDE 1 MG: 1 INJECTION, SOLUTION INTRAMUSCULAR; INTRAVENOUS; SUBCUTANEOUS at 20:09

## 2021-03-06 RX ADMIN — ONDANSETRON 4 MG: 2 INJECTION INTRAMUSCULAR; INTRAVENOUS at 22:57

## 2021-03-06 RX ADMIN — SODIUM CHLORIDE 125 ML/HR: 900 INJECTION, SOLUTION INTRAVENOUS at 20:09

## 2021-03-06 RX ADMIN — ONDANSETRON 4 MG: 2 INJECTION INTRAMUSCULAR; INTRAVENOUS at 20:02

## 2021-03-07 ENCOUNTER — APPOINTMENT (OUTPATIENT)
Dept: GENERAL RADIOLOGY | Facility: HOSPITAL | Age: 61
End: 2021-03-07

## 2021-03-07 ENCOUNTER — APPOINTMENT (OUTPATIENT)
Dept: INTERVENTIONAL RADIOLOGY/VASCULAR | Facility: HOSPITAL | Age: 61
End: 2021-03-07

## 2021-03-07 ENCOUNTER — APPOINTMENT (OUTPATIENT)
Dept: ULTRASOUND IMAGING | Facility: HOSPITAL | Age: 61
End: 2021-03-07

## 2021-03-07 LAB
ABO GROUP BLD: NORMAL
ANION GAP SERPL CALCULATED.3IONS-SCNC: 7 MMOL/L (ref 5–15)
APTT PPP: 33.8 SECONDS (ref 20–40.3)
BACTERIA SPEC AEROBE CULT: ABNORMAL
BASOPHILS # BLD AUTO: 0.03 10*3/MM3 (ref 0–0.2)
BASOPHILS NFR BLD AUTO: 0.5 % (ref 0–1.5)
BLD GP AB SCN SERPL QL: NEGATIVE
BUN SERPL-MCNC: 12 MG/DL (ref 8–23)
BUN/CREAT SERPL: 13.6 (ref 7–25)
CALCIUM SPEC-SCNC: 8.2 MG/DL (ref 8.6–10.5)
CHLORIDE SERPL-SCNC: 107 MMOL/L (ref 98–107)
CO2 SERPL-SCNC: 26 MMOL/L (ref 22–29)
CREAT SERPL-MCNC: 0.88 MG/DL (ref 0.57–1)
DEPRECATED RDW RBC AUTO: 47.3 FL (ref 37–54)
EOSINOPHIL # BLD AUTO: 0.21 10*3/MM3 (ref 0–0.4)
EOSINOPHIL NFR BLD AUTO: 3.3 % (ref 0.3–6.2)
ERYTHROCYTE [DISTWIDTH] IN BLOOD BY AUTOMATED COUNT: 16.6 % (ref 12.3–15.4)
GFR SERPL CREATININE-BSD FRML MDRD: 65 ML/MIN/1.73
GLUCOSE BLDC GLUCOMTR-MCNC: 82 MG/DL (ref 70–130)
GLUCOSE BLDC GLUCOMTR-MCNC: 93 MG/DL (ref 70–130)
GLUCOSE SERPL-MCNC: 95 MG/DL (ref 65–99)
HCT VFR BLD AUTO: 38.2 % (ref 34–46.6)
HGB BLD-MCNC: 12.9 G/DL (ref 12–15.9)
IMM GRANULOCYTES # BLD AUTO: 0.01 10*3/MM3 (ref 0–0.05)
IMM GRANULOCYTES NFR BLD AUTO: 0.2 % (ref 0–0.5)
INR PPP: 1.31 (ref 0.8–1.2)
LYMPHOCYTES # BLD AUTO: 1.71 10*3/MM3 (ref 0.7–3.1)
LYMPHOCYTES NFR BLD AUTO: 26.8 % (ref 19.6–45.3)
Lab: NORMAL
MCH RBC QN AUTO: 26.8 PG (ref 26.6–33)
MCHC RBC AUTO-ENTMCNC: 33.8 G/DL (ref 31.5–35.7)
MCV RBC AUTO: 79.4 FL (ref 79–97)
MONOCYTES # BLD AUTO: 0.41 10*3/MM3 (ref 0.1–0.9)
MONOCYTES NFR BLD AUTO: 6.4 % (ref 5–12)
NEUTROPHILS NFR BLD AUTO: 4.01 10*3/MM3 (ref 1.7–7)
NEUTROPHILS NFR BLD AUTO: 62.8 % (ref 42.7–76)
NRBC BLD AUTO-RTO: 0 /100 WBC (ref 0–0.2)
PLATELET # BLD AUTO: 80 10*3/MM3 (ref 140–450)
PMV BLD AUTO: 10.9 FL (ref 6–12)
POTASSIUM SERPL-SCNC: 3.8 MMOL/L (ref 3.5–5.2)
PROTHROMBIN TIME: 16.9 SECONDS (ref 11.1–15.3)
RBC # BLD AUTO: 4.81 10*6/MM3 (ref 3.77–5.28)
RH BLD: POSITIVE
SODIUM SERPL-SCNC: 140 MMOL/L (ref 136–145)
T&S EXPIRATION DATE: NORMAL
WBC # BLD AUTO: 6.38 10*3/MM3 (ref 3.4–10.8)

## 2021-03-07 PROCEDURE — 99232 SBSQ HOSP IP/OBS MODERATE 35: CPT | Performed by: SURGERY

## 2021-03-07 PROCEDURE — 85610 PROTHROMBIN TIME: CPT | Performed by: EMERGENCY MEDICINE

## 2021-03-07 PROCEDURE — 85025 COMPLETE CBC W/AUTO DIFF WBC: CPT | Performed by: HOSPITALIST

## 2021-03-07 PROCEDURE — 86850 RBC ANTIBODY SCREEN: CPT | Performed by: EMERGENCY MEDICINE

## 2021-03-07 PROCEDURE — 82962 GLUCOSE BLOOD TEST: CPT

## 2021-03-07 PROCEDURE — 86901 BLOOD TYPING SEROLOGIC RH(D): CPT | Performed by: EMERGENCY MEDICINE

## 2021-03-07 PROCEDURE — 74018 RADEX ABDOMEN 1 VIEW: CPT

## 2021-03-07 PROCEDURE — 86901 BLOOD TYPING SEROLOGIC RH(D): CPT

## 2021-03-07 PROCEDURE — 86900 BLOOD TYPING SEROLOGIC ABO: CPT | Performed by: EMERGENCY MEDICINE

## 2021-03-07 PROCEDURE — 86900 BLOOD TYPING SEROLOGIC ABO: CPT

## 2021-03-07 PROCEDURE — 80048 BASIC METABOLIC PNL TOTAL CA: CPT | Performed by: HOSPITALIST

## 2021-03-07 PROCEDURE — 25010000002 ONDANSETRON PER 1 MG: Performed by: HOSPITALIST

## 2021-03-07 PROCEDURE — 25010000002 HYDROMORPHONE 1 MG/ML SOLUTION: Performed by: HOSPITALIST

## 2021-03-07 PROCEDURE — 85730 THROMBOPLASTIN TIME PARTIAL: CPT | Performed by: EMERGENCY MEDICINE

## 2021-03-07 RX ORDER — NALOXONE HCL 0.4 MG/ML
0.4 VIAL (ML) INJECTION
Status: DISCONTINUED | OUTPATIENT
Start: 2021-03-07 | End: 2021-03-10

## 2021-03-07 RX ORDER — NICOTINE POLACRILEX 4 MG
15 LOZENGE BUCCAL
Status: DISCONTINUED | OUTPATIENT
Start: 2021-03-07 | End: 2021-03-10 | Stop reason: HOSPADM

## 2021-03-07 RX ORDER — ONDANSETRON 2 MG/ML
4 INJECTION INTRAMUSCULAR; INTRAVENOUS EVERY 6 HOURS PRN
Status: DISCONTINUED | OUTPATIENT
Start: 2021-03-07 | End: 2021-03-10 | Stop reason: HOSPADM

## 2021-03-07 RX ORDER — SODIUM CHLORIDE 0.9 % (FLUSH) 0.9 %
10 SYRINGE (ML) INJECTION EVERY 12 HOURS SCHEDULED
Status: DISCONTINUED | OUTPATIENT
Start: 2021-03-07 | End: 2021-03-10 | Stop reason: HOSPADM

## 2021-03-07 RX ORDER — HYDRALAZINE HYDROCHLORIDE 20 MG/ML
10 INJECTION INTRAMUSCULAR; INTRAVENOUS EVERY 6 HOURS PRN
Status: DISCONTINUED | OUTPATIENT
Start: 2021-03-07 | End: 2021-03-10 | Stop reason: HOSPADM

## 2021-03-07 RX ORDER — PANTOPRAZOLE SODIUM 40 MG/10ML
40 INJECTION, POWDER, LYOPHILIZED, FOR SOLUTION INTRAVENOUS
Status: DISCONTINUED | OUTPATIENT
Start: 2021-03-07 | End: 2021-03-10 | Stop reason: HOSPADM

## 2021-03-07 RX ORDER — SODIUM CHLORIDE 0.9 % (FLUSH) 0.9 %
10 SYRINGE (ML) INJECTION AS NEEDED
Status: DISCONTINUED | OUTPATIENT
Start: 2021-03-07 | End: 2021-03-10 | Stop reason: HOSPADM

## 2021-03-07 RX ORDER — DEXTROSE MONOHYDRATE 25 G/50ML
25 INJECTION, SOLUTION INTRAVENOUS
Status: DISCONTINUED | OUTPATIENT
Start: 2021-03-07 | End: 2021-03-10 | Stop reason: HOSPADM

## 2021-03-07 RX ADMIN — HYDROMORPHONE HYDROCHLORIDE 1 MG: 1 INJECTION, SOLUTION INTRAMUSCULAR; INTRAVENOUS; SUBCUTANEOUS at 16:50

## 2021-03-07 RX ADMIN — ONDANSETRON 4 MG: 2 INJECTION INTRAMUSCULAR; INTRAVENOUS at 14:41

## 2021-03-07 RX ADMIN — SODIUM CHLORIDE 125 ML/HR: 900 INJECTION, SOLUTION INTRAVENOUS at 05:51

## 2021-03-07 RX ADMIN — HYDROMORPHONE HYDROCHLORIDE 1 MG: 1 INJECTION, SOLUTION INTRAMUSCULAR; INTRAVENOUS; SUBCUTANEOUS at 22:40

## 2021-03-07 RX ADMIN — HYDROMORPHONE HYDROCHLORIDE 1 MG: 1 INJECTION, SOLUTION INTRAMUSCULAR; INTRAVENOUS; SUBCUTANEOUS at 11:16

## 2021-03-07 RX ADMIN — HYDROMORPHONE HYDROCHLORIDE 1 MG: 1 INJECTION, SOLUTION INTRAMUSCULAR; INTRAVENOUS; SUBCUTANEOUS at 02:44

## 2021-03-07 RX ADMIN — SODIUM CHLORIDE 125 ML/HR: 900 INJECTION, SOLUTION INTRAVENOUS at 22:42

## 2021-03-07 RX ADMIN — HYDROMORPHONE HYDROCHLORIDE 1 MG: 1 INJECTION, SOLUTION INTRAMUSCULAR; INTRAVENOUS; SUBCUTANEOUS at 19:18

## 2021-03-07 RX ADMIN — PANTOPRAZOLE SODIUM 40 MG: 40 INJECTION, POWDER, FOR SOLUTION INTRAVENOUS at 08:21

## 2021-03-07 RX ADMIN — HYDROMORPHONE HYDROCHLORIDE 1 MG: 1 INJECTION, SOLUTION INTRAMUSCULAR; INTRAVENOUS; SUBCUTANEOUS at 07:36

## 2021-03-07 NOTE — CONSULTS
GENERAL SURGERY CONSULT    Referring Provider: MD Ivy  Reason for Consultation: Small Bowel Obstruction    Patient Care Team:  Yobany Barr MD as PCP - General  TroncosoBenjamin MD as Surgeon (General Surgery)  Blaine Perales PA-C as Physician Assistant (Gastroenterology)  Daren Marie DPM as Consulting Physician (Podiatry)    Chief complaint: Abdominal pain, bowel obstruction    Subjective .     History of present illness: 60-year-old lady who presented to the emergency department yesterday evening and was admitted overnight by the hospitalist service for a small bowel obstruction.  The patient came to the emergency department thinking that she had a small bowel obstruction, she has had many of these in the past.  She has had at least 2 abdominal surgeries for small bowel obstructions.  She states that the most recent was approximately 8 years ago.  She has also undergone multiple other abdominal surgeries.  She began having abdominal distention, cramping pain, and nausea over the past several days which prompted her to come to the hospital when this increased in severity and failed to resolve at home.  She notes that since her last operation for small bowel obstruction she has had several obstructions which have resolved without surgery.  After her last surgery she did have a wound infection and had a wound VAC to assist in closure of that wound.    She has a history of liver disease due to fatty liver.  On imaging she appears to have cirrhosis and evidence of portal hypertension with splenomegaly noted as well.  A CT scan that was performed yesterday showed distended loops of bowel with edema in the mesentery.  The patient did have a small bowel movement yesterday.    Review of Systems    Review of Systems   Constitutional: Positive for appetite change.   Gastrointestinal: Positive for abdominal pain and nausea.         History  Past Medical History:   Diagnosis Date   • Acid reflux    • Altered bowel  "function    • Arthropathy of lumbar facet joint    • Bleeding disorder (CMS/HCC)    • C. difficile diarrhea    • Constipation    • Corns and callus    • Depression    • Disease related peripheral neuropathy    • Epigastric pain    • Fatty liver    • Hammer toe    • Headache    • Hiatal hernia    • History of transfusion    • Hyperlipidemia    • Knee pain    • Localized, primary osteoarthritis of the ankle and foot     Localized, primary osteoarthritis of the ankle and/or foot   • Mendoza's metatarsalgia     Mendoza's metatarsalgia - 2nd interspace on right   • Nausea and vomiting    • Neuralgia and neuritis     Neuralgia, neuritis, and radiculitis, unspecified   • Neuropathy    • Obstructive sleep apnea     Obstructive sleep apnea (adult) (pediatric)    • CHAI on CPAP     \"C-Pap at night  (unconfirmed)\"   • Osteoarthritis    • Pain in foot     Pain in unspecified foot - sees a podiatrist   • Pain in joint, ankle and foot     Joint pain in ankle and foot      • Pain radiating to back     Pain radiating to lumbar region of back   • Plantar fasciitis    • PONV (postoperative nausea and vomiting)    • Restless leg syndrome    • Secondary hypertension     Secondary hypertension, unspecified   • Sinusitis    • Sleep apnea    • Tongue anomaly     lesion   ,   Past Surgical History:   Procedure Laterality Date   • CARPAL TUNNEL RELEASE Left 2018    Procedure: CARPAL TUNNEL RELEASE - left;  Surgeon: Justus Lewis MD;  Location: [facility] OR;  Service: Orthopedics   • CARPAL TUNNEL RELEASE Right 2019    Procedure: carpal tunnel release right hand with local/monitored anesthesia care;  Surgeon: Justus Lewis MD;  Location: [facility] OR;  Service: Orthopedics   •  SECTION     • CHOLECYSTECTOMY     • COLONOSCOPY  2013   • COLONOSCOPY N/A 10/17/2018    Procedure: COLONOSCOPY;  Surgeon: Russell Del Rio MD;  Location: [facility] ENDOSCOPY;  Service: Gastroenterology   • DIRECT LARYNGOSCOPY, " ESOPHAGOSCOPY, BRONCHOSCOPY N/A 8/1/2017    Procedure: DIRECT LARYNGOSCOPY AND;  Surgeon: Live Bolton MD;  Location: [facility] OR;  Service:    • ENDOSCOPY  07/01/2013    Colon endoscopy 13754 (1) - Internal & external hemorrhoids found. Stool found.   • ENDOSCOPY  07/01/2013    EGD w/ tube 09211 (1) - Normal esophagus. Gastritis in stomach. Biopsy taken. Normal dudoenum. Biopsy taken.   • ENDOSCOPY N/A 10/17/2018    Procedure: ESOPHAGOGASTRODUODENOSCOPY--eval varices;  Surgeon: Russell Del Rio MD;  Location: [facility] ENDOSCOPY;  Service: Gastroenterology   • FOOT SURGERY  02/26/2013    Foot/toes surgery procedure (1) - Arthroplasty of toes 4 and 5 of right foot.   • HERNIA REPAIR     • HERNIA REPAIR      hital   • HYSTERECTOMY     • LIVER BIOPSY     • NERVE BLOCK  07/25/2016    Injection for nerve block (1) - Lumbar medial branch block.   • OTHER SURGICAL HISTORY  12/03/2012    Inj(s) Tend-Sheath, Ligament, Single 20550 (1) - [facility] NICK[facility] ([facility])    • OTHER SURGICAL HISTORY  06/24/2013    Small Joint Injection/Aspiration 20600 (2) - [facility]TER NICKERSON ([facility])    • OTHER SURGICAL HISTORY  2011    bowel obstruction x2   • OTHER SURGICAL HISTORY      gland removed from neck   • SUBLINGUAL SALIVARY CYST EXCISION N/A 8/1/2017    Procedure: EXCISION OF LEFT  TONGUE LESION WITH CLOSURE;  Surgeon: Live Bolton MD;  Location: [facility];  Service:    • TUBAL ABDOMINAL LIGATION     • UPPER GASTROINTESTINAL ENDOSCOPY  07/01/2013   • UPPER GASTROINTESTINAL ENDOSCOPY  10/17/2018   ,   Family History   Problem Relation Age of Onset   • Cancer Other    • Diabetes Other    • Heart disease Other    • Hypertension Mother    • Cancer Mother    • Diabetes Mother    • Hypertension Father    • Heart attack Father    • Cancer Father    • Heart disease Father    • Thyroid disease Maternal Aunt    ,   Social History     Tobacco Use   • Smoking status: Former Smoker     Packs/day: 2.00     Years: 15.00     Pack years: 30.00      Types: Cigarettes     Quit date:      Years since quittin.1   • Smokeless tobacco: Never Used   Substance Use Topics   • Alcohol use: No   • Drug use: No   ,   Medications Prior to Admission   Medication Sig Dispense Refill Last Dose   • cetirizine (zyrTEC) 10 MG tablet Take 1 tablet by mouth Daily.   Past Week at Unknown time   • cyclobenzaprine (FLEXERIL) 10 MG tablet Take 10 mg by mouth 3 (Three) Times a Day As Needed for Muscle Spasms.   Past Month at Unknown time   • diclofenac (VOLTAREN) 75 MG EC tablet Take 1 tablet by mouth Daily. 30 tablet 2 3/6/2021 at Unknown time   • escitalopram (LEXAPRO) 5 MG tablet Take 5 mg by mouth Daily.   3/6/2021 at Unknown time   • furosemide (LASIX) 20 MG tablet Take 20 mg by mouth 2 (Two) Times a Day.   3/6/2021 at Unknown time   • gabapentin (NEURONTIN) 800 MG tablet Take 800 mg by mouth 4 (Four) Times a Day.   3/6/2021 at Unknown time   • meclizine (ANTIVERT) 25 MG tablet Take 50 mg by mouth As Needed for Dizziness.   Past Month at Unknown time   • Melatonin 10 MG capsule Take  by mouth Every Night.   3/6/2021 at Unknown time   • metFORMIN (GLUCOPHAGE) 500 MG tablet Take 1 tablet by mouth 2 (Two) Times a Day With Meals. 60 tablet 6 3/6/2021 at Unknown time   • Mirabegron ER (Myrbetriq) 50 MG tablet sustained-release 24 hour 24 hr tablet Take 50 mg by mouth Daily.   3/6/2021 at Unknown time   • nystatin (MYCOSTATIN) 703568 UNIT/GM powder Apply  topically to the appropriate area as directed 4 (Four) Times a Day As Needed.   Past Week at Unknown time   • ondansetron (ZOFRAN) 8 MG tablet Take 1 tablet by mouth Every 8 (Eight) Hours As Needed for Nausea or Vomiting. 30 tablet 0 3/6/2021 at Unknown time   • oxyCODONE (oxyCONTIN) 10 MG 12 hr tablet Take 10 mg by mouth 4 (Four) Times a Day As Needed.   3/6/2021 at Unknown time   • pantoprazole (Protonix) 40 MG EC tablet Take 1 tablet by mouth Daily. 30 tablet 3 3/6/2021 at Unknown time   • polyethylene glycol (MIRALAX)  packet Take 17 g by mouth Daily As Needed.   3/6/2021 at Unknown time   • promethazine (PHENERGAN) 25 MG suppository Insert 1 suppository into the rectum Every 6 (Six) Hours As Needed for Nausea or Vomiting. 10 suppository 0 3/6/2021 at Unknown time   • Prucalopride Succinate (Motegrity) 2 MG tablet Take 1 tablet by mouth Daily. 30 tablet 2 3/6/2021 at Unknown time   • spironolactone (ALDACTONE) 25 MG tablet TAKE 1 TABLET BY MOUTH ONCE DAILY 30 tablet 5 3/6/2021 at Unknown time   • polyethylene glycol (Golytely) 236 g solution Take 240 mL by mouth Daily. 4000 mL 2    , Scheduled Meds:  insulin aspart, 0-9 Units, Subcutaneous, TID AC  pantoprazole, 40 mg, Intravenous, Q AM  sodium chloride, 10 mL, Intravenous, Q12H    , Continuous Infusions:  sodium chloride, 125 mL/hr, Last Rate: 125 mL/hr (03/07/21 1049)    , PRN Meds:  •  dextrose  •  dextrose  •  glucagon (human recombinant)  •  HYDROmorphone **AND** naloxone  •  ondansetron  •  [COMPLETED] Insert peripheral IV **AND** sodium chloride  •  sodium chloride and Allergies:  Other, Corticosteroids, Kenalog  [triamcinolone acetonide], and Sulfa antibiotics    Objective     Vital Signs   Temp:  [98.1 °F (36.7 °C)-99.2 °F (37.3 °C)] 98.6 °F (37 °C)  Heart Rate:  [] 82  Resp:  [17-18] 18  BP: (128-175)/(64-89) 128/64    Physical Exam:       General Appearance:    Alert, cooperative, in no acute distress   Head:    Normocephalic, without obvious abnormality, atraumatic   Eyes:            Lids and lashes normal, conjunctivae and sclerae normal, no   icterus, no pallor, corneas clear   Ears:    Ears appear intact with no abnormalities noted   Neck:   No adenopathy, supple, trachea midline, no thyromegaly, no   carotid bruit, no JVD   Lungs:    breathing unlabored   Abdomen:     Soft, with possible mild distention, midline scar well healed, no obvious hernias or masses   Extremities:   Moves all extremities well   Neurologic:   Cranial nerves 2 - 12 grossly intact,  sensation intact       Results Review:  Lab Results (last 72 hours)     Procedure Component Value Units Date/Time    POC Glucose Once [[id number]]  (Normal) Collected: 03/07/21 0639    Specimen: Blood Updated: 03/07/21 0746     Glucose 93 mg/dL      Comment: : [id number] NEVILLE ELRITAMeter ID: [id number]       Basic Metabolic Panel [[id number]]  (Abnormal) Collected: 03/07/21 0555    Specimen: Blood Updated: 03/07/21 0632     Glucose 95 mg/dL      BUN 12 mg/dL      Creatinine 0.88 mg/dL      Sodium 140 mmol/L      Potassium 3.8 mmol/L      Chloride 107 mmol/L      CO2 26.0 mmol/L      Calcium 8.2 mg/dL      eGFR Non African Amer 65 mL/min/1.73      BUN/Creatinine Ratio 13.6     Anion Gap 7.0 mmol/L     Narrative:      GFR Normal >60  Chronic Kidney Disease <60  Kidney Failure <15      CBC & Differential [[id number]]  (Abnormal) Collected: 03/07/21 0555    Specimen: Blood Updated: 03/07/21 0612    Narrative:      The following orders were created for panel order CBC & Differential.  Procedure                               Abnormality         Status                     ---------                               -----------         ------                     Scan Slide[[id number]]                                                                  CBC Auto Differential[[id number]]        Abnormal            Final result                 Please view results for these tests on the individual orders.    CBC Auto Differential [[id number]]  (Abnormal) Collected: 03/07/21 0555    Specimen: Blood Updated: 03/07/21 0612     WBC 6.38 10*3/mm3      RBC 4.81 10*6/mm3      Hemoglobin 12.9 g/dL      Hematocrit 38.2 %      MCV 79.4 fL      MCH 26.8 pg      MCHC 33.8 g/dL      RDW 16.6 %      RDW-SD 47.3 fl      MPV 10.9 fL      Platelets 80 10*3/mm3      Neutrophil % 62.8 %      Lymphocyte % 26.8 %      Monocyte % 6.4 %      Eosinophil % 3.3 %      Basophil % 0.5 %      Immature Grans % 0.2 %      Neutrophils, Absolute 4.01 10*3/mm3        Lymphocytes, Absolute 1.71 10*3/mm3      Monocytes, Absolute 0.41 10*3/mm3      Eosinophils, Absolute 0.21 10*3/mm3      Basophils, Absolute 0.03 10*3/mm3      Immature Grans, Absolute 0.01 10*3/mm3      nRBC 0.0 /100 WBC     aPTT [[id number]]  (Normal) Collected: 03/07/21 0036    Specimen: Blood Updated: 03/07/21 0151     PTT 33.8 seconds     Narrative:      The recommended Heparin therapeutic range is 68-97 seconds.    Protime-INR [[id number]]  (Abnormal) Collected: 03/07/21 0036    Specimen: Blood Updated: 03/07/21 0151     Protime 16.9 Seconds      INR 1.31    Narrative:      Therapeutic range for most indications is 2.0-3.0 INR,  or 2.5-3.5 for mechanical heart valves.    Urinalysis With Microscopic If Indicated (No Culture) - Urine, Clean Catch [[id number]]  (Abnormal) Collected: 03/06/21 2125    Specimen: Urine, Clean Catch Updated: 03/06/21 2136     Color, UA Dark Yellow     Appearance, UA Clear     pH, UA 6.0     Specific Gravity, UA 1.028     Glucose, UA Negative     Ketones, UA Trace     Bilirubin, UA Small (1+)     Blood, UA Negative     Protein, UA Trace     Leuk Esterase, UA Negative     Nitrite, UA Negative     Urobilinogen, UA 2.0 E.U./dL    Narrative:      Urine microscopic not indicated.    Comprehensive Metabolic Panel [[id number]]  (Abnormal) Collected: 03/06/21 2020    Specimen: Blood Updated: 03/06/21 2047     Glucose 105 mg/dL      BUN 11 mg/dL      Creatinine 0.95 mg/dL      Sodium 138 mmol/L      Potassium 3.9 mmol/L      Chloride 102 mmol/L      CO2 23.0 mmol/L      Calcium 9.3 mg/dL      Total Protein 7.2 g/dL      Albumin 3.80 g/dL      ALT (SGPT) 32 U/L      AST (SGOT) 51 U/L      Alkaline Phosphatase 118 U/L      Total Bilirubin 1.7 mg/dL      eGFR Non African Amer 60 mL/min/1.73      Globulin 3.4 gm/dL      A/G Ratio 1.1 g/dL      BUN/Creatinine Ratio 11.6     Anion Gap 13.0 mmol/L     Narrative:      GFR Normal >60  Chronic Kidney Disease <60  Kidney Failure <15      Lipase  [[id number]]  (Normal) Collected: 03/06/21 2020    Specimen: Blood Updated: 03/06/21 2047     Lipase 16 U/L     Amylase [[id number]]  (Normal) Collected: 03/06/21 2020    Specimen: Blood Updated: 03/06/21 2047     Amylase 29 U/L     CBC & Differential [[id number]]  (Abnormal) Collected: 03/06/21 2020    Specimen: Blood Updated: 03/06/21 2041    Narrative:      The following orders were created for panel order CBC & Differential.  Procedure                               Abnormality         Status                     ---------                               -----------         ------                     Scan Slide[[id number]]                                       Final result               CBC Auto Differential[[id number]]        Abnormal            Final result                 Please view results for these tests on the individual orders.    Scan Slide [[id number]] Collected: 03/06/21 2020    Specimen: Blood Updated: 03/06/21 2041     Acanthocytes Mod/2+     WBC Morphology Normal     Platelet Estimate Decreased     Clumped Platelets --     Comment: NONE SEEN       Lactic Acid, Plasma [[id number]]  (Normal) Collected: 03/06/21 2020    Specimen: Blood Updated: 03/06/21 2037     Lactate 1.4 mmol/L     COVID-19 and FLU A/B PCR - Swab, Nasopharynx [[id number]]  (Normal) Collected: 03/06/21 1944    Specimen: Swab from Nasopharynx Updated: 03/06/21 2030     COVID19 Not Detected     Influenza A PCR Not Detected     Influenza B PCR Not Detected    Narrative:      Fact sheet for providers: https://www.fda.gov/media/188647/download    Fact sheet for patients: https://www.fda.gov/media/840981/download    Test performed by PCR.    CBC Auto Differential [[id number]]  (Abnormal) Collected: 03/06/21 2020    Specimen: Blood Updated: 03/06/21 2025     WBC 9.56 10*3/mm3      RBC 5.85 10*6/mm3      Hemoglobin 15.5 g/dL      Hematocrit 45.2 %      MCV 77.3 fL      MCH 26.5 pg      MCHC 34.3 g/dL      RDW 17.6 %      RDW-SD 46.3 fl      MPV 10.0 fL       Platelets 89 10*3/mm3      Neutrophil % 80.9 %      Lymphocyte % 12.6 %      Monocyte % 4.5 %      Eosinophil % 1.5 %      Basophil % 0.3 %      Immature Grans % 0.2 %      Neutrophils, Absolute 7.74 10*3/mm3      Lymphocytes, Absolute 1.20 10*3/mm3      Monocytes, Absolute 0.43 10*3/mm3      Eosinophils, Absolute 0.14 10*3/mm3      Basophils, Absolute 0.03 10*3/mm3      Immature Grans, Absolute 0.02 10*3/mm3      nRBC 0.0 /100 WBC         Imaging Results (Last 72 Hours)     Procedure Component Value Units Date/Time    XR Abdomen KUB [[id number]] Collected: 03/07/21 1117     Updated: 03/07/21 1216    Narrative:      PROCEDURE: XR ABDOMEN 1 VIEW (KUB)    Clinical History: check NGT placement, K56.609 Unspecified  intestinal obstruction, unspecified as to partial versus complete  obstruction K74.60 Unspecified cirrhosis of liver K76.89 Other  specified diseases of liver    Indication:     Same as above     Comparison:    8/12/2019     Technique:    Single supine view of the abdomen and pelvis.    Findings:     A nonspecific bowel gas pattern is noted. The tip of the  orogastric tube is in the proximal body of the stomach    Radiographic contrast is seen in the urinary bladder.    There is no gross evidence of free air in the abdomen or the  pelvis on this single supine view of the abdomen and pelvis.    There is no visualization of any radiopaque calculi in the  outline of the urinary tract on either side.    There is no significant constipation.       Impression:      Impression:    A nonspecific bowel gas pattern is noted.   The tip of the orogastric tube is in the proximal body of the  stomach    Electronically signed by:  Emiliano Zheng MD  3/7/2021 12:14 PM CST  Workstation: [email or web address]    CT Abdomen Pelvis With Contrast [[id number]] Collected: 03/06/21 2215     Updated: 03/06/21 2247    Narrative:      CT ABDOMEN PELVIS WITH IV CONTRAST    INDICATION: 60 years Female; abd pain    TECHNIQUE:  CT scan of the  abdomen and pelvis was performed with  IV contrast.  This exam was performed according to our  departmental dose-optimization program, which includes automated  exposure control, adjustment of the mA and/or kV according to  patient size and/or use of iterative reconstruction technique.    Comparison: 11/24/2019.    FINDINGS:  Liver: The liver demonstrates cirrhotic morphology. There is  suggestion of numerous tiny hypodense nodules in the liver which  suggests regenerative nodules. A discrete hypodense lesion in the  right hepatic dome measures 8 mm which is new from prior study  and indeterminate.  Gallbladder/Biliary tree: Status post cholecystectomy. No  significant biliary dilatation.  Pancreas: Unremarkable.  Spleen: Splenomegaly.  Adrenals: Unremarkable.  Genitourinary: Status post hysterectomy. No adnexal masses. No  hydronephrosis or nephrolithiasis.  Gastrointestinal: No gastric wall thickening. Multiple loops of  dilated small bowel throughout the abdomen with air-fluid levels  and surrounding mesenteric edema with decompressed loops of small  bowel in the right lower quadrant which could possibly represent  a transition point and suggests either acute small bowel  obstruction or high-grade ileus. Some of the decompressed loops  of small bowel demonstrate mild wall thickening. No free air.  Appendix is not identified. Moderate stool throughout the colon.  Peritoneum: Unremarkable.  Vasculature: Paraesophageal and perigastric varices.   Lymph nodes: No pathologically enlarged lymph nodes.  Bones: Unremarkable.  Soft tissues: Unremarkable.  Incidental findings: None.      Impression:      Multiple loops of dilated small bowel throughout the abdomen with  air-fluid levels and surrounding mesenteric edema with  decompressed loops of small bowel in the right lower quadrant  which could possibly represent a transition point and suggests  either acute small bowel obstruction or high-grade ileus.     Cirrhosis  with evidence of portal hypertension. Numerous tiny  nodules in the liver suggest regenerative nodules. A discrete  hypodense lesion in the right hepatic dome measures 8 mm which is  new from prior study and indeterminate. Recommend follow-up CT or  MRI liver mass protocol.    Electronically signed by:  Gina Meier  3/6/2021 10:45 PM CST  Workstation: [id number]    XR Chest 1 View [[id number]] Collected: 03/06/21 1905     Updated: 03/06/21 1953    Narrative:        PORTABLE CHEST    HISTORY: Abdominal pain    Portable AP upright film of the chest was obtained at 6:43 PM.  COMPARISON: June 8, 2019    FINDINGS:   Low lung volumes.  No focal infiltrate.  Old granulomatous disease is present.  The heart is not enlarged.  The pulmonary vasculature is not increased.  No pleural effusion.  No pneumothorax.  No acute osseous abnormality.  Degenerative changes are present in the thoracic spine.  Cholecystectomy.      Impression:      CONCLUSION:  Low lung volumes.  No focal infiltrate.    [id number]    Electronically signed by:  Walker Stevens MD  3/6/2021 7:52 PM CST  Workstation: [email or web address]          I reviewed the patient's new clinical results.  I reviewed the patient's new imaging results and agree with the interpretation.  I reviewed the patient's other test results and agree with the interpretation      Assessment/Plan       MUSA (nonalcoholic steatohepatitis)    Thrombocytopenia (CMS/HCC)    Sleep apnea    SBO (small bowel obstruction) (CMS/HCC)      60-year-old lady with significant liver disease based on her imaging showing cirrhosis and portal hypertension.  History of multiple abdominal surgeries with multiple prior small bowel obstructions.    Currently she appears fairly comfortable.  Her NG tube does not appear to be having significant output.  We will check an x-ray today to confirm the placement of her NG tube.  We will get a small bowel follow-through tomorrow to further delineate whether she will need surgery  for her small bowel obstruction.    I discussed the patient's findings and my recommendations with patient, family and nursing staff              This document has been electronically signed by Justus Skinner MD on March 7, 2021 12:51 CST      Justus Skinner MD  03/07/21  12:51 CST

## 2021-03-07 NOTE — ED PROVIDER NOTES
"Subjective   59yo female pmh significant dm2/htn/noé/cirrhosis/cholecystectomy/hysterectomy/herniorraphy, presents ED c/o 2d hx periumbilical abdominal pain characterized as intermittent \"cramping/ache\"/nonradiating/associated with nausea, vomiting, constipation, abdominal distension/neg relieve factors.  Pt reports last BM earlier today.  ROS (+) flatus.  Denies fever/chills/cough/chest pain/diarrhea/melena/ hematochoezia/hematemesis/dysuria.      History provided by:  Patient  Abdominal Pain  Pain location:  Periumbilical  Pain quality: aching and cramping    Pain radiates to:  Does not radiate  Pain severity:  Moderate  Duration:  2 days  Associated symptoms: constipation, nausea and vomiting    Associated symptoms: no diarrhea        Review of Systems   Constitutional: Positive for appetite change.   HENT: Negative.    Eyes: Negative for redness.   Respiratory: Negative.    Cardiovascular: Negative.    Gastrointestinal: Positive for abdominal pain, constipation, nausea and vomiting. Negative for blood in stool and diarrhea.   Genitourinary: Negative.    Allergic/Immunologic: Positive for immunocompromised state.   All other systems reviewed and are negative.      Past Medical History:   Diagnosis Date   • Acid reflux    • Altered bowel function    • Arthropathy of lumbar facet joint    • Bleeding disorder (CMS/HCC)    • C. difficile diarrhea 2015   • Constipation    • Corns and callus    • Depression    • Disease related peripheral neuropathy    • Epigastric pain    • Fatty liver    • Hammer toe    • Headache    • Hiatal hernia    • History of transfusion    • Hyperlipidemia    • Knee pain    • Localized, primary osteoarthritis of the ankle and foot     Localized, primary osteoarthritis of the ankle and/or foot   • Mendoza's metatarsalgia     Mendoza's metatarsalgia - 2nd interspace on right   • Nausea and vomiting    • Neuralgia and neuritis     Neuralgia, neuritis, and radiculitis, unspecified   • Neuropathy    " "  • Obstructive sleep apnea     Obstructive sleep apnea (adult) (pediatric)    • CHAI on CPAP     \"C-Pap at night  (unconfirmed)\"   • Osteoarthritis    • Pain in foot     Pain in unspecified foot - sees a podiatrist   • Pain in joint, ankle and foot     Joint pain in ankle and foot      • Pain radiating to back     Pain radiating to lumbar region of back   • Plantar fasciitis    • PONV (postoperative nausea and vomiting)    • Restless leg syndrome    • Secondary hypertension     Secondary hypertension, unspecified   • Sinusitis    • Sleep apnea    • Tongue anomaly     lesion       Allergies   Allergen Reactions   • Other      Pt states that taking steroids either in pill or injection form make her have blisters in her mouth and she feels like she is on fire on the inside/ has hx of c diff and possible MRSA     • Corticosteroids Rash   • Kenalog  [Triamcinolone Acetonide] Rash   • Sulfa Antibiotics Rash     Sulfa (Sulfonamide Antibiotics)       Past Surgical History:   Procedure Laterality Date   • CARPAL TUNNEL RELEASE Left 2018    Procedure: CARPAL TUNNEL RELEASE - left;  Surgeon: Justus Lewis MD;  Location: [facility] OR;  Service: Orthopedics   • CARPAL TUNNEL RELEASE Right 2019    Procedure: carpal tunnel release right hand with local/monitored anesthesia care;  Surgeon: Justus Lewis MD;  Location: [facility] OR;  Service: Orthopedics   •  SECTION     • CHOLECYSTECTOMY     • COLONOSCOPY  2013   • COLONOSCOPY N/A 10/17/2018    Procedure: COLONOSCOPY;  Surgeon: Russell Del Rio MD;  Location: [facility] ENDOSCOPY;  Service: Gastroenterology   • DIRECT LARYNGOSCOPY, ESOPHAGOSCOPY, BRONCHOSCOPY N/A 2017    Procedure: DIRECT LARYNGOSCOPY AND;  Surgeon: Live Bolton MD;  Location: [facility] OR;  Service:    • ENDOSCOPY  2013    Colon endoscopy 46429 (1) - Internal & external hemorrhoids found. Stool found.   • ENDOSCOPY  2013    EGD w/ tube 73865 (1) - Normal " esophagus. Gastritis in stomach. Biopsy taken. Normal dudoenum. Biopsy taken.   • ENDOSCOPY N/A 10/17/2018    Procedure: ESOPHAGOGASTRODUODENOSCOPY--eval varices;  Surgeon: Russell Del Rio MD;  Location: [facility] ENDOSCOPY;  Service: Gastroenterology   • FOOT SURGERY  2013    Foot/toes surgery procedure (1) - Arthroplasty of toes 4 and 5 of right foot.   • HERNIA REPAIR     • HERNIA REPAIR      hital   • HYSTERECTOMY     • LIVER BIOPSY     • NERVE BLOCK  2016    Injection for nerve block (1) - Lumbar medial branch block.   • OTHER SURGICAL HISTORY  2012    Inj(s) Tend-Sheath, Ligament, Single  (1) - [facility] ([facility]    • OTHER SURGICAL HISTORY  2013    Small Joint Injection/Aspiration  (2) - [facility]TER NICKERSON ([facility])    • OTHER SURGICAL HISTORY      bowel obstruction x2   • OTHER SURGICAL HISTORY      gland removed from neck   • SUBLINGUAL SALIVARY CYST EXCISION N/A 2017    Procedure: EXCISION OF LEFT  TONGUE LESION WITH CLOSURE;  Surgeon: Live Bolton MD;  Location: [facility] OR;  Service:    • TUBAL ABDOMINAL LIGATION     • UPPER GASTROINTESTINAL ENDOSCOPY  2013   • UPPER GASTROINTESTINAL ENDOSCOPY  10/17/2018       Family History   Problem Relation Age of Onset   • Cancer Other    • Diabetes Other    • Heart disease Other    • Hypertension Mother    • Cancer Mother    • Diabetes Mother    • Hypertension Father    • Heart attack Father    • Cancer Father    • Heart disease Father    • Thyroid disease Maternal Aunt        Social History     Socioeconomic History   • Marital status:      Spouse name: Not on file   • Number of children: Not on file   • Years of education: Not on file   • Highest education level: Not on file   Tobacco Use   • Smoking status: Former Smoker     Packs/day: 2.00     Years: 15.00     Pack years: 30.00     Types: Cigarettes     Quit date: 2000     Years since quittin.1   • Smokeless tobacco: Never Used   Substance  and Sexual Activity   • Alcohol use: No   • Drug use: No   • Sexual activity: Defer     Comment: Marital Status:            Objective   Physical Exam  Vitals and nursing note reviewed.   Constitutional:       Appearance: Normal appearance.   HENT:      Head: Normocephalic and atraumatic.      Mouth/Throat:      Mouth: Mucous membranes are moist.   Eyes:      Pupils: Pupils are equal, round, and reactive to light.   Cardiovascular:      Rate and Rhythm: Normal rate and regular rhythm.      Pulses: Normal pulses.      Heart sounds: No murmur. No friction rub. No gallop.    Pulmonary:      Effort: Pulmonary effort is normal. No respiratory distress.      Breath sounds: Normal breath sounds. No wheezing, rhonchi or rales.   Abdominal:      General: Abdomen is protuberant. Bowel sounds are normal. There is distension.      Palpations: Abdomen is soft.      Tenderness: There is generalized abdominal tenderness. There is no right CVA tenderness, left CVA tenderness, guarding or rebound. Negative signs include Fuentes's sign, Rovsing's sign and McBurney's sign.       Musculoskeletal:         General: No swelling or deformity.      Cervical back: Normal range of motion and neck supple. No rigidity.   Lymphadenopathy:      Cervical: No cervical adenopathy.   Neurological:      General: No focal deficit present.      Mental Status: She is alert and oriented to person, place, and time.      GCS: GCS eye subscore is 4. GCS verbal subscore is 5. GCS motor subscore is 6.         ECG 12 Lead      Date/Time: 3/6/2021 10:10 PM  Performed by: Ollie Amado MD  Authorized by: Ollie Amado MD   Interpreted by physician  Rhythm: sinus rhythm  Rate: normal  BPM: 88  QRS axis: normal  Conduction: conduction normal  ST Segments: ST segments normal  T Waves: T waves normal  Other: no other findings  Clinical impression: non-specific ECG                 ED Course  ED Course as of Mar 06 2302   Sat Mar 06, 2021   2240 Checkout   Debbie. Pending CT abd/pelvis/disposition.    [SD]   9246 Patient was signed out to me at shift change by Dr. Amado.  She is alert in no acute distress.  Nursing staff is attempting to obtain IV access.  I reviewed the results of her evaluation with her and recommended admission for observation.  Case discussed with the hospitalist Dr. Brunson.  He agrees to admit the patient to the medical floor.  He agrees that if the ED nursing staff is unable to obtain IV access then patient can have a midline or PICC line placed in the morning.    [DR]      ED Course User Index  [DR] Mike Lewis MD  [SD] Ollie Amado MD      Labs Reviewed   COMPREHENSIVE METABOLIC PANEL - Abnormal; Notable for the following components:       Result Value    Glucose 105 (*)     AST (SGOT) 51 (*)     Alkaline Phosphatase 118 (*)     Total Bilirubin 1.7 (*)     eGFR Non  Amer 60 (*)     All other components within normal limits    Narrative:     GFR Normal >60  Chronic Kidney Disease <60  Kidney Failure <15     URINALYSIS W/ MICROSCOPIC IF INDICATED (NO CULTURE) - Abnormal; Notable for the following components:    Ketones, UA Trace (*)     Bilirubin, UA Small (1+) (*)     Protein, UA Trace (*)     Urobilinogen, UA 2.0 E.U./dL (*)     All other components within normal limits    Narrative:     Urine microscopic not indicated.   CBC WITH AUTO DIFFERENTIAL - Abnormal; Notable for the following components:    RBC 5.85 (*)     MCV 77.3 (*)     MCH 26.5 (*)     RDW 17.6 (*)     Platelets 89 (*)     Neutrophil % 80.9 (*)     Lymphocyte % 12.6 (*)     Monocyte % 4.5 (*)     Neutrophils, Absolute 7.74 (*)     All other components within normal limits   COVID-19 AND FLU A/B, NP SWAB IN TRANSPORT MEDIA 8-12 HR TAT - Normal    Narrative:     Fact sheet for providers: https://www.fda.gov/media/810401/download    Fact sheet for patients: https://www.fda.gov/media/398765/download    Test performed by PCR.   LIPASE - Normal   LACTIC ACID, PLASMA -  Normal   AMYLASE - Normal   SCAN SLIDE   PROTIME-INR   APTT   TYPE AND SCREEN   CBC AND DIFFERENTIAL    Narrative:     The following orders were created for panel order CBC & Differential.  Procedure                               Abnormality         Status                     ---------                               -----------         ------                     Scan Slide[[id number]]                                       Final result               CBC Auto Differential[[id number]]        Abnormal            Final result                 Please view results for these tests on the individual orders.     CT Abdomen Pelvis With Contrast    Result Date: 3/6/2021  Narrative: CT ABDOMEN PELVIS WITH IV CONTRAST INDICATION: 60 years Female; abd pain TECHNIQUE:  CT scan of the abdomen and pelvis was performed with IV contrast.  This exam was performed according to our departmental dose-optimization program, which includes automated exposure control, adjustment of the mA and/or kV according to patient size and/or use of iterative reconstruction technique. Comparison: 11/24/2019. FINDINGS: Liver: The liver demonstrates cirrhotic morphology. There is suggestion of numerous tiny hypodense nodules in the liver which suggests regenerative nodules. A discrete hypodense lesion in the right hepatic dome measures 8 mm which is new from prior study and indeterminate. Gallbladder/Biliary tree: Status post cholecystectomy. No significant biliary dilatation. Pancreas: Unremarkable. Spleen: Splenomegaly. Adrenals: Unremarkable. Genitourinary: Status post hysterectomy. No adnexal masses. No hydronephrosis or nephrolithiasis. Gastrointestinal: No gastric wall thickening. Multiple loops of dilated small bowel throughout the abdomen with air-fluid levels and surrounding mesenteric edema with decompressed loops of small bowel in the right lower quadrant which could possibly represent a transition point and suggests either acute small bowel  obstruction or high-grade ileus. Some of the decompressed loops of small bowel demonstrate mild wall thickening. No free air. Appendix is not identified. Moderate stool throughout the colon. Peritoneum: Unremarkable. Vasculature: Paraesophageal and perigastric varices. Lymph nodes: No pathologically enlarged lymph nodes. Bones: Unremarkable. Soft tissues: Unremarkable. Incidental findings: None.     Impression: Multiple loops of dilated small bowel throughout the abdomen with air-fluid levels and surrounding mesenteric edema with decompressed loops of small bowel in the right lower quadrant which could possibly represent a transition point and suggests either acute small bowel obstruction or high-grade ileus. Cirrhosis with evidence of portal hypertension. Numerous tiny nodules in the liver suggest regenerative nodules. A discrete hypodense lesion in the right hepatic dome measures 8 mm which is new from prior study and indeterminate. Recommend follow-up CT or MRI liver mass protocol. Electronically signed by:  Gina Meier  3/6/2021 10:45 PM CST Workstation: [id number] Chest 1 View    Result Date: 3/6/2021  Narrative: PORTABLE CHEST HISTORY: Abdominal pain Portable AP upright film of the chest was obtained at 6:43 PM. COMPARISON: June 8, 2019 FINDINGS: Low lung volumes. No focal infiltrate. Old granulomatous disease is present. The heart is not enlarged. The pulmonary vasculature is not increased. No pleural effusion. No pneumothorax. No acute osseous abnormality. Degenerative changes are present in the thoracic spine. Cholecystectomy.     Impression: CONCLUSION: Low lung volumes. No focal infiltrate. [id number] Electronically signed by:  Walker Stevens MD  3/6/2021 7:52 PM CST Workstation: [email or web address]                                         [facility]    Final diagnoses:   SBO (small bowel obstruction) (CMS/HCC)   Hepatic cirrhosis, unspecified hepatic cirrhosis type, unspecified whether ascites present (CMS/HCC)   Liver  nodule            Mike Lewis MD  03/06/21 [id number]

## 2021-03-07 NOTE — H&P
[facility] Medicine Admission      Date of Admission: 3/6/2021      Primary Care Physician: Yobany Barr MD      Chief Complaint: Abdominal pain, nausea, vomiting    HPI: Patient is a 60-year-old female past medical history of recurrent small bowel obstruction, nonalcoholic steatohepatitis, obstructive sleep apnea, and hypertension who presents emergency department with about 2 weeks of worsening abdominal pain.  She states that she has had worsening constipation in his.  Intermittently using enemas.  She states that she began having nausea vomiting on day of admission and decided to present to the emergency department.  She states that she has had multiple small bowel obstructions in the past.  She has had 2 abdominal surgery secondary to small bowel obstruction approximately 7 to 8 years ago.  She states the last surgery resulted in significant intra-abdominal infection requiring wound VAC.  She states she has had several obstructions since that point.  She denies fever, chills, change of taste or smell, chest pain, and shortness of breath.  There are no alleviating or exacerbating factors.    Concurrent Medical History:  has a past medical history of Acid reflux, Altered bowel function, Arthropathy of lumbar facet joint, Bleeding disorder (CMS/[facility]), C. difficile diarrhea (2015), Constipation, Corns and callus, Depression, Disease related peripheral neuropathy, Epigastric pain, Fatty liver, Hammer toe, Headache, Hiatal hernia, History of transfusion, Hyperlipidemia, Knee pain, Localized, primary osteoarthritis of the ankle and foot, Mendoza's metatarsalgia, Nausea and vomiting, Neuralgia and neuritis, Neuropathy, Obstructive sleep apnea, CHAI on CPAP, Osteoarthritis, Pain in foot, Pain in joint, ankle and foot, Pain radiating to back, Plantar fasciitis, PONV (postoperative nausea and vomiting), Restless leg syndrome, Secondary hypertension, Sinusitis, Sleep apnea, and  Tongue anomaly.    Past Surgical History:  has a past surgical history that includes Esophagogastroduodenoscopy (2013); Esophagogastroduodenoscopy (2013); Foot surgery (2013); Other surgical history (2012); Nerve Block (2016); Other surgical history (2013); Colonoscopy (2013); Other surgical history ();  section; Cholecystectomy; Tubal ligation; Hernia repair; Liver biopsy; Hysterectomy; Hernia repair; Other surgical history; direct laryngoscopy, esophagoscopy, bronchoscopy (N/A, 2017); Sublingual salivary cyst excision (N/A, 2017); Carpal tunnel release (Left, 2018); Esophagogastroduodenoscopy (N/A, 10/17/2018); Colonoscopy (N/A, 10/17/2018); Upper gastrointestinal endoscopy (2013); Upper gastrointestinal endoscopy (10/17/2018); and Carpal tunnel release (Right, 2019).    Family History: family history includes Cancer in her father, mother, and another family member; Diabetes in her mother and another family member; Heart attack in her father; Heart disease in her father and another family member; Hypertension in her father and mother; Thyroid disease in her maternal aunt.     Social History:  reports that she quit smoking about 21 years ago. Her smoking use included cigarettes. She has a 30.00 pack-year smoking history. She has never used smokeless tobacco. She reports that she does not drink alcohol and does not use drugs.    Allergies:   Allergies   Allergen Reactions   • Other      Pt states that taking steroids either in pill or injection form make her have blisters in her mouth and she feels like she is on fire on the inside/ has hx of c diff and possible MRSA     • Corticosteroids Rash   • Kenalog  [Triamcinolone Acetonide] Rash   • Sulfa Antibiotics Rash     Sulfa (Sulfonamide Antibiotics)       Medications:   Prior to Admission medications    Medication Sig Start Date End Date Taking? Authorizing Provider   cetirizine (zyrTEC) 10  MG tablet Take 1 tablet by mouth Daily. 9/25/17  Yes Promise Tovar MD   cyclobenzaprine (FLEXERIL) 10 MG tablet Take 10 mg by mouth 3 (Three) Times a Day As Needed for Muscle Spasms.   Yes Promise Tovar MD   diclofenac (VOLTAREN) 75 MG EC tablet Take 1 tablet by mouth Daily. 1/28/21  Yes Colleen Meadows APRN   escitalopram (LEXAPRO) 5 MG tablet Take 5 mg by mouth Daily.   Yes Promise Tovar MD   furosemide (LASIX) 20 MG tablet Take 20 mg by mouth 2 (Two) Times a Day.   Yes Promise Tovar MD   gabapentin (NEURONTIN) 800 MG tablet Take 800 mg by mouth 4 (Four) Times a Day.   Yes Promise Tovar MD   Mirabegron ER (Myrbetriq) 50 MG tablet sustained-release 24 hour 24 hr tablet Take 50 mg by mouth Daily.   Yes Promise Tovar MD   nystatin (MYCOSTATIN) 631454 UNIT/GM powder Apply  topically to the appropriate area as directed 4 (Four) Times a Day As Needed.   Yes Promise Tovar MD   ondansetron (ZOFRAN) 8 MG tablet Take 1 tablet by mouth Every 8 (Eight) Hours As Needed for Nausea or Vomiting. 1/26/21  Yes Blaine Perales PA-C   oxyCODONE (oxyCONTIN) 10 MG 12 hr tablet Take 10 mg by mouth 4 (Four) Times a Day As Needed.   Yes Promise Tovar MD   Prucalopride Succinate (Motegrity) 2 MG tablet Take 1 tablet by mouth Daily. 1/26/21  Yes Blaine Perales PA-C   spironolactone (ALDACTONE) 25 MG tablet TAKE 1 TABLET BY MOUTH ONCE DAILY 6/11/19  Yes Maureen Cardoso APRN   meclizine (ANTIVERT) 25 MG tablet Take 50 mg by mouth As Needed for Dizziness.    Promise Tovar MD   Melatonin 10 MG capsule Take  by mouth Every Night.    Promise Tovar MD   metFORMIN (GLUCOPHAGE) 500 MG tablet Take 1 tablet by mouth 2 (Two) Times a Day With Meals. 7/8/19   Maureen Cardoso APRN   pantoprazole (Protonix) 40 MG EC tablet Take 1 tablet by mouth Daily. 1/26/21   Blaine Perales PA-C   polyethylene glycol (Golytely) 236 g solution Take 240 mL by mouth Daily. 3/5/21    Blaine Perales PA-C   polyethylene glycol (MIRALAX) packet Take 17 g by mouth Daily As Needed.    Provider, MD Promise   promethazine (PHENERGAN) 25 MG suppository Insert 1 suppository into the rectum Every 6 (Six) Hours As Needed for Nausea or Vomiting. 11/24/19   Jeff Rivas, LIYAH       Review of Systems:  Review of Systems   Constitutional: Positive for activity change, appetite change and fatigue. Negative for chills, fever and unexpected weight change.   HENT: Negative for congestion, facial swelling, hearing loss, nosebleeds, rhinorrhea, sneezing, trouble swallowing and voice change.    Eyes: Negative for photophobia and visual disturbance.   Respiratory: Negative for apnea, cough, choking, chest tightness, shortness of breath, wheezing and stridor.    Cardiovascular: Negative for chest pain, palpitations and leg swelling.   Gastrointestinal: Positive for abdominal pain, constipation, nausea and vomiting. Negative for blood in stool and diarrhea.   Endocrine: Negative for cold intolerance, heat intolerance, polydipsia, polyphagia and polyuria.   Genitourinary: Negative for dysuria, flank pain and hematuria.   Musculoskeletal: Negative for arthralgias, back pain, myalgias and neck pain.   Skin: Negative for rash and wound.   Allergic/Immunologic: Negative for immunocompromised state.   Neurological: Negative for dizziness, seizures, syncope, speech difficulty, weakness, light-headedness, numbness and headaches.   Hematological: Does not bruise/bleed easily.   Psychiatric/Behavioral: Negative for agitation, behavioral problems, confusion, decreased concentration, hallucinations, self-injury and suicidal ideas. The patient is not nervous/anxious.       Otherwise complete ROS is negative except as mentioned above.    Physical Exam:   Temp:  [99.2 °F (37.3 °C)] 99.2 °F (37.3 °C)  Heart Rate:  [] 97  Resp:  [17-18] 17  BP: (144-175)/(71-89) 175/89  Physical Exam  Constitutional:       Appearance:  She is well-developed.   HENT:      Head: Normocephalic and atraumatic.      Nose: Nose normal.   Eyes:      General: Lids are normal. No scleral icterus.     Conjunctiva/sclera: Conjunctivae normal.      Pupils: Pupils are equal, round, and reactive to light.   Neck:      Vascular: No JVD.      Trachea: No tracheal tenderness or tracheal deviation.   Cardiovascular:      Rate and Rhythm: Normal rate and regular rhythm.      Pulses: Normal pulses.      Heart sounds: Normal heart sounds, S1 normal and S2 normal. No murmur. No friction rub. No gallop.    Pulmonary:      Effort: Pulmonary effort is normal. No accessory muscle usage or respiratory distress.      Breath sounds: Normal breath sounds. No decreased breath sounds, wheezing or rales.   Chest:      Chest wall: No tenderness.   Abdominal:      General: Bowel sounds are decreased. There is distension.      Palpations: Abdomen is soft. There is no mass.      Tenderness: There is generalized abdominal tenderness. There is no guarding or rebound.      Comments: NG tube in place   Musculoskeletal:         General: No tenderness.      Cervical back: Normal range of motion and neck supple. No edema or rigidity. No spinous process tenderness. Normal range of motion.   Skin:     General: Skin is warm.      Coloration: Skin is not pale.      Findings: No rash.   Neurological:      Mental Status: She is alert and oriented to person, place, and time.      Cranial Nerves: No cranial nerve deficit.      Sensory: No sensory deficit.      Motor: No atrophy, abnormal muscle tone or seizure activity.      Coordination: Coordination normal.      Deep Tendon Reflexes: Reflexes are normal and symmetric. Reflexes normal.   Psychiatric:         Behavior: Behavior normal.         Thought Content: Thought content normal.         Judgment: Judgment normal.       Results Reviewed:  I have personally reviewed current lab, radiology, and data and agree with results.  Lab Results (last 24  hours)     Procedure Component Value Units Date/Time    Urinalysis With Microscopic If Indicated (No Culture) - Urine, Clean Catch [[id number]]  (Abnormal) Collected: 03/06/21 2125    Specimen: Urine, Clean Catch Updated: 03/06/21 2136     Color, UA Dark Yellow     Appearance, UA Clear     pH, UA 6.0     Specific Gravity, UA 1.028     Glucose, UA Negative     Ketones, UA Trace     Bilirubin, UA Small (1+)     Blood, UA Negative     Protein, UA Trace     Leuk Esterase, UA Negative     Nitrite, UA Negative     Urobilinogen, UA 2.0 E.U./dL    Narrative:      Urine microscopic not indicated.    Comprehensive Metabolic Panel [[id number]]  (Abnormal) Collected: 03/06/21 2020    Specimen: Blood Updated: 03/06/21 2047     Glucose 105 mg/dL      BUN 11 mg/dL      Creatinine 0.95 mg/dL      Sodium 138 mmol/L      Potassium 3.9 mmol/L      Chloride 102 mmol/L      CO2 23.0 mmol/L      Calcium 9.3 mg/dL      Total Protein 7.2 g/dL      Albumin 3.80 g/dL      ALT (SGPT) 32 U/L      AST (SGOT) 51 U/L      Alkaline Phosphatase 118 U/L      Total Bilirubin 1.7 mg/dL      eGFR Non African Amer 60 mL/min/1.73      Globulin 3.4 gm/dL      A/G Ratio 1.1 g/dL      BUN/Creatinine Ratio 11.6     Anion Gap 13.0 mmol/L     Narrative:      GFR Normal >60  Chronic Kidney Disease <60  Kidney Failure <15      Lipase [[id number]]  (Normal) Collected: 03/06/21 2020    Specimen: Blood Updated: 03/06/21 2047     Lipase 16 U/L     Amylase [[id number]]  (Normal) Collected: 03/06/21 2020    Specimen: Blood Updated: 03/06/21 2047     Amylase 29 U/L     CBC & Differential [[id number]]  (Abnormal) Collected: 03/06/21 2020    Specimen: Blood Updated: 03/06/21 2041    Narrative:      The following orders were created for panel order CBC & Differential.  Procedure                               Abnormality         Status                     ---------                               -----------         ------                     Scan Slide[[id number]]                                        Final result               CBC Auto Differential[[id number]]        Abnormal            Final result                 Please view results for these tests on the individual orders.    Scan Slide [[id number]] Collected: 03/06/21 2020    Specimen: Blood Updated: 03/06/21 2041     Acanthocytes Mod/2+     WBC Morphology Normal     Platelet Estimate Decreased     Clumped Platelets --     Comment: NONE SEEN       Lactic Acid, Plasma [[id number]]  (Normal) Collected: 03/06/21 2020    Specimen: Blood Updated: 03/06/21 2037     Lactate 1.4 mmol/L     COVID-19 and FLU A/B PCR - Swab, Nasopharynx [[id number]]  (Normal) Collected: 03/06/21 1944    Specimen: Swab from Nasopharynx Updated: 03/06/21 2030     COVID19 Not Detected     Influenza A PCR Not Detected     Influenza B PCR Not Detected    Narrative:      Fact sheet for providers: https://www.fda.gov/media/980126/download    Fact sheet for patients: https://www.fda.gov/media/760046/download    Test performed by PCR.    CBC Auto Differential [[id number]]  (Abnormal) Collected: 03/06/21 2020    Specimen: Blood Updated: 03/06/21 2025     WBC 9.56 10*3/mm3      RBC 5.85 10*6/mm3      Hemoglobin 15.5 g/dL      Hematocrit 45.2 %      MCV 77.3 fL      MCH 26.5 pg      MCHC 34.3 g/dL      RDW 17.6 %      RDW-SD 46.3 fl      MPV 10.0 fL      Platelets 89 10*3/mm3      Neutrophil % 80.9 %      Lymphocyte % 12.6 %      Monocyte % 4.5 %      Eosinophil % 1.5 %      Basophil % 0.3 %      Immature Grans % 0.2 %      Neutrophils, Absolute 7.74 10*3/mm3      Lymphocytes, Absolute 1.20 10*3/mm3      Monocytes, Absolute 0.43 10*3/mm3      Eosinophils, Absolute 0.14 10*3/mm3      Basophils, Absolute 0.03 10*3/mm3      Immature Grans, Absolute 0.02 10*3/mm3      nRBC 0.0 /100 WBC         Imaging Results (Last 24 Hours)     Procedure Component Value Units Date/Time    CT Abdomen Pelvis With Contrast [[id number]] Collected: 03/06/21 2215     Updated: 03/06/21 2247    Narrative:         CT ABDOMEN PELVIS WITH IV CONTRAST    INDICATION: 60 years Female; abd pain    TECHNIQUE:  CT scan of the abdomen and pelvis was performed with  IV contrast.  This exam was performed according to our  departmental dose-optimization program, which includes automated  exposure control, adjustment of the mA and/or kV according to  patient size and/or use of iterative reconstruction technique.    Comparison: 11/24/2019.    FINDINGS:  Liver: The liver demonstrates cirrhotic morphology. There is  suggestion of numerous tiny hypodense nodules in the liver which  suggests regenerative nodules. A discrete hypodense lesion in the  right hepatic dome measures 8 mm which is new from prior study  and indeterminate.  Gallbladder/Biliary tree: Status post cholecystectomy. No  significant biliary dilatation.  Pancreas: Unremarkable.  Spleen: Splenomegaly.  Adrenals: Unremarkable.  Genitourinary: Status post hysterectomy. No adnexal masses. No  hydronephrosis or nephrolithiasis.  Gastrointestinal: No gastric wall thickening. Multiple loops of  dilated small bowel throughout the abdomen with air-fluid levels  and surrounding mesenteric edema with decompressed loops of small  bowel in the right lower quadrant which could possibly represent  a transition point and suggests either acute small bowel  obstruction or high-grade ileus. Some of the decompressed loops  of small bowel demonstrate mild wall thickening. No free air.  Appendix is not identified. Moderate stool throughout the colon.  Peritoneum: Unremarkable.  Vasculature: Paraesophageal and perigastric varices.   Lymph nodes: No pathologically enlarged lymph nodes.  Bones: Unremarkable.  Soft tissues: Unremarkable.  Incidental findings: None.      Impression:      Multiple loops of dilated small bowel throughout the abdomen with  air-fluid levels and surrounding mesenteric edema with  decompressed loops of small bowel in the right lower quadrant  which could possibly represent  a transition point and suggests  either acute small bowel obstruction or high-grade ileus.     Cirrhosis with evidence of portal hypertension. Numerous tiny  nodules in the liver suggest regenerative nodules. A discrete  hypodense lesion in the right hepatic dome measures 8 mm which is  new from prior study and indeterminate. Recommend follow-up CT or  MRI liver mass protocol.    Electronically signed by:  Gina Meier  3/6/2021 10:45 PM CST  Workstation: [id number]    XR Chest 1 View [[id number]] Collected: 03/06/21 1905     Updated: 03/06/21 1953    Narrative:        PORTABLE CHEST    HISTORY: Abdominal pain    Portable AP upright film of the chest was obtained at 6:43 PM.  COMPARISON: June 8, 2019    FINDINGS:   Low lung volumes.  No focal infiltrate.  Old granulomatous disease is present.  The heart is not enlarged.  The pulmonary vasculature is not increased.  No pleural effusion.  No pneumothorax.  No acute osseous abnormality.  Degenerative changes are present in the thoracic spine.  Cholecystectomy.      Impression:      CONCLUSION:  Low lung volumes.  No focal infiltrate.    [id number]    Electronically signed by:  Walker Stevens MD  3/6/2021 7:52 PM CST  Workstation: [email or web address]            Assessment:    Active Hospital Problems    Diagnosis    • SBO (small bowel obstruction) (CMS/HCC)    • Sleep apnea    • Thrombocytopenia (CMS/HCC)    • MUSA (nonalcoholic steatohepatitis)              Plan:  1.  Small bowel obstruction: Recurrent.  CT scan shows multiple loops of dilated small bowel with air-fluid levels and mesenteric edema with decompressed loops in the right lower quadrant possibly representing a transition point.  Continue NG tube, N.p.o., IV fluids, nausea and pain control.  Patient will likely need surgical consultation if it does not resolve quickly.  2.  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  CT scan shows evidence of portal hypertension.  Mild thrombocytopenia which appears to be chronic.  3.  Liver lesion  seen on CT scan.  Will need follow-up.  4.  Obstructive sleep apnea: Continue CPAP.  5.  Hypertension: Hold home medications.  6.  DVT prophylaxis: SCDs.    I discussed the patient's findings and my recommendations with: Patient        This document has been electronically signed by Nolan Brunson MD on March 7, 2021 01:08 CST

## 2021-03-07 NOTE — ED NOTES
Colonoscopy Procedure Note      Place of Service: [address]    Patient: Jennifer Nina    MRN# [id number]    Date of procedure: 10/23/2020    Surgeon: Jhon Yeager MD    Primary Physician: Jesica Parker MD    Operative Procedure: Colonoscopy    Preoperative Diagnosis: Personal Hx of Colonic Adenocarcinoma, last colonoscopy was 1  year and Diarrhea    Anesthesia Administered: as per Anesthesia    Procedure Description: The patient was placed in the left lateral position and monitored continuously through ECG tracing, pulse oximetry monitoring and direct observations. Medications were administered incrementally over the course of the procedure to achieve an adequate level of conscious sedation. After anorectal examination was performed, the Olympus PCF-H180AL was inserted into the rectum and advanced under direct vision to the neoterminal ileum. The procedure was considered not difficult..      During withdrawal examination, the final quality of the prep was Hamburg bowel prep scale.  Right colon:2- (minor amount of residual staining, small fragments of stool, and\or opaque liquid, but mucosa of colon segment is seen well)  Transverse colon: 2- (minor amount of residual staining, small fragments of stool, and\or opaque liquid, but mucosa of colon segment is seen well)  Left: 2- (minor amount of residual staining, small fragments of stool, and\or opaque liquid, but mucosa of colon segment is seen well)    A careful inspection was made as the colonoscope was withdrawn. A retroflexed view of the rectum was performed.  Findings and interventions are described below. Appropriate photo documentation was obtained.    Overall Jennifer Nina tolerated the procedure well, without undue discomfort, hypotension or desaturation. The patient was adequately recovered in the endoscopy suite and was transported to PACU.    Events:   Scope in: Scope In: 1113   At Cecum: At Cecum: (S) 1116  Scope Out: Scope  MD made aware of loss of IV during CT exam.   No new orders at this time.     Ladan Mark, RN  03/06/21 9451     Out: 1123  Scope Withdrawal time: Scope Withdrawal Time: 7  Cecum Surgically Absent:      Findings:  Anorectal: Normal and Internal hemorrhoids  Royal-Terminal ileum: Normal mucosa and Lymphoid hyperplasia  Cecum:  Surgically absent  Ascending colon:  Surgically absent  Surgical anastomosis:  Well-healed and patent with no evidence of recurrence as noted  Transverse colon: Normal mucosa  Descending colon: Normal mucosa and Polyp(s) 2 mm sessile polyp removed by cold biopsy forceps noted  Sigmoid colon: Normal mucosa  Rectum: Normal mucosa and hemorroids    Interventions:   1 Polyp(s) removed by cold biopsy   Cytology\biopsy random colonic mucosal biopsies were obtained as noted    Complications: none    Impression:  1. Well-healed surgical anastomosis with no evidence of recurrence was noted  2. Polyp removed as noted above  3. Moderate size nonbleeding internal hemorrhoids are noted  4. Mildly long redundant colon  5. Otherwise normal colonoscopy to the neoterminal ileum as noted    Recommendations  · High fiber diet, avoid constipation, Awaiting pathology results due to biopsy(s) performed., Follow up colonoscopy in 3 years, due to history of colon cancer as noted., Start Metamucil 2 tbsp daily. and no NSAIDs for 10 days

## 2021-03-07 NOTE — PROGRESS NOTES
[facility] Medicine Services  INPATIENT PROGRESS NOTE    Length of Stay: 0  Date of Admission: 3/6/2021  Primary Care Physician: Yobany Barr MD    Subjective   Chief Complaint: abdominal pain     HPI: Pt cont to have abd distention and pain.  Nausea is better and emesis has resolved. Her last BM was yesterday and was small and required 2 enemas.      Review of Systems   Constitutional: Negative for chills and fever.   HENT: Negative for congestion, rhinorrhea and sore throat.    Eyes: Negative for discharge.   Respiratory: Negative for cough and shortness of breath.    Cardiovascular: Negative for chest pain and palpitations.   Gastrointestinal: Positive for abdominal distention and abdominal pain. Negative for nausea and vomiting.   Genitourinary: Negative for dysuria, frequency and urgency.   Musculoskeletal: Negative for arthralgias and myalgias.   Skin: Negative for rash.   Neurological: Negative for dizziness, weakness and light-headedness.          Objective    Temp:  [98.1 °F (36.7 °C)-99.2 °F (37.3 °C)] 98.6 °F (37 °C)  Heart Rate:  [] 82  Resp:  [17-18] 18  BP: (128-175)/(64-89) 128/64    Physical Exam  Constitutional:       General: She is not in acute distress.     Appearance: Normal appearance. She is not ill-appearing.   HENT:      Head: Normocephalic and atraumatic.      Right Ear: External ear normal.      Left Ear: External ear normal.      Nose: Nose normal.      Mouth/Throat:      Mouth: Mucous membranes are moist.      Pharynx: Oropharynx is clear.   Eyes:      General: No scleral icterus.     Conjunctiva/sclera: Conjunctivae normal.      Pupils: Pupils are equal, round, and reactive to light.   Cardiovascular:      Rate and Rhythm: Normal rate and regular rhythm.      Heart sounds: Normal heart sounds. No murmur.   Pulmonary:      Effort: Pulmonary effort is normal.      Breath sounds: Normal breath sounds. No wheezing, rhonchi or rales.      Abdominal:      Palpations: Abdomen is soft.      Tenderness: There is abdominal tenderness (diffuse ). There is no guarding or rebound.   Musculoskeletal:      Cervical back: Neck supple. No rigidity or tenderness.      Right lower leg: No edema.      Left lower leg: No edema.   Lymphadenopathy:      Cervical: No cervical adenopathy.   Skin:     General: Skin is dry.   Neurological:      Mental Status: She is alert and oriented to person, place, and time.      Cranial Nerves: No cranial nerve deficit.      Sensory: No sensory deficit.      Motor: No weakness.      Coordination: Coordination normal.   Psychiatric:         Mood and Affect: Mood normal.         Behavior: Behavior normal.             Results Review:  I have reviewed the labs, radiology results, and diagnostic studies.    Laboratory Data:   Results from last 7 days   Lab Units 03/07/21 0555 03/06/21 2020   SODIUM mmol/L 140 138   POTASSIUM mmol/L 3.8 3.9   CHLORIDE mmol/L 107 102   CO2 mmol/L 26.0 23.0   BUN mg/dL 12 11   CREATININE mg/dL 0.88 0.95   GLUCOSE mg/dL 95 105*   CALCIUM mg/dL 8.2* 9.3   BILIRUBIN mg/dL  --  1.7*   ALK PHOS U/L  --  118*   ALT (SGPT) U/L  --  32   AST (SGOT) U/L  --  51*   ANION GAP mmol/L 7.0 13.0     Estimated Creatinine Clearance: 74.3 mL/min (by C-G formula based on SCr of 0.88 mg/dL).          Results from last 7 days   Lab Units 03/07/21 0555 03/06/21 2020   WBC 10*3/mm3 6.38 9.56   HEMOGLOBIN g/dL 12.9 15.5   HEMATOCRIT % 38.2 45.2   PLATELETS 10*3/mm3 80* 89*     Results from last 7 days   Lab Units 03/07/21  0036   INR  1.31*       Culture Data:   No results found for: BLOODCX  Urine Culture   Date Value Ref Range Status   03/05/2021 >100,000 CFU/mL Escherichia coli (A)  Final     No results found for: RESPCX  No results found for: WOUNDCX  No results found for: STOOLCX  No components found for: BODYFLD    Radiology Data:   Imaging Results (Last 24 Hours)     Procedure Component Value Units Date/Time    XR  Abdomen KUB [[id number]] Collected: 03/07/21 1117     Updated: 03/07/21 1216    Narrative:      PROCEDURE: XR ABDOMEN 1 VIEW (KUB)    Clinical History: check NGT placement, K56.609 Unspecified  intestinal obstruction, unspecified as to partial versus complete  obstruction K74.60 Unspecified cirrhosis of liver K76.89 Other  specified diseases of liver    Indication:     Same as above     Comparison:    8/12/2019     Technique:    Single supine view of the abdomen and pelvis.    Findings:     A nonspecific bowel gas pattern is noted. The tip of the  orogastric tube is in the proximal body of the stomach    Radiographic contrast is seen in the urinary bladder.    There is no gross evidence of free air in the abdomen or the  pelvis on this single supine view of the abdomen and pelvis.    There is no visualization of any radiopaque calculi in the  outline of the urinary tract on either side.    There is no significant constipation.       Impression:      Impression:    A nonspecific bowel gas pattern is noted.   The tip of the orogastric tube is in the proximal body of the  stomach    Electronically signed by:  Emiliano Zheng MD  3/7/2021 12:14 PM CST  Workstation: [email or web address]    CT Abdomen Pelvis With Contrast [[id number]] Collected: 03/06/21 2215     Updated: 03/06/21 2247    Narrative:      CT ABDOMEN PELVIS WITH IV CONTRAST    INDICATION: 60 years Female; abd pain    TECHNIQUE:  CT scan of the abdomen and pelvis was performed with  IV contrast.  This exam was performed according to our  departmental dose-optimization program, which includes automated  exposure control, adjustment of the mA and/or kV according to  patient size and/or use of iterative reconstruction technique.    Comparison: 11/24/2019.    FINDINGS:  Liver: The liver demonstrates cirrhotic morphology. There is  suggestion of numerous tiny hypodense nodules in the liver which  suggests regenerative nodules. A discrete hypodense lesion in the  right  hepatic dome measures 8 mm which is new from prior study  and indeterminate.  Gallbladder/Biliary tree: Status post cholecystectomy. No  significant biliary dilatation.  Pancreas: Unremarkable.  Spleen: Splenomegaly.  Adrenals: Unremarkable.  Genitourinary: Status post hysterectomy. No adnexal masses. No  hydronephrosis or nephrolithiasis.  Gastrointestinal: No gastric wall thickening. Multiple loops of  dilated small bowel throughout the abdomen with air-fluid levels  and surrounding mesenteric edema with decompressed loops of small  bowel in the right lower quadrant which could possibly represent  a transition point and suggests either acute small bowel  obstruction or high-grade ileus. Some of the decompressed loops  of small bowel demonstrate mild wall thickening. No free air.  Appendix is not identified. Moderate stool throughout the colon.  Peritoneum: Unremarkable.  Vasculature: Paraesophageal and perigastric varices.   Lymph nodes: No pathologically enlarged lymph nodes.  Bones: Unremarkable.  Soft tissues: Unremarkable.  Incidental findings: None.      Impression:      Multiple loops of dilated small bowel throughout the abdomen with  air-fluid levels and surrounding mesenteric edema with  decompressed loops of small bowel in the right lower quadrant  which could possibly represent a transition point and suggests  either acute small bowel obstruction or high-grade ileus.     Cirrhosis with evidence of portal hypertension. Numerous tiny  nodules in the liver suggest regenerative nodules. A discrete  hypodense lesion in the right hepatic dome measures 8 mm which is  new from prior study and indeterminate. Recommend follow-up CT or  MRI liver mass protocol.    Electronically signed by:  Gina Meier  3/6/2021 10:45 PM CST  Workstation: [id number]          I have reviewed the patient's current medications.     Assessment/Plan     Active Hospital Problems    Diagnosis    • SBO (small bowel obstruction) (CMS/HCC)       • Sleep apnea    • Thrombocytopenia (CMS/HCC)    • MUSA (nonalcoholic steatohepatitis)        Plan:      1.  SBO - general surgery consulted.  NGT in place.  Keep NPO.  SBFT in am per surgery.   2.  Chronic Liver Disease/ MUSA - monitor volume status and avoid unnecessary hepatotoxins.  Will need outpatient fu - sees Dr. Perales in [facility].  Tbili slightly elevated at 1.7  3.  Portal HTN - monitor.  Will need outpatient fu - sees Blaine Perales in [place]    4.  Chronic TCP - likely related to chronic liver disease, monitor in setting of acute illness.   5.  Supratherapeutic INR - related to CLD.  Monitor.  No anticoagulation at this time.   5.  CHAI  6.  Liver nodule/lesion - incidental finding on CT imaging.  Will need to be addressed once acute issue of SBO is resolved.  Can likely be addressed as an outpatient.  Sees Blaine Perales in [place].    7.  HTN - will add IV hydralazine prn for SBP > 170.        SCDs.    FULL CODE       Discharge Planning: I expect patient to be discharged to home in 3-4 days.      Giles Haynes MD

## 2021-03-07 NOTE — ED NOTES
abd pain off and on for a couple of days, began vomiting today. Reports thinks has a bowel obstruction.     Anayeli Seth, BERNADETTE  03/06/21 1841

## 2021-03-07 NOTE — ED NOTES
IV attempt LAC unsuccessful, dressing placed and apologized to patient     Anayeli Seth, LPN  03/06/21 4527

## 2021-03-07 NOTE — ED NOTES
IV attempt x1 RAC unsuccessful, dressing applied, apologized to patient     Anayeli Seth, LPN  03/06/21 0706

## 2021-03-07 NOTE — ED NOTES
CT called about IV.  CT going to see if Xray can inject dye.  Will update me on status     Ladan Mark RN  03/06/21 8213

## 2021-03-08 ENCOUNTER — APPOINTMENT (OUTPATIENT)
Dept: GENERAL RADIOLOGY | Facility: HOSPITAL | Age: 61
End: 2021-03-08

## 2021-03-08 LAB
ALBUMIN SERPL-MCNC: 3.3 G/DL (ref 3.5–5.2)
ALBUMIN/GLOB SERPL: 1.1 G/DL
ALP SERPL-CCNC: 98 U/L (ref 39–117)
ALT SERPL W P-5'-P-CCNC: 24 U/L (ref 1–33)
ANION GAP SERPL CALCULATED.3IONS-SCNC: 7 MMOL/L (ref 5–15)
AST SERPL-CCNC: 36 U/L (ref 1–32)
BASOPHILS # BLD AUTO: 0.03 10*3/MM3 (ref 0–0.2)
BASOPHILS NFR BLD AUTO: 0.6 % (ref 0–1.5)
BILIRUB SERPL-MCNC: 1.3 MG/DL (ref 0–1.2)
BUN SERPL-MCNC: 11 MG/DL (ref 8–23)
BUN/CREAT SERPL: 13.9 (ref 7–25)
CALCIUM SPEC-SCNC: 8.2 MG/DL (ref 8.6–10.5)
CHLORIDE SERPL-SCNC: 106 MMOL/L (ref 98–107)
CO2 SERPL-SCNC: 24 MMOL/L (ref 22–29)
CREAT SERPL-MCNC: 0.79 MG/DL (ref 0.57–1)
DEPRECATED RDW RBC AUTO: 46 FL (ref 37–54)
EOSINOPHIL # BLD AUTO: 0.16 10*3/MM3 (ref 0–0.4)
EOSINOPHIL NFR BLD AUTO: 3.1 % (ref 0.3–6.2)
ERYTHROCYTE [DISTWIDTH] IN BLOOD BY AUTOMATED COUNT: 16.2 % (ref 12.3–15.4)
GFR SERPL CREATININE-BSD FRML MDRD: 74 ML/MIN/1.73
GLOBULIN UR ELPH-MCNC: 3 GM/DL
GLUCOSE BLDC GLUCOMTR-MCNC: 82 MG/DL (ref 70–130)
GLUCOSE BLDC GLUCOMTR-MCNC: 82 MG/DL (ref 70–130)
GLUCOSE BLDC GLUCOMTR-MCNC: 87 MG/DL (ref 70–130)
GLUCOSE SERPL-MCNC: 88 MG/DL (ref 65–99)
HCT VFR BLD AUTO: 37.9 % (ref 34–46.6)
HGB BLD-MCNC: 13.1 G/DL (ref 12–15.9)
IMM GRANULOCYTES # BLD AUTO: 0.02 10*3/MM3 (ref 0–0.05)
IMM GRANULOCYTES NFR BLD AUTO: 0.4 % (ref 0–0.5)
LYMPHOCYTES # BLD AUTO: 1.02 10*3/MM3 (ref 0.7–3.1)
LYMPHOCYTES NFR BLD AUTO: 19.5 % (ref 19.6–45.3)
MCH RBC QN AUTO: 27.1 PG (ref 26.6–33)
MCHC RBC AUTO-ENTMCNC: 34.6 G/DL (ref 31.5–35.7)
MCV RBC AUTO: 78.3 FL (ref 79–97)
MONOCYTES # BLD AUTO: 0.26 10*3/MM3 (ref 0.1–0.9)
MONOCYTES NFR BLD AUTO: 5 % (ref 5–12)
NEUTROPHILS NFR BLD AUTO: 3.75 10*3/MM3 (ref 1.7–7)
NEUTROPHILS NFR BLD AUTO: 71.4 % (ref 42.7–76)
NRBC BLD AUTO-RTO: 0 /100 WBC (ref 0–0.2)
PLATELET # BLD AUTO: 86 10*3/MM3 (ref 140–450)
PMV BLD AUTO: 10.7 FL (ref 6–12)
POTASSIUM SERPL-SCNC: 4 MMOL/L (ref 3.5–5.2)
PROT SERPL-MCNC: 6.3 G/DL (ref 6–8.5)
RBC # BLD AUTO: 4.84 10*6/MM3 (ref 3.77–5.28)
SODIUM SERPL-SCNC: 137 MMOL/L (ref 136–145)
WBC # BLD AUTO: 5.24 10*3/MM3 (ref 3.4–10.8)

## 2021-03-08 PROCEDURE — 25010000002 ONDANSETRON PER 1 MG: Performed by: HOSPITALIST

## 2021-03-08 PROCEDURE — 0 DIATRIZOATE MEGLUMINE & SODIUM PER 1 ML: Performed by: HOSPITALIST

## 2021-03-08 PROCEDURE — 74250 X-RAY XM SM INT 1CNTRST STD: CPT

## 2021-03-08 PROCEDURE — 25010000002 HYDRALAZINE PER 20 MG: Performed by: FAMILY MEDICINE

## 2021-03-08 PROCEDURE — 80053 COMPREHEN METABOLIC PANEL: CPT | Performed by: FAMILY MEDICINE

## 2021-03-08 PROCEDURE — 82962 GLUCOSE BLOOD TEST: CPT

## 2021-03-08 PROCEDURE — 99231 SBSQ HOSP IP/OBS SF/LOW 25: CPT | Performed by: SURGERY

## 2021-03-08 PROCEDURE — 85025 COMPLETE CBC W/AUTO DIFF WBC: CPT | Performed by: HOSPITALIST

## 2021-03-08 PROCEDURE — 25010000002 HYDROMORPHONE 1 MG/ML SOLUTION: Performed by: HOSPITALIST

## 2021-03-08 RX ADMIN — HYDROMORPHONE HYDROCHLORIDE 1 MG: 1 INJECTION, SOLUTION INTRAMUSCULAR; INTRAVENOUS; SUBCUTANEOUS at 19:44

## 2021-03-08 RX ADMIN — HYDROMORPHONE HYDROCHLORIDE 1 MG: 1 INJECTION, SOLUTION INTRAMUSCULAR; INTRAVENOUS; SUBCUTANEOUS at 03:22

## 2021-03-08 RX ADMIN — SODIUM CHLORIDE 125 ML/HR: 900 INJECTION, SOLUTION INTRAVENOUS at 05:49

## 2021-03-08 RX ADMIN — SODIUM CHLORIDE, PRESERVATIVE FREE 10 ML: 5 INJECTION INTRAVENOUS at 20:19

## 2021-03-08 RX ADMIN — HYDROMORPHONE HYDROCHLORIDE 1 MG: 1 INJECTION, SOLUTION INTRAMUSCULAR; INTRAVENOUS; SUBCUTANEOUS at 11:53

## 2021-03-08 RX ADMIN — HYDROMORPHONE HYDROCHLORIDE 1 MG: 1 INJECTION, SOLUTION INTRAMUSCULAR; INTRAVENOUS; SUBCUTANEOUS at 09:45

## 2021-03-08 RX ADMIN — PANTOPRAZOLE SODIUM 40 MG: 40 INJECTION, POWDER, FOR SOLUTION INTRAVENOUS at 05:45

## 2021-03-08 RX ADMIN — HYDRALAZINE HYDROCHLORIDE 10 MG: 20 INJECTION INTRAMUSCULAR; INTRAVENOUS at 20:18

## 2021-03-08 RX ADMIN — DIATRIZOATE MEGLUMINE AND DIATRIZOATE SODIUM 240 ML: 660; 100 LIQUID ORAL; RECTAL at 08:44

## 2021-03-08 RX ADMIN — ONDANSETRON 4 MG: 2 INJECTION INTRAMUSCULAR; INTRAVENOUS at 09:45

## 2021-03-08 RX ADMIN — HYDROMORPHONE HYDROCHLORIDE 1 MG: 1 INJECTION, SOLUTION INTRAMUSCULAR; INTRAVENOUS; SUBCUTANEOUS at 05:50

## 2021-03-08 RX ADMIN — HYDROMORPHONE HYDROCHLORIDE 1 MG: 1 INJECTION, SOLUTION INTRAMUSCULAR; INTRAVENOUS; SUBCUTANEOUS at 14:19

## 2021-03-08 RX ADMIN — SODIUM CHLORIDE, PRESERVATIVE FREE 10 ML: 5 INJECTION INTRAVENOUS at 09:12

## 2021-03-08 RX ADMIN — HYDROMORPHONE HYDROCHLORIDE 1 MG: 1 INJECTION, SOLUTION INTRAMUSCULAR; INTRAVENOUS; SUBCUTANEOUS at 17:26

## 2021-03-08 RX ADMIN — SODIUM CHLORIDE 50 ML/HR: 900 INJECTION, SOLUTION INTRAVENOUS at 17:29

## 2021-03-08 NOTE — PROGRESS NOTES
[facility] Medicine Services  INPATIENT PROGRESS NOTE    Length of Stay: 1  Date of Admission: 3/6/2021  Primary Care Physician: Yobany Barr MD    Subjective   Chief Complaint: abdominal pain  HPI:  Patient is a 60-year-old female past medical history of recurrent small bowel obstruction, nonalcoholic steatohepatitis, obstructive sleep apnea, and hypertension who presents emergency department with about 2 weeks of worsening abdominal pain.  She states that she has had worsening constipation in his.  Intermittently using enemas.  She states that she began having nausea vomiting on day of admission and decided to present to the emergency department.  She states that she has had multiple small bowel obstructions in the past.  She has had 2 abdominal surgery secondary to small bowel obstruction approximately 7 to 8 years ago.  She states the last surgery resulted in significant intra-abdominal infection requiring wound VAC.  She states she has had several obstructions since that point.  She denies fever, chills, change of taste or smell, chest pain, and shortness of breath.  There are no alleviating or exacerbating factors.    (S): abdominal pain is less; no nausea, no vomit; has been without NGT suctioning in the last 24 hours    Review of Systems   All pertinent negatives and positives are as above. All other systems have been reviewed and are negative unless otherwise stated.     Prior to Admission medications    Medication Sig Start Date End Date Taking? Authorizing Provider   cetirizine (zyrTEC) 10 MG tablet Take 1 tablet by mouth Daily. 9/25/17  Yes ProviderPromise MD   cyclobenzaprine (FLEXERIL) 10 MG tablet Take 10 mg by mouth 3 (Three) Times a Day As Needed for Muscle Spasms.   Yes ProviderPromise MD   diclofenac (VOLTAREN) 75 MG EC tablet Take 1 tablet by mouth Daily. 1/28/21  Yes Colleen Meadows APRN   escitalopram (LEXAPRO) 5 MG tablet Take 5 mg by  mouth Daily.   Yes Promise Tovar MD   furosemide (LASIX) 20 MG tablet Take 20 mg by mouth 2 (Two) Times a Day.   Yes Promise Tovar MD   gabapentin (NEURONTIN) 800 MG tablet Take 800 mg by mouth 4 (Four) Times a Day.   Yes Promise Tovar MD   meclizine (ANTIVERT) 25 MG tablet Take 50 mg by mouth As Needed for Dizziness.   Yes Promise Tovar MD   Melatonin 10 MG capsule Take  by mouth Every Night.   Yes Promise Tovar MD   metFORMIN (GLUCOPHAGE) 500 MG tablet Take 1 tablet by mouth 2 (Two) Times a Day With Meals. 7/8/19  Yes Maureen Cardoso APRN   Mirabegron ER (Myrbetriq) 50 MG tablet sustained-release 24 hour 24 hr tablet Take 50 mg by mouth Daily.   Yes Promise Tovar MD   nystatin (MYCOSTATIN) 358483 UNIT/GM powder Apply  topically to the appropriate area as directed 4 (Four) Times a Day As Needed.   Yes Promise Tovar MD   ondansetron (ZOFRAN) 8 MG tablet Take 1 tablet by mouth Every 8 (Eight) Hours As Needed for Nausea or Vomiting. 1/26/21  Yes Blaine Perales PA-C   oxyCODONE (oxyCONTIN) 10 MG 12 hr tablet Take 10 mg by mouth 4 (Four) Times a Day As Needed.   Yes Promise Tovar MD   pantoprazole (Protonix) 40 MG EC tablet Take 1 tablet by mouth Daily. 1/26/21  Yes Blaine Perales PA-C   polyethylene glycol (MIRALAX) packet Take 17 g by mouth Daily As Needed.   Yes Promise Tovar MD   promethazine (PHENERGAN) 25 MG suppository Insert 1 suppository into the rectum Every 6 (Six) Hours As Needed for Nausea or Vomiting. 11/24/19  Yes Jeff Rivas APRN   Prucalopride Succinate (Motegrity) 2 MG tablet Take 1 tablet by mouth Daily. 1/26/21  Yes Blaine Perales PA-C   spironolactone (ALDACTONE) 25 MG tablet TAKE 1 TABLET BY MOUTH ONCE DAILY 6/11/19  Yes Maureen Cardoso APRN   polyethylene glycol (Golytely) 236 g solution Take 240 mL by mouth Daily. 3/5/21   Blaine Perales PA-C       insulin aspart, 0-9 Units, Subcutaneous, TID  AC  pantoprazole, 40 mg, Intravenous, Q AM  sodium chloride, 10 mL, Intravenous, Q12H      sodium chloride, 125 mL/hr, Last Rate: 125 mL/hr (03/08/21 0549)        Objective    Temp:  [97 °F (36.1 °C)-98.6 °F (37 °C)] 98.6 °F (37 °C)  Heart Rate:  [82-89] 84  Resp:  [18] 18  BP: (128-180)/() 159/87    Physical Exam  Constitutional:       Appearance: She is ill-appearing.      Comments: With a NGT    HENT:      Head: Normocephalic.      Mouth/Throat:      Mouth: Mucous membranes are moist.   Eyes:      Extraocular Movements: Extraocular movements intact.   Cardiovascular:      Rate and Rhythm: Regular rhythm.      Heart sounds: Normal heart sounds.   Pulmonary:      Breath sounds: Normal breath sounds.   Abdominal:      Palpations: Abdomen is soft.      Comments: Mild distended, with BS normal in 4 quadrants; minimal diffuse tenderness   Musculoskeletal:         General: Normal range of motion.      Cervical back: Normal range of motion and neck supple.   Skin:     General: Skin is warm.   Neurological:      General: No focal deficit present.      Mental Status: She is alert. Mental status is at baseline.   Psychiatric:         Mood and Affect: Mood normal.             Results Review:  I have reviewed the labs, radiology results, and diagnostic studies.    Laboratory Data:   Results from last 7 days   Lab Units 03/08/21  0609 03/07/21  0555 03/06/21 2020   SODIUM mmol/L 137 140 138   POTASSIUM mmol/L 4.0 3.8 3.9   CHLORIDE mmol/L 106 107 102   CO2 mmol/L 24.0 26.0 23.0   BUN mg/dL 11 12 11   CREATININE mg/dL 0.79 0.88 0.95   GLUCOSE mg/dL 88 95 105*   CALCIUM mg/dL 8.2* 8.2* 9.3   BILIRUBIN mg/dL 1.3*  --  1.7*   ALK PHOS U/L 98  --  118*   ALT (SGPT) U/L 24  --  32   AST (SGOT) U/L 36*  --  51*   ANION GAP mmol/L 7.0 7.0 13.0     Estimated Creatinine Clearance: 83.2 mL/min (by C-G formula based on SCr of 0.79 mg/dL).          Results from last 7 days   Lab Units 03/08/21  0609 03/07/21  0555 03/06/21  2020    WBC 10*3/mm3 5.24 6.38 9.56   HEMOGLOBIN g/dL 13.1 12.9 15.5   HEMATOCRIT % 37.9 38.2 45.2   PLATELETS 10*3/mm3 86* 80* 89*     Results from last 7 days   Lab Units 03/07/21  0036   INR  1.31*       Culture Data:   No results found for: BLOODCX  Urine Culture   Date Value Ref Range Status   03/05/2021 >100,000 CFU/mL Escherichia coli (A)  Final     No results found for: RESPCX  No results found for: WOUNDCX  No results found for: STOOLCX  No components found for: BODYFLD    Radiology Data:   Imaging Results (Last 24 Hours)     Procedure Component Value Units Date/Time    FL Small Bowel Follow Through Single-Contrast [[id number]] Resulted: 03/08/21 0758     Updated: 03/08/21 0758          I have reviewed the patient's current medications.     Assessment/Plan     Abdominal pain     Due to below; decreasing    SBO     She was able to have small bowel movements 2 days ago with enemas     Ongoing small bowel series follow through     Surgery on the case     Liver nodule     incidental finding; will need outpatient follow up    Chronic medical problems     Chronic liver disease, likely due to MUSA; with portal hypertension, thrombocytopenia and elevated INR     CHAI     Essential hypertension    Patient states that she is not diabetic; her blood glucose has been stable since admission; she is refusing now FBG. Based on her home meds, she takes metformin; I would recommend to stop this medication since her A1C is 5.1, unless primary care physician is providing this medication for another reason    Not on heparinoids products due to thrombocytopenia    Discharge Planning: In progress    Samy Ramachandran MD

## 2021-03-08 NOTE — PROGRESS NOTES
GENERAL SURGERY PROGRESS NOTE  Chief Complaint:  Surgery Follow up   LOS: 1 day       Subjective     Interval History:     Feels ok. Less pain. Has passed some gas. SBFT started this morning.    Objective     Vital Signs  Temp:  [96.7 °F (35.9 °C)-98.6 °F (37 °C)] 96.7 °F (35.9 °C)  Heart Rate:  [81-89] 81  Resp:  [18] 18  BP: (128-180)/() 158/102    Physical Exam:   Abdomen soft  Labs:  Lab Results (last 24 hours)     Procedure Component Value Units Date/Time    POC Glucose Once [[id number]]  (Normal) Collected: 03/08/21 1013    Specimen: Blood Updated: 03/08/21 1039     Glucose 82 mg/dL      Comment: : [id number] DANISPAL RICERigo ID: [id number]       Comprehensive Metabolic Panel [[id number]]  (Abnormal) Collected: 03/08/21 0609    Specimen: Blood Updated: 03/08/21 0643     Glucose 88 mg/dL      BUN 11 mg/dL      Creatinine 0.79 mg/dL      Sodium 137 mmol/L      Potassium 4.0 mmol/L      Chloride 106 mmol/L      CO2 24.0 mmol/L      Calcium 8.2 mg/dL      Total Protein 6.3 g/dL      Albumin 3.30 g/dL      ALT (SGPT) 24 U/L      AST (SGOT) 36 U/L      Alkaline Phosphatase 98 U/L      Total Bilirubin 1.3 mg/dL      eGFR Non African Amer 74 mL/min/1.73      Globulin 3.0 gm/dL      A/G Ratio 1.1 g/dL      BUN/Creatinine Ratio 13.9     Anion Gap 7.0 mmol/L     Narrative:      GFR Normal >60  Chronic Kidney Disease <60  Kidney Failure <15      POC Glucose Once [[id number]]  (Normal) Collected: 03/08/21 0620    Specimen: Blood Updated: 03/08/21 0642     Glucose 82 mg/dL      Comment: RN NotifiedOperator: [id number] LUPE Mcgrath ID: [id number]       CBC & Differential [[id number]]  (Abnormal) Collected: 03/08/21 0609    Specimen: Blood Updated: 03/08/21 0625    Narrative:      The following orders were created for panel order CBC & Differential.  Procedure                               Abnormality         Status                     ---------                               -----------          ------                     Scan Slide[[id number]]                                                                  CBC Auto Differential[[id number]]        Abnormal            Final result                 Please view results for these tests on the individual orders.    CBC Auto Differential [[id number]]  (Abnormal) Collected: 03/08/21 0609    Specimen: Blood Updated: 03/08/21 0625     WBC 5.24 10*3/mm3      RBC 4.84 10*6/mm3      Hemoglobin 13.1 g/dL      Hematocrit 37.9 %      MCV 78.3 fL      MCH 27.1 pg      MCHC 34.6 g/dL      RDW 16.2 %      RDW-SD 46.0 fl      MPV 10.7 fL      Platelets 86 10*3/mm3      Neutrophil % 71.4 %      Lymphocyte % 19.5 %      Monocyte % 5.0 %      Eosinophil % 3.1 %      Basophil % 0.6 %      Immature Grans % 0.4 %      Neutrophils, Absolute 3.75 10*3/mm3      Lymphocytes, Absolute 1.02 10*3/mm3      Monocytes, Absolute 0.26 10*3/mm3      Eosinophils, Absolute 0.16 10*3/mm3      Basophils, Absolute 0.03 10*3/mm3      Immature Grans, Absolute 0.02 10*3/mm3      nRBC 0.0 /100 WBC     POC Glucose Once [[id number]]  (Normal) Collected: 03/07/21 1957    Specimen: Blood Updated: 03/07/21 2027     Glucose 82 mg/dL      Comment: RN NotifiedOperator: [id number] LUPE KEMPNIFERMeter ID: [id number]              Results Review:     Labs and imaging for today were reviewed.    Assessment/Plan     Amira Shannon is a 60 y.o. female who has SBO      Follow up SBFT. Early images do appear promising for resolution of SBO.          This document has been electronically signed by Justus Skinner MD on March 8, 2021 11:44 CST        Justus Skinner MD  03/08/21  11:44 CST

## 2021-03-08 NOTE — PLAN OF CARE
Goal Outcome Evaluation:  Plan of Care Reviewed With: patient  Progress: no change  Outcome Summary: initial assessment, VSS, pain controlled

## 2021-03-08 NOTE — PROGRESS NOTES
Discharge Planning Assessment  [facility]     Patient Name: Amira Shannon  MRN: [id number]  Today's Date: 3/8/2021    Admit Date: 3/6/2021    Discharge Needs Assessment     Row Name 03/08/21 1456       Living Environment    Lives With  spouse    Current Living Arrangements  home/apartment/condo    Primary Care Provided by  self;spouse/significant other    Provides Primary Care For  no one    Family Caregiver if Needed  spouse    Quality of Family Relationships  helpful;involved;supportive    Able to Return to Prior Arrangements  yes       Transition Planning    Patient/Family Anticipates Transition to  home with family    Patient/Family Anticipated Services at Transition  none    Transportation Anticipated  family or friend will provide       Discharge Needs Assessment    Equipment Currently Used at Home  cane, quad;walker, rolling;wheelchair    Equipment Needed After Discharge  none        Discharge Plan     Row Name 03/08/21 1457       Plan    Plan  Home, no services    Patient/Family in Agreement with Plan  yes    Plan Comments  LACE complete. Lives at home with spouse who is in the room during assessment. Dependent on , who drives and transports her. Has used Home Health in the past ([facility]) but unless she has to have surgery she doesn't feel she will need HH services. We will follow up again if she has surgery. [place] [place]        Continued Care and Services - Admitted Since 3/6/2021    Coordination has not been started for this encounter.       Expected Discharge Date and Time     Expected Discharge Date Expected Discharge Time    Mar 10, 2021         Demographic Summary     Row Name 03/08/21 1453       General Information    Admission Type  inpatient    Arrived From  emergency department    Required Notices Provided  Important Message from Medicare    Referral Source  high risk screening    Reason for Consult  discharge planning    Row Name 03/08/21 1448       General Information    Arrived  From  emergency department        Functional Status     Row Name 03/08/21 1455       Employment/    Employment Status  disabled    Row Name 03/08/21 1454       Functional Status    Usual Activity Tolerance  moderate    Current Activity Tolerance  fair       Functional Status, IADL    Medications  independent    Meal Preparation  assistive equipment and person    Housekeeping  assistive equipment and person    Laundry  assistive equipment and person    Shopping  assistive equipment and person       Mental Status Summary    Recent Changes in Mental Status/Cognitive Functioning  no changes        Psychosocial    No documentation.       Abuse/Neglect    No documentation.       Legal    No documentation.       Substance Abuse    No documentation.       Patient Forms    No documentation.           Dipika Carney RN

## 2021-03-09 LAB
ALBUMIN SERPL-MCNC: 3.3 G/DL (ref 3.5–5.2)
ALBUMIN/GLOB SERPL: 1.2 G/DL
ALP SERPL-CCNC: 99 U/L (ref 39–117)
ALT SERPL W P-5'-P-CCNC: 22 U/L (ref 1–33)
ANION GAP SERPL CALCULATED.3IONS-SCNC: 8 MMOL/L (ref 5–15)
AST SERPL-CCNC: 37 U/L (ref 1–32)
BASOPHILS # BLD AUTO: 0.02 10*3/MM3 (ref 0–0.2)
BASOPHILS NFR BLD AUTO: 0.4 % (ref 0–1.5)
BILIRUB SERPL-MCNC: 1.1 MG/DL (ref 0–1.2)
BUN SERPL-MCNC: 10 MG/DL (ref 8–23)
BUN/CREAT SERPL: 13.2 (ref 7–25)
CALCIUM SPEC-SCNC: 8.7 MG/DL (ref 8.6–10.5)
CHLORIDE SERPL-SCNC: 105 MMOL/L (ref 98–107)
CO2 SERPL-SCNC: 25 MMOL/L (ref 22–29)
CREAT SERPL-MCNC: 0.76 MG/DL (ref 0.57–1)
DEPRECATED RDW RBC AUTO: 45.8 FL (ref 37–54)
EOSINOPHIL # BLD AUTO: 0.15 10*3/MM3 (ref 0–0.4)
EOSINOPHIL NFR BLD AUTO: 3.1 % (ref 0.3–6.2)
ERYTHROCYTE [DISTWIDTH] IN BLOOD BY AUTOMATED COUNT: 16.2 % (ref 12.3–15.4)
GFR SERPL CREATININE-BSD FRML MDRD: 77 ML/MIN/1.73
GLOBULIN UR ELPH-MCNC: 2.8 GM/DL
GLUCOSE SERPL-MCNC: 88 MG/DL (ref 65–99)
HCT VFR BLD AUTO: 37.7 % (ref 34–46.6)
HGB BLD-MCNC: 12.7 G/DL (ref 12–15.9)
IMM GRANULOCYTES # BLD AUTO: 0.03 10*3/MM3 (ref 0–0.05)
IMM GRANULOCYTES NFR BLD AUTO: 0.6 % (ref 0–0.5)
LYMPHOCYTES # BLD AUTO: 1.27 10*3/MM3 (ref 0.7–3.1)
LYMPHOCYTES NFR BLD AUTO: 25.9 % (ref 19.6–45.3)
MCH RBC QN AUTO: 26.8 PG (ref 26.6–33)
MCHC RBC AUTO-ENTMCNC: 33.7 G/DL (ref 31.5–35.7)
MCV RBC AUTO: 79.5 FL (ref 79–97)
MONOCYTES # BLD AUTO: 0.29 10*3/MM3 (ref 0.1–0.9)
MONOCYTES NFR BLD AUTO: 5.9 % (ref 5–12)
NEUTROPHILS NFR BLD AUTO: 3.14 10*3/MM3 (ref 1.7–7)
NEUTROPHILS NFR BLD AUTO: 64.1 % (ref 42.7–76)
NRBC BLD AUTO-RTO: 0 /100 WBC (ref 0–0.2)
PLATELET # BLD AUTO: 69 10*3/MM3 (ref 140–450)
PMV BLD AUTO: 10.9 FL (ref 6–12)
POTASSIUM SERPL-SCNC: 3.8 MMOL/L (ref 3.5–5.2)
PROT SERPL-MCNC: 6.1 G/DL (ref 6–8.5)
RBC # BLD AUTO: 4.74 10*6/MM3 (ref 3.77–5.28)
SODIUM SERPL-SCNC: 138 MMOL/L (ref 136–145)
WBC # BLD AUTO: 4.9 10*3/MM3 (ref 3.4–10.8)

## 2021-03-09 PROCEDURE — 80053 COMPREHEN METABOLIC PANEL: CPT | Performed by: FAMILY MEDICINE

## 2021-03-09 PROCEDURE — 85025 COMPLETE CBC W/AUTO DIFF WBC: CPT | Performed by: HOSPITALIST

## 2021-03-09 PROCEDURE — 99231 SBSQ HOSP IP/OBS SF/LOW 25: CPT | Performed by: SURGERY

## 2021-03-09 PROCEDURE — 25010000002 HYDROMORPHONE 1 MG/ML SOLUTION: Performed by: HOSPITALIST

## 2021-03-09 RX ADMIN — HYDROMORPHONE HYDROCHLORIDE 1 MG: 1 INJECTION, SOLUTION INTRAMUSCULAR; INTRAVENOUS; SUBCUTANEOUS at 04:18

## 2021-03-09 RX ADMIN — HYDROMORPHONE HYDROCHLORIDE 1 MG: 1 INJECTION, SOLUTION INTRAMUSCULAR; INTRAVENOUS; SUBCUTANEOUS at 02:13

## 2021-03-09 RX ADMIN — SODIUM CHLORIDE, PRESERVATIVE FREE 10 ML: 5 INJECTION INTRAVENOUS at 20:55

## 2021-03-09 RX ADMIN — HYDROMORPHONE HYDROCHLORIDE 1 MG: 1 INJECTION, SOLUTION INTRAMUSCULAR; INTRAVENOUS; SUBCUTANEOUS at 06:21

## 2021-03-09 RX ADMIN — HYDROMORPHONE HYDROCHLORIDE 1 MG: 1 INJECTION, SOLUTION INTRAMUSCULAR; INTRAVENOUS; SUBCUTANEOUS at 14:38

## 2021-03-09 RX ADMIN — HYDROMORPHONE HYDROCHLORIDE 1 MG: 1 INJECTION, SOLUTION INTRAMUSCULAR; INTRAVENOUS; SUBCUTANEOUS at 20:55

## 2021-03-09 RX ADMIN — HYDROMORPHONE HYDROCHLORIDE 1 MG: 1 INJECTION, SOLUTION INTRAMUSCULAR; INTRAVENOUS; SUBCUTANEOUS at 09:19

## 2021-03-09 RX ADMIN — HYDROMORPHONE HYDROCHLORIDE 1 MG: 1 INJECTION, SOLUTION INTRAMUSCULAR; INTRAVENOUS; SUBCUTANEOUS at 00:07

## 2021-03-09 RX ADMIN — PANTOPRAZOLE SODIUM 40 MG: 40 INJECTION, POWDER, FOR SOLUTION INTRAVENOUS at 05:14

## 2021-03-09 RX ADMIN — SODIUM CHLORIDE 50 ML/HR: 900 INJECTION, SOLUTION INTRAVENOUS at 14:38

## 2021-03-09 NOTE — PLAN OF CARE
Goal Outcome Evaluation:  Plan of Care Reviewed With: patient  Progress: improving  Outcome Summary: VSS, prn hydralazine given once, 50ml out of ngt throughout shift, pt had multiple loose stools

## 2021-03-09 NOTE — PROGRESS NOTES
[facility]  INPATIENT PROGRESS NOTE    Length of Stay: 2  Date of Admission: 3/6/2021  Primary Care Physician: Yobany Barr MD    Subjective   Chief Complaint: abdominal pain  HPI:  Patient is a 60-year-old female past medical history of recurrent small bowel obstruction, nonalcoholic steatohepatitis, obstructive sleep apnea, and hypertension who presents emergency department with about 2 weeks of worsening abdominal pain.  She states that she has had worsening constipation in his.  Intermittently using enemas.  She states that she began having nausea vomiting on day of admission and decided to present to the emergency department.  She states that she has had multiple small bowel obstructions in the past.  She has had 2 abdominal surgery secondary to small bowel obstruction approximately 7 to 8 years ago.  She states the last surgery resulted in significant intra-abdominal infection requiring wound VAC.  She states she has had several obstructions since that point.  She denies fever, chills, change of taste or smell, chest pain, and shortness of breath.  There are no alleviating or exacerbating factors.    (S): off NGT; started clear liquid diet now    Review of Systems   All pertinent negatives and positives are as above. All other systems have been reviewed and are negative unless otherwise stated.     Prior to Admission medications    Medication Sig Start Date End Date Taking? Authorizing Provider   cetirizine (zyrTEC) 10 MG tablet Take 1 tablet by mouth Daily. 9/25/17  Yes Promise Tovar MD   cyclobenzaprine (FLEXERIL) 10 MG tablet Take 10 mg by mouth 3 (Three) Times a Day As Needed for Muscle Spasms.   Yes Promise Tovar MD   diclofenac (VOLTAREN) 75 MG EC tablet Take 1 tablet by mouth Daily. 1/28/21  Yes Colleen Meadows APRN   escitalopram (LEXAPRO) 5 MG tablet Take 5 mg by mouth Daily.   Yes Promise Tovar MD   furosemide  (LASIX) 20 MG tablet Take 20 mg by mouth 2 (Two) Times a Day.   Yes Promise Tovar MD   gabapentin (NEURONTIN) 800 MG tablet Take 800 mg by mouth 4 (Four) Times a Day.   Yes Promise Tovar MD   meclizine (ANTIVERT) 25 MG tablet Take 50 mg by mouth As Needed for Dizziness.   Yes Promise Tovar MD   Melatonin 10 MG capsule Take  by mouth Every Night.   Yes Promise Tovar MD   metFORMIN (GLUCOPHAGE) 500 MG tablet Take 1 tablet by mouth 2 (Two) Times a Day With Meals. 7/8/19  Yes Maureen Cardoso APRN   Mirabegron ER (Myrbetriq) 50 MG tablet sustained-release 24 hour 24 hr tablet Take 50 mg by mouth Daily.   Yes Promise Tovar MD   nystatin (MYCOSTATIN) 290398 UNIT/GM powder Apply  topically to the appropriate area as directed 4 (Four) Times a Day As Needed.   Yes Promise Tovar MD   ondansetron (ZOFRAN) 8 MG tablet Take 1 tablet by mouth Every 8 (Eight) Hours As Needed for Nausea or Vomiting. 1/26/21  Yes Blaine Perales PA-C   oxyCODONE (oxyCONTIN) 10 MG 12 hr tablet Take 10 mg by mouth 4 (Four) Times a Day As Needed.   Yes Promise Tovar MD   pantoprazole (Protonix) 40 MG EC tablet Take 1 tablet by mouth Daily. 1/26/21  Yes Blaine Perales PA-C   polyethylene glycol (MIRALAX) packet Take 17 g by mouth Daily As Needed.   Yes Promise Tovar MD   promethazine (PHENERGAN) 25 MG suppository Insert 1 suppository into the rectum Every 6 (Six) Hours As Needed for Nausea or Vomiting. 11/24/19  Yes Jeff Rivas APRN   Prucalopride Succinate (Motegrity) 2 MG tablet Take 1 tablet by mouth Daily. 1/26/21  Yes Blaine Perales PA-C   spironolactone (ALDACTONE) 25 MG tablet TAKE 1 TABLET BY MOUTH ONCE DAILY 6/11/19  Yes Maureen Cardoso APRN   polyethylene glycol (Golytely) 236 g solution Take 240 mL by mouth Daily. 3/5/21   Blaine Perales PA-C       insulin aspart, 0-9 Units, Subcutaneous, TID AC  pantoprazole, 40 mg, Intravenous, Q AM  sodium chloride, 10  mL, Intravenous, Q12H      sodium chloride, 50 mL/hr, Last Rate: 50 mL/hr (03/08/21 1729)        Objective    Temp:  [97 °F (36.1 °C)-97.4 °F (36.3 °C)] 97 °F (36.1 °C)  Heart Rate:  [81-94] 94  Resp:  [18] 18  BP: (137-174)/() 159/77    Physical Exam  Constitutional:       Comments: Better appearance  Off of NGT   HENT:      Head: Normocephalic.      Mouth/Throat:      Mouth: Mucous membranes are moist.   Eyes:      Extraocular Movements: Extraocular movements intact.   Cardiovascular:      Rate and Rhythm: Regular rhythm.      Heart sounds: Normal heart sounds.   Pulmonary:      Breath sounds: Normal breath sounds.   Abdominal:      Palpations: Abdomen is soft.      Comments: with BS normal in 4 quadrants; no tenderness   Musculoskeletal:         General: Normal range of motion.      Cervical back: Normal range of motion and neck supple.   Skin:     General: Skin is warm.   Neurological:      General: No focal deficit present.      Mental Status: She is alert. Mental status is at baseline.   Psychiatric:         Mood and Affect: Mood normal.             Results Review:  I have reviewed the labs, radiology results, and diagnostic studies.    Laboratory Data:   Results from last 7 days   Lab Units 03/09/21  0500 03/08/21  0609 03/07/21  0555 03/06/21  2020   SODIUM mmol/L 138 137 140 138   POTASSIUM mmol/L 3.8 4.0 3.8 3.9   CHLORIDE mmol/L 105 106 107 102   CO2 mmol/L 25.0 24.0 26.0 23.0   BUN mg/dL 10 11 12 11   CREATININE mg/dL 0.76 0.79 0.88 0.95   GLUCOSE mg/dL 88 88 95 105*   CALCIUM mg/dL 8.7 8.2* 8.2* 9.3   BILIRUBIN mg/dL 1.1 1.3*  --  1.7*   ALK PHOS U/L 99 98  --  118*   ALT (SGPT) U/L 22 24  --  32   AST (SGOT) U/L 37* 36*  --  51*   ANION GAP mmol/L 8.0 7.0 7.0 13.0     Estimated Creatinine Clearance: 85.4 mL/min (by C-G formula based on SCr of 0.76 mg/dL).          Results from last 7 days   Lab Units 03/09/21  0500 03/08/21  0609 03/07/21  0555 03/06/21 2020   WBC 10*3/mm3 4.90 5.24 6.38 9.56      HEMOGLOBIN g/dL 12.7 13.1 12.9 15.5   HEMATOCRIT % 37.7 37.9 38.2 45.2   PLATELETS 10*3/mm3 69* 86* 80* 89*     Results from last 7 days   Lab Units 03/07/21  0036   INR  1.31*       Culture Data:   No results found for: BLOODCX  No results found for: URINECX  No results found for: RESPCX  No results found for: WOUNDCX  No results found for: STOOLCX  No components found for: BODYFLD    Radiology Data:   Imaging Results (Last 24 Hours)     ** No results found for the last 24 hours. **          I have reviewed the patient's current medications.     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 Based on her home meds, she takes metformin; I would recommend to stop this medication since her A1C is 5.1, unless primary care physician is providing this medication for another reason    Not on heparinoids products due to thrombocytopenia    Discharge Planning: In progress    Samy Ramachandran MD

## 2021-03-09 NOTE — PROGRESS NOTES
GENERAL SURGERY PROGRESS NOTE  Chief Complaint:  Surgery Follow up   LOS: 2 days       Subjective     Interval History:     Had multiple BMs since SBFT    Objective     Vital Signs  Temp:  [96.9 °F (36.1 °C)-97.4 °F (36.3 °C)] 96.9 °F (36.1 °C)  Heart Rate:  [77-94] 77  Resp:  [18] 18  BP: (137-174)/(74-90) 144/74    Physical Exam:   Abdomen soft  Labs:  Lab Results (last 24 hours)     Procedure Component Value Units Date/Time    Comprehensive Metabolic Panel [[id number]]  (Abnormal) Collected: 03/09/21 0500    Specimen: Blood Updated: 03/09/21 0544     Glucose 88 mg/dL      BUN 10 mg/dL      Creatinine 0.76 mg/dL      Sodium 138 mmol/L      Potassium 3.8 mmol/L      Chloride 105 mmol/L      CO2 25.0 mmol/L      Calcium 8.7 mg/dL      Total Protein 6.1 g/dL      Albumin 3.30 g/dL      ALT (SGPT) 22 U/L      AST (SGOT) 37 U/L      Alkaline Phosphatase 99 U/L      Total Bilirubin 1.1 mg/dL      eGFR Non African Amer 77 mL/min/1.73      Globulin 2.8 gm/dL      A/G Ratio 1.2 g/dL      BUN/Creatinine Ratio 13.2     Anion Gap 8.0 mmol/L     Narrative:      GFR Normal >60  Chronic Kidney Disease <60  Kidney Failure <15      CBC & Differential [[id number]]  (Abnormal) Collected: 03/09/21 0500    Specimen: Blood Updated: 03/09/21 0523    Narrative:      The following orders were created for panel order CBC & Differential.  Procedure                               Abnormality         Status                     ---------                               -----------         ------                     Scan Slide[[id number]]                                                                  CBC Auto Differential[[id number]]        Abnormal            Final result                 Please view results for these tests on the individual orders.    CBC Auto Differential [[id number]]  (Abnormal) Collected: 03/09/21 0500    Specimen: Blood Updated: 03/09/21 0523     WBC 4.90 10*3/mm3      RBC 4.74 10*6/mm3      Hemoglobin 12.7 g/dL      Hematocrit  37.7 %      MCV 79.5 fL      MCH 26.8 pg      MCHC 33.7 g/dL      RDW 16.2 %      RDW-SD 45.8 fl      MPV 10.9 fL      Platelets 69 10*3/mm3      Neutrophil % 64.1 %      Lymphocyte % 25.9 %      Monocyte % 5.9 %      Eosinophil % 3.1 %      Basophil % 0.4 %      Immature Grans % 0.6 %      Neutrophils, Absolute 3.14 10*3/mm3      Lymphocytes, Absolute 1.27 10*3/mm3      Monocytes, Absolute 0.29 10*3/mm3      Eosinophils, Absolute 0.15 10*3/mm3      Basophils, Absolute 0.02 10*3/mm3      Immature Grans, Absolute 0.03 10*3/mm3      nRBC 0.0 /100 WBC            Results Review:     Labs and imaging for today were reviewed.    Assessment/Plan     Amira Shannon is a 60 y.o. female who has resolving SBO      Will DC NGT and try clears.          This document has been electronically signed by Justus Skinner MD on March 9, 2021 15:16 CST        Justus Skinner MD  03/09/21  15:16 CST

## 2021-03-09 NOTE — PROGRESS NOTES
Has been tolerating clears today with no nausea. Continues to pass gas. Feels well overall.  Anticipate advancing diet in AM.          This document has been electronically signed by Justus Skinner MD on March 9, 2021 15:53 CST

## 2021-03-10 VITALS
OXYGEN SATURATION: 92 % | HEIGHT: 64 IN | DIASTOLIC BLOOD PRESSURE: 87 MMHG | BODY MASS INDEX: 34.49 KG/M2 | HEART RATE: 77 BPM | WEIGHT: 202 LBS | TEMPERATURE: 96.9 F | SYSTOLIC BLOOD PRESSURE: 165 MMHG | RESPIRATION RATE: 18 BRPM

## 2021-03-10 PROBLEM — R06.00 DYSPNEA: Status: RESOLVED | Noted: 2017-11-01 | Resolved: 2021-03-10

## 2021-03-10 LAB
ANION GAP SERPL CALCULATED.3IONS-SCNC: 8 MMOL/L (ref 5–15)
BASOPHILS # BLD AUTO: 0.02 10*3/MM3 (ref 0–0.2)
BASOPHILS # BLD MANUAL: 0.09 10*3/MM3 (ref 0–0.2)
BASOPHILS NFR BLD AUTO: 0.4 % (ref 0–1.5)
BASOPHILS NFR BLD AUTO: 2 % (ref 0–1.5)
BUN SERPL-MCNC: 6 MG/DL (ref 8–23)
BUN/CREAT SERPL: 7.8 (ref 7–25)
CALCIUM SPEC-SCNC: 8.5 MG/DL (ref 8.6–10.5)
CHLORIDE SERPL-SCNC: 104 MMOL/L (ref 98–107)
CLUMPED PLATELETS: PRESENT
CO2 SERPL-SCNC: 27 MMOL/L (ref 22–29)
CREAT SERPL-MCNC: 0.77 MG/DL (ref 0.57–1)
DEPRECATED RDW RBC AUTO: 44.4 FL (ref 37–54)
EOSINOPHIL # BLD AUTO: 0.13 10*3/MM3 (ref 0–0.4)
EOSINOPHIL # BLD MANUAL: 0.14 10*3/MM3 (ref 0–0.4)
EOSINOPHIL NFR BLD AUTO: 2.9 % (ref 0.3–6.2)
EOSINOPHIL NFR BLD MANUAL: 3 % (ref 0.3–6.2)
ERYTHROCYTE [DISTWIDTH] IN BLOOD BY AUTOMATED COUNT: 15.9 % (ref 12.3–15.4)
GFR SERPL CREATININE-BSD FRML MDRD: 76 ML/MIN/1.73
GLUCOSE SERPL-MCNC: 102 MG/DL (ref 65–99)
HCT VFR BLD AUTO: 40.6 % (ref 34–46.6)
HGB BLD-MCNC: 13.8 G/DL (ref 12–15.9)
IMM GRANULOCYTES # BLD AUTO: 0.01 10*3/MM3 (ref 0–0.05)
IMM GRANULOCYTES NFR BLD AUTO: 0.2 % (ref 0–0.5)
LYMPHOCYTES # BLD AUTO: 1.1 10*3/MM3 (ref 0.7–3.1)
LYMPHOCYTES # BLD MANUAL: 1.32 10*3/MM3 (ref 0.7–3.1)
LYMPHOCYTES NFR BLD AUTO: 24.1 % (ref 19.6–45.3)
LYMPHOCYTES NFR BLD MANUAL: 29 % (ref 19.6–45.3)
LYMPHOCYTES NFR BLD MANUAL: 9 % (ref 5–12)
MCH RBC QN AUTO: 26.7 PG (ref 26.6–33)
MCHC RBC AUTO-ENTMCNC: 34 G/DL (ref 31.5–35.7)
MCV RBC AUTO: 78.7 FL (ref 79–97)
MICROCYTES BLD QL: ABNORMAL
MONOCYTES # BLD AUTO: 0.23 10*3/MM3 (ref 0.1–0.9)
MONOCYTES # BLD AUTO: 0.41 10*3/MM3 (ref 0.1–0.9)
MONOCYTES NFR BLD AUTO: 5 % (ref 5–12)
NEUTROPHILS # BLD AUTO: 2.6 10*3/MM3 (ref 1.7–7)
NEUTROPHILS NFR BLD AUTO: 3.07 10*3/MM3 (ref 1.7–7)
NEUTROPHILS NFR BLD AUTO: 67.4 % (ref 42.7–76)
NEUTROPHILS NFR BLD MANUAL: 56 % (ref 42.7–76)
NEUTS BAND NFR BLD MANUAL: 1 % (ref 0–5)
NRBC BLD AUTO-RTO: 0 /100 WBC (ref 0–0.2)
PLATELET # BLD AUTO: 82 10*3/MM3 (ref 140–450)
PMV BLD AUTO: 11.5 FL (ref 6–12)
POTASSIUM SERPL-SCNC: 3.9 MMOL/L (ref 3.5–5.2)
RBC # BLD AUTO: 5.16 10*6/MM3 (ref 3.77–5.28)
SMALL PLATELETS BLD QL SMEAR: ABNORMAL
SODIUM SERPL-SCNC: 139 MMOL/L (ref 136–145)
WBC # BLD AUTO: 4.56 10*3/MM3 (ref 3.4–10.8)
WBC MORPH BLD: NORMAL

## 2021-03-10 PROCEDURE — 85025 COMPLETE CBC W/AUTO DIFF WBC: CPT | Performed by: HOSPITALIST

## 2021-03-10 PROCEDURE — 85007 BL SMEAR W/DIFF WBC COUNT: CPT | Performed by: HOSPITALIST

## 2021-03-10 PROCEDURE — 99231 SBSQ HOSP IP/OBS SF/LOW 25: CPT | Performed by: SURGERY

## 2021-03-10 PROCEDURE — 25010000002 HYDROMORPHONE 1 MG/ML SOLUTION: Performed by: HOSPITALIST

## 2021-03-10 PROCEDURE — 25010000002 HYDROMORPHONE 1 MG/ML SOLUTION: Performed by: INTERNAL MEDICINE

## 2021-03-10 PROCEDURE — 80048 BASIC METABOLIC PNL TOTAL CA: CPT | Performed by: INTERNAL MEDICINE

## 2021-03-10 RX ORDER — NALOXONE HCL 0.4 MG/ML
0.4 VIAL (ML) INJECTION
Status: DISCONTINUED | OUTPATIENT
Start: 2021-03-10 | End: 2021-03-10 | Stop reason: HOSPADM

## 2021-03-10 RX ORDER — OXYCODONE HCL 10 MG/1
10 TABLET, FILM COATED, EXTENDED RELEASE ORAL EVERY 12 HOURS
Status: ON HOLD | COMMUNITY
End: 2021-05-06

## 2021-03-10 RX ADMIN — HYDROMORPHONE HYDROCHLORIDE 1 MG: 1 INJECTION, SOLUTION INTRAMUSCULAR; INTRAVENOUS; SUBCUTANEOUS at 02:58

## 2021-03-10 RX ADMIN — HYDROMORPHONE HYDROCHLORIDE 1 MG: 1 INJECTION, SOLUTION INTRAMUSCULAR; INTRAVENOUS; SUBCUTANEOUS at 10:38

## 2021-03-10 RX ADMIN — PANTOPRAZOLE SODIUM 40 MG: 40 INJECTION, POWDER, FOR SOLUTION INTRAVENOUS at 06:13

## 2021-03-10 NOTE — PLAN OF CARE
Goal Outcome Evaluation:  Plan of Care Reviewed With: patient   Patient is still having bowel movements, that are liquid, pt walked in the johnson during evening and tolerated ambulating well. No prn hydralazine given yet this shift, pain medication given one time.

## 2021-03-10 NOTE — PROGRESS NOTES
GENERAL SURGERY PROGRESS NOTE  Chief Complaint:  Surgery Follow up   LOS: 3 days       Subjective     Interval History:     Tolerated liquids and passing gas.    Objective     Vital Signs  Temp:  [96.9 °F (36.1 °C)-97.9 °F (36.6 °C)] 96.9 °F (36.1 °C)  Heart Rate:  [67-83] 77  Resp:  [16-18] 18  BP: (136-168)/(74-96) 165/87    Physical Exam:   Abdomen soft, minimal discomfort  Labs:  Lab Results (last 24 hours)     Procedure Component Value Units Date/Time    Manual Differential [[id number]]  (Abnormal) Collected: 03/10/21 0550    Specimen: Blood Updated: 03/10/21 0810     Neutrophil % 56.0 %      Lymphocyte % 29.0 %      Monocyte % 9.0 %      Eosinophil % 3.0 %      Basophil % 2.0 %      Bands %  1.0 %      Neutrophils Absolute 2.60 10*3/mm3      Lymphocytes Absolute 1.32 10*3/mm3      Monocytes Absolute 0.41 10*3/mm3      Eosinophils Absolute 0.14 10*3/mm3      Basophils Absolute 0.09 10*3/mm3      Microcytes Slight/1+     WBC Morphology Normal     Platelet Estimate Decreased     Clumped Platelets Present    Basic Metabolic Panel [[id number]]  (Abnormal) Collected: 03/10/21 0550    Specimen: Blood Updated: 03/10/21 0642     Glucose 102 mg/dL      BUN 6 mg/dL      Creatinine 0.77 mg/dL      Sodium 139 mmol/L      Potassium 3.9 mmol/L      Chloride 104 mmol/L      CO2 27.0 mmol/L      Calcium 8.5 mg/dL      eGFR Non African Amer 76 mL/min/1.73      BUN/Creatinine Ratio 7.8     Anion Gap 8.0 mmol/L     Narrative:      GFR Normal >60  Chronic Kidney Disease <60  Kidney Failure <15      CBC & Differential [[id number]]  (Abnormal) Collected: 03/10/21 0550    Specimen: Blood Updated: 03/10/21 0638    Narrative:      The following orders were created for panel order CBC & Differential.  Procedure                               Abnormality         Status                     ---------                               -----------         ------                     Scan Slide[[id number]]                                                                   CBC Auto Differential[[id number]]        Abnormal            Final result                 Please view results for these tests on the individual orders.    CBC Auto Differential [[id number]]  (Abnormal) Collected: 03/10/21 0550    Specimen: Blood Updated: 03/10/21 0633     WBC 4.56 10*3/mm3      RBC 5.16 10*6/mm3      Hemoglobin 13.8 g/dL      Hematocrit 40.6 %      MCV 78.7 fL      MCH 26.7 pg      MCHC 34.0 g/dL      RDW 15.9 %      RDW-SD 44.4 fl      MPV 11.5 fL      Platelets 82 10*3/mm3      Neutrophil % 67.4 %      Lymphocyte % 24.1 %      Monocyte % 5.0 %      Eosinophil % 2.9 %      Basophil % 0.4 %      Immature Grans % 0.2 %      Neutrophils, Absolute 3.07 10*3/mm3      Lymphocytes, Absolute 1.10 10*3/mm3      Monocytes, Absolute 0.23 10*3/mm3      Eosinophils, Absolute 0.13 10*3/mm3      Basophils, Absolute 0.02 10*3/mm3      Immature Grans, Absolute 0.01 10*3/mm3      nRBC 0.0 /100 WBC            Results Review:     Labs and imaging for today were reviewed.    Assessment/Plan     Amira Shannon is a 60 y.o. female who has resolved SBO.      Regular diet. Home if tolerates well.          This document has been electronically signed by Justus Skinner MD on March 10, 2021 11:52 CST        Justus Skinner MD  03/10/21  11:52 CST

## 2021-03-10 NOTE — DISCHARGE SUMMARY
[facility] Medicine Services  DISCHARGE SUMMARY       Date of Admission: 3/6/2021  Date of Discharge:  3/10/2021  Primary Care Physician: Yobany Barr MD    Presenting Problem/History of Present Illness:  SBO (small bowel obstruction) (CMS/HCC) [K56.609]  Liver nodule [K76.89]  Hepatic cirrhosis, unspecified hepatic cirrhosis type, unspecified whether ascites present (CMS/HCC) [K74.60]       Final Discharge Diagnoses:  Abdominal pain     Due to below; resolved     SBO     Had 2 bowel movements yesterday; advance her diet and discharge home if tolerated; discussed with surgeon and he ok the discharge home       Liver nodule     incidental finding; will need outpatient follow up by GI and by Oncologist     Chronic medical problems     Chronic liver disease, 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 Based on her home meds, she takes metformin; I would recommend to stop this medication since her A1C is 5.1, unless primary care physician is providing this medication for another reason     Not on heparinoids products due to thrombocytopenia      Consults:   Consults     Date and Time Order Name Status Description    3/7/2021  9:05 AM Inpatient General Surgery Consult Completed           Procedures Performed: None               Pertinent Test Results:   Lab Results (most recent)     Procedure Component Value Units Date/Time    Manual Differential [[id number]]  (Abnormal) Collected: 03/10/21 0550    Specimen: Blood Updated: 03/10/21 0810     Neutrophil % 56.0 %      Lymphocyte % 29.0 %      Monocyte % 9.0 %      Eosinophil % 3.0 %      Basophil % 2.0 %      Bands %  1.0 %      Neutrophils Absolute 2.60 10*3/mm3      Lymphocytes Absolute 1.32 10*3/mm3      Monocytes Absolute 0.41 10*3/mm3       Eosinophils Absolute 0.14 10*3/mm3      Basophils Absolute 0.09 10*3/mm3      Microcytes Slight/1+     WBC Morphology Normal     Platelet Estimate Decreased     Clumped Platelets Present    Basic Metabolic Panel [[id number]]  (Abnormal) Collected: 03/10/21 0550    Specimen: Blood Updated: 03/10/21 0642     Glucose 102 mg/dL      BUN 6 mg/dL      Creatinine 0.77 mg/dL      Sodium 139 mmol/L      Potassium 3.9 mmol/L      Chloride 104 mmol/L      CO2 27.0 mmol/L      Calcium 8.5 mg/dL      eGFR Non African Amer 76 mL/min/1.73      BUN/Creatinine Ratio 7.8     Anion Gap 8.0 mmol/L     Narrative:      GFR Normal >60  Chronic Kidney Disease <60  Kidney Failure <15      CBC & Differential [[id number]]  (Abnormal) Collected: 03/10/21 0550    Specimen: Blood Updated: 03/10/21 0638    Narrative:      The following orders were created for panel order CBC & Differential.  Procedure                               Abnormality         Status                     ---------                               -----------         ------                     Scan Slide[[id number]]                                                                  CBC Auto Differential[[id number]]        Abnormal            Final result                 Please view results for these tests on the individual orders.    CBC Auto Differential [[id number]]  (Abnormal) Collected: 03/10/21 0550    Specimen: Blood Updated: 03/10/21 0633     WBC 4.56 10*3/mm3      RBC 5.16 10*6/mm3      Hemoglobin 13.8 g/dL      Hematocrit 40.6 %      MCV 78.7 fL      MCH 26.7 pg      MCHC 34.0 g/dL      RDW 15.9 %      RDW-SD 44.4 fl      MPV 11.5 fL      Platelets 82 10*3/mm3      Neutrophil % 67.4 %      Lymphocyte % 24.1 %      Monocyte % 5.0 %      Eosinophil % 2.9 %      Basophil % 0.4 %      Immature Grans % 0.2 %      Neutrophils, Absolute 3.07 10*3/mm3      Lymphocytes, Absolute 1.10 10*3/mm3      Monocytes, Absolute 0.23 10*3/mm3      Eosinophils, Absolute 0.13 10*3/mm3       Basophils, Absolute 0.02 10*3/mm3      Immature Grans, Absolute 0.01 10*3/mm3      nRBC 0.0 /100 WBC     Comprehensive Metabolic Panel [[id number]]  (Abnormal) Collected: 03/09/21 0500    Specimen: Blood Updated: 03/09/21 0544     Glucose 88 mg/dL      BUN 10 mg/dL      Creatinine 0.76 mg/dL      Sodium 138 mmol/L      Potassium 3.8 mmol/L      Chloride 105 mmol/L      CO2 25.0 mmol/L      Calcium 8.7 mg/dL      Total Protein 6.1 g/dL      Albumin 3.30 g/dL      ALT (SGPT) 22 U/L      AST (SGOT) 37 U/L      Alkaline Phosphatase 99 U/L      Total Bilirubin 1.1 mg/dL      eGFR Non African Amer 77 mL/min/1.73      Globulin 2.8 gm/dL      A/G Ratio 1.2 g/dL      BUN/Creatinine Ratio 13.2     Anion Gap 8.0 mmol/L     Narrative:      GFR Normal >60  Chronic Kidney Disease <60  Kidney Failure <15      CBC & Differential [[id number]]  (Abnormal) Collected: 03/09/21 0500    Specimen: Blood Updated: 03/09/21 0523    Narrative:      The following orders were created for panel order CBC & Differential.  Procedure                               Abnormality         Status                     ---------                               -----------         ------                     Scan Slide[[id number]]                                                                  CBC Auto Differential[[id number]]        Abnormal            Final result                 Please view results for these tests on the individual orders.    CBC Auto Differential [[id number]]  (Abnormal) Collected: 03/09/21 0500    Specimen: Blood Updated: 03/09/21 0523     WBC 4.90 10*3/mm3      RBC 4.74 10*6/mm3      Hemoglobin 12.7 g/dL      Hematocrit 37.7 %      MCV 79.5 fL      MCH 26.8 pg      MCHC 33.7 g/dL      RDW 16.2 %      RDW-SD 45.8 fl      MPV 10.9 fL      Platelets 69 10*3/mm3      Neutrophil % 64.1 %      Lymphocyte % 25.9 %      Monocyte % 5.9 %      Eosinophil % 3.1 %      Basophil % 0.4 %      Immature Grans % 0.6 %      Neutrophils, Absolute 3.14 10*3/mm3        Lymphocytes, Absolute 1.27 10*3/mm3      Monocytes, Absolute 0.29 10*3/mm3      Eosinophils, Absolute 0.15 10*3/mm3      Basophils, Absolute 0.02 10*3/mm3      Immature Grans, Absolute 0.03 10*3/mm3      nRBC 0.0 /100 WBC     POC Glucose Once [[id number]]  (Normal) Collected: 03/08/21 1957    Specimen: Blood Updated: 03/08/21 2030     Glucose 87 mg/dL      Comment: RN NotifiedOperator: [id number] LUPE Mcgrath ID: [id number]       POC Glucose Once [[id number]]  (Normal) Collected: 03/08/21 1013    Specimen: Blood Updated: 03/08/21 1039     Glucose 82 mg/dL      Comment: : [id number] CROW Mane ID: [id number]       Comprehensive Metabolic Panel [[id number]]  (Abnormal) Collected: 03/08/21 0609    Specimen: Blood Updated: 03/08/21 0643     Glucose 88 mg/dL      BUN 11 mg/dL      Creatinine 0.79 mg/dL      Sodium 137 mmol/L      Potassium 4.0 mmol/L      Chloride 106 mmol/L      CO2 24.0 mmol/L      Calcium 8.2 mg/dL      Total Protein 6.3 g/dL      Albumin 3.30 g/dL      ALT (SGPT) 24 U/L      AST (SGOT) 36 U/L      Alkaline Phosphatase 98 U/L      Total Bilirubin 1.3 mg/dL      eGFR Non African Amer 74 mL/min/1.73      Globulin 3.0 gm/dL      A/G Ratio 1.1 g/dL      BUN/Creatinine Ratio 13.9     Anion Gap 7.0 mmol/L     Narrative:      GFR Normal >60  Chronic Kidney Disease <60  Kidney Failure <15      Basic Metabolic Panel [[id number]]  (Abnormal) Collected: 03/07/21 0555    Specimen: Blood Updated: 03/07/21 0632     Glucose 95 mg/dL      BUN 12 mg/dL      Creatinine 0.88 mg/dL      Sodium 140 mmol/L      Potassium 3.8 mmol/L      Chloride 107 mmol/L      CO2 26.0 mmol/L      Calcium 8.2 mg/dL      eGFR Non African Amer 65 mL/min/1.73      BUN/Creatinine Ratio 13.6     Anion Gap 7.0 mmol/L     Narrative:      GFR Normal >60  Chronic Kidney Disease <60  Kidney Failure <15      aPTT [[id number]]  (Normal) Collected: 03/07/21 0036    Specimen: Blood Updated: 03/07/21 0151     PTT 33.8  seconds     Narrative:      The recommended Heparin therapeutic range is 68-97 seconds.    Protime-INR [[id number]]  (Abnormal) Collected: 03/07/21 0036    Specimen: Blood Updated: 03/07/21 0151     Protime 16.9 Seconds      INR 1.31    Narrative:      Therapeutic range for most indications is 2.0-3.0 INR,  or 2.5-3.5 for mechanical heart valves.    Urinalysis With Microscopic If Indicated (No Culture) - Urine, Clean Catch [[id number]]  (Abnormal) Collected: 03/06/21 2125    Specimen: Urine, Clean Catch Updated: 03/06/21 2136     Color, UA Dark Yellow     Appearance, UA Clear     pH, UA 6.0     Specific Gravity, UA 1.028     Glucose, UA Negative     Ketones, UA Trace     Bilirubin, UA Small (1+)     Blood, UA Negative     Protein, UA Trace     Leuk Esterase, UA Negative     Nitrite, UA Negative     Urobilinogen, UA 2.0 E.U./dL    Narrative:      Urine microscopic not indicated.    Lipase [[id number]]  (Normal) Collected: 03/06/21 2020    Specimen: Blood Updated: 03/06/21 2047     Lipase 16 U/L     Amylase [[id number]]  (Normal) Collected: 03/06/21 2020    Specimen: Blood Updated: 03/06/21 2047     Amylase 29 U/L     Scan Slide [[id number]] Collected: 03/06/21 2020    Specimen: Blood Updated: 03/06/21 2041     Acanthocytes Mod/2+     WBC Morphology Normal     Platelet Estimate Decreased     Clumped Platelets --     Comment: NONE SEEN       Lactic Acid, Plasma [[id number]]  (Normal) Collected: 03/06/21 2020    Specimen: Blood Updated: 03/06/21 2037     Lactate 1.4 mmol/L     COVID-19 and FLU A/B PCR - Swab, Nasopharynx [[id number]]  (Normal) Collected: 03/06/21 1944    Specimen: Swab from Nasopharynx Updated: 03/06/21 2030     COVID19 Not Detected     Influenza A PCR Not Detected     Influenza B PCR Not Detected    Narrative:      Fact sheet for providers: https://www.fda.gov/media/464281/download    Fact sheet for patients: https://www.fda.gov/media/711774/download    Test performed by PCR.        Imaging Results (Most  Recent)     Procedure Component Value Units Date/Time    FL Small Bowel Follow Through Single-Contrast [[id number]] Collected: 03/08/21 0758     Updated: 03/08/21 1438    Narrative:      Procedure: Gastrografin small bowel follow-through    Reason for exam: Small bowel obstruction.    FINDINGS: Surgical protocol Gastrografin small bowel  follow-through was performed. The  film of the abdomen is  unremarkable. Gastrografin was administered through the NG tube.  Incidental finding of second portion duodenal diverticula. The  small bowel is otherwise unremarkable. Six-hour image reveals the  Gastrografin to have extended through the entire small bowel and  is now within the region of the colon.      Impression:      1.  Second portion duodenal diverticula.  2.  Gastrografin small bowel findings as described above would be  suspicious for ileus versus low-grade partial  small bowel  obstruction. Gastrografin does make its way into the colon.  Recommend clinical correlation.    Electronically signed by:  Lenard Del Rio MD  3/8/2021 2:37 PM CST  Workstation: [id number]    XR Abdomen KUB [[id number]] Collected: 03/07/21 1117     Updated: 03/07/21 1216    Narrative:      PROCEDURE: XR ABDOMEN 1 VIEW (KUB)    Clinical History: check NGT placement, K56.609 Unspecified  intestinal obstruction, unspecified as to partial versus complete  obstruction K74.60 Unspecified cirrhosis of liver K76.89 Other  specified diseases of liver    Indication:     Same as above     Comparison:    8/12/2019     Technique:    Single supine view of the abdomen and pelvis.    Findings:     A nonspecific bowel gas pattern is noted. The tip of the  orogastric tube is in the proximal body of the stomach    Radiographic contrast is seen in the urinary bladder.    There is no gross evidence of free air in the abdomen or the  pelvis on this single supine view of the abdomen and pelvis.    There is no visualization of any radiopaque calculi in  the  outline of the urinary tract on either side.    There is no significant constipation.       Impression:      Impression:    A nonspecific bowel gas pattern is noted.   The tip of the orogastric tube is in the proximal body of the  stomach    Electronically signed by:  Emiliano Zheng MD  3/7/2021 12:14 PM CST  Workstation: [email or web address]    CT Abdomen Pelvis With Contrast [[id number]] Collected: 03/06/21 2215     Updated: 03/06/21 2247    Narrative:      CT ABDOMEN PELVIS WITH IV CONTRAST    INDICATION: 60 years Female; abd pain    TECHNIQUE:  CT scan of the abdomen and pelvis was performed with  IV contrast.  This exam was performed according to our  departmental dose-optimization program, which includes automated  exposure control, adjustment of the mA and/or kV according to  patient size and/or use of iterative reconstruction technique.    Comparison: 11/24/2019.    FINDINGS:  Liver: The liver demonstrates cirrhotic morphology. There is  suggestion of numerous tiny hypodense nodules in the liver which  suggests regenerative nodules. A discrete hypodense lesion in the  right hepatic dome measures 8 mm which is new from prior study  and indeterminate.  Gallbladder/Biliary tree: Status post cholecystectomy. No  significant biliary dilatation.  Pancreas: Unremarkable.  Spleen: Splenomegaly.  Adrenals: Unremarkable.  Genitourinary: Status post hysterectomy. No adnexal masses. No  hydronephrosis or nephrolithiasis.  Gastrointestinal: No gastric wall thickening. Multiple loops of  dilated small bowel throughout the abdomen with air-fluid levels  and surrounding mesenteric edema with decompressed loops of small  bowel in the right lower quadrant which could possibly represent  a transition point and suggests either acute small bowel  obstruction or high-grade ileus. Some of the decompressed loops  of small bowel demonstrate mild wall thickening. No free air.  Appendix is not identified. Moderate stool throughout the  colon.  Peritoneum: Unremarkable.  Vasculature: Paraesophageal and perigastric varices.   Lymph nodes: No pathologically enlarged lymph nodes.  Bones: Unremarkable.  Soft tissues: Unremarkable.  Incidental findings: None.      Impression:      Multiple loops of dilated small bowel throughout the abdomen with  air-fluid levels and surrounding mesenteric edema with  decompressed loops of small bowel in the right lower quadrant  which could possibly represent a transition point and suggests  either acute small bowel obstruction or high-grade ileus.     Cirrhosis with evidence of portal hypertension. Numerous tiny  nodules in the liver suggest regenerative nodules. A discrete  hypodense lesion in the right hepatic dome measures 8 mm which is  new from prior study and indeterminate. Recommend follow-up CT or  MRI liver mass protocol.    Electronically signed by:  Gina Meier  3/6/2021 10:45 PM CST  Workstation: [id number]    XR Chest 1 View [[id number]] Collected: 03/06/21 1905     Updated: 03/06/21 1953    Narrative:        PORTABLE CHEST    HISTORY: Abdominal pain    Portable AP upright film of the chest was obtained at 6:43 PM.  COMPARISON: June 8, 2019    FINDINGS:   Low lung volumes.  No focal infiltrate.  Old granulomatous disease is present.  The heart is not enlarged.  The pulmonary vasculature is not increased.  No pleural effusion.  No pneumothorax.  No acute osseous abnormality.  Degenerative changes are present in the thoracic spine.  Cholecystectomy.      Impression:      CONCLUSION:  Low lung volumes.  No focal infiltrate.    [id number]    Electronically signed by:  Walker Stevens MD  3/6/2021 7:52 PM CST  Workstation: [email or web address]          Chief Complaint on Day of Discharge: None    Hospital Course:  The patient is a 60 y.o. female who presented to [facility] with abdominal pain due to small bowel obstruction; NGT was placed and then under surgeon direction, a small bowel series follow through  "was done and later on it was resolved; then we started on diet and advance as tolerated. Regular diet was given for lunch and one hour after she has tolerated it well; she is eager to go home now.     Condition on Discharge:  stable    Physical Exam on Discharge:  /76 (BP Location: Right arm, Patient Position: Lying)   Pulse 67   Temp 96.9 °F (36.1 °C) (Temporal)   Resp 18   Ht 162.6 cm (64\")   Wt 91.6 kg (202 lb)   LMP  (LMP Unknown)   SpO2 95%   BMI 34.67 kg/m²   Physical Exam  Constitutional:       Appearance: Normal appearance.   HENT:      Head: Normocephalic.      Nose: Nose normal.      Mouth/Throat:      Mouth: Mucous membranes are moist.   Eyes:      Extraocular Movements: Extraocular movements intact.   Cardiovascular:      Rate and Rhythm: Normal rate and regular rhythm.      Heart sounds: Normal heart sounds.   Pulmonary:      Breath sounds: Normal breath sounds.   Abdominal:      General: Bowel sounds are normal.      Palpations: Abdomen is soft.   Musculoskeletal:         General: Normal range of motion.      Cervical back: Normal range of motion and neck supple.   Skin:     General: Skin is warm.   Neurological:      General: No focal deficit present.      Mental Status: She is alert. Mental status is at baseline.   Psychiatric:         Mood and Affect: Mood normal.         Behavior: Behavior normal.           Discharge Disposition: Home      Discharge Medications:     Your medication list      CONTINUE taking these medications      Instructions Last Dose Given Next Dose Due   cetirizine 10 MG tablet  Commonly known as: zyrTEC      Take 1 tablet by mouth Daily.       cyclobenzaprine 10 MG tablet  Commonly known as: FLEXERIL      Take 10 mg by mouth 3 (Three) Times a Day As Needed for Muscle Spasms.       diclofenac 75 MG EC tablet  Commonly known as: VOLTAREN      Take 1 tablet by mouth Daily.       escitalopram 5 MG tablet  Commonly known as: LEXAPRO      Take 5 mg by mouth Daily.     "   furosemide 20 MG tablet  Commonly known as: LASIX      Take 20 mg by mouth 2 (Two) Times a Day.       gabapentin 800 MG tablet  Commonly known as: NEURONTIN      Take 800 mg by mouth 4 (Four) Times a Day.       meclizine 25 MG tablet  Commonly known as: ANTIVERT      Take 50 mg by mouth As Needed for Dizziness.       Melatonin 10 MG capsule      Take  by mouth Every Night.       Motegrity 2 MG tablet  Generic drug: Prucalopride Succinate      Take 1 tablet by mouth Daily.       Myrbetriq 50 MG tablet sustained-release 24 hour 24 hr tablet  Generic drug: Mirabegron ER      Take 50 mg by mouth Daily.       ondansetron 8 MG tablet  Commonly known as: ZOFRAN      Take 1 tablet by mouth Every 8 (Eight) Hours As Needed for Nausea or Vomiting.       oxyCODONE 10 MG 12 hr tablet  Commonly known as: oxyCONTIN      Take 10 mg by mouth Every 12 (Twelve) Hours.       pantoprazole 40 MG EC tablet  Commonly known as: Protonix      Take 1 tablet by mouth Daily.       spironolactone 25 MG tablet  Commonly known as: ALDACTONE      TAKE 1 TABLET BY MOUTH ONCE DAILY          STOP taking these medications    Golytely 236 g solution  Generic drug: polyethylene glycol        metFORMIN 500 MG tablet  Commonly known as: GLUCOPHAGE        nystatin 855399 UNIT/GM powder  Commonly known as: MYCOSTATIN        polyethylene glycol packet  Commonly known as: MIRALAX        promethazine 25 MG suppository  Commonly known as: Phenergan                Discharge Diet: regular diet    Activity at Discharge: as tolerated    Discharge Care Plan/Instructions: see chart    Follow-up Appointments:   Future Appointments   Date Time Provider Department Center   4/20/2021 10:40 AM Blaine Perales PA-C [facility] None       Test Results Pending at Discharge:     Samy Ramachandran MD    Time: 26 min

## 2021-03-10 NOTE — PAYOR COMM NOTE
"auth#[id number]  Amira Coleman (60 y.o. Female)     Date of Birth Social Security Number Address Home Phone MRN    1960  [id number] [facility] [address] [phone] [id number]    Ep[employer]opalian Marital Status          Hoahaoism        Admission Date Admission Type Admitting Provider Attending Provider Department, Room/Bed    3/6/21 Emergency Nolan Brunson MD Williams, Kevin L, MD [address], 313/1    Discharge Date Discharge Disposition Discharge Destination                       Attending Provider: Nolan Brunson MD    Allergies: Other, Corticosteroids, Kenalog  [Triamcinolone Acetonide], Sulfa Antibiotics    Isolation: None   Infection: None   Code Status: CPR    Ht: 162.6 cm (64\")   Wt: 91.6 kg (202 lb)    Admission Cmt: None   Principal Problem: None                Active Insurance as of 3/6/2021     Primary Coverage     Payor Plan Insurance Group Employer/Plan Group    [place] [id number]     Payor Plan Address Payor Plan Phone Number Payor Plan Fax Number Effective Dates    [employer] [address] [address] [phone]  1/1/2020 - None Entered    [facility] [id number]       Subscriber Name Subscriber Birth Date Member ID       AMIRA COLEMAN 1960 [id number]                 Emergency Contacts      (Rel.) Home Phone Work Phone Mobile Phone    ShivaAlfonsovenita Conrad (Spouse) [phone] -- [phone]    alfonso coleman (Son) [phone] -- [phone]            Vital Signs (last day)     Date/Time   Temp   Temp src   Pulse   Resp   BP   Patient Position   SpO2    03/10/21 0724   96.9 (36.1)   Temporal   67   18   137/76   Lying   95    03/10/21 0337   97.3 (36.3)   Temporal   77   16   136/74   Lying   94    03/10/21 0002   97.5 (36.4)   Temporal   77   16   165/93   Lying   96    03/09/21 1958   97.9 (36.6)   Temporal   83   16   168/96   Lying   95    03/09/21 1434   96.9 (36.1)   Temporal   " 77   18   144/74   Lying   95    03/09/21 1103   --   --   94   18   159/77   --   97    03/09/21 0715   97 (36.1)   Temporal   84   18   163/89   Lying   93    03/09/21 0409   --   --   --   --   160/90   Lying   --    03/09/21 0408   97.1 (36.2)   Temporal   82   18   172/81   Lying   90              Oxygen Therapy (last day)     Date/Time   SpO2   Device (Oxygen Therapy)   Flow (L/min)   Oxygen Concentration (%)   ETCO2 (mmHg)    03/10/21 0724   95   room air   --   --   --    03/10/21 0337   94   room air   --   --   --    03/10/21 0033   --   room air   --   --   --    03/10/21 0002   96   room air   --   --   --    03/09/21 1958   95   room air   --   --   --    03/09/21 1434   95   room air   --   --   --    03/09/21 1103   97   room air   --   --   --    03/09/21 0715   93   room air   --   --   --    03/09/21 0408   90   room air   --   --   --              Current Facility-Administered Medications   Medication Dose Route Frequency Provider Last Rate Last Admin   • dextrose (D50W) 25 g/ 50mL Intravenous Solution 25 g  25 g Intravenous Q15 Min PRN Nolan Brunson MD       • dextrose (GLUTOSE) oral gel 15 g  15 g Oral Q15 Min PRN Nolan Brunson MD       • glucagon (human recombinant) (GLUCAGEN DIAGNOSTIC) injection 1 mg  1 mg Subcutaneous Q15 Min PRN Nolan Brunson MD       • hydrALAZINE (APRESOLINE) injection 10 mg  10 mg Intravenous Q6H PRN Giles Haynes MD   10 mg at 03/08/21 2018   • HYDROmorphone (DILAUDID) injection 1 mg  1 mg Intravenous Q6H PRN Samy Ramachandran MD   1 mg at 03/10/21 1038    And   • naloxone (NARCAN) injection 0.4 mg  0.4 mg Intravenous Q5 Min PRN Samy Ramachandran MD       • insulin aspart (novoLOG) injection 0-9 Units  0-9 Units Subcutaneous TID AC Nolan Brunson MD       • ondansetron (ZOFRAN) injection 4 mg  4 mg Intravenous Q6H PRN Nolan Brunson MD   4 mg at 03/08/21 0945   • pantoprazole (PROTONIX) injection 40 mg  40 mg Intravenous Q AM Boy  Nolan MCALLISTER MD   40 mg at 03/10/21 0613   • sodium chloride 0.9 % flush 10 mL  10 mL Intravenous PRN Nolan Brunson MD       • sodium chloride 0.9 % flush 10 mL  10 mL Intravenous Q12H Nolan Brunson MD   10 mL at 03/09/21 2055   • sodium chloride 0.9 % flush 10 mL  10 mL Intravenous PRN Nolan Brunson MD            Physician Progress Notes (last 24 hours) (Notes from 03/09/21 1103 through 03/10/21 1103)      Justus Skinner MD at 03/09/21 1552        Has been tolerating clears today with no nausea. Continues to pass gas. Feels well overall.  Anticipate advancing diet in AM.          This document has been electronically signed by Justus Skinner MD on March 9, 2021 15:53 CST        Electronically signed by Justus Skinner MD at 03/09/21 1553     Samy Ramachandran MD at 03/09/21 1318              [facility]  INPATIENT PROGRESS NOTE    Length of Stay: 2  Date of Admission: 3/6/2021  Primary Care Physician: Yobany Barr MD    Subjective   Chief Complaint: abdominal pain  HPI:  Patient is a 60-year-old female past medical history of recurrent small bowel obstruction, nonalcoholic steatohepatitis, obstructive sleep apnea, and hypertension who presents emergency department with about 2 weeks of worsening abdominal pain.  She states that she has had worsening constipation in his.  Intermittently using enemas.  She states that she began having nausea vomiting on day of admission and decided to present to the emergency department.  She states that she has had multiple small bowel obstructions in the past.  She has had 2 abdominal surgery secondary to small bowel obstruction approximately 7 to 8 years ago.  She states the last surgery resulted in significant intra-abdominal infection requiring wound VAC.  She states she has had several obstructions since that point.  She denies fever, chills, change of taste or smell, chest pain,  and shortness of breath.  There are no alleviating or exacerbating factors.    (S): off NGT; started clear liquid diet now    Review of Systems   All pertinent negatives and positives are as above. All other systems have been reviewed and are negative unless otherwise stated.     Prior to Admission medications    Medication Sig Start Date End Date Taking? Authorizing Provider   cetirizine (zyrTEC) 10 MG tablet Take 1 tablet by mouth Daily. 9/25/17  Yes Promise Tovar MD   cyclobenzaprine (FLEXERIL) 10 MG tablet Take 10 mg by mouth 3 (Three) Times a Day As Needed for Muscle Spasms.   Yes Promise Tovar MD   diclofenac (VOLTAREN) 75 MG EC tablet Take 1 tablet by mouth Daily. 1/28/21  Yes Colleen Meadows APRN   escitalopram (LEXAPRO) 5 MG tablet Take 5 mg by mouth Daily.   Yes Promise Tovar MD   furosemide (LASIX) 20 MG tablet Take 20 mg by mouth 2 (Two) Times a Day.   Yes Promise Tovar MD   gabapentin (NEURONTIN) 800 MG tablet Take 800 mg by mouth 4 (Four) Times a Day.   Yes Promise oTvar MD   meclizine (ANTIVERT) 25 MG tablet Take 50 mg by mouth As Needed for Dizziness.   Yes Promise Tovar MD   Melatonin 10 MG capsule Take  by mouth Every Night.   Yes Promise Tovar MD   metFORMIN (GLUCOPHAGE) 500 MG tablet Take 1 tablet by mouth 2 (Two) Times a Day With Meals. 7/8/19  Yes Maureen Cardoso APRN   Mirabegron ER (Myrbetriq) 50 MG tablet sustained-release 24 hour 24 hr tablet Take 50 mg by mouth Daily.   Yes Promise Tovar MD   nystatin (MYCOSTATIN) 447741 UNIT/GM powder Apply  topically to the appropriate area as directed 4 (Four) Times a Day As Needed.   Yes Promise Tovar MD   ondansetron (ZOFRAN) 8 MG tablet Take 1 tablet by mouth Every 8 (Eight) Hours As Needed for Nausea or Vomiting. 1/26/21  Yes Blaine Perales PA-C   oxyCODONE (oxyCONTIN) 10 MG 12 hr tablet Take 10 mg by mouth 4 (Four) Times a Day As Needed.   Yes Promise Tovar MD    pantoprazole (Protonix) 40 MG EC tablet Take 1 tablet by mouth Daily. 1/26/21  Yes Blaine Perales PA-C   polyethylene glycol (MIRALAX) packet Take 17 g by mouth Daily As Needed.   Yes Provider, MD Promise   promethazine (PHENERGAN) 25 MG suppository Insert 1 suppository into the rectum Every 6 (Six) Hours As Needed for Nausea or Vomiting. 11/24/19  Yes Jeff Rivas APRN   Prucalopride Succinate (Motegrity) 2 MG tablet Take 1 tablet by mouth Daily. 1/26/21  Yes Blaine Perales PA-C   spironolactone (ALDACTONE) 25 MG tablet TAKE 1 TABLET BY MOUTH ONCE DAILY 6/11/19  Yes Maureen Cardoso APRN   polyethylene glycol (Golytely) 236 g solution Take 240 mL by mouth Daily. 3/5/21   Blaine Perales PA-C       insulin aspart, 0-9 Units, Subcutaneous, TID AC  pantoprazole, 40 mg, Intravenous, Q AM  sodium chloride, 10 mL, Intravenous, Q12H      sodium chloride, 50 mL/hr, Last Rate: 50 mL/hr (03/08/21 1729)        Objective    Temp:  [97 °F (36.1 °C)-97.4 °F (36.3 °C)] 97 °F (36.1 °C)  Heart Rate:  [81-94] 94  Resp:  [18] 18  BP: (137-174)/() 159/77    Physical Exam  Constitutional:       Comments: Better appearance  Off of NGT   HENT:      Head: Normocephalic.      Mouth/Throat:      Mouth: Mucous membranes are moist.   Eyes:      Extraocular Movements: Extraocular movements intact.   Cardiovascular:      Rate and Rhythm: Regular rhythm.      Heart sounds: Normal heart sounds.   Pulmonary:      Breath sounds: Normal breath sounds.   Abdominal:      Palpations: Abdomen is soft.      Comments: with BS normal in 4 quadrants; no tenderness   Musculoskeletal:         General: Normal range of motion.      Cervical back: Normal range of motion and neck supple.   Skin:     General: Skin is warm.   Neurological:      General: No focal deficit present.      Mental Status: She is alert. Mental status is at baseline.   Psychiatric:         Mood and Affect: Mood normal.             Results Review:  I have reviewed  the labs, radiology results, and diagnostic studies.    Laboratory Data:   Results from last 7 days   Lab Units 03/09/21  0500 03/08/21  0609 03/07/21  0555 03/06/21 2020   SODIUM mmol/L 138 137 140 138   POTASSIUM mmol/L 3.8 4.0 3.8 3.9   CHLORIDE mmol/L 105 106 107 102   CO2 mmol/L 25.0 24.0 26.0 23.0   BUN mg/dL 10 11 12 11   CREATININE mg/dL 0.76 0.79 0.88 0.95   GLUCOSE mg/dL 88 88 95 105*   CALCIUM mg/dL 8.7 8.2* 8.2* 9.3   BILIRUBIN mg/dL 1.1 1.3*  --  1.7*   ALK PHOS U/L 99 98  --  118*   ALT (SGPT) U/L 22 24  --  32   AST (SGOT) U/L 37* 36*  --  51*   ANION GAP mmol/L 8.0 7.0 7.0 13.0     Estimated Creatinine Clearance: 85.4 mL/min (by C-G formula based on SCr of 0.76 mg/dL).          Results from last 7 days   Lab Units 03/09/21  0500 03/08/21  0609 03/07/21  0555 03/06/21 2020   WBC 10*3/mm3 4.90 5.24 6.38 9.56   HEMOGLOBIN g/dL 12.7 13.1 12.9 15.5   HEMATOCRIT % 37.7 37.9 38.2 45.2   PLATELETS 10*3/mm3 69* 86* 80* 89*     Results from last 7 days   Lab Units 03/07/21  0036   INR  1.31*       Culture Data:   No results found for: BLOODCX  No results found for: URINECX  No results found for: RESPCX  No results found for: WOUNDCX  No results found for: STOOLCX  No components found for: BODYFLD    Radiology Data:   Imaging Results (Last 24 Hours)     ** No results found for the last 24 hours. **          I have reviewed the patient's current medications.     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 Based on her home meds, she takes metformin; I would recommend to stop this medication since her A1C is 5.1, unless primary care physician is providing this medication for another reason    Not on heparinoids products due to thrombocytopenia    Discharge Planning: In progress    Samy Ramachandran MD      Electronically signed by Samy Ramachandran MD at 03/09/21 Steve Wylie RN  [facility]  [phone]  Fax [phone]

## 2021-03-11 ENCOUNTER — READMISSION MANAGEMENT (OUTPATIENT)
Dept: CALL CENTER | Facility: HOSPITAL | Age: 61
End: 2021-03-11

## 2021-03-11 NOTE — OUTREACH NOTE
Prep Survey      Responses   [facility] facility patient discharged from?  [facility] [place]   Is LACE score < 7 ?  No   Emergency Room discharge w/ pulse ox?  No   Eligibility  Readm Mgmt   Discharge diagnosis  SBO (small bowel obstruction   Does the patient have one of the following disease processes/diagnoses(primary or secondary)?  Other   Does the patient have Home health ordered?  No   Is there a DME ordered?  No   Prep survey completed?  Yes          Simona Mckinney RN

## 2021-03-11 NOTE — PAYOR COMM NOTE
"Ivory Jerez  [facility]  P: [phone]  F: [phone]    [facility]#[id number]    Amira Coleman (60 y.o. Female)     Date of Birth Social Security Number Address Home Phone MRN    1960  [id number] [phone] [id number]    [employer] Status          [employer]        Admission Date Admission Type Admitting Provider Attending Provider Department, Room/Bed    3/6/21 Emergency Nolan Brunson MD  [address], 313/1    Discharge Date Discharge Disposition Discharge Destination        3/10/2021 Home or Self Care              Attending Provider: (none)   Allergies: Other, Corticosteroids, Kenalog  [Triamcinolone Acetonide], Sulfa Antibiotics    Isolation: None   Infection: None   Code Status: Prior    Ht: 162.6 cm (64\")   Wt: 91.6 kg (202 lb)    Admission Cmt: None   Principal Problem: None                Active Insurance as of 3/6/2021     Primary Coverage     Payor Plan Insurance Group Employer/Plan Group    [place] [id number]     Payor Plan Address Payor Plan Phone Number Payor Plan Fax Number Effective Dates    [employer] [address] [address] [phone]  1/1/2020 - None Entered    Formerly [facility] [id number]       Subscriber Name Subscriber Birth Date Member ID       AMIRA COLEMAN 1960 [id number]                 Emergency Contacts      (Rel.) Home Phone Work Phone Mobile Phone    ShivaAlfonsovenita Conrad (Spouse) [phone] -- [phone]    alfonso coleman (Son) [phone] -- [phone]               Discharge Summary      Samy Ramachandran MD at 03/10/21 0937              [facility] Medicine Services  DISCHARGE SUMMARY       Date of Admission: 3/6/2021  Date of Discharge:  3/10/2021  Primary Care Physician: Yobany Barr MD    Presenting Problem/History of Present Illness:  SBO (small bowel obstruction) (CMS/[facility]) " [K56.609]  Liver nodule [K76.89]  Hepatic cirrhosis, unspecified hepatic cirrhosis type, unspecified whether ascites present (CMS/HCC) [K74.60]       Final Discharge Diagnoses:  Abdominal pain     Due to below; resolved     SBO     Had 2 bowel movements yesterday; advance her diet and discharge home if tolerated; discussed with surgeon and he ok the discharge home       Liver nodule     incidental finding; will need outpatient follow up by GI and by Oncologist     Chronic medical problems     Chronic liver disease, 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 Based on her home meds, she takes metformin; I would recommend to stop this medication since her A1C is 5.1, unless primary care physician is providing this medication for another reason     Not on heparinoids products due to thrombocytopenia      Consults:   Consults     Date and Time Order Name Status Description    3/7/2021  9:05 AM Inpatient General Surgery Consult Completed           Procedures Performed: None               Pertinent Test Results:   Lab Results (most recent)     Procedure Component Value Units Date/Time    Manual Differential [[id number]]  (Abnormal) Collected: 03/10/21 0550    Specimen: Blood Updated: 03/10/21 0810     Neutrophil % 56.0 %      Lymphocyte % 29.0 %      Monocyte % 9.0 %      Eosinophil % 3.0 %      Basophil % 2.0 %      Bands %  1.0 %      Neutrophils Absolute 2.60 10*3/mm3      Lymphocytes Absolute 1.32 10*3/mm3      Monocytes Absolute 0.41 10*3/mm3      Eosinophils Absolute 0.14 10*3/mm3      Basophils Absolute 0.09 10*3/mm3      Microcytes Slight/1+     WBC Morphology Normal     Platelet Estimate Decreased     Clumped Platelets Present    Basic Metabolic Panel [[id number]]  (Abnormal) Collected: 03/10/21 0550    Specimen: Blood  Updated: 03/10/21 0642     Glucose 102 mg/dL      BUN 6 mg/dL      Creatinine 0.77 mg/dL      Sodium 139 mmol/L      Potassium 3.9 mmol/L      Chloride 104 mmol/L      CO2 27.0 mmol/L      Calcium 8.5 mg/dL      eGFR Non African Amer 76 mL/min/1.73      BUN/Creatinine Ratio 7.8     Anion Gap 8.0 mmol/L     Narrative:      GFR Normal >60  Chronic Kidney Disease <60  Kidney Failure <15      CBC & Differential [[id number]]  (Abnormal) Collected: 03/10/21 0550    Specimen: Blood Updated: 03/10/21 0638    Narrative:      The following orders were created for panel order CBC & Differential.  Procedure                               Abnormality         Status                     ---------                               -----------         ------                     Scan Slide[[id number]]                                                                  CBC Auto Differential[[id number]]        Abnormal            Final result                 Please view results for these tests on the individual orders.    CBC Auto Differential [[id number]]  (Abnormal) Collected: 03/10/21 0550    Specimen: Blood Updated: 03/10/21 0633     WBC 4.56 10*3/mm3      RBC 5.16 10*6/mm3      Hemoglobin 13.8 g/dL      Hematocrit 40.6 %      MCV 78.7 fL      MCH 26.7 pg      MCHC 34.0 g/dL      RDW 15.9 %      RDW-SD 44.4 fl      MPV 11.5 fL      Platelets 82 10*3/mm3      Neutrophil % 67.4 %      Lymphocyte % 24.1 %      Monocyte % 5.0 %      Eosinophil % 2.9 %      Basophil % 0.4 %      Immature Grans % 0.2 %      Neutrophils, Absolute 3.07 10*3/mm3      Lymphocytes, Absolute 1.10 10*3/mm3      Monocytes, Absolute 0.23 10*3/mm3      Eosinophils, Absolute 0.13 10*3/mm3      Basophils, Absolute 0.02 10*3/mm3      Immature Grans, Absolute 0.01 10*3/mm3      nRBC 0.0 /100 WBC     Comprehensive Metabolic Panel [[id number]]  (Abnormal) Collected: 03/09/21 0500    Specimen: Blood Updated: 03/09/21 0544     Glucose 88 mg/dL      BUN 10 mg/dL      Creatinine 0.76  mg/dL      Sodium 138 mmol/L      Potassium 3.8 mmol/L      Chloride 105 mmol/L      CO2 25.0 mmol/L      Calcium 8.7 mg/dL      Total Protein 6.1 g/dL      Albumin 3.30 g/dL      ALT (SGPT) 22 U/L      AST (SGOT) 37 U/L      Alkaline Phosphatase 99 U/L      Total Bilirubin 1.1 mg/dL      eGFR Non African Amer 77 mL/min/1.73      Globulin 2.8 gm/dL      A/G Ratio 1.2 g/dL      BUN/Creatinine Ratio 13.2     Anion Gap 8.0 mmol/L     Narrative:      GFR Normal >60  Chronic Kidney Disease <60  Kidney Failure <15      CBC & Differential [[id number]]  (Abnormal) Collected: 03/09/21 0500    Specimen: Blood Updated: 03/09/21 0523    Narrative:      The following orders were created for panel order CBC & Differential.  Procedure                               Abnormality         Status                     ---------                               -----------         ------                     Scan Slide[[id number]]                                                                  CBC Auto Differential[[id number]]        Abnormal            Final result                 Please view results for these tests on the individual orders.    CBC Auto Differential [[id number]]  (Abnormal) Collected: 03/09/21 0500    Specimen: Blood Updated: 03/09/21 0523     WBC 4.90 10*3/mm3      RBC 4.74 10*6/mm3      Hemoglobin 12.7 g/dL      Hematocrit 37.7 %      MCV 79.5 fL      MCH 26.8 pg      MCHC 33.7 g/dL      RDW 16.2 %      RDW-SD 45.8 fl      MPV 10.9 fL      Platelets 69 10*3/mm3      Neutrophil % 64.1 %      Lymphocyte % 25.9 %      Monocyte % 5.9 %      Eosinophil % 3.1 %      Basophil % 0.4 %      Immature Grans % 0.6 %      Neutrophils, Absolute 3.14 10*3/mm3      Lymphocytes, Absolute 1.27 10*3/mm3      Monocytes, Absolute 0.29 10*3/mm3      Eosinophils, Absolute 0.15 10*3/mm3      Basophils, Absolute 0.02 10*3/mm3      Immature Grans, Absolute 0.03 10*3/mm3      nRBC 0.0 /100 WBC     POC Glucose Once [[id number]]  (Normal) Collected:  03/08/21 1957    Specimen: Blood Updated: 03/08/21 2030     Glucose 87 mg/dL      Comment: RN NotifiedOperator: [id number] LUPE Mcgrath ID: [id number]       POC Glucose Once [[id number]]  (Normal) Collected: 03/08/21 1013    Specimen: Blood Updated: 03/08/21 1039     Glucose 82 mg/dL      Comment: : [id number] CROW Mane ID: [id number]       Comprehensive Metabolic Panel [[id number]]  (Abnormal) Collected: 03/08/21 0609    Specimen: Blood Updated: 03/08/21 0643     Glucose 88 mg/dL      BUN 11 mg/dL      Creatinine 0.79 mg/dL      Sodium 137 mmol/L      Potassium 4.0 mmol/L      Chloride 106 mmol/L      CO2 24.0 mmol/L      Calcium 8.2 mg/dL      Total Protein 6.3 g/dL      Albumin 3.30 g/dL      ALT (SGPT) 24 U/L      AST (SGOT) 36 U/L      Alkaline Phosphatase 98 U/L      Total Bilirubin 1.3 mg/dL      eGFR Non African Amer 74 mL/min/1.73      Globulin 3.0 gm/dL      A/G Ratio 1.1 g/dL      BUN/Creatinine Ratio 13.9     Anion Gap 7.0 mmol/L     Narrative:      GFR Normal >60  Chronic Kidney Disease <60  Kidney Failure <15      Basic Metabolic Panel [[id number]]  (Abnormal) Collected: 03/07/21 0555    Specimen: Blood Updated: 03/07/21 0632     Glucose 95 mg/dL      BUN 12 mg/dL      Creatinine 0.88 mg/dL      Sodium 140 mmol/L      Potassium 3.8 mmol/L      Chloride 107 mmol/L      CO2 26.0 mmol/L      Calcium 8.2 mg/dL      eGFR Non African Amer 65 mL/min/1.73      BUN/Creatinine Ratio 13.6     Anion Gap 7.0 mmol/L     Narrative:      GFR Normal >60  Chronic Kidney Disease <60  Kidney Failure <15      aPTT [[id number]]  (Normal) Collected: 03/07/21 0036    Specimen: Blood Updated: 03/07/21 0151     PTT 33.8 seconds     Narrative:      The recommended Heparin therapeutic range is 68-97 seconds.    Protime-INR [[id number]]  (Abnormal) Collected: 03/07/21 0036    Specimen: Blood Updated: 03/07/21 0151     Protime 16.9 Seconds      INR 1.31    Narrative:      Therapeutic range for most  indications is 2.0-3.0 INR,  or 2.5-3.5 for mechanical heart valves.    Urinalysis With Microscopic If Indicated (No Culture) - Urine, Clean Catch [[id number]]  (Abnormal) Collected: 03/06/21 2125    Specimen: Urine, Clean Catch Updated: 03/06/21 2136     Color, UA Dark Yellow     Appearance, UA Clear     pH, UA 6.0     Specific Gravity, UA 1.028     Glucose, UA Negative     Ketones, UA Trace     Bilirubin, UA Small (1+)     Blood, UA Negative     Protein, UA Trace     Leuk Esterase, UA Negative     Nitrite, UA Negative     Urobilinogen, UA 2.0 E.U./dL    Narrative:      Urine microscopic not indicated.    Lipase [[id number]]  (Normal) Collected: 03/06/21 2020    Specimen: Blood Updated: 03/06/21 2047     Lipase 16 U/L     Amylase [[id number]]  (Normal) Collected: 03/06/21 2020    Specimen: Blood Updated: 03/06/21 2047     Amylase 29 U/L     Scan Slide [[id number]] Collected: 03/06/21 2020    Specimen: Blood Updated: 03/06/21 2041     Acanthocytes Mod/2+     WBC Morphology Normal     Platelet Estimate Decreased     Clumped Platelets --     Comment: NONE SEEN       Lactic Acid, Plasma [[id number]]  (Normal) Collected: 03/06/21 2020    Specimen: Blood Updated: 03/06/21 2037     Lactate 1.4 mmol/L     COVID-19 and FLU A/B PCR - Swab, Nasopharynx [[id number]]  (Normal) Collected: 03/06/21 1944    Specimen: Swab from Nasopharynx Updated: 03/06/21 2030     COVID19 Not Detected     Influenza A PCR Not Detected     Influenza B PCR Not Detected    Narrative:      Fact sheet for providers: https://www.fda.gov/media/954485/download    Fact sheet for patients: https://www.fda.gov/media/084906/download    Test performed by PCR.        Imaging Results (Most Recent)     Procedure Component Value Units Date/Time    FL Small Bowel Follow Through Single-Contrast [[id number]] Collected: 03/08/21 0758     Updated: 03/08/21 1438    Narrative:      Procedure: Gastrografin small bowel follow-through    Reason for exam: Small bowel  obstruction.    FINDINGS: Surgical protocol Gastrografin small bowel  follow-through was performed. The  film of the abdomen is  unremarkable. Gastrografin was administered through the NG tube.  Incidental finding of second portion duodenal diverticula. The  small bowel is otherwise unremarkable. Six-hour image reveals the  Gastrografin to have extended through the entire small bowel and  is now within the region of the colon.      Impression:      1.  Second portion duodenal diverticula.  2.  Gastrografin small bowel findings as described above would be  suspicious for ileus versus low-grade partial  small bowel  obstruction. Gastrografin does make its way into the colon.  Recommend clinical correlation.    Electronically signed by:  Lenard Del Rio MD  3/8/2021 2:37 PM CST  Workstation: [id number]    XR Abdomen KUB [[id number]] Collected: 03/07/21 1117     Updated: 03/07/21 1216    Narrative:      PROCEDURE: XR ABDOMEN 1 VIEW (KUB)    Clinical History: check NGT placement, K56.609 Unspecified  intestinal obstruction, unspecified as to partial versus complete  obstruction K74.60 Unspecified cirrhosis of liver K76.89 Other  specified diseases of liver    Indication:     Same as above     Comparison:    8/12/2019     Technique:    Single supine view of the abdomen and pelvis.    Findings:     A nonspecific bowel gas pattern is noted. The tip of the  orogastric tube is in the proximal body of the stomach    Radiographic contrast is seen in the urinary bladder.    There is no gross evidence of free air in the abdomen or the  pelvis on this single supine view of the abdomen and pelvis.    There is no visualization of any radiopaque calculi in the  outline of the urinary tract on either side.    There is no significant constipation.       Impression:      Impression:    A nonspecific bowel gas pattern is noted.   The tip of the orogastric tube is in the proximal body of the  stomach    Electronically signed by:  Emiliano  Marce VAUGHAN  3/7/2021 12:14 PM CST  Workstation: [id number]    CT Abdomen Pelvis With Contrast [[id number]] Collected: 03/06/21 2215     Updated: 03/06/21 2247    Narrative:      CT ABDOMEN PELVIS WITH IV CONTRAST    INDICATION: 60 years Female; abd pain    TECHNIQUE:  CT scan of the abdomen and pelvis was performed with  IV contrast.  This exam was performed according to our  departmental dose-optimization program, which includes automated  exposure control, adjustment of the mA and/or kV according to  patient size and/or use of iterative reconstruction technique.    Comparison: 11/24/2019.    FINDINGS:  Liver: The liver demonstrates cirrhotic morphology. There is  suggestion of numerous tiny hypodense nodules in the liver which  suggests regenerative nodules. A discrete hypodense lesion in the  right hepatic dome measures 8 mm which is new from prior study  and indeterminate.  Gallbladder/Biliary tree: Status post cholecystectomy. No  significant biliary dilatation.  Pancreas: Unremarkable.  Spleen: Splenomegaly.  Adrenals: Unremarkable.  Genitourinary: Status post hysterectomy. No adnexal masses. No  hydronephrosis or nephrolithiasis.  Gastrointestinal: No gastric wall thickening. Multiple loops of  dilated small bowel throughout the abdomen with air-fluid levels  and surrounding mesenteric edema with decompressed loops of small  bowel in the right lower quadrant which could possibly represent  a transition point and suggests either acute small bowel  obstruction or high-grade ileus. Some of the decompressed loops  of small bowel demonstrate mild wall thickening. No free air.  Appendix is not identified. Moderate stool throughout the colon.  Peritoneum: Unremarkable.  Vasculature: Paraesophageal and perigastric varices.   Lymph nodes: No pathologically enlarged lymph nodes.  Bones: Unremarkable.  Soft tissues: Unremarkable.  Incidental findings: None.      Impression:      Multiple loops of dilated small bowel  throughout the abdomen with  air-fluid levels and surrounding mesenteric edema with  decompressed loops of small bowel in the right lower quadrant  which could possibly represent a transition point and suggests  either acute small bowel obstruction or high-grade ileus.     Cirrhosis with evidence of portal hypertension. Numerous tiny  nodules in the liver suggest regenerative nodules. A discrete  hypodense lesion in the right hepatic dome measures 8 mm which is  new from prior study and indeterminate. Recommend follow-up CT or  MRI liver mass protocol.    Electronically signed by:  Gina Meier  3/6/2021 10:45 PM CST  Workstation: [id number]    XR Chest 1 View [[id number]] Collected: 03/06/21 1905     Updated: 03/06/21 1953    Narrative:        PORTABLE CHEST    HISTORY: Abdominal pain    Portable AP upright film of the chest was obtained at 6:43 PM.  COMPARISON: June 8, 2019    FINDINGS:   Low lung volumes.  No focal infiltrate.  Old granulomatous disease is present.  The heart is not enlarged.  The pulmonary vasculature is not increased.  No pleural effusion.  No pneumothorax.  No acute osseous abnormality.  Degenerative changes are present in the thoracic spine.  Cholecystectomy.      Impression:      CONCLUSION:  Low lung volumes.  No focal infiltrate.    [id number]    Electronically signed by:  Walker Stevens MD  3/6/2021 7:52 PM CST  Workstation: [email or web address]          Chief Complaint on Day of Discharge: None    Hospital Course:  The patient is a 60 y.o. female who presented to [facility] with abdominal pain due to small bowel obstruction; NGT was placed and then under surgeon direction, a small bowel series follow through was done and later on it was resolved; then we started on diet and advance as tolerated. Regular diet was given for lunch and one hour after she has tolerated it well; she is eager to go home now.     Condition on Discharge:  stable    Physical Exam on Discharge:  /76 (BP  "Location: Right arm, Patient Position: Lying)   Pulse 67   Temp 96.9 °F (36.1 °C) (Temporal)   Resp 18   Ht 162.6 cm (64\")   Wt 91.6 kg (202 lb)   LMP  (LMP Unknown)   SpO2 95%   BMI 34.67 kg/m²   Physical Exam  Constitutional:       Appearance: Normal appearance.   HENT:      Head: Normocephalic.      Nose: Nose normal.      Mouth/Throat:      Mouth: Mucous membranes are moist.   Eyes:      Extraocular Movements: Extraocular movements intact.   Cardiovascular:      Rate and Rhythm: Normal rate and regular rhythm.      Heart sounds: Normal heart sounds.   Pulmonary:      Breath sounds: Normal breath sounds.   Abdominal:      General: Bowel sounds are normal.      Palpations: Abdomen is soft.   Musculoskeletal:         General: Normal range of motion.      Cervical back: Normal range of motion and neck supple.   Skin:     General: Skin is warm.   Neurological:      General: No focal deficit present.      Mental Status: She is alert. Mental status is at baseline.   Psychiatric:         Mood and Affect: Mood normal.         Behavior: Behavior normal.           Discharge Disposition: Home      Discharge Medications:     Your medication list      CONTINUE taking these medications      Instructions Last Dose Given Next Dose Due   cetirizine 10 MG tablet  Commonly known as: zyrTEC      Take 1 tablet by mouth Daily.       cyclobenzaprine 10 MG tablet  Commonly known as: FLEXERIL      Take 10 mg by mouth 3 (Three) Times a Day As Needed for Muscle Spasms.       diclofenac 75 MG EC tablet  Commonly known as: VOLTAREN      Take 1 tablet by mouth Daily.       escitalopram 5 MG tablet  Commonly known as: LEXAPRO      Take 5 mg by mouth Daily.       furosemide 20 MG tablet  Commonly known as: LASIX      Take 20 mg by mouth 2 (Two) Times a Day.       gabapentin 800 MG tablet  Commonly known as: NEURONTIN      Take 800 mg by mouth 4 (Four) Times a Day.       meclizine 25 MG tablet  Commonly known as: ANTIVERT      Take 50 mg " by mouth As Needed for Dizziness.       Melatonin 10 MG capsule      Take  by mouth Every Night.       Motegrity 2 MG tablet  Generic drug: Prucalopride Succinate      Take 1 tablet by mouth Daily.       Myrbetriq 50 MG tablet sustained-release 24 hour 24 hr tablet  Generic drug: Mirabegron ER      Take 50 mg by mouth Daily.       ondansetron 8 MG tablet  Commonly known as: ZOFRAN      Take 1 tablet by mouth Every 8 (Eight) Hours As Needed for Nausea or Vomiting.       oxyCODONE 10 MG 12 hr tablet  Commonly known as: oxyCONTIN      Take 10 mg by mouth Every 12 (Twelve) Hours.       pantoprazole 40 MG EC tablet  Commonly known as: Protonix      Take 1 tablet by mouth Daily.       spironolactone 25 MG tablet  Commonly known as: ALDACTONE      TAKE 1 TABLET BY MOUTH ONCE DAILY          STOP taking these medications    Golytely 236 g solution  Generic drug: polyethylene glycol        metFORMIN 500 MG tablet  Commonly known as: GLUCOPHAGE        nystatin 355757 UNIT/GM powder  Commonly known as: MYCOSTATIN        polyethylene glycol packet  Commonly known as: MIRALAX        promethazine 25 MG suppository  Commonly known as: Phenergan                Discharge Diet: regular diet    Activity at Discharge: as tolerated    Discharge Care Plan/Instructions: see chart    Follow-up Appointments:   Future Appointments   Date Time Provider Department Center   4/20/2021 10:40 AM Blaine Perales PA-C [facility] None       Test Results Pending at Discharge:     Rosa Valencia MD    Time: 26 min              Electronically signed by Rosa Valencia MD at 03/10/21 1420       Discharge Order (From admission, onward)     Start     Ordered    03/10/21 1326  Discharge patient  Once     Expected Discharge Date: 03/10/21    Expected Discharge Time: Afternoon    Discharge Disposition: Home or Self Care    Physician of Record for Attribution - Please select from Treatment Team: ROSA VALENCIA [[id number]]    Review needed by CMO to  determine Physician of Record: No       Question Answer Comment   Physician of Record for Attribution - Please select from Treatment Team ROSA VALENCIA    Review needed by CMO to determine Physician of Record No        03/10/21 4755

## 2021-03-15 ENCOUNTER — READMISSION MANAGEMENT (OUTPATIENT)
Dept: CALL CENTER | Facility: HOSPITAL | Age: 61
End: 2021-03-15

## 2021-03-15 NOTE — OUTREACH NOTE
Medical Week 1 Survey      Responses   [facility] patient discharged from?  [place]   Does the patient have one of the following disease processes/diagnoses(primary or secondary)?  Other   Week 1 attempt successful?  Yes   Call start time  1454   Call end time  1458   Discharge diagnosis  SBO (small bowel obstruction   Is patient permission given to speak with other caregiver?  No   Meds reviewed with patient/caregiver?  Yes   Is the patient having any side effects they believe may be caused by any medication additions or changes?  No   Does the patient have all medications ordered at discharge?  N/A   Is the patient taking all medications as directed (includes completed medication regime)?  Yes   Does the patient have a primary care provider?   Yes   Does the patient have an appointment with their PCP within 7 days of discharge?  Yes   Comments regarding PCP  Dr David   Has the patient kept scheduled appointments due by today?  Yes   Has home health visited the patient within 72 hours of discharge?  N/A   Psychosocial issues?  No   Did the patient receive a copy of their discharge instructions?  Yes   Nursing interventions  Reviewed instructions with patient, Educated on MyChart   What is the patient's perception of their health status since discharge?  Worsening [She feels like she is getting constipated - She has had mag citrate and Mirilax- She is having nausea- She is thinking about coming back to the Ed. She does have call into Dr. Ricardo. If she does not hear from him she is going to the ED. ]   Is the patient/caregiver able to teach back signs and symptoms related to disease process for when to call PCP?  Yes   Is the patient/caregiver able to teach back signs and symptoms related to disease process for when to call 911?  Yes   Is the patient/caregiver able to teach back the hierarchy of who to call/visit for symptoms/problems? PCP, Specialist, Home health nurse, Urgent Care, ED, 911  Yes   If the  patient is a current smoker, are they able to teach back resources for cessation?  Not a smoker   Week 1 call completed?  Yes          Sonia Patel RN

## 2021-03-16 ENCOUNTER — APPOINTMENT (OUTPATIENT)
Dept: CT IMAGING | Facility: HOSPITAL | Age: 61
End: 2021-03-16

## 2021-03-16 ENCOUNTER — TELEPHONE (OUTPATIENT)
Dept: GASTROENTEROLOGY | Facility: CLINIC | Age: 61
End: 2021-03-16

## 2021-03-16 ENCOUNTER — HOSPITAL ENCOUNTER (EMERGENCY)
Facility: HOSPITAL | Age: 61
Discharge: HOME | End: 2021-03-16
Attending: EMERGENCY MEDICINE | Admitting: EMERGENCY MEDICINE

## 2021-03-16 VITALS
HEART RATE: 80 BPM | SYSTOLIC BLOOD PRESSURE: 132 MMHG | TEMPERATURE: 99.3 F | BODY MASS INDEX: 34.49 KG/M2 | WEIGHT: 202 LBS | DIASTOLIC BLOOD PRESSURE: 66 MMHG | RESPIRATION RATE: 20 BRPM | OXYGEN SATURATION: 95 % | HEIGHT: 64 IN

## 2021-03-16 DIAGNOSIS — K59.00 CONSTIPATION, UNSPECIFIED CONSTIPATION TYPE: ICD-10-CM

## 2021-03-16 DIAGNOSIS — R10.9 ABDOMINAL PAIN, UNSPECIFIED ABDOMINAL LOCATION: Primary | ICD-10-CM

## 2021-03-16 DIAGNOSIS — K74.60 CIRRHOSIS OF LIVER WITHOUT ASCITES, UNSPECIFIED HEPATIC CIRRHOSIS TYPE (HCC): ICD-10-CM

## 2021-03-16 LAB
ALBUMIN SERPL-MCNC: 3.9 G/DL (ref 3.5–5.2)
ALBUMIN/GLOB SERPL: 1.3 G/DL
ALP SERPL-CCNC: 123 U/L (ref 39–117)
ALT SERPL W P-5'-P-CCNC: 39 U/L (ref 1–33)
ANION GAP SERPL CALCULATED.3IONS-SCNC: 8 MMOL/L (ref 5–15)
ANISOCYTOSIS BLD QL: NORMAL
APTT PPP: 35.9 SECONDS (ref 20–40.3)
AST SERPL-CCNC: 74 U/L (ref 1–32)
BACTERIA UR QL AUTO: ABNORMAL /HPF
BASOPHILS # BLD AUTO: 0.03 10*3/MM3 (ref 0–0.2)
BASOPHILS NFR BLD AUTO: 0.5 % (ref 0–1.5)
BILIRUB SERPL-MCNC: 1.2 MG/DL (ref 0–1.2)
BILIRUB UR QL STRIP: ABNORMAL
BUN SERPL-MCNC: 8 MG/DL (ref 8–23)
BUN/CREAT SERPL: 7 (ref 7–25)
CALCIUM SPEC-SCNC: 9.3 MG/DL (ref 8.6–10.5)
CHLORIDE SERPL-SCNC: 102 MMOL/L (ref 98–107)
CLARITY UR: CLEAR
CO2 SERPL-SCNC: 28 MMOL/L (ref 22–29)
COLOR UR: ABNORMAL
CREAT SERPL-MCNC: 1.15 MG/DL (ref 0.57–1)
DEPRECATED RDW RBC AUTO: 47.6 FL (ref 37–54)
EOSINOPHIL # BLD AUTO: 0.23 10*3/MM3 (ref 0–0.4)
EOSINOPHIL NFR BLD AUTO: 3.7 % (ref 0.3–6.2)
ERYTHROCYTE [DISTWIDTH] IN BLOOD BY AUTOMATED COUNT: 16.4 % (ref 12.3–15.4)
FLUAV RNA RESP QL NAA+PROBE: NOT DETECTED
FLUBV RNA RESP QL NAA+PROBE: NOT DETECTED
GFR SERPL CREATININE-BSD FRML MDRD: 48 ML/MIN/1.73
GLOBULIN UR ELPH-MCNC: 3.1 GM/DL
GLUCOSE SERPL-MCNC: 88 MG/DL (ref 65–99)
GLUCOSE UR STRIP-MCNC: NEGATIVE MG/DL
HCT VFR BLD AUTO: 40.1 % (ref 34–46.6)
HGB BLD-MCNC: 13.1 G/DL (ref 12–15.9)
HGB UR QL STRIP.AUTO: NEGATIVE
HOLD SPECIMEN: NORMAL
HYALINE CASTS UR QL AUTO: ABNORMAL /LPF
IMM GRANULOCYTES # BLD AUTO: 0.02 10*3/MM3 (ref 0–0.05)
IMM GRANULOCYTES NFR BLD AUTO: 0.3 % (ref 0–0.5)
INR PPP: 1.28 (ref 0.8–1.2)
KETONES UR QL STRIP: ABNORMAL
LEUKOCYTE ESTERASE UR QL STRIP.AUTO: ABNORMAL
LIPASE SERPL-CCNC: 30 U/L (ref 13–60)
LYMPHOCYTES # BLD AUTO: 1.07 10*3/MM3 (ref 0.7–3.1)
LYMPHOCYTES NFR BLD AUTO: 17.3 % (ref 19.6–45.3)
MCH RBC QN AUTO: 26.5 PG (ref 26.6–33)
MCHC RBC AUTO-ENTMCNC: 32.7 G/DL (ref 31.5–35.7)
MCV RBC AUTO: 81 FL (ref 79–97)
MONOCYTES # BLD AUTO: 0.47 10*3/MM3 (ref 0.1–0.9)
MONOCYTES NFR BLD AUTO: 7.6 % (ref 5–12)
NEUTROPHILS NFR BLD AUTO: 4.36 10*3/MM3 (ref 1.7–7)
NEUTROPHILS NFR BLD AUTO: 70.6 % (ref 42.7–76)
NITRITE UR QL STRIP: NEGATIVE
NRBC BLD AUTO-RTO: 0 /100 WBC (ref 0–0.2)
PH UR STRIP.AUTO: 6 [PH] (ref 5–9)
PLATELET # BLD AUTO: 85 10*3/MM3 (ref 140–450)
PMV BLD AUTO: 10.9 FL (ref 6–12)
POTASSIUM SERPL-SCNC: 4.3 MMOL/L (ref 3.5–5.2)
PROT SERPL-MCNC: 7 G/DL (ref 6–8.5)
PROT UR QL STRIP: NEGATIVE
PROTHROMBIN TIME: 16.6 SECONDS (ref 11.1–15.3)
RBC # BLD AUTO: 4.95 10*6/MM3 (ref 3.77–5.28)
RBC # UR: ABNORMAL /HPF
REF LAB TEST METHOD: ABNORMAL
SARS-COV-2 RNA RESP QL NAA+PROBE: NOT DETECTED
SMALL PLATELETS BLD QL SMEAR: NORMAL
SODIUM SERPL-SCNC: 138 MMOL/L (ref 136–145)
SP GR UR STRIP: 1.02 (ref 1–1.03)
SQUAMOUS #/AREA URNS HPF: ABNORMAL /HPF
UROBILINOGEN UR QL STRIP: ABNORMAL
WBC # BLD AUTO: 6.18 10*3/MM3 (ref 3.4–10.8)
WBC MORPH BLD: NORMAL
WBC UR QL AUTO: ABNORMAL /HPF

## 2021-03-16 PROCEDURE — 96375 TX/PRO/DX INJ NEW DRUG ADDON: CPT

## 2021-03-16 PROCEDURE — 85007 BL SMEAR W/DIFF WBC COUNT: CPT | Performed by: EMERGENCY MEDICINE

## 2021-03-16 PROCEDURE — 85025 COMPLETE CBC W/AUTO DIFF WBC: CPT | Performed by: EMERGENCY MEDICINE

## 2021-03-16 PROCEDURE — 83690 ASSAY OF LIPASE: CPT | Performed by: EMERGENCY MEDICINE

## 2021-03-16 PROCEDURE — 96374 THER/PROPH/DIAG INJ IV PUSH: CPT

## 2021-03-16 PROCEDURE — 80053 COMPREHEN METABOLIC PANEL: CPT | Performed by: EMERGENCY MEDICINE

## 2021-03-16 PROCEDURE — 74176 CT ABD & PELVIS W/O CONTRAST: CPT

## 2021-03-16 PROCEDURE — 81001 URINALYSIS AUTO W/SCOPE: CPT | Performed by: EMERGENCY MEDICINE

## 2021-03-16 PROCEDURE — 85730 THROMBOPLASTIN TIME PARTIAL: CPT | Performed by: EMERGENCY MEDICINE

## 2021-03-16 PROCEDURE — 85610 PROTHROMBIN TIME: CPT | Performed by: EMERGENCY MEDICINE

## 2021-03-16 PROCEDURE — 99283 EMERGENCY DEPT VISIT LOW MDM: CPT

## 2021-03-16 PROCEDURE — 87636 SARSCOV2 & INF A&B AMP PRB: CPT | Performed by: EMERGENCY MEDICINE

## 2021-03-16 PROCEDURE — 25010000002 ONDANSETRON PER 1 MG: Performed by: EMERGENCY MEDICINE

## 2021-03-16 PROCEDURE — 96361 HYDRATE IV INFUSION ADD-ON: CPT

## 2021-03-16 RX ORDER — ONDANSETRON 2 MG/ML
4 INJECTION INTRAMUSCULAR; INTRAVENOUS ONCE
Status: COMPLETED | OUTPATIENT
Start: 2021-03-16 | End: 2021-03-16

## 2021-03-16 RX ORDER — SODIUM CHLORIDE 0.9 % (FLUSH) 0.9 %
10 SYRINGE (ML) INJECTION AS NEEDED
Status: DISCONTINUED | OUTPATIENT
Start: 2021-03-16 | End: 2021-03-16 | Stop reason: HOSPADM

## 2021-03-16 RX ORDER — PANTOPRAZOLE SODIUM 40 MG/10ML
40 INJECTION, POWDER, LYOPHILIZED, FOR SOLUTION INTRAVENOUS ONCE
Status: COMPLETED | OUTPATIENT
Start: 2021-03-16 | End: 2021-03-16

## 2021-03-16 RX ORDER — ONDANSETRON 4 MG/1
4 TABLET, ORALLY DISINTEGRATING ORAL EVERY 8 HOURS PRN
Qty: 10 TABLET | Refills: 0 | Status: SHIPPED | OUTPATIENT
Start: 2021-03-16 | End: 2021-05-17 | Stop reason: SDUPTHER

## 2021-03-16 RX ORDER — SODIUM CHLORIDE 9 MG/ML
125 INJECTION, SOLUTION INTRAVENOUS CONTINUOUS
Status: DISCONTINUED | OUTPATIENT
Start: 2021-03-16 | End: 2021-03-16 | Stop reason: HOSPADM

## 2021-03-16 RX ADMIN — SODIUM CHLORIDE 125 ML/HR: 9 INJECTION, SOLUTION INTRAVENOUS at 14:46

## 2021-03-16 RX ADMIN — PANTOPRAZOLE SODIUM 40 MG: 40 INJECTION, POWDER, FOR SOLUTION INTRAVENOUS at 14:47

## 2021-03-16 RX ADMIN — ONDANSETRON 4 MG: 2 INJECTION INTRAMUSCULAR; INTRAVENOUS at 14:47

## 2021-03-16 NOTE — ED PROVIDER NOTES
Subjective   61yo female pmh significant htn/MUSA/cirrhosis/noé, recently discharged [facility] 03.10.2021 secondary to SBO/cirrhosis/liver nodule, presents ED c/o 1wk hx progressive abdominal distension/obstipation/nausea/cramping abdominal discomfort.  Pt reports last bm 1wk previously.  ROS (+) passage flatus.  ROS neg fever/chills/cough/chest pain/soa/dysuria/melena/ hematochoezia/hematemesis.      History provided by:  Patient and spouse  Abdominal Pain  Pain location:  Generalized  Pain quality: bloating    Pain radiates to:  Does not radiate  Pain severity:  Moderate  Onset quality:  Gradual  Duration:  1 week  Associated symptoms: constipation and nausea    Associated symptoms: no vomiting        Review of Systems   Constitutional: Negative.    HENT: Negative.    Respiratory: Negative.    Cardiovascular: Negative.    Gastrointestinal: Positive for abdominal pain, constipation and nausea. Negative for blood in stool and vomiting.   Genitourinary: Negative.    Musculoskeletal: Negative.    Skin: Negative.    Allergic/Immunologic: Positive for immunocompromised state.   All other systems reviewed and are negative.      Past Medical History:   Diagnosis Date   • Acid reflux    • Altered bowel function    • Arthropathy of lumbar facet joint    • Bleeding disorder (CMS/HCC)    • C. difficile diarrhea 2015   • Constipation    • Corns and callus    • Depression    • Disease related peripheral neuropathy    • Epigastric pain    • Fatty liver    • Hammer toe    • Headache    • Hiatal hernia    • History of transfusion    • Hyperlipidemia    • Knee pain    • Localized, primary osteoarthritis of the ankle and foot     Localized, primary osteoarthritis of the ankle and/or foot   • Mendoza's metatarsalgia     Mendoza's metatarsalgia - 2nd interspace on right   • Nausea and vomiting    • Neuralgia and neuritis     Neuralgia, neuritis, and radiculitis, unspecified   • Neuropathy    • Obstructive sleep apnea     Obstructive sleep  "apnea (adult) (pediatric)    • CHAI on CPAP     \"C-Pap at night  (unconfirmed)\"   • Osteoarthritis    • Pain in foot     Pain in unspecified foot - sees a podiatrist   • Pain in joint, ankle and foot     Joint pain in ankle and foot      • Pain radiating to back     Pain radiating to lumbar region of back   • Plantar fasciitis    • PONV (postoperative nausea and vomiting)    • Restless leg syndrome    • Secondary hypertension     Secondary hypertension, unspecified   • Sinusitis    • Sleep apnea    • Tongue anomaly     lesion       Allergies   Allergen Reactions   • Other      Pt states that taking steroids either in pill or injection form make her have blisters in her mouth and she feels like she is on fire on the inside/ has hx of c diff and possible MRSA     • Corticosteroids Rash   • Kenalog  [Triamcinolone Acetonide] Rash   • Sulfa Antibiotics Rash     Sulfa (Sulfonamide Antibiotics)       Past Surgical History:   Procedure Laterality Date   • CARPAL TUNNEL RELEASE Left 2018    Procedure: CARPAL TUNNEL RELEASE - left;  Surgeon: Justus Lewis MD;  Location: [facility] OR;  Service: Orthopedics   • CARPAL TUNNEL RELEASE Right 2019    Procedure: carpal tunnel release right hand with local/monitored anesthesia care;  Surgeon: Justus Lewis MD;  Location: [facility] OR;  Service: Orthopedics   •  SECTION     • CHOLECYSTECTOMY     • COLONOSCOPY  2013   • COLONOSCOPY N/A 10/17/2018    Procedure: COLONOSCOPY;  Surgeon: Russell Del Rio MD;  Location: [facility] ENDOSCOPY;  Service: Gastroenterology   • DIRECT LARYNGOSCOPY, ESOPHAGOSCOPY, BRONCHOSCOPY N/A 2017    Procedure: DIRECT LARYNGOSCOPY AND;  Surgeon: Live Bolton MD;  Location: [facility] OR;  Service:    • ENDOSCOPY  2013    Colon endoscopy 18141 (1) - Internal & external hemorrhoids found. Stool found.   • ENDOSCOPY  2013    EGD w/ tube 06561 (1) - Normal esophagus. Gastritis in stomach. Biopsy taken. Normal " dudoenum. Biopsy taken.   • ENDOSCOPY N/A 10/17/2018    Procedure: ESOPHAGOGASTRODUODENOSCOPY--eval varices;  Surgeon: Russell Del Rio MD;  Location: [facility] ENDOSCOPY;  Service: Gastroenterology   • FOOT SURGERY  2013    Foot/toes surgery procedure (1) - Arthroplasty of toes 4 and 5 of right foot.   • HERNIA REPAIR     • HERNIA REPAIR      hital   • HYSTERECTOMY     • LIVER BIOPSY     • NERVE BLOCK  2016    Injection for nerve block (1) - Lumbar medial branch block.   • OTHER SURGICAL HISTORY  2012    Inj(s) Tend-Sheath, Ligament, Single  (1) - [facility]    • OTHER SURGICAL HISTORY  2013    Small Joint Injection/Aspiration  (2) - [facility]TER NICKERSON ([facility])    • OTHER SURGICAL HISTORY      bowel obstruction x2   • OTHER SURGICAL HISTORY      gland removed from neck   • SUBLINGUAL SALIVARY CYST EXCISION N/A 2017    Procedure: EXCISION OF LEFT  TONGUE LESION WITH CLOSURE;  Surgeon: Live Bolton MD;  Location: [facility] OR;  Service:    • TUBAL ABDOMINAL LIGATION     • UPPER GASTROINTESTINAL ENDOSCOPY  2013   • UPPER GASTROINTESTINAL ENDOSCOPY  10/17/2018       Family History   Problem Relation Age of Onset   • Cancer Other    • Diabetes Other    • Heart disease Other    • Hypertension Mother    • Cancer Mother    • Diabetes Mother    • Hypertension Father    • Heart attack Father    • Cancer Father    • Heart disease Father    • Thyroid disease Maternal Aunt        Social History     Socioeconomic History   • Marital status:      Spouse name: Not on file   • Number of children: Not on file   • Years of education: Not on file   • Highest education level: Not on file   Tobacco Use   • Smoking status: Former Smoker     Packs/day: 2.00     Years: 15.00     Pack years: 30.00     Types: Cigarettes     Quit date:      Years since quittin.2   • Smokeless tobacco: Never Used   Substance and Sexual Activity   • Alcohol use: No   • Drug use: No    • Sexual activity: Defer     Comment: Marital Status:            Objective   Physical Exam  Vitals and nursing note reviewed.   Constitutional:       Appearance: Normal appearance.   HENT:      Head: Normocephalic and atraumatic.      Mouth/Throat:      Mouth: Mucous membranes are moist.   Eyes:      Pupils: Pupils are equal, round, and reactive to light.   Cardiovascular:      Rate and Rhythm: Normal rate and regular rhythm.      Pulses: Normal pulses.      Heart sounds: Normal heart sounds. No murmur. No friction rub. No gallop.    Pulmonary:      Effort: Pulmonary effort is normal. No respiratory distress.      Breath sounds: Normal breath sounds. No wheezing, rhonchi or rales.   Abdominal:      General: Abdomen is protuberant. Bowel sounds are increased.      Palpations: Abdomen is soft.      Tenderness: There is no abdominal tenderness. There is no guarding or rebound. Negative signs include Fuentes's sign, Rovsing's sign and McBurney's sign.   Musculoskeletal:      Cervical back: Normal range of motion and neck supple. No rigidity.   Lymphadenopathy:      Cervical: No cervical adenopathy.   Skin:     General: Skin is warm and dry.   Neurological:      General: No focal deficit present.      Mental Status: She is alert and oriented to person, place, and time.         Procedures           ED Course      Labs Reviewed   COMPREHENSIVE METABOLIC PANEL - Abnormal; Notable for the following components:       Result Value    Creatinine 1.15 (*)     ALT (SGPT) 39 (*)     AST (SGOT) 74 (*)     Alkaline Phosphatase 123 (*)     eGFR Non  Amer 48 (*)     All other components within normal limits    Narrative:     GFR Normal >60  Chronic Kidney Disease <60  Kidney Failure <15     URINALYSIS W/ MICROSCOPIC IF INDICATED (NO CULTURE) - Abnormal; Notable for the following components:    Ketones, UA Trace (*)     Bilirubin, UA Small (1+) (*)     Leuk Esterase, UA Small (1+) (*)     All other components within  normal limits   PROTIME-INR - Abnormal; Notable for the following components:    Protime 16.6 (*)     INR 1.28 (*)     All other components within normal limits    Narrative:     Therapeutic range for most indications is 2.0-3.0 INR,  or 2.5-3.5 for mechanical heart valves.   CBC WITH AUTO DIFFERENTIAL - Abnormal; Notable for the following components:    MCH 26.5 (*)     RDW 16.4 (*)     Platelets 85 (*)     Lymphocyte % 17.3 (*)     All other components within normal limits   URINALYSIS, MICROSCOPIC ONLY - Abnormal; Notable for the following components:    Squamous Epithelial Cells, UA 3-5 (*)     All other components within normal limits   COVID-19 AND FLU A/B, NP SWAB IN TRANSPORT MEDIA 8-12 HR TAT - Normal    Narrative:     Fact sheet for providers: https://www.fda.gov/media/604517/download    Fact sheet for patients: https://www.fda.gov/media/714517/download    Test performed by PCR.   LIPASE - Normal   APTT - Normal    Narrative:     The recommended Heparin therapeutic range is 68-97 seconds.   SCAN SLIDE   CBC AND DIFFERENTIAL    Narrative:     The following orders were created for panel order CBC & Differential.  Procedure                               Abnormality         Status                     ---------                               -----------         ------                     Scan Slide[[id number]]                                       Final result               CBC Auto Differential[[id number]]        Abnormal            Final result                 Please view results for these tests on the individual orders.   EXTRA TUBES    Narrative:     The following orders were created for panel order Extra Tubes.  Procedure                               Abnormality         Status                     ---------                               -----------         ------                     Gold Top - SST[[id number]]                                   Final result                 Please view results for these tests on  the individual orders.   [facility]     CT Abdomen Pelvis Without Contrast    Result Date: 3/16/2021  Narrative: PROCEDURE: CT ABDOMEN PELVIS WO CONTRAST EXAMINATION:  Computed Tomography         REGION: Abdomen / Pelvis                 INDICATION: Abdominal pain, obstipation    HISTORY: Hysterectomy, cholecystectomy    RUTH. IMAGING: 3/6/2021        TECHNIQUE:    - reconstructions: axial, coronal, sagittal       - contrast:  oral:  Yes ; intravenous:  no   - Please note:     - Lack of IV contrast limits assessment of solid organ parenchyma, urinary system, or vascular structures.   This exam was performed according to our departmental dose-optimization program, which includes automated exposure control, adjustment of the mA and/or kV according to patient size and/or use of iterative reconstruction technique. DLP is 591.0 COMMENTS:       THORAX (INFERIOR):   The lung bases are clear. The pleura is without fluid or a mass. The heart size is normal size and there is no pericardial fluid.     ABDOMEN:  Limited assessment of the solid organ parenchyma demonstrates nodular contour of the liver, compatible with cirrhosis. Tiny hypoattenuating focus near the hepatic dome is unchanged from previous study The spleen is enlarged, measuring at least 20 cm in greatest dimension. The gallbladder is surgically absent. Limited assessment of the viscera is grossly unremarkable demonstrating normal caliber bowel loops. No evidence of free fluid or free intraperitoneal air. The osseous structures are grossly unremarkable for age. A few mildly enlarged lymph nodes are seen in the cornelius hepatis.  RETROPERITONEUM: Limited assessment of the kidneys demonstrates overall normal size. Limited assessment of the ureters demonstrates normal caliber and course. The adrenal glands are of normal size and contour. No gross evidence of significant retroperitoneal adenopathy. The vascular structures are grossly within normal limits for age.   PELVIS: Limited assessment of the viscera is grossly unremarkable demonstrating normal caliber bowel loops. No evidence of free fluid or free intraperitoneal air. The osseous structures are grossly unremarkable for age. The vascular structures are grossly within normal limits for age. The uterus is surgically absent.  .       Impression:  IMPRESSION: 1. Limited examination due to the lack of intravenous contrast. 2. Cirrhotic appearance of liver 3. Splenomegaly 4. Multiple mildly enlarged lymph nodes are present in the cornelius hepatis of uncertain etiology or clinical importance. 5. No dilated bowel is identified on today's exam 6. Tiny low-attenuation lesion near the hepatic dome in the right lobe of the liver again noted, indeterminate. 7. Please see findings section above for further details Electronically signed by:  Chandrika Grigsby MD  3/16/2021 4:47 PM CDT Workstation: [id number]    CT Abdomen Pelvis With Contrast    Result Date: 3/6/2021  Narrative: CT ABDOMEN PELVIS WITH IV CONTRAST INDICATION: 60 years Female; abd pain TECHNIQUE:  CT scan of the abdomen and pelvis was performed with IV contrast.  This exam was performed according to our departmental dose-optimization program, which includes automated exposure control, adjustment of the mA and/or kV according to patient size and/or use of iterative reconstruction technique. Comparison: 11/24/2019. FINDINGS: Liver: The liver demonstrates cirrhotic morphology. There is suggestion of numerous tiny hypodense nodules in the liver which suggests regenerative nodules. A discrete hypodense lesion in the right hepatic dome measures 8 mm which is new from prior study and indeterminate. Gallbladder/Biliary tree: Status post cholecystectomy. No significant biliary dilatation. Pancreas: Unremarkable. Spleen: Splenomegaly. Adrenals: Unremarkable. Genitourinary: Status post hysterectomy. No adnexal masses. No hydronephrosis or nephrolithiasis. Gastrointestinal: No gastric wall  thickening. Multiple loops of dilated small bowel throughout the abdomen with air-fluid levels and surrounding mesenteric edema with decompressed loops of small bowel in the right lower quadrant which could possibly represent a transition point and suggests either acute small bowel obstruction or high-grade ileus. Some of the decompressed loops of small bowel demonstrate mild wall thickening. No free air. Appendix is not identified. Moderate stool throughout the colon. Peritoneum: Unremarkable. Vasculature: Paraesophageal and perigastric varices. Lymph nodes: No pathologically enlarged lymph nodes. Bones: Unremarkable. Soft tissues: Unremarkable. Incidental findings: None.     Impression: Multiple loops of dilated small bowel throughout the abdomen with air-fluid levels and surrounding mesenteric edema with decompressed loops of small bowel in the right lower quadrant which could possibly represent a transition point and suggests either acute small bowel obstruction or high-grade ileus. Cirrhosis with evidence of portal hypertension. Numerous tiny nodules in the liver suggest regenerative nodules. A discrete hypodense lesion in the right hepatic dome measures 8 mm which is new from prior study and indeterminate. Recommend follow-up CT or MRI liver mass protocol. Electronically signed by:  Gina Meier  3/6/2021 10:45 PM CST Workstation: [id number]    XR Chest 1 View    Result Date: 3/6/2021  Narrative: PORTABLE CHEST HISTORY: Abdominal pain Portable AP upright film of the chest was obtained at 6:43 PM. COMPARISON: June 8, 2019 FINDINGS: Low lung volumes. No focal infiltrate. Old granulomatous disease is present. The heart is not enlarged. The pulmonary vasculature is not increased. No pleural effusion. No pneumothorax. No acute osseous abnormality. Degenerative changes are present in the thoracic spine. Cholecystectomy.     Impression: CONCLUSION: Low lung volumes. No focal infiltrate. [id number] Electronically signed by:   Walker Stevens MD  3/6/2021 7:52 PM CST Workstation: [id number]    FL Small Bowel Follow Through Single-Contrast    Result Date: 3/8/2021  Narrative: Procedure: Gastrografin small bowel follow-through Reason for exam: Small bowel obstruction. FINDINGS: Surgical protocol Gastrografin small bowel follow-through was performed. The  film of the abdomen is unremarkable. Gastrografin was administered through the NG tube. Incidental finding of second portion duodenal diverticula. The small bowel is otherwise unremarkable. Six-hour image reveals the Gastrografin to have extended through the entire small bowel and is now within the region of the colon.     Impression: 1.  Second portion duodenal diverticula. 2.  Gastrografin small bowel findings as described above would be suspicious for ileus versus low-grade partial  small bowel obstruction. Gastrografin does make its way into the colon. Recommend clinical correlation. Electronically signed by:  Lenard Del Rio MD  3/8/2021 2:37 PM CST Workstation: [id number]    XR Abdomen KUB    Result Date: 3/7/2021  Narrative: PROCEDURE: XR ABDOMEN 1 VIEW (KUB) Clinical History: check NGT placement, K56.609 Unspecified intestinal obstruction, unspecified as to partial versus complete obstruction K74.60 Unspecified cirrhosis of liver K76.89 Other specified diseases of liver Indication: Same as above Comparison: 8/12/2019 Technique: Single supine view of the abdomen and pelvis. Findings: A nonspecific bowel gas pattern is noted. The tip of the orogastric tube is in the proximal body of the stomach Radiographic contrast is seen in the urinary bladder. There is no gross evidence of free air in the abdomen or the pelvis on this single supine view of the abdomen and pelvis. There is no visualization of any radiopaque calculi in the outline of the urinary tract on either side. There is no significant constipation.     Impression: Impression: A nonspecific bowel gas pattern is noted. The  tip of the orogastric tube is in the proximal body of the stomach Electronically signed by:  Emiliano Zheng MD  3/7/2021 12:14 PM CST Workstation: [id number]    Final diagnoses:   Abdominal pain, unspecified abdominal location   Constipation, unspecified constipation type   Cirrhosis of liver without ascites, unspecified hepatic cirrhosis type (CMS/HCC)       ED Disposition  ED Disposition     ED Disposition Condition Comment    Discharge Yobany Barnes MD  [address] [id number] [phone]    In 1 day      Basia aSunders MD  [address] [place] [address]   [address] [facility] [address] [phone]    Schedule an appointment as soon as possible for a visit            Medication List      New Prescriptions    ondansetron ODT 4 MG disintegrating tablet  Commonly known as: ZOFRAN-ODT  Place 1 tablet on the tongue Every 8 (Eight) Hours As Needed for Nausea or Vomiting.     polyethylene glycol 236 g solution  Commonly known as: GoLYTELY  Take 4,000 mL by mouth 1 (One) Time for 1 dose.           Where to Get Your Medications      These medications were sent to [facility] [address] [place] [address] - [address] [place] [address] - [phone]  - [phone]   [address] [facility] [address]    Phone: [phone]   · ondansetron ODT 4 MG disintegrating tablet  · polyethylene glycol 236 g solution          Ollie Amado MD  03/16/21 [id number]

## 2021-03-16 NOTE — TELEPHONE ENCOUNTER
Patient states that she has not had a bm since being discharged from the hospital on 3/10 from a SBO. Patient has used an enema and mag citrate with no relief. Patient is in a lot of pain. Patient has been advised to go back to the ED. Patient voiced understanding.

## 2021-03-16 NOTE — TELEPHONE ENCOUNTER
----- Message from Blaine Perales PA-C sent at 3/15/2021  5:23 PM CDT -----  Contact: [phone]  She needs follow up sooner  ----- Message -----  From: Violeta Toth MA  Sent: 3/15/2021   9:58 AM CDT  To: Blaine Perales PA-C    Patient recently discharged from [facility] for a bowel obstruction she states now that she has not had a bm since she was discharged on Wednesday of last week. Patient would like to know what she should do?

## 2021-03-19 ENCOUNTER — DOCUMENTATION (OUTPATIENT)
Dept: GASTROENTEROLOGY | Facility: CLINIC | Age: 61
End: 2021-03-19

## 2021-03-19 ENCOUNTER — OFFICE VISIT (OUTPATIENT)
Dept: GASTROENTEROLOGY | Facility: CLINIC | Age: 61
End: 2021-03-19

## 2021-03-19 VITALS
HEIGHT: 64 IN | WEIGHT: 208.4 LBS | BODY MASS INDEX: 35.58 KG/M2 | HEART RATE: 82 BPM | DIASTOLIC BLOOD PRESSURE: 77 MMHG | SYSTOLIC BLOOD PRESSURE: 133 MMHG

## 2021-03-19 DIAGNOSIS — R10.84 ABDOMINAL PAIN, GENERALIZED: Primary | ICD-10-CM

## 2021-03-19 DIAGNOSIS — K59.04 CHRONIC IDIOPATHIC CONSTIPATION: ICD-10-CM

## 2021-03-19 DIAGNOSIS — R74.8 ELEVATED LIVER ENZYMES: ICD-10-CM

## 2021-03-19 DIAGNOSIS — K74.60 CIRRHOSIS OF LIVER WITHOUT ASCITES, UNSPECIFIED HEPATIC CIRRHOSIS TYPE (HCC): ICD-10-CM

## 2021-03-19 DIAGNOSIS — K75.81 NASH (NONALCOHOLIC STEATOHEPATITIS): ICD-10-CM

## 2021-03-19 DIAGNOSIS — K56.609 SBO (SMALL BOWEL OBSTRUCTION) (HCC): ICD-10-CM

## 2021-03-19 PROCEDURE — 99214 OFFICE O/P EST MOD 30 MIN: CPT | Performed by: PHYSICIAN ASSISTANT

## 2021-03-19 RX ORDER — DEXTROSE AND SODIUM CHLORIDE 5; .45 G/100ML; G/100ML
30 INJECTION, SOLUTION INTRAVENOUS CONTINUOUS PRN
Status: CANCELLED | OUTPATIENT
Start: 2021-03-22

## 2021-03-19 RX ORDER — SODIUM, POTASSIUM,MAG SULFATES 17.5-3.13G
1 SOLUTION, RECONSTITUTED, ORAL ORAL ONCE
Qty: 177 ML | Refills: 0 | Status: SHIPPED | OUTPATIENT
Start: 2021-03-19 | End: 2021-03-19

## 2021-03-19 RX ORDER — NITROFURANTOIN MACROCRYSTALS 100 MG/1
100 CAPSULE ORAL 2 TIMES DAILY
COMMUNITY
End: 2021-03-29

## 2021-03-19 NOTE — PROGRESS NOTES
[facility] called and the Suprep was not covered so I faxed over instructions for Magnesia Citrate 03/19/2021

## 2021-03-19 NOTE — PROGRESS NOTES
Chief Complaint   Patient presents with   • Hospital Follow Up Visit   • Constipation       ENDO PROCEDURE ORDERED: EGD/COLON abd pain, abnl CT, SBO    Subjective    Amira Shannon is a 60 y.o. female. she is here today for follow-up.    History of Present Illness    Patient presents as a hospital followup with increased constipation and abdominal pain. Last seen by me on 01/26/2021. At that time I changed her to Protonix. She has been taking that. She has been passing gas and eating but weight is down 1.5 pounds since last visit. She does have Motegrity 2 mg daily. She is complaining of 6 out of 10 right to mid lower abdominal pain. Previous FibroSURE F4/S1/N1.     Patient was hospitalized with small bowel obstruction/abdominal pain 03/06/2021 through 03/10/2021. Chest x-ray showed nothing acute. A CT scan abdomen and pelvis showed a cirrhotic-appearing liver with regenerative nodules, new 8 mm lesion in the liver, postcholecystectomy, splenomegaly, multiple dilated loops of small bowel with air-fluid levels, edema in the right lower quadrant. An OG tube was placed with negative KUB on 03/07/2021. Small bowel follow-through on 03/08/2021 showed an ileus versus low-grade partial small bowel obstruction with gastrografin not progressing into the colon.     Patient went to the ER with increased abdominal pain on 03/16/2021. She had a CT scan abdomen and pelvis without contrast. I did review the films with her. Right colon appears to be more dilated. There is still contrast in her bowel and primarily in her colon. She states she was given no contrast for that. Again it showed cirrhosis, enlarged spleen, postcholecystectomy, enlarged lymph nodes around the cornelius hepatis. She was negative for influenza, COVID. INR 1.28. Normal lipase. CBC showed 85,000 platelets. CMP showed creatinine 1.15, alkaline phosphatase 123, AST 74, ALT 39, GFR 48, otherwise normal. Urinalysis showed trace abnormalities.     Last  "EGD/colonoscopy 10/17/2018 showed gastritis and hemorrhoids.    ASSESSMENT/PLAN: Patient with increased abdominal pain, recent small bowel obstruction, questionable new liver lesion with cirrhotic liver, chronic constipation, elevated liver enzymes secondary to the above, potential for malignancy, possible ischemia. I am concerned. I did send her down for a flat and upright of the abdomen today. There is still contrast in her colon from contrast given on 03/08/2021, which was 11 days ago. There did appear to still be a lot of air and stool in the right colon. Given her persistent complaints, I recommended EGD/colonoscopy. We will do biopsies as indicated. We will try to do this as soon as possible. I encouraged her to keep her diet light until after her procedures. I may need to send her back for repeat surgical evaluation. Further pending clinical course and the results of the above. Would consider CT enterography.       The following portions of the patient's history were reviewed and updated as appropriate:   Past Medical History:   Diagnosis Date   • Acid reflux    • Altered bowel function    • Arthropathy of lumbar facet joint    • Bleeding disorder (CMS/HCC)    • C. difficile diarrhea 2015   • Constipation    • Corns and callus    • Depression    • Disease related peripheral neuropathy    • Epigastric pain    • Fatty liver    • Hammer toe    • Headache    • Hiatal hernia    • History of transfusion    • Hyperlipidemia    • Knee pain    • Localized, primary osteoarthritis of the ankle and foot     Localized, primary osteoarthritis of the ankle and/or foot   • Mendoza's metatarsalgia     Mendoza's metatarsalgia - 2nd interspace on right   • Nausea and vomiting    • Neuralgia and neuritis     Neuralgia, neuritis, and radiculitis, unspecified   • Neuropathy    • Obstructive sleep apnea     Obstructive sleep apnea (adult) (pediatric)    • CHAI on CPAP     \"C-Pap at night  (unconfirmed)\"   • Osteoarthritis    • Pain in " foot     Pain in unspecified foot - sees a podiatrist   • Pain in joint, ankle and foot     Joint pain in ankle and foot      • Pain radiating to back     Pain radiating to lumbar region of back   • Plantar fasciitis    • PONV (postoperative nausea and vomiting)    • Restless leg syndrome    • Secondary hypertension     Secondary hypertension, unspecified   • Sinusitis    • Sleep apnea    • Tongue anomaly     lesion     Past Surgical History:   Procedure Laterality Date   • CARPAL TUNNEL RELEASE Left 2018    Procedure: CARPAL TUNNEL RELEASE - left;  Surgeon: Justus Lewis MD;  Location: [facility] OR;  Service: Orthopedics   • CARPAL TUNNEL RELEASE Right 2019    Procedure: carpal tunnel release right hand with local/monitored anesthesia care;  Surgeon: Justus Lewis MD;  Location: [facility] OR;  Service: Orthopedics   •  SECTION     • CHOLECYSTECTOMY     • COLONOSCOPY  2013   • COLONOSCOPY N/A 10/17/2018    Procedure: COLONOSCOPY;  Surgeon: Russell Del Rio MD;  Location: [facility] ENDOSCOPY;  Service: Gastroenterology   • DIRECT LARYNGOSCOPY, ESOPHAGOSCOPY, BRONCHOSCOPY N/A 2017    Procedure: DIRECT LARYNGOSCOPY AND;  Surgeon: Live Bolton MD;  Location: [facility] OR;  Service:    • ENDOSCOPY  2013    Colon endoscopy 03454 (1) - Internal & external hemorrhoids found. Stool found.   • ENDOSCOPY  2013    EGD w/ tube 53388 (1) - Normal esophagus. Gastritis in stomach. Biopsy taken. Normal dudoenum. Biopsy taken.   • ENDOSCOPY N/A 10/17/2018    Procedure: ESOPHAGOGASTRODUODENOSCOPY--eval varices;  Surgeon: Russell Del Rio MD;  Location: [facility] ENDOSCOPY;  Service: Gastroenterology   • FOOT SURGERY  2013    Foot/toes surgery procedure (1) - Arthroplasty of toes 4 and 5 of right foot.   • HERNIA REPAIR     • HERNIA REPAIR      hital   • HYSTERECTOMY     • LIVER BIOPSY     • NERVE BLOCK  2016    Injection for nerve block (1) - Lumbar medial branch block.   •  OTHER SURGICAL HISTORY  2012    Inj(s) Tend-Sheath, Ligament, Single  (1) - [facility]    • OTHER SURGICAL HISTORY  2013    Small Joint Injection/Aspiration  (2) - [facility]ICK[facility] ([facility])    • OTHER SURGICAL HISTORY      bowel obstruction x2   • OTHER SURGICAL HISTORY      gland removed from neck   • SUBLINGUAL SALIVARY CYST EXCISION N/A 2017    Procedure: EXCISION OF LEFT  TONGUE LESION WITH CLOSURE;  Surgeon: Live Bolton MD;  Location: [facility];  Service:    • TUBAL ABDOMINAL LIGATION     • UPPER GASTROINTESTINAL ENDOSCOPY  2013   • UPPER GASTROINTESTINAL ENDOSCOPY  10/17/2018     Family History   Problem Relation Age of Onset   • Cancer Other    • Diabetes Other    • Heart disease Other    • Hypertension Mother    • Cancer Mother    • Diabetes Mother    • Hypertension Father    • Heart attack Father    • Cancer Father    • Heart disease Father    • Thyroid disease Maternal Aunt      OB History    No obstetric history on file.       Allergies   Allergen Reactions   • Other      Pt states that taking steroids either in pill or injection form make her have blisters in her mouth and she feels like she is on fire on the inside/ has hx of c diff and possible MRSA     • Corticosteroids Rash   • Kenalog  [Triamcinolone Acetonide] Rash   • Sulfa Antibiotics Rash     Sulfa (Sulfonamide Antibiotics)     Social History     Socioeconomic History   • Marital status:      Spouse name: Not on file   • Number of children: Not on file   • Years of education: Not on file   • Highest education level: Not on file   Tobacco Use   • Smoking status: Former Smoker     Packs/day: 2.00     Years: 15.00     Pack years: 30.00     Types: Cigarettes     Quit date: 2000     Years since quittin.2   • Smokeless tobacco: Never Used   Vaping Use   • Vaping Use: Never used   Substance and Sexual Activity   • Alcohol use: No   • Drug use: No   • Sexual activity: Defer      Comment: Marital Status:      Current Medications:  Prior to Admission medications    Medication Sig Start Date End Date Taking? Authorizing Provider   cetirizine (zyrTEC) 10 MG tablet Take 1 tablet by mouth Daily. 9/25/17  Yes Promise Tovar MD   CRANBERRY PO Take  by mouth.   Yes Promise Tovar MD   cyclobenzaprine (FLEXERIL) 10 MG tablet Take 10 mg by mouth 3 (Three) Times a Day As Needed for Muscle Spasms.   Yes Promise Tovar MD   diclofenac (VOLTAREN) 75 MG EC tablet Take 1 tablet by mouth Daily. 1/28/21  Yes Colleen Meadows APRN   escitalopram (LEXAPRO) 5 MG tablet Take 5 mg by mouth Daily.   Yes Promise Tovar MD   furosemide (LASIX) 20 MG tablet Take 20 mg by mouth 2 (Two) Times a Day.   Yes Promise Tovar MD   gabapentin (NEURONTIN) 800 MG tablet Take 800 mg by mouth 4 (Four) Times a Day.   Yes Promise Tovar MD   meclizine (ANTIVERT) 25 MG tablet Take 50 mg by mouth As Needed for Dizziness.   Yes Promise Tovar MD   Melatonin 10 MG capsule Take  by mouth Every Night.   Yes Promise Tovar MD   Mirabegron ER (Myrbetriq) 50 MG tablet sustained-release 24 hour 24 hr tablet Take 50 mg by mouth Daily.   Yes Promise Tovar MD   nitrofurantoin (MACRODANTIN) 100 MG capsule Take 100 mg by mouth 2 (two) times a day.   Yes Promise Tovar MD   ondansetron (ZOFRAN) 8 MG tablet Take 1 tablet by mouth Every 8 (Eight) Hours As Needed for Nausea or Vomiting. 1/26/21  Yes Blaine Perales PA-C   ondansetron ODT (ZOFRAN-ODT) 4 MG disintegrating tablet Place 1 tablet on the tongue Every 8 (Eight) Hours As Needed for Nausea or Vomiting. 3/16/21  Yes Ollie Amado MD   oxyCODONE (oxyCONTIN) 10 MG 12 hr tablet Take 10 mg by mouth Every 12 (Twelve) Hours.   Yes Promise Tovar MD   pantoprazole (Protonix) 40 MG EC tablet Take 1 tablet by mouth Daily. 1/26/21  Yes Blaine Perales PA-C   Prucalopride Succinate (Motegrity) 2 MG tablet Take 1  "tablet by mouth Daily. 1/26/21  Yes Blaine Perales PA-C   spironolactone (ALDACTONE) 25 MG tablet TAKE 1 TABLET BY MOUTH ONCE DAILY 6/11/19  Yes Maureen Cardoso APRN     Review of Systems  Review of Systems   Constitutional: Positive for unexpected weight change.   HENT: Negative for trouble swallowing.    Gastrointestinal: Positive for abdominal distention, abdominal pain, constipation and nausea. Negative for anal bleeding, blood in stool, diarrhea, rectal pain and vomiting.   Genitourinary: Negative for difficulty urinating.          Objective    /77   Pulse 82   Ht 162.6 cm (64\")   Wt 94.5 kg (208 lb 6.4 oz)   LMP  (LMP Unknown)   BMI 35.77 kg/m²   Physical Exam  Vitals and nursing note reviewed.   Constitutional:       General: She is not in acute distress.     Appearance: She is well-developed.   HENT:      Head: Normocephalic and atraumatic.   Eyes:      Pupils: Pupils are equal, round, and reactive to light.   Cardiovascular:      Rate and Rhythm: Normal rate and regular rhythm.      Heart sounds: Normal heart sounds.   Pulmonary:      Effort: Pulmonary effort is normal.      Breath sounds: Normal breath sounds.   Abdominal:      General: Bowel sounds are normal. There is distension. There is no abdominal bruit.      Palpations: Abdomen is soft. Abdomen is not rigid. There is no shifting dullness or mass.      Tenderness: There is abdominal tenderness. There is no guarding or rebound.      Hernia: No hernia is present. There is no hernia in the ventral area.      Comments: +BS   Musculoskeletal:         General: Normal range of motion.      Cervical back: Normal range of motion.   Skin:     General: Skin is warm and dry.   Neurological:      Mental Status: She is alert and oriented to person, place, and time.   Psychiatric:         Behavior: Behavior normal.         Thought Content: Thought content normal.         Judgment: Judgment normal.       Assessment/Plan      1. Abdominal pain, " generalized    2. Chronic idiopathic constipation    3. Cirrhosis of liver without ascites, unspecified hepatic cirrhosis type (CMS/HCC)    4. MUSA (nonalcoholic steatohepatitis)    5. Elevated liver enzymes    6. SBO (small bowel obstruction) (CMS/HCC)    .   Diagnoses and all orders for this visit:    1. Abdominal pain, generalized (Primary)  -     XR Abdomen Flat & Upright  -     Case Request; Standing  -     Cancel: dextrose 5 % and sodium chloride 0.45 % infusion  -     Case Request    2. Chronic idiopathic constipation  -     Case Request; Standing  -     Cancel: dextrose 5 % and sodium chloride 0.45 % infusion  -     Case Request    3. Cirrhosis of liver without ascites, unspecified hepatic cirrhosis type (CMS/HCC)  -     Case Request; Standing  -     Cancel: dextrose 5 % and sodium chloride 0.45 % infusion  -     Case Request    4. MUSA (nonalcoholic steatohepatitis)    5. Elevated liver enzymes    6. SBO (small bowel obstruction) (CMS/HCC)  -     XR Abdomen Flat & Upright  -     Case Request; Standing  -     Cancel: dextrose 5 % and sodium chloride 0.45 % infusion  -     Case Request    Other orders  -     Follow Anesthesia Guidelines / Standing Orders; Future  -     Obtain Informed Consent; Future  -     Cancel: Obtain Informed Consent; Standing  -     Cancel: POC Glucose Once; Standing  -     sodium-potassium-magnesium sulfates (Suprep Bowel Prep Kit) 17.5-3.13-1.6 GM/177ML solution oral solution; Take 1 bottle by mouth 1 (One) Time for 1 dose. Take as per instruction sheet for colonoscopy prep.  Dispense: 177 mL; Refill: 0        Orders placed during this encounter include:  Orders Placed This Encounter   Procedures   • XR Abdomen Flat & Upright     Order Specific Question:   Reason for Exam:     Answer:   SBO, abd pain,   • Follow Anesthesia Guidelines / Standing Orders     Standing Status:   Future   • Obtain Informed Consent     Standing Status:   Future     Order Specific Question:   Informed Consent  Given For     Answer:   ESOPHAGOGASTRODUODENOSCOPY and colonoscopy       Medications prescribed:  New Medications Ordered This Visit   Medications   • sodium-potassium-magnesium sulfates (Suprep Bowel Prep Kit) 17.5-3.13-1.6 GM/177ML solution oral solution     Sig: Take 1 bottle by mouth 1 (One) Time for 1 dose. Take as per instruction sheet for colonoscopy prep.     Dispense:  177 mL     Refill:  0       Requested Prescriptions     Signed Prescriptions Disp Refills   • sodium-potassium-magnesium sulfates (Suprep Bowel Prep Kit) 17.5-3.13-1.6 GM/177ML solution oral solution 177 mL 0     Sig: Take 1 bottle by mouth 1 (One) Time for 1 dose. Take as per instruction sheet for colonoscopy prep.       Review and/or summary of lab tests, radiology, procedures, medications. Review and summary of old records and obtaining of history. The risks and benefits of my recommendations, as well as other treatment options were discussed with the patient today. Questions were answered.    Follow-up: Return in about 2 weeks (around 4/2/2021), or if symptoms worsen or fail to improve.     ESOPHAGOGASTRODUODENOSCOPYURGNET (N/A), COLONOSCOPY (N/A)      This document has been electronically signed by Blaine Perales PA-C on March 22, 2021 16:05 CDT      Results for orders placed or performed in visit on 03/20/21   COVID-19, [facility] IN-HOUSE, NP SWAB IN TRANSPORT MEDIA 8-10 HR TAT - Swab, Nasopharynx    Specimen: Nasopharynx; Swab   Result Value Ref Range    COVID19 Not Detected Not Detected - Ref. Range   Results for orders placed or performed during the hospital encounter of 03/16/21   [facility] - [facility]   Result Value Ref Range    Extra Tube Hold for add-ons.    Scan Slide    Specimen: Blood   Result Value Ref Range    Anisocytosis Slight/1+ None Seen    WBC Morphology Normal Normal    Platelet Estimate Decreased Normal   COVID-19 and FLU A/B PCR - Swab, Nasopharynx    Specimen: Nasopharynx; Swab   Result Value Ref Range    COVID19 Not  Detected Not Detected - Ref. Range    Influenza A PCR Not Detected Not Detected    Influenza B PCR Not Detected Not Detected   Urinalysis, Microscopic Only - Urine, Clean Catch    Specimen: Urine, Clean Catch   Result Value Ref Range    RBC, UA None Seen None Seen /HPF    WBC, UA 3-5 None Seen, 0-2, 3-5 /HPF    Bacteria, UA None Seen None Seen /HPF    Squamous Epithelial Cells, UA 3-5 (A) None Seen, 0-2 /HPF    Hyaline Casts, UA 0-2 None Seen /LPF    Methodology Manual Light Microscopy    Urinalysis With Microscopic If Indicated (No Culture) - Urine, Clean Catch    Specimen: Urine, Clean Catch   Result Value Ref Range    Color, UA Dark Yellow Yellow, Straw, Dark Yellow, Mikala    Appearance, UA Clear Clear    pH, UA 6.0 5.0 - 9.0    Specific Gravity, UA 1.020 1.003 - 1.030    Glucose, UA Negative Negative    Ketones, UA Trace (A) Negative    Bilirubin, UA Small (1+) (A) Negative    Blood, UA Negative Negative    Protein, UA Negative Negative    Leuk Esterase, UA Small (1+) (A) Negative    Nitrite, UA Negative Negative    Urobilinogen, UA 1.0 E.U./dL 0.2 - 1.0 E.U./dL   CBC Auto Differential    Specimen: Blood   Result Value Ref Range    WBC 6.18 3.40 - 10.80 10*3/mm3    RBC 4.95 3.77 - 5.28 10*6/mm3    Hemoglobin 13.1 12.0 - 15.9 g/dL    Hematocrit 40.1 34.0 - 46.6 %    MCV 81.0 79.0 - 97.0 fL    MCH 26.5 (L) 26.6 - 33.0 pg    MCHC 32.7 31.5 - 35.7 g/dL    RDW 16.4 (H) 12.3 - 15.4 %    RDW-SD 47.6 37.0 - 54.0 fl    MPV 10.9 6.0 - 12.0 fL    Platelets 85 (L) 140 - 450 10*3/mm3    Neutrophil % 70.6 42.7 - 76.0 %    Lymphocyte % 17.3 (L) 19.6 - 45.3 %    Monocyte % 7.6 5.0 - 12.0 %    Eosinophil % 3.7 0.3 - 6.2 %    Basophil % 0.5 0.0 - 1.5 %    Immature Grans % 0.3 0.0 - 0.5 %    Neutrophils, Absolute 4.36 1.70 - 7.00 10*3/mm3    Lymphocytes, Absolute 1.07 0.70 - 3.10 10*3/mm3    Monocytes, Absolute 0.47 0.10 - 0.90 10*3/mm3    Eosinophils, Absolute 0.23 0.00 - 0.40 10*3/mm3    Basophils, Absolute 0.03 0.00 - 0.20  10*3/mm3    Immature Grans, Absolute 0.02 0.00 - 0.05 10*3/mm3    nRBC 0.0 0.0 - 0.2 /100 WBC   aPTT    Specimen: Blood   Result Value Ref Range    PTT 35.9 20.0 - 40.3 seconds   Protime-INR    Specimen: Blood   Result Value Ref Range    Protime 16.6 (H) 11.1 - 15.3 Seconds    INR 1.28 (H) 0.80 - 1.20   Lipase    Specimen: Blood   Result Value Ref Range    Lipase 30 13 - 60 U/L   Comprehensive Metabolic Panel    Specimen: Blood   Result Value Ref Range    Glucose 88 65 - 99 mg/dL    BUN 8 8 - 23 mg/dL    Creatinine 1.15 (H) 0.57 - 1.00 mg/dL    Sodium 138 136 - 145 mmol/L    Potassium 4.3 3.5 - 5.2 mmol/L    Chloride 102 98 - 107 mmol/L    CO2 28.0 22.0 - 29.0 mmol/L    Calcium 9.3 8.6 - 10.5 mg/dL    Total Protein 7.0 6.0 - 8.5 g/dL    Albumin 3.90 3.50 - 5.20 g/dL    ALT (SGPT) 39 (H) 1 - 33 U/L    AST (SGOT) 74 (H) 1 - 32 U/L    Alkaline Phosphatase 123 (H) 39 - 117 U/L    Total Bilirubin 1.2 0.0 - 1.2 mg/dL    eGFR Non African Amer 48 (L) >60 mL/min/1.73    Globulin 3.1 gm/dL    A/G Ratio 1.3 g/dL    BUN/Creatinine Ratio 7.0 7.0 - 25.0    Anion Gap 8.0 5.0 - 15.0 mmol/L   Results for orders placed or performed during the hospital encounter of 03/06/21   PREVIOUS HISTORY    Specimen: Blood   Result Value Ref Range    Previous History No record on File    Scan Slide    Specimen: Blood   Result Value Ref Range    Acanthocytes Mod/2+ None Seen    WBC Morphology Normal Normal    Platelet Estimate Decreased Normal    Clumped Platelets     COVID-19 and FLU A/B PCR - Swab, Nasopharynx    Specimen: Nasopharynx; Swab   Result Value Ref Range    COVID19 Not Detected Not Detected - Ref. Range    Influenza A PCR Not Detected Not Detected    Influenza B PCR Not Detected Not Detected   Urinalysis With Microscopic If Indicated (No Culture) - Urine, Clean Catch    Specimen: Urine, Clean Catch   Result Value Ref Range    Color, UA Dark Yellow Yellow, Straw, Dark Yellow, Mikala    Appearance, UA Clear Clear    pH, UA 6.0 5.0 - 9.0     Specific Gravity, UA 1.028 1.003 - 1.030    Glucose, UA Negative Negative    Ketones, UA Trace (A) Negative    Bilirubin, UA Small (1+) (A) Negative    Blood, UA Negative Negative    Protein, UA Trace (A) Negative    Leuk Esterase, UA Negative Negative    Nitrite, UA Negative Negative    Urobilinogen, UA 2.0 E.U./dL (A) 0.2 - 1.0 E.U./dL   CBC Auto Differential    Specimen: Blood   Result Value Ref Range    WBC 4.56 3.40 - 10.80 10*3/mm3    RBC 5.16 3.77 - 5.28 10*6/mm3    Hemoglobin 13.8 12.0 - 15.9 g/dL    Hematocrit 40.6 34.0 - 46.6 %    MCV 78.7 (L) 79.0 - 97.0 fL    MCH 26.7 26.6 - 33.0 pg    MCHC 34.0 31.5 - 35.7 g/dL    RDW 15.9 (H) 12.3 - 15.4 %    RDW-SD 44.4 37.0 - 54.0 fl    MPV 11.5 6.0 - 12.0 fL    Platelets 82 (L) 140 - 450 10*3/mm3    Neutrophil % 67.4 42.7 - 76.0 %    Lymphocyte % 24.1 19.6 - 45.3 %    Monocyte % 5.0 5.0 - 12.0 %    Eosinophil % 2.9 0.3 - 6.2 %    Basophil % 0.4 0.0 - 1.5 %    Immature Grans % 0.2 0.0 - 0.5 %    Neutrophils, Absolute 3.07 1.70 - 7.00 10*3/mm3    Lymphocytes, Absolute 1.10 0.70 - 3.10 10*3/mm3    Monocytes, Absolute 0.23 0.10 - 0.90 10*3/mm3    Eosinophils, Absolute 0.13 0.00 - 0.40 10*3/mm3    Basophils, Absolute 0.02 0.00 - 0.20 10*3/mm3    Immature Grans, Absolute 0.01 0.00 - 0.05 10*3/mm3    nRBC 0.0 0.0 - 0.2 /100 WBC     *Note: Due to a large number of results and/or encounters for the requested time period, some results have not been displayed. A complete set of results can be found in Results Review.       Some portions of this note have been dictated using voice recognition software and may contain errors and/or omissions.

## 2021-03-19 NOTE — PATIENT INSTRUCTIONS
MyPlate from USDA    MyPlate is an outline of a general healthy diet based on the 2010 Dietary Guidelines for Americans, from the U[place] Department of Agriculture (USDA). It sets guidelines for how much food you should eat from each food group based on your age, sex, and level of physical activity.  What are tips for following MyPlate?  To follow MyPlate recommendations:  · Eat a wide variety of fruits and vegetables, grains, and protein foods.  · Serve smaller portions and eat less food throughout the day.  · Limit portion sizes to avoid overeating.  · Enjoy your food.  · Get at least 150 minutes of exercise every week. This is about 30 minutes each day, 5 or more days per week.  It can be difficult to have every meal look like MyPlate. Think about MyPlate as eating guidelines for an entire day, rather than each individual meal.  Fruits and vegetables  · Make half of your plate fruits and vegetables.  · Eat many different colors of fruits and vegetables each day.  · For a 2,000 calorie daily food plan, eat:  ? 2½ cups of vegetables every day.  ? 2 cups of fruit every day.  · 1 cup is equal to:  ? 1 cup raw or cooked vegetables.  ? 1 cup raw fruit.  ? 1 medium-sized orange, apple, or banana.  ? 1 cup 100% fruit or vegetable juice.  ? 2 cups raw leafy greens, such as lettuce, spinach, or kale.  ? ½ cup dried fruit.  Grains  · One fourth of your plate should be grains.  · Make at least half of the grains you eat each day whole grains.  · For a 2,000 calorie daily food plan, eat 6 oz of grains every day.  · 1 oz is equal to:  ? 1 slice bread.  ? 1 cup cereal.  ? ½ cup cooked rice, cereal, or pasta.  Protein  · One fourth of your plate should be protein.  · Eat a wide variety of protein foods, including meat, poultry, fish, eggs, beans, nuts, and tofu.  · For a 2,000 calorie daily food plan, eat 5½ oz of protein every day.  · 1 oz is equal to:  ? 1 oz meat, poultry, or fish.  ? ¼ cup cooked beans.  ? 1 egg.  ? ½ oz nuts  or seeds.  ? 1 Tbsp peanut butter.  Dairy  · Drink fat-free or low-fat (1%) milk.  · Eat or drink dairy as a side to meals.  · For a 2,000 calorie daily food plan, eat or drink 3 cups of dairy every day.  · 1 cup is equal to:  ? 1 cup milk, yogurt, cottage cheese, or soy milk (soy beverage).  ? 2 oz processed cheese.  ? 1½ oz natural cheese.  Fats, oils, salt, and sugars  · Only small amounts of oils are recommended.  · Avoid foods that are high in calories and low in nutritional value (empty calories), like foods high in fat or added sugars.  · Choose foods that are low in salt (sodium). Choose foods that have less than 140 milligrams (mg) of sodium per serving.  · Drink water instead of sugary drinks. Drink enough water each day to keep your urine pale yellow.  Where to find support  · Work with your health care provider or a nutrition specialist (dietitian) to develop a customized eating plan that is right for you.  · Download an randi (mobile application) to help you track your daily food intake.  Where to find more information  · Go to ChooseMyPlate.gov for more information.  Summary  · MyPlate is a general guideline for healthy eating from the USDA. It is based on the 2010 Dietary Guidelines for Americans.  · In general, fruits and vegetables should take up ½ of your plate, grains should take up ¼ of your plate, and protein should take up ¼ of your plate.  This information is not intended to replace advice given to you by your health care provider. Make sure you discuss any questions you have with your health care provider.  Document Revised: 05/21/2020 Document Reviewed: 03/19/2018  Elsevier Patient Education © 2021 [employer]  BMI for Adults  What is BMI?  Body mass index (BMI) is a number that is calculated from a person's weight and height. BMI can help estimate how much of a person's weight is composed of fat. BMI does not measure body fat directly. Rather, it is an alternative to procedures that  "directly measure body fat, which can be difficult and expensive.  BMI can help identify people who may be at higher risk for certain medical problems.  What are BMI measurements used for?  BMI is used as a screening tool to identify possible weight problems. It helps determine whether a person is obese, overweight, a healthy weight, or underweight.  BMI is useful for:  · Identifying a weight problem that may be related to a medical condition or may increase the risk for medical problems.  · Promoting changes, such as changes in diet and exercise, to help reach a healthy weight. BMI screening can be repeated to see if these changes are working.  How is BMI calculated?  BMI involves measuring your weight in relation to your height. Both height and weight are measured, and the BMI is calculated from those numbers. This can be done either in English ([place] or metric measurements. Note that charts and online BMI calculators are available to help you find your BMI quickly and easily without having to do these calculations yourself.  To calculate your BMI in English ([place] measurements:    1. Measure your weight in pounds (lb).  2. Multiply the number of pounds by 703.  ? For example, for a person who weighs 180 lb, multiply that number by 703, which equals 126,540.  3. Measure your height in inches. Then multiply that number by itself to get a measurement called \"inches squared.\"  ? For example, for a person who is 70 inches tall, the \"inches squared\" measurement is 70 inches x 70 inches, which equals 4,900 inches squared.  4. Divide the total from step 2 (number of lb x 703) by the total from step 3 (inches squared): 126,540 ÷ 4,900 = 25.8. This is your BMI.  To calculate your BMI in metric measurements:  1. Measure your weight in kilograms (kg).  2. Measure your height in meters (m). Then multiply that number by itself to get a measurement called \"meters squared.\"  ? For example, for a person who is 1.75 m tall, the " "\"meters squared\" measurement is 1.75 m x 1.75 m, which is equal to 3.1 meters squared.  3. Divide the number of kilograms (your weight) by the meters squared number. In this example: 70 ÷ 3.1 = 22.6. This is your BMI.  What do the results mean?  BMI charts are used to identify whether you are underweight, normal weight, overweight, or obese. The following guidelines will be used:  · Underweight: BMI less than 18.5.  · Normal weight: BMI between 18.5 and 24.9.  · Overweight: BMI between 25 and 29.9.  · Obese: BMI of 30 or above.  Keep these notes in mind:  · Weight includes both fat and muscle, so someone with a muscular build, such as an athlete, may have a BMI that is higher than 24.9. In cases like these, BMI is not an accurate measure of body fat.  · To determine if excess body fat is the cause of a BMI of 25 or higher, further assessments may need to be done by a health care provider.  · BMI is usually interpreted in the same way for men and women.  Where to find more information  For more information about BMI, including tools to quickly calculate your BMI, go to these websites:  · Centers for Disease Control and Prevention: www.cdc.gov  · American Heart Association: www.heart.org  · National Heart, Lung, and [place]: www.nhlbi.nih.gov  Summary  · Body mass index (BMI) is a number that is calculated from a person's weight and height.  · BMI may help estimate how much of a person's weight is composed of fat. BMI can help identify those who may be at higher risk for certain medical problems.  · BMI can be measured using English measurements or metric measurements.  · BMI charts are used to identify whether you are underweight, normal weight, overweight, or obese.  This information is not intended to replace advice given to you by your health care provider. Make sure you discuss any questions you have with your health care provider.  Document Revised: 09/09/2020 Document Reviewed: 07/17/2020  [employer] " Patient Education © 2021 [employer]vier Inc.

## 2021-03-20 ENCOUNTER — LAB (OUTPATIENT)
Dept: LAB | Facility: HOSPITAL | Age: 61
End: 2021-03-20

## 2021-03-20 DIAGNOSIS — Z01.818 PREOP TESTING: Primary | ICD-10-CM

## 2021-03-20 LAB — SARS-COV-2 N GENE RESP QL NAA+PROBE: NOT DETECTED

## 2021-03-20 PROCEDURE — C9803 HOPD COVID-19 SPEC COLLECT: HCPCS

## 2021-03-20 PROCEDURE — 87635 SARS-COV-2 COVID-19 AMP PRB: CPT

## 2021-03-21 LAB
QT INTERVAL: 370 MS
QTC INTERVAL: 447 MS

## 2021-03-22 ENCOUNTER — ANESTHESIA EVENT (OUTPATIENT)
Dept: GASTROENTEROLOGY | Facility: HOSPITAL | Age: 61
End: 2021-03-22

## 2021-03-22 ENCOUNTER — ANESTHESIA (OUTPATIENT)
Dept: GASTROENTEROLOGY | Facility: HOSPITAL | Age: 61
End: 2021-03-22

## 2021-03-22 ENCOUNTER — HOSPITAL ENCOUNTER (OUTPATIENT)
Facility: HOSPITAL | Age: 61
Setting detail: HOSPITAL OUTPATIENT SURGERY
Discharge: HOME | End: 2021-03-22
Attending: INTERNAL MEDICINE | Admitting: INTERNAL MEDICINE

## 2021-03-22 ENCOUNTER — READMISSION MANAGEMENT (OUTPATIENT)
Dept: CALL CENTER | Facility: HOSPITAL | Age: 61
End: 2021-03-22

## 2021-03-22 VITALS
HEART RATE: 78 BPM | RESPIRATION RATE: 18 BRPM | WEIGHT: 210 LBS | BODY MASS INDEX: 35.85 KG/M2 | SYSTOLIC BLOOD PRESSURE: 132 MMHG | DIASTOLIC BLOOD PRESSURE: 78 MMHG | TEMPERATURE: 98.3 F | HEIGHT: 64 IN | OXYGEN SATURATION: 96 %

## 2021-03-22 DIAGNOSIS — K56.609 SBO (SMALL BOWEL OBSTRUCTION) (HCC): ICD-10-CM

## 2021-03-22 DIAGNOSIS — R10.84 ABDOMINAL PAIN, GENERALIZED: ICD-10-CM

## 2021-03-22 DIAGNOSIS — K74.60 CIRRHOSIS OF LIVER WITHOUT ASCITES, UNSPECIFIED HEPATIC CIRRHOSIS TYPE (HCC): ICD-10-CM

## 2021-03-22 DIAGNOSIS — K59.04 CHRONIC IDIOPATHIC CONSTIPATION: ICD-10-CM

## 2021-03-22 PROCEDURE — 43239 EGD BIOPSY SINGLE/MULTIPLE: CPT | Performed by: INTERNAL MEDICINE

## 2021-03-22 PROCEDURE — 45380 COLONOSCOPY AND BIOPSY: CPT | Performed by: INTERNAL MEDICINE

## 2021-03-22 PROCEDURE — 25010000002 PROPOFOL 10 MG/ML EMULSION: Performed by: NURSE ANESTHETIST, CERTIFIED REGISTERED

## 2021-03-22 RX ORDER — PROPOFOL 10 MG/ML
VIAL (ML) INTRAVENOUS AS NEEDED
Status: DISCONTINUED | OUTPATIENT
Start: 2021-03-22 | End: 2021-03-22 | Stop reason: SURG

## 2021-03-22 RX ORDER — DEXTROSE AND SODIUM CHLORIDE 5; .45 G/100ML; G/100ML
30 INJECTION, SOLUTION INTRAVENOUS CONTINUOUS PRN
Status: DISCONTINUED | OUTPATIENT
Start: 2021-03-22 | End: 2021-03-22 | Stop reason: HOSPADM

## 2021-03-22 RX ORDER — LIDOCAINE HYDROCHLORIDE 20 MG/ML
INJECTION, SOLUTION INTRAVENOUS AS NEEDED
Status: DISCONTINUED | OUTPATIENT
Start: 2021-03-22 | End: 2021-03-22 | Stop reason: SURG

## 2021-03-22 RX ADMIN — PROPOFOL 30 MG: 10 INJECTION, EMULSION INTRAVENOUS at 16:28

## 2021-03-22 RX ADMIN — LIDOCAINE HYDROCHLORIDE 60 MG: 20 INJECTION, SOLUTION INTRAVENOUS at 16:22

## 2021-03-22 RX ADMIN — PROPOFOL 30 MG: 10 INJECTION, EMULSION INTRAVENOUS at 16:25

## 2021-03-22 RX ADMIN — PROPOFOL 60 MG: 10 INJECTION, EMULSION INTRAVENOUS at 16:22

## 2021-03-22 RX ADMIN — DEXTROSE AND SODIUM CHLORIDE 30 ML/HR: 5; 450 INJECTION, SOLUTION INTRAVENOUS at 15:54

## 2021-03-22 NOTE — OUTREACH NOTE
Medical Week 2 Survey      Responses   [facility] patient discharged from?  [facility]   Does the patient have one of the following disease processes/diagnoses(primary or secondary)?  Other   Week 2 attempt successful?  Yes   Call start time  1757   Discharge diagnosis  SBO (small bowel obstruction   Rescheduled  Rescheduled-pt requested   Call end time  1758          Marija Shields RN

## 2021-03-22 NOTE — ANESTHESIA PREPROCEDURE EVALUATION
Anesthesia Evaluation     Patient summary reviewed and Nursing notes reviewed   history of anesthetic complications: PONV  NPO Solid Status: > 8 hours  NPO Liquid Status: > 6 hours           Airway   Mallampati: II  TM distance: >3 FB  Neck ROM: full  No difficulty expected  Dental - normal exam     Pulmonary - normal exam   (+) a smoker Former, sleep apnea,   Cardiovascular - normal exam    (+) hypertension well controlled less than 2 medications, hyperlipidemia,       Neuro/Psych  (+) headaches, numbness, psychiatric history Depression,     GI/Hepatic/Renal/Endo    (+)  hiatal hernia, GERD poorly controlled,  hepatitis, liver disease,     ROS Comment: MUSA    Musculoskeletal     Abdominal  - normal exam   Substance History      OB/GYN          Other   arthritis, blood dyscrasia thrombocytopenia,                   Anesthesia Plan    ASA 3     MAC     intravenous induction     Anesthetic plan, all risks, benefits, and alternatives have been provided, discussed and informed consent has been obtained with: patient.

## 2021-03-22 NOTE — ANESTHESIA POSTPROCEDURE EVALUATION
Patient: Amira Shannon    Procedure Summary     Date: 03/22/21 Room / Location: [facility] ENDOSCOPY 1 / [facility] ENDOSCOPY    Anesthesia Start: 1621 Anesthesia Stop: 1642    Procedures:       ESOPHAGOGASTRODUODENOSCOPYURGNET (N/A )      COLONOSCOPY (N/A ) Diagnosis:       Abdominal pain, generalized      Chronic idiopathic constipation      Cirrhosis of liver without ascites, unspecified hepatic cirrhosis type (CMS/HCC)      SBO (small bowel obstruction) (CMS/HCC)      (Abdominal pain, generalized [R10.84])      (Chronic idiopathic constipation [K59.04])      (Cirrhosis of liver without ascites, unspecified hepatic cirrhosis type (CMS/HCC) [K74.60])      (SBO (small bowel obstruction) (CMS/HCC) [K56.609])    Surgeons: Russell Del Rio MD Provider: Anum Michaud CRNA    Anesthesia Type: MAC ASA Status: 3          Anesthesia Type: MAC    Vitals  No vitals data found for the desired time range.          Post Anesthesia Care and Evaluation    Patient location during evaluation: bedside  Patient participation: complete - patient participated  Level of consciousness: awake  Pain score: 0  Pain management: adequate  Airway patency: patent  Anesthetic complications: No anesthetic complications  PONV Status: none  Cardiovascular status: hemodynamically stable  Respiratory status: spontaneous ventilation and room air  Hydration status: acceptable

## 2021-03-22 NOTE — H&P
"No chief complaint on file.      ENDO PROCEDURE ORDERED: EGD/COLON abd pain, abnl CT, SBO    Subjective    Amira Shannon is a 60 y.o. female. she is here today for follow-up.    History of Present Illness     Dictation on: 03/22/2021  4:08 PM by: MOHINI NICHOLS [[id number]]     The following portions of the patient's history were reviewed and updated as appropriate:   Past Medical History:   Diagnosis Date   • Acid reflux    • Altered bowel function    • Arthropathy of lumbar facet joint    • Bleeding disorder (CMS/HCC)    • C. difficile diarrhea 2015   • Constipation    • Corns and callus    • Depression    • Disease related peripheral neuropathy    • Epigastric pain    • Fatty liver    • Hammer toe    • Headache    • Hiatal hernia    • History of transfusion    • Hyperlipidemia    • Knee pain    • Localized, primary osteoarthritis of the ankle and foot     Localized, primary osteoarthritis of the ankle and/or foot   • Mendoza's metatarsalgia     Mendoza's metatarsalgia - 2nd interspace on right   • Nausea and vomiting    • Neuralgia and neuritis     Neuralgia, neuritis, and radiculitis, unspecified   • Neuropathy    • Obstructive sleep apnea     Obstructive sleep apnea (adult) (pediatric)    • CHAI on CPAP     \"C-Pap at night  (unconfirmed)\"   • Osteoarthritis    • Pain in foot     Pain in unspecified foot - sees a podiatrist   • Pain in joint, ankle and foot     Joint pain in ankle and foot      • Pain radiating to back     Pain radiating to lumbar region of back   • Plantar fasciitis    • PONV (postoperative nausea and vomiting)    • Restless leg syndrome    • Secondary hypertension     Secondary hypertension, unspecified   • Sinusitis    • Sleep apnea    • Tongue anomaly     lesion     Past Surgical History:   Procedure Laterality Date   • CARPAL TUNNEL RELEASE Left 8/22/2018    Procedure: CARPAL TUNNEL RELEASE - left;  Surgeon: Justus Lewis MD;  Location: [facility];  Service: Orthopedics   • CARPAL " TUNNEL RELEASE Right 2019    Procedure: carpal tunnel release right hand with local/monitored anesthesia care;  Surgeon: Justus Lewis MD;  Location: [facility] OR;  Service: Orthopedics   •  SECTION     • CHOLECYSTECTOMY     • COLONOSCOPY  2013   • COLONOSCOPY N/A 10/17/2018    Procedure: COLONOSCOPY;  Surgeon: Russell Del Rio MD;  Location: [facility] ENDOSCOPY;  Service: Gastroenterology   • DIRECT LARYNGOSCOPY, ESOPHAGOSCOPY, BRONCHOSCOPY N/A 2017    Procedure: DIRECT LARYNGOSCOPY AND;  Surgeon: Live Bolton MD;  Location: [facility] OR;  Service:    • ENDOSCOPY  2013    Colon endoscopy 90341 (1) - Internal & external hemorrhoids found. Stool found.   • ENDOSCOPY  2013    EGD w/ tube 06184 (1) - Normal esophagus. Gastritis in stomach. Biopsy taken. Normal dudoenum. Biopsy taken.   • ENDOSCOPY N/A 10/17/2018    Procedure: ESOPHAGOGASTRODUODENOSCOPY--eval varices;  Surgeon: Russell Del Rio MD;  Location: [facility] ENDOSCOPY;  Service: Gastroenterology   • FOOT SURGERY  2013    Foot/toes surgery procedure (1) - Arthroplasty of toes 4 and 5 of right foot.   • HERNIA REPAIR     • HERNIA REPAIR      hital   • HYSTERECTOMY     • LIVER BIOPSY     • NERVE BLOCK  2016    Injection for nerve block (1) - Lumbar medial branch block.   • OTHER SURGICAL HISTORY  2012    Inj(s) Tend-Sheath, Ligament, Single  (1) - [facility]    • OTHER SURGICAL HISTORY  2013    Small Joint Injection/Aspiration  (2) - [facility] NICKERSON ([facility])    • OTHER SURGICAL HISTORY      bowel obstruction x2   • OTHER SURGICAL HISTORY      gland removed from neck   • SUBLINGUAL SALIVARY CYST EXCISION N/A 2017    Procedure: EXCISION OF LEFT  TONGUE LESION WITH CLOSURE;  Surgeon: Live Bolton MD;  Location: [facility] OR;  Service:    • TUBAL ABDOMINAL LIGATION     • UPPER GASTROINTESTINAL ENDOSCOPY  2013   • UPPER GASTROINTESTINAL ENDOSCOPY  10/17/2018      Family History   Problem Relation Age of Onset   • Cancer Other    • Diabetes Other    • Heart disease Other    • Hypertension Mother    • Cancer Mother    • Diabetes Mother    • Hypertension Father    • Heart attack Father    • Cancer Father    • Heart disease Father    • Thyroid disease Maternal Aunt      OB History    No obstetric history on file.       Allergies   Allergen Reactions   • Other      Pt states that taking steroids either in pill or injection form make her have blisters in her mouth and she feels like she is on fire on the inside/ has hx of c diff and possible MRSA     • Corticosteroids Rash   • Kenalog  [Triamcinolone Acetonide] Rash   • Sulfa Antibiotics Rash     Sulfa (Sulfonamide Antibiotics)     Social History     Socioeconomic History   • Marital status:      Spouse name: Not on file   • Number of children: Not on file   • Years of education: Not on file   • Highest education level: Not on file   Tobacco Use   • Smoking status: Former Smoker     Packs/day: 2.00     Years: 15.00     Pack years: 30.00     Types: Cigarettes     Quit date:      Years since quittin.2   • Smokeless tobacco: Never Used   Vaping Use   • Vaping Use: Never used   Substance and Sexual Activity   • Alcohol use: No   • Drug use: No   • Sexual activity: Defer     Comment: Marital Status:      Current Medications:  Prior to Admission medications    Medication Sig Start Date End Date Taking? Authorizing Provider   cetirizine (zyrTEC) 10 MG tablet Take 1 tablet by mouth Daily. 17  Yes ProviderPromise MD   CRANBERRY PO Take  by mouth.   Yes Provider, MD Promise   cyclobenzaprine (FLEXERIL) 10 MG tablet Take 10 mg by mouth 3 (Three) Times a Day As Needed for Muscle Spasms.   Yes Provider, MD Promise   diclofenac (VOLTAREN) 75 MG EC tablet Take 1 tablet by mouth Daily. 21  Yes Colleen Meadows APRN   escitalopram (LEXAPRO) 5 MG tablet Take 5 mg by mouth Daily.   Yes Provider,  MD Promise   furosemide (LASIX) 20 MG tablet Take 20 mg by mouth 2 (Two) Times a Day.   Yes Promise Tovar MD   gabapentin (NEURONTIN) 800 MG tablet Take 800 mg by mouth 4 (Four) Times a Day.   Yes Promise Tovar MD   meclizine (ANTIVERT) 25 MG tablet Take 50 mg by mouth As Needed for Dizziness.   Yes Promise Tovar MD   Melatonin 10 MG capsule Take  by mouth Every Night.   Yes Promise Tovar MD   Mirabegron ER (Myrbetriq) 50 MG tablet sustained-release 24 hour 24 hr tablet Take 50 mg by mouth Daily.   Yes Promise Tovar MD   nitrofurantoin (MACRODANTIN) 100 MG capsule Take 100 mg by mouth 2 (two) times a day.   Yes Promise Tovar MD   ondansetron (ZOFRAN) 8 MG tablet Take 1 tablet by mouth Every 8 (Eight) Hours As Needed for Nausea or Vomiting. 1/26/21  Yes Blaine Perales PA-C   ondansetron ODT (ZOFRAN-ODT) 4 MG disintegrating tablet Place 1 tablet on the tongue Every 8 (Eight) Hours As Needed for Nausea or Vomiting. 3/16/21  Yes Ollie Amado MD   oxyCODONE (oxyCONTIN) 10 MG 12 hr tablet Take 10 mg by mouth Every 12 (Twelve) Hours.   Yes Promise Tovar MD   pantoprazole (Protonix) 40 MG EC tablet Take 1 tablet by mouth Daily. 1/26/21  Yes Blaine Perales PA-C   Prucalopride Succinate (Motegrity) 2 MG tablet Take 1 tablet by mouth Daily. 1/26/21  Yes Blaine Perales PA-C   spironolactone (ALDACTONE) 25 MG tablet TAKE 1 TABLET BY MOUTH ONCE DAILY 6/11/19  Yes Maureen Cardoso, APRN     Review of Systems  Review of Systems   Constitutional: Positive for unexpected weight change.   HENT: Negative for trouble swallowing.    Gastrointestinal: Positive for abdominal distention, abdominal pain, constipation and nausea. Negative for anal bleeding, blood in stool, diarrhea, rectal pain and vomiting.   Genitourinary: Negative for difficulty urinating.          Objective    /86 (BP Location: Left arm, Patient Position: Lying)   Pulse 90   Temp 98.5 °F  "(36.9 °C) (Temporal)   Resp 18   Ht 162.6 cm (64\")   Wt 95.3 kg (210 lb)   LMP  (LMP Unknown)   SpO2 95%   BMI 36.05 kg/m²   Physical Exam  Vitals and nursing note reviewed.   Constitutional:       General: She is not in acute distress.     Appearance: She is well-developed.   HENT:      Head: Normocephalic and atraumatic.   Eyes:      Pupils: Pupils are equal, round, and reactive to light.   Cardiovascular:      Rate and Rhythm: Normal rate and regular rhythm.      Heart sounds: Normal heart sounds.   Pulmonary:      Effort: Pulmonary effort is normal.      Breath sounds: Normal breath sounds.   Abdominal:      General: Bowel sounds are normal. There is distension. There is no abdominal bruit.      Palpations: Abdomen is soft. Abdomen is not rigid. There is no shifting dullness or mass.      Tenderness: There is abdominal tenderness. There is no guarding or rebound.      Hernia: No hernia is present. There is no hernia in the ventral area.      Comments: +BS   Musculoskeletal:         General: Normal range of motion.      Cervical back: Normal range of motion.   Skin:     General: Skin is warm and dry.   Neurological:      Mental Status: She is alert and oriented to person, place, and time.   Psychiatric:         Behavior: Behavior normal.         Thought Content: Thought content normal.         Judgment: Judgment normal.       Assessment/Plan      1. Abdominal pain, generalized    2. Chronic idiopathic constipation    3. Cirrhosis of liver without ascites, unspecified hepatic cirrhosis type (CMS/HCC)    4. SBO (small bowel obstruction) (CMS/HCC)    .   Diagnoses and all orders for this visit:    1. Abdominal pain, generalized  -     dextrose 5 % and sodium chloride 0.45 % infusion    2. Chronic idiopathic constipation  -     dextrose 5 % and sodium chloride 0.45 % infusion    3. Cirrhosis of liver without ascites, unspecified hepatic cirrhosis type (CMS/HCC)  -     dextrose 5 % and sodium chloride 0.45 % " infusion    4. SBO (small bowel obstruction) (CMS/[facility])  -     dextrose 5 % and sodium chloride 0.45 % infusion    Other orders  -     POC Glucose Once; Standing  -     Obtain Informed Consent; Standing  -     POC Glucose Once  -     Obtain Informed Consent  -     Notify Anesthesia of Any Acute Changes in Patient Condition; Standing  -     Notify Anesthesia for Unrelieved Pain; Standing  -     Discharge patient from PACU when discharge criteria is met.; Standing  -     Notify Anesthesia of Any Acute Changes in Patient Condition  -     Notify Anesthesia for Unrelieved Pain  -     Discharge patient from PACU when discharge criteria is met.        Orders placed during this encounter include:  Orders Placed This Encounter   Procedures   • Obtain Informed Consent     Standing Status:   Standing     Number of Occurrences:   1     Order Specific Question:   Informed Consent Given For     Answer:   ESOPHAGOGASTRODUODENOSCOPY and colonoscopy   • Notify Anesthesia of Any Acute Changes in Patient Condition     Standing Status:   Standing     Number of Occurrences:   1     Order Specific Question:   Other     Answer:   Any Acute Changes in Patient Condition   • Notify Anesthesia for Unrelieved Pain     Standing Status:   Standing     Number of Occurrences:   1     Order Specific Question:   Other     Answer:   Unrelieved Pain   • Discharge patient from PACU when discharge criteria is met.     Standing Status:   Standing     Number of Occurrences:   1   • POC Glucose Once     Prior to Procedure on ALL Diabetic Patients     Standing Status:   Standing     Number of Occurrences:   1       Medications prescribed:  New Medications Ordered This Visit   Medications   • dextrose 5 % and sodium chloride 0.45 % infusion       Requested Prescriptions     Signed Prescriptions Disp Refills   • sodium-potassium-magnesium sulfates (Suprep Bowel Prep Kit) 17.5-3.13-1.6 GM/177ML solution oral solution 177 mL 0     Sig: Take 1 bottle by mouth 1  (One) Time for 1 dose. Take as per instruction sheet for colonoscopy prep.       Review and/or summary of lab tests, radiology, procedures, medications. Review and summary of old records and obtaining of history. The risks and benefits of my recommendations, as well as other treatment options were discussed with the patient today. Questions were answered.    Follow-up: No follow-ups on file.     ESOPHAGOGASTRODUODENOSCOPYURGNET (N/A), COLONOSCOPY (N/A)      This document has been electronically signed by Russell Del Rio MD on March 22, 2021 16:19 CDT      Results for orders placed or performed in visit on 03/20/21   COVID-19, [facility] IN-HOUSE, NP SWAB IN TRANSPORT MEDIA 8-10 HR TAT - Swab, Nasopharynx    Specimen: Nasopharynx; Swab   Result Value Ref Range    COVID19 Not Detected Not Detected - Ref. Range   Results for orders placed or performed during the hospital encounter of 03/16/21   Gold Top - SST   Result Value Ref Range    Extra Tube Hold for add-ons.    Scan Slide    Specimen: Blood   Result Value Ref Range    Anisocytosis Slight/1+ None Seen    WBC Morphology Normal Normal    Platelet Estimate Decreased Normal   COVID-19 and FLU A/B PCR - Swab, Nasopharynx    Specimen: Nasopharynx; Swab   Result Value Ref Range    COVID19 Not Detected Not Detected - Ref. Range    Influenza A PCR Not Detected Not Detected    Influenza B PCR Not Detected Not Detected   Urinalysis, Microscopic Only - Urine, Clean Catch    Specimen: Urine, Clean Catch   Result Value Ref Range    RBC, UA None Seen None Seen /HPF    WBC, UA 3-5 None Seen, 0-2, 3-5 /HPF    Bacteria, UA None Seen None Seen /HPF    Squamous Epithelial Cells, UA 3-5 (A) None Seen, 0-2 /HPF    Hyaline Casts, UA 0-2 None Seen /LPF    Methodology Manual Light Microscopy    Urinalysis With Microscopic If Indicated (No Culture) - Urine, Clean Catch    Specimen: Urine, Clean Catch   Result Value Ref Range    Color, UA Dark Yellow Yellow, Straw, Dark Yellow, Mikala     Appearance, UA Clear Clear    pH, UA 6.0 5.0 - 9.0    Specific Gravity, UA 1.020 1.003 - 1.030    Glucose, UA Negative Negative    Ketones, UA Trace (A) Negative    Bilirubin, UA Small (1+) (A) Negative    Blood, UA Negative Negative    Protein, UA Negative Negative    Leuk Esterase, UA Small (1+) (A) Negative    Nitrite, UA Negative Negative    Urobilinogen, UA 1.0 E.U./dL 0.2 - 1.0 E.U./dL   CBC Auto Differential    Specimen: Blood   Result Value Ref Range    WBC 6.18 3.40 - 10.80 10*3/mm3    RBC 4.95 3.77 - 5.28 10*6/mm3    Hemoglobin 13.1 12.0 - 15.9 g/dL    Hematocrit 40.1 34.0 - 46.6 %    MCV 81.0 79.0 - 97.0 fL    MCH 26.5 (L) 26.6 - 33.0 pg    MCHC 32.7 31.5 - 35.7 g/dL    RDW 16.4 (H) 12.3 - 15.4 %    RDW-SD 47.6 37.0 - 54.0 fl    MPV 10.9 6.0 - 12.0 fL    Platelets 85 (L) 140 - 450 10*3/mm3    Neutrophil % 70.6 42.7 - 76.0 %    Lymphocyte % 17.3 (L) 19.6 - 45.3 %    Monocyte % 7.6 5.0 - 12.0 %    Eosinophil % 3.7 0.3 - 6.2 %    Basophil % 0.5 0.0 - 1.5 %    Immature Grans % 0.3 0.0 - 0.5 %    Neutrophils, Absolute 4.36 1.70 - 7.00 10*3/mm3    Lymphocytes, Absolute 1.07 0.70 - 3.10 10*3/mm3    Monocytes, Absolute 0.47 0.10 - 0.90 10*3/mm3    Eosinophils, Absolute 0.23 0.00 - 0.40 10*3/mm3    Basophils, Absolute 0.03 0.00 - 0.20 10*3/mm3    Immature Grans, Absolute 0.02 0.00 - 0.05 10*3/mm3    nRBC 0.0 0.0 - 0.2 /100 WBC   aPTT    Specimen: Blood   Result Value Ref Range    PTT 35.9 20.0 - 40.3 seconds   Protime-INR    Specimen: Blood   Result Value Ref Range    Protime 16.6 (H) 11.1 - 15.3 Seconds    INR 1.28 (H) 0.80 - 1.20   Lipase    Specimen: Blood   Result Value Ref Range    Lipase 30 13 - 60 U/L   Comprehensive Metabolic Panel    Specimen: Blood   Result Value Ref Range    Glucose 88 65 - 99 mg/dL    BUN 8 8 - 23 mg/dL    Creatinine 1.15 (H) 0.57 - 1.00 mg/dL    Sodium 138 136 - 145 mmol/L    Potassium 4.3 3.5 - 5.2 mmol/L    Chloride 102 98 - 107 mmol/L    CO2 28.0 22.0 - 29.0 mmol/L    Calcium 9.3  8.6 - 10.5 mg/dL    Total Protein 7.0 6.0 - 8.5 g/dL    Albumin 3.90 3.50 - 5.20 g/dL    ALT (SGPT) 39 (H) 1 - 33 U/L    AST (SGOT) 74 (H) 1 - 32 U/L    Alkaline Phosphatase 123 (H) 39 - 117 U/L    Total Bilirubin 1.2 0.0 - 1.2 mg/dL    eGFR Non African Amer 48 (L) >60 mL/min/1.73    Globulin 3.1 gm/dL    A/G Ratio 1.3 g/dL    BUN/Creatinine Ratio 7.0 7.0 - 25.0    Anion Gap 8.0 5.0 - 15.0 mmol/L   Results for orders placed or performed during the hospital encounter of 03/06/21   PREVIOUS HISTORY    Specimen: Blood   Result Value Ref Range    Previous History No record on File    Scan Slide    Specimen: Blood   Result Value Ref Range    Acanthocytes Mod/2+ None Seen    WBC Morphology Normal Normal    Platelet Estimate Decreased Normal    Clumped Platelets     COVID-19 and FLU A/B PCR - Swab, Nasopharynx    Specimen: Nasopharynx; Swab   Result Value Ref Range    COVID19 Not Detected Not Detected - Ref. Range    Influenza A PCR Not Detected Not Detected    Influenza B PCR Not Detected Not Detected   Urinalysis With Microscopic If Indicated (No Culture) - Urine, Clean Catch    Specimen: Urine, Clean Catch   Result Value Ref Range    Color, UA Dark Yellow Yellow, Straw, Dark Yellow, Mikala    Appearance, UA Clear Clear    pH, UA 6.0 5.0 - 9.0    Specific Gravity, UA 1.028 1.003 - 1.030    Glucose, UA Negative Negative    Ketones, UA Trace (A) Negative    Bilirubin, UA Small (1+) (A) Negative    Blood, UA Negative Negative    Protein, UA Trace (A) Negative    Leuk Esterase, UA Negative Negative    Nitrite, UA Negative Negative    Urobilinogen, UA 2.0 E.U./dL (A) 0.2 - 1.0 E.U./dL   CBC Auto Differential    Specimen: Blood   Result Value Ref Range    WBC 4.56 3.40 - 10.80 10*3/mm3    RBC 5.16 3.77 - 5.28 10*6/mm3    Hemoglobin 13.8 12.0 - 15.9 g/dL    Hematocrit 40.6 34.0 - 46.6 %    MCV 78.7 (L) 79.0 - 97.0 fL    MCH 26.7 26.6 - 33.0 pg    MCHC 34.0 31.5 - 35.7 g/dL    RDW 15.9 (H) 12.3 - 15.4 %    RDW-SD 44.4 37.0 - 54.0  fl    MPV 11.5 6.0 - 12.0 fL    Platelets 82 (L) 140 - 450 10*3/mm3    Neutrophil % 67.4 42.7 - 76.0 %    Lymphocyte % 24.1 19.6 - 45.3 %    Monocyte % 5.0 5.0 - 12.0 %    Eosinophil % 2.9 0.3 - 6.2 %    Basophil % 0.4 0.0 - 1.5 %    Immature Grans % 0.2 0.0 - 0.5 %    Neutrophils, Absolute 3.07 1.70 - 7.00 10*3/mm3    Lymphocytes, Absolute 1.10 0.70 - 3.10 10*3/mm3    Monocytes, Absolute 0.23 0.10 - 0.90 10*3/mm3    Eosinophils, Absolute 0.13 0.00 - 0.40 10*3/mm3    Basophils, Absolute 0.02 0.00 - 0.20 10*3/mm3    Immature Grans, Absolute 0.01 0.00 - 0.05 10*3/mm3    nRBC 0.0 0.0 - 0.2 /100 WBC     *Note: Due to a large number of results and/or encounters for the requested time period, some results have not been displayed. A complete set of results can be found in Results Review.       Some portions of this note have been dictated using voice recognition software and may contain errors and/or omissions.

## 2021-03-24 LAB
LAB AP CASE REPORT: NORMAL
PATH REPORT.FINAL DX SPEC: NORMAL

## 2021-03-26 ENCOUNTER — READMISSION MANAGEMENT (OUTPATIENT)
Dept: CALL CENTER | Facility: HOSPITAL | Age: 61
End: 2021-03-26

## 2021-03-26 NOTE — OUTREACH NOTE
Medical Week 2 Survey      Responses   [facility] patient discharged from?  [place]   Does the patient have one of the following disease processes/diagnoses(primary or secondary)?  Other   Week 2 attempt successful?  Yes   Call start time  1607   Call end time  1614   Meds reviewed with patient/caregiver?  Yes   Is the patient taking all medications as directed (includes completed medication regime)?  Yes   Has the patient kept scheduled appointments due by today?  Yes   Comments  Going to Drs. Appt.   Comments  Bowels have not moved since colonscopy, getting miserable again, going Monday to see GI, Dr. Perales   What is the patient's perception of their health status since discharge?  Same [Still not having bowel movement having appt. Monday with GI, ]   Week 2 Call Completed?  Yes   Wrap up additional comments  She says still has no urge to have Bowel movement, she sees Dr. Epps, GI, she is getting another bottle Mag. citrate for this weekend, told her she needs to call her Dr. she says will see him NMonday, she is starting to get miserable again from constipation, told her call MD Reina Rose, RN

## 2021-03-29 ENCOUNTER — OFFICE VISIT (OUTPATIENT)
Dept: GASTROENTEROLOGY | Facility: CLINIC | Age: 61
End: 2021-03-29

## 2021-03-29 VITALS
DIASTOLIC BLOOD PRESSURE: 69 MMHG | BODY MASS INDEX: 36.5 KG/M2 | HEART RATE: 83 BPM | WEIGHT: 213.8 LBS | SYSTOLIC BLOOD PRESSURE: 129 MMHG | HEIGHT: 64 IN

## 2021-03-29 DIAGNOSIS — R10.84 GENERALIZED ABDOMINAL PAIN: ICD-10-CM

## 2021-03-29 DIAGNOSIS — K59.04 CHRONIC IDIOPATHIC CONSTIPATION: Primary | ICD-10-CM

## 2021-03-29 DIAGNOSIS — R11.2 NAUSEA AND VOMITING, INTRACTABILITY OF VOMITING NOT SPECIFIED, UNSPECIFIED VOMITING TYPE: ICD-10-CM

## 2021-03-29 PROCEDURE — 99214 OFFICE O/P EST MOD 30 MIN: CPT | Performed by: PHYSICIAN ASSISTANT

## 2021-03-29 RX ORDER — POLYETHYLENE GLYCOL 3350 17 G/17G
17 POWDER, FOR SOLUTION ORAL DAILY
Status: ON HOLD | COMMUNITY
End: 2021-05-24 | Stop reason: SDUPTHER

## 2021-03-29 RX ORDER — DOCUSATE SODIUM 100 MG/1
100 CAPSULE, LIQUID FILLED ORAL DAILY
COMMUNITY
End: 2021-09-24 | Stop reason: HOSPADM

## 2021-03-29 RX ORDER — METHYLNALTREXONE BROMIDE 150 MG/1
450 TABLET ORAL DAILY
Qty: 90 TABLET | Refills: 1 | Status: SHIPPED | OUTPATIENT
Start: 2021-03-29 | End: 2021-07-19

## 2021-03-29 RX ORDER — METOCLOPRAMIDE 5 MG/1
5 TABLET ORAL
Qty: 90 TABLET | Refills: 1 | Status: SHIPPED | OUTPATIENT
Start: 2021-03-29 | End: 2021-09-24 | Stop reason: HOSPADM

## 2021-03-29 RX ORDER — SODIUM PHOSPHATE, DIBASIC AND SODIUM PHOSPHATE, MONOBASIC 7; 19 G/133ML; G/133ML
1 ENEMA RECTAL AS NEEDED
COMMUNITY
End: 2022-01-25

## 2021-03-29 RX ORDER — ESCITALOPRAM OXALATE 10 MG/1
10 TABLET ORAL DAILY
COMMUNITY
Start: 2021-03-25 | End: 2022-07-07 | Stop reason: ALTCHOICE

## 2021-03-30 ENCOUNTER — TELEPHONE (OUTPATIENT)
Dept: GASTROENTEROLOGY | Facility: CLINIC | Age: 61
End: 2021-03-30

## 2021-03-30 NOTE — TELEPHONE ENCOUNTER
Patient has been contacted and made aware that the prior auth for [employer] has been approved through her insurance company.

## 2021-04-05 ENCOUNTER — READMISSION MANAGEMENT (OUTPATIENT)
Dept: CALL CENTER | Facility: HOSPITAL | Age: 61
End: 2021-04-05

## 2021-04-05 NOTE — OUTREACH NOTE
Medical Week 3 Survey      Responses   [facility] patient discharged from?  [place]   Does the patient have one of the following disease processes/diagnoses(primary or secondary)?  Other   Week 3 attempt successful?  Yes   Call start time  1817   Call end time  1824   Discharge diagnosis  SBO (small bowel obstruction   Meds reviewed with patient/caregiver?  Yes   Is the patient taking all medications as directed (includes completed medication regime)?  Yes   Has the patient kept scheduled appointments due by today?  Yes   What is the patient's perception of their health status since discharge?  Same   Additional teach back comments  still having HTN has follow up tomorrow.   Week 3 Call Completed?  Yes   Wrap up additional comments  Still has HTN.  Instructed her to call her PCP in the am with her BP readings.  She has a follow up tomorrow with nephrology and will discuss her bp.  No other problems.          Nell Benson RN

## 2021-04-06 ENCOUNTER — TRANSCRIBE ORDERS (OUTPATIENT)
Dept: LAB | Facility: HOSPITAL | Age: 61
End: 2021-04-06

## 2021-04-06 DIAGNOSIS — N39.0 URINARY TRACT INFECTION WITHOUT HEMATURIA, SITE UNSPECIFIED: Primary | ICD-10-CM

## 2021-04-07 ENCOUNTER — HOSPITAL ENCOUNTER (EMERGENCY)
Facility: HOSPITAL | Age: 61
Discharge: HOME | End: 2021-04-07
Attending: FAMILY MEDICINE | Admitting: FAMILY MEDICINE

## 2021-04-07 ENCOUNTER — APPOINTMENT (OUTPATIENT)
Dept: GENERAL RADIOLOGY | Facility: HOSPITAL | Age: 61
End: 2021-04-07

## 2021-04-07 VITALS
WEIGHT: 210.3 LBS | TEMPERATURE: 98.4 F | BODY MASS INDEX: 35.9 KG/M2 | HEART RATE: 81 BPM | RESPIRATION RATE: 18 BRPM | DIASTOLIC BLOOD PRESSURE: 83 MMHG | SYSTOLIC BLOOD PRESSURE: 142 MMHG | OXYGEN SATURATION: 96 % | HEIGHT: 64 IN

## 2021-04-07 DIAGNOSIS — K59.03 DRUG-INDUCED CONSTIPATION: Primary | ICD-10-CM

## 2021-04-07 DIAGNOSIS — R51.9 NONINTRACTABLE HEADACHE, UNSPECIFIED CHRONICITY PATTERN, UNSPECIFIED HEADACHE TYPE: ICD-10-CM

## 2021-04-07 LAB
ALBUMIN SERPL-MCNC: 3.4 G/DL (ref 3.5–5.2)
ALBUMIN/GLOB SERPL: 1.1 G/DL
ALP SERPL-CCNC: 136 U/L (ref 39–117)
ALT SERPL W P-5'-P-CCNC: 27 U/L (ref 1–33)
ANION GAP SERPL CALCULATED.3IONS-SCNC: 8 MMOL/L (ref 5–15)
AST SERPL-CCNC: 49 U/L (ref 1–32)
BACTERIA UR QL AUTO: ABNORMAL /HPF
BASOPHILS # BLD AUTO: 0.02 10*3/MM3 (ref 0–0.2)
BASOPHILS # BLD MANUAL: 0.04 10*3/MM3 (ref 0–0.2)
BASOPHILS NFR BLD AUTO: 0.5 % (ref 0–1.5)
BASOPHILS NFR BLD AUTO: 1 % (ref 0–1.5)
BILIRUB SERPL-MCNC: 0.7 MG/DL (ref 0–1.2)
BILIRUB UR QL STRIP: NEGATIVE
BUN SERPL-MCNC: 8 MG/DL (ref 8–23)
BUN/CREAT SERPL: 9.1 (ref 7–25)
CALCIUM SPEC-SCNC: 9.3 MG/DL (ref 8.6–10.5)
CHLORIDE SERPL-SCNC: 106 MMOL/L (ref 98–107)
CLARITY UR: CLEAR
CO2 SERPL-SCNC: 26 MMOL/L (ref 22–29)
COLOR UR: YELLOW
CREAT SERPL-MCNC: 0.88 MG/DL (ref 0.57–1)
DEPRECATED RDW RBC AUTO: 46.5 FL (ref 37–54)
EOSINOPHIL # BLD AUTO: 0.19 10*3/MM3 (ref 0–0.4)
EOSINOPHIL # BLD MANUAL: 0.25 10*3/MM3 (ref 0–0.4)
EOSINOPHIL NFR BLD AUTO: 4.5 % (ref 0.3–6.2)
EOSINOPHIL NFR BLD MANUAL: 6 % (ref 0.3–6.2)
ERYTHROCYTE [DISTWIDTH] IN BLOOD BY AUTOMATED COUNT: 16.2 % (ref 12.3–15.4)
GFR SERPL CREATININE-BSD FRML MDRD: 65 ML/MIN/1.73
GLOBULIN UR ELPH-MCNC: 3.2 GM/DL
GLUCOSE SERPL-MCNC: 98 MG/DL (ref 65–99)
GLUCOSE UR STRIP-MCNC: NEGATIVE MG/DL
HCT VFR BLD AUTO: 37.9 % (ref 34–46.6)
HGB BLD-MCNC: 12.2 G/DL (ref 12–15.9)
HGB UR QL STRIP.AUTO: NEGATIVE
HOLD SPECIMEN: NORMAL
HYALINE CASTS UR QL AUTO: ABNORMAL /LPF
IMM GRANULOCYTES # BLD AUTO: 0.01 10*3/MM3 (ref 0–0.05)
IMM GRANULOCYTES NFR BLD AUTO: 0.2 % (ref 0–0.5)
KETONES UR QL STRIP: NEGATIVE
LEUKOCYTE ESTERASE UR QL STRIP.AUTO: ABNORMAL
LIPASE SERPL-CCNC: 27 U/L (ref 13–60)
LYMPHOCYTES # BLD AUTO: 0.98 10*3/MM3 (ref 0.7–3.1)
LYMPHOCYTES # BLD MANUAL: 0.79 10*3/MM3 (ref 0.7–3.1)
LYMPHOCYTES NFR BLD AUTO: 23.4 % (ref 19.6–45.3)
LYMPHOCYTES NFR BLD MANUAL: 19 % (ref 19.6–45.3)
LYMPHOCYTES NFR BLD MANUAL: 5 % (ref 5–12)
MCH RBC QN AUTO: 25.9 PG (ref 26.6–33)
MCHC RBC AUTO-ENTMCNC: 32.2 G/DL (ref 31.5–35.7)
MCV RBC AUTO: 80.5 FL (ref 79–97)
MONOCYTES # BLD AUTO: 0.21 10*3/MM3 (ref 0.1–0.9)
MONOCYTES # BLD AUTO: 0.29 10*3/MM3 (ref 0.1–0.9)
MONOCYTES NFR BLD AUTO: 6.9 % (ref 5–12)
NEUTROPHILS # BLD AUTO: 2.88 10*3/MM3 (ref 1.7–7)
NEUTROPHILS NFR BLD AUTO: 2.69 10*3/MM3 (ref 1.7–7)
NEUTROPHILS NFR BLD AUTO: 64.5 % (ref 42.7–76)
NEUTROPHILS NFR BLD MANUAL: 69 % (ref 42.7–76)
NITRITE UR QL STRIP: NEGATIVE
NRBC BLD AUTO-RTO: 0 /100 WBC (ref 0–0.2)
PH UR STRIP.AUTO: 6.5 [PH] (ref 5–9)
PLATELET # BLD AUTO: 69 10*3/MM3 (ref 140–450)
PMV BLD AUTO: 10.9 FL (ref 6–12)
POTASSIUM SERPL-SCNC: 4.3 MMOL/L (ref 3.5–5.2)
PROT SERPL-MCNC: 6.6 G/DL (ref 6–8.5)
PROT UR QL STRIP: NEGATIVE
RBC # BLD AUTO: 4.71 10*6/MM3 (ref 3.77–5.28)
RBC # UR: ABNORMAL /HPF
RBC MORPH BLD: NORMAL
REF LAB TEST METHOD: ABNORMAL
SMALL PLATELETS BLD QL SMEAR: ABNORMAL
SODIUM SERPL-SCNC: 140 MMOL/L (ref 136–145)
SP GR UR STRIP: 1.01 (ref 1–1.03)
SQUAMOUS #/AREA URNS HPF: ABNORMAL /HPF
UROBILINOGEN UR QL STRIP: ABNORMAL
WBC # BLD AUTO: 4.18 10*3/MM3 (ref 3.4–10.8)
WBC MORPH BLD: NORMAL
WBC UR QL AUTO: ABNORMAL /HPF
WHOLE BLOOD HOLD SPECIMEN: NORMAL
WHOLE BLOOD HOLD SPECIMEN: NORMAL

## 2021-04-07 PROCEDURE — 99284 EMERGENCY DEPT VISIT MOD MDM: CPT

## 2021-04-07 PROCEDURE — 96361 HYDRATE IV INFUSION ADD-ON: CPT

## 2021-04-07 PROCEDURE — 25010000002 KETOROLAC TROMETHAMINE PER 15 MG: Performed by: FAMILY MEDICINE

## 2021-04-07 PROCEDURE — 81001 URINALYSIS AUTO W/SCOPE: CPT | Performed by: FAMILY MEDICINE

## 2021-04-07 PROCEDURE — 85007 BL SMEAR W/DIFF WBC COUNT: CPT | Performed by: FAMILY MEDICINE

## 2021-04-07 PROCEDURE — 96374 THER/PROPH/DIAG INJ IV PUSH: CPT

## 2021-04-07 PROCEDURE — 85025 COMPLETE CBC W/AUTO DIFF WBC: CPT | Performed by: FAMILY MEDICINE

## 2021-04-07 PROCEDURE — 96375 TX/PRO/DX INJ NEW DRUG ADDON: CPT

## 2021-04-07 PROCEDURE — 74022 RADEX COMPL AQT ABD SERIES: CPT

## 2021-04-07 PROCEDURE — 83690 ASSAY OF LIPASE: CPT | Performed by: FAMILY MEDICINE

## 2021-04-07 PROCEDURE — 80053 COMPREHEN METABOLIC PANEL: CPT | Performed by: FAMILY MEDICINE

## 2021-04-07 PROCEDURE — 25010000002 ONDANSETRON PER 1 MG: Performed by: FAMILY MEDICINE

## 2021-04-07 RX ORDER — SUMATRIPTAN 100 MG/1
50 TABLET, FILM COATED ORAL
Qty: 10 TABLET | Refills: 0 | Status: SHIPPED | OUTPATIENT
Start: 2021-04-07 | End: 2021-06-11

## 2021-04-07 RX ORDER — CLONIDINE HYDROCHLORIDE 0.1 MG/1
0.1 TABLET ORAL 2 TIMES DAILY
Qty: 20 TABLET | Refills: 0 | Status: SHIPPED | OUTPATIENT
Start: 2021-04-07 | End: 2021-09-24 | Stop reason: HOSPADM

## 2021-04-07 RX ORDER — ONDANSETRON 2 MG/ML
4 INJECTION INTRAMUSCULAR; INTRAVENOUS ONCE
Status: COMPLETED | OUTPATIENT
Start: 2021-04-07 | End: 2021-04-07

## 2021-04-07 RX ORDER — KETOROLAC TROMETHAMINE 15 MG/ML
15 INJECTION, SOLUTION INTRAMUSCULAR; INTRAVENOUS ONCE
Status: COMPLETED | OUTPATIENT
Start: 2021-04-07 | End: 2021-04-07

## 2021-04-07 RX ORDER — DICYCLOMINE HCL 20 MG
20 TABLET ORAL EVERY 6 HOURS PRN
Qty: 30 TABLET | Refills: 0 | Status: SHIPPED | OUTPATIENT
Start: 2021-04-07 | End: 2021-08-20 | Stop reason: SDUPTHER

## 2021-04-07 RX ADMIN — SODIUM CHLORIDE 1000 ML: 9 INJECTION, SOLUTION INTRAVENOUS at 09:38

## 2021-04-07 RX ADMIN — KETOROLAC TROMETHAMINE 15 MG: 15 INJECTION, SOLUTION INTRAMUSCULAR; INTRAVENOUS at 09:39

## 2021-04-07 RX ADMIN — ONDANSETRON 4 MG: 2 INJECTION INTRAMUSCULAR; INTRAVENOUS at 09:38

## 2021-04-07 NOTE — ED PROVIDER NOTES
"Subjective   Patient presents emergency department with nausea, headache, and decreased bowel movements for the past 3 days.  She states that she has recently had a small bowel obstruction that resolved without surgery.  She sees Dr. Perales in [place].      Headache  Pain location:  Generalized  Quality:  Unable to specify  Duration:  1 day  Progression:  Waxing and waning  Associated symptoms: nausea    Nausea:     Severity:  Moderate    Duration:  3 days    Progression:  Waxing and waning  Nausea  The primary symptoms include nausea.   The illness is also significant for constipation.   Constipation  Severity:  Moderate  Time since last bowel movement:  3 days  Timing:  Constant  Progression:  Worsening  Associated symptoms: nausea        Review of Systems   Gastrointestinal: Positive for constipation and nausea.   Neurological: Positive for headaches.       Past Medical History:   Diagnosis Date   • Acid reflux    • Altered bowel function    • Arthropathy of lumbar facet joint    • Bleeding disorder (CMS/HCC)    • C. difficile diarrhea 2015   • Constipation    • Corns and callus    • Depression    • Disease related peripheral neuropathy    • Epigastric pain    • Fatty liver    • Hammer toe    • Headache    • Hiatal hernia    • History of transfusion    • Hyperlipidemia    • Knee pain    • Localized, primary osteoarthritis of the ankle and foot     Localized, primary osteoarthritis of the ankle and/or foot   • Mendoza's metatarsalgia     Mendoza's metatarsalgia - 2nd interspace on right   • Nausea and vomiting    • Neuralgia and neuritis     Neuralgia, neuritis, and radiculitis, unspecified   • Neuropathy    • Obstructive sleep apnea     Obstructive sleep apnea (adult) (pediatric)    • CHAI on CPAP     \"C-Pap at night  (unconfirmed)\"   • Osteoarthritis    • Pain in foot     Pain in unspecified foot - sees a podiatrist   • Pain in joint, ankle and foot     Joint pain in ankle and foot      • Pain radiating to " back     Pain radiating to lumbar region of back   • Plantar fasciitis    • PONV (postoperative nausea and vomiting)    • Restless leg syndrome    • Secondary hypertension     Secondary hypertension, unspecified   • Sinusitis    • Sleep apnea    • Tongue anomaly     lesion       Allergies   Allergen Reactions   • Other      Pt states that taking steroids either in pill or injection form make her have blisters in her mouth and she feels like she is on fire on the inside/ has hx of c diff and possible MRSA     • Corticosteroids Rash   • Kenalog  [Triamcinolone Acetonide] Rash   • Sulfa Antibiotics Rash     Sulfa (Sulfonamide Antibiotics)       Past Surgical History:   Procedure Laterality Date   • CARPAL TUNNEL RELEASE Left 2018    Procedure: CARPAL TUNNEL RELEASE - left;  Surgeon: Justus Lewis MD;  Location: [facility] OR;  Service: Orthopedics   • CARPAL TUNNEL RELEASE Right 2019    Procedure: carpal tunnel release right hand with local/monitored anesthesia care;  Surgeon: Justus Lewis MD;  Location: [facility] OR;  Service: Orthopedics   •  SECTION     • CHOLECYSTECTOMY     • COLONOSCOPY  2013   • COLONOSCOPY N/A 10/17/2018    Procedure: COLONOSCOPY;  Surgeon: Russell Del Rio MD;  Location: [facility] ENDOSCOPY;  Service: Gastroenterology   • COLONOSCOPY N/A 3/22/2021    Procedure: COLONOSCOPY;  Surgeon: Russell Del Rio MD;  Location: [facility] ENDOSCOPY;  Service: Gastroenterology;  Laterality: N/A;   • DIRECT LARYNGOSCOPY, ESOPHAGOSCOPY, BRONCHOSCOPY N/A 2017    Procedure: DIRECT LARYNGOSCOPY AND;  Surgeon: Live Bolton MD;  Location: [facility] OR;  Service:    • ENDOSCOPY  2013    Colon endoscopy 64508 (1) - Internal & external hemorrhoids found. Stool found.   • ENDOSCOPY  2013    EGD w/ tube 12469 (1) - Normal esophagus. Gastritis in stomach. Biopsy taken. Normal dudoenum. Biopsy taken.   • ENDOSCOPY N/A 10/17/2018    Procedure: ESOPHAGOGASTRODUODENOSCOPY--eval  varices;  Surgeon: Russell Del Rio MD;  Location: [facility] ENDOSCOPY;  Service: Gastroenterology   • ENDOSCOPY N/A 3/22/2021    Procedure: ESOPHAGOGASTRODUODENOSCOPYURGNET;  Surgeon: Russell Del Rio MD;  Location: [facility] ENDOSCOPY;  Service: Gastroenterology;  Laterality: N/A;   • FOOT SURGERY  02/26/2013    Foot/toes surgery procedure (1) - Arthroplasty of toes 4 and 5 of right foot.   • HERNIA REPAIR     • HERNIA REPAIR      hital   • HYSTERECTOMY     • LIVER BIOPSY     • NERVE BLOCK  07/25/2016    Injection for nerve block (1) - Lumbar medial branch block.   • OTHER SURGICAL HISTORY  12/03/2012    Inj(s) Tend-Sheath, Ligament, Single 20550 (1) - [facility] ([facility])    • OTHER SURGICAL HISTORY  06/24/2013    Small Joint Injection/Aspiration 20600 (2) - [facility]TER NICK[facility] ([facility])    • OTHER SURGICAL HISTORY  2011    bowel obstruction x2   • OTHER SURGICAL HISTORY      gland removed from neck   • SUBLINGUAL SALIVARY CYST EXCISION N/A 8/1/2017    Procedure: EXCISION OF LEFT  TONGUE LESION WITH CLOSURE;  Surgeon: Live Bolton MD;  Location: [facility] OR;  Service:    • TUBAL ABDOMINAL LIGATION     • UPPER GASTROINTESTINAL ENDOSCOPY  07/01/2013   • UPPER GASTROINTESTINAL ENDOSCOPY  10/17/2018   • UPPER GASTROINTESTINAL ENDOSCOPY  03/22/2021       Family History   Problem Relation Age of Onset   • Cancer Other    • Diabetes Other    • Heart disease Other    • Hypertension Mother    • Cancer Mother    • Diabetes Mother    • Hypertension Father    • Heart attack Father    • Cancer Father    • Heart disease Father    • Thyroid disease Maternal Aunt        Social History     Socioeconomic History   • Marital status:      Spouse name: Not on file   • Number of children: Not on file   • Years of education: Not on file   • Highest education level: Not on file   Tobacco Use   • Smoking status: Former Smoker     Packs/day: 2.00     Years: 15.00     Pack years: 30.00     Types: Cigarettes     Quit date: 2000      Years since quittin.2   • Smokeless tobacco: Never Used   Vaping Use   • Vaping Use: Never used   Substance and Sexual Activity   • Alcohol use: No   • Drug use: No   • Sexual activity: Defer     Comment: Marital Status:            Objective   Physical Exam  Vitals and nursing note reviewed.   Constitutional:       Appearance: She is well-developed.   HENT:      Head: Normocephalic and atraumatic.      Nose: Nose normal.   Eyes:      General: No scleral icterus.        Right eye: No discharge.         Left eye: No discharge.      Conjunctiva/sclera: Conjunctivae normal.      Pupils: Pupils are equal, round, and reactive to light.   Neck:      Trachea: No tracheal deviation.   Cardiovascular:      Rate and Rhythm: Normal rate and regular rhythm.      Heart sounds: Normal heart sounds. No murmur heard.     Pulmonary:      Effort: Pulmonary effort is normal. No respiratory distress.      Breath sounds: Normal breath sounds. No stridor. No wheezing or rales.   Abdominal:      General: Bowel sounds are normal. There is no distension.      Palpations: Abdomen is soft. There is no mass.      Tenderness: There is abdominal tenderness (mild) in the left lower quadrant. There is no guarding or rebound.   Musculoskeletal:      Cervical back: Normal range of motion and neck supple.   Skin:     General: Skin is warm and dry.      Findings: No erythema or rash.   Neurological:      Mental Status: She is alert and oriented to person, place, and time.      Coordination: Coordination normal.   Psychiatric:         Behavior: Behavior normal.         Thought Content: Thought content normal.         Procedures           ED Course                   Labs Reviewed   COMPREHENSIVE METABOLIC PANEL - Abnormal; Notable for the following components:       Result Value    Albumin 3.40 (*)     AST (SGOT) 49 (*)     Alkaline Phosphatase 136 (*)     All other components within normal limits    Narrative:     GFR Normal >60  Chronic  Kidney Disease <60  Kidney Failure <15     URINALYSIS W/ CULTURE IF INDICATED - Abnormal; Notable for the following components:    Leuk Esterase, UA Small (1+) (*)     All other components within normal limits   CBC WITH AUTO DIFFERENTIAL - Abnormal; Notable for the following components:    MCH 25.9 (*)     RDW 16.2 (*)     Platelets 69 (*)     All other components within normal limits   URINALYSIS, MICROSCOPIC ONLY - Abnormal; Notable for the following components:    Bacteria, UA Trace (*)     Squamous Epithelial Cells, UA 3-5 (*)     All other components within normal limits   MANUAL DIFFERENTIAL - Abnormal; Notable for the following components:    Lymphocyte % 19.0 (*)     All other components within normal limits   LIPASE - Normal   CBC AND DIFFERENTIAL    Narrative:     The following orders were created for panel order CBC & Differential.  Procedure                               Abnormality         Status                     ---------                               -----------         ------                     Scan Slide[[id number]]                                                                  CBC Auto Differential[[id number]]        Abnormal            Final result                 Please view results for these tests on the individual orders.   EXTRA TUBES    Narrative:     The following orders were created for panel order Extra Tubes.  Procedure                               Abnormality         Status                     ---------                               -----------         ------                     Light Blue Top[[id number]]                                   Final result               Lavender Top[[id number]]                                     Final result               Gold Top - SST[[id number]]                                   Final result                 Please view results for these tests on the individual orders.   LIGHT BLUE TOP   LAVENDER TOP   GOLD TOP - SST       XR Abdomen 2+ VW with Chest 1  VW   Final Result   Increased stool throughout the colon. Otherwise   unremarkable abdomen.         Electronically signed by:  Blair Rojas MD  4/7/2021 10:17 AM CDT   Workstation: [id number]                                       [facility]    Final diagnoses:   Drug-induced constipation   Nonintractable headache, unspecified chronicity pattern, unspecified headache type       ED Disposition  ED Disposition     ED Disposition Condition Comment    Discharge Stable           Yobany Barr MD  [address] [place] [address]  [phone]    In 2 days           Medication List      New Prescriptions    cloNIDine 0.1 MG tablet  Commonly known as: CATAPRES  Take 1 tablet by mouth 2 (Two) Times a Day. PRN systolic BP >160.     dicyclomine 20 MG tablet  Commonly known as: BENTYL  Take 1 tablet by mouth Every 6 (Six) Hours As Needed (abdominal pain).     SUMAtriptan 100 MG tablet  Commonly known as: IMITREX  Take 0.5 tablets by mouth Every 2 (Two) Hours As Needed for Migraine.           Where to Get Your Medications      These medications were sent to [facility] [address] [place] [address] - [address] [place] [address] - [phone]  - [phone] [place] [id number]    Phone: [phone]   · cloNIDine 0.1 MG tablet  · dicyclomine 20 MG tablet  · SUMAtriptan 100 MG tablet          Nelson Kim MD  04/07/21 [id number]

## 2021-04-14 ENCOUNTER — READMISSION MANAGEMENT (OUTPATIENT)
Dept: CALL CENTER | Facility: HOSPITAL | Age: 61
End: 2021-04-14

## 2021-04-14 ENCOUNTER — LAB (OUTPATIENT)
Dept: LAB | Facility: HOSPITAL | Age: 61
End: 2021-04-14

## 2021-04-14 DIAGNOSIS — K59.04 CHRONIC IDIOPATHIC CONSTIPATION: ICD-10-CM

## 2021-04-14 DIAGNOSIS — N39.0 URINARY TRACT INFECTION WITHOUT HEMATURIA, SITE UNSPECIFIED: ICD-10-CM

## 2021-04-14 DIAGNOSIS — K75.81 NASH (NONALCOHOLIC STEATOHEPATITIS): ICD-10-CM

## 2021-04-14 DIAGNOSIS — R74.8 ELEVATED LIVER ENZYMES: ICD-10-CM

## 2021-04-14 DIAGNOSIS — K74.60 CIRRHOSIS OF LIVER WITHOUT ASCITES, UNSPECIFIED HEPATIC CIRRHOSIS TYPE (HCC): ICD-10-CM

## 2021-04-14 DIAGNOSIS — Z01.89 ROUTINE LAB DRAW: ICD-10-CM

## 2021-04-14 LAB
ALBUMIN SERPL-MCNC: 3.8 G/DL (ref 3.5–5.2)
ALBUMIN/GLOB SERPL: 1.3 G/DL
ALP SERPL-CCNC: 126 U/L (ref 39–117)
ALT SERPL W P-5'-P-CCNC: 29 U/L (ref 1–33)
ANION GAP SERPL CALCULATED.3IONS-SCNC: 6.7 MMOL/L (ref 5–15)
AST SERPL-CCNC: 54 U/L (ref 1–32)
BILIRUB SERPL-MCNC: 0.8 MG/DL (ref 0–1.2)
BUN SERPL-MCNC: 9 MG/DL (ref 8–23)
BUN/CREAT SERPL: 10.1 (ref 7–25)
CALCIUM SPEC-SCNC: 9 MG/DL (ref 8.6–10.5)
CHLORIDE SERPL-SCNC: 108 MMOL/L (ref 98–107)
CO2 SERPL-SCNC: 25.3 MMOL/L (ref 22–29)
CREAT SERPL-MCNC: 0.89 MG/DL (ref 0.57–1)
GFR SERPL CREATININE-BSD FRML MDRD: 64 ML/MIN/1.73
GLOBULIN UR ELPH-MCNC: 2.9 GM/DL
GLUCOSE SERPL-MCNC: 89 MG/DL (ref 65–99)
POTASSIUM SERPL-SCNC: 4.2 MMOL/L (ref 3.5–5.2)
PROT SERPL-MCNC: 6.7 G/DL (ref 6–8.5)
SODIUM SERPL-SCNC: 140 MMOL/L (ref 136–145)

## 2021-04-14 PROCEDURE — 83010 ASSAY OF HAPTOGLOBIN QUANT: CPT

## 2021-04-14 PROCEDURE — 82172 ASSAY OF APOLIPOPROTEIN: CPT

## 2021-04-14 PROCEDURE — 84450 TRANSFERASE (AST) (SGOT): CPT

## 2021-04-14 PROCEDURE — 84478 ASSAY OF TRIGLYCERIDES: CPT

## 2021-04-14 PROCEDURE — 83883 ASSAY NEPHELOMETRY NOT SPEC: CPT

## 2021-04-14 PROCEDURE — 87086 URINE CULTURE/COLONY COUNT: CPT

## 2021-04-14 PROCEDURE — 82977 ASSAY OF GGT: CPT

## 2021-04-14 PROCEDURE — 84460 ALANINE AMINO (ALT) (SGPT): CPT

## 2021-04-14 PROCEDURE — 80053 COMPREHEN METABOLIC PANEL: CPT

## 2021-04-14 PROCEDURE — 82947 ASSAY GLUCOSE BLOOD QUANT: CPT

## 2021-04-14 PROCEDURE — 82247 BILIRUBIN TOTAL: CPT

## 2021-04-14 PROCEDURE — 82465 ASSAY BLD/SERUM CHOLESTEROL: CPT

## 2021-04-14 NOTE — OUTREACH NOTE
Medical Week 4 Survey      Responses   [facility] patient discharged from?  [place]   Does the patient have one of the following disease processes/diagnoses(primary or secondary)?  Other   Week 4 attempt successful?  Yes   Call start time  1310   Call end time  1320   Meds reviewed with patient/caregiver?  Yes   Is the patient taking all medications as directed (includes completed medication regime)?  Yes   Has the patient kept scheduled appointments due by today?  Yes   What is the patient's perception of their health status since discharge?  Same   Week 4 Call Completed?  Yes   Would the patient like one additional call?  Yes   Wrap up additional comments  Pt continues to c/o constipation. MD aware. Pt has a call into MD office.          Ladan Cho RN

## 2021-04-15 LAB — BACTERIA SPEC AEROBE CULT: NO GROWTH

## 2021-04-16 LAB
A2 MACROGLOB SERPL-MCNC: 373 MG/DL (ref 110–276)
ALT SERPL W P-5'-P-CCNC: 32 IU/L (ref 0–40)
APO A-I SERPL-MCNC: 101 MG/DL (ref 116–209)
AST SERPL W P-5'-P-CCNC: 61 IU/L (ref 0–40)
BILIRUB SERPL-MCNC: 0.7 MG/DL (ref 0–1.2)
CHOLEST SERPL-MCNC: 112 MG/DL (ref 100–199)
FIBROSIS SCORING:: ABNORMAL
FIBROSIS STAGE SERPL QL: ABNORMAL
GGT SERPL-CCNC: 46 IU/L (ref 0–60)
GLUCOSE SERPL-MCNC: 89 MG/DL (ref 65–99)
HAPTOGLOB SERPL-MCNC: 44 MG/DL (ref 37–355)
INTERPRETATIONS: (REFERENCE): ABNORMAL
LABORATORY COMMENT REPORT: ABNORMAL
LIVER FIBR SCORE SERPL CALC.FIBROSURE: 0.83 (ref 0–0.21)
NASH SCORING (REFERENCE): ABNORMAL
NECROINFLAMMATORY ACT GRADE SERPL QL: ABNORMAL
NECROINFLAMMATORY ACT SCORE SERPL: 0.75
SERVICE CMNT-IMP: ABNORMAL
STEATOSIS GRADE (REFERENCE): ABNORMAL
STEATOSIS GRADING (REFERENCE): ABNORMAL
STEATOSIS SCORE (REFERENCE): 0.58 (ref 0–0.3)
TRIGL SERPL-MCNC: 125 MG/DL (ref 0–149)

## 2021-04-19 ENCOUNTER — OFFICE VISIT (OUTPATIENT)
Dept: GASTROENTEROLOGY | Facility: CLINIC | Age: 61
End: 2021-04-19

## 2021-04-19 VITALS
BODY MASS INDEX: 36.06 KG/M2 | HEIGHT: 64 IN | HEART RATE: 87 BPM | DIASTOLIC BLOOD PRESSURE: 79 MMHG | WEIGHT: 211.2 LBS | SYSTOLIC BLOOD PRESSURE: 141 MMHG

## 2021-04-19 DIAGNOSIS — M62.89 PELVIC FLOOR DYSFUNCTION: ICD-10-CM

## 2021-04-19 DIAGNOSIS — K75.81 NASH (NONALCOHOLIC STEATOHEPATITIS): ICD-10-CM

## 2021-04-19 DIAGNOSIS — K59.04 CHRONIC IDIOPATHIC CONSTIPATION: Primary | ICD-10-CM

## 2021-04-19 DIAGNOSIS — R11.2 NAUSEA AND VOMITING, INTRACTABILITY OF VOMITING NOT SPECIFIED, UNSPECIFIED VOMITING TYPE: ICD-10-CM

## 2021-04-19 DIAGNOSIS — K74.60 CIRRHOSIS OF LIVER WITHOUT ASCITES, UNSPECIFIED HEPATIC CIRRHOSIS TYPE (HCC): ICD-10-CM

## 2021-04-19 DIAGNOSIS — R10.84 GENERALIZED ABDOMINAL PAIN: ICD-10-CM

## 2021-04-19 PROCEDURE — 99214 OFFICE O/P EST MOD 30 MIN: CPT | Performed by: PHYSICIAN ASSISTANT

## 2021-04-21 ENCOUNTER — READMISSION MANAGEMENT (OUTPATIENT)
Dept: CALL CENTER | Facility: HOSPITAL | Age: 61
End: 2021-04-21

## 2021-04-21 NOTE — OUTREACH NOTE
Medical Week 5 Survey      Responses   [facility] patient discharged from?  [place]   Does the patient have one of the following disease processes/diagnoses(primary or secondary)?  Other   Week 5 attempt successful?  Yes   Call start time  1237   Call end time  1240   Discharge diagnosis  SBO (small bowel obstruction   Meds reviewed with patient/caregiver?  Yes   Is the patient taking all medications as directed (includes completed medication regime)?  Yes   Has the patient kept scheduled appointments due by today?  Yes   Is the patient still receiving Home Health Services?  N/A   What is the patient's perception of their health status since discharge?  Same   Graduated  Yes   Is the patient interested in additional calls from an ambulatory ?  NOTE:  applies to high risk patients requiring additional follow-up.  No   Did the patient feel the follow up calls were helpful during their recovery period?  Yes          Shante Seth RN

## 2021-05-03 ENCOUNTER — OFFICE VISIT (OUTPATIENT)
Dept: OBSTETRICS AND GYNECOLOGY | Facility: CLINIC | Age: 61
End: 2021-05-03

## 2021-05-03 VITALS
SYSTOLIC BLOOD PRESSURE: 124 MMHG | BODY MASS INDEX: 36.19 KG/M2 | HEIGHT: 64 IN | WEIGHT: 212 LBS | DIASTOLIC BLOOD PRESSURE: 78 MMHG

## 2021-05-03 DIAGNOSIS — Z12.31 SCREENING MAMMOGRAM, ENCOUNTER FOR: Primary | ICD-10-CM

## 2021-05-03 PROCEDURE — 99213 OFFICE O/P EST LOW 20 MIN: CPT | Performed by: NURSE PRACTITIONER

## 2021-05-03 NOTE — PROGRESS NOTES
"Subjective   Amira Shannon is a 60 y.o. here for second opinion for her breast    Amira Shannon is a 60 yr old new gyn patient who presents today desiring a second opinion regarding her breasts. She had noticed brown nipple discharge about 3 weeks ago, was prescribed antibiotics by her PCP, Dr. Barr and this has resolved. Had a left breast ultrasound done at [facility] at this time, pt has a CD of her ultrasound however, we are unable to read this CD in the clinic. Pt had a follow-up appt with Dr. Barr and pt states her provider felt a \"knot\" on her left breast and was told to follow-up in 3 months.  Per pt, her  GI provider- Blaine Perales recommended she be evaluated by us. Pt believes her last mammogram was in 2017 at [facility]. Pt is non smoker, quit over 20 yrs ago. Hx of hysterectomy with BSO for endometriosis, 1979.       The following portions of the patient's history were reviewed and updated as appropriate: allergies, current medications, past family history, past medical history, past social history, past surgical history and problem list.    Review of Systems   Constitutional: Negative for fatigue and fever.   Genitourinary: Positive for breast lump and breast pain. Negative for breast discharge.        Knot on left breast       Objective   Physical Exam  Chest:      Breasts:         Right: Tenderness present. No swelling, bleeding, inverted nipple, mass, nipple discharge or skin change.         Left: Tenderness present. No swelling, bleeding, inverted nipple, mass, nipple discharge or skin change.      Comments: Fibrocystic breasts.   Lymphadenopathy:      Upper Body:      Right upper body: No supraclavicular, axillary or pectoral adenopathy.      Left upper body: No supraclavicular, axillary or pectoral adenopathy.           Assessment/Plan   Diagnoses and all orders for this visit:    1. Screening mammogram, encounter for (Primary)  -     Mammo Screening Digital Tomosynthesis Bilateral " With CAD; Future        Discussed that her breast infection/nipple discharge appears to be resolved with antiobiotics. Discussed that her breasts overall felt lumpy which is consistent with fibrocystic breasts. Will request records of left breast ultrasound and will fax order for mammogram to [facility]. Pt instructed to call to make the appt for her mammogram. Encouraged annual mammograms.

## 2021-05-04 ENCOUNTER — TELEPHONE (OUTPATIENT)
Dept: GASTROENTEROLOGY | Facility: CLINIC | Age: 61
End: 2021-05-04

## 2021-05-04 NOTE — TELEPHONE ENCOUNTER
----- Message from Blaine Perales PA-C sent at 5/4/2021  1:21 PM CDT -----  6-7 ounces of green tea, 1/2 teaspoon apple cider vinegar, 1/2 teaspoon lemon juice.  ----- Message -----  From: Viloeta Toth MA  Sent: 5/4/2021   9:57 AM CDT  To: Blaine Perales PA-C    YES SIR  ----- Message -----  From: Blaine Perales PA-C  Sent: 5/3/2021   2:49 PM CDT  To: Violeta Toth MA    Has she tried Pericolace?  ----- Message -----  From: Violeta Toth MA  Sent: 4/30/2021   2:16 PM CDT  To: Blaine Perales PA-C    She has tried sennekot. No on the breath test. Would that make her constipated?   ----- Message -----  From: Blaine Perales PA-C  Sent: 4/30/2021   1:08 PM CDT  To: Violeta Toth MA    Have we tired sennekot? Or sucrose?  ----- Message -----  From: Violeta Toth MA  Sent: 4/30/2021   9:13 AM CDT  To: Blaine Perales PA-C    Patient states she had a bm on Wednesday and Friday of last week. She has not had a bm since then she is very nauseated and has been vomiting but it was just liquid that she threw up. She does not have an appt with manolo donaldson yet she is on the waitlist for a [place] appointment.   ----- Message -----  From: Blaine Perales PA-C  Sent: 4/29/2021   5:53 PM CDT  To: Violeta Toth MA    abnormal  ----- Message -----  From: Violeta Toth MA  Sent: 4/29/2021   2:29 PM CDT  To: Blaine Perales PA-C    Patient called for kub results from recent [facility] study.

## 2021-05-04 NOTE — TELEPHONE ENCOUNTER
Patient has been contacted and made aware of the recommendations per Blaine Perales MS, PALalitoC. Patient will call our office on Thursday to let me know how she is feeling.

## 2021-05-06 ENCOUNTER — APPOINTMENT (OUTPATIENT)
Dept: CT IMAGING | Facility: HOSPITAL | Age: 61
End: 2021-05-06

## 2021-05-06 ENCOUNTER — HOSPITAL ENCOUNTER (INPATIENT)
Facility: HOSPITAL | Age: 61
LOS: 2 days | Discharge: HOME | End: 2021-05-12
Attending: FAMILY MEDICINE | Admitting: FAMILY MEDICINE

## 2021-05-06 DIAGNOSIS — K56.7 ILEUS (HCC): Primary | ICD-10-CM

## 2021-05-06 LAB
ALBUMIN SERPL-MCNC: 3.5 G/DL (ref 3.5–5.2)
ALBUMIN/GLOB SERPL: 1.2 G/DL
ALP SERPL-CCNC: 136 U/L (ref 39–117)
ALT SERPL W P-5'-P-CCNC: 21 U/L (ref 1–33)
ANION GAP SERPL CALCULATED.3IONS-SCNC: 7 MMOL/L (ref 5–15)
ANISOCYTOSIS BLD QL: ABNORMAL
AST SERPL-CCNC: 38 U/L (ref 1–32)
BILIRUB SERPL-MCNC: 0.8 MG/DL (ref 0–1.2)
BILIRUB UR QL STRIP: NEGATIVE
BUN SERPL-MCNC: 12 MG/DL (ref 8–23)
BUN/CREAT SERPL: 11.1 (ref 7–25)
CALCIUM SPEC-SCNC: 8.8 MG/DL (ref 8.6–10.5)
CHLORIDE SERPL-SCNC: 105 MMOL/L (ref 98–107)
CLARITY UR: CLEAR
CLUMPED PLATELETS: ABNORMAL
CO2 SERPL-SCNC: 26 MMOL/L (ref 22–29)
COLOR UR: YELLOW
CREAT SERPL-MCNC: 1.08 MG/DL (ref 0.57–1)
DEPRECATED RDW RBC AUTO: 49.5 FL (ref 37–54)
EOSINOPHIL # BLD MANUAL: 0.15 10*3/MM3 (ref 0–0.4)
EOSINOPHIL NFR BLD MANUAL: 4 % (ref 0.3–6.2)
ERYTHROCYTE [DISTWIDTH] IN BLOOD BY AUTOMATED COUNT: 16.5 % (ref 12.3–15.4)
FLUAV RNA RESP QL NAA+PROBE: NOT DETECTED
FLUBV RNA RESP QL NAA+PROBE: NOT DETECTED
GFR SERPL CREATININE-BSD FRML MDRD: 52 ML/MIN/1.73
GLOBULIN UR ELPH-MCNC: 2.9 GM/DL
GLUCOSE SERPL-MCNC: 98 MG/DL (ref 65–99)
GLUCOSE UR STRIP-MCNC: NEGATIVE MG/DL
HCT VFR BLD AUTO: 39.5 % (ref 34–46.6)
HGB BLD-MCNC: 12.4 G/DL (ref 12–15.9)
HGB UR QL STRIP.AUTO: NEGATIVE
HOLD SPECIMEN: NORMAL
KETONES UR QL STRIP: NEGATIVE
LEUKOCYTE ESTERASE UR QL STRIP.AUTO: NEGATIVE
LIPASE SERPL-CCNC: 24 U/L (ref 13–60)
LYMPHOCYTES # BLD MANUAL: 0.67 10*3/MM3 (ref 0.7–3.1)
LYMPHOCYTES NFR BLD MANUAL: 18 % (ref 19.6–45.3)
LYMPHOCYTES NFR BLD MANUAL: 7 % (ref 5–12)
MCH RBC QN AUTO: 25.7 PG (ref 26.6–33)
MCHC RBC AUTO-ENTMCNC: 31.4 G/DL (ref 31.5–35.7)
MCV RBC AUTO: 82 FL (ref 79–97)
MONOCYTES # BLD AUTO: 0.26 10*3/MM3 (ref 0.1–0.9)
NEUTROPHILS # BLD AUTO: 2.65 10*3/MM3 (ref 1.7–7)
NEUTROPHILS NFR BLD MANUAL: 71 % (ref 42.7–76)
NITRITE UR QL STRIP: NEGATIVE
PH UR STRIP.AUTO: 6 [PH] (ref 5–9)
PLATELET # BLD AUTO: 30 10*3/MM3 (ref 140–450)
PMV BLD AUTO: 10 FL (ref 6–12)
POTASSIUM SERPL-SCNC: 4.2 MMOL/L (ref 3.5–5.2)
PROT SERPL-MCNC: 6.4 G/DL (ref 6–8.5)
PROT UR QL STRIP: NEGATIVE
RBC # BLD AUTO: 4.82 10*6/MM3 (ref 3.77–5.28)
SARS-COV-2 RNA RESP QL NAA+PROBE: NOT DETECTED
SMALL PLATELETS BLD QL SMEAR: ADEQUATE
SODIUM SERPL-SCNC: 138 MMOL/L (ref 136–145)
SP GR UR STRIP: 1.01 (ref 1–1.03)
UROBILINOGEN UR QL STRIP: NORMAL
WBC # BLD AUTO: 3.73 10*3/MM3 (ref 3.4–10.8)
WBC MORPH BLD: NORMAL
WHOLE BLOOD HOLD SPECIMEN: NORMAL

## 2021-05-06 PROCEDURE — 80053 COMPREHEN METABOLIC PANEL: CPT | Performed by: PHYSICIAN ASSISTANT

## 2021-05-06 PROCEDURE — 85007 BL SMEAR W/DIFF WBC COUNT: CPT | Performed by: PHYSICIAN ASSISTANT

## 2021-05-06 PROCEDURE — 74177 CT ABD & PELVIS W/CONTRAST: CPT

## 2021-05-06 PROCEDURE — 99284 EMERGENCY DEPT VISIT MOD MDM: CPT

## 2021-05-06 PROCEDURE — 85025 COMPLETE CBC W/AUTO DIFF WBC: CPT | Performed by: PHYSICIAN ASSISTANT

## 2021-05-06 PROCEDURE — G0378 HOSPITAL OBSERVATION PER HR: HCPCS

## 2021-05-06 PROCEDURE — 25010000002 IOPAMIDOL 61 % SOLUTION: Performed by: FAMILY MEDICINE

## 2021-05-06 PROCEDURE — 87636 SARSCOV2 & INF A&B AMP PRB: CPT | Performed by: PHYSICIAN ASSISTANT

## 2021-05-06 PROCEDURE — 25010000002 ONDANSETRON PER 1 MG

## 2021-05-06 PROCEDURE — 25010000002 LEVOFLOXACIN PER 250 MG: Performed by: STUDENT IN AN ORGANIZED HEALTH CARE EDUCATION/TRAINING PROGRAM

## 2021-05-06 PROCEDURE — 99219 PR INITIAL OBSERVATION CARE/DAY 50 MINUTES: CPT | Performed by: STUDENT IN AN ORGANIZED HEALTH CARE EDUCATION/TRAINING PROGRAM

## 2021-05-06 PROCEDURE — 25010000002 HYDROMORPHONE 1 MG/ML SOLUTION: Performed by: EMERGENCY MEDICINE

## 2021-05-06 PROCEDURE — 81003 URINALYSIS AUTO W/O SCOPE: CPT | Performed by: PHYSICIAN ASSISTANT

## 2021-05-06 PROCEDURE — 83690 ASSAY OF LIPASE: CPT | Performed by: PHYSICIAN ASSISTANT

## 2021-05-06 RX ORDER — METOCLOPRAMIDE 5 MG/1
5 TABLET ORAL 2 TIMES DAILY
Status: DISCONTINUED | OUTPATIENT
Start: 2021-05-06 | End: 2021-05-11

## 2021-05-06 RX ORDER — SODIUM CHLORIDE 9 MG/ML
75 INJECTION, SOLUTION INTRAVENOUS CONTINUOUS
Status: DISCONTINUED | OUTPATIENT
Start: 2021-05-06 | End: 2021-05-12 | Stop reason: HOSPADM

## 2021-05-06 RX ORDER — LEVOFLOXACIN 5 MG/ML
750 INJECTION, SOLUTION INTRAVENOUS EVERY 24 HOURS
Status: DISCONTINUED | OUTPATIENT
Start: 2021-05-06 | End: 2021-05-12 | Stop reason: HOSPADM

## 2021-05-06 RX ORDER — DICYCLOMINE HCL 20 MG
20 TABLET ORAL EVERY 6 HOURS PRN
Status: DISCONTINUED | OUTPATIENT
Start: 2021-05-06 | End: 2021-05-12 | Stop reason: HOSPADM

## 2021-05-06 RX ORDER — CLONIDINE HYDROCHLORIDE 0.1 MG/1
0.1 TABLET ORAL 2 TIMES DAILY
Status: DISCONTINUED | OUTPATIENT
Start: 2021-05-06 | End: 2021-05-11

## 2021-05-06 RX ORDER — OXYCODONE HCL 10 MG/1
10 TABLET, FILM COATED, EXTENDED RELEASE ORAL ONCE
Status: COMPLETED | OUTPATIENT
Start: 2021-05-06 | End: 2021-05-06

## 2021-05-06 RX ORDER — PANTOPRAZOLE SODIUM 40 MG/1
40 TABLET, DELAYED RELEASE ORAL DAILY
Status: DISCONTINUED | OUTPATIENT
Start: 2021-05-07 | End: 2021-05-11

## 2021-05-06 RX ORDER — FUROSEMIDE 20 MG/1
20 TABLET ORAL DAILY
Status: DISCONTINUED | OUTPATIENT
Start: 2021-05-07 | End: 2021-05-11

## 2021-05-06 RX ORDER — ONDANSETRON 2 MG/ML
4 INJECTION INTRAMUSCULAR; INTRAVENOUS EVERY 6 HOURS PRN
Status: DISCONTINUED | OUTPATIENT
Start: 2021-05-06 | End: 2021-05-12 | Stop reason: HOSPADM

## 2021-05-06 RX ORDER — LANOLIN ALCOHOL/MO/W.PET/CERES
3 CREAM (GRAM) TOPICAL NIGHTLY
Status: DISCONTINUED | OUTPATIENT
Start: 2021-05-06 | End: 2021-05-11

## 2021-05-06 RX ORDER — OXYCODONE HYDROCHLORIDE 10 MG/1
10 TABLET ORAL 3 TIMES DAILY
COMMUNITY
End: 2021-05-24 | Stop reason: HOSPADM

## 2021-05-06 RX ORDER — SODIUM CHLORIDE 0.9 % (FLUSH) 0.9 %
10 SYRINGE (ML) INJECTION EVERY 12 HOURS SCHEDULED
Status: DISCONTINUED | OUTPATIENT
Start: 2021-05-06 | End: 2021-05-12 | Stop reason: HOSPADM

## 2021-05-06 RX ORDER — DOCUSATE SODIUM 100 MG/1
100 CAPSULE, LIQUID FILLED ORAL DAILY
Status: DISCONTINUED | OUTPATIENT
Start: 2021-05-07 | End: 2021-05-08

## 2021-05-06 RX ORDER — ESCITALOPRAM OXALATE 10 MG/1
10 TABLET ORAL DAILY
Status: DISCONTINUED | OUTPATIENT
Start: 2021-05-07 | End: 2021-05-11

## 2021-05-06 RX ORDER — SODIUM CHLORIDE 0.9 % (FLUSH) 0.9 %
10 SYRINGE (ML) INJECTION AS NEEDED
Status: DISCONTINUED | OUTPATIENT
Start: 2021-05-06 | End: 2021-05-12 | Stop reason: HOSPADM

## 2021-05-06 RX ORDER — POLYETHYLENE GLYCOL 3350 17 G/17G
17 POWDER, FOR SOLUTION ORAL DAILY
Status: DISCONTINUED | OUTPATIENT
Start: 2021-05-06 | End: 2021-05-08

## 2021-05-06 RX ORDER — ONDANSETRON 2 MG/ML
4 INJECTION INTRAMUSCULAR; INTRAVENOUS ONCE
Status: COMPLETED | OUTPATIENT
Start: 2021-05-06 | End: 2021-05-06

## 2021-05-06 RX ORDER — CETIRIZINE HYDROCHLORIDE 10 MG/1
10 TABLET ORAL DAILY
Status: DISCONTINUED | OUTPATIENT
Start: 2021-05-07 | End: 2021-05-11

## 2021-05-06 RX ORDER — OXYCODONE HYDROCHLORIDE 5 MG/1
10 TABLET ORAL 3 TIMES DAILY
Status: DISCONTINUED | OUTPATIENT
Start: 2021-05-06 | End: 2021-05-06

## 2021-05-06 RX ORDER — OXYBUTYNIN CHLORIDE 5 MG/1
5 TABLET, EXTENDED RELEASE ORAL DAILY
Status: DISCONTINUED | OUTPATIENT
Start: 2021-05-07 | End: 2021-05-11

## 2021-05-06 RX ORDER — ONDANSETRON 2 MG/ML
INJECTION INTRAMUSCULAR; INTRAVENOUS
Status: COMPLETED
Start: 2021-05-06 | End: 2021-05-06

## 2021-05-06 RX ORDER — CYCLOBENZAPRINE HCL 10 MG
10 TABLET ORAL 3 TIMES DAILY PRN
Status: DISCONTINUED | OUTPATIENT
Start: 2021-05-06 | End: 2021-05-12 | Stop reason: HOSPADM

## 2021-05-06 RX ORDER — OXYCODONE HYDROCHLORIDE 5 MG/1
10 TABLET ORAL 3 TIMES DAILY PRN
Status: DISCONTINUED | OUTPATIENT
Start: 2021-05-06 | End: 2021-05-10

## 2021-05-06 RX ORDER — GABAPENTIN 400 MG/1
800 CAPSULE ORAL EVERY 6 HOURS SCHEDULED
Status: DISCONTINUED | OUTPATIENT
Start: 2021-05-07 | End: 2021-05-11

## 2021-05-06 RX ORDER — ONDANSETRON 4 MG/1
4 TABLET, FILM COATED ORAL EVERY 6 HOURS PRN
Status: DISCONTINUED | OUTPATIENT
Start: 2021-05-06 | End: 2021-05-12 | Stop reason: HOSPADM

## 2021-05-06 RX ADMIN — MELATONIN 3 MG: 3 TAB ORAL at 21:46

## 2021-05-06 RX ADMIN — OXYCODONE HYDROCHLORIDE 10 MG: 10 TABLET, FILM COATED, EXTENDED RELEASE ORAL at 20:37

## 2021-05-06 RX ADMIN — GABAPENTIN 800 MG: 400 CAPSULE ORAL at 23:38

## 2021-05-06 RX ADMIN — METRONIDAZOLE 500 MG: 500 INJECTION, SOLUTION INTRAVENOUS at 21:46

## 2021-05-06 RX ADMIN — SODIUM CHLORIDE, PRESERVATIVE FREE 10 ML: 5 INJECTION INTRAVENOUS at 21:48

## 2021-05-06 RX ADMIN — LEVOFLOXACIN 750 MG: 5 INJECTION, SOLUTION INTRAVENOUS at 22:51

## 2021-05-06 RX ADMIN — SODIUM CHLORIDE 75 ML/HR: 9 INJECTION, SOLUTION INTRAVENOUS at 21:46

## 2021-05-06 RX ADMIN — IOPAMIDOL 90 ML: 612 INJECTION, SOLUTION INTRAVENOUS at 16:17

## 2021-05-06 RX ADMIN — METOCLOPRAMIDE 5 MG: 5 TABLET ORAL at 21:46

## 2021-05-06 RX ADMIN — ONDANSETRON 4 MG: 2 INJECTION INTRAMUSCULAR; INTRAVENOUS at 15:50

## 2021-05-06 RX ADMIN — ONDANSETRON HYDROCHLORIDE 4 MG: 2 INJECTION, SOLUTION INTRAMUSCULAR; INTRAVENOUS at 15:50

## 2021-05-06 RX ADMIN — POLYETHYLENE GLYCOL 3350 17 G: 17 POWDER, FOR SOLUTION ORAL at 21:47

## 2021-05-06 RX ADMIN — CLONIDINE HYDROCHLORIDE 0.1 MG: 0.1 TABLET ORAL at 21:46

## 2021-05-06 RX ADMIN — HYDROMORPHONE HYDROCHLORIDE 1 MG: 1 INJECTION, SOLUTION INTRAMUSCULAR; INTRAVENOUS; SUBCUTANEOUS at 17:12

## 2021-05-06 RX ADMIN — SODIUM CHLORIDE 1000 ML: 9 INJECTION, SOLUTION INTRAVENOUS at 15:41

## 2021-05-07 ENCOUNTER — APPOINTMENT (OUTPATIENT)
Dept: ULTRASOUND IMAGING | Facility: HOSPITAL | Age: 61
End: 2021-05-07

## 2021-05-07 ENCOUNTER — APPOINTMENT (OUTPATIENT)
Dept: CARDIOLOGY | Facility: HOSPITAL | Age: 61
End: 2021-05-07

## 2021-05-07 PROBLEM — K52.9 ILEITIS: Status: ACTIVE | Noted: 2021-05-07

## 2021-05-07 PROBLEM — K74.69 OTHER CIRRHOSIS OF LIVER: Status: ACTIVE | Noted: 2018-09-20

## 2021-05-07 PROBLEM — I10 HYPERTENSION: Status: ACTIVE | Noted: 2021-05-07

## 2021-05-07 PROBLEM — R16.1 SPLENOMEGALY: Status: ACTIVE | Noted: 2021-05-07

## 2021-05-07 PROBLEM — R79.89 ELEVATED SERUM CREATININE: Status: ACTIVE | Noted: 2021-05-07

## 2021-05-07 LAB
ANION GAP SERPL CALCULATED.3IONS-SCNC: 6 MMOL/L (ref 5–15)
BASOPHILS # BLD AUTO: 0.01 10*3/MM3 (ref 0–0.2)
BASOPHILS NFR BLD AUTO: 0.4 % (ref 0–1.5)
BH CV ECHO MEAS - ACS: 3.9 CM
BH CV ECHO MEAS - AO ISTHMUS: 2.6 CM
BH CV ECHO MEAS - AO MAX PG (FULL): 4.6 MMHG
BH CV ECHO MEAS - AO MAX PG: 8.9 MMHG
BH CV ECHO MEAS - AO MEAN PG (FULL): 3 MMHG
BH CV ECHO MEAS - AO MEAN PG: 5 MMHG
BH CV ECHO MEAS - AO ROOT AREA (BSA CORRECTED): 1.6
BH CV ECHO MEAS - AO ROOT AREA: 8.6 CM^2
BH CV ECHO MEAS - AO ROOT DIAM: 3.3 CM
BH CV ECHO MEAS - AO V2 MAX: 149 CM/SEC
BH CV ECHO MEAS - AO V2 MEAN: 102 CM/SEC
BH CV ECHO MEAS - AO V2 VTI: 32.6 CM
BH CV ECHO MEAS - ASC AORTA: 3.2 CM
BH CV ECHO MEAS - AVA(I,A): 2.5 CM^2
BH CV ECHO MEAS - AVA(I,D): 2.5 CM^2
BH CV ECHO MEAS - AVA(V,A): 2.4 CM^2
BH CV ECHO MEAS - AVA(V,D): 2.4 CM^2
BH CV ECHO MEAS - BSA(HAYCOCK): 2.1 M^2
BH CV ECHO MEAS - BSA: 2 M^2
BH CV ECHO MEAS - BZI_BMI: 36.2 KILOGRAMS/M^2
BH CV ECHO MEAS - BZI_METRIC_HEIGHT: 162.6 CM
BH CV ECHO MEAS - BZI_METRIC_WEIGHT: 95.7 KG
BH CV ECHO MEAS - EDV(CUBED): 71.5 ML
BH CV ECHO MEAS - EDV(MOD-SP2): 125 ML
BH CV ECHO MEAS - EDV(MOD-SP4): 124 ML
BH CV ECHO MEAS - EDV(TEICH): 76.4 ML
BH CV ECHO MEAS - EF(CUBED): 76.5 %
BH CV ECHO MEAS - EF(MOD-SP2): 65.3 %
BH CV ECHO MEAS - EF(MOD-SP4): 72 %
BH CV ECHO MEAS - EF(TEICH): 69 %
BH CV ECHO MEAS - ESV(CUBED): 16.8 ML
BH CV ECHO MEAS - ESV(MOD-SP2): 43.4 ML
BH CV ECHO MEAS - ESV(MOD-SP4): 34.7 ML
BH CV ECHO MEAS - ESV(TEICH): 23.7 ML
BH CV ECHO MEAS - FS: 38.3 %
BH CV ECHO MEAS - IVS/LVPW: 1.1
BH CV ECHO MEAS - IVSD: 1.4 CM
BH CV ECHO MEAS - LA DIMENSION: 2.5 CM
BH CV ECHO MEAS - LA/AO: 0.76
BH CV ECHO MEAS - LV DIASTOLIC VOL/BSA (35-75): 62 ML/M^2
BH CV ECHO MEAS - LV MASS(C)D: 215.6 GRAMS
BH CV ECHO MEAS - LV MASS(C)DI: 107.8 GRAMS/M^2
BH CV ECHO MEAS - LV MAX PG: 4.3 MMHG
BH CV ECHO MEAS - LV MEAN PG: 2 MMHG
BH CV ECHO MEAS - LV SYSTOLIC VOL/BSA (12-30): 17.3 ML/M^2
BH CV ECHO MEAS - LV V1 MAX: 104 CM/SEC
BH CV ECHO MEAS - LV V1 MEAN: 72.6 CM/SEC
BH CV ECHO MEAS - LV V1 VTI: 24 CM
BH CV ECHO MEAS - LVIDD: 4.2 CM
BH CV ECHO MEAS - LVIDS: 2.6 CM
BH CV ECHO MEAS - LVLD AP2: 8 CM
BH CV ECHO MEAS - LVLD AP4: 7.7 CM
BH CV ECHO MEAS - LVLS AP2: 6.9 CM
BH CV ECHO MEAS - LVLS AP4: 6.7 CM
BH CV ECHO MEAS - LVOT AREA (M): 3.5 CM^2
BH CV ECHO MEAS - LVOT AREA: 3.5 CM^2
BH CV ECHO MEAS - LVOT DIAM: 2.1 CM
BH CV ECHO MEAS - LVPWD: 1.3 CM
BH CV ECHO MEAS - MV A MAX VEL: 101 CM/SEC
BH CV ECHO MEAS - MV DEC SLOPE: 394 CM/SEC^2
BH CV ECHO MEAS - MV E MAX VEL: 104 CM/SEC
BH CV ECHO MEAS - MV E/A: 1
BH CV ECHO MEAS - MV MAX PG: 6.1 MMHG
BH CV ECHO MEAS - MV MEAN PG: 2 MMHG
BH CV ECHO MEAS - MV P1/2T MAX VEL: 121 CM/SEC
BH CV ECHO MEAS - MV P1/2T: 89.9 MSEC
BH CV ECHO MEAS - MV V2 MAX: 123 CM/SEC
BH CV ECHO MEAS - MV V2 MEAN: 68.7 CM/SEC
BH CV ECHO MEAS - MV V2 VTI: 33.6 CM
BH CV ECHO MEAS - MVA P1/2T LCG: 1.8 CM^2
BH CV ECHO MEAS - MVA(P1/2T): 2.4 CM^2
BH CV ECHO MEAS - MVA(VTI): 2.5 CM^2
BH CV ECHO MEAS - PA MAX PG: 2.7 MMHG
BH CV ECHO MEAS - PA V2 MAX: 82.8 CM/SEC
BH CV ECHO MEAS - RAP SYSTOLE: 10 MMHG
BH CV ECHO MEAS - RVDD: 3.7 CM
BH CV ECHO MEAS - RVSP: 43.6 MMHG
BH CV ECHO MEAS - SI(AO): 139.3 ML/M^2
BH CV ECHO MEAS - SI(CUBED): 27.3 ML/M^2
BH CV ECHO MEAS - SI(LVOT): 41.5 ML/M^2
BH CV ECHO MEAS - SI(MOD-SP2): 40.8 ML/M^2
BH CV ECHO MEAS - SI(MOD-SP4): 44.6 ML/M^2
BH CV ECHO MEAS - SI(TEICH): 26.3 ML/M^2
BH CV ECHO MEAS - SV(AO): 278.8 ML
BH CV ECHO MEAS - SV(CUBED): 54.7 ML
BH CV ECHO MEAS - SV(LVOT): 83.1 ML
BH CV ECHO MEAS - SV(MOD-SP2): 81.6 ML
BH CV ECHO MEAS - SV(MOD-SP4): 89.3 ML
BH CV ECHO MEAS - SV(TEICH): 52.7 ML
BH CV ECHO MEAS - TR MAX VEL: 290 CM/SEC
BUN SERPL-MCNC: 9 MG/DL (ref 8–23)
BUN/CREAT SERPL: 9.7 (ref 7–25)
CALCIUM SPEC-SCNC: 8.4 MG/DL (ref 8.6–10.5)
CHLORIDE SERPL-SCNC: 110 MMOL/L (ref 98–107)
CO2 SERPL-SCNC: 25 MMOL/L (ref 22–29)
CREAT SERPL-MCNC: 0.93 MG/DL (ref 0.57–1)
DEPRECATED RDW RBC AUTO: 47.2 FL (ref 37–54)
EOSINOPHIL # BLD AUTO: 0.1 10*3/MM3 (ref 0–0.4)
EOSINOPHIL NFR BLD AUTO: 4.1 % (ref 0.3–6.2)
ERYTHROCYTE [DISTWIDTH] IN BLOOD BY AUTOMATED COUNT: 16.4 % (ref 12.3–15.4)
GFR SERPL CREATININE-BSD FRML MDRD: 61 ML/MIN/1.73
GLUCOSE SERPL-MCNC: 95 MG/DL (ref 65–99)
HCT VFR BLD AUTO: 34.1 % (ref 34–46.6)
HGB BLD-MCNC: 11.1 G/DL (ref 12–15.9)
IMM GRANULOCYTES # BLD AUTO: 0.01 10*3/MM3 (ref 0–0.05)
IMM GRANULOCYTES NFR BLD AUTO: 0.4 % (ref 0–0.5)
LV EF 2D ECHO EST: 61 %
LYMPHOCYTES # BLD AUTO: 0.66 10*3/MM3 (ref 0.7–3.1)
LYMPHOCYTES NFR BLD AUTO: 26.8 % (ref 19.6–45.3)
MAXIMAL PREDICTED HEART RATE: 159 BPM
MCH RBC QN AUTO: 26.1 PG (ref 26.6–33)
MCHC RBC AUTO-ENTMCNC: 32.6 G/DL (ref 31.5–35.7)
MCV RBC AUTO: 80.2 FL (ref 79–97)
MONOCYTES # BLD AUTO: 0.18 10*3/MM3 (ref 0.1–0.9)
MONOCYTES NFR BLD AUTO: 7.3 % (ref 5–12)
NEUTROPHILS NFR BLD AUTO: 1.5 10*3/MM3 (ref 1.7–7)
NEUTROPHILS NFR BLD AUTO: 61 % (ref 42.7–76)
NRBC BLD AUTO-RTO: 0 /100 WBC (ref 0–0.2)
PLATELET # BLD AUTO: 68 10*3/MM3 (ref 140–450)
PMV BLD AUTO: 11.1 FL (ref 6–12)
POTASSIUM SERPL-SCNC: 4 MMOL/L (ref 3.5–5.2)
RBC # BLD AUTO: 4.25 10*6/MM3 (ref 3.77–5.28)
SODIUM SERPL-SCNC: 141 MMOL/L (ref 136–145)
STRESS TARGET HR: 135 BPM
WBC # BLD AUTO: 2.46 10*3/MM3 (ref 3.4–10.8)

## 2021-05-07 PROCEDURE — 85025 COMPLETE CBC W/AUTO DIFF WBC: CPT | Performed by: STUDENT IN AN ORGANIZED HEALTH CARE EDUCATION/TRAINING PROGRAM

## 2021-05-07 PROCEDURE — 94760 N-INVAS EAR/PLS OXIMETRY 1: CPT

## 2021-05-07 PROCEDURE — G0378 HOSPITAL OBSERVATION PER HR: HCPCS

## 2021-05-07 PROCEDURE — 25010000002 LEVOFLOXACIN PER 250 MG: Performed by: STUDENT IN AN ORGANIZED HEALTH CARE EDUCATION/TRAINING PROGRAM

## 2021-05-07 PROCEDURE — 93306 TTE W/DOPPLER COMPLETE: CPT | Performed by: INTERNAL MEDICINE

## 2021-05-07 PROCEDURE — 94799 UNLISTED PULMONARY SVC/PX: CPT

## 2021-05-07 PROCEDURE — 76705 ECHO EXAM OF ABDOMEN: CPT

## 2021-05-07 PROCEDURE — 93306 TTE W/DOPPLER COMPLETE: CPT

## 2021-05-07 PROCEDURE — 80048 BASIC METABOLIC PNL TOTAL CA: CPT | Performed by: STUDENT IN AN ORGANIZED HEALTH CARE EDUCATION/TRAINING PROGRAM

## 2021-05-07 RX ADMIN — CETIRIZINE HYDROCHLORIDE 10 MG: 10 TABLET, FILM COATED ORAL at 09:22

## 2021-05-07 RX ADMIN — OXYCODONE 10 MG: 5 TABLET ORAL at 23:15

## 2021-05-07 RX ADMIN — SODIUM CHLORIDE 75 ML/HR: 9 INJECTION, SOLUTION INTRAVENOUS at 17:18

## 2021-05-07 RX ADMIN — METRONIDAZOLE 500 MG: 500 INJECTION, SOLUTION INTRAVENOUS at 05:05

## 2021-05-07 RX ADMIN — CYCLOBENZAPRINE 10 MG: 10 TABLET, FILM COATED ORAL at 20:27

## 2021-05-07 RX ADMIN — METRONIDAZOLE 500 MG: 500 INJECTION, SOLUTION INTRAVENOUS at 14:39

## 2021-05-07 RX ADMIN — CLONIDINE HYDROCHLORIDE 0.1 MG: 0.1 TABLET ORAL at 09:22

## 2021-05-07 RX ADMIN — FUROSEMIDE 20 MG: 20 TABLET ORAL at 09:23

## 2021-05-07 RX ADMIN — OXYCODONE 10 MG: 5 TABLET ORAL at 13:12

## 2021-05-07 RX ADMIN — PANTOPRAZOLE SODIUM 40 MG: 40 TABLET, DELAYED RELEASE ORAL at 09:22

## 2021-05-07 RX ADMIN — LEVOFLOXACIN 750 MG: 5 INJECTION, SOLUTION INTRAVENOUS at 20:33

## 2021-05-07 RX ADMIN — ESCITALOPRAM OXALATE 10 MG: 10 TABLET ORAL at 09:22

## 2021-05-07 RX ADMIN — MELATONIN 3 MG: 3 TAB ORAL at 20:26

## 2021-05-07 RX ADMIN — METOCLOPRAMIDE 5 MG: 5 TABLET ORAL at 09:22

## 2021-05-07 RX ADMIN — CLONIDINE HYDROCHLORIDE 0.1 MG: 0.1 TABLET ORAL at 20:27

## 2021-05-07 RX ADMIN — SODIUM CHLORIDE, PRESERVATIVE FREE 10 ML: 5 INJECTION INTRAVENOUS at 09:24

## 2021-05-07 RX ADMIN — METOCLOPRAMIDE 5 MG: 5 TABLET ORAL at 20:27

## 2021-05-07 RX ADMIN — METRONIDAZOLE 500 MG: 500 INJECTION, SOLUTION INTRAVENOUS at 20:33

## 2021-05-07 RX ADMIN — DOCUSATE SODIUM 100 MG: 100 CAPSULE, LIQUID FILLED ORAL at 09:22

## 2021-05-07 RX ADMIN — SODIUM CHLORIDE, PRESERVATIVE FREE 10 ML: 5 INJECTION INTRAVENOUS at 20:32

## 2021-05-07 RX ADMIN — GABAPENTIN 800 MG: 400 CAPSULE ORAL at 23:15

## 2021-05-07 RX ADMIN — GABAPENTIN 800 MG: 400 CAPSULE ORAL at 17:18

## 2021-05-07 RX ADMIN — CYCLOBENZAPRINE 10 MG: 10 TABLET, FILM COATED ORAL at 13:12

## 2021-05-07 RX ADMIN — OXYBUTYNIN CHLORIDE 5 MG: 5 TABLET, EXTENDED RELEASE ORAL at 09:22

## 2021-05-07 RX ADMIN — GABAPENTIN 800 MG: 400 CAPSULE ORAL at 12:40

## 2021-05-07 RX ADMIN — GABAPENTIN 800 MG: 400 CAPSULE ORAL at 05:05

## 2021-05-07 NOTE — MR AVS SNAPSHOT
Amira Shannon   1/10/2017 2:00 PM   Office Visit    Dept Phone:  [phone]   Encounter #:  [id number]    Provider:  LIYAH Del Toro   Department:  [facility] PAIN MANAGEMENT                Your Full Care Plan              Today's Medication Changes          These changes are accurate as of: 1/10/17  4:10 PM.  If you have any questions, ask your nurse or doctor.               New Medication(s)Ordered:     cyclobenzaprine 5 MG tablet   Commonly known as:  FLEXERIL   Take 1 tablet by mouth 2 (Two) Times a Day As Needed for muscle spasms for up to 30 days.   Started by:  LIYAH Del Toro         Medication(s)that have changed:     * oxyCODONE-acetaminophen 5-325 MG per tablet   Commonly known as:  PERCOCET   Take 1 tablet by mouth 3 (Three) Times a Day.   What changed:    - when to take this  - reasons to take this   Changed by:  LIYAH Del Toro       * oxyCODONE-acetaminophen 5-325 MG per tablet   Commonly known as:  PERCOCET   Take 1 tablet by mouth 3 (Three) Times a Day for 30 days.   What changed:  You were already taking a medication with the same name, and this prescription was added. Make sure you understand how and when to take each.   Changed by:  LIYAH Del Toro       * Notice:  This list has 2 medication(s) that are the same as other medications prescribed for you. Read the directions carefully, and ask your doctor or other care provider to review them with you.         Where to Get Your Medications      These medications were sent to [facility] [address] [place] [address] - [phone] [facility] [phone] [address] [address]     Phone:  [phone]     cyclobenzaprine 5 MG tablet         You can get these medications from any pharmacy     Bring a paper prescription for each of these medications     oxyCODONE-acetaminophen 5-325 MG per tablet    oxyCODONE-acetaminophen 5-325  Problem: Mobility Impaired (Adult and Pediatric)  Goal: *Acute Goals and Plan of Care (Insert Text)  Description: FUNCTIONAL STATUS PRIOR TO ADMISSION: Patient was modified independent using a rolling walker for functional mobility. HOME SUPPORT PRIOR TO ADMISSION: The patient lived alone with family to provide assistance as needed. Physical Therapy Goals  Initiated 5/7/2021  1. Patient will move from supine to sit and sit to supine , scoot up and down, and roll side to side in bed with independence within 7 day(s). Met 5/7/21  2. Patient will transfer from bed to chair and chair to bed with modified independence using the least restrictive device within 7 day(s). Met 5/7/21  3. Patient will perform sit to stand with modified independence within 7 day(s). Met 5/7/21  4. Patient will ambulate with modified independence for 450 feet with the least restrictive device within 7 day(s). Outcome: Progressing Towards Goal   PHYSICAL THERAPY EVALUATION  Patient: Ama Watson (22 y.o. female)  Date: 5/7/2021  Primary Diagnosis: Left patella fracture [S82.002A]  Procedure(s) (LRB):  LEFT PATELLA OPEN REDUCTION INTERNAL FIXATION (Left) 1 Day Post-Op   Precautions:  WBAT(L knee immobilizor at all times)    ASSESSMENT  Based on the objective data described below, the patient presents with minimal L knee pain with wb during transfers and ambulation using RW. She was able to ambulate from room the gym and perform car transfers with sba. Gait was near independent with RW for about 300 feet. Stair training not needed as patient has no steps to enter home. Ready for discharge from PT standpoint. Current Level of Function Impacting Discharge (mobility/balance): independent supine to sit, modified independent sit to stand and bed to chair transfers with RW. Sba ambulation with  feet. Functional Outcome Measure:   The patient scored 85/100 on the Barthel outcome measure which is indicative of 15% functional MG per tablet                  Your Updated Medication List          This list is accurate as of: 1/10/17  4:10 PM.  Always use your most recent med list.                cyclobenzaprine 5 MG tablet   Commonly known as:  FLEXERIL   Take 1 tablet by mouth 2 (Two) Times a Day As Needed for muscle spasms for up to 30 days.       CYMBALTA 30 MG capsule   Generic drug:  DULoxetine       DIOVAN -12.5 MG per tablet   Generic drug:  valsartan-hydrochlorothiazide       furosemide 20 MG tablet   Commonly known as:  LASIX       gabapentin 600 MG tablet   Commonly known as:  NEURONTIN       linaclotide 290 MCG capsule capsule   Commonly known as:  LINZESS       metoclopramide 5 MG/5ML solution   Commonly known as:  REGLAN       NEXIUM 40 MG capsule   Generic drug:  esomeprazole       * oxyCODONE-acetaminophen 5-325 MG per tablet   Commonly known as:  PERCOCET   Take 1 tablet by mouth 3 (Three) Times a Day.       * oxyCODONE-acetaminophen 5-325 MG per tablet   Commonly known as:  PERCOCET   Take 1 tablet by mouth 3 (Three) Times a Day for 30 days.       traMADol 50 MG tablet   Commonly known as:  ULTRAM       ZOFRAN 4 MG tablet   Generic drug:  ondansetron       * Notice:  This list has 2 medication(s) that are the same as other medications prescribed for you. Read the directions carefully, and ask your doctor or other care provider to review them with you.            You Were Diagnosed With        Codes Comments    Lumbosacral spondylosis without myelopathy    -  Primary ICD-10-CM: M47.817  ICD-9-CM: 721.3     Neuropathic pain     ICD-10-CM: M79.2  ICD-9-CM: 729.2     Multiple joint complaints     ICD-10-CM: M25.9  ICD-9-CM: 719.99     High risk medications (not anticoagulants) long-term use     ICD-10-CM: Z79.899  ICD-9-CM: V58.69       Instructions     None    Patient Instructions History      Upcoming Appointments     Visit Type Date Time Department    FOLLOW UP 1/10/2017  2:00 PM [facility]    FOLLOW UP  impairment. Other factors to consider for discharge: good home support; independent and active prior to L patellar fracture. Patient will benefit from skilled therapy intervention to address the above noted impairments. PLAN :  Recommendations and Planned Interventions: bed mobility training, transfer training, gait training, therapeutic exercises, orthotic/prosthetic training, modalities, edema management/control, patient and family training/education, and therapeutic activities      Frequency/Duration: Patient will be followed by physical therapy:  5 times a week to address goals. Recommendation for discharge: (in order for the patient to meet his/her long term goals)  No skilled physical therapy/ follow up rehabilitation needs identified at this time. This discharge recommendation:  Has been made in collaboration with the attending provider and/or case management    IF patient discharges home will need the following DME: patient owns DME required for discharge       SUBJECTIVE:   Patient stated my knee feels better. 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    Nausea & vomiting      Past Surgical History:   Procedure Laterality Date    HX CATARACT REMOVAL Right 01/04/2017    HX HYSTERECTOMY      HX ROTATOR CUFF REPAIR Left 2008    HX TUBAL LIGATION      RI COLONOSCOPY FLX DX W/COLLJ SPEC WHEN PFRMD  11/9/2011         RI EGD TRANSORAL BIOPSY SINGLE/MULTIPLE  11/9/2011         UPPER GI ENDOSCOPY,BIOPSY  8/20/2019            Personal factors and/or comorbidities impacting plan of care: none    [facility] "3/7/2017  1:20 PM [facility]      MyChart Signup     Our records indicate that your [facility] account has been deactivated. If you would like to reactivate your account, please email [email or web address] or call [phone] to talk to our [place] [facility] staff.             Other Info from Your Visit           Your Appointments     Mar 07, 2017  1:20 PM CST   Follow Up with Hua Westfall MD   [facility]IN [facility] (--)    [address]  [facility] [address] [place] [id number] [phone]           Arrive 15 minutes prior to appointment.              Allergies     Other      Pt states that taking steroids either in pill or injection form make her have blisters in her mouth and she feels like she is on fire on the inside    Sulfa Antibiotics  Rash    Sulfa (Sulfonamide Antibiotics)      Reason for Visit     Hip Pain right    Knee Pain bilateral    Foot Pain bilateral      Vital Signs     Blood Pressure Height Weight Body Mass Index Smoking Status       138/70 (BP Location: Left arm, Patient Position: Sitting, Cuff Size: Large Adult) 64\" (162.6 cm) 214 lb 11.2 oz (97.4 kg) 36.85 kg/m2 Former Smoker       Problems and Diagnoses Noted     Arthritis of low back    -  Primary    Neuropathic pain        Multiple joint complaints        High risk medications (not anticoagulants) long-term use            " Environment: Apartment  # Steps to Enter: 0  One/Two Story Residence: One story  Living Alone: Yes  Support Systems: Child(kerry), Family member(s)  Patient Expects to be Discharged to[de-identified] Apartment  Current DME Used/Available at Home: Walker, rolling, Cane, straight, Grab bars  Tub or Shower Type: Tub/Shower combination    EXAMINATION/PRESENTATION/DECISION MAKING:   Critical Behavior:  Neurologic State: Alert  Orientation Level: Oriented X4  Cognition: Appropriate decision making, Appropriate for age attention/concentration, Appropriate safety awareness, Follows commands  Safety/Judgement: Awareness of environment, Good awareness of safety precautions  Hearing: Auditory  Auditory Impairment: None  Hearing Aids/Status: Does not own  Skin:  no findings  Edema: mild L knee  Range Of Motion:  AROM: Within functional limits           PROM: Within functional limits(L knee immobilized )           Strength:    Strength: Within functional limits(L knee immobilized )                    Tone & Sensation:   Tone: Normal              Sensation: Intact               Coordination:  Coordination: Within functional limits  Functional Mobility:  Bed Mobility:  Rolling: Independent  Supine to Sit: Independent  Sit to Supine: Independent  Scooting: Independent  Transfers:  Sit to Stand: Modified independent  Stand to Sit: Modified independent        Bed to Chair: Modified independent; Adaptive equipment(rw)              Balance:   Sitting: Intact  Standing: Impaired  Standing - Static: Good  Standing - Dynamic : Good;Constant support  Ambulation/Gait Training:  Distance (ft): 300 Feet (ft)  Assistive Device: Gait belt;Walker, rolling;Brace/Splint  Ambulation - Level of Assistance: Modified independent     Gait Description (WDL): Exceptions to WDL  Gait Abnormalities: Antalgic;Decreased step clearance;Circumduction(circumducts to advance LLE)  Right Side Weight Bearing: Full  Left Side Weight Bearing: As tolerated     Stance: Left decreased  Speed/Aspen: Pace decreased (<100 feet/min)  Step Length: Right shortened        Interventions: Safety awareness training        Therapeutic Exercises: Ankle pumps    Functional Measure:  Barthel Index:    Bathin  Bladder: 10  Bowels: 10  Groomin  Dressing: 10  Feeding: 10  Mobility: 10  Stairs: 5  Toilet Use: 10  Transfer (Bed to Chair and Back): 15  Total: 85/100       The Barthel ADL Index: Guidelines  1. The index should be used as a record of what a patient does, not as a record of what a patient could do. 2. The main aim is to establish degree of independence from any help, physical or verbal, however minor and for whatever reason. 3. The need for supervision renders the patient not independent. 4. A patient's performance should be established using the best available evidence. Asking the patient, friends/relatives and nurses are the usual sources, but direct observation and common sense are also important. However direct testing is not needed. 5. Usually the patient's performance over the preceding 24-48 hours is important, but occasionally longer periods will be relevant. 6. Middle categories imply that the patient supplies over 50 per cent of the effort. 7. Use of aids to be independent is allowed. Neftali Post., Barthel, D.W. (3537). Functional evaluation: the Barthel Index. [facility] (14)2. JENNIE Skaggs Francenia Sayres.Velma., [place], [address] [place] [address] (). Measuring the change indisability after inpatient rehabilitation; comparison of the responsiveness of the Barthel Index and Functional Moody Measure. Journal of Neurology, Neurosurgery, and Psychiatry, 66(4), 538-961. Jason Bell, N.J.A, DAVIN Her, & Shawn Kumari, MAmbrocioA. (2004.) Assessment of post-stroke quality of life in cost-effectiveness studies: The usefulness of the Barthel Index and the EuroQoL-5D.  Quality of Life Research, 15, 813-48        Physical Therapy Evaluation Charge Determination   History Examination Presentation Decision-Making   MEDIUM  Complexity : 1-2 comorbidities / personal factors will impact the outcome/ POC  HIGH Complexity : 4+ Standardized tests and measures addressing body structure, function, activity limitation and / or participation in recreation  LOW Complexity : Stable, uncomplicated  Other outcome measures Barthel  LOW       Based on the above components, the patient evaluation is determined to be of the following complexity level: LOW     Pain Ratin/10 L knee    Activity Tolerance:   Good and SpO2 stable on RA    After treatment patient left in no apparent distress:   Sitting in chair, Heels elevated for pressure relief, and Call bell within reach    COMMUNICATION/EDUCATION:   The patients plan of care was discussed with: Registered nurse, Case management, and NP . Fall prevention education was provided and the patient/caregiver indicated understanding., Patient/family have participated as able in goal setting and plan of care. , and Patient/family agree to work toward stated goals and plan of care.     Thank you for this referral.  Marcello Edmonds, PT   Time Calculation: 41 mins

## 2021-05-08 LAB
ANION GAP SERPL CALCULATED.3IONS-SCNC: 5 MMOL/L (ref 5–15)
BASOPHILS # BLD AUTO: 0.01 10*3/MM3 (ref 0–0.2)
BASOPHILS NFR BLD AUTO: 0.4 % (ref 0–1.5)
BUN SERPL-MCNC: 8 MG/DL (ref 8–23)
BUN/CREAT SERPL: 8.1 (ref 7–25)
CALCIUM SPEC-SCNC: 8.5 MG/DL (ref 8.6–10.5)
CHLORIDE SERPL-SCNC: 109 MMOL/L (ref 98–107)
CO2 SERPL-SCNC: 28 MMOL/L (ref 22–29)
CREAT SERPL-MCNC: 0.99 MG/DL (ref 0.57–1)
DEPRECATED RDW RBC AUTO: 48.7 FL (ref 37–54)
EOSINOPHIL # BLD AUTO: 0.09 10*3/MM3 (ref 0–0.4)
EOSINOPHIL NFR BLD AUTO: 3.7 % (ref 0.3–6.2)
ERYTHROCYTE [DISTWIDTH] IN BLOOD BY AUTOMATED COUNT: 16.6 % (ref 12.3–15.4)
FERRITIN SERPL-MCNC: 30.64 NG/ML (ref 13–150)
FOLATE SERPL-MCNC: 11.2 NG/ML (ref 4.78–24.2)
GFR SERPL CREATININE-BSD FRML MDRD: 57 ML/MIN/1.73
GLUCOSE SERPL-MCNC: 91 MG/DL (ref 65–99)
HCT VFR BLD AUTO: 33.1 % (ref 34–46.6)
HGB BLD-MCNC: 11 G/DL (ref 12–15.9)
IMM GRANULOCYTES # BLD AUTO: 0.01 10*3/MM3 (ref 0–0.05)
IMM GRANULOCYTES NFR BLD AUTO: 0.4 % (ref 0–0.5)
IRON 24H UR-MRATE: 72 MCG/DL (ref 37–145)
IRON SATN MFR SERPL: 18 % (ref 20–50)
LYMPHOCYTES # BLD AUTO: 0.51 10*3/MM3 (ref 0.7–3.1)
LYMPHOCYTES NFR BLD AUTO: 20.9 % (ref 19.6–45.3)
MCH RBC QN AUTO: 26.9 PG (ref 26.6–33)
MCHC RBC AUTO-ENTMCNC: 33.2 G/DL (ref 31.5–35.7)
MCV RBC AUTO: 80.9 FL (ref 79–97)
MONOCYTES # BLD AUTO: 0.2 10*3/MM3 (ref 0.1–0.9)
MONOCYTES NFR BLD AUTO: 8.2 % (ref 5–12)
NEUTROPHILS NFR BLD AUTO: 1.62 10*3/MM3 (ref 1.7–7)
NEUTROPHILS NFR BLD AUTO: 66.4 % (ref 42.7–76)
NRBC BLD AUTO-RTO: 0 /100 WBC (ref 0–0.2)
PLATELET # BLD AUTO: 67 10*3/MM3 (ref 140–450)
PMV BLD AUTO: 9.9 FL (ref 6–12)
POTASSIUM SERPL-SCNC: 3.9 MMOL/L (ref 3.5–5.2)
RBC # BLD AUTO: 4.09 10*6/MM3 (ref 3.77–5.28)
SODIUM SERPL-SCNC: 142 MMOL/L (ref 136–145)
TIBC SERPL-MCNC: 411 MCG/DL (ref 298–536)
TRANSFERRIN SERPL-MCNC: 276 MG/DL (ref 200–360)
WBC # BLD AUTO: 2.44 10*3/MM3 (ref 3.4–10.8)

## 2021-05-08 PROCEDURE — 82728 ASSAY OF FERRITIN: CPT | Performed by: STUDENT IN AN ORGANIZED HEALTH CARE EDUCATION/TRAINING PROGRAM

## 2021-05-08 PROCEDURE — 83540 ASSAY OF IRON: CPT | Performed by: STUDENT IN AN ORGANIZED HEALTH CARE EDUCATION/TRAINING PROGRAM

## 2021-05-08 PROCEDURE — 99226 PR SBSQ OBSERVATION CARE/DAY 35 MINUTES: CPT | Performed by: STUDENT IN AN ORGANIZED HEALTH CARE EDUCATION/TRAINING PROGRAM

## 2021-05-08 PROCEDURE — G0378 HOSPITAL OBSERVATION PER HR: HCPCS

## 2021-05-08 PROCEDURE — 80048 BASIC METABOLIC PNL TOTAL CA: CPT | Performed by: STUDENT IN AN ORGANIZED HEALTH CARE EDUCATION/TRAINING PROGRAM

## 2021-05-08 PROCEDURE — 25010000002 LEVOFLOXACIN PER 250 MG: Performed by: STUDENT IN AN ORGANIZED HEALTH CARE EDUCATION/TRAINING PROGRAM

## 2021-05-08 PROCEDURE — 85025 COMPLETE CBC W/AUTO DIFF WBC: CPT | Performed by: STUDENT IN AN ORGANIZED HEALTH CARE EDUCATION/TRAINING PROGRAM

## 2021-05-08 PROCEDURE — 82746 ASSAY OF FOLIC ACID SERUM: CPT | Performed by: STUDENT IN AN ORGANIZED HEALTH CARE EDUCATION/TRAINING PROGRAM

## 2021-05-08 PROCEDURE — 84466 ASSAY OF TRANSFERRIN: CPT | Performed by: STUDENT IN AN ORGANIZED HEALTH CARE EDUCATION/TRAINING PROGRAM

## 2021-05-08 RX ORDER — LANOLIN ALCOHOL/MO/W.PET/CERES
3 CREAM (GRAM) TOPICAL ONCE
Status: COMPLETED | OUTPATIENT
Start: 2021-05-08 | End: 2021-05-08

## 2021-05-08 RX ORDER — POLYETHYLENE GLYCOL 3350 17 G/17G
17 POWDER, FOR SOLUTION ORAL 2 TIMES DAILY
Status: DISCONTINUED | OUTPATIENT
Start: 2021-05-08 | End: 2021-05-10

## 2021-05-08 RX ORDER — DOCUSATE SODIUM 100 MG/1
100 CAPSULE, LIQUID FILLED ORAL 2 TIMES DAILY
Status: DISCONTINUED | OUTPATIENT
Start: 2021-05-08 | End: 2021-05-10

## 2021-05-08 RX ORDER — SIMETHICONE 80 MG
80 TABLET,CHEWABLE ORAL 4 TIMES DAILY PRN
Status: DISCONTINUED | OUTPATIENT
Start: 2021-05-08 | End: 2021-05-10

## 2021-05-08 RX ADMIN — GABAPENTIN 800 MG: 400 CAPSULE ORAL at 05:23

## 2021-05-08 RX ADMIN — POLYETHYLENE GLYCOL 3350 17 G: 17 POWDER, FOR SOLUTION ORAL at 20:19

## 2021-05-08 RX ADMIN — ESCITALOPRAM OXALATE 10 MG: 10 TABLET ORAL at 08:20

## 2021-05-08 RX ADMIN — MELATONIN 3 MG: 3 TAB ORAL at 20:19

## 2021-05-08 RX ADMIN — CLONIDINE HYDROCHLORIDE 0.1 MG: 0.1 TABLET ORAL at 20:19

## 2021-05-08 RX ADMIN — METRONIDAZOLE 500 MG: 500 INJECTION, SOLUTION INTRAVENOUS at 21:52

## 2021-05-08 RX ADMIN — SIMETHICONE 80 MG: 80 TABLET, CHEWABLE ORAL at 11:55

## 2021-05-08 RX ADMIN — CETIRIZINE HYDROCHLORIDE 10 MG: 10 TABLET, FILM COATED ORAL at 08:20

## 2021-05-08 RX ADMIN — POLYETHYLENE GLYCOL 3350 17 G: 17 POWDER, FOR SOLUTION ORAL at 08:20

## 2021-05-08 RX ADMIN — SODIUM CHLORIDE 75 ML/HR: 9 INJECTION, SOLUTION INTRAVENOUS at 08:19

## 2021-05-08 RX ADMIN — GABAPENTIN 800 MG: 400 CAPSULE ORAL at 11:55

## 2021-05-08 RX ADMIN — OXYCODONE 10 MG: 5 TABLET ORAL at 20:19

## 2021-05-08 RX ADMIN — METOCLOPRAMIDE 5 MG: 5 TABLET ORAL at 20:19

## 2021-05-08 RX ADMIN — FUROSEMIDE 20 MG: 20 TABLET ORAL at 08:20

## 2021-05-08 RX ADMIN — SODIUM CHLORIDE 75 ML/HR: 9 INJECTION, SOLUTION INTRAVENOUS at 21:52

## 2021-05-08 RX ADMIN — GABAPENTIN 800 MG: 400 CAPSULE ORAL at 17:12

## 2021-05-08 RX ADMIN — OXYBUTYNIN CHLORIDE 5 MG: 5 TABLET, EXTENDED RELEASE ORAL at 08:20

## 2021-05-08 RX ADMIN — SODIUM CHLORIDE, PRESERVATIVE FREE 10 ML: 5 INJECTION INTRAVENOUS at 08:20

## 2021-05-08 RX ADMIN — METOCLOPRAMIDE 5 MG: 5 TABLET ORAL at 08:20

## 2021-05-08 RX ADMIN — LEVOFLOXACIN 750 MG: 5 INJECTION, SOLUTION INTRAVENOUS at 20:21

## 2021-05-08 RX ADMIN — PANTOPRAZOLE SODIUM 40 MG: 40 TABLET, DELAYED RELEASE ORAL at 08:20

## 2021-05-08 RX ADMIN — METRONIDAZOLE 500 MG: 500 INJECTION, SOLUTION INTRAVENOUS at 04:40

## 2021-05-08 RX ADMIN — DOCUSATE SODIUM 100 MG: 100 CAPSULE, LIQUID FILLED ORAL at 08:20

## 2021-05-08 RX ADMIN — MELATONIN 3 MG: 3 TAB ORAL at 22:16

## 2021-05-08 RX ADMIN — DOCUSATE SODIUM 100 MG: 100 CAPSULE, LIQUID FILLED ORAL at 20:19

## 2021-05-08 RX ADMIN — CLONIDINE HYDROCHLORIDE 0.1 MG: 0.1 TABLET ORAL at 08:20

## 2021-05-08 RX ADMIN — METRONIDAZOLE 500 MG: 500 INJECTION, SOLUTION INTRAVENOUS at 12:00

## 2021-05-08 RX ADMIN — GABAPENTIN 800 MG: 400 CAPSULE ORAL at 23:44

## 2021-05-09 ENCOUNTER — APPOINTMENT (OUTPATIENT)
Dept: GENERAL RADIOLOGY | Facility: HOSPITAL | Age: 61
End: 2021-05-09

## 2021-05-09 PROBLEM — D61.818 PANCYTOPENIA: Status: ACTIVE | Noted: 2017-11-13

## 2021-05-09 PROBLEM — K59.00 CONSTIPATION: Status: ACTIVE | Noted: 2021-05-09

## 2021-05-09 LAB
ANION GAP SERPL CALCULATED.3IONS-SCNC: 6 MMOL/L (ref 5–15)
BASOPHILS # BLD AUTO: 0.02 10*3/MM3 (ref 0–0.2)
BASOPHILS NFR BLD AUTO: 0.7 % (ref 0–1.5)
BUN SERPL-MCNC: 7 MG/DL (ref 8–23)
BUN/CREAT SERPL: 6.5 (ref 7–25)
CALCIUM SPEC-SCNC: 8.6 MG/DL (ref 8.6–10.5)
CHLORIDE SERPL-SCNC: 107 MMOL/L (ref 98–107)
CO2 SERPL-SCNC: 27 MMOL/L (ref 22–29)
CREAT SERPL-MCNC: 1.08 MG/DL (ref 0.57–1)
DEPRECATED RDW RBC AUTO: 48.5 FL (ref 37–54)
EOSINOPHIL # BLD AUTO: 0.1 10*3/MM3 (ref 0–0.4)
EOSINOPHIL NFR BLD AUTO: 3.5 % (ref 0.3–6.2)
ERYTHROCYTE [DISTWIDTH] IN BLOOD BY AUTOMATED COUNT: 16.5 % (ref 12.3–15.4)
GFR SERPL CREATININE-BSD FRML MDRD: 52 ML/MIN/1.73
GLUCOSE SERPL-MCNC: 101 MG/DL (ref 65–99)
HCT VFR BLD AUTO: 35 % (ref 34–46.6)
HGB BLD-MCNC: 11 G/DL (ref 12–15.9)
IMM GRANULOCYTES # BLD AUTO: 0.02 10*3/MM3 (ref 0–0.05)
IMM GRANULOCYTES NFR BLD AUTO: 0.7 % (ref 0–0.5)
LYMPHOCYTES # BLD AUTO: 0.69 10*3/MM3 (ref 0.7–3.1)
LYMPHOCYTES NFR BLD AUTO: 24.3 % (ref 19.6–45.3)
MCH RBC QN AUTO: 25.5 PG (ref 26.6–33)
MCHC RBC AUTO-ENTMCNC: 31.4 G/DL (ref 31.5–35.7)
MCV RBC AUTO: 81 FL (ref 79–97)
MONOCYTES # BLD AUTO: 0.21 10*3/MM3 (ref 0.1–0.9)
MONOCYTES NFR BLD AUTO: 7.4 % (ref 5–12)
NEUTROPHILS NFR BLD AUTO: 1.8 10*3/MM3 (ref 1.7–7)
NEUTROPHILS NFR BLD AUTO: 63.4 % (ref 42.7–76)
NRBC BLD AUTO-RTO: 0 /100 WBC (ref 0–0.2)
PLATELET # BLD AUTO: 72 10*3/MM3 (ref 140–450)
PMV BLD AUTO: 11.6 FL (ref 6–12)
POTASSIUM SERPL-SCNC: 3.5 MMOL/L (ref 3.5–5.2)
RBC # BLD AUTO: 4.32 10*6/MM3 (ref 3.77–5.28)
SODIUM SERPL-SCNC: 140 MMOL/L (ref 136–145)
VIT B12 BLD-MCNC: 508 PG/ML (ref 211–946)
WBC # BLD AUTO: 2.84 10*3/MM3 (ref 3.4–10.8)

## 2021-05-09 PROCEDURE — 74018 RADEX ABDOMEN 1 VIEW: CPT

## 2021-05-09 PROCEDURE — 25010000002 NA FERRIC GLUC CPLX PER 12.5 MG: Performed by: INTERNAL MEDICINE

## 2021-05-09 PROCEDURE — G0378 HOSPITAL OBSERVATION PER HR: HCPCS

## 2021-05-09 PROCEDURE — 25010000002 LEVOFLOXACIN PER 250 MG: Performed by: STUDENT IN AN ORGANIZED HEALTH CARE EDUCATION/TRAINING PROGRAM

## 2021-05-09 PROCEDURE — 80048 BASIC METABOLIC PNL TOTAL CA: CPT | Performed by: STUDENT IN AN ORGANIZED HEALTH CARE EDUCATION/TRAINING PROGRAM

## 2021-05-09 PROCEDURE — 82607 VITAMIN B-12: CPT | Performed by: STUDENT IN AN ORGANIZED HEALTH CARE EDUCATION/TRAINING PROGRAM

## 2021-05-09 PROCEDURE — 99204 OFFICE O/P NEW MOD 45 MIN: CPT | Performed by: INTERNAL MEDICINE

## 2021-05-09 PROCEDURE — 85025 COMPLETE CBC W/AUTO DIFF WBC: CPT | Performed by: STUDENT IN AN ORGANIZED HEALTH CARE EDUCATION/TRAINING PROGRAM

## 2021-05-09 RX ORDER — LACTULOSE 10 G/15ML
20 SOLUTION ORAL DAILY PRN
Status: DISCONTINUED | OUTPATIENT
Start: 2021-05-09 | End: 2021-05-10

## 2021-05-09 RX ORDER — FOLIC ACID 1 MG/1
1 TABLET ORAL DAILY
Status: DISCONTINUED | OUTPATIENT
Start: 2021-05-09 | End: 2021-05-11

## 2021-05-09 RX ORDER — MAG HYDROX/ALUMINUM HYD/SIMETH 400-400-40
1 SUSPENSION, ORAL (FINAL DOSE FORM) ORAL ONCE
Status: COMPLETED | OUTPATIENT
Start: 2021-05-09 | End: 2021-05-09

## 2021-05-09 RX ORDER — LANOLIN ALCOHOL/MO/W.PET/CERES
3 CREAM (GRAM) TOPICAL ONCE
Status: COMPLETED | OUTPATIENT
Start: 2021-05-09 | End: 2021-05-09

## 2021-05-09 RX ADMIN — OXYCODONE 10 MG: 5 TABLET ORAL at 05:03

## 2021-05-09 RX ADMIN — METOCLOPRAMIDE 5 MG: 5 TABLET ORAL at 08:44

## 2021-05-09 RX ADMIN — METRONIDAZOLE 500 MG: 500 INJECTION, SOLUTION INTRAVENOUS at 20:50

## 2021-05-09 RX ADMIN — METRONIDAZOLE 500 MG: 500 INJECTION, SOLUTION INTRAVENOUS at 05:03

## 2021-05-09 RX ADMIN — MELATONIN 3 MG: 3 TAB ORAL at 20:43

## 2021-05-09 RX ADMIN — LACTULOSE 20 G: 20 SOLUTION ORAL at 12:21

## 2021-05-09 RX ADMIN — GABAPENTIN 800 MG: 400 CAPSULE ORAL at 17:07

## 2021-05-09 RX ADMIN — SODIUM CHLORIDE 125 MG: 9 INJECTION, SOLUTION INTRAVENOUS at 12:21

## 2021-05-09 RX ADMIN — CLONIDINE HYDROCHLORIDE 0.1 MG: 0.1 TABLET ORAL at 08:44

## 2021-05-09 RX ADMIN — METRONIDAZOLE 500 MG: 500 INJECTION, SOLUTION INTRAVENOUS at 13:50

## 2021-05-09 RX ADMIN — POLYETHYLENE GLYCOL 3350 17 G: 17 POWDER, FOR SOLUTION ORAL at 08:44

## 2021-05-09 RX ADMIN — OXYBUTYNIN CHLORIDE 5 MG: 5 TABLET, EXTENDED RELEASE ORAL at 08:44

## 2021-05-09 RX ADMIN — CYCLOBENZAPRINE 10 MG: 10 TABLET, FILM COATED ORAL at 08:44

## 2021-05-09 RX ADMIN — POLYETHYLENE GLYCOL 3350 17 G: 17 POWDER, FOR SOLUTION ORAL at 20:42

## 2021-05-09 RX ADMIN — PANTOPRAZOLE SODIUM 40 MG: 40 TABLET, DELAYED RELEASE ORAL at 08:44

## 2021-05-09 RX ADMIN — OXYCODONE 10 MG: 5 TABLET ORAL at 20:43

## 2021-05-09 RX ADMIN — FOLIC ACID 1 MG: 1 TABLET ORAL at 12:21

## 2021-05-09 RX ADMIN — CYCLOBENZAPRINE 10 MG: 10 TABLET, FILM COATED ORAL at 02:31

## 2021-05-09 RX ADMIN — DOCUSATE SODIUM 100 MG: 100 CAPSULE, LIQUID FILLED ORAL at 08:44

## 2021-05-09 RX ADMIN — FUROSEMIDE 20 MG: 20 TABLET ORAL at 08:44

## 2021-05-09 RX ADMIN — ESCITALOPRAM OXALATE 10 MG: 10 TABLET ORAL at 08:44

## 2021-05-09 RX ADMIN — SODIUM CHLORIDE, PRESERVATIVE FREE 10 ML: 5 INJECTION INTRAVENOUS at 08:45

## 2021-05-09 RX ADMIN — CYCLOBENZAPRINE 10 MG: 10 TABLET, FILM COATED ORAL at 20:42

## 2021-05-09 RX ADMIN — OXYCODONE 10 MG: 5 TABLET ORAL at 13:57

## 2021-05-09 RX ADMIN — LEVOFLOXACIN 750 MG: 5 INJECTION, SOLUTION INTRAVENOUS at 20:42

## 2021-05-09 RX ADMIN — CLONIDINE HYDROCHLORIDE 0.1 MG: 0.1 TABLET ORAL at 20:42

## 2021-05-09 RX ADMIN — SIMETHICONE 80 MG: 80 TABLET, CHEWABLE ORAL at 13:57

## 2021-05-09 RX ADMIN — MELATONIN 3 MG: 3 TAB ORAL at 20:41

## 2021-05-09 RX ADMIN — GABAPENTIN 800 MG: 400 CAPSULE ORAL at 05:03

## 2021-05-09 RX ADMIN — SODIUM CHLORIDE 75 ML/HR: 9 INJECTION, SOLUTION INTRAVENOUS at 13:50

## 2021-05-09 RX ADMIN — GABAPENTIN 800 MG: 400 CAPSULE ORAL at 22:52

## 2021-05-09 RX ADMIN — DOCUSATE SODIUM 100 MG: 100 CAPSULE, LIQUID FILLED ORAL at 20:42

## 2021-05-09 RX ADMIN — METOCLOPRAMIDE 5 MG: 5 TABLET ORAL at 20:49

## 2021-05-09 RX ADMIN — CETIRIZINE HYDROCHLORIDE 10 MG: 10 TABLET, FILM COATED ORAL at 08:44

## 2021-05-09 RX ADMIN — GLYCERIN 1 SUPPOSITORY: 2 SUPPOSITORY RECTAL at 11:34

## 2021-05-09 RX ADMIN — GABAPENTIN 800 MG: 400 CAPSULE ORAL at 11:36

## 2021-05-10 LAB
ANION GAP SERPL CALCULATED.3IONS-SCNC: 7 MMOL/L (ref 5–15)
ANISOCYTOSIS BLD QL: NORMAL
BASOPHILS # BLD AUTO: 0.02 10*3/MM3 (ref 0–0.2)
BASOPHILS NFR BLD AUTO: 0.7 % (ref 0–1.5)
BUN SERPL-MCNC: 6 MG/DL (ref 8–23)
BUN/CREAT SERPL: 6.1 (ref 7–25)
CALCIUM SPEC-SCNC: 8.7 MG/DL (ref 8.6–10.5)
CHLORIDE SERPL-SCNC: 109 MMOL/L (ref 98–107)
CO2 SERPL-SCNC: 26 MMOL/L (ref 22–29)
CREAT SERPL-MCNC: 0.99 MG/DL (ref 0.57–1)
DEPRECATED RDW RBC AUTO: 48.6 FL (ref 37–54)
EOSINOPHIL # BLD AUTO: 0.09 10*3/MM3 (ref 0–0.4)
EOSINOPHIL NFR BLD AUTO: 3.1 % (ref 0.3–6.2)
ERYTHROCYTE [DISTWIDTH] IN BLOOD BY AUTOMATED COUNT: 17.2 % (ref 12.3–15.4)
GFR SERPL CREATININE-BSD FRML MDRD: 57 ML/MIN/1.73
GLUCOSE SERPL-MCNC: 90 MG/DL (ref 65–99)
HCT VFR BLD AUTO: 34.6 % (ref 34–46.6)
HGB BLD-MCNC: 11.7 G/DL (ref 12–15.9)
IMM GRANULOCYTES # BLD AUTO: 0.01 10*3/MM3 (ref 0–0.05)
IMM GRANULOCYTES NFR BLD AUTO: 0.3 % (ref 0–0.5)
LYMPHOCYTES # BLD AUTO: 0.68 10*3/MM3 (ref 0.7–3.1)
LYMPHOCYTES NFR BLD AUTO: 23.1 % (ref 19.6–45.3)
MCH RBC QN AUTO: 27.4 PG (ref 26.6–33)
MCHC RBC AUTO-ENTMCNC: 33.8 G/DL (ref 31.5–35.7)
MCV RBC AUTO: 81 FL (ref 79–97)
MONOCYTES # BLD AUTO: 0.25 10*3/MM3 (ref 0.1–0.9)
MONOCYTES NFR BLD AUTO: 8.5 % (ref 5–12)
NEUTROPHILS NFR BLD AUTO: 1.89 10*3/MM3 (ref 1.7–7)
NEUTROPHILS NFR BLD AUTO: 64.3 % (ref 42.7–76)
NRBC BLD AUTO-RTO: 0 /100 WBC (ref 0–0.2)
PLATELET # BLD AUTO: 76 10*3/MM3 (ref 140–450)
PMV BLD AUTO: 12 FL (ref 6–12)
POTASSIUM SERPL-SCNC: 3.8 MMOL/L (ref 3.5–5.2)
RBC # BLD AUTO: 4.27 10*6/MM3 (ref 3.77–5.28)
SMALL PLATELETS BLD QL SMEAR: NORMAL
SODIUM SERPL-SCNC: 142 MMOL/L (ref 136–145)
WBC # BLD AUTO: 2.94 10*3/MM3 (ref 3.4–10.8)
WBC MORPH BLD: NORMAL

## 2021-05-10 PROCEDURE — 25010000002 LEVOFLOXACIN PER 250 MG: Performed by: STUDENT IN AN ORGANIZED HEALTH CARE EDUCATION/TRAINING PROGRAM

## 2021-05-10 PROCEDURE — 85025 COMPLETE CBC W/AUTO DIFF WBC: CPT | Performed by: STUDENT IN AN ORGANIZED HEALTH CARE EDUCATION/TRAINING PROGRAM

## 2021-05-10 PROCEDURE — 25010000002 NA FERRIC GLUC CPLX PER 12.5 MG: Performed by: INTERNAL MEDICINE

## 2021-05-10 PROCEDURE — 85007 BL SMEAR W/DIFF WBC COUNT: CPT | Performed by: STUDENT IN AN ORGANIZED HEALTH CARE EDUCATION/TRAINING PROGRAM

## 2021-05-10 PROCEDURE — 63710000001 ONDANSETRON PER 8 MG: Performed by: STUDENT IN AN ORGANIZED HEALTH CARE EDUCATION/TRAINING PROGRAM

## 2021-05-10 PROCEDURE — 80048 BASIC METABOLIC PNL TOTAL CA: CPT | Performed by: STUDENT IN AN ORGANIZED HEALTH CARE EDUCATION/TRAINING PROGRAM

## 2021-05-10 PROCEDURE — 25010000002 METHYLNALTREXONE 12 MG/0.6ML SOLUTION: Performed by: STUDENT IN AN ORGANIZED HEALTH CARE EDUCATION/TRAINING PROGRAM

## 2021-05-10 PROCEDURE — 99214 OFFICE O/P EST MOD 30 MIN: CPT | Performed by: INTERNAL MEDICINE

## 2021-05-10 RX ORDER — CALCIUM CARBONATE 200(500)MG
2 TABLET,CHEWABLE ORAL 3 TIMES DAILY PRN
Status: DISCONTINUED | OUTPATIENT
Start: 2021-05-10 | End: 2021-05-12 | Stop reason: HOSPADM

## 2021-05-10 RX ORDER — OXYCODONE HYDROCHLORIDE 5 MG/1
10 TABLET ORAL EVERY 8 HOURS PRN
Status: DISCONTINUED | OUTPATIENT
Start: 2021-05-11 | End: 2021-05-11

## 2021-05-10 RX ORDER — LANOLIN ALCOHOL/MO/W.PET/CERES
1000 CREAM (GRAM) TOPICAL DAILY
Status: DISCONTINUED | OUTPATIENT
Start: 2021-05-10 | End: 2021-05-11

## 2021-05-10 RX ADMIN — FOLIC ACID 1 MG: 1 TABLET ORAL at 09:38

## 2021-05-10 RX ADMIN — POLYETHYLENE GLYCOL 3350 17 G: 17 POWDER, FOR SOLUTION ORAL at 09:37

## 2021-05-10 RX ADMIN — OXYBUTYNIN CHLORIDE 5 MG: 5 TABLET, EXTENDED RELEASE ORAL at 09:38

## 2021-05-10 RX ADMIN — OXYCODONE 10 MG: 5 TABLET ORAL at 11:52

## 2021-05-10 RX ADMIN — CYANOCOBALAMIN TAB 1000 MCG 1000 MCG: 1000 TAB at 11:52

## 2021-05-10 RX ADMIN — ESCITALOPRAM OXALATE 10 MG: 10 TABLET ORAL at 09:38

## 2021-05-10 RX ADMIN — SODIUM CHLORIDE 125 MG: 9 INJECTION, SOLUTION INTRAVENOUS at 15:02

## 2021-05-10 RX ADMIN — GABAPENTIN 800 MG: 400 CAPSULE ORAL at 17:35

## 2021-05-10 RX ADMIN — METHYLNALTREXONE BROMIDE 12 MG: 12 INJECTION, SOLUTION SUBCUTANEOUS at 16:18

## 2021-05-10 RX ADMIN — CALCIUM CARBONATE (ANTACID) CHEW TAB 500 MG 2 TABLET: 500 CHEW TAB at 22:01

## 2021-05-10 RX ADMIN — PANTOPRAZOLE SODIUM 40 MG: 40 TABLET, DELAYED RELEASE ORAL at 09:38

## 2021-05-10 RX ADMIN — CYCLOBENZAPRINE 10 MG: 10 TABLET, FILM COATED ORAL at 20:36

## 2021-05-10 RX ADMIN — METOCLOPRAMIDE 5 MG: 5 TABLET ORAL at 09:37

## 2021-05-10 RX ADMIN — CLONIDINE HYDROCHLORIDE 0.1 MG: 0.1 TABLET ORAL at 20:48

## 2021-05-10 RX ADMIN — LACTULOSE 20 G: 20 SOLUTION ORAL at 09:38

## 2021-05-10 RX ADMIN — CETIRIZINE HYDROCHLORIDE 10 MG: 10 TABLET, FILM COATED ORAL at 09:38

## 2021-05-10 RX ADMIN — GABAPENTIN 800 MG: 400 CAPSULE ORAL at 11:52

## 2021-05-10 RX ADMIN — DICYCLOMINE HYDROCHLORIDE 20 MG: 20 TABLET ORAL at 09:38

## 2021-05-10 RX ADMIN — DOCUSATE SODIUM 100 MG: 100 CAPSULE, LIQUID FILLED ORAL at 09:37

## 2021-05-10 RX ADMIN — METRONIDAZOLE 500 MG: 500 INJECTION, SOLUTION INTRAVENOUS at 05:36

## 2021-05-10 RX ADMIN — FUROSEMIDE 20 MG: 20 TABLET ORAL at 09:37

## 2021-05-10 RX ADMIN — DICYCLOMINE HYDROCHLORIDE 20 MG: 20 TABLET ORAL at 22:46

## 2021-05-10 RX ADMIN — GABAPENTIN 800 MG: 400 CAPSULE ORAL at 22:46

## 2021-05-10 RX ADMIN — OXYCODONE 10 MG: 5 TABLET ORAL at 03:47

## 2021-05-10 RX ADMIN — METRONIDAZOLE 500 MG: 500 INJECTION, SOLUTION INTRAVENOUS at 16:09

## 2021-05-10 RX ADMIN — CYCLOBENZAPRINE 10 MG: 10 TABLET, FILM COATED ORAL at 09:38

## 2021-05-10 RX ADMIN — ONDANSETRON HYDROCHLORIDE 4 MG: 4 TABLET, FILM COATED ORAL at 16:24

## 2021-05-10 RX ADMIN — GABAPENTIN 800 MG: 400 CAPSULE ORAL at 05:37

## 2021-05-10 RX ADMIN — LEVOFLOXACIN 750 MG: 5 INJECTION, SOLUTION INTRAVENOUS at 20:49

## 2021-05-10 RX ADMIN — CLONIDINE HYDROCHLORIDE 0.1 MG: 0.1 TABLET ORAL at 09:38

## 2021-05-10 RX ADMIN — METOCLOPRAMIDE 5 MG: 5 TABLET ORAL at 20:48

## 2021-05-10 RX ADMIN — METRONIDAZOLE 500 MG: 500 INJECTION, SOLUTION INTRAVENOUS at 22:00

## 2021-05-10 RX ADMIN — MELATONIN 3 MG: 3 TAB ORAL at 20:48

## 2021-05-10 RX ADMIN — Medication 300 ML: at 10:42

## 2021-05-11 ENCOUNTER — APPOINTMENT (OUTPATIENT)
Dept: GENERAL RADIOLOGY | Facility: HOSPITAL | Age: 61
End: 2021-05-11

## 2021-05-11 LAB
ANION GAP SERPL CALCULATED.3IONS-SCNC: 7 MMOL/L (ref 5–15)
BASOPHILS # BLD AUTO: 0 10*3/MM3 (ref 0–0.2)
BASOPHILS NFR BLD AUTO: 0 % (ref 0–1.5)
BUN SERPL-MCNC: 5 MG/DL (ref 8–23)
BUN/CREAT SERPL: 4.9 (ref 7–25)
CALCIUM SPEC-SCNC: 9 MG/DL (ref 8.6–10.5)
CHLORIDE SERPL-SCNC: 107 MMOL/L (ref 98–107)
CO2 SERPL-SCNC: 28 MMOL/L (ref 22–29)
CREAT SERPL-MCNC: 1.02 MG/DL (ref 0.57–1)
DEPRECATED RDW RBC AUTO: 48.3 FL (ref 37–54)
EOSINOPHIL # BLD AUTO: 0.1 10*3/MM3 (ref 0–0.4)
EOSINOPHIL NFR BLD AUTO: 3 % (ref 0.3–6.2)
ERYTHROCYTE [DISTWIDTH] IN BLOOD BY AUTOMATED COUNT: 16.6 % (ref 12.3–15.4)
GFR SERPL CREATININE-BSD FRML MDRD: 55 ML/MIN/1.73
GLUCOSE SERPL-MCNC: 97 MG/DL (ref 65–99)
HCT VFR BLD AUTO: 36.2 % (ref 34–46.6)
HGB BLD-MCNC: 11.7 G/DL (ref 12–15.9)
IMM GRANULOCYTES # BLD AUTO: 0 10*3/MM3 (ref 0–0.05)
IMM GRANULOCYTES NFR BLD AUTO: 0 % (ref 0–0.5)
LYMPHOCYTES # BLD AUTO: 0.73 10*3/MM3 (ref 0.7–3.1)
LYMPHOCYTES NFR BLD AUTO: 22.1 % (ref 19.6–45.3)
MCH RBC QN AUTO: 25.9 PG (ref 26.6–33)
MCHC RBC AUTO-ENTMCNC: 32.3 G/DL (ref 31.5–35.7)
MCV RBC AUTO: 80.3 FL (ref 79–97)
MONOCYTES # BLD AUTO: 0.25 10*3/MM3 (ref 0.1–0.9)
MONOCYTES NFR BLD AUTO: 7.6 % (ref 5–12)
NEUTROPHILS NFR BLD AUTO: 2.23 10*3/MM3 (ref 1.7–7)
NEUTROPHILS NFR BLD AUTO: 67.3 % (ref 42.7–76)
NRBC BLD AUTO-RTO: 0 /100 WBC (ref 0–0.2)
PLATELET # BLD AUTO: 79 10*3/MM3 (ref 140–450)
PMV BLD AUTO: 11.8 FL (ref 6–12)
POTASSIUM SERPL-SCNC: 4.1 MMOL/L (ref 3.5–5.2)
RBC # BLD AUTO: 4.51 10*6/MM3 (ref 3.77–5.28)
SODIUM SERPL-SCNC: 142 MMOL/L (ref 136–145)
WBC # BLD AUTO: 3.31 10*3/MM3 (ref 3.4–10.8)

## 2021-05-11 PROCEDURE — 99232 SBSQ HOSP IP/OBS MODERATE 35: CPT | Performed by: INTERNAL MEDICINE

## 2021-05-11 PROCEDURE — 63710000001 ONDANSETRON PER 8 MG: Performed by: STUDENT IN AN ORGANIZED HEALTH CARE EDUCATION/TRAINING PROGRAM

## 2021-05-11 PROCEDURE — 85025 COMPLETE CBC W/AUTO DIFF WBC: CPT | Performed by: STUDENT IN AN ORGANIZED HEALTH CARE EDUCATION/TRAINING PROGRAM

## 2021-05-11 PROCEDURE — 25010000002 LEVOFLOXACIN PER 250 MG: Performed by: STUDENT IN AN ORGANIZED HEALTH CARE EDUCATION/TRAINING PROGRAM

## 2021-05-11 PROCEDURE — 99254 IP/OBS CNSLTJ NEW/EST MOD 60: CPT | Performed by: INTERNAL MEDICINE

## 2021-05-11 PROCEDURE — 80048 BASIC METABOLIC PNL TOTAL CA: CPT | Performed by: STUDENT IN AN ORGANIZED HEALTH CARE EDUCATION/TRAINING PROGRAM

## 2021-05-11 PROCEDURE — 74018 RADEX ABDOMEN 1 VIEW: CPT

## 2021-05-11 PROCEDURE — 71045 X-RAY EXAM CHEST 1 VIEW: CPT

## 2021-05-11 PROCEDURE — 25010000002 ONDANSETRON PER 1 MG: Performed by: STUDENT IN AN ORGANIZED HEALTH CARE EDUCATION/TRAINING PROGRAM

## 2021-05-11 PROCEDURE — 25010000002 NA FERRIC GLUC CPLX PER 12.5 MG: Performed by: INTERNAL MEDICINE

## 2021-05-11 RX ORDER — OXYBUTYNIN CHLORIDE 5 MG/1
5 TABLET ORAL 2 TIMES DAILY
Status: DISCONTINUED | OUTPATIENT
Start: 2021-05-11 | End: 2021-05-12

## 2021-05-11 RX ORDER — FOLIC ACID 1 MG/1
1 TABLET ORAL DAILY
Status: DISCONTINUED | OUTPATIENT
Start: 2021-05-12 | End: 2021-05-12

## 2021-05-11 RX ORDER — LANOLIN ALCOHOL/MO/W.PET/CERES
3 CREAM (GRAM) TOPICAL NIGHTLY
Status: DISCONTINUED | OUTPATIENT
Start: 2021-05-11 | End: 2021-05-12

## 2021-05-11 RX ORDER — ESCITALOPRAM OXALATE 10 MG/1
10 TABLET ORAL DAILY
Status: DISCONTINUED | OUTPATIENT
Start: 2021-05-12 | End: 2021-05-12

## 2021-05-11 RX ORDER — OXYCODONE HYDROCHLORIDE 5 MG/1
10 TABLET ORAL EVERY 8 HOURS PRN
Status: DISCONTINUED | OUTPATIENT
Start: 2021-05-11 | End: 2021-05-12 | Stop reason: HOSPADM

## 2021-05-11 RX ORDER — LANOLIN ALCOHOL/MO/W.PET/CERES
1000 CREAM (GRAM) TOPICAL DAILY
Status: DISCONTINUED | OUTPATIENT
Start: 2021-05-12 | End: 2021-05-12

## 2021-05-11 RX ORDER — CLONIDINE HYDROCHLORIDE 0.1 MG/1
0.1 TABLET ORAL 2 TIMES DAILY
Status: DISCONTINUED | OUTPATIENT
Start: 2021-05-11 | End: 2021-05-12

## 2021-05-11 RX ORDER — FUROSEMIDE 20 MG/1
20 TABLET ORAL DAILY
Status: DISCONTINUED | OUTPATIENT
Start: 2021-05-12 | End: 2021-05-12

## 2021-05-11 RX ORDER — METOCLOPRAMIDE 5 MG/1
5 TABLET ORAL 2 TIMES DAILY
Status: DISCONTINUED | OUTPATIENT
Start: 2021-05-11 | End: 2021-05-12

## 2021-05-11 RX ORDER — GABAPENTIN 400 MG/1
800 CAPSULE ORAL EVERY 6 HOURS SCHEDULED
Status: DISCONTINUED | OUTPATIENT
Start: 2021-05-11 | End: 2021-05-12

## 2021-05-11 RX ORDER — LORAZEPAM 0.5 MG/1
1 TABLET ORAL ONCE
Status: CANCELLED | OUTPATIENT
Start: 2021-05-11

## 2021-05-11 RX ORDER — LORAZEPAM 0.5 MG/1
1 TABLET ORAL ONCE
Status: COMPLETED | OUTPATIENT
Start: 2021-05-11 | End: 2021-05-11

## 2021-05-11 RX ORDER — CETIRIZINE HYDROCHLORIDE 10 MG/1
10 TABLET ORAL DAILY
Status: DISCONTINUED | OUTPATIENT
Start: 2021-05-12 | End: 2021-05-12

## 2021-05-11 RX ORDER — PANTOPRAZOLE SODIUM 40 MG/10ML
40 INJECTION, POWDER, LYOPHILIZED, FOR SOLUTION INTRAVENOUS
Status: DISCONTINUED | OUTPATIENT
Start: 2021-05-12 | End: 2021-05-12 | Stop reason: HOSPADM

## 2021-05-11 RX ADMIN — CETIRIZINE HYDROCHLORIDE 10 MG: 10 TABLET, FILM COATED ORAL at 10:57

## 2021-05-11 RX ADMIN — ONDANSETRON HYDROCHLORIDE 4 MG: 4 TABLET, FILM COATED ORAL at 10:57

## 2021-05-11 RX ADMIN — POLYETHYLENE GLYCOL 3350, SODIUM SULFATE ANHYDROUS, SODIUM BICARBONATE, SODIUM CHLORIDE, POTASSIUM CHLORIDE 500 ML: 236; 22.74; 6.74; 5.86; 2.97 POWDER, FOR SOLUTION ORAL at 20:15

## 2021-05-11 RX ADMIN — POLYETHYLENE GLYCOL 3350, SODIUM SULFATE ANHYDROUS, SODIUM BICARBONATE, SODIUM CHLORIDE, POTASSIUM CHLORIDE 500 ML: 236; 22.74; 6.74; 5.86; 2.97 POWDER, FOR SOLUTION ORAL at 15:54

## 2021-05-11 RX ADMIN — FOLIC ACID 1 MG: 1 TABLET ORAL at 10:57

## 2021-05-11 RX ADMIN — MELATONIN 3 MG: 3 TAB ORAL at 20:12

## 2021-05-11 RX ADMIN — DICYCLOMINE HYDROCHLORIDE 20 MG: 20 TABLET ORAL at 10:57

## 2021-05-11 RX ADMIN — LEVOFLOXACIN 750 MG: 5 INJECTION, SOLUTION INTRAVENOUS at 20:37

## 2021-05-11 RX ADMIN — PANTOPRAZOLE SODIUM 40 MG: 40 TABLET, DELAYED RELEASE ORAL at 10:57

## 2021-05-11 RX ADMIN — GABAPENTIN 800 MG: 400 CAPSULE ORAL at 23:56

## 2021-05-11 RX ADMIN — METRONIDAZOLE 500 MG: 500 INJECTION, SOLUTION INTRAVENOUS at 20:37

## 2021-05-11 RX ADMIN — ESCITALOPRAM OXALATE 10 MG: 10 TABLET ORAL at 10:58

## 2021-05-11 RX ADMIN — OXYBUTYNIN CHLORIDE 5 MG: 5 TABLET ORAL at 20:12

## 2021-05-11 RX ADMIN — FUROSEMIDE 20 MG: 20 TABLET ORAL at 10:58

## 2021-05-11 RX ADMIN — LORAZEPAM 1 MG: 0.5 TABLET ORAL at 12:20

## 2021-05-11 RX ADMIN — POLYETHYLENE GLYCOL 3350, SODIUM SULFATE ANHYDROUS, SODIUM BICARBONATE, SODIUM CHLORIDE, POTASSIUM CHLORIDE 500 ML: 236; 22.74; 6.74; 5.86; 2.97 POWDER, FOR SOLUTION ORAL at 17:00

## 2021-05-11 RX ADMIN — POLYETHYLENE GLYCOL 3350, SODIUM SULFATE ANHYDROUS, SODIUM BICARBONATE, SODIUM CHLORIDE, POTASSIUM CHLORIDE 500 ML: 236; 22.74; 6.74; 5.86; 2.97 POWDER, FOR SOLUTION ORAL at 18:00

## 2021-05-11 RX ADMIN — GABAPENTIN 800 MG: 400 CAPSULE ORAL at 05:51

## 2021-05-11 RX ADMIN — SODIUM CHLORIDE 125 MG: 9 INJECTION, SOLUTION INTRAVENOUS at 11:37

## 2021-05-11 RX ADMIN — METOCLOPRAMIDE 5 MG: 5 TABLET ORAL at 20:12

## 2021-05-11 RX ADMIN — SODIUM CHLORIDE, PRESERVATIVE FREE 10 ML: 5 INJECTION INTRAVENOUS at 20:12

## 2021-05-11 RX ADMIN — METRONIDAZOLE 500 MG: 500 INJECTION, SOLUTION INTRAVENOUS at 14:19

## 2021-05-11 RX ADMIN — OXYBUTYNIN CHLORIDE 5 MG: 5 TABLET, EXTENDED RELEASE ORAL at 10:57

## 2021-05-11 RX ADMIN — CYANOCOBALAMIN TAB 1000 MCG 1000 MCG: 1000 TAB at 10:57

## 2021-05-11 RX ADMIN — ONDANSETRON 4 MG: 2 INJECTION INTRAMUSCULAR; INTRAVENOUS at 21:31

## 2021-05-11 RX ADMIN — GABAPENTIN 800 MG: 400 CAPSULE ORAL at 17:15

## 2021-05-11 RX ADMIN — POLYETHYLENE GLYCOL 3350, SODIUM SULFATE ANHYDROUS, SODIUM BICARBONATE, SODIUM CHLORIDE, POTASSIUM CHLORIDE 500 ML: 236; 22.74; 6.74; 5.86; 2.97 POWDER, FOR SOLUTION ORAL at 22:40

## 2021-05-11 RX ADMIN — OXYCODONE 10 MG: 5 TABLET ORAL at 04:02

## 2021-05-11 RX ADMIN — CLONIDINE HYDROCHLORIDE 0.1 MG: 0.1 TABLET ORAL at 20:12

## 2021-05-11 RX ADMIN — GABAPENTIN 800 MG: 400 CAPSULE ORAL at 10:57

## 2021-05-11 RX ADMIN — POLYETHYLENE GLYCOL 3350, SODIUM SULFATE ANHYDROUS, SODIUM BICARBONATE, SODIUM CHLORIDE, POTASSIUM CHLORIDE 500 ML: 236; 22.74; 6.74; 5.86; 2.97 POWDER, FOR SOLUTION ORAL at 14:56

## 2021-05-11 RX ADMIN — CLONIDINE HYDROCHLORIDE 0.1 MG: 0.1 TABLET ORAL at 10:57

## 2021-05-11 RX ADMIN — METRONIDAZOLE 500 MG: 500 INJECTION, SOLUTION INTRAVENOUS at 04:42

## 2021-05-11 RX ADMIN — METOCLOPRAMIDE 5 MG: 5 TABLET ORAL at 10:56

## 2021-05-11 RX ADMIN — OXYCODONE 10 MG: 5 TABLET ORAL at 17:15

## 2021-05-11 RX ADMIN — CYCLOBENZAPRINE 10 MG: 10 TABLET, FILM COATED ORAL at 10:58

## 2021-05-11 RX ADMIN — POLYETHYLENE GLYCOL 3350, SODIUM SULFATE ANHYDROUS, SODIUM BICARBONATE, SODIUM CHLORIDE, POTASSIUM CHLORIDE 500 ML: 236; 22.74; 6.74; 5.86; 2.97 POWDER, FOR SOLUTION ORAL at 19:07

## 2021-05-11 RX ADMIN — POLYETHYLENE GLYCOL 3350, SODIUM SULFATE ANHYDROUS, SODIUM BICARBONATE, SODIUM CHLORIDE, POTASSIUM CHLORIDE 500 ML: 236; 22.74; 6.74; 5.86; 2.97 POWDER, FOR SOLUTION ORAL at 21:31

## 2021-05-12 VITALS
RESPIRATION RATE: 18 BRPM | WEIGHT: 206.8 LBS | OXYGEN SATURATION: 97 % | BODY MASS INDEX: 35.3 KG/M2 | SYSTOLIC BLOOD PRESSURE: 143 MMHG | TEMPERATURE: 97.7 F | HEIGHT: 64 IN | HEART RATE: 87 BPM | DIASTOLIC BLOOD PRESSURE: 63 MMHG

## 2021-05-12 PROBLEM — K59.00 CONSTIPATION: Status: RESOLVED | Noted: 2021-05-09 | Resolved: 2021-05-12

## 2021-05-12 PROBLEM — K52.9 ILEITIS: Status: RESOLVED | Noted: 2021-05-07 | Resolved: 2021-05-12

## 2021-05-12 PROBLEM — K56.7 ILEUS: Status: RESOLVED | Noted: 2021-05-06 | Resolved: 2021-05-12

## 2021-05-12 LAB
ANION GAP SERPL CALCULATED.3IONS-SCNC: 6 MMOL/L (ref 5–15)
BUN SERPL-MCNC: 8 MG/DL (ref 8–23)
BUN/CREAT SERPL: 7.6 (ref 7–25)
CALCIUM SPEC-SCNC: 8.5 MG/DL (ref 8.6–10.5)
CHLORIDE SERPL-SCNC: 107 MMOL/L (ref 98–107)
CO2 SERPL-SCNC: 28 MMOL/L (ref 22–29)
CREAT SERPL-MCNC: 1.05 MG/DL (ref 0.57–1)
DEPRECATED RDW RBC AUTO: 48.3 FL (ref 37–54)
EOSINOPHIL # BLD MANUAL: 0.03 10*3/MM3 (ref 0–0.4)
EOSINOPHIL NFR BLD MANUAL: 1 % (ref 0.3–6.2)
ERYTHROCYTE [DISTWIDTH] IN BLOOD BY AUTOMATED COUNT: 16.9 % (ref 12.3–15.4)
GFR SERPL CREATININE-BSD FRML MDRD: 53 ML/MIN/1.73
GLUCOSE SERPL-MCNC: 87 MG/DL (ref 65–99)
HCT VFR BLD AUTO: 35.2 % (ref 34–46.6)
HGB BLD-MCNC: 11.9 G/DL (ref 12–15.9)
LYMPHOCYTES # BLD MANUAL: 0.66 10*3/MM3 (ref 0.7–3.1)
LYMPHOCYTES NFR BLD MANUAL: 19 % (ref 19.6–45.3)
LYMPHOCYTES NFR BLD MANUAL: 6 % (ref 5–12)
MCH RBC QN AUTO: 27 PG (ref 26.6–33)
MCHC RBC AUTO-ENTMCNC: 33.8 G/DL (ref 31.5–35.7)
MCV RBC AUTO: 79.8 FL (ref 79–97)
MONOCYTES # BLD AUTO: 0.19 10*3/MM3 (ref 0.1–0.9)
NEUTROPHILS # BLD AUTO: 2.27 10*3/MM3 (ref 1.7–7)
NEUTROPHILS NFR BLD MANUAL: 71 % (ref 42.7–76)
NEUTS BAND NFR BLD MANUAL: 1 % (ref 0–5)
PLATELET # BLD AUTO: 72 10*3/MM3 (ref 140–450)
PMV BLD AUTO: 10.9 FL (ref 6–12)
POTASSIUM SERPL-SCNC: 3.7 MMOL/L (ref 3.5–5.2)
RBC # BLD AUTO: 4.41 10*6/MM3 (ref 3.77–5.28)
RBC MORPH BLD: NORMAL
SMALL PLATELETS BLD QL SMEAR: ADEQUATE
SODIUM SERPL-SCNC: 141 MMOL/L (ref 136–145)
VARIANT LYMPHS NFR BLD MANUAL: 2 % (ref 0–5)
WBC # BLD AUTO: 3.15 10*3/MM3 (ref 3.4–10.8)
WBC MORPH BLD: NORMAL

## 2021-05-12 PROCEDURE — 80048 BASIC METABOLIC PNL TOTAL CA: CPT | Performed by: STUDENT IN AN ORGANIZED HEALTH CARE EDUCATION/TRAINING PROGRAM

## 2021-05-12 PROCEDURE — 85007 BL SMEAR W/DIFF WBC COUNT: CPT | Performed by: STUDENT IN AN ORGANIZED HEALTH CARE EDUCATION/TRAINING PROGRAM

## 2021-05-12 PROCEDURE — 99232 SBSQ HOSP IP/OBS MODERATE 35: CPT | Performed by: INTERNAL MEDICINE

## 2021-05-12 PROCEDURE — 63710000001 ONDANSETRON PER 8 MG: Performed by: STUDENT IN AN ORGANIZED HEALTH CARE EDUCATION/TRAINING PROGRAM

## 2021-05-12 PROCEDURE — 85025 COMPLETE CBC W/AUTO DIFF WBC: CPT | Performed by: STUDENT IN AN ORGANIZED HEALTH CARE EDUCATION/TRAINING PROGRAM

## 2021-05-12 RX ORDER — ESCITALOPRAM OXALATE 10 MG/1
10 TABLET ORAL DAILY
Status: DISCONTINUED | OUTPATIENT
Start: 2021-05-12 | End: 2021-05-12 | Stop reason: HOSPADM

## 2021-05-12 RX ORDER — CLONIDINE HYDROCHLORIDE 0.1 MG/1
0.1 TABLET ORAL 2 TIMES DAILY
Status: DISCONTINUED | OUTPATIENT
Start: 2021-05-12 | End: 2021-05-12 | Stop reason: HOSPADM

## 2021-05-12 RX ORDER — GABAPENTIN 400 MG/1
800 CAPSULE ORAL EVERY 6 HOURS SCHEDULED
Status: DISCONTINUED | OUTPATIENT
Start: 2021-05-12 | End: 2021-05-12 | Stop reason: HOSPADM

## 2021-05-12 RX ORDER — CETIRIZINE HYDROCHLORIDE 10 MG/1
10 TABLET ORAL DAILY
Status: DISCONTINUED | OUTPATIENT
Start: 2021-05-12 | End: 2021-05-12 | Stop reason: HOSPADM

## 2021-05-12 RX ORDER — FOLIC ACID 1 MG/1
1 TABLET ORAL DAILY
Status: DISCONTINUED | OUTPATIENT
Start: 2021-05-12 | End: 2021-05-12 | Stop reason: HOSPADM

## 2021-05-12 RX ORDER — OXYBUTYNIN CHLORIDE 5 MG/1
5 TABLET ORAL 2 TIMES DAILY
Status: DISCONTINUED | OUTPATIENT
Start: 2021-05-12 | End: 2021-05-12 | Stop reason: HOSPADM

## 2021-05-12 RX ORDER — FUROSEMIDE 20 MG/1
20 TABLET ORAL DAILY
Status: DISCONTINUED | OUTPATIENT
Start: 2021-05-12 | End: 2021-05-12 | Stop reason: HOSPADM

## 2021-05-12 RX ORDER — METOCLOPRAMIDE 5 MG/1
5 TABLET ORAL 2 TIMES DAILY
Status: DISCONTINUED | OUTPATIENT
Start: 2021-05-12 | End: 2021-05-12 | Stop reason: HOSPADM

## 2021-05-12 RX ORDER — LANOLIN ALCOHOL/MO/W.PET/CERES
3 CREAM (GRAM) TOPICAL NIGHTLY
Status: DISCONTINUED | OUTPATIENT
Start: 2021-05-12 | End: 2021-05-12 | Stop reason: HOSPADM

## 2021-05-12 RX ORDER — LANOLIN ALCOHOL/MO/W.PET/CERES
1000 CREAM (GRAM) TOPICAL DAILY
Status: DISCONTINUED | OUTPATIENT
Start: 2021-05-12 | End: 2021-05-12 | Stop reason: HOSPADM

## 2021-05-12 RX ADMIN — ESCITALOPRAM OXALATE 10 MG: 10 TABLET ORAL at 10:45

## 2021-05-12 RX ADMIN — OXYBUTYNIN CHLORIDE 5 MG: 5 TABLET ORAL at 10:46

## 2021-05-12 RX ADMIN — CLONIDINE HYDROCHLORIDE 0.1 MG: 0.1 TABLET ORAL at 10:46

## 2021-05-12 RX ADMIN — METOCLOPRAMIDE 5 MG: 5 TABLET ORAL at 10:46

## 2021-05-12 RX ADMIN — OXYCODONE 10 MG: 5 TABLET ORAL at 14:33

## 2021-05-12 RX ADMIN — GABAPENTIN 800 MG: 400 CAPSULE ORAL at 05:10

## 2021-05-12 RX ADMIN — METRONIDAZOLE 500 MG: 500 INJECTION, SOLUTION INTRAVENOUS at 05:10

## 2021-05-12 RX ADMIN — DICYCLOMINE HYDROCHLORIDE 20 MG: 20 TABLET ORAL at 10:45

## 2021-05-12 RX ADMIN — FUROSEMIDE 20 MG: 20 TABLET ORAL at 10:45

## 2021-05-12 RX ADMIN — PANTOPRAZOLE SODIUM 40 MG: 40 INJECTION, POWDER, FOR SOLUTION INTRAVENOUS at 05:10

## 2021-05-12 RX ADMIN — CETIRIZINE HYDROCHLORIDE 10 MG: 10 TABLET, FILM COATED ORAL at 10:45

## 2021-05-12 RX ADMIN — CYANOCOBALAMIN TAB 1000 MCG 1000 MCG: 1000 TAB at 10:46

## 2021-05-12 RX ADMIN — FOLIC ACID 1 MG: 1 TABLET ORAL at 10:45

## 2021-05-12 RX ADMIN — CYCLOBENZAPRINE 10 MG: 10 TABLET, FILM COATED ORAL at 10:45

## 2021-05-12 RX ADMIN — ONDANSETRON HYDROCHLORIDE 4 MG: 4 TABLET, FILM COATED ORAL at 10:45

## 2021-05-12 RX ADMIN — GABAPENTIN 800 MG: 400 CAPSULE ORAL at 14:32

## 2021-05-13 ENCOUNTER — READMISSION MANAGEMENT (OUTPATIENT)
Dept: CALL CENTER | Facility: HOSPITAL | Age: 61
End: 2021-05-13

## 2021-05-13 RX ORDER — DICLOFENAC SODIUM 75 MG/1
TABLET, DELAYED RELEASE ORAL
Qty: 30 TABLET | Refills: 0 | OUTPATIENT
Start: 2021-05-13

## 2021-05-13 NOTE — OUTREACH NOTE
Prep Survey      Responses   Restoration facility patient discharged from?  [facility]   Is LACE score < 7 ?  No   Emergency Room discharge w/ pulse ox?  No   Eligibility  Readm Mgmt   Discharge diagnosis  **Ileitis    Does the patient have one of the following disease processes/diagnoses(primary or secondary)?  Other   Does the patient have Home health ordered?  No   Is there a DME ordered?  No   Prep survey completed?  Yes          Simona Mckinney RN

## 2021-05-13 NOTE — TELEPHONE ENCOUNTER
Called patient and let her know the reason for the refusal of the voltaren until cleared by [facility].

## 2021-05-14 ENCOUNTER — OFFICE VISIT (OUTPATIENT)
Dept: GASTROENTEROLOGY | Facility: CLINIC | Age: 61
End: 2021-05-14

## 2021-05-14 ENCOUNTER — APPOINTMENT (OUTPATIENT)
Dept: CT IMAGING | Facility: HOSPITAL | Age: 61
End: 2021-05-14

## 2021-05-14 ENCOUNTER — APPOINTMENT (OUTPATIENT)
Dept: GENERAL RADIOLOGY | Facility: HOSPITAL | Age: 61
End: 2021-05-14

## 2021-05-14 ENCOUNTER — HOSPITAL ENCOUNTER (EMERGENCY)
Facility: HOSPITAL | Age: 61
Discharge: HOME | End: 2021-05-14
Attending: EMERGENCY MEDICINE | Admitting: EMERGENCY MEDICINE

## 2021-05-14 VITALS
BODY MASS INDEX: 37.05 KG/M2 | OXYGEN SATURATION: 98 % | DIASTOLIC BLOOD PRESSURE: 65 MMHG | TEMPERATURE: 97.7 F | WEIGHT: 217 LBS | HEART RATE: 77 BPM | RESPIRATION RATE: 20 BRPM | HEIGHT: 64 IN | SYSTOLIC BLOOD PRESSURE: 126 MMHG

## 2021-05-14 VITALS
WEIGHT: 217 LBS | DIASTOLIC BLOOD PRESSURE: 94 MMHG | SYSTOLIC BLOOD PRESSURE: 154 MMHG | HEART RATE: 94 BPM | BODY MASS INDEX: 37.05 KG/M2 | HEIGHT: 64 IN

## 2021-05-14 DIAGNOSIS — R10.84 GENERALIZED ABDOMINAL PAIN: ICD-10-CM

## 2021-05-14 DIAGNOSIS — K59.9 COLONIC INERTIA: ICD-10-CM

## 2021-05-14 DIAGNOSIS — R11.2 NAUSEA AND VOMITING, INTRACTABILITY OF VOMITING NOT SPECIFIED, UNSPECIFIED VOMITING TYPE: ICD-10-CM

## 2021-05-14 DIAGNOSIS — K59.04 CHRONIC IDIOPATHIC CONSTIPATION: Primary | ICD-10-CM

## 2021-05-14 DIAGNOSIS — K59.00 CONSTIPATION, UNSPECIFIED CONSTIPATION TYPE: ICD-10-CM

## 2021-05-14 DIAGNOSIS — K74.60 CIRRHOSIS OF LIVER WITHOUT ASCITES, UNSPECIFIED HEPATIC CIRRHOSIS TYPE (HCC): ICD-10-CM

## 2021-05-14 DIAGNOSIS — R60.9 PERIPHERAL EDEMA: ICD-10-CM

## 2021-05-14 DIAGNOSIS — K75.81 NASH (NONALCOHOLIC STEATOHEPATITIS): ICD-10-CM

## 2021-05-14 DIAGNOSIS — M62.89 PELVIC FLOOR DYSFUNCTION: ICD-10-CM

## 2021-05-14 DIAGNOSIS — R10.30 LOWER ABDOMINAL PAIN: Primary | ICD-10-CM

## 2021-05-14 DIAGNOSIS — R91.1 PULMONARY NODULE: ICD-10-CM

## 2021-05-14 LAB
ALBUMIN SERPL-MCNC: 3.8 G/DL (ref 3.5–5.2)
ALBUMIN/GLOB SERPL: 1.4 G/DL
ALP SERPL-CCNC: 165 U/L (ref 39–117)
ALT SERPL W P-5'-P-CCNC: 24 U/L (ref 1–33)
ANION GAP SERPL CALCULATED.3IONS-SCNC: 6 MMOL/L (ref 5–15)
ANISOCYTOSIS BLD QL: NORMAL
AST SERPL-CCNC: 72 U/L (ref 1–32)
BASOPHILS # BLD AUTO: 0.02 10*3/MM3 (ref 0–0.2)
BASOPHILS NFR BLD AUTO: 0.4 % (ref 0–1.5)
BILIRUB SERPL-MCNC: 0.6 MG/DL (ref 0–1.2)
BILIRUB UR QL STRIP: NEGATIVE
BUN SERPL-MCNC: 8 MG/DL (ref 8–23)
BUN/CREAT SERPL: 7.4 (ref 7–25)
CALCIUM SPEC-SCNC: 9.4 MG/DL (ref 8.6–10.5)
CHLORIDE SERPL-SCNC: 104 MMOL/L (ref 98–107)
CLARITY UR: CLEAR
CO2 SERPL-SCNC: 27 MMOL/L (ref 22–29)
COLOR UR: YELLOW
CREAT SERPL-MCNC: 1.08 MG/DL (ref 0.57–1)
D-LACTATE SERPL-SCNC: 1.5 MMOL/L (ref 0.5–2)
DEPRECATED RDW RBC AUTO: 49.2 FL (ref 37–54)
EOSINOPHIL # BLD AUTO: 0.13 10*3/MM3 (ref 0–0.4)
EOSINOPHIL NFR BLD AUTO: 2.8 % (ref 0.3–6.2)
ERYTHROCYTE [DISTWIDTH] IN BLOOD BY AUTOMATED COUNT: 17.4 % (ref 12.3–15.4)
FLUAV RNA RESP QL NAA+PROBE: NOT DETECTED
FLUBV RNA RESP QL NAA+PROBE: NOT DETECTED
GFR SERPL CREATININE-BSD FRML MDRD: 52 ML/MIN/1.73
GLOBULIN UR ELPH-MCNC: 2.8 GM/DL
GLUCOSE SERPL-MCNC: 87 MG/DL (ref 65–99)
GLUCOSE UR STRIP-MCNC: NEGATIVE MG/DL
HCT VFR BLD AUTO: 38.8 % (ref 34–46.6)
HGB BLD-MCNC: 12.6 G/DL (ref 12–15.9)
HGB UR QL STRIP.AUTO: NEGATIVE
HOLD SPECIMEN: NORMAL
IMM GRANULOCYTES # BLD AUTO: 0.01 10*3/MM3 (ref 0–0.05)
IMM GRANULOCYTES NFR BLD AUTO: 0.2 % (ref 0–0.5)
KETONES UR QL STRIP: NEGATIVE
LEUKOCYTE ESTERASE UR QL STRIP.AUTO: NEGATIVE
LIPASE SERPL-CCNC: 28 U/L (ref 13–60)
LYMPHOCYTES # BLD AUTO: 1.09 10*3/MM3 (ref 0.7–3.1)
LYMPHOCYTES NFR BLD AUTO: 23.1 % (ref 19.6–45.3)
MCH RBC QN AUTO: 26 PG (ref 26.6–33)
MCHC RBC AUTO-ENTMCNC: 32.5 G/DL (ref 31.5–35.7)
MCV RBC AUTO: 80.2 FL (ref 79–97)
MONOCYTES # BLD AUTO: 0.35 10*3/MM3 (ref 0.1–0.9)
MONOCYTES NFR BLD AUTO: 7.4 % (ref 5–12)
NEUTROPHILS NFR BLD AUTO: 3.12 10*3/MM3 (ref 1.7–7)
NEUTROPHILS NFR BLD AUTO: 66.1 % (ref 42.7–76)
NITRITE UR QL STRIP: NEGATIVE
NRBC BLD AUTO-RTO: 0 /100 WBC (ref 0–0.2)
NT-PROBNP SERPL-MCNC: 107.7 PG/ML (ref 0–900)
PH UR STRIP.AUTO: 6 [PH] (ref 5–9)
PLATELET # BLD AUTO: 72 10*3/MM3 (ref 140–450)
PMV BLD AUTO: 11.5 FL (ref 6–12)
POTASSIUM SERPL-SCNC: 4.6 MMOL/L (ref 3.5–5.2)
PROT SERPL-MCNC: 6.6 G/DL (ref 6–8.5)
PROT UR QL STRIP: NEGATIVE
RBC # BLD AUTO: 4.84 10*6/MM3 (ref 3.77–5.28)
SARS-COV-2 RNA RESP QL NAA+PROBE: NOT DETECTED
SMALL PLATELETS BLD QL SMEAR: NORMAL
SODIUM SERPL-SCNC: 137 MMOL/L (ref 136–145)
SP GR UR STRIP: 1.01 (ref 1–1.03)
TROPONIN T SERPL-MCNC: <0.01 NG/ML (ref 0–0.03)
UROBILINOGEN UR QL STRIP: NORMAL
WBC # BLD AUTO: 4.72 10*3/MM3 (ref 3.4–10.8)
WBC MORPH BLD: NORMAL
WHOLE BLOOD HOLD SPECIMEN: NORMAL

## 2021-05-14 PROCEDURE — 71045 X-RAY EXAM CHEST 1 VIEW: CPT

## 2021-05-14 PROCEDURE — 80053 COMPREHEN METABOLIC PANEL: CPT | Performed by: EMERGENCY MEDICINE

## 2021-05-14 PROCEDURE — 0 DIATRIZOATE MEGLUMINE & SODIUM PER 1 ML: Performed by: EMERGENCY MEDICINE

## 2021-05-14 PROCEDURE — 83690 ASSAY OF LIPASE: CPT | Performed by: EMERGENCY MEDICINE

## 2021-05-14 PROCEDURE — 99214 OFFICE O/P EST MOD 30 MIN: CPT | Performed by: PHYSICIAN ASSISTANT

## 2021-05-14 PROCEDURE — 87636 SARSCOV2 & INF A&B AMP PRB: CPT | Performed by: EMERGENCY MEDICINE

## 2021-05-14 PROCEDURE — 74176 CT ABD & PELVIS W/O CONTRAST: CPT

## 2021-05-14 PROCEDURE — 81003 URINALYSIS AUTO W/O SCOPE: CPT | Performed by: EMERGENCY MEDICINE

## 2021-05-14 PROCEDURE — 96361 HYDRATE IV INFUSION ADD-ON: CPT

## 2021-05-14 PROCEDURE — 96375 TX/PRO/DX INJ NEW DRUG ADDON: CPT

## 2021-05-14 PROCEDURE — 85025 COMPLETE CBC W/AUTO DIFF WBC: CPT | Performed by: EMERGENCY MEDICINE

## 2021-05-14 PROCEDURE — 85007 BL SMEAR W/DIFF WBC COUNT: CPT | Performed by: EMERGENCY MEDICINE

## 2021-05-14 PROCEDURE — 83880 ASSAY OF NATRIURETIC PEPTIDE: CPT | Performed by: EMERGENCY MEDICINE

## 2021-05-14 PROCEDURE — 93005 ELECTROCARDIOGRAM TRACING: CPT | Performed by: EMERGENCY MEDICINE

## 2021-05-14 PROCEDURE — 83605 ASSAY OF LACTIC ACID: CPT | Performed by: EMERGENCY MEDICINE

## 2021-05-14 PROCEDURE — 84484 ASSAY OF TROPONIN QUANT: CPT | Performed by: EMERGENCY MEDICINE

## 2021-05-14 PROCEDURE — 96374 THER/PROPH/DIAG INJ IV PUSH: CPT

## 2021-05-14 PROCEDURE — 72100 X-RAY EXAM L-S SPINE 2/3 VWS: CPT

## 2021-05-14 PROCEDURE — 99283 EMERGENCY DEPT VISIT LOW MDM: CPT

## 2021-05-14 PROCEDURE — 25010000002 ONDANSETRON PER 1 MG: Performed by: EMERGENCY MEDICINE

## 2021-05-14 PROCEDURE — 93010 ELECTROCARDIOGRAM REPORT: CPT | Performed by: INTERNAL MEDICINE

## 2021-05-14 RX ORDER — SODIUM CHLORIDE 9 MG/ML
100 INJECTION, SOLUTION INTRAVENOUS CONTINUOUS
Status: DISCONTINUED | OUTPATIENT
Start: 2021-05-14 | End: 2021-05-15 | Stop reason: HOSPADM

## 2021-05-14 RX ORDER — PANTOPRAZOLE SODIUM 40 MG/10ML
40 INJECTION, POWDER, LYOPHILIZED, FOR SOLUTION INTRAVENOUS ONCE
Status: COMPLETED | OUTPATIENT
Start: 2021-05-14 | End: 2021-05-14

## 2021-05-14 RX ORDER — ONDANSETRON 2 MG/ML
4 INJECTION INTRAMUSCULAR; INTRAVENOUS ONCE
Status: COMPLETED | OUTPATIENT
Start: 2021-05-14 | End: 2021-05-14

## 2021-05-14 RX ORDER — MINERAL OIL 100 G/100G
1 OIL RECTAL ONCE
Qty: 1 ENEMA | Refills: 0 | Status: SHIPPED | OUTPATIENT
Start: 2021-05-14 | End: 2021-05-14

## 2021-05-14 RX ORDER — FUROSEMIDE 20 MG/1
40 TABLET ORAL DAILY
Qty: 6 TABLET | Refills: 0 | Status: SHIPPED | OUTPATIENT
Start: 2021-05-14 | End: 2021-08-20

## 2021-05-14 RX ORDER — SODIUM CHLORIDE 0.9 % (FLUSH) 0.9 %
10 SYRINGE (ML) INJECTION AS NEEDED
Status: DISCONTINUED | OUTPATIENT
Start: 2021-05-14 | End: 2021-05-15 | Stop reason: HOSPADM

## 2021-05-14 RX ADMIN — PANTOPRAZOLE SODIUM 40 MG: 40 INJECTION, POWDER, FOR SOLUTION INTRAVENOUS at 20:10

## 2021-05-14 RX ADMIN — SODIUM CHLORIDE 100 ML/HR: 9 INJECTION, SOLUTION INTRAVENOUS at 20:11

## 2021-05-14 RX ADMIN — ONDANSETRON 4 MG: 2 INJECTION INTRAMUSCULAR; INTRAVENOUS at 20:10

## 2021-05-14 RX ADMIN — DIATRIZOATE MEGLUMINE AND DIATRIZOATE SODIUM 30 ML: 660; 100 LIQUID ORAL; RECTAL at 21:43

## 2021-05-15 NOTE — ED PROVIDER NOTES
"Subjective   61yo female pmh significant htn/cirrhosis/gerd/mdd/pancytopenia/MUSA, recently discharged [facility] 05.12.2021 secondary to ileitis/gordo/constipation, presents ED c/o 2d hx worsened BLE edema/abdominal distension/constipation.  ROS neg fever/chills/chest pain/soa/cough/dysuria.      History provided by:  Patient  Illness  Severity:  Moderate  Duration:  2 days  Progression:  Worsening  Chronicity:  Recurrent  Associated symptoms: abdominal pain    Associated symptoms: no diarrhea, no nausea and no vomiting        Review of Systems   Constitutional: Negative.    HENT: Negative.    Eyes: Negative for redness.   Respiratory: Negative.    Cardiovascular: Negative.    Gastrointestinal: Positive for abdominal distention, abdominal pain and constipation. Negative for diarrhea, nausea and vomiting.   Genitourinary: Negative.    Musculoskeletal: Positive for joint swelling.   Skin: Negative.    Allergic/Immunologic: Positive for immunocompromised state.   All other systems reviewed and are negative.      Past Medical History:   Diagnosis Date   • Acid reflux    • Altered bowel function    • Arthropathy of lumbar facet joint    • Bleeding disorder (CMS/HCC)    • C. difficile diarrhea 2015   • Constipation    • Corns and callus    • Depression    • Disease related peripheral neuropathy    • Epigastric pain    • Fatty liver    • Hammer toe    • Headache    • Hiatal hernia    • History of transfusion    • Hyperlipidemia    • Knee pain    • Localized, primary osteoarthritis of the ankle and foot     Localized, primary osteoarthritis of the ankle and/or foot   • Mendoza's metatarsalgia     Mendoza's metatarsalgia - 2nd interspace on right   • Nausea and vomiting    • Neuralgia and neuritis     Neuralgia, neuritis, and radiculitis, unspecified   • Neuropathy    • Obstructive sleep apnea     Obstructive sleep apnea (adult) (pediatric)    • CHAI on CPAP     \"C-Pap at night  (unconfirmed)\"   • Osteoarthritis    • Pain in foot     " Pain in unspecified foot - sees a podiatrist   • Pain in joint, ankle and foot     Joint pain in ankle and foot      • Pain radiating to back     Pain radiating to lumbar region of back   • Plantar fasciitis    • PONV (postoperative nausea and vomiting)    • Restless leg syndrome    • Secondary hypertension     Secondary hypertension, unspecified   • Sinusitis    • Sleep apnea    • Tongue anomaly     lesion       Allergies   Allergen Reactions   • Other      Pt states that taking steroids either in pill or injection form make her have blisters in her mouth and she feels like she is on fire on the inside/ has hx of c diff and possible MRSA     • Corticosteroids Rash   • Kenalog  [Triamcinolone Acetonide] Rash   • Sulfa Antibiotics Rash     Sulfa (Sulfonamide Antibiotics)       Past Surgical History:   Procedure Laterality Date   • CARPAL TUNNEL RELEASE Left 2018    Procedure: CARPAL TUNNEL RELEASE - left;  Surgeon: Justus Lewis MD;  Location: [facility] OR;  Service: Orthopedics   • CARPAL TUNNEL RELEASE Right 2019    Procedure: carpal tunnel release right hand with local/monitored anesthesia care;  Surgeon: Justus Lewis MD;  Location: [facility] OR;  Service: Orthopedics   •  SECTION     • CHOLECYSTECTOMY     • COLONOSCOPY  2013   • COLONOSCOPY N/A 10/17/2018    Procedure: COLONOSCOPY;  Surgeon: Russell Del Rio MD;  Location: [facility] ENDOSCOPY;  Service: Gastroenterology   • COLONOSCOPY N/A 3/22/2021    Procedure: COLONOSCOPY;  Surgeon: Russell Del Rio MD;  Location: [facility] ENDOSCOPY;  Service: Gastroenterology;  Laterality: N/A;   • DIRECT LARYNGOSCOPY, ESOPHAGOSCOPY, BRONCHOSCOPY N/A 2017    Procedure: DIRECT LARYNGOSCOPY AND;  Surgeon: Live Bolton MD;  Location: [facility] OR;  Service:    • ENDOSCOPY  2013    Colon endoscopy 05602 (1) - Internal & external hemorrhoids found. Stool found.   • ENDOSCOPY  2013    EGD w/ tube 91136 (1) - Normal esophagus.  Gastritis in stomach. Biopsy taken. Normal dudoenum. Biopsy taken.   • ENDOSCOPY N/A 10/17/2018    Procedure: ESOPHAGOGASTRODUODENOSCOPY--eval varices;  Surgeon: Russell Del Rio MD;  Location: [facility] ENDOSCOPY;  Service: Gastroenterology   • ENDOSCOPY N/A 3/22/2021    Procedure: ESOPHAGOGASTRODUODENOSCOPYURGNET;  Surgeon: Russell Del Rio MD;  Location: [facility] ENDOSCOPY;  Service: Gastroenterology;  Laterality: N/A;   • FOOT SURGERY  02/26/2013    Foot/toes surgery procedure (1) - Arthroplasty of toes 4 and 5 of right foot.   • HERNIA REPAIR     • HERNIA REPAIR      hital   • HYSTERECTOMY     • LIVER BIOPSY     • NERVE BLOCK  07/25/2016    Injection for nerve block (1) - Lumbar medial branch block.   • OTHER SURGICAL HISTORY  12/03/2012    Inj(s) Tend-Sheath, Ligament, Single 20550 (1) - [facility] ([facility])    • OTHER SURGICAL HISTORY  06/24/2013    Small Joint Injection/Aspiration 20600 (2) - [facility]ICK[facility] ([facility])    • OTHER SURGICAL HISTORY  2011    bowel obstruction x2   • OTHER SURGICAL HISTORY      gland removed from neck   • SUBLINGUAL SALIVARY CYST EXCISION N/A 8/1/2017    Procedure: EXCISION OF LEFT  TONGUE LESION WITH CLOSURE;  Surgeon: Live Bolton MD;  Location: [facility] OR;  Service:    • TUBAL ABDOMINAL LIGATION     • UPPER GASTROINTESTINAL ENDOSCOPY  07/01/2013   • UPPER GASTROINTESTINAL ENDOSCOPY  10/17/2018   • UPPER GASTROINTESTINAL ENDOSCOPY  03/22/2021       Family History   Problem Relation Age of Onset   • Cancer Other    • Diabetes Other    • Heart disease Other    • Hypertension Mother    • Cancer Mother    • Diabetes Mother    • Hypertension Father    • Heart attack Father    • Cancer Father    • Heart disease Father    • Thyroid disease Maternal Aunt        Social History     Socioeconomic History   • Marital status:      Spouse name: Not on file   • Number of children: Not on file   • Years of education: Not on file   • Highest education level: Not on file      Tobacco Use   • Smoking status: Former Smoker     Packs/day: 2.00     Years: 15.00     Pack years: 30.00     Types: Cigarettes     Quit date:      Years since quittin.3   • Smokeless tobacco: Never Used   Vaping Use   • Vaping Use: Never used   Substance and Sexual Activity   • Alcohol use: No   • Drug use: No   • Sexual activity: Defer     Birth control/protection: Surgical     Comment: Marital Status:            Objective   Physical Exam  Vitals and nursing note reviewed.   Constitutional:       Appearance: Normal appearance. She is obese.   HENT:      Head: Normocephalic and atraumatic.      Mouth/Throat:      Mouth: Mucous membranes are moist.   Eyes:      Pupils: Pupils are equal, round, and reactive to light.   Cardiovascular:      Rate and Rhythm: Normal rate and regular rhythm.      Pulses: Normal pulses.      Heart sounds: Normal heart sounds. No murmur heard.   No gallop.    Pulmonary:      Effort: Pulmonary effort is normal. No respiratory distress.      Breath sounds: Normal breath sounds. No wheezing, rhonchi or rales.   Abdominal:      General: Abdomen is protuberant. Bowel sounds are normal.      Palpations: Abdomen is soft.      Tenderness: There is generalized abdominal tenderness. There is no right CVA tenderness, left CVA tenderness, guarding or rebound. Negative signs include Fuentes's sign, Rovsing's sign and McBurney's sign.       Musculoskeletal:      Cervical back: Normal range of motion and neck supple. No rigidity.      Right lower leg: Edema present.      Left lower leg: Edema present.   Lymphadenopathy:      Cervical: No cervical adenopathy.   Skin:     General: Skin is warm and dry.   Neurological:      General: No focal deficit present.      Mental Status: She is alert and oriented to person, place, and time.      GCS: GCS eye subscore is 4. GCS verbal subscore is 5. GCS motor subscore is 6.         ECG 12 Lead      Date/Time: 2021 9:04 PM  Performed by: Aneudy  Ollie BERRY MD  Authorized by: Ollie Amado MD   Interpreted by physician  Rhythm: sinus rhythm  Rate: normal  BPM: 86  QRS axis: normal  Conduction: conduction normal  ST Segments: ST segments normal  T Waves: T waves normal  Q waves: III and aVF  Clinical impression: abnormal ECG                 ED Course      Labs Reviewed   COMPREHENSIVE METABOLIC PANEL - Abnormal; Notable for the following components:       Result Value    Creatinine 1.08 (*)     AST (SGOT) 72 (*)     Alkaline Phosphatase 165 (*)     eGFR Non  Amer 52 (*)     All other components within normal limits    Narrative:     GFR Normal >60  Chronic Kidney Disease <60  Kidney Failure <15     CBC WITH AUTO DIFFERENTIAL - Abnormal; Notable for the following components:    MCH 26.0 (*)     RDW 17.4 (*)     Platelets 72 (*)     All other components within normal limits   COVID-19 AND FLU A/B, NP SWAB IN TRANSPORT MEDIA 8-12 HR TAT - Normal    Narrative:     Fact sheet for providers: https://www.fda.gov/media/061778/download    Fact sheet for patients: https://www.fda.gov/media/353116/download    Test performed by PCR.   LIPASE - Normal   URINALYSIS W/ MICROSCOPIC IF INDICATED (NO CULTURE) - Normal    Narrative:     Urine microscopic not indicated.   LACTIC ACID, PLASMA - Normal   BNP (IN-HOUSE) - Normal    Narrative:     Among patients with dyspnea, NT-proBNP is highly sensitive for the detection of acute congestive heart failure. In addition NT-proBNP of <300 pg/ml effectively rules out acute congestive heart failure with 99% negative predictive value.    Results may be falsely decreased if patient taking Biotin.     TROPONIN (IN-HOUSE) - Normal    Narrative:     Troponin T Reference Range:  <= 0.03 ng/mL-   Negative for AMI  >0.03 ng/mL-     Abnormal for myocardial necrosis.  Clinicians would have to utilize clinical acumen, EKG, Troponin and serial changes to determine if it is an Acute Myocardial Infarction or myocardial injury due to an  underlying chronic condition.       Results may be falsely decreased if patient taking Biotin.     SCAN SLIDE   CBC AND DIFFERENTIAL    Narrative:     The following orders were created for panel order CBC & Differential.  Procedure                               Abnormality         Status                     ---------                               -----------         ------                     Scan Slide[[id number]]                                       Final result               CBC Auto Differential[[id number]]        Abnormal            Final result                 Please view results for these tests on the individual orders.   EXTRA TUBES    Narrative:     The following orders were created for panel order Extra Tubes.  Procedure                               Abnormality         Status                     ---------                               -----------         ------                     Light Blue Top[[id number]]                                   Final result               Gold Top - SST[[id number]]                                   Final result                 Please view results for these tests on the individual orders.   LIGHT BLUE TOP   GOLD TOP - [facility]     Adult Transthoracic Echo Complete W/ Cont if Necessary Per Protocol    Result Date: 5/7/2021  Narrative: · Left ventricular wall thickness is consistent with mild to moderate concentric hypertrophy. · Estimated left ventricular EF = 61% Left ventricular ejection fraction appears to be 61 - 65%. Left ventricular systolic function is normal. · Left ventricular diastolic function is consistent with (grade I) impaired relaxation. · The right ventricular cavity is mildly dilated. · Left atrial volume is mildly increased. · Estimated right ventricular systolic pressure from tricuspid regurgitation is mildly elevated (35-45 mmHg). · Mild pulmonary hypertension is present.      CT Abdomen Pelvis Without Contrast    Result Date: 5/14/2021  Narrative: CLINICAL HISTORY:  Abdominal pain COMPARISON: May 6, 2021 TECHNIQUE: This exam was performed according to our departmental dose-optimization program, which includes automated exposure control, adjustment of the mA and/or KV according to the patient's size and/or use of iterative reconstruction technique. MIP reconstructed sagittal and coronal images were also obtained. FINDINGS: The examination was performed with oral contrast present. The heart and inferior mediastinal structures are similar to prior. Left lower lobe calcified pulmonary granuloma stable compared to prior. There is a bilobed left lower lobe pulmonary nodule measuring approximately 0.6 cm x 1 cm x 0.5 cm. Cirrhotic morphology with associated splenomegaly is again demonstrated, similar to prior. Gastric, esophageal, and splenic varices are again demonstrated. The pancreas appears normal. Adrenal glands appear normal. No collecting system dilation is shown in either kidney. Ureters are not abnormally dilated. The urinary bladder appears normal. The uterus and ovaries are not demonstrated. The stomach, small bowel, and large bowel appear stable in course and caliber. Inflammatory changes in the mesentery surrounding some prominent small bowel loops in the lower abdomen is improved since prior. Contrast has not yet reached the colon on the study. If partial small bowel obstruction remains a clinical concern, consider follow-up with plain radiographs show contrast is shown within the colon. There is a large amount of stool present throughout the course of the colon, particularly in the right side and in the transverse portion of the colon. Remainder, unchanged.     Impression: *  Large amount of stool present throughout the course of the colon, particularly in the right side and in the transverse portion of the colon. *  Interval improvement in some mesenteric stranding associated with some prominent ileal loops as described. Consider follow-up serial plain radiography  until the contrast column reaches the colon. *  Stable findings of cirrhosis and portal hypertension. *  Bilobed left lower lobe pulmonary nodule. Recommend a non-contrast Chest CT at 6-12 months. If patient is high risk for malignancy, recommend an additional non-contrast Chest CT at 18-24 months; if patient is low risk for malignancy a non-contrast Chest CT at 18-24 months is optional. These guidelines do not apply to immunocompromised patients and patients with cancer. Follow up in patients with significant comorbidities as clinically warranted. For lung cancer screening, adhere to Lung-RADS guidelines. Reference: Radiology. 2017; 284(1):228-43. Thank you for allowing us to participate in the care of your patient. Electronically signed by:  Miguel Carlson DO  5/14/2021 10:21 PM CDT Workstation: [id number]    XR Spine Lumbar 2 or 3 View    Result Date: 5/14/2021  Narrative: Back pain. Lumbar spine AP, lateral and coned-down views. The alignment of the lumbar spine is well-maintained. No spondylolyses or spondylolisthesis is seen. No fracture is identified. The disc spaces are within normal limits. There are anterior and lateral osteophytes. There is multilevel bilateral facet arthropathy. No bony erosion or destruction is seen. No soft tissue abnormality is identified.     Impression: CONCLUSION: Degenerative changes. Electronically signed by:  Terry Huerta MD  5/14/2021 7:52 PM CDT Workstation: [id number]    CT Abdomen Pelvis With Contrast    Result Date: 5/6/2021  Narrative: PROCEDURE: Ct abdomen and pelvis with contrast REASON FOR EXAM: abdominal pain FINDINGS: Comparison study dated  March 16, 2021. Axial computer tomography sequential imaging was performed from the diaphragms through the symphysis pubis with IV contrast administration. Sagittal and coronal reformates was performed. This exam was performed according to our departmental dose optimization program, which includes automated exposure  control, adjustment of the mA and/or KV according to patient size and/or use of iterative reconstruction technique.  Imaging through lung bases reveals no abnormality. The liver is small with a nodular peripheral contour suggesting changes from cirrhosis. The gallbladder surgically absent. The biliary system appears within normal limits status post cholecystectomy. The pancreas is normal. Splenomegaly with the spleen measuring 17.8 cm in craniocaudal dimension. Bilateral adrenal glands are normal. The right kidney and ureter are normal. The left kidney and ureter are normal. The bladder is normal. The uterus is surgically absent. Redundant colon. Lower midabdomen superior pelvis central abdominal ileus small bowel loops are seen which demonstrate mild distention with fluid and air and has surrounding fatty mesentery stranding. No definite region of caliber change is identified. The hollow viscera is otherwise unremarkable. No lymphadenopathy in the abdomen or the pelvis. No acute osseous abnormality.     Impression: 1. Liver appearance suggests changes from cirrhosis. Associated splenomegaly.. 2.Lower midabdomen superior pelvis central abdominal ileus small bowel loops are seen which demonstrate mild distention with fluid and air and has surrounding fatty mesentery stranding. No definite region of caliber change is identified. This appearance would be suspicious for changes from infectious and/or inflammatory ileitis. Cannot exclude early partial small bowel obstruction. 3. Remainder CT abdomen pelvis study is unremarkable.. Electronically signed by:  Lenard Del Rio MD  5/6/2021 5:05 PM CDT Workstation: [id number]    XR Chest 1 View    Result Date: 5/14/2021  Narrative: PORTABLE CHEST HISTORY: Abdominal pain Portable AP film of the chest was obtained at 7:46 PM. COMPARISON: May 11, 2021 FINDINGS: EKG leads. The lungs are clear of an acute process. Old granulomatous disease is present. The heart is not enlarged. The  pulmonary vasculature is not increased. No pleural effusion. No pneumothorax. No acute osseous abnormality. Degenerative changes are present in the thoracic spine. Cholecystectomy.     Impression: CONCLUSION: No Acute Disease [id number] Electronically signed by:  Walker Stevens MD  5/14/2021 7:52 PM CDT Workstation: [id number]    XR Chest 1 View    Result Date: 5/11/2021  Narrative: EXAMINATION:  XR CHEST 1 VW CLINICAL HISTORY:  60 years Female,NG Tube placement, K56.7 Ileus, unspecified COMPARISON:  None FINDINGS:  Enteric tube extends into the stomach. No focal consolidation, pleural effusion, or pneumothorax. Normal heart size and pulmonary vascularity.     Impression: Enteric tube extends into the stomach. Electronically signed by:  Jose Ngo MD  5/11/2021 2:35 PM CDT Workstation: [id number]    US Abdomen Limited    Result Date: 5/7/2021  Narrative: Procedure: Limited ultrasound abdomen Reason for exam: Evaluate for ascites. FINDINGS: Limited ultrasound of the four abdominal quadrants was performed. No ascites is identified.     Impression: No abdominal ascites. Electronically signed by:  Lenard Del Rio MD  5/7/2021 12:13 PM CDT Workstation: [id number]    XR Abdomen KUB    Result Date: 5/11/2021  Narrative: Procedure: Supine abdomen two views Reason for exam: Abdominal pain. Findings: Comparison exam dated May 9, 2021. No acute osseous abnormality. Surgical clips in gallbladder fossa consistent with prior cholecystectomy. Single sitz marker in the region of the mid transverse colon. 4 sitz marker in the region of the sigmoid colon. Soft tissue structures are normal. No pathologic locations. Nonobstructive bowel gas pattern.     Impression: No acute abdominal abnormality. Electronically signed by:  Lenard Del Rio MD  5/11/2021 9:18 AM CDT Workstation: [id number]    XR Abdomen KUB    Result Date: 5/9/2021  Narrative: PROCEDURE: XR ABDOMEN KUB VIEWS:  2 INDICATION:Abdominal pain/discomfort COMPARISON: None FINDINGS:   -  Bowel gas pattern: Nonobstructive. There is mildly increased stool in the distal transverse colon. The sigmoid colon, concerning for constipation. Five Sitzmarks markers are present overlying the left sacrum, probably within the sigmoid colon   - Free air: None   - Soft tissue:No gross evidence of organomegaly,     an abdominal mass,or ascites   - Calculi:None projecting over gallbladder      fossa, renal shadows, or anticipated course of the ureters.   - Osseous:Limited assessment, unremarkable for age.     Impression: Non-obstructed bowel gas pattern. Mildly increased stool in the distal colon as above, concerning for constipation. Five Sitzmarks are present in the sigmoid colon Electronically signed by:  Chandrika Grigsby MD  5/9/2021 4:48 PM CDT Workstation: [id number]    XR Abdomen KUB    Result Date: 4/23/2021  Narrative: Exam: AP abdomen INDICATION: Sitz marker for constipation COMPARISON: 4/7/2021 FINDINGS: Bowel thoracolumbar lumbar spondylosis. Status post cholecystectomy. Nonspecific bowel gas pattern. No significantly dilated loops of large or small bowel. Moderate stool is seen. There are 15 Sitzmarks markers the region of the cecum/ascending colon. There are five of Sitzmarks markers present in the region of the distal transverse colon/splenic flexure.     Impression: Findings compatible with constipation. Recommend follow-up as clinically warranted Electronically signed by:  Jostin Perales MD  4/23/2021 9:43 PM CDT Workstation: [id number]    XR Abdomen KUB    Result Date: 4/22/2021  Narrative: PROCEDURE: XR ABDOMEN KUB VIEWS:  2 INDICATION: Sitz marker study for constipation COMPARISON: None FINDINGS:   - Bowel gas pattern: Nonobstructive. There is increased stool throughout the visualized portion of the colon. Numerous cysts markers are present in the ascending colon and as far distally as the mid transverse colon.   - Free air: None   - Soft tissue:No gross evidence of organomegaly,     an abdominal  mass,or ascites   - Osseous:Limited assessment, unremarkable for age.     Impression: Non-obstructed bowel gas pattern. Numerous Sitzmarks from the cecum through the mid transverse colon as above. Electronically signed by:  Chandrika Grigsby MD  4/22/2021 7:23 PM CDT Workstation: [id number]                                         MDM  Number of Diagnoses or Management Options  Cirrhosis of liver without ascites, unspecified hepatic cirrhosis type (CMS/HCC)  Constipation, unspecified constipation type  Lower abdominal pain  Peripheral edema  Pulmonary nodule  Diagnosis management comments: Labs/radiographic studies reviewed. CT abd/pelvis demonstrates stable/chronic findings cirrhosis/splenomegaly with incidental LLL pulmonary nodule.  No evidence bowel obstruction. Improved mesenteric stranding compared previous inpatient study. Constipation noted. No evidence leukocytosis/obstruction/perforation/hemorrhage/ peritonitis. Mild stable thrombocytopenia noted. No evidence sepsis/ascites. Reviewed echocardiogram 05.06.2021 demonstrating normal LVEF 61-65% with pulmonary htn et grade 1 diastolic dysfunction. Repeat exam at discharge: pt appears resting comfortably. abd soft mild tender suprapubic. Neg r/r/g/hsm. Stable discharge w/pmd/gi followup.       Amount and/or Complexity of Data Reviewed  Clinical lab tests: reviewed  Tests in the radiology section of CPT®: reviewed  Tests in the medicine section of CPT®: reviewed  Decide to obtain previous medical records or to obtain history from someone other than the patient: yes        Final diagnoses:   Lower abdominal pain   Constipation, unspecified constipation type   Cirrhosis of liver without ascites, unspecified hepatic cirrhosis type (CMS/HCC)   Pulmonary nodule   Peripheral edema       ED Disposition  ED Disposition     ED Disposition Condition Comment    Discharge Yobany Barnes MD  [address] [place], [place] [id number]  [phone]    In 2  days      Russell Del Rio MD  [address] [place] [address] [address]  [facility] [address] [phone]    In 2 days           Medication List      New Prescriptions    mineral oil enema  Insert 1 enema into the rectum 1 (One) Time for 1 dose.     polyethylene glycol 236 g solution  Commonly known as: GoLYTELY  Take 4,000 mL by mouth 1 (One) Time for 1 dose.        Changed    furosemide 20 MG tablet  Commonly known as: LASIX  Take 2 tablets by mouth Daily for 3 days.  What changed: how much to take     metoclopramide 5 MG tablet  Commonly known as: Reglan  Take 1 tablet by mouth 3 (Three) Times a Day Before Meals.  What changed: when to take this           Where to Get Your Medications      These medications were sent to [facility] [address] [place] [address] - [address] [place] [address] - [phone] [facility] [phone] [address] [address]    Phone: [phone]   · furosemide 20 MG tablet  · mineral oil enema  · polyethylene glycol 236 g solution          Ollie Amado MD  05/14/21 2798

## 2021-05-15 NOTE — DISCHARGE INSTRUCTIONS
Clear liquid diet x24hrs  Return ED fever, abdominal pain, bleeding, vomiting, worse condition, any other concerns

## 2021-05-17 ENCOUNTER — OFFICE VISIT (OUTPATIENT)
Dept: SURGERY | Facility: CLINIC | Age: 61
End: 2021-05-17

## 2021-05-17 ENCOUNTER — READMISSION MANAGEMENT (OUTPATIENT)
Dept: CALL CENTER | Facility: HOSPITAL | Age: 61
End: 2021-05-17

## 2021-05-17 VITALS
TEMPERATURE: 98.4 F | HEART RATE: 92 BPM | WEIGHT: 211.8 LBS | DIASTOLIC BLOOD PRESSURE: 81 MMHG | BODY MASS INDEX: 36.16 KG/M2 | SYSTOLIC BLOOD PRESSURE: 144 MMHG | HEIGHT: 64 IN

## 2021-05-17 DIAGNOSIS — K74.69 OTHER CIRRHOSIS OF LIVER (HCC): ICD-10-CM

## 2021-05-17 DIAGNOSIS — I50.9 CONGESTIVE HEART FAILURE, UNSPECIFIED HF CHRONICITY, UNSPECIFIED HEART FAILURE TYPE (HCC): Primary | ICD-10-CM

## 2021-05-17 DIAGNOSIS — G89.4 CHRONIC PAIN SYNDROME: ICD-10-CM

## 2021-05-17 DIAGNOSIS — K59.04 CHRONIC IDIOPATHIC CONSTIPATION: ICD-10-CM

## 2021-05-17 PROCEDURE — 99213 OFFICE O/P EST LOW 20 MIN: CPT | Performed by: SURGERY

## 2021-05-17 RX ORDER — ONDANSETRON 4 MG/1
4 TABLET, ORALLY DISINTEGRATING ORAL EVERY 8 HOURS PRN
Qty: 10 TABLET | Refills: 0 | Status: SHIPPED | OUTPATIENT
Start: 2021-05-17 | End: 2021-08-06

## 2021-05-17 NOTE — OUTREACH NOTE
Medical Week 1 Survey      Responses   [facility] patient discharged from?  [facility]   Does the patient have one of the following disease processes/diagnoses(primary or secondary)?  Other   Week 1 attempt successful?  Yes   Call start time  1107   Call end time  1108   Discharge diagnosis  **Ileitis    Meds reviewed with patient/caregiver?  Yes   Is the patient having any side effects they believe may be caused by any medication additions or changes?  No   Does the patient have all medications ordered at discharge?  Yes   Is the patient taking all medications as directed (includes completed medication regime)?  Yes   Does the patient have a primary care provider?   Yes   Does the patient have an appointment with their PCP within 7 days of discharge?  Yes   Has the patient kept scheduled appointments due by today?  Yes   Psychosocial issues?  No   Did the patient receive a copy of their discharge instructions?  Yes   Nursing interventions  Reviewed instructions with patient   What is the patient's perception of their health status since discharge?  Same   Is the patient/caregiver able to teach back signs and symptoms related to disease process for when to call PCP?  Yes   Is the patient/caregiver able to teach back signs and symptoms related to disease process for when to call 911?  Yes   Is the patient/caregiver able to teach back the hierarchy of who to call/visit for symptoms/problems? PCP, Specialist, Home health nurse, Urgent Care, ED, 911  Yes   If the patient is a current smoker, are they able to teach back resources for cessation?  Not a smoker   Week 1 call completed?  Yes   Wrap up additional comments  She is not feeling as well as she thought she would be.          Emmy Raman RN

## 2021-05-18 ENCOUNTER — APPOINTMENT (OUTPATIENT)
Dept: CT IMAGING | Facility: HOSPITAL | Age: 61
End: 2021-05-18

## 2021-05-18 ENCOUNTER — HOSPITAL ENCOUNTER (OUTPATIENT)
Facility: HOSPITAL | Age: 61
Setting detail: OBSERVATION
Discharge: HOME | End: 2021-05-24
Attending: STUDENT IN AN ORGANIZED HEALTH CARE EDUCATION/TRAINING PROGRAM | Admitting: FAMILY MEDICINE

## 2021-05-18 ENCOUNTER — APPOINTMENT (OUTPATIENT)
Dept: ULTRASOUND IMAGING | Facility: HOSPITAL | Age: 61
End: 2021-05-18

## 2021-05-18 ENCOUNTER — APPOINTMENT (OUTPATIENT)
Dept: GENERAL RADIOLOGY | Facility: HOSPITAL | Age: 61
End: 2021-05-18

## 2021-05-18 ENCOUNTER — APPOINTMENT (OUTPATIENT)
Dept: INTERVENTIONAL RADIOLOGY/VASCULAR | Facility: HOSPITAL | Age: 61
End: 2021-05-18

## 2021-05-18 DIAGNOSIS — R10.84 GENERALIZED ABDOMINAL PAIN: Primary | ICD-10-CM

## 2021-05-18 DIAGNOSIS — R11.0 NAUSEA: ICD-10-CM

## 2021-05-18 PROBLEM — G89.4 CHRONIC PAIN SYNDROME: Status: ACTIVE | Noted: 2021-05-18

## 2021-05-18 LAB
ALBUMIN SERPL-MCNC: 3.8 G/DL (ref 3.5–5.2)
ALBUMIN/GLOB SERPL: 1.4 G/DL
ALP SERPL-CCNC: 138 U/L (ref 39–117)
ALT SERPL W P-5'-P-CCNC: 26 U/L (ref 1–33)
ANION GAP SERPL CALCULATED.3IONS-SCNC: 8 MMOL/L (ref 5–15)
ANISOCYTOSIS BLD QL: NORMAL
AST SERPL-CCNC: 62 U/L (ref 1–32)
BASOPHILS # BLD AUTO: 0.02 10*3/MM3 (ref 0–0.2)
BASOPHILS NFR BLD AUTO: 0.4 % (ref 0–1.5)
BILIRUB SERPL-MCNC: 1 MG/DL (ref 0–1.2)
BILIRUB UR QL STRIP: NEGATIVE
BUN SERPL-MCNC: 7 MG/DL (ref 8–23)
BUN/CREAT SERPL: 7.4 (ref 7–25)
CALCIUM SPEC-SCNC: 9.3 MG/DL (ref 8.6–10.5)
CHLORIDE SERPL-SCNC: 101 MMOL/L (ref 98–107)
CLARITY UR: CLEAR
CO2 SERPL-SCNC: 26 MMOL/L (ref 22–29)
COLOR UR: YELLOW
CREAT SERPL-MCNC: 0.94 MG/DL (ref 0.57–1)
D-LACTATE SERPL-SCNC: 1.8 MMOL/L (ref 0.5–2)
DEPRECATED RDW RBC AUTO: 52.3 FL (ref 37–54)
EOSINOPHIL # BLD AUTO: 0.11 10*3/MM3 (ref 0–0.4)
EOSINOPHIL NFR BLD AUTO: 2.4 % (ref 0.3–6.2)
ERYTHROCYTE [DISTWIDTH] IN BLOOD BY AUTOMATED COUNT: 17.9 % (ref 12.3–15.4)
FLUAV RNA RESP QL NAA+PROBE: NOT DETECTED
FLUBV RNA RESP QL NAA+PROBE: NOT DETECTED
GFR SERPL CREATININE-BSD FRML MDRD: 61 ML/MIN/1.73
GLOBULIN UR ELPH-MCNC: 2.8 GM/DL
GLUCOSE SERPL-MCNC: 94 MG/DL (ref 65–99)
GLUCOSE UR STRIP-MCNC: NEGATIVE MG/DL
HCT VFR BLD AUTO: 38.8 % (ref 34–46.6)
HGB BLD-MCNC: 12.5 G/DL (ref 12–15.9)
HGB UR QL STRIP.AUTO: NEGATIVE
HOLD SPECIMEN: NORMAL
HOLD SPECIMEN: NORMAL
IMM GRANULOCYTES # BLD AUTO: 0.02 10*3/MM3 (ref 0–0.05)
IMM GRANULOCYTES NFR BLD AUTO: 0.4 % (ref 0–0.5)
KETONES UR QL STRIP: NEGATIVE
LEUKOCYTE ESTERASE UR QL STRIP.AUTO: NEGATIVE
LIPASE SERPL-CCNC: 29 U/L (ref 13–60)
LYMPHOCYTES # BLD AUTO: 0.82 10*3/MM3 (ref 0.7–3.1)
LYMPHOCYTES NFR BLD AUTO: 17.9 % (ref 19.6–45.3)
MCH RBC QN AUTO: 26.1 PG (ref 26.6–33)
MCHC RBC AUTO-ENTMCNC: 32.2 G/DL (ref 31.5–35.7)
MCV RBC AUTO: 81 FL (ref 79–97)
MONOCYTES # BLD AUTO: 0.26 10*3/MM3 (ref 0.1–0.9)
MONOCYTES NFR BLD AUTO: 5.7 % (ref 5–12)
NEUTROPHILS NFR BLD AUTO: 3.35 10*3/MM3 (ref 1.7–7)
NEUTROPHILS NFR BLD AUTO: 73.2 % (ref 42.7–76)
NITRITE UR QL STRIP: NEGATIVE
NRBC BLD AUTO-RTO: 0 /100 WBC (ref 0–0.2)
NT-PROBNP SERPL-MCNC: 34.5 PG/ML (ref 0–900)
PH UR STRIP.AUTO: 7.5 [PH] (ref 5–9)
PLATELET # BLD AUTO: 72 10*3/MM3 (ref 140–450)
PMV BLD AUTO: 11.3 FL (ref 6–12)
POTASSIUM SERPL-SCNC: 4.9 MMOL/L (ref 3.5–5.2)
PROT SERPL-MCNC: 6.6 G/DL (ref 6–8.5)
PROT UR QL STRIP: NEGATIVE
RBC # BLD AUTO: 4.79 10*6/MM3 (ref 3.77–5.28)
SARS-COV-2 RNA RESP QL NAA+PROBE: NOT DETECTED
SMALL PLATELETS BLD QL SMEAR: NORMAL
SODIUM SERPL-SCNC: 135 MMOL/L (ref 136–145)
SP GR UR STRIP: 1.01 (ref 1–1.03)
TROPONIN T SERPL-MCNC: <0.01 NG/ML (ref 0–0.03)
UROBILINOGEN UR QL STRIP: NORMAL
WBC # BLD AUTO: 4.58 10*3/MM3 (ref 3.4–10.8)
WBC MORPH BLD: NORMAL
WHOLE BLOOD HOLD SPECIMEN: NORMAL
WHOLE BLOOD HOLD SPECIMEN: NORMAL

## 2021-05-18 PROCEDURE — 83605 ASSAY OF LACTIC ACID: CPT | Performed by: STUDENT IN AN ORGANIZED HEALTH CARE EDUCATION/TRAINING PROGRAM

## 2021-05-18 PROCEDURE — C9803 HOPD COVID-19 SPEC COLLECT: HCPCS

## 2021-05-18 PROCEDURE — 85007 BL SMEAR W/DIFF WBC COUNT: CPT | Performed by: STUDENT IN AN ORGANIZED HEALTH CARE EDUCATION/TRAINING PROGRAM

## 2021-05-18 PROCEDURE — G0378 HOSPITAL OBSERVATION PER HR: HCPCS

## 2021-05-18 PROCEDURE — 96376 TX/PRO/DX INJ SAME DRUG ADON: CPT

## 2021-05-18 PROCEDURE — 96375 TX/PRO/DX INJ NEW DRUG ADDON: CPT

## 2021-05-18 PROCEDURE — 25010000002 ONDANSETRON PER 1 MG: Performed by: STUDENT IN AN ORGANIZED HEALTH CARE EDUCATION/TRAINING PROGRAM

## 2021-05-18 PROCEDURE — 80053 COMPREHEN METABOLIC PANEL: CPT | Performed by: STUDENT IN AN ORGANIZED HEALTH CARE EDUCATION/TRAINING PROGRAM

## 2021-05-18 PROCEDURE — 84484 ASSAY OF TROPONIN QUANT: CPT | Performed by: STUDENT IN AN ORGANIZED HEALTH CARE EDUCATION/TRAINING PROGRAM

## 2021-05-18 PROCEDURE — 25010000002 KETOROLAC TROMETHAMINE PER 15 MG: Performed by: STUDENT IN AN ORGANIZED HEALTH CARE EDUCATION/TRAINING PROGRAM

## 2021-05-18 PROCEDURE — 81003 URINALYSIS AUTO W/O SCOPE: CPT | Performed by: STUDENT IN AN ORGANIZED HEALTH CARE EDUCATION/TRAINING PROGRAM

## 2021-05-18 PROCEDURE — 83880 ASSAY OF NATRIURETIC PEPTIDE: CPT | Performed by: STUDENT IN AN ORGANIZED HEALTH CARE EDUCATION/TRAINING PROGRAM

## 2021-05-18 PROCEDURE — C1751 CATH, INF, PER/CENT/MIDLINE: HCPCS

## 2021-05-18 PROCEDURE — 25010000002 IOPAMIDOL 61 % SOLUTION: Performed by: STUDENT IN AN ORGANIZED HEALTH CARE EDUCATION/TRAINING PROGRAM

## 2021-05-18 PROCEDURE — 82977 ASSAY OF GGT: CPT | Performed by: STUDENT IN AN ORGANIZED HEALTH CARE EDUCATION/TRAINING PROGRAM

## 2021-05-18 PROCEDURE — 25010000002 MORPHINE PER 10 MG: Performed by: STUDENT IN AN ORGANIZED HEALTH CARE EDUCATION/TRAINING PROGRAM

## 2021-05-18 PROCEDURE — 87040 BLOOD CULTURE FOR BACTERIA: CPT | Performed by: STUDENT IN AN ORGANIZED HEALTH CARE EDUCATION/TRAINING PROGRAM

## 2021-05-18 PROCEDURE — 85025 COMPLETE CBC W/AUTO DIFF WBC: CPT | Performed by: STUDENT IN AN ORGANIZED HEALTH CARE EDUCATION/TRAINING PROGRAM

## 2021-05-18 PROCEDURE — 83690 ASSAY OF LIPASE: CPT | Performed by: STUDENT IN AN ORGANIZED HEALTH CARE EDUCATION/TRAINING PROGRAM

## 2021-05-18 PROCEDURE — 76937 US GUIDE VASCULAR ACCESS: CPT

## 2021-05-18 PROCEDURE — 74177 CT ABD & PELVIS W/CONTRAST: CPT

## 2021-05-18 PROCEDURE — 87636 SARSCOV2 & INF A&B AMP PRB: CPT | Performed by: STUDENT IN AN ORGANIZED HEALTH CARE EDUCATION/TRAINING PROGRAM

## 2021-05-18 PROCEDURE — 96361 HYDRATE IV INFUSION ADD-ON: CPT

## 2021-05-18 PROCEDURE — 99284 EMERGENCY DEPT VISIT MOD MDM: CPT

## 2021-05-18 PROCEDURE — 96374 THER/PROPH/DIAG INJ IV PUSH: CPT

## 2021-05-18 PROCEDURE — 74250 X-RAY XM SM INT 1CNTRST STD: CPT

## 2021-05-18 PROCEDURE — 36410 VNPNXR 3YR/> PHY/QHP DX/THER: CPT

## 2021-05-18 RX ORDER — SODIUM CHLORIDE 0.9 % (FLUSH) 0.9 %
10 SYRINGE (ML) INJECTION AS NEEDED
Status: DISCONTINUED | OUTPATIENT
Start: 2021-05-18 | End: 2021-05-24 | Stop reason: HOSPADM

## 2021-05-18 RX ORDER — ONDANSETRON 2 MG/ML
4 INJECTION INTRAMUSCULAR; INTRAVENOUS ONCE
Status: COMPLETED | OUTPATIENT
Start: 2021-05-18 | End: 2021-05-18

## 2021-05-18 RX ORDER — DEXTROSE AND SODIUM CHLORIDE 5; .45 G/100ML; G/100ML
100 INJECTION, SOLUTION INTRAVENOUS CONTINUOUS
Status: DISCONTINUED | OUTPATIENT
Start: 2021-05-18 | End: 2021-05-24 | Stop reason: HOSPADM

## 2021-05-18 RX ORDER — KETOROLAC TROMETHAMINE 30 MG/ML
30 INJECTION, SOLUTION INTRAMUSCULAR; INTRAVENOUS EVERY 6 HOURS PRN
Status: DISPENSED | OUTPATIENT
Start: 2021-05-18 | End: 2021-05-20

## 2021-05-18 RX ORDER — SODIUM CHLORIDE 0.9 % (FLUSH) 0.9 %
10 SYRINGE (ML) INJECTION EVERY 12 HOURS SCHEDULED
Status: DISCONTINUED | OUTPATIENT
Start: 2021-05-18 | End: 2021-05-24 | Stop reason: HOSPADM

## 2021-05-18 RX ORDER — ONDANSETRON 2 MG/ML
4 INJECTION INTRAMUSCULAR; INTRAVENOUS EVERY 6 HOURS PRN
Status: DISCONTINUED | OUTPATIENT
Start: 2021-05-18 | End: 2021-05-24 | Stop reason: HOSPADM

## 2021-05-18 RX ADMIN — SODIUM CHLORIDE, POTASSIUM CHLORIDE, SODIUM LACTATE AND CALCIUM CHLORIDE 1000 ML: 600; 310; 30; 20 INJECTION, SOLUTION INTRAVENOUS at 18:17

## 2021-05-18 RX ADMIN — ONDANSETRON 4 MG: 2 INJECTION INTRAMUSCULAR; INTRAVENOUS at 21:03

## 2021-05-18 RX ADMIN — MORPHINE SULFATE 4 MG: 4 INJECTION INTRAVENOUS at 15:54

## 2021-05-18 RX ADMIN — DEXTROSE AND SODIUM CHLORIDE 100 ML/HR: 5; 450 INJECTION, SOLUTION INTRAVENOUS at 21:04

## 2021-05-18 RX ADMIN — ONDANSETRON 4 MG: 2 INJECTION INTRAMUSCULAR; INTRAVENOUS at 18:18

## 2021-05-18 RX ADMIN — ONDANSETRON 4 MG: 2 INJECTION INTRAMUSCULAR; INTRAVENOUS at 15:18

## 2021-05-18 RX ADMIN — KETOROLAC TROMETHAMINE 30 MG: 30 INJECTION, SOLUTION INTRAMUSCULAR; INTRAVENOUS at 21:03

## 2021-05-18 RX ADMIN — IOPAMIDOL 90 ML: 612 INJECTION, SOLUTION INTRAVENOUS at 16:10

## 2021-05-19 ENCOUNTER — READMISSION MANAGEMENT (OUTPATIENT)
Dept: CALL CENTER | Facility: HOSPITAL | Age: 61
End: 2021-05-19

## 2021-05-19 PROBLEM — G89.4 CHRONIC PAIN SYNDROME: Chronic | Status: ACTIVE | Noted: 2021-05-18

## 2021-05-19 PROBLEM — K74.60 CIRRHOSIS OF LIVER (HCC): Chronic | Status: ACTIVE | Noted: 2018-09-20

## 2021-05-19 PROBLEM — I10 HYPERTENSION: Status: ACTIVE | Noted: 2021-05-19

## 2021-05-19 PROBLEM — K56.600 PARTIAL SMALL BOWEL OBSTRUCTION (HCC): Status: ACTIVE | Noted: 2018-09-01

## 2021-05-19 PROBLEM — R91.1 LUNG NODULE: Chronic | Status: ACTIVE | Noted: 2021-05-19

## 2021-05-19 LAB
ALBUMIN SERPL-MCNC: 3.2 G/DL (ref 3.5–5.2)
ALBUMIN/GLOB SERPL: 1 G/DL
ALP SERPL-CCNC: 112 U/L (ref 39–117)
ALT SERPL W P-5'-P-CCNC: 25 U/L (ref 1–33)
ANION GAP SERPL CALCULATED.3IONS-SCNC: 7 MMOL/L (ref 5–15)
ANISOCYTOSIS BLD QL: ABNORMAL
AST SERPL-CCNC: 53 U/L (ref 1–32)
BASOPHILS # BLD AUTO: 0.01 10*3/MM3 (ref 0–0.2)
BASOPHILS NFR BLD AUTO: 0.3 % (ref 0–1.5)
BILIRUB SERPL-MCNC: 0.9 MG/DL (ref 0–1.2)
BUN SERPL-MCNC: 9 MG/DL (ref 8–23)
BUN/CREAT SERPL: 9.5 (ref 7–25)
CALCIUM SPEC-SCNC: 8.7 MG/DL (ref 8.6–10.5)
CHLORIDE SERPL-SCNC: 107 MMOL/L (ref 98–107)
CO2 SERPL-SCNC: 25 MMOL/L (ref 22–29)
CREAT SERPL-MCNC: 0.95 MG/DL (ref 0.57–1)
DEPRECATED RDW RBC AUTO: 52.7 FL (ref 37–54)
EOSINOPHIL # BLD AUTO: 0.08 10*3/MM3 (ref 0–0.4)
EOSINOPHIL # BLD MANUAL: 0.11 10*3/MM3 (ref 0–0.4)
EOSINOPHIL NFR BLD AUTO: 2.3 % (ref 0.3–6.2)
EOSINOPHIL NFR BLD MANUAL: 3 % (ref 0.3–6.2)
ERYTHROCYTE [DISTWIDTH] IN BLOOD BY AUTOMATED COUNT: 17.9 % (ref 12.3–15.4)
GFR SERPL CREATININE-BSD FRML MDRD: 60 ML/MIN/1.73
GGT SERPL-CCNC: 51 U/L (ref 5–36)
GLOBULIN UR ELPH-MCNC: 3.3 GM/DL
GLUCOSE SERPL-MCNC: 111 MG/DL (ref 65–99)
HCT VFR BLD AUTO: 36.8 % (ref 34–46.6)
HGB BLD-MCNC: 12 G/DL (ref 12–15.9)
IMM GRANULOCYTES # BLD AUTO: 0.02 10*3/MM3 (ref 0–0.05)
IMM GRANULOCYTES NFR BLD AUTO: 0.6 % (ref 0–0.5)
LYMPHOCYTES # BLD AUTO: 0.74 10*3/MM3 (ref 0.7–3.1)
LYMPHOCYTES # BLD MANUAL: 0.56 10*3/MM3 (ref 0.7–3.1)
LYMPHOCYTES NFR BLD AUTO: 21 % (ref 19.6–45.3)
LYMPHOCYTES NFR BLD MANUAL: 16 % (ref 19.6–45.3)
LYMPHOCYTES NFR BLD MANUAL: 8 % (ref 5–12)
MCH RBC QN AUTO: 26.7 PG (ref 26.6–33)
MCHC RBC AUTO-ENTMCNC: 32.6 G/DL (ref 31.5–35.7)
MCV RBC AUTO: 81.8 FL (ref 79–97)
MONOCYTES # BLD AUTO: 0.23 10*3/MM3 (ref 0.1–0.9)
MONOCYTES # BLD AUTO: 0.28 10*3/MM3 (ref 0.1–0.9)
MONOCYTES NFR BLD AUTO: 6.5 % (ref 5–12)
NEUTROPHILS # BLD AUTO: 2.58 10*3/MM3 (ref 1.7–7)
NEUTROPHILS NFR BLD AUTO: 2.45 10*3/MM3 (ref 1.7–7)
NEUTROPHILS NFR BLD AUTO: 69.3 % (ref 42.7–76)
NEUTROPHILS NFR BLD MANUAL: 73 % (ref 42.7–76)
NRBC BLD AUTO-RTO: 0 /100 WBC (ref 0–0.2)
PLATELET # BLD AUTO: 53 10*3/MM3 (ref 140–450)
PMV BLD AUTO: ABNORMAL FL
POTASSIUM SERPL-SCNC: 4.2 MMOL/L (ref 3.5–5.2)
PROT SERPL-MCNC: 6.5 G/DL (ref 6–8.5)
RBC # BLD AUTO: 4.5 10*6/MM3 (ref 3.77–5.28)
SMALL PLATELETS BLD QL SMEAR: ABNORMAL
SODIUM SERPL-SCNC: 139 MMOL/L (ref 136–145)
WBC # BLD AUTO: 3.53 10*3/MM3 (ref 3.4–10.8)
WBC MORPH BLD: NORMAL

## 2021-05-19 PROCEDURE — 85025 COMPLETE CBC W/AUTO DIFF WBC: CPT | Performed by: STUDENT IN AN ORGANIZED HEALTH CARE EDUCATION/TRAINING PROGRAM

## 2021-05-19 PROCEDURE — 96376 TX/PRO/DX INJ SAME DRUG ADON: CPT

## 2021-05-19 PROCEDURE — 94799 UNLISTED PULMONARY SVC/PX: CPT

## 2021-05-19 PROCEDURE — 80053 COMPREHEN METABOLIC PANEL: CPT | Performed by: STUDENT IN AN ORGANIZED HEALTH CARE EDUCATION/TRAINING PROGRAM

## 2021-05-19 PROCEDURE — 25010000002 KETOROLAC TROMETHAMINE PER 15 MG: Performed by: STUDENT IN AN ORGANIZED HEALTH CARE EDUCATION/TRAINING PROGRAM

## 2021-05-19 PROCEDURE — 85007 BL SMEAR W/DIFF WBC COUNT: CPT | Performed by: STUDENT IN AN ORGANIZED HEALTH CARE EDUCATION/TRAINING PROGRAM

## 2021-05-19 PROCEDURE — 25010000002 ONDANSETRON PER 1 MG: Performed by: STUDENT IN AN ORGANIZED HEALTH CARE EDUCATION/TRAINING PROGRAM

## 2021-05-19 PROCEDURE — 96361 HYDRATE IV INFUSION ADD-ON: CPT

## 2021-05-19 PROCEDURE — G0378 HOSPITAL OBSERVATION PER HR: HCPCS

## 2021-05-19 RX ORDER — ACETAMINOPHEN 650 MG/1
650 SUPPOSITORY RECTAL EVERY 4 HOURS PRN
Status: DISCONTINUED | OUTPATIENT
Start: 2021-05-19 | End: 2021-05-24

## 2021-05-19 RX ORDER — POLYETHYLENE GLYCOL 3350 17 G/17G
17 POWDER, FOR SOLUTION ORAL DAILY
Status: DISCONTINUED | OUTPATIENT
Start: 2021-05-21 | End: 2021-05-22

## 2021-05-19 RX ORDER — POLYETHYLENE GLYCOL 3350 17 G/17G
17 POWDER, FOR SOLUTION ORAL 3 TIMES DAILY
Status: DISCONTINUED | OUTPATIENT
Start: 2021-05-19 | End: 2021-05-20

## 2021-05-19 RX ORDER — GABAPENTIN 400 MG/1
400 CAPSULE ORAL 4 TIMES DAILY
Status: DISCONTINUED | OUTPATIENT
Start: 2021-05-19 | End: 2021-05-20 | Stop reason: DRUGHIGH

## 2021-05-19 RX ADMIN — GABAPENTIN 400 MG: 400 CAPSULE ORAL at 22:33

## 2021-05-19 RX ADMIN — KETOROLAC TROMETHAMINE 30 MG: 30 INJECTION, SOLUTION INTRAMUSCULAR; INTRAVENOUS at 22:39

## 2021-05-19 RX ADMIN — DEXTROSE AND SODIUM CHLORIDE 100 ML/HR: 5; 450 INJECTION, SOLUTION INTRAVENOUS at 16:21

## 2021-05-19 RX ADMIN — ONDANSETRON 4 MG: 2 INJECTION INTRAMUSCULAR; INTRAVENOUS at 04:02

## 2021-05-19 RX ADMIN — KETOROLAC TROMETHAMINE 30 MG: 30 INJECTION, SOLUTION INTRAMUSCULAR; INTRAVENOUS at 16:21

## 2021-05-19 RX ADMIN — ONDANSETRON 4 MG: 2 INJECTION INTRAMUSCULAR; INTRAVENOUS at 22:33

## 2021-05-19 RX ADMIN — KETOROLAC TROMETHAMINE 30 MG: 30 INJECTION, SOLUTION INTRAMUSCULAR; INTRAVENOUS at 04:02

## 2021-05-19 RX ADMIN — KETOROLAC TROMETHAMINE 30 MG: 30 INJECTION, SOLUTION INTRAMUSCULAR; INTRAVENOUS at 10:17

## 2021-05-19 NOTE — OUTREACH NOTE
Medical Week 2 Survey      Responses   [facility] patient discharged from?  [facility]   Does the patient have one of the following disease processes/diagnoses(primary or secondary)?  Other   Week 2 attempt successful?  No   Unsuccessful attempts  Attempt 1   Revoke  Readmitted          Liz Anderson RN

## 2021-05-19 NOTE — PROGRESS NOTES
"Chief Complaint   Patient presents with   • Chronic Idiopathic Constipation     Zero Bowel Movement X 5 - 6 Days   • Colonic Inertia   • Pelvic Floor Dysfunction   • Lower Abdominal Pain   • Swelling In Bilateral Lower Extremities        HPI  60-year-old woman with chronic constipation.  She has been evaluated by GI medicine and it is noted that Sitzmarks will pass from the right colon to the sigmoid colon in an appropriate timeframe.  However, beyond this point they do not appear to pass.  She has no relief of symptoms with enemas and has not been able to have good bowel function even with GoLYTELY.  She is referred for consideration of a colectomy-possible total colectomy with ileorectal ostomy for the severity of symptoms.    Past Medical History:   Diagnosis Date   • Acid reflux    • Altered bowel function    • Arthropathy of lumbar facet joint    • Bleeding disorder (CMS/HCC)    • C. difficile diarrhea 2015   • Chronic idiopathic constipation    • Colonic inertia    • Constipation    • Corns and callus    • Depression    • Disease related peripheral neuropathy    • Epigastric pain    • Fatty liver    • Hammer toe    • Headache    • Hiatal hernia    • History of transfusion    • Hyperlipidemia    • Knee pain    • Localized, primary osteoarthritis of the ankle and foot     Localized, primary osteoarthritis of the ankle and/or foot   • Mendoza's metatarsalgia     Mendoza's metatarsalgia - 2nd interspace on right   • Nausea and vomiting    • Neuralgia and neuritis     Neuralgia, neuritis, and radiculitis, unspecified   • Neuropathy    • Obstructive sleep apnea     Obstructive sleep apnea (adult) (pediatric)    • CHAI on CPAP     \"C-Pap at night  (unconfirmed)\"   • Osteoarthritis    • Pain in foot     Pain in unspecified foot - sees a podiatrist   • Pain in joint, ankle and foot     Joint pain in ankle and foot      • Pain radiating to back     Pain radiating to lumbar region of back   • Pelvic floor dysfunction    • " Plantar fasciitis    • PONV (postoperative nausea and vomiting)    • Restless leg syndrome    • Secondary hypertension     Secondary hypertension, unspecified   • Sinusitis    • Sleep apnea    • Tongue anomaly     lesion       Past Surgical History:   Procedure Laterality Date   • CARPAL TUNNEL RELEASE Left 2018    Procedure: CARPAL TUNNEL RELEASE - left;  Surgeon: Justus Lewis MD;  Location: [facility] OR;  Service: Orthopedics   • CARPAL TUNNEL RELEASE Right 2019    Procedure: carpal tunnel release right hand with local/monitored anesthesia care;  Surgeon: Justus Lewis MD;  Location: [facility] OR;  Service: Orthopedics   •  SECTION     • CHOLECYSTECTOMY     • COLONOSCOPY  2013   • COLONOSCOPY N/A 10/17/2018    Procedure: COLONOSCOPY;  Surgeon: Russell Del Rio MD;  Location: [facility] ENDOSCOPY;  Service: Gastroenterology   • COLONOSCOPY N/A 3/22/2021    Procedure: COLONOSCOPY;  Surgeon: Russell Del Rio MD;  Location: [facility] ENDOSCOPY;  Service: Gastroenterology;  Laterality: N/A;   • DIRECT LARYNGOSCOPY, ESOPHAGOSCOPY, BRONCHOSCOPY N/A 2017    Procedure: DIRECT LARYNGOSCOPY AND;  Surgeon: Live Bolton MD;  Location: [facility] OR;  Service:    • ENDOSCOPY  2013    Colon endoscopy 23532 (1) - Internal & external hemorrhoids found. Stool found.   • ENDOSCOPY  2013    EGD w/ tube 49168 (1) - Normal esophagus. Gastritis in stomach. Biopsy taken. Normal dudoenum. Biopsy taken.   • ENDOSCOPY N/A 10/17/2018    Procedure: ESOPHAGOGASTRODUODENOSCOPY--eval varices;  Surgeon: Russell Del Rio MD;  Location: [facility] ENDOSCOPY;  Service: Gastroenterology   • ENDOSCOPY N/A 3/22/2021    Procedure: ESOPHAGOGASTRODUODENOSCOPYURGNET;  Surgeon: Russell Del Rio MD;  Location: [facility] ENDOSCOPY;  Service: Gastroenterology;  Laterality: N/A;   • FOOT SURGERY  2013    Foot/toes surgery procedure (1) - Arthroplasty of toes 4 and 5 of right foot.   • HERNIA REPAIR     • HERNIA  REPAIR      hital   • HYSTERECTOMY     • LIVER BIOPSY     • NERVE BLOCK  07/25/2016    Injection for nerve block (1) - Lumbar medial branch block.   • OTHER SURGICAL HISTORY  12/03/2012    Inj(s) Tend-Sheath, Ligament, Single 20550 (1) - [facility]ICK[facility] ([facility])    • OTHER SURGICAL HISTORY  06/24/2013    Small Joint Injection/Aspiration 20600 (2) - [facility] ([facility])    • OTHER SURGICAL HISTORY  2011    bowel obstruction x2   • OTHER SURGICAL HISTORY      gland removed from neck   • SUBLINGUAL SALIVARY CYST EXCISION N/A 8/1/2017    Procedure: EXCISION OF LEFT  TONGUE LESION WITH CLOSURE;  Surgeon: Live Bolton MD;  Location: [facility];  Service:    • TUBAL ABDOMINAL LIGATION     • UPPER GASTROINTESTINAL ENDOSCOPY  07/01/2013   • UPPER GASTROINTESTINAL ENDOSCOPY  10/17/2018   • UPPER GASTROINTESTINAL ENDOSCOPY  03/22/2021       No current facility-administered medications for this visit.  No current outpatient medications on file.    Facility-Administered Medications Ordered in Other Visits:   •  dextrose 5 % and sodium chloride 0.45 % infusion, 100 mL/hr, Intravenous, Continuous, Jhonathan Busby MD, Last Rate: 100 mL/hr at 05/18/21 2104, 100 mL/hr at 05/18/21 2104  •  ketorolac (TORADOL) injection 30 mg, 30 mg, Intravenous, Q6H PRN, Jhonathan Busby MD, 30 mg at 05/18/21 2103  •  ondansetron (ZOFRAN) injection 4 mg, 4 mg, Intravenous, Q6H PRN, Jhonathan Busby MD, 4 mg at 05/18/21 2103  •  sodium chloride 0.9 % flush 10 mL, 10 mL, Intravenous, Q12H, Jhonathan Busby MD  •  sodium chloride 0.9 % flush 10 mL, 10 mL, Intravenous, PRN, Jhonathan Busby MD    Allergies   Allergen Reactions   • Other      Pt states that taking steroids either in pill or injection form make her have blisters in her mouth and she feels like she is on fire on the inside/ has hx of c diff and possible MRSA     • Corticosteroids Rash   • Kenalog  [Triamcinolone Acetonide] Rash   • Sulfa Antibiotics  Rash     Sulfa (Sulfonamide Antibiotics)       Family History   Problem Relation Age of Onset   • Cancer Other    • Diabetes Other    • Heart disease Other    • Hypertension Mother    • Cancer Mother    • Diabetes Mother    • Hypertension Father    • Heart attack Father    • Cancer Father    • Heart disease Father    • Thyroid disease Maternal Aunt        Social History     Socioeconomic History   • Marital status:      Spouse name: Not on file   • Number of children: Not on file   • Years of education: Not on file   • Highest education level: Not on file   Tobacco Use   • Smoking status: Former Smoker     Packs/day: 2.00     Years: 15.00     Pack years: 30.00     Types: Cigarettes     Quit date:      Years since quittin.3   • Smokeless tobacco: Never Used   Vaping Use   • Vaping Use: Never used   Substance and Sexual Activity   • Alcohol use: No   • Drug use: No   • Sexual activity: Defer     Birth control/protection: Surgical     Comment: Marital Status: .  Hysterectomy.       Review of Systems   Gastrointestinal: Positive for abdominal distention, abdominal pain and constipation. Negative for anal bleeding, blood in stool, diarrhea, nausea, rectal pain and vomiting.       Physical Exam  Vitals and nursing note reviewed.   Constitutional:       Appearance: Normal appearance.   HENT:      Head: Normocephalic and atraumatic.      Nose: Nose normal.   Eyes:      Extraocular Movements: Extraocular movements intact.      Pupils: Pupils are equal, round, and reactive to light.   Cardiovascular:      Rate and Rhythm: Normal rate and regular rhythm.   Pulmonary:      Effort: Pulmonary effort is normal.      Breath sounds: Normal breath sounds.   Abdominal:      General: Abdomen is flat. Bowel sounds are normal. There is distension ( Mild abdominal distention is noted).      Palpations: Abdomen is soft. There is no mass.      Tenderness: There is abdominal tenderness ( Abdomen is tender although she  has no peritonitis). There is no guarding or rebound.      Hernia: No hernia is present.   Musculoskeletal:         General: Swelling present. Normal range of motion.      Cervical back: Normal range of motion and neck supple.      Right lower leg: Edema present.      Left lower leg: Edema present.   Skin:     General: Skin is warm.   Neurological:      General: No focal deficit present.      Mental Status: She is alert and oriented to person, place, and time.   Psychiatric:         Mood and Affect: Mood normal.         Behavior: Behavior normal.           ASSESSMENT    Diagnoses and all orders for this visit:    1. Congestive heart failure, unspecified HF chronicity, unspecified heart failure type (CMS/HCC) (Primary)  -     Ambulatory Referral to Cardiology    2. Chronic pain syndrome    3. Chronic idiopathic constipation    4. Other cirrhosis of liver (CMS/HCC)        PLAN    1.  Recheck after cardiac evaluation              This document has been electronically signed by Benjamin Troncoso MD on May 18, 2021 22:30 CDT

## 2021-05-20 PROBLEM — Z20.822 LAB TEST NEGATIVE FOR COVID-19 VIRUS: Status: ACTIVE | Noted: 2021-05-20

## 2021-05-20 LAB
ALBUMIN SERPL-MCNC: 3.5 G/DL (ref 3.5–5.2)
ALBUMIN/GLOB SERPL: 1.4 G/DL
ALP SERPL-CCNC: 111 U/L (ref 39–117)
ALT SERPL W P-5'-P-CCNC: 29 U/L (ref 1–33)
ANION GAP SERPL CALCULATED.3IONS-SCNC: 7 MMOL/L (ref 5–15)
AST SERPL-CCNC: 48 U/L (ref 1–32)
BASOPHILS # BLD AUTO: 0.01 10*3/MM3 (ref 0–0.2)
BASOPHILS NFR BLD AUTO: 0.4 % (ref 0–1.5)
BILIRUB SERPL-MCNC: 1.1 MG/DL (ref 0–1.2)
BUN SERPL-MCNC: 7 MG/DL (ref 8–23)
BUN/CREAT SERPL: 7.4 (ref 7–25)
CALCIUM SPEC-SCNC: 8.5 MG/DL (ref 8.6–10.5)
CHLORIDE SERPL-SCNC: 109 MMOL/L (ref 98–107)
CO2 SERPL-SCNC: 26 MMOL/L (ref 22–29)
CREAT SERPL-MCNC: 0.95 MG/DL (ref 0.57–1)
DEPRECATED RDW RBC AUTO: 52.7 FL (ref 37–54)
EOSINOPHIL # BLD AUTO: 0.1 10*3/MM3 (ref 0–0.4)
EOSINOPHIL NFR BLD AUTO: 3.6 % (ref 0.3–6.2)
ERYTHROCYTE [DISTWIDTH] IN BLOOD BY AUTOMATED COUNT: 18.6 % (ref 12.3–15.4)
GFR SERPL CREATININE-BSD FRML MDRD: 60 ML/MIN/1.73
GLOBULIN UR ELPH-MCNC: 2.5 GM/DL
GLUCOSE SERPL-MCNC: 110 MG/DL (ref 65–99)
HCT VFR BLD AUTO: 36 % (ref 34–46.6)
HGB BLD-MCNC: 12.3 G/DL (ref 12–15.9)
IMM GRANULOCYTES # BLD AUTO: 0.01 10*3/MM3 (ref 0–0.05)
IMM GRANULOCYTES NFR BLD AUTO: 0.4 % (ref 0–0.5)
LYMPHOCYTES # BLD AUTO: 0.64 10*3/MM3 (ref 0.7–3.1)
LYMPHOCYTES NFR BLD AUTO: 22.9 % (ref 19.6–45.3)
MCH RBC QN AUTO: 28 PG (ref 26.6–33)
MCHC RBC AUTO-ENTMCNC: 34.2 G/DL (ref 31.5–35.7)
MCV RBC AUTO: 82 FL (ref 79–97)
MONOCYTES # BLD AUTO: 0.16 10*3/MM3 (ref 0.1–0.9)
MONOCYTES NFR BLD AUTO: 5.7 % (ref 5–12)
NEUTROPHILS NFR BLD AUTO: 1.88 10*3/MM3 (ref 1.7–7)
NEUTROPHILS NFR BLD AUTO: 67 % (ref 42.7–76)
NRBC BLD AUTO-RTO: 0 /100 WBC (ref 0–0.2)
PLATELET # BLD AUTO: 52 10*3/MM3 (ref 140–450)
PMV BLD AUTO: ABNORMAL FL
POTASSIUM SERPL-SCNC: 3.9 MMOL/L (ref 3.5–5.2)
PROT SERPL-MCNC: 6 G/DL (ref 6–8.5)
QT INTERVAL: 382 MS
QTC INTERVAL: 457 MS
RBC # BLD AUTO: 4.39 10*6/MM3 (ref 3.77–5.28)
SODIUM SERPL-SCNC: 142 MMOL/L (ref 136–145)
WBC # BLD AUTO: 2.8 10*3/MM3 (ref 3.4–10.8)

## 2021-05-20 PROCEDURE — 96361 HYDRATE IV INFUSION ADD-ON: CPT

## 2021-05-20 PROCEDURE — 96376 TX/PRO/DX INJ SAME DRUG ADON: CPT

## 2021-05-20 PROCEDURE — 99225 PR SBSQ OBSERVATION CARE/DAY 25 MINUTES: CPT | Performed by: STUDENT IN AN ORGANIZED HEALTH CARE EDUCATION/TRAINING PROGRAM

## 2021-05-20 PROCEDURE — 85025 COMPLETE CBC W/AUTO DIFF WBC: CPT | Performed by: STUDENT IN AN ORGANIZED HEALTH CARE EDUCATION/TRAINING PROGRAM

## 2021-05-20 PROCEDURE — 80053 COMPREHEN METABOLIC PANEL: CPT | Performed by: STUDENT IN AN ORGANIZED HEALTH CARE EDUCATION/TRAINING PROGRAM

## 2021-05-20 PROCEDURE — 99214 OFFICE O/P EST MOD 30 MIN: CPT | Performed by: NURSE PRACTITIONER

## 2021-05-20 PROCEDURE — G0378 HOSPITAL OBSERVATION PER HR: HCPCS

## 2021-05-20 PROCEDURE — 25010000002 KETOROLAC TROMETHAMINE PER 15 MG: Performed by: STUDENT IN AN ORGANIZED HEALTH CARE EDUCATION/TRAINING PROGRAM

## 2021-05-20 RX ORDER — ESCITALOPRAM OXALATE 10 MG/1
10 TABLET ORAL DAILY
Status: DISCONTINUED | OUTPATIENT
Start: 2021-05-21 | End: 2021-05-24 | Stop reason: HOSPADM

## 2021-05-20 RX ORDER — LANOLIN ALCOHOL/MO/W.PET/CERES
6 CREAM (GRAM) TOPICAL NIGHTLY
Status: DISCONTINUED | OUTPATIENT
Start: 2021-05-20 | End: 2021-05-24 | Stop reason: HOSPADM

## 2021-05-20 RX ORDER — PANTOPRAZOLE SODIUM 40 MG/1
40 TABLET, DELAYED RELEASE ORAL
Status: DISCONTINUED | OUTPATIENT
Start: 2021-05-21 | End: 2021-05-20

## 2021-05-20 RX ORDER — CALCIUM CARBONATE 200(500)MG
2 TABLET,CHEWABLE ORAL 3 TIMES DAILY PRN
Status: DISCONTINUED | OUTPATIENT
Start: 2021-05-20 | End: 2021-05-24 | Stop reason: HOSPADM

## 2021-05-20 RX ORDER — PANTOPRAZOLE SODIUM 40 MG/1
40 TABLET, DELAYED RELEASE ORAL
Status: DISCONTINUED | OUTPATIENT
Start: 2021-05-20 | End: 2021-05-24 | Stop reason: HOSPADM

## 2021-05-20 RX ORDER — SPIRONOLACTONE 25 MG/1
25 TABLET ORAL DAILY
Status: DISCONTINUED | OUTPATIENT
Start: 2021-05-21 | End: 2021-05-24 | Stop reason: HOSPADM

## 2021-05-20 RX ORDER — GABAPENTIN 300 MG/1
600 CAPSULE ORAL 3 TIMES DAILY
Status: DISCONTINUED | OUTPATIENT
Start: 2021-05-20 | End: 2021-05-22

## 2021-05-20 RX ADMIN — GABAPENTIN 600 MG: 300 CAPSULE ORAL at 11:29

## 2021-05-20 RX ADMIN — MELATONIN 6 MG: at 21:47

## 2021-05-20 RX ADMIN — KETOROLAC TROMETHAMINE 30 MG: 30 INJECTION, SOLUTION INTRAMUSCULAR; INTRAVENOUS at 04:15

## 2021-05-20 RX ADMIN — GABAPENTIN 400 MG: 400 CAPSULE ORAL at 08:18

## 2021-05-20 RX ADMIN — DEXTROSE AND SODIUM CHLORIDE 100 ML/HR: 5; 450 INJECTION, SOLUTION INTRAVENOUS at 04:15

## 2021-05-20 RX ADMIN — GABAPENTIN 600 MG: 300 CAPSULE ORAL at 20:54

## 2021-05-20 RX ADMIN — CALCIUM CARBONATE (ANTACID) CHEW TAB 500 MG 2 TABLET: 500 CHEW TAB at 20:53

## 2021-05-20 RX ADMIN — KETOROLAC TROMETHAMINE 30 MG: 30 INJECTION, SOLUTION INTRAMUSCULAR; INTRAVENOUS at 10:57

## 2021-05-20 RX ADMIN — DEXTROSE AND SODIUM CHLORIDE 100 ML/HR: 5; 450 INJECTION, SOLUTION INTRAVENOUS at 14:00

## 2021-05-20 RX ADMIN — POLYETHYLENE GLYCOL 3350 17 G: 17 POWDER, FOR SOLUTION ORAL at 17:08

## 2021-05-20 RX ADMIN — SODIUM CHLORIDE, PRESERVATIVE FREE 10 ML: 5 INJECTION INTRAVENOUS at 08:18

## 2021-05-20 RX ADMIN — KETOROLAC TROMETHAMINE 30 MG: 30 INJECTION, SOLUTION INTRAMUSCULAR; INTRAVENOUS at 20:54

## 2021-05-20 RX ADMIN — GABAPENTIN 600 MG: 300 CAPSULE ORAL at 17:07

## 2021-05-20 RX ADMIN — PANTOPRAZOLE SODIUM 40 MG: 40 TABLET, DELAYED RELEASE ORAL at 20:54

## 2021-05-21 PROBLEM — K56.600 PARTIAL SMALL BOWEL OBSTRUCTION: Status: RESOLVED | Noted: 2018-09-01 | Resolved: 2021-05-21

## 2021-05-21 LAB
ALBUMIN SERPL-MCNC: 3.3 G/DL (ref 3.5–5.2)
ALBUMIN/GLOB SERPL: 1.3 G/DL
ALP SERPL-CCNC: 104 U/L (ref 39–117)
ALT SERPL W P-5'-P-CCNC: 19 U/L (ref 1–33)
ANION GAP SERPL CALCULATED.3IONS-SCNC: 4 MMOL/L (ref 5–15)
AST SERPL-CCNC: 41 U/L (ref 1–32)
BASOPHILS # BLD AUTO: 0.02 10*3/MM3 (ref 0–0.2)
BASOPHILS NFR BLD AUTO: 0.6 % (ref 0–1.5)
BILIRUB SERPL-MCNC: 0.8 MG/DL (ref 0–1.2)
BUN SERPL-MCNC: 6 MG/DL (ref 8–23)
BUN/CREAT SERPL: 6.8 (ref 7–25)
CALCIUM SPEC-SCNC: 8.5 MG/DL (ref 8.6–10.5)
CHLORIDE SERPL-SCNC: 108 MMOL/L (ref 98–107)
CO2 SERPL-SCNC: 26 MMOL/L (ref 22–29)
CREAT SERPL-MCNC: 0.88 MG/DL (ref 0.57–1)
DEPRECATED RDW RBC AUTO: 53.3 FL (ref 37–54)
EOSINOPHIL # BLD AUTO: 0.09 10*3/MM3 (ref 0–0.4)
EOSINOPHIL NFR BLD AUTO: 2.7 % (ref 0.3–6.2)
ERYTHROCYTE [DISTWIDTH] IN BLOOD BY AUTOMATED COUNT: 18.1 % (ref 12.3–15.4)
GFR SERPL CREATININE-BSD FRML MDRD: 65 ML/MIN/1.73
GLOBULIN UR ELPH-MCNC: 2.5 GM/DL
GLUCOSE SERPL-MCNC: 102 MG/DL (ref 65–99)
HCT VFR BLD AUTO: 36.5 % (ref 34–46.6)
HGB BLD-MCNC: 12.2 G/DL (ref 12–15.9)
IMM GRANULOCYTES # BLD AUTO: 0.06 10*3/MM3 (ref 0–0.05)
IMM GRANULOCYTES NFR BLD AUTO: 1.8 % (ref 0–0.5)
LYMPHOCYTES # BLD AUTO: 0.84 10*3/MM3 (ref 0.7–3.1)
LYMPHOCYTES NFR BLD AUTO: 25.1 % (ref 19.6–45.3)
MCH RBC QN AUTO: 27.5 PG (ref 26.6–33)
MCHC RBC AUTO-ENTMCNC: 33.4 G/DL (ref 31.5–35.7)
MCV RBC AUTO: 82.4 FL (ref 79–97)
MONOCYTES # BLD AUTO: 0.27 10*3/MM3 (ref 0.1–0.9)
MONOCYTES NFR BLD AUTO: 8.1 % (ref 5–12)
NEUTROPHILS NFR BLD AUTO: 2.06 10*3/MM3 (ref 1.7–7)
NEUTROPHILS NFR BLD AUTO: 61.7 % (ref 42.7–76)
NRBC BLD AUTO-RTO: 0 /100 WBC (ref 0–0.2)
PLATELET # BLD AUTO: 42 10*3/MM3 (ref 140–450)
PMV BLD AUTO: ABNORMAL FL
POTASSIUM SERPL-SCNC: 3.6 MMOL/L (ref 3.5–5.2)
PROT SERPL-MCNC: 5.8 G/DL (ref 6–8.5)
RBC # BLD AUTO: 4.43 10*6/MM3 (ref 3.77–5.28)
SODIUM SERPL-SCNC: 138 MMOL/L (ref 136–145)
WBC # BLD AUTO: 3.34 10*3/MM3 (ref 3.4–10.8)

## 2021-05-21 PROCEDURE — 96361 HYDRATE IV INFUSION ADD-ON: CPT

## 2021-05-21 PROCEDURE — 25010000002 KETOROLAC TROMETHAMINE PER 15 MG: Performed by: STUDENT IN AN ORGANIZED HEALTH CARE EDUCATION/TRAINING PROGRAM

## 2021-05-21 PROCEDURE — G0378 HOSPITAL OBSERVATION PER HR: HCPCS

## 2021-05-21 PROCEDURE — 80053 COMPREHEN METABOLIC PANEL: CPT | Performed by: STUDENT IN AN ORGANIZED HEALTH CARE EDUCATION/TRAINING PROGRAM

## 2021-05-21 PROCEDURE — 85025 COMPLETE CBC W/AUTO DIFF WBC: CPT | Performed by: STUDENT IN AN ORGANIZED HEALTH CARE EDUCATION/TRAINING PROGRAM

## 2021-05-21 PROCEDURE — 96376 TX/PRO/DX INJ SAME DRUG ADON: CPT

## 2021-05-21 PROCEDURE — 99225 PR SBSQ OBSERVATION CARE/DAY 25 MINUTES: CPT | Performed by: STUDENT IN AN ORGANIZED HEALTH CARE EDUCATION/TRAINING PROGRAM

## 2021-05-21 RX ORDER — CYCLOBENZAPRINE HCL 5 MG
5 TABLET ORAL 3 TIMES DAILY PRN
Status: DISCONTINUED | OUTPATIENT
Start: 2021-05-21 | End: 2021-05-24 | Stop reason: HOSPADM

## 2021-05-21 RX ORDER — KETOROLAC TROMETHAMINE 30 MG/ML
30 INJECTION, SOLUTION INTRAMUSCULAR; INTRAVENOUS EVERY 6 HOURS PRN
Status: DISPENSED | OUTPATIENT
Start: 2021-05-21 | End: 2021-05-24

## 2021-05-21 RX ORDER — ERYTHROMYCIN 250 MG/1
250 TABLET, COATED ORAL
Status: DISCONTINUED | OUTPATIENT
Start: 2021-05-21 | End: 2021-05-24 | Stop reason: HOSPADM

## 2021-05-21 RX ADMIN — GABAPENTIN 600 MG: 300 CAPSULE ORAL at 15:14

## 2021-05-21 RX ADMIN — GABAPENTIN 600 MG: 300 CAPSULE ORAL at 08:26

## 2021-05-21 RX ADMIN — MELATONIN 6 MG: at 20:21

## 2021-05-21 RX ADMIN — DEXTROSE AND SODIUM CHLORIDE 100 ML/HR: 5; 450 INJECTION, SOLUTION INTRAVENOUS at 20:21

## 2021-05-21 RX ADMIN — GABAPENTIN 600 MG: 300 CAPSULE ORAL at 20:21

## 2021-05-21 RX ADMIN — KETOROLAC TROMETHAMINE 30 MG: 30 INJECTION, SOLUTION INTRAMUSCULAR; INTRAVENOUS at 20:21

## 2021-05-21 RX ADMIN — SPIRONOLACTONE 25 MG: 25 TABLET ORAL at 08:26

## 2021-05-21 RX ADMIN — ERYTHROMYCIN 250 MG: 250 TABLET, FILM COATED ORAL at 17:54

## 2021-05-21 RX ADMIN — PANTOPRAZOLE SODIUM 40 MG: 40 TABLET, DELAYED RELEASE ORAL at 08:27

## 2021-05-21 RX ADMIN — ESCITALOPRAM OXALATE 10 MG: 10 TABLET ORAL at 08:26

## 2021-05-21 RX ADMIN — KETOROLAC TROMETHAMINE 30 MG: 30 INJECTION, SOLUTION INTRAMUSCULAR; INTRAVENOUS at 01:21

## 2021-05-21 RX ADMIN — POLYETHYLENE GLYCOL 3350 17 G: 17 POWDER, FOR SOLUTION ORAL at 08:26

## 2021-05-21 RX ADMIN — CYCLOBENZAPRINE HYDROCHLORIDE 5 MG: 5 TABLET, FILM COATED ORAL at 23:16

## 2021-05-22 ENCOUNTER — APPOINTMENT (OUTPATIENT)
Dept: GENERAL RADIOLOGY | Facility: HOSPITAL | Age: 61
End: 2021-05-22

## 2021-05-22 LAB
ALBUMIN SERPL-MCNC: 3.6 G/DL (ref 3.5–5.2)
ALBUMIN/GLOB SERPL: 1.4 G/DL
ALP SERPL-CCNC: 118 U/L (ref 39–117)
ALT SERPL W P-5'-P-CCNC: 25 U/L (ref 1–33)
ANION GAP SERPL CALCULATED.3IONS-SCNC: 6 MMOL/L (ref 5–15)
AST SERPL-CCNC: 41 U/L (ref 1–32)
BASOPHILS # BLD AUTO: 0.01 10*3/MM3 (ref 0–0.2)
BASOPHILS NFR BLD AUTO: 0.3 % (ref 0–1.5)
BILIRUB SERPL-MCNC: 0.7 MG/DL (ref 0–1.2)
BUN SERPL-MCNC: 5 MG/DL (ref 8–23)
BUN/CREAT SERPL: 5.6 (ref 7–25)
CALCIUM SPEC-SCNC: 8.7 MG/DL (ref 8.6–10.5)
CHLORIDE SERPL-SCNC: 109 MMOL/L (ref 98–107)
CO2 SERPL-SCNC: 27 MMOL/L (ref 22–29)
CREAT SERPL-MCNC: 0.9 MG/DL (ref 0.57–1)
DEPRECATED RDW RBC AUTO: 50.6 FL (ref 37–54)
EOSINOPHIL # BLD AUTO: 0.09 10*3/MM3 (ref 0–0.4)
EOSINOPHIL NFR BLD AUTO: 3 % (ref 0.3–6.2)
ERYTHROCYTE [DISTWIDTH] IN BLOOD BY AUTOMATED COUNT: 17.6 % (ref 12.3–15.4)
GFR SERPL CREATININE-BSD FRML MDRD: 64 ML/MIN/1.73
GLOBULIN UR ELPH-MCNC: 2.5 GM/DL
GLUCOSE SERPL-MCNC: 101 MG/DL (ref 65–99)
HCT VFR BLD AUTO: 37.7 % (ref 34–46.6)
HGB BLD-MCNC: 12.3 G/DL (ref 12–15.9)
IMM GRANULOCYTES # BLD AUTO: 0.01 10*3/MM3 (ref 0–0.05)
IMM GRANULOCYTES NFR BLD AUTO: 0.3 % (ref 0–0.5)
LYMPHOCYTES # BLD AUTO: 0.72 10*3/MM3 (ref 0.7–3.1)
LYMPHOCYTES NFR BLD AUTO: 23.8 % (ref 19.6–45.3)
MCH RBC QN AUTO: 26.3 PG (ref 26.6–33)
MCHC RBC AUTO-ENTMCNC: 32.6 G/DL (ref 31.5–35.7)
MCV RBC AUTO: 80.6 FL (ref 79–97)
MONOCYTES # BLD AUTO: 0.16 10*3/MM3 (ref 0.1–0.9)
MONOCYTES NFR BLD AUTO: 5.3 % (ref 5–12)
NEUTROPHILS NFR BLD AUTO: 2.03 10*3/MM3 (ref 1.7–7)
NEUTROPHILS NFR BLD AUTO: 67.3 % (ref 42.7–76)
NRBC BLD AUTO-RTO: 0 /100 WBC (ref 0–0.2)
PLATELET # BLD AUTO: 58 10*3/MM3 (ref 140–450)
PMV BLD AUTO: 10.1 FL (ref 6–12)
POTASSIUM SERPL-SCNC: 3.7 MMOL/L (ref 3.5–5.2)
PROT SERPL-MCNC: 6.1 G/DL (ref 6–8.5)
RBC # BLD AUTO: 4.68 10*6/MM3 (ref 3.77–5.28)
SODIUM SERPL-SCNC: 142 MMOL/L (ref 136–145)
WBC # BLD AUTO: 3.02 10*3/MM3 (ref 3.4–10.8)

## 2021-05-22 PROCEDURE — 85025 COMPLETE CBC W/AUTO DIFF WBC: CPT | Performed by: STUDENT IN AN ORGANIZED HEALTH CARE EDUCATION/TRAINING PROGRAM

## 2021-05-22 PROCEDURE — G0378 HOSPITAL OBSERVATION PER HR: HCPCS

## 2021-05-22 PROCEDURE — 25010000002 KETOROLAC TROMETHAMINE PER 15 MG: Performed by: STUDENT IN AN ORGANIZED HEALTH CARE EDUCATION/TRAINING PROGRAM

## 2021-05-22 PROCEDURE — 96361 HYDRATE IV INFUSION ADD-ON: CPT

## 2021-05-22 PROCEDURE — 99225 PR SBSQ OBSERVATION CARE/DAY 25 MINUTES: CPT | Performed by: STUDENT IN AN ORGANIZED HEALTH CARE EDUCATION/TRAINING PROGRAM

## 2021-05-22 PROCEDURE — 80053 COMPREHEN METABOLIC PANEL: CPT | Performed by: STUDENT IN AN ORGANIZED HEALTH CARE EDUCATION/TRAINING PROGRAM

## 2021-05-22 PROCEDURE — 74018 RADEX ABDOMEN 1 VIEW: CPT

## 2021-05-22 PROCEDURE — 96376 TX/PRO/DX INJ SAME DRUG ADON: CPT

## 2021-05-22 RX ORDER — POLYETHYLENE GLYCOL 3350 17 G/17G
17 POWDER, FOR SOLUTION ORAL 3 TIMES DAILY
Status: DISCONTINUED | OUTPATIENT
Start: 2021-05-22 | End: 2021-05-24

## 2021-05-22 RX ORDER — GABAPENTIN 400 MG/1
800 CAPSULE ORAL EVERY 8 HOURS SCHEDULED
Status: DISCONTINUED | OUTPATIENT
Start: 2021-05-22 | End: 2021-05-24 | Stop reason: HOSPADM

## 2021-05-22 RX ADMIN — GABAPENTIN 800 MG: 400 CAPSULE ORAL at 08:12

## 2021-05-22 RX ADMIN — POLYETHYLENE GLYCOL 3350 17 G: 17 POWDER, FOR SOLUTION ORAL at 15:05

## 2021-05-22 RX ADMIN — PANTOPRAZOLE SODIUM 40 MG: 40 TABLET, DELAYED RELEASE ORAL at 05:43

## 2021-05-22 RX ADMIN — MELATONIN 6 MG: at 20:28

## 2021-05-22 RX ADMIN — SPIRONOLACTONE 25 MG: 25 TABLET ORAL at 08:11

## 2021-05-22 RX ADMIN — GABAPENTIN 800 MG: 400 CAPSULE ORAL at 15:05

## 2021-05-22 RX ADMIN — DEXTROSE AND SODIUM CHLORIDE 100 ML/HR: 5; 450 INJECTION, SOLUTION INTRAVENOUS at 20:29

## 2021-05-22 RX ADMIN — POLYETHYLENE GLYCOL 3350 17 G: 17 POWDER, FOR SOLUTION ORAL at 20:29

## 2021-05-22 RX ADMIN — KETOROLAC TROMETHAMINE 30 MG: 30 INJECTION, SOLUTION INTRAMUSCULAR; INTRAVENOUS at 01:59

## 2021-05-22 RX ADMIN — ERYTHROMYCIN 250 MG: 250 TABLET, FILM COATED ORAL at 11:34

## 2021-05-22 RX ADMIN — ESCITALOPRAM OXALATE 10 MG: 10 TABLET ORAL at 08:11

## 2021-05-22 RX ADMIN — ERYTHROMYCIN 250 MG: 250 TABLET, FILM COATED ORAL at 08:11

## 2021-05-22 RX ADMIN — POLYETHYLENE GLYCOL 3350 17 G: 17 POWDER, FOR SOLUTION ORAL at 08:11

## 2021-05-22 RX ADMIN — DEXTROSE AND SODIUM CHLORIDE 100 ML/HR: 5; 450 INJECTION, SOLUTION INTRAVENOUS at 05:43

## 2021-05-22 RX ADMIN — CYCLOBENZAPRINE HYDROCHLORIDE 5 MG: 5 TABLET, FILM COATED ORAL at 20:28

## 2021-05-22 RX ADMIN — ERYTHROMYCIN 250 MG: 250 TABLET, FILM COATED ORAL at 15:05

## 2021-05-22 RX ADMIN — GABAPENTIN 800 MG: 400 CAPSULE ORAL at 20:28

## 2021-05-22 RX ADMIN — KETOROLAC TROMETHAMINE 30 MG: 30 INJECTION, SOLUTION INTRAMUSCULAR; INTRAVENOUS at 20:28

## 2021-05-22 NOTE — PROGRESS NOTES
FAMILY MEDICINE DAILY PROGRESS NOTE  NAME: Amira Shannon  : 1960  MRN: [id number]     LOS: 0 days     PROVIDER OF SERVICE: Segundo Molina MD    Chief Complaint: Generalized abdominal pain    Subjective:   Pt did well overnight with no acute events. VSS. States last BM was after lunch yesterday. Endorses increasing abdominal distension. Denies any abdominal pain or nausea/vomiting at this time. Complains of bilateral foot pain of 7/10.   Interval History:  History taken from: patient chart RN    Review of Systems:   Review of Systems   Constitutional: Negative for activity change, chills, diaphoresis and fever.   HENT: Negative for dental problem, drooling, ear discharge, ear pain, facial swelling, mouth sores, nosebleeds, rhinorrhea, sinus pressure, sinus pain, sneezing and sore throat.    Eyes: Negative for pain, discharge and visual disturbance.   Respiratory: Negative for apnea, cough, shortness of breath, wheezing and stridor.    Cardiovascular: Negative for chest pain, palpitations and leg swelling.   Gastrointestinal: Positive for abdominal distention. Negative for abdominal pain, constipation, diarrhea, nausea and vomiting.   Genitourinary: Negative for dysuria, frequency and urgency.   Musculoskeletal: Negative for arthralgias and back pain.   Skin: Negative for rash and wound.   Neurological: Negative for dizziness, facial asymmetry, light-headedness and headaches.   Psychiatric/Behavioral: Negative for agitation and decreased concentration. The patient is not nervous/anxious.        Objective:     Vital Signs  Temp:  [96.2 °F (35.7 °C)-98 °F (36.7 °C)] 97.3 °F (36.3 °C)  Heart Rate:  [74-77] 77  Resp:  [18] 18  BP: (136-155)/(71-90) 146/83    Physical Exam  Physical Exam  Constitutional:       Appearance: She is well-developed. She is obese.   HENT:      Head: Normocephalic and atraumatic.      Right Ear: External ear normal.      Left Ear: External ear normal.      Nose: Nose normal.       Mouth/Throat:      Pharynx: No oropharyngeal exudate.   Eyes:      General: No scleral icterus.        Right eye: No discharge.         Left eye: No discharge.      Conjunctiva/sclera: Conjunctivae normal.      Pupils: Pupils are equal, round, and reactive to light.   Neck:      Vascular: No JVD.   Cardiovascular:      Rate and Rhythm: Normal rate and regular rhythm.      Heart sounds: Normal heart sounds. No murmur heard.   No friction rub. No gallop.    Pulmonary:      Effort: Pulmonary effort is normal. No respiratory distress.      Breath sounds: Normal breath sounds. No stridor. No wheezing or rales.   Abdominal:      General: There is no distension.      Palpations: Abdomen is soft.      Tenderness: There is abdominal tenderness (generalized).      Comments: Bowel sounds decreased     Musculoskeletal:         General: No tenderness or deformity. Normal range of motion.      Cervical back: Normal range of motion and neck supple.   Skin:     General: Skin is warm and dry.      Capillary Refill: Capillary refill takes less than 2 seconds.      Coloration: Skin is not pale.      Findings: No erythema or rash.   Neurological:      Mental Status: She is alert and oriented to person, place, and time.   Psychiatric:         Behavior: Behavior normal.         Medication Review    Current Facility-Administered Medications:   •  acetaminophen (TYLENOL) suppository 650 mg, 650 mg, Rectal, Q4H PRN, Jhonathan Busby MD  •  calcium carbonate (TUMS) chewable tablet 500 mg (200 mg elemental), 2 tablet, Oral, TID PRN, Jhonathan Busby MD, 2 tablet at 05/20/21 2053  •  cyclobenzaprine (FLEXERIL) tablet 5 mg, 5 mg, Oral, TID PRN, Karen Villarreal MD, 5 mg at 05/21/21 2316  •  dextrose 5 % and sodium chloride 0.45 % infusion, 100 mL/hr, Intravenous, Continuous, Jhonathan Busby MD, Last Rate: 100 mL/hr at 05/22/21 0543, 100 mL/hr at 05/22/21 0543  •  erythromycin base (E-MYCIN) tablet 250 mg, 250 mg, Oral, TID With  Meals, Segundo Molina MD, 250 mg at 21 1754  •  escitalopram (LEXAPRO) tablet 10 mg, 10 mg, Oral, Daily, Jhonathan Busby MD, 10 mg at 21 0826  •  gabapentin (NEURONTIN) capsule 800 mg, 800 mg, Oral, Q8H, Segundo Molina MD  •  ketorolac (TORADOL) injection 30 mg, 30 mg, Intravenous, Q6H PRN, Jhonathan Busby MD, 30 mg at 21 0159  •  melatonin tablet 6 mg, 6 mg, Oral, Nightly, Jhonathan Busby MD, 6 mg at 21  •  ondansetron (ZOFRAN) injection 4 mg, 4 mg, Intravenous, Q6H PRN, Jhonathan Busby MD, 4 mg at 21 2233  •  pantoprazole (PROTONIX) EC tablet 40 mg, 40 mg, Oral, Q AM, Jhonathan Busby MD, 40 mg at 21 0543  •  [] polyethylene glycol (MIRALAX) packet 17 g, 17 g, Oral, TID, 17 g at 21 1708 **FOLLOWED BY** polyethylene glycol (MIRALAX) packet 17 g, 17 g, Oral, TID, Segundo Molina MD  •  sodium chloride 0.9 % flush 10 mL, 10 mL, Intravenous, Q12H, Jhonathan Busby MD, 10 mL at 21 0818  •  sodium chloride 0.9 % flush 10 mL, 10 mL, Intravenous, PRN, Jhonathan Busby MD  •  spironolactone (ALDACTONE) tablet 25 mg, 25 mg, Oral, Daily, Jhonathan Busby MD, 25 mg at 21 0826     Diagnostic Data    Lab Results (last 24 hours)     Procedure Component Value Units Date/Time    Comprehensive Metabolic Panel [[id number]]  (Abnormal) Collected: 21 0654    Specimen: Blood Updated: 21     Glucose 101 mg/dL      BUN 5 mg/dL      Creatinine 0.90 mg/dL      Sodium 142 mmol/L      Potassium 3.7 mmol/L      Chloride 109 mmol/L      CO2 27.0 mmol/L      Calcium 8.7 mg/dL      Total Protein 6.1 g/dL      Albumin 3.60 g/dL      ALT (SGPT) 25 U/L      AST (SGOT) 41 U/L      Alkaline Phosphatase 118 U/L      Total Bilirubin 0.7 mg/dL      eGFR Non African Amer 64 mL/min/1.73      Globulin 2.5 gm/dL      A/G Ratio 1.4 g/dL      BUN/Creatinine Ratio 5.6     Anion Gap 6.0 mmol/L     Narrative:      GFR Normal  >60  Chronic Kidney Disease <60  Kidney Failure <15      CBC & Differential [[id number]]  (Abnormal) Collected: 05/22/21 0654    Specimen: Blood Updated: 05/22/21 0731    Narrative:      The following orders were created for panel order CBC & Differential.  Procedure                               Abnormality         Status                     ---------                               -----------         ------                     Scan Slide[[id number]]                                                                  CBC Auto Differential[[id number]]        Abnormal            Final result                 Please view results for these tests on the individual orders.    CBC Auto Differential [[id number]]  (Abnormal) Collected: 05/22/21 0654    Specimen: Blood Updated: 05/22/21 0731     WBC 3.02 10*3/mm3      RBC 4.68 10*6/mm3      Hemoglobin 12.3 g/dL      Hematocrit 37.7 %      MCV 80.6 fL      MCH 26.3 pg      MCHC 32.6 g/dL      RDW 17.6 %      RDW-SD 50.6 fl      MPV 10.1 fL      Platelets 58 10*3/mm3      Neutrophil % 67.3 %      Lymphocyte % 23.8 %      Monocyte % 5.3 %      Eosinophil % 3.0 %      Basophil % 0.3 %      Immature Grans % 0.3 %      Neutrophils, Absolute 2.03 10*3/mm3      Lymphocytes, Absolute 0.72 10*3/mm3      Monocytes, Absolute 0.16 10*3/mm3      Eosinophils, Absolute 0.09 10*3/mm3      Basophils, Absolute 0.01 10*3/mm3      Immature Grans, Absolute 0.01 10*3/mm3      nRBC 0.0 /100 WBC     Blood Culture - Blood, Hand, Right [[id number]] Collected: 05/18/21 1522    Specimen: Blood from Hand, Right Updated: 05/21/21 1530     Blood Culture No growth at 3 days    Blood Culture - Blood, Hand, Left [[id number]] Collected: 05/18/21 1522    Specimen: Blood from Hand, Left Updated: 05/21/21 1530     Blood Culture No growth at 3 days           Imaging Results (Last 24 Hours)     ** No results found for the last 24 hours. **          I reviewed the patient's new clinical results.    Assessment/Plan:     Active  Hospital Problems    Diagnosis    • **Generalized abdominal pain      Drug-induced ileus vs SBO. On admisison lactate wnl, no BIANCA, no hypokalemia. FT study shows contrast at end of colon  -Currently on full liquid diet/. Pt states has not had a BM in 18 hours. Increase Miralax to BID  -Have added Erythromycin for motility 5/21/2021  - NG tube if n/v  - curbsided gen surg, Dr Troncoso, OP f/u  - I/O's     • Lab test negative for COVID-19 virus    • Hypertension      -Outpatient takes Clonidine 0.1mg BID only as needed.  -cover with hydralazine IV for SBP >160 mmHg while NPO     • Chronic pain syndrome      Patient reportedly follows with chronic pain management who has placed her on oxycodone and methyl naltrexone for bowel management.  Her chronic pain stems from lower back pain, osteoarthritis of both knees, and bilateral foot pains.  She asks for pain management with Dilaudid or morphine.  Discussed with patient that opiate medications are likely contributing at least in some part to her frequent ileus versus SBO.  Discussed that we would attempt nonopiate medications for pain management and discussed expectations for pain that is controlled however may not be completely alleviated.   -We will increase Pt's Gabapentin from 600mg TID to 800mg TID     • Nausea      zofran     • Cirrhosis of liver (CMS/HCC)      Managed by GI as Op     • CHAI on CPAP      cpap qhs           DVT prophylaxis: SCDs/TEDs  Code Status and Medical Interventions:   Ordered at: 05/18/21 2051     Level Of Support Discussed With:    Patient     Code Status:    CPR     Medical Interventions (Level of Support Prior to Arrest):    Full       Plan for disposition:Where: home and When:  1-2days      Time: 30 minutes        This document has been electronically signed by Segundo Molina MD on May 22, 2021 07:50 CDT

## 2021-05-22 NOTE — PLAN OF CARE
Goal Outcome Evaluation:  Plan of Care Reviewed With: patient  Progress: no change  Outcome Summary: VSS, no complaints of abd. pain or nausea

## 2021-05-22 NOTE — PLAN OF CARE
Goal Outcome Evaluation:  Plan of Care Reviewed With: patient  Progress: improving  Outcome Summary: vss, no distress, has had 2 BMs today ,

## 2021-05-23 ENCOUNTER — APPOINTMENT (OUTPATIENT)
Dept: GENERAL RADIOLOGY | Facility: HOSPITAL | Age: 61
End: 2021-05-23

## 2021-05-23 ENCOUNTER — APPOINTMENT (OUTPATIENT)
Dept: CT IMAGING | Facility: HOSPITAL | Age: 61
End: 2021-05-23

## 2021-05-23 PROBLEM — K56.7 ILEUS: Status: ACTIVE | Noted: 2018-09-20

## 2021-05-23 LAB
ALBUMIN SERPL-MCNC: 3.5 G/DL (ref 3.5–5.2)
ALBUMIN/GLOB SERPL: 1.3 G/DL
ALP SERPL-CCNC: 111 U/L (ref 39–117)
ALT SERPL W P-5'-P-CCNC: 21 U/L (ref 1–33)
ANION GAP SERPL CALCULATED.3IONS-SCNC: 6 MMOL/L (ref 5–15)
ANISOCYTOSIS BLD QL: NORMAL
AST SERPL-CCNC: 41 U/L (ref 1–32)
BACTERIA SPEC AEROBE CULT: NORMAL
BACTERIA SPEC AEROBE CULT: NORMAL
BASOPHILS # BLD AUTO: 0.01 10*3/MM3 (ref 0–0.2)
BASOPHILS NFR BLD AUTO: 0.3 % (ref 0–1.5)
BILIRUB SERPL-MCNC: 1 MG/DL (ref 0–1.2)
BUN SERPL-MCNC: 5 MG/DL (ref 8–23)
BUN/CREAT SERPL: 5.6 (ref 7–25)
CALCIUM SPEC-SCNC: 8.5 MG/DL (ref 8.6–10.5)
CHLORIDE SERPL-SCNC: 106 MMOL/L (ref 98–107)
CO2 SERPL-SCNC: 27 MMOL/L (ref 22–29)
CREAT SERPL-MCNC: 0.89 MG/DL (ref 0.57–1)
DEPRECATED RDW RBC AUTO: 50.6 FL (ref 37–54)
EOSINOPHIL # BLD AUTO: 0.1 10*3/MM3 (ref 0–0.4)
EOSINOPHIL NFR BLD AUTO: 3.4 % (ref 0.3–6.2)
ERYTHROCYTE [DISTWIDTH] IN BLOOD BY AUTOMATED COUNT: 17.6 % (ref 12.3–15.4)
GFR SERPL CREATININE-BSD FRML MDRD: 64 ML/MIN/1.73
GLOBULIN UR ELPH-MCNC: 2.6 GM/DL
GLUCOSE SERPL-MCNC: 101 MG/DL (ref 65–99)
HCT VFR BLD AUTO: 37.4 % (ref 34–46.6)
HGB BLD-MCNC: 12.2 G/DL (ref 12–15.9)
HYPOCHROMIA BLD QL: NORMAL
IMM GRANULOCYTES # BLD AUTO: 0.01 10*3/MM3 (ref 0–0.05)
IMM GRANULOCYTES NFR BLD AUTO: 0.3 % (ref 0–0.5)
LYMPHOCYTES # BLD AUTO: 0.61 10*3/MM3 (ref 0.7–3.1)
LYMPHOCYTES NFR BLD AUTO: 20.8 % (ref 19.6–45.3)
MCH RBC QN AUTO: 26.1 PG (ref 26.6–33)
MCHC RBC AUTO-ENTMCNC: 32.6 G/DL (ref 31.5–35.7)
MCV RBC AUTO: 80.1 FL (ref 79–97)
MONOCYTES # BLD AUTO: 0.14 10*3/MM3 (ref 0.1–0.9)
MONOCYTES NFR BLD AUTO: 4.8 % (ref 5–12)
NEUTROPHILS NFR BLD AUTO: 2.06 10*3/MM3 (ref 1.7–7)
NEUTROPHILS NFR BLD AUTO: 70.4 % (ref 42.7–76)
NRBC BLD AUTO-RTO: 0 /100 WBC (ref 0–0.2)
PLATELET # BLD AUTO: 64 10*3/MM3 (ref 140–450)
PMV BLD AUTO: 11.5 FL (ref 6–12)
POTASSIUM SERPL-SCNC: 3.7 MMOL/L (ref 3.5–5.2)
PROT SERPL-MCNC: 6.1 G/DL (ref 6–8.5)
RBC # BLD AUTO: 4.67 10*6/MM3 (ref 3.77–5.28)
SMALL PLATELETS BLD QL SMEAR: NORMAL
SODIUM SERPL-SCNC: 139 MMOL/L (ref 136–145)
WBC # BLD AUTO: 2.93 10*3/MM3 (ref 3.4–10.8)
WBC MORPH BLD: NORMAL

## 2021-05-23 PROCEDURE — 96361 HYDRATE IV INFUSION ADD-ON: CPT

## 2021-05-23 PROCEDURE — 25010000002 IOPAMIDOL 61 % SOLUTION: Performed by: STUDENT IN AN ORGANIZED HEALTH CARE EDUCATION/TRAINING PROGRAM

## 2021-05-23 PROCEDURE — G0378 HOSPITAL OBSERVATION PER HR: HCPCS

## 2021-05-23 PROCEDURE — 96376 TX/PRO/DX INJ SAME DRUG ADON: CPT

## 2021-05-23 PROCEDURE — 74177 CT ABD & PELVIS W/CONTRAST: CPT

## 2021-05-23 PROCEDURE — 85025 COMPLETE CBC W/AUTO DIFF WBC: CPT | Performed by: STUDENT IN AN ORGANIZED HEALTH CARE EDUCATION/TRAINING PROGRAM

## 2021-05-23 PROCEDURE — 85007 BL SMEAR W/DIFF WBC COUNT: CPT | Performed by: STUDENT IN AN ORGANIZED HEALTH CARE EDUCATION/TRAINING PROGRAM

## 2021-05-23 PROCEDURE — 25010000002 KETOROLAC TROMETHAMINE PER 15 MG: Performed by: STUDENT IN AN ORGANIZED HEALTH CARE EDUCATION/TRAINING PROGRAM

## 2021-05-23 PROCEDURE — 0 DIATRIZOATE MEGLUMINE & SODIUM PER 1 ML: Performed by: STUDENT IN AN ORGANIZED HEALTH CARE EDUCATION/TRAINING PROGRAM

## 2021-05-23 PROCEDURE — 99225 PR SBSQ OBSERVATION CARE/DAY 25 MINUTES: CPT | Performed by: STUDENT IN AN ORGANIZED HEALTH CARE EDUCATION/TRAINING PROGRAM

## 2021-05-23 PROCEDURE — 74018 RADEX ABDOMEN 1 VIEW: CPT

## 2021-05-23 PROCEDURE — 80053 COMPREHEN METABOLIC PANEL: CPT | Performed by: STUDENT IN AN ORGANIZED HEALTH CARE EDUCATION/TRAINING PROGRAM

## 2021-05-23 RX ADMIN — ESCITALOPRAM OXALATE 10 MG: 10 TABLET ORAL at 08:22

## 2021-05-23 RX ADMIN — DIATRIZOATE MEGLUMINE AND DIATRIZOATE SODIUM 30 ML: 660; 100 LIQUID ORAL; RECTAL at 18:15

## 2021-05-23 RX ADMIN — CYCLOBENZAPRINE HYDROCHLORIDE 5 MG: 5 TABLET, FILM COATED ORAL at 21:15

## 2021-05-23 RX ADMIN — MELATONIN 6 MG: at 21:15

## 2021-05-23 RX ADMIN — ERYTHROMYCIN 250 MG: 250 TABLET, FILM COATED ORAL at 08:22

## 2021-05-23 RX ADMIN — SODIUM CHLORIDE, PRESERVATIVE FREE 10 ML: 5 INJECTION INTRAVENOUS at 21:15

## 2021-05-23 RX ADMIN — DEXTROSE AND SODIUM CHLORIDE 100 ML/HR: 5; 450 INJECTION, SOLUTION INTRAVENOUS at 04:44

## 2021-05-23 RX ADMIN — GABAPENTIN 800 MG: 400 CAPSULE ORAL at 21:15

## 2021-05-23 RX ADMIN — PANTOPRAZOLE SODIUM 40 MG: 40 TABLET, DELAYED RELEASE ORAL at 04:40

## 2021-05-23 RX ADMIN — KETOROLAC TROMETHAMINE 30 MG: 30 INJECTION, SOLUTION INTRAMUSCULAR; INTRAVENOUS at 22:30

## 2021-05-23 RX ADMIN — POLYETHYLENE GLYCOL 3350 17 G: 17 POWDER, FOR SOLUTION ORAL at 21:15

## 2021-05-23 RX ADMIN — GABAPENTIN 800 MG: 400 CAPSULE ORAL at 13:25

## 2021-05-23 RX ADMIN — IOPAMIDOL 90 ML: 612 INJECTION, SOLUTION INTRAVENOUS at 18:15

## 2021-05-23 RX ADMIN — KETOROLAC TROMETHAMINE 30 MG: 30 INJECTION, SOLUTION INTRAMUSCULAR; INTRAVENOUS at 04:39

## 2021-05-23 RX ADMIN — SODIUM CHLORIDE, PRESERVATIVE FREE 10 ML: 5 INJECTION INTRAVENOUS at 08:23

## 2021-05-23 RX ADMIN — SPIRONOLACTONE 25 MG: 25 TABLET ORAL at 08:22

## 2021-05-23 RX ADMIN — GABAPENTIN 800 MG: 400 CAPSULE ORAL at 04:40

## 2021-05-23 RX ADMIN — POLYETHYLENE GLYCOL 3350 17 G: 17 POWDER, FOR SOLUTION ORAL at 08:22

## 2021-05-23 RX ADMIN — ERYTHROMYCIN 250 MG: 250 TABLET, FILM COATED ORAL at 13:25

## 2021-05-23 NOTE — PLAN OF CARE
Goal Outcome Evaluation:  Plan of Care Reviewed With: patient  Progress: no change  Outcome Summary: VSS, pt rested well, no complaints of nausea

## 2021-05-23 NOTE — PROGRESS NOTES
"    FAMILY MEDICINE DAILY PROGRESS NOTE    NAME: Amira Shannon  : 1960  MRN: [id number]      LOS: 0 days     PROVIDER OF SERVICE: Ari Bui MD    Chief Complaint: Generalized abdominal pain    Subjective:     Interval History:  History taken from: patient  Patient Complaints: None  Patient Denies:  Worsening abdominal pain    Patient states she had 2 BM's overnight.  One was semi-solid, the other was watery.  No issues as of right now, and states she feels fine.  Claims she would like to go home if she has outpatient follow up.  States she knows she is \"going to get clogged up in 2 days and (she) will be right back\".    Review of Systems:   Review of Systems   Constitutional: Negative for chills and fever.   HENT: Negative for congestion, rhinorrhea and sore throat.    Eyes: Negative for visual disturbance.   Respiratory: Negative for chest tightness and shortness of breath.    Cardiovascular: Negative for chest pain and palpitations.   Gastrointestinal: Positive for abdominal pain. Negative for diarrhea, nausea and vomiting.   Endocrine: Negative for polyuria.   Genitourinary: Negative for difficulty urinating and dysuria.   Musculoskeletal: Positive for arthralgias and myalgias. Negative for back pain.   Skin: Negative for rash and wound.   Neurological: Negative for dizziness, weakness and numbness.   Hematological: Does not bruise/bleed easily.   Psychiatric/Behavioral: Negative for agitation, behavioral problems and confusion.       Objective:     Vital Signs  Temp:  [96.3 °F (35.7 °C)-97.9 °F (36.6 °C)] 97.2 °F (36.2 °C)  Heart Rate:  [68-75] 68  Resp:  [18] 18  BP: (135-159)/(83-94) 159/83   Body mass index is 34.71 kg/m².    Physical Exam  Physical Exam  Constitutional:       General: She is not in acute distress.     Appearance: She is obese.   HENT:      Head: Normocephalic and atraumatic.   Cardiovascular:      Heart sounds: Normal heart sounds. No murmur heard.     Pulmonary:      Effort: " No respiratory distress.      Breath sounds: Normal breath sounds.   Abdominal:      Tenderness: There is abdominal tenderness.      Comments: Bowel sounds infrequent but present   Musculoskeletal:         General: Tenderness present.      Right lower leg: No edema.      Left lower leg: No edema.   Neurological:      Mental Status: She is alert.      Comments: Moving all extremities   Psychiatric:         Mood and Affect: Mood normal.         Behavior: Behavior normal.         Scheduled Meds   erythromycin base, 250 mg, Oral, TID With Meals  escitalopram, 10 mg, Oral, Daily  gabapentin, 800 mg, Oral, Q8H  melatonin, 6 mg, Oral, Nightly  pantoprazole, 40 mg, Oral, Q AM  polyethylene glycol, 17 g, Oral, TID  sodium chloride, 10 mL, Intravenous, Q12H  spironolactone, 25 mg, Oral, Daily       PRN Meds   •  acetaminophen  •  calcium carbonate  •  cyclobenzaprine  •  ketorolac  •  ondansetron  •  sodium chloride      Diagnostic Data    Results from last 7 days   Lab Units 05/23/21  0659   WBC 10*3/mm3 2.93*   HEMOGLOBIN g/dL 12.2   HEMATOCRIT % 37.4   PLATELETS 10*3/mm3 64*   GLUCOSE mg/dL 101*   CREATININE mg/dL 0.89   BUN mg/dL 5*   SODIUM mmol/L 139   POTASSIUM mmol/L 3.7   AST (SGOT) U/L 41*   ALT (SGPT) U/L 21   ALK PHOS U/L 111   BILIRUBIN mg/dL 1.0   ANION GAP mmol/L 6.0       XR Abdomen KUB    Result Date: 5/22/2021  As above. Electronically signed by:  Blair Rojas MD  5/22/2021 10:09 AM CDT Workstation: [id number]        I reviewed the patient's new clinical results.    Assessment/Plan:     Active Hospital Problems    Diagnosis  POA   • **Generalized abdominal pain [R10.84]  Yes     Drug-induced ileus vs SBO. On admisison lactate wnl, no BIANCA, no hypokalemia. FT study shows contrast at end of colon  -Currently on full liquid diet.  Miralax  BID  -Have added Erythromycin for motility 5/21/2021  - NG tube if n/v  - curbsided gen surg, Dr Troncoso, OP f/u  - I/O's     • Lab test negative for COVID-19 virus [Z03.818]  Not  Applicable   • Hypertension [I10]  Unknown     -Outpatient takes Clonidine 0.1mg BID only as needed.  -cover with hydralazine IV for SBP >160 mmHg while NPO     • Chronic pain syndrome [G89.4]  Yes     Patient reportedly follows with chronic pain management who has placed her on oxycodone and methyl naltrexone for bowel management.  Her chronic pain stems from lower back pain, osteoarthritis of both knees, and bilateral foot pains.  She asks for pain management with Dilaudid or morphine.  Discussed with patient that opiate medications are likely contributing at least in some part to her frequent ileus versus SBO.  Discussed that we would attempt nonopiate medications for pain management and discussed expectations for pain that is controlled however may not be completely alleviated.   -We will increase Pt's Gabapentin 800mg TID     • Nausea [R11.0]  Yes     zofran     • Cirrhosis of liver (CMS/HCC) [K74.60]  Yes     Managed by GI as Op     • CHAI on CPAP [G47.33, Z99.89]  Not Applicable     cpap qhs         DVT prophylaxis:  Mechanical DVT prophylaxis orders are present.     Code status is   Code Status and Medical Interventions:   Ordered at: 05/18/21 2051     Level Of Support Discussed With:    Patient     Code Status:    CPR     Medical Interventions (Level of Support Prior to Arrest):    Full       Plan for disposition:1-2 days      Time: 30 mins       This document has been electronically signed by Ari Bui MD on May 23, 2021 08:45 CDT

## 2021-05-24 VITALS
SYSTOLIC BLOOD PRESSURE: 158 MMHG | TEMPERATURE: 97.7 F | BODY MASS INDEX: 34.31 KG/M2 | DIASTOLIC BLOOD PRESSURE: 85 MMHG | HEART RATE: 70 BPM | HEIGHT: 64 IN | RESPIRATION RATE: 18 BRPM | OXYGEN SATURATION: 97 % | WEIGHT: 201 LBS

## 2021-05-24 PROBLEM — K56.7 ILEUS (HCC): Status: RESOLVED | Noted: 2018-09-20 | Resolved: 2021-05-24

## 2021-05-24 PROBLEM — R11.0 NAUSEA: Status: RESOLVED | Noted: 2018-09-20 | Resolved: 2021-05-24

## 2021-05-24 LAB
ALBUMIN SERPL-MCNC: 3.6 G/DL (ref 3.5–5.2)
ALBUMIN/GLOB SERPL: 1.4 G/DL
ALP SERPL-CCNC: 118 U/L (ref 39–117)
ALT SERPL W P-5'-P-CCNC: 20 U/L (ref 1–33)
ANION GAP SERPL CALCULATED.3IONS-SCNC: 5 MMOL/L (ref 5–15)
AST SERPL-CCNC: 42 U/L (ref 1–32)
BASOPHILS # BLD AUTO: 0.01 10*3/MM3 (ref 0–0.2)
BASOPHILS NFR BLD AUTO: 0.3 % (ref 0–1.5)
BILIRUB SERPL-MCNC: 1.1 MG/DL (ref 0–1.2)
BUN SERPL-MCNC: 5 MG/DL (ref 8–23)
BUN/CREAT SERPL: 5.4 (ref 7–25)
CALCIUM SPEC-SCNC: 8.9 MG/DL (ref 8.6–10.5)
CHLORIDE SERPL-SCNC: 106 MMOL/L (ref 98–107)
CO2 SERPL-SCNC: 28 MMOL/L (ref 22–29)
CREAT SERPL-MCNC: 0.93 MG/DL (ref 0.57–1)
DEPRECATED RDW RBC AUTO: 50.7 FL (ref 37–54)
EOSINOPHIL # BLD AUTO: 0.12 10*3/MM3 (ref 0–0.4)
EOSINOPHIL NFR BLD AUTO: 3.3 % (ref 0.3–6.2)
ERYTHROCYTE [DISTWIDTH] IN BLOOD BY AUTOMATED COUNT: 17.5 % (ref 12.3–15.4)
GFR SERPL CREATININE-BSD FRML MDRD: 61 ML/MIN/1.73
GLOBULIN UR ELPH-MCNC: 2.5 GM/DL
GLUCOSE SERPL-MCNC: 90 MG/DL (ref 65–99)
HCT VFR BLD AUTO: 38.4 % (ref 34–46.6)
HGB BLD-MCNC: 12.6 G/DL (ref 12–15.9)
IMM GRANULOCYTES # BLD AUTO: 0.01 10*3/MM3 (ref 0–0.05)
IMM GRANULOCYTES NFR BLD AUTO: 0.3 % (ref 0–0.5)
LYMPHOCYTES # BLD AUTO: 0.88 10*3/MM3 (ref 0.7–3.1)
LYMPHOCYTES NFR BLD AUTO: 24.2 % (ref 19.6–45.3)
MCH RBC QN AUTO: 26.1 PG (ref 26.6–33)
MCHC RBC AUTO-ENTMCNC: 32.8 G/DL (ref 31.5–35.7)
MCV RBC AUTO: 79.7 FL (ref 79–97)
MONOCYTES # BLD AUTO: 0.21 10*3/MM3 (ref 0.1–0.9)
MONOCYTES NFR BLD AUTO: 5.8 % (ref 5–12)
NEUTROPHILS NFR BLD AUTO: 2.4 10*3/MM3 (ref 1.7–7)
NEUTROPHILS NFR BLD AUTO: 66.1 % (ref 42.7–76)
NRBC BLD AUTO-RTO: 0 /100 WBC (ref 0–0.2)
PLATELET # BLD AUTO: 64 10*3/MM3 (ref 140–450)
PMV BLD AUTO: 11.2 FL (ref 6–12)
POTASSIUM SERPL-SCNC: 3.9 MMOL/L (ref 3.5–5.2)
PROT SERPL-MCNC: 6.1 G/DL (ref 6–8.5)
RBC # BLD AUTO: 4.82 10*6/MM3 (ref 3.77–5.28)
SODIUM SERPL-SCNC: 139 MMOL/L (ref 136–145)
WBC # BLD AUTO: 3.63 10*3/MM3 (ref 3.4–10.8)

## 2021-05-24 PROCEDURE — 99217 PR OBSERVATION CARE DISCHARGE MANAGEMENT: CPT | Performed by: STUDENT IN AN ORGANIZED HEALTH CARE EDUCATION/TRAINING PROGRAM

## 2021-05-24 PROCEDURE — 80053 COMPREHEN METABOLIC PANEL: CPT | Performed by: STUDENT IN AN ORGANIZED HEALTH CARE EDUCATION/TRAINING PROGRAM

## 2021-05-24 PROCEDURE — G0378 HOSPITAL OBSERVATION PER HR: HCPCS

## 2021-05-24 PROCEDURE — 96361 HYDRATE IV INFUSION ADD-ON: CPT

## 2021-05-24 PROCEDURE — 85025 COMPLETE CBC W/AUTO DIFF WBC: CPT | Performed by: STUDENT IN AN ORGANIZED HEALTH CARE EDUCATION/TRAINING PROGRAM

## 2021-05-24 RX ORDER — MAGNESIUM CARB/ALUMINUM HYDROX 105-160MG
150 TABLET,CHEWABLE ORAL ONCE
Status: DISCONTINUED | OUTPATIENT
Start: 2021-05-24 | End: 2021-05-24

## 2021-05-24 RX ORDER — POLYETHYLENE GLYCOL 3350 17 G/17G
17 POWDER, FOR SOLUTION ORAL 2 TIMES DAILY
Qty: 850 G | Refills: 1 | Status: SHIPPED | OUTPATIENT
Start: 2021-05-24 | End: 2022-01-25

## 2021-05-24 RX ORDER — GABAPENTIN 400 MG/1
400 CAPSULE ORAL EVERY 6 HOURS PRN
Status: DISCONTINUED | OUTPATIENT
Start: 2021-05-24 | End: 2021-05-24

## 2021-05-24 RX ORDER — ACETAMINOPHEN 325 MG/1
650 TABLET ORAL EVERY 6 HOURS PRN
Status: DISCONTINUED | OUTPATIENT
Start: 2021-05-24 | End: 2021-05-24 | Stop reason: HOSPADM

## 2021-05-24 RX ORDER — ERYTHROMYCIN 250 MG/1
250 TABLET, COATED ORAL 3 TIMES DAILY
Qty: 90 TABLET | Refills: 0 | Status: SHIPPED | OUTPATIENT
Start: 2021-05-24 | End: 2021-06-24

## 2021-05-24 RX ADMIN — PANTOPRAZOLE SODIUM 40 MG: 40 TABLET, DELAYED RELEASE ORAL at 06:31

## 2021-05-24 RX ADMIN — ESCITALOPRAM OXALATE 10 MG: 10 TABLET ORAL at 08:50

## 2021-05-24 RX ADMIN — SPIRONOLACTONE 25 MG: 25 TABLET ORAL at 08:50

## 2021-05-24 RX ADMIN — ERYTHROMYCIN 250 MG: 250 TABLET, FILM COATED ORAL at 11:52

## 2021-05-24 RX ADMIN — DEXTROSE AND SODIUM CHLORIDE 100 ML/HR: 5; 450 INJECTION, SOLUTION INTRAVENOUS at 06:31

## 2021-05-24 RX ADMIN — ERYTHROMYCIN 250 MG: 250 TABLET, FILM COATED ORAL at 08:50

## 2021-05-24 RX ADMIN — GABAPENTIN 800 MG: 400 CAPSULE ORAL at 06:31

## 2021-05-24 RX ADMIN — ACETAMINOPHEN 650 MG: 325 TABLET, FILM COATED ORAL at 06:31

## 2021-05-24 NOTE — PLAN OF CARE
Goal Outcome Evaluation:  Plan of Care Reviewed With: patient     Outcome Summary: Pt rested between care. VSS. Working on pain control. Will continue to monitor.

## 2021-05-24 NOTE — DISCHARGE SUMMARY
"    DISCHARGE SUMMARY    PATIENT NAME: Amira Shannon       PHYSICIAN: Ari Bui MD  : 1960  MRN: [id number]    ADMITTED: 2021     DISCHARGED: 2021    ADMISSION DIAGNOSES:  Active Hospital Problems    Diagnosis  POA   • **Ileus (CMS/HCC) [K56.7]  Yes   • Lab test negative for COVID-19 virus [Z03.818]  Not Applicable   • Hypertension [I10]  Yes   • Chronic pain syndrome [G89.4]  Yes   • Cirrhosis of liver (CMS/HCC) [K74.60]  Yes   • CHAI on CPAP [G47.33, Z99.89]  Not Applicable      Resolved Hospital Problems    Diagnosis Date Resolved POA   • Nausea [R11.0] 2021 Yes   • Partial small bowel obstruction (CMS/HCC) [K56.600] 2021 Yes     DISCHARGE DIAGNOSES:   Active Hospital Problems    Diagnosis  POA   • **Ileus (CMS/HCC) [K56.7]  Yes   • Lab test negative for COVID-19 virus [Z03.818]  Not Applicable   • Hypertension [I10]  Yes   • Chronic pain syndrome [G89.4]  Yes   • Cirrhosis of liver (CMS/HCC) [K74.60]  Yes   • CHAI on CPAP [G47.33, Z99.89]  Not Applicable      Resolved Hospital Problems    Diagnosis Date Resolved POA   • Nausea [R11.0] 2021 Yes   • Partial small bowel obstruction (CMS/HCC) [K56.600] 2021 Yes       SERVICE: Family Medicine Residency  Attending: Katherine Villar MD  Resident: Ari Bui MD    CONSULTS:   Consult Orders (all) (From admission, onward)     Start     Ordered    21 1506  Inpatient Cardiology Consult  Once     Specialty:  Cardiology  Provider:  Nicholas Stanton MD    21 1506                PROCEDURES:   None    HISTORY OF PRESENT ILLNESS:   Per HP by Busby dated 21  \"Amira Shannon is a 60 y.o. female with a CMH of HLD, HTN, MUSA, GERD, Chronic pain (LBP, b/l foot) on oxycodone, who presents complaining of  Generalized abdominal pain.     She has had several abdominal surgeries and was recently hospitalized at [facility]  2021 - 2021 for Ileus and abdominal pain that resolved with NG tube and " "Golytely that produced several BM's. Since discharge from hospital she has followed up with Dr. Troncoso who to her understanding wants her cleared by cardiology before attempting \"to remove non-functional bowel.\"     She presents to the ED complaining of several days of progressively worsening abdominal pain, generalized but worse in RLQ, throbbing, 9/10, constant. Aggravated with movement; no alleviating factors. Associated with nausea, no emesis, and anorexia with feeling of satiety.      Chronic pain management gives OxyContin for b/l foot pain and LBP. \"    DIAGNOSTIC DATA:   Lab Results (last 48 hours)     Procedure Component Value Units Date/Time    Comprehensive Metabolic Panel [[id number]]  (Abnormal) Collected: 05/24/21 0606    Specimen: Blood Updated: 05/24/21 0653     Glucose 90 mg/dL      BUN 5 mg/dL      Creatinine 0.93 mg/dL      Sodium 139 mmol/L      Potassium 3.9 mmol/L      Chloride 106 mmol/L      CO2 28.0 mmol/L      Calcium 8.9 mg/dL      Total Protein 6.1 g/dL      Albumin 3.60 g/dL      ALT (SGPT) 20 U/L      AST (SGOT) 42 U/L      Alkaline Phosphatase 118 U/L      Total Bilirubin 1.1 mg/dL      eGFR Non African Amer 61 mL/min/1.73      Globulin 2.5 gm/dL      A/G Ratio 1.4 g/dL      BUN/Creatinine Ratio 5.4     Anion Gap 5.0 mmol/L     Narrative:      GFR Normal >60  Chronic Kidney Disease <60  Kidney Failure <15      CBC & Differential [[id number]]  (Abnormal) Collected: 05/24/21 0606    Specimen: Blood Updated: 05/24/21 0632    Narrative:      The following orders were created for panel order CBC & Differential.  Procedure                               Abnormality         Status                     ---------                               -----------         ------                     Scan Slide[[id number]]                                                                  CBC Auto Differential[[id number]]        Abnormal            Final result                 Please view results for these tests on " the individual orders.    CBC Auto Differential [[id number]]  (Abnormal) Collected: 05/24/21 0606    Specimen: Blood Updated: 05/24/21 0632     WBC 3.63 10*3/mm3      RBC 4.82 10*6/mm3      Hemoglobin 12.6 g/dL      Hematocrit 38.4 %      MCV 79.7 fL      MCH 26.1 pg      MCHC 32.8 g/dL      RDW 17.5 %      RDW-SD 50.7 fl      MPV 11.2 fL      Platelets 64 10*3/mm3      Neutrophil % 66.1 %      Lymphocyte % 24.2 %      Monocyte % 5.8 %      Eosinophil % 3.3 %      Basophil % 0.3 %      Immature Grans % 0.3 %      Neutrophils, Absolute 2.40 10*3/mm3      Lymphocytes, Absolute 0.88 10*3/mm3      Monocytes, Absolute 0.21 10*3/mm3      Eosinophils, Absolute 0.12 10*3/mm3      Basophils, Absolute 0.01 10*3/mm3      Immature Grans, Absolute 0.01 10*3/mm3      nRBC 0.0 /100 WBC     Blood Culture - Blood, Hand, Right [[id number]] Collected: 05/18/21 1522    Specimen: Blood from Hand, Right Updated: 05/23/21 1530     Blood Culture No growth at 5 days    Blood Culture - Blood, Hand, Left [[id number]] Collected: 05/18/21 1522    Specimen: Blood from Hand, Left Updated: 05/23/21 1530     Blood Culture No growth at 5 days    CBC & Differential [[id number]]  (Abnormal) Collected: 05/23/21 0659    Specimen: Blood Updated: 05/23/21 0814    Narrative:      The following orders were created for panel order CBC & Differential.  Procedure                               Abnormality         Status                     ---------                               -----------         ------                     Scan Slide[[id number]]                                       Final result               CBC Auto Differential[[id number]]        Abnormal            Final result                 Please view results for these tests on the individual orders.    Scan Slide [[id number]] Collected: 05/23/21 0659    Specimen: Blood Updated: 05/23/21 0814     Anisocytosis Slight/1+     Hypochromia Slight/1+     WBC Morphology Normal     Platelet Estimate Decreased     Comprehensive Metabolic Panel [[id number]]  (Abnormal) Collected: 05/23/21 0659    Specimen: Blood Updated: 05/23/21 0807     Glucose 101 mg/dL      BUN 5 mg/dL      Creatinine 0.89 mg/dL      Sodium 139 mmol/L      Potassium 3.7 mmol/L      Chloride 106 mmol/L      CO2 27.0 mmol/L      Calcium 8.5 mg/dL      Total Protein 6.1 g/dL      Albumin 3.50 g/dL      ALT (SGPT) 21 U/L      AST (SGOT) 41 U/L      Alkaline Phosphatase 111 U/L      Total Bilirubin 1.0 mg/dL      eGFR Non African Amer 64 mL/min/1.73      Globulin 2.6 gm/dL      A/G Ratio 1.3 g/dL      BUN/Creatinine Ratio 5.6     Anion Gap 6.0 mmol/L     Narrative:      GFR Normal >60  Chronic Kidney Disease <60  Kidney Failure <15      CBC Auto Differential [[id number]]  (Abnormal) Collected: 05/23/21 0659    Specimen: Blood Updated: 05/23/21 0749     WBC 2.93 10*3/mm3      RBC 4.67 10*6/mm3      Hemoglobin 12.2 g/dL      Hematocrit 37.4 %      MCV 80.1 fL      MCH 26.1 pg      MCHC 32.6 g/dL      RDW 17.6 %      RDW-SD 50.6 fl      MPV 11.5 fL      Platelets 64 10*3/mm3      Neutrophil % 70.4 %      Lymphocyte % 20.8 %      Monocyte % 4.8 %      Eosinophil % 3.4 %      Basophil % 0.3 %      Immature Grans % 0.3 %      Neutrophils, Absolute 2.06 10*3/mm3      Lymphocytes, Absolute 0.61 10*3/mm3      Monocytes, Absolute 0.14 10*3/mm3      Eosinophils, Absolute 0.10 10*3/mm3      Basophils, Absolute 0.01 10*3/mm3      Immature Grans, Absolute 0.01 10*3/mm3      nRBC 0.0 /100 WBC         CT Abdomen Pelvis With Contrast    Result Date: 5/23/2021  Oral contrast medium in the rectum with no evidence of complete bowel obstruction. Splenomegaly with portal venous hypertension. Cirrhotic change of the liver. Two adjacent noncalcified nodules in the left lung base measuring 5.7 mm and 4 mm, also visualized on recent CT scan. Follow-up as per Fleischner Society recommendation. Moderate pelvic free fluid. Electronically signed by:  Ilia Gagnon MD  5/23/2021 8:48 PM CDT  Workstation: [id number]    XR Abdomen KUB    Result Date: 5/23/2021  Mildly dilated gas-filled loops of small bowel in central abdomen, may represent focal ileus or but early or partial small bowel obstruction cannot be entirely excluded. This can be followed as is deemed clinically warranted. If pain or symptoms persist beyond reasonable expectations, a CT examination is suggested, as is deemed clinically appropriate. Electronically signed by:  Chandrika Grigsby MD  5/23/2021 1:33 PM CDT Workstation: [id number]       HOSPITAL COURSE:  Patient was brought on service and all opiate medications were discontinued.  Patient was placed on fluids while initially placed NPO.  We attempted to adv diet from NPO to GI soft but the patient was unable to tolerate and bowel sounds were minimal.  A GI follow thorough study was done and showed contrast in the distal colon.  Patient was able to have regular BM's ranging from watery to loosely formed.  A second attempt to adv from NPO to GI soft was done with erythromycin and the patient once again was unable to tolerate and her bowel sounds were still irregular.  KUB was done and CT abdomen was done after to confirm there was no SBO.  Patient was able to tolerate an advancement of her diet and her bowel sounds were regular on day of discharge.    DISCHARGE CONDITION:   Stable    DISPOSITION:  Home or Self Care    DISCHARGE MEDICATIONS     Discharge Medications      New Medications      Instructions Start Date   erythromycin base 250 MG tablet  Commonly known as: E-MYCIN   250 mg, Oral, 3 Times Daily         Changes to Medications      Instructions Start Date   furosemide 20 MG tablet  Commonly known as: LASIX  What changed: how much to take   40 mg, Oral, Daily      metoclopramide 5 MG tablet  Commonly known as: Reglan  What changed: when to take this   5 mg, Oral, 3 Times Daily Before Meals      polyethylene glycol 17 GM/SCOOP powder  Commonly known as: MiraLax  What changed: when to  take this   17 g, Oral, 2 Times Daily         Continue These Medications      Instructions Start Date   cetirizine 10 MG tablet  Commonly known as: zyrTEC   1 tablet, Oral, Daily      cloNIDine 0.1 MG tablet  Commonly known as: CATAPRES   0.1 mg, Oral, 2 Times Daily, PRN systolic BP >160.      CRANBERRY PO   1 capsule, Oral, 2 times daily      cyclobenzaprine 10 MG tablet  Commonly known as: FLEXERIL   10 mg, Oral, 3 Times Daily PRN      diclofenac 75 MG EC tablet  Commonly known as: VOLTAREN   75 mg, Oral, Daily      dicyclomine 20 MG tablet  Commonly known as: BENTYL   20 mg, Oral, Every 6 Hours PRN      docusate sodium 100 MG capsule  Commonly known as: COLACE   100 mg, Oral, Daily      escitalopram 10 MG tablet  Commonly known as: LEXAPRO   10 mg, Oral, Daily      fleet enema 7-19 GM/118ML enema   1 enema, Rectal, As Needed      gabapentin 800 MG tablet  Commonly known as: NEURONTIN   800 mg, Oral, 4 Times Daily      meclizine 25 MG tablet  Commonly known as: ANTIVERT   50 mg, Oral, As Needed      Melatonin 10 MG capsule   Oral, Nightly      Myrbetriq 50 MG tablet sustained-release 24 hour 24 hr tablet  Generic drug: Mirabegron ER   50 mg, Oral, Daily      ondansetron 8 MG tablet  Commonly known as: ZOFRAN   8 mg, Oral, Every 8 Hours PRN      ondansetron ODT 4 MG disintegrating tablet  Commonly known as: ZOFRAN-ODT   4 mg, Translingual, Every 8 Hours PRN      pantoprazole 40 MG EC tablet  Commonly known as: Protonix   40 mg, Oral, Daily      Relistor 150 MG tablet  Generic drug: Methylnaltrexone Bromide   450 mg, Oral, Daily      spironolactone 25 MG tablet  Commonly known as: ALDACTONE   TAKE 1 TABLET BY MOUTH ONCE DAILY      SUMAtriptan 100 MG tablet  Commonly known as: IMITREX   50 mg, Oral, Every 2 Hours PRN         Stop These Medications    oxyCODONE 10 MG tablet  Commonly known as: ROXICODONE            INSTRUCTIONS:  Activity:   Activity Instructions     Activity as Tolerated          Diet:   Diet  Instructions     Diet: Soft Texture; Thin Liquids, No Restrictions; Ground      Discharge Diet: Soft Texture    Fluid Consistency: Thin Liquids, No Restrictions    Soft Options: Ground          FOLLOW UP:   Additional Instructions for the Follow-ups that You Need to Schedule     Ambulatory Referral to Home Health   As directed      Face to Face Visit Date: 5/23/2021    Follow-up provider for Plan of Care?: I treated the patient in an acute care facility and will not continue treatment after discharge.    Follow-up provider: YOBANY BARR [[id number]]    Reason/Clinical Findings: Transtion per case maangement    Describe mobility limitations that make leaving home difficult: Poor mobility    Nursing/Therapeutic Services Requested: Other (Transition)    Frequency: 1 Week 1         Call MD With Problems / Concerns   As directed      Instructions: If worsening abdominal pain, emesis, nausea, fevers, chills.    Order Comments: Instructions: If worsening abdominal pain, emesis, nausea, fevers, chills.          Discharge Follow-up with PCP   As directed       Currently Documented PCP:    Yobany Barr MD    PCP Phone Number:    [phone]     Follow Up Details: 1 week         Discharge Follow-up with Specified Provider: Dr. Benjamin Troncoso; 2 Weeks   As directed      To: Dr. Benjamin Troncoso    Follow Up: 2 Weeks           Follow-up Information     Yobany Barr MD Follow up on 6/3/2021.    Specialty: Family Medicine  Why: Hospital follow up appointment Thursday 6/3/21 at 10:15am.  Contact information:  [address] [place] [id number]  [phone]             Yobany Barr MD .    Specialty: Family Medicine  Why: 1 week  Contact information:  [address] [place] [address]  [phone]                   PENDING TEST RESULTS AT DISCHARGE      Time: >30 minutes were spent in discharge planning, medication reconciliation and coordination of care for this patient.    Katherine Villar MD is the attending at time of  discharge, She is aware of the patient's status and agrees with the above discharge summary.      This document has been electronically signed by Ari Bui MD on May 24, 2021 12:49 CDT

## 2021-05-24 NOTE — PROGRESS NOTES
FAMILY MEDICINE DAILY PROGRESS NOTE    NAME: Amira Shannon  : 1960  MRN: [id number]      LOS: 0 days     PROVIDER OF SERVICE: Ari Bui MD    Chief Complaint: Ileus (CMS/HCC)    Subjective:     Interval History:  History taken from: patient  Patient Complaints: None   Patient Denies:  Worsening indigestion/constipation    Patient states she is doing fine this AM.  Some reports that her feet were hurting last night and she needs opiates, which were appropriately denied by night flat.  Able to tolerate her liquid diet this AM and feels able to advance.    Review of Systems:   Review of Systems   Constitutional: Negative for chills and fever.   HENT: Negative for congestion and rhinorrhea.    Eyes: Negative for visual disturbance.   Respiratory: Negative for shortness of breath.    Cardiovascular: Negative for chest pain.   Gastrointestinal: Positive for nausea. Negative for abdominal pain, diarrhea and vomiting.   Endocrine: Negative for polyuria.   Genitourinary: Negative for difficulty urinating and dyspareunia.   Musculoskeletal: Positive for arthralgias and joint swelling. Negative for back pain and myalgias.   Skin: Negative for rash and wound.   Neurological: Negative for weakness, numbness and headaches.   Psychiatric/Behavioral: Negative for agitation, behavioral problems and confusion.       Objective:     Vital Signs  Temp:  [95.7 °F (35.4 °C)-97.7 °F (36.5 °C)] 97.7 °F (36.5 °C)  Heart Rate:  [63-75] 70  Resp:  [18-20] 18  BP: (138-180)/(78-90) 158/85   Body mass index is 34.5 kg/m².    Physical Exam  Physical Exam  Constitutional:       General: She is not in acute distress.  HENT:      Head: Normocephalic and atraumatic.   Cardiovascular:      Rate and Rhythm: Normal rate and regular rhythm.      Heart sounds: Normal heart sounds.   Pulmonary:      Effort: Pulmonary effort is normal. No respiratory distress.      Breath sounds: Normal breath sounds.   Abdominal:      General: Bowel  sounds are normal.      Palpations: Abdomen is soft.      Tenderness: There is no abdominal tenderness.   Musculoskeletal:      Right lower leg: No edema.      Left lower leg: No edema.   Skin:     General: Skin is warm and dry.   Neurological:      Mental Status: She is alert.   Psychiatric:         Mood and Affect: Mood normal.         Behavior: Behavior normal.         Scheduled Meds   erythromycin base, 250 mg, Oral, TID With Meals  escitalopram, 10 mg, Oral, Daily  gabapentin, 800 mg, Oral, Q8H  melatonin, 6 mg, Oral, Nightly  pantoprazole, 40 mg, Oral, Q AM  sodium chloride, 10 mL, Intravenous, Q12H  spironolactone, 25 mg, Oral, Daily       PRN Meds   •  acetaminophen  •  calcium carbonate  •  cyclobenzaprine  •  ondansetron  •  sodium chloride      Diagnostic Data    Results from last 7 days   Lab Units 05/24/21  0606   WBC 10*3/mm3 3.63   HEMOGLOBIN g/dL 12.6   HEMATOCRIT % 38.4   PLATELETS 10*3/mm3 64*   GLUCOSE mg/dL 90   CREATININE mg/dL 0.93   BUN mg/dL 5*   SODIUM mmol/L 139   POTASSIUM mmol/L 3.9   AST (SGOT) U/L 42*   ALT (SGPT) U/L 20   ALK PHOS U/L 118*   BILIRUBIN mg/dL 1.1   ANION GAP mmol/L 5.0       CT Abdomen Pelvis With Contrast    Result Date: 5/23/2021  Oral contrast medium in the rectum with no evidence of complete bowel obstruction. Splenomegaly with portal venous hypertension. Cirrhotic change of the liver. Two adjacent noncalcified nodules in the left lung base measuring 5.7 mm and 4 mm, also visualized on recent CT scan. Follow-up as per Fleischner Society recommendation. Moderate pelvic free fluid. Electronically signed by:  Ilia Gagnon MD  5/23/2021 8:48 PM CDT Workstation: [id number]    XR Abdomen KUB    Result Date: 5/23/2021  Mildly dilated gas-filled loops of small bowel in central abdomen, may represent focal ileus or but early or partial small bowel obstruction cannot be entirely excluded. This can be followed as is deemed clinically warranted. If pain or symptoms persist beyond  reasonable expectations, a CT examination is suggested, as is deemed clinically appropriate. Electronically signed by:  Chandrika Grigsby MD  5/23/2021 1:33 PM CDT Workstation: [id number]        I reviewed the patient's new clinical results.    Assessment/Plan:     Active Hospital Problems    Diagnosis  POA   • **Ileus (CMS/HCC) [K56.7]  Yes     Drug-induced ileus vs SBO. On admisison lactate wnl, no BIANCA, no hypokalemia. FT study shows contrast at end of colon. No SBO per CT.  BS wnL today.  -Miralax  BID  -Have added Erythromycin for motility 5/21/2021  - curbsided gen surg, Dr Troncoso, OP f/u  - I/O's     • Lab test negative for COVID-19 virus [Z03.818]  Not Applicable   • Hypertension [I10]  Yes     -Outpatient takes Clonidine 0.1mg BID only as needed.  -cover with hydralazine IV for SBP >160 mmHg while NPO     • Chronic pain syndrome [G89.4]  Yes     Patient reportedly follows with chronic pain management who has placed her on oxycodone and methyl naltrexone for bowel management.  Her chronic pain stems from lower back pain, osteoarthritis of both knees, and bilateral foot pains.  She asks for pain management with Dilaudid or morphine.  Discussed with patient that opiate medications are likely contributing at least in some part to her frequent ileus versus SBO.  Discussed that we would attempt nonopiate medications for pain management and discussed expectations for pain that is controlled however may not be completely alleviated.   -We will increase Pt's Gabapentin 800mg TID     • Nausea [R11.0]  Yes     zofran     • Cirrhosis of liver (CMS/HCC) [K74.60]  Yes     Managed by GI as Op     • CHAI on CPAP [G47.33, Z99.89]  Not Applicable     cpap qhs         DVT prophylaxis:  Mechanical DVT prophylaxis orders are present.     Code status is   Code Status and Medical Interventions:   Ordered at: 05/18/21 2051     Level Of Support Discussed With:    Patient     Code Status:    CPR     Medical Interventions (Level of Support  Prior to Arrest):    Full       Plan for disposition:0-1 day      Time: 30 mins       This document has been electronically signed by Ari Bui MD on May 24, 2021 11:46 CDT

## 2021-05-25 ENCOUNTER — READMISSION MANAGEMENT (OUTPATIENT)
Dept: CALL CENTER | Facility: HOSPITAL | Age: 61
End: 2021-05-25

## 2021-05-25 NOTE — OUTREACH NOTE
Prep Survey      Responses   [facility] facility patient discharged from?  [facility]   Is LACE score < 7 ?  No   Emergency Room discharge w/ pulse ox?  No   Eligibility  Readm Mgmt   Discharge diagnosis  Ileus   Does the patient have one of the following disease processes/diagnoses(primary or secondary)?  Other   Does the patient have Home health ordered?  Yes   What is the Home health agency?   [facility]   Is there a DME ordered?  No   Prep survey completed?  Yes          Darlene Raman RN

## 2021-05-27 ENCOUNTER — TELEPHONE (OUTPATIENT)
Dept: ORTHOPEDIC SURGERY | Facility: CLINIC | Age: 61
End: 2021-05-27

## 2021-05-27 NOTE — TELEPHONE ENCOUNTER
----- Message from LIYAH Nur sent at 5/27/2021  2:46 PM CDT -----  Please contact patient and let her know that I have received a request to order her a power wheelchair for her bilateral knee pain.  I have never seen her for these issues.  After reviewing the chart I can only see where patient has mild arthritic change in bilateral knees as well as a BMI of 34.  This does not qualify for a power wheelchair as we would like patient to remain as mobile as possible.  If she would like to come in to see me to discuss knee pain, there are other options we can do to help treat her pain such as injections, medications ETC.

## 2021-05-28 ENCOUNTER — READMISSION MANAGEMENT (OUTPATIENT)
Dept: CALL CENTER | Facility: HOSPITAL | Age: 61
End: 2021-05-28

## 2021-05-28 NOTE — OUTREACH NOTE
Medical Week 1 Survey      Responses   [facility] patient discharged from?  [facility]   Does the patient have one of the following disease processes/diagnoses(primary or secondary)?  Other   Week 1 attempt successful?  Yes   Call start time  1547   Call end time  1555   Discharge diagnosis  Ileus   Does the patient have all medications ordered at discharge?  Yes   Is the patient taking all medications as directed (includes completed medication regime)?  Yes   Comments regarding appointments  f/u with General surgery on 6/7   Does the patient have a primary care provider?   Yes   Does the patient have an appointment with their PCP within 7 days of discharge?  Yes   Comments regarding PCP  f/u with Dr. Barr on 6/3   Has the patient kept scheduled appointments due by today?  N/A   What is the Home health agency?   [facility]   Has home health visited the patient within 72 hours of discharge?  Yes   Psychosocial issues?  No   Did the patient receive a copy of their discharge instructions?  Yes   Nursing interventions  Reviewed instructions with patient   What is the patient's perception of their health status since discharge?  Same   Is the patient/caregiver able to teach back signs and symptoms related to disease process for when to call PCP?  Yes   Is the patient/caregiver able to teach back signs and symptoms related to disease process for when to call 911?  Yes   Is the patient/caregiver able to teach back the hierarchy of who to call/visit for symptoms/problems? PCP, Specialist, Home health nurse, Urgent Care, ED, 911  Yes   Week 1 call completed?  Yes   Wrap up additional comments  States she is still having the cramping and has not had a BM in 2 days, advised her to keep taking her medications and to call gastro doctor or go to ER if symptoms worsen, she confirmed her f/u appts.          Magaly Cotton RN

## 2021-06-07 ENCOUNTER — LAB (OUTPATIENT)
Dept: LAB | Facility: HOSPITAL | Age: 61
End: 2021-06-07

## 2021-06-07 ENCOUNTER — OFFICE VISIT (OUTPATIENT)
Dept: SURGERY | Facility: CLINIC | Age: 61
End: 2021-06-07

## 2021-06-07 VITALS
DIASTOLIC BLOOD PRESSURE: 80 MMHG | OXYGEN SATURATION: 98 % | HEIGHT: 64 IN | HEART RATE: 103 BPM | BODY MASS INDEX: 35.96 KG/M2 | TEMPERATURE: 98.4 F | SYSTOLIC BLOOD PRESSURE: 152 MMHG | WEIGHT: 210.6 LBS

## 2021-06-07 DIAGNOSIS — K74.60 CIRRHOSIS OF LIVER WITHOUT ASCITES, UNSPECIFIED HEPATIC CIRRHOSIS TYPE (HCC): Primary | ICD-10-CM

## 2021-06-07 DIAGNOSIS — K74.60 CIRRHOSIS OF LIVER WITHOUT ASCITES, UNSPECIFIED HEPATIC CIRRHOSIS TYPE (HCC): ICD-10-CM

## 2021-06-07 LAB
ALBUMIN SERPL-MCNC: 3.6 G/DL (ref 3.5–5.2)
ALBUMIN/GLOB SERPL: 1.2 G/DL
ALP SERPL-CCNC: 132 U/L (ref 39–117)
ALT SERPL W P-5'-P-CCNC: 29 U/L (ref 1–33)
ANION GAP SERPL CALCULATED.3IONS-SCNC: 9.5 MMOL/L (ref 5–15)
APTT PPP: 35.6 SECONDS (ref 20–40.3)
AST SERPL-CCNC: 47 U/L (ref 1–32)
BASOPHILS # BLD AUTO: 0.01 10*3/MM3 (ref 0–0.2)
BASOPHILS NFR BLD AUTO: 0.3 % (ref 0–1.5)
BILIRUB SERPL-MCNC: 0.7 MG/DL (ref 0–1.2)
BUN SERPL-MCNC: 8 MG/DL (ref 8–23)
BUN/CREAT SERPL: 8.6 (ref 7–25)
CALCIUM SPEC-SCNC: 8.9 MG/DL (ref 8.6–10.5)
CHLORIDE SERPL-SCNC: 108 MMOL/L (ref 98–107)
CO2 SERPL-SCNC: 25.5 MMOL/L (ref 22–29)
CREAT SERPL-MCNC: 0.93 MG/DL (ref 0.57–1)
DEPRECATED RDW RBC AUTO: 49.3 FL (ref 37–54)
EOSINOPHIL # BLD AUTO: 0.09 10*3/MM3 (ref 0–0.4)
EOSINOPHIL NFR BLD AUTO: 2.6 % (ref 0.3–6.2)
ERYTHROCYTE [DISTWIDTH] IN BLOOD BY AUTOMATED COUNT: 17.3 % (ref 12.3–15.4)
GFR SERPL CREATININE-BSD FRML MDRD: 61 ML/MIN/1.73
GLOBULIN UR ELPH-MCNC: 3 GM/DL
GLUCOSE SERPL-MCNC: 129 MG/DL (ref 65–99)
HCT VFR BLD AUTO: 37.7 % (ref 34–46.6)
HGB BLD-MCNC: 12.7 G/DL (ref 12–15.9)
INR PPP: 1.17 (ref 0.8–1.2)
LYMPHOCYTES # BLD AUTO: 0.59 10*3/MM3 (ref 0.7–3.1)
LYMPHOCYTES NFR BLD AUTO: 17 % (ref 19.6–45.3)
MAGNESIUM SERPL-MCNC: 1.9 MG/DL (ref 1.6–2.4)
MCH RBC QN AUTO: 27.1 PG (ref 26.6–33)
MCHC RBC AUTO-ENTMCNC: 33.7 G/DL (ref 31.5–35.7)
MCV RBC AUTO: 80.6 FL (ref 79–97)
MONOCYTES # BLD AUTO: 0.2 10*3/MM3 (ref 0.1–0.9)
MONOCYTES NFR BLD AUTO: 5.8 % (ref 5–12)
NEUTROPHILS NFR BLD AUTO: 2.57 10*3/MM3 (ref 1.7–7)
NEUTROPHILS NFR BLD AUTO: 74 % (ref 42.7–76)
PHOSPHATE SERPL-MCNC: 2.5 MG/DL (ref 2.5–4.5)
PLATELET # BLD AUTO: 55 10*3/MM3 (ref 140–450)
PMV BLD AUTO: 11.4 FL (ref 6–12)
POTASSIUM SERPL-SCNC: 3.7 MMOL/L (ref 3.5–5.2)
PROT SERPL-MCNC: 6.6 G/DL (ref 6–8.5)
PROTHROMBIN TIME: 15.5 SECONDS (ref 11.1–15.3)
RBC # BLD AUTO: 4.68 10*6/MM3 (ref 3.77–5.28)
SODIUM SERPL-SCNC: 143 MMOL/L (ref 136–145)
WBC # BLD AUTO: 3.47 10*3/MM3 (ref 3.4–10.8)

## 2021-06-07 PROCEDURE — 85025 COMPLETE CBC W/AUTO DIFF WBC: CPT

## 2021-06-07 PROCEDURE — 36415 COLL VENOUS BLD VENIPUNCTURE: CPT

## 2021-06-07 PROCEDURE — 80053 COMPREHEN METABOLIC PANEL: CPT

## 2021-06-07 PROCEDURE — 85610 PROTHROMBIN TIME: CPT

## 2021-06-07 PROCEDURE — 84100 ASSAY OF PHOSPHORUS: CPT

## 2021-06-07 PROCEDURE — 83735 ASSAY OF MAGNESIUM: CPT

## 2021-06-07 PROCEDURE — 85730 THROMBOPLASTIN TIME PARTIAL: CPT

## 2021-06-07 PROCEDURE — 99213 OFFICE O/P EST LOW 20 MIN: CPT | Performed by: SURGERY

## 2021-06-07 RX ORDER — OMEPRAZOLE 40 MG/1
40 CAPSULE, DELAYED RELEASE ORAL DAILY
COMMUNITY
Start: 2021-05-24 | End: 2021-06-11

## 2021-06-07 NOTE — PATIENT INSTRUCTIONS
"BMI for Adults  What is BMI?  Body mass index (BMI) is a number that is calculated from a person's weight and height. BMI can help estimate how much of a person's weight is composed of fat. BMI does not measure body fat directly. Rather, it is an alternative to procedures that directly measure body fat, which can be difficult and expensive.  BMI can help identify people who may be at higher risk for certain medical problems.  What are BMI measurements used for?  BMI is used as a screening tool to identify possible weight problems. It helps determine whether a person is obese, overweight, a healthy weight, or underweight.  BMI is useful for:  · Identifying a weight problem that may be related to a medical condition or may increase the risk for medical problems.  · Promoting changes, such as changes in diet and exercise, to help reach a healthy weight. BMI screening can be repeated to see if these changes are working.  How is BMI calculated?  BMI involves measuring your weight in relation to your height. Both height and weight are measured, and the BMI is calculated from those numbers. This can be done either in English ([place] or metric measurements. Note that charts and online BMI calculators are available to help you find your BMI quickly and easily without having to do these calculations yourself.  To calculate your BMI in English ([place] measurements:    1. Measure your weight in pounds (lb).  2. Multiply the number of pounds by 703.  ? For example, for a person who weighs 180 lb, multiply that number by 703, which equals 126,540.  3. Measure your height in inches. Then multiply that number by itself to get a measurement called \"inches squared.\"  ? For example, for a person who is 70 inches tall, the \"inches squared\" measurement is 70 inches x 70 inches, which equals 4,900 inches squared.  4. Divide the total from step 2 (number of lb x 703) by the total from step 3 (inches squared): 126,540 ÷ 4,900 = 25.8. This is " "your BMI.  To calculate your BMI in metric measurements:  1. Measure your weight in kilograms (kg).  2. Measure your height in meters (m). Then multiply that number by itself to get a measurement called \"meters squared.\"  ? For example, for a person who is 1.75 m tall, the \"meters squared\" measurement is 1.75 m x 1.75 m, which is equal to 3.1 meters squared.  3. Divide the number of kilograms (your weight) by the meters squared number. In this example: 70 ÷ 3.1 = 22.6. This is your BMI.  What do the results mean?  BMI charts are used to identify whether you are underweight, normal weight, overweight, or obese. The following guidelines will be used:  · Underweight: BMI less than 18.5.  · Normal weight: BMI between 18.5 and 24.9.  · Overweight: BMI between 25 and 29.9.  · Obese: BMI of 30 or above.  Keep these notes in mind:  · Weight includes both fat and muscle, so someone with a muscular build, such as an athlete, may have a BMI that is higher than 24.9. In cases like these, BMI is not an accurate measure of body fat.  · To determine if excess body fat is the cause of a BMI of 25 or higher, further assessments may need to be done by a health care provider.  · BMI is usually interpreted in the same way for men and women.  Where to find more information  For more information about BMI, including tools to quickly calculate your BMI, go to these websites:  · Centers for Disease Control and Prevention: www.cdc.gov  · American Heart Association: www.heart.org  · National Heart, Lung, and Blood [place]: www.nhlbi.nih.gov  Summary  · Body mass index (BMI) is a number that is calculated from a person's weight and height.  · BMI may help estimate how much of a person's weight is composed of fat. BMI can help identify those who may be at higher risk for certain medical problems.  · BMI can be measured using English measurements or metric measurements.  · BMI charts are used to identify whether you are underweight, normal " weight, overweight, or obese.  This information is not intended to replace advice given to you by your health care provider. Make sure you discuss any questions you have with your health care provider.  Document Revised: 09/09/2020 Document Reviewed: 07/17/2020  Elsevier Patient Education © 2021 [employer]

## 2021-06-09 ENCOUNTER — OFFICE VISIT (OUTPATIENT)
Dept: SURGERY | Facility: CLINIC | Age: 61
End: 2021-06-09

## 2021-06-09 VITALS
HEART RATE: 92 BPM | TEMPERATURE: 97.8 F | OXYGEN SATURATION: 96 % | SYSTOLIC BLOOD PRESSURE: 162 MMHG | HEIGHT: 64 IN | BODY MASS INDEX: 35.71 KG/M2 | WEIGHT: 209.2 LBS | DIASTOLIC BLOOD PRESSURE: 93 MMHG

## 2021-06-09 DIAGNOSIS — K59.04 CHRONIC IDIOPATHIC CONSTIPATION: ICD-10-CM

## 2021-06-09 DIAGNOSIS — D69.6 THROMBOCYTOPENIA (HCC): Primary | ICD-10-CM

## 2021-06-09 PROCEDURE — 99212 OFFICE O/P EST SF 10 MIN: CPT | Performed by: SURGERY

## 2021-06-09 NOTE — PATIENT INSTRUCTIONS
"BMI for Adults  What is BMI?  Body mass index (BMI) is a number that is calculated from a person's weight and height. BMI can help estimate how much of a person's weight is composed of fat. BMI does not measure body fat directly. Rather, it is an alternative to procedures that directly measure body fat, which can be difficult and expensive.  BMI can help identify people who may be at higher risk for certain medical problems.  What are BMI measurements used for?  BMI is used as a screening tool to identify possible weight problems. It helps determine whether a person is obese, overweight, a healthy weight, or underweight.  BMI is useful for:  · Identifying a weight problem that may be related to a medical condition or may increase the risk for medical problems.  · Promoting changes, such as changes in diet and exercise, to help reach a healthy weight. BMI screening can be repeated to see if these changes are working.  How is BMI calculated?  BMI involves measuring your weight in relation to your height. Both height and weight are measured, and the BMI is calculated from those numbers. This can be done either in English ([place] or metric measurements. Note that charts and online BMI calculators are available to help you find your BMI quickly and easily without having to do these calculations yourself.  To calculate your BMI in English ([place] measurements:    1. Measure your weight in pounds (lb).  2. Multiply the number of pounds by 703.  ? For example, for a person who weighs 180 lb, multiply that number by 703, which equals 126,540.  3. Measure your height in inches. Then multiply that number by itself to get a measurement called \"inches squared.\"  ? For example, for a person who is 70 inches tall, the \"inches squared\" measurement is 70 inches x 70 inches, which equals 4,900 inches squared.  4. Divide the total from step 2 (number of lb x 703) by the total from step 3 (inches squared): 126,540 ÷ 4,900 = 25.8. This is " "your BMI.  To calculate your BMI in metric measurements:  1. Measure your weight in kilograms (kg).  2. Measure your height in meters (m). Then multiply that number by itself to get a measurement called \"meters squared.\"  ? For example, for a person who is 1.75 m tall, the \"meters squared\" measurement is 1.75 m x 1.75 m, which is equal to 3.1 meters squared.  3. Divide the number of kilograms (your weight) by the meters squared number. In this example: 70 ÷ 3.1 = 22.6. This is your BMI.  What do the results mean?  BMI charts are used to identify whether you are underweight, normal weight, overweight, or obese. The following guidelines will be used:  · Underweight: BMI less than 18.5.  · Normal weight: BMI between 18.5 and 24.9.  · Overweight: BMI between 25 and 29.9.  · Obese: BMI of 30 or above.  Keep these notes in mind:  · Weight includes both fat and muscle, so someone with a muscular build, such as an athlete, may have a BMI that is higher than 24.9. In cases like these, BMI is not an accurate measure of body fat.  · To determine if excess body fat is the cause of a BMI of 25 or higher, further assessments may need to be done by a health care provider.  · BMI is usually interpreted in the same way for men and women.  Where to find more information  For more information about BMI, including tools to quickly calculate your BMI, go to these websites:  · Centers for Disease Control and Prevention: www.cdc.gov  · American Heart Association: www.heart.org  · National Heart, Lung, and Blood [place]: www.nhlbi.nih.gov  Summary  · Body mass index (BMI) is a number that is calculated from a person's weight and height.  · BMI may help estimate how much of a person's weight is composed of fat. BMI can help identify those who may be at higher risk for certain medical problems.  · BMI can be measured using English measurements or metric measurements.  · BMI charts are used to identify whether you are underweight, normal " weight, overweight, or obese.  This information is not intended to replace advice given to you by your health care provider. Make sure you discuss any questions you have with your health care provider.  Document Revised: 09/09/2020 Document Reviewed: 07/17/2020  Elsevier Patient Education © 2021 [employer]

## 2021-06-09 NOTE — PROGRESS NOTES
"Chief Complaint   Patient presents with   • Follow-up     2 week hospital follow up        HPI  60 years old woman seen in follow-up following a questionable bowel obstruction.  In reality, she simply has significant constipation.  She has been operated twice in [place] for \"bowel obstruction\" the last was done by Ford Howard 2 to 3 years ago.  She therefore has a right upper quadrant incision for cholecystectomy and a long midline laparotomy.  She saw Maureen Cardoso for cardiac clearance and this is been accomplished.  Additionally, she has a history of cirrhosis of the liver.  She is considered for a subtotal colectomy to relieve her constipation which has been untreatable with innumerable different drugs.  Review of her chart demonstrates very low platelets over the last 3 years in the 40-60,000 range.  Past Medical History:   Diagnosis Date   • Acid reflux    • Altered bowel function    • Arthropathy of lumbar facet joint    • Bleeding disorder (CMS/HCC)    • C. difficile diarrhea 2015   • Chronic idiopathic constipation    • Colonic inertia    • Constipation    • Corns and callus    • Depression    • Disease related peripheral neuropathy    • Epigastric pain    • Fatty liver    • Hammer toe    • Headache    • Hiatal hernia    • History of transfusion    • Hyperlipidemia    • Knee pain    • Localized, primary osteoarthritis of the ankle and foot     Localized, primary osteoarthritis of the ankle and/or foot   • Mendoza's metatarsalgia     Mendoza's metatarsalgia - 2nd interspace on right   • Nausea and vomiting    • Neuralgia and neuritis     Neuralgia, neuritis, and radiculitis, unspecified   • Neuropathy    • Obstructive sleep apnea     Obstructive sleep apnea (adult) (pediatric)    • CHAI on CPAP     \"C-Pap at night  (unconfirmed)\"   • Osteoarthritis    • Pain in foot     Pain in unspecified foot - sees a podiatrist   • Pain in joint, ankle and foot     Joint pain in ankle and foot      • Pain radiating to " back     Pain radiating to lumbar region of back   • Pelvic floor dysfunction    • Plantar fasciitis    • PONV (postoperative nausea and vomiting)    • Restless leg syndrome    • Secondary hypertension     Secondary hypertension, unspecified   • Sinusitis    • Sleep apnea    • Tongue anomaly     lesion       Past Surgical History:   Procedure Laterality Date   • CARPAL TUNNEL RELEASE Left 2018    Procedure: CARPAL TUNNEL RELEASE - left;  Surgeon: Justus Lweis MD;  Location: [facility] OR;  Service: Orthopedics   • CARPAL TUNNEL RELEASE Right 2019    Procedure: carpal tunnel release right hand with local/monitored anesthesia care;  Surgeon: Justus Lewis MD;  Location: [facility] OR;  Service: Orthopedics   •  SECTION     • CHOLECYSTECTOMY     • COLONOSCOPY  2013   • COLONOSCOPY N/A 10/17/2018    Procedure: COLONOSCOPY;  Surgeon: Russell Del Rio MD;  Location: [facility] ENDOSCOPY;  Service: Gastroenterology   • COLONOSCOPY N/A 3/22/2021    Procedure: COLONOSCOPY;  Surgeon: Russell Del Rio MD;  Location: [facility] ENDOSCOPY;  Service: Gastroenterology;  Laterality: N/A;   • DIRECT LARYNGOSCOPY, ESOPHAGOSCOPY, BRONCHOSCOPY N/A 2017    Procedure: DIRECT LARYNGOSCOPY AND;  Surgeon: Live Bolton MD;  Location: [facility] OR;  Service:    • ENDOSCOPY  2013    Colon endoscopy 77509 (1) - Internal & external hemorrhoids found. Stool found.   • ENDOSCOPY  2013    EGD w/ tube 92965 (1) - Normal esophagus. Gastritis in stomach. Biopsy taken. Normal dudoenum. Biopsy taken.   • ENDOSCOPY N/A 10/17/2018    Procedure: ESOPHAGOGASTRODUODENOSCOPY--eval varices;  Surgeon: Russell Del Rio MD;  Location: [facility] ENDOSCOPY;  Service: Gastroenterology   • ENDOSCOPY N/A 3/22/2021    Procedure: ESOPHAGOGASTRODUODENOSCOPYURGNET;  Surgeon: Russell Del Rio MD;  Location: [facility] ENDOSCOPY;  Service: Gastroenterology;  Laterality: N/A;   • FOOT SURGERY  2013    Foot/toes surgery procedure  (1) - Arthroplasty of toes 4 and 5 of right foot.   • HERNIA REPAIR     • HERNIA REPAIR      hital   • HYSTERECTOMY     • LIVER BIOPSY     • NERVE BLOCK  07/25/2016    Injection for nerve block (1) - Lumbar medial branch block.   • OTHER SURGICAL HISTORY  12/03/2012    Inj(s) Tend-Sheath, Ligament, Single 20550 (1) - [facility] ([facility]    • OTHER SURGICAL HISTORY  06/24/2013    Small Joint Injection/Aspiration 20600 (2) - [facility]ICK[facility] ([facility])    • OTHER SURGICAL HISTORY  2011    bowel obstruction x2   • OTHER SURGICAL HISTORY      gland removed from neck   • SUBLINGUAL SALIVARY CYST EXCISION N/A 8/1/2017    Procedure: EXCISION OF LEFT  TONGUE LESION WITH CLOSURE;  Surgeon: Live Bolton MD;  Location: [facility];  Service:    • TUBAL ABDOMINAL LIGATION     • UPPER GASTROINTESTINAL ENDOSCOPY  07/01/2013   • UPPER GASTROINTESTINAL ENDOSCOPY  10/17/2018   • UPPER GASTROINTESTINAL ENDOSCOPY  03/22/2021         Current Outpatient Medications:   •  CRANBERRY PO, Take 1 capsule by mouth 2 (two) times a day., Disp: , Rfl:   •  docusate sodium (COLACE) 100 MG capsule, Take 100 mg by mouth Daily., Disp: , Rfl:   •  escitalopram (LEXAPRO) 10 MG tablet, Take 10 mg by mouth Daily., Disp: , Rfl:   •  gabapentin (NEURONTIN) 800 MG tablet, Take 800 mg by mouth 4 (Four) Times a Day., Disp: , Rfl:   •  Methylnaltrexone Bromide (Relistor) 150 MG tablet, Take 450 mg by mouth Daily., Disp: 90 tablet, Rfl: 1  •  metoclopramide (Reglan) 5 MG tablet, Take 1 tablet by mouth 3 (Three) Times a Day Before Meals. (Patient taking differently: Take 5 mg by mouth 2 (two) times a day.), Disp: 90 tablet, Rfl: 1  •  Mirabegron ER (Myrbetriq) 50 MG tablet sustained-release 24 hour 24 hr tablet, Take 50 mg by mouth Daily., Disp: , Rfl:   •  omeprazole (priLOSEC) 40 MG capsule, Take 40 mg by mouth Daily., Disp: , Rfl:   •  pantoprazole (Protonix) 40 MG EC tablet, Take 1 tablet by mouth Daily., Disp: 30 tablet, Rfl: 3  •  polyethylene  glycol (MiraLax) 17 GM/SCOOP powder, Take 17 g by mouth 2 (Two) Times a Day., Disp: 850 g, Rfl: 1  •  Sodium Phosphates (fleet enema) 7-19 GM/118ML enema, Insert 1 enema into the rectum As Needed., Disp: , Rfl:   •  spironolactone (ALDACTONE) 25 MG tablet, TAKE 1 TABLET BY MOUTH ONCE DAILY, Disp: 30 tablet, Rfl: 5  •  cetirizine (zyrTEC) 10 MG tablet, Take 1 tablet by mouth Daily., Disp: , Rfl:   •  cloNIDine (CATAPRES) 0.1 MG tablet, Take 1 tablet by mouth 2 (Two) Times a Day. PRN systolic BP >160., Disp: 20 tablet, Rfl: 0  •  cyclobenzaprine (FLEXERIL) 10 MG tablet, Take 10 mg by mouth 3 (Three) Times a Day As Needed for Muscle Spasms., Disp: , Rfl:   •  diclofenac (VOLTAREN) 75 MG EC tablet, Take 1 tablet by mouth Daily., Disp: 30 tablet, Rfl: 2  •  dicyclomine (BENTYL) 20 MG tablet, Take 1 tablet by mouth Every 6 (Six) Hours As Needed (abdominal pain)., Disp: 30 tablet, Rfl: 0  •  erythromycin base (E-MYCIN) 250 MG tablet, Take 1 tablet by mouth 3 (Three) Times a Day for 30 days. Indications: Delayed or Stopped Emptying of Stomach, Disp: 90 tablet, Rfl: 0  •  furosemide (LASIX) 20 MG tablet, Take 2 tablets by mouth Daily for 3 days. (Patient taking differently: Take 20 mg by mouth Daily.), Disp: 6 tablet, Rfl: 0  •  meclizine (ANTIVERT) 25 MG tablet, Take 50 mg by mouth As Needed for Dizziness., Disp: , Rfl:   •  Melatonin 10 MG capsule, Take  by mouth Every Night., Disp: , Rfl:   •  ondansetron (ZOFRAN) 8 MG tablet, Take 1 tablet by mouth Every 8 (Eight) Hours As Needed for Nausea or Vomiting., Disp: 30 tablet, Rfl: 0  •  ondansetron ODT (ZOFRAN-ODT) 4 MG disintegrating tablet, Place 1 tablet on the tongue Every 8 (Eight) Hours As Needed for Nausea or Vomiting., Disp: 10 tablet, Rfl: 0  •  SUMAtriptan (IMITREX) 100 MG tablet, Take 0.5 tablets by mouth Every 2 (Two) Hours As Needed for Migraine., Disp: 10 tablet, Rfl: 0    Allergies   Allergen Reactions   • Other      Pt states that taking steroids either in  pill or injection form make her have blisters in her mouth and she feels like she is on fire on the inside/ has hx of c diff and possible MRSA     • Corticosteroids Rash   • Kenalog  [Triamcinolone Acetonide] Rash   • Sulfa Antibiotics Rash     Sulfa (Sulfonamide Antibiotics)       Family History   Problem Relation Age of Onset   • Cancer Other    • Diabetes Other    • Heart disease Other    • Hypertension Mother    • Cancer Mother    • Diabetes Mother    • Hypertension Father    • Heart attack Father    • Cancer Father    • Heart disease Father    • Thyroid disease Maternal Aunt        Social History     Socioeconomic History   • Marital status:      Spouse name: Not on file   • Number of children: Not on file   • Years of education: Not on file   • Highest education level: Not on file   Tobacco Use   • Smoking status: Former Smoker     Packs/day: 2.00     Years: 15.00     Pack years: 30.00     Types: Cigarettes     Quit date:      Years since quittin.4   • Smokeless tobacco: Never Used   Vaping Use   • Vaping Use: Never used   Substance and Sexual Activity   • Alcohol use: No   • Drug use: No   • Sexual activity: Defer     Birth control/protection: Surgical     Comment: Marital Status: .  Hysterectomy.       Review of Systems   Constitutional: Positive for activity change and appetite change.   Respiratory: Negative for apnea and choking.    Gastrointestinal: Positive for abdominal distention, abdominal pain and constipation. Negative for anal bleeding, blood in stool, diarrhea, nausea, rectal pain and vomiting.   Genitourinary: Negative for difficulty urinating and dyspareunia.   Musculoskeletal: Positive for arthralgias, back pain and myalgias.   Neurological: Negative for dizziness and facial asymmetry.   Hematological: Negative for adenopathy. Does not bruise/bleed easily.   Psychiatric/Behavioral: Positive for dysphoric mood. Negative for agitation.       Physical  Exam      ASSESSMENT    Diagnoses and all orders for this visit:    1. Cirrhosis of liver without ascites, unspecified hepatic cirrhosis type (CMS/HCC) (Primary)  -     CBC & Differential; Future  -     Comprehensive Metabolic Panel; Future  -     Magnesium; Future  -     Phosphorus; Future  -     Protime-INR; Future  -     aPTT; Future        PLAN    1.  Recheck after blood work              This document has been electronically signed by Benjamin Troncoso MD on June 9, 2021 14:02 CDT

## 2021-06-11 ENCOUNTER — READMISSION MANAGEMENT (OUTPATIENT)
Dept: CALL CENTER | Facility: HOSPITAL | Age: 61
End: 2021-06-11

## 2021-06-11 ENCOUNTER — OFFICE VISIT (OUTPATIENT)
Dept: GASTROENTEROLOGY | Facility: CLINIC | Age: 61
End: 2021-06-11

## 2021-06-11 VITALS
BODY MASS INDEX: 36.91 KG/M2 | SYSTOLIC BLOOD PRESSURE: 136 MMHG | HEART RATE: 90 BPM | WEIGHT: 216.2 LBS | DIASTOLIC BLOOD PRESSURE: 77 MMHG | HEIGHT: 64 IN

## 2021-06-11 DIAGNOSIS — K75.81 NASH (NONALCOHOLIC STEATOHEPATITIS): ICD-10-CM

## 2021-06-11 DIAGNOSIS — K59.9 COLONIC INERTIA: ICD-10-CM

## 2021-06-11 DIAGNOSIS — K59.04 CHRONIC IDIOPATHIC CONSTIPATION: ICD-10-CM

## 2021-06-11 DIAGNOSIS — K74.60 CIRRHOSIS OF LIVER WITHOUT ASCITES, UNSPECIFIED HEPATIC CIRRHOSIS TYPE (HCC): ICD-10-CM

## 2021-06-11 DIAGNOSIS — R10.84 GENERALIZED ABDOMINAL PAIN: Primary | ICD-10-CM

## 2021-06-11 PROCEDURE — 99214 OFFICE O/P EST MOD 30 MIN: CPT | Performed by: PHYSICIAN ASSISTANT

## 2021-06-11 RX ORDER — TRAZODONE HYDROCHLORIDE 100 MG/1
100 TABLET ORAL NIGHTLY
Qty: 30 TABLET | Refills: 2 | Status: SHIPPED | OUTPATIENT
Start: 2021-06-11 | End: 2021-12-27

## 2021-06-11 NOTE — PROGRESS NOTES
Chief Complaint   Patient presents with   • Chronic Idiopathic Constipation   • Nausea   • Vomiting   • Cirrhosis   • Musa       ENDO PROCEDURE ORDERED:    Subjective    Amira Shannon is a 60 y.o. female. she is here today for follow-up.    History of Present Illness    Patient seen on a recheck of her chronic idiopathic constipation, nausea, vomiting, cirrhosis, MUSA, F4/S1/N1. Last seen 05/14/2021. She went to the ER because of severe abdominal pain. A CT scan abdomen and pelvis showed granuloma of the lung, pulmonary nodules, cirrhotic liver with splenomegaly, gastric, esophageal and splenic varices, inflammatory changes in the mesentery with prominent small bowel loops, increased stool especially in the right and transverse colon. She had another CT showing cirrhotic liver prior to this on 05/11/2021. She was sent to Dr. Troncoso for possible colon resection. He last saw her on 06/09/2021, recommended hematological evaluation for the low platelets with consideration for colectomy. Patient states she has been trying to avoid her pain medications, but it has not really affected her bowel movements. She was given Relistor her last hospitalization, but it is not giving her much of a bowel movement. She complains of 7 out of 10 diffuse low abdominal pain. She is on Protonix and Reglan for chronic heartburn. Denied nausea, vomiting, dysphagia. Weight is down less than 1 pound since last visit. Last colonoscopy 03/22/2021.     Patient was hospitalized 05/18/2021 through 05/24/2021 with an ileus, partial bowel obstruction. She had an NG tube placed with GoLYTELY given through that. She was seen by Dr. Troncoso. A CMP showed an AST of 42, alkaline phosphatase 118, otherwise normal. CBC showed 64,000 platelets. Small bowel follow-through only makes reference to contrast in the colon from previous CT scan but does not actually give a reading on the bowel. Films show contrast in the colon. She had another CT scan abdomen and  pelvis with contrast on 05/23/2021, showed moderate free fluid, cirrhotic liver with splenomegaly, pulmonary nodule.     Most recent laboratory 06/07/2021: INR 1.17. Normal phosphorus, magnesium. CMP showed glucose 129, chloride 108, alkaline phosphatase 132, AST 47, otherwise normal. CBC showed 55,000 platelets.    ASSESSMENT/PLAN: Patient with MUSA cirrhosis, varices noted on previous imaging, with cirrhotic liver with splenomegaly. Will need to review last EGD. Patient encouraged dietary modification and continued weight loss. She appears to have a lot of adhesive disease with her bowel. Her transit studies have been grossly abnormal and delayed. I think we have exhausted all medical treatment for her constipation. She previously had a period of improvement for a few years but has failed to improve this time. I have recommended followup with Dr. Troncoso for possible colectomy. I believe her low platelets are a result of her liver disease. She could be treated with platelet transfusion if concern during surgery. Also, would consider Mulpleta to increase platelet count. She complains she is having a lot of difficulty sleeping. We will give her a trial on trazodone, but I have cautioned her with any worsening of confusion or hypersomnolence this will need to be stopped. Discussed all the risks, benefits, alternatives to further evaluation and treatment. She agrees to proceed with her other care providers. We will plan followup in 6 weeks, further pending clinical course and the results of the above.       The following portions of the patient's history were reviewed and updated as appropriate:   Past Medical History:   Diagnosis Date   • Acid reflux    • Altered bowel function    • Arthropathy of lumbar facet joint    • Bleeding disorder (CMS/HCC)    • C. difficile diarrhea 2015   • Chronic idiopathic constipation    • Colonic inertia    • Constipation    • Corns and callus    • Depression    • Disease related  "peripheral neuropathy    • Epigastric pain    • Fatty liver    • Hammer toe    • Headache    • Hiatal hernia    • History of transfusion    • Hyperlipidemia    • Knee pain    • Localized, primary osteoarthritis of the ankle and foot     Localized, primary osteoarthritis of the ankle and/or foot   • Mendoza's metatarsalgia     Mendoza's metatarsalgia - 2nd interspace on right   • Nausea and vomiting    • Neuralgia and neuritis     Neuralgia, neuritis, and radiculitis, unspecified   • Neuropathy    • Obstructive sleep apnea     Obstructive sleep apnea (adult) (pediatric)    • CHAI on CPAP     \"C-Pap at night  (unconfirmed)\"   • Osteoarthritis    • Pain in foot     Pain in unspecified foot - sees a podiatrist   • Pain in joint, ankle and foot     Joint pain in ankle and foot      • Pain radiating to back     Pain radiating to lumbar region of back   • Pelvic floor dysfunction    • Plantar fasciitis    • PONV (postoperative nausea and vomiting)    • Restless leg syndrome    • Secondary hypertension     Secondary hypertension, unspecified   • Sinusitis    • Sleep apnea    • Tongue anomaly     lesion     Past Surgical History:   Procedure Laterality Date   • CARPAL TUNNEL RELEASE Left 2018    Procedure: CARPAL TUNNEL RELEASE - left;  Surgeon: Justus Lewis MD;  Location: [facility] OR;  Service: Orthopedics   • CARPAL TUNNEL RELEASE Right 2019    Procedure: carpal tunnel release right hand with local/monitored anesthesia care;  Surgeon: Justus Lewis MD;  Location: [facility] OR;  Service: Orthopedics   •  SECTION     • CHOLECYSTECTOMY     • COLONOSCOPY  2013   • COLONOSCOPY N/A 10/17/2018    Procedure: COLONOSCOPY;  Surgeon: Russell Del Rio MD;  Location: [facility] ENDOSCOPY;  Service: Gastroenterology   • COLONOSCOPY N/A 3/22/2021    Procedure: COLONOSCOPY;  Surgeon: Russell Del Rio MD;  Location: [facility] ENDOSCOPY;  Service: Gastroenterology;  Laterality: N/A;   • DIRECT LARYNGOSCOPY, " ESOPHAGOSCOPY, BRONCHOSCOPY N/A 8/1/2017    Procedure: DIRECT LARYNGOSCOPY AND;  Surgeon: Live Bolton MD;  Location: [facility] OR;  Service:    • ENDOSCOPY  07/01/2013    Colon endoscopy 68155 (1) - Internal & external hemorrhoids found. Stool found.   • ENDOSCOPY  07/01/2013    EGD w/ tube 45457 (1) - Normal esophagus. Gastritis in stomach. Biopsy taken. Normal dudoenum. Biopsy taken.   • ENDOSCOPY N/A 10/17/2018    Procedure: ESOPHAGOGASTRODUODENOSCOPY--eval varices;  Surgeon: Russell Del Rio MD;  Location: [facility] ENDOSCOPY;  Service: Gastroenterology   • ENDOSCOPY N/A 3/22/2021    Procedure: ESOPHAGOGASTRODUODENOSCOPYURGNET;  Surgeon: Russell Del Rio MD;  Location: [facility] ENDOSCOPY;  Service: Gastroenterology;  Laterality: N/A;   • FOOT SURGERY  02/26/2013    Foot/toes surgery procedure (1) - Arthroplasty of toes 4 and 5 of right foot.   • HERNIA REPAIR     • HERNIA REPAIR      hital   • HYSTERECTOMY     • LIVER BIOPSY     • NERVE BLOCK  07/25/2016    Injection for nerve block (1) - Lumbar medial branch block.   • OTHER SURGICAL HISTORY  12/03/2012    Inj(s) Tend-Sheath, Ligament, Single 20550 (1) - [facility] ([facility])    • OTHER SURGICAL HISTORY  06/24/2013    Small Joint Injection/Aspiration 20600 (2) - [facility]ICK[facility] ([facility])    • OTHER SURGICAL HISTORY  2011    bowel obstruction x2   • OTHER SURGICAL HISTORY      gland removed from neck   • SUBLINGUAL SALIVARY CYST EXCISION N/A 8/1/2017    Procedure: EXCISION OF LEFT  TONGUE LESION WITH CLOSURE;  Surgeon: Live Bolton MD;  Location: [facility] OR;  Service:    • TUBAL ABDOMINAL LIGATION     • UPPER GASTROINTESTINAL ENDOSCOPY  07/01/2013   • UPPER GASTROINTESTINAL ENDOSCOPY  10/17/2018   • UPPER GASTROINTESTINAL ENDOSCOPY  03/22/2021     Family History   Problem Relation Age of Onset   • Cancer Other    • Diabetes Other    • Heart disease Other    • Hypertension Mother    • Cancer Mother    • Diabetes Mother    • Hypertension Father    •  Heart attack Father    • Cancer Father    • Heart disease Father    • Thyroid disease Maternal Aunt      OB History        1    Para   1    Term                AB        Living   1       SAB        TAB        Ectopic        Molar        Multiple        Live Births                  Allergies   Allergen Reactions   • Other      Pt states that taking steroids either in pill or injection form make her have blisters in her mouth and she feels like she is on fire on the inside/ has hx of c diff and possible MRSA     • Corticosteroids Rash   • Kenalog  [Triamcinolone Acetonide] Rash   • Sulfa Antibiotics Rash     Sulfa (Sulfonamide Antibiotics)     Social History     Socioeconomic History   • Marital status:      Spouse name: Not on file   • Number of children: Not on file   • Years of education: Not on file   • Highest education level: Not on file   Tobacco Use   • Smoking status: Former Smoker     Packs/day: 2.00     Years: 15.00     Pack years: 30.00     Types: Cigarettes     Quit date:      Years since quittin.4   • Smokeless tobacco: Never Used   Vaping Use   • Vaping Use: Never used   Substance and Sexual Activity   • Alcohol use: No   • Drug use: No   • Sexual activity: Defer     Birth control/protection: Surgical     Comment: Marital Status: .  Hysterectomy.     Current Medications:  Prior to Admission medications    Medication Sig Start Date End Date Taking? Authorizing Provider   cetirizine (zyrTEC) 10 MG tablet Take 1 tablet by mouth Daily. 17  Yes ProviderPromise MD   cloNIDine (CATAPRES) 0.1 MG tablet Take 1 tablet by mouth 2 (Two) Times a Day. PRN systolic BP >160. 4  Yes Nelson Kim MD   CRANBERRY PO Take 1 capsule by mouth 2 (two) times a day.   Yes ProviderPromise MD   cyclobenzaprine (FLEXERIL) 10 MG tablet Take 10 mg by mouth 3 (Three) Times a Day As Needed for Muscle Spasms.   Yes ProviderPromise MD   diclofenac (VOLTAREN) 75 MG  EC tablet Take 1 tablet by mouth Daily. 1/28/21  Yes Colleen Meadows APRN   dicyclomine (BENTYL) 20 MG tablet Take 1 tablet by mouth Every 6 (Six) Hours As Needed (abdominal pain). 4/7/21  Yes Nelson Kim MD   docusate sodium (COLACE) 100 MG capsule Take 100 mg by mouth Daily.   Yes Promise Tovar MD   erythromycin base (E-MYCIN) 250 MG tablet Take 1 tablet by mouth 3 (Three) Times a Day for 30 days. Indications: Delayed or Stopped Emptying of Stomach 5/24/21 6/23/21 Yes Segundo oMlina MD   escitalopram (LEXAPRO) 10 MG tablet Take 10 mg by mouth Daily. 3/25/21  Yes Promise Tovar MD   gabapentin (NEURONTIN) 800 MG tablet Take 800 mg by mouth 4 (Four) Times a Day.   Yes Promise Tovar MD   Melatonin 10 MG capsule Take  by mouth Every Night.   Yes Promise Tovar MD   Methylnaltrexone Bromide (Relistor) 150 MG tablet Take 450 mg by mouth Daily. 3/29/21  Yes Blaine Perales PA-C   metoclopramide (Reglan) 5 MG tablet Take 1 tablet by mouth 3 (Three) Times a Day Before Meals.  Patient taking differently: Take 5 mg by mouth 2 (two) times a day. 3/29/21  Yes Blaine Perales PA-C   Mirabegron ER (Myrbetriq) 50 MG tablet sustained-release 24 hour 24 hr tablet Take 50 mg by mouth Daily.   Yes Promise Tovar MD   ondansetron (ZOFRAN) 8 MG tablet Take 1 tablet by mouth Every 8 (Eight) Hours As Needed for Nausea or Vomiting. 1/26/21  Yes Blaine Perales PA-C   ondansetron ODT (ZOFRAN-ODT) 4 MG disintegrating tablet Place 1 tablet on the tongue Every 8 (Eight) Hours As Needed for Nausea or Vomiting. 5/17/21  Yes Blaine Perales PA-C   pantoprazole (Protonix) 40 MG EC tablet Take 1 tablet by mouth Daily. 1/26/21  Yes Blaine Perales PA-C   polyethylene glycol (MiraLax) 17 GM/SCOOP powder Take 17 g by mouth 2 (Two) Times a Day. 5/24/21  Yes Segundo Molina MD   Sodium Phosphates (fleet enema) 7-19 GM/118ML enema Insert 1 enema into the rectum As Needed.   Yes Provider,  "MD Promise   spironolactone (ALDACTONE) 25 MG tablet TAKE 1 TABLET BY MOUTH ONCE DAILY 6/11/19  Yes Maureen Cardoso APRN   furosemide (LASIX) 20 MG tablet Take 2 tablets by mouth Daily for 3 days.  Patient taking differently: Take 20 mg by mouth Daily. 5/14/21 5/17/21  Ollie Amado MD   meclizine (ANTIVERT) 25 MG tablet Take 50 mg by mouth As Needed for Dizziness.  6/11/21  ProviderPromise MD   omeprazole (priLOSEC) 40 MG capsule Take 40 mg by mouth Daily. 5/24/21 6/11/21  ProviderPromise MD   SUMAtriptan (IMITREX) 100 MG tablet Take 0.5 tablets by mouth Every 2 (Two) Hours As Needed for Migraine. 4/7/21 6/11/21  Nelson Kim MD     Review of Systems  Review of Systems       Objective    /77   Pulse 90   Ht 162.6 cm (64\")   Wt 98.1 kg (216 lb 3.2 oz)   LMP  (LMP Unknown)   BMI 37.11 kg/m²   Physical Exam  Vitals and nursing note reviewed.   Constitutional:       General: She is not in acute distress.     Appearance: She is well-developed.   HENT:      Head: Normocephalic and atraumatic.   Eyes:      Pupils: Pupils are equal, round, and reactive to light.   Cardiovascular:      Rate and Rhythm: Normal rate and regular rhythm.      Heart sounds: Normal heart sounds.   Pulmonary:      Effort: Pulmonary effort is normal.      Breath sounds: Normal breath sounds.   Abdominal:      General: Bowel sounds are normal. There is no distension or abdominal bruit.      Palpations: Abdomen is soft. Abdomen is not rigid. There is no shifting dullness or mass.      Tenderness: There is abdominal tenderness. There is no guarding or rebound.      Hernia: No hernia is present. There is no hernia in the ventral area.      Comments: Obese, diffuse   Musculoskeletal:         General: Normal range of motion.      Cervical back: Normal range of motion.      Right lower leg: Edema present.      Left lower leg: Edema present.      Comments: 3+   Skin:     General: Skin is warm and dry.   "   Neurological:      Mental Status: She is alert and oriented to person, place, and time.   Psychiatric:         Behavior: Behavior normal.         Thought Content: Thought content normal.         Judgment: Judgment normal.       Assessment/Plan      1. Generalized abdominal pain    2. Chronic idiopathic constipation    3. Cirrhosis of liver without ascites, unspecified hepatic cirrhosis type (CMS/HCC)    4. MUSA (nonalcoholic steatohepatitis)    5. Colonic inertia    .   Diagnoses and all orders for this visit:    1. Generalized abdominal pain (Primary)    2. Chronic idiopathic constipation    3. Cirrhosis of liver without ascites, unspecified hepatic cirrhosis type (CMS/HCC)    4. MUSA (nonalcoholic steatohepatitis)    5. Colonic inertia    Other orders  -     traZODone (DESYREL) 100 MG tablet; Take 1 tablet by mouth Every Night.  Dispense: 30 tablet; Refill: 2        Orders placed during this encounter include:  No orders of the defined types were placed in this encounter.      Medications prescribed:  New Medications Ordered This Visit   Medications   • traZODone (DESYREL) 100 MG tablet     Sig: Take 1 tablet by mouth Every Night.     Dispense:  30 tablet     Refill:  2     Discontinued Medications       Reason for Discontinue     meclizine (ANTIVERT) 25 MG tablet    *Therapy completed     SUMAtriptan (IMITREX) 100 MG tablet    *Therapy completed     omeprazole (priLOSEC) 40 MG capsule    *Therapy completed        Requested Prescriptions     Signed Prescriptions Disp Refills   • traZODone (DESYREL) 100 MG tablet 30 tablet 2     Sig: Take 1 tablet by mouth Every Night.       Review and/or summary of lab tests, radiology, procedures, medications. Review and summary of old records and obtaining of history. The risks and benefits of my recommendations, as well as other treatment options were discussed with the patient today. Questions were answered.    Follow-up: Return in about 6 weeks (around 7/23/2021), or if  symptoms worsen or fail to improve.     * Surgery not found *      This document has been electronically signed by Blaine Perales PA-C on June 20, 2021 12:40 CDT      Results for orders placed or performed in visit on 06/07/21   CBC Auto Differential    Specimen: Blood   Result Value Ref Range    WBC 3.47 3.40 - 10.80 10*3/mm3    RBC 4.68 3.77 - 5.28 10*6/mm3    Hemoglobin 12.7 12.0 - 15.9 g/dL    Hematocrit 37.7 34.0 - 46.6 %    MCV 80.6 79.0 - 97.0 fL    MCH 27.1 26.6 - 33.0 pg    MCHC 33.7 31.5 - 35.7 g/dL    RDW 17.3 (H) 12.3 - 15.4 %    RDW-SD 49.3 37.0 - 54.0 fl    MPV 11.4 6.0 - 12.0 fL    Platelets 55 (L) 140 - 450 10*3/mm3    Neutrophil % 74.0 42.7 - 76.0 %    Lymphocyte % 17.0 (L) 19.6 - 45.3 %    Monocyte % 5.8 5.0 - 12.0 %    Eosinophil % 2.6 0.3 - 6.2 %    Basophil % 0.3 0.0 - 1.5 %    Neutrophils, Absolute 2.57 1.70 - 7.00 10*3/mm3    Lymphocytes, Absolute 0.59 (L) 0.70 - 3.10 10*3/mm3    Monocytes, Absolute 0.20 0.10 - 0.90 10*3/mm3    Eosinophils, Absolute 0.09 0.00 - 0.40 10*3/mm3    Basophils, Absolute 0.01 0.00 - 0.20 10*3/mm3   aPTT    Specimen: Blood   Result Value Ref Range    PTT 35.6 20.0 - 40.3 seconds   Protime-INR    Specimen: Blood   Result Value Ref Range    Protime 15.5 (H) 11.1 - 15.3 Seconds    INR 1.17 0.80 - 1.20   Phosphorus    Specimen: Blood   Result Value Ref Range    Phosphorus 2.5 2.5 - 4.5 mg/dL   Magnesium    Specimen: Blood   Result Value Ref Range    Magnesium 1.9 1.6 - 2.4 mg/dL   Comprehensive Metabolic Panel    Specimen: Blood   Result Value Ref Range    Glucose 129 (H) 65 - 99 mg/dL    BUN 8 8 - 23 mg/dL    Creatinine 0.93 0.57 - 1.00 mg/dL    Sodium 143 136 - 145 mmol/L    Potassium 3.7 3.5 - 5.2 mmol/L    Chloride 108 (H) 98 - 107 mmol/L    CO2 25.5 22.0 - 29.0 mmol/L    Calcium 8.9 8.6 - 10.5 mg/dL    Total Protein 6.6 6.0 - 8.5 g/dL    Albumin 3.60 3.50 - 5.20 g/dL    ALT (SGPT) 29 1 - 33 U/L    AST (SGOT) 47 (H) 1 - 32 U/L    Alkaline Phosphatase 132 (H) 39 -  117 U/L    Total Bilirubin 0.7 0.0 - 1.2 mg/dL    eGFR Non African Amer 61 >60 mL/min/1.73    Globulin 3.0 gm/dL    A/G Ratio 1.2 g/dL    BUN/Creatinine Ratio 8.6 7.0 - 25.0    Anion Gap 9.5 5.0 - 15.0 mmol/L   Results for orders placed or performed during the hospital encounter of 05/18/21   Gold Top - SST   Result Value Ref Range    Extra Tube Hold for add-ons.    Green Top (Gel)   Result Value Ref Range    Extra Tube Hold for add-ons.    Scan Slide    Specimen: Blood   Result Value Ref Range    Anisocytosis Slight/1+ None Seen    Hypochromia Slight/1+ None Seen    WBC Morphology Normal Normal    Platelet Estimate Decreased Normal   Scan Slide    Specimen: Blood   Result Value Ref Range    Anisocytosis Slight/1+ None Seen    WBC Morphology Normal Normal    Platelet Estimate Decreased Normal   COVID-19 and FLU A/B PCR - Swab, Nasopharynx    Specimen: Nasopharynx; Swab   Result Value Ref Range    COVID19 Not Detected Not Detected - Ref. Range    Influenza A PCR Not Detected Not Detected    Influenza B PCR Not Detected Not Detected   Urinalysis With Microscopic If Indicated (No Culture) - Urine, Clean Catch    Specimen: Urine, Clean Catch   Result Value Ref Range    Color, UA Yellow Yellow, Straw, Dark Yellow, Mikala    Appearance, UA Clear Clear    pH, UA 7.5 5.0 - 9.0    Specific Gravity, UA 1.012 1.003 - 1.030    Glucose, UA Negative Negative    Ketones, UA Negative Negative    Bilirubin, UA Negative Negative    Blood, UA Negative Negative    Protein, UA Negative Negative    Leuk Esterase, UA Negative Negative    Nitrite, UA Negative Negative    Urobilinogen, UA 1.0 E.U./dL 0.2 - 1.0 E.U./dL   CBC Auto Differential    Specimen: Blood   Result Value Ref Range    WBC 3.63 3.40 - 10.80 10*3/mm3    RBC 4.82 3.77 - 5.28 10*6/mm3    Hemoglobin 12.6 12.0 - 15.9 g/dL    Hematocrit 38.4 34.0 - 46.6 %    MCV 79.7 79.0 - 97.0 fL    MCH 26.1 (L) 26.6 - 33.0 pg    MCHC 32.8 31.5 - 35.7 g/dL    RDW 17.5 (H) 12.3 - 15.4 %     RDW-SD 50.7 37.0 - 54.0 fl    MPV 11.2 6.0 - 12.0 fL    Platelets 64 (L) 140 - 450 10*3/mm3    Neutrophil % 66.1 42.7 - 76.0 %    Lymphocyte % 24.2 19.6 - 45.3 %    Monocyte % 5.8 5.0 - 12.0 %    Eosinophil % 3.3 0.3 - 6.2 %    Basophil % 0.3 0.0 - 1.5 %    Immature Grans % 0.3 0.0 - 0.5 %    Neutrophils, Absolute 2.40 1.70 - 7.00 10*3/mm3    Lymphocytes, Absolute 0.88 0.70 - 3.10 10*3/mm3    Monocytes, Absolute 0.21 0.10 - 0.90 10*3/mm3    Eosinophils, Absolute 0.12 0.00 - 0.40 10*3/mm3    Basophils, Absolute 0.01 0.00 - 0.20 10*3/mm3    Immature Grans, Absolute 0.01 0.00 - 0.05 10*3/mm3    nRBC 0.0 0.0 - 0.2 /100 WBC   CBC Auto Differential    Specimen: Blood   Result Value Ref Range    WBC 2.93 (L) 3.40 - 10.80 10*3/mm3    RBC 4.67 3.77 - 5.28 10*6/mm3    Hemoglobin 12.2 12.0 - 15.9 g/dL    Hematocrit 37.4 34.0 - 46.6 %    MCV 80.1 79.0 - 97.0 fL    MCH 26.1 (L) 26.6 - 33.0 pg    MCHC 32.6 31.5 - 35.7 g/dL    RDW 17.6 (H) 12.3 - 15.4 %    RDW-SD 50.6 37.0 - 54.0 fl    MPV 11.5 6.0 - 12.0 fL    Platelets 64 (L) 140 - 450 10*3/mm3    Neutrophil % 70.4 42.7 - 76.0 %    Lymphocyte % 20.8 19.6 - 45.3 %    Monocyte % 4.8 (L) 5.0 - 12.0 %    Eosinophil % 3.4 0.3 - 6.2 %    Basophil % 0.3 0.0 - 1.5 %    Immature Grans % 0.3 0.0 - 0.5 %    Neutrophils, Absolute 2.06 1.70 - 7.00 10*3/mm3    Lymphocytes, Absolute 0.61 (L) 0.70 - 3.10 10*3/mm3    Monocytes, Absolute 0.14 0.10 - 0.90 10*3/mm3    Eosinophils, Absolute 0.10 0.00 - 0.40 10*3/mm3    Basophils, Absolute 0.01 0.00 - 0.20 10*3/mm3    Immature Grans, Absolute 0.01 0.00 - 0.05 10*3/mm3    nRBC 0.0 0.0 - 0.2 /100 WBC     *Note: Due to a large number of results and/or encounters for the requested time period, some results have not been displayed. A complete set of results can be found in Results Review.       Some portions of this note have been dictated using voice recognition software and may contain errors and/or omissions.

## 2021-06-11 NOTE — OUTREACH NOTE
Medical Week 3 Survey      Responses   [facility] patient discharged from?  [place]   Does the patient have one of the following disease processes/diagnoses(primary or secondary)?  Other   Week 3 attempt successful?  No   Unsuccessful attempts  Attempt 1          Emmy Raman RN

## 2021-06-11 NOTE — PROGRESS NOTES
Chief Complaint   Patient presents with   • Follow-up     :Lab Results        HPI  60 year old woman considered for subtotal colectomy to treat severe constipation. She also has cirrhosis of the liver and was noted to have low platelet counts. Repeated labs:    Units 3 d ago   PTT   20.0 - 40.3 seconds      Units 3 d ago   Protime   11.1 - 15.3 Seconds 15.5High     INR   0.80 - 1.20 1.17      Units 3 d ago   Phosphorus   2.5 - 4.5 mg/dL 2.5      Units 3 d ago   Magnesium   1.6 - 2.4 mg/dL 1.9      Units 3 d ago   Glucose   65 - 99 mg/dL 129High     BUN   8 - 23 mg/dL 8    Creatinine   0.57 - 1.00 mg/dL 0.93    Sodium   136 - 145 mmol/L 143    Potassium   3.5 - 5.2 mmol/L 3.7    Chloride   98 - 107 mmol/L 108High     CO2   22.0 - 29.0 mmol/L 25.5    Calcium   8.6 - 10.5 mg/dL 8.9    Total Protein   6.0 - 8.5 g/dL 6.6    Albumin   3.50 - 5.20 g/dL 3.60    ALT (SGPT)   1 - 33 U/L 29    AST (SGOT)   1 - 32 U/L 47High     Alkaline Phosphatase   39 - 117 U/L 132High     Total Bilirubin   0.0 - 1.2 mg/dL 0.7    eGFR Non African Amer   >60 mL/min/1.73 61    Globulin   gm/dL 3.0    A/G Ratio   g/dL 1.2    BUN/Creatinine Ratio   7.0 - 25.0 8.6    Anion Gap   5.0 - 15.0 mmol/L 9.5    Resulting Agency [facility]      Narrative  Performed by: [facility]  GFR Normal >60   Chronic Kidney Disease <60   Kidney Failure <15       Specimen Collected: 06/07/21 15:06   Last Resulted: 06/07/21 19:12        Units 3 d ago   WBC   3.40 - 10.80 10*3/mm3 3.47    RBC   3.77 - 5.28 10*6/mm3 4.68    Hemoglobin   12.0 - 15.9 g/dL 12.7    Hematocrit   34.0 - 46.6 % 37.7    MCV   79.0 - 97.0 fL 80.6    MCH   26.6 - 33.0 pg 27.1    MCHC   31.5 - 35.7 g/dL 33.7    RDW   12.3 - 15.4 % 17.3High     RDW-SD   37.0 - 54.0 fl 49.3    MPV   6.0 - 12.0 fL 11.4    Platelets   140 - 450 10*3/mm3 55Low     Neutrophil %   42.7 - 76.0 % 74.0    Lymphocyte %   19.6 - 45.3 % 17.0Low     Monocyte %   5.0 - 12.0 % 5.8    Eosinophil %   0.3 - 6.2 % 2.6    Basophil %    "  0.0 - 1.5 % 0.3    Neutrophils, Absolute   1.70 - 7.00 10*3/mm3 2.57    Lymphocytes, Absolute   0.70 - 3.10 10*3/mm3 0.59Low     Monocytes, Absolute   0.10 - 0.90 10*3/mm3 0.20    Eosinophils, Absolute   0.00 - 0.40 10*3/mm3 0.09    Basophils, Absolute   0.00 - 0.20 10*3/mm3 0.01    Resulting Agency [facility]         Specimen Collected: 06/07/21 15:06   Last Resulted: 06/07/21 19:44        Past Medical History:   Diagnosis Date   • Acid reflux    • Altered bowel function    • Arthropathy of lumbar facet joint    • Bleeding disorder (CMS/HCC)    • C. difficile diarrhea 2015   • Chronic idiopathic constipation    • Colonic inertia    • Constipation    • Corns and callus    • Depression    • Disease related peripheral neuropathy    • Epigastric pain    • Fatty liver    • Hammer toe    • Headache    • Hiatal hernia    • History of transfusion    • Hyperlipidemia    • Knee pain    • Localized, primary osteoarthritis of the ankle and foot     Localized, primary osteoarthritis of the ankle and/or foot   • Mendoza's metatarsalgia     Mendoza's metatarsalgia - 2nd interspace on right   • Nausea and vomiting    • Neuralgia and neuritis     Neuralgia, neuritis, and radiculitis, unspecified   • Neuropathy    • Obstructive sleep apnea     Obstructive sleep apnea (adult) (pediatric)    • CHAI on CPAP     \"C-Pap at night  (unconfirmed)\"   • Osteoarthritis    • Pain in foot     Pain in unspecified foot - sees a podiatrist   • Pain in joint, ankle and foot     Joint pain in ankle and foot      • Pain radiating to back     Pain radiating to lumbar region of back   • Pelvic floor dysfunction    • Plantar fasciitis    • PONV (postoperative nausea and vomiting)    • Restless leg syndrome    • Secondary hypertension     Secondary hypertension, unspecified   • Sinusitis    • Sleep apnea    • Tongue anomaly     lesion       Past Surgical History:   Procedure Laterality Date   • CARPAL TUNNEL RELEASE Left 8/22/2018    Procedure: CARPAL " TUNNEL RELEASE - left;  Surgeon: Justus Lewis MD;  Location: [facility] OR;  Service: Orthopedics   • CARPAL TUNNEL RELEASE Right 2019    Procedure: carpal tunnel release right hand with local/monitored anesthesia care;  Surgeon: Justus Lewis MD;  Location: [facility] OR;  Service: Orthopedics   •  SECTION     • CHOLECYSTECTOMY     • COLONOSCOPY  2013   • COLONOSCOPY N/A 10/17/2018    Procedure: COLONOSCOPY;  Surgeon: Russell Del Rio MD;  Location: [facility] ENDOSCOPY;  Service: Gastroenterology   • COLONOSCOPY N/A 3/22/2021    Procedure: COLONOSCOPY;  Surgeon: Russell Del Rio MD;  Location: [facility] ENDOSCOPY;  Service: Gastroenterology;  Laterality: N/A;   • DIRECT LARYNGOSCOPY, ESOPHAGOSCOPY, BRONCHOSCOPY N/A 2017    Procedure: DIRECT LARYNGOSCOPY AND;  Surgeon: Live Bolton MD;  Location: [facility] OR;  Service:    • ENDOSCOPY  2013    Colon endoscopy 87453 (1) - Internal & external hemorrhoids found. Stool found.   • ENDOSCOPY  2013    EGD w/ tube 72189 (1) - Normal esophagus. Gastritis in stomach. Biopsy taken. Normal dudoenum. Biopsy taken.   • ENDOSCOPY N/A 10/17/2018    Procedure: ESOPHAGOGASTRODUODENOSCOPY--eval varices;  Surgeon: Russell Del Rio MD;  Location: [facility] ENDOSCOPY;  Service: Gastroenterology   • ENDOSCOPY N/A 3/22/2021    Procedure: ESOPHAGOGASTRODUODENOSCOPYURGNET;  Surgeon: Russell Del Rio MD;  Location: [facility] ENDOSCOPY;  Service: Gastroenterology;  Laterality: N/A;   • FOOT SURGERY  2013    Foot/toes surgery procedure (1) - Arthroplasty of toes 4 and 5 of right foot.   • HERNIA REPAIR     • HERNIA REPAIR      hital   • HYSTERECTOMY     • LIVER BIOPSY     • NERVE BLOCK  2016    Injection for nerve block (1) - Lumbar medial branch block.   • OTHER SURGICAL HISTORY  2012    Inj(s) Tend-Sheath, Ligament, Single 85162 (1) - PORTER NICKERSON ([facility])    • OTHER SURGICAL HISTORY  2013    Small Joint Injection/Aspiration  20600 (2) Lalito NICKERSON ([facility])    • OTHER SURGICAL HISTORY  2011    bowel obstruction x2   • OTHER SURGICAL HISTORY      gland removed from neck   • SUBLINGUAL SALIVARY CYST EXCISION N/A 8/1/2017    Procedure: EXCISION OF LEFT  TONGUE LESION WITH CLOSURE;  Surgeon: Live Bolton MD;  Location: [facility];  Service:    • TUBAL ABDOMINAL LIGATION     • UPPER GASTROINTESTINAL ENDOSCOPY  07/01/2013   • UPPER GASTROINTESTINAL ENDOSCOPY  10/17/2018   • UPPER GASTROINTESTINAL ENDOSCOPY  03/22/2021         Current Outpatient Medications:   •  cetirizine (zyrTEC) 10 MG tablet, Take 1 tablet by mouth Daily., Disp: , Rfl:   •  cloNIDine (CATAPRES) 0.1 MG tablet, Take 1 tablet by mouth 2 (Two) Times a Day. PRN systolic BP >160., Disp: 20 tablet, Rfl: 0  •  CRANBERRY PO, Take 1 capsule by mouth 2 (two) times a day., Disp: , Rfl:   •  cyclobenzaprine (FLEXERIL) 10 MG tablet, Take 10 mg by mouth 3 (Three) Times a Day As Needed for Muscle Spasms., Disp: , Rfl:   •  diclofenac (VOLTAREN) 75 MG EC tablet, Take 1 tablet by mouth Daily., Disp: 30 tablet, Rfl: 2  •  dicyclomine (BENTYL) 20 MG tablet, Take 1 tablet by mouth Every 6 (Six) Hours As Needed (abdominal pain)., Disp: 30 tablet, Rfl: 0  •  docusate sodium (COLACE) 100 MG capsule, Take 100 mg by mouth Daily., Disp: , Rfl:   •  escitalopram (LEXAPRO) 10 MG tablet, Take 10 mg by mouth Daily., Disp: , Rfl:   •  gabapentin (NEURONTIN) 800 MG tablet, Take 800 mg by mouth 4 (Four) Times a Day., Disp: , Rfl:   •  Melatonin 10 MG capsule, Take  by mouth Every Night., Disp: , Rfl:   •  Methylnaltrexone Bromide (Relistor) 150 MG tablet, Take 450 mg by mouth Daily., Disp: 90 tablet, Rfl: 1  •  metoclopramide (Reglan) 5 MG tablet, Take 1 tablet by mouth 3 (Three) Times a Day Before Meals. (Patient taking differently: Take 5 mg by mouth 2 (two) times a day.), Disp: 90 tablet, Rfl: 1  •  Mirabegron ER (Myrbetriq) 50 MG tablet sustained-release 24 hour 24 hr tablet, Take 50 mg by  mouth Daily., Disp: , Rfl:   •  ondansetron (ZOFRAN) 8 MG tablet, Take 1 tablet by mouth Every 8 (Eight) Hours As Needed for Nausea or Vomiting., Disp: 30 tablet, Rfl: 0  •  ondansetron ODT (ZOFRAN-ODT) 4 MG disintegrating tablet, Place 1 tablet on the tongue Every 8 (Eight) Hours As Needed for Nausea or Vomiting., Disp: 10 tablet, Rfl: 0  •  pantoprazole (Protonix) 40 MG EC tablet, Take 1 tablet by mouth Daily., Disp: 30 tablet, Rfl: 3  •  polyethylene glycol (MiraLax) 17 GM/SCOOP powder, Take 17 g by mouth 2 (Two) Times a Day., Disp: 850 g, Rfl: 1  •  spironolactone (ALDACTONE) 25 MG tablet, TAKE 1 TABLET BY MOUTH ONCE DAILY, Disp: 30 tablet, Rfl: 5  •  erythromycin base (E-MYCIN) 250 MG tablet, Take 1 tablet by mouth 3 (Three) Times a Day for 30 days. Indications: Delayed or Stopped Emptying of Stomach, Disp: 90 tablet, Rfl: 0  •  furosemide (LASIX) 20 MG tablet, Take 2 tablets by mouth Daily for 3 days. (Patient taking differently: Take 20 mg by mouth Daily.), Disp: 6 tablet, Rfl: 0  •  meclizine (ANTIVERT) 25 MG tablet, Take 50 mg by mouth As Needed for Dizziness., Disp: , Rfl:   •  omeprazole (priLOSEC) 40 MG capsule, Take 40 mg by mouth Daily., Disp: , Rfl:   •  Sodium Phosphates (fleet enema) 7-19 GM/118ML enema, Insert 1 enema into the rectum As Needed., Disp: , Rfl:   •  SUMAtriptan (IMITREX) 100 MG tablet, Take 0.5 tablets by mouth Every 2 (Two) Hours As Needed for Migraine., Disp: 10 tablet, Rfl: 0    Allergies   Allergen Reactions   • Other      Pt states that taking steroids either in pill or injection form make her have blisters in her mouth and she feels like she is on fire on the inside/ has hx of c diff and possible MRSA     • Corticosteroids Rash   • Kenalog  [Triamcinolone Acetonide] Rash   • Sulfa Antibiotics Rash     Sulfa (Sulfonamide Antibiotics)       Family History   Problem Relation Age of Onset   • Cancer Other    • Diabetes Other    • Heart disease Other    • Hypertension Mother    •  Cancer Mother    • Diabetes Mother    • Hypertension Father    • Heart attack Father    • Cancer Father    • Heart disease Father    • Thyroid disease Maternal Aunt        Social History     Socioeconomic History   • Marital status:      Spouse name: Not on file   • Number of children: Not on file   • Years of education: Not on file   • Highest education level: Not on file   Tobacco Use   • Smoking status: Former Smoker     Packs/day: 2.00     Years: 15.00     Pack years: 30.00     Types: Cigarettes     Quit date:      Years since quittin.4   • Smokeless tobacco: Never Used   Vaping Use   • Vaping Use: Never used   Substance and Sexual Activity   • Alcohol use: No   • Drug use: No   • Sexual activity: Defer     Birth control/protection: Surgical     Comment: Marital Status: .  Hysterectomy.       Review of Systems  Not repeated  Physical Exam  Not repeated    ASSESSMENT    Diagnoses and all orders for this visit:    1. Thrombocytopenia (CMS/HCC) (Primary)  -     Ambulatory Referral to Hematology / Oncology    2. Chronic idiopathic constipation- under consideration for colectomy to treat this        PLAN    1. Will see after hematology evaluation              This document has been electronically signed by Benjamin Troncoso MD on Kristie 10, 2021 20:25 CDT

## 2021-06-24 ENCOUNTER — CONSULT (OUTPATIENT)
Dept: ONCOLOGY | Facility: CLINIC | Age: 61
End: 2021-06-24

## 2021-06-24 ENCOUNTER — LAB (OUTPATIENT)
Dept: ONCOLOGY | Facility: HOSPITAL | Age: 61
End: 2021-06-24

## 2021-06-24 VITALS
SYSTOLIC BLOOD PRESSURE: 146 MMHG | WEIGHT: 207.6 LBS | DIASTOLIC BLOOD PRESSURE: 76 MMHG | BODY MASS INDEX: 35.44 KG/M2 | HEART RATE: 80 BPM | HEIGHT: 64 IN | TEMPERATURE: 97.8 F | RESPIRATION RATE: 18 BRPM

## 2021-06-24 DIAGNOSIS — D69.6 THROMBOCYTOPENIA (HCC): ICD-10-CM

## 2021-06-24 DIAGNOSIS — R91.1 LUNG NODULE: ICD-10-CM

## 2021-06-24 DIAGNOSIS — R16.1 SPLENOMEGALY: ICD-10-CM

## 2021-06-24 DIAGNOSIS — E61.1 IRON DEFICIENCY: ICD-10-CM

## 2021-06-24 DIAGNOSIS — E61.1 IRON DEFICIENCY: Chronic | ICD-10-CM

## 2021-06-24 DIAGNOSIS — K74.69 OTHER CIRRHOSIS OF LIVER (HCC): Primary | ICD-10-CM

## 2021-06-24 DIAGNOSIS — D61.818 PANCYTOPENIA (HCC): ICD-10-CM

## 2021-06-24 LAB
ALBUMIN SERPL-MCNC: 3.7 G/DL (ref 3.5–5.2)
ALBUMIN/GLOB SERPL: 0.9 G/DL
ALP SERPL-CCNC: 127 U/L (ref 39–117)
ALT SERPL W P-5'-P-CCNC: 24 U/L (ref 1–33)
ANION GAP SERPL CALCULATED.3IONS-SCNC: 10 MMOL/L (ref 5–15)
ANISOCYTOSIS BLD QL: NORMAL
APTT PPP: 36.4 SECONDS (ref 20–40.3)
AST SERPL-CCNC: 40 U/L (ref 1–32)
BASOPHILS # BLD AUTO: 0.01 10*3/MM3 (ref 0–0.2)
BASOPHILS NFR BLD AUTO: 0.2 % (ref 0–1.5)
BILIRUB SERPL-MCNC: 1.3 MG/DL (ref 0–1.2)
BUN SERPL-MCNC: 9 MG/DL (ref 8–23)
BUN/CREAT SERPL: 9.2 (ref 7–25)
CALCIUM SPEC-SCNC: 9.5 MG/DL (ref 8.6–10.5)
CHLORIDE SERPL-SCNC: 105 MMOL/L (ref 98–107)
CO2 SERPL-SCNC: 24 MMOL/L (ref 22–29)
CREAT SERPL-MCNC: 0.98 MG/DL (ref 0.57–1)
DEPRECATED RDW RBC AUTO: 52 FL (ref 37–54)
EOSINOPHIL # BLD AUTO: 0.09 10*3/MM3 (ref 0–0.4)
EOSINOPHIL NFR BLD AUTO: 2 % (ref 0.3–6.2)
ERYTHROCYTE [DISTWIDTH] IN BLOOD BY AUTOMATED COUNT: 17.6 % (ref 12.3–15.4)
FERRITIN SERPL-MCNC: 102.1 NG/ML (ref 13–150)
FIBRINOGEN PPP-MCNC: 326 MG/DL (ref 228–514)
FOLATE SERPL-MCNC: 13.1 NG/ML (ref 4.78–24.2)
GFR SERPL CREATININE-BSD FRML MDRD: 58 ML/MIN/1.73
GLOBULIN UR ELPH-MCNC: 4 GM/DL
GLUCOSE SERPL-MCNC: 89 MG/DL (ref 65–99)
HCT VFR BLD AUTO: 42.3 % (ref 34–46.6)
HGB BLD-MCNC: 13.8 G/DL (ref 12–15.9)
HOLD SPECIMEN: NORMAL
HYPOCHROMIA BLD QL: NORMAL
IMM GRANULOCYTES # BLD AUTO: 0.01 10*3/MM3 (ref 0–0.05)
IMM GRANULOCYTES NFR BLD AUTO: 0.2 % (ref 0–0.5)
INR PPP: 1.13 (ref 0.8–1.2)
IRON 24H UR-MRATE: 84 MCG/DL (ref 37–145)
IRON SATN MFR SERPL: 18 % (ref 20–50)
LYMPHOCYTES # BLD AUTO: 0.81 10*3/MM3 (ref 0.7–3.1)
LYMPHOCYTES NFR BLD AUTO: 17.6 % (ref 19.6–45.3)
MCH RBC QN AUTO: 27.1 PG (ref 26.6–33)
MCHC RBC AUTO-ENTMCNC: 32.6 G/DL (ref 31.5–35.7)
MCV RBC AUTO: 82.9 FL (ref 79–97)
MONOCYTES # BLD AUTO: 0.33 10*3/MM3 (ref 0.1–0.9)
MONOCYTES NFR BLD AUTO: 7.2 % (ref 5–12)
NEUTROPHILS NFR BLD AUTO: 3.36 10*3/MM3 (ref 1.7–7)
NEUTROPHILS NFR BLD AUTO: 72.8 % (ref 42.7–76)
NRBC BLD AUTO-RTO: 0 /100 WBC (ref 0–0.2)
PLATELET # BLD AUTO: 66 10*3/MM3 (ref 140–450)
PMV BLD AUTO: 10.7 FL (ref 6–12)
POTASSIUM SERPL-SCNC: 4.3 MMOL/L (ref 3.5–5.2)
PROT SERPL-MCNC: 7.7 G/DL (ref 6–8.5)
PROTHROMBIN TIME: 14.4 SECONDS (ref 11.1–15.3)
RBC # BLD AUTO: 5.1 10*6/MM3 (ref 3.77–5.28)
SMALL PLATELETS BLD QL SMEAR: NORMAL
SODIUM SERPL-SCNC: 139 MMOL/L (ref 136–145)
TIBC SERPL-MCNC: 456 MCG/DL (ref 298–536)
TRANSFERRIN SERPL-MCNC: 306 MG/DL (ref 200–360)
VIT B12 BLD-MCNC: 477 PG/ML (ref 211–946)
WBC # BLD AUTO: 4.61 10*3/MM3 (ref 3.4–10.8)
WBC MORPH BLD: NORMAL

## 2021-06-24 PROCEDURE — 85384 FIBRINOGEN ACTIVITY: CPT | Performed by: INTERNAL MEDICINE

## 2021-06-24 PROCEDURE — 82607 VITAMIN B-12: CPT | Performed by: INTERNAL MEDICINE

## 2021-06-24 PROCEDURE — 85730 THROMBOPLASTIN TIME PARTIAL: CPT | Performed by: INTERNAL MEDICINE

## 2021-06-24 PROCEDURE — 82746 ASSAY OF FOLIC ACID SERUM: CPT | Performed by: INTERNAL MEDICINE

## 2021-06-24 PROCEDURE — 84466 ASSAY OF TRANSFERRIN: CPT | Performed by: INTERNAL MEDICINE

## 2021-06-24 PROCEDURE — 99214 OFFICE O/P EST MOD 30 MIN: CPT | Performed by: INTERNAL MEDICINE

## 2021-06-24 PROCEDURE — 1125F AMNT PAIN NOTED PAIN PRSNT: CPT | Performed by: INTERNAL MEDICINE

## 2021-06-24 PROCEDURE — 82728 ASSAY OF FERRITIN: CPT | Performed by: INTERNAL MEDICINE

## 2021-06-24 PROCEDURE — 36415 COLL VENOUS BLD VENIPUNCTURE: CPT | Performed by: INTERNAL MEDICINE

## 2021-06-24 PROCEDURE — 85610 PROTHROMBIN TIME: CPT | Performed by: INTERNAL MEDICINE

## 2021-06-24 PROCEDURE — 80053 COMPREHEN METABOLIC PANEL: CPT | Performed by: INTERNAL MEDICINE

## 2021-06-24 PROCEDURE — G0463 HOSPITAL OUTPT CLINIC VISIT: HCPCS | Performed by: INTERNAL MEDICINE

## 2021-06-24 PROCEDURE — 85007 BL SMEAR W/DIFF WBC COUNT: CPT | Performed by: INTERNAL MEDICINE

## 2021-06-24 PROCEDURE — 1123F ACP DISCUSS/DSCN MKR DOCD: CPT | Performed by: INTERNAL MEDICINE

## 2021-06-24 PROCEDURE — G9903 PT SCRN TBCO ID AS NON USER: HCPCS | Performed by: INTERNAL MEDICINE

## 2021-06-24 PROCEDURE — 83540 ASSAY OF IRON: CPT | Performed by: INTERNAL MEDICINE

## 2021-06-24 PROCEDURE — 85025 COMPLETE CBC W/AUTO DIFF WBC: CPT | Performed by: INTERNAL MEDICINE

## 2021-06-24 RX ORDER — OMEPRAZOLE 40 MG/1
CAPSULE, DELAYED RELEASE ORAL
COMMUNITY
Start: 2021-06-23 | End: 2021-09-24 | Stop reason: HOSPADM

## 2021-06-24 RX ORDER — POTASSIUM CHLORIDE 750 MG/1
TABLET, FILM COATED, EXTENDED RELEASE ORAL
COMMUNITY
Start: 2021-06-23 | End: 2021-09-24 | Stop reason: HOSPADM

## 2021-06-24 NOTE — PROGRESS NOTES
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  Patient is currently being considered for colectomy for her chronic bowel obstruction and severe constipation.  Patient has been referred back to [facility] for evaluation and recommendation regarding thrombocytopenia in view of requirement of surgery.  Patient denies any bleeding.  Complains of easy bruising.  Continues to have constipation.        PAST MEDICAL HISTORY:    Past Medical History:   Diagnosis Date   • Acid reflux    • Altered bowel function    • Arthropathy of lumbar facet joint    • Bleeding disorder (CMS/HCC)    • C. difficile diarrhea 2015   • Chronic idiopathic constipation    • Colonic inertia    • Constipation    • Corns and callus    • Depression    • Disease related peripheral neuropathy    • Epigastric pain    • Fatty liver    • Hammer toe    • Headache    • Hiatal hernia    • History of transfusion    • Hyperlipidemia    • Knee pain    • Localized, primary osteoarthritis of the ankle and foot     Localized, primary osteoarthritis of the ankle and/or foot   • Mendoza's metatarsalgia     Mendoza's metatarsalgia - 2nd interspace on right   • Nausea and vomiting    • Neuralgia and neuritis     Neuralgia, neuritis, and radiculitis, unspecified   • Neuropathy    • Obstructive sleep apnea     Obstructive sleep apnea (adult) (pediatric)    • CHAI on CPAP     \"C-Pap at night  (unconfirmed)\"   • Osteoarthritis    • Pain in foot     Pain in " unspecified foot - sees a podiatrist   • Pain in joint, ankle and foot     Joint pain in ankle and foot      • Pain radiating to back     Pain radiating to lumbar region of back   • Pelvic floor dysfunction    • Plantar fasciitis    • PONV (postoperative nausea and vomiting)    • Restless leg syndrome    • Secondary hypertension     Secondary hypertension, unspecified   • Sinusitis    • Sleep apnea    • Tongue anomaly     lesion       PAST SURGICAL HISTORY:  Past Surgical History:   Procedure Laterality Date   • CARPAL TUNNEL RELEASE Left 2018    Procedure: CARPAL TUNNEL RELEASE - left;  Surgeon: Justus Lewis MD;  Location: [facility] OR;  Service: Orthopedics   • CARPAL TUNNEL RELEASE Right 2019    Procedure: carpal tunnel release right hand with local/monitored anesthesia care;  Surgeon: Justus Lewis MD;  Location: [facility] OR;  Service: Orthopedics   •  SECTION     • CHOLECYSTECTOMY     • COLONOSCOPY  2013   • COLONOSCOPY N/A 10/17/2018    Procedure: COLONOSCOPY;  Surgeon: Russell De lRio MD;  Location: [facility] ENDOSCOPY;  Service: Gastroenterology   • COLONOSCOPY N/A 3/22/2021    Procedure: COLONOSCOPY;  Surgeon: Russell Del Rio MD;  Location: [facility] ENDOSCOPY;  Service: Gastroenterology;  Laterality: N/A;   • DIRECT LARYNGOSCOPY, ESOPHAGOSCOPY, BRONCHOSCOPY N/A 2017    Procedure: DIRECT LARYNGOSCOPY AND;  Surgeon: Live Bolton MD;  Location: [facility] OR;  Service:    • ENDOSCOPY  2013    Colon endoscopy 13107 (1) - Internal & external hemorrhoids found. Stool found.   • ENDOSCOPY  2013    EGD w/ tube 08453 (1) - Normal esophagus. Gastritis in stomach. Biopsy taken. Normal dudoenum. Biopsy taken.   • ENDOSCOPY N/A 10/17/2018    Procedure: ESOPHAGOGASTRODUODENOSCOPY--eval varices;  Surgeon: Russell Del Rio MD;  Location: [facility] ENDOSCOPY;  Service: Gastroenterology   • ENDOSCOPY N/A 3/22/2021    Procedure: ESOPHAGOGASTRODUODENOSCOPYURGNET;  Surgeon:  Russell Del Rio MD;  Location: [facility] ENDOSCOPY;  Service: Gastroenterology;  Laterality: N/A;   • FOOT SURGERY  2013    Foot/toes surgery procedure (1) - Arthroplasty of toes 4 and 5 of right foot.   • HERNIA REPAIR     • HERNIA REPAIR      hital   • HYSTERECTOMY     • LIVER BIOPSY     • NERVE BLOCK  2016    Injection for nerve block (1) - Lumbar medial branch block.   • OTHER SURGICAL HISTORY  2012    Inj(s) Tend-Sheath, Ligament, Single  (1) - [facility]    • OTHER SURGICAL HISTORY  2013    Small Joint Injection/Aspiration  (2) - [facility]TER NICKERSON ([facility])    • OTHER SURGICAL HISTORY      bowel obstruction x2   • OTHER SURGICAL HISTORY      gland removed from neck   • SUBLINGUAL SALIVARY CYST EXCISION N/A 2017    Procedure: EXCISION OF LEFT  TONGUE LESION WITH CLOSURE;  Surgeon: Live Bolton MD;  Location: [facility] OR;  Service:    • TUBAL ABDOMINAL LIGATION     • UPPER GASTROINTESTINAL ENDOSCOPY  2013   • UPPER GASTROINTESTINAL ENDOSCOPY  10/17/2018   • UPPER GASTROINTESTINAL ENDOSCOPY  2021       ALLERGIES:    Allergies   Allergen Reactions   • Other      Pt states that taking steroids either in pill or injection form make her have blisters in her mouth and she feels like she is on fire on the inside/ has hx of c diff and possible MRSA     • Corticosteroids Rash   • Kenalog  [Triamcinolone Acetonide] Rash   • Sulfa Antibiotics Rash     Sulfa (Sulfonamide Antibiotics)       SOCIAL HISTORY:   Social History     Tobacco Use   • Smoking status: Former Smoker     Packs/day: 2.00     Years: 15.00     Pack years: 30.00     Types: Cigarettes     Quit date:      Years since quittin.5   • Smokeless tobacco: Never Used   Vaping Use   • Vaping Use: Never used   Substance Use Topics   • Alcohol use: No   • Drug use: No       CURRENT MEDICATIONS:    Current Outpatient Medications   Medication Sig Dispense Refill   • cetirizine (zyrTEC) 10  MG tablet Take 1 tablet by mouth Daily.     • cloNIDine (CATAPRES) 0.1 MG tablet Take 1 tablet by mouth 2 (Two) Times a Day. PRN systolic BP >160. 20 tablet 0   • CRANBERRY PO Take 1 capsule by mouth 2 (two) times a day.     • cyclobenzaprine (FLEXERIL) 10 MG tablet Take 10 mg by mouth 3 (Three) Times a Day As Needed for Muscle Spasms.     • diclofenac (VOLTAREN) 75 MG EC tablet Take 1 tablet by mouth Daily. 30 tablet 2   • dicyclomine (BENTYL) 20 MG tablet Take 1 tablet by mouth Every 6 (Six) Hours As Needed (abdominal pain). 30 tablet 0   • docusate sodium (COLACE) 100 MG capsule Take 100 mg by mouth Daily.     • escitalopram (LEXAPRO) 10 MG tablet Take 10 mg by mouth Daily.     • furosemide (LASIX) 20 MG tablet Take 2 tablets by mouth Daily for 3 days. (Patient taking differently: Take 20 mg by mouth Daily.) 6 tablet 0   • gabapentin (NEURONTIN) 800 MG tablet Take 800 mg by mouth 4 (Four) Times a Day.     • Methylnaltrexone Bromide (Relistor) 150 MG tablet Take 450 mg by mouth Daily. 90 tablet 1   • metoclopramide (Reglan) 5 MG tablet Take 1 tablet by mouth 3 (Three) Times a Day Before Meals. (Patient taking differently: Take 5 mg by mouth 2 (two) times a day.) 90 tablet 1   • Mirabegron ER (Myrbetriq) 50 MG tablet sustained-release 24 hour 24 hr tablet Take 50 mg by mouth Daily.     • omeprazole (priLOSEC) 40 MG capsule      • ondansetron (ZOFRAN) 8 MG tablet Take 1 tablet by mouth Every 8 (Eight) Hours As Needed for Nausea or Vomiting. 30 tablet 0   • ondansetron ODT (ZOFRAN-ODT) 4 MG disintegrating tablet Place 1 tablet on the tongue Every 8 (Eight) Hours As Needed for Nausea or Vomiting. 10 tablet 0   • pantoprazole (Protonix) 40 MG EC tablet Take 1 tablet by mouth Daily. 30 tablet 3   • polyethylene glycol (MiraLax) 17 GM/SCOOP powder Take 17 g by mouth 2 (Two) Times a Day. 850 g 1   • potassium chloride 10 MEQ CR tablet      • Sodium Phosphates (fleet enema) 7-19 GM/118ML enema Insert 1 enema into the  rectum As Needed.     • spironolactone (ALDACTONE) 25 MG tablet TAKE 1 TABLET BY MOUTH ONCE DAILY 30 tablet 5   • traZODone (DESYREL) 100 MG tablet Take 1 tablet by mouth Every Night. 30 tablet 2   • folic acid (FOLVITE) 1 MG tablet Take 1 tablet by mouth on Monday, Wednesday and Friday 36 tablet 1   • vitamin B-12 (CYANOCOBALAMIN) 1000 MCG tablet Take 1 tablet by mouth on Monday, Wednesday and Friday 36 tablet 1     No current facility-administered medications for this visit.        HOME MEDICATIONS:   Current Outpatient Medications on File Prior to Visit   Medication Sig Dispense Refill   • cetirizine (zyrTEC) 10 MG tablet Take 1 tablet by mouth Daily.     • cloNIDine (CATAPRES) 0.1 MG tablet Take 1 tablet by mouth 2 (Two) Times a Day. PRN systolic BP >160. 20 tablet 0   • CRANBERRY PO Take 1 capsule by mouth 2 (two) times a day.     • cyclobenzaprine (FLEXERIL) 10 MG tablet Take 10 mg by mouth 3 (Three) Times a Day As Needed for Muscle Spasms.     • diclofenac (VOLTAREN) 75 MG EC tablet Take 1 tablet by mouth Daily. 30 tablet 2   • dicyclomine (BENTYL) 20 MG tablet Take 1 tablet by mouth Every 6 (Six) Hours As Needed (abdominal pain). 30 tablet 0   • docusate sodium (COLACE) 100 MG capsule Take 100 mg by mouth Daily.     • [] erythromycin base (E-MYCIN) 250 MG tablet Take 1 tablet by mouth 3 (Three) Times a Day for 30 days. Indications: Delayed or Stopped Emptying of Stomach 90 tablet 0   • escitalopram (LEXAPRO) 10 MG tablet Take 10 mg by mouth Daily.     • furosemide (LASIX) 20 MG tablet Take 2 tablets by mouth Daily for 3 days. (Patient taking differently: Take 20 mg by mouth Daily.) 6 tablet 0   • gabapentin (NEURONTIN) 800 MG tablet Take 800 mg by mouth 4 (Four) Times a Day.     • Methylnaltrexone Bromide (Relistor) 150 MG tablet Take 450 mg by mouth Daily. 90 tablet 1   • metoclopramide (Reglan) 5 MG tablet Take 1 tablet by mouth 3 (Three) Times a Day Before Meals. (Patient taking differently: Take 5  mg by mouth 2 (two) times a day.) 90 tablet 1   • Mirabegron ER (Myrbetriq) 50 MG tablet sustained-release 24 hour 24 hr tablet Take 50 mg by mouth Daily.     • omeprazole (priLOSEC) 40 MG capsule      • ondansetron (ZOFRAN) 8 MG tablet Take 1 tablet by mouth Every 8 (Eight) Hours As Needed for Nausea or Vomiting. 30 tablet 0   • ondansetron ODT (ZOFRAN-ODT) 4 MG disintegrating tablet Place 1 tablet on the tongue Every 8 (Eight) Hours As Needed for Nausea or Vomiting. 10 tablet 0   • pantoprazole (Protonix) 40 MG EC tablet Take 1 tablet by mouth Daily. 30 tablet 3   • polyethylene glycol (MiraLax) 17 GM/SCOOP powder Take 17 g by mouth 2 (Two) Times a Day. 850 g 1   • potassium chloride 10 MEQ CR tablet      • Sodium Phosphates (fleet enema) 7-19 GM/118ML enema Insert 1 enema into the rectum As Needed.     • spironolactone (ALDACTONE) 25 MG tablet TAKE 1 TABLET BY MOUTH ONCE DAILY 30 tablet 5   • traZODone (DESYREL) 100 MG tablet Take 1 tablet by mouth Every Night. 30 tablet 2     No current facility-administered medications on file prior to visit.       FAMILY HISTORY:    Family History   Problem Relation Age of Onset   • Cancer Other    • Diabetes Other    • Heart disease Other    • Hypertension Mother    • Cancer Mother    • Diabetes Mother    • Hypertension Father    • Heart attack Father    • Cancer Father    • Heart disease Father    • Thyroid disease Maternal Aunt    • Cancer Maternal Aunt    • No Known Problems Sister    • No Known Problems Brother    • Cancer Maternal Grandmother    • No Known Problems Sister        REVIEW OF SYSTEMS:        CONSTITUTIONAL:  Complains of fatigue. Denies any fever, chills or weight loss.     EYES: No visual disturbances. No discharge. No new lesions    ENMT:  No epistaxis, mouth sores or difficulty swallowing.    RESPIRATORY:  No new shortness of breath. No new cough or hemoptysis.    CARDIOVASCULAR:  No chest pain or palpitations.    GASTROINTESTINAL: Positive for chronic  "generalized abdominal discomfort.  Positive for nausea.  Positive for constipation.  No blood in stool.    GENITOURINARY: No Dysuria or Hematuria.    MUSCULOSKELETAL: Positive for arthritis.  Positive for swelling of bilateral lower extremity.    LYMPHATICS:  Denies any abnormal swollen glands anywhere in the body.    NEUROLOGICAL : No tingling or numbness. No headache or dizziness. No seizures or balance problems.    SKIN: Positive for easy bruising.    ENDOCRINE : No new hot or cold intolerance. No new polyuria . No polydipsia.        PHYSICAL EXAMINATION:      VITAL SIGNS:  /76   Pulse 80   Temp 97.8 °F (36.6 °C)   Resp 18   Ht 162.6 cm (64\")   Wt 94.2 kg (207 lb 9.6 oz)   LMP  (LMP Unknown)   BMI 35.63 kg/m²       06/24/21  0809   Weight: 94.2 kg (207 lb 9.6 oz)       ECOG performance status: 2    CONSTITUTIONAL:  Not in any distress.    EYES: Mild conjunctival Pallor. No Icterus. No Pterygium. Extraocular Movements intact.No ptosis.    ENMT:  Normocephalic, Atraumatic.No Facial Asymmetry noted.    NECK:  No adenopathy.Trachea midline. NO JVD.    RESPIRATORY:  Fair air entry bilateral. No rhonchi or wheezing.Fair respiratory effort.    CARDIOVASCULAR:  S1, S2. Regular rate and rhythm. No murmur or gallop appreciated.    ABDOMEN:  Soft, obese, nontender. Bowel sounds present in all four quadrants.  No Hepatosplenomegaly appreciated.    MUSCULOSKELETAL: 1+ edema.No Calf Tenderness.Decreased range of motion.    NEUROLOGIC:    No  Motor  deficit appreciated. Cranial Nerves 2-12 grossly intact.    SKIN : No new skin lesion identified. Skin is warm and dry to touch.    LYMPHATICS: No new enlarged lymph nodes in neck or supraclavicular area.    PSYCHIATRY: Alert, awake and oriented ×3.                DIAGNOSTIC DATA:    WBC   Date Value Ref Range Status   06/24/2021 4.61 3.40 - 10.80 10*3/mm3 Final   06/22/2020 4.36 3.40 - 10.80 10*3/mm3 Final     RBC   Date Value Ref Range Status   06/24/2021 5.10 3.77 - " 5.28 10*6/mm3 Final     Hemoglobin   Date Value Ref Range Status   06/24/2021 13.8 12.0 - 15.9 g/dL Final     Hematocrit   Date Value Ref Range Status   06/24/2021 42.3 34.0 - 46.6 % Final     MCV   Date Value Ref Range Status   06/24/2021 82.9 79.0 - 97.0 fL Final     MCH   Date Value Ref Range Status   06/24/2021 27.1 26.6 - 33.0 pg Final     MCHC   Date Value Ref Range Status   06/24/2021 32.6 31.5 - 35.7 g/dL Final     RDW   Date Value Ref Range Status   06/24/2021 17.6 (H) 12.3 - 15.4 % Final     RDW-SD   Date Value Ref Range Status   06/24/2021 52.0 37.0 - 54.0 fl Final     MPV   Date Value Ref Range Status   06/24/2021 10.7 6.0 - 12.0 fL Final     Platelets   Date Value Ref Range Status   06/24/2021 66 (L) 140 - 450 10*3/mm3 Final     Neutrophil %   Date Value Ref Range Status   06/24/2021 72.8 42.7 - 76.0 % Final     Lymphocyte %   Date Value Ref Range Status   06/24/2021 17.6 (L) 19.6 - 45.3 % Final     Monocyte %   Date Value Ref Range Status   06/24/2021 7.2 5.0 - 12.0 % Final     Eosinophil %   Date Value Ref Range Status   06/24/2021 2.0 0.3 - 6.2 % Final     Basophil %   Date Value Ref Range Status   06/24/2021 0.2 0.0 - 1.5 % Final     Immature Grans %   Date Value Ref Range Status   06/24/2021 0.2 0.0 - 0.5 % Final     Neutrophils, Absolute   Date Value Ref Range Status   06/24/2021 3.36 1.70 - 7.00 10*3/mm3 Final     Lymphocytes, Absolute   Date Value Ref Range Status   06/24/2021 0.81 0.70 - 3.10 10*3/mm3 Final     Monocytes, Absolute   Date Value Ref Range Status   06/24/2021 0.33 0.10 - 0.90 10*3/mm3 Final     Eosinophils, Absolute   Date Value Ref Range Status   06/24/2021 0.09 0.00 - 0.40 10*3/mm3 Final     Basophils, Absolute   Date Value Ref Range Status   06/24/2021 0.01 0.00 - 0.20 10*3/mm3 Final     Immature Grans, Absolute   Date Value Ref Range Status   06/24/2021 0.01 0.00 - 0.05 10*3/mm3 Final     nRBC   Date Value Ref Range Status   06/24/2021 0.0 0.0 - 0.2 /100 WBC Final     Glucose    Date Value Ref Range Status   06/24/2021 89 65 - 99 mg/dL Final     Sodium   Date Value Ref Range Status   06/24/2021 139 136 - 145 mmol/L Final     Potassium   Date Value Ref Range Status   06/24/2021 4.3 3.5 - 5.2 mmol/L Final     CO2   Date Value Ref Range Status   06/24/2021 24.0 22.0 - 29.0 mmol/L Final     Chloride   Date Value Ref Range Status   06/24/2021 105 98 - 107 mmol/L Final     Anion Gap   Date Value Ref Range Status   06/24/2021 10.0 5.0 - 15.0 mmol/L Final     Creatinine   Date Value Ref Range Status   06/24/2021 0.98 0.57 - 1.00 mg/dL Final     BUN   Date Value Ref Range Status   06/24/2021 9 8 - 23 mg/dL Final     BUN/Creatinine Ratio   Date Value Ref Range Status   06/24/2021 9.2 7.0 - 25.0 Final     Calcium   Date Value Ref Range Status   06/24/2021 9.5 8.6 - 10.5 mg/dL Final     eGFR Non  Amer   Date Value Ref Range Status   06/24/2021 58 (L) >60 mL/min/1.73 Final     Alkaline Phosphatase   Date Value Ref Range Status   06/24/2021 127 (H) 39 - 117 U/L Final     Total Protein   Date Value Ref Range Status   06/24/2021 7.7 6.0 - 8.5 g/dL Final     ALT (SGPT)   Date Value Ref Range Status   06/24/2021 24 1 - 33 U/L Final     AST (SGOT)   Date Value Ref Range Status   06/24/2021 40 (H) 1 - 32 U/L Final     Total Bilirubin   Date Value Ref Range Status   06/24/2021 1.3 (H) 0.0 - 1.2 mg/dL Final     Albumin   Date Value Ref Range Status   06/24/2021 3.70 3.50 - 5.20 g/dL Final     Globulin   Date Value Ref Range Status   06/24/2021 4.0 gm/dL Final     Lab Results   Component Value Date    IRON 84 06/24/2021    TIBC 456 06/24/2021    LABIRON 18 (L) 06/24/2021    FERRITIN 102.10 06/24/2021    [id number] 477 06/24/2021    FOLATE 13.10 06/24/2021     Lab Results   Component Value Date    AFPTM 2.5 07/31/2018    AFPTM 99 07/31/2018     Hepatitis Panel, Acute  Specimen Information: Blood        Component   Ref Range & Units 2 yr ago   Hepatitis C Ab   Negative Negative    Hep A IgM   Negative  Negative    Hep B C IgM   Negative Negative    Hepatitis B Surface Ag   Negative Negative    Resulting Agency [facility]         Specimen Collected: 07/31/18 08:52 Last Resulted: 08/01/18 04:03               Radiology Data :  CT of abdomen and pelvis with contrast done on May 18, 2021 showed:    FINDINGS:      LOWER CHEST: No pulmonary consolidation or pleural effusion.  Stable small area of noncalcified nodularity within the left  posterior lateral lung base, described on CT 5/14/2021 with  recommendations. No cardiac enlargement or pericardial effusion.     HEPATOBILIARY: Redemonstration of nodular contour of the liver  most consistent with diffuse hepatocellular disease such as  cirrhosis. No focal suspicious hepatic lesion or ductal dilation.  Cholecystectomy changes without extrahepatic biliary dilation.  The portal vein is patent.  SPLEEN: Redemonstration of splenomegaly measuring approximate  17.5 cm in craniocaudal dimension. The enhancement pattern is  homogeneous. No perisplenic fluid collection. Splenic vein is  patent.  PANCREAS: Unremarkable  ADRENAL GLANDS: Unremarkable.  KIDNEYS/URETERS: No suspicious renal mass, renal/ureteral  calcification or obstructive uropathy.     GASTROINTESTINAL: The included distal esophagus, stomach, and  duodenum are unremarkable. Redemonstration of localized grouping  of small bowel within the anterior midline abdomen/pelvis  demonstrating mild distention with fluid and air-fluid levels.  There is continued minimal stranding of the associated regional  mesentery. The findings may relate to an ileus versus partial  small bowel obstruction. There is oral contrast intermixed with  stool throughout the distribution of the large bowel without  acute inflammatory changes.  REPRODUCTIVE ORGANS: Hysterectomy.  URINARY BLADDER: Unremarkable     VASCULAR: No abdominal aortic aneurysm. Redemonstration of  varicoses associated with the cirrhosis and portal hypertension.  LYMPH NODES:  No pathologically enlarged nodes by size criteria.  PERITONEUM/RETROPERITONEUM: Redemonstrated trace volume of fluid  within the pelvis. No free air or abscess.      OSSEOUS STRUCTURES: Degenerative changes within the spine.     ADDITIONAL FINDINGS: None     IMPRESSION:  Redemonstration of grouping of small bowel loops anterior  abdomen/pelvis mildly distended with air-fluid levels and minimal  regional inflammatory stranding of the mesentery. Consideration  is given to an ileus versus partial obstruction.     Redemonstration of hepatic cirrhosis with portal hypertension  changes.     Redemonstration of trace amount of free fluid within the pelvis.      No radiology results for the last 7 days        Pathology :  * Cannot find OR log *          ASSESSMENT AND PLAN:      1.  Thrombocytopenia:  -Secondary to cirrhosis of liver with splenomegaly of 17.5 cm.  -Most recent platelet count on June 7, 2021 is 55,000.  -Discussed with patient her thrombocytopenia is related to cirrhosis of liver and splenic sequestration.  -With her surgery she will be moderate to high risk of bleeding because of her underlying liver disease and severe thrombocytopenia.  -Option of platelet transfusion during surgery was also discussed with patient.  -Due to splenomegaly, platelet transfusion may not increase platelet count but may help with control bleeding at that time.  -We will do blood work today consisting of CBC with differential, CMP, PT, INR, PTT, fibrinogen, anemia work-up with vitamin B12, folate, iron studies, ferritin.  -Platelet count is 66,000 today.  PT, INR and fibrinogen level is normal.  -Case was discussed with Dr. Troncoso.  Recommendation if surgery is required were also discussed with Dr. Troncoso Today  -We will ask patient to return to clinic in 2 week to go over the result of blood work and further recommendation.      2.  Lung nodule:  -CT scan of abdomen done on May 23, 2021 shows 2 noncalcified lung nodule at left lung  base  -Recommend getting CT of chest without contrast for further evaluation of lung nodule.    3.  Cirrhosis of liver with splenomegaly due to nonalcoholic steatohepatitis  -Continue with management as per gastroenterology team    4.  Iron deficiency with anemia  -Anemia work-up done today shows hemoglobin is 13.6.  Iron studies are showing component of iron deficiency with iron saturation of 18% and ferritin of 102.  Due to severe constipation patient would not be able to tolerate ferrous sulfate by mouth.  We will start patient on intravenous Venofer starting next week.  Side effect of Venofer including allergic reaction were discussed with patient  -Recommend starting B12 and folic acid 3 times a week.      4.  Severe constipation:  -Continue with management as per gastroenterology team    5.  Health maintenance: Patient does not smoke currently.  Had a colonoscopy in March 2021    6. -Patient is not homicidal or suicidal.  No acute intervention required.      PHQ-9 Total Score: 16   Patient screened positive for depression based on a PHQ-9 score of 16 on 6/24/2021. Follow-up recommendations include: Elevated PHQ score reflective of acute illness, not depression.      Amira Shannon reports a pain score of 10.  Given her pain assessment as noted, treatment options were discussed and the following options were decided upon as a follow-up plan to address the patient's pain: continuation of current treatment plan for pain.             Thank you for this consultation.    Lokesh Goodwin MD  6/25/2021  17:19 CDT        Part of this note may be an electronic transcription/translation of spoken language to printed text using the Dragon Dictation System.

## 2021-06-25 PROBLEM — T45.4X5A ADVERSE EFFECT OF IRON: Status: ACTIVE | Noted: 2021-06-25

## 2021-06-25 RX ORDER — LANOLIN ALCOHOL/MO/W.PET/CERES
CREAM (GRAM) TOPICAL
Qty: 36 TABLET | Refills: 1 | Status: SHIPPED | OUTPATIENT
Start: 2021-06-25 | End: 2022-01-25

## 2021-06-25 RX ORDER — DIPHENHYDRAMINE HYDROCHLORIDE 50 MG/ML
25 INJECTION INTRAMUSCULAR; INTRAVENOUS ONCE
Status: CANCELLED | OUTPATIENT
Start: 2021-06-30

## 2021-06-25 RX ORDER — FOLIC ACID 1 MG/1
TABLET ORAL
Qty: 36 TABLET | Refills: 1 | Status: SHIPPED | OUTPATIENT
Start: 2021-06-25 | End: 2021-06-25 | Stop reason: SDUPTHER

## 2021-06-25 RX ORDER — ACETAMINOPHEN 325 MG/1
650 TABLET ORAL ONCE
Status: CANCELLED | OUTPATIENT
Start: 2021-06-30

## 2021-06-25 RX ORDER — FOLIC ACID 1 MG/1
TABLET ORAL
Qty: 36 TABLET | Refills: 1 | Status: SHIPPED | OUTPATIENT
Start: 2021-06-25 | End: 2021-12-01 | Stop reason: SDUPTHER

## 2021-06-25 RX ORDER — SODIUM CHLORIDE 9 MG/ML
250 INJECTION, SOLUTION INTRAVENOUS ONCE
Status: CANCELLED | OUTPATIENT
Start: 2021-06-30

## 2021-06-28 ENCOUNTER — TELEPHONE (OUTPATIENT)
Dept: ONCOLOGY | Facility: HOSPITAL | Age: 61
End: 2021-06-28

## 2021-06-28 NOTE — TELEPHONE ENCOUNTER
----- Message from Lokesh Goodwin MD sent at 6/25/2021  5:23 PM CDT -----  Please let patient know her iron level is again low in blood.  I have ordered intravenous iron to be started next week.  I have sent prescription for B12 and folic acid 3 times a week to her pharmacy.  We will go over the results in detail upon next clinic visit in 2 weeks.  Thank you

## 2021-06-28 NOTE — TELEPHONE ENCOUNTER
Called and spoke with pt regarding lab results and med instructions per Dr. Goodwin. V/u obtained.

## 2021-07-06 ENCOUNTER — HOSPITAL ENCOUNTER (OUTPATIENT)
Dept: CT IMAGING | Facility: HOSPITAL | Age: 61
Discharge: HOME | End: 2021-07-06
Admitting: INTERNAL MEDICINE

## 2021-07-06 DIAGNOSIS — R91.1 LUNG NODULE: ICD-10-CM

## 2021-07-06 PROCEDURE — 71250 CT THORAX DX C-: CPT

## 2021-07-08 ENCOUNTER — INFUSION (OUTPATIENT)
Dept: ONCOLOGY | Facility: HOSPITAL | Age: 61
End: 2021-07-08

## 2021-07-08 ENCOUNTER — OFFICE VISIT (OUTPATIENT)
Dept: ONCOLOGY | Facility: CLINIC | Age: 61
End: 2021-07-08

## 2021-07-08 VITALS
HEART RATE: 92 BPM | HEIGHT: 64 IN | SYSTOLIC BLOOD PRESSURE: 120 MMHG | BODY MASS INDEX: 35.53 KG/M2 | WEIGHT: 208.1 LBS | RESPIRATION RATE: 18 BRPM | TEMPERATURE: 98.2 F | DIASTOLIC BLOOD PRESSURE: 71 MMHG

## 2021-07-08 DIAGNOSIS — E61.1 IRON DEFICIENCY: ICD-10-CM

## 2021-07-08 DIAGNOSIS — K74.69 OTHER CIRRHOSIS OF LIVER (HCC): Primary | Chronic | ICD-10-CM

## 2021-07-08 DIAGNOSIS — T45.4X5A ADVERSE EFFECT OF IRON, INITIAL ENCOUNTER: ICD-10-CM

## 2021-07-08 DIAGNOSIS — T45.4X5A ADVERSE EFFECT OF IRON, INITIAL ENCOUNTER: Primary | ICD-10-CM

## 2021-07-08 DIAGNOSIS — R91.1 LUNG NODULE: Chronic | ICD-10-CM

## 2021-07-08 DIAGNOSIS — D69.6 THROMBOCYTOPENIA (HCC): Chronic | ICD-10-CM

## 2021-07-08 DIAGNOSIS — R16.1 SPLENOMEGALY: Chronic | ICD-10-CM

## 2021-07-08 PROCEDURE — 99214 OFFICE O/P EST MOD 30 MIN: CPT | Performed by: INTERNAL MEDICINE

## 2021-07-08 PROCEDURE — 96375 TX/PRO/DX INJ NEW DRUG ADDON: CPT | Performed by: INTERNAL MEDICINE

## 2021-07-08 PROCEDURE — 96365 THER/PROPH/DIAG IV INF INIT: CPT | Performed by: INTERNAL MEDICINE

## 2021-07-08 PROCEDURE — 25010000002 IRON SUCROSE PER 1 MG: Performed by: INTERNAL MEDICINE

## 2021-07-08 PROCEDURE — 25010000002 DIPHENHYDRAMINE PER 50 MG: Performed by: INTERNAL MEDICINE

## 2021-07-08 PROCEDURE — 1125F AMNT PAIN NOTED PAIN PRSNT: CPT | Performed by: INTERNAL MEDICINE

## 2021-07-08 PROCEDURE — G9903 PT SCRN TBCO ID AS NON USER: HCPCS | Performed by: INTERNAL MEDICINE

## 2021-07-08 PROCEDURE — 1123F ACP DISCUSS/DSCN MKR DOCD: CPT | Performed by: INTERNAL MEDICINE

## 2021-07-08 RX ORDER — DIPHENHYDRAMINE HYDROCHLORIDE 50 MG/ML
25 INJECTION INTRAMUSCULAR; INTRAVENOUS ONCE
Status: CANCELLED | OUTPATIENT
Start: 2021-07-09

## 2021-07-08 RX ORDER — SODIUM CHLORIDE 9 MG/ML
250 INJECTION, SOLUTION INTRAVENOUS ONCE
Status: COMPLETED | OUTPATIENT
Start: 2021-07-08 | End: 2021-07-08

## 2021-07-08 RX ORDER — ACETAMINOPHEN 325 MG/1
650 TABLET ORAL ONCE
Status: CANCELLED | OUTPATIENT
Start: 2021-07-09

## 2021-07-08 RX ORDER — DIPHENHYDRAMINE HYDROCHLORIDE 50 MG/ML
25 INJECTION INTRAMUSCULAR; INTRAVENOUS ONCE
Status: COMPLETED | OUTPATIENT
Start: 2021-07-08 | End: 2021-07-08

## 2021-07-08 RX ORDER — SODIUM CHLORIDE 9 MG/ML
250 INJECTION, SOLUTION INTRAVENOUS ONCE
Status: CANCELLED | OUTPATIENT
Start: 2021-07-09

## 2021-07-08 RX ORDER — ACETAMINOPHEN 325 MG/1
650 TABLET ORAL ONCE
Status: COMPLETED | OUTPATIENT
Start: 2021-07-08 | End: 2021-07-08

## 2021-07-08 RX ADMIN — DIPHENHYDRAMINE HYDROCHLORIDE 25 MG: 50 INJECTION INTRAMUSCULAR; INTRAVENOUS at 09:58

## 2021-07-08 RX ADMIN — IRON SUCROSE 200 MG: 20 INJECTION, SOLUTION INTRAVENOUS at 10:31

## 2021-07-08 RX ADMIN — FAMOTIDINE 20 MG: 10 INJECTION INTRAVENOUS at 10:01

## 2021-07-08 RX ADMIN — ACETAMINOPHEN 650 MG: 325 TABLET, FILM COATED ORAL at 09:56

## 2021-07-08 RX ADMIN — SODIUM CHLORIDE 250 ML: 9 INJECTION, SOLUTION INTRAVENOUS at 09:55

## 2021-07-08 NOTE — NURSING NOTE
The patient was being discharged when she said she felt wet on her blouse.  The patient's blouse and jacket were saturated with IV iron.  Informed provider that it was undetermined how much of the iron infusion the patient actually received due to the hub of the IV had become disconnected during the infusion and leaked onto the patient under her blanket.  Rosio Logan RN  July 8, 2021  1200

## 2021-07-08 NOTE — PROGRESS NOTES
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  Patient is scheduled to start first dose of intravenous iron today.  Complains of chronic abdominal discomfort.  Complains of swelling and pain in bilateral lower extremity which is chronic for her.  Complains of constipation.  Complains of easy bruising.      Past Medical History, Past Surgical History, Social History, Family History have been reviewed and are without significant changes except as mentioned.    Review of Systems   Constitutional: Positive for fatigue.   Gastrointestinal: Positive for abdominal pain, constipation and nausea.   Musculoskeletal: Positive for arthralgias.        Positive for chronic lower extremity swelling and pain.   Hematological: Bruises/bleeds easily.      A comprehensive 14 point review of systems was performed and was negative except as mentioned.    Medications:  The current medication list was reviewed in the EMR    ALLERGIES:    Allergies   Allergen Reactions   • Other      Pt states that taking steroids either in pill or injection form make her have blisters in her mouth and she feels like she is on fire on the inside/ has hx of c diff and possible MRSA     • Corticosteroids Rash   • Kenalog  [Triamcinolone Acetonide] Rash   • Sulfa Antibiotics Rash     Sulfa (Sulfonamide Antibiotics)       Objective      Vitals:    07/08/21 0830   BP: 120/71   Pulse: 92   Resp: 18   Temp: 98.2 °F (36.8 °C)   TempSrc: Temporal   Weight: 94.4 kg (208 lb 1.6 oz)   Height: 162.6 cm  "(64.02\")   PainSc:   8   PainLoc: Generalized     Current Status 7/8/2021   ECOG score 1       Physical Exam  Cardiovascular:      Rate and Rhythm: Normal rate and regular rhythm.   Pulmonary:      Breath sounds: Normal breath sounds.   Neurological:      Mental Status: She is alert and oriented to person, place, and time.           RECENT LABS:  Glucose   Date Value Ref Range Status   06/24/2021 89 65 - 99 mg/dL Final     Sodium   Date Value Ref Range Status   06/24/2021 139 136 - 145 mmol/L Final     Potassium   Date Value Ref Range Status   06/24/2021 4.3 3.5 - 5.2 mmol/L Final     CO2   Date Value Ref Range Status   06/24/2021 24.0 22.0 - 29.0 mmol/L Final     Chloride   Date Value Ref Range Status   06/24/2021 105 98 - 107 mmol/L Final     Anion Gap   Date Value Ref Range Status   06/24/2021 10.0 5.0 - 15.0 mmol/L Final     Creatinine   Date Value Ref Range Status   06/24/2021 0.98 0.57 - 1.00 mg/dL Final     BUN   Date Value Ref Range Status   06/24/2021 9 8 - 23 mg/dL Final     BUN/Creatinine Ratio   Date Value Ref Range Status   06/24/2021 9.2 7.0 - 25.0 Final     Calcium   Date Value Ref Range Status   06/24/2021 9.5 8.6 - 10.5 mg/dL Final     eGFR Non  Amer   Date Value Ref Range Status   06/24/2021 58 (L) >60 mL/min/1.73 Final     Alkaline Phosphatase   Date Value Ref Range Status   06/24/2021 127 (H) 39 - 117 U/L Final     Total Protein   Date Value Ref Range Status   06/24/2021 7.7 6.0 - 8.5 g/dL Final     ALT (SGPT)   Date Value Ref Range Status   06/24/2021 24 1 - 33 U/L Final     AST (SGOT)   Date Value Ref Range Status   06/24/2021 40 (H) 1 - 32 U/L Final     Total Bilirubin   Date Value Ref Range Status   06/24/2021 1.3 (H) 0.0 - 1.2 mg/dL Final     Albumin   Date Value Ref Range Status   06/24/2021 3.70 3.50 - 5.20 g/dL Final     Globulin   Date Value Ref Range Status   06/24/2021 4.0 gm/dL Final     Lab Results   Component Value Date    WBC 4.61 06/24/2021    HGB 13.8 06/24/2021    HCT " 42.3 06/24/2021    MCV 82.9 06/24/2021    PLT 66 (L) 06/24/2021     Lab Results   Component Value Date    NEUTROABS 3.36 06/24/2021    IRON 84 06/24/2021    IRON 72 05/08/2021    IRON 81 07/31/2018    TIBC 456 06/24/2021    TIBC 411 05/08/2021    TIBC 481 07/31/2018    LABIRON 18 (L) 06/24/2021    LABIRON 18 (L) 05/08/2021    LABIRON 17 07/31/2018    FERRITIN 102.10 06/24/2021    FERRITIN 30.64 05/08/2021    FERRITIN 19.30 07/31/2018    APHUUPJE83 477 06/24/2021    ZECNRYPF41 508 05/09/2021    CIHSPYVN24 461 11/14/2017    FOLATE 13.10 06/24/2021    FOLATE 11.20 05/08/2021    FOLATE 8.17 11/14/2017     Lab Results   Component Value Date    AFPTM 2.5 07/31/2018    AFPTM 99 07/31/2018         PATHOLOGY:  * Cannot find OR log *         RADIOLOGY DATA :  CT Chest Without Contrast Diagnostic    Result Date: 7/7/2021  2 adjacent left lower lobe pulmonary nodules described on prior CT abdomen May 23, 2021 are smaller and more ill-defined on today's examination therefore suggesting resolution of an inflammatory/infectious process rather than neoplasm. Electronically signed by:  Blair Rojas MD  7/7/2021 10:44 AM CDT Workstation: [id number]          Assessment/Plan     1.  Thrombocytopenia:  -Secondary to cirrhosis of liver with splenomegaly  -Most recent platelet count is 66,000  -PT, INR and fibrinogen level done on June 24, 2021 was within normal limit:  -Due to her large spleen size again it was discussed with patient and her  that platelet transfusion may not increase number of platelet but might be helpful in controlling bleeding.  -Due to her liver problem and chronic thrombocytopenia with splenomegaly she would be considered moderate to high risk of bleeding with surgery.  Recommendation has been discussed with Dr. Troncoso as well  -We will ask patient to return to clinic in 4 months with repeat CBC, iron studies, ferritin, B12 and folate to be done on that day.    2.  Lung nodules:  -Recent CT of chest without  contrast done on July 6, 2021 shows lung nodules are getting smaller most likely inflammatory in nature rather than malignant which was discussed with patient  -Recommend repeating CT of chest around January 2021.    3.  Iron deficiency anemia:  -Due to component of iron deficiency with iron saturation of 18% and severe constipation patient will be started on intravenous Venofer from today.  -Recommend continue with B12 and folic acid 3 times a week    4.  Cirrhosis of liver with splenomegaly due to nonalcoholic steatohepatitis  -Continue with management as per gastroenterology team    5.  Health maintenance: Patient does not smoke.  Had a colonoscopy in March 2021    6. Advance Care Planning: For now patient remains full code and is able to make decisions.  Patient has health care surrogate mentioned on chart.                 PHQ-9 Total Score: 3   -Patient is not homicidal or suicidal.  No acute intervention required.    Amira Shannon reports a pain score of 8.  Given her pain assessment as noted, treatment options were discussed and the following options were decided upon as a follow-up plan to address the patient's pain: continuation of current treatment plan for pain.         Lokesh Goodwin MD  7/8/2021  19:12 CDT        Part of this note may be an electronic transcription/translation of spoken language to printed text using the Dragon Dictation System.          CC:

## 2021-07-13 ENCOUNTER — TRANSCRIBE ORDERS (OUTPATIENT)
Dept: LAB | Facility: HOSPITAL | Age: 61
End: 2021-07-13

## 2021-07-13 ENCOUNTER — LAB (OUTPATIENT)
Dept: LAB | Facility: HOSPITAL | Age: 61
End: 2021-07-13

## 2021-07-13 ENCOUNTER — INFUSION (OUTPATIENT)
Dept: ONCOLOGY | Facility: HOSPITAL | Age: 61
End: 2021-07-13

## 2021-07-13 VITALS
SYSTOLIC BLOOD PRESSURE: 128 MMHG | RESPIRATION RATE: 20 BRPM | OXYGEN SATURATION: 95 % | DIASTOLIC BLOOD PRESSURE: 69 MMHG | HEART RATE: 82 BPM | TEMPERATURE: 98.6 F

## 2021-07-13 DIAGNOSIS — T45.4X5A ADVERSE EFFECT OF IRON, INITIAL ENCOUNTER: Primary | ICD-10-CM

## 2021-07-13 DIAGNOSIS — R31.9 URINARY TRACT INFECTION WITH HEMATURIA, SITE UNSPECIFIED: Primary | ICD-10-CM

## 2021-07-13 DIAGNOSIS — N39.0 URINARY TRACT INFECTION WITH HEMATURIA, SITE UNSPECIFIED: ICD-10-CM

## 2021-07-13 DIAGNOSIS — N39.0 URINARY TRACT INFECTION WITH HEMATURIA, SITE UNSPECIFIED: Primary | ICD-10-CM

## 2021-07-13 DIAGNOSIS — R31.9 URINARY TRACT INFECTION WITH HEMATURIA, SITE UNSPECIFIED: ICD-10-CM

## 2021-07-13 DIAGNOSIS — E61.1 IRON DEFICIENCY: ICD-10-CM

## 2021-07-13 PROCEDURE — 81001 URINALYSIS AUTO W/SCOPE: CPT

## 2021-07-13 PROCEDURE — 87186 SC STD MICRODIL/AGAR DIL: CPT

## 2021-07-13 PROCEDURE — 25010000002 DIPHENHYDRAMINE PER 50 MG: Performed by: INTERNAL MEDICINE

## 2021-07-13 PROCEDURE — 96375 TX/PRO/DX INJ NEW DRUG ADDON: CPT | Performed by: INTERNAL MEDICINE

## 2021-07-13 PROCEDURE — 96365 THER/PROPH/DIAG IV INF INIT: CPT | Performed by: INTERNAL MEDICINE

## 2021-07-13 PROCEDURE — 87086 URINE CULTURE/COLONY COUNT: CPT

## 2021-07-13 PROCEDURE — 87077 CULTURE AEROBIC IDENTIFY: CPT

## 2021-07-13 PROCEDURE — 25010000002 IRON SUCROSE PER 1 MG: Performed by: INTERNAL MEDICINE

## 2021-07-13 RX ORDER — ACETAMINOPHEN 325 MG/1
650 TABLET ORAL ONCE
Status: CANCELLED | OUTPATIENT
Start: 2021-07-14

## 2021-07-13 RX ORDER — SODIUM CHLORIDE 9 MG/ML
250 INJECTION, SOLUTION INTRAVENOUS ONCE
Status: COMPLETED | OUTPATIENT
Start: 2021-07-13 | End: 2021-07-13

## 2021-07-13 RX ORDER — ACETAMINOPHEN 325 MG/1
650 TABLET ORAL ONCE
Status: COMPLETED | OUTPATIENT
Start: 2021-07-13 | End: 2021-07-13

## 2021-07-13 RX ORDER — SODIUM CHLORIDE 9 MG/ML
250 INJECTION, SOLUTION INTRAVENOUS ONCE
Status: CANCELLED | OUTPATIENT
Start: 2021-07-14

## 2021-07-13 RX ORDER — DIPHENHYDRAMINE HYDROCHLORIDE 50 MG/ML
25 INJECTION INTRAMUSCULAR; INTRAVENOUS ONCE
Status: COMPLETED | OUTPATIENT
Start: 2021-07-13 | End: 2021-07-13

## 2021-07-13 RX ORDER — DIPHENHYDRAMINE HYDROCHLORIDE 50 MG/ML
25 INJECTION INTRAMUSCULAR; INTRAVENOUS ONCE
Status: CANCELLED | OUTPATIENT
Start: 2021-07-14

## 2021-07-13 RX ADMIN — IRON SUCROSE 200 MG: 20 INJECTION, SOLUTION INTRAVENOUS at 14:45

## 2021-07-13 RX ADMIN — ACETAMINOPHEN 650 MG: 325 TABLET, FILM COATED ORAL at 14:04

## 2021-07-13 RX ADMIN — DIPHENHYDRAMINE HYDROCHLORIDE 25 MG: 50 INJECTION INTRAMUSCULAR; INTRAVENOUS at 14:15

## 2021-07-13 RX ADMIN — SODIUM CHLORIDE 250 ML: 9 INJECTION, SOLUTION INTRAVENOUS at 14:14

## 2021-07-13 RX ADMIN — FAMOTIDINE 20 MG: 10 INJECTION INTRAVENOUS at 14:32

## 2021-07-14 LAB
BACTERIA UR QL AUTO: ABNORMAL /HPF
BILIRUB UR QL STRIP: NEGATIVE
CLARITY UR: CLEAR
COLOR UR: ABNORMAL
GLUCOSE UR STRIP-MCNC: NEGATIVE MG/DL
HGB UR QL STRIP.AUTO: ABNORMAL
HYALINE CASTS UR QL AUTO: ABNORMAL /LPF
KETONES UR QL STRIP: NEGATIVE
LEUKOCYTE ESTERASE UR QL STRIP.AUTO: ABNORMAL
NITRITE UR QL STRIP: POSITIVE
PH UR STRIP.AUTO: 6 [PH] (ref 5–8)
PROT UR QL STRIP: NEGATIVE
RBC # UR: ABNORMAL /HPF
REF LAB TEST METHOD: ABNORMAL
SP GR UR STRIP: 1.01 (ref 1–1.03)
SQUAMOUS #/AREA URNS HPF: ABNORMAL /HPF
UROBILINOGEN UR QL STRIP: ABNORMAL
WBC UR QL AUTO: ABNORMAL /HPF

## 2021-07-15 ENCOUNTER — HOSPITAL ENCOUNTER (OUTPATIENT)
Dept: ULTRASOUND IMAGING | Facility: HOSPITAL | Age: 61
Discharge: HOME | End: 2021-07-15

## 2021-07-15 ENCOUNTER — HOSPITAL ENCOUNTER (OUTPATIENT)
Dept: INTERVENTIONAL RADIOLOGY/VASCULAR | Facility: HOSPITAL | Age: 61
Discharge: HOME | End: 2021-07-15

## 2021-07-15 ENCOUNTER — INFUSION (OUTPATIENT)
Dept: ONCOLOGY | Facility: HOSPITAL | Age: 61
End: 2021-07-15

## 2021-07-15 ENCOUNTER — HOSPITAL ENCOUNTER (OUTPATIENT)
Dept: GENERAL RADIOLOGY | Facility: HOSPITAL | Age: 61
Discharge: HOME | End: 2021-07-15

## 2021-07-15 VITALS
HEART RATE: 80 BPM | SYSTOLIC BLOOD PRESSURE: 142 MMHG | TEMPERATURE: 98.4 F | RESPIRATION RATE: 20 BRPM | DIASTOLIC BLOOD PRESSURE: 70 MMHG

## 2021-07-15 DIAGNOSIS — T45.4X5A ADVERSE EFFECT OF IRON, INITIAL ENCOUNTER: ICD-10-CM

## 2021-07-15 DIAGNOSIS — E61.1 IRON DEFICIENCY: ICD-10-CM

## 2021-07-15 DIAGNOSIS — Z45.2 ENCOUNTER FOR ASSESSMENT OF PERIPHERALLY INSERTED CENTRAL VENOUS CATHETER (PICC): ICD-10-CM

## 2021-07-15 DIAGNOSIS — T45.4X5A ADVERSE EFFECT OF IRON, INITIAL ENCOUNTER: Primary | ICD-10-CM

## 2021-07-15 PROCEDURE — 25010000002 DIPHENHYDRAMINE PER 50 MG: Performed by: INTERNAL MEDICINE

## 2021-07-15 PROCEDURE — 25010000002 IRON SUCROSE PER 1 MG: Performed by: INTERNAL MEDICINE

## 2021-07-15 PROCEDURE — 96375 TX/PRO/DX INJ NEW DRUG ADDON: CPT | Performed by: INTERNAL MEDICINE

## 2021-07-15 PROCEDURE — 96365 THER/PROPH/DIAG IV INF INIT: CPT | Performed by: INTERNAL MEDICINE

## 2021-07-15 PROCEDURE — C1751 CATH, INF, PER/CENT/MIDLINE: HCPCS

## 2021-07-15 RX ORDER — SODIUM CHLORIDE 9 MG/ML
250 INJECTION, SOLUTION INTRAVENOUS ONCE
Status: CANCELLED | OUTPATIENT
Start: 2021-07-16

## 2021-07-15 RX ORDER — DIPHENHYDRAMINE HYDROCHLORIDE 50 MG/ML
25 INJECTION INTRAMUSCULAR; INTRAVENOUS ONCE
Status: COMPLETED | OUTPATIENT
Start: 2021-07-15 | End: 2021-07-15

## 2021-07-15 RX ORDER — SODIUM CHLORIDE 0.9 % (FLUSH) 0.9 %
20 SYRINGE (ML) INJECTION AS NEEDED
Status: DISCONTINUED | OUTPATIENT
Start: 2021-07-15 | End: 2021-07-15 | Stop reason: HOSPADM

## 2021-07-15 RX ORDER — ACETAMINOPHEN 325 MG/1
650 TABLET ORAL ONCE
Status: COMPLETED | OUTPATIENT
Start: 2021-07-15 | End: 2021-07-15

## 2021-07-15 RX ORDER — ACETAMINOPHEN 325 MG/1
650 TABLET ORAL ONCE
Status: CANCELLED | OUTPATIENT
Start: 2021-07-16

## 2021-07-15 RX ORDER — DIPHENHYDRAMINE HYDROCHLORIDE 50 MG/ML
25 INJECTION INTRAMUSCULAR; INTRAVENOUS ONCE
Status: CANCELLED | OUTPATIENT
Start: 2021-07-16

## 2021-07-15 RX ORDER — SODIUM CHLORIDE 0.9 % (FLUSH) 0.9 %
10 SYRINGE (ML) INJECTION AS NEEDED
Status: DISCONTINUED | OUTPATIENT
Start: 2021-07-15 | End: 2021-07-15 | Stop reason: HOSPADM

## 2021-07-15 RX ORDER — SODIUM CHLORIDE 0.9 % (FLUSH) 0.9 %
20 SYRINGE (ML) INJECTION AS NEEDED
Status: CANCELLED | OUTPATIENT
Start: 2021-07-15

## 2021-07-15 RX ORDER — SODIUM CHLORIDE 9 MG/ML
250 INJECTION, SOLUTION INTRAVENOUS ONCE
Status: COMPLETED | OUTPATIENT
Start: 2021-07-15 | End: 2021-07-15

## 2021-07-15 RX ORDER — SODIUM CHLORIDE 0.9 % (FLUSH) 0.9 %
10 SYRINGE (ML) INJECTION AS NEEDED
Status: CANCELLED | OUTPATIENT
Start: 2021-07-15

## 2021-07-15 RX ADMIN — DIPHENHYDRAMINE HYDROCHLORIDE 25 MG: 50 INJECTION INTRAMUSCULAR; INTRAVENOUS at 14:13

## 2021-07-15 RX ADMIN — IRON SUCROSE 200 MG: 20 INJECTION, SOLUTION INTRAVENOUS at 14:52

## 2021-07-15 RX ADMIN — ACETAMINOPHEN 650 MG: 325 TABLET, FILM COATED ORAL at 14:14

## 2021-07-15 RX ADMIN — SODIUM CHLORIDE, PRESERVATIVE FREE 10 ML: 5 INJECTION INTRAVENOUS at 15:56

## 2021-07-15 RX ADMIN — SODIUM CHLORIDE 250 ML: 9 INJECTION, SOLUTION INTRAVENOUS at 14:13

## 2021-07-15 RX ADMIN — FAMOTIDINE 20 MG: 10 INJECTION INTRAVENOUS at 14:28

## 2021-07-15 NOTE — PROGRESS NOTES
TWO PATIENT IDENTIFIERS WERE USED. CONSENT WAS SIGNED PER PATIENT EDUCATION MATERIAL WAS GIVEN TO PATIENT AND / OR FAMILY. THE PATIENT WAS DRAPED WITH FULL BODY DRAPE AND PATIENT'S RIGHT ARM WAS PREPPED WITH CHLORAPREP.  ULTRASOUND WAS USED TO LOCALIZE THERIGHT BASILIC  VEIN. SUBCUTANEOUS TISSUE AT THE CATHETER SITE WAS INFILTRATED WITH 2% LIDOCAINE. UNDER ULTRASOUND GUIDANCE, THE VEIN WAS ACCESSED WITH A 21GAUGE  NEEDLE. AN 0.018 WIRE WAS THEN THREADED THROUGH THE NEEDLE INTO THE CENTRAL VENOUS SYSTEM. THE 21GAUGE  NEEDLE WAS REMOVED AND A 4 Citizen of [place] PEEL AWAY SHEATH WAS PLACED OVER THE WIRE. THE PICC LINE CATHETER WAS CUT AT 36 CM. THE PICC LINE CATHETER WAS THEN PLACED OVER THE WIRE INTO THE VEIN, THE SHEATH WAS PEELED AWAY,WIRE WAS REMOVED. CATHETER WAS FLUSHED WITH NORMAL SALINE AND TIPS APPLIED. BIOPATCH PLACED. CATHETER SECURED WITH STATLOCK AND TEGADERM. PATIENT TOLERATED PROCEDURE WELL. THIS WAS DONE IN THE   ANGIOSUITE      IMPRESSION: SUCCESSFUL PLACEMENT OF SINGLE LUMEN SOLO PICC        Talia Hidalgo  7/15/2021  13:51 CDT

## 2021-07-16 LAB — BACTERIA SPEC AEROBE CULT: ABNORMAL

## 2021-07-19 ENCOUNTER — INFUSION (OUTPATIENT)
Dept: ONCOLOGY | Facility: HOSPITAL | Age: 61
End: 2021-07-19

## 2021-07-19 VITALS
RESPIRATION RATE: 20 BRPM | SYSTOLIC BLOOD PRESSURE: 143 MMHG | HEART RATE: 94 BPM | DIASTOLIC BLOOD PRESSURE: 83 MMHG | TEMPERATURE: 99.9 F

## 2021-07-19 DIAGNOSIS — Z45.2 ENCOUNTER FOR ASSESSMENT OF PERIPHERALLY INSERTED CENTRAL VENOUS CATHETER (PICC): ICD-10-CM

## 2021-07-19 DIAGNOSIS — E61.1 IRON DEFICIENCY: ICD-10-CM

## 2021-07-19 DIAGNOSIS — T45.4X5A ADVERSE EFFECT OF IRON, INITIAL ENCOUNTER: Primary | ICD-10-CM

## 2021-07-19 PROCEDURE — 25010000002 IRON SUCROSE PER 1 MG: Performed by: INTERNAL MEDICINE

## 2021-07-19 PROCEDURE — 96375 TX/PRO/DX INJ NEW DRUG ADDON: CPT | Performed by: INTERNAL MEDICINE

## 2021-07-19 PROCEDURE — 25010000002 DIPHENHYDRAMINE PER 50 MG: Performed by: INTERNAL MEDICINE

## 2021-07-19 PROCEDURE — 96365 THER/PROPH/DIAG IV INF INIT: CPT | Performed by: INTERNAL MEDICINE

## 2021-07-19 RX ORDER — SODIUM CHLORIDE 9 MG/ML
250 INJECTION, SOLUTION INTRAVENOUS ONCE
Status: COMPLETED | OUTPATIENT
Start: 2021-07-19 | End: 2021-07-19

## 2021-07-19 RX ORDER — SODIUM CHLORIDE 9 MG/ML
250 INJECTION, SOLUTION INTRAVENOUS ONCE
Status: CANCELLED | OUTPATIENT
Start: 2021-07-20

## 2021-07-19 RX ORDER — DIPHENHYDRAMINE HYDROCHLORIDE 50 MG/ML
25 INJECTION INTRAMUSCULAR; INTRAVENOUS ONCE
Status: COMPLETED | OUTPATIENT
Start: 2021-07-19 | End: 2021-07-19

## 2021-07-19 RX ORDER — SODIUM CHLORIDE 0.9 % (FLUSH) 0.9 %
10 SYRINGE (ML) INJECTION AS NEEDED
Status: DISCONTINUED | OUTPATIENT
Start: 2021-07-19 | End: 2021-07-19 | Stop reason: HOSPADM

## 2021-07-19 RX ORDER — SODIUM CHLORIDE 0.9 % (FLUSH) 0.9 %
10 SYRINGE (ML) INJECTION AS NEEDED
Status: CANCELLED | OUTPATIENT
Start: 2021-07-19

## 2021-07-19 RX ORDER — ACETAMINOPHEN 325 MG/1
650 TABLET ORAL ONCE
Status: CANCELLED | OUTPATIENT
Start: 2021-07-20

## 2021-07-19 RX ORDER — METHYLNALTREXONE BROMIDE 150 MG/1
TABLET ORAL
Qty: 90 TABLET | Refills: 0 | Status: SHIPPED | OUTPATIENT
Start: 2021-07-19 | End: 2021-08-24

## 2021-07-19 RX ORDER — SODIUM CHLORIDE 0.9 % (FLUSH) 0.9 %
20 SYRINGE (ML) INJECTION AS NEEDED
Status: CANCELLED | OUTPATIENT
Start: 2021-07-19

## 2021-07-19 RX ORDER — DIPHENHYDRAMINE HYDROCHLORIDE 50 MG/ML
25 INJECTION INTRAMUSCULAR; INTRAVENOUS ONCE
Status: CANCELLED | OUTPATIENT
Start: 2021-07-20

## 2021-07-19 RX ORDER — ACETAMINOPHEN 325 MG/1
650 TABLET ORAL ONCE
Status: COMPLETED | OUTPATIENT
Start: 2021-07-19 | End: 2021-07-19

## 2021-07-19 RX ORDER — SODIUM CHLORIDE 0.9 % (FLUSH) 0.9 %
20 SYRINGE (ML) INJECTION AS NEEDED
Status: DISCONTINUED | OUTPATIENT
Start: 2021-07-19 | End: 2021-07-19 | Stop reason: HOSPADM

## 2021-07-19 RX ADMIN — SODIUM CHLORIDE 250 ML: 9 INJECTION, SOLUTION INTRAVENOUS at 13:39

## 2021-07-19 RX ADMIN — SODIUM CHLORIDE, PRESERVATIVE FREE 20 ML: 5 INJECTION INTRAVENOUS at 15:07

## 2021-07-19 RX ADMIN — DIPHENHYDRAMINE HYDROCHLORIDE 25 MG: 50 INJECTION INTRAMUSCULAR; INTRAVENOUS at 13:40

## 2021-07-19 RX ADMIN — FAMOTIDINE 20 MG: 10 INJECTION INTRAVENOUS at 13:52

## 2021-07-19 RX ADMIN — ACETAMINOPHEN 650 MG: 325 TABLET, FILM COATED ORAL at 13:40

## 2021-07-19 RX ADMIN — IRON SUCROSE 200 MG: 20 INJECTION, SOLUTION INTRAVENOUS at 14:06

## 2021-07-20 ENCOUNTER — HOSPITAL ENCOUNTER (EMERGENCY)
Facility: HOSPITAL | Age: 61
Discharge: HOME | End: 2021-07-21
Attending: EMERGENCY MEDICINE | Admitting: EMERGENCY MEDICINE

## 2021-07-20 DIAGNOSIS — R10.13 EPIGASTRIC PAIN: Primary | ICD-10-CM

## 2021-07-20 DIAGNOSIS — K59.00 CONSTIPATION, UNSPECIFIED CONSTIPATION TYPE: ICD-10-CM

## 2021-07-20 DIAGNOSIS — K74.60 CIRRHOSIS OF LIVER WITHOUT ASCITES, UNSPECIFIED HEPATIC CIRRHOSIS TYPE (HCC): ICD-10-CM

## 2021-07-20 PROCEDURE — 96374 THER/PROPH/DIAG INJ IV PUSH: CPT

## 2021-07-20 PROCEDURE — 80053 COMPREHEN METABOLIC PANEL: CPT

## 2021-07-20 PROCEDURE — 96375 TX/PRO/DX INJ NEW DRUG ADDON: CPT

## 2021-07-20 PROCEDURE — 83690 ASSAY OF LIPASE: CPT

## 2021-07-20 PROCEDURE — 96376 TX/PRO/DX INJ SAME DRUG ADON: CPT

## 2021-07-20 PROCEDURE — 83735 ASSAY OF MAGNESIUM: CPT | Performed by: EMERGENCY MEDICINE

## 2021-07-20 PROCEDURE — 85025 COMPLETE CBC W/AUTO DIFF WBC: CPT

## 2021-07-20 PROCEDURE — 81003 URINALYSIS AUTO W/O SCOPE: CPT

## 2021-07-20 PROCEDURE — 87086 URINE CULTURE/COLONY COUNT: CPT | Performed by: EMERGENCY MEDICINE

## 2021-07-20 PROCEDURE — 99283 EMERGENCY DEPT VISIT LOW MDM: CPT

## 2021-07-21 ENCOUNTER — APPOINTMENT (OUTPATIENT)
Dept: CT IMAGING | Facility: HOSPITAL | Age: 61
End: 2021-07-21

## 2021-07-21 ENCOUNTER — INFUSION (OUTPATIENT)
Dept: ONCOLOGY | Facility: HOSPITAL | Age: 61
End: 2021-07-21

## 2021-07-21 VITALS
DIASTOLIC BLOOD PRESSURE: 76 MMHG | RESPIRATION RATE: 18 BRPM | TEMPERATURE: 97.6 F | BODY MASS INDEX: 35.85 KG/M2 | HEART RATE: 80 BPM | OXYGEN SATURATION: 96 % | WEIGHT: 210 LBS | SYSTOLIC BLOOD PRESSURE: 147 MMHG | HEIGHT: 64 IN

## 2021-07-21 VITALS
TEMPERATURE: 98.5 F | HEART RATE: 88 BPM | SYSTOLIC BLOOD PRESSURE: 128 MMHG | RESPIRATION RATE: 22 BRPM | DIASTOLIC BLOOD PRESSURE: 69 MMHG

## 2021-07-21 DIAGNOSIS — E61.1 IRON DEFICIENCY: ICD-10-CM

## 2021-07-21 DIAGNOSIS — Z45.2 ENCOUNTER FOR ASSESSMENT OF PERIPHERALLY INSERTED CENTRAL VENOUS CATHETER (PICC): ICD-10-CM

## 2021-07-21 DIAGNOSIS — T45.4X5A ADVERSE EFFECT OF IRON, INITIAL ENCOUNTER: Primary | ICD-10-CM

## 2021-07-21 LAB
ALBUMIN SERPL-MCNC: 4 G/DL (ref 3.5–5.2)
ALBUMIN/GLOB SERPL: 1.2 G/DL
ALP SERPL-CCNC: 118 U/L (ref 39–117)
ALT SERPL W P-5'-P-CCNC: 27 U/L (ref 1–33)
ANION GAP SERPL CALCULATED.3IONS-SCNC: 10 MMOL/L (ref 5–15)
APTT PPP: 39 SECONDS (ref 20–40.3)
AST SERPL-CCNC: 50 U/L (ref 1–32)
BASOPHILS # BLD AUTO: 0.02 10*3/MM3 (ref 0–0.2)
BASOPHILS NFR BLD AUTO: 0.4 % (ref 0–1.5)
BILIRUB SERPL-MCNC: 0.9 MG/DL (ref 0–1.2)
BILIRUB UR QL STRIP: NEGATIVE
BUN SERPL-MCNC: 7 MG/DL (ref 8–23)
BUN/CREAT SERPL: 7 (ref 7–25)
CALCIUM SPEC-SCNC: 9.2 MG/DL (ref 8.6–10.5)
CHLORIDE SERPL-SCNC: 104 MMOL/L (ref 98–107)
CLARITY UR: CLEAR
CO2 SERPL-SCNC: 26 MMOL/L (ref 22–29)
COLOR UR: YELLOW
CREAT SERPL-MCNC: 1 MG/DL (ref 0.57–1)
DEPRECATED RDW RBC AUTO: 52 FL (ref 37–54)
EOSINOPHIL # BLD AUTO: 0.14 10*3/MM3 (ref 0–0.4)
EOSINOPHIL NFR BLD AUTO: 2.7 % (ref 0.3–6.2)
ERYTHROCYTE [DISTWIDTH] IN BLOOD BY AUTOMATED COUNT: 17 % (ref 12.3–15.4)
GFR SERPL CREATININE-BSD FRML MDRD: 56 ML/MIN/1.73
GLOBULIN UR ELPH-MCNC: 3.4 GM/DL
GLUCOSE SERPL-MCNC: 92 MG/DL (ref 65–99)
GLUCOSE UR STRIP-MCNC: NEGATIVE MG/DL
HCT VFR BLD AUTO: 42 % (ref 34–46.6)
HGB BLD-MCNC: 13.7 G/DL (ref 12–15.9)
HGB UR QL STRIP.AUTO: NEGATIVE
HOLD SPECIMEN: NORMAL
HOLD SPECIMEN: NORMAL
IMM GRANULOCYTES # BLD AUTO: 0.03 10*3/MM3 (ref 0–0.05)
IMM GRANULOCYTES NFR BLD AUTO: 0.6 % (ref 0–0.5)
INR PPP: 1.22 (ref 0.8–1.2)
KETONES UR QL STRIP: NEGATIVE
LEUKOCYTE ESTERASE UR QL STRIP.AUTO: NEGATIVE
LIPASE SERPL-CCNC: 19 U/L (ref 13–60)
LYMPHOCYTES # BLD AUTO: 0.77 10*3/MM3 (ref 0.7–3.1)
LYMPHOCYTES NFR BLD AUTO: 14.6 % (ref 19.6–45.3)
MAGNESIUM SERPL-MCNC: 1.9 MG/DL (ref 1.6–2.4)
MCH RBC QN AUTO: 27.6 PG (ref 26.6–33)
MCHC RBC AUTO-ENTMCNC: 32.6 G/DL (ref 31.5–35.7)
MCV RBC AUTO: 84.5 FL (ref 79–97)
MONOCYTES # BLD AUTO: 0.28 10*3/MM3 (ref 0.1–0.9)
MONOCYTES NFR BLD AUTO: 5.3 % (ref 5–12)
NEUTROPHILS NFR BLD AUTO: 4.03 10*3/MM3 (ref 1.7–7)
NEUTROPHILS NFR BLD AUTO: 76.4 % (ref 42.7–76)
NITRITE UR QL STRIP: NEGATIVE
NRBC BLD AUTO-RTO: 0 /100 WBC (ref 0–0.2)
PH UR STRIP.AUTO: 7 [PH] (ref 5–9)
PLATELET # BLD AUTO: 92 10*3/MM3 (ref 140–450)
PMV BLD AUTO: 10.6 FL (ref 6–12)
POTASSIUM SERPL-SCNC: 4.2 MMOL/L (ref 3.5–5.2)
PROT SERPL-MCNC: 7.4 G/DL (ref 6–8.5)
PROT UR QL STRIP: NEGATIVE
PROTHROMBIN TIME: 15.2 SECONDS (ref 11.1–15.3)
RBC # BLD AUTO: 4.97 10*6/MM3 (ref 3.77–5.28)
SODIUM SERPL-SCNC: 140 MMOL/L (ref 136–145)
SP GR UR STRIP: 1.01 (ref 1–1.03)
UROBILINOGEN UR QL STRIP: NORMAL
WBC # BLD AUTO: 5.27 10*3/MM3 (ref 3.4–10.8)
WHOLE BLOOD HOLD SPECIMEN: NORMAL

## 2021-07-21 PROCEDURE — 25010000002 ONDANSETRON PER 1 MG

## 2021-07-21 PROCEDURE — 96375 TX/PRO/DX INJ NEW DRUG ADDON: CPT

## 2021-07-21 PROCEDURE — 74177 CT ABD & PELVIS W/CONTRAST: CPT

## 2021-07-21 PROCEDURE — 25010000002 MORPHINE PER 10 MG: Performed by: EMERGENCY MEDICINE

## 2021-07-21 PROCEDURE — 96376 TX/PRO/DX INJ SAME DRUG ADON: CPT

## 2021-07-21 PROCEDURE — 96375 TX/PRO/DX INJ NEW DRUG ADDON: CPT | Performed by: INTERNAL MEDICINE

## 2021-07-21 PROCEDURE — 96374 THER/PROPH/DIAG INJ IV PUSH: CPT

## 2021-07-21 PROCEDURE — 85610 PROTHROMBIN TIME: CPT | Performed by: EMERGENCY MEDICINE

## 2021-07-21 PROCEDURE — 25010000002 IRON SUCROSE PER 1 MG: Performed by: INTERNAL MEDICINE

## 2021-07-21 PROCEDURE — 25010000002 DIPHENHYDRAMINE PER 50 MG: Performed by: INTERNAL MEDICINE

## 2021-07-21 PROCEDURE — 25010000002 IOPAMIDOL 61 % SOLUTION: Performed by: EMERGENCY MEDICINE

## 2021-07-21 PROCEDURE — 85730 THROMBOPLASTIN TIME PARTIAL: CPT | Performed by: EMERGENCY MEDICINE

## 2021-07-21 PROCEDURE — 25010000002 MORPHINE PER 10 MG

## 2021-07-21 PROCEDURE — 96365 THER/PROPH/DIAG IV INF INIT: CPT | Performed by: INTERNAL MEDICINE

## 2021-07-21 RX ORDER — ONDANSETRON 2 MG/ML
INJECTION INTRAMUSCULAR; INTRAVENOUS
Status: COMPLETED
Start: 2021-07-21 | End: 2021-07-21

## 2021-07-21 RX ORDER — ACETAMINOPHEN 325 MG/1
650 TABLET ORAL ONCE
Status: COMPLETED | OUTPATIENT
Start: 2021-07-21 | End: 2021-07-21

## 2021-07-21 RX ORDER — DIPHENHYDRAMINE HYDROCHLORIDE 50 MG/ML
25 INJECTION INTRAMUSCULAR; INTRAVENOUS ONCE
Status: CANCELLED | OUTPATIENT
Start: 2021-07-21

## 2021-07-21 RX ORDER — SODIUM CHLORIDE 0.9 % (FLUSH) 0.9 %
20 SYRINGE (ML) INJECTION AS NEEDED
Status: CANCELLED | OUTPATIENT
Start: 2021-07-21

## 2021-07-21 RX ORDER — ONDANSETRON 2 MG/ML
4 INJECTION INTRAMUSCULAR; INTRAVENOUS ONCE
Status: COMPLETED | OUTPATIENT
Start: 2021-07-21 | End: 2021-07-21

## 2021-07-21 RX ORDER — SODIUM CHLORIDE 9 MG/ML
250 INJECTION, SOLUTION INTRAVENOUS ONCE
Status: CANCELLED | OUTPATIENT
Start: 2021-07-21

## 2021-07-21 RX ORDER — ACETAMINOPHEN 325 MG/1
650 TABLET ORAL ONCE
Status: CANCELLED | OUTPATIENT
Start: 2021-07-21

## 2021-07-21 RX ORDER — SODIUM CHLORIDE 0.9 % (FLUSH) 0.9 %
10 SYRINGE (ML) INJECTION AS NEEDED
Status: DISCONTINUED | OUTPATIENT
Start: 2021-07-21 | End: 2021-07-21

## 2021-07-21 RX ORDER — SODIUM CHLORIDE 9 MG/ML
250 INJECTION, SOLUTION INTRAVENOUS ONCE
Status: COMPLETED | OUTPATIENT
Start: 2021-07-21 | End: 2021-07-21

## 2021-07-21 RX ORDER — SODIUM CHLORIDE 0.9 % (FLUSH) 0.9 %
10 SYRINGE (ML) INJECTION AS NEEDED
Status: CANCELLED | OUTPATIENT
Start: 2021-07-21

## 2021-07-21 RX ORDER — DIPHENHYDRAMINE HYDROCHLORIDE 50 MG/ML
25 INJECTION INTRAMUSCULAR; INTRAVENOUS ONCE
Status: COMPLETED | OUTPATIENT
Start: 2021-07-21 | End: 2021-07-21

## 2021-07-21 RX ADMIN — MORPHINE SULFATE 4 MG: 4 INJECTION INTRAVENOUS at 02:13

## 2021-07-21 RX ADMIN — IRON SUCROSE 200 MG: 20 INJECTION, SOLUTION INTRAVENOUS at 14:02

## 2021-07-21 RX ADMIN — ACETAMINOPHEN 650 MG: 325 TABLET, FILM COATED ORAL at 13:38

## 2021-07-21 RX ADMIN — ONDANSETRON 4 MG: 2 INJECTION INTRAMUSCULAR; INTRAVENOUS at 02:12

## 2021-07-21 RX ADMIN — FAMOTIDINE 20 MG: 10 INJECTION INTRAVENOUS at 13:47

## 2021-07-21 RX ADMIN — SODIUM CHLORIDE 250 ML: 9 INJECTION, SOLUTION INTRAVENOUS at 13:36

## 2021-07-21 RX ADMIN — IOPAMIDOL 90 ML: 612 INJECTION, SOLUTION INTRAVENOUS at 03:08

## 2021-07-21 RX ADMIN — DIPHENHYDRAMINE HYDROCHLORIDE 25 MG: 50 INJECTION INTRAMUSCULAR; INTRAVENOUS at 13:37

## 2021-07-21 RX ADMIN — ONDANSETRON HYDROCHLORIDE 4 MG: 2 INJECTION, SOLUTION INTRAMUSCULAR; INTRAVENOUS at 02:12

## 2021-07-21 RX ADMIN — MORPHINE SULFATE 4 MG: 4 INJECTION INTRAVENOUS at 04:03

## 2021-07-21 RX ADMIN — Medication 4 MG: at 02:13

## 2021-07-21 NOTE — ED PROVIDER NOTES
"Subjective   60-year-old female with known cirrhosis presents the emergency department with complaint of abdominal distention and concern for possible small bowel obstruction.  She reports she has had obstructions in the past and required placement of a \"tube in my nose\".  She reports that she is still passing gas but is having trouble having bowel movement and is complaining of diffuse abdominal pain.  Reports distention that is not uncommon for her given her cirrhosis.  Reports nausea without vomiting.  No fevers or chills.    Family history, surgical history, social history, current medications and allergies are reviewed with the patient and triage documentation and vitals are reviewed.      History provided by:  Patient, spouse and medical records   used: No        Review of Systems   Constitutional: Negative for chills and fever.   HENT: Negative for congestion and sore throat.    Eyes: Negative for photophobia and visual disturbance.   Respiratory: Negative for cough and shortness of breath.    Cardiovascular: Negative for chest pain, palpitations and leg swelling.   Gastrointestinal: Positive for abdominal distention, abdominal pain, constipation and nausea. Negative for vomiting.   Endocrine: Negative for polydipsia, polyphagia and polyuria.   Genitourinary: Negative for dysuria, frequency and urgency.   Musculoskeletal: Negative for arthralgias, back pain, myalgias and neck pain.   Skin: Negative for rash and wound.   Allergic/Immunologic: Negative.    Neurological: Negative for dizziness, weakness, light-headedness and numbness.   Hematological: Negative.    Psychiatric/Behavioral: Negative.        Past Medical History:   Diagnosis Date   • Acid reflux    • Altered bowel function    • Arthropathy of lumbar facet joint    • Bleeding disorder (CMS/HCC)    • C. difficile diarrhea 2015   • Chronic idiopathic constipation    • Colonic inertia    • Constipation    • Corns and callus    • " "Depression    • Disease related peripheral neuropathy    • Epigastric pain    • Fatty liver    • Hammer toe    • Headache    • Hiatal hernia    • History of transfusion    • Hyperlipidemia    • Knee pain    • Localized, primary osteoarthritis of the ankle and foot     Localized, primary osteoarthritis of the ankle and/or foot   • Mendoza's metatarsalgia     Mendoza's metatarsalgia - 2nd interspace on right   • Nausea and vomiting    • Neuralgia and neuritis     Neuralgia, neuritis, and radiculitis, unspecified   • Neuropathy    • Obstructive sleep apnea     Obstructive sleep apnea (adult) (pediatric)    • CHAI on CPAP     \"C-Pap at night  (unconfirmed)\"   • Osteoarthritis    • Pain in foot     Pain in unspecified foot - sees a podiatrist   • Pain in joint, ankle and foot     Joint pain in ankle and foot      • Pain radiating to back     Pain radiating to lumbar region of back   • Pelvic floor dysfunction    • Plantar fasciitis    • PONV (postoperative nausea and vomiting)    • Restless leg syndrome    • Secondary hypertension     Secondary hypertension, unspecified   • Sinusitis    • Sleep apnea    • Tongue anomaly     lesion       Allergies   Allergen Reactions   • Tape Rash     Patient has rash/blishers on site after tape   • Other      Pt states that taking steroids either in pill or injection form make her have blisters in her mouth and she feels like she is on fire on the inside/ has hx of c diff and possible MRSA     • Corticosteroids Rash   • Kenalog  [Triamcinolone Acetonide] Rash   • Sulfa Antibiotics Rash     Sulfa (Sulfonamide Antibiotics)       Past Surgical History:   Procedure Laterality Date   • CARPAL TUNNEL RELEASE Left 8/22/2018    Procedure: CARPAL TUNNEL RELEASE - left;  Surgeon: Justus Lewis MD;  Location: [facility];  Service: Orthopedics   • CARPAL TUNNEL RELEASE Right 2/13/2019    Procedure: carpal tunnel release right hand with local/monitored anesthesia care;  Surgeon: Justus Lewis " MD Becka;  Location: [facility] OR;  Service: Orthopedics   •  SECTION     • CHOLECYSTECTOMY     • COLONOSCOPY  2013   • COLONOSCOPY N/A 10/17/2018    Procedure: COLONOSCOPY;  Surgeon: Russell Del Rio MD;  Location: [facility] ENDOSCOPY;  Service: Gastroenterology   • COLONOSCOPY N/A 3/22/2021    Procedure: COLONOSCOPY;  Surgeon: Russell Del Rio MD;  Location: [facility] ENDOSCOPY;  Service: Gastroenterology;  Laterality: N/A;   • DIRECT LARYNGOSCOPY, ESOPHAGOSCOPY, BRONCHOSCOPY N/A 2017    Procedure: DIRECT LARYNGOSCOPY AND;  Surgeon: Live Bolton MD;  Location: [facility] OR;  Service:    • ENDOSCOPY  2013    Colon endoscopy 21269 (1) - Internal & external hemorrhoids found. Stool found.   • ENDOSCOPY  2013    EGD w/ tube 70715 (1) - Normal esophagus. Gastritis in stomach. Biopsy taken. Normal dudoenum. Biopsy taken.   • ENDOSCOPY N/A 10/17/2018    Procedure: ESOPHAGOGASTRODUODENOSCOPY--eval varices;  Surgeon: Russell Del Rio MD;  Location: [facility] ENDOSCOPY;  Service: Gastroenterology   • ENDOSCOPY N/A 3/22/2021    Procedure: ESOPHAGOGASTRODUODENOSCOPYURGNET;  Surgeon: Russell Del Rio MD;  Location: [facility] ENDOSCOPY;  Service: Gastroenterology;  Laterality: N/A;   • FOOT SURGERY  2013    Foot/toes surgery procedure (1) - Arthroplasty of toes 4 and 5 of right foot.   • HERNIA REPAIR     • HERNIA REPAIR      hital   • HYSTERECTOMY     • LIVER BIOPSY     • NERVE BLOCK  2016    Injection for nerve block (1) - Lumbar medial branch block.   • OTHER SURGICAL HISTORY  2012    Inj(s) Tend-Sheath, Ligament, Single  (1) - [facility]    • OTHER SURGICAL HISTORY  2013    Small Joint Injection/Aspiration  (2) - [facility]ICK[facility] ([facility])    • OTHER SURGICAL HISTORY      bowel obstruction x2   • OTHER SURGICAL HISTORY      gland removed from neck   • SUBLINGUAL SALIVARY CYST EXCISION N/A 2017    Procedure: EXCISION OF LEFT  TONGUE LESION  WITH CLOSURE;  Surgeon: Live Bolton MD;  Location: [facility];  Service:    • TUBAL ABDOMINAL LIGATION     • UPPER GASTROINTESTINAL ENDOSCOPY  2013   • UPPER GASTROINTESTINAL ENDOSCOPY  10/17/2018   • UPPER GASTROINTESTINAL ENDOSCOPY  2021       Family History   Problem Relation Age of Onset   • Cancer Other    • Diabetes Other    • Heart disease Other    • Hypertension Mother    • Cancer Mother    • Diabetes Mother    • Hypertension Father    • Heart attack Father    • Cancer Father    • Heart disease Father    • Thyroid disease Maternal Aunt    • Cancer Maternal Aunt    • No Known Problems Sister    • No Known Problems Brother    • Cancer Maternal Grandmother    • No Known Problems Sister        Social History     Socioeconomic History   • Marital status:      Spouse name: Not on file   • Number of children: Not on file   • Years of education: Not on file   • Highest education level: Not on file   Tobacco Use   • Smoking status: Former Smoker     Packs/day: 2.00     Years: 15.00     Pack years: 30.00     Types: Cigarettes     Quit date:      Years since quittin.5   • Smokeless tobacco: Never Used   Vaping Use   • Vaping Use: Never used   Substance and Sexual Activity   • Alcohol use: No   • Drug use: No   • Sexual activity: Defer     Birth control/protection: Surgical     Comment: Marital Status: .  Hysterectomy.           Objective   Physical Exam  Vitals and nursing note reviewed.   Constitutional:       General: She is not in acute distress.     Appearance: She is well-developed. She is obese. She is not ill-appearing, toxic-appearing or diaphoretic.   HENT:      Head: Normocephalic.      Mouth/Throat:      Mouth: Mucous membranes are moist.      Pharynx: Oropharynx is clear.   Eyes:      General: No scleral icterus.     Pupils: Pupils are equal, round, and reactive to light.   Cardiovascular:      Rate and Rhythm: Normal rate and regular rhythm.      Heart sounds: Normal  heart sounds. No murmur heard.     Pulmonary:      Effort: Pulmonary effort is normal. No respiratory distress.      Breath sounds: Normal breath sounds. No wheezing.   Abdominal:      General: Abdomen is protuberant. Bowel sounds are normal. There is distension.      Palpations: Abdomen is soft. There is hepatomegaly. There is no splenomegaly.      Tenderness: There is generalized abdominal tenderness. There is no guarding or rebound. Negative signs include Fuentes's sign and McBurney's sign.      Hernia: No hernia is present.   Skin:     General: Skin is warm and dry.      Capillary Refill: Capillary refill takes less than 2 seconds.   Neurological:      General: No focal deficit present.      Mental Status: She is alert and oriented to person, place, and time.   Psychiatric:         Mood and Affect: Mood normal.         Behavior: Behavior normal.         Procedures  none         ED Course      Labs Reviewed   COMPREHENSIVE METABOLIC PANEL - Abnormal; Notable for the following components:       Result Value    BUN 7 (*)     AST (SGOT) 50 (*)     Alkaline Phosphatase 118 (*)     eGFR Non  Amer 56 (*)     All other components within normal limits    Narrative:     GFR Normal >60  Chronic Kidney Disease <60  Kidney Failure <15     CBC WITH AUTO DIFFERENTIAL - Abnormal; Notable for the following components:    RDW 17.0 (*)     Platelets 92 (*)     Neutrophil % 76.4 (*)     Lymphocyte % 14.6 (*)     Immature Grans % 0.6 (*)     All other components within normal limits   PROTIME-INR - Abnormal; Notable for the following components:    INR 1.22 (*)     All other components within normal limits    Narrative:     Therapeutic range for most indications is 2.0-3.0 INR,  or 2.5-3.5 for mechanical heart valves.   URINE CULTURE - Normal   LIPASE - Normal   URINALYSIS W/ MICROSCOPIC IF INDICATED (NO CULTURE) - Normal    Narrative:     Urine microscopic not indicated.   MAGNESIUM - Normal   APTT - Normal    Narrative:      The recommended Heparin therapeutic range is 68-97 seconds.   RAINBOW DRAW    Narrative:     The following orders were created for panel order [place] Draw.  Procedure                               Abnormality         Status                     ---------                               -----------         ------                     Green Top (Gel)[[id number]]                                  Final result               Lavender Top[[id number]]                                     Final result               Gold Top - SST[[id number]]                                   Final result                 Please view results for these tests on the individual orders.   CBC AND DIFFERENTIAL    Narrative:     The following orders were created for panel order CBC & Differential.  Procedure                               Abnormality         Status                     ---------                               -----------         ------                     Scan Slide[[id number]]                                                                  CBC Auto Differential[[id number]]        Abnormal            Final result                 Please view results for these tests on the individual orders.   GREEN TOP   LAVENDER TOP   GOLD TOP - SST     CT Chest Without Contrast Diagnostic    Result Date: 7/7/2021  Narrative: CT chest without contrast. CLINICAL INDICATION: Pulmonary nodules, follow-up   COMPARISON: CT abdomen May 23, 2021.   TECHNIQUE: Noncontrast helical scanning with axial and coronal reformations. Soft tissue, lung, liver, and bone windows reviewed. This exam was performed according to our departmental dose-optimization program, which includes automated exposure control, adjustment of the mA and/or kV according to patient size and/or use of iterative reconstruction technique. CHEST CT FINDINGS: Heart size normal. No evidence of pathologically enlarged nodes. Benign calcified hilar lymph nodes. Left lower lobe pulmonary nodules described on  prior examination CT abdomen are smaller and more ill-defined on today's exam. Series 3 image 39. Additional linear infiltrative changes, atelectasis or fibrosis noted in the lingular segment left upper lobe. Benign calcified granuloma right middle lobe. CT chest is otherwise unremarkable.     Impression: 2 adjacent left lower lobe pulmonary nodules described on prior CT abdomen May 23, 2021 are smaller and more ill-defined on today's examination therefore suggesting resolution of an inflammatory/infectious process rather than neoplasm. Electronically signed by:  Blair Rojas MD  7/7/2021 10:44 AM CDT Workstation: [id number]    CT Abdomen Pelvis With Contrast    Result Date: 7/21/2021  Narrative: CT SCANS ABDOMEN AND PELVIS WITH IV CONTRAST. HISTORY: abdomen pain COMPARISON: 5/23/2021. TECHNIQUE: Radiation dose reduction techniques were utilized, including automated exposure control and exposure modulation based on body size. Axial images were obtained from the lung bases to the symphysis pubis with IV contrast only.. Oral contrast was not administered per request. FINDINGS :  Previously described left lung base nodules have diminished in size and conspicuity. Please see recent chest CT from 7/6/2021. Liver again shows nodular contour suggesting cirrhosis. Marked homogeneous splenic enlargement at 19.9 cm appear similar and likely sequelae of portal hypertension. Prior cholecystectomy and hysterectomy. Remaining abdominal organs have an unremarkable appearance. Aorta nonaneurysmal. Severe constipation. No appreciable diverticular disease. Hours of appendix 4 The GI tract not opacified for assessment but non obstructive in appearance. Trace ascites, decreased from previous.     Impression: IMPRESSION : 1. Findings of cirrhosis and portal hypertension again noted. 2. Severe constipation without obstruction. 3. Trace ascites Electronically signed by:  Emanuel Coughlin MD  7/21/2021 4:03 AM CDT Workstation: [id number]    US  Guidance PICC NC    Result Date: 7/15/2021  Narrative: This procedure was auto-finalized with no dictation required.    XR Chest Post CVA Port    Result Date: 7/15/2021  Narrative: PROCEDURE: Single view AP supine chest x-ray at 1:52 PM. INDICATION: picc, T45.4X5A Adverse effect of iron and its compounds, initial encounter. COMPARISON: 7/6/2021 CT chest. 5/14/2021 chest x-ray. FINDINGS: Right side PICC in place tip overlying the lower mid SVC. Heart size normal with normal pulmonary vascularity. Small area of linear atelectasis left lower lobe. Lungs otherwise clear. No pleural effusion. Bony structures unremarkable.     Impression: Small area linear atelectasis left lower lobe. Otherwise no acute disease. Right side PICC tip overlying lower mid SVC. [id number] Electronically signed by:  Miguel Martínez MD  7/15/2021 3:02 PM CDT Workstation: [id number]    IR PICC wo fluoro guidance    Result Date: 7/15/2021  Narrative: This procedure was auto-finalized with no dictation required.          MDM  Number of Diagnoses or Management Options     Amount and/or Complexity of Data Reviewed  Clinical lab tests: reviewed  Tests in the radiology section of CPT®: reviewed    Patient Progress  Patient progress: stable    Patient advised of findings of constipation without evidence of bowel obstruction.  Advised on use of MiraLAX and other stool softeners to help with symptoms.  Nothing low amenable to enema.  Advised on follow-up with primary care.    Final diagnoses:   Epigastric pain   Constipation, unspecified constipation type   Cirrhosis of liver without ascites, unspecified hepatic cirrhosis type (CMS/HCC)       ED Disposition  ED Disposition     ED Disposition Condition Comment    Discharge Stable           Yobany Barr MD  [address] [place], [place] [address]  [phone]               Medication List      Changed    furosemide 20 MG tablet  Commonly known as: LASIX  Take 2 tablets by mouth Daily for 3 days.  What changed:  how much to take     metoclopramide 5 MG tablet  Commonly known as: Reglan  Take 1 tablet by mouth 3 (Three) Times a Day Before Meals.  What changed: when to take this             Ramone Brunson,   07/23/21 [id number]

## 2021-07-21 NOTE — ED NOTES
Pt here with upper abd pain worsening over the last sever days, pt was recently dx with cirrhosis of liver, pt c/o abd pain, bloating, nausea. Pt currently getting iron infusions. Pt stated she was placed on antibiotics for kidney infection yesterday. Pt alert and oriented, VSS, family at bedside     Barbara Lee RN  07/21/21 0031

## 2021-07-22 LAB — BACTERIA SPEC AEROBE CULT: NO GROWTH

## 2021-07-23 ENCOUNTER — OFFICE VISIT (OUTPATIENT)
Dept: GASTROENTEROLOGY | Facility: CLINIC | Age: 61
End: 2021-07-23

## 2021-07-23 VITALS
BODY MASS INDEX: 35.85 KG/M2 | HEIGHT: 64 IN | HEART RATE: 84 BPM | SYSTOLIC BLOOD PRESSURE: 128 MMHG | DIASTOLIC BLOOD PRESSURE: 85 MMHG | WEIGHT: 210 LBS

## 2021-07-23 DIAGNOSIS — K74.60 CIRRHOSIS OF LIVER WITHOUT ASCITES, UNSPECIFIED HEPATIC CIRRHOSIS TYPE (HCC): ICD-10-CM

## 2021-07-23 DIAGNOSIS — K59.04 CHRONIC IDIOPATHIC CONSTIPATION: ICD-10-CM

## 2021-07-23 DIAGNOSIS — K72.10 END STAGE LIVER DISEASE (HCC): Primary | ICD-10-CM

## 2021-07-23 DIAGNOSIS — R10.84 GENERALIZED ABDOMINAL PAIN: ICD-10-CM

## 2021-07-23 DIAGNOSIS — K59.9 COLONIC INERTIA: ICD-10-CM

## 2021-07-23 DIAGNOSIS — K75.81 NASH (NONALCOHOLIC STEATOHEPATITIS): ICD-10-CM

## 2021-07-23 PROCEDURE — 99214 OFFICE O/P EST MOD 30 MIN: CPT | Performed by: PHYSICIAN ASSISTANT

## 2021-07-23 RX ORDER — NITROFURANTOIN MACROCRYSTALS 100 MG/1
100 CAPSULE ORAL 2 TIMES DAILY
COMMUNITY
Start: 2021-07-19 | End: 2021-09-24 | Stop reason: HOSPADM

## 2021-08-06 ENCOUNTER — OFFICE VISIT (OUTPATIENT)
Dept: SURGERY | Facility: CLINIC | Age: 61
End: 2021-08-06

## 2021-08-06 ENCOUNTER — HOSPITAL ENCOUNTER (OUTPATIENT)
Dept: ULTRASOUND IMAGING | Facility: HOSPITAL | Age: 61
Discharge: HOME | End: 2021-08-06
Admitting: PHYSICIAN ASSISTANT

## 2021-08-06 VITALS
HEART RATE: 87 BPM | OXYGEN SATURATION: 96 % | HEIGHT: 64 IN | SYSTOLIC BLOOD PRESSURE: 110 MMHG | DIASTOLIC BLOOD PRESSURE: 70 MMHG | BODY MASS INDEX: 36.33 KG/M2 | WEIGHT: 212.8 LBS | TEMPERATURE: 97.4 F

## 2021-08-06 DIAGNOSIS — K59.01 CONSTIPATION BY DELAYED COLONIC TRANSIT: Primary | ICD-10-CM

## 2021-08-06 PROCEDURE — 93976 VASCULAR STUDY: CPT

## 2021-08-06 PROCEDURE — 99212 OFFICE O/P EST SF 10 MIN: CPT | Performed by: SURGERY

## 2021-08-06 RX ORDER — CEPHALEXIN 500 MG/1
500 CAPSULE ORAL 3 TIMES DAILY
COMMUNITY
Start: 2021-07-26 | End: 2021-08-20

## 2021-08-06 RX ORDER — FUROSEMIDE 20 MG/1
TABLET ORAL
COMMUNITY
Start: 2021-06-02 | End: 2021-09-24 | Stop reason: HOSPADM

## 2021-08-06 RX ORDER — ONDANSETRON 4 MG/1
TABLET, ORALLY DISINTEGRATING ORAL
Qty: 10 TABLET | Refills: 0 | Status: SHIPPED | OUTPATIENT
Start: 2021-08-06 | End: 2021-08-24

## 2021-08-06 RX ORDER — OXYCODONE HYDROCHLORIDE 10 MG/1
TABLET ORAL
COMMUNITY
Start: 2021-07-25 | End: 2021-09-24 | Stop reason: HOSPADM

## 2021-08-06 RX ORDER — OXYBUTYNIN CHLORIDE 5 MG/1
5 TABLET ORAL DAILY
COMMUNITY
Start: 2021-08-02 | End: 2022-01-25

## 2021-08-06 RX ORDER — ESCITALOPRAM OXALATE 10 MG/1
TABLET ORAL
COMMUNITY
Start: 2021-06-22 | End: 2021-08-20

## 2021-08-06 NOTE — PATIENT INSTRUCTIONS
"BMI for Adults  What is BMI?  Body mass index (BMI) is a number that is calculated from a person's weight and height. BMI can help estimate how much of a person's weight is composed of fat. BMI does not measure body fat directly. Rather, it is an alternative to procedures that directly measure body fat, which can be difficult and expensive.  BMI can help identify people who may be at higher risk for certain medical problems.  What are BMI measurements used for?  BMI is used as a screening tool to identify possible weight problems. It helps determine whether a person is obese, overweight, a healthy weight, or underweight.  BMI is useful for:  · Identifying a weight problem that may be related to a medical condition or may increase the risk for medical problems.  · Promoting changes, such as changes in diet and exercise, to help reach a healthy weight. BMI screening can be repeated to see if these changes are working.  How is BMI calculated?  BMI involves measuring your weight in relation to your height. Both height and weight are measured, and the BMI is calculated from those numbers. This can be done either in English ([place] or metric measurements. Note that charts and online BMI calculators are available to help you find your BMI quickly and easily without having to do these calculations yourself.  To calculate your BMI in English ([place] measurements:    1. Measure your weight in pounds (lb).  2. Multiply the number of pounds by 703.  ? For example, for a person who weighs 180 lb, multiply that number by 703, which equals 126,540.  3. Measure your height in inches. Then multiply that number by itself to get a measurement called \"inches squared.\"  ? For example, for a person who is 70 inches tall, the \"inches squared\" measurement is 70 inches x 70 inches, which equals 4,900 inches squared.  4. Divide the total from step 2 (number of lb x 703) by the total from step 3 (inches squared): 126,540 ÷ 4,900 = 25.8. This is " "your BMI.  To calculate your BMI in metric measurements:  1. Measure your weight in kilograms (kg).  2. Measure your height in meters (m). Then multiply that number by itself to get a measurement called \"meters squared.\"  ? For example, for a person who is 1.75 m tall, the \"meters squared\" measurement is 1.75 m x 1.75 m, which is equal to 3.1 meters squared.  3. Divide the number of kilograms (your weight) by the meters squared number. In this example: 70 ÷ 3.1 = 22.6. This is your BMI.  What do the results mean?  BMI charts are used to identify whether you are underweight, normal weight, overweight, or obese. The following guidelines will be used:  · Underweight: BMI less than 18.5.  · Normal weight: BMI between 18.5 and 24.9.  · Overweight: BMI between 25 and 29.9.  · Obese: BMI of 30 or above.  Keep these notes in mind:  · Weight includes both fat and muscle, so someone with a muscular build, such as an athlete, may have a BMI that is higher than 24.9. In cases like these, BMI is not an accurate measure of body fat.  · To determine if excess body fat is the cause of a BMI of 25 or higher, further assessments may need to be done by a health care provider.  · BMI is usually interpreted in the same way for men and women.  Where to find more information  For more information about BMI, including tools to quickly calculate your BMI, go to these websites:  · Centers for Disease Control and Prevention: www.cdc.gov  · American Heart Association: www.heart.org  · National Heart, Lung, and Blood [place]: www.nhlbi.nih.gov  Summary  · Body mass index (BMI) is a number that is calculated from a person's weight and height.  · BMI may help estimate how much of a person's weight is composed of fat. BMI can help identify those who may be at higher risk for certain medical problems.  · BMI can be measured using English measurements or metric measurements.  · BMI charts are used to identify whether you are underweight, normal " weight, overweight, or obese.  This information is not intended to replace advice given to you by your health care provider. Make sure you discuss any questions you have with your health care provider.  Document Revised: 09/09/2020 Document Reviewed: 07/17/2020  Elsevier Patient Education © 2021 [employer]

## 2021-08-07 NOTE — PROGRESS NOTES
"Chief Complaint   Patient presents with   • Follow-up        HPI  60-year-old woman again seen for constipation.  She has been worked up by several services the latest being a month.  It is felt that her thrombocytopenia is likely secondary to cirrhosis of her liver and varices  Past Medical History:   Diagnosis Date   • Acid reflux    • Altered bowel function    • Arthropathy of lumbar facet joint    • Bleeding disorder (CMS/HCC)    • C. difficile diarrhea 2015   • Chronic idiopathic constipation    • Colonic inertia    • Constipation    • Corns and callus    • Depression    • Disease related peripheral neuropathy    • Epigastric pain    • Fatty liver    • Hammer toe    • Headache    • Hiatal hernia    • History of transfusion    • Hyperlipidemia    • Knee pain    • Localized, primary osteoarthritis of the ankle and foot     Localized, primary osteoarthritis of the ankle and/or foot   • Mendoza's metatarsalgia     Mendoza's metatarsalgia - 2nd interspace on right   • Nausea and vomiting    • Neuralgia and neuritis     Neuralgia, neuritis, and radiculitis, unspecified   • Neuropathy    • Obstructive sleep apnea     Obstructive sleep apnea (adult) (pediatric)    • CHAI on CPAP     \"C-Pap at night  (unconfirmed)\"   • Osteoarthritis    • Pain in foot     Pain in unspecified foot - sees a podiatrist   • Pain in joint, ankle and foot     Joint pain in ankle and foot      • Pain radiating to back     Pain radiating to lumbar region of back   • Pelvic floor dysfunction    • Plantar fasciitis    • PONV (postoperative nausea and vomiting)    • Restless leg syndrome    • Secondary hypertension     Secondary hypertension, unspecified   • Sinusitis    • Sleep apnea    • Tongue anomaly     lesion       Past Surgical History:   Procedure Laterality Date   • CARPAL TUNNEL RELEASE Left 8/22/2018    Procedure: CARPAL TUNNEL RELEASE - left;  Surgeon: Justus Lewis MD;  Location: [facility];  Service: Orthopedics   • CARPAL " TUNNEL RELEASE Right 2019    Procedure: carpal tunnel release right hand with local/monitored anesthesia care;  Surgeon: Justus Lewis MD;  Location: [facility] OR;  Service: Orthopedics   •  SECTION     • CHOLECYSTECTOMY     • COLONOSCOPY  2013   • COLONOSCOPY N/A 10/17/2018    Procedure: COLONOSCOPY;  Surgeon: Russell Del Rio MD;  Location: [facility] ENDOSCOPY;  Service: Gastroenterology   • COLONOSCOPY N/A 3/22/2021    Procedure: COLONOSCOPY;  Surgeon: Russell Del Rio MD;  Location: [facility] ENDOSCOPY;  Service: Gastroenterology;  Laterality: N/A;   • DIRECT LARYNGOSCOPY, ESOPHAGOSCOPY, BRONCHOSCOPY N/A 2017    Procedure: DIRECT LARYNGOSCOPY AND;  Surgeon: Live Bolton MD;  Location: [facility] OR;  Service:    • ENDOSCOPY  2013    Colon endoscopy 53718 (1) - Internal & external hemorrhoids found. Stool found.   • ENDOSCOPY  2013    EGD w/ tube 98280 (1) - Normal esophagus. Gastritis in stomach. Biopsy taken. Normal dudoenum. Biopsy taken.   • ENDOSCOPY N/A 10/17/2018    Procedure: ESOPHAGOGASTRODUODENOSCOPY--eval varices;  Surgeon: Russell Del Rio MD;  Location: [facility] ENDOSCOPY;  Service: Gastroenterology   • ENDOSCOPY N/A 3/22/2021    Procedure: ESOPHAGOGASTRODUODENOSCOPYURGNET;  Surgeon: Russell Del Rio MD;  Location: [facility] ENDOSCOPY;  Service: Gastroenterology;  Laterality: N/A;   • FOOT SURGERY  2013    Foot/toes surgery procedure (1) - Arthroplasty of toes 4 and 5 of right foot.   • HERNIA REPAIR     • HERNIA REPAIR      hital   • HYSTERECTOMY     • LIVER BIOPSY     • NERVE BLOCK  2016    Injection for nerve block (1) - Lumbar medial branch block.   • OTHER SURGICAL HISTORY  2012    Inj(s) Tend-Sheath, Ligament, Single  (1) - [facility]    • OTHER SURGICAL HISTORY  2013    Small Joint Injection/Aspiration  (2) - [facility]ICK[facility] ([facility])    • OTHER SURGICAL HISTORY  2011    bowel obstruction x2   • OTHER  SURGICAL HISTORY      gland removed from neck   • SUBLINGUAL SALIVARY CYST EXCISION N/A 8/1/2017    Procedure: EXCISION OF LEFT  TONGUE LESION WITH CLOSURE;  Surgeon: Live Bolton MD;  Location: [facility];  Service:    • TUBAL ABDOMINAL LIGATION     • UPPER GASTROINTESTINAL ENDOSCOPY  07/01/2013   • UPPER GASTROINTESTINAL ENDOSCOPY  10/17/2018   • UPPER GASTROINTESTINAL ENDOSCOPY  03/22/2021         Current Outpatient Medications:   •  cephalexin (KEFLEX) 500 MG capsule, Take 500 mg by mouth 3 (Three) Times a Day., Disp: , Rfl:   •  cetirizine (zyrTEC) 10 MG tablet, Take 1 tablet by mouth Daily., Disp: , Rfl:   •  cloNIDine (CATAPRES) 0.1 MG tablet, Take 1 tablet by mouth 2 (Two) Times a Day. PRN systolic BP >160., Disp: 20 tablet, Rfl: 0  •  CRANBERRY PO, Take 1 capsule by mouth 2 (two) times a day., Disp: , Rfl:   •  cyclobenzaprine (FLEXERIL) 10 MG tablet, Take 10 mg by mouth 3 (Three) Times a Day As Needed for Muscle Spasms., Disp: , Rfl:   •  diclofenac (VOLTAREN) 75 MG EC tablet, Take 1 tablet by mouth Daily., Disp: 30 tablet, Rfl: 2  •  dicyclomine (BENTYL) 20 MG tablet, Take 1 tablet by mouth Every 6 (Six) Hours As Needed (abdominal pain)., Disp: 30 tablet, Rfl: 0  •  docusate sodium (COLACE) 100 MG capsule, Take 100 mg by mouth Daily., Disp: , Rfl:   •  escitalopram (LEXAPRO) 10 MG tablet, Take 10 mg by mouth Daily., Disp: , Rfl:   •  escitalopram (LEXAPRO) 10 MG tablet, Tablet  1 tab(s) Oral Take 1 tablet by mouth once daily, Disp: , Rfl:   •  folic acid (FOLVITE) 1 MG tablet, Take 1 tablet by mouth on Monday, Wednesday and Friday, Disp: 36 tablet, Rfl: 1  •  furosemide (Lasix) 20 MG tablet, Tablet 1 tab(s) Oral once a day, Disp: , Rfl:   •  gabapentin (NEURONTIN) 800 MG tablet, Take 800 mg by mouth 4 (Four) Times a Day., Disp: , Rfl:   •  metoclopramide (Reglan) 5 MG tablet, Take 1 tablet by mouth 3 (Three) Times a Day Before Meals. (Patient taking differently: Take 5 mg by mouth 2 (two) times a  day.), Disp: 90 tablet, Rfl: 1  •  nitrofurantoin (MACRODANTIN) 100 MG capsule, Take 100 mg by mouth 2 (Two) Times a Day., Disp: , Rfl:   •  omeprazole (priLOSEC) 40 MG capsule, , Disp: , Rfl:   •  oxybutynin (DITROPAN) 5 MG tablet, Take 5 mg by mouth Daily., Disp: , Rfl:   •  oxyCODONE (ROXICODONE) 10 MG tablet, TAKE 1 TABLET BY MOUTH 4 TIMES DAILY AS NEEDED FOR PAIN, Disp: , Rfl:   •  pantoprazole (Protonix) 40 MG EC tablet, Take 1 tablet by mouth Daily., Disp: 30 tablet, Rfl: 3  •  polyethylene glycol (MiraLax) 17 GM/SCOOP powder, Take 17 g by mouth 2 (Two) Times a Day., Disp: 850 g, Rfl: 1  •  potassium chloride 10 MEQ CR tablet, , Disp: , Rfl:   •  Relistor 150 MG tablet, Take 3 tablets by mouth once daily, Disp: 90 tablet, Rfl: 0  •  Sodium Phosphates (fleet enema) 7-19 GM/118ML enema, Insert 1 enema into the rectum As Needed., Disp: , Rfl:   •  spironolactone (ALDACTONE) 25 MG tablet, TAKE 1 TABLET BY MOUTH ONCE DAILY, Disp: 30 tablet, Rfl: 5  •  traZODone (DESYREL) 100 MG tablet, Take 1 tablet by mouth Every Night., Disp: 30 tablet, Rfl: 2  •  vitamin B-12 (CYANOCOBALAMIN) 1000 MCG tablet, Take 1 tablet by mouth on Monday, Wednesday and Friday, Disp: 36 tablet, Rfl: 1  •  furosemide (LASIX) 20 MG tablet, Take 2 tablets by mouth Daily for 3 days. (Patient taking differently: Take 20 mg by mouth Daily.), Disp: 6 tablet, Rfl: 0  •  ondansetron (ZOFRAN) 8 MG tablet, Take 1 tablet by mouth Every 8 (Eight) Hours As Needed for Nausea or Vomiting., Disp: 30 tablet, Rfl: 0  •  ondansetron ODT (ZOFRAN-ODT) 4 MG disintegrating tablet, DISSOLVE 1 TABLET IN MOUTH EVERY 8 HOURS AS NEEDED FOR NAUSEA AND VOMITING, Disp: 10 tablet, Rfl: 0    Allergies   Allergen Reactions   • Tape Rash     Patient has rash/blishers on site after tape   • Other      Pt states that taking steroids either in pill or injection form make her have blisters in her mouth and she feels like she is on fire on the inside/ has hx of c diff and possible  MRSA     • Corticosteroids Rash   • Kenalog  [Triamcinolone Acetonide] Rash   • Sulfa Antibiotics Rash     Sulfa (Sulfonamide Antibiotics)       Family History   Problem Relation Age of Onset   • Cancer Other    • Diabetes Other    • Heart disease Other    • Hypertension Mother    • Cancer Mother    • Diabetes Mother    • Hypertension Father    • Heart attack Father    • Cancer Father    • Heart disease Father    • Thyroid disease Maternal Aunt    • Cancer Maternal Aunt    • No Known Problems Sister    • No Known Problems Brother    • Cancer Maternal Grandmother    • No Known Problems Sister        Social History     Socioeconomic History   • Marital status:      Spouse name: Not on file   • Number of children: Not on file   • Years of education: Not on file   • Highest education level: Not on file   Tobacco Use   • Smoking status: Former Smoker     Packs/day: 2.00     Years: 15.00     Pack years: 30.00     Types: Cigarettes     Quit date:      Years since quittin.6   • Smokeless tobacco: Never Used   Vaping Use   • Vaping Use: Never used   Substance and Sexual Activity   • Alcohol use: No   • Drug use: Never   • Sexual activity: Defer     Birth control/protection: Surgical     Comment: Marital Status: .  Hysterectomy.       Review of Systems   Gastrointestinal: Positive for abdominal pain and constipation. Negative for abdominal distention, anal bleeding, blood in stool, diarrhea, nausea, rectal pain and vomiting.       Physical Exam  Abdominal:      General: Abdomen is flat. Bowel sounds are normal. There is no distension.      Palpations: Abdomen is soft. There is no mass.      Tenderness: There is no abdominal tenderness. There is no guarding or rebound.      Hernia: No hernia is present.           ASSESSMENT    Diagnoses and all orders for this visit:    1. Constipation by delayed colonic transit (Primary)        PLAN    1.  No surgery is planned at this juncture.  Medical comorbidities  far outweigh her ability to tolerate a subtotal colectomy            This document has been electronically signed by Benjamin Troncoso MD on August 6, 2021 22:28 CDT

## 2021-08-11 ENCOUNTER — TELEPHONE (OUTPATIENT)
Dept: GASTROENTEROLOGY | Facility: CLINIC | Age: 61
End: 2021-08-11

## 2021-08-11 NOTE — TELEPHONE ENCOUNTER
Patient has been contacted and made aware that her us was ok. If patients symptoms continue patient has been advised to go to the ED. Patient voiced understanding.

## 2021-08-11 NOTE — TELEPHONE ENCOUNTER
----- Message from Blaine Perales PA-C sent at 8/11/2021  4:29 PM CDT -----  US was ok  ----- Message -----  From: Violeta Toth MA  Sent: 8/11/2021   2:31 PM CDT  To: Blaine Perales PA-C    Patient called stating that she has lower abd pain with nausea, she states that her bowels are moving. She wanted to make sure you have received her us results.

## 2021-08-12 ENCOUNTER — APPOINTMENT (OUTPATIENT)
Dept: GENERAL RADIOLOGY | Facility: HOSPITAL | Age: 61
End: 2021-08-12

## 2021-08-12 ENCOUNTER — HOSPITAL ENCOUNTER (EMERGENCY)
Facility: HOSPITAL | Age: 61
Discharge: HOME | End: 2021-08-12
Attending: FAMILY MEDICINE | Admitting: FAMILY MEDICINE

## 2021-08-12 VITALS
RESPIRATION RATE: 18 BRPM | WEIGHT: 209 LBS | TEMPERATURE: 98.1 F | DIASTOLIC BLOOD PRESSURE: 86 MMHG | HEIGHT: 64 IN | SYSTOLIC BLOOD PRESSURE: 143 MMHG | OXYGEN SATURATION: 95 % | HEART RATE: 76 BPM | BODY MASS INDEX: 35.68 KG/M2

## 2021-08-12 DIAGNOSIS — K59.01 CONSTIPATION BY DELAYED COLONIC TRANSIT: Primary | ICD-10-CM

## 2021-08-12 DIAGNOSIS — R10.30 LOWER ABDOMINAL PAIN: ICD-10-CM

## 2021-08-12 LAB
ALBUMIN SERPL-MCNC: 3.5 G/DL (ref 3.5–5.2)
ALBUMIN/GLOB SERPL: 1.1 G/DL
ALP SERPL-CCNC: 113 U/L (ref 39–117)
ALT SERPL W P-5'-P-CCNC: 21 U/L (ref 1–33)
ANION GAP SERPL CALCULATED.3IONS-SCNC: 11 MMOL/L (ref 5–15)
ANISOCYTOSIS BLD QL: NORMAL
AST SERPL-CCNC: 30 U/L (ref 1–32)
BASOPHILS # BLD AUTO: 0.01 10*3/MM3 (ref 0–0.2)
BASOPHILS NFR BLD AUTO: 0.2 % (ref 0–1.5)
BILIRUB SERPL-MCNC: 0.6 MG/DL (ref 0–1.2)
BILIRUB UR QL STRIP: ABNORMAL
BUN SERPL-MCNC: 8 MG/DL (ref 8–23)
BUN/CREAT SERPL: 8.3 (ref 7–25)
CALCIUM SPEC-SCNC: 8.9 MG/DL (ref 8.6–10.5)
CHLORIDE SERPL-SCNC: 104 MMOL/L (ref 98–107)
CLARITY UR: CLEAR
CO2 SERPL-SCNC: 23 MMOL/L (ref 22–29)
COLOR UR: ABNORMAL
CREAT SERPL-MCNC: 0.96 MG/DL (ref 0.57–1)
DEPRECATED RDW RBC AUTO: 51 FL (ref 37–54)
EOSINOPHIL # BLD AUTO: 0.13 10*3/MM3 (ref 0–0.4)
EOSINOPHIL NFR BLD AUTO: 2.9 % (ref 0.3–6.2)
ERYTHROCYTE [DISTWIDTH] IN BLOOD BY AUTOMATED COUNT: 16.4 % (ref 12.3–15.4)
GFR SERPL CREATININE-BSD FRML MDRD: 59 ML/MIN/1.73
GLOBULIN UR ELPH-MCNC: 3.2 GM/DL
GLUCOSE SERPL-MCNC: 119 MG/DL (ref 65–99)
GLUCOSE UR STRIP-MCNC: NEGATIVE MG/DL
HCT VFR BLD AUTO: 39.8 % (ref 34–46.6)
HGB BLD-MCNC: 13.1 G/DL (ref 12–15.9)
HGB UR QL STRIP.AUTO: NEGATIVE
HOLD SPECIMEN: NORMAL
HOLD SPECIMEN: NORMAL
IMM GRANULOCYTES # BLD AUTO: 0.01 10*3/MM3 (ref 0–0.05)
IMM GRANULOCYTES NFR BLD AUTO: 0.2 % (ref 0–0.5)
KETONES UR QL STRIP: NEGATIVE
LEUKOCYTE ESTERASE UR QL STRIP.AUTO: NEGATIVE
LIPASE SERPL-CCNC: 20 U/L (ref 13–60)
LYMPHOCYTES # BLD AUTO: 0.76 10*3/MM3 (ref 0.7–3.1)
LYMPHOCYTES NFR BLD AUTO: 16.8 % (ref 19.6–45.3)
MCH RBC QN AUTO: 28 PG (ref 26.6–33)
MCHC RBC AUTO-ENTMCNC: 32.9 G/DL (ref 31.5–35.7)
MCV RBC AUTO: 85 FL (ref 79–97)
MONOCYTES # BLD AUTO: 0.29 10*3/MM3 (ref 0.1–0.9)
MONOCYTES NFR BLD AUTO: 6.4 % (ref 5–12)
NEUTROPHILS NFR BLD AUTO: 3.32 10*3/MM3 (ref 1.7–7)
NEUTROPHILS NFR BLD AUTO: 73.5 % (ref 42.7–76)
NITRITE UR QL STRIP: NEGATIVE
NRBC BLD AUTO-RTO: 0 /100 WBC (ref 0–0.2)
PH UR STRIP.AUTO: 6 [PH] (ref 5–9)
PLATELET # BLD AUTO: 66 10*3/MM3 (ref 140–450)
PMV BLD AUTO: 10.9 FL (ref 6–12)
POTASSIUM SERPL-SCNC: 3.6 MMOL/L (ref 3.5–5.2)
PROT SERPL-MCNC: 6.7 G/DL (ref 6–8.5)
PROT UR QL STRIP: NEGATIVE
RBC # BLD AUTO: 4.68 10*6/MM3 (ref 3.77–5.28)
SMALL PLATELETS BLD QL SMEAR: NORMAL
SODIUM SERPL-SCNC: 138 MMOL/L (ref 136–145)
SP GR UR STRIP: 1.02 (ref 1–1.03)
UROBILINOGEN UR QL STRIP: ABNORMAL
WBC # BLD AUTO: 4.52 10*3/MM3 (ref 3.4–10.8)
WBC MORPH BLD: NORMAL
WHOLE BLOOD HOLD SPECIMEN: NORMAL

## 2021-08-12 PROCEDURE — 25010000002 ONDANSETRON PER 1 MG: Performed by: STUDENT IN AN ORGANIZED HEALTH CARE EDUCATION/TRAINING PROGRAM

## 2021-08-12 PROCEDURE — 83690 ASSAY OF LIPASE: CPT | Performed by: FAMILY MEDICINE

## 2021-08-12 PROCEDURE — 85007 BL SMEAR W/DIFF WBC COUNT: CPT | Performed by: FAMILY MEDICINE

## 2021-08-12 PROCEDURE — 85025 COMPLETE CBC W/AUTO DIFF WBC: CPT | Performed by: FAMILY MEDICINE

## 2021-08-12 PROCEDURE — 81003 URINALYSIS AUTO W/O SCOPE: CPT | Performed by: FAMILY MEDICINE

## 2021-08-12 PROCEDURE — 80053 COMPREHEN METABOLIC PANEL: CPT | Performed by: FAMILY MEDICINE

## 2021-08-12 PROCEDURE — 96375 TX/PRO/DX INJ NEW DRUG ADDON: CPT

## 2021-08-12 PROCEDURE — 74022 RADEX COMPL AQT ABD SERIES: CPT

## 2021-08-12 PROCEDURE — 25010000002 MORPHINE PER 10 MG: Performed by: STUDENT IN AN ORGANIZED HEALTH CARE EDUCATION/TRAINING PROGRAM

## 2021-08-12 PROCEDURE — 96374 THER/PROPH/DIAG INJ IV PUSH: CPT

## 2021-08-12 PROCEDURE — 99283 EMERGENCY DEPT VISIT LOW MDM: CPT

## 2021-08-12 RX ORDER — ERYTHROMYCIN 250 MG/1
500 CAPSULE, DELAYED RELEASE ORAL DAILY
Qty: 14 CAPSULE | Refills: 0 | Status: SHIPPED | OUTPATIENT
Start: 2021-08-12 | End: 2021-08-19

## 2021-08-12 RX ORDER — SODIUM CHLORIDE 0.9 % (FLUSH) 0.9 %
10 SYRINGE (ML) INJECTION AS NEEDED
Status: DISCONTINUED | OUTPATIENT
Start: 2021-08-12 | End: 2021-08-12 | Stop reason: HOSPADM

## 2021-08-12 RX ORDER — DICYCLOMINE HCL 20 MG
20 TABLET ORAL EVERY 6 HOURS PRN
Qty: 16 TABLET | Refills: 0 | Status: SHIPPED | OUTPATIENT
Start: 2021-08-12 | End: 2021-09-24 | Stop reason: HOSPADM

## 2021-08-12 RX ORDER — ONDANSETRON 2 MG/ML
4 INJECTION INTRAMUSCULAR; INTRAVENOUS ONCE
Status: COMPLETED | OUTPATIENT
Start: 2021-08-12 | End: 2021-08-12

## 2021-08-12 RX ADMIN — MORPHINE SULFATE 4 MG: 4 INJECTION, SOLUTION INTRAMUSCULAR; INTRAVENOUS at 19:12

## 2021-08-12 RX ADMIN — ONDANSETRON 4 MG: 2 INJECTION INTRAMUSCULAR; INTRAVENOUS at 19:12

## 2021-08-12 RX ADMIN — SODIUM CHLORIDE, POTASSIUM CHLORIDE, SODIUM LACTATE AND CALCIUM CHLORIDE 500 ML: 600; 310; 30; 20 INJECTION, SOLUTION INTRAVENOUS at 19:12

## 2021-08-12 NOTE — ED PROVIDER NOTES
Subjective   Patient states she has a h/o bowel blockage, and has had increasing lower abdominal pain for the past 3 days. She was told she couldn't have surgery for it, and has a h/o MUSA and sees Blaine Perales. She has been taking her Bentyl & Reglan and still has pain and nausea. Been able to eat and drink ok. Denies chest pain, dizziness, vomiting, diarrhea, or changes in vision. She did call GI's office and was told if this progressed and didn't resolve to come to the ER.           Review of Systems   Constitutional: Negative for chills and fever.   HENT: Negative for rhinorrhea and sore throat.    Eyes: Negative for photophobia and visual disturbance.   Respiratory: Negative for cough and shortness of breath.    Cardiovascular: Negative for chest pain and palpitations.   Gastrointestinal: Positive for abdominal distention, abdominal pain (  Lower), constipation, nausea and rectal pain (  When she defecates). Negative for blood in stool, diarrhea and vomiting.   Genitourinary: Negative for difficulty urinating and flank pain.   Musculoskeletal: Negative for arthralgias and back pain.   Neurological: Negative for dizziness and headaches.   Psychiatric/Behavioral: Negative for confusion. The patient is not nervous/anxious.        Past Medical History:   Diagnosis Date   • Acid reflux    • Altered bowel function    • Arthropathy of lumbar facet joint    • Bleeding disorder (CMS/HCC)    • C. difficile diarrhea 2015   • Chronic idiopathic constipation    • Colonic inertia    • Constipation    • Corns and callus    • Depression    • Disease related peripheral neuropathy    • Epigastric pain    • Fatty liver    • Hammer toe    • Headache    • Hiatal hernia    • History of transfusion    • Hyperlipidemia    • Knee pain    • Localized, primary osteoarthritis of the ankle and foot     Localized, primary osteoarthritis of the ankle and/or foot   • Mendoza's metatarsalgia     Mendoza's metatarsalgia - 2nd interspace on right  "  • Nausea and vomiting    • Neuralgia and neuritis     Neuralgia, neuritis, and radiculitis, unspecified   • Neuropathy    • Obstructive sleep apnea     Obstructive sleep apnea (adult) (pediatric)    • CHAI on CPAP     \"C-Pap at night  (unconfirmed)\"   • Osteoarthritis    • Pain in foot     Pain in unspecified foot - sees a podiatrist   • Pain in joint, ankle and foot     Joint pain in ankle and foot      • Pain radiating to back     Pain radiating to lumbar region of back   • Pelvic floor dysfunction    • Plantar fasciitis    • PONV (postoperative nausea and vomiting)    • Restless leg syndrome    • Secondary hypertension     Secondary hypertension, unspecified   • Sinusitis    • Sleep apnea    • Tongue anomaly     lesion       Allergies   Allergen Reactions   • Tape Rash     Patient has rash/blishers on site after tape   • Other      Pt states that taking steroids either in pill or injection form make her have blisters in her mouth and she feels like she is on fire on the inside/ has hx of c diff and possible MRSA     • Corticosteroids Rash   • Kenalog  [Triamcinolone Acetonide] Rash   • Sulfa Antibiotics Rash     Sulfa (Sulfonamide Antibiotics)       Past Surgical History:   Procedure Laterality Date   • CARPAL TUNNEL RELEASE Left 2018    Procedure: CARPAL TUNNEL RELEASE - left;  Surgeon: Justus Lewis MD;  Location: [facility] OR;  Service: Orthopedics   • CARPAL TUNNEL RELEASE Right 2019    Procedure: carpal tunnel release right hand with local/monitored anesthesia care;  Surgeon: Justus Lewis MD;  Location: [facility] OR;  Service: Orthopedics   •  SECTION     • CHOLECYSTECTOMY     • COLONOSCOPY  2013   • COLONOSCOPY N/A 10/17/2018    Procedure: COLONOSCOPY;  Surgeon: Russell Del Rio MD;  Location: [facility] ENDOSCOPY;  Service: Gastroenterology   • COLONOSCOPY N/A 3/22/2021    Procedure: COLONOSCOPY;  Surgeon: Russell Del Rio MD;  Location: [facility] ENDOSCOPY;  Service: " Gastroenterology;  Laterality: N/A;   • DIRECT LARYNGOSCOPY, ESOPHAGOSCOPY, BRONCHOSCOPY N/A 8/1/2017    Procedure: DIRECT LARYNGOSCOPY AND;  Surgeon: Live Bolton MD;  Location: [facility] OR;  Service:    • ENDOSCOPY  07/01/2013    Colon endoscopy 30860 (1) - Internal & external hemorrhoids found. Stool found.   • ENDOSCOPY  07/01/2013    EGD w/ tube 58193 (1) - Normal esophagus. Gastritis in stomach. Biopsy taken. Normal dudoenum. Biopsy taken.   • ENDOSCOPY N/A 10/17/2018    Procedure: ESOPHAGOGASTRODUODENOSCOPY--eval varices;  Surgeon: Russell Del Rio MD;  Location: [facility] ENDOSCOPY;  Service: Gastroenterology   • ENDOSCOPY N/A 3/22/2021    Procedure: ESOPHAGOGASTRODUODENOSCOPYURGNET;  Surgeon: Russell Del Rio MD;  Location: [facility] ENDOSCOPY;  Service: Gastroenterology;  Laterality: N/A;   • FOOT SURGERY  02/26/2013    Foot/toes surgery procedure (1) - Arthroplasty of toes 4 and 5 of right foot.   • HERNIA REPAIR     • HERNIA REPAIR      hital   • HYSTERECTOMY     • LIVER BIOPSY     • NERVE BLOCK  07/25/2016    Injection for nerve block (1) - Lumbar medial branch block.   • OTHER SURGICAL HISTORY  12/03/2012    Inj(s) Tend-Sheath, Ligament, Single 20550 (1) - [facility]    • OTHER SURGICAL HISTORY  06/24/2013    Small Joint Injection/Aspiration 20600 (2) - [facility] ([facility])    • OTHER SURGICAL HISTORY  2011    bowel obstruction x2   • OTHER SURGICAL HISTORY      gland removed from neck   • SUBLINGUAL SALIVARY CYST EXCISION N/A 8/1/2017    Procedure: EXCISION OF LEFT  TONGUE LESION WITH CLOSURE;  Surgeon: Live Bolton MD;  Location: [facility];  Service:    • TUBAL ABDOMINAL LIGATION     • UPPER GASTROINTESTINAL ENDOSCOPY  07/01/2013   • UPPER GASTROINTESTINAL ENDOSCOPY  10/17/2018   • UPPER GASTROINTESTINAL ENDOSCOPY  03/22/2021       Family History   Problem Relation Age of Onset   • Cancer Other    • Diabetes Other    • Heart disease Other    • Hypertension Mother    • Cancer  Mother    • Diabetes Mother    • Hypertension Father    • Heart attack Father    • Cancer Father    • Heart disease Father    • Thyroid disease Maternal Aunt    • Cancer Maternal Aunt    • No Known Problems Sister    • No Known Problems Brother    • Cancer Maternal Grandmother    • No Known Problems Sister        Social History     Socioeconomic History   • Marital status:      Spouse name: Not on file   • Number of children: Not on file   • Years of education: Not on file   • Highest education level: Not on file   Tobacco Use   • Smoking status: Former Smoker     Packs/day: 2.00     Years: 15.00     Pack years: 30.00     Types: Cigarettes     Quit date:      Years since quittin.6   • Smokeless tobacco: Never Used   Vaping Use   • Vaping Use: Never used   Substance and Sexual Activity   • Alcohol use: No   • Drug use: Never   • Sexual activity: Defer     Birth control/protection: Surgical     Comment: Marital Status: .  Hysterectomy.           Objective   Physical Exam  Vitals reviewed.   Constitutional:       General: She is not in acute distress.     Appearance: Normal appearance. She is well-developed and well-groomed. She is obese.      Interventions: Face mask in place.   HENT:      Head: Normocephalic.      Right Ear: Hearing and external ear normal.      Left Ear: Hearing and external ear normal.      Nose: Nose normal.      Mouth/Throat:      Lips: Pink.      Mouth: Mucous membranes are moist.      Pharynx: Oropharynx is clear. Uvula midline.   Eyes:      General: Lids are normal.      Conjunctiva/sclera: Conjunctivae normal.      Pupils: Pupils are equal, round, and reactive to light.   Cardiovascular:      Rate and Rhythm: Normal rate and regular rhythm.      Pulses: Normal pulses.      Heart sounds: Normal heart sounds. No murmur heard.   No friction rub. No gallop.    Pulmonary:      Effort: Pulmonary effort is normal.      Breath sounds: Normal breath sounds. No wheezing, rhonchi  "or rales.   Abdominal:      General: Abdomen is protuberant. Bowel sounds are normal. There is distension (  Tympanic).      Palpations: Abdomen is soft.      Tenderness: There is abdominal tenderness in the right lower quadrant, suprapubic area, left upper quadrant and left lower quadrant. There is no right CVA tenderness, left CVA tenderness, guarding or rebound.   Skin:     General: Skin is warm.      Findings: Wound (RLE, told spider bite) present.   Neurological:      Mental Status: She is alert and oriented to person, place, and time.   Psychiatric:         Attention and Perception: Attention and perception normal.         Mood and Affect: Mood and affect normal.         Speech: Speech normal.         Behavior: Behavior normal. Behavior is cooperative.         Thought Content: Thought content normal.         Cognition and Memory: Cognition and memory normal.         Judgment: Judgment normal.         Procedures     None    ED Course  ED Course as of Aug 12 2103   Thu Aug 12, 2021   1848 CBC, CMP, AXR, UA, Zofran, Morphine, 500cc LR.     [NA]   2041 Patient states she is feeling a bit better. Explained AXR findings and she states she has tried \"everything\" for constipation. Discussed follow up with Blaine Perales and to consider fecal transplant for cure. Agreed to try erythromycin and will do 3 cap fulls of miralax in gatorade and enemas. Bentyl for pain. Reasons to return to the ER discussed and acknowledged.     [NA]      ED Course User Index  [NA] Karen Villarreal MD      Lab Results (last 24 hours)     Procedure Component Value Units Date/Time    [facility] Draw [[id number]] Collected: 08/12/21 1713    Specimen: Blood Updated: 08/12/21 1816    Narrative:      The following orders were created for panel order [facility].  Procedure                               Abnormality         Status                     ---------                               -----------         ------                     Green Top " (Gel)[[id number]]                                  Final result               Lavender Top[[id number]]                                     Final result               Gold Top - SST[[id number]]                                   Final result                 Please view results for these tests on the individual orders.    Green Top (Gel) [[id number]] Collected: 08/12/21 1713    Specimen: Blood Updated: 08/12/21 1816     Extra Tube Hold for add-ons.     Comment: Auto resulted.       Lavender Top [[id number]] Collected: 08/12/21 1713    Specimen: Blood Updated: 08/12/21 1816     Extra Tube hold for add-on     Comment: Auto resulted       Gold Top - SST [[id number]] Collected: 08/12/21 1713    Specimen: Blood Updated: 08/12/21 1816     Extra Tube Hold for add-ons.     Comment: Auto resulted.       CBC & Differential [[id number]]  (Abnormal) Collected: 08/12/21 1713    Specimen: Blood Updated: 08/12/21 1758    Narrative:      The following orders were created for panel order CBC & Differential.  Procedure                               Abnormality         Status                     ---------                               -----------         ------                     CBC Auto Differential[[id number]]        Abnormal            Final result               Scan Slide[[id number]]                                       Final result                 Please view results for these tests on the individual orders.    Scan Slide [[id number]] Collected: 08/12/21 1713    Specimen: Blood Updated: 08/12/21 1758     Anisocytosis Slight/1+     WBC Morphology Normal     Platelet Estimate Decreased    Comprehensive Metabolic Panel [[id number]]  (Abnormal) Collected: 08/12/21 1713    Specimen: Blood Updated: 08/12/21 1744     Glucose 119 mg/dL      BUN 8 mg/dL      Creatinine 0.96 mg/dL      Sodium 138 mmol/L      Potassium 3.6 mmol/L      Chloride 104 mmol/L      CO2 23.0 mmol/L      Calcium 8.9 mg/dL      Total Protein 6.7 g/dL      Albumin 3.50 g/dL       ALT (SGPT) 21 U/L      AST (SGOT) 30 U/L      Alkaline Phosphatase 113 U/L      Total Bilirubin 0.6 mg/dL      eGFR Non African Amer 59 mL/min/1.73      Globulin 3.2 gm/dL      A/G Ratio 1.1 g/dL      BUN/Creatinine Ratio 8.3     Anion Gap 11.0 mmol/L     Narrative:      GFR Normal >60  Chronic Kidney Disease <60  Kidney Failure <15      Lipase [[id number]]  (Normal) Collected: 08/12/21 1713    Specimen: Blood Updated: 08/12/21 1744     Lipase 20 U/L     Urinalysis With Microscopic If Indicated (No Culture) - Urine, Clean Catch [[id number]]  (Abnormal) Collected: 08/12/21 1722    Specimen: Urine, Clean Catch Updated: 08/12/21 1742     Color, UA Dark Yellow     Appearance, UA Clear     pH, UA 6.0     Specific Gravity, UA 1.017     Glucose, UA Negative     Ketones, UA Negative     Bilirubin, UA Small (1+)     Blood, UA Negative     Protein, UA Negative     Leuk Esterase, UA Negative     Nitrite, UA Negative     Urobilinogen, UA 1.0 E.U./dL    Narrative:      Urine microscopic not indicated.    CBC Auto Differential [[id number]]  (Abnormal) Collected: 08/12/21 1713    Specimen: Blood Updated: 08/12/21 1728     WBC 4.52 10*3/mm3      RBC 4.68 10*6/mm3      Hemoglobin 13.1 g/dL      Hematocrit 39.8 %      MCV 85.0 fL      MCH 28.0 pg      MCHC 32.9 g/dL      RDW 16.4 %      RDW-SD 51.0 fl      MPV 10.9 fL      Platelets 66 10*3/mm3      Neutrophil % 73.5 %      Lymphocyte % 16.8 %      Monocyte % 6.4 %      Eosinophil % 2.9 %      Basophil % 0.2 %      Immature Grans % 0.2 %      Neutrophils, Absolute 3.32 10*3/mm3      Lymphocytes, Absolute 0.76 10*3/mm3      Monocytes, Absolute 0.29 10*3/mm3      Eosinophils, Absolute 0.13 10*3/mm3      Basophils, Absolute 0.01 10*3/mm3      Immature Grans, Absolute 0.01 10*3/mm3      nRBC 0.0 /100 WBC         XR Abdomen 2+ VW with Chest 1 VW    Result Date: 8/12/2021  No bowel obstruction or perforation. Moderate constipation. Electronically signed by:  Leda Crouch MD  8/12/2021 8:10 PM  CDT Workstation: [id number]              [facility]  Number of Diagnoses or Management Options  Constipation by delayed colonic transit: minor  Lower abdominal pain: minor     Amount and/or Complexity of Data Reviewed  Clinical lab tests: reviewed and ordered  Tests in the radiology section of CPT®: reviewed and ordered  Discuss the patient with other providers: yes    Risk of Complications, Morbidity, and/or Mortality  Presenting problems: minimal  Diagnostic procedures: minimal  Management options: minimal    Patient Progress  Patient progress: improved      Final diagnoses:   Constipation by delayed colonic transit   Lower abdominal pain       ED Disposition  ED Disposition     ED Disposition Condition Comment    Discharge Stable           Blaine Perales PA-C  [address] [place] [address] [address]  [facility] [address] [phone]    Schedule an appointment as soon as possible for a visit in 1 week  ER discharge follow up to discuss fecal transplant         Medication List      New Prescriptions    erythromycin base 250 MG EC capsule  Take 2 capsules by mouth Daily for 7 days.        Changed    * dicyclomine 20 MG tablet  Commonly known as: BENTYL  Take 1 tablet by mouth Every 6 (Six) Hours As Needed (abdominal pain).  What changed: Another medication with the same name was added. Make sure you understand how and when to take each.     * dicyclomine 20 MG tablet  Commonly known as: BENTYL  Take 1 tablet by mouth Every 6 (Six) Hours As Needed (Abdominal pain).  What changed: You were already taking a medication with the same name, and this prescription was added. Make sure you understand how and when to take each.     * furosemide 20 MG tablet  Commonly known as: LASIX  Take 2 tablets by mouth Daily for 3 days.  What changed: how much to take     * Lasix 20 MG tablet  Generic drug: furosemide  What changed: Another medication with the same name was changed. Make sure you understand how and when to take each.     metoclopramide 5 MG  tablet  Commonly known as: Reglan  Take 1 tablet by mouth 3 (Three) Times a Day Before Meals.  What changed: when to take this         * This list has 4 medication(s) that are the same as other medications prescribed for you. Read the directions carefully, and ask your doctor or other care provider to review them with you.               Where to Get Your Medications      These medications were sent to [facility] [address] [place] [address] DRIVE - [phone]  - [phone] [address] [address]    Phone: [phone]   · dicyclomine 20 MG tablet  · erythromycin base 250 MG EC capsule           This document has been electronically signed by Karen Villarreal MD on August 12, 2021 21:04 CDT          Karen Villarreal MD  Resident  08/12/21 4611

## 2021-08-13 NOTE — ED NOTES
Pt assisted to bathroom and back to room via wheelchair. Pt tolerated without difficulty.      Erick Simons RN  08/12/21 2002

## 2021-08-20 ENCOUNTER — OFFICE VISIT (OUTPATIENT)
Dept: GASTROENTEROLOGY | Facility: CLINIC | Age: 61
End: 2021-08-20

## 2021-08-20 VITALS
SYSTOLIC BLOOD PRESSURE: 114 MMHG | HEART RATE: 82 BPM | DIASTOLIC BLOOD PRESSURE: 73 MMHG | BODY MASS INDEX: 37.9 KG/M2 | HEIGHT: 64 IN | WEIGHT: 222 LBS

## 2021-08-20 DIAGNOSIS — R10.84 GENERALIZED ABDOMINAL PAIN: ICD-10-CM

## 2021-08-20 DIAGNOSIS — K72.10 END STAGE LIVER DISEASE (HCC): Primary | ICD-10-CM

## 2021-08-20 DIAGNOSIS — K75.81 NASH (NONALCOHOLIC STEATOHEPATITIS): ICD-10-CM

## 2021-08-20 DIAGNOSIS — K59.04 CHRONIC IDIOPATHIC CONSTIPATION: ICD-10-CM

## 2021-08-20 DIAGNOSIS — K59.9 COLONIC INERTIA: ICD-10-CM

## 2021-08-20 PROCEDURE — 99214 OFFICE O/P EST MOD 30 MIN: CPT | Performed by: PHYSICIAN ASSISTANT

## 2021-08-24 RX ORDER — ONDANSETRON 4 MG/1
TABLET, ORALLY DISINTEGRATING ORAL
Qty: 15 TABLET | Refills: 1 | Status: SHIPPED | OUTPATIENT
Start: 2021-08-24 | End: 2021-09-24 | Stop reason: HOSPADM

## 2021-08-24 RX ORDER — METHYLNALTREXONE BROMIDE 150 MG/1
TABLET ORAL
Qty: 90 TABLET | Refills: 1 | Status: SHIPPED | OUTPATIENT
Start: 2021-08-24 | End: 2022-01-25

## 2021-08-24 RX ORDER — PANTOPRAZOLE SODIUM 40 MG/1
TABLET, DELAYED RELEASE ORAL
Qty: 30 TABLET | Refills: 1 | Status: SHIPPED | OUTPATIENT
Start: 2021-08-24 | End: 2021-09-24 | Stop reason: HOSPADM

## 2021-09-13 ENCOUNTER — HOSPITAL ENCOUNTER (EMERGENCY)
Facility: HOSPITAL | Age: 61
Discharge: HOME | End: 2021-09-14
Attending: EMERGENCY MEDICINE | Admitting: EMERGENCY MEDICINE

## 2021-09-13 ENCOUNTER — APPOINTMENT (OUTPATIENT)
Dept: CT IMAGING | Facility: HOSPITAL | Age: 61
End: 2021-09-13

## 2021-09-13 DIAGNOSIS — R10.9 ABDOMINAL PAIN, UNSPECIFIED ABDOMINAL LOCATION: Primary | ICD-10-CM

## 2021-09-13 LAB
ALBUMIN SERPL-MCNC: 3.9 G/DL (ref 3.5–5.2)
ALBUMIN/GLOB SERPL: 1.2 G/DL
ALP SERPL-CCNC: 114 U/L (ref 39–117)
ALT SERPL W P-5'-P-CCNC: 27 U/L (ref 1–33)
ANION GAP SERPL CALCULATED.3IONS-SCNC: 9 MMOL/L (ref 5–15)
AST SERPL-CCNC: 50 U/L (ref 1–32)
BASOPHILS # BLD AUTO: 0.03 10*3/MM3 (ref 0–0.2)
BASOPHILS NFR BLD AUTO: 0.5 % (ref 0–1.5)
BILIRUB SERPL-MCNC: 1.2 MG/DL (ref 0–1.2)
BUN SERPL-MCNC: 7 MG/DL (ref 8–23)
BUN/CREAT SERPL: 7.9 (ref 7–25)
CALCIUM SPEC-SCNC: 9 MG/DL (ref 8.6–10.5)
CHLORIDE SERPL-SCNC: 98 MMOL/L (ref 98–107)
CO2 SERPL-SCNC: 26 MMOL/L (ref 22–29)
CREAT SERPL-MCNC: 0.89 MG/DL (ref 0.57–1)
DEPRECATED RDW RBC AUTO: 47.7 FL (ref 37–54)
EOSINOPHIL # BLD AUTO: 0.09 10*3/MM3 (ref 0–0.4)
EOSINOPHIL NFR BLD AUTO: 1.6 % (ref 0.3–6.2)
ERYTHROCYTE [DISTWIDTH] IN BLOOD BY AUTOMATED COUNT: 15.5 % (ref 12.3–15.4)
GFR SERPL CREATININE-BSD FRML MDRD: 64 ML/MIN/1.73
GLOBULIN UR ELPH-MCNC: 3.2 GM/DL
GLUCOSE SERPL-MCNC: 122 MG/DL (ref 65–99)
HCT VFR BLD AUTO: 45 % (ref 34–46.6)
HGB BLD-MCNC: 14.9 G/DL (ref 12–15.9)
HOLD SPECIMEN: NORMAL
IMM GRANULOCYTES # BLD AUTO: 0.01 10*3/MM3 (ref 0–0.05)
IMM GRANULOCYTES NFR BLD AUTO: 0.2 % (ref 0–0.5)
LIPASE SERPL-CCNC: 24 U/L (ref 13–60)
LYMPHOCYTES # BLD AUTO: 0.78 10*3/MM3 (ref 0.7–3.1)
LYMPHOCYTES NFR BLD AUTO: 14 % (ref 19.6–45.3)
MCH RBC QN AUTO: 28.2 PG (ref 26.6–33)
MCHC RBC AUTO-ENTMCNC: 33.1 G/DL (ref 31.5–35.7)
MCV RBC AUTO: 85.1 FL (ref 79–97)
MONOCYTES # BLD AUTO: 0.31 10*3/MM3 (ref 0.1–0.9)
MONOCYTES NFR BLD AUTO: 5.6 % (ref 5–12)
NEUTROPHILS NFR BLD AUTO: 4.36 10*3/MM3 (ref 1.7–7)
NEUTROPHILS NFR BLD AUTO: 78.1 % (ref 42.7–76)
NRBC BLD AUTO-RTO: 0 /100 WBC (ref 0–0.2)
PLATELET # BLD AUTO: 73 10*3/MM3 (ref 140–450)
PMV BLD AUTO: 10.6 FL (ref 6–12)
POTASSIUM SERPL-SCNC: 4.1 MMOL/L (ref 3.5–5.2)
PROT SERPL-MCNC: 7.1 G/DL (ref 6–8.5)
RBC # BLD AUTO: 5.29 10*6/MM3 (ref 3.77–5.28)
SODIUM SERPL-SCNC: 133 MMOL/L (ref 136–145)
WBC # BLD AUTO: 5.58 10*3/MM3 (ref 3.4–10.8)

## 2021-09-13 PROCEDURE — 80053 COMPREHEN METABOLIC PANEL: CPT | Performed by: EMERGENCY MEDICINE

## 2021-09-13 PROCEDURE — 81003 URINALYSIS AUTO W/O SCOPE: CPT | Performed by: EMERGENCY MEDICINE

## 2021-09-13 PROCEDURE — 25010000002 IOPAMIDOL 61 % SOLUTION: Performed by: EMERGENCY MEDICINE

## 2021-09-13 PROCEDURE — 96375 TX/PRO/DX INJ NEW DRUG ADDON: CPT

## 2021-09-13 PROCEDURE — 99283 EMERGENCY DEPT VISIT LOW MDM: CPT

## 2021-09-13 PROCEDURE — 74177 CT ABD & PELVIS W/CONTRAST: CPT

## 2021-09-13 PROCEDURE — 83690 ASSAY OF LIPASE: CPT | Performed by: EMERGENCY MEDICINE

## 2021-09-13 PROCEDURE — 96376 TX/PRO/DX INJ SAME DRUG ADON: CPT

## 2021-09-13 PROCEDURE — 25010000002 ONDANSETRON PER 1 MG: Performed by: EMERGENCY MEDICINE

## 2021-09-13 PROCEDURE — 25010000002 HYDROMORPHONE 1 MG/ML SOLUTION: Performed by: EMERGENCY MEDICINE

## 2021-09-13 PROCEDURE — 85025 COMPLETE CBC W/AUTO DIFF WBC: CPT | Performed by: EMERGENCY MEDICINE

## 2021-09-13 PROCEDURE — 96361 HYDRATE IV INFUSION ADD-ON: CPT

## 2021-09-13 PROCEDURE — 96374 THER/PROPH/DIAG INJ IV PUSH: CPT

## 2021-09-13 RX ORDER — SODIUM CHLORIDE 0.9 % (FLUSH) 0.9 %
10 SYRINGE (ML) INJECTION AS NEEDED
Status: DISCONTINUED | OUTPATIENT
Start: 2021-09-13 | End: 2021-09-14 | Stop reason: HOSPADM

## 2021-09-13 RX ORDER — ONDANSETRON 2 MG/ML
4 INJECTION INTRAMUSCULAR; INTRAVENOUS ONCE
Status: COMPLETED | OUTPATIENT
Start: 2021-09-13 | End: 2021-09-13

## 2021-09-13 RX ADMIN — SODIUM CHLORIDE 1000 ML: 9 INJECTION, SOLUTION INTRAVENOUS at 21:52

## 2021-09-13 RX ADMIN — HYDROMORPHONE HYDROCHLORIDE 1 MG: 1 INJECTION, SOLUTION INTRAMUSCULAR; INTRAVENOUS; SUBCUTANEOUS at 21:54

## 2021-09-13 RX ADMIN — ONDANSETRON 4 MG: 2 INJECTION INTRAMUSCULAR; INTRAVENOUS at 23:04

## 2021-09-13 RX ADMIN — ONDANSETRON 4 MG: 2 INJECTION INTRAMUSCULAR; INTRAVENOUS at 21:52

## 2021-09-13 RX ADMIN — IOPAMIDOL 90 ML: 612 INJECTION, SOLUTION INTRAVENOUS at 22:58

## 2021-09-13 RX ADMIN — HYDROMORPHONE HYDROCHLORIDE 1 MG: 1 INJECTION, SOLUTION INTRAMUSCULAR; INTRAVENOUS; SUBCUTANEOUS at 23:05

## 2021-09-14 ENCOUNTER — TELEPHONE (OUTPATIENT)
Dept: GASTROENTEROLOGY | Facility: CLINIC | Age: 61
End: 2021-09-14

## 2021-09-14 ENCOUNTER — HOSPITAL ENCOUNTER (EMERGENCY)
Facility: HOSPITAL | Age: 61
Discharge: HOME | End: 2021-09-14
Attending: FAMILY MEDICINE | Admitting: FAMILY MEDICINE

## 2021-09-14 ENCOUNTER — APPOINTMENT (OUTPATIENT)
Dept: GENERAL RADIOLOGY | Facility: HOSPITAL | Age: 61
End: 2021-09-14

## 2021-09-14 VITALS
OXYGEN SATURATION: 94 % | DIASTOLIC BLOOD PRESSURE: 75 MMHG | SYSTOLIC BLOOD PRESSURE: 147 MMHG | TEMPERATURE: 97.7 F | HEART RATE: 84 BPM | RESPIRATION RATE: 18 BRPM

## 2021-09-14 VITALS
WEIGHT: 220 LBS | TEMPERATURE: 97.7 F | OXYGEN SATURATION: 94 % | DIASTOLIC BLOOD PRESSURE: 70 MMHG | BODY MASS INDEX: 37.56 KG/M2 | SYSTOLIC BLOOD PRESSURE: 133 MMHG | HEART RATE: 81 BPM | HEIGHT: 64 IN | RESPIRATION RATE: 16 BRPM

## 2021-09-14 DIAGNOSIS — K59.09 CHRONIC CONSTIPATION: ICD-10-CM

## 2021-09-14 DIAGNOSIS — R11.2 NON-INTRACTABLE VOMITING WITH NAUSEA, UNSPECIFIED VOMITING TYPE: Primary | ICD-10-CM

## 2021-09-14 LAB
ALBUMIN SERPL-MCNC: 3.9 G/DL (ref 3.5–5.2)
ALBUMIN/GLOB SERPL: 1.1 G/DL
ALP SERPL-CCNC: 106 U/L (ref 39–117)
ALT SERPL W P-5'-P-CCNC: 25 U/L (ref 1–33)
ANION GAP SERPL CALCULATED.3IONS-SCNC: 10 MMOL/L (ref 5–15)
AST SERPL-CCNC: 41 U/L (ref 1–32)
BASOPHILS # BLD AUTO: 0.01 10*3/MM3 (ref 0–0.2)
BASOPHILS NFR BLD AUTO: 0.2 % (ref 0–1.5)
BILIRUB SERPL-MCNC: 1.4 MG/DL (ref 0–1.2)
BILIRUB UR QL STRIP: NEGATIVE
BUN SERPL-MCNC: 7 MG/DL (ref 8–23)
BUN/CREAT SERPL: 8 (ref 7–25)
CALCIUM SPEC-SCNC: 9.7 MG/DL (ref 8.6–10.5)
CHLORIDE SERPL-SCNC: 101 MMOL/L (ref 98–107)
CLARITY UR: CLEAR
CLUMPED PLATELETS: NORMAL
CO2 SERPL-SCNC: 25 MMOL/L (ref 22–29)
COLOR UR: YELLOW
CREAT SERPL-MCNC: 0.87 MG/DL (ref 0.57–1)
DEPRECATED RDW RBC AUTO: 47 FL (ref 37–54)
EOSINOPHIL # BLD AUTO: 0.04 10*3/MM3 (ref 0–0.4)
EOSINOPHIL NFR BLD AUTO: 0.7 % (ref 0.3–6.2)
ERYTHROCYTE [DISTWIDTH] IN BLOOD BY AUTOMATED COUNT: 15.5 % (ref 12.3–15.4)
GFR SERPL CREATININE-BSD FRML MDRD: 66 ML/MIN/1.73
GLOBULIN UR ELPH-MCNC: 3.4 GM/DL
GLUCOSE SERPL-MCNC: 126 MG/DL (ref 65–99)
GLUCOSE UR STRIP-MCNC: NEGATIVE MG/DL
HCT VFR BLD AUTO: 44.7 % (ref 34–46.6)
HGB BLD-MCNC: 15.3 G/DL (ref 12–15.9)
HGB UR QL STRIP.AUTO: NEGATIVE
IMM GRANULOCYTES # BLD AUTO: 0.02 10*3/MM3 (ref 0–0.05)
IMM GRANULOCYTES NFR BLD AUTO: 0.3 % (ref 0–0.5)
KETONES UR QL STRIP: NEGATIVE
LEUKOCYTE ESTERASE UR QL STRIP.AUTO: NEGATIVE
LIPASE SERPL-CCNC: 26 U/L (ref 13–60)
LYMPHOCYTES # BLD AUTO: 0.53 10*3/MM3 (ref 0.7–3.1)
LYMPHOCYTES NFR BLD AUTO: 9.2 % (ref 19.6–45.3)
MAGNESIUM SERPL-MCNC: 1.8 MG/DL (ref 1.6–2.4)
MCH RBC QN AUTO: 28.8 PG (ref 26.6–33)
MCHC RBC AUTO-ENTMCNC: 34.2 G/DL (ref 31.5–35.7)
MCV RBC AUTO: 84.2 FL (ref 79–97)
MONOCYTES # BLD AUTO: 0.21 10*3/MM3 (ref 0.1–0.9)
MONOCYTES NFR BLD AUTO: 3.7 % (ref 5–12)
NEUTROPHILS NFR BLD AUTO: 4.94 10*3/MM3 (ref 1.7–7)
NEUTROPHILS NFR BLD AUTO: 85.9 % (ref 42.7–76)
NITRITE UR QL STRIP: NEGATIVE
NRBC BLD AUTO-RTO: 0 /100 WBC (ref 0–0.2)
PH UR STRIP.AUTO: 6.5 [PH] (ref 5–9)
PLATELET # BLD AUTO: 60 10*3/MM3 (ref 140–450)
PMV BLD AUTO: 11.2 FL (ref 6–12)
POTASSIUM SERPL-SCNC: 4.5 MMOL/L (ref 3.5–5.2)
PROT SERPL-MCNC: 7.3 G/DL (ref 6–8.5)
PROT UR QL STRIP: NEGATIVE
RBC # BLD AUTO: 5.31 10*6/MM3 (ref 3.77–5.28)
RBC MORPH BLD: NORMAL
SMALL PLATELETS BLD QL SMEAR: NORMAL
SODIUM SERPL-SCNC: 136 MMOL/L (ref 136–145)
SP GR UR STRIP: 1.03 (ref 1–1.03)
UROBILINOGEN UR QL STRIP: ABNORMAL
WBC # BLD AUTO: 5.75 10*3/MM3 (ref 3.4–10.8)
WBC MORPH BLD: NORMAL

## 2021-09-14 PROCEDURE — 25010000002 ONDANSETRON PER 1 MG: Performed by: PHYSICIAN ASSISTANT

## 2021-09-14 PROCEDURE — 83735 ASSAY OF MAGNESIUM: CPT | Performed by: PHYSICIAN ASSISTANT

## 2021-09-14 PROCEDURE — 85007 BL SMEAR W/DIFF WBC COUNT: CPT | Performed by: PHYSICIAN ASSISTANT

## 2021-09-14 PROCEDURE — 74018 RADEX ABDOMEN 1 VIEW: CPT

## 2021-09-14 PROCEDURE — 83690 ASSAY OF LIPASE: CPT | Performed by: PHYSICIAN ASSISTANT

## 2021-09-14 PROCEDURE — 85025 COMPLETE CBC W/AUTO DIFF WBC: CPT | Performed by: PHYSICIAN ASSISTANT

## 2021-09-14 PROCEDURE — 96374 THER/PROPH/DIAG INJ IV PUSH: CPT

## 2021-09-14 PROCEDURE — 25010000002 HYDROMORPHONE 1 MG/ML SOLUTION: Performed by: EMERGENCY MEDICINE

## 2021-09-14 PROCEDURE — 96375 TX/PRO/DX INJ NEW DRUG ADDON: CPT

## 2021-09-14 PROCEDURE — 99283 EMERGENCY DEPT VISIT LOW MDM: CPT

## 2021-09-14 PROCEDURE — 80053 COMPREHEN METABOLIC PANEL: CPT | Performed by: PHYSICIAN ASSISTANT

## 2021-09-14 PROCEDURE — 25010000002 PROCHLORPERAZINE 10 MG/2ML SOLUTION: Performed by: PHYSICIAN ASSISTANT

## 2021-09-14 RX ORDER — PROCHLORPERAZINE EDISYLATE 5 MG/ML
5 INJECTION INTRAMUSCULAR; INTRAVENOUS EVERY 6 HOURS PRN
Status: DISCONTINUED | OUTPATIENT
Start: 2021-09-14 | End: 2021-09-14 | Stop reason: HOSPADM

## 2021-09-14 RX ORDER — SODIUM CHLORIDE 0.9 % (FLUSH) 0.9 %
10 SYRINGE (ML) INJECTION AS NEEDED
Status: DISCONTINUED | OUTPATIENT
Start: 2021-09-14 | End: 2021-09-14 | Stop reason: HOSPADM

## 2021-09-14 RX ORDER — ONDANSETRON 2 MG/ML
4 INJECTION INTRAMUSCULAR; INTRAVENOUS ONCE
Status: COMPLETED | OUTPATIENT
Start: 2021-09-14 | End: 2021-09-14

## 2021-09-14 RX ADMIN — SODIUM CHLORIDE 1000 ML: 9 INJECTION, SOLUTION INTRAVENOUS at 12:43

## 2021-09-14 RX ADMIN — HYDROMORPHONE HYDROCHLORIDE 1 MG: 1 INJECTION, SOLUTION INTRAMUSCULAR; INTRAVENOUS; SUBCUTANEOUS at 14:50

## 2021-09-14 RX ADMIN — ONDANSETRON 4 MG: 2 INJECTION INTRAMUSCULAR; INTRAVENOUS at 14:50

## 2021-09-14 RX ADMIN — PROCHLORPERAZINE EDISYLATE 5 MG: 5 INJECTION INTRAMUSCULAR; INTRAVENOUS at 12:44

## 2021-09-14 NOTE — ED NOTES
Pt incontinent urine while vomiting during IV insertion. Unable to collect urine specimen at this time.     eBbe Dasilva RN  09/13/21 8457

## 2021-09-14 NOTE — ED PROVIDER NOTES
Subjective   Patient presents to emergency department for nausea/vomiting, abdominal pain x 1 week.  She was seen in the emergency department last night and had a benign work up including CT abdomen without contrast with no acute findings(cirrhosis with portal HTN, previous hysterectomy/cholecystectomy)  States she suffers from chronic constipation and sees GI for this.  She takes Roxicodone 10mg four times a day for chronic pain.        History provided by:  Patient   used: No        Review of Systems   Constitutional: Negative for chills and fever.   HENT: Negative for sore throat and trouble swallowing.    Eyes: Negative for visual disturbance.   Respiratory: Negative for cough, shortness of breath and wheezing.    Cardiovascular: Negative for chest pain.   Gastrointestinal: Positive for abdominal pain, nausea and vomiting.   Genitourinary: Negative for dysuria and flank pain.   Musculoskeletal: Negative for back pain.   Skin: Negative for color change.   Allergic/Immunologic: Negative for immunocompromised state.   Neurological: Negative for syncope and weakness.   Hematological: Does not bruise/bleed easily.   Psychiatric/Behavioral: Negative for confusion.       Past Medical History:   Diagnosis Date   • Acid reflux    • Altered bowel function    • Arthropathy of lumbar facet joint    • Bleeding disorder (CMS/HCC)    • C. difficile diarrhea 2015   • Chronic idiopathic constipation    • Colonic inertia    • Constipation    • Corns and callus    • Depression    • Disease related peripheral neuropathy    • Epigastric pain    • Fatty liver    • Hammer toe    • Headache    • Hiatal hernia    • History of transfusion    • Hyperlipidemia    • Knee pain    • Localized, primary osteoarthritis of the ankle and foot     Localized, primary osteoarthritis of the ankle and/or foot   • Mendoza's metatarsalgia     Mendoza's metatarsalgia - 2nd interspace on right   • Nausea and vomiting    • Neuralgia and  "neuritis     Neuralgia, neuritis, and radiculitis, unspecified   • Neuropathy    • Obstructive sleep apnea     Obstructive sleep apnea (adult) (pediatric)    • CHAI on CPAP     \"C-Pap at night  (unconfirmed)\"   • Osteoarthritis    • Pain in foot     Pain in unspecified foot - sees a podiatrist   • Pain in joint, ankle and foot     Joint pain in ankle and foot      • Pain radiating to back     Pain radiating to lumbar region of back   • Pelvic floor dysfunction    • Plantar fasciitis    • PONV (postoperative nausea and vomiting)    • Restless leg syndrome    • Secondary hypertension     Secondary hypertension, unspecified   • Sinusitis    • Sleep apnea    • Tongue anomaly     lesion       Allergies   Allergen Reactions   • Tape Rash     Patient has rash/blishers on site after tape   • Other      Pt states that taking steroids either in pill or injection form make her have blisters in her mouth and she feels like she is on fire on the inside/ has hx of c diff and possible MRSA     • Corticosteroids Rash   • Kenalog  [Triamcinolone Acetonide] Rash   • Sulfa Antibiotics Rash     Sulfa (Sulfonamide Antibiotics)       Past Surgical History:   Procedure Laterality Date   • CARPAL TUNNEL RELEASE Left 2018    Procedure: CARPAL TUNNEL RELEASE - left;  Surgeon: Justus Lewis MD;  Location: [facility] OR;  Service: Orthopedics   • CARPAL TUNNEL RELEASE Right 2019    Procedure: carpal tunnel release right hand with local/monitored anesthesia care;  Surgeon: Justus Lewis MD;  Location: [facility] OR;  Service: Orthopedics   •  SECTION     • CHOLECYSTECTOMY     • COLONOSCOPY  2013   • COLONOSCOPY N/A 10/17/2018    Procedure: COLONOSCOPY;  Surgeon: Russell Del Rio MD;  Location: [facility] ENDOSCOPY;  Service: Gastroenterology   • COLONOSCOPY N/A 3/22/2021    Procedure: COLONOSCOPY;  Surgeon: Russell Del Rio MD;  Location: [facility] ENDOSCOPY;  Service: Gastroenterology;  Laterality: N/A;   • DIRECT " LARYNGOSCOPY, ESOPHAGOSCOPY, BRONCHOSCOPY N/A 8/1/2017    Procedure: DIRECT LARYNGOSCOPY AND;  Surgeon: Live Bolton MD;  Location: [facility] OR;  Service:    • ENDOSCOPY  07/01/2013    Colon endoscopy 03699 (1) - Internal & external hemorrhoids found. Stool found.   • ENDOSCOPY  07/01/2013    EGD w/ tube 41820 (1) - Normal esophagus. Gastritis in stomach. Biopsy taken. Normal dudoenum. Biopsy taken.   • ENDOSCOPY N/A 10/17/2018    Procedure: ESOPHAGOGASTRODUODENOSCOPY--eval varices;  Surgeon: Russell Del Rio MD;  Location: [facility] ENDOSCOPY;  Service: Gastroenterology   • ENDOSCOPY N/A 3/22/2021    Procedure: ESOPHAGOGASTRODUODENOSCOPYURGNET;  Surgeon: Russell Del Rio MD;  Location: [facility] ENDOSCOPY;  Service: Gastroenterology;  Laterality: N/A;   • FOOT SURGERY  02/26/2013    Foot/toes surgery procedure (1) - Arthroplasty of toes 4 and 5 of right foot.   • HERNIA REPAIR     • HERNIA REPAIR      hital   • HYSTERECTOMY     • LIVER BIOPSY     • NERVE BLOCK  07/25/2016    Injection for nerve block (1) - Lumbar medial branch block.   • OTHER SURGICAL HISTORY  12/03/2012    Inj(s) Tend-Sheath, Ligament, Single 20550 (1) - [facility]    • OTHER SURGICAL HISTORY  06/24/2013    Small Joint Injection/Aspiration 20600 (2) - [facility]ICK[facility] ([facility])    • OTHER SURGICAL HISTORY  2011    bowel obstruction x2   • OTHER SURGICAL HISTORY      gland removed from neck   • SUBLINGUAL SALIVARY CYST EXCISION N/A 8/1/2017    Procedure: EXCISION OF LEFT  TONGUE LESION WITH CLOSURE;  Surgeon: Live Bolton MD;  Location: [facility] OR;  Service:    • TUBAL ABDOMINAL LIGATION     • UPPER GASTROINTESTINAL ENDOSCOPY  07/01/2013   • UPPER GASTROINTESTINAL ENDOSCOPY  10/17/2018   • UPPER GASTROINTESTINAL ENDOSCOPY  03/22/2021       Family History   Problem Relation Age of Onset   • Cancer Other    • Diabetes Other    • Heart disease Other    • Hypertension Mother    • Cancer Mother    • Diabetes Mother    • Hypertension  Father    • Heart attack Father    • Cancer Father    • Heart disease Father    • Thyroid disease Maternal Aunt    • Cancer Maternal Aunt    • No Known Problems Sister    • No Known Problems Brother    • Cancer Maternal Grandmother    • No Known Problems Sister        Social History     Socioeconomic History   • Marital status:      Spouse name: Not on file   • Number of children: Not on file   • Years of education: Not on file   • Highest education level: Not on file   Tobacco Use   • Smoking status: Former Smoker     Packs/day: 2.00     Years: 15.00     Pack years: 30.00     Types: Cigarettes     Quit date:      Years since quittin.7   • Smokeless tobacco: Never Used   Vaping Use   • Vaping Use: Never used   Substance and Sexual Activity   • Alcohol use: No   • Drug use: Never   • Sexual activity: Defer     Birth control/protection: Surgical     Comment: Marital Status: .  Hysterectomy.           Objective      /72   Pulse 84   Temp 97.7 °F (36.5 °C) (Temporal)   Resp 18   LMP  (LMP Unknown)   SpO2 96%     Physical Exam  Vitals and nursing note reviewed.   Constitutional:       Appearance: Normal appearance. She is obese.   HENT:      Head: Normocephalic and atraumatic.   Eyes:      Conjunctiva/sclera: Conjunctivae normal.   Cardiovascular:      Rate and Rhythm: Normal rate and regular rhythm.      Pulses: Normal pulses.      Heart sounds: Normal heart sounds.   Pulmonary:      Effort: Pulmonary effort is normal. No respiratory distress.      Breath sounds: Normal breath sounds. No wheezing.   Abdominal:      General: Bowel sounds are normal.      Tenderness: There is abdominal tenderness (generalized).   Skin:     General: Skin is warm.      Capillary Refill: Capillary refill takes less than 2 seconds.   Neurological:      Mental Status: She is alert and oriented to person, place, and time. Mental status is at baseline.   Psychiatric:         Mood and Affect: Mood normal.          Behavior: Behavior normal.         Thought Content: Thought content normal.         Procedures           ED Course  ED Course as of Sep 14 1527   Tue Sep 14, 2021   1358 Reviewed [facility].  Current prescription of Roxicodone 10mg.  Should have 10 day supply left.      [DANNY]   1524 Patient states nausea/vomiting and abdominal pain and controlled now.      [DANNY]      ED Course User Index  [DANNY] Lalo Riggins PA-C      Results for orders placed or performed during the hospital encounter of 09/14/21   Comprehensive Metabolic Panel    Specimen: Blood   Result Value Ref Range    Glucose 126 (H) 65 - 99 mg/dL    BUN 7 (L) 8 - 23 mg/dL    Creatinine 0.87 0.57 - 1.00 mg/dL    Sodium 136 136 - 145 mmol/L    Potassium 4.5 3.5 - 5.2 mmol/L    Chloride 101 98 - 107 mmol/L    CO2 25.0 22.0 - 29.0 mmol/L    Calcium 9.7 8.6 - 10.5 mg/dL    Total Protein 7.3 6.0 - 8.5 g/dL    Albumin 3.90 3.50 - 5.20 g/dL    ALT (SGPT) 25 1 - 33 U/L    AST (SGOT) 41 (H) 1 - 32 U/L    Alkaline Phosphatase 106 39 - 117 U/L    Total Bilirubin 1.4 (H) 0.0 - 1.2 mg/dL    eGFR Non African Amer 66 >60 mL/min/1.73    Globulin 3.4 gm/dL    A/G Ratio 1.1 g/dL    BUN/Creatinine Ratio 8.0 7.0 - 25.0    Anion Gap 10.0 5.0 - 15.0 mmol/L   Lipase    Specimen: Blood   Result Value Ref Range    Lipase 26 13 - 60 U/L   Magnesium    Specimen: Blood   Result Value Ref Range    Magnesium 1.8 1.6 - 2.4 mg/dL   CBC Auto Differential    Specimen: Blood   Result Value Ref Range    WBC 5.75 3.40 - 10.80 10*3/mm3    RBC 5.31 (H) 3.77 - 5.28 10*6/mm3    Hemoglobin 15.3 12.0 - 15.9 g/dL    Hematocrit 44.7 34.0 - 46.6 %    MCV 84.2 79.0 - 97.0 fL    MCH 28.8 26.6 - 33.0 pg    MCHC 34.2 31.5 - 35.7 g/dL    RDW 15.5 (H) 12.3 - 15.4 %    RDW-SD 47.0 37.0 - 54.0 fl    MPV 11.2 6.0 - 12.0 fL    Platelets 60 (L) 140 - 450 10*3/mm3    Neutrophil % 85.9 (H) 42.7 - 76.0 %    Lymphocyte % 9.2 (L) 19.6 - 45.3 %    Monocyte % 3.7 (L) 5.0 - 12.0 %    Eosinophil % 0.7 0.3 - 6.2 %     Basophil % 0.2 0.0 - 1.5 %    Immature Grans % 0.3 0.0 - 0.5 %    Neutrophils, Absolute 4.94 1.70 - 7.00 10*3/mm3    Lymphocytes, Absolute 0.53 (L) 0.70 - 3.10 10*3/mm3    Monocytes, Absolute 0.21 0.10 - 0.90 10*3/mm3    Eosinophils, Absolute 0.04 0.00 - 0.40 10*3/mm3    Basophils, Absolute 0.01 0.00 - 0.20 10*3/mm3    Immature Grans, Absolute 0.02 0.00 - 0.05 10*3/mm3    nRBC 0.0 0.0 - 0.2 /100 WBC   Scan Slide    Specimen: Blood   Result Value Ref Range    RBC Morphology Normal Normal    WBC Morphology Normal Normal    Platelet Estimate Decreased Normal    Clumped Platelets       CT Abdomen Pelvis With Contrast    Result Date: 9/13/2021  Narrative: EXAM DESCRIPTION: Site:  CT ABDOMEN PELVIS W CONTRAST RP: CT ABDOMEN PELVIS WITH IV CONTRAST CLINICAL HISTORY: 60 years Female; abdominal pain; TECHNIQUE: CT of the abdomen and pelvis using intravenous contrast. All CT scans at this facility use dose modulation, iterative reconstruction, and/or weight based dosing when appropriate to reduce radiation dose to as low as reasonably achievable. COMPARISON: CT 07/21/2021 FINDINGS: Abdomen: Stomach: No significant distention or surrounding edema. Liver:No focal lesions. No intrahepatic ductal distention. Slightly nodular contour as on prior study. 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 No portal venous filling defects. Gallbladder: Surgically absent Pancreas:Within normal limits Spleen: Enlarged as on prior exam, 18.5 cm long. Right kidney:No hydronephrosis. No focal lesion. Left kidney:No hydronephrosis. No focal lesion. Adrenal glands:Within normal limits Vascular structures:Within normal limits Pelvis: Small bowel:No significant distention. Appendix:Within normal limits Colon:No distention or acute pericolonic edema. Trace amount of fluid in the pelvis. No free air. Bones: Mild chronic degenerative changes of the lumbar spine. No acute bone findings. Bladder: Unremarkable. Uterus is absent. No pelvic mass.        Impression: 1.  No acute findings. 2.  Cirrhosis with portal hypertension, as on prior exam. Minimal ascites. 3.  Previous cholecystectomy and hysterectomy. Electronically signed by:  Sanket Alanis MD  9/13/2021 11:19 PM CDT Workstation: [id number]    XR Abdomen KUB    Result Date: 9/14/2021  Narrative: EXAM DESCRIPTION:  XR ABDOMEN KUB CLINICAL HISTORY: 60 years  Female  nausea/vomiting COMPARISON: August 12, 2021 TECHNIQUE: 1 view-AP radiograph of the abdomen and pelvis FINDINGS: The bowel gas pattern is unremarkable. There is no evidence of mechanical obstruction or free intraperitoneal air. There is evidence prior cholecystectomy. There is contrast in the urinary bladder compatible with the preceding CT scan one day earlier. There is evidence prior cholecystectomy.     Impression: 1. Unremarkable bowel gas pattern. 2. Contrast in the urinary bladder compatible with recent IV contrast from the preceding CT one day earlier. Electronically signed by:  Deb Kerns MD  9/14/2021 1:05 PM CDT Workstation: [id number]                                         MDM    Final diagnoses:   Non-intractable vomiting with nausea, unspecified vomiting type   Chronic constipation       ED Disposition  ED Disposition     ED Disposition Condition Comment    Discharge Stable           Blaine Perales PA-C  [address] [place] [address] DR  MICHELLE [address] [place] [address]  [phone]    Call in 1 day      [facility] EMERGENCY DEPARTMENT  [address] [facility] [address] [phone]  Go to   if symptoms worsen.         Medication List      Changed    metoclopramide 5 MG tablet  Commonly known as: Reglan  Take 1 tablet by mouth 3 (Three) Times a Day Before Meals.  What changed: when to take this             Lalo Riggins PA-C  09/14/21 2133

## 2021-09-14 NOTE — TELEPHONE ENCOUNTER
----- Message from Rosie Jack MA sent at 9/14/2021  8:42 AM CDT -----  Patient requests call, was in ER last night [phone]

## 2021-09-14 NOTE — ED PROVIDER NOTES
"Subjective   60-year-old white female with a history of cirrhosis presents to the emergency department chief complaint of abdominal pain.  Patient complains of generalized abdominal pain off and on for 1 week.  She rates the pain as 9 out of 10 severity.  Associated symptoms include nausea, vomiting, and constipation.  She denies fever, sweats, chills, chest pain, shortness of breath, hematemesis, melena, or hematochezia.          Review of Systems   Constitutional: Negative for chills, diaphoresis and fever.   Respiratory: Negative for cough and shortness of breath.    Cardiovascular: Negative for chest pain.   Gastrointestinal: Positive for abdominal pain, constipation, nausea and vomiting. Negative for blood in stool.   Genitourinary: Negative for dysuria and hematuria.   Musculoskeletal: Negative for back pain and neck pain.   Neurological: Positive for weakness. Negative for syncope and headaches.   All other systems reviewed and are negative.      Past Medical History:   Diagnosis Date   • Acid reflux    • Altered bowel function    • Arthropathy of lumbar facet joint    • Bleeding disorder (CMS/HCC)    • C. difficile diarrhea 2015   • Chronic idiopathic constipation    • Colonic inertia    • Constipation    • Corns and callus    • Depression    • Disease related peripheral neuropathy    • Epigastric pain    • Fatty liver    • Hammer toe    • Headache    • Hiatal hernia    • History of transfusion    • Hyperlipidemia    • Knee pain    • Localized, primary osteoarthritis of the ankle and foot     Localized, primary osteoarthritis of the ankle and/or foot   • Mendoza's metatarsalgia     Mendoza's metatarsalgia - 2nd interspace on right   • Nausea and vomiting    • Neuralgia and neuritis     Neuralgia, neuritis, and radiculitis, unspecified   • Neuropathy    • Obstructive sleep apnea     Obstructive sleep apnea (adult) (pediatric)    • CHAI on CPAP     \"C-Pap at night  (unconfirmed)\"   • Osteoarthritis    • Pain in " foot     Pain in unspecified foot - sees a podiatrist   • Pain in joint, ankle and foot     Joint pain in ankle and foot      • Pain radiating to back     Pain radiating to lumbar region of back   • Pelvic floor dysfunction    • Plantar fasciitis    • PONV (postoperative nausea and vomiting)    • Restless leg syndrome    • Secondary hypertension     Secondary hypertension, unspecified   • Sinusitis    • Sleep apnea    • Tongue anomaly     lesion       Allergies   Allergen Reactions   • Tape Rash     Patient has rash/blishers on site after tape   • Other      Pt states that taking steroids either in pill or injection form make her have blisters in her mouth and she feels like she is on fire on the inside/ has hx of c diff and possible MRSA     • Corticosteroids Rash   • Kenalog  [Triamcinolone Acetonide] Rash   • Sulfa Antibiotics Rash     Sulfa (Sulfonamide Antibiotics)       Past Surgical History:   Procedure Laterality Date   • CARPAL TUNNEL RELEASE Left 2018    Procedure: CARPAL TUNNEL RELEASE - left;  Surgeon: Justus Lewis MD;  Location: [facility] OR;  Service: Orthopedics   • CARPAL TUNNEL RELEASE Right 2019    Procedure: carpal tunnel release right hand with local/monitored anesthesia care;  Surgeon: Justus Lewis MD;  Location: [facility] OR;  Service: Orthopedics   •  SECTION     • CHOLECYSTECTOMY     • COLONOSCOPY  2013   • COLONOSCOPY N/A 10/17/2018    Procedure: COLONOSCOPY;  Surgeon: Russell Del Rio MD;  Location: [facility] ENDOSCOPY;  Service: Gastroenterology   • COLONOSCOPY N/A 3/22/2021    Procedure: COLONOSCOPY;  Surgeon: Russell Del Rio MD;  Location: [facility] ENDOSCOPY;  Service: Gastroenterology;  Laterality: N/A;   • DIRECT LARYNGOSCOPY, ESOPHAGOSCOPY, BRONCHOSCOPY N/A 2017    Procedure: DIRECT LARYNGOSCOPY AND;  Surgeon: Live Bolton MD;  Location: [facility] OR;  Service:    • ENDOSCOPY  2013    Colon endoscopy 95592 (1) - Internal & external  hemorrhoids found. Stool found.   • ENDOSCOPY  07/01/2013    EGD w/ tube 53739 (1) - Normal esophagus. Gastritis in stomach. Biopsy taken. Normal dudoenum. Biopsy taken.   • ENDOSCOPY N/A 10/17/2018    Procedure: ESOPHAGOGASTRODUODENOSCOPY--eval varices;  Surgeon: Russell Del Rio MD;  Location: [facility] ENDOSCOPY;  Service: Gastroenterology   • ENDOSCOPY N/A 3/22/2021    Procedure: ESOPHAGOGASTRODUODENOSCOPYURGNET;  Surgeon: Russell Del Rio MD;  Location: [facility] ENDOSCOPY;  Service: Gastroenterology;  Laterality: N/A;   • FOOT SURGERY  02/26/2013    Foot/toes surgery procedure (1) - Arthroplasty of toes 4 and 5 of right foot.   • HERNIA REPAIR     • HERNIA REPAIR      hital   • HYSTERECTOMY     • LIVER BIOPSY     • NERVE BLOCK  07/25/2016    Injection for nerve block (1) - Lumbar medial branch block.   • OTHER SURGICAL HISTORY  12/03/2012    Inj(s) Tend-Sheath, Ligament, Single 20550 (1) - [facility]    • OTHER SURGICAL HISTORY  06/24/2013    Small Joint Injection/Aspiration 20600 (2) - [facility] ([facility])    • OTHER SURGICAL HISTORY  2011    bowel obstruction x2   • OTHER SURGICAL HISTORY      gland removed from neck   • SUBLINGUAL SALIVARY CYST EXCISION N/A 8/1/2017    Procedure: EXCISION OF LEFT  TONGUE LESION WITH CLOSURE;  Surgeon: Live Bolton MD;  Location: [facility] OR;  Service:    • TUBAL ABDOMINAL LIGATION     • UPPER GASTROINTESTINAL ENDOSCOPY  07/01/2013   • UPPER GASTROINTESTINAL ENDOSCOPY  10/17/2018   • UPPER GASTROINTESTINAL ENDOSCOPY  03/22/2021       Family History   Problem Relation Age of Onset   • Cancer Other    • Diabetes Other    • Heart disease Other    • Hypertension Mother    • Cancer Mother    • Diabetes Mother    • Hypertension Father    • Heart attack Father    • Cancer Father    • Heart disease Father    • Thyroid disease Maternal Aunt    • Cancer Maternal Aunt    • No Known Problems Sister    • No Known Problems Brother    • Cancer Maternal Grandmother     • No Known Problems Sister        Social History     Socioeconomic History   • Marital status:      Spouse name: Not on file   • Number of children: Not on file   • Years of education: Not on file   • Highest education level: Not on file   Tobacco Use   • Smoking status: Former Smoker     Packs/day: 2.00     Years: 15.00     Pack years: 30.00     Types: Cigarettes     Quit date:      Years since quittin.7   • Smokeless tobacco: Never Used   Vaping Use   • Vaping Use: Never used   Substance and Sexual Activity   • Alcohol use: No   • Drug use: Never   • Sexual activity: Defer     Birth control/protection: Surgical     Comment: Marital Status: .  Hysterectomy.           Objective   Physical Exam  Vitals and nursing note reviewed.   Constitutional:       General: She is not in acute distress.     Appearance: She is not toxic-appearing or diaphoretic.   HENT:      Head: Normocephalic and atraumatic.      Right Ear: External ear normal.      Left Ear: External ear normal.      Nose: Nose normal.      Mouth/Throat:      Mouth: Mucous membranes are moist.      Pharynx: Oropharynx is clear.   Eyes:      Extraocular Movements: Extraocular movements intact.      Conjunctiva/sclera: Conjunctivae normal.      Pupils: Pupils are equal, round, and reactive to light.   Cardiovascular:      Rate and Rhythm: Normal rate and regular rhythm.      Pulses: Normal pulses.      Heart sounds: Normal heart sounds.   Pulmonary:      Effort: Pulmonary effort is normal.      Breath sounds: Normal breath sounds.   Abdominal:      General: Bowel sounds are normal. There is no distension.      Palpations: Abdomen is soft. There is no mass.      Tenderness: There is generalized abdominal tenderness. There is no guarding or rebound.   Musculoskeletal:      Cervical back: Normal range of motion and neck supple.      Right lower leg: No edema.      Left lower leg: No edema.   Skin:     General: Skin is warm and dry.    Neurological:      General: No focal deficit present.      Mental Status: She is alert and oriented to person, place, and time.   Psychiatric:         Mood and Affect: Mood normal.         Behavior: Behavior normal.         Procedures           ED Course  ED Course as of Sep 14 0023   Tue Sep 14, 2021   0021 Patient is alert and resting comfortably. I reviewed the results of the emergency department evaluation with the patient.  I recommended primary care follow-up.  I advised the patient to return to the emergency department if their symptoms change or worsen.     [DR]      ED Course User Index  [DR] Mike Lewis MD            Labs Reviewed   COMPREHENSIVE METABOLIC PANEL - Abnormal; Notable for the following components:       Result Value    Glucose 122 (*)     BUN 7 (*)     Sodium 133 (*)     AST (SGOT) 50 (*)     All other components within normal limits    Narrative:     GFR Normal >60  Chronic Kidney Disease <60  Kidney Failure <15     URINALYSIS W/ MICROSCOPIC IF INDICATED (NO CULTURE) - Abnormal; Notable for the following components:    Specific Gravity, UA 1.034 (*)     All other components within normal limits    Narrative:     Urine microscopic not indicated.   CBC WITH AUTO DIFFERENTIAL - Abnormal; Notable for the following components:    RBC 5.29 (*)     RDW 15.5 (*)     Platelets 73 (*)     Neutrophil % 78.1 (*)     Lymphocyte % 14.0 (*)     All other components within normal limits   LIPASE - Normal   CBC AND DIFFERENTIAL    Narrative:     The following orders were created for panel order CBC & Differential.  Procedure                               Abnormality         Status                     ---------                               -----------         ------                     CBC Auto Differential[[id number]]        Abnormal            Final result               Scan Slide[[id number]]                                                                    Please view results for these tests on the  individual orders.   EXTRA TUBES    Narrative:     The following orders were created for panel order Extra Tubes.  Procedure                               Abnormality         Status                     ---------                               -----------         ------                     Gold Top - SST[[id number]]                                   Final result                 Please view results for these tests on the individual orders.   [facility]     CT Abdomen Pelvis With Contrast    Result Date: 9/13/2021  Narrative: EXAM DESCRIPTION: Site:  CT ABDOMEN PELVIS W CONTRAST RP: CT ABDOMEN PELVIS WITH IV CONTRAST CLINICAL HISTORY: 60 years Female; abdominal pain; TECHNIQUE: CT of the abdomen and pelvis using intravenous contrast. All CT scans at this facility use dose modulation, iterative reconstruction, and/or weight based dosing when appropriate to reduce radiation dose to as low as reasonably achievable. COMPARISON: CT 07/21/2021 FINDINGS: Abdomen: Stomach: No significant distention or surrounding edema. Liver:No focal lesions. No intrahepatic ductal distention. Slightly nodular contour as on prior study. 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 No portal venous filling defects. Gallbladder: Surgically absent Pancreas:Within normal limits Spleen: Enlarged as on prior exam, 18.5 cm long. Right kidney:No hydronephrosis. No focal lesion. Left kidney:No hydronephrosis. No focal lesion. Adrenal glands:Within normal limits Vascular structures:Within normal limits Pelvis: Small bowel:No significant distention. Appendix:Within normal limits Colon:No distention or acute pericolonic edema. Trace amount of fluid in the pelvis. No free air. Bones: Mild chronic degenerative changes of the lumbar spine. No acute bone findings. Bladder: Unremarkable. Uterus is absent. No pelvic mass.       Impression: 1.  No acute findings. 2.  Cirrhosis with portal hypertension, as on prior exam. Minimal ascites. 3.  Previous cholecystectomy  and hysterectomy. Electronically signed by:  Sanket Alanis MD  9/13/2021 11:19 PM CDT Workstation: [id number]                                    [facility]    Final diagnoses:   Abdominal pain, unspecified abdominal location       ED Disposition  ED Disposition     ED Disposition Condition Comment    Discharge Stable           Yobany Barr MD  [address] [place] [id number]  [phone]    Schedule an appointment as soon as possible for a visit in 1 day           Medication List      Changed    metoclopramide 5 MG tablet  Commonly known as: Reglan  Take 1 tablet by mouth 3 (Three) Times a Day Before Meals.  What changed: when to take this             Mike Lewis MD  09/14/21 0023

## 2021-09-14 NOTE — ED NOTES
Unsuccessful peripheral attempt X2 right ac and left ac. Tolerated well     Abigail Correa RN  09/14/21 6125

## 2021-09-15 ENCOUNTER — APPOINTMENT (OUTPATIENT)
Dept: CT IMAGING | Facility: HOSPITAL | Age: 61
End: 2021-09-15

## 2021-09-15 ENCOUNTER — OFFICE VISIT (OUTPATIENT)
Dept: GASTROENTEROLOGY | Facility: CLINIC | Age: 61
End: 2021-09-15

## 2021-09-15 ENCOUNTER — APPOINTMENT (OUTPATIENT)
Dept: GENERAL RADIOLOGY | Facility: HOSPITAL | Age: 61
End: 2021-09-15

## 2021-09-15 ENCOUNTER — HOSPITAL ENCOUNTER (OUTPATIENT)
Facility: HOSPITAL | Age: 61
Setting detail: HOSPITAL OUTPATIENT SURGERY
End: 2021-09-15
Attending: INTERNAL MEDICINE | Admitting: INTERNAL MEDICINE

## 2021-09-15 ENCOUNTER — HOSPITAL ENCOUNTER (INPATIENT)
Facility: HOSPITAL | Age: 61
LOS: 7 days | Discharge: HOME HEALTH | End: 2021-09-24
Attending: EMERGENCY MEDICINE | Admitting: INTERNAL MEDICINE

## 2021-09-15 VITALS
SYSTOLIC BLOOD PRESSURE: 161 MMHG | WEIGHT: 193 LBS | HEART RATE: 83 BPM | BODY MASS INDEX: 32.95 KG/M2 | HEIGHT: 64 IN | DIASTOLIC BLOOD PRESSURE: 101 MMHG

## 2021-09-15 DIAGNOSIS — R63.4 WEIGHT LOSS, ABNORMAL: ICD-10-CM

## 2021-09-15 DIAGNOSIS — R11.2 INTRACTABLE VOMITING WITH NAUSEA, UNSPECIFIED VOMITING TYPE: ICD-10-CM

## 2021-09-15 DIAGNOSIS — K59.04 CHRONIC IDIOPATHIC CONSTIPATION: ICD-10-CM

## 2021-09-15 DIAGNOSIS — R10.84 GENERALIZED ABDOMINAL PAIN: ICD-10-CM

## 2021-09-15 DIAGNOSIS — R11.2 NAUSEA AND VOMITING, INTRACTABILITY OF VOMITING NOT SPECIFIED, UNSPECIFIED VOMITING TYPE: Primary | ICD-10-CM

## 2021-09-15 DIAGNOSIS — K72.10 END STAGE LIVER DISEASE (HCC): ICD-10-CM

## 2021-09-15 DIAGNOSIS — K75.81 NASH (NONALCOHOLIC STEATOHEPATITIS): ICD-10-CM

## 2021-09-15 DIAGNOSIS — I16.0 HYPERTENSIVE URGENCY: ICD-10-CM

## 2021-09-15 LAB
ALBUMIN SERPL-MCNC: 4.4 G/DL (ref 3.5–5.2)
ALBUMIN/GLOB SERPL: 1.2 G/DL
ALP SERPL-CCNC: 112 U/L (ref 39–117)
ALT SERPL W P-5'-P-CCNC: 22 U/L (ref 1–33)
AMYLASE SERPL-CCNC: 39 U/L (ref 28–100)
ANION GAP SERPL CALCULATED.3IONS-SCNC: 11 MMOL/L (ref 5–15)
ANISOCYTOSIS BLD QL: NORMAL
AST SERPL-CCNC: 30 U/L (ref 1–32)
BACTERIA UR QL AUTO: ABNORMAL /HPF
BASOPHILS # BLD AUTO: 0.02 10*3/MM3 (ref 0–0.2)
BASOPHILS NFR BLD AUTO: 0.2 % (ref 0–1.5)
BILIRUB SERPL-MCNC: 1.6 MG/DL (ref 0–1.2)
BILIRUB UR QL STRIP: NEGATIVE
BUN SERPL-MCNC: 12 MG/DL (ref 8–23)
BUN/CREAT SERPL: 11.4 (ref 7–25)
CALCIUM SPEC-SCNC: 10 MG/DL (ref 8.6–10.5)
CHLORIDE SERPL-SCNC: 100 MMOL/L (ref 98–107)
CLARITY UR: CLEAR
CO2 SERPL-SCNC: 27 MMOL/L (ref 22–29)
COLOR UR: ABNORMAL
CREAT SERPL-MCNC: 1.05 MG/DL (ref 0.57–1)
DEPRECATED RDW RBC AUTO: 46.8 FL (ref 37–54)
EOSINOPHIL # BLD AUTO: 0.03 10*3/MM3 (ref 0–0.4)
EOSINOPHIL NFR BLD AUTO: 0.3 % (ref 0.3–6.2)
ERYTHROCYTE [DISTWIDTH] IN BLOOD BY AUTOMATED COUNT: 16.1 % (ref 12.3–15.4)
FLUAV SUBTYP SPEC NAA+PROBE: NOT DETECTED
FLUBV RNA ISLT QL NAA+PROBE: NOT DETECTED
GFR SERPL CREATININE-BSD FRML MDRD: 53 ML/MIN/1.73
GLOBULIN UR ELPH-MCNC: 3.7 GM/DL
GLUCOSE SERPL-MCNC: 137 MG/DL (ref 65–99)
GLUCOSE UR STRIP-MCNC: NEGATIVE MG/DL
HCT VFR BLD AUTO: 50.2 % (ref 34–46.6)
HGB BLD-MCNC: 16.8 G/DL (ref 12–15.9)
HGB UR QL STRIP.AUTO: NEGATIVE
HOLD SPECIMEN: NORMAL
HYALINE CASTS UR QL AUTO: ABNORMAL /LPF
IMM GRANULOCYTES # BLD AUTO: 0.05 10*3/MM3 (ref 0–0.05)
IMM GRANULOCYTES NFR BLD AUTO: 0.4 % (ref 0–0.5)
INR PPP: 1.57 (ref 0.8–1.2)
KETONES UR QL STRIP: ABNORMAL
LEUKOCYTE ESTERASE UR QL STRIP.AUTO: NEGATIVE
LIPASE SERPL-CCNC: 30 U/L (ref 13–60)
LYMPHOCYTES # BLD AUTO: 0.9 10*3/MM3 (ref 0.7–3.1)
LYMPHOCYTES NFR BLD AUTO: 8 % (ref 19.6–45.3)
MCH RBC QN AUTO: 28 PG (ref 26.6–33)
MCHC RBC AUTO-ENTMCNC: 33.5 G/DL (ref 31.5–35.7)
MCV RBC AUTO: 83.5 FL (ref 79–97)
MONOCYTES # BLD AUTO: 0.61 10*3/MM3 (ref 0.1–0.9)
MONOCYTES NFR BLD AUTO: 5.5 % (ref 5–12)
NEUTROPHILS NFR BLD AUTO: 85.6 % (ref 42.7–76)
NEUTROPHILS NFR BLD AUTO: 9.58 10*3/MM3 (ref 1.7–7)
NITRITE UR QL STRIP: NEGATIVE
NRBC BLD AUTO-RTO: 0 /100 WBC (ref 0–0.2)
PH UR STRIP.AUTO: 6 [PH] (ref 5–9)
PLATELET # BLD AUTO: 98 10*3/MM3 (ref 140–450)
PMV BLD AUTO: 11 FL (ref 6–12)
POTASSIUM SERPL-SCNC: 3.8 MMOL/L (ref 3.5–5.2)
PROT SERPL-MCNC: 8.1 G/DL (ref 6–8.5)
PROT UR QL STRIP: ABNORMAL
PROTHROMBIN TIME: 18.5 SECONDS (ref 11.1–15.3)
RBC # BLD AUTO: 6.01 10*6/MM3 (ref 3.77–5.28)
RBC # UR: ABNORMAL /HPF
REF LAB TEST METHOD: ABNORMAL
SARS-COV-2 RNA PNL SPEC NAA+PROBE: NOT DETECTED
SMALL PLATELETS BLD QL SMEAR: NORMAL
SODIUM SERPL-SCNC: 138 MMOL/L (ref 136–145)
SP GR UR STRIP: 1.07 (ref 1–1.03)
SQUAMOUS #/AREA URNS HPF: ABNORMAL /HPF
TROPONIN T SERPL-MCNC: <0.01 NG/ML (ref 0–0.03)
TROPONIN T SERPL-MCNC: <0.01 NG/ML (ref 0–0.03)
UROBILINOGEN UR QL STRIP: ABNORMAL
WBC # BLD AUTO: 11.19 10*3/MM3 (ref 3.4–10.8)
WBC MORPH BLD: NORMAL
WBC UR QL AUTO: ABNORMAL /HPF

## 2021-09-15 PROCEDURE — 93010 ELECTROCARDIOGRAM REPORT: CPT | Performed by: INTERNAL MEDICINE

## 2021-09-15 PROCEDURE — 87636 SARSCOV2 & INF A&B AMP PRB: CPT | Performed by: EMERGENCY MEDICINE

## 2021-09-15 PROCEDURE — 70450 CT HEAD/BRAIN W/O DYE: CPT

## 2021-09-15 PROCEDURE — 25010000002 MORPHINE PER 10 MG: Performed by: EMERGENCY MEDICINE

## 2021-09-15 PROCEDURE — 85025 COMPLETE CBC W/AUTO DIFF WBC: CPT | Performed by: EMERGENCY MEDICINE

## 2021-09-15 PROCEDURE — 25010000002 IOPAMIDOL 61 % SOLUTION: Performed by: EMERGENCY MEDICINE

## 2021-09-15 PROCEDURE — 36415 COLL VENOUS BLD VENIPUNCTURE: CPT | Performed by: INTERNAL MEDICINE

## 2021-09-15 PROCEDURE — 80053 COMPREHEN METABOLIC PANEL: CPT | Performed by: EMERGENCY MEDICINE

## 2021-09-15 PROCEDURE — 85610 PROTHROMBIN TIME: CPT | Performed by: INTERNAL MEDICINE

## 2021-09-15 PROCEDURE — 93005 ELECTROCARDIOGRAM TRACING: CPT | Performed by: EMERGENCY MEDICINE

## 2021-09-15 PROCEDURE — 82150 ASSAY OF AMYLASE: CPT | Performed by: EMERGENCY MEDICINE

## 2021-09-15 PROCEDURE — 25010000002 HYDROMORPHONE 1 MG/ML SOLUTION: Performed by: EMERGENCY MEDICINE

## 2021-09-15 PROCEDURE — 25010000002 MORPHINE PER 10 MG: Performed by: INTERNAL MEDICINE

## 2021-09-15 PROCEDURE — G0378 HOSPITAL OBSERVATION PER HR: HCPCS

## 2021-09-15 PROCEDURE — 71045 X-RAY EXAM CHEST 1 VIEW: CPT

## 2021-09-15 PROCEDURE — 25010000002 ONDANSETRON PER 1 MG: Performed by: EMERGENCY MEDICINE

## 2021-09-15 PROCEDURE — 25010000002 METOCLOPRAMIDE PER 10 MG: Performed by: EMERGENCY MEDICINE

## 2021-09-15 PROCEDURE — 99285 EMERGENCY DEPT VISIT HI MDM: CPT

## 2021-09-15 PROCEDURE — 85007 BL SMEAR W/DIFF WBC COUNT: CPT | Performed by: EMERGENCY MEDICINE

## 2021-09-15 PROCEDURE — 84484 ASSAY OF TROPONIN QUANT: CPT | Performed by: INTERNAL MEDICINE

## 2021-09-15 PROCEDURE — 74177 CT ABD & PELVIS W/CONTRAST: CPT

## 2021-09-15 PROCEDURE — 81001 URINALYSIS AUTO W/SCOPE: CPT | Performed by: INTERNAL MEDICINE

## 2021-09-15 PROCEDURE — 99215 OFFICE O/P EST HI 40 MIN: CPT | Performed by: PHYSICIAN ASSISTANT

## 2021-09-15 PROCEDURE — 83690 ASSAY OF LIPASE: CPT | Performed by: EMERGENCY MEDICINE

## 2021-09-15 PROCEDURE — 84484 ASSAY OF TROPONIN QUANT: CPT | Performed by: EMERGENCY MEDICINE

## 2021-09-15 RX ORDER — PANTOPRAZOLE SODIUM 40 MG/10ML
40 INJECTION, POWDER, LYOPHILIZED, FOR SOLUTION INTRAVENOUS ONCE
Status: COMPLETED | OUTPATIENT
Start: 2021-09-15 | End: 2021-09-15

## 2021-09-15 RX ORDER — SODIUM CHLORIDE 0.9 % (FLUSH) 0.9 %
10 SYRINGE (ML) INJECTION AS NEEDED
Status: DISCONTINUED | OUTPATIENT
Start: 2021-09-15 | End: 2021-09-24 | Stop reason: HOSPADM

## 2021-09-15 RX ORDER — METOCLOPRAMIDE HYDROCHLORIDE 5 MG/ML
10 INJECTION INTRAMUSCULAR; INTRAVENOUS ONCE
Status: COMPLETED | OUTPATIENT
Start: 2021-09-15 | End: 2021-09-15

## 2021-09-15 RX ORDER — ACETAMINOPHEN 160 MG/5ML
650 SOLUTION ORAL EVERY 4 HOURS PRN
Status: DISCONTINUED | OUTPATIENT
Start: 2021-09-15 | End: 2021-09-24 | Stop reason: HOSPADM

## 2021-09-15 RX ORDER — ONDANSETRON 2 MG/ML
4 INJECTION INTRAMUSCULAR; INTRAVENOUS ONCE
Status: COMPLETED | OUTPATIENT
Start: 2021-09-15 | End: 2021-09-15

## 2021-09-15 RX ORDER — ACETAMINOPHEN 650 MG/1
650 SUPPOSITORY RECTAL EVERY 4 HOURS PRN
Status: DISCONTINUED | OUTPATIENT
Start: 2021-09-15 | End: 2021-09-24 | Stop reason: HOSPADM

## 2021-09-15 RX ORDER — METOCLOPRAMIDE HYDROCHLORIDE 5 MG/ML
10 INJECTION INTRAMUSCULAR; INTRAVENOUS EVERY 6 HOURS PRN
Status: DISCONTINUED | OUTPATIENT
Start: 2021-09-15 | End: 2021-09-18

## 2021-09-15 RX ORDER — LABETALOL HYDROCHLORIDE 5 MG/ML
20 INJECTION, SOLUTION INTRAVENOUS ONCE
Status: COMPLETED | OUTPATIENT
Start: 2021-09-15 | End: 2021-09-15

## 2021-09-15 RX ORDER — SODIUM CHLORIDE 9 MG/ML
100 INJECTION, SOLUTION INTRAVENOUS CONTINUOUS
Status: DISCONTINUED | OUTPATIENT
Start: 2021-09-15 | End: 2021-09-18

## 2021-09-15 RX ORDER — DEXTROSE AND SODIUM CHLORIDE 5; .45 G/100ML; G/100ML
30 INJECTION, SOLUTION INTRAVENOUS CONTINUOUS PRN
Status: CANCELLED | OUTPATIENT
Start: 2021-09-15

## 2021-09-15 RX ORDER — METOPROLOL TARTRATE 5 MG/5ML
5 INJECTION INTRAVENOUS EVERY 8 HOURS
Status: DISCONTINUED | OUTPATIENT
Start: 2021-09-15 | End: 2021-09-24 | Stop reason: HOSPADM

## 2021-09-15 RX ORDER — PANTOPRAZOLE SODIUM 40 MG/10ML
40 INJECTION, POWDER, LYOPHILIZED, FOR SOLUTION INTRAVENOUS
Status: DISCONTINUED | OUTPATIENT
Start: 2021-09-16 | End: 2021-09-17

## 2021-09-15 RX ORDER — ACETAMINOPHEN 325 MG/1
650 TABLET ORAL EVERY 4 HOURS PRN
Status: DISCONTINUED | OUTPATIENT
Start: 2021-09-15 | End: 2021-09-24 | Stop reason: HOSPADM

## 2021-09-15 RX ORDER — HYDRALAZINE HYDROCHLORIDE 20 MG/ML
10 INJECTION INTRAMUSCULAR; INTRAVENOUS EVERY 6 HOURS PRN
Status: DISCONTINUED | OUTPATIENT
Start: 2021-09-15 | End: 2021-09-24 | Stop reason: HOSPADM

## 2021-09-15 RX ORDER — MORPHINE SULFATE 2 MG/ML
1 INJECTION, SOLUTION INTRAMUSCULAR; INTRAVENOUS EVERY 6 HOURS PRN
Status: DISCONTINUED | OUTPATIENT
Start: 2021-09-15 | End: 2021-09-17

## 2021-09-15 RX ORDER — SODIUM CHLORIDE 0.9 % (FLUSH) 0.9 %
10 SYRINGE (ML) INJECTION EVERY 12 HOURS SCHEDULED
Status: DISCONTINUED | OUTPATIENT
Start: 2021-09-15 | End: 2021-09-24 | Stop reason: HOSPADM

## 2021-09-15 RX ORDER — ONDANSETRON 4 MG/1
4 TABLET, FILM COATED ORAL EVERY 6 HOURS PRN
Status: DISCONTINUED | OUTPATIENT
Start: 2021-09-15 | End: 2021-09-24 | Stop reason: HOSPADM

## 2021-09-15 RX ORDER — ONDANSETRON 2 MG/ML
4 INJECTION INTRAMUSCULAR; INTRAVENOUS EVERY 6 HOURS PRN
Status: DISCONTINUED | OUTPATIENT
Start: 2021-09-15 | End: 2021-09-24 | Stop reason: HOSPADM

## 2021-09-15 RX ADMIN — LABETALOL HYDROCHLORIDE 20 MG: 5 INJECTION, SOLUTION INTRAVENOUS at 17:01

## 2021-09-15 RX ADMIN — ONDANSETRON 4 MG: 2 INJECTION INTRAMUSCULAR; INTRAVENOUS at 15:50

## 2021-09-15 RX ADMIN — SODIUM CHLORIDE 125 ML/HR: 9 INJECTION, SOLUTION INTRAVENOUS at 23:36

## 2021-09-15 RX ADMIN — MORPHINE SULFATE 1 MG: 2 INJECTION, SOLUTION INTRAMUSCULAR; INTRAVENOUS at 23:45

## 2021-09-15 RX ADMIN — METOCLOPRAMIDE 10 MG: 5 INJECTION, SOLUTION INTRAMUSCULAR; INTRAVENOUS at 16:07

## 2021-09-15 RX ADMIN — MORPHINE SULFATE 4 MG: 4 INJECTION INTRAVENOUS at 18:01

## 2021-09-15 RX ADMIN — ONDANSETRON HYDROCHLORIDE 4 MG: 2 INJECTION, SOLUTION INTRAMUSCULAR; INTRAVENOUS at 18:01

## 2021-09-15 RX ADMIN — HYDROMORPHONE HYDROCHLORIDE 1 MG: 1 INJECTION, SOLUTION INTRAMUSCULAR; INTRAVENOUS; SUBCUTANEOUS at 19:14

## 2021-09-15 RX ADMIN — SODIUM CHLORIDE 1000 ML: 900 INJECTION, SOLUTION INTRAVENOUS at 15:57

## 2021-09-15 RX ADMIN — METOPROLOL TARTRATE 5 MG: 5 INJECTION INTRAVENOUS at 21:45

## 2021-09-15 RX ADMIN — SODIUM CHLORIDE, PRESERVATIVE FREE 10 ML: 5 INJECTION INTRAVENOUS at 23:37

## 2021-09-15 RX ADMIN — IOPAMIDOL 90 ML: 612 INJECTION, SOLUTION INTRAVENOUS at 17:27

## 2021-09-15 RX ADMIN — SODIUM CHLORIDE 125 ML/HR: 9 INJECTION, SOLUTION INTRAVENOUS at 19:13

## 2021-09-15 RX ADMIN — PANTOPRAZOLE SODIUM 40 MG: 40 INJECTION, POWDER, FOR SOLUTION INTRAVENOUS at 15:52

## 2021-09-15 NOTE — PATIENT INSTRUCTIONS
MyPlate from USDA    MyPlate is an outline of a general healthy diet based on the 2010 Dietary Guidelines for Americans, from the U.S. Department of Agriculture (USDA). It sets guidelines for how much food you should eat from each food group based on your age, sex, and level of physical activity.  What are tips for following MyPlate?  To follow MyPlate recommendations:  · Eat a wide variety of fruits and vegetables, grains, and protein foods.  · Serve smaller portions and eat less food throughout the day.  · Limit portion sizes to avoid overeating.  · Enjoy your food.  · Get at least 150 minutes of exercise every week. This is about 30 minutes each day, 5 or more days per week.  It can be difficult to have every meal look like MyPlate. Think about MyPlate as eating guidelines for an entire day, rather than each individual meal.  Fruits and vegetables  · Make half of your plate fruits and vegetables.  · Eat many different colors of fruits and vegetables each day.  · For a 2,000 calorie daily food plan, eat:  ? 2½ cups of vegetables every day.  ? 2 cups of fruit every day.  · 1 cup is equal to:  ? 1 cup raw or cooked vegetables.  ? 1 cup raw fruit.  ? 1 medium-sized orange, apple, or banana.  ? 1 cup 100% fruit or vegetable juice.  ? 2 cups raw leafy greens, such as lettuce, spinach, or kale.  ? ½ cup dried fruit.  Grains  · One fourth of your plate should be grains.  · Make at least half of the grains you eat each day whole grains.  · For a 2,000 calorie daily food plan, eat 6 oz of grains every day.  · 1 oz is equal to:  ? 1 slice bread.  ? 1 cup cereal.  ? ½ cup cooked rice, cereal, or pasta.  Protein  · One fourth of your plate should be protein.  · Eat a wide variety of protein foods, including meat, poultry, fish, eggs, beans, nuts, and tofu.  · For a 2,000 calorie daily food plan, eat 5½ oz of protein every day.  · 1 oz is equal to:  ? 1 oz meat, poultry, or fish.  ? ¼ cup cooked beans.  ? 1 egg.  ? ½ oz nuts  or seeds.  ? 1 Tbsp peanut butter.  Dairy  · Drink fat-free or low-fat (1%) milk.  · Eat or drink dairy as a side to meals.  · For a 2,000 calorie daily food plan, eat or drink 3 cups of dairy every day.  · 1 cup is equal to:  ? 1 cup milk, yogurt, cottage cheese, or soy milk (soy beverage).  ? 2 oz processed cheese.  ? 1½ oz natural cheese.  Fats, oils, salt, and sugars  · Only small amounts of oils are recommended.  · Avoid foods that are high in calories and low in nutritional value (empty calories), like foods high in fat or added sugars.  · Choose foods that are low in salt (sodium). Choose foods that have less than 140 milligrams (mg) of sodium per serving.  · Drink water instead of sugary drinks. Drink enough water each day to keep your urine pale yellow.  Where to find support  · Work with your health care provider or a nutrition specialist (dietitian) to develop a customized eating plan that is right for you.  · Download an randi (mobile application) to help you track your daily food intake.  Where to find more information  · Go to ChooseMyPlate.gov for more information.  Summary  · MyPlate is a general guideline for healthy eating from the USDA. It is based on the 2010 Dietary Guidelines for Americans.  · In general, fruits and vegetables should take up ½ of your plate, grains should take up ¼ of your plate, and protein should take up ¼ of your plate.  This information is not intended to replace advice given to you by your health care provider. Make sure you discuss any questions you have with your health care provider.  Document Revised: 05/21/2020 Document Reviewed: 03/19/2018  Elsevier Patient Education © 2021 [employer] Inc.  BMI for Adults  What is BMI?  Body mass index (BMI) is a number that is calculated from a person's weight and height. BMI can help estimate how much of a person's weight is composed of fat. BMI does not measure body fat directly. Rather, it is an alternative to procedures that  "directly measure body fat, which can be difficult and expensive.  BMI can help identify people who may be at higher risk for certain medical problems.  What are BMI measurements used for?  BMI is used as a screening tool to identify possible weight problems. It helps determine whether a person is obese, overweight, a healthy weight, or underweight.  BMI is useful for:  · Identifying a weight problem that may be related to a medical condition or may increase the risk for medical problems.  · Promoting changes, such as changes in diet and exercise, to help reach a healthy weight. BMI screening can be repeated to see if these changes are working.  How is BMI calculated?  BMI involves measuring your weight in relation to your height. Both height and weight are measured, and the BMI is calculated from those numbers. This can be done either in English (U[place] or metric measurements. Note that charts and online BMI calculators are available to help you find your BMI quickly and easily without having to do these calculations yourself.  To calculate your BMI in English (U.S[place] measurements:    1. Measure your weight in pounds (lb).  2. Multiply the number of pounds by 703.  ? For example, for a person who weighs 180 lb, multiply that number by 703, which equals 126,540.  3. Measure your height in inches. Then multiply that number by itself to get a measurement called \"inches squared.\"  ? For example, for a person who is 70 inches tall, the \"inches squared\" measurement is 70 inches x 70 inches, which equals 4,900 inches squared.  4. Divide the total from step 2 (number of lb x 703) by the total from step 3 (inches squared): 126,540 ÷ 4,900 = 25.8. This is your BMI.    To calculate your BMI in metric measurements:  1. Measure your weight in kilograms (kg).  2. Measure your height in meters (m). Then multiply that number by itself to get a measurement called \"meters squared.\"  ? For example, for a person who is 1.75 m tall, the " "\"meters squared\" measurement is 1.75 m x 1.75 m, which is equal to 3.1 meters squared.  3. Divide the number of kilograms (your weight) by the meters squared number. In this example: 70 ÷ 3.1 = 22.6. This is your BMI.  What do the results mean?  BMI charts are used to identify whether you are underweight, normal weight, overweight, or obese. The following guidelines will be used:  · Underweight: BMI less than 18.5.  · Normal weight: BMI between 18.5 and 24.9.  · Overweight: BMI between 25 and 29.9.  · Obese: BMI of 30 or above.  Keep these notes in mind:  · Weight includes both fat and muscle, so someone with a muscular build, such as an athlete, may have a BMI that is higher than 24.9. In cases like these, BMI is not an accurate measure of body fat.  · To determine if excess body fat is the cause of a BMI of 25 or higher, further assessments may need to be done by a health care provider.  · BMI is usually interpreted in the same way for men and women.  Where to find more information  For more information about BMI, including tools to quickly calculate your BMI, go to these websites:  · Centers for Disease Control and Prevention: www.cdc.gov  · American Heart Association: www.heart.org  · National Heart, Lung, and Blood [place]: www.nhlbi.nih.gov  Summary  · Body mass index (BMI) is a number that is calculated from a person's weight and height.  · BMI may help estimate how much of a person's weight is composed of fat. BMI can help identify those who may be at higher risk for certain medical problems.  · BMI can be measured using English measurements or metric measurements.  · BMI charts are used to identify whether you are underweight, normal weight, overweight, or obese.  This information is not intended to replace advice given to you by your health care provider. Make sure you discuss any questions you have with your health care provider.  Document Revised: 09/09/2020 Document Reviewed: 07/17/2020  [employer] " Patient Education © 2021 Elsevier Inc.

## 2021-09-15 NOTE — PROGRESS NOTES
Chief Complaint   Patient presents with   • Abdominal Pain   • Nausea   • Vomiting       ENDO PROCEDURE ORDERED: EGd/small bowel enteroscopy, d/w Dr. Ivan--[facility] IMAGING    Subjective    Amira Shannon is a 60 y.o. female. she is here today for follow-up.    History of Present Illness    Patient presents with complaints of nausea, vomiting and abdominal pain. She is accompanied by her . Last seen on 08/20/2021. She does have known cirrhosis, F4/S1/N1. She states she has not been able to take any of her medications for 2 days. She is supposed to be on Prilosec, Protonix, Zofran and Reglan. She states she has been vomiting a lot of bile and yellow mucus. She has had severe umbilical pain for the past week. She states she tried an enema last night. She does not think the Bentyl and Relistor have been helping her. Her weight is down 29 pounds since her last visit. Last EGD/colonoscopy on 03/22/2021 showed gastritis and hemorrhoids.     Patient went to the ER on 09/13/2021. CT scan of abdomen and pelvis with contrast showed a nodular liver, varices, post cholecystectomy, enlarged spleen at 18.5 cm with degeneration of the spine. Negative urinalysis, lipase. CMP showed glucose 122, sodium 133, AST 50, otherwise normal. CBC showed platelets 73,000. I did review the images with the patient in the room. By my review it looks like she has a loop of small bowel in her mid abdomen which coincides with her worst pain.     Patient went to the ER again on 09/14/2021. KUB was read as contrast in the bladder, post cholecystectomy. Again, I think this loop of bowel is dilated on that image. CBC showed platelets 60,000, otherwise fairly normal. Normal magnesium, lipase. CMP showed glucose 126, total bilirubin 1.4, AST 41, otherwise normal.     ASSESSMENT/PLAN:  Patient with severe nausea, vomiting and abdominal pain. She has had multiple abdominal surgeries, prior bowel obstruction. I think she may have significant  "abnormality of her proximal small bowel. I did review with Dr. Ivan. The patient wanted to be direct admitted. I told her we could not do that, especially with high rates of COVID in the hospital. I think she does have dilated small bowel and recommended EGD with small bowel enteroscopy to try to see if we can see this area. Would consider ischemia or other etiologies. I did recommend she try going to the emergency room again but cannot be certain they will admit her. The patient was agreeable.     NOTE: The patient did go to the emergency room and was admitted to the hospital.      The following portions of the patient's history were reviewed and updated as appropriate:   Past Medical History:   Diagnosis Date   • Acid reflux    • Altered bowel function    • Arthropathy of lumbar facet joint    • Bleeding disorder (CMS/HCC)    • C. difficile diarrhea 2015   • Chronic idiopathic constipation    • Colonic inertia    • Constipation    • Corns and callus    • Depression    • Disease related peripheral neuropathy    • Epigastric pain    • Fatty liver    • Hammer toe    • Headache    • Hiatal hernia    • History of transfusion    • Hyperlipidemia    • Knee pain    • Localized, primary osteoarthritis of the ankle and foot     Localized, primary osteoarthritis of the ankle and/or foot   • Mendoza's metatarsalgia     Mendoza's metatarsalgia - 2nd interspace on right   • Nausea and vomiting    • Neuralgia and neuritis     Neuralgia, neuritis, and radiculitis, unspecified   • Neuropathy    • Obstructive sleep apnea     Obstructive sleep apnea (adult) (pediatric)    • CHAI on CPAP     \"C-Pap at night  (unconfirmed)\"   • Osteoarthritis    • Pain in foot     Pain in unspecified foot - sees a podiatrist   • Pain in joint, ankle and foot     Joint pain in ankle and foot      • Pain radiating to back     Pain radiating to lumbar region of back   • Pelvic floor dysfunction    • Plantar fasciitis    • PONV (postoperative nausea and " vomiting)    • Restless leg syndrome    • Secondary hypertension     Secondary hypertension, unspecified   • Sinusitis    • Sleep apnea    • Tongue anomaly     lesion     Past Surgical History:   Procedure Laterality Date   • CARPAL TUNNEL RELEASE Left 2018    Procedure: CARPAL TUNNEL RELEASE - left;  Surgeon: Justus Lewis MD;  Location: [facility] OR;  Service: Orthopedics   • CARPAL TUNNEL RELEASE Right 2019    Procedure: carpal tunnel release right hand with local/monitored anesthesia care;  Surgeon: Justus Lewis MD;  Location: [facility] OR;  Service: Orthopedics   •  SECTION     • CHOLECYSTECTOMY     • COLONOSCOPY  2013   • COLONOSCOPY N/A 10/17/2018    Procedure: COLONOSCOPY;  Surgeon: Russell Del Rio MD;  Location: [facility] ENDOSCOPY;  Service: Gastroenterology   • COLONOSCOPY N/A 3/22/2021    Procedure: COLONOSCOPY;  Surgeon: Russell Del Rio MD;  Location: [facility] ENDOSCOPY;  Service: Gastroenterology;  Laterality: N/A;   • DIRECT LARYNGOSCOPY, ESOPHAGOSCOPY, BRONCHOSCOPY N/A 2017    Procedure: DIRECT LARYNGOSCOPY AND;  Surgeon: Live Bolton MD;  Location: [facility] OR;  Service:    • ENDOSCOPY  2013    Colon endoscopy 90186 (1) - Internal & external hemorrhoids found. Stool found.   • ENDOSCOPY  2013    EGD w/ tube 88370 (1) - Normal esophagus. Gastritis in stomach. Biopsy taken. Normal dudoenum. Biopsy taken.   • ENDOSCOPY N/A 10/17/2018    Procedure: ESOPHAGOGASTRODUODENOSCOPY--eval varices;  Surgeon: Russell Del Rio MD;  Location: [facility] ENDOSCOPY;  Service: Gastroenterology   • ENDOSCOPY N/A 3/22/2021    Procedure: ESOPHAGOGASTRODUODENOSCOPYURGNET;  Surgeon: Russell Del Rio MD;  Location: [facility] ENDOSCOPY;  Service: Gastroenterology;  Laterality: N/A;   • ENDOSCOPY N/A 2021    Procedure: ESOPHAGOGASTRODUODENOSCOPY;  Surgeon: Basia Saunders MD;  Location: [facility] ENDOSCOPY;  Service: Gastroenterology;  Laterality: N/A;   • FOOT SURGERY   2013    Foot/toes surgery procedure (1) - Arthroplasty of toes 4 and 5 of right foot.   • HERNIA REPAIR     • HERNIA REPAIR      hital   • HYSTERECTOMY     • LIVER BIOPSY     • NERVE BLOCK  2016    Injection for nerve block (1) - Lumbar medial branch block.   • OTHER SURGICAL HISTORY  2012    Inj(s) Tend-Sheath, Ligament, Single  (1) - [facility]    • OTHER SURGICAL HISTORY  2013    Small Joint Injection/Aspiration  (2) - [facility]ICK[facility] ([facility])    • OTHER SURGICAL HISTORY      bowel obstruction x2   • OTHER SURGICAL HISTORY      gland removed from neck   • SUBLINGUAL SALIVARY CYST EXCISION N/A 2017    Procedure: EXCISION OF LEFT  TONGUE LESION WITH CLOSURE;  Surgeon: Live Bolton MD;  Location: [facility];  Service:    • TUBAL ABDOMINAL LIGATION     • UPPER GASTROINTESTINAL ENDOSCOPY  2013   • UPPER GASTROINTESTINAL ENDOSCOPY  10/17/2018   • UPPER GASTROINTESTINAL ENDOSCOPY  2021     Family History   Problem Relation Age of Onset   • Cancer Other    • Diabetes Other    • Heart disease Other    • Hypertension Mother    • Cancer Mother    • Diabetes Mother    • Hypertension Father    • Heart attack Father    • Cancer Father    • Heart disease Father    • Thyroid disease Maternal Aunt    • Cancer Maternal Aunt    • No Known Problems Sister    • No Known Problems Brother    • Cancer Maternal Grandmother    • No Known Problems Sister      OB History        1    Para   1    Term                AB        Living   1       SAB        TAB        Ectopic        Molar        Multiple        Live Births                  Allergies   Allergen Reactions   • Tape Rash     Patient has rash/blishers on site after tape   • Other      Pt states that taking steroids either in pill or injection form make her have blisters in her mouth and she feels like she is on fire on the inside/ has hx of c diff and possible MRSA     • Corticosteroids Rash    • Kenalog  [Triamcinolone Acetonide] Rash   • Sulfa Antibiotics Rash     Sulfa (Sulfonamide Antibiotics)     Social History     Socioeconomic History   • Marital status:      Spouse name: Not on file   • Number of children: Not on file   • Years of education: Not on file   • Highest education level: Not on file   Tobacco Use   • Smoking status: Former Smoker     Packs/day: 2.00     Years: 15.00     Pack years: 30.00     Types: Cigarettes     Quit date:      Years since quittin.7   • Smokeless tobacco: Never Used   Vaping Use   • Vaping Use: Never used   Substance and Sexual Activity   • Alcohol use: No   • Drug use: Never   • Sexual activity: Defer     Birth control/protection: Surgical     Comment: Marital Status: .  Hysterectomy.     Current Medications:  Prior to Admission medications    Medication Sig Start Date End Date Taking? Authorizing Provider   cetirizine (zyrTEC) 10 MG tablet Take 1 tablet by mouth Daily. 17  Yes Promise Tovar MD   cloNIDine (CATAPRES) 0.1 MG tablet Take 1 tablet by mouth 2 (Two) Times a Day. PRN systolic BP >160. 21  Yes Nelson Kim MD   CRANBERRY PO Take 1 capsule by mouth 2 (two) times a day.   Yes ProviderPromise MD   cyclobenzaprine (FLEXERIL) 10 MG tablet Take 10 mg by mouth 3 (Three) Times a Day As Needed for Muscle Spasms.   Yes ProviderPromise MD   diclofenac (VOLTAREN) 75 MG EC tablet Take 1 tablet by mouth Daily. 21  Yes Colleen Meadows APRN   dicyclomine (BENTYL) 20 MG tablet Take 1 tablet by mouth Every 6 (Six) Hours As Needed (Abdominal pain). 21  Yes Karen Villarreal MD   docusate sodium (COLACE) 100 MG capsule Take 100 mg by mouth Daily.   Yes ProviderPromise MD   escitalopram (LEXAPRO) 10 MG tablet Take 10 mg by mouth Daily. 3/25/21  Yes Promise Tovar MD   folic acid (FOLVITE) 1 MG tablet Take 1 tablet by mouth on Monday, Wednesday and 21  Yes Lokesh Goodwin MD   furosemide  (Lasix) 20 MG tablet Tablet 1 tab(s) Oral once a day 6/2/21  Yes Promise Tovar MD   gabapentin (NEURONTIN) 800 MG tablet Take 800 mg by mouth 4 (Four) Times a Day.   Yes Promise Tovar MD   metoclopramide (Reglan) 5 MG tablet Take 1 tablet by mouth 3 (Three) Times a Day Before Meals.  Patient taking differently: Take 5 mg by mouth 2 (two) times a day. 3/29/21  Yes Blaine Perales PA-C   omeprazole (priLOSEC) 40 MG capsule  6/23/21  Yes Promise Tovar MD   ondansetron (ZOFRAN) 8 MG tablet Take 1 tablet by mouth Every 8 (Eight) Hours As Needed for Nausea or Vomiting. 1/26/21  Yes Blaine Perales PA-C   ondansetron ODT (ZOFRAN-ODT) 4 MG disintegrating tablet DISSOLVE 1 TABLET IN MOUTH EVERY 8 HOURS AS NEEDED FOR NAUSEA AND VOMITING 8/24/21  Yes Blaine Perales PA-C   oxybutynin (DITROPAN) 5 MG tablet Take 5 mg by mouth Daily. 8/2/21  Yes Promise Tovar MD   oxyCODONE (ROXICODONE) 10 MG tablet TAKE 1 TABLET BY MOUTH 4 TIMES DAILY AS NEEDED FOR PAIN 7/25/21  Yes Promise Tovar MD   pantoprazole (PROTONIX) 40 MG EC tablet Take 1 tablet by mouth once daily 8/24/21  Yes Blaine Perales PA-C   polyethylene glycol (MiraLax) 17 GM/SCOOP powder Take 17 g by mouth 2 (Two) Times a Day. 5/24/21  Yes Segundo Molina MD   potassium chloride 10 MEQ CR tablet  6/23/21  Yes ProviderPromise MD   Relistor 150 MG tablet Take 3 tablets by mouth once daily 8/24/21  Yes Blaine Perales PA-C   Sodium Phosphates (fleet enema) 7-19 GM/118ML enema Insert 1 enema into the rectum As Needed.   Yes ProviderPromise MD   spironolactone (ALDACTONE) 25 MG tablet TAKE 1 TABLET BY MOUTH ONCE DAILY 6/11/19  Yes Maureen Cardoso APRN   traZODone (DESYREL) 100 MG tablet Take 1 tablet by mouth Every Night. 6/11/21  Yes Blaine Perales PA-C   vitamin B-12 (CYANOCOBALAMIN) 1000 MCG tablet Take 1 tablet by mouth on Monday, Wednesday and Friday 6/25/21  Yes Lokesh Goodwin MD   nitrofurantoin  "(MACRODANTIN) 100 MG capsule Take 100 mg by mouth 2 (Two) Times a Day. 7/19/21   Provider, Promise, MD     Review of Systems  Review of Systems       Objective    BP (!) 161/101   Pulse 83   Ht 162.6 cm (64\")   Wt 87.5 kg (193 lb)   LMP  (LMP Unknown)   BMI 33.13 kg/m²   Physical Exam  Vitals and nursing note reviewed.   Constitutional:       General: She is in acute distress.      Appearance: She is well-developed. She is ill-appearing.   HENT:      Head: Normocephalic and atraumatic.   Eyes:      Pupils: Pupils are equal, round, and reactive to light.   Cardiovascular:      Rate and Rhythm: Normal rate and regular rhythm.      Heart sounds: Normal heart sounds.   Pulmonary:      Effort: Pulmonary effort is normal.      Breath sounds: Normal breath sounds.   Abdominal:      General: Bowel sounds are normal. There is no distension or abdominal bruit.      Palpations: Abdomen is soft. Abdomen is not rigid. There is no shifting dullness or mass.      Tenderness: There is abdominal tenderness. There is no guarding or rebound.      Hernia: No hernia is present. There is no hernia in the ventral area.   Musculoskeletal:         General: Normal range of motion.      Cervical back: Normal range of motion.   Skin:     General: Skin is warm and dry.   Neurological:      Mental Status: She is alert and oriented to person, place, and time.   Psychiatric:         Behavior: Behavior normal.         Thought Content: Thought content normal.         Judgment: Judgment normal.       Assessment/Plan      1. Nausea and vomiting, intractability of vomiting not specified, unspecified vomiting type    2. Weight loss, abnormal    3. Generalized abdominal pain    4. End stage liver disease (CMS/HCC)    5. MUSA (nonalcoholic steatohepatitis)    6. Chronic idiopathic constipation    .   Diagnoses and all orders for this visit:    1. Nausea and vomiting, intractability of vomiting not specified, unspecified vomiting type (Primary)  -  "    Cancel: Case Request; Standing  -     Cancel: Case Request  -     Cancel: Case Request; Standing  -     Cancel: Case Request    2. Weight loss, abnormal  -     Cancel: Case Request; Standing  -     Cancel: Case Request  -     Cancel: Case Request; Standing  -     Cancel: Case Request    3. Generalized abdominal pain  -     Cancel: Case Request; Standing  -     Cancel: Case Request  -     Cancel: Case Request; Standing  -     Cancel: Case Request    4. End stage liver disease (CMS/HCC)    5. MUSA (nonalcoholic steatohepatitis)    6. Chronic idiopathic constipation    Other orders  -     Follow Anesthesia Guidelines / Standing Orders; Future  -     Obtain Informed Consent; Future  -     Cancel: Obtain Informed Consent; Standing  -     Cancel: POC Glucose Once; Standing  -     Cancel: dextrose 5 % and sodium chloride 0.45 % infusion  -     Follow Anesthesia Guidelines / Standing Orders; Future  -     Obtain Informed Consent; Future  -     Cancel: Obtain Informed Consent; Standing  -     Cancel: POC Glucose Once; Standing  -     Cancel: dextrose 5 % and sodium chloride 0.45 % infusion        Orders placed during this encounter include:  Orders Placed This Encounter   Procedures   • Follow Anesthesia Guidelines / Standing Orders     Standing Status:   Future   • Obtain Informed Consent     Standing Status:   Future     Order Specific Question:   Informed Consent Given For     Answer:   ESOPHAGOGASTRODUODENOSCOPY   • Follow Anesthesia Guidelines / Standing Orders     Standing Status:   Future   • Obtain Informed Consent     Standing Status:   Future     Order Specific Question:   Informed Consent Given For     Answer:   ESOPHAGOGASTRODUODENOSCOPY       Medications prescribed:  No orders of the defined types were placed in this encounter.      Requested Prescriptions      No prescriptions requested or ordered in this encounter       Review and/or summary of lab tests, radiology, procedures, medications. Review and  summary of old records and obtaining of history. The risks and benefits of my recommendations, as well as other treatment options were discussed with the patient today. Questions were answered.    Follow-up: Return in about 1 month (around 10/15/2021), or if symptoms worsen or fail to improve.     ENTEROSCOPY SMALL BOWEL (N/A)      This document has been electronically signed by Blaine Perales PA-C on September 29, 2021 19:29 CDT      Results for orders placed or performed during the hospital encounter of 09/15/21   [facility] Top - SST   Result Value Ref Range    Extra Tube Hold for add-ons.    Scan Slide    Specimen: Blood   Result Value Ref Range    Anisocytosis Slight/1+ None Seen    WBC Morphology Normal Normal    Platelet Estimate Decreased Normal   Scan Slide    Specimen: Blood   Result Value Ref Range    Anisocytosis Slight/1+ None Seen    WBC Morphology Normal Normal    Platelet Estimate Decreased Normal   Scan Slide    Specimen: Blood   Result Value Ref Range    Anisocytosis Slight/1+ None Seen    WBC Morphology Normal Normal    Platelet Estimate Decreased Normal   Scan Slide    Specimen: Blood   Result Value Ref Range    Anisocytosis Slight/1+ None Seen    WBC Morphology Normal Normal    Platelet Estimate Decreased Normal   COVID-19 and FLU A/B PCR - Swab, Nasopharynx    Specimen: Nasopharynx; Swab   Result Value Ref Range    COVID19 Not Detected Not Detected - Ref. Range    Influenza A PCR Not Detected Not Detected    Influenza B PCR Not Detected Not Detected   Urinalysis, Microscopic Only - Urine, Clean Catch    Specimen: Urine, Clean Catch   Result Value Ref Range    RBC, UA 0-2 (A) None Seen /HPF    WBC, UA Too Numerous to Count (A) None Seen, 0-2, 3-5 /HPF    Bacteria, UA Trace (A) None Seen /HPF    Squamous Epithelial Cells, UA 3-5 (A) None Seen, 0-2 /HPF    Hyaline Casts, UA 3-6 None Seen /LPF    Mucus, UA Large/3+ (A) None Seen, Trace /HPF    Methodology Manual Light Microscopy    Urinalysis,  Microscopic Only - Urine, Clean Catch    Specimen: Urine, Clean Catch   Result Value Ref Range    RBC, UA 3-5 (A) None Seen /HPF    WBC, UA 3-5 None Seen, 0-2, 3-5 /HPF    Bacteria, UA None Seen None Seen /HPF    Squamous Epithelial Cells, UA None Seen None Seen, 0-2 /HPF    Hyaline Casts, UA 7-12 None Seen /LPF    Methodology Automated Microscopy    Urinalysis With Culture If Indicated - Urine, Clean Catch    Specimen: Urine, Clean Catch   Result Value Ref Range    Color, UA Dark Yellow Yellow, Straw, Dark Yellow, Mikala    Appearance, UA Cloudy (A) Clear    pH, UA 6.0 5.0 - 9.0    Specific [place], UA 1.026 1.003 - 1.030    Glucose, UA Negative Negative    Ketones, UA Negative Negative    Bilirubin, UA Small (1+) (A) Negative    Blood, UA Negative Negative    Protein, UA Trace (A) Negative    Leuk Esterase, UA Moderate (2+) (A) Negative    Nitrite, UA Negative Negative    Urobilinogen, UA 1.0 E.U./dL 0.2 - 1.0 E.U./dL   Urinalysis With Microscopic If Indicated (No Culture) - Urine, Clean Catch    Specimen: Urine, Clean Catch   Result Value Ref Range    Color, UA Dark Yellow Yellow, Straw, Dark Yellow, Mikala    Appearance, UA Clear Clear    pH, UA 6.0 5.0 - 9.0    Specific Gravity, UA 1.072 (H) 1.003 - 1.030    Glucose, UA Negative Negative    Ketones, UA Trace (A) Negative    Bilirubin, UA Negative Negative    Blood, UA Negative Negative    Protein,  mg/dL (2+) (A) Negative    Leuk Esterase, UA Negative Negative    Nitrite, UA Negative Negative    Urobilinogen, UA 0.2 E.U./dL 0.2 - 1.0 E.U./dL   CBC Auto Differential    Specimen: Blood   Result Value Ref Range    WBC 11.62 (H) 3.40 - 10.80 10*3/mm3    RBC 5.40 (H) 3.77 - 5.28 10*6/mm3    Hemoglobin 15.5 12.0 - 15.9 g/dL    Hematocrit 43.9 34.0 - 46.6 %    MCV 81.3 79.0 - 97.0 fL    MCH 28.7 26.6 - 33.0 pg    MCHC 35.3 31.5 - 35.7 g/dL    RDW 15.8 (H) 12.3 - 15.4 %    RDW-SD 44.8 37.0 - 54.0 fl    MPV 10.3 6.0 - 12.0 fL    Platelets 96 (L) 140 - 450 10*3/mm3     Neutrophil % 68.7 42.7 - 76.0 %    Lymphocyte % 19.3 (L) 19.6 - 45.3 %    Monocyte % 8.3 5.0 - 12.0 %    Eosinophil % 2.7 0.3 - 6.2 %    Basophil % 0.4 0.0 - 1.5 %    Immature Grans % 0.6 (H) 0.0 - 0.5 %    Neutrophils, Absolute 7.99 (H) 1.70 - 7.00 10*3/mm3    Lymphocytes, Absolute 2.24 0.70 - 3.10 10*3/mm3    Monocytes, Absolute 0.96 (H) 0.10 - 0.90 10*3/mm3    Eosinophils, Absolute 0.31 0.00 - 0.40 10*3/mm3    Basophils, Absolute 0.05 0.00 - 0.20 10*3/mm3    Immature Grans, Absolute 0.07 (H) 0.00 - 0.05 10*3/mm3    nRBC 0.0 0.0 - 0.2 /100 WBC   CBC Auto Differential    Specimen: Blood   Result Value Ref Range    WBC 7.08 3.40 - 10.80 10*3/mm3    RBC 5.23 3.77 - 5.28 10*6/mm3    Hemoglobin 15.3 12.0 - 15.9 g/dL    Hematocrit 43.0 34.0 - 46.6 %    MCV 82.2 79.0 - 97.0 fL    MCH 29.3 26.6 - 33.0 pg    MCHC 35.6 31.5 - 35.7 g/dL    RDW 15.6 (H) 12.3 - 15.4 %    RDW-SD 45.0 37.0 - 54.0 fl    MPV 11.1 6.0 - 12.0 fL    Platelets 68 (L) 140 - 450 10*3/mm3    Neutrophil % 71.7 42.7 - 76.0 %    Lymphocyte % 18.2 (L) 19.6 - 45.3 %    Monocyte % 6.2 5.0 - 12.0 %    Eosinophil % 3.2 0.3 - 6.2 %    Basophil % 0.3 0.0 - 1.5 %    Immature Grans % 0.4 0.0 - 0.5 %    Neutrophils, Absolute 5.07 1.70 - 7.00 10*3/mm3    Lymphocytes, Absolute 1.29 0.70 - 3.10 10*3/mm3    Monocytes, Absolute 0.44 0.10 - 0.90 10*3/mm3    Eosinophils, Absolute 0.23 0.00 - 0.40 10*3/mm3    Basophils, Absolute 0.02 0.00 - 0.20 10*3/mm3    Immature Grans, Absolute 0.03 0.00 - 0.05 10*3/mm3    nRBC 0.0 0.0 - 0.2 /100 WBC     *Note: Due to a large number of results and/or encounters for the requested time period, some results have not been displayed. A complete set of results can be found in Results Review.

## 2021-09-16 ENCOUNTER — ANESTHESIA EVENT (OUTPATIENT)
Dept: GASTROENTEROLOGY | Facility: HOSPITAL | Age: 61
End: 2021-09-16

## 2021-09-16 ENCOUNTER — ANESTHESIA (OUTPATIENT)
Dept: GASTROENTEROLOGY | Facility: HOSPITAL | Age: 61
End: 2021-09-16

## 2021-09-16 LAB
ANISOCYTOSIS BLD QL: NORMAL
BASOPHILS # BLD AUTO: 0.03 10*3/MM3 (ref 0–0.2)
BASOPHILS NFR BLD AUTO: 0.3 % (ref 0–1.5)
DEPRECATED RDW RBC AUTO: 49.4 FL (ref 37–54)
EOSINOPHIL # BLD AUTO: 0.21 10*3/MM3 (ref 0–0.4)
EOSINOPHIL NFR BLD AUTO: 2.4 % (ref 0.3–6.2)
ERYTHROCYTE [DISTWIDTH] IN BLOOD BY AUTOMATED COUNT: 15.9 % (ref 12.3–15.4)
HCT VFR BLD AUTO: 44.9 % (ref 34–46.6)
HGB BLD-MCNC: 15 G/DL (ref 12–15.9)
IMM GRANULOCYTES # BLD AUTO: 0.06 10*3/MM3 (ref 0–0.05)
IMM GRANULOCYTES NFR BLD AUTO: 0.7 % (ref 0–0.5)
LYMPHOCYTES # BLD AUTO: 1.57 10*3/MM3 (ref 0.7–3.1)
LYMPHOCYTES NFR BLD AUTO: 18.3 % (ref 19.6–45.3)
MCH RBC QN AUTO: 28.7 PG (ref 26.6–33)
MCHC RBC AUTO-ENTMCNC: 33.4 G/DL (ref 31.5–35.7)
MCV RBC AUTO: 85.9 FL (ref 79–97)
MONOCYTES # BLD AUTO: 0.51 10*3/MM3 (ref 0.1–0.9)
MONOCYTES NFR BLD AUTO: 5.9 % (ref 5–12)
NEUTROPHILS NFR BLD AUTO: 6.2 10*3/MM3 (ref 1.7–7)
NEUTROPHILS NFR BLD AUTO: 72.4 % (ref 42.7–76)
NRBC BLD AUTO-RTO: 0 /100 WBC (ref 0–0.2)
PLATELET # BLD AUTO: 55 10*3/MM3 (ref 140–450)
PMV BLD AUTO: 12.2 FL (ref 6–12)
RBC # BLD AUTO: 5.23 10*6/MM3 (ref 3.77–5.28)
SMALL PLATELETS BLD QL SMEAR: NORMAL
WBC # BLD AUTO: 8.58 10*3/MM3 (ref 3.4–10.8)
WBC MORPH BLD: NORMAL

## 2021-09-16 PROCEDURE — 85025 COMPLETE CBC W/AUTO DIFF WBC: CPT | Performed by: INTERNAL MEDICINE

## 2021-09-16 PROCEDURE — 88305 TISSUE EXAM BY PATHOLOGIST: CPT

## 2021-09-16 PROCEDURE — 25010000002 MORPHINE PER 10 MG: Performed by: INTERNAL MEDICINE

## 2021-09-16 PROCEDURE — G0378 HOSPITAL OBSERVATION PER HR: HCPCS

## 2021-09-16 PROCEDURE — 85007 BL SMEAR W/DIFF WBC COUNT: CPT | Performed by: INTERNAL MEDICINE

## 2021-09-16 PROCEDURE — 0DB78ZX EXCISION OF STOMACH, PYLORUS, VIA NATURAL OR ARTIFICIAL OPENING ENDOSCOPIC, DIAGNOSTIC: ICD-10-PCS | Performed by: INTERNAL MEDICINE

## 2021-09-16 PROCEDURE — 88342 IMHCHEM/IMCYTCHM 1ST ANTB: CPT

## 2021-09-16 PROCEDURE — 25010000002 METOCLOPRAMIDE PER 10 MG: Performed by: INTERNAL MEDICINE

## 2021-09-16 PROCEDURE — 25010000002 PROPOFOL 10 MG/ML EMULSION: Performed by: NURSE ANESTHETIST, CERTIFIED REGISTERED

## 2021-09-16 PROCEDURE — 0D9670Z DRAINAGE OF STOMACH WITH DRAINAGE DEVICE, VIA NATURAL OR ARTIFICIAL OPENING: ICD-10-PCS | Performed by: INTERNAL MEDICINE

## 2021-09-16 PROCEDURE — 99214 OFFICE O/P EST MOD 30 MIN: CPT | Performed by: INTERNAL MEDICINE

## 2021-09-16 PROCEDURE — 43239 EGD BIOPSY SINGLE/MULTIPLE: CPT | Performed by: INTERNAL MEDICINE

## 2021-09-16 RX ORDER — LIDOCAINE HYDROCHLORIDE 20 MG/ML
INJECTION, SOLUTION INTRAVENOUS AS NEEDED
Status: DISCONTINUED | OUTPATIENT
Start: 2021-09-16 | End: 2021-09-16 | Stop reason: SURG

## 2021-09-16 RX ORDER — PROPOFOL 10 MG/ML
VIAL (ML) INTRAVENOUS AS NEEDED
Status: DISCONTINUED | OUTPATIENT
Start: 2021-09-16 | End: 2021-09-16 | Stop reason: SURG

## 2021-09-16 RX ORDER — MEPERIDINE HYDROCHLORIDE 25 MG/ML
12.5 INJECTION INTRAMUSCULAR; INTRAVENOUS; SUBCUTANEOUS
Status: DISCONTINUED | OUTPATIENT
Start: 2021-09-16 | End: 2021-09-16 | Stop reason: HOSPADM

## 2021-09-16 RX ORDER — DEXTROSE AND SODIUM CHLORIDE 5; .45 G/100ML; G/100ML
30 INJECTION, SOLUTION INTRAVENOUS CONTINUOUS PRN
Status: CANCELLED | OUTPATIENT
Start: 2021-09-16

## 2021-09-16 RX ORDER — ONDANSETRON 2 MG/ML
4 INJECTION INTRAMUSCULAR; INTRAVENOUS ONCE AS NEEDED
Status: DISCONTINUED | OUTPATIENT
Start: 2021-09-16 | End: 2021-09-16 | Stop reason: HOSPADM

## 2021-09-16 RX ADMIN — MORPHINE SULFATE 1 MG: 2 INJECTION, SOLUTION INTRAMUSCULAR; INTRAVENOUS at 18:06

## 2021-09-16 RX ADMIN — SODIUM CHLORIDE, PRESERVATIVE FREE 10 ML: 5 INJECTION INTRAVENOUS at 21:03

## 2021-09-16 RX ADMIN — MORPHINE SULFATE 1 MG: 2 INJECTION, SOLUTION INTRAMUSCULAR; INTRAVENOUS at 06:27

## 2021-09-16 RX ADMIN — LIDOCAINE HYDROCHLORIDE 100 MG: 20 INJECTION, SOLUTION INTRAVENOUS at 15:04

## 2021-09-16 RX ADMIN — METOCLOPRAMIDE 10 MG: 5 INJECTION, SOLUTION INTRAMUSCULAR; INTRAVENOUS at 02:23

## 2021-09-16 RX ADMIN — PROPOFOL 100 MG: 10 INJECTION, EMULSION INTRAVENOUS at 15:04

## 2021-09-16 RX ADMIN — METOCLOPRAMIDE 10 MG: 5 INJECTION, SOLUTION INTRAMUSCULAR; INTRAVENOUS at 11:59

## 2021-09-16 RX ADMIN — SODIUM CHLORIDE 100 ML/HR: 9 INJECTION, SOLUTION INTRAVENOUS at 17:03

## 2021-09-16 RX ADMIN — METOPROLOL TARTRATE 5 MG: 5 INJECTION INTRAVENOUS at 21:03

## 2021-09-16 RX ADMIN — METOPROLOL TARTRATE 5 MG: 5 INJECTION INTRAVENOUS at 11:59

## 2021-09-16 RX ADMIN — METOPROLOL TARTRATE 5 MG: 5 INJECTION INTRAVENOUS at 02:23

## 2021-09-16 RX ADMIN — SODIUM CHLORIDE 125 ML/HR: 9 INJECTION, SOLUTION INTRAVENOUS at 06:16

## 2021-09-16 RX ADMIN — METOCLOPRAMIDE 10 MG: 5 INJECTION, SOLUTION INTRAMUSCULAR; INTRAVENOUS at 18:06

## 2021-09-16 RX ADMIN — MORPHINE SULFATE 1 MG: 2 INJECTION, SOLUTION INTRAMUSCULAR; INTRAVENOUS at 11:51

## 2021-09-16 RX ADMIN — PANTOPRAZOLE SODIUM 40 MG: 40 INJECTION, POWDER, FOR SOLUTION INTRAVENOUS at 06:16

## 2021-09-16 NOTE — ANESTHESIA POSTPROCEDURE EVALUATION
Patient: Amira Shannon    Procedure Summary     Date: 09/16/21 Room / Location: [facility] ENDOSCOPY 4 / [facility] ENDOSCOPY    Anesthesia Start: 1458 Anesthesia Stop: 1511    Procedure: ESOPHAGOGASTRODUODENOSCOPY (N/A ) Diagnosis:       Intractable vomiting with nausea, unspecified vomiting type      (Intractable vomiting with nausea, unspecified vomiting type [R11.2])    Surgeons: Basia Saunders MD Provider: Cecy Moncada CRNA    Anesthesia Type: MAC ASA Status: 3          Anesthesia Type: MAC    Vitals  No vitals data found for the desired time range.          Post Anesthesia Care and Evaluation    Patient location during evaluation: PHASE II  Patient participation: waiting for patient participation  Level of consciousness: awake and alert  Pain score: 0  Pain management: adequate  Airway patency: patent  Anesthetic complications: No anesthetic complications  PONV Status: none  Cardiovascular status: acceptable  Respiratory status: acceptable  Hydration status: acceptable

## 2021-09-17 ENCOUNTER — LAB (OUTPATIENT)
Dept: LAB | Facility: HOSPITAL | Age: 61
End: 2021-09-17

## 2021-09-17 DIAGNOSIS — Z01.818 PREOP TESTING: Primary | ICD-10-CM

## 2021-09-17 LAB
ANION GAP SERPL CALCULATED.3IONS-SCNC: 11 MMOL/L (ref 5–15)
BASOPHILS # BLD AUTO: 0.03 10*3/MM3 (ref 0–0.2)
BASOPHILS NFR BLD AUTO: 0.4 % (ref 0–1.5)
BUN SERPL-MCNC: 13 MG/DL (ref 8–23)
BUN/CREAT SERPL: 15.1 (ref 7–25)
CALCIUM SPEC-SCNC: 8.4 MG/DL (ref 8.6–10.5)
CHLORIDE SERPL-SCNC: 107 MMOL/L (ref 98–107)
CO2 SERPL-SCNC: 23 MMOL/L (ref 22–29)
CREAT SERPL-MCNC: 0.86 MG/DL (ref 0.57–1)
DEPRECATED RDW RBC AUTO: 46.9 FL (ref 37–54)
EOSINOPHIL # BLD AUTO: 0.19 10*3/MM3 (ref 0–0.4)
EOSINOPHIL NFR BLD AUTO: 2.2 % (ref 0.3–6.2)
ERYTHROCYTE [DISTWIDTH] IN BLOOD BY AUTOMATED COUNT: 15.5 % (ref 12.3–15.4)
GFR SERPL CREATININE-BSD FRML MDRD: 67 ML/MIN/1.73
GLUCOSE SERPL-MCNC: 87 MG/DL (ref 65–99)
HCT VFR BLD AUTO: 45.4 % (ref 34–46.6)
HGB BLD-MCNC: 15.6 G/DL (ref 12–15.9)
IMM GRANULOCYTES # BLD AUTO: 0.04 10*3/MM3 (ref 0–0.05)
IMM GRANULOCYTES NFR BLD AUTO: 0.5 % (ref 0–0.5)
LYMPHOCYTES # BLD AUTO: 1.72 10*3/MM3 (ref 0.7–3.1)
LYMPHOCYTES NFR BLD AUTO: 20.3 % (ref 19.6–45.3)
MCH RBC QN AUTO: 28.8 PG (ref 26.6–33)
MCHC RBC AUTO-ENTMCNC: 34.4 G/DL (ref 31.5–35.7)
MCV RBC AUTO: 83.9 FL (ref 79–97)
MONOCYTES # BLD AUTO: 0.46 10*3/MM3 (ref 0.1–0.9)
MONOCYTES NFR BLD AUTO: 5.4 % (ref 5–12)
NEUTROPHILS NFR BLD AUTO: 6.05 10*3/MM3 (ref 1.7–7)
NEUTROPHILS NFR BLD AUTO: 71.2 % (ref 42.7–76)
NRBC BLD AUTO-RTO: 0 /100 WBC (ref 0–0.2)
PLATELET # BLD AUTO: 66 10*3/MM3 (ref 140–450)
PMV BLD AUTO: 10.6 FL (ref 6–12)
POTASSIUM SERPL-SCNC: 3.8 MMOL/L (ref 3.5–5.2)
RBC # BLD AUTO: 5.41 10*6/MM3 (ref 3.77–5.28)
SODIUM SERPL-SCNC: 141 MMOL/L (ref 136–145)
WBC # BLD AUTO: 8.49 10*3/MM3 (ref 3.4–10.8)

## 2021-09-17 PROCEDURE — 25010000002 MORPHINE PER 10 MG: Performed by: STUDENT IN AN ORGANIZED HEALTH CARE EDUCATION/TRAINING PROGRAM

## 2021-09-17 PROCEDURE — 25010000002 PROMETHAZINE PER 50 MG: Performed by: FAMILY MEDICINE

## 2021-09-17 PROCEDURE — 99232 SBSQ HOSP IP/OBS MODERATE 35: CPT | Performed by: INTERNAL MEDICINE

## 2021-09-17 PROCEDURE — 25010000002 HYDRALAZINE PER 20 MG: Performed by: INTERNAL MEDICINE

## 2021-09-17 PROCEDURE — 85025 COMPLETE CBC W/AUTO DIFF WBC: CPT | Performed by: INTERNAL MEDICINE

## 2021-09-17 PROCEDURE — 25010000002 METOCLOPRAMIDE PER 10 MG: Performed by: INTERNAL MEDICINE

## 2021-09-17 PROCEDURE — 80048 BASIC METABOLIC PNL TOTAL CA: CPT | Performed by: INTERNAL MEDICINE

## 2021-09-17 PROCEDURE — 25010000002 ONDANSETRON PER 1 MG: Performed by: INTERNAL MEDICINE

## 2021-09-17 PROCEDURE — 25010000002 MORPHINE PER 10 MG: Performed by: INTERNAL MEDICINE

## 2021-09-17 RX ORDER — MORPHINE SULFATE 2 MG/ML
2 INJECTION, SOLUTION INTRAMUSCULAR; INTRAVENOUS EVERY 4 HOURS PRN
Status: DISCONTINUED | OUTPATIENT
Start: 2021-09-17 | End: 2021-09-21

## 2021-09-17 RX ORDER — PANTOPRAZOLE SODIUM 40 MG/10ML
40 INJECTION, POWDER, LYOPHILIZED, FOR SOLUTION INTRAVENOUS 2 TIMES DAILY
Status: DISCONTINUED | OUTPATIENT
Start: 2021-09-17 | End: 2021-09-24 | Stop reason: HOSPADM

## 2021-09-17 RX ADMIN — METOPROLOL TARTRATE 5 MG: 5 INJECTION INTRAVENOUS at 03:45

## 2021-09-17 RX ADMIN — MORPHINE SULFATE 2 MG: 2 INJECTION, SOLUTION INTRAMUSCULAR; INTRAVENOUS at 14:37

## 2021-09-17 RX ADMIN — ONDANSETRON HYDROCHLORIDE 4 MG: 2 INJECTION, SOLUTION INTRAMUSCULAR; INTRAVENOUS at 01:43

## 2021-09-17 RX ADMIN — MORPHINE SULFATE 2 MG: 2 INJECTION, SOLUTION INTRAMUSCULAR; INTRAVENOUS at 20:07

## 2021-09-17 RX ADMIN — HYDRALAZINE HYDROCHLORIDE 10 MG: 20 INJECTION INTRAMUSCULAR; INTRAVENOUS at 08:34

## 2021-09-17 RX ADMIN — PANTOPRAZOLE SODIUM 40 MG: 40 INJECTION, POWDER, FOR SOLUTION INTRAVENOUS at 17:01

## 2021-09-17 RX ADMIN — SODIUM CHLORIDE 100 ML/HR: 9 INJECTION, SOLUTION INTRAVENOUS at 13:00

## 2021-09-17 RX ADMIN — PANTOPRAZOLE SODIUM 40 MG: 40 INJECTION, POWDER, FOR SOLUTION INTRAVENOUS at 04:53

## 2021-09-17 RX ADMIN — METOCLOPRAMIDE 10 MG: 5 INJECTION, SOLUTION INTRAMUSCULAR; INTRAVENOUS at 06:44

## 2021-09-17 RX ADMIN — SODIUM CHLORIDE 100 ML/HR: 9 INJECTION, SOLUTION INTRAVENOUS at 03:45

## 2021-09-17 RX ADMIN — PROMETHAZINE HYDROCHLORIDE 25 MG: 25 INJECTION INTRAMUSCULAR; INTRAVENOUS at 03:04

## 2021-09-17 RX ADMIN — MORPHINE SULFATE 1 MG: 2 INJECTION, SOLUTION INTRAMUSCULAR; INTRAVENOUS at 00:08

## 2021-09-17 RX ADMIN — ONDANSETRON HYDROCHLORIDE 4 MG: 2 INJECTION, SOLUTION INTRAMUSCULAR; INTRAVENOUS at 08:24

## 2021-09-17 RX ADMIN — METOPROLOL TARTRATE 5 MG: 5 INJECTION INTRAVENOUS at 10:58

## 2021-09-17 RX ADMIN — METOPROLOL TARTRATE 5 MG: 5 INJECTION INTRAVENOUS at 20:07

## 2021-09-17 RX ADMIN — SODIUM CHLORIDE 100 ML/HR: 9 INJECTION, SOLUTION INTRAVENOUS at 23:34

## 2021-09-17 RX ADMIN — MORPHINE SULFATE 1 MG: 2 INJECTION, SOLUTION INTRAMUSCULAR; INTRAVENOUS at 05:45

## 2021-09-17 RX ADMIN — MORPHINE SULFATE 2 MG: 2 INJECTION, SOLUTION INTRAMUSCULAR; INTRAVENOUS at 10:09

## 2021-09-17 RX ADMIN — SODIUM CHLORIDE, PRESERVATIVE FREE 10 ML: 5 INJECTION INTRAVENOUS at 09:01

## 2021-09-17 RX ADMIN — METOCLOPRAMIDE 10 MG: 5 INJECTION, SOLUTION INTRAMUSCULAR; INTRAVENOUS at 00:08

## 2021-09-17 RX ADMIN — ONDANSETRON HYDROCHLORIDE 4 MG: 2 INJECTION, SOLUTION INTRAMUSCULAR; INTRAVENOUS at 23:34

## 2021-09-18 ENCOUNTER — APPOINTMENT (OUTPATIENT)
Dept: GENERAL RADIOLOGY | Facility: HOSPITAL | Age: 61
End: 2021-09-18

## 2021-09-18 LAB
ANION GAP SERPL CALCULATED.3IONS-SCNC: 12 MMOL/L (ref 5–15)
BASOPHILS # BLD AUTO: 0.04 10*3/MM3 (ref 0–0.2)
BASOPHILS NFR BLD AUTO: 0.4 % (ref 0–1.5)
BUN SERPL-MCNC: 13 MG/DL (ref 8–23)
BUN/CREAT SERPL: 16.5 (ref 7–25)
CALCIUM SPEC-SCNC: 8.8 MG/DL (ref 8.6–10.5)
CHLORIDE SERPL-SCNC: 105 MMOL/L (ref 98–107)
CO2 SERPL-SCNC: 20 MMOL/L (ref 22–29)
CREAT SERPL-MCNC: 0.79 MG/DL (ref 0.57–1)
DEPRECATED RDW RBC AUTO: 45.8 FL (ref 37–54)
EOSINOPHIL # BLD AUTO: 0.19 10*3/MM3 (ref 0–0.4)
EOSINOPHIL NFR BLD AUTO: 1.8 % (ref 0.3–6.2)
ERYTHROCYTE [DISTWIDTH] IN BLOOD BY AUTOMATED COUNT: 15.5 % (ref 12.3–15.4)
GFR SERPL CREATININE-BSD FRML MDRD: 74 ML/MIN/1.73
GLUCOSE SERPL-MCNC: 101 MG/DL (ref 65–99)
HCT VFR BLD AUTO: 47.6 % (ref 34–46.6)
HGB BLD-MCNC: 16.6 G/DL (ref 12–15.9)
IMM GRANULOCYTES # BLD AUTO: 0.09 10*3/MM3 (ref 0–0.05)
IMM GRANULOCYTES NFR BLD AUTO: 0.8 % (ref 0–0.5)
LYMPHOCYTES # BLD AUTO: 1.54 10*3/MM3 (ref 0.7–3.1)
LYMPHOCYTES NFR BLD AUTO: 14.3 % (ref 19.6–45.3)
MCH RBC QN AUTO: 28.8 PG (ref 26.6–33)
MCHC RBC AUTO-ENTMCNC: 34.9 G/DL (ref 31.5–35.7)
MCV RBC AUTO: 82.6 FL (ref 79–97)
MONOCYTES # BLD AUTO: 0.55 10*3/MM3 (ref 0.1–0.9)
MONOCYTES NFR BLD AUTO: 5.1 % (ref 5–12)
NEUTROPHILS NFR BLD AUTO: 77.6 % (ref 42.7–76)
NEUTROPHILS NFR BLD AUTO: 8.34 10*3/MM3 (ref 1.7–7)
NRBC BLD AUTO-RTO: 0 /100 WBC (ref 0–0.2)
PLATELET # BLD AUTO: 88 10*3/MM3 (ref 140–450)
PMV BLD AUTO: 10.5 FL (ref 6–12)
POTASSIUM SERPL-SCNC: 3.6 MMOL/L (ref 3.5–5.2)
RBC # BLD AUTO: 5.76 10*6/MM3 (ref 3.77–5.28)
SODIUM SERPL-SCNC: 137 MMOL/L (ref 136–145)
WBC # BLD AUTO: 10.75 10*3/MM3 (ref 3.4–10.8)

## 2021-09-18 PROCEDURE — 25010000002 HYDRALAZINE PER 20 MG: Performed by: INTERNAL MEDICINE

## 2021-09-18 PROCEDURE — 99232 SBSQ HOSP IP/OBS MODERATE 35: CPT | Performed by: INTERNAL MEDICINE

## 2021-09-18 PROCEDURE — 85025 COMPLETE CBC W/AUTO DIFF WBC: CPT | Performed by: INTERNAL MEDICINE

## 2021-09-18 PROCEDURE — 25010000002 ONDANSETRON PER 1 MG: Performed by: INTERNAL MEDICINE

## 2021-09-18 PROCEDURE — 74018 RADEX ABDOMEN 1 VIEW: CPT

## 2021-09-18 PROCEDURE — 25010000002 METOCLOPRAMIDE PER 10 MG: Performed by: INTERNAL MEDICINE

## 2021-09-18 PROCEDURE — 25010000002 MORPHINE PER 10 MG: Performed by: STUDENT IN AN ORGANIZED HEALTH CARE EDUCATION/TRAINING PROGRAM

## 2021-09-18 PROCEDURE — 80048 BASIC METABOLIC PNL TOTAL CA: CPT | Performed by: INTERNAL MEDICINE

## 2021-09-18 RX ORDER — DEXTROSE AND SODIUM CHLORIDE 5; .9 G/100ML; G/100ML
100 INJECTION, SOLUTION INTRAVENOUS CONTINUOUS
Status: DISCONTINUED | OUTPATIENT
Start: 2021-09-18 | End: 2021-09-24 | Stop reason: HOSPADM

## 2021-09-18 RX ORDER — METOCLOPRAMIDE HYDROCHLORIDE 5 MG/ML
10 INJECTION INTRAMUSCULAR; INTRAVENOUS EVERY 8 HOURS
Status: DISCONTINUED | OUTPATIENT
Start: 2021-09-18 | End: 2021-09-24 | Stop reason: HOSPADM

## 2021-09-18 RX ADMIN — HYDRALAZINE HYDROCHLORIDE 10 MG: 20 INJECTION INTRAMUSCULAR; INTRAVENOUS at 21:01

## 2021-09-18 RX ADMIN — DEXTROSE AND SODIUM CHLORIDE 100 ML/HR: 5; 900 INJECTION, SOLUTION INTRAVENOUS at 10:24

## 2021-09-18 RX ADMIN — ACETAMINOPHEN ORAL SOLUTION 649.6 MG: 650 SOLUTION ORAL at 12:23

## 2021-09-18 RX ADMIN — HYDRALAZINE HYDROCHLORIDE 10 MG: 20 INJECTION INTRAMUSCULAR; INTRAVENOUS at 05:45

## 2021-09-18 RX ADMIN — METOPROLOL TARTRATE 5 MG: 5 INJECTION INTRAVENOUS at 21:01

## 2021-09-18 RX ADMIN — METOCLOPRAMIDE 10 MG: 5 INJECTION, SOLUTION INTRAMUSCULAR; INTRAVENOUS at 21:01

## 2021-09-18 RX ADMIN — MORPHINE SULFATE 2 MG: 2 INJECTION, SOLUTION INTRAMUSCULAR; INTRAVENOUS at 02:36

## 2021-09-18 RX ADMIN — PANTOPRAZOLE SODIUM 40 MG: 40 INJECTION, POWDER, FOR SOLUTION INTRAVENOUS at 05:44

## 2021-09-18 RX ADMIN — PANTOPRAZOLE SODIUM 40 MG: 40 INJECTION, POWDER, FOR SOLUTION INTRAVENOUS at 17:12

## 2021-09-18 RX ADMIN — METOCLOPRAMIDE 10 MG: 5 INJECTION, SOLUTION INTRAMUSCULAR; INTRAVENOUS at 14:20

## 2021-09-18 RX ADMIN — ONDANSETRON HYDROCHLORIDE 4 MG: 2 INJECTION, SOLUTION INTRAMUSCULAR; INTRAVENOUS at 17:13

## 2021-09-18 RX ADMIN — ONDANSETRON HYDROCHLORIDE 4 MG: 2 INJECTION, SOLUTION INTRAMUSCULAR; INTRAVENOUS at 22:01

## 2021-09-18 RX ADMIN — MORPHINE SULFATE 2 MG: 2 INJECTION, SOLUTION INTRAMUSCULAR; INTRAVENOUS at 21:01

## 2021-09-18 RX ADMIN — METOPROLOL TARTRATE 5 MG: 5 INJECTION INTRAVENOUS at 11:31

## 2021-09-18 RX ADMIN — METOPROLOL TARTRATE 5 MG: 5 INJECTION INTRAVENOUS at 02:36

## 2021-09-18 RX ADMIN — MORPHINE SULFATE 2 MG: 2 INJECTION, SOLUTION INTRAMUSCULAR; INTRAVENOUS at 08:18

## 2021-09-18 RX ADMIN — DEXTROSE AND SODIUM CHLORIDE 100 ML/HR: 5; 900 INJECTION, SOLUTION INTRAVENOUS at 21:01

## 2021-09-18 RX ADMIN — MORPHINE SULFATE 2 MG: 2 INJECTION, SOLUTION INTRAMUSCULAR; INTRAVENOUS at 16:40

## 2021-09-18 RX ADMIN — MORPHINE SULFATE 2 MG: 2 INJECTION, SOLUTION INTRAMUSCULAR; INTRAVENOUS at 12:01

## 2021-09-19 LAB
ANION GAP SERPL CALCULATED.3IONS-SCNC: 11 MMOL/L (ref 5–15)
BUN SERPL-MCNC: 13 MG/DL (ref 8–23)
BUN/CREAT SERPL: 16.9 (ref 7–25)
CALCIUM SPEC-SCNC: 8.3 MG/DL (ref 8.6–10.5)
CHLORIDE SERPL-SCNC: 105 MMOL/L (ref 98–107)
CO2 SERPL-SCNC: 20 MMOL/L (ref 22–29)
CREAT SERPL-MCNC: 0.77 MG/DL (ref 0.57–1)
GFR SERPL CREATININE-BSD FRML MDRD: 76 ML/MIN/1.73
GLUCOSE SERPL-MCNC: 122 MG/DL (ref 65–99)
POTASSIUM SERPL-SCNC: 3.7 MMOL/L (ref 3.5–5.2)
SODIUM SERPL-SCNC: 136 MMOL/L (ref 136–145)

## 2021-09-19 PROCEDURE — 25010000002 MORPHINE PER 10 MG: Performed by: STUDENT IN AN ORGANIZED HEALTH CARE EDUCATION/TRAINING PROGRAM

## 2021-09-19 PROCEDURE — 99232 SBSQ HOSP IP/OBS MODERATE 35: CPT | Performed by: INTERNAL MEDICINE

## 2021-09-19 PROCEDURE — 25010000002 METOCLOPRAMIDE PER 10 MG: Performed by: INTERNAL MEDICINE

## 2021-09-19 PROCEDURE — 80048 BASIC METABOLIC PNL TOTAL CA: CPT | Performed by: INTERNAL MEDICINE

## 2021-09-19 RX ADMIN — ACETAMINOPHEN ORAL SOLUTION 649.6 MG: 650 SOLUTION ORAL at 08:18

## 2021-09-19 RX ADMIN — MORPHINE SULFATE 2 MG: 2 INJECTION, SOLUTION INTRAMUSCULAR; INTRAVENOUS at 13:48

## 2021-09-19 RX ADMIN — PANTOPRAZOLE SODIUM 40 MG: 40 INJECTION, POWDER, FOR SOLUTION INTRAVENOUS at 05:29

## 2021-09-19 RX ADMIN — MORPHINE SULFATE 2 MG: 2 INJECTION, SOLUTION INTRAMUSCULAR; INTRAVENOUS at 18:43

## 2021-09-19 RX ADMIN — METOPROLOL TARTRATE 5 MG: 5 INJECTION INTRAVENOUS at 11:25

## 2021-09-19 RX ADMIN — METOPROLOL TARTRATE 5 MG: 5 INJECTION INTRAVENOUS at 03:23

## 2021-09-19 RX ADMIN — METOCLOPRAMIDE 10 MG: 5 INJECTION, SOLUTION INTRAMUSCULAR; INTRAVENOUS at 21:00

## 2021-09-19 RX ADMIN — MORPHINE SULFATE 2 MG: 2 INJECTION, SOLUTION INTRAMUSCULAR; INTRAVENOUS at 01:43

## 2021-09-19 RX ADMIN — ACETAMINOPHEN ORAL SOLUTION 650 MG: 650 SOLUTION ORAL at 03:23

## 2021-09-19 RX ADMIN — DEXTROSE AND SODIUM CHLORIDE 100 ML/HR: 5; 900 INJECTION, SOLUTION INTRAVENOUS at 17:09

## 2021-09-19 RX ADMIN — PANTOPRAZOLE SODIUM 40 MG: 40 INJECTION, POWDER, FOR SOLUTION INTRAVENOUS at 17:07

## 2021-09-19 RX ADMIN — METOPROLOL TARTRATE 5 MG: 5 INJECTION INTRAVENOUS at 21:00

## 2021-09-19 RX ADMIN — METOCLOPRAMIDE 10 MG: 5 INJECTION, SOLUTION INTRAMUSCULAR; INTRAVENOUS at 13:10

## 2021-09-19 RX ADMIN — SODIUM CHLORIDE, PRESERVATIVE FREE 10 ML: 5 INJECTION INTRAVENOUS at 08:23

## 2021-09-19 RX ADMIN — METOCLOPRAMIDE 10 MG: 5 INJECTION, SOLUTION INTRAMUSCULAR; INTRAVENOUS at 05:29

## 2021-09-19 RX ADMIN — MORPHINE SULFATE 2 MG: 2 INJECTION, SOLUTION INTRAMUSCULAR; INTRAVENOUS at 09:44

## 2021-09-19 RX ADMIN — DEXTROSE AND SODIUM CHLORIDE 100 ML/HR: 5; 900 INJECTION, SOLUTION INTRAVENOUS at 08:19

## 2021-09-19 RX ADMIN — MORPHINE SULFATE 2 MG: 2 INJECTION, SOLUTION INTRAMUSCULAR; INTRAVENOUS at 05:35

## 2021-09-20 LAB
ANION GAP SERPL CALCULATED.3IONS-SCNC: 8 MMOL/L (ref 5–15)
BASOPHILS # BLD AUTO: 0.02 10*3/MM3 (ref 0–0.2)
BASOPHILS NFR BLD AUTO: 0.2 % (ref 0–1.5)
BUN SERPL-MCNC: 8 MG/DL (ref 8–23)
BUN/CREAT SERPL: 11.1 (ref 7–25)
CALCIUM SPEC-SCNC: 8.4 MG/DL (ref 8.6–10.5)
CHLORIDE SERPL-SCNC: 108 MMOL/L (ref 98–107)
CO2 SERPL-SCNC: 24 MMOL/L (ref 22–29)
CREAT SERPL-MCNC: 0.72 MG/DL (ref 0.57–1)
DEPRECATED RDW RBC AUTO: 45.6 FL (ref 37–54)
EOSINOPHIL # BLD AUTO: 0.27 10*3/MM3 (ref 0–0.4)
EOSINOPHIL NFR BLD AUTO: 3.3 % (ref 0.3–6.2)
ERYTHROCYTE [DISTWIDTH] IN BLOOD BY AUTOMATED COUNT: 15.5 % (ref 12.3–15.4)
GFR SERPL CREATININE-BSD FRML MDRD: 82 ML/MIN/1.73
GLUCOSE SERPL-MCNC: 124 MG/DL (ref 65–99)
HCT VFR BLD AUTO: 45.4 % (ref 34–46.6)
HGB BLD-MCNC: 15.6 G/DL (ref 12–15.9)
IMM GRANULOCYTES # BLD AUTO: 0.05 10*3/MM3 (ref 0–0.05)
IMM GRANULOCYTES NFR BLD AUTO: 0.6 % (ref 0–0.5)
LYMPHOCYTES # BLD AUTO: 1.27 10*3/MM3 (ref 0.7–3.1)
LYMPHOCYTES NFR BLD AUTO: 15.7 % (ref 19.6–45.3)
MCH RBC QN AUTO: 28.4 PG (ref 26.6–33)
MCHC RBC AUTO-ENTMCNC: 34.4 G/DL (ref 31.5–35.7)
MCV RBC AUTO: 82.7 FL (ref 79–97)
MONOCYTES # BLD AUTO: 0.46 10*3/MM3 (ref 0.1–0.9)
MONOCYTES NFR BLD AUTO: 5.7 % (ref 5–12)
NEUTROPHILS NFR BLD AUTO: 6.03 10*3/MM3 (ref 1.7–7)
NEUTROPHILS NFR BLD AUTO: 74.5 % (ref 42.7–76)
NRBC BLD AUTO-RTO: 0 /100 WBC (ref 0–0.2)
PLATELET # BLD AUTO: 70 10*3/MM3 (ref 140–450)
PMV BLD AUTO: 10.7 FL (ref 6–12)
POTASSIUM SERPL-SCNC: 3.6 MMOL/L (ref 3.5–5.2)
RBC # BLD AUTO: 5.49 10*6/MM3 (ref 3.77–5.28)
SODIUM SERPL-SCNC: 140 MMOL/L (ref 136–145)
WBC # BLD AUTO: 8.1 10*3/MM3 (ref 3.4–10.8)

## 2021-09-20 PROCEDURE — 25010000002 MORPHINE PER 10 MG: Performed by: STUDENT IN AN ORGANIZED HEALTH CARE EDUCATION/TRAINING PROGRAM

## 2021-09-20 PROCEDURE — 80048 BASIC METABOLIC PNL TOTAL CA: CPT | Performed by: STUDENT IN AN ORGANIZED HEALTH CARE EDUCATION/TRAINING PROGRAM

## 2021-09-20 PROCEDURE — 85025 COMPLETE CBC W/AUTO DIFF WBC: CPT | Performed by: STUDENT IN AN ORGANIZED HEALTH CARE EDUCATION/TRAINING PROGRAM

## 2021-09-20 PROCEDURE — 25010000002 METOCLOPRAMIDE PER 10 MG: Performed by: INTERNAL MEDICINE

## 2021-09-20 PROCEDURE — 99232 SBSQ HOSP IP/OBS MODERATE 35: CPT | Performed by: INTERNAL MEDICINE

## 2021-09-20 RX ORDER — ALUMINA, MAGNESIA, AND SIMETHICONE 2400; 2400; 240 MG/30ML; MG/30ML; MG/30ML
15 SUSPENSION ORAL ONCE
Status: COMPLETED | OUTPATIENT
Start: 2021-09-20 | End: 2021-09-20

## 2021-09-20 RX ORDER — BISACODYL 10 MG
10 SUPPOSITORY, RECTAL RECTAL ONCE
Status: COMPLETED | OUTPATIENT
Start: 2021-09-20 | End: 2021-09-20

## 2021-09-20 RX ADMIN — PANTOPRAZOLE SODIUM 40 MG: 40 INJECTION, POWDER, FOR SOLUTION INTRAVENOUS at 18:07

## 2021-09-20 RX ADMIN — ALUMINUM HYDROXIDE, MAGNESIUM HYDROXIDE, AND DIMETHICONE 15 ML: 400; 400; 40 SUSPENSION ORAL at 22:45

## 2021-09-20 RX ADMIN — METOCLOPRAMIDE 10 MG: 5 INJECTION, SOLUTION INTRAMUSCULAR; INTRAVENOUS at 21:02

## 2021-09-20 RX ADMIN — SODIUM CHLORIDE, PRESERVATIVE FREE 10 ML: 5 INJECTION INTRAVENOUS at 09:35

## 2021-09-20 RX ADMIN — METOPROLOL TARTRATE 5 MG: 5 INJECTION INTRAVENOUS at 21:02

## 2021-09-20 RX ADMIN — MORPHINE SULFATE 2 MG: 2 INJECTION, SOLUTION INTRAMUSCULAR; INTRAVENOUS at 13:34

## 2021-09-20 RX ADMIN — METOPROLOL TARTRATE 5 MG: 5 INJECTION INTRAVENOUS at 12:31

## 2021-09-20 RX ADMIN — MORPHINE SULFATE 2 MG: 2 INJECTION, SOLUTION INTRAMUSCULAR; INTRAVENOUS at 00:13

## 2021-09-20 RX ADMIN — METOCLOPRAMIDE 10 MG: 5 INJECTION, SOLUTION INTRAMUSCULAR; INTRAVENOUS at 14:34

## 2021-09-20 RX ADMIN — MORPHINE SULFATE 2 MG: 2 INJECTION, SOLUTION INTRAMUSCULAR; INTRAVENOUS at 05:29

## 2021-09-20 RX ADMIN — DEXTROSE AND SODIUM CHLORIDE 100 ML/HR: 5; 900 INJECTION, SOLUTION INTRAVENOUS at 12:30

## 2021-09-20 RX ADMIN — MORPHINE SULFATE 2 MG: 2 INJECTION, SOLUTION INTRAMUSCULAR; INTRAVENOUS at 18:07

## 2021-09-20 RX ADMIN — METOCLOPRAMIDE 10 MG: 5 INJECTION, SOLUTION INTRAMUSCULAR; INTRAVENOUS at 05:29

## 2021-09-20 RX ADMIN — MORPHINE SULFATE 2 MG: 2 INJECTION, SOLUTION INTRAMUSCULAR; INTRAVENOUS at 09:35

## 2021-09-20 RX ADMIN — DEXTROSE AND SODIUM CHLORIDE 100 ML/HR: 5; 900 INJECTION, SOLUTION INTRAVENOUS at 02:55

## 2021-09-20 RX ADMIN — BISACODYL 10 MG: 10 SUPPOSITORY RECTAL at 18:07

## 2021-09-20 RX ADMIN — MORPHINE SULFATE 2 MG: 2 INJECTION, SOLUTION INTRAMUSCULAR; INTRAVENOUS at 22:45

## 2021-09-20 RX ADMIN — PANTOPRAZOLE SODIUM 40 MG: 40 INJECTION, POWDER, FOR SOLUTION INTRAVENOUS at 05:29

## 2021-09-20 RX ADMIN — METOPROLOL TARTRATE 5 MG: 5 INJECTION INTRAVENOUS at 02:55

## 2021-09-21 ENCOUNTER — APPOINTMENT (OUTPATIENT)
Dept: NUCLEAR MEDICINE | Facility: HOSPITAL | Age: 61
End: 2021-09-21

## 2021-09-21 PROBLEM — K31.84 GASTROPARESIS: Status: ACTIVE | Noted: 2021-09-21

## 2021-09-21 LAB
ANION GAP SERPL CALCULATED.3IONS-SCNC: 9 MMOL/L (ref 5–15)
ANISOCYTOSIS BLD QL: NORMAL
BACTERIA UR QL AUTO: ABNORMAL /HPF
BASOPHILS # BLD AUTO: 0.03 10*3/MM3 (ref 0–0.2)
BASOPHILS NFR BLD AUTO: 0.4 % (ref 0–1.5)
BILIRUB UR QL STRIP: ABNORMAL
BUN SERPL-MCNC: 8 MG/DL (ref 8–23)
BUN/CREAT SERPL: 11.4 (ref 7–25)
CALCIUM SPEC-SCNC: 8.3 MG/DL (ref 8.6–10.5)
CHLORIDE SERPL-SCNC: 107 MMOL/L (ref 98–107)
CLARITY UR: ABNORMAL
CO2 SERPL-SCNC: 22 MMOL/L (ref 22–29)
COLOR UR: ABNORMAL
CREAT SERPL-MCNC: 0.7 MG/DL (ref 0.57–1)
DEPRECATED RDW RBC AUTO: 44.6 FL (ref 37–54)
EOSINOPHIL # BLD AUTO: 0.33 10*3/MM3 (ref 0–0.4)
EOSINOPHIL NFR BLD AUTO: 4.1 % (ref 0.3–6.2)
ERYTHROCYTE [DISTWIDTH] IN BLOOD BY AUTOMATED COUNT: 15.5 % (ref 12.3–15.4)
GFR SERPL CREATININE-BSD FRML MDRD: 85 ML/MIN/1.73
GLUCOSE SERPL-MCNC: 110 MG/DL (ref 65–99)
GLUCOSE UR STRIP-MCNC: NEGATIVE MG/DL
HCT VFR BLD AUTO: 44.8 % (ref 34–46.6)
HGB BLD-MCNC: 15.8 G/DL (ref 12–15.9)
HGB UR QL STRIP.AUTO: NEGATIVE
HYALINE CASTS UR QL AUTO: ABNORMAL /LPF
IMM GRANULOCYTES # BLD AUTO: 0.04 10*3/MM3 (ref 0–0.05)
IMM GRANULOCYTES NFR BLD AUTO: 0.5 % (ref 0–0.5)
KETONES UR QL STRIP: NEGATIVE
LEUKOCYTE ESTERASE UR QL STRIP.AUTO: ABNORMAL
LYMPHOCYTES # BLD AUTO: 1.54 10*3/MM3 (ref 0.7–3.1)
LYMPHOCYTES NFR BLD AUTO: 19 % (ref 19.6–45.3)
MCH RBC QN AUTO: 29 PG (ref 26.6–33)
MCHC RBC AUTO-ENTMCNC: 35.3 G/DL (ref 31.5–35.7)
MCV RBC AUTO: 82.4 FL (ref 79–97)
MONOCYTES # BLD AUTO: 0.51 10*3/MM3 (ref 0.1–0.9)
MONOCYTES NFR BLD AUTO: 6.3 % (ref 5–12)
MUCOUS THREADS URNS QL MICRO: ABNORMAL /HPF
NEUTROPHILS NFR BLD AUTO: 5.67 10*3/MM3 (ref 1.7–7)
NEUTROPHILS NFR BLD AUTO: 69.7 % (ref 42.7–76)
NITRITE UR QL STRIP: NEGATIVE
NRBC BLD AUTO-RTO: 0 /100 WBC (ref 0–0.2)
PH UR STRIP.AUTO: 6 [PH] (ref 5–9)
PLATELET # BLD AUTO: 61 10*3/MM3 (ref 140–450)
PMV BLD AUTO: 11.7 FL (ref 6–12)
POTASSIUM SERPL-SCNC: 3.2 MMOL/L (ref 3.5–5.2)
PROT UR QL STRIP: ABNORMAL
QT INTERVAL: 394 MS
QTC INTERVAL: 460 MS
RBC # BLD AUTO: 5.44 10*6/MM3 (ref 3.77–5.28)
RBC # UR: ABNORMAL /HPF
REF LAB TEST METHOD: ABNORMAL
SMALL PLATELETS BLD QL SMEAR: NORMAL
SODIUM SERPL-SCNC: 138 MMOL/L (ref 136–145)
SP GR UR STRIP: 1.03 (ref 1–1.03)
SQUAMOUS #/AREA URNS HPF: ABNORMAL /HPF
UROBILINOGEN UR QL STRIP: ABNORMAL
WBC # BLD AUTO: 8.12 10*3/MM3 (ref 3.4–10.8)
WBC MORPH BLD: NORMAL
WBC UR QL AUTO: ABNORMAL /HPF

## 2021-09-21 PROCEDURE — 85007 BL SMEAR W/DIFF WBC COUNT: CPT | Performed by: STUDENT IN AN ORGANIZED HEALTH CARE EDUCATION/TRAINING PROGRAM

## 2021-09-21 PROCEDURE — A9541 TC99M SULFUR COLLOID: HCPCS | Performed by: INTERNAL MEDICINE

## 2021-09-21 PROCEDURE — 87086 URINE CULTURE/COLONY COUNT: CPT | Performed by: INTERNAL MEDICINE

## 2021-09-21 PROCEDURE — 25010000002 ONDANSETRON PER 1 MG: Performed by: INTERNAL MEDICINE

## 2021-09-21 PROCEDURE — 80048 BASIC METABOLIC PNL TOTAL CA: CPT | Performed by: STUDENT IN AN ORGANIZED HEALTH CARE EDUCATION/TRAINING PROGRAM

## 2021-09-21 PROCEDURE — 25010000002 PROMETHAZINE PER 50 MG: Performed by: FAMILY MEDICINE

## 2021-09-21 PROCEDURE — 78264 GASTRIC EMPTYING IMG STUDY: CPT

## 2021-09-21 PROCEDURE — 87088 URINE BACTERIA CULTURE: CPT | Performed by: INTERNAL MEDICINE

## 2021-09-21 PROCEDURE — 85025 COMPLETE CBC W/AUTO DIFF WBC: CPT | Performed by: STUDENT IN AN ORGANIZED HEALTH CARE EDUCATION/TRAINING PROGRAM

## 2021-09-21 PROCEDURE — 25010000002 LORAZEPAM PER 2 MG: Performed by: INTERNAL MEDICINE

## 2021-09-21 PROCEDURE — 0 TECHNETIUM SULFUR COLLOID: Performed by: INTERNAL MEDICINE

## 2021-09-21 PROCEDURE — 25010000002 MORPHINE PER 10 MG: Performed by: STUDENT IN AN ORGANIZED HEALTH CARE EDUCATION/TRAINING PROGRAM

## 2021-09-21 PROCEDURE — 25010000002 CEFTRIAXONE PER 250 MG: Performed by: INTERNAL MEDICINE

## 2021-09-21 PROCEDURE — 81001 URINALYSIS AUTO W/SCOPE: CPT | Performed by: INTERNAL MEDICINE

## 2021-09-21 PROCEDURE — 87186 SC STD MICRODIL/AGAR DIL: CPT | Performed by: INTERNAL MEDICINE

## 2021-09-21 PROCEDURE — 25010000003 POTASSIUM CHLORIDE 10 MEQ/100ML SOLUTION: Performed by: INTERNAL MEDICINE

## 2021-09-21 PROCEDURE — 25010000002 HYDROMORPHONE 1 MG/ML SOLUTION: Performed by: INTERNAL MEDICINE

## 2021-09-21 PROCEDURE — 25010000002 METOCLOPRAMIDE PER 10 MG: Performed by: INTERNAL MEDICINE

## 2021-09-21 PROCEDURE — 99232 SBSQ HOSP IP/OBS MODERATE 35: CPT | Performed by: INTERNAL MEDICINE

## 2021-09-21 RX ORDER — POTASSIUM CHLORIDE 1.5 G/1.77G
40 POWDER, FOR SOLUTION ORAL AS NEEDED
Status: DISCONTINUED | OUTPATIENT
Start: 2021-09-21 | End: 2021-09-24 | Stop reason: HOSPADM

## 2021-09-21 RX ORDER — LORAZEPAM 2 MG/ML
1 INJECTION INTRAMUSCULAR EVERY 6 HOURS PRN
Status: DISPENSED | OUTPATIENT
Start: 2021-09-21 | End: 2021-09-23

## 2021-09-21 RX ORDER — POTASSIUM CHLORIDE 7.45 MG/ML
10 INJECTION INTRAVENOUS
Status: DISCONTINUED | OUTPATIENT
Start: 2021-09-21 | End: 2021-09-24 | Stop reason: HOSPADM

## 2021-09-21 RX ORDER — POTASSIUM CHLORIDE 750 MG/1
40 CAPSULE, EXTENDED RELEASE ORAL AS NEEDED
Status: DISCONTINUED | OUTPATIENT
Start: 2021-09-21 | End: 2021-09-24 | Stop reason: HOSPADM

## 2021-09-21 RX ORDER — BISACODYL 10 MG
10 SUPPOSITORY, RECTAL RECTAL DAILY
Status: DISCONTINUED | OUTPATIENT
Start: 2021-09-21 | End: 2021-09-24 | Stop reason: HOSPADM

## 2021-09-21 RX ADMIN — DEXTROSE AND SODIUM CHLORIDE 100 ML/HR: 5; 900 INJECTION, SOLUTION INTRAVENOUS at 00:42

## 2021-09-21 RX ADMIN — ONDANSETRON HYDROCHLORIDE 4 MG: 2 INJECTION, SOLUTION INTRAMUSCULAR; INTRAVENOUS at 13:58

## 2021-09-21 RX ADMIN — POTASSIUM CHLORIDE 10 MEQ: 7.46 INJECTION, SOLUTION INTRAVENOUS at 17:01

## 2021-09-21 RX ADMIN — HYDROMORPHONE HYDROCHLORIDE 0.5 MG: 1 INJECTION, SOLUTION INTRAMUSCULAR; INTRAVENOUS; SUBCUTANEOUS at 19:30

## 2021-09-21 RX ADMIN — CEFTRIAXONE SODIUM 1 G: 1 INJECTION, POWDER, FOR SOLUTION INTRAMUSCULAR; INTRAVENOUS at 15:38

## 2021-09-21 RX ADMIN — METOPROLOL TARTRATE 5 MG: 5 INJECTION INTRAVENOUS at 03:09

## 2021-09-21 RX ADMIN — ONDANSETRON HYDROCHLORIDE 4 MG: 2 INJECTION, SOLUTION INTRAMUSCULAR; INTRAVENOUS at 05:47

## 2021-09-21 RX ADMIN — HYDROMORPHONE HYDROCHLORIDE 0.5 MG: 1 INJECTION, SOLUTION INTRAMUSCULAR; INTRAVENOUS; SUBCUTANEOUS at 23:49

## 2021-09-21 RX ADMIN — METOCLOPRAMIDE 10 MG: 5 INJECTION, SOLUTION INTRAMUSCULAR; INTRAVENOUS at 05:47

## 2021-09-21 RX ADMIN — TECHNETIUM TC 99M SULFUR COLLOID 1 DOSE: KIT at 12:08

## 2021-09-21 RX ADMIN — PROMETHAZINE HYDROCHLORIDE 25 MG: 25 INJECTION INTRAMUSCULAR; INTRAVENOUS at 09:48

## 2021-09-21 RX ADMIN — POTASSIUM CHLORIDE 10 MEQ: 7.46 INJECTION, SOLUTION INTRAVENOUS at 19:37

## 2021-09-21 RX ADMIN — LORAZEPAM 1 MG: 2 INJECTION INTRAMUSCULAR; INTRAVENOUS at 20:34

## 2021-09-21 RX ADMIN — METOPROLOL TARTRATE 5 MG: 5 INJECTION INTRAVENOUS at 14:05

## 2021-09-21 RX ADMIN — BISACODYL 10 MG: 10 SUPPOSITORY RECTAL at 14:05

## 2021-09-21 RX ADMIN — MORPHINE SULFATE 2 MG: 2 INJECTION, SOLUTION INTRAMUSCULAR; INTRAVENOUS at 03:09

## 2021-09-21 RX ADMIN — POTASSIUM CHLORIDE 10 MEQ: 7.46 INJECTION, SOLUTION INTRAVENOUS at 20:34

## 2021-09-21 RX ADMIN — PANTOPRAZOLE SODIUM 40 MG: 40 INJECTION, POWDER, FOR SOLUTION INTRAVENOUS at 17:07

## 2021-09-21 RX ADMIN — DEXTROSE AND SODIUM CHLORIDE 100 ML/HR: 5; 900 INJECTION, SOLUTION INTRAVENOUS at 09:48

## 2021-09-21 RX ADMIN — PANTOPRAZOLE SODIUM 40 MG: 40 INJECTION, POWDER, FOR SOLUTION INTRAVENOUS at 05:47

## 2021-09-21 RX ADMIN — MORPHINE SULFATE 2 MG: 2 INJECTION, SOLUTION INTRAMUSCULAR; INTRAVENOUS at 13:58

## 2021-09-21 RX ADMIN — METOCLOPRAMIDE 10 MG: 5 INJECTION, SOLUTION INTRAMUSCULAR; INTRAVENOUS at 21:30

## 2021-09-21 RX ADMIN — METOPROLOL TARTRATE 5 MG: 5 INJECTION INTRAVENOUS at 19:30

## 2021-09-22 LAB
ANION GAP SERPL CALCULATED.3IONS-SCNC: 11 MMOL/L (ref 5–15)
BASOPHILS # BLD AUTO: 0.02 10*3/MM3 (ref 0–0.2)
BASOPHILS NFR BLD AUTO: 0.2 % (ref 0–1.5)
BUN SERPL-MCNC: 10 MG/DL (ref 8–23)
BUN/CREAT SERPL: 12 (ref 7–25)
CALCIUM SPEC-SCNC: 8.3 MG/DL (ref 8.6–10.5)
CHLORIDE SERPL-SCNC: 103 MMOL/L (ref 98–107)
CO2 SERPL-SCNC: 22 MMOL/L (ref 22–29)
CREAT SERPL-MCNC: 0.83 MG/DL (ref 0.57–1)
DEPRECATED RDW RBC AUTO: 46.2 FL (ref 37–54)
EOSINOPHIL # BLD AUTO: 0.33 10*3/MM3 (ref 0–0.4)
EOSINOPHIL NFR BLD AUTO: 3.9 % (ref 0.3–6.2)
ERYTHROCYTE [DISTWIDTH] IN BLOOD BY AUTOMATED COUNT: 15.9 % (ref 12.3–15.4)
GFR SERPL CREATININE-BSD FRML MDRD: 70 ML/MIN/1.73
GLUCOSE SERPL-MCNC: 109 MG/DL (ref 65–99)
HCT VFR BLD AUTO: 43.3 % (ref 34–46.6)
HGB BLD-MCNC: 15.2 G/DL (ref 12–15.9)
IMM GRANULOCYTES # BLD AUTO: 0.04 10*3/MM3 (ref 0–0.05)
IMM GRANULOCYTES NFR BLD AUTO: 0.5 % (ref 0–0.5)
LYMPHOCYTES # BLD AUTO: 1.88 10*3/MM3 (ref 0.7–3.1)
LYMPHOCYTES NFR BLD AUTO: 22.5 % (ref 19.6–45.3)
MAGNESIUM SERPL-MCNC: 1.7 MG/DL (ref 1.6–2.4)
MCH RBC QN AUTO: 29 PG (ref 26.6–33)
MCHC RBC AUTO-ENTMCNC: 35.1 G/DL (ref 31.5–35.7)
MCV RBC AUTO: 82.5 FL (ref 79–97)
MONOCYTES # BLD AUTO: 0.55 10*3/MM3 (ref 0.1–0.9)
MONOCYTES NFR BLD AUTO: 6.6 % (ref 5–12)
NEUTROPHILS NFR BLD AUTO: 5.55 10*3/MM3 (ref 1.7–7)
NEUTROPHILS NFR BLD AUTO: 66.3 % (ref 42.7–76)
NRBC BLD AUTO-RTO: 0 /100 WBC (ref 0–0.2)
PLATELET # BLD AUTO: 67 10*3/MM3 (ref 140–450)
PMV BLD AUTO: 11.3 FL (ref 6–12)
POTASSIUM SERPL-SCNC: 3.5 MMOL/L (ref 3.5–5.2)
POTASSIUM SERPL-SCNC: 3.6 MMOL/L (ref 3.5–5.2)
POTASSIUM SERPL-SCNC: 3.8 MMOL/L (ref 3.5–5.2)
RBC # BLD AUTO: 5.25 10*6/MM3 (ref 3.77–5.28)
SODIUM SERPL-SCNC: 136 MMOL/L (ref 136–145)
WBC # BLD AUTO: 8.37 10*3/MM3 (ref 3.4–10.8)

## 2021-09-22 PROCEDURE — 25010000002 CEFTRIAXONE PER 250 MG: Performed by: INTERNAL MEDICINE

## 2021-09-22 PROCEDURE — 85025 COMPLETE CBC W/AUTO DIFF WBC: CPT | Performed by: STUDENT IN AN ORGANIZED HEALTH CARE EDUCATION/TRAINING PROGRAM

## 2021-09-22 PROCEDURE — 25010000002 LORAZEPAM PER 2 MG: Performed by: INTERNAL MEDICINE

## 2021-09-22 PROCEDURE — 83735 ASSAY OF MAGNESIUM: CPT | Performed by: INTERNAL MEDICINE

## 2021-09-22 PROCEDURE — 25010000003 POTASSIUM CHLORIDE 10 MEQ/100ML SOLUTION: Performed by: INTERNAL MEDICINE

## 2021-09-22 PROCEDURE — 25010000002 HYDROMORPHONE 1 MG/ML SOLUTION: Performed by: INTERNAL MEDICINE

## 2021-09-22 PROCEDURE — 99232 SBSQ HOSP IP/OBS MODERATE 35: CPT | Performed by: INTERNAL MEDICINE

## 2021-09-22 PROCEDURE — 80048 BASIC METABOLIC PNL TOTAL CA: CPT | Performed by: STUDENT IN AN ORGANIZED HEALTH CARE EDUCATION/TRAINING PROGRAM

## 2021-09-22 PROCEDURE — 25010000002 METOCLOPRAMIDE PER 10 MG: Performed by: INTERNAL MEDICINE

## 2021-09-22 PROCEDURE — 84132 ASSAY OF SERUM POTASSIUM: CPT | Performed by: INTERNAL MEDICINE

## 2021-09-22 RX ADMIN — POTASSIUM CHLORIDE 10 MEQ: 7.46 INJECTION, SOLUTION INTRAVENOUS at 14:22

## 2021-09-22 RX ADMIN — HYDROMORPHONE HYDROCHLORIDE 0.5 MG: 1 INJECTION, SOLUTION INTRAMUSCULAR; INTRAVENOUS; SUBCUTANEOUS at 10:16

## 2021-09-22 RX ADMIN — SODIUM CHLORIDE, PRESERVATIVE FREE 10 ML: 5 INJECTION INTRAVENOUS at 08:36

## 2021-09-22 RX ADMIN — HYDROMORPHONE HYDROCHLORIDE 0.5 MG: 1 INJECTION, SOLUTION INTRAMUSCULAR; INTRAVENOUS; SUBCUTANEOUS at 05:22

## 2021-09-22 RX ADMIN — SODIUM CHLORIDE, PRESERVATIVE FREE 10 ML: 5 INJECTION INTRAVENOUS at 21:16

## 2021-09-22 RX ADMIN — BISACODYL 10 MG: 10 SUPPOSITORY RECTAL at 08:36

## 2021-09-22 RX ADMIN — CEFTRIAXONE SODIUM 1 G: 1 INJECTION, POWDER, FOR SOLUTION INTRAMUSCULAR; INTRAVENOUS at 15:32

## 2021-09-22 RX ADMIN — DEXTROSE AND SODIUM CHLORIDE 100 ML/HR: 5; 900 INJECTION, SOLUTION INTRAVENOUS at 15:32

## 2021-09-22 RX ADMIN — LORAZEPAM 1 MG: 2 INJECTION INTRAMUSCULAR; INTRAVENOUS at 08:45

## 2021-09-22 RX ADMIN — PANTOPRAZOLE SODIUM 40 MG: 40 INJECTION, POWDER, FOR SOLUTION INTRAVENOUS at 05:21

## 2021-09-22 RX ADMIN — METOPROLOL TARTRATE 5 MG: 5 INJECTION INTRAVENOUS at 12:06

## 2021-09-22 RX ADMIN — POTASSIUM CHLORIDE 10 MEQ: 7.46 INJECTION, SOLUTION INTRAVENOUS at 09:58

## 2021-09-22 RX ADMIN — METOPROLOL TARTRATE 5 MG: 5 INJECTION INTRAVENOUS at 05:22

## 2021-09-22 RX ADMIN — LORAZEPAM 1 MG: 2 INJECTION INTRAMUSCULAR; INTRAVENOUS at 02:06

## 2021-09-22 RX ADMIN — PANTOPRAZOLE SODIUM 40 MG: 40 INJECTION, POWDER, FOR SOLUTION INTRAVENOUS at 17:01

## 2021-09-22 RX ADMIN — LORAZEPAM 1 MG: 2 INJECTION INTRAMUSCULAR; INTRAVENOUS at 17:48

## 2021-09-22 RX ADMIN — POTASSIUM CHLORIDE 10 MEQ: 7.46 INJECTION, SOLUTION INTRAVENOUS at 12:47

## 2021-09-22 RX ADMIN — METOPROLOL TARTRATE 5 MG: 5 INJECTION INTRAVENOUS at 21:16

## 2021-09-22 RX ADMIN — POTASSIUM CHLORIDE 10 MEQ: 7.46 INJECTION, SOLUTION INTRAVENOUS at 11:00

## 2021-09-22 RX ADMIN — METOCLOPRAMIDE 10 MG: 5 INJECTION, SOLUTION INTRAMUSCULAR; INTRAVENOUS at 21:16

## 2021-09-22 RX ADMIN — METOCLOPRAMIDE 10 MG: 5 INJECTION, SOLUTION INTRAMUSCULAR; INTRAVENOUS at 05:22

## 2021-09-22 RX ADMIN — METOCLOPRAMIDE 10 MG: 5 INJECTION, SOLUTION INTRAMUSCULAR; INTRAVENOUS at 14:21

## 2021-09-22 RX ADMIN — DEXTROSE AND SODIUM CHLORIDE 100 ML/HR: 5; 900 INJECTION, SOLUTION INTRAVENOUS at 05:21

## 2021-09-23 PROBLEM — N39.0 E. COLI UTI (URINARY TRACT INFECTION): Chronic | Status: ACTIVE | Noted: 2021-09-23

## 2021-09-23 PROBLEM — K75.81 NASH (NONALCOHOLIC STEATOHEPATITIS): Chronic | Status: ACTIVE | Noted: 2017-07-13

## 2021-09-23 PROBLEM — B96.20 E. COLI UTI (URINARY TRACT INFECTION): Chronic | Status: ACTIVE | Noted: 2021-09-23

## 2021-09-23 PROBLEM — K31.84 GASTROPARESIS: Chronic | Status: ACTIVE | Noted: 2021-09-21

## 2021-09-23 PROBLEM — K59.04 CHRONIC IDIOPATHIC CONSTIPATION: Chronic | Status: ACTIVE | Noted: 2021-03-19

## 2021-09-23 LAB
ANION GAP SERPL CALCULATED.3IONS-SCNC: 7 MMOL/L (ref 5–15)
BACTERIA SPEC AEROBE CULT: ABNORMAL
BASOPHILS # BLD AUTO: 0.02 10*3/MM3 (ref 0–0.2)
BASOPHILS NFR BLD AUTO: 0.3 % (ref 0–1.5)
BUN SERPL-MCNC: 9 MG/DL (ref 8–23)
BUN/CREAT SERPL: 12.3 (ref 7–25)
CALCIUM SPEC-SCNC: 8.2 MG/DL (ref 8.6–10.5)
CHLORIDE SERPL-SCNC: 108 MMOL/L (ref 98–107)
CO2 SERPL-SCNC: 24 MMOL/L (ref 22–29)
CREAT SERPL-MCNC: 0.73 MG/DL (ref 0.57–1)
DEPRECATED RDW RBC AUTO: 45 FL (ref 37–54)
EOSINOPHIL # BLD AUTO: 0.23 10*3/MM3 (ref 0–0.4)
EOSINOPHIL NFR BLD AUTO: 3.2 % (ref 0.3–6.2)
ERYTHROCYTE [DISTWIDTH] IN BLOOD BY AUTOMATED COUNT: 15.6 % (ref 12.3–15.4)
GFR SERPL CREATININE-BSD FRML MDRD: 81 ML/MIN/1.73
GLUCOSE SERPL-MCNC: 110 MG/DL (ref 65–99)
HCT VFR BLD AUTO: 43 % (ref 34–46.6)
HGB BLD-MCNC: 15.3 G/DL (ref 12–15.9)
IMM GRANULOCYTES # BLD AUTO: 0.03 10*3/MM3 (ref 0–0.05)
IMM GRANULOCYTES NFR BLD AUTO: 0.4 % (ref 0–0.5)
LYMPHOCYTES # BLD AUTO: 1.29 10*3/MM3 (ref 0.7–3.1)
LYMPHOCYTES NFR BLD AUTO: 18.2 % (ref 19.6–45.3)
MCH RBC QN AUTO: 29.3 PG (ref 26.6–33)
MCHC RBC AUTO-ENTMCNC: 35.6 G/DL (ref 31.5–35.7)
MCV RBC AUTO: 82.2 FL (ref 79–97)
MONOCYTES # BLD AUTO: 0.44 10*3/MM3 (ref 0.1–0.9)
MONOCYTES NFR BLD AUTO: 6.2 % (ref 5–12)
NEUTROPHILS NFR BLD AUTO: 5.07 10*3/MM3 (ref 1.7–7)
NEUTROPHILS NFR BLD AUTO: 71.7 % (ref 42.7–76)
NRBC BLD AUTO-RTO: 0 /100 WBC (ref 0–0.2)
PLATELET # BLD AUTO: 68 10*3/MM3 (ref 140–450)
PMV BLD AUTO: 11.1 FL (ref 6–12)
POTASSIUM SERPL-SCNC: 3.5 MMOL/L (ref 3.5–5.2)
RBC # BLD AUTO: 5.23 10*6/MM3 (ref 3.77–5.28)
SODIUM SERPL-SCNC: 139 MMOL/L (ref 136–145)
WBC # BLD AUTO: 7.08 10*3/MM3 (ref 3.4–10.8)

## 2021-09-23 PROCEDURE — 99232 SBSQ HOSP IP/OBS MODERATE 35: CPT | Performed by: INTERNAL MEDICINE

## 2021-09-23 PROCEDURE — 25010000002 ENOXAPARIN PER 10 MG: Performed by: INTERNAL MEDICINE

## 2021-09-23 PROCEDURE — 25010000002 LORAZEPAM PER 2 MG: Performed by: INTERNAL MEDICINE

## 2021-09-23 PROCEDURE — 85025 COMPLETE CBC W/AUTO DIFF WBC: CPT | Performed by: STUDENT IN AN ORGANIZED HEALTH CARE EDUCATION/TRAINING PROGRAM

## 2021-09-23 PROCEDURE — 80048 BASIC METABOLIC PNL TOTAL CA: CPT | Performed by: STUDENT IN AN ORGANIZED HEALTH CARE EDUCATION/TRAINING PROGRAM

## 2021-09-23 PROCEDURE — 25010000002 METOCLOPRAMIDE PER 10 MG: Performed by: INTERNAL MEDICINE

## 2021-09-23 PROCEDURE — 25010000002 CEFTRIAXONE PER 250 MG: Performed by: INTERNAL MEDICINE

## 2021-09-23 PROCEDURE — 25010000002 ONDANSETRON PER 1 MG: Performed by: INTERNAL MEDICINE

## 2021-09-23 RX ORDER — LORAZEPAM 2 MG/ML
1 INJECTION INTRAMUSCULAR EVERY 6 HOURS PRN
Status: DISCONTINUED | OUTPATIENT
Start: 2021-09-23 | End: 2021-09-24 | Stop reason: HOSPADM

## 2021-09-23 RX ADMIN — METOCLOPRAMIDE 10 MG: 5 INJECTION, SOLUTION INTRAMUSCULAR; INTRAVENOUS at 14:18

## 2021-09-23 RX ADMIN — CEFTRIAXONE SODIUM 1 G: 1 INJECTION, POWDER, FOR SOLUTION INTRAMUSCULAR; INTRAVENOUS at 14:18

## 2021-09-23 RX ADMIN — PANTOPRAZOLE SODIUM 40 MG: 40 INJECTION, POWDER, FOR SOLUTION INTRAVENOUS at 05:00

## 2021-09-23 RX ADMIN — METOPROLOL TARTRATE 5 MG: 5 INJECTION INTRAVENOUS at 20:55

## 2021-09-23 RX ADMIN — LORAZEPAM 1 MG: 2 INJECTION INTRAMUSCULAR; INTRAVENOUS at 14:33

## 2021-09-23 RX ADMIN — BISACODYL 10 MG: 10 SUPPOSITORY RECTAL at 10:49

## 2021-09-23 RX ADMIN — SODIUM CHLORIDE, PRESERVATIVE FREE 10 ML: 5 INJECTION INTRAVENOUS at 22:47

## 2021-09-23 RX ADMIN — LORAZEPAM 1 MG: 2 INJECTION INTRAMUSCULAR; INTRAVENOUS at 20:54

## 2021-09-23 RX ADMIN — ONDANSETRON HYDROCHLORIDE 4 MG: 2 INJECTION, SOLUTION INTRAMUSCULAR; INTRAVENOUS at 10:59

## 2021-09-23 RX ADMIN — LORAZEPAM 1 MG: 2 INJECTION INTRAMUSCULAR; INTRAVENOUS at 08:02

## 2021-09-23 RX ADMIN — METOCLOPRAMIDE 10 MG: 5 INJECTION, SOLUTION INTRAMUSCULAR; INTRAVENOUS at 20:55

## 2021-09-23 RX ADMIN — LORAZEPAM 1 MG: 2 INJECTION INTRAMUSCULAR; INTRAVENOUS at 01:28

## 2021-09-23 RX ADMIN — METOCLOPRAMIDE 10 MG: 5 INJECTION, SOLUTION INTRAMUSCULAR; INTRAVENOUS at 05:18

## 2021-09-23 RX ADMIN — DEXTROSE AND SODIUM CHLORIDE 100 ML/HR: 5; 900 INJECTION, SOLUTION INTRAVENOUS at 05:04

## 2021-09-23 RX ADMIN — ENOXAPARIN SODIUM 40 MG: 40 INJECTION SUBCUTANEOUS at 20:56

## 2021-09-24 ENCOUNTER — HOME HEALTH ADMISSION (OUTPATIENT)
Dept: HOME HEALTH SERVICES | Facility: HOME HEALTHCARE | Age: 61
End: 2021-09-24

## 2021-09-24 ENCOUNTER — READMISSION MANAGEMENT (OUTPATIENT)
Dept: CALL CENTER | Facility: HOSPITAL | Age: 61
End: 2021-09-24

## 2021-09-24 VITALS
TEMPERATURE: 98 F | HEIGHT: 64 IN | OXYGEN SATURATION: 95 % | DIASTOLIC BLOOD PRESSURE: 84 MMHG | WEIGHT: 193.2 LBS | RESPIRATION RATE: 16 BRPM | HEART RATE: 84 BPM | BODY MASS INDEX: 32.98 KG/M2 | SYSTOLIC BLOOD PRESSURE: 129 MMHG

## 2021-09-24 PROBLEM — K44.9 HIATAL HERNIA: Status: ACTIVE | Noted: 2021-09-24

## 2021-09-24 PROBLEM — K29.00 ACUTE GASTRITIS: Chronic | Status: ACTIVE | Noted: 2021-09-24

## 2021-09-24 LAB
ANION GAP SERPL CALCULATED.3IONS-SCNC: 8 MMOL/L (ref 5–15)
ANISOCYTOSIS BLD QL: NORMAL
BASOPHILS # BLD AUTO: 0.05 10*3/MM3 (ref 0–0.2)
BASOPHILS NFR BLD AUTO: 0.4 % (ref 0–1.5)
BUN SERPL-MCNC: 9 MG/DL (ref 8–23)
BUN/CREAT SERPL: 10.8 (ref 7–25)
CALCIUM SPEC-SCNC: 8.2 MG/DL (ref 8.6–10.5)
CHLORIDE SERPL-SCNC: 109 MMOL/L (ref 98–107)
CO2 SERPL-SCNC: 22 MMOL/L (ref 22–29)
CREAT SERPL-MCNC: 0.83 MG/DL (ref 0.57–1)
DEPRECATED RDW RBC AUTO: 44.8 FL (ref 37–54)
EOSINOPHIL # BLD AUTO: 0.31 10*3/MM3 (ref 0–0.4)
EOSINOPHIL NFR BLD AUTO: 2.7 % (ref 0.3–6.2)
ERYTHROCYTE [DISTWIDTH] IN BLOOD BY AUTOMATED COUNT: 15.8 % (ref 12.3–15.4)
GFR SERPL CREATININE-BSD FRML MDRD: 70 ML/MIN/1.73
GLUCOSE BLDC GLUCOMTR-MCNC: 132 MG/DL (ref 70–130)
GLUCOSE SERPL-MCNC: 104 MG/DL (ref 65–99)
HCT VFR BLD AUTO: 43.9 % (ref 34–46.6)
HGB BLD-MCNC: 15.5 G/DL (ref 12–15.9)
IMM GRANULOCYTES # BLD AUTO: 0.07 10*3/MM3 (ref 0–0.05)
IMM GRANULOCYTES NFR BLD AUTO: 0.6 % (ref 0–0.5)
LAB AP CASE REPORT: NORMAL
LYMPHOCYTES # BLD AUTO: 2.24 10*3/MM3 (ref 0.7–3.1)
LYMPHOCYTES NFR BLD AUTO: 19.3 % (ref 19.6–45.3)
MCH RBC QN AUTO: 28.7 PG (ref 26.6–33)
MCHC RBC AUTO-ENTMCNC: 35.3 G/DL (ref 31.5–35.7)
MCV RBC AUTO: 81.3 FL (ref 79–97)
MONOCYTES # BLD AUTO: 0.96 10*3/MM3 (ref 0.1–0.9)
MONOCYTES NFR BLD AUTO: 8.3 % (ref 5–12)
NEUTROPHILS NFR BLD AUTO: 68.7 % (ref 42.7–76)
NEUTROPHILS NFR BLD AUTO: 7.99 10*3/MM3 (ref 1.7–7)
NRBC BLD AUTO-RTO: 0 /100 WBC (ref 0–0.2)
PATH REPORT.FINAL DX SPEC: NORMAL
PLATELET # BLD AUTO: 96 10*3/MM3 (ref 140–450)
PMV BLD AUTO: 10.3 FL (ref 6–12)
POTASSIUM SERPL-SCNC: 3.5 MMOL/L (ref 3.5–5.2)
RBC # BLD AUTO: 5.4 10*6/MM3 (ref 3.77–5.28)
SMALL PLATELETS BLD QL SMEAR: NORMAL
SODIUM SERPL-SCNC: 139 MMOL/L (ref 136–145)
WBC # BLD AUTO: 11.62 10*3/MM3 (ref 3.4–10.8)
WBC MORPH BLD: NORMAL

## 2021-09-24 PROCEDURE — 25010000002 LORAZEPAM PER 2 MG: Performed by: INTERNAL MEDICINE

## 2021-09-24 PROCEDURE — 25010000002 METOCLOPRAMIDE PER 10 MG: Performed by: INTERNAL MEDICINE

## 2021-09-24 PROCEDURE — 82962 GLUCOSE BLOOD TEST: CPT

## 2021-09-24 PROCEDURE — 85025 COMPLETE CBC W/AUTO DIFF WBC: CPT | Performed by: STUDENT IN AN ORGANIZED HEALTH CARE EDUCATION/TRAINING PROGRAM

## 2021-09-24 PROCEDURE — 80048 BASIC METABOLIC PNL TOTAL CA: CPT | Performed by: STUDENT IN AN ORGANIZED HEALTH CARE EDUCATION/TRAINING PROGRAM

## 2021-09-24 PROCEDURE — 85007 BL SMEAR W/DIFF WBC COUNT: CPT | Performed by: STUDENT IN AN ORGANIZED HEALTH CARE EDUCATION/TRAINING PROGRAM

## 2021-09-24 RX ORDER — PANTOPRAZOLE SODIUM 40 MG/1
40 TABLET, DELAYED RELEASE ORAL DAILY
Qty: 30 TABLET | Refills: 1 | Status: SHIPPED | OUTPATIENT
Start: 2021-09-24 | End: 2022-01-25

## 2021-09-24 RX ORDER — ACETAMINOPHEN 325 MG/1
650 TABLET ORAL EVERY 4 HOURS PRN
Start: 2021-09-24 | End: 2022-01-25

## 2021-09-24 RX ORDER — POTASSIUM CHLORIDE 750 MG/1
20 CAPSULE, EXTENDED RELEASE ORAL DAILY
Qty: 30 CAPSULE | Refills: 1 | Status: SHIPPED | OUTPATIENT
Start: 2021-09-24

## 2021-09-24 RX ORDER — BISACODYL 10 MG
10 SUPPOSITORY, RECTAL RECTAL DAILY
Qty: 20 SUPPOSITORY | Refills: 0 | Status: SHIPPED | OUTPATIENT
Start: 2021-09-25 | End: 2022-01-25

## 2021-09-24 RX ORDER — LEVOFLOXACIN 500 MG/1
500 TABLET, FILM COATED ORAL DAILY
Qty: 3 TABLET | Refills: 0 | Status: SHIPPED | OUTPATIENT
Start: 2021-09-24 | End: 2022-01-25

## 2021-09-24 RX ADMIN — METOPROLOL TARTRATE 5 MG: 5 INJECTION INTRAVENOUS at 04:51

## 2021-09-24 RX ADMIN — METOCLOPRAMIDE 10 MG: 5 INJECTION, SOLUTION INTRAMUSCULAR; INTRAVENOUS at 06:24

## 2021-09-24 RX ADMIN — PANTOPRAZOLE SODIUM 40 MG: 40 INJECTION, POWDER, FOR SOLUTION INTRAVENOUS at 06:24

## 2021-09-24 RX ADMIN — LORAZEPAM 1 MG: 2 INJECTION INTRAMUSCULAR; INTRAVENOUS at 03:01

## 2021-09-24 RX ADMIN — ACETAMINOPHEN 650 MG: 325 TABLET, FILM COATED ORAL at 01:09

## 2021-09-24 NOTE — OUTREACH NOTE
Prep Survey      Responses   [facility] facility patient discharged from?  [place]   Is LACE score < 7 ?  Yes   Emergency Room discharge w/ pulse ox?  No   Eligibility  Readm Mgmt   Discharge diagnosis  Nausea and vomiting   Does the patient have one of the following disease processes/diagnoses(primary or secondary)?  Other   Does the patient have Home health ordered?  Yes   What is the Home health agency?   [facility]    Is there a DME ordered?  No   Prep survey completed?  Yes          MAITE Shearer RN

## 2021-09-26 ENCOUNTER — HOME CARE VISIT (OUTPATIENT)
Dept: HOME HEALTH SERVICES | Facility: HOME HEALTHCARE | Age: 61
End: 2021-09-26

## 2021-10-14 ENCOUNTER — LAB (OUTPATIENT)
Dept: LAB | Facility: HOSPITAL | Age: 61
End: 2021-10-14

## 2021-10-14 DIAGNOSIS — R91.1 LUNG NODULE: ICD-10-CM

## 2021-10-14 DIAGNOSIS — D69.6 THROMBOCYTOPENIA (HCC): ICD-10-CM

## 2021-10-14 DIAGNOSIS — R16.1 SPLENOMEGALY: ICD-10-CM

## 2021-10-14 DIAGNOSIS — K74.69 OTHER CIRRHOSIS OF LIVER (HCC): ICD-10-CM

## 2021-10-14 DIAGNOSIS — T45.4X5A ADVERSE EFFECT OF IRON, INITIAL ENCOUNTER: ICD-10-CM

## 2021-10-14 LAB
ALPHA-FETOPROTEIN: 2.64 NG/ML (ref 0–8.3)
ANISOCYTOSIS BLD QL: NORMAL
BASOPHILS # BLD AUTO: 0.02 10*3/MM3 (ref 0–0.2)
BASOPHILS NFR BLD AUTO: 0.6 % (ref 0–1.5)
CLUMPED PLATELETS: PRESENT
DEPRECATED RDW RBC AUTO: 56.3 FL (ref 37–54)
EOSINOPHIL # BLD AUTO: 0.09 10*3/MM3 (ref 0–0.4)
EOSINOPHIL NFR BLD AUTO: 2.5 % (ref 0.3–6.2)
ERYTHROCYTE [DISTWIDTH] IN BLOOD BY AUTOMATED COUNT: 17.3 % (ref 12.3–15.4)
FERRITIN SERPL-MCNC: 247 NG/ML (ref 13–150)
FOLATE SERPL-MCNC: 14 NG/ML (ref 4.78–24.2)
HCT VFR BLD AUTO: 38.7 % (ref 34–46.6)
HGB BLD-MCNC: 12.7 G/DL (ref 12–15.9)
IMM GRANULOCYTES # BLD AUTO: 0.01 10*3/MM3 (ref 0–0.05)
IMM GRANULOCYTES NFR BLD AUTO: 0.3 % (ref 0–0.5)
IRON 24H UR-MRATE: 56 MCG/DL (ref 37–145)
IRON SATN MFR SERPL: 19 % (ref 20–50)
LYMPHOCYTES # BLD AUTO: 0.64 10*3/MM3 (ref 0.7–3.1)
LYMPHOCYTES NFR BLD AUTO: 17.9 % (ref 19.6–45.3)
MCH RBC QN AUTO: 29.3 PG (ref 26.6–33)
MCHC RBC AUTO-ENTMCNC: 32.8 G/DL (ref 31.5–35.7)
MCV RBC AUTO: 89.2 FL (ref 79–97)
MONOCYTES # BLD AUTO: 0.26 10*3/MM3 (ref 0.1–0.9)
MONOCYTES NFR BLD AUTO: 7.3 % (ref 5–12)
NEUTROPHILS NFR BLD AUTO: 2.55 10*3/MM3 (ref 1.7–7)
NEUTROPHILS NFR BLD AUTO: 71.4 % (ref 42.7–76)
NRBC BLD AUTO-RTO: 0 /100 WBC (ref 0–0.2)
PLATELET # BLD AUTO: 55 10*3/MM3 (ref 140–450)
PMV BLD AUTO: 11.6 FL (ref 6–12)
RBC # BLD AUTO: 4.34 10*6/MM3 (ref 3.77–5.28)
SMALL PLATELETS BLD QL SMEAR: NORMAL
TIBC SERPL-MCNC: 292 MCG/DL (ref 298–536)
TRANSFERRIN SERPL-MCNC: 196 MG/DL (ref 200–360)
VIT B12 BLD-MCNC: 859 PG/ML (ref 211–946)
WBC # BLD AUTO: 3.57 10*3/MM3 (ref 3.4–10.8)
WBC MORPH BLD: NORMAL

## 2021-10-14 PROCEDURE — 82977 ASSAY OF GGT: CPT | Performed by: PHYSICIAN ASSISTANT

## 2021-10-14 PROCEDURE — 85007 BL SMEAR W/DIFF WBC COUNT: CPT

## 2021-10-14 PROCEDURE — 84478 ASSAY OF TRIGLYCERIDES: CPT | Performed by: PHYSICIAN ASSISTANT

## 2021-10-14 PROCEDURE — 82105 ALPHA-FETOPROTEIN SERUM: CPT | Performed by: PHYSICIAN ASSISTANT

## 2021-10-14 PROCEDURE — 82465 ASSAY BLD/SERUM CHOLESTEROL: CPT | Performed by: PHYSICIAN ASSISTANT

## 2021-10-14 PROCEDURE — 82247 BILIRUBIN TOTAL: CPT | Performed by: PHYSICIAN ASSISTANT

## 2021-10-14 PROCEDURE — 85025 COMPLETE CBC W/AUTO DIFF WBC: CPT

## 2021-10-14 PROCEDURE — 82172 ASSAY OF APOLIPOPROTEIN: CPT | Performed by: PHYSICIAN ASSISTANT

## 2021-10-14 PROCEDURE — 83883 ASSAY NEPHELOMETRY NOT SPEC: CPT | Performed by: PHYSICIAN ASSISTANT

## 2021-10-14 PROCEDURE — 82728 ASSAY OF FERRITIN: CPT

## 2021-10-14 PROCEDURE — 82947 ASSAY GLUCOSE BLOOD QUANT: CPT | Performed by: PHYSICIAN ASSISTANT

## 2021-10-14 PROCEDURE — 85610 PROTHROMBIN TIME: CPT | Performed by: PHYSICIAN ASSISTANT

## 2021-10-14 PROCEDURE — 84460 ALANINE AMINO (ALT) (SGPT): CPT | Performed by: PHYSICIAN ASSISTANT

## 2021-10-14 PROCEDURE — 84450 TRANSFERASE (AST) (SGOT): CPT | Performed by: PHYSICIAN ASSISTANT

## 2021-10-14 PROCEDURE — 82607 VITAMIN B-12: CPT

## 2021-10-14 PROCEDURE — 83010 ASSAY OF HAPTOGLOBIN QUANT: CPT | Performed by: PHYSICIAN ASSISTANT

## 2021-10-14 PROCEDURE — 82140 ASSAY OF AMMONIA: CPT | Performed by: PHYSICIAN ASSISTANT

## 2021-10-14 PROCEDURE — 83540 ASSAY OF IRON: CPT

## 2021-10-14 PROCEDURE — 82746 ASSAY OF FOLIC ACID SERUM: CPT

## 2021-10-14 PROCEDURE — 84466 ASSAY OF TRANSFERRIN: CPT

## 2021-10-15 LAB
AMMONIA BLD-SCNC: 58 UMOL/L (ref 11–51)
INR PPP: 1.31 (ref 0.8–1.2)
PROTHROMBIN TIME: 16.1 SECONDS (ref 11.1–15.3)

## 2021-10-16 LAB
A2 MACROGLOB SERPL-MCNC: 262 MG/DL (ref 110–276)
ALT SERPL W P-5'-P-CCNC: 50 IU/L (ref 0–40)
APO A-I SERPL-MCNC: 99 MG/DL (ref 116–209)
AST SERPL W P-5'-P-CCNC: 59 IU/L (ref 0–40)
BILIRUB SERPL-MCNC: 0.5 MG/DL (ref 0–1.2)
CHOLEST SERPL-MCNC: 118 MG/DL (ref 100–199)
FIBROSIS SCORING:: ABNORMAL
FIBROSIS STAGE SERPL QL: ABNORMAL
GGT SERPL-CCNC: 50 IU/L (ref 0–60)
GLUCOSE SERPL-MCNC: 102 MG/DL (ref 65–99)
HAPTOGLOB SERPL-MCNC: 38 MG/DL (ref 37–355)
INTERPRETATIONS: (REFERENCE): ABNORMAL
LABORATORY COMMENT REPORT: ABNORMAL
LIVER FIBR SCORE SERPL CALC.FIBROSURE: 0.69 (ref 0–0.21)
NASH SCORING (REFERENCE): ABNORMAL
NECROINFLAMMATORY ACT GRADE SERPL QL: ABNORMAL
NECROINFLAMMATORY ACT SCORE SERPL: 0.75
SERVICE CMNT-IMP: ABNORMAL
STEATOSIS GRADE (REFERENCE): ABNORMAL
STEATOSIS GRADING (REFERENCE): ABNORMAL
STEATOSIS SCORE (REFERENCE): 0.66 (ref 0–0.3)
TRIGL SERPL-MCNC: 71 MG/DL (ref 0–149)

## 2021-10-19 ENCOUNTER — OFFICE VISIT (OUTPATIENT)
Dept: GASTROENTEROLOGY | Facility: CLINIC | Age: 61
End: 2021-10-19

## 2021-10-19 VITALS
HEIGHT: 64 IN | HEART RATE: 76 BPM | BODY MASS INDEX: 35.34 KG/M2 | SYSTOLIC BLOOD PRESSURE: 122 MMHG | DIASTOLIC BLOOD PRESSURE: 66 MMHG | WEIGHT: 207 LBS

## 2021-10-19 DIAGNOSIS — K74.60 CIRRHOSIS OF LIVER WITHOUT ASCITES, UNSPECIFIED HEPATIC CIRRHOSIS TYPE (HCC): ICD-10-CM

## 2021-10-19 DIAGNOSIS — R10.84 GENERALIZED ABDOMINAL PAIN: Primary | ICD-10-CM

## 2021-10-19 DIAGNOSIS — K59.04 CHRONIC IDIOPATHIC CONSTIPATION: ICD-10-CM

## 2021-10-19 DIAGNOSIS — K72.10 END STAGE LIVER DISEASE (HCC): ICD-10-CM

## 2021-10-19 DIAGNOSIS — K75.81 NASH (NONALCOHOLIC STEATOHEPATITIS): ICD-10-CM

## 2021-10-19 DIAGNOSIS — R11.2 NAUSEA AND VOMITING, INTRACTABILITY OF VOMITING NOT SPECIFIED, UNSPECIFIED VOMITING TYPE: ICD-10-CM

## 2021-10-19 PROCEDURE — 99214 OFFICE O/P EST MOD 30 MIN: CPT | Performed by: PHYSICIAN ASSISTANT

## 2021-10-19 RX ORDER — GABAPENTIN 800 MG/1
800 TABLET ORAL 3 TIMES DAILY
COMMUNITY
Start: 2021-10-19

## 2021-10-19 RX ORDER — SUCRALFATE 1 G/1
1 TABLET ORAL 4 TIMES DAILY
Qty: 120 TABLET | Refills: 2 | Status: SHIPPED | OUTPATIENT
Start: 2021-10-19

## 2021-10-19 RX ORDER — OXYCODONE HYDROCHLORIDE 10 MG/1
10 TABLET ORAL
COMMUNITY
Start: 2021-10-19 | End: 2022-01-25

## 2021-10-19 RX ORDER — FUROSEMIDE 20 MG/1
TABLET ORAL
COMMUNITY
Start: 2021-10-14 | End: 2022-01-25

## 2021-10-19 NOTE — PROGRESS NOTES
Chief Complaint   Patient presents with   • Nausea   • Vomiting   • MUSA   • Abdominal Pain       ENDO PROCEDURE ORDERED:    Subjective    Amira Shannon is a 60 y.o. female. she is here today for follow-up.    History of Present Illness    Patient is seen on a recheck of her nausea, vomiting, abdominal pain, MUSA, F4/S1/N1. Last seen 09/15/2021. Patient presents with 6 out of 10 abdominal pain. She is accompanied today with family member. She feels very weak. She is Protonix 40 mg daily for chronic heartburn. Denied dysphagia. She is still having a lot of constipation despite taking Relistor and MiraLAX. She is on Lasix 20 mg b.i.d. as well as Aldactone 25 mg a day. Weight is up 14 pounds since last visit. She does have bilateral lower extremity edema. Last EGD/colonoscopy on 03/22/2021 showed gastritis and hemorrhoids. She states she had endoscopy at [facility] with her recent hospitalization.     I saw the patient on 09/15/2021. She ended up being admitted until 09/24/2021 for nausea and vomiting. An NG tube was placed. She was recommended to go to the motility clinic at  of . She had an EGD done here which showed a medium hiatal hernia, gastritis with negative biopsy for H. pylori. There was a fullness in the duodenum but small bowel was not endoscoped as I had recommended at her last visit. She had gastric emptying study with no emptying at 26 minutes. It was terminated secondary to nausea and vomiting. She had a CT scan of abdomen and pelvis with contrast that showed a dilated stomach, nodular liver, enlarged spleen, varices, trace pelvic fluid.     Patient had a 4-hour emptying study on 09/27/2021 that showed a half-life 135 minutes; at 60 minutes was 18%, at 120 minutes 41%, at 240 minutes was 79%. She had CT scan repeated of abdomen and pelvis at [email or web address] She had a feeding tube in the jejunum, wall thickening of the distal small bowel and cecum to ascending colon. They did an EGD on 09/30/2021 that  showed stenosis of the third portion of the duodenum. NG tube was placed. She was recommended to have a repeat EGD with endoscopic ultrasound at that time.     She had a CMP on 10/06/2021 that showed chloride 109, glucose 105, protein 5.3, albumin 2.4, calcium 8.2, AST 57, otherwise normal.     Laboratories on 10/14/2021 showed normal B12, folate 14, ferritin 247, iron 56 with 19% saturation. CBC showed platelets 55,000; INR 1.31. MUSA FibroSURE 0.69/F3, steatosis 0.66/S2, 0.75/N2. AST 59, ALT 50, glucose 102. Ammonia was 58. AFP was normal.     ASSESSMENT/PLAN:  Patient with end-stage liver disease. Significant abnormalities of the small bowel. I would suspect possible ischemia. This was not clearly delineated in the endoscopies but this would be my suspicion. I encouraged her to continue current medications. We will try adding Fish oil for the constipation along with her other medications. We will also add Carafate to try to help coat her small bowel. She is planning to go back to [place] in 3 months at their recommendation for further workup. We will plan followup in 6 weeks. Further pending clinical course and the results of the above.      The following portions of the patient's history were reviewed and updated as appropriate:   Past Medical History:   Diagnosis Date   • Acid reflux    • Altered bowel function    • Arthropathy of lumbar facet joint    • Bleeding disorder (HCC)    • C. difficile diarrhea 2015   • Chronic idiopathic constipation    • Colonic inertia    • Constipation    • Corns and callus    • Depression    • Disease related peripheral neuropathy    • Epigastric pain    • Fatty liver    • Hammer toe    • Headache    • Hiatal hernia    • History of transfusion    • Hyperlipidemia    • Knee pain    • Localized, primary osteoarthritis of the ankle and foot     Localized, primary osteoarthritis of the ankle and/or foot   • Mendoza's metatarsalgia     Mendoza's metatarsalgia - 2nd interspace on  "right   • Nausea and vomiting    • Neuralgia and neuritis     Neuralgia, neuritis, and radiculitis, unspecified   • Neuropathy    • Obstructive sleep apnea     Obstructive sleep apnea (adult) (pediatric)    • CHAI on CPAP     \"C-Pap at night  (unconfirmed)\"   • Osteoarthritis    • Pain in foot     Pain in unspecified foot - sees a podiatrist   • Pain in joint, ankle and foot     Joint pain in ankle and foot      • Pain radiating to back     Pain radiating to lumbar region of back   • Pelvic floor dysfunction    • Plantar fasciitis    • PONV (postoperative nausea and vomiting)    • Restless leg syndrome    • Secondary hypertension     Secondary hypertension, unspecified   • Sinusitis    • Sleep apnea    • Tongue anomaly     lesion     Past Surgical History:   Procedure Laterality Date   • CARPAL TUNNEL RELEASE Left 2018    Procedure: CARPAL TUNNEL RELEASE - left;  Surgeon: Justus Lewis MD;  Location: [facility] OR;  Service: Orthopedics   • CARPAL TUNNEL RELEASE Right 2019    Procedure: carpal tunnel release right hand with local/monitored anesthesia care;  Surgeon: Justus Lewis MD;  Location: [facility] OR;  Service: Orthopedics   •  SECTION     • CHOLECYSTECTOMY     • COLONOSCOPY  2013   • COLONOSCOPY N/A 10/17/2018    Procedure: COLONOSCOPY;  Surgeon: Russell Del Rio MD;  Location: [facility] ENDOSCOPY;  Service: Gastroenterology   • COLONOSCOPY N/A 3/22/2021    Procedure: COLONOSCOPY;  Surgeon: Russell Del Rio MD;  Location: [facility] ENDOSCOPY;  Service: Gastroenterology;  Laterality: N/A;   • DIRECT LARYNGOSCOPY, ESOPHAGOSCOPY, BRONCHOSCOPY N/A 2017    Procedure: DIRECT LARYNGOSCOPY AND;  Surgeon: Live Bolton MD;  Location: [facility] OR;  Service:    • ENDOSCOPY  2013    Colon endoscopy 36471 (1) - Internal & external hemorrhoids found. Stool found.   • ENDOSCOPY  2013    EGD w/ tube 42292 (1) - Normal esophagus. Gastritis in stomach. Biopsy taken. Normal " dudoenum. Biopsy taken.   • ENDOSCOPY N/A 10/17/2018    Procedure: ESOPHAGOGASTRODUODENOSCOPY--eval varices;  Surgeon: Russell Del Rio MD;  Location: [facility] ENDOSCOPY;  Service: Gastroenterology   • ENDOSCOPY N/A 3/22/2021    Procedure: ESOPHAGOGASTRODUODENOSCOPYURGNET;  Surgeon: Russell Del Rio MD;  Location: [facility] ENDOSCOPY;  Service: Gastroenterology;  Laterality: N/A;   • ENDOSCOPY N/A 9/16/2021    Procedure: ESOPHAGOGASTRODUODENOSCOPY;  Surgeon: Basia Saunders MD;  Location: [facility] ENDOSCOPY;  Service: Gastroenterology;  Laterality: N/A;   • FOOT SURGERY  02/26/2013    Foot/toes surgery procedure (1) - Arthroplasty of toes 4 and 5 of right foot.   • HERNIA REPAIR     • HERNIA REPAIR      hital   • HYSTERECTOMY     • LIVER BIOPSY     • NERVE BLOCK  07/25/2016    Injection for nerve block (1) - Lumbar medial branch block.   • OTHER SURGICAL HISTORY  12/03/2012    Inj(s) Tend-Sheath, Ligament, Single 20550 (1) - [facility] ([facility])    • OTHER SURGICAL HISTORY  06/24/2013    Small Joint Injection/Aspiration 20600 (2) - [facility]ICK[facility] ([facility])    • OTHER SURGICAL HISTORY  2011    bowel obstruction x2   • OTHER SURGICAL HISTORY      gland removed from neck   • SUBLINGUAL SALIVARY CYST EXCISION N/A 8/1/2017    Procedure: EXCISION OF LEFT  TONGUE LESION WITH CLOSURE;  Surgeon: Live Bolton MD;  Location: [facility] OR;  Service:    • TUBAL ABDOMINAL LIGATION     • UPPER GASTROINTESTINAL ENDOSCOPY  07/01/2013   • UPPER GASTROINTESTINAL ENDOSCOPY  10/17/2018   • UPPER GASTROINTESTINAL ENDOSCOPY  03/22/2021     Family History   Problem Relation Age of Onset   • Cancer Other    • Diabetes Other    • Heart disease Other    • Hypertension Mother    • Cancer Mother    • Diabetes Mother    • Hypertension Father    • Heart attack Father    • Cancer Father    • Heart disease Father    • Thyroid disease Maternal Aunt    • Cancer Maternal Aunt    • No Known Problems Sister    • No Known Problems Brother    •  Cancer Maternal Grandmother    • No Known Problems Sister      OB History        1    Para   1    Term                AB        Living   1       SAB        IAB        Ectopic        Molar        Multiple        Live Births                  Allergies   Allergen Reactions   • Morphine GI Intolerance   • Tape Rash     Patient has rash/blishers on site after tape   • Other      Pt states that taking steroids either in pill or injection form make her have blisters in her mouth and she feels like she is on fire on the inside/ has hx of c diff and possible MRSA     • Corticosteroids Rash   • Kenalog  [Triamcinolone Acetonide] Rash   • Sulfa Antibiotics Rash     Sulfa (Sulfonamide Antibiotics)     Social History     Socioeconomic History   • Marital status:    Tobacco Use   • Smoking status: Former Smoker     Packs/day: 2.00     Years: 15.00     Pack years: 30.00     Types: Cigarettes     Quit date:      Years since quittin.8   • Smokeless tobacco: Never Used   Vaping Use   • Vaping Use: Never used   Substance and Sexual Activity   • Alcohol use: No   • Drug use: Never   • Sexual activity: Defer     Birth control/protection: Surgical     Comment: Marital Status: .  Hysterectomy.     Current Medications:  Prior to Admission medications    Medication Sig Start Date End Date Taking? Authorizing Provider   acetaminophen (TYLENOL) 325 MG tablet Take 2 tablets by mouth Every 4 (Four) Hours As Needed for Mild Pain . 21  Yes Fidencio Mathews MD   bisacodyl (DULCOLAX) 10 MG suppository Insert 1 suppository into the rectum Daily. 21  Yes Fidencio Mathews MD   escitalopram (LEXAPRO) 10 MG tablet Take 10 mg by mouth Daily. 3/25/21  Yes Promise Tovar MD   folic acid (FOLVITE) 1 MG tablet Take 1 tablet by mouth on Monday, Wednesday and 21  Yes Lokesh Goodwin MD   furosemide (LASIX) 20 MG tablet  10/14/21  Yes Promise Tovar MD   gabapentin (NEURONTIN) 800 MG tablet  "Take 800 mg by mouth 3 (Three) Times a Day. 10/19/21  Yes Promise Tovar MD   levoFLOXacin (Levaquin) 500 MG tablet Take 1 tablet by mouth Daily. 9/24/21  Yes Fidencio Mathews MD   ondansetron (ZOFRAN) 8 MG tablet Take 1 tablet by mouth Every 8 (Eight) Hours As Needed for Nausea or Vomiting. 1/26/21  Yes Blaine Perales PA-C   oxybutynin (DITROPAN) 5 MG tablet Take 5 mg by mouth Daily. 8/2/21  Yes Promise Tovar MD   oxyCODONE (ROXICODONE) 10 MG tablet Take 10 mg by mouth. 10/19/21  Yes Promise Tovar MD   pantoprazole (PROTONIX) 40 MG EC tablet Take 1 tablet by mouth Daily. 9/24/21  Yes Fidecnio Mathews MD   polyethylene glycol (MiraLax) 17 GM/SCOOP powder Take 17 g by mouth 2 (Two) Times a Day. 5/24/21  Yes Segundo Molina MD   potassium chloride (MICRO-K) 10 MEQ CR capsule Take 2 capsules by mouth Daily. 9/24/21  Yes Fidencio Mathews MD   Relistor 150 MG tablet Take 3 tablets by mouth once daily 8/24/21  Yes Blaine Perales PA-C   Sodium Phosphates (fleet enema) 7-19 GM/118ML enema Insert 1 enema into the rectum As Needed.   Yes ProviderPromise MD   spironolactone (ALDACTONE) 25 MG tablet TAKE 1 TABLET BY MOUTH ONCE DAILY  Patient taking differently: 25 mg 2 (Two) Times a Day. 6/11/19  Yes Maureen Cardoso APRN   traZODone (DESYREL) 100 MG tablet Take 1 tablet by mouth Every Night. 6/11/21  Yes Blaine Perales PA-C   vitamin B-12 (CYANOCOBALAMIN) 1000 MCG tablet Take 1 tablet by mouth on Monday, Wednesday and Friday 6/25/21  Yes Lokesh Goodwin MD     Review of Systems  Review of Systems       Objective    /66   Pulse 76   Ht 162.6 cm (64\")   Wt 93.9 kg (207 lb)   LMP  (LMP Unknown)   BMI 35.53 kg/m²   Physical Exam  Vitals and nursing note reviewed.   Constitutional:       General: She is not in acute distress.     Appearance: She is well-developed. She is ill-appearing.      Comments: MERY   HENT:      Head: Normocephalic and atraumatic.   Eyes:      Pupils: Pupils are " equal, round, and reactive to light.   Cardiovascular:      Rate and Rhythm: Normal rate and regular rhythm.      Heart sounds: Normal heart sounds.   Pulmonary:      Effort: Pulmonary effort is normal.      Breath sounds: Normal breath sounds.   Abdominal:      General: Bowel sounds are normal. There is distension. There is no abdominal bruit.      Palpations: Abdomen is soft. Abdomen is not rigid. There is no shifting dullness or mass.      Tenderness: There is abdominal tenderness. There is no guarding or rebound.      Hernia: No hernia is present. There is no hernia in the ventral area.      Comments: Mild diffuse   Musculoskeletal:         General: Normal range of motion.      Cervical back: Normal range of motion.   Skin:     General: Skin is warm and dry.   Neurological:      Mental Status: She is alert and oriented to person, place, and time.   Psychiatric:         Behavior: Behavior normal.         Thought Content: Thought content normal.         Judgment: Judgment normal.       Assessment/Plan      1. Generalized abdominal pain    2. Nausea and vomiting, intractability of vomiting not specified, unspecified vomiting type    3. End stage liver disease (HCC)    4. MUSA (nonalcoholic steatohepatitis)    5. Chronic idiopathic constipation    6. Cirrhosis of liver without ascites, unspecified hepatic cirrhosis type (HCC)    .   Diagnoses and all orders for this visit:    1. Generalized abdominal pain (Primary)    2. Nausea and vomiting, intractability of vomiting not specified, unspecified vomiting type    3. End stage liver disease (HCC)  Comments:  [id number]    4. MUSA (nonalcoholic steatohepatitis)    5. Chronic idiopathic constipation    6. Cirrhosis of liver without ascites, unspecified hepatic cirrhosis type (HCC)    Other orders  -     sucralfate (Carafate) 1 g tablet; Take 1 tablet by mouth 4 (Four) Times a Day.  Dispense: 120 tablet; Refill: 2        Orders placed during this encounter include:  No orders  of the defined types were placed in this encounter.      Medications prescribed:  New Medications Ordered This Visit   Medications   • sucralfate (Carafate) 1 g tablet     Sig: Take 1 tablet by mouth 4 (Four) Times a Day.     Dispense:  120 tablet     Refill:  2       Requested Prescriptions     Signed Prescriptions Disp Refills   • sucralfate (Carafate) 1 g tablet 120 tablet 2     Sig: Take 1 tablet by mouth 4 (Four) Times a Day.       Review and/or summary of lab tests, radiology, procedures, medications. Review and summary of old records and obtaining of history. The risks and benefits of my recommendations, as well as other treatment options were discussed with the patient today. Questions were answered.    Follow-up: Return in about 6 weeks (around 11/30/2021), or if symptoms worsen or fail to improve.     * Surgery not found *      This document has been electronically signed by Blaine Perales PA-C on October 29, 2021 13:11 CDT      Results for orders placed or performed in visit on 10/14/21   Scan Slide    Specimen: Blood   Result Value Ref Range    Anisocytosis Slight/1+ None Seen    WBC Morphology Normal Normal    Platelet Estimate Decreased Normal    Clumped Platelets Present None Seen   CBC Auto Differential    Specimen: Blood   Result Value Ref Range    WBC 3.57 3.40 - 10.80 10*3/mm3    RBC 4.34 3.77 - 5.28 10*6/mm3    Hemoglobin 12.7 12.0 - 15.9 g/dL    Hematocrit 38.7 34.0 - 46.6 %    MCV 89.2 79.0 - 97.0 fL    MCH 29.3 26.6 - 33.0 pg    MCHC 32.8 31.5 - 35.7 g/dL    RDW 17.3 (H) 12.3 - 15.4 %    RDW-SD 56.3 (H) 37.0 - 54.0 fl    MPV 11.6 6.0 - 12.0 fL    Platelets 55 (L) 140 - 450 10*3/mm3    Neutrophil % 71.4 42.7 - 76.0 %    Lymphocyte % 17.9 (L) 19.6 - 45.3 %    Monocyte % 7.3 5.0 - 12.0 %    Eosinophil % 2.5 0.3 - 6.2 %    Basophil % 0.6 0.0 - 1.5 %    Immature Grans % 0.3 0.0 - 0.5 %    Neutrophils, Absolute 2.55 1.70 - 7.00 10*3/mm3    Lymphocytes, Absolute 0.64 (L) 0.70 - 3.10 10*3/mm3     Monocytes, Absolute 0.26 0.10 - 0.90 10*3/mm3    Eosinophils, Absolute 0.09 0.00 - 0.40 10*3/mm3    Basophils, Absolute 0.02 0.00 - 0.20 10*3/mm3    Immature Grans, Absolute 0.01 0.00 - 0.05 10*3/mm3    nRBC 0.0 0.0 - 0.2 /100 WBC   Iron and TIBC    Specimen: Blood   Result Value Ref Range    Iron 56 37 - 145 mcg/dL    Iron Saturation 19 (L) 20 - 50 %    Transferrin 196 (L) 200 - 360 mg/dL    TIBC 292 (L) 298 - 536 mcg/dL   Folate    Specimen: Blood   Result Value Ref Range    Folate 14.00 4.78 - 24.20 ng/mL   Ferritin    Specimen: Blood   Result Value Ref Range    Ferritin 247.00 (H) 13.00 - 150.00 ng/mL   Vitamin B12    Specimen: Blood   Result Value Ref Range    Vitamin B-12 859 211 - 946 pg/mL   Results for orders placed or performed during the hospital encounter of 09/15/21   Gold Top - SST   Result Value Ref Range    Extra Tube Hold for add-ons.    Scan Slide    Specimen: Blood   Result Value Ref Range    Anisocytosis Slight/1+ None Seen    WBC Morphology Normal Normal    Platelet Estimate Decreased Normal   Scan Slide    Specimen: Blood   Result Value Ref Range    Anisocytosis Slight/1+ None Seen    WBC Morphology Normal Normal    Platelet Estimate Decreased Normal   Scan Slide    Specimen: Blood   Result Value Ref Range    Anisocytosis Slight/1+ None Seen    WBC Morphology Normal Normal    Platelet Estimate Decreased Normal   Scan Slide    Specimen: Blood   Result Value Ref Range    Anisocytosis Slight/1+ None Seen    WBC Morphology Normal Normal    Platelet Estimate Decreased Normal   COVID-19 and FLU A/B PCR - Swab, Nasopharynx    Specimen: Nasopharynx; Swab   Result Value Ref Range    COVID19 Not Detected Not Detected - Ref. Range    Influenza A PCR Not Detected Not Detected    Influenza B PCR Not Detected Not Detected   Urinalysis, Microscopic Only - Urine, Clean Catch    Specimen: Urine, Clean Catch   Result Value Ref Range    RBC, UA 0-2 (A) None Seen /HPF    WBC, UA Too Numerous to Count (A) None  Seen, 0-2, 3-5 /HPF    Bacteria, UA Trace (A) None Seen /HPF    Squamous Epithelial Cells, UA 3-5 (A) None Seen, 0-2 /HPF    Hyaline Casts, UA 3-6 None Seen /LPF    Mucus, UA Large/3+ (A) None Seen, Trace /HPF    Methodology Manual Light Microscopy    Urinalysis, Microscopic Only - Urine, Clean Catch    Specimen: Urine, Clean Catch   Result Value Ref Range    RBC, UA 3-5 (A) None Seen /HPF    WBC, UA 3-5 None Seen, 0-2, 3-5 /HPF    Bacteria, UA None Seen None Seen /HPF    Squamous Epithelial Cells, UA None Seen None Seen, 0-2 /HPF    Hyaline Casts, UA 7-12 None Seen /LPF    Methodology Automated Microscopy    Urinalysis With Culture If Indicated - Urine, Clean Catch    Specimen: Urine, Clean Catch   Result Value Ref Range    Color, UA Dark Yellow Yellow, Straw, Dark Yellow, Mikala    Appearance, UA Cloudy (A) Clear    pH, UA 6.0 5.0 - 9.0    Specific [place], UA 1.026 1.003 - 1.030    Glucose, UA Negative Negative    Ketones, UA Negative Negative    Bilirubin, UA Small (1+) (A) Negative    Blood, UA Negative Negative    Protein, UA Trace (A) Negative    Leuk Esterase, UA Moderate (2+) (A) Negative    Nitrite, UA Negative Negative    Urobilinogen, UA 1.0 E.U./dL 0.2 - 1.0 E.U./dL   Urinalysis With Microscopic If Indicated (No Culture) - Urine, Clean Catch    Specimen: Urine, Clean Catch   Result Value Ref Range    Color, UA Dark Yellow Yellow, Straw, Dark Yellow, [place]ala    Appearance, UA Clear Clear    pH, UA 6.0 5.0 - 9.0    Specific Gravity, UA 1.072 (H) 1.003 - 1.030    Glucose, UA Negative Negative    Ketones, UA Trace (A) Negative    Bilirubin, UA Negative Negative    Blood, UA Negative Negative    Protein,  mg/dL (2+) (A) Negative    Leuk Esterase, UA Negative Negative    Nitrite, UA Negative Negative    Urobilinogen, UA 0.2 E.U./dL 0.2 - 1.0 E.U./dL   CBC Auto Differential    Specimen: Blood   Result Value Ref Range    WBC 11.62 (H) 3.40 - 10.80 10*3/mm3    RBC 5.40 (H) 3.77 - 5.28 10*6/mm3    Hemoglobin  15.5 12.0 - 15.9 g/dL    Hematocrit 43.9 34.0 - 46.6 %    MCV 81.3 79.0 - 97.0 fL    MCH 28.7 26.6 - 33.0 pg    MCHC 35.3 31.5 - 35.7 g/dL    RDW 15.8 (H) 12.3 - 15.4 %    RDW-SD 44.8 37.0 - 54.0 fl    MPV 10.3 6.0 - 12.0 fL    Platelets 96 (L) 140 - 450 10*3/mm3    Neutrophil % 68.7 42.7 - 76.0 %    Lymphocyte % 19.3 (L) 19.6 - 45.3 %    Monocyte % 8.3 5.0 - 12.0 %    Eosinophil % 2.7 0.3 - 6.2 %    Basophil % 0.4 0.0 - 1.5 %    Immature Grans % 0.6 (H) 0.0 - 0.5 %    Neutrophils, Absolute 7.99 (H) 1.70 - 7.00 10*3/mm3    Lymphocytes, Absolute 2.24 0.70 - 3.10 10*3/mm3    Monocytes, Absolute 0.96 (H) 0.10 - 0.90 10*3/mm3    Eosinophils, Absolute 0.31 0.00 - 0.40 10*3/mm3    Basophils, Absolute 0.05 0.00 - 0.20 10*3/mm3    Immature Grans, Absolute 0.07 (H) 0.00 - 0.05 10*3/mm3    nRBC 0.0 0.0 - 0.2 /100 WBC     *Note: Due to a large number of results and/or encounters for the requested time period, some results have not been displayed. A complete set of results can be found in Results Review.

## 2021-11-04 RX ORDER — ONDANSETRON 4 MG/1
TABLET, ORALLY DISINTEGRATING ORAL
Qty: 15 TABLET | Refills: 0 | Status: SHIPPED | OUTPATIENT
Start: 2021-11-04 | End: 2022-01-25

## 2021-11-10 ENCOUNTER — APPOINTMENT (OUTPATIENT)
Dept: ONCOLOGY | Facility: HOSPITAL | Age: 61
End: 2021-11-10

## 2021-11-10 ENCOUNTER — APPOINTMENT (OUTPATIENT)
Dept: ONCOLOGY | Facility: CLINIC | Age: 61
End: 2021-11-10

## 2021-12-01 ENCOUNTER — OFFICE VISIT (OUTPATIENT)
Dept: ONCOLOGY | Facility: CLINIC | Age: 61
End: 2021-12-01

## 2021-12-01 ENCOUNTER — LAB (OUTPATIENT)
Dept: ONCOLOGY | Facility: HOSPITAL | Age: 61
End: 2021-12-01

## 2021-12-01 VITALS
TEMPERATURE: 96.5 F | DIASTOLIC BLOOD PRESSURE: 80 MMHG | OXYGEN SATURATION: 97 % | SYSTOLIC BLOOD PRESSURE: 142 MMHG | HEART RATE: 85 BPM | WEIGHT: 208.3 LBS | BODY MASS INDEX: 35.75 KG/M2 | RESPIRATION RATE: 18 BRPM

## 2021-12-01 DIAGNOSIS — E61.1 IRON DEFICIENCY: ICD-10-CM

## 2021-12-01 DIAGNOSIS — R91.1 LUNG NODULE: ICD-10-CM

## 2021-12-01 DIAGNOSIS — E61.1 IRON DEFICIENCY: Chronic | ICD-10-CM

## 2021-12-01 DIAGNOSIS — E61.1 IRON DEFICIENCY: Primary | ICD-10-CM

## 2021-12-01 LAB
BASOPHILS # BLD AUTO: 0.02 10*3/MM3 (ref 0–0.2)
BASOPHILS NFR BLD AUTO: 0.5 % (ref 0–1.5)
DEPRECATED RDW RBC AUTO: 46.5 FL (ref 37–54)
EOSINOPHIL # BLD AUTO: 0.16 10*3/MM3 (ref 0–0.4)
EOSINOPHIL NFR BLD AUTO: 3.7 % (ref 0.3–6.2)
ERYTHROCYTE [DISTWIDTH] IN BLOOD BY AUTOMATED COUNT: 14.9 % (ref 12.3–15.4)
FERRITIN SERPL-MCNC: 152.2 NG/ML (ref 13–150)
HCT VFR BLD AUTO: 41.1 % (ref 34–46.6)
HGB BLD-MCNC: 14.2 G/DL (ref 12–15.9)
IMM GRANULOCYTES # BLD AUTO: 0.01 10*3/MM3 (ref 0–0.05)
IMM GRANULOCYTES NFR BLD AUTO: 0.2 % (ref 0–0.5)
IRON 24H UR-MRATE: 148 MCG/DL (ref 37–145)
IRON SATN MFR SERPL: 41 % (ref 20–50)
LARGE PLATELETS: NORMAL
LYMPHOCYTES # BLD AUTO: 0.96 10*3/MM3 (ref 0.7–3.1)
LYMPHOCYTES NFR BLD AUTO: 22.2 % (ref 19.6–45.3)
MCH RBC QN AUTO: 29.5 PG (ref 26.6–33)
MCHC RBC AUTO-ENTMCNC: 34.5 G/DL (ref 31.5–35.7)
MCV RBC AUTO: 85.3 FL (ref 79–97)
MONOCYTES # BLD AUTO: 0.28 10*3/MM3 (ref 0.1–0.9)
MONOCYTES NFR BLD AUTO: 6.5 % (ref 5–12)
NEUTROPHILS NFR BLD AUTO: 2.9 10*3/MM3 (ref 1.7–7)
NEUTROPHILS NFR BLD AUTO: 66.9 % (ref 42.7–76)
NRBC BLD AUTO-RTO: 0 /100 WBC (ref 0–0.2)
PLATELET # BLD AUTO: 75 10*3/MM3 (ref 140–450)
PMV BLD AUTO: 10.8 FL (ref 6–12)
RBC # BLD AUTO: 4.82 10*6/MM3 (ref 3.77–5.28)
RBC MORPH BLD: NORMAL
SMALL PLATELETS BLD QL SMEAR: NORMAL
TIBC SERPL-MCNC: 365 MCG/DL (ref 298–536)
TRANSFERRIN SERPL-MCNC: 245 MG/DL (ref 200–360)
WBC MORPH BLD: NORMAL
WBC NRBC COR # BLD: 4.33 10*3/MM3 (ref 3.4–10.8)

## 2021-12-01 PROCEDURE — 85007 BL SMEAR W/DIFF WBC COUNT: CPT | Performed by: NURSE PRACTITIONER

## 2021-12-01 PROCEDURE — 84466 ASSAY OF TRANSFERRIN: CPT | Performed by: NURSE PRACTITIONER

## 2021-12-01 PROCEDURE — 85025 COMPLETE CBC W/AUTO DIFF WBC: CPT | Performed by: NURSE PRACTITIONER

## 2021-12-01 PROCEDURE — G0463 HOSPITAL OUTPT CLINIC VISIT: HCPCS | Performed by: NURSE PRACTITIONER

## 2021-12-01 PROCEDURE — 83540 ASSAY OF IRON: CPT | Performed by: NURSE PRACTITIONER

## 2021-12-01 PROCEDURE — 82728 ASSAY OF FERRITIN: CPT | Performed by: NURSE PRACTITIONER

## 2021-12-01 PROCEDURE — 99213 OFFICE O/P EST LOW 20 MIN: CPT | Performed by: NURSE PRACTITIONER

## 2021-12-01 RX ORDER — OMEPRAZOLE 40 MG/1
40 CAPSULE, DELAYED RELEASE ORAL DAILY
COMMUNITY
Start: 2021-11-24 | End: 2022-07-26 | Stop reason: SDUPTHER

## 2021-12-01 RX ORDER — FOLIC ACID 1 MG/1
TABLET ORAL
Qty: 36 TABLET | Refills: 1 | Status: SHIPPED | OUTPATIENT
Start: 2021-12-01 | End: 2022-09-19

## 2021-12-01 RX ORDER — CYCLOBENZAPRINE HCL 10 MG
10 TABLET ORAL 2 TIMES DAILY PRN
COMMUNITY
Start: 2021-11-24 | End: 2022-07-07

## 2021-12-01 NOTE — PROGRESS NOTES
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 She had IV iron and here to reassess her iron levels.     Since she was here last she was hospitalized and transferred to [facility] for persistent nausea and constipation. She had CT scan repeated of abdomen and pelvis at [facility]. She had a feeding tube in the jejunum, wall thickening of the distal small bowel and cecum to ascending colon. They did an EGD on 09/30/2021 that showed stenosis of the third portion of the duodenum. NG tube was placed. She was discharged and has follow-up in [place] in January and continues to follow with local GI clinic as well.         Past Medical History, Past Surgical History, Social History, Family History have been reviewed and are without significant changes except as mentioned.    Review of Systems   A comprehensive 14 point review of systems was performed and was negative except as mentioned.  +chronic fatigue  +bruises easily but denies bleeding  Medications:  The current medication list was reviewed in the EMR    ALLERGIES:    Allergies   Allergen Reactions   • Morphine GI Intolerance   • Tape Rash     Patient has rash/blishers on site after tape   • Other      Pt states that taking steroids either in pill or injection form make her have blisters in her mouth and she feels like she is on fire on the inside/ has hx of c diff and possible MRSA     • Corticosteroids Rash   • Kenalog  [Triamcinolone Acetonide] Rash   • Sulfa Antibiotics Rash      Sulfa (Sulfonamide Antibiotics)       Objective      Vitals:    12/01/21 1023   BP: 142/80   Pulse: 85   Resp: 18   Temp: 96.5 °F (35.8 °C)   SpO2: 97%   Weight: 94.5 kg (208 lb 4.8 oz)   PainSc:   6     Current Status 7/21/2021   ECOG score 2       Physical Exam  Cardiovascular:      Rate and Rhythm: Normal rate and regular rhythm.   Pulmonary:      Breath sounds: Normal breath sounds.   Neurological:      Mental Status: She is alert and oriented to person, place, and time.     I have reexamined the patient and the results are consistent with the previously documented exam. LIYAH Somers     RECENT LABS:  Glucose   Date Value Ref Range Status   09/24/2021 104 (H) 65 - 99 mg/dL Final     Sodium   Date Value Ref Range Status   09/24/2021 139 136 - 145 mmol/L Final     Potassium   Date Value Ref Range Status   09/24/2021 3.5 3.5 - 5.2 mmol/L Final     CO2   Date Value Ref Range Status   09/24/2021 22.0 22.0 - 29.0 mmol/L Final     Chloride   Date Value Ref Range Status   09/24/2021 109 (H) 98 - 107 mmol/L Final     Anion Gap   Date Value Ref Range Status   09/24/2021 8.0 5.0 - 15.0 mmol/L Final     Creatinine   Date Value Ref Range Status   09/24/2021 0.83 0.57 - 1.00 mg/dL Final     BUN   Date Value Ref Range Status   09/24/2021 9 8 - 23 mg/dL Final     BUN/Creatinine Ratio   Date Value Ref Range Status   09/24/2021 10.8 7.0 - 25.0 Final     Calcium   Date Value Ref Range Status   09/24/2021 8.2 (L) 8.6 - 10.5 mg/dL Final     eGFR Non  Amer   Date Value Ref Range Status   09/24/2021 70 >60 mL/min/1.73 Final     Alkaline Phosphatase   Date Value Ref Range Status   09/15/2021 112 39 - 117 U/L Final     Total Protein   Date Value Ref Range Status   09/15/2021 8.1 6.0 - 8.5 g/dL Final     ALT (SGPT)   Date Value Ref Range Status   09/15/2021 22 1 - 33 U/L Final     AST (SGOT)   Date Value Ref Range Status   09/15/2021 30 1 - 32 U/L Final     Total Bilirubin   Date Value Ref Range Status   09/15/2021 1.6 (H)  0.0 - 1.2 mg/dL Final     Albumin   Date Value Ref Range Status   09/15/2021 4.40 3.50 - 5.20 g/dL Final     Globulin   Date Value Ref Range Status   09/15/2021 3.7 gm/dL Final     Lab Results   Component Value Date    WBC 4.33 12/01/2021    HGB 14.2 12/01/2021    HCT 41.1 12/01/2021    MCV 85.3 12/01/2021    PLT 75 (L) 12/01/2021     Lab Results   Component Value Date    NEUTROABS 2.90 12/01/2021    IRON 148 (H) 12/01/2021    IRON 56 10/14/2021    IRON 84 06/24/2021    TIBC 365 12/01/2021    TIBC 292 (L) 10/14/2021    TIBC 456 06/24/2021    LABIRON 41 12/01/2021    LABIRON 19 (L) 10/14/2021    LABIRON 18 (L) 06/24/2021    FERRITIN 152.20 (H) 12/01/2021    FERRITIN 247.00 (H) 10/14/2021    FERRITIN 102.10 06/24/2021    [id number] 859 10/14/2021    RLFOVWDN30 477 06/24/2021    CDLPTJLZ54 508 05/09/2021    FOLATE 14.00 10/14/2021    FOLATE 13.10 06/24/2021    FOLATE 11.20 05/08/2021     Lab Results   Component Value Date    AFPTM 2.5 07/31/2018    AFPTM 99 07/31/2018             RADIOLOGY DATA :  No radiology results for the last 7 days        Assessment/Plan     1.  Thrombocytopenia:  -Secondary to cirrhosis of liver with splenomegaly  -Most recent platelet count is 75,000  -We will ask patient to return to clinic in 4 months with repeat CBC, iron studies, ferritin, B12 and folate to be done on that day.     2.  Lung nodules:  -Recent CT of chest without contrast done on July 6, 2021 shows lung nodules are getting smaller most likely inflammatory in nature rather than malignant which was discussed with patient  -Recommend repeating CT of chest around January 2022; order placed today     3.  Iron deficiency anemia:  -patient received IV iron infusions in July.  -labs reviewed today and iron deficiency resolved.  -Recommend continue with B12 and folic acid 3 times a week     4.  Cirrhosis of liver with splenomegaly due to nonalcoholic steatohepatitis  -Continue with management as per gastroenterology team     5.   Health maintenance: Patient does not smoke.  Had a colonoscopy in March 2021     6. Advance Care Planning: For now patient remains full code and is able to make decisions.  Patient has health care surrogate mentioned on chart.               PHQ-9 Total Score: 1 pt is not suicidal or homicidal    Amira Shannon reports a pain score of 6.  Given her pain assessment as noted, treatment options were discussed and the following options were decided upon as a follow-up plan to address the patient's pain: continuation of current treatment plan for pain.         Marija Wharton, APRN  12/1/2021  12:41 CST        Part of this note may be an electronic transcription/translation of spoken language to printed text using the Dragon Dictation System.          CC:

## 2021-12-27 RX ORDER — TRAZODONE HYDROCHLORIDE 100 MG/1
TABLET ORAL
Qty: 30 TABLET | Refills: 0 | Status: SHIPPED | OUTPATIENT
Start: 2021-12-27 | End: 2022-01-31

## 2022-01-21 ENCOUNTER — LAB (OUTPATIENT)
Dept: LAB | Facility: HOSPITAL | Age: 62
End: 2022-01-21

## 2022-01-21 DIAGNOSIS — E61.1 IRON DEFICIENCY: ICD-10-CM

## 2022-01-21 LAB
ANISOCYTOSIS BLD QL: ABNORMAL
BASOPHILS # BLD MANUAL: 0.04 10*3/MM3 (ref 0–0.2)
BASOPHILS NFR BLD MANUAL: 1 % (ref 0–1.5)
DEPRECATED RDW RBC AUTO: 49.3 FL (ref 37–54)
EOSINOPHIL # BLD MANUAL: 0.17 10*3/MM3 (ref 0–0.4)
EOSINOPHIL NFR BLD MANUAL: 4 % (ref 0.3–6.2)
ERYTHROCYTE [DISTWIDTH] IN BLOOD BY AUTOMATED COUNT: 15.6 % (ref 12.3–15.4)
FERRITIN SERPL-MCNC: 176.6 NG/ML (ref 13–150)
HCT VFR BLD AUTO: 42.6 % (ref 34–46.6)
HGB BLD-MCNC: 14 G/DL (ref 12–15.9)
IRON 24H UR-MRATE: 101 MCG/DL (ref 37–145)
IRON SATN MFR SERPL: 28 % (ref 20–50)
LYMPHOCYTES # BLD MANUAL: 1.04 10*3/MM3 (ref 0.7–3.1)
LYMPHOCYTES NFR BLD MANUAL: 6 % (ref 5–12)
MCH RBC QN AUTO: 28.5 PG (ref 26.6–33)
MCHC RBC AUTO-ENTMCNC: 32.9 G/DL (ref 31.5–35.7)
MCV RBC AUTO: 86.8 FL (ref 79–97)
MONOCYTES # BLD: 0.25 10*3/MM3 (ref 0.1–0.9)
NEUTROPHILS # BLD AUTO: 2.66 10*3/MM3 (ref 1.7–7)
NEUTROPHILS NFR BLD MANUAL: 57 % (ref 42.7–76)
NEUTS BAND NFR BLD MANUAL: 7 % (ref 0–5)
PLATELET # BLD AUTO: 32 10*3/MM3 (ref 140–450)
PMV BLD AUTO: 11.6 FL (ref 6–12)
RBC # BLD AUTO: 4.91 10*6/MM3 (ref 3.77–5.28)
SMALL PLATELETS BLD QL SMEAR: ABNORMAL
TIBC SERPL-MCNC: 367 MCG/DL (ref 298–536)
TRANSFERRIN SERPL-MCNC: 246 MG/DL (ref 200–360)
VARIANT LYMPHS NFR BLD MANUAL: 25 % (ref 19.6–45.3)
WBC MORPH BLD: NORMAL
WBC NRBC COR # BLD: 4.16 10*3/MM3 (ref 3.4–10.8)

## 2022-01-21 PROCEDURE — 83540 ASSAY OF IRON: CPT

## 2022-01-21 PROCEDURE — 82728 ASSAY OF FERRITIN: CPT

## 2022-01-21 PROCEDURE — 82746 ASSAY OF FOLIC ACID SERUM: CPT

## 2022-01-21 PROCEDURE — 82607 VITAMIN B-12: CPT

## 2022-01-21 PROCEDURE — 85007 BL SMEAR W/DIFF WBC COUNT: CPT

## 2022-01-21 PROCEDURE — 84466 ASSAY OF TRANSFERRIN: CPT

## 2022-01-21 PROCEDURE — 85025 COMPLETE CBC W/AUTO DIFF WBC: CPT

## 2022-01-22 LAB
FOLATE SERPL-MCNC: >20 NG/ML (ref 4.78–24.2)
VIT B12 BLD-MCNC: 1044 PG/ML (ref 211–946)

## 2022-01-24 ENCOUNTER — TELEPHONE (OUTPATIENT)
Dept: ONCOLOGY | Facility: HOSPITAL | Age: 62
End: 2022-01-24

## 2022-01-24 DIAGNOSIS — D69.6 THROMBOCYTOPENIA: Primary | ICD-10-CM

## 2022-01-24 NOTE — PROGRESS NOTES
PT states she notices she is bruising easier. Denies any bleeding. PT is having CT scan on Wednesday.

## 2022-01-24 NOTE — PROGRESS NOTES
Need to call her and see if she is having any bruising or bleeding. Scheduled to see us 2/1 but may need to see sooner if she is having bleeding

## 2022-01-25 ENCOUNTER — OFFICE VISIT (OUTPATIENT)
Dept: GASTROENTEROLOGY | Facility: CLINIC | Age: 62
End: 2022-01-25

## 2022-01-25 VITALS
HEIGHT: 64 IN | WEIGHT: 213 LBS | HEART RATE: 84 BPM | DIASTOLIC BLOOD PRESSURE: 80 MMHG | SYSTOLIC BLOOD PRESSURE: 137 MMHG | BODY MASS INDEX: 36.37 KG/M2

## 2022-01-25 DIAGNOSIS — K74.60 CIRRHOSIS OF LIVER WITHOUT ASCITES, UNSPECIFIED HEPATIC CIRRHOSIS TYPE: ICD-10-CM

## 2022-01-25 DIAGNOSIS — K59.04 CHRONIC IDIOPATHIC CONSTIPATION: ICD-10-CM

## 2022-01-25 DIAGNOSIS — K75.81 NASH (NONALCOHOLIC STEATOHEPATITIS): ICD-10-CM

## 2022-01-25 DIAGNOSIS — K72.10 END STAGE LIVER DISEASE: Primary | ICD-10-CM

## 2022-01-25 PROCEDURE — 99214 OFFICE O/P EST MOD 30 MIN: CPT | Performed by: PHYSICIAN ASSISTANT

## 2022-01-25 RX ORDER — DULOXETIN HYDROCHLORIDE 20 MG/1
20 CAPSULE, DELAYED RELEASE ORAL DAILY
COMMUNITY
End: 2023-03-21 | Stop reason: SDUPTHER

## 2022-01-25 RX ORDER — CHLORAL HYDRATE 500 MG
CAPSULE ORAL
COMMUNITY
End: 2022-01-25

## 2022-01-25 RX ORDER — CETIRIZINE HYDROCHLORIDE 10 MG/1
10 TABLET ORAL DAILY
COMMUNITY
End: 2022-07-07 | Stop reason: SDUPTHER

## 2022-01-26 ENCOUNTER — APPOINTMENT (OUTPATIENT)
Dept: CT IMAGING | Facility: HOSPITAL | Age: 62
End: 2022-01-26

## 2022-01-27 ENCOUNTER — LAB (OUTPATIENT)
Dept: ONCOLOGY | Facility: HOSPITAL | Age: 62
End: 2022-01-27

## 2022-01-27 ENCOUNTER — OFFICE VISIT (OUTPATIENT)
Dept: ONCOLOGY | Facility: CLINIC | Age: 62
End: 2022-01-27

## 2022-01-27 VITALS
HEART RATE: 81 BPM | RESPIRATION RATE: 18 BRPM | OXYGEN SATURATION: 95 % | DIASTOLIC BLOOD PRESSURE: 65 MMHG | WEIGHT: 213.1 LBS | SYSTOLIC BLOOD PRESSURE: 132 MMHG | TEMPERATURE: 96.9 F | BODY MASS INDEX: 36.58 KG/M2

## 2022-01-27 DIAGNOSIS — K74.69 OTHER CIRRHOSIS OF LIVER: Primary | ICD-10-CM

## 2022-01-27 DIAGNOSIS — D69.6 THROMBOCYTOPENIA: ICD-10-CM

## 2022-01-27 LAB
BASOPHILS # BLD AUTO: 0.01 10*3/MM3 (ref 0–0.2)
BASOPHILS NFR BLD AUTO: 0.2 % (ref 0–1.5)
DEPRECATED RDW RBC AUTO: 48.5 FL (ref 37–54)
EOSINOPHIL # BLD AUTO: 0.18 10*3/MM3 (ref 0–0.4)
EOSINOPHIL NFR BLD AUTO: 4.2 % (ref 0.3–6.2)
ERYTHROCYTE [DISTWIDTH] IN BLOOD BY AUTOMATED COUNT: 15.5 % (ref 12.3–15.4)
HCT VFR BLD AUTO: 41.4 % (ref 34–46.6)
HGB BLD-MCNC: 14 G/DL (ref 12–15.9)
HOLD SPECIMEN: NORMAL
IMM GRANULOCYTES # BLD AUTO: 0.01 10*3/MM3 (ref 0–0.05)
IMM GRANULOCYTES NFR BLD AUTO: 0.2 % (ref 0–0.5)
LYMPHOCYTES # BLD AUTO: 1.1 10*3/MM3 (ref 0.7–3.1)
LYMPHOCYTES NFR BLD AUTO: 25.5 % (ref 19.6–45.3)
MCH RBC QN AUTO: 28.9 PG (ref 26.6–33)
MCHC RBC AUTO-ENTMCNC: 33.8 G/DL (ref 31.5–35.7)
MCV RBC AUTO: 85.5 FL (ref 79–97)
MONOCYTES # BLD AUTO: 0.26 10*3/MM3 (ref 0.1–0.9)
MONOCYTES NFR BLD AUTO: 6 % (ref 5–12)
NEUTROPHILS NFR BLD AUTO: 2.76 10*3/MM3 (ref 1.7–7)
NEUTROPHILS NFR BLD AUTO: 63.9 % (ref 42.7–76)
NRBC BLD AUTO-RTO: 0 /100 WBC (ref 0–0.2)
PLATELET # BLD AUTO: 68 10*3/MM3 (ref 140–450)
PMV BLD AUTO: 11.1 FL (ref 6–12)
RBC # BLD AUTO: 4.84 10*6/MM3 (ref 3.77–5.28)
RBC MORPH BLD: NORMAL
SMALL PLATELETS BLD QL SMEAR: NORMAL
WBC MORPH BLD: NORMAL
WBC NRBC COR # BLD: 4.32 10*3/MM3 (ref 3.4–10.8)

## 2022-01-27 PROCEDURE — G0463 HOSPITAL OUTPT CLINIC VISIT: HCPCS | Performed by: NURSE PRACTITIONER

## 2022-01-27 PROCEDURE — 85025 COMPLETE CBC W/AUTO DIFF WBC: CPT | Performed by: NURSE PRACTITIONER

## 2022-01-27 PROCEDURE — 85007 BL SMEAR W/DIFF WBC COUNT: CPT | Performed by: NURSE PRACTITIONER

## 2022-01-27 PROCEDURE — 99212 OFFICE O/P EST SF 10 MIN: CPT | Performed by: NURSE PRACTITIONER

## 2022-01-27 RX ORDER — CHLORAL HYDRATE 500 MG
CAPSULE ORAL 2 TIMES DAILY WITH MEALS
COMMUNITY

## 2022-01-27 RX ORDER — LANOLIN ALCOHOL/MO/W.PET/CERES
1000 CREAM (GRAM) TOPICAL DAILY
COMMUNITY

## 2022-01-27 RX ORDER — FUROSEMIDE 20 MG/1
20 TABLET ORAL 2 TIMES DAILY
COMMUNITY

## 2022-01-27 RX ORDER — ASPIRIN 81 MG
100 TABLET, DELAYED RELEASE (ENTERIC COATED) ORAL 2 TIMES DAILY
COMMUNITY

## 2022-01-27 RX ORDER — OXYCODONE AND ACETAMINOPHEN 10; 325 MG/1; MG/1
1 TABLET ORAL EVERY 6 HOURS PRN
COMMUNITY
End: 2022-07-07 | Stop reason: ALTCHOICE

## 2022-01-31 RX ORDER — TRAZODONE HYDROCHLORIDE 100 MG/1
TABLET ORAL
Qty: 30 TABLET | Refills: 2 | Status: SHIPPED | OUTPATIENT
Start: 2022-01-31 | End: 2022-05-02

## 2022-02-01 ENCOUNTER — APPOINTMENT (OUTPATIENT)
Dept: ONCOLOGY | Facility: HOSPITAL | Age: 62
End: 2022-02-01

## 2022-02-01 ENCOUNTER — APPOINTMENT (OUTPATIENT)
Dept: ONCOLOGY | Facility: CLINIC | Age: 62
End: 2022-02-01

## 2022-02-07 NOTE — PROGRESS NOTES
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 She had IV iron and here to reassess her iron levels.           Past Medical History, Past Surgical History, Social History, Family History have been reviewed and are without significant changes except as mentioned.    Review of Systems   A comprehensive 14 point review of systems was performed and was negative except as mentioned.    Medications:  The current medication list was reviewed in the EMR    ALLERGIES:    Allergies   Allergen Reactions   • Morphine GI Intolerance   • Tape Rash     Patient has rash/blishers on site after tape   • Other      Pt states that taking steroids either in pill or injection form make her have blisters in her mouth and she feels like she is on fire on the inside/ has hx of c diff and possible MRSA     • Corticosteroids Rash   • Kenalog  [Triamcinolone Acetonide] Rash   • Sulfa Antibiotics Rash     Sulfa (Sulfonamide Antibiotics)       Objective      Vitals:    01/27/22 1250   BP: 132/65   Pulse: 81   Resp: 18   Temp: 96.9 °F (36.1 °C)   SpO2: 95%   Weight: 96.7 kg (213 lb 1.6 oz)   PainSc:   6     Current Status 7/21/2021   ECOG score 2       Physical Exam  Cardiovascular:      Rate and Rhythm: Normal rate and regular rhythm.   Pulmonary:      Breath sounds: Normal breath sounds.   Neurological:      Mental Status: She is alert and oriented to person, place, and time.   I have reexamined the patient and the results are consistent with the previously documented exam.  LIYAH Somers     RECENT LABS:  Glucose   Date Value Ref Range Status   09/24/2021 104 (H) 65 - 99 mg/dL Final     Sodium   Date Value Ref Range Status   09/24/2021 139 136 - 145 mmol/L Final     Potassium   Date Value Ref Range Status   09/24/2021 3.5 3.5 - 5.2 mmol/L Final     CO2   Date Value Ref Range Status   09/24/2021 22.0 22.0 - 29.0 mmol/L Final     Chloride   Date Value Ref Range Status   09/24/2021 109 (H) 98 - 107 mmol/L Final     Anion Gap   Date Value Ref Range Status   09/24/2021 8.0 5.0 - 15.0 mmol/L Final     Creatinine   Date Value Ref Range Status   09/24/2021 0.83 0.57 - 1.00 mg/dL Final     BUN   Date Value Ref Range Status   09/24/2021 9 8 - 23 mg/dL Final     BUN/Creatinine Ratio   Date Value Ref Range Status   09/24/2021 10.8 7.0 - 25.0 Final     Calcium   Date Value Ref Range Status   09/24/2021 8.2 (L) 8.6 - 10.5 mg/dL Final     eGFR Non  Amer   Date Value Ref Range Status   09/24/2021 70 >60 mL/min/1.73 Final     Alkaline Phosphatase   Date Value Ref Range Status   09/15/2021 112 39 - 117 U/L Final     Total Protein   Date Value Ref Range Status   09/15/2021 8.1 6.0 - 8.5 g/dL Final     ALT (SGPT)   Date Value Ref Range Status   09/15/2021 22 1 - 33 U/L Final     AST (SGOT)   Date Value Ref Range Status   09/15/2021 30 1 - 32 U/L Final     Total Bilirubin   Date Value Ref Range Status   09/15/2021 1.6 (H) 0.0 - 1.2 mg/dL Final     Albumin   Date Value Ref Range Status   09/15/2021 4.40 3.50 - 5.20 g/dL Final     Globulin   Date Value Ref Range Status   09/15/2021 3.7 gm/dL Final     Lab Results   Component Value Date    WBC 4.32 01/27/2022    HGB 14.0 01/27/2022    HCT 41.4 01/27/2022    MCV 85.5 01/27/2022    PLT 68 (L) 01/27/2022     Lab Results   Component Value Date    NEUTROABS 2.76 01/27/2022    IRON 101 01/21/2022    IRON 148 (H) 12/01/2021    IRON 56 10/14/2021    TIBC 367 01/21/2022    TIBC 365 12/01/2021    TIBC 292 (L) 10/14/2021    LABIRON 28 01/21/2022    LABIRON  41 12/01/2021    LABIRON 19 (L) 10/14/2021    FERRITIN 176.60 (H) 01/21/2022    FERRITIN 152.20 (H) 12/01/2021    FERRITIN 247.00 (H) 10/14/2021    QLTAIUWH14 1,044 (H) 01/21/2022    BSZWQIHA75 859 10/14/2021    FBEURMNO12 477 06/24/2021    FOLATE >20.00 01/21/2022    FOLATE 14.00 10/14/2021    FOLATE 13.10 06/24/2021     Lab Results   Component Value Date    AFPTM 2.5 07/31/2018    AFPTM 99 07/31/2018               RADIOLOGY DATA :  No radiology results for the last 7 days        Assessment/Plan     1.  Thrombocytopenia:  -Secondary to cirrhosis of liver with splenomegaly  -Labs reviewed today and platelet count is 68,000.  -advised pt that if she develops any life threatenign bleeding she should go to nearest ER  -We will ask patient to return to clinic in 4 months with repeat CBC, iron studies, ferritin, B12 and folate to be done on that day.     2.  Lung nodules:  -Recent CT of chest without contrast done on July 6, 2021 shows lung nodules are getting smaller most likely inflammatory in nature rather than malignant which was discussed with patient  -Had repeat CT  Chest on 1/26/2022 at [facility]; CT report reviewed and impression shows old granulomatous disease.     3.  Iron deficiency anemia:  -patient received IV iron infusions in July.  -labs reviewed today and iron deficiency resolved.  -Recommend continue with B12 and folic acid 3 times a week     4.  Cirrhosis of liver with splenomegaly due to nonalcoholic steatohepatitis  -Continue with management as per gastroenterology team     5.  Health maintenance: Patient does not smoke.  Had a colonoscopy in March 2021     6. Advance Care Planning: For now patient remains full code and is able to make decisions.  Patient has health care surrogate mentioned on chart.               PHQ-9 Total Score: 1 pt is not suicidal or homicidal    Amira Shannon reports a pain score of 6.  Given her pain assessment as noted, treatment options were discussed and the  following options were decided upon as a follow-up plan to address the patient's pain: continuation of current treatment plan for pain.         Marija Wharton, APRN  2/7/2022  10:54 CST        Part of this note may be an electronic transcription/translation of spoken language to printed text using the Dragon Dictation System.          CC:

## 2022-02-25 ENCOUNTER — OFFICE VISIT (OUTPATIENT)
Dept: SURGERY | Facility: CLINIC | Age: 62
End: 2022-02-25

## 2022-02-25 VITALS
HEIGHT: 64 IN | HEART RATE: 85 BPM | BODY MASS INDEX: 37.32 KG/M2 | WEIGHT: 218.6 LBS | TEMPERATURE: 97.7 F | DIASTOLIC BLOOD PRESSURE: 64 MMHG | SYSTOLIC BLOOD PRESSURE: 128 MMHG

## 2022-02-25 DIAGNOSIS — K59.00 CONSTIPATION, UNSPECIFIED CONSTIPATION TYPE: Primary | ICD-10-CM

## 2022-02-25 PROCEDURE — 99212 OFFICE O/P EST SF 10 MIN: CPT | Performed by: SURGERY

## 2022-02-25 RX ORDER — POLYETHYLENE GLYCOL 3350 17 G/17G
17 POWDER, FOR SOLUTION ORAL DAILY
COMMUNITY

## 2022-02-25 RX ORDER — ONDANSETRON 4 MG/1
TABLET, ORALLY DISINTEGRATING ORAL AS NEEDED
COMMUNITY
Start: 2021-11-05 | End: 2022-02-28

## 2022-02-25 RX ORDER — ACETAMINOPHEN 325 MG/1
650 TABLET ORAL EVERY 6 HOURS PRN
COMMUNITY

## 2022-02-25 RX ORDER — DULOXETIN HYDROCHLORIDE 60 MG/1
60 CAPSULE, DELAYED RELEASE ORAL DAILY
COMMUNITY
End: 2022-07-07 | Stop reason: DRUGHIGH

## 2022-02-25 RX ORDER — BISACODYL 10 MG
10 SUPPOSITORY, RECTAL RECTAL AS NEEDED
COMMUNITY

## 2022-02-25 NOTE — PATIENT INSTRUCTIONS
"BMI for Adults  What is BMI?  Body mass index (BMI) is a number that is calculated from a person's weight and height. BMI can help estimate how much of a person's weight is composed of fat. BMI does not measure body fat directly. Rather, it is an alternative to procedures that directly measure body fat, which can be difficult and expensive.  BMI can help identify people who may be at higher risk for certain medical problems.  What are BMI measurements used for?  BMI is used as a screening tool to identify possible weight problems. It helps determine whether a person is obese, overweight, a healthy weight, or underweight.  BMI is useful for:  · Identifying a weight problem that may be related to a medical condition or may increase the risk for medical problems.  · Promoting changes, such as changes in diet and exercise, to help reach a healthy weight. BMI screening can be repeated to see if these changes are working.  How is BMI calculated?  BMI involves measuring your weight in relation to your height. Both height and weight are measured, and the BMI is calculated from those numbers. This can be done either in English ([place] or metric measurements. Note that charts and online BMI calculators are available to help you find your BMI quickly and easily without having to do these calculations yourself.  To calculate your BMI in English ([place] measurements:    1. Measure your weight in pounds (lb).  2. Multiply the number of pounds by 703.  ? For example, for a person who weighs 180 lb, multiply that number by 703, which equals 126,540.  3. Measure your height in inches. Then multiply that number by itself to get a measurement called \"inches squared.\"  ? For example, for a person who is 70 inches tall, the \"inches squared\" measurement is 70 inches x 70 inches, which equals 4,900 inches squared.  4. Divide the total from step 2 (number of lb x 703) by the total from step 3 (inches squared): 126,540 ÷ 4,900 = 25.8. This is " "your BMI.    To calculate your BMI in metric measurements:  1. Measure your weight in kilograms (kg).  2. Measure your height in meters (m). Then multiply that number by itself to get a measurement called \"meters squared.\"  ? For example, for a person who is 1.75 m tall, the \"meters squared\" measurement is 1.75 m x 1.75 m, which is equal to 3.1 meters squared.  3. Divide the number of kilograms (your weight) by the meters squared number. In this example: 70 ÷ 3.1 = 22.6. This is your BMI.  What do the results mean?  BMI charts are used to identify whether you are underweight, normal weight, overweight, or obese. The following guidelines will be used:  · Underweight: BMI less than 18.5.  · Normal weight: BMI between 18.5 and 24.9.  · Overweight: BMI between 25 and 29.9.  · Obese: BMI of 30 or above.  Keep these notes in mind:  · Weight includes both fat and muscle, so someone with a muscular build, such as an athlete, may have a BMI that is higher than 24.9. In cases like these, BMI is not an accurate measure of body fat.  · To determine if excess body fat is the cause of a BMI of 25 or higher, further assessments may need to be done by a health care provider.  · BMI is usually interpreted in the same way for men and women.  Where to find more information  For more information about BMI, including tools to quickly calculate your BMI, go to these websites:  · Centers for Disease Control and Prevention: www.cdc.gov  · American Heart Association: www.heart.org  · National Heart, Lung, and Blood [place]: www.nhlbi.nih.gov  Summary  · Body mass index (BMI) is a number that is calculated from a person's weight and height.  · BMI may help estimate how much of a person's weight is composed of fat. BMI can help identify those who may be at higher risk for certain medical problems.  · BMI can be measured using English measurements or metric measurements.  · BMI charts are used to identify whether you are underweight, normal " weight, overweight, or obese.  This information is not intended to replace advice given to you by your health care provider. Make sure you discuss any questions you have with your health care provider.  Document Revised: 09/09/2020 Document Reviewed: 07/17/2020  Elsevier Patient Education © 2021 [employer]

## 2022-02-26 NOTE — PROGRESS NOTES
"Chief Complaint   Patient presents with   • Follow-up     6 Mos. follow-up        HPI  61-year-old woman seen for chronic constipation.  This is actually improved since her last visit.  She has significant thrombocytopenia secondary to cirrhosis of the liver and varices.  Past Medical History:   Diagnosis Date   • Acid reflux    • Altered bowel function    • Arthropathy of lumbar facet joint    • Bleeding disorder (HCC)    • C. difficile diarrhea 2015   • Chronic idiopathic constipation    • Colonic inertia    • Constipation    • Corns and callus    • Depression    • Disease related peripheral neuropathy    • Epigastric pain    • Fatty liver    • Hammer toe    • Headache    • Hiatal hernia    • History of transfusion    • Hyperlipidemia    • Knee pain    • Localized, primary osteoarthritis of the ankle and foot     Localized, primary osteoarthritis of the ankle and/or foot   • Mendoza's metatarsalgia     Mendoza's metatarsalgia - 2nd interspace on right   • Nausea and vomiting    • Neuralgia and neuritis     Neuralgia, neuritis, and radiculitis, unspecified   • Neuropathy    • Obstructive sleep apnea     Obstructive sleep apnea (adult) (pediatric)    • CHAI on CPAP     \"C-Pap at night  (unconfirmed)\"   • Osteoarthritis    • Pain in foot     Pain in unspecified foot - sees a podiatrist   • Pain in joint, ankle and foot     Joint pain in ankle and foot      • Pain radiating to back     Pain radiating to lumbar region of back   • Pelvic floor dysfunction    • Plantar fasciitis    • PONV (postoperative nausea and vomiting)    • Restless leg syndrome    • Secondary hypertension     Secondary hypertension, unspecified   • Sinusitis    • Sleep apnea    • Tongue anomaly     lesion       Past Surgical History:   Procedure Laterality Date   • CARPAL TUNNEL RELEASE Left 8/22/2018    Procedure: CARPAL TUNNEL RELEASE - left;  Surgeon: Justus Lewis MD;  Location: [facility];  Service: Orthopedics   • CARPAL TUNNEL RELEASE " Right 2019    Procedure: carpal tunnel release right hand with local/monitored anesthesia care;  Surgeon: Justus Lewis MD;  Location: [facility] OR;  Service: Orthopedics   •  SECTION     • CHOLECYSTECTOMY     • COLONOSCOPY  2013   • COLONOSCOPY N/A 10/17/2018    Procedure: COLONOSCOPY;  Surgeon: Russell Del Rio MD;  Location: [facility] ENDOSCOPY;  Service: Gastroenterology   • COLONOSCOPY N/A 3/22/2021    Procedure: COLONOSCOPY;  Surgeon: Russell Del Rio MD;  Location: [facility] ENDOSCOPY;  Service: Gastroenterology;  Laterality: N/A;   • DIRECT LARYNGOSCOPY, ESOPHAGOSCOPY, BRONCHOSCOPY N/A 2017    Procedure: DIRECT LARYNGOSCOPY AND;  Surgeon: Live Bolton MD;  Location: [facility] OR;  Service:    • ENDOSCOPY  2013    Colon endoscopy 40561 (1) - Internal & external hemorrhoids found. Stool found.   • ENDOSCOPY  2013    EGD w/ tube 34990 (1) - Normal esophagus. Gastritis in stomach. Biopsy taken. Normal dudoenum. Biopsy taken.   • ENDOSCOPY N/A 10/17/2018    Procedure: ESOPHAGOGASTRODUODENOSCOPY--eval varices;  Surgeon: Russell Del Rio MD;  Location: [facility] ENDOSCOPY;  Service: Gastroenterology   • ENDOSCOPY N/A 3/22/2021    Procedure: ESOPHAGOGASTRODUODENOSCOPYURGNET;  Surgeon: Russell Del Rio MD;  Location: [facility] ENDOSCOPY;  Service: Gastroenterology;  Laterality: N/A;   • ENDOSCOPY N/A 2021    Procedure: ESOPHAGOGASTRODUODENOSCOPY;  Surgeon: Basia Saunders MD;  Location: [facility] ENDOSCOPY;  Service: Gastroenterology;  Laterality: N/A;   • FOOT SURGERY  2013    Foot/toes surgery procedure (1) - Arthroplasty of toes 4 and 5 of right foot.   • HERNIA REPAIR     • HERNIA REPAIR      hital   • HYSTERECTOMY     • LIVER BIOPSY     • NERVE BLOCK  2016    Injection for nerve block (1) - Lumbar medial branch block.   • OTHER SURGICAL HISTORY  2012    Inj(s) Tend-Sheath, Ligament, Single 81018 (1) - [facility]    • OTHER SURGICAL HISTORY   06/24/2013    Small Joint Injection/Aspiration 20600 (2) - [facility] ([facility])    • OTHER SURGICAL HISTORY  2011    bowel obstruction x2   • OTHER SURGICAL HISTORY      gland removed from neck   • SUBLINGUAL SALIVARY CYST EXCISION N/A 8/1/2017    Procedure: EXCISION OF LEFT  TONGUE LESION WITH CLOSURE;  Surgeon: Live Bolton MD;  Location: [facility];  Service:    • TUBAL ABDOMINAL LIGATION     • UPPER GASTROINTESTINAL ENDOSCOPY  07/01/2013   • UPPER GASTROINTESTINAL ENDOSCOPY  10/17/2018   • UPPER GASTROINTESTINAL ENDOSCOPY  03/22/2021         Current Outpatient Medications:   •  acetaminophen (TYLENOL) 325 MG tablet, Take 650 mg by mouth Every 6 (Six) Hours As Needed for Mild Pain ., Disp: , Rfl:   •  bisacodyl ([facility] Bisacodyl) 10 MG suppository, Insert 10 mg into the rectum As Needed for Constipation., Disp: , Rfl:   •  cetirizine (zyrTEC) 10 MG tablet, Take 10 mg by mouth Daily., Disp: , Rfl:   •  Docusate Sodium (Stool Softener) 100 MG capsule, Take 100 mg by mouth 2 (Two) Times a Day., Disp: , Rfl:   •  DULoxetine (CYMBALTA) 60 MG capsule, Take 60 mg by mouth Daily., Disp: , Rfl:   •  escitalopram (LEXAPRO) 10 MG tablet, Take 10 mg by mouth Daily., Disp: , Rfl:   •  folic acid (FOLVITE) 1 MG tablet, Take 1 tablet by mouth on Monday, Wednesday and Friday, Disp: 36 tablet, Rfl: 1  •  furosemide (LASIX) 20 MG tablet, Take 20 mg by mouth 2 (Two) Times a Day., Disp: , Rfl:   •  gabapentin (NEURONTIN) 800 MG tablet, Take 800 mg by mouth 3 (Three) Times a Day., Disp: , Rfl:   •  linaclotide (LINZESS) 145 MCG capsule capsule, Take 145 mcg by mouth Daily., Disp: , Rfl:   •  Omega-3 Fatty Acids (fish oil) 1000 MG capsule capsule, Take  by mouth 2 (Two) Times a Day With Meals., Disp: , Rfl:   •  omeprazole (priLOSEC) 40 MG capsule, Take 40 mg by mouth Daily., Disp: , Rfl:   •  oxyCODONE-acetaminophen (PERCOCET)  MG per tablet, Take 1 tablet by mouth Every 6 (Six) Hours As Needed for Moderate Pain .,  Disp: , Rfl:   •  polyethylene glycol (MiraLax) 17 GM/SCOOP powder, Take 17 g by mouth Daily., Disp: , Rfl:   •  potassium chloride (MICRO-K) 10 MEQ CR capsule, Take 2 capsules by mouth Daily. (Patient taking differently: Take 10 mEq by mouth 2 (Two) Times a Day.), Disp: 30 capsule, Rfl: 1  •  spironolactone (ALDACTONE) 25 MG tablet, TAKE 1 TABLET BY MOUTH ONCE DAILY (Patient taking differently: 25 mg 2 (Two) Times a Day.), Disp: 30 tablet, Rfl: 5  •  sucralfate (Carafate) 1 g tablet, Take 1 tablet by mouth 4 (Four) Times a Day., Disp: 120 tablet, Rfl: 2  •  traZODone (DESYREL) 100 MG tablet, TAKE 1 TABLET BY MOUTH ONCE DAILY AT NIGHT, Disp: 30 tablet, Rfl: 2  •  vitamin B-12 (CYANOCOBALAMIN) 1000 MCG tablet, Take 1,000 mcg by mouth Daily., Disp: , Rfl:   •  cyclobenzaprine (FLEXERIL) 10 MG tablet, Take 10 mg by mouth 2 (Two) Times a Day As Needed., Disp: , Rfl:   •  DULoxetine (CYMBALTA) 20 MG capsule, Take 20 mg by mouth Daily., Disp: , Rfl:   •  ondansetron (ZOFRAN) 8 MG tablet, Take 1 tablet by mouth Every 8 (Eight) Hours As Needed for Nausea or Vomiting. (Patient taking differently: Take 4 mg by mouth Every 8 (Eight) Hours As Needed for Nausea or Vomiting.), Disp: 30 tablet, Rfl: 0  •  ondansetron ODT (ZOFRAN-ODT) 4 MG disintegrating tablet, As Needed., Disp: , Rfl:     Allergies   Allergen Reactions   • Morphine GI Intolerance   • Tape Rash     Patient has rash/blishers on site after tape   • Other      Pt states that taking steroids either in pill or injection form make her have blisters in her mouth and she feels like she is on fire on the inside/ has hx of c diff and possible MRSA     • Corticosteroids Rash   • Kenalog  [Triamcinolone Acetonide] Rash   • Sulfa Antibiotics Rash     Sulfa (Sulfonamide Antibiotics)       Family History   Problem Relation Age of Onset   • Cancer Other    • Diabetes Other    • Heart disease Other    • Hypertension Mother    • Cancer Mother    • Diabetes Mother    • Hypertension  Father    • Heart attack Father    • Cancer Father    • Heart disease Father    • Thyroid disease Maternal Aunt    • Cancer Maternal Aunt    • No Known Problems Sister    • No Known Problems Brother    • Cancer Maternal Grandmother    • No Known Problems Sister        Social History     Socioeconomic History   • Marital status:    Tobacco Use   • Smoking status: Former Smoker     Packs/day: 2.00     Years: 15.00     Pack years: 30.00     Types: Cigarettes     Quit date:      Years since quittin.1   • Smokeless tobacco: Never Used   Vaping Use   • Vaping Use: Never used   Substance and Sexual Activity   • Alcohol use: No   • Drug use: Never   • Sexual activity: Defer     Birth control/protection: Surgical     Comment: Marital Status: .  Hysterectomy.           Physical Exam  Constitutional:       Appearance: Normal appearance.   Abdominal:      General: Abdomen is flat. There is no distension.      Palpations: Abdomen is soft. There is no mass.      Tenderness: There is no abdominal tenderness. There is no guarding or rebound.      Hernia: No hernia is present.   Musculoskeletal:      Cervical back: Normal range of motion and neck supple.   Neurological:      Mental Status: She is alert.           ASSESSMENT    Diagnoses and all orders for this visit:    1. Constipation, unspecified constipation type (Primary)        PLAN    1.  Recheck as needed  2.  Advised to take daily fiber and perhaps even a daily low-dose cathartic              This document has been electronically signed by Benjamin Troncoso MD on 2022 07:47 CST

## 2022-02-28 RX ORDER — ONDANSETRON 4 MG/1
TABLET, ORALLY DISINTEGRATING ORAL
Qty: 15 TABLET | Refills: 0 | Status: SHIPPED | OUTPATIENT
Start: 2022-02-28 | End: 2022-06-20

## 2022-03-02 ENCOUNTER — LAB (OUTPATIENT)
Dept: LAB | Facility: HOSPITAL | Age: 62
End: 2022-03-02

## 2022-03-02 DIAGNOSIS — K72.10 END STAGE LIVER DISEASE: ICD-10-CM

## 2022-03-02 DIAGNOSIS — K75.81 NASH (NONALCOHOLIC STEATOHEPATITIS): ICD-10-CM

## 2022-03-02 DIAGNOSIS — K59.04 CHRONIC IDIOPATHIC CONSTIPATION: ICD-10-CM

## 2022-03-02 DIAGNOSIS — K74.60 CIRRHOSIS OF LIVER WITHOUT ASCITES, UNSPECIFIED HEPATIC CIRRHOSIS TYPE: ICD-10-CM

## 2022-03-02 LAB
ALBUMIN SERPL-MCNC: 3.5 G/DL (ref 3.5–5.2)
ALBUMIN/GLOB SERPL: 1.2 G/DL
ALP SERPL-CCNC: 105 U/L (ref 39–117)
ALPHA-FETOPROTEIN: 3.69 NG/ML (ref 0–8.3)
ALT SERPL W P-5'-P-CCNC: 30 U/L (ref 1–33)
AMMONIA BLD-SCNC: <10 UMOL/L (ref 11–51)
ANION GAP SERPL CALCULATED.3IONS-SCNC: 9.8 MMOL/L (ref 5–15)
AST SERPL-CCNC: 42 U/L (ref 1–32)
BILIRUB SERPL-MCNC: 1.4 MG/DL (ref 0–1.2)
BUN SERPL-MCNC: 9 MG/DL (ref 8–23)
BUN/CREAT SERPL: 9.8 (ref 7–25)
CALCIUM SPEC-SCNC: 8.9 MG/DL (ref 8.6–10.5)
CHLORIDE SERPL-SCNC: 105 MMOL/L (ref 98–107)
CO2 SERPL-SCNC: 24.2 MMOL/L (ref 22–29)
CREAT SERPL-MCNC: 0.92 MG/DL (ref 0.57–1)
EGFRCR SERPLBLD CKD-EPI 2021: 70.5 ML/MIN/1.73
GLOBULIN UR ELPH-MCNC: 3 GM/DL
GLUCOSE SERPL-MCNC: 90 MG/DL (ref 65–99)
POTASSIUM SERPL-SCNC: 4.2 MMOL/L (ref 3.5–5.2)
PROT SERPL-MCNC: 6.5 G/DL (ref 6–8.5)
SODIUM SERPL-SCNC: 139 MMOL/L (ref 136–145)

## 2022-03-02 PROCEDURE — 82105 ALPHA-FETOPROTEIN SERUM: CPT

## 2022-03-02 PROCEDURE — 80053 COMPREHEN METABOLIC PANEL: CPT

## 2022-03-02 PROCEDURE — 85610 PROTHROMBIN TIME: CPT

## 2022-03-02 PROCEDURE — 82140 ASSAY OF AMMONIA: CPT

## 2022-03-03 DIAGNOSIS — K59.04 CHRONIC IDIOPATHIC CONSTIPATION: ICD-10-CM

## 2022-03-03 DIAGNOSIS — K74.60 CIRRHOSIS OF LIVER WITHOUT ASCITES, UNSPECIFIED HEPATIC CIRRHOSIS TYPE: ICD-10-CM

## 2022-03-03 DIAGNOSIS — K72.10 END STAGE LIVER DISEASE: ICD-10-CM

## 2022-03-03 DIAGNOSIS — K75.81 NASH (NONALCOHOLIC STEATOHEPATITIS): ICD-10-CM

## 2022-03-03 LAB
INR PPP: 2.32 (ref 0.8–1.2)
PROTHROMBIN TIME: 24.9 SECONDS (ref 11.1–15.3)

## 2022-03-08 ENCOUNTER — OFFICE VISIT (OUTPATIENT)
Dept: GASTROENTEROLOGY | Facility: CLINIC | Age: 62
End: 2022-03-08

## 2022-03-08 VITALS
WEIGHT: 219 LBS | HEIGHT: 64 IN | DIASTOLIC BLOOD PRESSURE: 88 MMHG | HEART RATE: 78 BPM | SYSTOLIC BLOOD PRESSURE: 148 MMHG | BODY MASS INDEX: 37.39 KG/M2

## 2022-03-08 DIAGNOSIS — K59.04 CHRONIC IDIOPATHIC CONSTIPATION: ICD-10-CM

## 2022-03-08 DIAGNOSIS — K75.81 NASH (NONALCOHOLIC STEATOHEPATITIS): ICD-10-CM

## 2022-03-08 DIAGNOSIS — R10.84 GENERALIZED ABDOMINAL PAIN: ICD-10-CM

## 2022-03-08 DIAGNOSIS — K74.60 CIRRHOSIS OF LIVER WITHOUT ASCITES, UNSPECIFIED HEPATIC CIRRHOSIS TYPE: ICD-10-CM

## 2022-03-08 DIAGNOSIS — K72.10 END STAGE LIVER DISEASE: Primary | ICD-10-CM

## 2022-03-08 PROCEDURE — 99214 OFFICE O/P EST MOD 30 MIN: CPT | Performed by: PHYSICIAN ASSISTANT

## 2022-03-08 NOTE — PROGRESS NOTES
Chief Complaint   Patient presents with   • Cirrhosis   • MUSA   • Chronic Idiopathic Constipation       ENDO PROCEDURE ORDERED:    Subjective    Amira Shannon is a 61 y.o. female. she is here today for follow-up.    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 Last seen 01/25/2022. Patient has been having some right flank pain. She states if she stands too long it causes her to hurt. She states her bowels are moving fairly regularly with the Linzess 145 and the MiraLAX as needed. She is not on the Relistor. She has had some nausea, no vomiting or dysphagia. She is on Prilosec and Carafate as needed for the heartburn. Weight is up 6 pounds since last visit. Last colonoscopy 03/22/2021.     Patient had a CBC on 01/27/2022, showed 68,000 platelets, otherwise normal. She had a follow-up with Dr. Hope on 02/25/2022. He recommended following up as needed. She underwent studies on 03/02/2022. Abdominal ultrasound at [facility] showed a fatty liver, normal Doppler, post cholecystectomy, 3.8 mm common bile duct, enlarged spleen 17.9 cm. Normal AFP, INR 2.32, ammonia was less than 10. CMP showed total bilirubin 1.4, AST 42, otherwise normal.     A/P: Patient with MUSA cirrhosis with significant hepatic fibrosis, elevated enzymes secondary to the above and low platelets, also likely secondary to the splenomegaly. Nausea probably related to GERD. Her bowels have been doing better. She states she is really not taking the Linzess now regularly. She may take the MiraLAX as needed. She states her constipation seems to have resolved. She may take that again as needed. Will otherwise continue current regimen. Encouraged to avoid gastric irritants, encouraged high fiber diet. Will plan follow-up in 4 months with DIMPLE Moscoso, INR prior, further pending clinical course and the results of the above.       The following portions of the patient's history were  "reviewed and updated as appropriate:   Past Medical History:   Diagnosis Date   • Acid reflux    • Altered bowel function    • Arthropathy of lumbar facet joint    • Bleeding disorder (HCC)    • C. difficile diarrhea    • Chronic idiopathic constipation    • Colonic inertia    • Constipation    • Corns and callus    • Depression    • Disease related peripheral neuropathy    • Epigastric pain    • Fatty liver    • Hammer toe    • Headache    • Hiatal hernia    • History of transfusion    • Hyperlipidemia    • Knee pain    • Localized, primary osteoarthritis of the ankle and foot     Localized, primary osteoarthritis of the ankle and/or foot   • Mendoza's metatarsalgia     Mendoza's metatarsalgia - 2nd interspace on right   • Nausea and vomiting    • Neuralgia and neuritis     Neuralgia, neuritis, and radiculitis, unspecified   • Neuropathy    • Obstructive sleep apnea     Obstructive sleep apnea (adult) (pediatric)    • CHAI on CPAP     \"C-Pap at night  (unconfirmed)\"   • Osteoarthritis    • Pain in foot     Pain in unspecified foot - sees a podiatrist   • Pain in joint, ankle and foot     Joint pain in ankle and foot      • Pain radiating to back     Pain radiating to lumbar region of back   • Pelvic floor dysfunction    • Plantar fasciitis    • PONV (postoperative nausea and vomiting)    • Restless leg syndrome    • Secondary hypertension     Secondary hypertension, unspecified   • Sinusitis    • Sleep apnea    • Tongue anomaly     lesion     Past Surgical History:   Procedure Laterality Date   • CARPAL TUNNEL RELEASE Left 2018    Procedure: CARPAL TUNNEL RELEASE - left;  Surgeon: Justus Lewis MD;  Location: [facility];  Service: Orthopedics   • CARPAL TUNNEL RELEASE Right 2019    Procedure: carpal tunnel release right hand with local/monitored anesthesia care;  Surgeon: Justus Lewis MD;  Location: [facility];  Service: Orthopedics   •  SECTION     • CHOLECYSTECTOMY     • " COLONOSCOPY  07/01/2013   • COLONOSCOPY N/A 10/17/2018    Procedure: COLONOSCOPY;  Surgeon: Russell Del Rio MD;  Location: [facility] ENDOSCOPY;  Service: Gastroenterology   • COLONOSCOPY N/A 3/22/2021    Procedure: COLONOSCOPY;  Surgeon: Russell Del Rio MD;  Location: [facility] ENDOSCOPY;  Service: Gastroenterology;  Laterality: N/A;   • DIRECT LARYNGOSCOPY, ESOPHAGOSCOPY, BRONCHOSCOPY N/A 8/1/2017    Procedure: DIRECT LARYNGOSCOPY AND;  Surgeon: Live Bolton MD;  Location: [facility] OR;  Service:    • ENDOSCOPY  07/01/2013    Colon endoscopy 01267 (1) - Internal & external hemorrhoids found. Stool found.   • ENDOSCOPY  07/01/2013    EGD w/ tube 63979 (1) - Normal esophagus. Gastritis in stomach. Biopsy taken. Normal dudoenum. Biopsy taken.   • ENDOSCOPY N/A 10/17/2018    Procedure: ESOPHAGOGASTRODUODENOSCOPY--eval varices;  Surgeon: Russell Del Rio MD;  Location: [facility] ENDOSCOPY;  Service: Gastroenterology   • ENDOSCOPY N/A 3/22/2021    Procedure: ESOPHAGOGASTRODUODENOSCOPYURGNET;  Surgeon: Russell Del Rio MD;  Location: [facility] ENDOSCOPY;  Service: Gastroenterology;  Laterality: N/A;   • ENDOSCOPY N/A 9/16/2021    Procedure: ESOPHAGOGASTRODUODENOSCOPY;  Surgeon: Basia Saunders MD;  Location: [facility] ENDOSCOPY;  Service: Gastroenterology;  Laterality: N/A;   • FOOT SURGERY  02/26/2013    Foot/toes surgery procedure (1) - Arthroplasty of toes 4 and 5 of right foot.   • HERNIA REPAIR     • HERNIA REPAIR      hital   • HYSTERECTOMY     • LIVER BIOPSY     • NERVE BLOCK  07/25/2016    Injection for nerve block (1) - Lumbar medial branch block.   • OTHER SURGICAL HISTORY  12/03/2012    Inj(s) Tend-Sheath, Ligament, Single 20550 (1) - [facility]    • OTHER SURGICAL HISTORY  06/24/2013    Small Joint Injection/Aspiration 20600 (2) - [facility] ([facility])    • OTHER SURGICAL HISTORY  2011    bowel obstruction x2   • OTHER SURGICAL HISTORY      gland removed from neck   • SUBLINGUAL SALIVARY CYST  EXCISION N/A 2017    Procedure: EXCISION OF LEFT  TONGUE LESION WITH CLOSURE;  Surgeon: Live Bolton MD;  Location: [facility];  Service:    • TUBAL ABDOMINAL LIGATION     • UPPER GASTROINTESTINAL ENDOSCOPY  2013   • UPPER GASTROINTESTINAL ENDOSCOPY  10/17/2018   • UPPER GASTROINTESTINAL ENDOSCOPY  2021     Family History   Problem Relation Age of Onset   • Cancer Other    • Diabetes Other    • Heart disease Other    • Hypertension Mother    • Cancer Mother    • Diabetes Mother    • Hypertension Father    • Heart attack Father    • Cancer Father    • Heart disease Father    • Thyroid disease Maternal Aunt    • Cancer Maternal Aunt    • No Known Problems Sister    • No Known Problems Brother    • Cancer Maternal Grandmother    • No Known Problems Sister      OB History        1    Para   1    Term                AB        Living   1       SAB        IAB        Ectopic        Molar        Multiple        Live Births                  Allergies   Allergen Reactions   • Morphine GI Intolerance   • Tape Rash     Patient has rash/blishers on site after tape   • Other      Pt states that taking steroids either in pill or injection form make her have blisters in her mouth and she feels like she is on fire on the inside/ has hx of c diff and possible MRSA     • Corticosteroids Rash   • Kenalog  [Triamcinolone Acetonide] Rash   • Sulfa Antibiotics Rash     Sulfa (Sulfonamide Antibiotics)     Social History     Socioeconomic History   • Marital status:    Tobacco Use   • Smoking status: Former Smoker     Packs/day: 2.00     Years: 15.00     Pack years: 30.00     Types: Cigarettes     Quit date:      Years since quittin.2   • Smokeless tobacco: Never Used   Vaping Use   • Vaping Use: Never used   Substance and Sexual Activity   • Alcohol use: No   • Drug use: Never   • Sexual activity: Defer     Birth control/protection: Surgical     Comment: Marital Status: .   Hysterectomy.     Current Medications:  Prior to Admission medications    Medication Sig Start Date End Date Taking? Authorizing Provider   acetaminophen (TYLENOL) 325 MG tablet Take 650 mg by mouth Every 6 (Six) Hours As Needed for Mild Pain .   Yes Promise Tovar MD   bisacodyl (DULCOLAX) 10 MG suppository Insert 10 mg into the rectum As Needed for Constipation.   Yes Promise Tovar MD   cetirizine (zyrTEC) 10 MG tablet Take 10 mg by mouth Daily.   Yes Promise Tovar MD   cyclobenzaprine (FLEXERIL) 10 MG tablet Take 10 mg by mouth 2 (Two) Times a Day As Needed. 11/24/21  Yes Promise Tovar MD   Docusate Sodium 100 MG capsule Take 100 mg by mouth 2 (Two) Times a Day.   Yes Promise Tovar MD   DULoxetine (CYMBALTA) 20 MG capsule Take 20 mg by mouth Daily.   Yes Promise Tovar MD   escitalopram (LEXAPRO) 10 MG tablet Take 10 mg by mouth Daily. 3/25/21  Yes Promise Tovar MD   folic acid (FOLVITE) 1 MG tablet Take 1 tablet by mouth on Monday, Wednesday and Friday 12/1/21  Yes Marija Wharton APRN   furosemide (LASIX) 20 MG tablet Take 20 mg by mouth 2 (Two) Times a Day.   Yes Promise Tovar MD   gabapentin (NEURONTIN) 800 MG tablet Take 800 mg by mouth 3 (Three) Times a Day. 10/19/21  Yes Promise Tovar MD   Omega-3 Fatty Acids (fish oil) 1000 MG capsule capsule Take  by mouth 2 (Two) Times a Day With Meals.   Yes Promise Tovar MD   omeprazole (priLOSEC) 40 MG capsule Take 40 mg by mouth Daily. 11/24/21  Yes Promise Tovar MD   ondansetron ODT (ZOFRAN-ODT) 4 MG disintegrating tablet DISSOLVE 1 TABLET IN MOUTH EVERY 8 HOURS AS NEEDED FOR NAUSEA AND VOMITING 2/28/22  Yes Blaine Perales PA-C   oxyCODONE-acetaminophen (PERCOCET)  MG per tablet Take 1 tablet by mouth Every 6 (Six) Hours As Needed for Moderate Pain .   Yes Promise Tovar MD   polyethylene glycol (MIRALAX) 17 GM/SCOOP powder Take 17 g by mouth Daily.   Yes Guy  "MD Promise   potassium chloride (MICRO-K) 10 MEQ CR capsule Take 2 capsules by mouth Daily.  Patient taking differently: Take 10 mEq by mouth 2 (Two) Times a Day. 9/24/21  Yes Fidencio Mathews MD   spironolactone (ALDACTONE) 25 MG tablet TAKE 1 TABLET BY MOUTH ONCE DAILY  Patient taking differently: 25 mg 2 (Two) Times a Day. 6/11/19  Yes Maureen Cardoso APRN   sucralfate (Carafate) 1 g tablet Take 1 tablet by mouth 4 (Four) Times a Day. 10/19/21  Yes Blaine Perales PA-C   traZODone (DESYREL) 100 MG tablet TAKE 1 TABLET BY MOUTH ONCE DAILY AT NIGHT 1/31/22  Yes Blaine Perales PA-C   vitamin B-12 (CYANOCOBALAMIN) 1000 MCG tablet Take 1,000 mcg by mouth Daily.   Yes Promise Tovar MD   linaclotide (LINZESS) 145 MCG capsule capsule Take 145 mcg by mouth Daily.  3/8/22 Yes ProviderPromise MD   DULoxetine (CYMBALTA) 60 MG capsule Take 60 mg by mouth Daily.    Provider, MD Promise   ondansetron (ZOFRAN) 8 MG tablet Take 1 tablet by mouth Every 8 (Eight) Hours As Needed for Nausea or Vomiting.  Patient taking differently: Take 4 mg by mouth Every 8 (Eight) Hours As Needed for Nausea or Vomiting. 1/26/21   Blaine Perales PA-C     Review of Systems  Review of Systems       Objective    /88   Pulse 78   Ht 162.6 cm (64\")   Wt 99.3 kg (219 lb)   LMP  (LMP Unknown)   BMI 37.59 kg/m²   Physical Exam  Vitals and nursing note reviewed.   Constitutional:       General: She is not in acute distress.     Appearance: She is well-developed. She is obese. She is ill-appearing.   HENT:      Head: Normocephalic and atraumatic.   Eyes:      Pupils: Pupils are equal, round, and reactive to light.   Cardiovascular:      Rate and Rhythm: Normal rate and regular rhythm.      Heart sounds: Normal heart sounds.   Pulmonary:      Effort: Pulmonary effort is normal.      Breath sounds: Normal breath sounds.   Abdominal:      General: Bowel sounds are normal. There is no distension or abdominal bruit.      " Palpations: Abdomen is soft. Abdomen is not rigid. There is no shifting dullness or mass.      Tenderness: There is abdominal tenderness. There is no guarding or rebound.      Hernia: No hernia is present. There is no hernia in the ventral area.   Musculoskeletal:         General: Normal range of motion.      Cervical back: Normal range of motion.   Skin:     General: Skin is warm and dry.   Neurological:      Mental Status: She is alert and oriented to person, place, and time.   Psychiatric:         Behavior: Behavior normal.         Thought Content: Thought content normal.         Judgment: Judgment normal.       Assessment/Plan      1. End stage liver disease (HCC)    2. Cirrhosis of liver without ascites, unspecified hepatic cirrhosis type (HCC)    3. Chronic idiopathic constipation    4. MUSA (nonalcoholic steatohepatitis)    5. Generalized abdominal pain    .   Diagnoses and all orders for this visit:    1. End stage liver disease (HCC) (Primary)  -     UMSA Fibrosure; Future  -     Protime-INR; Future    2. Cirrhosis of liver without ascites, unspecified hepatic cirrhosis type (HCC)  -     MUSA Fibrosure; Future  -     Protime-INR; Future    3. Chronic idiopathic constipation  -     MUSA Fibrosure; Future  -     Protime-INR; Future    4. MUSA (nonalcoholic steatohepatitis)  -     MUSA Fibrosure; Future  -     Protime-INR; Future    5. Generalized abdominal pain  -     MUSA Fibrosure; Future  -     Protime-INR; Future        Orders placed during this encounter include:  Orders Placed This Encounter   Procedures   • MUSA Fibrosure     Standing Status:   Future     Standing Expiration Date:   9/4/2022     Order Specific Question:   Release to patient     Answer:   Immediate   • Protime-INR     Standing Status:   Future     Standing Expiration Date:   9/4/2022       Medications prescribed:  No orders of the defined types were placed in this encounter.    Discontinued Medications       Reason for Discontinue      linaclotide (LINZESS) 145 MCG capsule capsule    Not Efficacious        Requested Prescriptions      No prescriptions requested or ordered in this encounter       Review and/or summary of lab tests, radiology, procedures, medications. Review and summary of old records and obtaining of history. The risks and benefits of my recommendations, as well as other treatment options were discussed with the patient today. Questions were answered.    Follow-up: Return in about 4 months (around 7/8/2022), or if symptoms worsen or fail to improve, for lab prior.     * Surgery not found *      This document has been electronically signed by Blaine Perales PA-C on March 16, 2022 17:57 CDT      Results for orders placed or performed in visit on 03/02/22   AFP Tumor Marker    Specimen: Blood   Result Value Ref Range    ALPHA-FETOPROTEIN 3.69 0 - 8.3 ng/mL   Protime-INR    Specimen: Blood   Result Value Ref Range    Protime 24.9 (H) 11.1 - 15.3 Seconds    INR 2.32 (H) 0.80 - 1.20   Ammonia    Specimen: Blood   Result Value Ref Range    Ammonia <10 (L) 11 - 51 umol/L   Comprehensive Metabolic Panel    Specimen: Blood   Result Value Ref Range    Glucose 90 65 - 99 mg/dL    BUN 9 8 - 23 mg/dL    Creatinine 0.92 0.57 - 1.00 mg/dL    Sodium 139 136 - 145 mmol/L    Potassium 4.2 3.5 - 5.2 mmol/L    Chloride 105 98 - 107 mmol/L    CO2 24.2 22.0 - 29.0 mmol/L    Calcium 8.9 8.6 - 10.5 mg/dL    Total Protein 6.5 6.0 - 8.5 g/dL    Albumin 3.50 3.50 - 5.20 g/dL    ALT (SGPT) 30 1 - 33 U/L    AST (SGOT) 42 (H) 1 - 32 U/L    Alkaline Phosphatase 105 39 - 117 U/L    Total Bilirubin 1.4 (H) 0.0 - 1.2 mg/dL    Globulin 3.0 gm/dL    A/G Ratio 1.2 g/dL    BUN/Creatinine Ratio 9.8 7.0 - 25.0    Anion Gap 9.8 5.0 - 15.0 mmol/L    eGFR 70.5 >60.0 mL/min/1.73   Results for orders placed or performed in visit on 01/27/22   [facility]   Result Value Ref Range    Extra Tube Hold for add-ons.    Scan Slide    Specimen: Blood   Result Value Ref Range     RBC Morphology Normal Normal    WBC Morphology Normal Normal    Platelet Estimate Decreased Normal   CBC Auto Differential    Specimen: Blood   Result Value Ref Range    WBC 4.32 3.40 - 10.80 10*3/mm3    RBC 4.84 3.77 - 5.28 10*6/mm3    Hemoglobin 14.0 12.0 - 15.9 g/dL    Hematocrit 41.4 34.0 - 46.6 %    MCV 85.5 79.0 - 97.0 fL    MCH 28.9 26.6 - 33.0 pg    MCHC 33.8 31.5 - 35.7 g/dL    RDW 15.5 (H) 12.3 - 15.4 %    RDW-SD 48.5 37.0 - 54.0 fl    MPV 11.1 6.0 - 12.0 fL    Platelets 68 (L) 140 - 450 10*3/mm3    Neutrophil % 63.9 42.7 - 76.0 %    Lymphocyte % 25.5 19.6 - 45.3 %    Monocyte % 6.0 5.0 - 12.0 %    Eosinophil % 4.2 0.3 - 6.2 %    Basophil % 0.2 0.0 - 1.5 %    Immature Grans % 0.2 0.0 - 0.5 %    Neutrophils, Absolute 2.76 1.70 - 7.00 10*3/mm3    Lymphocytes, Absolute 1.10 0.70 - 3.10 10*3/mm3    Monocytes, Absolute 0.26 0.10 - 0.90 10*3/mm3    Eosinophils, Absolute 0.18 0.00 - 0.40 10*3/mm3    Basophils, Absolute 0.01 0.00 - 0.20 10*3/mm3    Immature Grans, Absolute 0.01 0.00 - 0.05 10*3/mm3    nRBC 0.0 0.0 - 0.2 /100 WBC   Results for orders placed or performed in visit on 01/21/22   CBC Auto Differential    Specimen: Blood   Result Value Ref Range    WBC 4.16 3.40 - 10.80 10*3/mm3    RBC 4.91 3.77 - 5.28 10*6/mm3    Hemoglobin 14.0 12.0 - 15.9 g/dL    Hematocrit 42.6 34.0 - 46.6 %    MCV 86.8 79.0 - 97.0 fL    MCH 28.5 26.6 - 33.0 pg    MCHC 32.9 31.5 - 35.7 g/dL    RDW 15.6 (H) 12.3 - 15.4 %    RDW-SD 49.3 37.0 - 54.0 fl    MPV 11.6 6.0 - 12.0 fL    Platelets 32 (C) 140 - 450 10*3/mm3   Iron Profile    Specimen: Blood   Result Value Ref Range    Iron 101 37 - 145 mcg/dL    Iron Saturation 28 20 - 50 %    Transferrin 246 200 - 360 mg/dL    TIBC 367 298 - 536 mcg/dL   Manual Differential    Specimen: Blood   Result Value Ref Range    Neutrophil % 57.0 42.7 - 76.0 %    Lymphocyte % 25.0 19.6 - 45.3 %    Monocyte % 6.0 5.0 - 12.0 %    Eosinophil % 4.0 0.3 - 6.2 %    Basophil % 1.0 0.0 - 1.5 %    Bands %  7.0  (H) 0.0 - 5.0 %    Neutrophils Absolute 2.66 1.70 - 7.00 10*3/mm3    Lymphocytes Absolute 1.04 0.70 - 3.10 10*3/mm3    Monocytes Absolute 0.25 0.10 - 0.90 10*3/mm3    Eosinophils Absolute 0.17 0.00 - 0.40 10*3/mm3    Basophils Absolute 0.04 0.00 - 0.20 10*3/mm3    Anisocytosis Slight/1+ None Seen    WBC Morphology Normal Normal    Platelet Estimate Decreased Normal   Folate    Specimen: Blood   Result Value Ref Range    Folate >20.00 4.78 - 24.20 ng/mL   Ferritin    Specimen: Blood   Result Value Ref Range    Ferritin 176.60 (H) 13.00 - 150.00 ng/mL   Vitamin B12    Specimen: Blood   Result Value Ref Range    Vitamin B-12 1,044 (H) 211 - 946 pg/mL   Results for orders placed or performed in visit on 12/01/21   Scan Slide    Specimen: Blood   Result Value Ref Range    RBC Morphology Normal Normal    WBC Morphology Normal Normal    Platelet Estimate Decreased Normal    Large Platelets     CBC Auto Differential    Specimen: Blood   Result Value Ref Range    WBC 4.33 3.40 - 10.80 10*3/mm3    RBC 4.82 3.77 - 5.28 10*6/mm3    Hemoglobin 14.2 12.0 - 15.9 g/dL    Hematocrit 41.1 34.0 - 46.6 %    MCV 85.3 79.0 - 97.0 fL    MCH 29.5 26.6 - 33.0 pg    MCHC 34.5 31.5 - 35.7 g/dL    RDW 14.9 12.3 - 15.4 %    RDW-SD 46.5 37.0 - 54.0 fl    MPV 10.8 6.0 - 12.0 fL    Platelets 75 (L) 140 - 450 10*3/mm3    Neutrophil % 66.9 42.7 - 76.0 %    Lymphocyte % 22.2 19.6 - 45.3 %    Monocyte % 6.5 5.0 - 12.0 %    Eosinophil % 3.7 0.3 - 6.2 %    Basophil % 0.5 0.0 - 1.5 %    Immature Grans % 0.2 0.0 - 0.5 %    Neutrophils, Absolute 2.90 1.70 - 7.00 10*3/mm3    Lymphocytes, Absolute 0.96 0.70 - 3.10 10*3/mm3    Monocytes, Absolute 0.28 0.10 - 0.90 10*3/mm3    Eosinophils, Absolute 0.16 0.00 - 0.40 10*3/mm3    Basophils, Absolute 0.02 0.00 - 0.20 10*3/mm3    Immature Grans, Absolute 0.01 0.00 - 0.05 10*3/mm3    nRBC 0.0 0.0 - 0.2 /100 WBC     *Note: Due to a large number of results and/or encounters for the requested time period, some results  have not been displayed. A complete set of results can be found in Results Review.

## 2022-05-02 RX ORDER — TRAZODONE HYDROCHLORIDE 100 MG/1
TABLET ORAL
Qty: 30 TABLET | Refills: 2 | Status: SHIPPED | OUTPATIENT
Start: 2022-05-02 | End: 2022-10-17 | Stop reason: SDUPTHER

## 2022-05-17 ENCOUNTER — HOSPITAL ENCOUNTER (EMERGENCY)
Facility: HOSPITAL | Age: 62
Discharge: HOME | End: 2022-05-17
Attending: FAMILY MEDICINE | Admitting: FAMILY MEDICINE

## 2022-05-17 ENCOUNTER — APPOINTMENT (OUTPATIENT)
Dept: ULTRASOUND IMAGING | Facility: HOSPITAL | Age: 62
End: 2022-05-17

## 2022-05-17 ENCOUNTER — APPOINTMENT (OUTPATIENT)
Dept: CT IMAGING | Facility: HOSPITAL | Age: 62
End: 2022-05-17

## 2022-05-17 VITALS
SYSTOLIC BLOOD PRESSURE: 150 MMHG | RESPIRATION RATE: 18 BRPM | TEMPERATURE: 98.2 F | WEIGHT: 212 LBS | HEART RATE: 76 BPM | BODY MASS INDEX: 36.19 KG/M2 | DIASTOLIC BLOOD PRESSURE: 75 MMHG | OXYGEN SATURATION: 98 % | HEIGHT: 64 IN

## 2022-05-17 DIAGNOSIS — K59.00 CONSTIPATION, UNSPECIFIED CONSTIPATION TYPE: ICD-10-CM

## 2022-05-17 DIAGNOSIS — R10.10 PAIN OF UPPER ABDOMEN: Primary | ICD-10-CM

## 2022-05-17 LAB
ALBUMIN SERPL-MCNC: 3.7 G/DL (ref 3.5–5.2)
ALBUMIN/GLOB SERPL: 1.1 G/DL
ALP SERPL-CCNC: 149 U/L (ref 39–117)
ALT SERPL W P-5'-P-CCNC: 25 U/L (ref 1–33)
ANION GAP SERPL CALCULATED.3IONS-SCNC: 9 MMOL/L (ref 5–15)
ANISOCYTOSIS BLD QL: NORMAL
AST SERPL-CCNC: 37 U/L (ref 1–32)
BASOPHILS # BLD AUTO: 0.02 10*3/MM3 (ref 0–0.2)
BASOPHILS NFR BLD AUTO: 0.5 % (ref 0–1.5)
BILIRUB SERPL-MCNC: 1.1 MG/DL (ref 0–1.2)
BUN SERPL-MCNC: 8 MG/DL (ref 8–23)
BUN/CREAT SERPL: 7.2 (ref 7–25)
CALCIUM SPEC-SCNC: 9.2 MG/DL (ref 8.6–10.5)
CHLORIDE SERPL-SCNC: 105 MMOL/L (ref 98–107)
CO2 SERPL-SCNC: 27 MMOL/L (ref 22–29)
CREAT SERPL-MCNC: 1.11 MG/DL (ref 0.57–1)
D-DIMER, QUANTITATIVE (MAD,POW, STR): 894 NG/ML (FEU) (ref 0–470)
DEPRECATED RDW RBC AUTO: 47.1 FL (ref 37–54)
EGFRCR SERPLBLD CKD-EPI 2021: 56.3 ML/MIN/1.73
EOSINOPHIL # BLD AUTO: 0.09 10*3/MM3 (ref 0–0.4)
EOSINOPHIL NFR BLD AUTO: 2.3 % (ref 0.3–6.2)
ERYTHROCYTE [DISTWIDTH] IN BLOOD BY AUTOMATED COUNT: 15.5 % (ref 12.3–15.4)
FLUAV RNA RESP QL NAA+PROBE: NOT DETECTED
FLUBV RNA RESP QL NAA+PROBE: NOT DETECTED
GLOBULIN UR ELPH-MCNC: 3.3 GM/DL
GLUCOSE SERPL-MCNC: 102 MG/DL (ref 65–99)
HCT VFR BLD AUTO: 42.3 % (ref 34–46.6)
HGB BLD-MCNC: 14.4 G/DL (ref 12–15.9)
HOLD SPECIMEN: NORMAL
HOLD SPECIMEN: NORMAL
IMM GRANULOCYTES # BLD AUTO: 0.02 10*3/MM3 (ref 0–0.05)
IMM GRANULOCYTES NFR BLD AUTO: 0.5 % (ref 0–0.5)
LIPASE SERPL-CCNC: 33 U/L (ref 13–60)
LYMPHOCYTES # BLD AUTO: 0.9 10*3/MM3 (ref 0.7–3.1)
LYMPHOCYTES NFR BLD AUTO: 23.1 % (ref 19.6–45.3)
MCH RBC QN AUTO: 28.8 PG (ref 26.6–33)
MCHC RBC AUTO-ENTMCNC: 34 G/DL (ref 31.5–35.7)
MCV RBC AUTO: 84.6 FL (ref 79–97)
MONOCYTES # BLD AUTO: 0.2 10*3/MM3 (ref 0.1–0.9)
MONOCYTES NFR BLD AUTO: 5.1 % (ref 5–12)
NEUTROPHILS NFR BLD AUTO: 2.67 10*3/MM3 (ref 1.7–7)
NEUTROPHILS NFR BLD AUTO: 68.5 % (ref 42.7–76)
NRBC BLD AUTO-RTO: 0 /100 WBC (ref 0–0.2)
NT-PROBNP SERPL-MCNC: 209.6 PG/ML (ref 0–900)
PLATELET # BLD AUTO: 67 10*3/MM3 (ref 140–450)
PMV BLD AUTO: 11 FL (ref 6–12)
POTASSIUM SERPL-SCNC: 4.1 MMOL/L (ref 3.5–5.2)
PROT SERPL-MCNC: 7 G/DL (ref 6–8.5)
RBC # BLD AUTO: 5 10*6/MM3 (ref 3.77–5.28)
SARS-COV-2 RNA RESP QL NAA+PROBE: NOT DETECTED
SMALL PLATELETS BLD QL SMEAR: NORMAL
SODIUM SERPL-SCNC: 141 MMOL/L (ref 136–145)
WBC MORPH BLD: NORMAL
WBC NRBC COR # BLD: 3.9 10*3/MM3 (ref 3.4–10.8)

## 2022-05-17 PROCEDURE — 85379 FIBRIN DEGRADATION QUANT: CPT | Performed by: FAMILY MEDICINE

## 2022-05-17 PROCEDURE — 80053 COMPREHEN METABOLIC PANEL: CPT | Performed by: FAMILY MEDICINE

## 2022-05-17 PROCEDURE — 85007 BL SMEAR W/DIFF WBC COUNT: CPT | Performed by: FAMILY MEDICINE

## 2022-05-17 PROCEDURE — 87636 SARSCOV2 & INF A&B AMP PRB: CPT | Performed by: FAMILY MEDICINE

## 2022-05-17 PROCEDURE — 25010000002 IOPAMIDOL 61 % SOLUTION: Performed by: FAMILY MEDICINE

## 2022-05-17 PROCEDURE — 83690 ASSAY OF LIPASE: CPT | Performed by: FAMILY MEDICINE

## 2022-05-17 PROCEDURE — 85025 COMPLETE CBC W/AUTO DIFF WBC: CPT | Performed by: FAMILY MEDICINE

## 2022-05-17 PROCEDURE — 36415 COLL VENOUS BLD VENIPUNCTURE: CPT

## 2022-05-17 PROCEDURE — 99283 EMERGENCY DEPT VISIT LOW MDM: CPT

## 2022-05-17 PROCEDURE — 93970 EXTREMITY STUDY: CPT

## 2022-05-17 PROCEDURE — 83880 ASSAY OF NATRIURETIC PEPTIDE: CPT | Performed by: FAMILY MEDICINE

## 2022-05-17 PROCEDURE — 74177 CT ABD & PELVIS W/CONTRAST: CPT

## 2022-05-17 RX ORDER — MAGNESIUM HYDROXIDE 1200 MG/15ML
1 LIQUID ORAL ONCE
Qty: 1 ENEMA | Refills: 0 | Status: SHIPPED | OUTPATIENT
Start: 2022-05-17 | End: 2022-05-17

## 2022-05-17 RX ORDER — METOCLOPRAMIDE 10 MG/1
10 TABLET ORAL
Qty: 20 TABLET | Refills: 0 | Status: SHIPPED | OUTPATIENT
Start: 2022-05-17 | End: 2022-07-07 | Stop reason: SDUPTHER

## 2022-05-17 RX ADMIN — IOPAMIDOL 90 ML: 612 INJECTION, SOLUTION INTRAVENOUS at 11:11

## 2022-05-17 NOTE — ED PROVIDER NOTES
Subjective   Epigastric and upper abdominal pain x5 days.  Patient has a history of multiple bowel obstructions.  She has had a cholecystectomy and hysterectomy in the past.        Abdominal Pain  Pain location:  Epigastric, LUQ and RUQ  Pain quality: aching    Pain radiates to:  Does not radiate  Pain severity:  Moderate  Duration:  5 days  Timing:  Intermittent  Progression:  Waxing and waning  Chronicity:  Recurrent  Relieved by:  Nothing  Worsened by:  Nothing  Associated symptoms: nausea    Associated symptoms: no chest pain, no chills, no cough, no diarrhea, no dysuria, no fatigue, no fever, no shortness of breath, no sore throat and no vomiting    Risk factors: obesity    Leg Swelling  Associated symptoms: abdominal pain and nausea    Associated symptoms: no chest pain, no congestion, no cough, no diarrhea, no ear pain, no fatigue, no fever, no headaches, no myalgias, no rash, no rhinorrhea, no shortness of breath, no sore throat, no vomiting and no wheezing        Review of Systems   Constitutional: Negative for appetite change, chills, diaphoresis, fatigue and fever.   HENT: Negative for congestion, ear discharge, ear pain, nosebleeds, rhinorrhea, sinus pressure, sore throat and trouble swallowing.    Eyes: Negative for discharge and redness.   Respiratory: Negative for apnea, cough, chest tightness, shortness of breath and wheezing.    Cardiovascular: Positive for leg swelling. Negative for chest pain.   Gastrointestinal: Positive for abdominal pain and nausea. Negative for diarrhea and vomiting.   Endocrine: Negative for polyuria.   Genitourinary: Negative for dysuria, frequency and urgency.   Musculoskeletal: Negative for myalgias and neck pain.   Skin: Negative for color change and rash.   Allergic/Immunologic: Negative for immunocompromised state.   Neurological: Negative for dizziness, seizures, syncope, weakness, light-headedness and headaches.   Hematological: Negative for adenopathy. Does not  "bruise/bleed easily.   Psychiatric/Behavioral: Negative for behavioral problems and confusion.   All other systems reviewed and are negative.      Past Medical History:   Diagnosis Date   • Acid reflux    • Altered bowel function    • Arthropathy of lumbar facet joint    • Bleeding disorder (HCC)    • C. difficile diarrhea 2015   • Chronic idiopathic constipation    • Colonic inertia    • Constipation    • Corns and callus    • Depression    • Disease related peripheral neuropathy    • Epigastric pain    • Fatty liver    • Hammer toe    • Headache    • Hiatal hernia    • History of transfusion    • Hyperlipidemia    • Knee pain    • Localized, primary osteoarthritis of the ankle and foot     Localized, primary osteoarthritis of the ankle and/or foot   • Mendoza's metatarsalgia     Mendoza's metatarsalgia - 2nd interspace on right   • Nausea and vomiting    • Neuralgia and neuritis     Neuralgia, neuritis, and radiculitis, unspecified   • Neuropathy    • Obstructive sleep apnea     Obstructive sleep apnea (adult) (pediatric)    • CHAI on CPAP     \"C-Pap at night  (unconfirmed)\"   • Osteoarthritis    • Pain in foot     Pain in unspecified foot - sees a podiatrist   • Pain in joint, ankle and foot     Joint pain in ankle and foot      • Pain radiating to back     Pain radiating to lumbar region of back   • Pelvic floor dysfunction    • Plantar fasciitis    • PONV (postoperative nausea and vomiting)    • Restless leg syndrome    • Secondary hypertension     Secondary hypertension, unspecified   • Sinusitis    • Sleep apnea    • Tongue anomaly     lesion       Allergies   Allergen Reactions   • Morphine GI Intolerance   • Tape Rash     Patient has rash/blishers on site after tape   • Other      Pt states that taking steroids either in pill or injection form make her have blisters in her mouth and she feels like she is on fire on the inside/ has hx of c diff and possible MRSA     • Corticosteroids Rash   • Kenalog  " [Triamcinolone Acetonide] Rash   • Sulfa Antibiotics Rash     Sulfa (Sulfonamide Antibiotics)       Past Surgical History:   Procedure Laterality Date   • CARPAL TUNNEL RELEASE Left 2018    Procedure: CARPAL TUNNEL RELEASE - left;  Surgeon: Justus Lewis MD;  Location: [facility] OR;  Service: Orthopedics   • CARPAL TUNNEL RELEASE Right 2019    Procedure: carpal tunnel release right hand with local/monitored anesthesia care;  Surgeon: Justus Lewis MD;  Location: [facility] OR;  Service: Orthopedics   •  SECTION     • CHOLECYSTECTOMY     • COLONOSCOPY  2013   • COLONOSCOPY N/A 10/17/2018    Procedure: COLONOSCOPY;  Surgeon: Russell Del Rio MD;  Location: [facility] ENDOSCOPY;  Service: Gastroenterology   • COLONOSCOPY N/A 3/22/2021    Procedure: COLONOSCOPY;  Surgeon: Russell Del Rio MD;  Location: [facility] ENDOSCOPY;  Service: Gastroenterology;  Laterality: N/A;   • DIRECT LARYNGOSCOPY, ESOPHAGOSCOPY, BRONCHOSCOPY N/A 2017    Procedure: DIRECT LARYNGOSCOPY AND;  Surgeon: Live Bolton MD;  Location: [facility] OR;  Service:    • ENDOSCOPY  2013    Colon endoscopy 37603 (1) - Internal & external hemorrhoids found. Stool found.   • ENDOSCOPY  2013    EGD w/ tube 76040 (1) - Normal esophagus. Gastritis in stomach. Biopsy taken. Normal dudoenum. Biopsy taken.   • ENDOSCOPY N/A 10/17/2018    Procedure: ESOPHAGOGASTRODUODENOSCOPY--eval varices;  Surgeon: Russell Del Rio MD;  Location: [facility] ENDOSCOPY;  Service: Gastroenterology   • ENDOSCOPY N/A 3/22/2021    Procedure: ESOPHAGOGASTRODUODENOSCOPYURGNET;  Surgeon: Russell Del Rio MD;  Location: [facility] ENDOSCOPY;  Service: Gastroenterology;  Laterality: N/A;   • ENDOSCOPY N/A 2021    Procedure: ESOPHAGOGASTRODUODENOSCOPY;  Surgeon: Basia Saunders MD;  Location: [facility] ENDOSCOPY;  Service: Gastroenterology;  Laterality: N/A;   • FOOT SURGERY  2013    Foot/toes surgery procedure (1) - Arthroplasty of toes 4 and  5 of right foot.   • HERNIA REPAIR     • HERNIA REPAIR      hital   • HYSTERECTOMY     • LIVER BIOPSY     • NERVE BLOCK  2016    Injection for nerve block (1) - Lumbar medial branch block.   • OTHER SURGICAL HISTORY  2012    Inj(s) Tend-Sheath, Ligament, Single  (1) - [facility]    • OTHER SURGICAL HISTORY  2013    Small Joint Injection/Aspiration  (2) - [facility]ICK[facility] ([facility])    • OTHER SURGICAL HISTORY      bowel obstruction x2   • OTHER SURGICAL HISTORY      gland removed from neck   • SUBLINGUAL SALIVARY CYST EXCISION N/A 2017    Procedure: EXCISION OF LEFT  TONGUE LESION WITH CLOSURE;  Surgeon: Live Bolton MD;  Location: [facility];  Service:    • TUBAL ABDOMINAL LIGATION     • UPPER GASTROINTESTINAL ENDOSCOPY  2013   • UPPER GASTROINTESTINAL ENDOSCOPY  10/17/2018   • UPPER GASTROINTESTINAL ENDOSCOPY  2021       Family History   Problem Relation Age of Onset   • Cancer Other    • Diabetes Other    • Heart disease Other    • Hypertension Mother    • Cancer Mother    • Diabetes Mother    • Hypertension Father    • Heart attack Father    • Cancer Father    • Heart disease Father    • Thyroid disease Maternal Aunt    • Cancer Maternal Aunt    • No Known Problems Sister    • No Known Problems Brother    • Cancer Maternal Grandmother    • No Known Problems Sister        Social History     Socioeconomic History   • Marital status:    Tobacco Use   • Smoking status: Former Smoker     Packs/day: 2.00     Years: 15.00     Pack years: 30.00     Types: Cigarettes     Quit date:      Years since quittin.3   • Smokeless tobacco: Never Used   Vaping Use   • Vaping Use: Never used   Substance and Sexual Activity   • Alcohol use: No   • Drug use: Never   • Sexual activity: Defer     Birth control/protection: Surgical     Comment: Marital Status: .  Hysterectomy.           Objective   Physical Exam  Vitals and nursing note  reviewed.   Constitutional:       Appearance: She is well-developed.   HENT:      Head: Normocephalic and atraumatic.      Nose: Nose normal.   Eyes:      General: No scleral icterus.        Right eye: No discharge.         Left eye: No discharge.      Conjunctiva/sclera: Conjunctivae normal.      Pupils: Pupils are equal, round, and reactive to light.   Neck:      Trachea: No tracheal deviation.   Cardiovascular:      Rate and Rhythm: Normal rate and regular rhythm.      Heart sounds: Normal heart sounds. No murmur heard.  Pulmonary:      Effort: Pulmonary effort is normal. No respiratory distress.      Breath sounds: Normal breath sounds. No stridor. No wheezing or rales.   Abdominal:      General: Bowel sounds are normal. There is no distension.      Palpations: Abdomen is soft. There is no mass.      Tenderness: There is abdominal tenderness in the right upper quadrant, epigastric area and left upper quadrant. There is no guarding or rebound.   Musculoskeletal:      Cervical back: Normal range of motion and neck supple.      Right lower leg: Edema present.      Left lower leg: Edema present.   Skin:     General: Skin is warm and dry.      Findings: No erythema or rash.   Neurological:      Mental Status: She is alert and oriented to person, place, and time.      Coordination: Coordination normal.   Psychiatric:         Behavior: Behavior normal.         Thought Content: Thought content normal.         Procedures           ED Course  ED Course as of 05/17/22 1327   Tue May 17, 2022   1324 Findings were discussed with patient and spouse at bedside.  Advised outpatient medications for constipation and follow-up with GI in the next 2 to 3 days for further assessment.  Patient is currently taking MiraLAX and stool softeners at home. [CB]      ED Course User Index  [CB] Nelson Kim MD                   Labs Reviewed   COMPREHENSIVE METABOLIC PANEL - Abnormal; Notable for the following components:        Result Value    Glucose 102 (*)     Creatinine 1.11 (*)     AST (SGOT) 37 (*)     Alkaline Phosphatase 149 (*)     eGFR 56.3 (*)     All other components within normal limits    Narrative:     GFR Normal >60  Chronic Kidney Disease <60  Kidney Failure <15     CBC WITH AUTO DIFFERENTIAL - Abnormal; Notable for the following components:    RDW 15.5 (*)     Platelets 67 (*)     All other components within normal limits   D-DIMER, QUANTITATIVE - Abnormal; Notable for the following components:    D-Dimer, Quantitative 894 (*)     All other components within normal limits    Narrative:     Dimer values <500 ng/ml FEU are FDA approved as aid in diagnosis of deep venous thrombosis and pulmonary embolism.  This test should not be used in an exclusion strategy with pretest probability alone.    A recent guideline regarding diagnosis for pulmonary thromboembolism recommends an adjusted exclusion criterion of age x 10 ng/ml FEU for patients >50 years of age (Franci Intern Med 2015; 163: 701-711).     COVID-19 AND FLU A/B, NP SWAB IN TRANSPORT MEDIA 8-12 HR TAT - Normal    Narrative:     Fact sheet for providers: https://www.fda.gov/media/519524/download    Fact sheet for patients: https://www.fda.gov/media/376788/download    Test performed by PCR.   LIPASE - Normal   BNP (IN-HOUSE) - Normal    Narrative:     Among patients with dyspnea, NT-proBNP is highly sensitive for the detection of acute congestive heart failure. In addition NT-proBNP of <300 pg/ml effectively rules out acute congestive heart failure with 99% negative predictive value.    Results may be falsely decreased if patient taking Biotin.     SCAN SLIDE   URINALYSIS W/ CULTURE IF INDICATED   CBC AND DIFFERENTIAL    Narrative:     The following orders were created for panel order CBC & Differential.  Procedure                               Abnormality         Status                     ---------                               -----------         ------                      CBC Auto Differential[[id number]]        Abnormal            Final result               Scan Slide[[id number]]                                       Final result                 Please view results for these tests on the individual orders.   GOLD TOP - SST   EXTRA TUBES    Narrative:     The following orders were created for panel order Extra Tubes.  Procedure                               Abnormality         Status                     ---------                               -----------         ------                     Gold Top - SST[[id number]]                                   Final result               Velasquez Top[[id number]]                                         In process                   Please view results for these tests on the individual orders.   GRAY TOP       US Venous Doppler Lower Extremity Bilateral (duplex)   Final Result   No evidence of deep venous thrombosis in the common   femoral, femoral, or popliteal veins of the bilateral lower   extremities.         Electronically signed by:  Blair Rojas MD  5/17/2022 12:12 PM CDT   Workstation: [id number]      CT Abdomen Pelvis With Contrast   Final Result         1. Nodular, cirrhotic appearance to the liver with evidence of   portal hypertension, including splenomegaly, trace ascites, and   lower esophageal varices. Similar findings were also present on   9/15/2021.   2. Relatively large amount of stool throughout the colon.   3. No evidence for mechanical bowel obstruction.            Electronically signed by:  Jose Ngo MD  5/17/2022 11:28 AM   CDT Workstation: [id number]                                             [facility]    Final diagnoses:   Pain of upper abdomen   Constipation, unspecified constipation type       ED Disposition  ED Disposition     ED Disposition   Discharge    Condition   Stable    Comment   --             Russell Del Rio MD  [address] [place] [address] [address]  [facility] [address] [phone]    In 2 days           Medication List      New  Prescriptions    metoclopramide 10 MG tablet  Commonly known as: REGLAN  Take 1 tablet by mouth 4 (Four) Times a Day Before Meals & at Bedtime.     sodium phosphate 7-19 GM/118ML enema  Insert 1 enema into the rectum 1 (One) Time for 1 dose.        Changed    ondansetron 8 MG tablet  Commonly known as: ZOFRAN  Take 1 tablet by mouth Every 8 (Eight) Hours As Needed for Nausea or Vomiting.  What changed: how much to take     potassium chloride 10 MEQ CR capsule  Commonly known as: MICRO-K  Take 2 capsules by mouth Daily.  What changed:   · how much to take  · when to take this     spironolactone 25 MG tablet  Commonly known as: ALDACTONE  TAKE 1 TABLET BY MOUTH ONCE DAILY  What changed:   · how to take this  · when to take this           Where to Get Your Medications      These medications were sent to [facility] [address] [place] [address] - [phone]  - [phone] [address] [address]    Phone: [phone]   · metoclopramide 10 MG tablet  · sodium phosphate 7-19 GM/118ML enema          Nelson Kim MD  05/17/22 2469

## 2022-05-26 ENCOUNTER — OFFICE VISIT (OUTPATIENT)
Dept: ONCOLOGY | Facility: CLINIC | Age: 62
End: 2022-05-26

## 2022-05-26 ENCOUNTER — LAB (OUTPATIENT)
Dept: ONCOLOGY | Facility: HOSPITAL | Age: 62
End: 2022-05-26

## 2022-05-26 VITALS
DIASTOLIC BLOOD PRESSURE: 80 MMHG | WEIGHT: 232.4 LBS | BODY MASS INDEX: 39.89 KG/M2 | OXYGEN SATURATION: 96 % | TEMPERATURE: 98 F | SYSTOLIC BLOOD PRESSURE: 141 MMHG | HEART RATE: 88 BPM

## 2022-05-26 DIAGNOSIS — K74.69 OTHER CIRRHOSIS OF LIVER: ICD-10-CM

## 2022-05-26 DIAGNOSIS — D69.6 THROMBOCYTOPENIA: Chronic | ICD-10-CM

## 2022-05-26 DIAGNOSIS — R91.1 LUNG NODULE: Chronic | ICD-10-CM

## 2022-05-26 DIAGNOSIS — R16.1 SPLENOMEGALY: Primary | Chronic | ICD-10-CM

## 2022-05-26 DIAGNOSIS — K74.69 OTHER CIRRHOSIS OF LIVER: Chronic | ICD-10-CM

## 2022-05-26 DIAGNOSIS — E61.1 IRON DEFICIENCY: Chronic | ICD-10-CM

## 2022-05-26 LAB
BASOPHILS # BLD AUTO: 0.01 10*3/MM3 (ref 0–0.2)
BASOPHILS NFR BLD AUTO: 0.3 % (ref 0–1.5)
DEPRECATED RDW RBC AUTO: 46.9 FL (ref 37–54)
EOSINOPHIL # BLD AUTO: 0.05 10*3/MM3 (ref 0–0.4)
EOSINOPHIL NFR BLD AUTO: 1.7 % (ref 0.3–6.2)
ERYTHROCYTE [DISTWIDTH] IN BLOOD BY AUTOMATED COUNT: 15.4 % (ref 12.3–15.4)
FERRITIN SERPL-MCNC: 124.6 NG/ML (ref 13–150)
HCT VFR BLD AUTO: 39.3 % (ref 34–46.6)
HGB BLD-MCNC: 13.6 G/DL (ref 12–15.9)
IMM GRANULOCYTES # BLD AUTO: 0.02 10*3/MM3 (ref 0–0.05)
IMM GRANULOCYTES NFR BLD AUTO: 0.7 % (ref 0–0.5)
IRON 24H UR-MRATE: 69 MCG/DL (ref 37–145)
IRON SATN MFR SERPL: 18 % (ref 20–50)
LYMPHOCYTES # BLD AUTO: 0.53 10*3/MM3 (ref 0.7–3.1)
LYMPHOCYTES NFR BLD AUTO: 18.1 % (ref 19.6–45.3)
MCH RBC QN AUTO: 28.9 PG (ref 26.6–33)
MCHC RBC AUTO-ENTMCNC: 34.6 G/DL (ref 31.5–35.7)
MCV RBC AUTO: 83.6 FL (ref 79–97)
MONOCYTES # BLD AUTO: 0.36 10*3/MM3 (ref 0.1–0.9)
MONOCYTES NFR BLD AUTO: 12.3 % (ref 5–12)
NEUTROPHILS NFR BLD AUTO: 1.96 10*3/MM3 (ref 1.7–7)
NEUTROPHILS NFR BLD AUTO: 66.9 % (ref 42.7–76)
NRBC BLD AUTO-RTO: 0 /100 WBC (ref 0–0.2)
PLATELET # BLD AUTO: 55 10*3/MM3 (ref 140–450)
PMV BLD AUTO: 10.8 FL (ref 6–12)
RBC # BLD AUTO: 4.7 10*6/MM3 (ref 3.77–5.28)
TIBC SERPL-MCNC: 384 MCG/DL (ref 298–536)
TRANSFERRIN SERPL-MCNC: 258 MG/DL (ref 200–360)
WBC NRBC COR # BLD: 2.93 10*3/MM3 (ref 3.4–10.8)

## 2022-05-26 PROCEDURE — 1123F ACP DISCUSS/DSCN MKR DOCD: CPT | Performed by: INTERNAL MEDICINE

## 2022-05-26 PROCEDURE — 83540 ASSAY OF IRON: CPT

## 2022-05-26 PROCEDURE — 82746 ASSAY OF FOLIC ACID SERUM: CPT

## 2022-05-26 PROCEDURE — 99214 OFFICE O/P EST MOD 30 MIN: CPT | Performed by: INTERNAL MEDICINE

## 2022-05-26 PROCEDURE — 82728 ASSAY OF FERRITIN: CPT

## 2022-05-26 PROCEDURE — 82607 VITAMIN B-12: CPT

## 2022-05-26 PROCEDURE — 1125F AMNT PAIN NOTED PAIN PRSNT: CPT | Performed by: INTERNAL MEDICINE

## 2022-05-26 PROCEDURE — 85025 COMPLETE CBC W/AUTO DIFF WBC: CPT

## 2022-05-26 PROCEDURE — 84466 ASSAY OF TRANSFERRIN: CPT

## 2022-05-26 PROCEDURE — G0463 HOSPITAL OUTPT CLINIC VISIT: HCPCS | Performed by: INTERNAL MEDICINE

## 2022-05-26 RX ORDER — OXYCODONE HYDROCHLORIDE 10 MG/1
10 TABLET ORAL
COMMUNITY
Start: 2022-05-26 | End: 2022-08-09 | Stop reason: ALTCHOICE

## 2022-05-26 NOTE — PROGRESS NOTES
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  Complains of chronic abdominal discomfort.  Complains of chronic constipation.  Complains of chronic intermittent swelling and pain of lower extremity.  Complains of easy bruising.  Complains of worsening problem with balance and falls.  Denies any bleeding.  Complains of chronic arthralgia.              Past Medical History, Past Surgical History, Social History, Family History have been reviewed and are without significant changes except as mentioned.    Review of Systems   A comprehensive 14 point review of systems was performed and was negative except as mentioned in HPI.    Medications:  The current medication list was reviewed in the EMR    ALLERGIES:    Allergies   Allergen Reactions   • Morphine GI Intolerance   • Tape Rash     Patient has rash/blishers on site after tape   • Other      Pt states that taking steroids either in pill or injection form make her have blisters in her mouth and she feels like she is on fire on the inside/ has hx of c diff and possible MRSA     • Corticosteroids Rash   • Kenalog  [Triamcinolone Acetonide] Rash   • Sulfa Antibiotics Rash     Sulfa (Sulfonamide Antibiotics)       Objective      Vitals:    05/26/22 1055   BP: 141/80   Pulse: 88   Temp: 98 °F (36.7 °C)   TempSrc: Temporal   SpO2: 96%   Weight: 105 kg (232 lb 6.4 oz)   PainSc:   8   PainLoc: Generalized  Comment: PT STATES SHE HAS BEEN FALLING A LOT LATELY     Current Status 7/21/2021   ECOG score 2       Physical Exam  Pulmonary:      Breath sounds: Normal breath sounds.    Neurological:      Mental Status: She is alert and oriented to person, place, and time.           RECENT LABS:  Glucose   Date Value Ref Range Status   05/17/2022 102 (H) 65 - 99 mg/dL Final     Sodium   Date Value Ref Range Status   05/17/2022 141 136 - 145 mmol/L Final     Potassium   Date Value Ref Range Status   05/17/2022 4.1 3.5 - 5.2 mmol/L Final     CO2   Date Value Ref Range Status   05/17/2022 27.0 22.0 - 29.0 mmol/L Final     Chloride   Date Value Ref Range Status   05/17/2022 105 98 - 107 mmol/L Final     Anion Gap   Date Value Ref Range Status   05/17/2022 9.0 5.0 - 15.0 mmol/L Final     Creatinine   Date Value Ref Range Status   05/17/2022 1.11 (H) 0.57 - 1.00 mg/dL Final     BUN   Date Value Ref Range Status   05/17/2022 8 8 - 23 mg/dL Final     BUN/Creatinine Ratio   Date Value Ref Range Status   05/17/2022 7.2 7.0 - 25.0 Final     Calcium   Date Value Ref Range Status   05/17/2022 9.2 8.6 - 10.5 mg/dL Final     eGFR Non  Amer   Date Value Ref Range Status   09/24/2021 70 >60 mL/min/1.73 Final     Alkaline Phosphatase   Date Value Ref Range Status   05/17/2022 149 (H) 39 - 117 U/L Final     Total Protein   Date Value Ref Range Status   05/17/2022 7.0 6.0 - 8.5 g/dL Final     ALT (SGPT)   Date Value Ref Range Status   05/17/2022 25 1 - 33 U/L Final     AST (SGOT)   Date Value Ref Range Status   05/17/2022 37 (H) 1 - 32 U/L Final     Total Bilirubin   Date Value Ref Range Status   05/17/2022 1.1 0.0 - 1.2 mg/dL Final     Albumin   Date Value Ref Range Status   05/17/2022 3.70 3.50 - 5.20 g/dL Final     Globulin   Date Value Ref Range Status   05/17/2022 3.3 gm/dL Final     Lab Results   Component Value Date    WBC 2.93 (L) 05/26/2022    HGB 13.6 05/26/2022    HCT 39.3 05/26/2022    MCV 83.6 05/26/2022    PLT 55 (L) 05/26/2022     Lab Results   Component Value Date    NEUTROABS 1.96 05/26/2022    IRON 69 05/26/2022    IRON 101 01/21/2022    IRON 148 (H) 12/01/2021    TIBC 384 05/26/2022    TIBC  367 01/21/2022    TIBC 365 12/01/2021    LABIRON 18 (L) 05/26/2022    LABIRON 28 01/21/2022    LABIRON 41 12/01/2021    FERRITIN 124.60 05/26/2022    FERRITIN 176.60 (H) 01/21/2022    FERRITIN 152.20 (H) 12/01/2021    RQXEFRYS11 1,017 (H) 05/26/2022    SHMVCAUI77 1,044 (H) 01/21/2022    FXMNBYKP66 859 10/14/2021    FOLATE 14.50 05/26/2022    FOLATE >20.00 01/21/2022    FOLATE 14.00 10/14/2021     Lab Results   Component Value Date    AFPTM 2.5 07/31/2018    AFPTM 99 07/31/2018         PATHOLOGY:  * Cannot find OR log *         RADIOLOGY DATA :  No radiology results for the last 7 days        Assessment & Plan     1.  Thrombocytopenia:  - Secondary to cirrhosis of liver with splenomegaly  - Most recent platelet count is 55,000  - Patient denies any bleeding.  Will monitor with CBC for now  - We will have patient return to clinic in 4 months with repeat CBC, iron studies, ferritin, B12 and folate to be done on that day.    2.  Lung nodule:  - Patient has a history of lung nodule which on last CT scan done in July 2021 was getting smaller  - Recommend repeating CT of chest without contrast prior to next clinic visit in 4 months    3.  Iron deficiency anemia  - Due to iron deficiency and adverse effect of iron patient has received intravenous Venofer in July 2021.  - Remains on B12 and folic acid 3 times a week.  - Anemia work-up from today shows hemoglobin is 13.6.  Iron studies are adequate.  No need for any intravenous iron replacement at present    4.  Leukopenia:  - White blood cell count is 2.96 with absolute neutrophil count of 1.8.  Secondary to splenomegaly and cirrhosis of liver  - We will monitor with CBC.    5.  Cirrhosis of liver with splenomegaly  - Recommend continue following up with gastroenterology clinic    6.  Health maintenance: Patient does not smoke.  Had a colonoscopy in March 2021    7. Advance Care Planning: For now patient remains full code and is able to make decisions.  Patient has health  care surrogate mentioned on chart.                 PHQ-9 Total Score: 1   -Patient is not homicidal or suicidal.  No acute intervention required.    Amira Shannon reports a pain score of 8.  Given her pain assessment as noted, treatment options were discussed and the following options were decided upon as a follow-up plan to address the patient's pain: continuation of current treatment plan for pain.         Lokesh Goodwin MD  5/27/2022  07:14 CDT        Part of this note may be an electronic transcription/translation of spoken language to printed text using the Dragon Dictation System.          CC:

## 2022-05-27 ENCOUNTER — TELEPHONE (OUTPATIENT)
Dept: ONCOLOGY | Facility: HOSPITAL | Age: 62
End: 2022-05-27

## 2022-05-27 LAB
FOLATE SERPL-MCNC: 14.5 NG/ML (ref 4.78–24.2)
VIT B12 BLD-MCNC: 1017 PG/ML (ref 211–946)

## 2022-05-27 NOTE — TELEPHONE ENCOUNTER
Called and spoke with pt regarding lab results and medication instruction as per Dr. Goodwin. Pt gives v/u obtained.

## 2022-05-27 NOTE — TELEPHONE ENCOUNTER
----- Message from Lokesh Goodwin MD sent at 5/27/2022  7:15 AM CDT -----  Regarding: labs  Please let patient know, she needs to continue taking B12 and folic acid 3 times a week.  Thank you

## 2022-06-20 RX ORDER — ONDANSETRON 4 MG/1
TABLET, ORALLY DISINTEGRATING ORAL
Qty: 15 TABLET | Refills: 0 | Status: SHIPPED | OUTPATIENT
Start: 2022-06-20 | End: 2022-07-26

## 2022-07-01 ENCOUNTER — LAB (OUTPATIENT)
Dept: LAB | Facility: HOSPITAL | Age: 62
End: 2022-07-01

## 2022-07-01 DIAGNOSIS — K74.69 OTHER CIRRHOSIS OF LIVER: ICD-10-CM

## 2022-07-01 DIAGNOSIS — R16.1 SPLENOMEGALY: ICD-10-CM

## 2022-07-01 DIAGNOSIS — K74.60 CIRRHOSIS OF LIVER WITHOUT ASCITES, UNSPECIFIED HEPATIC CIRRHOSIS TYPE: ICD-10-CM

## 2022-07-01 DIAGNOSIS — K75.81 NASH (NONALCOHOLIC STEATOHEPATITIS): ICD-10-CM

## 2022-07-01 DIAGNOSIS — E61.1 IRON DEFICIENCY: ICD-10-CM

## 2022-07-01 DIAGNOSIS — K72.10 END STAGE LIVER DISEASE: ICD-10-CM

## 2022-07-01 DIAGNOSIS — K59.04 CHRONIC IDIOPATHIC CONSTIPATION: ICD-10-CM

## 2022-07-01 DIAGNOSIS — R10.84 GENERALIZED ABDOMINAL PAIN: ICD-10-CM

## 2022-07-01 LAB
ANISOCYTOSIS BLD QL: ABNORMAL
CLUMPED PLATELETS: PRESENT
DEPRECATED RDW RBC AUTO: 51 FL (ref 37–54)
EOSINOPHIL # BLD MANUAL: 0.05 10*3/MM3 (ref 0–0.4)
EOSINOPHIL NFR BLD MANUAL: 1 % (ref 0.3–6.2)
ERYTHROCYTE [DISTWIDTH] IN BLOOD BY AUTOMATED COUNT: 16.7 % (ref 12.3–15.4)
FERRITIN SERPL-MCNC: 156.4 NG/ML (ref 13–150)
HCT VFR BLD AUTO: 40.2 % (ref 34–46.6)
HGB BLD-MCNC: 13.6 G/DL (ref 12–15.9)
INR PPP: 1.42 (ref 0.8–1.2)
IRON 24H UR-MRATE: 73 MCG/DL (ref 37–145)
IRON SATN MFR SERPL: 20 % (ref 20–50)
LYMPHOCYTES # BLD MANUAL: 1 10*3/MM3 (ref 0.7–3.1)
LYMPHOCYTES NFR BLD MANUAL: 8 % (ref 5–12)
MCH RBC QN AUTO: 28.5 PG (ref 26.6–33)
MCHC RBC AUTO-ENTMCNC: 33.8 G/DL (ref 31.5–35.7)
MCV RBC AUTO: 84.1 FL (ref 79–97)
MONOCYTES # BLD: 0.38 10*3/MM3 (ref 0.1–0.9)
NEUTROPHILS # BLD AUTO: 3.32 10*3/MM3 (ref 1.7–7)
NEUTROPHILS NFR BLD MANUAL: 63 % (ref 42.7–76)
NEUTS BAND NFR BLD MANUAL: 7 % (ref 0–5)
PLATELET # BLD AUTO: 28 10*3/MM3 (ref 140–450)
PMV BLD AUTO: ABNORMAL FL
PROTHROMBIN TIME: 17.2 SECONDS (ref 11.1–15.3)
RBC # BLD AUTO: 4.78 10*6/MM3 (ref 3.77–5.28)
SMALL PLATELETS BLD QL SMEAR: ABNORMAL
TIBC SERPL-MCNC: 370 MCG/DL (ref 298–536)
TRANSFERRIN SERPL-MCNC: 248 MG/DL (ref 200–360)
VARIANT LYMPHS NFR BLD MANUAL: 1 % (ref 0–5)
VARIANT LYMPHS NFR BLD MANUAL: 20 % (ref 19.6–45.3)
WBC MORPH BLD: NORMAL
WBC NRBC COR # BLD: 4.74 10*3/MM3 (ref 3.4–10.8)

## 2022-07-01 PROCEDURE — 85610 PROTHROMBIN TIME: CPT

## 2022-07-01 PROCEDURE — 85007 BL SMEAR W/DIFF WBC COUNT: CPT

## 2022-07-01 PROCEDURE — 82172 ASSAY OF APOLIPOPROTEIN: CPT

## 2022-07-01 PROCEDURE — 85025 COMPLETE CBC W/AUTO DIFF WBC: CPT

## 2022-07-01 PROCEDURE — 83010 ASSAY OF HAPTOGLOBIN QUANT: CPT

## 2022-07-01 PROCEDURE — 82607 VITAMIN B-12: CPT

## 2022-07-01 PROCEDURE — 84466 ASSAY OF TRANSFERRIN: CPT

## 2022-07-01 PROCEDURE — 82746 ASSAY OF FOLIC ACID SERUM: CPT

## 2022-07-01 PROCEDURE — 82977 ASSAY OF GGT: CPT

## 2022-07-01 PROCEDURE — 82247 BILIRUBIN TOTAL: CPT

## 2022-07-01 PROCEDURE — 82465 ASSAY BLD/SERUM CHOLESTEROL: CPT

## 2022-07-01 PROCEDURE — 84460 ALANINE AMINO (ALT) (SGPT): CPT

## 2022-07-01 PROCEDURE — 83883 ASSAY NEPHELOMETRY NOT SPEC: CPT

## 2022-07-01 PROCEDURE — 82728 ASSAY OF FERRITIN: CPT

## 2022-07-01 PROCEDURE — 83540 ASSAY OF IRON: CPT

## 2022-07-01 PROCEDURE — 84478 ASSAY OF TRIGLYCERIDES: CPT

## 2022-07-01 PROCEDURE — 82947 ASSAY GLUCOSE BLOOD QUANT: CPT

## 2022-07-01 PROCEDURE — 84450 TRANSFERASE (AST) (SGOT): CPT

## 2022-07-02 LAB
FOLATE SERPL-MCNC: 9.32 NG/ML (ref 4.78–24.2)
VIT B12 BLD-MCNC: 1290 PG/ML (ref 211–946)

## 2022-07-04 LAB
A2 MACROGLOB SERPL-MCNC: 336 MG/DL (ref 110–276)
ALT SERPL W P-5'-P-CCNC: 45 IU/L (ref 0–40)
APO A-I SERPL-MCNC: 101 MG/DL (ref 116–209)
AST SERPL W P-5'-P-CCNC: 75 IU/L (ref 0–40)
BILIRUB SERPL-MCNC: 0.7 MG/DL (ref 0–1.2)
CHOLEST SERPL-MCNC: 122 MG/DL (ref 100–199)
FIBROSIS SCORING:: ABNORMAL
FIBROSIS STAGE SERPL QL: ABNORMAL
GGT SERPL-CCNC: 44 IU/L (ref 0–60)
GLUCOSE SERPL-MCNC: 106 MG/DL (ref 65–99)
HAPTOGLOB SERPL-MCNC: <10 MG/DL (ref 37–355)
INTERPRETATIONS: (REFERENCE): ABNORMAL
LABORATORY COMMENT REPORT: ABNORMAL
LIVER FIBR SCORE SERPL CALC.FIBROSURE: 0.91 (ref 0–0.21)
NASH SCORING (REFERENCE): ABNORMAL
NECROINFLAMMATORY ACT GRADE SERPL QL: ABNORMAL
NECROINFLAMMATORY ACT SCORE SERPL: 0.75
SERVICE CMNT-IMP: ABNORMAL
STEATOSIS GRADE (REFERENCE): ABNORMAL
STEATOSIS GRADING (REFERENCE): ABNORMAL
STEATOSIS SCORE (REFERENCE): 0.55 (ref 0–0.3)
TRIGL SERPL-MCNC: 76 MG/DL (ref 0–149)

## 2022-07-07 ENCOUNTER — OFFICE VISIT (OUTPATIENT)
Dept: FAMILY MEDICINE CLINIC | Facility: CLINIC | Age: 62
End: 2022-07-07

## 2022-07-07 VITALS
OXYGEN SATURATION: 97 % | HEIGHT: 64 IN | TEMPERATURE: 97.5 F | BODY MASS INDEX: 40.12 KG/M2 | SYSTOLIC BLOOD PRESSURE: 126 MMHG | DIASTOLIC BLOOD PRESSURE: 82 MMHG | WEIGHT: 235 LBS | HEART RATE: 85 BPM

## 2022-07-07 DIAGNOSIS — S81.802A WOUND OF LEFT LOWER EXTREMITY, INITIAL ENCOUNTER: Primary | ICD-10-CM

## 2022-07-07 DIAGNOSIS — Z12.31 ENCOUNTER FOR SCREENING MAMMOGRAM FOR MALIGNANT NEOPLASM OF BREAST: ICD-10-CM

## 2022-07-07 PROBLEM — E87.6 HYPOKALEMIA: Status: ACTIVE | Noted: 2021-09-26

## 2022-07-07 PROBLEM — K21.00 GASTRO-ESOPHAGEAL REFLUX DISEASE WITH ESOPHAGITIS, WITHOUT BLEEDING: Status: ACTIVE | Noted: 2021-10-14

## 2022-07-07 PROBLEM — E83.42 HYPOMAGNESEMIA: Status: ACTIVE | Noted: 2021-09-26

## 2022-07-07 PROCEDURE — 99203 OFFICE O/P NEW LOW 30 MIN: CPT | Performed by: STUDENT IN AN ORGANIZED HEALTH CARE EDUCATION/TRAINING PROGRAM

## 2022-07-07 RX ORDER — NALOXEGOL OXALATE 25 MG/1
1 TABLET, FILM COATED ORAL DAILY
COMMUNITY
Start: 2022-06-20

## 2022-07-07 RX ORDER — CETIRIZINE HYDROCHLORIDE 10 MG/1
10 TABLET ORAL DAILY
Qty: 90 TABLET | Refills: 3 | Status: SHIPPED | OUTPATIENT
Start: 2022-07-07

## 2022-07-07 RX ORDER — TORSEMIDE 20 MG/1
1 TABLET ORAL DAILY
COMMUNITY
End: 2022-07-07 | Stop reason: ALTCHOICE

## 2022-07-07 RX ORDER — METOCLOPRAMIDE 10 MG/1
10 TABLET ORAL
Qty: 20 TABLET | Refills: 0 | Status: SHIPPED | OUTPATIENT
Start: 2022-07-07 | End: 2022-07-07

## 2022-07-07 NOTE — PROGRESS NOTES
Subjective:  Amira Shannon is a 61 y.o. female who presents for establish care.    Patient with history of end-stage liver disease, cirrhosis, ascites, splenomegaly, constipation, recurrent bowel obstruction, hypertension, dyslipidemia, thrombocytopenia, lung nodule.  Patient states is seeing gastroenterology and oncology/hematology.  In addition patient is seeing pain management for low back pain caused by neural foraminal stenosis of L5-S1.    Left leg wound; patient states that has had wound on the back of her left calf for over 1 month.  Denied any drainage, swelling, tenderness.  Has been applying topical antibiotic ointment, bandage.  Has not seen a medical provider.  Does admit to bilateral lower extremity swelling, has tried compression stockings in the past but could not tolerate. Had ultrasound Doppler bilateral lower extremity on 5/17/2022 that was negative for DVT.    Patient Active Problem List   Diagnosis   • Bilateral foot pain   • Right hip pain   • Acute pain of both knees   • Plantar fasciitis, bilateral   • Primary osteoarthritis of left knee   • MUSA (nonalcoholic steatohepatitis)   • Tongue lesion   • Bilateral lower extremity edema   • Chronic pain of both knees   • Bilateral calcaneal spurs   • Swelling of both lower extremities   • Obesity with body mass index of 30.0-39.9   • Diastolic dysfunction   • Pancytopenia (HCC)   • Chronic fatigue   • Abdominal pain   • Other constipation   • GERD (gastroesophageal reflux disease)   • Depression   • Disease related peripheral neuropathy   • Epigastric pain   • Hyperlipidemia   • Restless leg syndrome   • CHAI on CPAP   • Carpal tunnel syndrome on left   • Carpal tunnel syndrome on right   • Bilateral wrist pain   • Numbness and tingling in both hands   • Elevated liver enzymes   • History of small bowel obstruction   • Cirrhosis of liver (HCC)   • Status post carpal tunnel release   • Bilateral ankle pain   • Lakesha's deformity of both heels  "  • Pain in joint, ankle and foot   • Primary osteoarthritis of both knees   • Low back pain   • Abdominal pain, generalized   • Chronic idiopathic constipation   • Hypertension   • Elevated serum creatinine   • Splenomegaly   • Chronic pain syndrome   • Lung nodule   • Hypertension   • Lab test negative for COVID-19 virus   • Thrombocytopenia (HCC)   • Iron deficiency   • Adverse effect of iron   • Encounter for assessment of peripherally inserted central venous catheter (PICC)   • Nausea and vomiting   • Weight loss, abnormal   • Gastroparesis   • E. coli UTI (urinary tract infection)   • Acute gastritis   • Hiatal hernia   • Hypomagnesemia   • Hypokalemia   • Gastro-esophageal reflux disease with esophagitis, without bleeding     Vitals:    Vitals:    07/07/22 1007   BP: 126/82   BP Location: Left arm   Patient Position: Sitting   Cuff Size: Large Adult   Pulse: 85   Temp: 97.5 °F (36.4 °C)   SpO2: 97%   Weight: 107 kg (235 lb)   Height: 162.6 cm (64\")     Body mass index is 40.34 kg/m².    Current Outpatient Medications:   •  bisacodyl (DULCOLAX) 10 MG suppository, Insert 10 mg into the rectum As Needed for Constipation., Disp: , Rfl:   •  cetirizine (zyrTEC) 10 MG tablet, Take 1 tablet by mouth Daily., Disp: 90 tablet, Rfl: 3  •  Docusate Sodium 100 MG capsule, Take 100 mg by mouth 2 (Two) Times a Day., Disp: , Rfl:   •  folic acid (FOLVITE) 1 MG tablet, Take 1 tablet by mouth on Monday, Wednesday and Friday, Disp: 36 tablet, Rfl: 1  •  furosemide (LASIX) 20 MG tablet, Take 20 mg by mouth 2 (Two) Times a Day., Disp: , Rfl:   •  gabapentin (NEURONTIN) 800 MG tablet, Take 800 mg by mouth 3 (Three) Times a Day., Disp: , Rfl:   •  Naloxegol Oxalate (Movantik) 25 MG tablet, Take 1 tablet by mouth Daily., Disp: , Rfl:   •  Omega-3 Fatty Acids (fish oil) 1000 MG capsule capsule, Take  by mouth 2 (Two) Times a Day With Meals., Disp: , Rfl:   •  omeprazole (priLOSEC) 40 MG capsule, Take 40 mg by mouth Daily., Disp: , " Rfl:   •  ondansetron ODT (ZOFRAN-ODT) 4 MG disintegrating tablet, DISSOLVE 1 TABLET IN MOUTH EVERY 8 HOURS AS NEEDED FOR NAUSEA AND VOMITING, Disp: 15 tablet, Rfl: 0  •  oxyCODONE (ROXICODONE) 10 MG tablet, Take 10 mg by mouth., Disp: , Rfl:   •  polyethylene glycol (MIRALAX) 17 GM/SCOOP powder, Take 17 g by mouth Daily., Disp: , Rfl:   •  potassium chloride (MICRO-K) 10 MEQ CR capsule, Take 2 capsules by mouth Daily. (Patient taking differently: Take 10 mEq by mouth 2 (Two) Times a Day.), Disp: 30 capsule, Rfl: 1  •  spironolactone (ALDACTONE) 25 MG tablet, TAKE 1 TABLET BY MOUTH ONCE DAILY (Patient taking differently: 25 mg 2 (Two) Times a Day.), Disp: 30 tablet, Rfl: 5  •  traZODone (DESYREL) 100 MG tablet, TAKE 1 TABLET BY MOUTH ONCE DAILY AT NIGHT, Disp: 30 tablet, Rfl: 2  •  vitamin B-12 (CYANOCOBALAMIN) 1000 MCG tablet, Take 1,000 mcg by mouth Daily., Disp: , Rfl:   •  acetaminophen (TYLENOL) 325 MG tablet, Take 650 mg by mouth Every 6 (Six) Hours As Needed for Mild Pain ., Disp: , Rfl:   •  DULoxetine (CYMBALTA) 20 MG capsule, Take 20 mg by mouth Daily., Disp: , Rfl:   •  ondansetron (ZOFRAN) 8 MG tablet, Take 1 tablet by mouth Every 8 (Eight) Hours As Needed for Nausea or Vomiting. (Patient taking differently: Take 4 mg by mouth Every 8 (Eight) Hours As Needed for Nausea or Vomiting.), Disp: 30 tablet, Rfl: 0  •  sucralfate (Carafate) 1 g tablet, Take 1 tablet by mouth 4 (Four) Times a Day., Disp: 120 tablet, Rfl: 2    Patient Active Problem List   Diagnosis   • Bilateral foot pain   • Right hip pain   • Acute pain of both knees   • Plantar fasciitis, bilateral   • Primary osteoarthritis of left knee   • UMSA (nonalcoholic steatohepatitis)   • Tongue lesion   • Bilateral lower extremity edema   • Chronic pain of both knees   • Bilateral calcaneal spurs   • Swelling of both lower extremities   • Obesity with body mass index of 30.0-39.9   • Diastolic dysfunction   • Pancytopenia (HCC)   • Chronic fatigue    • Abdominal pain   • Other constipation   • GERD (gastroesophageal reflux disease)   • Depression   • Disease related peripheral neuropathy   • Epigastric pain   • Hyperlipidemia   • Restless leg syndrome   • CHAI on CPAP   • Carpal tunnel syndrome on left   • Carpal tunnel syndrome on right   • Bilateral wrist pain   • Numbness and tingling in both hands   • Elevated liver enzymes   • History of small bowel obstruction   • Cirrhosis of liver (HCC)   • Status post carpal tunnel release   • Bilateral ankle pain   • Lakesha's deformity of both heels   • Pain in joint, ankle and foot   • Primary osteoarthritis of both knees   • Low back pain   • Abdominal pain, generalized   • Chronic idiopathic constipation   • Hypertension   • Elevated serum creatinine   • Splenomegaly   • Chronic pain syndrome   • Lung nodule   • Hypertension   • Lab test negative for COVID-19 virus   • Thrombocytopenia (HCC)   • Iron deficiency   • Adverse effect of iron   • Encounter for assessment of peripherally inserted central venous catheter (PICC)   • Nausea and vomiting   • Weight loss, abnormal   • Gastroparesis   • E. coli UTI (urinary tract infection)   • Acute gastritis   • Hiatal hernia   • Hypomagnesemia   • Hypokalemia   • Gastro-esophageal reflux disease with esophagitis, without bleeding     Past Surgical History:   Procedure Laterality Date   • CARPAL TUNNEL RELEASE Left 2018    Procedure: CARPAL TUNNEL RELEASE - left;  Surgeon: Justus Lewis MD;  Location: [facility] OR;  Service: Orthopedics   • CARPAL TUNNEL RELEASE Right 2019    Procedure: carpal tunnel release right hand with local/monitored anesthesia care;  Surgeon: Justus Lewis MD;  Location: [facility] OR;  Service: Orthopedics   •  SECTION     • CHOLECYSTECTOMY     • COLONOSCOPY  2013   • COLONOSCOPY N/A 10/17/2018    Procedure: COLONOSCOPY;  Surgeon: Russell Del Rio MD;  Location: [facility] ENDOSCOPY;  Service: Gastroenterology   •  COLONOSCOPY N/A 3/22/2021    Procedure: COLONOSCOPY;  Surgeon: Russell Del Rio MD;  Location: [facility] ENDOSCOPY;  Service: Gastroenterology;  Laterality: N/A;   • DIRECT LARYNGOSCOPY, ESOPHAGOSCOPY, BRONCHOSCOPY N/A 8/1/2017    Procedure: DIRECT LARYNGOSCOPY AND;  Surgeon: Live Bolton MD;  Location: [facility] OR;  Service:    • ENDOSCOPY  07/01/2013    Colon endoscopy 42665 (1) - Internal & external hemorrhoids found. Stool found.   • ENDOSCOPY  07/01/2013    EGD w/ tube 56234 (1) - Normal esophagus. Gastritis in stomach. Biopsy taken. Normal dudoenum. Biopsy taken.   • ENDOSCOPY N/A 10/17/2018    Procedure: ESOPHAGOGASTRODUODENOSCOPY--eval varices;  Surgeon: Russell Del Rio MD;  Location: [facility] ENDOSCOPY;  Service: Gastroenterology   • ENDOSCOPY N/A 3/22/2021    Procedure: ESOPHAGOGASTRODUODENOSCOPYURGNET;  Surgeon: Russell Del Rio MD;  Location: [facility] ENDOSCOPY;  Service: Gastroenterology;  Laterality: N/A;   • ENDOSCOPY N/A 9/16/2021    Procedure: ESOPHAGOGASTRODUODENOSCOPY;  Surgeon: Basia Saunders MD;  Location: [facility] ENDOSCOPY;  Service: Gastroenterology;  Laterality: N/A;   • FOOT SURGERY  02/26/2013    Foot/toes surgery procedure (1) - Arthroplasty of toes 4 and 5 of right foot.   • HERNIA REPAIR     • HERNIA REPAIR      hital   • HYSTERECTOMY     • LIVER BIOPSY     • NERVE BLOCK  07/25/2016    Injection for nerve block (1) - Lumbar medial branch block.   • OTHER SURGICAL HISTORY  12/03/2012    Inj(s) Tend-Sheath, Ligament, Single 20550 (1) - [facility]    • OTHER SURGICAL HISTORY  06/24/2013    Small Joint Injection/Aspiration 20600 (2) - [facility]    • OTHER SURGICAL HISTORY  2011    bowel obstruction x2   • OTHER SURGICAL HISTORY      gland removed from neck   • SUBLINGUAL SALIVARY CYST EXCISION N/A 8/1/2017    Procedure: EXCISION OF LEFT  TONGUE LESION WITH CLOSURE;  Surgeon: Live Bolton MD;  Location: [facility] OR;  Service:    • TUBAL ABDOMINAL LIGATION      • UPPER GASTROINTESTINAL ENDOSCOPY  2013   • UPPER GASTROINTESTINAL ENDOSCOPY  10/17/2018   • UPPER GASTROINTESTINAL ENDOSCOPY  2021     Social History     Socioeconomic History   • Marital status:    Tobacco Use   • Smoking status: Former Smoker     Packs/day: 2.00     Years: 15.00     Pack years: 30.00     Types: Cigarettes     Quit date:      Years since quittin.5   • Smokeless tobacco: Never Used   Vaping Use   • Vaping Use: Never used   Substance and Sexual Activity   • Alcohol use: No   • Drug use: Never   • Sexual activity: Defer     Birth control/protection: Surgical     Comment: Marital Status: .  Hysterectomy.     Family History   Problem Relation Age of Onset   • Cancer Other    • Diabetes Other    • Heart disease Other    • Hypertension Mother    • Cancer Mother    • Diabetes Mother    • Hypertension Father    • Heart attack Father    • Cancer Father    • Heart disease Father    • Thyroid disease Maternal Aunt    • Cancer Maternal Aunt    • No Known Problems Sister    • No Known Problems Brother    • Cancer Maternal Grandmother    • No Known Problems Sister      Lab on 2022   Component Date Value Ref Range Status   • Fibrosis Score (References) 2022 0.91 (A) 0.00 - 0.21 Final   • Fibrosis Stage (Reference) 2022 Comment   Final                         F4 - Cirrhosis   • Steatosis Score (Reference) 2022 0.55 (A) 0.00 - 0.30 Final   • Steatosis Grade (Reference) 2022 Comment   Final           S1 - S2  Minimal Steatosis - Moderate Steatosis   • MUSA Score (Reference) 2022 0.75 (A) 0.25 Final   • Musa Grade (Reference) 2022 Comment   Final                           N2 - MUSA   • Height: (Reference) 2022 64  in Final   • Weight: (Reference) 2022 232  LBS Final   • Alpha 2-Macroglobulins, Qn 2022 336 (A) 110 - 276 mg/dL Final   • Haptoglobin 2022 <10 (A) 37 - 355 mg/dL Final   • Apolipoprotein A-1 2022  101 (A) 116 - 209 mg/dL Final   • Total Bilirubin 2022 0.7  0.0 - 1.2 mg/dL Final   • GGT 2022 44  0 - 60 IU/L Final   • ALT (SGPT) 2022 45 (A) 0 - 40 IU/L Final   • AST (SGOT) P5P (Reference) 2022 75 (A) 0 - 40 IU/L Final   • Cholesterol, Total (Reference) 2022 122  100 - 199 mg/dL Final   • Glucose, Serum (Reference) 2022 106 (A) 65 - 99 mg/dL Final   • Triglycerides 2022 76  0 - 149 mg/dL Final   • Interpretations: (Reference) 2022 Comment   Final    Comment: Quantitative results of 10 biochemicals in combination with  age, gender, height, and weight, are analyzed using a  computational algorithm to provide a quantitative surrogate  marker (0.0-1.0) of liver fibrosis (Metavir F0-F4), hepatic  steatosis (0.0-1.0, S0-S3), and Non-Alcoholic Steato-  Hepatitis (MUSA) (0.0-0.75, N0-N2). The absence of steatosis  (S<0.38) precludes the diagnosis of MUSA.  Fibrosis marker:  In a study of 171 Non-Alcoholic Fatty  Liver Disease (NAFLD) patients where 23% had significant  NAFLD fibrosis (Metavir F2-F4) and 11% had cirrhosis by  liver biopsy, a fibrosis result of >0.3 yielded a  sensitivity of 83% and a specificity of 78% for the  detection of significant fibrosis(1).  Steatosis Marker:  In a population of 744 patients (583 HCV,  18 HBV, 69 NAFLD, and 74 alcoholic disease patients), where  36% had significant steatosis (>5%) on a liver biopsy, a  steatosis score >0.5 had a sensitivity of 71% and a  specificity of 72% for identification of                            significant  steatosis(2).  MUSA marker:  In a population of 257 NAFLD patients, where  62% had at least some MUSA by liver biopsy, a prediction of  MUSA had a sensitivity of 88% for identifying MUSA and a  specificity of 50%(3).   • Fibrosis Scorin2022 Comment   Final         <=0.21 = Stage F0 - No fibrosis  0.21 - 0.27 = Stage F0 - F1  0.27 - 0.31 = Stage F1 - Portal fibrosis  0.31 - 0.48 = Stage F1 -  F2  0.48 - 0.58 = Stage F2 - Bridging fibrosis with few septa  0.58 - 0.72 = Stage F3 - Bridging fibrosis with many septa  0.72 - 0.74 = Stage F3 - F4        >0.74 = Stage F4 - Cirrhosis   • Steatosis Grading (Reference) 07/01/2022 Comment   Final          < 0.30 = S0 - No Steatosis  0.30 to 0.38 = S0 - S1  0.38 to 0.48 = S1 - Minimal Steatosis  0.48 to 0.57 = S1 - S2  0.57 to 0.67 = S2 - Moderate Steatosis  0.67 to 0.69 = S2 - S3        > 0.69 = S3 - Marked or Severe Steatosis   • Judge Scoring (Reference) 07/01/2022 Comment   Final    0.25 = N0 - Not JUDGE  0.50 = N1 - Borderline or probable JUDGE  0.75 = N2 - JUDGE   • Limitations: (Reference) 07/01/2022 Comment   Final    JUDGE FibroSure is recommended for patients with suspected non-  alcoholic fatty liver disease.  It is not recommended for patients  with other liver diseases.  It is also not recommended in patients  with Gilbert Disease, acute hemolysis, acute viral hepatitis, drug  induced hepatitis, genetic liver disease, autoimmune hepatitis and/or  extra-hepatic cholestasis.  Any of these clinical situations may lead  to inaccurate quantitative predictions of fibrosis.   • Comment (Reference) 07/01/2022 Comment   Final    This test was developed and its performance characteristics determined  by [employer].  It has not been cleared or approved by the Food and Drug  Administration.  The FDA has determined that such clearance or  approval is not necessary.  For questions regarding this report please contact  customer service at [phone].  References:  1. Yunier QUICK. et al. Diagnostic Value of Biochemical Markers     (FibroTest) for the prediction of Liver Fibrosis in     patients with Non-Alcoholic Fatty Liver Disease. [facility] 2006; 6:6.  2. DAVIS Quinn. et al. The Diagnostic Value of Biomarkers     (Steato Test) for the Prediction of Liver Steatosis.     Comparative Hepatol. 2005; 4:10.  3. DAVIS Quinn, Chrissy Faye, et al. Diagnostic  value     of biochemical markers (MUSA TEST) for the prediction of     non alcohol steato hepatitis in patients with non-     alcoholic fatty liver disease. [facility] 2006;     6:34 doi:10.1186/6965-598L-2-34.   • Protime 07/01/2022 17.2 (A) 11.1 - 15.3 Seconds Final   • INR 07/01/2022 1.42 (A) 0.80 - 1.20 Final   • Iron 07/01/2022 73  37 - 145 mcg/dL Final   • Iron Saturation 07/01/2022 20  20 - 50 % Final   • Transferrin 07/01/2022 248  200 - 360 mg/dL Final   • TIBC 07/01/2022 370  298 - 536 mcg/dL Final   • Ferritin 07/01/2022 156.40 (A) 13.00 - 150.00 ng/mL Final   • Folate 07/01/2022 9.32  4.78 - 24.20 ng/mL Final   • Vitamin B-12 07/01/2022 1,290 (A) 211 - 946 pg/mL Final   • WBC 07/01/2022 4.74  3.40 - 10.80 10*3/mm3 Final   • RBC 07/01/2022 4.78  3.77 - 5.28 10*6/mm3 Final   • Hemoglobin 07/01/2022 13.6  12.0 - 15.9 g/dL Final   • Hematocrit 07/01/2022 40.2  34.0 - 46.6 % Final   • MCV 07/01/2022 84.1  79.0 - 97.0 fL Final   • MCH 07/01/2022 28.5  26.6 - 33.0 pg Final   • MCHC 07/01/2022 33.8  31.5 - 35.7 g/dL Final   • RDW 07/01/2022 16.7 (A) 12.3 - 15.4 % Final   • RDW-SD 07/01/2022 51.0  37.0 - 54.0 fl Final   • MPV 07/01/2022    Final    Instrument unable to calculate MPV manual diff to follow   • Platelets 07/01/2022 28 (A) 140 - 450 10*3/mm3 Final   • Neutrophil % 07/01/2022 63.0  42.7 - 76.0 % Final   • Lymphocyte % 07/01/2022 20.0  19.6 - 45.3 % Final   • Monocyte % 07/01/2022 8.0  5.0 - 12.0 % Final   • Eosinophil % 07/01/2022 1.0  0.3 - 6.2 % Final   • Bands %  07/01/2022 7.0 (A) 0.0 - 5.0 % Final   • Atypical Lymphocyte % 07/01/2022 1.0  0.0 - 5.0 % Final   • Neutrophils Absolute 07/01/2022 3.32  1.70 - 7.00 10*3/mm3 Final   • Lymphocytes Absolute 07/01/2022 1.00  0.70 - 3.10 10*3/mm3 Final   • Monocytes Absolute 07/01/2022 0.38  0.10 - 0.90 10*3/mm3 Final   • Eosinophils Absolute 07/01/2022 0.05  0.00 - 0.40 10*3/mm3 Final   • Anisocytosis 07/01/2022 Mod/2+  None Seen Final   • WBC  Morphology 07/01/2022 Normal  Normal Final   • Platelet Estimate 07/01/2022 Decreased  Normal Final   • Clumped Platelets 07/01/2022 Present  None Seen Final    INTERPRET PLT CT WITH CAUTION;  MANY PLT CLUMPS APPEARING ON SLIDE SCAN MAY MAKE CT READ LOWER THAN IT ACTUALLY IS   Lab on 05/26/2022   Component Date Value Ref Range Status   • Ferritin 05/26/2022 124.60  13.00 - 150.00 ng/mL Final   • Iron 05/26/2022 69  37 - 145 mcg/dL Final   • Iron Saturation 05/26/2022 18 (A) 20 - 50 % Final   • Transferrin 05/26/2022 258  200 - 360 mg/dL Final   • TIBC 05/26/2022 384  298 - 536 mcg/dL Final   • Folate 05/26/2022 14.50  4.78 - 24.20 ng/mL Final   • Vitamin B-12 05/26/2022 1,017 (A) 211 - 946 pg/mL Final   • WBC 05/26/2022 2.93 (A) 3.40 - 10.80 10*3/mm3 Final   • RBC 05/26/2022 4.70  3.77 - 5.28 10*6/mm3 Final   • Hemoglobin 05/26/2022 13.6  12.0 - 15.9 g/dL Final   • Hematocrit 05/26/2022 39.3  34.0 - 46.6 % Final   • MCV 05/26/2022 83.6  79.0 - 97.0 fL Final   • MCH 05/26/2022 28.9  26.6 - 33.0 pg Final   • MCHC 05/26/2022 34.6  31.5 - 35.7 g/dL Final   • RDW 05/26/2022 15.4  12.3 - 15.4 % Final   • RDW-SD 05/26/2022 46.9  37.0 - 54.0 fl Final   • MPV 05/26/2022 10.8  6.0 - 12.0 fL Final   • Platelets 05/26/2022 55 (A) 140 - 450 10*3/mm3 Final   • Neutrophil % 05/26/2022 66.9  42.7 - 76.0 % Final   • Lymphocyte % 05/26/2022 18.1 (A) 19.6 - 45.3 % Final   • Monocyte % 05/26/2022 12.3 (A) 5.0 - 12.0 % Final   • Eosinophil % 05/26/2022 1.7  0.3 - 6.2 % Final   • Basophil % 05/26/2022 0.3  0.0 - 1.5 % Final   • Immature Grans % 05/26/2022 0.7 (A) 0.0 - 0.5 % Final   • Neutrophils, Absolute 05/26/2022 1.96  1.70 - 7.00 10*3/mm3 Final   • Lymphocytes, Absolute 05/26/2022 0.53 (A) 0.70 - 3.10 10*3/mm3 Final   • Monocytes, Absolute 05/26/2022 0.36  0.10 - 0.90 10*3/mm3 Final   • Eosinophils, Absolute 05/26/2022 0.05  0.00 - 0.40 10*3/mm3 Final   • Basophils, Absolute 05/26/2022 0.01  0.00 - 0.20 10*3/mm3 Final   • Immature  Grans, Absolute 05/26/2022 0.02  0.00 - 0.05 10*3/mm3 Final   • nRBC 05/26/2022 0.0  0.0 - 0.2 /100 WBC Final   Admission on 05/17/2022, Discharged on 05/17/2022   Component Date Value Ref Range Status   • COVID19 05/17/2022 Not Detected  Not Detected - Ref. Range Final   • Influenza A PCR 05/17/2022 Not Detected  Not Detected Final   • Influenza B PCR 05/17/2022 Not Detected  Not Detected Final   • Lipase 05/17/2022 33  13 - 60 U/L Final   • Glucose 05/17/2022 102 (A) 65 - 99 mg/dL Final   • BUN 05/17/2022 8  8 - 23 mg/dL Final   • Creatinine 05/17/2022 1.11 (A) 0.57 - 1.00 mg/dL Final   • Sodium 05/17/2022 141  136 - 145 mmol/L Final   • Potassium 05/17/2022 4.1  3.5 - 5.2 mmol/L Final   • Chloride 05/17/2022 105  98 - 107 mmol/L Final   • CO2 05/17/2022 27.0  22.0 - 29.0 mmol/L Final   • Calcium 05/17/2022 9.2  8.6 - 10.5 mg/dL Final   • Total Protein 05/17/2022 7.0  6.0 - 8.5 g/dL Final   • Albumin 05/17/2022 3.70  3.50 - 5.20 g/dL Final   • ALT (SGPT) 05/17/2022 25  1 - 33 U/L Final   • AST (SGOT) 05/17/2022 37 (A) 1 - 32 U/L Final   • Alkaline Phosphatase 05/17/2022 149 (A) 39 - 117 U/L Final   • Total Bilirubin 05/17/2022 1.1  0.0 - 1.2 mg/dL Final   • Globulin 05/17/2022 3.3  gm/dL Final   • A/G Ratio 05/17/2022 1.1  g/dL Final   • BUN/Creatinine Ratio 05/17/2022 7.2  7.0 - 25.0 Final   • Anion Gap 05/17/2022 9.0  5.0 - 15.0 mmol/L Final   • eGFR 05/17/2022 56.3 (A) >60.0 mL/min/1.73 Final    National Kidney Foundation and American Society of Nephrology (ASN) Task Force recommended calculation based on the Chronic Kidney Disease Epidemiology Collaboration (CKD-EPI) equation refit without adjustment for race.   • WBC 05/17/2022 3.90  3.40 - 10.80 10*3/mm3 Final   • RBC 05/17/2022 5.00  3.77 - 5.28 10*6/mm3 Final   • Hemoglobin 05/17/2022 14.4  12.0 - 15.9 g/dL Final   • Hematocrit 05/17/2022 42.3  34.0 - 46.6 % Final   • MCV 05/17/2022 84.6  79.0 - 97.0 fL Final   • MCH 05/17/2022 28.8  26.6 - 33.0 pg Final    • MCHC 05/17/2022 34.0  31.5 - 35.7 g/dL Final   • RDW 05/17/2022 15.5 (A) 12.3 - 15.4 % Final   • RDW-SD 05/17/2022 47.1  37.0 - 54.0 fl Final   • MPV 05/17/2022 11.0  6.0 - 12.0 fL Final   • Platelets 05/17/2022 67 (A) 140 - 450 10*3/mm3 Final   • Neutrophil % 05/17/2022 68.5  42.7 - 76.0 % Final   • Lymphocyte % 05/17/2022 23.1  19.6 - 45.3 % Final   • Monocyte % 05/17/2022 5.1  5.0 - 12.0 % Final   • Eosinophil % 05/17/2022 2.3  0.3 - 6.2 % Final   • Basophil % 05/17/2022 0.5  0.0 - 1.5 % Final   • Immature Grans % 05/17/2022 0.5  0.0 - 0.5 % Final   • Neutrophils, Absolute 05/17/2022 2.67  1.70 - 7.00 10*3/mm3 Final   • Lymphocytes, Absolute 05/17/2022 0.90  0.70 - 3.10 10*3/mm3 Final   • Monocytes, Absolute 05/17/2022 0.20  0.10 - 0.90 10*3/mm3 Final   • Eosinophils, Absolute 05/17/2022 0.09  0.00 - 0.40 10*3/mm3 Final   • Basophils, Absolute 05/17/2022 0.02  0.00 - 0.20 10*3/mm3 Final   • Immature Grans, Absolute 05/17/2022 0.02  0.00 - 0.05 10*3/mm3 Final   • nRBC 05/17/2022 0.0  0.0 - 0.2 /100 WBC Final   • proBNP 05/17/2022 209.6  0.0 - 900.0 pg/mL Final   • D-Dimer, Quantitative 05/17/2022 894 (A) 0 - 470 ng/mL (FEU) Final   • Extra Tube 05/17/2022 Hold for add-ons.   Final    Auto resulted.   • Extra Tube 05/17/2022 Hold for add-ons.   Final    Auto resulted.   • Anisocytosis 05/17/2022 Slight/1+  None Seen Final   • WBC Morphology 05/17/2022 Normal  Normal Final   • Platelet Estimate 05/17/2022 Decreased  Normal Final   Lab on 03/02/2022   Component Date Value Ref Range Status   • Ammonia 03/02/2022 <10 (A) 11 - 51 umol/L Final   • Glucose 03/02/2022 90  65 - 99 mg/dL Final   • BUN 03/02/2022 9  8 - 23 mg/dL Final   • Creatinine 03/02/2022 0.92  0.57 - 1.00 mg/dL Final   • Sodium 03/02/2022 139  136 - 145 mmol/L Final   • Potassium 03/02/2022 4.2  3.5 - 5.2 mmol/L Final    Slight hemolysis detected by analyzer. Results may be affected.   • Chloride 03/02/2022 105  98 - 107 mmol/L Final   • CO2  03/02/2022 24.2  22.0 - 29.0 mmol/L Final   • Calcium 03/02/2022 8.9  8.6 - 10.5 mg/dL Final   • Total Protein 03/02/2022 6.5  6.0 - 8.5 g/dL Final   • Albumin 03/02/2022 3.50  3.50 - 5.20 g/dL Final   • ALT (SGPT) 03/02/2022 30  1 - 33 U/L Final   • AST (SGOT) 03/02/2022 42 (A) 1 - 32 U/L Final   • Alkaline Phosphatase 03/02/2022 105  39 - 117 U/L Final   • Total Bilirubin 03/02/2022 1.4 (A) 0.0 - 1.2 mg/dL Final   • Globulin 03/02/2022 3.0  gm/dL Final   • A/G Ratio 03/02/2022 1.2  g/dL Final   • BUN/Creatinine Ratio 03/02/2022 9.8  7.0 - 25.0 Final   • Anion Gap 03/02/2022 9.8  5.0 - 15.0 mmol/L Final   • eGFR 03/02/2022 70.5  >60.0 mL/min/1.73 Final    [employer] and American Society of Nephrology (ASN) Task Force recommended calculation based on the Chronic Kidney Disease Epidemiology Collaboration (CKD-EPI) equation refit without adjustment for race.   • Protime 03/02/2022 24.9 (A) 11.1 - 15.3 Seconds Final   • INR 03/02/2022 2.32 (A) 0.80 - 1.20 Final   • ALPHA-FETOPROTEIN 03/02/2022 3.69  0 - 8.3 ng/mL Final   Lab on 01/27/2022   Component Date Value Ref Range Status   • WBC 01/27/2022 4.32  3.40 - 10.80 10*3/mm3 Final   • RBC 01/27/2022 4.84  3.77 - 5.28 10*6/mm3 Final   • Hemoglobin 01/27/2022 14.0  12.0 - 15.9 g/dL Final   • Hematocrit 01/27/2022 41.4  34.0 - 46.6 % Final   • MCV 01/27/2022 85.5  79.0 - 97.0 fL Final   • MCH 01/27/2022 28.9  26.6 - 33.0 pg Final   • MCHC 01/27/2022 33.8  31.5 - 35.7 g/dL Final   • RDW 01/27/2022 15.5 (A) 12.3 - 15.4 % Final   • RDW-SD 01/27/2022 48.5  37.0 - 54.0 fl Final   • MPV 01/27/2022 11.1  6.0 - 12.0 fL Final   • Platelets 01/27/2022 68 (A) 140 - 450 10*3/mm3 Final   • Neutrophil % 01/27/2022 63.9  42.7 - 76.0 % Final   • Lymphocyte % 01/27/2022 25.5  19.6 - 45.3 % Final   • Monocyte % 01/27/2022 6.0  5.0 - 12.0 % Final   • Eosinophil % 01/27/2022 4.2  0.3 - 6.2 % Final   • Basophil % 01/27/2022 0.2  0.0 - 1.5 % Final   • Immature Grans % 01/27/2022  0.2  0.0 - 0.5 % Final   • Neutrophils, Absolute 01/27/2022 2.76  1.70 - 7.00 10*3/mm3 Final   • Lymphocytes, Absolute 01/27/2022 1.10  0.70 - 3.10 10*3/mm3 Final   • Monocytes, Absolute 01/27/2022 0.26  0.10 - 0.90 10*3/mm3 Final   • Eosinophils, Absolute 01/27/2022 0.18  0.00 - 0.40 10*3/mm3 Final   • Basophils, Absolute 01/27/2022 0.01  0.00 - 0.20 10*3/mm3 Final   • Immature Grans, Absolute 01/27/2022 0.01  0.00 - 0.05 10*3/mm3 Final   • nRBC 01/27/2022 0.0  0.0 - 0.2 /100 WBC Final   • Extra Tube 01/27/2022 Hold for add-ons.   Final    Auto resulted.   • RBC Morphology 01/27/2022 Normal  Normal Final   • WBC Morphology 01/27/2022 Normal  Normal Final   • Platelet Estimate 01/27/2022 Decreased  Normal Final   Lab on 01/21/2022   Component Date Value Ref Range Status   • Ferritin 01/21/2022 176.60 (A) 13.00 - 150.00 ng/mL Final   • Iron 01/21/2022 101  37 - 145 mcg/dL Final   • Iron Saturation 01/21/2022 28  20 - 50 % Final   • Transferrin 01/21/2022 246  200 - 360 mg/dL Final   • TIBC 01/21/2022 367  298 - 536 mcg/dL Final   • Vitamin B-12 01/21/2022 1,044 (A) 211 - 946 pg/mL Final   • Folate 01/21/2022 >20.00  4.78 - 24.20 ng/mL Final   • WBC 01/21/2022 4.16  3.40 - 10.80 10*3/mm3 Final   • RBC 01/21/2022 4.91  3.77 - 5.28 10*6/mm3 Final   • Hemoglobin 01/21/2022 14.0  12.0 - 15.9 g/dL Final   • Hematocrit 01/21/2022 42.6  34.0 - 46.6 % Final   • MCV 01/21/2022 86.8  79.0 - 97.0 fL Final   • MCH 01/21/2022 28.5  26.6 - 33.0 pg Final   • MCHC 01/21/2022 32.9  31.5 - 35.7 g/dL Final   • RDW 01/21/2022 15.6 (A) 12.3 - 15.4 % Final   • RDW-SD 01/21/2022 49.3  37.0 - 54.0 fl Final   • MPV 01/21/2022 11.6  6.0 - 12.0 fL Final   • Platelets 01/21/2022 32 (A) 140 - 450 10*3/mm3 Final   • Neutrophil % 01/21/2022 57.0  42.7 - 76.0 % Final   • Lymphocyte % 01/21/2022 25.0  19.6 - 45.3 % Final   • Monocyte % 01/21/2022 6.0  5.0 - 12.0 % Final   • Eosinophil % 01/21/2022 4.0  0.3 - 6.2 % Final   • Basophil % 01/21/2022 1.0   0.0 - 1.5 % Final   • Bands %  01/21/2022 7.0 (A) 0.0 - 5.0 % Final   • Neutrophils Absolute 01/21/2022 2.66  1.70 - 7.00 10*3/mm3 Final   • Lymphocytes Absolute 01/21/2022 1.04  0.70 - 3.10 10*3/mm3 Final   • Monocytes Absolute 01/21/2022 0.25  0.10 - 0.90 10*3/mm3 Final   • Eosinophils Absolute 01/21/2022 0.17  0.00 - 0.40 10*3/mm3 Final   • Basophils Absolute 01/21/2022 0.04  0.00 - 0.20 10*3/mm3 Final   • Anisocytosis 01/21/2022 Slight/1+  None Seen Final   • WBC Morphology 01/21/2022 Normal  Normal Final   • Platelet Estimate 01/21/2022 Decreased  Normal Final    Warmed specimen in incubator did not yield a higher platelet count           [unfilled]  Immunization History   Administered Date(s) Administered   • COVID-19 (MODERNA) 1st, 2nd, 3rd Dose Only 06/16/2021, 08/13/2021   • Flu Vaccine Quad PF >36MO 10/02/2018   • FluLaval/Fluarix/Fluzone >6 11/04/2019, 10/01/2020   • Hepatitis A 10/04/2007, 04/04/2008   • Hepatitis B 10/04/2007, 11/08/2007, 04/04/2008   • Influenza, Unspecified 09/28/2021   • Shingrix 11/04/2019, 02/11/2020     The following portions of the patient's history were reviewed and updated as appropriate: allergies, current medications, past family history, past medical history, past social history, past surgical history and problem list.    PHQ-9 Total Score:           Physical Exam  Constitutional:       Appearance: Normal appearance.   HENT:      Head: Normocephalic and atraumatic.      Right Ear: External ear normal.      Left Ear: External ear normal.   Eyes:      General:         Right eye: No discharge.         Left eye: No discharge.      Conjunctiva/sclera: Conjunctivae normal.   Cardiovascular:      Rate and Rhythm: Normal rate and regular rhythm.      Pulses: Normal pulses.      Heart sounds: Normal heart sounds. No murmur heard.  Pulmonary:      Effort: Pulmonary effort is normal. No respiratory distress.      Breath sounds: Normal breath sounds.   Abdominal:      General: There  is no distension.      Palpations: Abdomen is soft.      Tenderness: There is no abdominal tenderness.   Musculoskeletal:      Cervical back: Normal range of motion.      Right lower leg: Edema present.      Left lower leg: Edema present.   Lymphadenopathy:      Cervical: No cervical adenopathy.   Skin:         Neurological:      Mental Status: She is alert. Mental status is at baseline.   Psychiatric:         Mood and Affect: Mood normal.         Behavior: Behavior normal.       Assessment & Plan    Diagnosis Plan   1. Need for pneumococcal vaccine     2. Wound of left lower extremity, initial encounter  Ambulatory Referral to Wound Clinic   3. Encounter for screening mammogram for malignant neoplasm of breast  Mammo Screening Bilateral With CAD      Orders Placed This Encounter   Procedures   • Mammo Screening Bilateral With CAD     Standing Status:   Future     Standing Expiration Date:   7/7/2023     Scheduling Instructions:      [place]     Order Specific Question:   Reason for Exam:     Answer:   breast cancer screening   • Ambulatory Referral to Wound Clinic     Referral Priority:   Routine     Referral Type:   Consultation     Referral Reason:   Specialty Services Required     Number of Visits Requested:   1     Establish care; patient currently seeing cardiology, gastroenterology, reassuring.  Vital stable.  Prior records and imaging reviewed.    Wound left lower extremity; unclear etiology, likely venous ulcer, no acute complications, discussed importance of compression stocking, wound care, will refer him to wound clinic as above due to chronicity, strict ER/return precautions given.          This document has been electronically signed by Omar Chadwick MD on July 7, 2022 17:55 CDT    EMR Dragon/Transciption Disclaimer: Some of this note may be an electronic transcription/translation of spoken language to printed text.  The electronic translation of spoken language may permit erroneous, or at times,  nonsensical words or phrases to be inadvertently transcribed. Although I have reviewed the note for such errors, some may still exist.

## 2022-07-13 ENCOUNTER — OFFICE VISIT (OUTPATIENT)
Dept: WOUND CARE | Facility: HOSPITAL | Age: 62
End: 2022-07-13

## 2022-07-13 ENCOUNTER — TRANSCRIBE ORDERS (OUTPATIENT)
Dept: ADMINISTRATIVE | Facility: HOSPITAL | Age: 62
End: 2022-07-13

## 2022-07-13 DIAGNOSIS — R60.0 LEG EDEMA: Primary | ICD-10-CM

## 2022-07-13 DIAGNOSIS — I73.9 PERIPHERAL VASCULAR DISEASE: ICD-10-CM

## 2022-07-13 PROCEDURE — G0463 HOSPITAL OUTPT CLINIC VISIT: HCPCS

## 2022-07-13 PROCEDURE — 97602 WOUND(S) CARE NON-SELECTIVE: CPT

## 2022-07-19 ENCOUNTER — OFFICE VISIT (OUTPATIENT)
Dept: WOUND CARE | Facility: HOSPITAL | Age: 62
End: 2022-07-19

## 2022-07-22 ENCOUNTER — PATIENT ROUNDING (BHMG ONLY) (OUTPATIENT)
Dept: FAMILY MEDICINE CLINIC | Facility: CLINIC | Age: 62
End: 2022-07-22

## 2022-07-22 NOTE — PROGRESS NOTES
July 22, 2022    Hello, may I speak with Amira Shannon?    My name is seferino miller     I am  with [facility] - [address] [place] [address] [phone].    Before we get started may I verify your date of birth? 1960    I am calling to officially welcome you to our practice and ask about your recent visit. Is this a good time to talk? Yes     Tell me about your visit with us. What things went well?  DR PACE WAS REALLY GOOD AND HE IS DOING THINGS THAT MY PREVIOUS DR DID NOT EVEN TRY.       We're always looking for ways to make our patients' experiences even better. Do you have recommendations on ways we may improve?  NO IT WAS GOOD    Overall were you satisfied with your first visit to our practice? YES       I appreciate you taking the time to speak with me today. Is there anything else I can do for you? NO       Thank you, and have a great day.

## 2022-07-26 ENCOUNTER — OFFICE VISIT (OUTPATIENT)
Dept: GASTROENTEROLOGY | Facility: CLINIC | Age: 62
End: 2022-07-26

## 2022-07-26 VITALS
WEIGHT: 226 LBS | BODY MASS INDEX: 38.58 KG/M2 | SYSTOLIC BLOOD PRESSURE: 143 MMHG | HEIGHT: 64 IN | HEART RATE: 79 BPM | DIASTOLIC BLOOD PRESSURE: 85 MMHG

## 2022-07-26 DIAGNOSIS — K72.10 END STAGE LIVER DISEASE: Primary | ICD-10-CM

## 2022-07-26 DIAGNOSIS — K74.60 CIRRHOSIS OF LIVER WITHOUT ASCITES, UNSPECIFIED HEPATIC CIRRHOSIS TYPE: ICD-10-CM

## 2022-07-26 DIAGNOSIS — K75.81 NASH (NONALCOHOLIC STEATOHEPATITIS): ICD-10-CM

## 2022-07-26 DIAGNOSIS — K59.04 CHRONIC IDIOPATHIC CONSTIPATION: ICD-10-CM

## 2022-07-26 DIAGNOSIS — R10.84 GENERALIZED ABDOMINAL PAIN: ICD-10-CM

## 2022-07-26 PROCEDURE — 99214 OFFICE O/P EST MOD 30 MIN: CPT | Performed by: PHYSICIAN ASSISTANT

## 2022-07-26 RX ORDER — METOCLOPRAMIDE 10 MG/1
10 TABLET ORAL
COMMUNITY
End: 2022-07-26 | Stop reason: SDUPTHER

## 2022-07-26 RX ORDER — ESCITALOPRAM OXALATE 10 MG/1
10 TABLET ORAL DAILY
COMMUNITY
End: 2022-07-26 | Stop reason: SDUPTHER

## 2022-07-26 RX ORDER — OMEPRAZOLE 40 MG/1
40 CAPSULE, DELAYED RELEASE ORAL DAILY
COMMUNITY
End: 2022-07-26 | Stop reason: SDUPTHER

## 2022-07-26 NOTE — TELEPHONE ENCOUNTER
Rx Refill Note  Requested Prescriptions      No prescriptions requested or ordered in this encounter      Last office visit with prescribing clinician: 7/7/2022      Next office visit with prescribing clinician: 8/9/2022            Lina Aviles MA  07/26/22, 13:48 CDT

## 2022-07-26 NOTE — PROGRESS NOTES
Chief Complaint   Patient presents with   • Cirrhosis       ENDO PROCEDURE ORDERED:    Subjective    Amira Shannon is a 61 y.o. female. she is here today for follow-up.    History of Present Illness    Patient seen on a recheck of her cirrhosis, F4/S1/N1. Last seen 03/08/2022. GERD does fairly well on the Prilosec and Carafate. She denied nausea, vomiting. She is still having very hard stools, she has to strain. She is on Movantik which she states is helping. She usually has a bowel movement about every other day. She is on Lasix 20 and Aldactone 25 for ascites. Weight is up 7 pounds since last visit. Last EGD/colonoscopy 03/22/2021 showed gastritis and hemorrhoids. Last EGD on 09/16/2021 showed a hiatal hernia with a lot of bile in her stomach.     Patient went to the ER with abdominal pain 05/15/2022, had a CT abdomen and pelvis with contrast showed a nodular cirrhotic liver, portal hypertension, splenomegaly, trace ascites, esophageal varices, increased stool.     Laboratory 07/01/2022 MUSA FibroSure 0.91/F4, 0.55/S1-S2, 0.75/N2. Glucose 106, ALT 45, AST 75, INR 1.42, normal iron studies, ferritin 156.4, folate 9.32, B12 was 1290. CBC showed 28,000 platelets.     A/P: Patient with MUSA cirrhosis. Encouraged to continue dietary modification and weight loss. She has been having some leg issues, she has been going to the wound center. She states she has holes in multiple places on her legs that are trying to heal. Her iron studies are stable. She is still folate deficient, will continue on supplementation. She will be due for hepatoma screening in September and will plan follow-up at that time with AFP, CBC, CMP, INR prior. She did have imaging done in May and will need repeat imaging by November. Discussed high fiber diet and other means to help with her bowel movements.       The following portions of the patient's history were reviewed and updated as appropriate:   Past Medical History:   Diagnosis Date   •  "Acid reflux    • Altered bowel function    • Arthropathy of lumbar facet joint    • Bleeding disorder (HCC)    • C. difficile diarrhea    • Chronic idiopathic constipation    • Colonic inertia    • Constipation    • Corns and callus    • Depression    • Disease related peripheral neuropathy    • Epigastric pain    • Fatty liver    • Hammer toe    • Headache    • Hiatal hernia    • History of transfusion    • Hyperlipidemia    • Knee pain    • Localized, primary osteoarthritis of the ankle and foot     Localized, primary osteoarthritis of the ankle and/or foot   • Mendoza's metatarsalgia     Mendoza's metatarsalgia - 2nd interspace on right   • Nausea and vomiting    • Neuralgia and neuritis     Neuralgia, neuritis, and radiculitis, unspecified   • Neuropathy    • Obstructive sleep apnea     Obstructive sleep apnea (adult) (pediatric)    • CHAI on CPAP     \"C-Pap at night  (unconfirmed)\"   • Osteoarthritis    • Pain in foot     Pain in unspecified foot - sees a podiatrist   • Pain in joint, ankle and foot     Joint pain in ankle and foot      • Pain radiating to back     Pain radiating to lumbar region of back   • Pelvic floor dysfunction    • Plantar fasciitis    • PONV (postoperative nausea and vomiting)    • Restless leg syndrome    • Secondary hypertension     Secondary hypertension, unspecified   • Sinusitis    • Sleep apnea    • Tongue anomaly     lesion     Past Surgical History:   Procedure Laterality Date   • CARPAL TUNNEL RELEASE Left 2018    Procedure: CARPAL TUNNEL RELEASE - left;  Surgeon: Justus Lewis MD;  Location: [facility] OR;  Service: Orthopedics   • CARPAL TUNNEL RELEASE Right 2019    Procedure: carpal tunnel release right hand with local/monitored anesthesia care;  Surgeon: Justus Lewis MD;  Location: [facility];  Service: Orthopedics   •  SECTION     • CHOLECYSTECTOMY     • COLONOSCOPY  2013   • COLONOSCOPY N/A 10/17/2018    Procedure: COLONOSCOPY;  " Surgeon: Russell Del Rio MD;  Location: [facility] ENDOSCOPY;  Service: Gastroenterology   • COLONOSCOPY N/A 3/22/2021    Procedure: COLONOSCOPY;  Surgeon: Russell Del Rio MD;  Location: [facility] ENDOSCOPY;  Service: Gastroenterology;  Laterality: N/A;   • DIRECT LARYNGOSCOPY, ESOPHAGOSCOPY, BRONCHOSCOPY N/A 8/1/2017    Procedure: DIRECT LARYNGOSCOPY AND;  Surgeon: Live Bolton MD;  Location: [facility] OR;  Service:    • ENDOSCOPY  07/01/2013    Colon endoscopy 59884 (1) - Internal & external hemorrhoids found. Stool found.   • ENDOSCOPY  07/01/2013    EGD w/ tube 90151 (1) - Normal esophagus. Gastritis in stomach. Biopsy taken. Normal dudoenum. Biopsy taken.   • ENDOSCOPY N/A 10/17/2018    Procedure: ESOPHAGOGASTRODUODENOSCOPY--eval varices;  Surgeon: Russell DelR io MD;  Location: [facility] ENDOSCOPY;  Service: Gastroenterology   • ENDOSCOPY N/A 3/22/2021    Procedure: ESOPHAGOGASTRODUODENOSCOPYURGNET;  Surgeon: Russell Del Rio MD;  Location: [facility] ENDOSCOPY;  Service: Gastroenterology;  Laterality: N/A;   • ENDOSCOPY N/A 9/16/2021    Procedure: ESOPHAGOGASTRODUODENOSCOPY;  Surgeon: Basia Saunders MD;  Location: [facility] ENDOSCOPY;  Service: Gastroenterology;  Laterality: N/A;   • FOOT SURGERY  02/26/2013    Foot/toes surgery procedure (1) - Arthroplasty of toes 4 and 5 of right foot.   • HERNIA REPAIR     • HERNIA REPAIR      hital   • HYSTERECTOMY     • LIVER BIOPSY     • NERVE BLOCK  07/25/2016    Injection for nerve block (1) - Lumbar medial branch block.   • OTHER SURGICAL HISTORY  12/03/2012    Inj(s) Tend-Sheath, Ligament, Single 20550 (1) - [facility]    • OTHER SURGICAL HISTORY  06/24/2013    Small Joint Injection/Aspiration 20600 (2) - [facility] ([facility])    • OTHER SURGICAL HISTORY  2011    bowel obstruction x2   • OTHER SURGICAL HISTORY      gland removed from neck   • SUBLINGUAL SALIVARY CYST EXCISION N/A 8/1/2017    Procedure: EXCISION OF LEFT  TONGUE LESION WITH CLOSURE;   Surgeon: Live Bolton MD;  Location: [facility];  Service:    • TUBAL ABDOMINAL LIGATION     • UPPER GASTROINTESTINAL ENDOSCOPY  2013   • UPPER GASTROINTESTINAL ENDOSCOPY  10/17/2018   • UPPER GASTROINTESTINAL ENDOSCOPY  2021     Family History   Problem Relation Age of Onset   • Cancer Other    • Diabetes Other    • Heart disease Other    • Hypertension Mother    • Cancer Mother    • Diabetes Mother    • Hypertension Father    • Heart attack Father    • Cancer Father    • Heart disease Father    • Thyroid disease Maternal Aunt    • Cancer Maternal Aunt    • No Known Problems Sister    • No Known Problems Brother    • Cancer Maternal Grandmother    • No Known Problems Sister      OB History        1    Para   1    Term                AB        Living   1       SAB        IAB        Ectopic        Molar        Multiple        Live Births                  Allergies   Allergen Reactions   • Morphine GI Intolerance   • Tape Rash     Patient has rash/blishers on site after tape   • Other      Pt states that taking steroids either in pill or injection form make her have blisters in her mouth and she feels like she is on fire on the inside/ has hx of c diff and possible MRSA     • Corticosteroids Rash   • Kenalog  [Triamcinolone Acetonide] Rash   • Sulfa Antibiotics Rash     Sulfa (Sulfonamide Antibiotics)     Social History     Socioeconomic History   • Marital status:    Tobacco Use   • Smoking status: Former Smoker     Packs/day: 2.00     Years: 15.00     Pack years: 30.00     Types: Cigarettes     Quit date:      Years since quittin.5   • Smokeless tobacco: Never Used   Vaping Use   • Vaping Use: Never used   Substance and Sexual Activity   • Alcohol use: No   • Drug use: Never   • Sexual activity: Defer     Birth control/protection: Surgical     Comment: Marital Status: .  Hysterectomy.     Current Medications:  Prior to Admission medications    Medication Sig Start  Date End Date Taking? Authorizing Provider   acetaminophen (TYLENOL) 325 MG tablet Take 650 mg by mouth Every 6 (Six) Hours As Needed for Mild Pain .   Yes Promise Tovar MD   bisacodyl (DULCOLAX) 10 MG suppository Insert 10 mg into the rectum As Needed for Constipation.   Yes Promise Tovar MD   cetirizine (zyrTEC) 10 MG tablet Take 1 tablet by mouth Daily. 7/7/22  Yes Omar Chadwick MD   Docusate Sodium 100 MG capsule Take 100 mg by mouth 2 (Two) Times a Day.   Yes Promise Tovar MD   DULoxetine (CYMBALTA) 20 MG capsule Take 20 mg by mouth Daily.   Yes Proimse Tovar MD   folic acid (FOLVITE) 1 MG tablet Take 1 tablet by mouth on Monday, Wednesday and Friday 12/1/21  Yes Marija Wharton APRN   furosemide (LASIX) 20 MG tablet Take 20 mg by mouth 2 (Two) Times a Day.   Yes Promise Tovar MD   gabapentin (NEURONTIN) 800 MG tablet Take 800 mg by mouth 3 (Three) Times a Day. 10/19/21  Yes Promise Tovar MD   Naloxegol Oxalate (Movantik) 25 MG tablet Take 1 tablet by mouth Daily. 6/20/22  Yes Hua Westfall MD   Omega-3 Fatty Acids (fish oil) 1000 MG capsule capsule Take  by mouth 2 (Two) Times a Day With Meals.   Yes Promise Tovar MD   omeprazole (priLOSEC) 40 MG capsule Take 40 mg by mouth Daily. 11/24/21  Yes Promise Tovar MD   ondansetron (ZOFRAN) 8 MG tablet Take 1 tablet by mouth Every 8 (Eight) Hours As Needed for Nausea or Vomiting.  Patient taking differently: Take 4 mg by mouth Every 8 (Eight) Hours As Needed for Nausea or Vomiting. 1/26/21  Yes Blaine Perales PA-C   oxyCODONE (ROXICODONE) 10 MG tablet Take 10 mg by mouth. 5/26/22  Yes Promise Tovar MD   polyethylene glycol (MIRALAX) 17 GM/SCOOP powder Take 17 g by mouth Daily.   Yes Promise Tovar MD   potassium chloride (MICRO-K) 10 MEQ CR capsule Take 2 capsules by mouth Daily.  Patient taking differently: Take 10 mEq by mouth 2 (Two) Times a Day. 9/24/21  Yes Fidencio Mathews,  "MD   spironolactone (ALDACTONE) 25 MG tablet TAKE 1 TABLET BY MOUTH ONCE DAILY  Patient taking differently: 25 mg 2 (Two) Times a Day. 6/11/19  Yes Maureen Cardoso APRN   sucralfate (Carafate) 1 g tablet Take 1 tablet by mouth 4 (Four) Times a Day. 10/19/21  Yes Blaine Perales PA-C   traZODone (DESYREL) 100 MG tablet TAKE 1 TABLET BY MOUTH ONCE DAILY AT NIGHT 5/2/22  Yes Blaine Perales PA-C   vitamin B-12 (CYANOCOBALAMIN) 1000 MCG tablet Take 1,000 mcg by mouth Daily.   Yes Provider, MD Promise   ondansetron ODT (ZOFRAN-ODT) 4 MG disintegrating tablet DISSOLVE 1 TABLET IN MOUTH EVERY 8 HOURS AS NEEDED FOR NAUSEA AND VOMITING 6/20/22 7/26/22 Yes Blaine Perales PA-C     Review of Systems  Review of Systems       Objective    /85   Pulse 79   Ht 162.6 cm (64\")   Wt 103 kg (226 lb)   LMP  (LMP Unknown)   BMI 38.79 kg/m²   Physical Exam  Vitals and nursing note reviewed.   Constitutional:       General: She is not in acute distress.     Appearance: She is well-developed. She is obese. She is ill-appearing.   HENT:      Head: Normocephalic and atraumatic.   Eyes:      Pupils: Pupils are equal, round, and reactive to light.   Cardiovascular:      Rate and Rhythm: Normal rate and regular rhythm.      Heart sounds: Normal heart sounds.   Pulmonary:      Effort: Pulmonary effort is normal.      Breath sounds: Normal breath sounds.   Abdominal:      General: Bowel sounds are normal. There is no distension or abdominal bruit.      Palpations: Abdomen is soft. Abdomen is not rigid. There is no shifting dullness or mass.      Tenderness: There is abdominal tenderness. There is no guarding or rebound.      Hernia: No hernia is present. There is no hernia in the ventral area.   Musculoskeletal:         General: Swelling and tenderness present. Normal range of motion.      Cervical back: Normal range of motion.      Right lower leg: Edema present.      Left lower leg: Edema present.   Skin:     General: " Skin is warm and dry.   Neurological:      Mental Status: She is alert and oriented to person, place, and time.   Psychiatric:         Behavior: Behavior normal.         Thought Content: Thought content normal.         Judgment: Judgment normal.       Assessment & Plan      1. End stage liver disease (HCC)    2. Cirrhosis of liver without ascites, unspecified hepatic cirrhosis type (HCC)    3. Chronic idiopathic constipation    4. MUSA (nonalcoholic steatohepatitis)    5. Generalized abdominal pain    .   Diagnoses and all orders for this visit:    1. End stage liver disease (HCC) (Primary)  -     AFP Tumor Marker; Future  -     Protime-INR; Future  -     Comprehensive Metabolic Panel; Future    2. Cirrhosis of liver without ascites, unspecified hepatic cirrhosis type (HCC)  -     AFP Tumor Marker; Future  -     Protime-INR; Future  -     Comprehensive Metabolic Panel; Future    3. Chronic idiopathic constipation  -     AFP Tumor Marker; Future  -     Protime-INR; Future  -     Comprehensive Metabolic Panel; Future    4. MUSA (nonalcoholic steatohepatitis)  -     AFP Tumor Marker; Future  -     Protime-INR; Future  -     Comprehensive Metabolic Panel; Future    5. Generalized abdominal pain  -     AFP Tumor Marker; Future  -     Protime-INR; Future  -     Comprehensive Metabolic Panel; Future        Orders placed during this encounter include:  Orders Placed This Encounter   Procedures   • AFP Tumor Marker     Due prior to follow up.     Standing Status:   Future     Standing Expiration Date:   10/7/2022     Order Specific Question:   Release to patient     Answer:   Immediate   • Protime-INR     Due prior to follow up.     Standing Status:   Future     Standing Expiration Date:   10/7/2022   • Comprehensive Metabolic Panel     Due prior to follow up.     Standing Status:   Future     Standing Expiration Date:   10/7/2022     Order Specific Question:   Release to patient     Answer:   Immediate       Medications  prescribed:  No orders of the defined types were placed in this encounter.    Discontinued Medications       Reason for Discontinue     ondansetron ODT (ZOFRAN-ODT) 4 MG disintegrating tablet    *Therapy completed        Requested Prescriptions      No prescriptions requested or ordered in this encounter       Review and/or summary of lab tests, radiology, procedures, medications. Review and summary of old records and obtaining of history. The risks and benefits of my recommendations, as well as other treatment options were discussed with the patient today. Questions were answered.    Follow-up: Return in about 2 months (around 9/26/2022), or if symptoms worsen or fail to improve, for lab prior.       * Surgery not found *      This document has been electronically signed by Blaine Perales PA-C on July 29, 2022 13:38 CDT      Results for orders placed or performed during the hospital encounter of 07/27/22   Doppler ankle brachial index single level CAR   Result Value Ref Range    Target HR (85%) 134 bpm    Max. Pred. HR (100%) 158 bpm    RIGHT DORSALIS PEDIS SYS      RIGHT POST TIBIAL SYS      RIGHT CARYN RATIO 1.17     LEFT DORSALIS PEDIS SYS      LEFT POST TIBIAL SYS      LEFT CARYN RATIO 1.15     Upper arterial right arm brachial sys max 149 mmHg    Upper arterial left arm brachial sys max 145 mmHg   Results for orders placed or performed during the hospital encounter of 07/27/22   Duplex venous lower extremity bilateral CAR   Result Value Ref Range    Target HR (85%) 134 bpm    Max. Pred. HR (100%) 158 bpm   Results for orders placed or performed in visit on 07/01/22   MUSA Fibrosure    Specimen: Blood   Result Value Ref Range    Fibrosis Score (References) 0.91 (H) 0.00 - 0.21    Fibrosis Stage (Reference) Comment     Steatosis Score (Reference) 0.55 (H) 0.00 - 0.30    Steatosis Grade (Reference) Comment     MUSA Score (Reference) 0.75 (H) 0.25    Musa Grade (Reference) Comment      Height: (Reference) 64 in    Weight: (Reference) 232 LBS    Alpha 2-Macroglobulins, Qn 336 (H) 110 - 276 mg/dL    Haptoglobin <10 (L) 37 - 355 mg/dL    Apolipoprotein A-1 101 (L) 116 - 209 mg/dL    Total Bilirubin 0.7 0.0 - 1.2 mg/dL    GGT 44 0 - 60 IU/L    ALT (SGPT) 45 (H) 0 - 40 IU/L    AST (SGOT) P5P (Reference) 75 (H) 0 - 40 IU/L    Cholesterol, Total (Reference) 122 100 - 199 mg/dL    Glucose, Serum (Reference) 106 (H) 65 - 99 mg/dL    Triglycerides 76 0 - 149 mg/dL    Interpretations: (Reference) Comment     Fibrosis Scoring: Comment     Steatosis Grading (Reference) Comment     Judge Scoring (Reference) Comment     Limitations: (Reference) Comment     Comment (Reference) Comment    CBC Auto Differential    Specimen: Blood   Result Value Ref Range    WBC 4.74 3.40 - 10.80 10*3/mm3    RBC 4.78 3.77 - 5.28 10*6/mm3    Hemoglobin 13.6 12.0 - 15.9 g/dL    Hematocrit 40.2 34.0 - 46.6 %    MCV 84.1 79.0 - 97.0 fL    MCH 28.5 26.6 - 33.0 pg    MCHC 33.8 31.5 - 35.7 g/dL    RDW 16.7 (H) 12.3 - 15.4 %    RDW-SD 51.0 37.0 - 54.0 fl    MPV      Platelets 28 (C) 140 - 450 10*3/mm3   Iron and TIBC    Specimen: Blood   Result Value Ref Range    Iron 73 37 - 145 mcg/dL    Iron Saturation 20 20 - 50 %    Transferrin 248 200 - 360 mg/dL    TIBC 370 298 - 536 mcg/dL   Manual Differential    Specimen: Blood   Result Value Ref Range    Neutrophil % 63.0 42.7 - 76.0 %    Lymphocyte % 20.0 19.6 - 45.3 %    Monocyte % 8.0 5.0 - 12.0 %    Eosinophil % 1.0 0.3 - 6.2 %    Bands %  7.0 (H) 0.0 - 5.0 %    Atypical Lymphocyte % 1.0 0.0 - 5.0 %    Neutrophils Absolute 3.32 1.70 - 7.00 10*3/mm3    Lymphocytes Absolute 1.00 0.70 - 3.10 10*3/mm3    Monocytes Absolute 0.38 0.10 - 0.90 10*3/mm3    Eosinophils Absolute 0.05 0.00 - 0.40 10*3/mm3    Anisocytosis Mod/2+ None Seen    WBC Morphology Normal Normal    Platelet Estimate Decreased Normal    Clumped Platelets Present None Seen   Protime-INR    Specimen: Blood   Result Value Ref Range     Protime 17.2 (H) 11.1 - 15.3 Seconds    INR 1.42 (H) 0.80 - 1.20   Folate    Specimen: Blood   Result Value Ref Range    Folate 9.32 4.78 - 24.20 ng/mL   Ferritin    Specimen: Blood   Result Value Ref Range    Ferritin 156.40 (H) 13.00 - 150.00 ng/mL   Vitamin B12    Specimen: Blood   Result Value Ref Range    Vitamin B-12 1,290 (H) 211 - 946 pg/mL   Results for orders placed or performed in visit on 05/26/22   Vitamin B12 & Folate    Specimen: Blood   Result Value Ref Range    Folate 14.50 4.78 - 24.20 ng/mL    Vitamin B-12 1,017 (H) 211 - 946 pg/mL   CBC Auto Differential    Specimen: Blood   Result Value Ref Range    WBC 2.93 (L) 3.40 - 10.80 10*3/mm3    RBC 4.70 3.77 - 5.28 10*6/mm3    Hemoglobin 13.6 12.0 - 15.9 g/dL    Hematocrit 39.3 34.0 - 46.6 %    MCV 83.6 79.0 - 97.0 fL    MCH 28.9 26.6 - 33.0 pg    MCHC 34.6 31.5 - 35.7 g/dL    RDW 15.4 12.3 - 15.4 %    RDW-SD 46.9 37.0 - 54.0 fl    MPV 10.8 6.0 - 12.0 fL    Platelets 55 (L) 140 - 450 10*3/mm3    Neutrophil % 66.9 42.7 - 76.0 %    Lymphocyte % 18.1 (L) 19.6 - 45.3 %    Monocyte % 12.3 (H) 5.0 - 12.0 %    Eosinophil % 1.7 0.3 - 6.2 %    Basophil % 0.3 0.0 - 1.5 %    Immature Grans % 0.7 (H) 0.0 - 0.5 %    Neutrophils, Absolute 1.96 1.70 - 7.00 10*3/mm3    Lymphocytes, Absolute 0.53 (L) 0.70 - 3.10 10*3/mm3    Monocytes, Absolute 0.36 0.10 - 0.90 10*3/mm3    Eosinophils, Absolute 0.05 0.00 - 0.40 10*3/mm3    Basophils, Absolute 0.01 0.00 - 0.20 10*3/mm3    Immature Grans, Absolute 0.02 0.00 - 0.05 10*3/mm3    nRBC 0.0 0.0 - 0.2 /100 WBC   Iron Profile    Specimen: Blood   Result Value Ref Range    Iron 69 37 - 145 mcg/dL    Iron Saturation 18 (L) 20 - 50 %    Transferrin 258 200 - 360 mg/dL    TIBC 384 298 - 536 mcg/dL     *Note: Due to a large number of results and/or encounters for the requested time period, some results have not been displayed. A complete set of results can be found in Results Review.

## 2022-07-26 NOTE — TELEPHONE ENCOUNTER
Incoming Refill Request      Medication requested (name and dose): ESITALOPRAM 10 MG, OMERPRAZOLE DR 40 MG, METOCLOPRAMIDE 10MG 4X A DAY    Pharmacy where request should be sent: [facility] [address]     Additional details provided by patient: NONE    Best call back number: [phone]     Does the patient have less than a 3 day supply:  [x] Yes  [] No    Oenlia Turner  07/26/22, 11:25 CDT

## 2022-07-27 ENCOUNTER — OFFICE VISIT (OUTPATIENT)
Dept: WOUND CARE | Facility: HOSPITAL | Age: 62
End: 2022-07-27

## 2022-07-27 RX ORDER — ESCITALOPRAM OXALATE 10 MG/1
10 TABLET ORAL DAILY
Qty: 60 TABLET | Refills: 0 | Status: SHIPPED | OUTPATIENT
Start: 2022-07-27 | End: 2022-08-09 | Stop reason: ALTCHOICE

## 2022-07-27 RX ORDER — METOCLOPRAMIDE 10 MG/1
10 TABLET ORAL
Qty: 120 TABLET | Refills: 1 | Status: SHIPPED | OUTPATIENT
Start: 2022-07-27 | End: 2023-01-11

## 2022-07-27 RX ORDER — OMEPRAZOLE 40 MG/1
40 CAPSULE, DELAYED RELEASE ORAL DAILY
Qty: 60 CAPSULE | Refills: 0 | Status: SHIPPED | OUTPATIENT
Start: 2022-07-27 | End: 2022-09-26

## 2022-08-03 ENCOUNTER — OFFICE VISIT (OUTPATIENT)
Dept: WOUND CARE | Facility: HOSPITAL | Age: 62
End: 2022-08-03

## 2022-08-09 ENCOUNTER — OFFICE VISIT (OUTPATIENT)
Dept: FAMILY MEDICINE CLINIC | Facility: CLINIC | Age: 62
End: 2022-08-09

## 2022-08-09 ENCOUNTER — LAB (OUTPATIENT)
Dept: LAB | Facility: HOSPITAL | Age: 62
End: 2022-08-09

## 2022-08-09 VITALS
SYSTOLIC BLOOD PRESSURE: 120 MMHG | DIASTOLIC BLOOD PRESSURE: 76 MMHG | WEIGHT: 223 LBS | HEIGHT: 64 IN | BODY MASS INDEX: 38.07 KG/M2 | HEART RATE: 85 BPM | TEMPERATURE: 97.3 F | OXYGEN SATURATION: 96 %

## 2022-08-09 DIAGNOSIS — R30.0 BURNING WITH URINATION: Primary | ICD-10-CM

## 2022-08-09 DIAGNOSIS — I89.0 LYMPHEDEMA OF BOTH LOWER EXTREMITIES: ICD-10-CM

## 2022-08-09 LAB
BILIRUB BLD-MCNC: ABNORMAL MG/DL
CLARITY, POC: CLEAR
COLOR UR: ABNORMAL
EXPIRATION DATE: ABNORMAL
GLUCOSE UR STRIP-MCNC: NEGATIVE MG/DL
KETONES UR QL: NEGATIVE
LEUKOCYTE EST, POC: NEGATIVE
Lab: ABNORMAL
NITRITE UR-MCNC: NEGATIVE MG/ML
PH UR: 6.5 [PH] (ref 5–8)
PROT UR STRIP-MCNC: ABNORMAL MG/DL
RBC # UR STRIP: NEGATIVE /UL
SP GR UR: 1.01 (ref 1–1.03)
UROBILINOGEN UR QL: ABNORMAL

## 2022-08-09 PROCEDURE — 99213 OFFICE O/P EST LOW 20 MIN: CPT | Performed by: STUDENT IN AN ORGANIZED HEALTH CARE EDUCATION/TRAINING PROGRAM

## 2022-08-09 PROCEDURE — 87086 URINE CULTURE/COLONY COUNT: CPT | Performed by: STUDENT IN AN ORGANIZED HEALTH CARE EDUCATION/TRAINING PROGRAM

## 2022-08-09 RX ORDER — OXYCODONE AND ACETAMINOPHEN 10; 325 MG/1; MG/1
1 TABLET ORAL EVERY 6 HOURS PRN
COMMUNITY
End: 2022-10-28 | Stop reason: SDUPTHER

## 2022-08-09 RX ORDER — TORSEMIDE 20 MG/1
1 TABLET ORAL DAILY
COMMUNITY
Start: 2022-07-26 | End: 2022-08-09 | Stop reason: ALTCHOICE

## 2022-08-09 RX ORDER — CYCLOBENZAPRINE HCL 10 MG
10 TABLET ORAL 2 TIMES DAILY PRN
COMMUNITY
Start: 2022-08-03

## 2022-08-09 NOTE — PROGRESS NOTES
Subjective:  Amira Shannon is a 61 y.o. female who presents for     Dysuria; states that has been an issue for several weeks. Comes and goes, associated with foul smelling urine.  Denies any flank pain, hematuria, abdominal pain, fever, chills, nausea, vomiting, diarrhea, groin pain.  States is uncertain if she has a UTI or not, difficult to tell due to her chronic back pain.    Lymphedema; States currently being seen by wound care, using compression therapy. Denied any fever, chills, nausea, vomiting. Is in the process of having compression device approved.  Denied any exacerbation of symptoms, happy current management.  Denied any chest pain, shortness of breath, headaches, vision changes, numbness, tingling, weakness, orthopnea.    Patient Active Problem List   Diagnosis   • Bilateral foot pain   • Right hip pain   • Acute pain of both knees   • Plantar fasciitis, bilateral   • Primary osteoarthritis of left knee   • MUSA (nonalcoholic steatohepatitis)   • Tongue lesion   • Bilateral lower extremity edema   • Chronic pain of both knees   • Bilateral calcaneal spurs   • Swelling of both lower extremities   • Obesity with body mass index of 30.0-39.9   • Diastolic dysfunction   • Pancytopenia (HCC)   • Chronic fatigue   • Abdominal pain   • Other constipation   • GERD (gastroesophageal reflux disease)   • Depression   • Disease related peripheral neuropathy   • Epigastric pain   • Hyperlipidemia   • Restless leg syndrome   • CHAI on CPAP   • Carpal tunnel syndrome on left   • Carpal tunnel syndrome on right   • Bilateral wrist pain   • Numbness and tingling in both hands   • Elevated liver enzymes   • History of small bowel obstruction   • Cirrhosis of liver (HCC)   • Status post carpal tunnel release   • Bilateral ankle pain   • Lakesha's deformity of both heels   • Pain in joint, ankle and foot   • Primary osteoarthritis of both knees   • Low back pain   • Abdominal pain, generalized   • Chronic idiopathic  "constipation   • Hypertension   • Elevated serum creatinine   • Splenomegaly   • Chronic pain syndrome   • Lung nodule   • Hypertension   • Lab test negative for COVID-19 virus   • Thrombocytopenia (HCC)   • Iron deficiency   • Adverse effect of iron   • Encounter for assessment of peripherally inserted central venous catheter (PICC)   • Nausea and vomiting   • Weight loss, abnormal   • Gastroparesis   • E. coli UTI (urinary tract infection)   • Acute gastritis   • Hiatal hernia   • Hypomagnesemia   • Hypokalemia   • Gastro-esophageal reflux disease with esophagitis, without bleeding     Vitals:    Vitals:    08/09/22 0920   BP: 120/76   BP Location: Right arm   Patient Position: Sitting   Cuff Size: Large Adult   Pulse: 85   Temp: 97.3 °F (36.3 °C)   SpO2: 96%   Weight: 101 kg (223 lb)   Height: 162.6 cm (64\")     Body mass index is 38.28 kg/m².      Current Outpatient Medications:   •  acetaminophen (TYLENOL) 325 MG tablet, Take 650 mg by mouth Every 6 (Six) Hours As Needed for Mild Pain ., Disp: , Rfl:   •  bisacodyl (DULCOLAX) 10 MG suppository, Insert 10 mg into the rectum As Needed for Constipation., Disp: , Rfl:   •  cetirizine (zyrTEC) 10 MG tablet, Take 1 tablet by mouth Daily., Disp: 90 tablet, Rfl: 3  •  cyclobenzaprine (FLEXERIL) 10 MG tablet, Take 10 mg by mouth 2 (Two) Times a Day As Needed., Disp: , Rfl:   •  Docusate Sodium 100 MG capsule, Take 100 mg by mouth 2 (Two) Times a Day., Disp: , Rfl:   •  DULoxetine (CYMBALTA) 60 MG capsule, Take 60 mg by mouth Daily., Disp: , Rfl:   •  folic acid (FOLVITE) 1 MG tablet, Take 1 tablet by mouth on Monday, Wednesday and Friday, Disp: 36 tablet, Rfl: 1  •  furosemide (LASIX) 20 MG tablet, Take 20 mg by mouth 2 (Two) Times a Day., Disp: , Rfl:   •  gabapentin (NEURONTIN) 800 MG tablet, Take 800 mg by mouth 3 (Three) Times a Day., Disp: , Rfl:   •  metoclopramide (REGLAN) 10 MG tablet, Take 1 tablet by mouth 4 (Four) Times a Day Before Meals & at Bedtime., Disp: " 120 tablet, Rfl: 1  •  Naloxegol Oxalate (Movantik) 25 MG tablet, Take 1 tablet by mouth Daily., Disp: , Rfl:   •  Omega-3 Fatty Acids (fish oil) 1000 MG capsule capsule, Take  by mouth 2 (Two) Times a Day With Meals., Disp: , Rfl:   •  omeprazole (priLOSEC) 40 MG capsule, Take 1 capsule by mouth Daily., Disp: 60 capsule, Rfl: 0  •  ondansetron (ZOFRAN) 8 MG tablet, Take 1 tablet by mouth Every 8 (Eight) Hours As Needed for Nausea or Vomiting. (Patient taking differently: Take 4 mg by mouth Every 8 (Eight) Hours As Needed for Nausea or Vomiting.), Disp: 30 tablet, Rfl: 0  •  oxyCODONE-acetaminophen (PERCOCET)  MG per tablet, Take 1 tablet by mouth Every 6 (Six) Hours As Needed for Moderate Pain ., Disp: , Rfl:   •  polyethylene glycol (MIRALAX) 17 GM/SCOOP powder, Take 17 g by mouth Daily., Disp: , Rfl:   •  potassium chloride (MICRO-K) 10 MEQ CR capsule, Take 2 capsules by mouth Daily. (Patient taking differently: Take 10 mEq by mouth 2 (Two) Times a Day.), Disp: 30 capsule, Rfl: 1  •  spironolactone (ALDACTONE) 25 MG tablet, TAKE 1 TABLET BY MOUTH ONCE DAILY (Patient taking differently: 25 mg 2 (Two) Times a Day.), Disp: 30 tablet, Rfl: 5  •  sucralfate (Carafate) 1 g tablet, Take 1 tablet by mouth 4 (Four) Times a Day., Disp: 120 tablet, Rfl: 2  •  traZODone (DESYREL) 100 MG tablet, TAKE 1 TABLET BY MOUTH ONCE DAILY AT NIGHT, Disp: 30 tablet, Rfl: 2  •  vitamin B-12 (CYANOCOBALAMIN) 1000 MCG tablet, Take 1,000 mcg by mouth Daily., Disp: , Rfl:     Patient Active Problem List   Diagnosis   • Bilateral foot pain   • Right hip pain   • Acute pain of both knees   • Plantar fasciitis, bilateral   • Primary osteoarthritis of left knee   • MUSA (nonalcoholic steatohepatitis)   • Tongue lesion   • Bilateral lower extremity edema   • Chronic pain of both knees   • Bilateral calcaneal spurs   • Swelling of both lower extremities   • Obesity with body mass index of 30.0-39.9   • Diastolic dysfunction   • Pancytopenia  (HCC)   • Chronic fatigue   • Abdominal pain   • Other constipation   • GERD (gastroesophageal reflux disease)   • Depression   • Disease related peripheral neuropathy   • Epigastric pain   • Hyperlipidemia   • Restless leg syndrome   • CHAI on CPAP   • Carpal tunnel syndrome on left   • Carpal tunnel syndrome on right   • Bilateral wrist pain   • Numbness and tingling in both hands   • Elevated liver enzymes   • History of small bowel obstruction   • Cirrhosis of liver (HCC)   • Status post carpal tunnel release   • Bilateral ankle pain   • Lakesha's deformity of both heels   • Pain in joint, ankle and foot   • Primary osteoarthritis of both knees   • Low back pain   • Abdominal pain, generalized   • Chronic idiopathic constipation   • Hypertension   • Elevated serum creatinine   • Splenomegaly   • Chronic pain syndrome   • Lung nodule   • Hypertension   • Lab test negative for COVID-19 virus   • Thrombocytopenia (HCC)   • Iron deficiency   • Adverse effect of iron   • Encounter for assessment of peripherally inserted central venous catheter (PICC)   • Nausea and vomiting   • Weight loss, abnormal   • Gastroparesis   • E. coli UTI (urinary tract infection)   • Acute gastritis   • Hiatal hernia   • Hypomagnesemia   • Hypokalemia   • Gastro-esophageal reflux disease with esophagitis, without bleeding     Past Surgical History:   Procedure Laterality Date   • CARPAL TUNNEL RELEASE Left 2018    Procedure: CARPAL TUNNEL RELEASE - left;  Surgeon: Justus Lewis MD;  Location: [facility] OR;  Service: Orthopedics   • CARPAL TUNNEL RELEASE Right 2019    Procedure: carpal tunnel release right hand with local/monitored anesthesia care;  Surgeon: Justus Lewis MD;  Location: [facility] OR;  Service: Orthopedics   •  SECTION     • CHOLECYSTECTOMY     • COLONOSCOPY  2013   • COLONOSCOPY N/A 10/17/2018    Procedure: COLONOSCOPY;  Surgeon: Russell Del Rio MD;  Location: [facility] ENDOSCOPY;   Service: Gastroenterology   • COLONOSCOPY N/A 3/22/2021    Procedure: COLONOSCOPY;  Surgeon: Russell Del Rio MD;  Location: [facility] ENDOSCOPY;  Service: Gastroenterology;  Laterality: N/A;   • DIRECT LARYNGOSCOPY, ESOPHAGOSCOPY, BRONCHOSCOPY N/A 8/1/2017    Procedure: DIRECT LARYNGOSCOPY AND;  Surgeon: Live Bolton MD;  Location: [facility] OR;  Service:    • ENDOSCOPY  07/01/2013    Colon endoscopy 11432 (1) - Internal & external hemorrhoids found. Stool found.   • ENDOSCOPY  07/01/2013    EGD w/ tube 67752 (1) - Normal esophagus. Gastritis in stomach. Biopsy taken. Normal dudoenum. Biopsy taken.   • ENDOSCOPY N/A 10/17/2018    Procedure: ESOPHAGOGASTRODUODENOSCOPY--eval varices;  Surgeon: Russell Del Rio MD;  Location: [facility] ENDOSCOPY;  Service: Gastroenterology   • ENDOSCOPY N/A 3/22/2021    Procedure: ESOPHAGOGASTRODUODENOSCOPYURGNET;  Surgeon: Russell Del Rio MD;  Location: [facility] ENDOSCOPY;  Service: Gastroenterology;  Laterality: N/A;   • ENDOSCOPY N/A 9/16/2021    Procedure: ESOPHAGOGASTRODUODENOSCOPY;  Surgeon: Basia Saunders MD;  Location: [facility] ENDOSCOPY;  Service: Gastroenterology;  Laterality: N/A;   • FOOT SURGERY  02/26/2013    Foot/toes surgery procedure (1) - Arthroplasty of toes 4 and 5 of right foot.   • HERNIA REPAIR     • HERNIA REPAIR      hital   • HYSTERECTOMY     • LIVER BIOPSY     • NERVE BLOCK  07/25/2016    Injection for nerve block (1) - Lumbar medial branch block.   • OTHER SURGICAL HISTORY  12/03/2012    Inj(s) Tend-Sheath, Ligament, Single 20550 (1) - [facility]    • OTHER SURGICAL HISTORY  06/24/2013    Small Joint Injection/Aspiration 20600 (2) - [facility] ([facility])    • OTHER SURGICAL HISTORY  2011    bowel obstruction x2   • OTHER SURGICAL HISTORY      gland removed from neck   • SUBLINGUAL SALIVARY CYST EXCISION N/A 8/1/2017    Procedure: EXCISION OF LEFT  TONGUE LESION WITH CLOSURE;  Surgeon: Live Bolton MD;  Location: [facility] OR;  Service:     • TUBAL ABDOMINAL LIGATION     • UPPER GASTROINTESTINAL ENDOSCOPY  2013   • UPPER GASTROINTESTINAL ENDOSCOPY  10/17/2018   • UPPER GASTROINTESTINAL ENDOSCOPY  2021     Social History     Socioeconomic History   • Marital status:    Tobacco Use   • Smoking status: Former Smoker     Packs/day: 2.00     Years: 15.00     Pack years: 30.00     Types: Cigarettes     Quit date:      Years since quittin.6   • Smokeless tobacco: Never Used   Vaping Use   • Vaping Use: Never used   Substance and Sexual Activity   • Alcohol use: No   • Drug use: Never   • Sexual activity: Defer     Birth control/protection: Surgical     Comment: Marital Status: .  Hysterectomy.     Family History   Problem Relation Age of Onset   • Cancer Other    • Diabetes Other    • Heart disease Other    • Hypertension Mother    • Cancer Mother    • Diabetes Mother    • Hypertension Father    • Heart attack Father    • Cancer Father    • Heart disease Father    • Thyroid disease Maternal Aunt    • Cancer Maternal Aunt    • No Known Problems Sister    • No Known Problems Brother    • Cancer Maternal Grandmother    • No Known Problems Sister      Hospital Outpatient Visit on 2022   Component Date Value Ref Range Status   • Target HR (85%) 2022 134  bpm Final   • Max. Pred. HR (100%) 2022 158  bpm Final   • RIGHT DORSALIS PEDIS SYS MAX 2022 141   Final   • RIGHT POST TIBIAL SYS MAX 2022 174   Final   • RIGHT CARYN RATIO 2022 1.17   Final   • LEFT DORSALIS PEDIS SYS MAX 2022 169   Final   • LEFT POST TIBIAL SYS MAX 2022 171   Final   • LEFT CARYN RATIO 2022 1.15   Final   • Upper arterial right arm brachial * 2022 149  mmHg Final   • Upper arterial left arm brachial s* 2022 145  mmHg Final   Hospital Outpatient Visit on 2022   Component Date Value Ref Range Status   • Target HR (85%) 2022 134  bpm Final   • Max. Pred. HR (100%) 2022 158  bpm  Final   Lab on 07/01/2022   Component Date Value Ref Range Status   • Fibrosis Score (References) 07/01/2022 0.91 (A) 0.00 - 0.21 Final   • Fibrosis Stage (Reference) 07/01/2022 Comment   Final                         F4 - Cirrhosis   • Steatosis Score (Reference) 07/01/2022 0.55 (A) 0.00 - 0.30 Final   • Steatosis Grade (Reference) 07/01/2022 Comment   Final           S1 - S2  Minimal Steatosis - Moderate Steatosis   • MUSA Score (Reference) 07/01/2022 0.75 (A) 0.25 Final   • Musa Grade (Reference) 07/01/2022 Comment   Final                           N2 - MUSA   • Height: (Reference) 07/01/2022 64  in Final   • Weight: (Reference) 07/01/2022 232  LBS Final   • Alpha 2-Macroglobulins, Qn 07/01/2022 336 (A) 110 - 276 mg/dL Final   • Haptoglobin 07/01/2022 <10 (A) 37 - 355 mg/dL Final   • Apolipoprotein A-1 07/01/2022 101 (A) 116 - 209 mg/dL Final   • Total Bilirubin 07/01/2022 0.7  0.0 - 1.2 mg/dL Final   • GGT 07/01/2022 44  0 - 60 IU/L Final   • ALT (SGPT) 07/01/2022 45 (A) 0 - 40 IU/L Final   • AST (SGOT) P5P (Reference) 07/01/2022 75 (A) 0 - 40 IU/L Final   • Cholesterol, Total (Reference) 07/01/2022 122  100 - 199 mg/dL Final   • Glucose, Serum (Reference) 07/01/2022 106 (A) 65 - 99 mg/dL Final   • Triglycerides 07/01/2022 76  0 - 149 mg/dL Final   • Interpretations: (Reference) 07/01/2022 Comment   Final    Comment: Quantitative results of 10 biochemicals in combination with  age, gender, height, and weight, are analyzed using a  computational algorithm to provide a quantitative surrogate  marker (0.0-1.0) of liver fibrosis (Metavir F0-F4), hepatic  steatosis (0.0-1.0, S0-S3), and Non-Alcoholic Steato-  Hepatitis (MUSA) (0.0-0.75, N0-N2). The absence of steatosis  (S<0.38) precludes the diagnosis of MUSA.  Fibrosis marker:  In a study of 171 Non-Alcoholic Fatty  Liver Disease (NAFLD) patients where 23% had significant  NAFLD fibrosis (Metavir F2-F4) and 11% had cirrhosis by  liver biopsy, a fibrosis result of  >0.3 yielded a  sensitivity of 83% and a specificity of 78% for the  detection of significant fibrosis(1).  Steatosis Marker:  In a population of 744 patients (583 HCV,  18 HBV, 69 NAFLD, and 74 alcoholic disease patients), where  36% had significant steatosis (>5%) on a liver biopsy, a  steatosis score >0.5 had a sensitivity of 71% and a  specificity of 72% for identification of                            significant  steatosis(2).  MUSA marker:  In a population of 257 NAFLD patients, where  62% had at least some MUSA by liver biopsy, a prediction of  MUSA had a sensitivity of 88% for identifying MUSA and a  specificity of 50%(3).   • Fibrosis Scorin2022 Comment   Final         <=0.21 = Stage F0 - No fibrosis  0.21 - 0.27 = Stage F0 - F1  0.27 - 0.31 = Stage F1 - Portal fibrosis  0.31 - 0.48 = Stage F1 - F2  0.48 - 0.58 = Stage F2 - Bridging fibrosis with few septa  0.58 - 0.72 = Stage F3 - Bridging fibrosis with many septa  0.72 - 0.74 = Stage F3 - F4        >0.74 = Stage F4 - Cirrhosis   • Steatosis Grading (Reference) 2022 Comment   Final          < 0.30 = S0 - No Steatosis  0.30 to 0.38 = S0 - S1  0.38 to 0.48 = S1 - Minimal Steatosis  0.48 to 0.57 = S1 - S2  0.57 to 0.67 = S2 - Moderate Steatosis  0.67 to 0.69 = S2 - S3        > 0.69 = S3 - Marked or Severe Steatosis   • Musa Scoring (Reference) 2022 Comment   Final    0.25 = N0 - Not MUSA  0.50 = N1 - Borderline or probable MUSA  0.75 = N2 - MUSA   • Limitations: (Reference) 2022 Comment   Final    MUSA FibroSure is recommended for patients with suspected non-  alcoholic fatty liver disease.  It is not recommended for patients  with other liver diseases.  It is also not recommended in patients  with Gilbert Disease, acute hemolysis, acute viral hepatitis, drug  induced hepatitis, genetic liver disease, autoimmune hepatitis and/or  extra-hepatic cholestasis.  Any of these clinical situations may lead  to inaccurate quantitative  predictions of fibrosis.   • Comment (Reference) 07/01/2022 Comment   Final    This test was developed and its performance characteristics determined  by [facility].  It has not been cleared or approved by the Food and Drug  Administration.  The FDA has determined that such clearance or  approval is not necessary.  For questions regarding this report please contact  customer service at [phone].  References:  1. Yunier QUICK. et al. Diagnostic Value of Biochemical Markers     (FibroTest) for the prediction of Liver Fibrosis in     patients with Non-Alcoholic Fatty Liver Disease. [facility] 2006; 6:6.  2. DAVIS Quinn. et al. The Diagnostic Value of Biomarkers     (Steato Test) for the Prediction of Liver Steatosis.     Comparative Hepatol. 2005; 4:10.  3. DAVIS Quinn, Chrissy Faye, et al. Diagnostic value     of biochemical markers (MUSA TEST) for the prediction of     non alcohol steato hepatitis in patients with non-     alcoholic fatty liver disease. [facility] 2006;     6:34 doi:10.1186/2083-027E-6-34.   • Protime 07/01/2022 17.2 (A) 11.1 - 15.3 Seconds Final   • INR 07/01/2022 1.42 (A) 0.80 - 1.20 Final   • Iron 07/01/2022 73  37 - 145 mcg/dL Final   • Iron Saturation 07/01/2022 20  20 - 50 % Final   • Transferrin 07/01/2022 248  200 - 360 mg/dL Final   • TIBC 07/01/2022 370  298 - 536 mcg/dL Final   • Ferritin 07/01/2022 156.40 (A) 13.00 - 150.00 ng/mL Final   • Folate 07/01/2022 9.32  4.78 - 24.20 ng/mL Final   • Vitamin B-12 07/01/2022 1,290 (A) 211 - 946 pg/mL Final   • WBC 07/01/2022 4.74  3.40 - 10.80 10*3/mm3 Final   • RBC 07/01/2022 4.78  3.77 - 5.28 10*6/mm3 Final   • Hemoglobin 07/01/2022 13.6  12.0 - 15.9 g/dL Final   • Hematocrit 07/01/2022 40.2  34.0 - 46.6 % Final   • MCV 07/01/2022 84.1  79.0 - 97.0 fL Final   • MCH 07/01/2022 28.5  26.6 - 33.0 pg Final   • MCHC 07/01/2022 33.8  31.5 - 35.7 g/dL Final   • RDW 07/01/2022 16.7 (A) 12.3 - 15.4 % Final   • RDW-SD 07/01/2022  51.0  37.0 - 54.0 fl Final   • MPV 07/01/2022    Final    Instrument unable to calculate MPV manual diff to follow   • Platelets 07/01/2022 28 (A) 140 - 450 10*3/mm3 Final   • Neutrophil % 07/01/2022 63.0  42.7 - 76.0 % Final   • Lymphocyte % 07/01/2022 20.0  19.6 - 45.3 % Final   • Monocyte % 07/01/2022 8.0  5.0 - 12.0 % Final   • Eosinophil % 07/01/2022 1.0  0.3 - 6.2 % Final   • Bands %  07/01/2022 7.0 (A) 0.0 - 5.0 % Final   • Atypical Lymphocyte % 07/01/2022 1.0  0.0 - 5.0 % Final   • Neutrophils Absolute 07/01/2022 3.32  1.70 - 7.00 10*3/mm3 Final   • Lymphocytes Absolute 07/01/2022 1.00  0.70 - 3.10 10*3/mm3 Final   • Monocytes Absolute 07/01/2022 0.38  0.10 - 0.90 10*3/mm3 Final   • Eosinophils Absolute 07/01/2022 0.05  0.00 - 0.40 10*3/mm3 Final   • Anisocytosis 07/01/2022 Mod/2+  None Seen Final   • WBC Morphology 07/01/2022 Normal  Normal Final   • Platelet Estimate 07/01/2022 Decreased  Normal Final   • Clumped Platelets 07/01/2022 Present  None Seen Final    INTERPRET PLT CT WITH CAUTION;  MANY PLT CLUMPS APPEARING ON SLIDE SCAN MAY MAKE CT READ LOWER THAN IT ACTUALLY IS   Lab on 05/26/2022   Component Date Value Ref Range Status   • Ferritin 05/26/2022 124.60  13.00 - 150.00 ng/mL Final   • Iron 05/26/2022 69  37 - 145 mcg/dL Final   • Iron Saturation 05/26/2022 18 (A) 20 - 50 % Final   • Transferrin 05/26/2022 258  200 - 360 mg/dL Final   • TIBC 05/26/2022 384  298 - 536 mcg/dL Final   • Folate 05/26/2022 14.50  4.78 - 24.20 ng/mL Final   • Vitamin B-12 05/26/2022 1,017 (A) 211 - 946 pg/mL Final   • WBC 05/26/2022 2.93 (A) 3.40 - 10.80 10*3/mm3 Final   • RBC 05/26/2022 4.70  3.77 - 5.28 10*6/mm3 Final   • Hemoglobin 05/26/2022 13.6  12.0 - 15.9 g/dL Final   • Hematocrit 05/26/2022 39.3  34.0 - 46.6 % Final   • MCV 05/26/2022 83.6  79.0 - 97.0 fL Final   • MCH 05/26/2022 28.9  26.6 - 33.0 pg Final   • MCHC 05/26/2022 34.6  31.5 - 35.7 g/dL Final   • RDW 05/26/2022 15.4  12.3 - 15.4 % Final   • RDW-SD  05/26/2022 46.9  37.0 - 54.0 fl Final   • MPV 05/26/2022 10.8  6.0 - 12.0 fL Final   • Platelets 05/26/2022 55 (A) 140 - 450 10*3/mm3 Final   • Neutrophil % 05/26/2022 66.9  42.7 - 76.0 % Final   • Lymphocyte % 05/26/2022 18.1 (A) 19.6 - 45.3 % Final   • Monocyte % 05/26/2022 12.3 (A) 5.0 - 12.0 % Final   • Eosinophil % 05/26/2022 1.7  0.3 - 6.2 % Final   • Basophil % 05/26/2022 0.3  0.0 - 1.5 % Final   • Immature Grans % 05/26/2022 0.7 (A) 0.0 - 0.5 % Final   • Neutrophils, Absolute 05/26/2022 1.96  1.70 - 7.00 10*3/mm3 Final   • Lymphocytes, Absolute 05/26/2022 0.53 (A) 0.70 - 3.10 10*3/mm3 Final   • Monocytes, Absolute 05/26/2022 0.36  0.10 - 0.90 10*3/mm3 Final   • Eosinophils, Absolute 05/26/2022 0.05  0.00 - 0.40 10*3/mm3 Final   • Basophils, Absolute 05/26/2022 0.01  0.00 - 0.20 10*3/mm3 Final   • Immature Grans, Absolute 05/26/2022 0.02  0.00 - 0.05 10*3/mm3 Final   • nRBC 05/26/2022 0.0  0.0 - 0.2 /100 WBC Final   Admission on 05/17/2022, Discharged on 05/17/2022   Component Date Value Ref Range Status   • COVID19 05/17/2022 Not Detected  Not Detected - Ref. Range Final   • Influenza A PCR 05/17/2022 Not Detected  Not Detected Final   • Influenza B PCR 05/17/2022 Not Detected  Not Detected Final   • Lipase 05/17/2022 33  13 - 60 U/L Final   • Glucose 05/17/2022 102 (A) 65 - 99 mg/dL Final   • BUN 05/17/2022 8  8 - 23 mg/dL Final   • Creatinine 05/17/2022 1.11 (A) 0.57 - 1.00 mg/dL Final   • Sodium 05/17/2022 141  136 - 145 mmol/L Final   • Potassium 05/17/2022 4.1  3.5 - 5.2 mmol/L Final   • Chloride 05/17/2022 105  98 - 107 mmol/L Final   • CO2 05/17/2022 27.0  22.0 - 29.0 mmol/L Final   • Calcium 05/17/2022 9.2  8.6 - 10.5 mg/dL Final   • Total Protein 05/17/2022 7.0  6.0 - 8.5 g/dL Final   • Albumin 05/17/2022 3.70  3.50 - 5.20 g/dL Final   • ALT (SGPT) 05/17/2022 25  1 - 33 U/L Final   • AST (SGOT) 05/17/2022 37 (A) 1 - 32 U/L Final   • Alkaline Phosphatase 05/17/2022 149 (A) 39 - 117 U/L Final   •  Total Bilirubin 05/17/2022 1.1  0.0 - 1.2 mg/dL Final   • Globulin 05/17/2022 3.3  gm/dL Final   • A/G Ratio 05/17/2022 1.1  g/dL Final   • BUN/Creatinine Ratio 05/17/2022 7.2  7.0 - 25.0 Final   • Anion Gap 05/17/2022 9.0  5.0 - 15.0 mmol/L Final   • eGFR 05/17/2022 56.3 (A) >60.0 mL/min/1.73 Final    National Kidney Foundation and American Society of Nephrology (ASN) Task Force recommended calculation based on the Chronic Kidney Disease Epidemiology Collaboration (CKD-EPI) equation refit without adjustment for race.   • WBC 05/17/2022 3.90  3.40 - 10.80 10*3/mm3 Final   • RBC 05/17/2022 5.00  3.77 - 5.28 10*6/mm3 Final   • Hemoglobin 05/17/2022 14.4  12.0 - 15.9 g/dL Final   • Hematocrit 05/17/2022 42.3  34.0 - 46.6 % Final   • MCV 05/17/2022 84.6  79.0 - 97.0 fL Final   • MCH 05/17/2022 28.8  26.6 - 33.0 pg Final   • MCHC 05/17/2022 34.0  31.5 - 35.7 g/dL Final   • RDW 05/17/2022 15.5 (A) 12.3 - 15.4 % Final   • RDW-SD 05/17/2022 47.1  37.0 - 54.0 fl Final   • MPV 05/17/2022 11.0  6.0 - 12.0 fL Final   • Platelets 05/17/2022 67 (A) 140 - 450 10*3/mm3 Final   • Neutrophil % 05/17/2022 68.5  42.7 - 76.0 % Final   • Lymphocyte % 05/17/2022 23.1  19.6 - 45.3 % Final   • Monocyte % 05/17/2022 5.1  5.0 - 12.0 % Final   • Eosinophil % 05/17/2022 2.3  0.3 - 6.2 % Final   • Basophil % 05/17/2022 0.5  0.0 - 1.5 % Final   • Immature Grans % 05/17/2022 0.5  0.0 - 0.5 % Final   • Neutrophils, Absolute 05/17/2022 2.67  1.70 - 7.00 10*3/mm3 Final   • Lymphocytes, Absolute 05/17/2022 0.90  0.70 - 3.10 10*3/mm3 Final   • Monocytes, Absolute 05/17/2022 0.20  0.10 - 0.90 10*3/mm3 Final   • Eosinophils, Absolute 05/17/2022 0.09  0.00 - 0.40 10*3/mm3 Final   • Basophils, Absolute 05/17/2022 0.02  0.00 - 0.20 10*3/mm3 Final   • Immature Grans, Absolute 05/17/2022 0.02  0.00 - 0.05 10*3/mm3 Final   • nRBC 05/17/2022 0.0  0.0 - 0.2 /100 WBC Final   • proBNP 05/17/2022 209.6  0.0 - 900.0 pg/mL Final   • D-Dimer, Quantitative 05/17/2022  894 (A) 0 - 470 ng/mL (FEU) Final   • Extra Tube 05/17/2022 Hold for add-ons.   Final    Auto resulted.   • Extra Tube 05/17/2022 Hold for add-ons.   Final    Auto resulted.   • Anisocytosis 05/17/2022 Slight/1+  None Seen Final   • WBC Morphology 05/17/2022 Normal  Normal Final   • Platelet Estimate 05/17/2022 Decreased  Normal Final   Lab on 03/02/2022   Component Date Value Ref Range Status   • Ammonia 03/02/2022 <10 (A) 11 - 51 umol/L Final   • Glucose 03/02/2022 90  65 - 99 mg/dL Final   • BUN 03/02/2022 9  8 - 23 mg/dL Final   • Creatinine 03/02/2022 0.92  0.57 - 1.00 mg/dL Final   • Sodium 03/02/2022 139  136 - 145 mmol/L Final   • Potassium 03/02/2022 4.2  3.5 - 5.2 mmol/L Final    Slight hemolysis detected by analyzer. Results may be affected.   • Chloride 03/02/2022 105  98 - 107 mmol/L Final   • CO2 03/02/2022 24.2  22.0 - 29.0 mmol/L Final   • Calcium 03/02/2022 8.9  8.6 - 10.5 mg/dL Final   • Total Protein 03/02/2022 6.5  6.0 - 8.5 g/dL Final   • Albumin 03/02/2022 3.50  3.50 - 5.20 g/dL Final   • ALT (SGPT) 03/02/2022 30  1 - 33 U/L Final   • AST (SGOT) 03/02/2022 42 (A) 1 - 32 U/L Final   • Alkaline Phosphatase 03/02/2022 105  39 - 117 U/L Final   • Total Bilirubin 03/02/2022 1.4 (A) 0.0 - 1.2 mg/dL Final   • Globulin 03/02/2022 3.0  gm/dL Final   • A/G Ratio 03/02/2022 1.2  g/dL Final   • BUN/Creatinine Ratio 03/02/2022 9.8  7.0 - 25.0 Final   • Anion Gap 03/02/2022 9.8  5.0 - 15.0 mmol/L Final   • eGFR 03/02/2022 70.5  >60.0 mL/min/1.73 Final    National Kid[employer] and American Society of Nephrology (ASN) Task Force recommended calculation based on the Chronic Kidney Disease Epidemiology Collaboration (CKD-EPI) equation refit without adjustment for race.   • Protime 03/02/2022 24.9 (A) 11.1 - 15.3 Seconds Final   • INR 03/02/2022 2.32 (A) 0.80 - 1.20 Final   • ALPHA-FETOPROTEIN 03/02/2022 3.69  0 - 8.3 ng/mL Final      Doppler ankle brachial index single level CAR  · Right Conclusion: The  right CARYN is normal.  · Left Conclusion: The left CARYN is normal.     Duplex venous lower extremity bilateral CAR  · Appears negative for DVT of the lower extremities  · Negatie for reflux GSV bilaterally  · Negative for reflux deep system bilaterally  · Pulsatile flow throughout         [unfilled]  Immunization History   Administered Date(s) Administered   • COVID-19 (MODERNA) 1st, 2nd, 3rd Dose Only 06/16/2021, 08/13/2021   • Flu Vaccine Quad PF >36MO 10/02/2018   • FluLaval/Fluarix/Fluzone >6 11/04/2019, 10/01/2020   • Hepatitis A 10/04/2007, 04/04/2008   • Hepatitis B 10/04/2007, 11/08/2007, 04/04/2008   • Influenza, Unspecified 09/28/2021   • Shingrix 11/04/2019, 02/11/2020     The following portions of the patient's history were reviewed and updated as appropriate: allergies, current medications, past family history, past medical history, past social history, past surgical history and problem list.    PHQ-9 Total Score:             Physical Exam  Constitutional:       Appearance: Normal appearance.   HENT:      Head: Normocephalic and atraumatic.      Right Ear: External ear normal.      Left Ear: External ear normal.   Eyes:      General:         Right eye: No discharge.         Left eye: No discharge.      Conjunctiva/sclera: Conjunctivae normal.   Cardiovascular:      Rate and Rhythm: Normal rate and regular rhythm.      Pulses: Normal pulses.      Heart sounds: Normal heart sounds. No murmur heard.  Pulmonary:      Effort: Pulmonary effort is normal. No respiratory distress.      Breath sounds: Normal breath sounds.   Abdominal:      General: There is no distension.      Palpations: Abdomen is soft.      Tenderness: There is no abdominal tenderness.   Musculoskeletal:      Cervical back: Normal range of motion.      Right lower leg: Edema present.      Left lower leg: Edema present.      Comments:  Bilateral Unna boots in place   Lymphadenopathy:      Cervical: No cervical adenopathy.   Neurological:       Mental Status: She is alert. Mental status is at baseline.   Psychiatric:         Mood and Affect: Mood normal.         Behavior: Behavior normal.         Assessment & Plan    Diagnosis Plan   1. Burning with urination  POCT urinalysis dipstick, automated    Urine Culture - Urine, Urine, Clean Catch    Urine Culture - Urine, Urine, Clean Catch   2. Lymphedema of both lower extremities        Orders Placed This Encounter   Procedures   • Urine Culture - Urine, Urine, Clean Catch     Standing Status:   Future     Number of Occurrences:   1     Standing Expiration Date:   8/9/2023     Order Specific Question:   Release to patient     Answer:   Routine Release   • POCT urinalysis dipstick, automated     Order Specific Question:   Release to patient     Answer:   Immediate     Dysuria; UA reassuring, discussed with patient, will await culture before sending in treatment.  No red flags on exam, no signs of systemic illness.    Lymphedema; currently managed by wound care, vascular team.  Encouraged adherence to appointments, compression therapy.  No red flags on exam, reassuring.          This document has been electronically signed by Omar Chadwick MD on August 9, 2022 12:02 CDT    EMR Dragon/Transciption Disclaimer: Some of this note may be an electronic transcription/translation of spoken language to printed text.  The electronic translation of spoken language may permit erroneous, or at times, nonsensical words or phrases to be inadvertently transcribed. Although I have reviewed the note for such errors, some may still exist.

## 2022-08-10 ENCOUNTER — OFFICE VISIT (OUTPATIENT)
Dept: WOUND CARE | Facility: HOSPITAL | Age: 62
End: 2022-08-10

## 2022-08-12 LAB — BACTERIA SPEC AEROBE CULT: NO GROWTH

## 2022-08-17 ENCOUNTER — OFFICE VISIT (OUTPATIENT)
Dept: WOUND CARE | Facility: HOSPITAL | Age: 62
End: 2022-08-17

## 2022-08-24 ENCOUNTER — OFFICE VISIT (OUTPATIENT)
Dept: WOUND CARE | Facility: HOSPITAL | Age: 62
End: 2022-08-24

## 2022-08-30 ENCOUNTER — HOSPITAL ENCOUNTER (OUTPATIENT)
Dept: CT IMAGING | Facility: HOSPITAL | Age: 62
Discharge: HOME | End: 2022-08-30

## 2022-08-30 ENCOUNTER — OFFICE VISIT (OUTPATIENT)
Dept: WOUND CARE | Facility: HOSPITAL | Age: 62
End: 2022-08-30

## 2022-08-30 DIAGNOSIS — E61.1 IRON DEFICIENCY: Primary | ICD-10-CM

## 2022-08-30 DIAGNOSIS — R91.1 LUNG NODULE: Chronic | ICD-10-CM

## 2022-08-30 DIAGNOSIS — Z12.31 ENCOUNTER FOR SCREENING MAMMOGRAM FOR MALIGNANT NEOPLASM OF BREAST: ICD-10-CM

## 2022-08-30 PROCEDURE — 71250 CT THORAX DX C-: CPT

## 2022-09-01 ENCOUNTER — LAB (OUTPATIENT)
Dept: ONCOLOGY | Facility: HOSPITAL | Age: 62
End: 2022-09-01

## 2022-09-01 ENCOUNTER — OFFICE VISIT (OUTPATIENT)
Dept: ONCOLOGY | Facility: CLINIC | Age: 62
End: 2022-09-01

## 2022-09-01 VITALS
SYSTOLIC BLOOD PRESSURE: 140 MMHG | WEIGHT: 221 LBS | HEART RATE: 85 BPM | TEMPERATURE: 98.5 F | OXYGEN SATURATION: 95 % | DIASTOLIC BLOOD PRESSURE: 79 MMHG | BODY MASS INDEX: 37.93 KG/M2

## 2022-09-01 DIAGNOSIS — E61.1 IRON DEFICIENCY: ICD-10-CM

## 2022-09-01 DIAGNOSIS — R16.1 SPLENOMEGALY: Chronic | ICD-10-CM

## 2022-09-01 DIAGNOSIS — D69.6 THROMBOCYTOPENIA: Chronic | ICD-10-CM

## 2022-09-01 DIAGNOSIS — E61.1 IRON DEFICIENCY: Primary | Chronic | ICD-10-CM

## 2022-09-01 DIAGNOSIS — K74.69 OTHER CIRRHOSIS OF LIVER: Chronic | ICD-10-CM

## 2022-09-01 DIAGNOSIS — R91.1 LUNG NODULE: Chronic | ICD-10-CM

## 2022-09-01 LAB
BASOPHILS # BLD AUTO: 0.02 10*3/MM3 (ref 0–0.2)
BASOPHILS NFR BLD AUTO: 0.5 % (ref 0–1.5)
DEPRECATED RDW RBC AUTO: 47.6 FL (ref 37–54)
EOSINOPHIL # BLD AUTO: 0.16 10*3/MM3 (ref 0–0.4)
EOSINOPHIL NFR BLD AUTO: 3.7 % (ref 0.3–6.2)
ERYTHROCYTE [DISTWIDTH] IN BLOOD BY AUTOMATED COUNT: 15.5 % (ref 12.3–15.4)
FERRITIN SERPL-MCNC: 166.6 NG/ML (ref 13–150)
FOLATE SERPL-MCNC: 12 NG/ML (ref 4.78–24.2)
HCT VFR BLD AUTO: 39.9 % (ref 34–46.6)
HGB BLD-MCNC: 13.7 G/DL (ref 12–15.9)
IMM GRANULOCYTES # BLD AUTO: 0.01 10*3/MM3 (ref 0–0.05)
IMM GRANULOCYTES NFR BLD AUTO: 0.2 % (ref 0–0.5)
IRON 24H UR-MRATE: 123 MCG/DL (ref 37–145)
IRON SATN MFR SERPL: 36 % (ref 20–50)
LYMPHOCYTES # BLD AUTO: 0.92 10*3/MM3 (ref 0.7–3.1)
LYMPHOCYTES NFR BLD AUTO: 21.1 % (ref 19.6–45.3)
MCH RBC QN AUTO: 29.2 PG (ref 26.6–33)
MCHC RBC AUTO-ENTMCNC: 34.3 G/DL (ref 31.5–35.7)
MCV RBC AUTO: 85.1 FL (ref 79–97)
MONOCYTES # BLD AUTO: 0.26 10*3/MM3 (ref 0.1–0.9)
MONOCYTES NFR BLD AUTO: 6 % (ref 5–12)
NEUTROPHILS NFR BLD AUTO: 2.99 10*3/MM3 (ref 1.7–7)
NEUTROPHILS NFR BLD AUTO: 68.5 % (ref 42.7–76)
NRBC BLD AUTO-RTO: 0 /100 WBC (ref 0–0.2)
PLATELET # BLD AUTO: 58 10*3/MM3 (ref 140–450)
PMV BLD AUTO: 10.5 FL (ref 6–12)
RBC # BLD AUTO: 4.69 10*6/MM3 (ref 3.77–5.28)
TIBC SERPL-MCNC: 343 MCG/DL (ref 298–536)
TRANSFERRIN SERPL-MCNC: 230 MG/DL (ref 200–360)
VIT B12 BLD-MCNC: 1295 PG/ML (ref 211–946)
WBC NRBC COR # BLD: 4.36 10*3/MM3 (ref 3.4–10.8)

## 2022-09-01 PROCEDURE — 82728 ASSAY OF FERRITIN: CPT

## 2022-09-01 PROCEDURE — 83540 ASSAY OF IRON: CPT

## 2022-09-01 PROCEDURE — 99214 OFFICE O/P EST MOD 30 MIN: CPT | Performed by: INTERNAL MEDICINE

## 2022-09-01 PROCEDURE — 1123F ACP DISCUSS/DSCN MKR DOCD: CPT | Performed by: INTERNAL MEDICINE

## 2022-09-01 PROCEDURE — 84466 ASSAY OF TRANSFERRIN: CPT

## 2022-09-01 PROCEDURE — 82746 ASSAY OF FOLIC ACID SERUM: CPT

## 2022-09-01 PROCEDURE — 85025 COMPLETE CBC W/AUTO DIFF WBC: CPT

## 2022-09-01 PROCEDURE — 82607 VITAMIN B-12: CPT

## 2022-09-01 PROCEDURE — 1125F AMNT PAIN NOTED PAIN PRSNT: CPT | Performed by: INTERNAL MEDICINE

## 2022-09-01 PROCEDURE — G0463 HOSPITAL OUTPT CLINIC VISIT: HCPCS | Performed by: INTERNAL MEDICINE

## 2022-09-01 NOTE — PROGRESS NOTES
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  Patient had a CT scan of lung done recently to follow-up on lung nodule.  Complains of chronic abdominal discomfort.  Complains of chronic constipation.  Complains of chronic swelling of lower extremity along with pain.  Complains of easy bruising.  Denies any bleeding.  Complains of chronic arthralgia.              Past Medical History, Past Surgical History, Social History, Family History have been reviewed and are without significant changes except as mentioned.    Review of Systems   A comprehensive 14 point review of systems was performed and was negative except as mentioned in HPI.    Medications:  The current medication list was reviewed in the EMR    ALLERGIES:    Allergies   Allergen Reactions   • Morphine GI Intolerance   • Tape Rash     Patient has rash/blishers on site after tape   • Other      Pt states that taking steroids either in pill or injection form make her have blisters in her mouth and she feels like she is on fire on the inside/ has hx of c diff and possible MRSA     • Corticosteroids Rash   • Kenalog  [Triamcinolone Acetonide] Rash   • Sulfa Antibiotics Rash     Sulfa (Sulfonamide Antibiotics)       Objective      Vitals:    09/01/22 1012   BP: 140/79   Pulse: 85   Temp: 98.5 °F (36.9 °C)   TempSrc: Temporal   SpO2: 95%   Weight: 100 kg (221 lb)   PainSc:   6   PainLoc: Foot  Comment: NEUROPATHY     Current Status 7/21/2021   ECOG score 2       Physical Exam  Pulmonary:      Breath sounds: Normal breath sounds.   Neurological:      Mental Status: She  is alert and oriented to person, place, and time.           RECENT LABS:  Glucose   Date Value Ref Range Status   05/17/2022 102 (H) 65 - 99 mg/dL Final     Sodium   Date Value Ref Range Status   05/17/2022 141 136 - 145 mmol/L Final     Potassium   Date Value Ref Range Status   05/17/2022 4.1 3.5 - 5.2 mmol/L Final     CO2   Date Value Ref Range Status   05/17/2022 27.0 22.0 - 29.0 mmol/L Final     Chloride   Date Value Ref Range Status   05/17/2022 105 98 - 107 mmol/L Final     Anion Gap   Date Value Ref Range Status   05/17/2022 9.0 5.0 - 15.0 mmol/L Final     Creatinine   Date Value Ref Range Status   05/17/2022 1.11 (H) 0.57 - 1.00 mg/dL Final     BUN   Date Value Ref Range Status   05/17/2022 8 8 - 23 mg/dL Final     BUN/Creatinine Ratio   Date Value Ref Range Status   05/17/2022 7.2 7.0 - 25.0 Final     Calcium   Date Value Ref Range Status   05/17/2022 9.2 8.6 - 10.5 mg/dL Final     eGFR Non  Amer   Date Value Ref Range Status   09/24/2021 70 >60 mL/min/1.73 Final     Alkaline Phosphatase   Date Value Ref Range Status   05/17/2022 149 (H) 39 - 117 U/L Final     Total Protein   Date Value Ref Range Status   05/17/2022 7.0 6.0 - 8.5 g/dL Final     ALT (SGPT)   Date Value Ref Range Status   05/17/2022 25 1 - 33 U/L Final     AST (SGOT)   Date Value Ref Range Status   05/17/2022 37 (H) 1 - 32 U/L Final     Total Bilirubin   Date Value Ref Range Status   05/17/2022 1.1 0.0 - 1.2 mg/dL Final     Albumin   Date Value Ref Range Status   05/17/2022 3.70 3.50 - 5.20 g/dL Final     Globulin   Date Value Ref Range Status   05/17/2022 3.3 gm/dL Final     Lab Results   Component Value Date    WBC 4.36 09/01/2022    HGB 13.7 09/01/2022    HCT 39.9 09/01/2022    MCV 85.1 09/01/2022    PLT 58 (L) 09/01/2022     Lab Results   Component Value Date    NEUTROABS 2.99 09/01/2022    IRON 123 09/01/2022    IRON 73 07/01/2022    IRON 69 05/26/2022    TIBC 343 09/01/2022    TIBC 370 07/01/2022    TIBC 384 05/26/2022    LABIRON  36 09/01/2022    LABIRON 20 07/01/2022    LABIRON 18 (L) 05/26/2022    FERRITIN 166.60 (H) 09/01/2022    FERRITIN 156.40 (H) 07/01/2022    FERRITIN 124.60 05/26/2022    QFPXYYFJ34 1,295 (H) 09/01/2022    EIXUHEKX46 1,290 (H) 07/01/2022    [id number] 1,017 (H) 05/26/2022    FOLATE 12.00 09/01/2022    FOLATE 9.32 07/01/2022    FOLATE 14.50 05/26/2022     Lab Results   Component Value Date    AFPTM 2.5 07/31/2018    AFPTM 99 07/31/2018         PATHOLOGY:  * Cannot find OR log *         RADIOLOGY DATA :  CT Chest Without Contrast Diagnostic    Result Date: 8/31/2022  Benign calcified granulomata left lower lobe. No suspicious pulmonary nodules are identified on today's exam. Electronically signed by:  Blair Rojas MD  8/31/2022 4:05 PM CDT Workstation: [id number]          Assessment & Plan     1.  Thrombocytopenia:  - Secondary to cirrhosis of liver with splenomegaly.  - Platelet count today is 58,000.  - Patient denies any bleeding.  Will monitor with CBC for now  - I recommend coming to the emergency room if she starts having any bleeding complication due to severe thrombocytopenia.  In that case she will benefit from platelet transfusion  - For now patient will return to clinic in 3 months with repeat CBC, iron studies, ferritin, B12 and folate to be done on that day.    2.  Lung nodule:  - Recent CT of chest without contrast on August 27, 2022 shows calcified granuloma without any suspicious lung nodule.  - Recommend repeating CT of chest around August 2023.    3.  Iron deficiency anemia:  - Due to inability to tolerate iron by mouth patient received intravenous Venofer in July 2021.  - Anemia work-up done today shows hemoglobin is 13.6.  - Remains on B12 and folic acid 3 times a week    4.  Leukopenia:  - White blood cell count is 4.74 with absolute neutrophil count of 3.34.  Leukopenia is secondary to splenomegaly and cirrhosis of liver.-We will monitor with CBC    5.  Cirrhosis of liver with splenomegaly  -  Recommend continue following up with gastroenterology clinic    6.  Health maintenance: Patient does not smoke.  Had a colonoscopy in March 2021    7. Advance Care Planning: For now patient remains full code and is able to make decisions.  Patient has health care surrogate mentioned on chart.                 PHQ-9 Total Score: 2   -Patient is not homicidal or suicidal.  No acute intervention required.    Amira Shannon reports a pain score of 6.  Given her pain assessment as noted, treatment options were discussed and the following options were decided upon as a follow-up plan to address the patient's pain: continuation of current treatment plan for pain.         Lokesh Goodwin MD  9/2/2022  07:20 CDT        Part of this note may be an electronic transcription/translation of spoken language to printed text using the Dragon Dictation System.          CC:

## 2022-09-02 ENCOUNTER — TELEPHONE (OUTPATIENT)
Dept: ONCOLOGY | Facility: HOSPITAL | Age: 62
End: 2022-09-02

## 2022-09-02 RX ORDER — NYSTATIN 100000 [USP'U]/G
1 POWDER TOPICAL 3 TIMES DAILY PRN
Qty: 60 G | Refills: 0 | Status: SHIPPED | OUTPATIENT
Start: 2022-09-02

## 2022-09-02 RX ORDER — NYSTATIN 100000 [USP'U]/G
1 POWDER TOPICAL 4 TIMES DAILY PRN
COMMUNITY
End: 2022-09-02 | Stop reason: SDUPTHER

## 2022-09-02 NOTE — TELEPHONE ENCOUNTER
Rx Refill Note  Requested Prescriptions     Pending Prescriptions Disp Refills   • nystatin (MYCOSTATIN) 267118 UNIT/GM powder 60 g 0     Sig: Apply 1 application topically to the appropriate area as directed 3 (Three) Times a Day As Needed (for itching in crevices).      Last office visit with prescribing clinician: 8/9/2022      Next office visit with prescribing clinician: 11/11/2022            Lina Aviles MA  09/02/22, 10:32 CDT

## 2022-09-02 NOTE — TELEPHONE ENCOUNTER
Called and spoke with pt regarding lab results and medication instructions as per Dr. Goodwin, v/u obtained.

## 2022-09-02 NOTE — TELEPHONE ENCOUNTER
----- Message from Lokesh Goodwin MD sent at 9/2/2022  7:21 AM CDT -----  Regarding: Labs  Please let patient know, she needs to continue taking B12 and folic acid 3 times a week.  Iron level is adequate, no need to start any intravenous iron replacement at present.  Thank you

## 2022-09-06 ENCOUNTER — OFFICE VISIT (OUTPATIENT)
Dept: WOUND CARE | Facility: HOSPITAL | Age: 62
End: 2022-09-06

## 2022-09-06 PROCEDURE — G0463 HOSPITAL OUTPT CLINIC VISIT: HCPCS

## 2022-09-19 RX ORDER — FOLIC ACID 1 MG/1
TABLET ORAL
Qty: 36 TABLET | Refills: 0 | Status: SHIPPED | OUTPATIENT
Start: 2022-09-19 | End: 2023-01-09

## 2022-09-19 NOTE — TELEPHONE ENCOUNTER
Rx Refill Note  Requested Prescriptions     Pending Prescriptions Disp Refills   • folic acid (FOLVITE) 1 MG tablet [Pharmacy Med Name: Folic Acid 1 MG Oral Tablet] 36 tablet 0     Sig: TAKE 1 TABLET BY MOUTH ON MONDAY, WEDNESDAY AND FRIDAY      Last office visit with prescribing clinician: 1/27/2022      Next office visit with prescribing clinician: Visit date not found            Ruiz TRUJILLO Rep  09/19/22, 08:09 CDT

## 2022-09-20 ENCOUNTER — OFFICE VISIT (OUTPATIENT)
Dept: WOUND CARE | Facility: HOSPITAL | Age: 62
End: 2022-09-20

## 2022-09-20 PROCEDURE — G0463 HOSPITAL OUTPT CLINIC VISIT: HCPCS

## 2022-09-26 RX ORDER — OMEPRAZOLE 40 MG/1
CAPSULE, DELAYED RELEASE ORAL
Qty: 90 CAPSULE | Refills: 0 | Status: SHIPPED | OUTPATIENT
Start: 2022-09-26 | End: 2023-01-11

## 2022-09-26 NOTE — TELEPHONE ENCOUNTER
Rx Refill Note  Requested Prescriptions     Pending Prescriptions Disp Refills   • omeprazole (priLOSEC) 40 MG capsule [Pharmacy Med Name: Omeprazole 40 MG Oral Capsule Delayed Release] 90 capsule 0     Sig: Take 1 capsule by mouth once daily      Last office visit with prescribing clinician: 8/9/2022      Next office visit with prescribing clinician: 11/11/2022   {TIP  Encounters:     Proton Pump Inhibitors Protocol Failed 09/26/2022 01:24 PM    No history of clostridium difficile diagnosis on record            {TIP  Please add Last Relevant Lab Date if appropriate  {TIP  Is Refill Pharmacy correct? YES  Sandra Brown LPN  09/26/22, 15:37 CDT

## 2022-09-27 ENCOUNTER — LAB (OUTPATIENT)
Dept: LAB | Facility: HOSPITAL | Age: 62
End: 2022-09-27

## 2022-09-27 DIAGNOSIS — K72.10 END STAGE LIVER DISEASE: ICD-10-CM

## 2022-09-27 DIAGNOSIS — K59.04 CHRONIC IDIOPATHIC CONSTIPATION: ICD-10-CM

## 2022-09-27 DIAGNOSIS — K75.81 NASH (NONALCOHOLIC STEATOHEPATITIS): ICD-10-CM

## 2022-09-27 DIAGNOSIS — K74.60 CIRRHOSIS OF LIVER WITHOUT ASCITES, UNSPECIFIED HEPATIC CIRRHOSIS TYPE: ICD-10-CM

## 2022-09-27 DIAGNOSIS — R10.84 GENERALIZED ABDOMINAL PAIN: ICD-10-CM

## 2022-09-27 PROCEDURE — 80053 COMPREHEN METABOLIC PANEL: CPT

## 2022-09-27 PROCEDURE — 82105 ALPHA-FETOPROTEIN SERUM: CPT

## 2022-09-27 PROCEDURE — 85610 PROTHROMBIN TIME: CPT

## 2022-09-28 LAB
ALBUMIN SERPL-MCNC: 3.4 G/DL (ref 3.5–5.2)
ALBUMIN/GLOB SERPL: 1.1 G/DL
ALP SERPL-CCNC: 153 U/L (ref 39–117)
ALPHA-FETOPROTEIN: 3.11 NG/ML (ref 0–8.3)
ALT SERPL W P-5'-P-CCNC: 28 U/L (ref 1–33)
ANION GAP SERPL CALCULATED.3IONS-SCNC: 9.9 MMOL/L (ref 5–15)
AST SERPL-CCNC: 49 U/L (ref 1–32)
BILIRUB SERPL-MCNC: 1.4 MG/DL (ref 0–1.2)
BUN SERPL-MCNC: 7 MG/DL (ref 8–23)
BUN/CREAT SERPL: 7.9 (ref 7–25)
CALCIUM SPEC-SCNC: 8.8 MG/DL (ref 8.6–10.5)
CHLORIDE SERPL-SCNC: 103 MMOL/L (ref 98–107)
CO2 SERPL-SCNC: 26.1 MMOL/L (ref 22–29)
CREAT SERPL-MCNC: 0.89 MG/DL (ref 0.57–1)
EGFRCR SERPLBLD CKD-EPI 2021: 73.4 ML/MIN/1.73
GLOBULIN UR ELPH-MCNC: 3 GM/DL
GLUCOSE SERPL-MCNC: 107 MG/DL (ref 65–99)
INR PPP: 1.56 (ref 0.8–1.2)
POTASSIUM SERPL-SCNC: 3.6 MMOL/L (ref 3.5–5.2)
PROT SERPL-MCNC: 6.4 G/DL (ref 6–8.5)
PROTHROMBIN TIME: 18.7 SECONDS (ref 11.1–15.3)
SODIUM SERPL-SCNC: 139 MMOL/L (ref 136–145)

## 2022-10-04 ENCOUNTER — OFFICE VISIT (OUTPATIENT)
Dept: WOUND CARE | Facility: HOSPITAL | Age: 62
End: 2022-10-04

## 2022-10-04 ENCOUNTER — OFFICE VISIT (OUTPATIENT)
Dept: GASTROENTEROLOGY | Facility: CLINIC | Age: 62
End: 2022-10-04

## 2022-10-04 VITALS
HEIGHT: 64 IN | SYSTOLIC BLOOD PRESSURE: 192 MMHG | HEART RATE: 86 BPM | DIASTOLIC BLOOD PRESSURE: 92 MMHG | WEIGHT: 215 LBS | BODY MASS INDEX: 36.7 KG/M2

## 2022-10-04 DIAGNOSIS — R10.84 GENERALIZED ABDOMINAL PAIN: ICD-10-CM

## 2022-10-04 DIAGNOSIS — K72.10 END STAGE LIVER DISEASE: Primary | ICD-10-CM

## 2022-10-04 DIAGNOSIS — K74.60 CIRRHOSIS OF LIVER WITHOUT ASCITES, UNSPECIFIED HEPATIC CIRRHOSIS TYPE: ICD-10-CM

## 2022-10-04 DIAGNOSIS — K75.81 NASH (NONALCOHOLIC STEATOHEPATITIS): ICD-10-CM

## 2022-10-04 DIAGNOSIS — K59.04 CHRONIC IDIOPATHIC CONSTIPATION: ICD-10-CM

## 2022-10-04 PROCEDURE — 99214 OFFICE O/P EST MOD 30 MIN: CPT | Performed by: PHYSICIAN ASSISTANT

## 2022-10-04 PROCEDURE — G0463 HOSPITAL OUTPT CLINIC VISIT: HCPCS

## 2022-10-04 RX ORDER — OXYCODONE HYDROCHLORIDE 10 MG/1
10 TABLET ORAL
COMMUNITY
Start: 2022-09-30

## 2022-10-04 NOTE — PROGRESS NOTES
Chief Complaint   Patient presents with   • Cirrhosis   • MUSA   • Constipation   • Abdominal Pain       ENDO PROCEDURE ORDERED:    Subjective    Amira Shannon is a 61 y.o. female. she is here today for follow-up.    History of Present Illness    Patient seen on a recheck of her MSUA cirrhosis, GERD, abdominal pain, fatty liver, constipation, F4/S1/N2. Last seen 07/26/2022. Patient is on the Movantik, MiraLAX, Colace and Dulcolax for her bowel. She states she is having a decent bowel movement every other day. Recently she turned her left ankle and she is having some issues from that. Weight is down 11 pounds since last visit, but she has been trying to lose. She is on Prilosec and Carafate for chronic heartburn. Last EGD showed a hiatal hernia with bile on 09/16/2021. Last colonoscopy showed hemorrhoids 03/22/2021.     Patient had a CT of the chest with contrast 08/30/2021. It showed granulomas with fibrotic lungs, splenomegaly. There was some previous issue in the left lobe of the liver. She did have a Doppler done by Dr. Mills 07/29/2022. Most recent laboratory 09/27/2022 showed normal AFP, INR 1.56. CMP showed glucose 107, albumin 3.4, total bilirubin 1.4, alkaline phosphatase 153, AST 49, otherwise normal.     ASSESSMENT/PLAN: Patient with MUSA cirrhosis with hepatitis fibrosis with imaging as noted above. She had hypertrophy of the left lobe of the liver on CT imaging of the chest. AFP was normal. She was encouraged to continue dietary modification and weight loss. Her constipation is doing well on current regimen. We will plan followup in 3 months with CBC, CMP, INR, ammonia, with right upper quadrant ultrasound prior. Further pending clinical course and the results of the above.       The following portions of the patient's history were reviewed and updated as appropriate:   Past Medical History:   Diagnosis Date   • Acid reflux    • Altered bowel function    • Arthropathy of lumbar facet joint    •  "Bleeding disorder (HCC)    • C. difficile diarrhea    • Chronic idiopathic constipation    • Colonic inertia    • Constipation    • Corns and callus    • Depression    • Disease related peripheral neuropathy    • Epigastric pain    • Fatty liver    • Hammer toe    • Headache    • Hiatal hernia    • History of transfusion    • Hyperlipidemia    • Knee pain    • Localized, primary osteoarthritis of the ankle and foot     Localized, primary osteoarthritis of the ankle and/or foot   • Mendoza's metatarsalgia     Mendoza's metatarsalgia - 2nd interspace on right   • Nausea and vomiting    • Neuralgia and neuritis     Neuralgia, neuritis, and radiculitis, unspecified   • Neuropathy    • Obstructive sleep apnea     Obstructive sleep apnea (adult) (pediatric)    • CHAI on CPAP     \"C-Pap at night  (unconfirmed)\"   • Osteoarthritis    • Pain in foot     Pain in unspecified foot - sees a podiatrist   • Pain in joint, ankle and foot     Joint pain in ankle and foot      • Pain radiating to back     Pain radiating to lumbar region of back   • Pelvic floor dysfunction    • Plantar fasciitis    • PONV (postoperative nausea and vomiting)    • Restless leg syndrome    • Secondary hypertension     Secondary hypertension, unspecified   • Sinusitis    • Sleep apnea    • Tongue anomaly     lesion     Past Surgical History:   Procedure Laterality Date   • CARPAL TUNNEL RELEASE Left 2018    Procedure: CARPAL TUNNEL RELEASE - left;  Surgeon: Justus Lewis MD;  Location: [facility] OR;  Service: Orthopedics   • CARPAL TUNNEL RELEASE Right 2019    Procedure: carpal tunnel release right hand with local/monitored anesthesia care;  Surgeon: Justus Lewis MD;  Location: [facility] OR;  Service: Orthopedics   •  SECTION     • CHOLECYSTECTOMY     • COLONOSCOPY  2013   • COLONOSCOPY N/A 10/17/2018    Procedure: COLONOSCOPY;  Surgeon: Russell Del Rio MD;  Location: [facility] ENDOSCOPY;  Service: Gastroenterology "   • COLONOSCOPY N/A 3/22/2021    Procedure: COLONOSCOPY;  Surgeon: Russell Del Rio MD;  Location: [facility] ENDOSCOPY;  Service: Gastroenterology;  Laterality: N/A;   • DIRECT LARYNGOSCOPY, ESOPHAGOSCOPY, BRONCHOSCOPY N/A 8/1/2017    Procedure: DIRECT LARYNGOSCOPY AND;  Surgeon: Live Bolton MD;  Location: [facility] OR;  Service:    • ENDOSCOPY  07/01/2013    Colon endoscopy 08708 (1) - Internal & external hemorrhoids found. Stool found.   • ENDOSCOPY  07/01/2013    EGD w/ tube 70908 (1) - Normal esophagus. Gastritis in stomach. Biopsy taken. Normal dudoenum. Biopsy taken.   • ENDOSCOPY N/A 10/17/2018    Procedure: ESOPHAGOGASTRODUODENOSCOPY--eval varices;  Surgeon: Russell Del Rio MD;  Location: [facility] ENDOSCOPY;  Service: Gastroenterology   • ENDOSCOPY N/A 3/22/2021    Procedure: ESOPHAGOGASTRODUODENOSCOPYURGNET;  Surgeon: Russell Del Rio MD;  Location: [facility] ENDOSCOPY;  Service: Gastroenterology;  Laterality: N/A;   • ENDOSCOPY N/A 9/16/2021    Procedure: ESOPHAGOGASTRODUODENOSCOPY;  Surgeon: Basia Saunders MD;  Location: [facility] ENDOSCOPY;  Service: Gastroenterology;  Laterality: N/A;   • FOOT SURGERY  02/26/2013    Foot/toes surgery procedure (1) - Arthroplasty of toes 4 and 5 of right foot.   • HERNIA REPAIR     • HERNIA REPAIR      hital   • HYSTERECTOMY     • LIVER BIOPSY     • NERVE BLOCK  07/25/2016    Injection for nerve block (1) - Lumbar medial branch block.   • OTHER SURGICAL HISTORY  12/03/2012    Inj(s) Tend-Sheath, Ligament, Single 20550 (1) - [facility]    • OTHER SURGICAL HISTORY  06/24/2013    Small Joint Injection/Aspiration 20600 (2) - [facility]ICK[facility] ([facility])    • OTHER SURGICAL HISTORY  2011    bowel obstruction x2   • OTHER SURGICAL HISTORY      gland removed from neck   • SUBLINGUAL SALIVARY CYST EXCISION N/A 8/1/2017    Procedure: EXCISION OF LEFT  TONGUE LESION WITH CLOSURE;  Surgeon: Live Bolton MD;  Location: [facility] OR;  Service:    • TUBAL ABDOMINAL  LIGATION     • UPPER GASTROINTESTINAL ENDOSCOPY  2013   • UPPER GASTROINTESTINAL ENDOSCOPY  10/17/2018   • UPPER GASTROINTESTINAL ENDOSCOPY  2021     Family History   Problem Relation Age of Onset   • Cancer Other    • Diabetes Other    • Heart disease Other    • Hypertension Mother    • Cancer Mother    • Diabetes Mother    • Hypertension Father    • Heart attack Father    • Cancer Father    • Heart disease Father    • Thyroid disease Maternal Aunt    • Cancer Maternal Aunt    • No Known Problems Sister    • No Known Problems Brother    • Cancer Maternal Grandmother    • No Known Problems Sister      OB History        1    Para   1    Term                AB        Living   1       SAB        IAB        Ectopic        Molar        Multiple        Live Births                  Allergies   Allergen Reactions   • Morphine GI Intolerance   • Tape Rash     Patient has rash/blishers on site after tape   • Other      Pt states that taking steroids either in pill or injection form make her have blisters in her mouth and she feels like she is on fire on the inside/ has hx of c diff and possible MRSA     • Corticosteroids Rash   • Kenalog  [Triamcinolone Acetonide] Rash   • Sulfa Antibiotics Rash     Sulfa (Sulfonamide Antibiotics)     Social History     Socioeconomic History   • Marital status:    Tobacco Use   • Smoking status: Former     Packs/day: 2.00     Years: 15.00     Pack years: 30.00     Types: Cigarettes     Quit date:      Years since quittin.8   • Smokeless tobacco: Never   Vaping Use   • Vaping Use: Never used   Substance and Sexual Activity   • Alcohol use: No   • Drug use: Never   • Sexual activity: Defer     Birth control/protection: Surgical     Comment: Marital Status: .  Hysterectomy.     Current Medications:  Prior to Admission medications    Medication Sig Start Date End Date Taking? Authorizing Provider   acetaminophen (TYLENOL) 325 MG tablet Take 650 mg  by mouth Every 6 (Six) Hours As Needed for Mild Pain .   Yes Promise Tovar MD   bisacodyl (DULCOLAX) 10 MG suppository Insert 10 mg into the rectum As Needed for Constipation.   Yes Promise Tovar MD   cetirizine (zyrTEC) 10 MG tablet Take 1 tablet by mouth Daily. 7/7/22  Yes Omar Chadwick MD   cyclobenzaprine (FLEXERIL) 10 MG tablet Take 10 mg by mouth 2 (Two) Times a Day As Needed. 8/3/22  Yes Promise Tovar MD   Docusate Sodium 100 MG capsule Take 100 mg by mouth 2 (Two) Times a Day.   Yes Promise Tovar MD   DULoxetine (CYMBALTA) 60 MG capsule Take 60 mg by mouth Daily.   Yes Promise Tovar MD   folic acid (FOLVITE) 1 MG tablet TAKE 1 TABLET BY MOUTH ON MONDAY, WEDNESDAY AND FRIDAY 9/19/22  Yes Lokesh Goodwin MD   furosemide (LASIX) 20 MG tablet Take 20 mg by mouth 2 (Two) Times a Day.   Yes Promise Tovar MD   gabapentin (NEURONTIN) 800 MG tablet Take 800 mg by mouth 3 (Three) Times a Day. 10/19/21  Yes Promise Tovar MD   metoclopramide (REGLAN) 10 MG tablet Take 1 tablet by mouth 4 (Four) Times a Day Before Meals & at Bedtime. 7/27/22  Yes Omar Chadwick MD   Naloxegol Oxalate (Movantik) 25 MG tablet Take 1 tablet by mouth Daily. 6/20/22  Yes Hua Westfall MD   nystatin (MYCOSTATIN) 237633 UNIT/GM powder Apply 1 application topically to the appropriate area as directed 3 (Three) Times a Day As Needed (for itching in crevices). 9/2/22  Yes Omar Chadwick MD   Omega-3 Fatty Acids (fish oil) 1000 MG capsule capsule Take  by mouth 2 (Two) Times a Day With Meals.   Yes Promise Tovar MD   omeprazole (priLOSEC) 40 MG capsule Take 1 capsule by mouth once daily 9/26/22  Yes Omar Chadwick MD   ondansetron (ZOFRAN) 8 MG tablet Take 1 tablet by mouth Every 8 (Eight) Hours As Needed for Nausea or Vomiting.  Patient taking differently: Take 4 mg by mouth Every 8 (Eight) Hours As Needed for Nausea or Vomiting. 1/26/21  Yes Blaine Perales PA-C  "  oxyCODONE (ROXICODONE) 10 MG tablet Take 10 mg by mouth. 9/30/22  Yes Promise Tovar MD   oxyCODONE-acetaminophen (PERCOCET)  MG per tablet Take 1 tablet by mouth Every 6 (Six) Hours As Needed for Moderate Pain .   Yes Promise Tovar MD   polyethylene glycol (MIRALAX) 17 GM/SCOOP powder Take 17 g by mouth Daily.   Yes Promise Tovar MD   potassium chloride (MICRO-K) 10 MEQ CR capsule Take 2 capsules by mouth Daily.  Patient taking differently: Take 10 mEq by mouth 2 (Two) Times a Day. 9/24/21  Yes Fidencio Mathews MD   spironolactone (ALDACTONE) 25 MG tablet TAKE 1 TABLET BY MOUTH ONCE DAILY  Patient taking differently: 25 mg 2 (Two) Times a Day. 6/11/19  Yes Maureen Cardoso APRN   sucralfate (Carafate) 1 g tablet Take 1 tablet by mouth 4 (Four) Times a Day. 10/19/21  Yes Blaine Perales PA-C   traZODone (DESYREL) 100 MG tablet TAKE 1 TABLET BY MOUTH ONCE DAILY AT NIGHT 5/2/22  Yes Blaine Perales PA-C   vitamin B-12 (CYANOCOBALAMIN) 1000 MCG tablet Take 1,000 mcg by mouth Daily.   Yes Promise Tovar MD     Review of Systems  Review of Systems       Objective    BP (!) 192/92 (BP Location: Left arm) Comment: patient is in alot of pain  Pulse 86   Ht 162.6 cm (64\")   Wt 97.5 kg (215 lb)   LMP  (LMP Unknown)   BMI 36.90 kg/m²   Physical Exam  Vitals and nursing note reviewed.   Constitutional:       General: She is not in acute distress.     Appearance: She is well-developed. She is obese.   HENT:      Head: Normocephalic and atraumatic.   Eyes:      Pupils: Pupils are equal, round, and reactive to light.   Cardiovascular:      Rate and Rhythm: Normal rate and regular rhythm.      Heart sounds: Normal heart sounds.   Pulmonary:      Effort: Pulmonary effort is normal.      Breath sounds: Normal breath sounds.   Abdominal:      General: Bowel sounds are normal. There is no distension or abdominal bruit.      Palpations: Abdomen is soft. Abdomen is not rigid. There is no " shifting dullness or mass.      Tenderness: There is abdominal tenderness. There is no guarding or rebound.      Hernia: No hernia is present. There is no hernia in the ventral area.   Musculoskeletal:         General: Normal range of motion.      Cervical back: Normal range of motion.   Skin:     General: Skin is warm and dry.   Neurological:      Mental Status: She is alert and oriented to person, place, and time.   Psychiatric:         Behavior: Behavior normal.         Thought Content: Thought content normal.         Judgment: Judgment normal.       Assessment & Plan      1. End stage liver disease (HCC)    2. Cirrhosis of liver without ascites, unspecified hepatic cirrhosis type (HCC)    3. Chronic idiopathic constipation    4. MUSA (nonalcoholic steatohepatitis)    5. Generalized abdominal pain    .   Diagnoses and all orders for this visit:    1. End stage liver disease (HCC) (Primary)  -     CBC & Differential; Future  -     Ammonia; Future  -     Comprehensive Metabolic Panel; Future  -     Protime-INR; Future  -     US Abdomen Complete; Future    2. Cirrhosis of liver without ascites, unspecified hepatic cirrhosis type (HCC)  -     CBC & Differential; Future  -     Ammonia; Future  -     Comprehensive Metabolic Panel; Future  -     Protime-INR; Future  -     US Abdomen Complete; Future    3. Chronic idiopathic constipation  -     CBC & Differential; Future  -     Ammonia; Future  -     Comprehensive Metabolic Panel; Future  -     Protime-INR; Future  -     US Abdomen Complete; Future    4. MUSA (nonalcoholic steatohepatitis)  -     CBC & Differential; Future  -     Ammonia; Future  -     Comprehensive Metabolic Panel; Future  -     Protime-INR; Future  -     US Abdomen Complete; Future    5. Generalized abdominal pain  -     CBC & Differential; Future  -     Ammonia; Future  -     Comprehensive Metabolic Panel; Future  -     Protime-INR; Future  -     US Abdomen Complete; Future    Other orders  -      traZODone (DESYREL) 100 MG tablet; Take 1 tablet by mouth Every Night.  Dispense: 30 tablet; Refill: 2        Orders placed during this encounter include:  Orders Placed This Encounter   Procedures   • US Abdomen Complete     Standing Status:   Future     Standing Expiration Date:   1/31/2024     Order Specific Question:   Reason for Exam:     Answer:   ESLD, ascites   • Ammonia     Standing Status:   Future     Number of Occurrences:   1     Standing Expiration Date:   4/2/2023     Order Specific Question:   Release to patient     Answer:   Routine Release   • Comprehensive Metabolic Panel     Standing Status:   Future     Number of Occurrences:   1     Standing Expiration Date:   4/2/2023     Order Specific Question:   Release to patient     Answer:   Routine Release   • Protime-INR     Standing Status:   Future     Number of Occurrences:   1     Standing Expiration Date:   4/2/2023   • CBC & Differential     Standing Status:   Future     Number of Occurrences:   1     Standing Expiration Date:   4/2/2023     Order Specific Question:   Manual Differential     Answer:   No       Medications prescribed:  New Medications Ordered This Visit   Medications   • traZODone (DESYREL) 100 MG tablet     Sig: Take 1 tablet by mouth Every Night.     Dispense:  30 tablet     Refill:  2       Requested Prescriptions     Signed Prescriptions Disp Refills   • traZODone (DESYREL) 100 MG tablet 30 tablet 2     Sig: Take 1 tablet by mouth Every Night.       Review and/or summary of lab tests, radiology, procedures, medications. Review and summary of old records and obtaining of history. The risks and benefits of my recommendations, as well as other treatment options were discussed with the patient today. Questions were answered.    Follow-up: Return in about 3 months (around 1/4/2023), or if symptoms worsen or fail to improve, for lab/US prior.     * Surgery not found *      This document has been electronically signed by Blaine CROCKER  MERISSA Perales on October 28, 2022 17:21 CDT      Results for orders placed or performed in visit on 10/28/22   Protime-INR    Specimen: Blood   Result Value Ref Range    Protime 18.4 (H) 11.1 - 15.3 Seconds    INR 1.53 (H) 0.80 - 1.20   Ammonia    Specimen: Blood   Result Value Ref Range    Ammonia 54 (H) 11 - 51 umol/L   Results for orders placed or performed in visit on 09/27/22   AFP Tumor Marker    Specimen: Blood   Result Value Ref Range    ALPHA-FETOPROTEIN 3.11 0 - 8.3 ng/mL   Protime-INR    Specimen: Blood   Result Value Ref Range    Protime 18.7 (H) 11.1 - 15.3 Seconds    INR 1.56 (H) 0.80 - 1.20   Comprehensive Metabolic Panel    Specimen: Blood   Result Value Ref Range    Glucose 107 (H) 65 - 99 mg/dL    BUN 7 (L) 8 - 23 mg/dL    Creatinine 0.89 0.57 - 1.00 mg/dL    Sodium 139 136 - 145 mmol/L    Potassium 3.6 3.5 - 5.2 mmol/L    Chloride 103 98 - 107 mmol/L    CO2 26.1 22.0 - 29.0 mmol/L    Calcium 8.8 8.6 - 10.5 mg/dL    Total Protein 6.4 6.0 - 8.5 g/dL    Albumin 3.40 (L) 3.50 - 5.20 g/dL    ALT (SGPT) 28 1 - 33 U/L    AST (SGOT) 49 (H) 1 - 32 U/L    Alkaline Phosphatase 153 (H) 39 - 117 U/L    Total Bilirubin 1.4 (H) 0.0 - 1.2 mg/dL    Globulin 3.0 gm/dL    A/G Ratio 1.1 g/dL    BUN/Creatinine Ratio 7.9 7.0 - 25.0    Anion Gap 9.9 5.0 - 15.0 mmol/L    eGFR 73.4 >60.0 mL/min/1.73   Results for orders placed or performed in visit on 09/01/22   CBC Auto Differential    Specimen: Hand, Left; Blood   Result Value Ref Range    WBC 4.36 3.40 - 10.80 10*3/mm3    RBC 4.69 3.77 - 5.28 10*6/mm3    Hemoglobin 13.7 12.0 - 15.9 g/dL    Hematocrit 39.9 34.0 - 46.6 %    MCV 85.1 79.0 - 97.0 fL    MCH 29.2 26.6 - 33.0 pg    MCHC 34.3 31.5 - 35.7 g/dL    RDW 15.5 (H) 12.3 - 15.4 %    RDW-SD 47.6 37.0 - 54.0 fl    MPV 10.5 6.0 - 12.0 fL    Platelets 58 (L) 140 - 450 10*3/mm3    Neutrophil % 68.5 42.7 - 76.0 %    Lymphocyte % 21.1 19.6 - 45.3 %    Monocyte % 6.0 5.0 - 12.0 %    Eosinophil % 3.7 0.3 - 6.2 %    Basophil %  0.5 0.0 - 1.5 %    Immature Grans % 0.2 0.0 - 0.5 %    Neutrophils, Absolute 2.99 1.70 - 7.00 10*3/mm3    Lymphocytes, Absolute 0.92 0.70 - 3.10 10*3/mm3    Monocytes, Absolute 0.26 0.10 - 0.90 10*3/mm3    Eosinophils, Absolute 0.16 0.00 - 0.40 10*3/mm3    Basophils, Absolute 0.02 0.00 - 0.20 10*3/mm3    Immature Grans, Absolute 0.01 0.00 - 0.05 10*3/mm3    nRBC 0.0 0.0 - 0.2 /100 WBC   Iron Profile    Specimen: Hand, Left; Blood   Result Value Ref Range    Iron 123 37 - 145 mcg/dL    Iron Saturation 36 20 - 50 %    Transferrin 230 200 - 360 mg/dL    TIBC 343 298 - 536 mcg/dL   Folate    Specimen: Hand, Left; Blood   Result Value Ref Range    Folate 12.00 4.78 - 24.20 ng/mL   Ferritin    Specimen: Hand, Left; Blood   Result Value Ref Range    Ferritin 166.60 (H) 13.00 - 150.00 ng/mL   Vitamin B12    Specimen: Hand, Left; Blood   Result Value Ref Range    Vitamin B-12 1,295 (H) 211 - 946 pg/mL   Results for orders placed or performed in visit on 08/09/22   POCT urinalysis dipstick, automated    Specimen: Urine   Result Value Ref Range    Color Dark Yellow Yellow, Straw, Dark Yellow, Mikala    Clarity, UA Clear Clear    Specific Gravity  1.015 1.005 - 1.030    pH, Urine 6.5 5.0 - 8.0    Leukocytes Negative Negative    Nitrite, UA Negative Negative    Protein, POC Trace (A) Negative mg/dL    Glucose, UA Negative Negative mg/dL    Ketones, UA Negative Negative    Urobilinogen, UA 1 E.U./dL (A) Normal    Bilirubin Small (1+) (A) Negative    Blood, UA Negative Negative    Lot Number [id number]     Expiration Date 08/18/22    Urine Culture - Urine, Urine, Clean Catch    Specimen: Urine, Clean Catch   Result Value Ref Range    Urine Culture No growth    Results for orders placed or performed during the hospital encounter of 07/27/22   Doppler ankle brachial index single level CAR   Result Value Ref Range    Target HR (85%) 134 bpm    Max. Pred. HR (100%) 158 bpm    RIGHT DORSALIS PEDIS SYS      RIGHT POST TIBIAL  SYS      RIGHT CARYN RATIO 1.17     LEFT DORSALIS PEDIS SYS      LEFT POST TIBIAL SYS      LEFT CARYN RATIO 1.15     Upper arterial right arm brachial sys max 149 mmHg    Upper arterial left arm brachial sys max 145 mmHg   Results for orders placed or performed during the hospital encounter of 07/27/22   Duplex venous lower extremity bilateral CAR   Result Value Ref Range    Target HR (85%) 134 bpm    Max. Pred. HR (100%) 158 bpm   Results for orders placed or performed in visit on 07/01/22   MUSA Fibrosure    Specimen: Blood   Result Value Ref Range    Fibrosis Score (References) 0.91 (H) 0.00 - 0.21    Fibrosis Stage (Reference) Comment     Steatosis Score (Reference) 0.55 (H) 0.00 - 0.30    Steatosis Grade (Reference) Comment     MUSA Score (Reference) 0.75 (H) 0.25    Musa Grade (Reference) Comment     Height: (Reference) 64 in    Weight: (Reference) 232 LBS    Alpha 2-Macroglobulins, Qn 336 (H) 110 - 276 mg/dL    Haptoglobin <10 (L) 37 - 355 mg/dL    Apolipoprotein A-1 101 (L) 116 - 209 mg/dL    Total Bilirubin 0.7 0.0 - 1.2 mg/dL    GGT 44 0 - 60 IU/L    ALT (SGPT) 45 (H) 0 - 40 IU/L    AST (SGOT) P5P (Reference) 75 (H) 0 - 40 IU/L    Cholesterol, Total (Reference) 122 100 - 199 mg/dL    Glucose, Serum (Reference) 106 (H) 65 - 99 mg/dL    Triglycerides 76 0 - 149 mg/dL    Interpretations: (Reference) Comment     Fibrosis Scoring: Comment     Steatosis Grading (Reference) Comment     Musa Scoring (Reference) Comment     Limitations: (Reference) Comment     Comment (Reference) Comment      *Note: Due to a large number of results and/or encounters for the requested time period, some results have not been displayed. A complete set of results can be found in Results Review.

## 2022-10-17 RX ORDER — TRAZODONE HYDROCHLORIDE 100 MG/1
100 TABLET ORAL NIGHTLY
Qty: 30 TABLET | Refills: 2 | Status: SHIPPED | OUTPATIENT
Start: 2022-10-17

## 2022-10-17 RX ORDER — TRAZODONE HYDROCHLORIDE 100 MG/1
TABLET ORAL
Qty: 30 TABLET | Refills: 0 | OUTPATIENT
Start: 2022-10-17

## 2022-10-28 ENCOUNTER — OFFICE VISIT (OUTPATIENT)
Dept: FAMILY MEDICINE CLINIC | Facility: CLINIC | Age: 62
End: 2022-10-28

## 2022-10-28 ENCOUNTER — LAB (OUTPATIENT)
Dept: LAB | Facility: HOSPITAL | Age: 62
End: 2022-10-28

## 2022-10-28 VITALS
DIASTOLIC BLOOD PRESSURE: 86 MMHG | HEART RATE: 95 BPM | SYSTOLIC BLOOD PRESSURE: 142 MMHG | WEIGHT: 233 LBS | BODY MASS INDEX: 39.78 KG/M2 | HEIGHT: 64 IN | OXYGEN SATURATION: 96 % | TEMPERATURE: 97.6 F

## 2022-10-28 DIAGNOSIS — Z23 FLU VACCINE NEED: ICD-10-CM

## 2022-10-28 DIAGNOSIS — Z09 HOSPITAL DISCHARGE FOLLOW-UP: Primary | ICD-10-CM

## 2022-10-28 DIAGNOSIS — K72.10 END STAGE LIVER DISEASE: ICD-10-CM

## 2022-10-28 DIAGNOSIS — R10.84 GENERALIZED ABDOMINAL PAIN: ICD-10-CM

## 2022-10-28 DIAGNOSIS — K59.04 CHRONIC IDIOPATHIC CONSTIPATION: ICD-10-CM

## 2022-10-28 DIAGNOSIS — K74.60 CIRRHOSIS OF LIVER WITHOUT ASCITES, UNSPECIFIED HEPATIC CIRRHOSIS TYPE: ICD-10-CM

## 2022-10-28 DIAGNOSIS — K75.81 NASH (NONALCOHOLIC STEATOHEPATITIS): ICD-10-CM

## 2022-10-28 LAB
AMMONIA BLD-SCNC: 54 UMOL/L (ref 11–51)
INR PPP: 1.53 (ref 0.8–1.2)
PROTHROMBIN TIME: 18.4 SECONDS (ref 11.1–15.3)

## 2022-10-28 PROCEDURE — 80053 COMPREHEN METABOLIC PANEL: CPT

## 2022-10-28 PROCEDURE — 82140 ASSAY OF AMMONIA: CPT

## 2022-10-28 PROCEDURE — 90471 IMMUNIZATION ADMIN: CPT | Performed by: STUDENT IN AN ORGANIZED HEALTH CARE EDUCATION/TRAINING PROGRAM

## 2022-10-28 PROCEDURE — 90686 IIV4 VACC NO PRSV 0.5 ML IM: CPT | Performed by: STUDENT IN AN ORGANIZED HEALTH CARE EDUCATION/TRAINING PROGRAM

## 2022-10-28 PROCEDURE — 85610 PROTHROMBIN TIME: CPT

## 2022-10-28 PROCEDURE — 99214 OFFICE O/P EST MOD 30 MIN: CPT | Performed by: STUDENT IN AN ORGANIZED HEALTH CARE EDUCATION/TRAINING PROGRAM

## 2022-10-28 PROCEDURE — 85025 COMPLETE CBC W/AUTO DIFF WBC: CPT

## 2022-10-28 RX ORDER — SPIRONOLACTONE 25 MG/1
25 TABLET ORAL 2 TIMES DAILY
Qty: 180 TABLET | Refills: 1 | Status: SHIPPED | OUTPATIENT
Start: 2022-10-28

## 2022-10-28 NOTE — PROGRESS NOTES
Subjective:  Amira Shannon is a 61 y.o. female who presents for Hospital discharge follow-up.    Patient went cerumen changed to ER on 10/08/2022 due to severe muscle cramping.  Patient found to have multiple abnormalities on work-up including ammonia level of 130 and platelet level of 60, was admitted for further management.  Responded well to conservative management, no signs of active bleeding, patient was discharged home on 10/10/2020.  States that since discharge, has been feeling well, no confusion, dizziness, falls, nausea, vomiting, diarrhea, hematochezia, hematemesis, abdominal pain.  No acute complaints today, states that she has appropriate follow-up with gastroenterology, hematology.    Patient Active Problem List   Diagnosis   • Bilateral foot pain   • Right hip pain   • Acute pain of both knees   • Plantar fasciitis, bilateral   • Primary osteoarthritis of left knee   • MUSA (nonalcoholic steatohepatitis)   • Tongue lesion   • Bilateral lower extremity edema   • Chronic pain of both knees   • Bilateral calcaneal spurs   • Swelling of both lower extremities   • Obesity with body mass index of 30.0-39.9   • Diastolic dysfunction   • Pancytopenia (HCC)   • Chronic fatigue   • Abdominal pain   • Other constipation   • GERD (gastroesophageal reflux disease)   • Depression   • Disease related peripheral neuropathy   • Epigastric pain   • Hyperlipidemia   • Restless leg syndrome   • CHAI on CPAP   • Carpal tunnel syndrome on left   • Carpal tunnel syndrome on right   • Bilateral wrist pain   • Numbness and tingling in both hands   • Elevated liver enzymes   • History of small bowel obstruction   • Cirrhosis of liver (HCC)   • Status post carpal tunnel release   • Bilateral ankle pain   • Lakesha's deformity of both heels   • Pain in joint, ankle and foot   • Primary osteoarthritis of both knees   • Low back pain   • Abdominal pain, generalized   • Chronic idiopathic constipation   • Hypertension   •  "Elevated serum creatinine   • Splenomegaly   • Chronic pain syndrome   • Lung nodule   • Hypertension   • Lab test negative for COVID-19 virus   • Thrombocytopenia (HCC)   • Iron deficiency   • Adverse effect of iron   • Encounter for assessment of peripherally inserted central venous catheter (PICC)   • Nausea and vomiting   • Weight loss, abnormal   • Gastroparesis   • E. coli UTI (urinary tract infection)   • Acute gastritis   • Hiatal hernia   • Hypomagnesemia   • Hypokalemia   • Gastro-esophageal reflux disease with esophagitis, without bleeding     Vitals:    Vitals:    10/28/22 0916   BP: 142/86   BP Location: Right arm   Patient Position: Sitting   Cuff Size: Large Adult   Pulse: 95   Temp: 97.6 °F (36.4 °C)   SpO2: 96%   Weight: 106 kg (233 lb)   Height: 162.6 cm (64\")     Body mass index is 39.99 kg/m².      Current Outpatient Medications:   •  acetaminophen (TYLENOL) 325 MG tablet, Take 650 mg by mouth Every 6 (Six) Hours As Needed for Mild Pain ., Disp: , Rfl:   •  bisacodyl (DULCOLAX) 10 MG suppository, Insert 10 mg into the rectum As Needed for Constipation., Disp: , Rfl:   •  cetirizine (zyrTEC) 10 MG tablet, Take 1 tablet by mouth Daily., Disp: 90 tablet, Rfl: 3  •  cyclobenzaprine (FLEXERIL) 10 MG tablet, Take 10 mg by mouth 2 (Two) Times a Day As Needed., Disp: , Rfl:   •  Docusate Sodium 100 MG capsule, Take 100 mg by mouth 2 (Two) Times a Day., Disp: , Rfl:   •  DULoxetine (CYMBALTA) 20 MG capsule, Take 1 capsule by mouth Daily., Disp: , Rfl:   •  folic acid (FOLVITE) 1 MG tablet, TAKE 1 TABLET BY MOUTH ON MONDAY, WEDNESDAY AND FRIDAY, Disp: 36 tablet, Rfl: 0  •  furosemide (LASIX) 20 MG tablet, Take 20 mg by mouth 2 (Two) Times a Day., Disp: , Rfl:   •  gabapentin (NEURONTIN) 800 MG tablet, Take 800 mg by mouth 3 (Three) Times a Day., Disp: , Rfl:   •  metoclopramide (REGLAN) 10 MG tablet, Take 1 tablet by mouth 4 (Four) Times a Day Before Meals & at Bedtime., Disp: 120 tablet, Rfl: 1  •  " Naloxegol Oxalate (Movantik) 25 MG tablet, Take 1 tablet by mouth Daily., Disp: , Rfl:   •  nystatin (MYCOSTATIN) 163337 UNIT/GM powder, Apply 1 application topically to the appropriate area as directed 3 (Three) Times a Day As Needed (for itching in crevices)., Disp: 60 g, Rfl: 0  •  Omega-3 Fatty Acids (fish oil) 1000 MG capsule capsule, Take  by mouth 2 (Two) Times a Day With Meals., Disp: , Rfl:   •  omeprazole (priLOSEC) 40 MG capsule, Take 1 capsule by mouth once daily, Disp: 90 capsule, Rfl: 0  •  ondansetron (ZOFRAN) 8 MG tablet, Take 1 tablet by mouth Every 8 (Eight) Hours As Needed for Nausea or Vomiting. (Patient taking differently: Take 4 mg by mouth Every 8 (Eight) Hours As Needed for Nausea or Vomiting.), Disp: 30 tablet, Rfl: 0  •  oxyCODONE (ROXICODONE) 10 MG tablet, Take 10 mg by mouth., Disp: , Rfl:   •  polyethylene glycol (MIRALAX) 17 GM/SCOOP powder, Take 17 g by mouth Daily., Disp: , Rfl:   •  potassium chloride (MICRO-K) 10 MEQ CR capsule, Take 2 capsules by mouth Daily. (Patient taking differently: Take 1 capsule by mouth 2 (Two) Times a Day.), Disp: 30 capsule, Rfl: 1  •  spironolactone (ALDACTONE) 25 MG tablet, Take 1 tablet by mouth 2 (Two) Times a Day., Disp: 180 tablet, Rfl: 1  •  sucralfate (Carafate) 1 g tablet, Take 1 tablet by mouth 4 (Four) Times a Day., Disp: 120 tablet, Rfl: 2  •  traZODone (DESYREL) 100 MG tablet, Take 1 tablet by mouth Every Night., Disp: 30 tablet, Rfl: 2  •  vitamin B-12 (CYANOCOBALAMIN) 1000 MCG tablet, Take 1,000 mcg by mouth Daily., Disp: , Rfl:     Patient Active Problem List   Diagnosis   • Bilateral foot pain   • Right hip pain   • Acute pain of both knees   • Plantar fasciitis, bilateral   • Primary osteoarthritis of left knee   • MUSA (nonalcoholic steatohepatitis)   • Tongue lesion   • Bilateral lower extremity edema   • Chronic pain of both knees   • Bilateral calcaneal spurs   • Swelling of both lower extremities   • Obesity with body mass index of  30.0-39.9   • Diastolic dysfunction   • Pancytopenia (HCC)   • Chronic fatigue   • Abdominal pain   • Other constipation   • GERD (gastroesophageal reflux disease)   • Depression   • Disease related peripheral neuropathy   • Epigastric pain   • Hyperlipidemia   • Restless leg syndrome   • CHAI on CPAP   • Carpal tunnel syndrome on left   • Carpal tunnel syndrome on right   • Bilateral wrist pain   • Numbness and tingling in both hands   • Elevated liver enzymes   • History of small bowel obstruction   • Cirrhosis of liver (HCC)   • Status post carpal tunnel release   • Bilateral ankle pain   • Lakesha's deformity of both heels   • Pain in joint, ankle and foot   • Primary osteoarthritis of both knees   • Low back pain   • Abdominal pain, generalized   • Chronic idiopathic constipation   • Hypertension   • Elevated serum creatinine   • Splenomegaly   • Chronic pain syndrome   • Lung nodule   • Hypertension   • Lab test negative for COVID-19 virus   • Thrombocytopenia (HCC)   • Iron deficiency   • Adverse effect of iron   • Encounter for assessment of peripherally inserted central venous catheter (PICC)   • Nausea and vomiting   • Weight loss, abnormal   • Gastroparesis   • E. coli UTI (urinary tract infection)   • Acute gastritis   • Hiatal hernia   • Hypomagnesemia   • Hypokalemia   • Gastro-esophageal reflux disease with esophagitis, without bleeding     Past Surgical History:   Procedure Laterality Date   • CARPAL TUNNEL RELEASE Left 2018    Procedure: CARPAL TUNNEL RELEASE - left;  Surgeon: Justus Lewis MD;  Location: [facility];  Service: Orthopedics   • CARPAL TUNNEL RELEASE Right 2019    Procedure: carpal tunnel release right hand with local/monitored anesthesia care;  Surgeon: Justus Lewis MD;  Location: [facility];  Service: Orthopedics   •  SECTION     • CHOLECYSTECTOMY     • COLONOSCOPY  2013   • COLONOSCOPY N/A 10/17/2018    Procedure: COLONOSCOPY;  Surgeon:  Russell Del Rio MD;  Location: [facility] ENDOSCOPY;  Service: Gastroenterology   • COLONOSCOPY N/A 3/22/2021    Procedure: COLONOSCOPY;  Surgeon: Russell Del Rio MD;  Location: [facility] ENDOSCOPY;  Service: Gastroenterology;  Laterality: N/A;   • DIRECT LARYNGOSCOPY, ESOPHAGOSCOPY, BRONCHOSCOPY N/A 8/1/2017    Procedure: DIRECT LARYNGOSCOPY AND;  Surgeon: Live Bolton MD;  Location: [facility] OR;  Service:    • ENDOSCOPY  07/01/2013    Colon endoscopy 10199 (1) - Internal & external hemorrhoids found. Stool found.   • ENDOSCOPY  07/01/2013    EGD w/ tube 83990 (1) - Normal esophagus. Gastritis in stomach. Biopsy taken. Normal dudoenum. Biopsy taken.   • ENDOSCOPY N/A 10/17/2018    Procedure: ESOPHAGOGASTRODUODENOSCOPY--eval varices;  Surgeon: Russell Del Rio MD;  Location: [facility] ENDOSCOPY;  Service: Gastroenterology   • ENDOSCOPY N/A 3/22/2021    Procedure: ESOPHAGOGASTRODUODENOSCOPYURGNET;  Surgeon: Russell Del Rio MD;  Location: [facility] ENDOSCOPY;  Service: Gastroenterology;  Laterality: N/A;   • ENDOSCOPY N/A 9/16/2021    Procedure: ESOPHAGOGASTRODUODENOSCOPY;  Surgeon: Basia Saunders MD;  Location: [facility] ENDOSCOPY;  Service: Gastroenterology;  Laterality: N/A;   • FOOT SURGERY  02/26/2013    Foot/toes surgery procedure (1) - Arthroplasty of toes 4 and 5 of right foot.   • HERNIA REPAIR     • HERNIA REPAIR      hital   • HYSTERECTOMY     • LIVER BIOPSY     • NERVE BLOCK  07/25/2016    Injection for nerve block (1) - Lumbar medial branch block.   • OTHER SURGICAL HISTORY  12/03/2012    Inj(s) Tend-Sheath, Ligament, Single 20550 (1) - [facility]    • OTHER SURGICAL HISTORY  06/24/2013    Small Joint Injection/Aspiration 20600 (2) - [facility]    • OTHER SURGICAL HISTORY  2011    bowel obstruction x2   • OTHER SURGICAL HISTORY      gland removed from neck   • SUBLINGUAL SALIVARY CYST EXCISION N/A 8/1/2017    Procedure: EXCISION OF LEFT  TONGUE LESION WITH CLOSURE;  Surgeon:  Live Bolton MD;  Location: [facility];  Service:    • TUBAL ABDOMINAL LIGATION     • UPPER GASTROINTESTINAL ENDOSCOPY  2013   • UPPER GASTROINTESTINAL ENDOSCOPY  10/17/2018   • UPPER GASTROINTESTINAL ENDOSCOPY  2021     Social History     Socioeconomic History   • Marital status:    Tobacco Use   • Smoking status: Former     Packs/day: 2.00     Years: 15.00     Pack years: 30.00     Types: Cigarettes     Quit date:      Years since quittin.8   • Smokeless tobacco: Never   Vaping Use   • Vaping Use: Never used   Substance and Sexual Activity   • Alcohol use: No   • Drug use: Never   • Sexual activity: Defer     Birth control/protection: Surgical     Comment: Marital Status: .  Hysterectomy.     Family History   Problem Relation Age of Onset   • Cancer Other    • Diabetes Other    • Heart disease Other    • Hypertension Mother    • Cancer Mother    • Diabetes Mother    • Hypertension Father    • Heart attack Father    • Cancer Father    • Heart disease Father    • Thyroid disease Maternal Aunt    • Cancer Maternal Aunt    • No Known Problems Sister    • No Known Problems Brother    • Cancer Maternal Grandmother    • No Known Problems Sister      Lab on 2022   Component Date Value Ref Range Status   • ALPHA-FETOPROTEIN 2022 3.11  0 - 8.3 ng/mL Final   • Protime 2022 18.7 (H)  11.1 - 15.3 Seconds Final   • INR 2022 1.56 (H)  0.80 - 1.20 Final   • Glucose 2022 107 (H)  65 - 99 mg/dL Final   • BUN 2022 7 (L)  8 - 23 mg/dL Final   • Creatinine 2022 0.89  0.57 - 1.00 mg/dL Final   • Sodium 2022 139  136 - 145 mmol/L Final   • Potassium 2022 3.6  3.5 - 5.2 mmol/L Final   • Chloride 2022 103  98 - 107 mmol/L Final   • CO2 2022 26.1  22.0 - 29.0 mmol/L Final   • Calcium 2022 8.8  8.6 - 10.5 mg/dL Final   • Total Protein 2022 6.4  6.0 - 8.5 g/dL Final   • Albumin 2022 3.40 (L)  3.50 - 5.20 g/dL Final   •  ALT (SGPT) 09/27/2022 28  1 - 33 U/L Final   • AST (SGOT) 09/27/2022 49 (H)  1 - 32 U/L Final   • Alkaline Phosphatase 09/27/2022 153 (H)  39 - 117 U/L Final   • Total Bilirubin 09/27/2022 1.4 (H)  0.0 - 1.2 mg/dL Final   • Globulin 09/27/2022 3.0  gm/dL Final   • A/G Ratio 09/27/2022 1.1  g/dL Final   • BUN/Creatinine Ratio 09/27/2022 7.9  7.0 - 25.0 Final   • Anion Gap 09/27/2022 9.9  5.0 - 15.0 mmol/L Final   • eGFR 09/27/2022 73.4  >60.0 mL/min/1.73 Final    National Kidney Foundation and American Society of Nephrology (ASN) Task Force recommended calculation based on the Chronic Kidney Disease Epidemiology Collaboration (CKD-EPI) equation refit without adjustment for race.   Lab on 09/01/2022   Component Date Value Ref Range Status   • Iron 09/01/2022 123  37 - 145 mcg/dL Final   • Iron Saturation 09/01/2022 36  20 - 50 % Final   • Transferrin 09/01/2022 230  200 - 360 mg/dL Final   • TIBC 09/01/2022 343  298 - 536 mcg/dL Final   • Ferritin 09/01/2022 166.60 (H)  13.00 - 150.00 ng/mL Final   • Vitamin B-12 09/01/2022 1,295 (H)  211 - 946 pg/mL Final   • Folate 09/01/2022 12.00  4.78 - 24.20 ng/mL Final   • WBC 09/01/2022 4.36  3.40 - 10.80 10*3/mm3 Final   • RBC 09/01/2022 4.69  3.77 - 5.28 10*6/mm3 Final   • Hemoglobin 09/01/2022 13.7  12.0 - 15.9 g/dL Final   • Hematocrit 09/01/2022 39.9  34.0 - 46.6 % Final   • MCV 09/01/2022 85.1  79.0 - 97.0 fL Final   • MCH 09/01/2022 29.2  26.6 - 33.0 pg Final   • MCHC 09/01/2022 34.3  31.5 - 35.7 g/dL Final   • RDW 09/01/2022 15.5 (H)  12.3 - 15.4 % Final   • RDW-SD 09/01/2022 47.6  37.0 - 54.0 fl Final   • MPV 09/01/2022 10.5  6.0 - 12.0 fL Final   • Platelets 09/01/2022 58 (L)  140 - 450 10*3/mm3 Final   • Neutrophil % 09/01/2022 68.5  42.7 - 76.0 % Final   • Lymphocyte % 09/01/2022 21.1  19.6 - 45.3 % Final   • Monocyte % 09/01/2022 6.0  5.0 - 12.0 % Final   • Eosinophil % 09/01/2022 3.7  0.3 - 6.2 % Final   • Basophil % 09/01/2022 0.5  0.0 - 1.5 % Final   • Immature  Grans % 09/01/2022 0.2  0.0 - 0.5 % Final   • Neutrophils, Absolute 09/01/2022 2.99  1.70 - 7.00 10*3/mm3 Final   • Lymphocytes, Absolute 09/01/2022 0.92  0.70 - 3.10 10*3/mm3 Final   • Monocytes, Absolute 09/01/2022 0.26  0.10 - 0.90 10*3/mm3 Final   • Eosinophils, Absolute 09/01/2022 0.16  0.00 - 0.40 10*3/mm3 Final   • Basophils, Absolute 09/01/2022 0.02  0.00 - 0.20 10*3/mm3 Final   • Immature Grans, Absolute 09/01/2022 0.01  0.00 - 0.05 10*3/mm3 Final   • nRBC 09/01/2022 0.0  0.0 - 0.2 /100 WBC Final   Office Visit on 08/09/2022   Component Date Value Ref Range Status   • Color 08/09/2022 Dark Yellow  Yellow, Straw, Dark Yellow, Mikala Final   • Clarity, UA 08/09/2022 Clear  Clear Final   • Specific Gravity  08/09/2022 1.015  1.005 - 1.030 Final   • pH, Urine 08/09/2022 6.5  5.0 - 8.0 Final   • Leukocytes 08/09/2022 Negative  Negative Final   • Nitrite, UA 08/09/2022 Negative  Negative Final   • Protein, POC 08/09/2022 Trace (A)  Negative mg/dL Final   • Glucose, UA 08/09/2022 Negative  Negative mg/dL Final   • Ketones, UA 08/09/2022 Negative  Negative Final   • Urobilinogen, UA 08/09/2022 1 E.U./dL (A)  Normal Final   • Bilirubin 08/09/2022 Small (1+) (A)  Negative Final   • Blood, UA 08/09/2022 Negative  Negative Final   • Lot Number 08/09/2022 [id number]   Final   • Expiration Date 08/09/2022 08/18/22   Final   • Urine Culture 08/09/2022 No growth   Final   Hospital Outpatient Visit on 07/27/2022   Component Date Value Ref Range Status   • Target HR (85%) 07/27/2022 134  bpm Final   • Max. Pred. HR (100%) 07/27/2022 158  bpm Final   • RIGHT DORSALIS PEDIS SYS MAX 07/27/2022 141   Final   • RIGHT POST TIBIAL SYS MAX 07/27/2022 174   Final   • RIGHT CARYN RATIO 07/27/2022 1.17   Final   • LEFT DORSALIS PEDIS SYS MAX 07/27/2022 169   Final   • LEFT POST TIBIAL SYS MAX 07/27/2022 171   Final   • LEFT CARYN RATIO 07/27/2022 1.15   Final   • Upper arterial right arm brachial * 07/27/2022 149  mmHg Final   • Upper  arterial left arm brachial s* 07/27/2022 145  mmHg Final   Hospital Outpatient Visit on 07/27/2022   Component Date Value Ref Range Status   • Target HR (85%) 07/27/2022 134  bpm Final   • Max. Pred. HR (100%) 07/27/2022 158  bpm Final   Lab on 07/01/2022   Component Date Value Ref Range Status   • Fibrosis Score (References) 07/01/2022 0.91 (H)  0.00 - 0.21 Final   • Fibrosis Stage (Reference) 07/01/2022 Comment   Final                         F4 - Cirrhosis   • Steatosis Score (Reference) 07/01/2022 0.55 (H)  0.00 - 0.30 Final   • Steatosis Grade (Reference) 07/01/2022 Comment   Final           S1 - S2  Minimal Steatosis - Moderate Steatosis   • MUSA Score (Reference) 07/01/2022 0.75 (H)  0.25 Final   • Musa Grade (Reference) 07/01/2022 Comment   Final                           N2 - MUSA   • Height: (Reference) 07/01/2022 64  in Final   • Weight: (Reference) 07/01/2022 232  LBS Final   • Alpha 2-Macroglobulins, Qn 07/01/2022 336 (H)  110 - 276 mg/dL Final   • Haptoglobin 07/01/2022 <10 (L)  37 - 355 mg/dL Final   • Apolipoprotein A-1 07/01/2022 101 (L)  116 - 209 mg/dL Final   • Total Bilirubin 07/01/2022 0.7  0.0 - 1.2 mg/dL Final   • GGT 07/01/2022 44  0 - 60 IU/L Final   • ALT (SGPT) 07/01/2022 45 (H)  0 - 40 IU/L Final   • AST (SGOT) P5P (Reference) 07/01/2022 75 (H)  0 - 40 IU/L Final   • Cholesterol, Total (Reference) 07/01/2022 122  100 - 199 mg/dL Final   • Glucose, Serum (Reference) 07/01/2022 106 (H)  65 - 99 mg/dL Final   • Triglycerides 07/01/2022 76  0 - 149 mg/dL Final   • Interpretations: (Reference) 07/01/2022 Comment   Final    Comment: Quantitative results of 10 biochemicals in combination with  age, gender, height, and weight, are analyzed using a  computational algorithm to provide a quantitative surrogate  marker (0.0-1.0) of liver fibrosis (Metavir F0-F4), hepatic  steatosis (0.0-1.0, S0-S3), and Non-Alcoholic Steato-  Hepatitis (MUSA) (0.0-0.75, N0-N2). The absence of steatosis  (S<0.38)  precludes the diagnosis of MUSA.  Fibrosis marker:  In a study of 171 Non-Alcoholic Fatty  Liver Disease (NAFLD) patients where 23% had significant  NAFLD fibrosis (Metavir F2-F4) and 11% had cirrhosis by  liver biopsy, a fibrosis result of >0.3 yielded a  sensitivity of 83% and a specificity of 78% for the  detection of significant fibrosis(1).  Steatosis Marker:  In a population of 744 patients (583 HCV,  18 HBV, 69 NAFLD, and 74 alcoholic disease patients), where  36% had significant steatosis (>5%) on a liver biopsy, a  steatosis score >0.5 had a sensitivity of 71% and a  specificity of 72% for identification of                            significant  steatosis(2).  MUSA marker:  In a population of 257 NAFLD patients, where  62% had at least some MUSA by liver biopsy, a prediction of  MUSA had a sensitivity of 88% for identifying MUSA and a  specificity of 50%(3).   • Fibrosis Scorin2022 Comment   Final         <=0.21 = Stage F0 - No fibrosis  0.21 - 0.27 = Stage F0 - F1  0.27 - 0.31 = Stage F1 - Portal fibrosis  0.31 - 0.48 = Stage F1 - F2  0.48 - 0.58 = Stage F2 - Bridging fibrosis with few septa  0.58 - 0.72 = Stage F3 - Bridging fibrosis with many septa  0.72 - 0.74 = Stage F3 - F4        >0.74 = Stage F4 - Cirrhosis   • Steatosis Grading (Reference) 2022 Comment   Final          < 0.30 = S0 - No Steatosis  0.30 to 0.38 = S0 - S1  0.38 to 0.48 = S1 - Minimal Steatosis  0.48 to 0.57 = S1 - S2  0.57 to 0.67 = S2 - Moderate Steatosis  0.67 to 0.69 = S2 - S3        > 0.69 = S3 - Marked or Severe Steatosis   • Musa Scoring (Reference) 2022 Comment   Final    0.25 = N0 - Not MUSA  0.50 = N1 - Borderline or probable MUSA  0.75 = N2 - MUSA   • Limitations: (Reference) 2022 Comment   Final    MUSA FibroSure is recommended for patients with suspected non-  alcoholic fatty liver disease.  It is not recommended for patients  with other liver diseases.  It is also not recommended in  patients  with Gilbert Disease, acute hemolysis, acute viral hepatitis, drug  induced hepatitis, genetic liver disease, autoimmune hepatitis and/or  extra-hepatic cholestasis.  Any of these clinical situations may lead  to inaccurate quantitative predictions of fibrosis.   • Comment (Reference) 07/01/2022 Comment   Final    This test was developed and its performance characteristics determined  by [facility].  It has not been cleared or approved by the Food and Drug  Administration.  The FDA has determined that such clearance or  approval is not necessary.  For questions regarding this report please contact  customer service at [phone].  References:  1. Yunier QUICK. et al. Diagnostic Value of Biochemical Markers     (FibroTest) for the prediction of Liver Fibrosis in     patients with Non-Alcoholic Fatty Liver Disease. [facility] 2006; 6:6.  2. DAVIS Quinn. et al. The Diagnostic Value of Biomarkers     (Steato Test) for the Prediction of Liver Steatosis.     Comparative Hepatol. 2005; 4:10.  3. DAVIS Quinn, Chrissy Faye, et al. Diagnostic value     of biochemical markers (MUSA TEST) for the prediction of     non alcohol steato hepatitis in patients with non-     alcoholic fatty liver disease. [facility] 2006;     6:34 doi:10.1186/5063-560S-4-34.   • Protime 07/01/2022 17.2 (H)  11.1 - 15.3 Seconds Final   • INR 07/01/2022 1.42 (H)  0.80 - 1.20 Final   • Iron 07/01/2022 73  37 - 145 mcg/dL Final   • Iron Saturation 07/01/2022 20  20 - 50 % Final   • Transferrin 07/01/2022 248  200 - 360 mg/dL Final   • TIBC 07/01/2022 370  298 - 536 mcg/dL Final   • Ferritin 07/01/2022 156.40 (H)  13.00 - 150.00 ng/mL Final   • Folate 07/01/2022 9.32  4.78 - 24.20 ng/mL Final   • Vitamin B-12 07/01/2022 1,290 (H)  211 - 946 pg/mL Final   • WBC 07/01/2022 4.74  3.40 - 10.80 10*3/mm3 Final   • RBC 07/01/2022 4.78  3.77 - 5.28 10*6/mm3 Final   • Hemoglobin 07/01/2022 13.6  12.0 - 15.9 g/dL Final   •  Hematocrit 07/01/2022 40.2  34.0 - 46.6 % Final   • MCV 07/01/2022 84.1  79.0 - 97.0 fL Final   • MCH 07/01/2022 28.5  26.6 - 33.0 pg Final   • MCHC 07/01/2022 33.8  31.5 - 35.7 g/dL Final   • RDW 07/01/2022 16.7 (H)  12.3 - 15.4 % Final   • RDW-SD 07/01/2022 51.0  37.0 - 54.0 fl Final   • MPV 07/01/2022    Final    Instrument unable to calculate MPV manual diff to follow   • Platelets 07/01/2022 28 (C)  140 - 450 10*3/mm3 Final   • Neutrophil % 07/01/2022 63.0  42.7 - 76.0 % Final   • Lymphocyte % 07/01/2022 20.0  19.6 - 45.3 % Final   • Monocyte % 07/01/2022 8.0  5.0 - 12.0 % Final   • Eosinophil % 07/01/2022 1.0  0.3 - 6.2 % Final   • Bands %  07/01/2022 7.0 (H)  0.0 - 5.0 % Final   • Atypical Lymphocyte % 07/01/2022 1.0  0.0 - 5.0 % Final   • Neutrophils Absolute 07/01/2022 3.32  1.70 - 7.00 10*3/mm3 Final   • Lymphocytes Absolute 07/01/2022 1.00  0.70 - 3.10 10*3/mm3 Final   • Monocytes Absolute 07/01/2022 0.38  0.10 - 0.90 10*3/mm3 Final   • Eosinophils Absolute 07/01/2022 0.05  0.00 - 0.40 10*3/mm3 Final   • Anisocytosis 07/01/2022 Mod/2+  None Seen Final   • WBC Morphology 07/01/2022 Normal  Normal Final   • Platelet Estimate 07/01/2022 Decreased  Normal Final   • Clumped Platelets 07/01/2022 Present  None Seen Final    INTERPRET PLT CT WITH CAUTION;  MANY PLT CLUMPS APPEARING ON SLIDE SCAN MAY MAKE CT READ LOWER THAN IT ACTUALLY IS   Lab on 05/26/2022   Component Date Value Ref Range Status   • Ferritin 05/26/2022 124.60  13.00 - 150.00 ng/mL Final   • Iron 05/26/2022 69  37 - 145 mcg/dL Final   • Iron Saturation 05/26/2022 18 (L)  20 - 50 % Final   • Transferrin 05/26/2022 258  200 - 360 mg/dL Final   • TIBC 05/26/2022 384  298 - 536 mcg/dL Final   • Folate 05/26/2022 14.50  4.78 - 24.20 ng/mL Final   • Vitamin B-12 05/26/2022 1,017 (H)  211 - 946 pg/mL Final   • WBC 05/26/2022 2.93 (L)  3.40 - 10.80 10*3/mm3 Final   • RBC 05/26/2022 4.70  3.77 - 5.28 10*6/mm3 Final   • Hemoglobin 05/26/2022 13.6  12.0 -  15.9 g/dL Final   • Hematocrit 05/26/2022 39.3  34.0 - 46.6 % Final   • MCV 05/26/2022 83.6  79.0 - 97.0 fL Final   • MCH 05/26/2022 28.9  26.6 - 33.0 pg Final   • MCHC 05/26/2022 34.6  31.5 - 35.7 g/dL Final   • RDW 05/26/2022 15.4  12.3 - 15.4 % Final   • RDW-SD 05/26/2022 46.9  37.0 - 54.0 fl Final   • MPV 05/26/2022 10.8  6.0 - 12.0 fL Final   • Platelets 05/26/2022 55 (L)  140 - 450 10*3/mm3 Final   • Neutrophil % 05/26/2022 66.9  42.7 - 76.0 % Final   • Lymphocyte % 05/26/2022 18.1 (L)  19.6 - 45.3 % Final   • Monocyte % 05/26/2022 12.3 (H)  5.0 - 12.0 % Final   • Eosinophil % 05/26/2022 1.7  0.3 - 6.2 % Final   • Basophil % 05/26/2022 0.3  0.0 - 1.5 % Final   • Immature Grans % 05/26/2022 0.7 (H)  0.0 - 0.5 % Final   • Neutrophils, Absolute 05/26/2022 1.96  1.70 - 7.00 10*3/mm3 Final   • Lymphocytes, Absolute 05/26/2022 0.53 (L)  0.70 - 3.10 10*3/mm3 Final   • Monocytes, Absolute 05/26/2022 0.36  0.10 - 0.90 10*3/mm3 Final   • Eosinophils, Absolute 05/26/2022 0.05  0.00 - 0.40 10*3/mm3 Final   • Basophils, Absolute 05/26/2022 0.01  0.00 - 0.20 10*3/mm3 Final   • Immature Grans, Absolute 05/26/2022 0.02  0.00 - 0.05 10*3/mm3 Final   • nRBC 05/26/2022 0.0  0.0 - 0.2 /100 WBC Final   Admission on 05/17/2022, Discharged on 05/17/2022   Component Date Value Ref Range Status   • COVID19 05/17/2022 Not Detected  Not Detected - Ref. Range Final   • Influenza A PCR 05/17/2022 Not Detected  Not Detected Final   • Influenza B PCR 05/17/2022 Not Detected  Not Detected Final   • Lipase 05/17/2022 33  13 - 60 U/L Final   • Glucose 05/17/2022 102 (H)  65 - 99 mg/dL Final   • BUN 05/17/2022 8  8 - 23 mg/dL Final   • Creatinine 05/17/2022 1.11 (H)  0.57 - 1.00 mg/dL Final   • Sodium 05/17/2022 141  136 - 145 mmol/L Final   • Potassium 05/17/2022 4.1  3.5 - 5.2 mmol/L Final   • Chloride 05/17/2022 105  98 - 107 mmol/L Final   • CO2 05/17/2022 27.0  22.0 - 29.0 mmol/L Final   • Calcium 05/17/2022 9.2  8.6 - 10.5 mg/dL Final   •  Total Protein 05/17/2022 7.0  6.0 - 8.5 g/dL Final   • Albumin 05/17/2022 3.70  3.50 - 5.20 g/dL Final   • ALT (SGPT) 05/17/2022 25  1 - 33 U/L Final   • AST (SGOT) 05/17/2022 37 (H)  1 - 32 U/L Final   • Alkaline Phosphatase 05/17/2022 149 (H)  39 - 117 U/L Final   • Total Bilirubin 05/17/2022 1.1  0.0 - 1.2 mg/dL Final   • Globulin 05/17/2022 3.3  gm/dL Final   • A/G Ratio 05/17/2022 1.1  g/dL Final   • BUN/Creatinine Ratio 05/17/2022 7.2  7.0 - 25.0 Final   • Anion Gap 05/17/2022 9.0  5.0 - 15.0 mmol/L Final   • eGFR 05/17/2022 56.3 (L)  >60.0 mL/min/1.73 Final    National Kidney Foundation and American Society of Nephrology (ASN) Task Force recommended calculation based on the Chronic Kidney Disease Epidemiology Collaboration (CKD-EPI) equation refit without adjustment for race.   • WBC 05/17/2022 3.90  3.40 - 10.80 10*3/mm3 Final   • RBC 05/17/2022 5.00  3.77 - 5.28 10*6/mm3 Final   • Hemoglobin 05/17/2022 14.4  12.0 - 15.9 g/dL Final   • Hematocrit 05/17/2022 42.3  34.0 - 46.6 % Final   • MCV 05/17/2022 84.6  79.0 - 97.0 fL Final   • MCH 05/17/2022 28.8  26.6 - 33.0 pg Final   • MCHC 05/17/2022 34.0  31.5 - 35.7 g/dL Final   • RDW 05/17/2022 15.5 (H)  12.3 - 15.4 % Final   • RDW-SD 05/17/2022 47.1  37.0 - 54.0 fl Final   • MPV 05/17/2022 11.0  6.0 - 12.0 fL Final   • Platelets 05/17/2022 67 (L)  140 - 450 10*3/mm3 Final   • Neutrophil % 05/17/2022 68.5  42.7 - 76.0 % Final   • Lymphocyte % 05/17/2022 23.1  19.6 - 45.3 % Final   • Monocyte % 05/17/2022 5.1  5.0 - 12.0 % Final   • Eosinophil % 05/17/2022 2.3  0.3 - 6.2 % Final   • Basophil % 05/17/2022 0.5  0.0 - 1.5 % Final   • Immature Grans % 05/17/2022 0.5  0.0 - 0.5 % Final   • Neutrophils, Absolute 05/17/2022 2.67  1.70 - 7.00 10*3/mm3 Final   • Lymphocytes, Absolute 05/17/2022 0.90  0.70 - 3.10 10*3/mm3 Final   • Monocytes, Absolute 05/17/2022 0.20  0.10 - 0.90 10*3/mm3 Final   • Eosinophils, Absolute 05/17/2022 0.09  0.00 - 0.40 10*3/mm3 Final   •  Basophils, Absolute 05/17/2022 0.02  0.00 - 0.20 10*3/mm3 Final   • Immature Grans, Absolute 05/17/2022 0.02  0.00 - 0.05 10*3/mm3 Final   • nRBC 05/17/2022 0.0  0.0 - 0.2 /100 WBC Final   • proBNP 05/17/2022 209.6  0.0 - 900.0 pg/mL Final   • D-Dimer, Quantitative 05/17/2022 894 (H)  0 - 470 ng/mL (FEU) Final   • Extra Tube 05/17/2022 Hold for add-ons.   Final    Auto resulted.   • Extra Tube 05/17/2022 Hold for add-ons.   Final    Auto resulted.   • Anisocytosis 05/17/2022 Slight/1+  None Seen Final   • WBC Morphology 05/17/2022 Normal  Normal Final   • Platelet Estimate 05/17/2022 Decreased  Normal Final      XR Ankle 3+ View Left  Narrative: Three view left ankle    HISTORY: Pain following injury    AP, lateral and oblique views obtained.    COMPARISON: March 21, 2019    FINDINGS:   No acute fracture or dislocation.  Soft tissue swelling about the ankle.  Old avulsions medial and lateral malleoli.  Degenerative narrowing tibial talar joint.  Small spurs distal tibia.  Calcaneal spurs.  Degenerative changes dorsal aspect navicular cuneiform joint and  cuneiform metatarsal joints.  No other osseous or articular abnormality.  Impression: CONCLUSION:  As above    [id number]    Electronically signed by:  Walker Stevens MD  10/6/2022 10:35 AM  CDT Workstation: [id number]      [email or web address]  Immunization History   Administered Date(s) Administered   • COVID-19 (MODERNA) 1st, 2nd, 3rd Dose Only 06/16/2021, 08/13/2021   • Flu Vaccine Quad PF >36MO 10/02/2018   • FluLaval/Fluzone >6mos 11/04/2019, 10/01/2020   • Hepatitis A 10/04/2007, 04/04/2008   • Hepatitis B 10/04/2007, 11/08/2007, 04/04/2008   • Influenza, Unspecified 09/28/2021   • Shingrix 11/04/2019, 02/11/2020     The following portions of the patient's history were reviewed and updated as appropriate: allergies, current medications, past family history, past medical history, past social history, past surgical history and problem list.    PHQ-9 Total Score:            Physical Exam  Constitutional:       Appearance: Normal appearance.   HENT:      Head: Normocephalic and atraumatic.      Right Ear: External ear normal.      Left Ear: External ear normal.   Eyes:      General:         Right eye: No discharge.         Left eye: No discharge.      Conjunctiva/sclera: Conjunctivae normal.   Cardiovascular:      Rate and Rhythm: Normal rate and regular rhythm.      Pulses: Normal pulses.      Heart sounds: Normal heart sounds. No murmur heard.  Pulmonary:      Effort: Pulmonary effort is normal. No respiratory distress.      Breath sounds: Normal breath sounds.   Abdominal:      General: There is no distension.      Palpations: Abdomen is soft.      Tenderness: There is no abdominal tenderness.   Musculoskeletal:      Cervical back: Normal range of motion.      Right lower leg: Edema present.      Left lower leg: Edema present.   Lymphadenopathy:      Cervical: No cervical adenopathy.   Neurological:      Mental Status: She is alert. Mental status is at baseline.   Psychiatric:         Mood and Affect: Mood normal.         Behavior: Behavior normal.       Assessment & Plan    Diagnosis Plan   1. Hospital discharge follow-up        2. Flu vaccine need  FluLaval/Fluzone >6 mos ([id number])      3. End stage liver disease (HCC)  spironolactone (ALDACTONE) 25 MG tablet         Orders Placed This Encounter   Procedures   • FluLaval/Fluzone >6 mos ([id number])     Hospital discharge follow-up; as above, records reviewed, labs reassuring, patient has labs pending with gastroenterology, advised to obtain today.  No acute complications on exam, vital stable, reassuring.  Will refill medication as above, flu vaccine today, patient declined pneumonia vaccine.  Follow-up in 6 months or sooner if needed    Total time examining evaluating the patient, completing orders and counseling was 31 minutes.         This document has been electronically signed by Omar Chadwick MD on October 28, 2022 09:52  CDT    EMR Dragon/Transciption Disclaimer: Some of this note may be an electronic transcription/translation of spoken language to printed text.  The electronic translation of spoken language may permit erroneous, or at times, nonsensical words or phrases to be inadvertently transcribed. Although I have reviewed the note for such errors, some may still exist.

## 2022-10-29 LAB
ALBUMIN SERPL-MCNC: 3.3 G/DL (ref 3.5–5.2)
ALBUMIN/GLOB SERPL: 1 G/DL
ALP SERPL-CCNC: 159 U/L (ref 39–117)
ALT SERPL W P-5'-P-CCNC: 28 U/L (ref 1–33)
ANION GAP SERPL CALCULATED.3IONS-SCNC: 10.3 MMOL/L (ref 5–15)
AST SERPL-CCNC: 47 U/L (ref 1–32)
BASOPHILS # BLD AUTO: 0.03 10*3/MM3 (ref 0–0.2)
BASOPHILS NFR BLD AUTO: 0.6 % (ref 0–1.5)
BILIRUB SERPL-MCNC: 1.6 MG/DL (ref 0–1.2)
BUN SERPL-MCNC: 5 MG/DL (ref 8–23)
BUN/CREAT SERPL: 5.6 (ref 7–25)
CALCIUM SPEC-SCNC: 8.9 MG/DL (ref 8.6–10.5)
CHLORIDE SERPL-SCNC: 106 MMOL/L (ref 98–107)
CO2 SERPL-SCNC: 23.7 MMOL/L (ref 22–29)
CREAT SERPL-MCNC: 0.89 MG/DL (ref 0.57–1)
DEPRECATED RDW RBC AUTO: 50.2 FL (ref 37–54)
EGFRCR SERPLBLD CKD-EPI 2021: 73.4 ML/MIN/1.73
EOSINOPHIL # BLD AUTO: 0.14 10*3/MM3 (ref 0–0.4)
EOSINOPHIL NFR BLD AUTO: 3 % (ref 0.3–6.2)
ERYTHROCYTE [DISTWIDTH] IN BLOOD BY AUTOMATED COUNT: 16.1 % (ref 12.3–15.4)
GLOBULIN UR ELPH-MCNC: 3.3 GM/DL
GLUCOSE SERPL-MCNC: 89 MG/DL (ref 65–99)
HCT VFR BLD AUTO: 41.2 % (ref 34–46.6)
HGB BLD-MCNC: 14.4 G/DL (ref 12–15.9)
LYMPHOCYTES # BLD AUTO: 1.08 10*3/MM3 (ref 0.7–3.1)
LYMPHOCYTES NFR BLD AUTO: 23.3 % (ref 19.6–45.3)
MCH RBC QN AUTO: 29.8 PG (ref 26.6–33)
MCHC RBC AUTO-ENTMCNC: 35 G/DL (ref 31.5–35.7)
MCV RBC AUTO: 85.1 FL (ref 79–97)
MONOCYTES # BLD AUTO: 0.25 10*3/MM3 (ref 0.1–0.9)
MONOCYTES NFR BLD AUTO: 5.4 % (ref 5–12)
NEUTROPHILS NFR BLD AUTO: 3.12 10*3/MM3 (ref 1.7–7)
NEUTROPHILS NFR BLD AUTO: 67.5 % (ref 42.7–76)
PLATELET # BLD AUTO: 69 10*3/MM3 (ref 140–450)
POTASSIUM SERPL-SCNC: 4.4 MMOL/L (ref 3.5–5.2)
PROT SERPL-MCNC: 6.6 G/DL (ref 6–8.5)
RBC # BLD AUTO: 4.84 10*6/MM3 (ref 3.77–5.28)
SODIUM SERPL-SCNC: 140 MMOL/L (ref 136–145)
WBC NRBC COR # BLD: 4.63 10*3/MM3 (ref 3.4–10.8)

## 2022-11-02 ENCOUNTER — TELEPHONE (OUTPATIENT)
Dept: FAMILY MEDICINE CLINIC | Facility: CLINIC | Age: 62
End: 2022-11-02

## 2022-11-17 NOTE — PROGRESS NOTES
Amira Shannon is a 60 y.o. female   Primary provider:  Yobany Barr MD       Chief Complaint   Patient presents with   • Right Hip - Pain, Initial Evaluation     Xray done today.  HISTORY OF PRESENT ILLNESS: Patient is a 60-year-old female who presents today with complaints of right hip pain that has been present for 6+ months.  Patient denies falls or injury to left hip.  Patient reports that she has been using her cane which helps a little.  She is currently taking Mobic which she states does not seem to help much.  She takes oxycodone for chronic pain, she states this helps with pain does not fix it completely though.  Patient reports that she cannot take steroids due to allergic reaction.  She reports sometimes when she sits for long periods of time it will feel like her upper quad goes numb.  She denies radicular pain.  She reports the pain is a throbbing and ache.  She rates the pain is an 8 out of 10.  She also reports occasional muscle spasms.  She is on muscle relaxer, this does not seem to help much.  She reports that laying on affected hip aggravates pain.    After patient's initial exam was done with hip, patient notes that she did take a stumble in her yard and bruised her second toe on her right foot, patient is also requesting x-ray of right foot today.  X-rays of foot also obtained.  No fracture was noted but abnormality of ankle was incidentally seen.  Patient reports that she has history of chronic bilateral ankle pain.  Monofilament exam was performed and patient has decreased sensation.  Further ankle x-rays were taken.      Pain  The current episode started more than 1 month ago. Associated symptoms include joint swelling and numbness. Associated symptoms comments: Stabbing, aching. The symptoms are aggravated by standing and walking (sitting, driving ).        CONCURRENT MEDICAL HISTORY:    Past Medical History:   Diagnosis Date   • Acid reflux    • Altered bowel function    •  "Arthropathy of lumbar facet joint    • Bleeding disorder (CMS/HCC)    • C. difficile diarrhea 2015   • Constipation    • Corns and callus    • Depression    • Disease related peripheral neuropathy    • Epigastric pain    • Fatty liver    • Hammer toe    • Headache    • Hiatal hernia    • History of transfusion    • Hyperlipidemia    • Knee pain    • Localized, primary osteoarthritis of the ankle and foot     Localized, primary osteoarthritis of the ankle and/or foot   • Mendoza's metatarsalgia     Mendoza's metatarsalgia - 2nd interspace on right   • Nausea and vomiting    • Neuralgia and neuritis     Neuralgia, neuritis, and radiculitis, unspecified   • Neuropathy    • Obstructive sleep apnea     Obstructive sleep apnea (adult) (pediatric)    • CHAI on CPAP     \"C-Pap at night  (unconfirmed)\"   • Osteoarthritis    • Pain in foot     Pain in unspecified foot - sees a podiatrist   • Pain in joint, ankle and foot     Joint pain in ankle and foot      • Pain radiating to back     Pain radiating to lumbar region of back   • Plantar fasciitis    • PONV (postoperative nausea and vomiting)    • Restless leg syndrome    • Secondary hypertension     Secondary hypertension, unspecified   • Sinusitis    • Sleep apnea    • Tongue anomaly     lesion       Allergies   Allergen Reactions   • Other      Pt states that taking steroids either in pill or injection form make her have blisters in her mouth and she feels like she is on fire on the inside/ has hx of c diff and possible MRSA     • Corticosteroids Rash   • Kenalog  [Triamcinolone Acetonide] Rash   • Sulfa Antibiotics Rash     Sulfa (Sulfonamide Antibiotics)         Current Outpatient Medications:   •  cetirizine (zyrTEC) 10 MG tablet, Take 1 tablet by mouth Daily., Disp: , Rfl:   •  cyclobenzaprine (FLEXERIL) 10 MG tablet, Take 10 mg by mouth 3 (Three) Times a Day As Needed for Muscle Spasms., Disp: , Rfl:   •  dicyclomine (BENTYL) 20 MG tablet, Take 1 tablet by mouth Every 6 " (Six) Hours As Needed (ABD CRAMPING)., Disp: 20 tablet, Rfl: 0  •  escitalopram (LEXAPRO) 5 MG tablet, Take 5 mg by mouth Daily., Disp: , Rfl:   •  gabapentin (NEURONTIN) 800 MG tablet, Take 800 mg by mouth 4 (Four) Times a Day., Disp: , Rfl:   •  lactulose (CHRONULAC) 10 GM/15ML solution, Take 30 mL by mouth 2 (Two) Times a Day., Disp: 946 mL, Rfl: 1  •  Melatonin 10 MG capsule, Take  by mouth Every Night., Disp: , Rfl:   •  metFORMIN (GLUCOPHAGE) 500 MG tablet, Take 1 tablet by mouth 2 (Two) Times a Day With Meals., Disp: 60 tablet, Rfl: 6  •  nystatin (MYCOSTATIN) 554185 UNIT/GM powder, Apply  topically to the appropriate area as directed 4 (Four) Times a Day As Needed., Disp: , Rfl:   •  omeprazole (priLOSEC) 40 MG capsule, Take 40 mg by mouth Daily., Disp: , Rfl:   •  ondansetron (ZOFRAN) 8 MG tablet, TAKE 1 TABLET BY MOUTH EVERY 8 HOURS AS NEEDED FOR NAUSEA FOR VOMITING, Disp: 30 tablet, Rfl: 0  •  oxyCODONE (oxyCONTIN) 10 MG 12 hr tablet, Take 10 mg by mouth 4 (Four) Times a Day As Needed., Disp: , Rfl:   •  polyethylene glycol (MIRALAX) packet, Take 17 g by mouth Daily As Needed., Disp: , Rfl:   •  promethazine (PHENERGAN) 25 MG suppository, Insert 1 suppository into the rectum Every 6 (Six) Hours As Needed for Nausea or Vomiting., Disp: 10 suppository, Rfl: 0  •  Prucalopride Succinate (Motegrity) 2 MG tablet, Take 1 tablet by mouth Daily., Disp: 30 tablet, Rfl: 2  •  spironolactone (ALDACTONE) 25 MG tablet, TAKE 1 TABLET BY MOUTH ONCE DAILY, Disp: 30 tablet, Rfl: 5  •  diclofenac (VOLTAREN) 75 MG EC tablet, Take 1 tablet by mouth Daily for 60 days., Disp: 30 tablet, Rfl: 1    Past Surgical History:   Procedure Laterality Date   • CARPAL TUNNEL RELEASE Left 8/22/2018    Procedure: CARPAL TUNNEL RELEASE - left;  Surgeon: Justus Lewis MD;  Location: [facility];  Service: Orthopedics   • CARPAL TUNNEL RELEASE Right 2/13/2019    Procedure: carpal tunnel release right hand with local/monitored  anesthesia care;  Surgeon: Justus Lewis MD;  Location: [facility] OR;  Service: Orthopedics   •  SECTION     • CHOLECYSTECTOMY     • COLONOSCOPY  2013   • COLONOSCOPY N/A 10/17/2018    Procedure: COLONOSCOPY;  Surgeon: Russell Del Rio MD;  Location: [facility] ENDOSCOPY;  Service: Gastroenterology   • DIRECT LARYNGOSCOPY, ESOPHAGOSCOPY, BRONCHOSCOPY N/A 2017    Procedure: DIRECT LARYNGOSCOPY AND;  Surgeon: Live Bolton MD;  Location: [facility] OR;  Service:    • ENDOSCOPY  2013    Colon endoscopy 79949 (1) - Internal & external hemorrhoids found. Stool found.   • ENDOSCOPY  2013    EGD w/ tube 13374 (1) - Normal esophagus. Gastritis in stomach. Biopsy taken. Normal dudoenum. Biopsy taken.   • ENDOSCOPY N/A 10/17/2018    Procedure: ESOPHAGOGASTRODUODENOSCOPY--eval varices;  Surgeon: Russell Del Rio MD;  Location: [facility] ENDOSCOPY;  Service: Gastroenterology   • FOOT SURGERY  2013    Foot/toes surgery procedure (1) - Arthroplasty of toes 4 and 5 of right foot.   • HERNIA REPAIR     • HERNIA REPAIR      hital   • HYSTERECTOMY     • LIVER BIOPSY     • NERVE BLOCK  2016    Injection for nerve block (1) - Lumbar medial branch block.   • OTHER SURGICAL HISTORY  2012    Inj(s) Tend-Sheath, Ligament, Single  (1) - [facility]    • OTHER SURGICAL HISTORY  2013    Small Joint Injection/Aspiration  (2) - [facility] NICK[facility] ([facility])    • OTHER SURGICAL HISTORY      bowel obstruction x2   • OTHER SURGICAL HISTORY      gland removed from neck   • SUBLINGUAL SALIVARY CYST EXCISION N/A 2017    Procedure: EXCISION OF LEFT  TONGUE LESION WITH CLOSURE;  Surgeon: Live Bolton MD;  Location: [facility] OR;  Service:    • TUBAL ABDOMINAL LIGATION     • UPPER GASTROINTESTINAL ENDOSCOPY  2013   • UPPER GASTROINTESTINAL ENDOSCOPY  10/17/2018       Family History   Problem Relation Age of Onset   • Cancer Other    • Diabetes Other    • Heart  "disease Other    • Hypertension Mother    • Cancer Mother    • Diabetes Mother    • Hypertension Father    • Heart attack Father    • Cancer Father    • Heart disease Father    • Thyroid disease Maternal Aunt        Social History     Socioeconomic History   • Marital status:      Spouse name: Not on file   • Number of children: Not on file   • Years of education: Not on file   • Highest education level: Not on file   Tobacco Use   • Smoking status: Former Smoker     Packs/day: 2.00     Years: 15.00     Pack years: 30.00     Types: Cigarettes     Quit date:      Years since quittin.9   • Smokeless tobacco: Never Used   Substance and Sexual Activity   • Alcohol use: No   • Drug use: No   • Sexual activity: Defer     Comment: Marital Status:         Review of Systems   Endocrine: Positive for cold intolerance and heat intolerance.   Musculoskeletal: Positive for joint swelling.        Muscle pain    Neurological: Positive for numbness.   Psychiatric/Behavioral: Positive for sleep disturbance.       PHYSICAL EXAMINATION:       Ht 162.6 cm (64\")   Wt 92.5 kg (204 lb)   LMP  (LMP Unknown)   BMI 35.02 kg/m²     Physical Exam  Vitals signs and nursing note reviewed.   Constitutional:       General: She is not in acute distress.     Appearance: She is well-developed. She is not toxic-appearing.   HENT:      Head: Normocephalic.   Pulmonary:      Effort: Pulmonary effort is normal. No respiratory distress.   Skin:     General: Skin is warm and dry.   Neurological:      Mental Status: She is alert and oriented to person, place, and time.   Psychiatric:         Behavior: Behavior normal.         Thought Content: Thought content normal.         Judgment: Judgment normal.         GAIT:     []  Normal  []  Antalgic    Assistive device: [x]  None  []  Walker     []  Crutches  []  Cane     []  Wheelchair  []  Stretcher    Right Ankle Exam     Tenderness   Right ankle tenderness location: " generalized.  Swelling: mild    Other   Erythema: absent  Sensation: decreased  Pulse: present     Comments:  Patient unable to feel monofilament at any area of foot  Mild pain with range of motion  Decreased range of motion    No bony tenderness  Patient is able to move toes freely  Mild bruising of second toe  No significant swelling in foot      Left Ankle Exam     Tenderness   Left ankle tenderness location: generalized.   Swelling: mild    Other   Erythema: absent  Sensation: decreased  Pulse: present    Comments:  Patient unable to feel monofilament at any area of foot  Mild pain with range of motion  Decreased range of motion      Right Hip Exam     Tenderness   The patient is experiencing tenderness in the greater trochanter.    Range of Motion   Abduction: 45   Adduction: 20   Flexion: 120   External rotation: 40   Internal rotation: 30     Tests   FRANK: negative  Fadir:  Negative FADIR test    Comments:  Negative Stinchfield      Left Hip Exam     Range of Motion   Abduction: 45   Adduction: 20   Flexion: 120   External rotation: 40   Internal rotation: 30     Comments:  Negative Stinchfield      Back Exam     Tenderness   The patient is experiencing tenderness in the lumbar.    Muscle Strength   Right Quadriceps:  4/5   Left Quadriceps:  4/5   Right Hamstrings:  4/5   Left Hamstrings:  4/5     Tests   Straight leg raise right: negative  Straight leg raise left: negative    Reflexes   Patellar: normal                  Xr Ankle 2 Vw Right    Result Date: 11/20/2020  Narrative: Study: X-ray ankle 2 view, right Comparison: 3/21/2019 Narrative: Right ankle is with severe end-stage arthritic changes at the tibiotalar joint as well as the subtalar joint of the right foot.  There is loss of arch as well as partial collapse of talus that appears slightly progressed from comparison imaging.  No acute bony abnormality identified. Colleen Meadows APRN 11/20/2020     Xr Foot 2 View Right    Result Date:  11/20/2020  Narrative: Study: X-ray foot 1-2 VW, right Comparison: None Narrative: Position alignment of foot is acceptable.  No evidence of fracture or dislocation.  Severe arthritic changes are noted about the ankle. Colleen Abdiel HERNANDEZN 11/20/2020    Xr Hip With Or Without Pelvis 2 - 3 View Right    Result Date: 11/20/2020  Narrative: Study: XR hip with or without pelvis, right Comparison: None Narrative: Bilateral hips are acceptable in alignment and position.  Joint spaces are maintained.  No evidence of fracture or dislocation.  Mild degenerative changes noted in visible portion of lumbar spine. Colleen Meadows APRN 11/20/2020           ASSESSMENT:    Diagnoses and all orders for this visit:    Right foot pain  -     XR Foot 2 View Right  -     Cancel: XR Ankle 3+ View Right; Future    Right hip pain    Routine lab draw  -     Hemoglobin A1c; Future    Numbness in feet  -     Hemoglobin A1c; Future    Chronic pain of both ankles  -     Ambulatory Referral to Podiatry  -     Cancel: XR Ankle 3+ View Right; Future    Collapse of right talus  -     Ambulatory Referral to Podiatry    Trochanteric bursitis of right hip    Other orders  -     diclofenac (VOLTAREN) 75 MG EC tablet; Take 1 tablet by mouth Daily for 60 days.          PLAN      Hip pain: X-rays reviewed, no significant degenerative change seen today.  Suspect bursitis as patient has negative Stinchfield but increased pain with direct palpation of trochanteric bursa.  Patient is unable to take steroids and is already taking prescription strength NSAID.  Recommend that patient undergo physical therapy.  Patient declined stating she does not want to do physical therapy.  Patient given handouts on home stretches she can do for bursitis.  Patient request switching Mobic to alternative prescription strength NSAID.  Mobic changed to Voltaren.  However Voltaren given in lower dose than normal due to patient's recent kidney function studies.  Decreased GFR and decreased  creatinine explained to patient.  Explained to patient that if she continues taking prescription strength NSAIDs she could see a further reduction in her GFR and kidney function which can be very dangerous.  Explained to patient that it would be safer for her to continue with non-NSAID type medication.  Patient verbalized understanding and states that she still wants prescription for Voltaren but she may discuss taking medication with kidney doctor prior to starting.  Patient encouraged to do so.  Patient also instructed to perform lab work done by other doctor which includes a CMP.  Patient also encouraged to continue using cane for modified weightbearing.  Patient to return in 4 weeks for recheck.    Toe pain: Patient instructed to use rice, rest, elevation to help with toe pain and then return if this does not resolve.  If she decides to proceed with NSAIDs this will likely also help with toe pain from recent stumble.     Collapsed talus: Patient's ankle arthritis and collapsed talus appears worsened steadily from last views.  Patient's last hemoglobin A1c was 5.  Recommend repeating this as patient's x-rays do look somewhat suggestive for Charcot.  Hemoglobin A1c ordered, patient states she will do this and other labs including CMP ordered by a different provider. Patient reports chronic ankle pain and states she has tried physical therapy in the past for this.  Recommend that patient follow-up with podiatry for evaluation.    Decreased sensation in bilateral feet: Patient instructed to follow-up with primary care provider for further evaluation of possible diabetic neuropathy.     Body mass index is 35.02 kg/m².    Return in about 4 weeks (around 12/18/2020).    LIYAH Nur       [employer]

## 2022-11-23 RX ORDER — ONDANSETRON 4 MG/1
TABLET, ORALLY DISINTEGRATING ORAL
Qty: 15 TABLET | Refills: 0 | Status: SHIPPED | OUTPATIENT
Start: 2022-11-23 | End: 2023-04-04

## 2022-12-01 ENCOUNTER — LAB (OUTPATIENT)
Dept: ONCOLOGY | Facility: HOSPITAL | Age: 62
End: 2022-12-01

## 2022-12-01 ENCOUNTER — OFFICE VISIT (OUTPATIENT)
Dept: ONCOLOGY | Facility: CLINIC | Age: 62
End: 2022-12-01

## 2022-12-01 VITALS
SYSTOLIC BLOOD PRESSURE: 144 MMHG | BODY MASS INDEX: 38.81 KG/M2 | TEMPERATURE: 97.7 F | OXYGEN SATURATION: 95 % | WEIGHT: 226.1 LBS | HEART RATE: 99 BPM | DIASTOLIC BLOOD PRESSURE: 71 MMHG

## 2022-12-01 DIAGNOSIS — K75.81 NASH (NONALCOHOLIC STEATOHEPATITIS): Chronic | ICD-10-CM

## 2022-12-01 DIAGNOSIS — R91.1 LUNG NODULE: Chronic | ICD-10-CM

## 2022-12-01 DIAGNOSIS — K74.69 OTHER CIRRHOSIS OF LIVER: ICD-10-CM

## 2022-12-01 DIAGNOSIS — E61.1 IRON DEFICIENCY: Chronic | ICD-10-CM

## 2022-12-01 DIAGNOSIS — R16.1 SPLENOMEGALY: Chronic | ICD-10-CM

## 2022-12-01 DIAGNOSIS — R91.1 LUNG NODULE: ICD-10-CM

## 2022-12-01 DIAGNOSIS — K74.69 OTHER CIRRHOSIS OF LIVER: Chronic | ICD-10-CM

## 2022-12-01 DIAGNOSIS — R16.1 SPLENOMEGALY: ICD-10-CM

## 2022-12-01 DIAGNOSIS — D69.6 THROMBOCYTOPENIA: Primary | Chronic | ICD-10-CM

## 2022-12-01 DIAGNOSIS — D69.6 THROMBOCYTOPENIA: ICD-10-CM

## 2022-12-01 DIAGNOSIS — E61.1 IRON DEFICIENCY: ICD-10-CM

## 2022-12-01 LAB
ANISOCYTOSIS BLD QL: NORMAL
BASOPHILS # BLD AUTO: 0.02 10*3/MM3 (ref 0–0.2)
BASOPHILS NFR BLD AUTO: 0.4 % (ref 0–1.5)
DEPRECATED RDW RBC AUTO: 48.1 FL (ref 37–54)
EOSINOPHIL # BLD AUTO: 0.14 10*3/MM3 (ref 0–0.4)
EOSINOPHIL NFR BLD AUTO: 3.1 % (ref 0.3–6.2)
ERYTHROCYTE [DISTWIDTH] IN BLOOD BY AUTOMATED COUNT: 15.9 % (ref 12.3–15.4)
FERRITIN SERPL-MCNC: 180.6 NG/ML (ref 13–150)
FOLATE SERPL-MCNC: >20 NG/ML (ref 4.78–24.2)
HCT VFR BLD AUTO: 39.5 % (ref 34–46.6)
HGB BLD-MCNC: 13.4 G/DL (ref 12–15.9)
IMM GRANULOCYTES # BLD AUTO: 0.01 10*3/MM3 (ref 0–0.05)
IMM GRANULOCYTES NFR BLD AUTO: 0.2 % (ref 0–0.5)
IRON 24H UR-MRATE: 107 MCG/DL (ref 37–145)
IRON SATN MFR SERPL: 30 % (ref 20–50)
LYMPHOCYTES # BLD AUTO: 0.96 10*3/MM3 (ref 0.7–3.1)
LYMPHOCYTES NFR BLD AUTO: 21.2 % (ref 19.6–45.3)
MCH RBC QN AUTO: 28.7 PG (ref 26.6–33)
MCHC RBC AUTO-ENTMCNC: 33.9 G/DL (ref 31.5–35.7)
MCV RBC AUTO: 84.6 FL (ref 79–97)
MONOCYTES # BLD AUTO: 0.27 10*3/MM3 (ref 0.1–0.9)
MONOCYTES NFR BLD AUTO: 6 % (ref 5–12)
NEUTROPHILS NFR BLD AUTO: 3.13 10*3/MM3 (ref 1.7–7)
NEUTROPHILS NFR BLD AUTO: 69.1 % (ref 42.7–76)
NRBC BLD AUTO-RTO: 0 /100 WBC (ref 0–0.2)
OVALOCYTES BLD QL SMEAR: NORMAL
PLATELET # BLD AUTO: 66 10*3/MM3 (ref 140–450)
PMV BLD AUTO: 10.8 FL (ref 6–12)
RBC # BLD AUTO: 4.67 10*6/MM3 (ref 3.77–5.28)
SMALL PLATELETS BLD QL SMEAR: NORMAL
TIBC SERPL-MCNC: 355 MCG/DL (ref 298–536)
TRANSFERRIN SERPL-MCNC: 238 MG/DL (ref 200–360)
VIT B12 BLD-MCNC: 1302 PG/ML (ref 211–946)
WBC MORPH BLD: NORMAL
WBC NRBC COR # BLD: 4.53 10*3/MM3 (ref 3.4–10.8)

## 2022-12-01 PROCEDURE — 84466 ASSAY OF TRANSFERRIN: CPT

## 2022-12-01 PROCEDURE — 82746 ASSAY OF FOLIC ACID SERUM: CPT

## 2022-12-01 PROCEDURE — G0463 HOSPITAL OUTPT CLINIC VISIT: HCPCS | Performed by: INTERNAL MEDICINE

## 2022-12-01 PROCEDURE — 85007 BL SMEAR W/DIFF WBC COUNT: CPT

## 2022-12-01 PROCEDURE — 99214 OFFICE O/P EST MOD 30 MIN: CPT | Performed by: INTERNAL MEDICINE

## 2022-12-01 PROCEDURE — 82607 VITAMIN B-12: CPT

## 2022-12-01 PROCEDURE — 82728 ASSAY OF FERRITIN: CPT

## 2022-12-01 PROCEDURE — 1125F AMNT PAIN NOTED PAIN PRSNT: CPT | Performed by: INTERNAL MEDICINE

## 2022-12-01 PROCEDURE — 85025 COMPLETE CBC W/AUTO DIFF WBC: CPT

## 2022-12-01 PROCEDURE — 83540 ASSAY OF IRON: CPT

## 2022-12-01 PROCEDURE — 1123F ACP DISCUSS/DSCN MKR DOCD: CPT | Performed by: INTERNAL MEDICINE

## 2022-12-01 NOTE — PROGRESS NOTES
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  Complains of chronic abdominal pain and constipation.  Complains of chronic swelling of lower extremity along with pain.  Complains of easy bruising.  States she was admitted at [facility] since last clinic visit for elevated ammonia level in blood.  Denies any bleeding.  Complains of chronic arthralgia.                  Past Medical History, Past Surgical History, Social History, Family History have been reviewed and are without significant changes except as mentioned.    Review of Systems   A comprehensive 14 point review of systems was performed and was negative except as mentioned in HPI.    Medications:  The current medication list was reviewed in the EMR    ALLERGIES:    Allergies   Allergen Reactions   • Morphine GI Intolerance   • Tape Rash     Patient has rash/blishers on site after tape   • Other      Pt states that taking steroids either in pill or injection form make her have blisters in her mouth and she feels like she is on fire on the inside/ has hx of c diff and possible MRSA     • Corticosteroids Rash   • Kenalog  [Triamcinolone Acetonide] Rash   • Sulfa Antibiotics Rash     Sulfa (Sulfonamide Antibiotics)       Objective      Vitals:    12/01/22 0944   BP: 144/71   Pulse: 99   Temp: 97.7 °F (36.5 °C)   TempSrc: Oral   SpO2: 95%   Weight: 103 kg (226 lb 1.6 oz)   PainSc:   6   PainLoc: Generalized     Current Status 7/21/2021   ECOG score 2       Physical Exam  Pulmonary:      Breath sounds: Normal breath sounds.   Neurological:      Mental  Status: She is alert and oriented to person, place, and time.           RECENT LABS:  Glucose   Date Value Ref Range Status   10/28/2022 89 65 - 99 mg/dL Final     Sodium   Date Value Ref Range Status   10/28/2022 140 136 - 145 mmol/L Final     Potassium   Date Value Ref Range Status   10/28/2022 4.4 3.5 - 5.2 mmol/L Final     Comment:     Slight hemolysis detected by analyzer. Results may be affected.     CO2   Date Value Ref Range Status   10/28/2022 23.7 22.0 - 29.0 mmol/L Final     Chloride   Date Value Ref Range Status   10/28/2022 106 98 - 107 mmol/L Final     Anion Gap   Date Value Ref Range Status   10/28/2022 10.3 5.0 - 15.0 mmol/L Final     Creatinine   Date Value Ref Range Status   10/28/2022 0.89 0.57 - 1.00 mg/dL Final     BUN   Date Value Ref Range Status   10/28/2022 5 (L) 8 - 23 mg/dL Final     BUN/Creatinine Ratio   Date Value Ref Range Status   10/28/2022 5.6 (L) 7.0 - 25.0 Final     Calcium   Date Value Ref Range Status   10/28/2022 8.9 8.6 - 10.5 mg/dL Final     eGFR Non  Amer   Date Value Ref Range Status   09/24/2021 70 >60 mL/min/1.73 Final     Alkaline Phosphatase   Date Value Ref Range Status   10/28/2022 159 (H) 39 - 117 U/L Final     Total Protein   Date Value Ref Range Status   10/28/2022 6.6 6.0 - 8.5 g/dL Final     ALT (SGPT)   Date Value Ref Range Status   10/28/2022 28 1 - 33 U/L Final     AST (SGOT)   Date Value Ref Range Status   10/28/2022 47 (H) 1 - 32 U/L Final     Total Bilirubin   Date Value Ref Range Status   10/28/2022 1.6 (H) 0.0 - 1.2 mg/dL Final     Albumin   Date Value Ref Range Status   10/28/2022 3.30 (L) 3.50 - 5.20 g/dL Final     Globulin   Date Value Ref Range Status   10/28/2022 3.3 gm/dL Final     Lab Results   Component Value Date    WBC 4.53 12/01/2022    HGB 13.4 12/01/2022    HCT 39.5 12/01/2022    MCV 84.6 12/01/2022    PLT 66 (L) 12/01/2022     Lab Results   Component Value Date    NEUTROABS 3.13 12/01/2022    IRON 107 12/01/2022    IRON 123 09/01/2022     IRON 73 07/01/2022    TIBC 355 12/01/2022    TIBC 343 09/01/2022    TIBC 370 07/01/2022    LABIRON 30 12/01/2022    LABIRON 36 09/01/2022    LABIRON 20 07/01/2022    FERRITIN 180.60 (H) 12/01/2022    FERRITIN 166.60 (H) 09/01/2022    FERRITIN 156.40 (H) 07/01/2022    QUPCROVS17 1,295 (H) 09/01/2022    KSUYIJOC48 1,290 (H) 07/01/2022    OCQJAJBB46 1,017 (H) 05/26/2022    FOLATE 12.00 09/01/2022    FOLATE 9.32 07/01/2022    FOLATE 14.50 05/26/2022     Lab Results   Component Value Date    AFPTM 2.5 07/31/2018    AFPTM 99 07/31/2018         PATHOLOGY:  * Cannot find OR log *         RADIOLOGY DATA :  No radiology results for the last 7 days        Assessment & Plan     1.  Thrombocytopenia:  Is secondary to cirrhosis of liver with splenomegaly  - Platelet count is 66,000.  - Patient denies any bleeding.  Will monitor with CBC for now.  - Patient was recommended to come to emergency room if she starts having any bleeding complication.  - For now we will have patient return to clinic in 3 months with repeat CBC, iron studies, ferritin, B12 and folate to be done on that day.    2.  Lung nodule:  - CT of chest without contrast on August 27, 2002 shows calcified granuloma without any suspicious lung nodule.  We will follow-up on CT of chest around August 2023.    3.  Iron deficiency anemia:  - Due to inability to tolerate iron by mouth patient received intravenous Venofer in July 2021  - Anemia work-up done today shows hemoglobin is 13.4.  - Remains on B12 and folic acid 3 times a week    4.  Cirrhosis of liver with splenomegaly  - From nonalcoholic steatohepatitis.  Comment continue following up with gastroenterology clinic.    5.  Health maintenance: Patient does not smoke.  Had a colonoscopy in March 2021    6. Advance Care Planning: For now patient remains full code and is able to make decisions.  Patient has health care surrogate mentioned on chart.                 PHQ-9 Total Score: 0   -Patient is not homicidal or  suicidal.  No acute intervention required.    Amira Shannon reports a pain score of 6.  Given her pain assessment as noted, treatment options were discussed and the following options were decided upon as a follow-up plan to address the patient's pain: continuation of current treatment plan for pain.         Lokesh Goodwin MD  12/1/2022  17:18 CST        Part of this note may be an electronic transcription/translation of spoken language to printed text using the Dragon Dictation System.          CC:

## 2022-12-02 ENCOUNTER — TELEPHONE (OUTPATIENT)
Dept: ONCOLOGY | Facility: HOSPITAL | Age: 62
End: 2022-12-02

## 2022-12-02 NOTE — TELEPHONE ENCOUNTER
----- Message from Lokesh Goodwin MD sent at 12/2/2022  7:02 AM CST -----  Please let patient know iron studies are adequate.  No need for any iron replacement at present.  Recommend continuing with B12 and folic acid 3 times a week.  Thank you

## 2023-01-01 ENCOUNTER — TELEPHONE (OUTPATIENT)
Dept: FAMILY MEDICINE CLINIC | Facility: CLINIC | Age: 63
End: 2023-01-01

## 2023-01-09 RX ORDER — FOLIC ACID 1 MG/1
TABLET ORAL
Qty: 36 TABLET | Refills: 0 | Status: SHIPPED | OUTPATIENT
Start: 2023-01-09

## 2023-01-09 NOTE — TELEPHONE ENCOUNTER
Rx Refill Note  Requested Prescriptions     Pending Prescriptions Disp Refills   • folic acid (FOLVITE) 1 MG tablet [Pharmacy Med Name: Folic Acid 1 MG Oral Tablet] 36 tablet 0     Sig: TAKE 1 TABLET BY MOUTH ON MONDAY, WEDNESDAY AND FRIDAY      Last office visit with prescribing clinician: 12/1/2022   Last telemedicine visit with prescribing clinician: 4/6/2023   Next office visit with prescribing clinician: 4/6/2023                         Would you like a call back once the refill request has been completed: [] Yes [] No    If the office needs to give you a call back, can they leave a voicemail: [] Yes [] No    Yomaira Elliott, Ruiz Rep  01/09/23, 07:58 CST

## 2023-01-09 NOTE — TELEPHONE ENCOUNTER
Rx Refill Note  Requested Prescriptions     Pending Prescriptions Disp Refills   • metoclopramide (REGLAN) 10 MG tablet [Pharmacy Med Name: Metoclopramide HCl 10 MG Oral Tablet] 120 tablet 0     Sig: TAKE 1 TABLET BY MOUTH 4 TIMES DAILY (BEFORE MEALS AND AT BEDTIME)   • omeprazole (priLOSEC) 40 MG capsule [Pharmacy Med Name: Omeprazole 40 MG Oral Capsule Delayed Release] 90 capsule 0     Sig: Take 1 capsule by mouth once daily      Last office visit with prescribing clinician: 10/28/2022     Next office visit with prescribing clinician: 4/28/2023                         Lina Aviles MA  01/09/23, 10:51 CST

## 2023-01-11 RX ORDER — METOCLOPRAMIDE 10 MG/1
TABLET ORAL
Qty: 120 TABLET | Refills: 0 | Status: SHIPPED | OUTPATIENT
Start: 2023-01-11 | End: 2023-02-03

## 2023-01-11 RX ORDER — OMEPRAZOLE 40 MG/1
CAPSULE, DELAYED RELEASE ORAL
Qty: 90 CAPSULE | Refills: 0 | Status: SHIPPED | OUTPATIENT
Start: 2023-01-11

## 2023-01-12 ENCOUNTER — TELEPHONE (OUTPATIENT)
Dept: GASTROENTEROLOGY | Facility: CLINIC | Age: 63
End: 2023-01-12
Payer: MEDICAID

## 2023-01-12 NOTE — TELEPHONE ENCOUNTER
Scheduled radiology for pt, faxed order and received confirmation it was received.      Called Pt and notified her of:  Test: US Abdomen  Scheduled: 1.20.2023  Time: 7:30 am  Location: [facility]  NPO after midnight  Rescheduled follow up from 1.24 to 2.7 @2:20

## 2023-02-03 RX ORDER — METOCLOPRAMIDE 10 MG/1
TABLET ORAL
Qty: 120 TABLET | Refills: 0 | Status: SHIPPED | OUTPATIENT
Start: 2023-02-03

## 2023-02-15 ENCOUNTER — TELEPHONE (OUTPATIENT)
Dept: ONCOLOGY | Facility: CLINIC | Age: 63
End: 2023-02-15
Payer: MEDICAID

## 2023-02-21 ENCOUNTER — TELEPHONE (OUTPATIENT)
Dept: FAMILY MEDICINE CLINIC | Facility: CLINIC | Age: 63
End: 2023-02-21
Payer: MEDICAID

## 2023-02-23 ENCOUNTER — LAB (OUTPATIENT)
Dept: LAB | Facility: HOSPITAL | Age: 63
End: 2023-02-23
Payer: MEDICAID

## 2023-02-23 ENCOUNTER — OFFICE VISIT (OUTPATIENT)
Dept: FAMILY MEDICINE CLINIC | Facility: CLINIC | Age: 63
End: 2023-02-23
Payer: MEDICAID

## 2023-02-23 VITALS
HEIGHT: 64 IN | WEIGHT: 233 LBS | HEART RATE: 80 BPM | SYSTOLIC BLOOD PRESSURE: 124 MMHG | OXYGEN SATURATION: 95 % | BODY MASS INDEX: 39.78 KG/M2 | DIASTOLIC BLOOD PRESSURE: 70 MMHG | TEMPERATURE: 98.9 F

## 2023-02-23 DIAGNOSIS — R30.0 DYSURIA: ICD-10-CM

## 2023-02-23 DIAGNOSIS — Z09 HOSPITAL DISCHARGE FOLLOW-UP: Primary | ICD-10-CM

## 2023-02-23 DIAGNOSIS — E78.2 MIXED HYPERLIPIDEMIA: ICD-10-CM

## 2023-02-23 DIAGNOSIS — Z12.83 SKIN CANCER SCREENING: ICD-10-CM

## 2023-02-23 DIAGNOSIS — R30.0 BURNING WITH URINATION: ICD-10-CM

## 2023-02-23 LAB
ALBUMIN SERPL-MCNC: 3.3 G/DL (ref 3.5–5.2)
ALBUMIN/GLOB SERPL: 1.1 G/DL
ALP SERPL-CCNC: 181 U/L (ref 39–117)
ALT SERPL W P-5'-P-CCNC: 30 U/L (ref 1–33)
ANION GAP SERPL CALCULATED.3IONS-SCNC: 6 MMOL/L (ref 5–15)
AST SERPL-CCNC: 53 U/L (ref 1–32)
BASOPHILS # BLD AUTO: 0.02 10*3/MM3 (ref 0–0.2)
BASOPHILS NFR BLD AUTO: 0.5 % (ref 0–1.5)
BILIRUB SERPL-MCNC: 1.7 MG/DL (ref 0–1.2)
BILIRUB UR QL STRIP: NEGATIVE
BUN SERPL-MCNC: 5 MG/DL (ref 8–23)
BUN/CREAT SERPL: 5.4 (ref 7–25)
CALCIUM SPEC-SCNC: 8.9 MG/DL (ref 8.6–10.5)
CHLORIDE SERPL-SCNC: 103 MMOL/L (ref 98–107)
CHOLEST SERPL-MCNC: 142 MG/DL (ref 0–200)
CLARITY UR: CLEAR
CO2 SERPL-SCNC: 27 MMOL/L (ref 22–29)
COLOR UR: YELLOW
CREAT SERPL-MCNC: 0.93 MG/DL (ref 0.57–1)
DEPRECATED RDW RBC AUTO: 46.4 FL (ref 37–54)
EGFRCR SERPLBLD CKD-EPI 2021: 69.2 ML/MIN/1.73
EOSINOPHIL # BLD AUTO: 0.1 10*3/MM3 (ref 0–0.4)
EOSINOPHIL NFR BLD AUTO: 2.5 % (ref 0.3–6.2)
ERYTHROCYTE [DISTWIDTH] IN BLOOD BY AUTOMATED COUNT: 15.4 % (ref 12.3–15.4)
GLOBULIN UR ELPH-MCNC: 3.1 GM/DL
GLUCOSE SERPL-MCNC: 105 MG/DL (ref 65–99)
GLUCOSE UR STRIP-MCNC: NEGATIVE MG/DL
HCT VFR BLD AUTO: 40.1 % (ref 34–46.6)
HDLC SERPL-MCNC: 56 MG/DL (ref 40–60)
HGB BLD-MCNC: 13.9 G/DL (ref 12–15.9)
HGB UR QL STRIP.AUTO: NEGATIVE
KETONES UR QL STRIP: NEGATIVE
LDLC SERPL CALC-MCNC: 73 MG/DL (ref 0–100)
LDLC/HDLC SERPL: 1.32 {RATIO}
LEUKOCYTE ESTERASE UR QL STRIP.AUTO: NEGATIVE
LYMPHOCYTES # BLD AUTO: 0.87 10*3/MM3 (ref 0.7–3.1)
LYMPHOCYTES NFR BLD AUTO: 21.4 % (ref 19.6–45.3)
MCH RBC QN AUTO: 29.2 PG (ref 26.6–33)
MCHC RBC AUTO-ENTMCNC: 34.7 G/DL (ref 31.5–35.7)
MCV RBC AUTO: 84.2 FL (ref 79–97)
MONOCYTES # BLD AUTO: 0.28 10*3/MM3 (ref 0.1–0.9)
MONOCYTES NFR BLD AUTO: 6.9 % (ref 5–12)
NEUTROPHILS NFR BLD AUTO: 2.79 10*3/MM3 (ref 1.7–7)
NEUTROPHILS NFR BLD AUTO: 68.5 % (ref 42.7–76)
NITRITE UR QL STRIP: NEGATIVE
PH UR STRIP.AUTO: 7 [PH] (ref 5–8)
PLATELET # BLD AUTO: 28 10*3/MM3 (ref 140–450)
PMV BLD AUTO: 11.3 FL (ref 6–12)
POTASSIUM SERPL-SCNC: 4.3 MMOL/L (ref 3.5–5.2)
PROT SERPL-MCNC: 6.4 G/DL (ref 6–8.5)
PROT UR QL STRIP: NEGATIVE
RBC # BLD AUTO: 4.76 10*6/MM3 (ref 3.77–5.28)
SODIUM SERPL-SCNC: 136 MMOL/L (ref 136–145)
SP GR UR STRIP: 1.01 (ref 1–1.03)
TRIGL SERPL-MCNC: 60 MG/DL (ref 0–150)
UROBILINOGEN UR QL STRIP: NORMAL
VLDLC SERPL-MCNC: 13 MG/DL (ref 5–40)
WBC NRBC COR # BLD: 4.07 10*3/MM3 (ref 3.4–10.8)

## 2023-02-23 PROCEDURE — 99214 OFFICE O/P EST MOD 30 MIN: CPT | Performed by: STUDENT IN AN ORGANIZED HEALTH CARE EDUCATION/TRAINING PROGRAM

## 2023-02-23 PROCEDURE — 81003 URINALYSIS AUTO W/O SCOPE: CPT | Performed by: STUDENT IN AN ORGANIZED HEALTH CARE EDUCATION/TRAINING PROGRAM

## 2023-02-23 PROCEDURE — 80061 LIPID PANEL: CPT | Performed by: STUDENT IN AN ORGANIZED HEALTH CARE EDUCATION/TRAINING PROGRAM

## 2023-02-23 PROCEDURE — 85025 COMPLETE CBC W/AUTO DIFF WBC: CPT | Performed by: STUDENT IN AN ORGANIZED HEALTH CARE EDUCATION/TRAINING PROGRAM

## 2023-02-23 PROCEDURE — 80053 COMPREHEN METABOLIC PANEL: CPT | Performed by: STUDENT IN AN ORGANIZED HEALTH CARE EDUCATION/TRAINING PROGRAM

## 2023-02-23 RX ORDER — LACTULOSE 10 G/15ML
30 SOLUTION ORAL 2 TIMES DAILY
COMMUNITY
Start: 2023-02-12

## 2023-02-23 NOTE — PROGRESS NOTES
Subjective:  Amira Shannon is a 62 y.o. female who presents for     Hospital discharge follow-up; went to [facility] ER due to abdominal pain, nausea, vomiting.  In ER, was found to have SBO based on CT findings.  Additional significant finding of platelets of 65 secondary to known cirrhosis.  Was admitted for medical management.  Unfortunate, records of hospital course unavailable, patient states that was admitted for 1 week, was put on bowel rest for 4 days and then had her diet slowly increased. States that she was discharged last Monday, states has had persistent nausea, which she has been treating with Phenergan/Zofran nearly daily. Denied any vomiting, constipation, diarrhea, headache, fever, chills, dysuria.     Patient Active Problem List   Diagnosis   • Bilateral foot pain   • Right hip pain   • Acute pain of both knees   • Plantar fasciitis, bilateral   • Primary osteoarthritis of left knee   • MUSA (nonalcoholic steatohepatitis)   • Tongue lesion   • Bilateral lower extremity edema   • Chronic pain of both knees   • Bilateral calcaneal spurs   • Swelling of both lower extremities   • Obesity with body mass index of 30.0-39.9   • Diastolic dysfunction   • Pancytopenia (HCC)   • Chronic fatigue   • Abdominal pain   • Other constipation   • GERD (gastroesophageal reflux disease)   • Depression   • Disease related peripheral neuropathy   • Epigastric pain   • Hyperlipidemia   • Restless leg syndrome   • CHAI on CPAP   • Carpal tunnel syndrome on left   • Carpal tunnel syndrome on right   • Bilateral wrist pain   • Numbness and tingling in both hands   • Elevated liver enzymes   • History of small bowel obstruction   • Cirrhosis of liver (HCC)   • Status post carpal tunnel release   • Bilateral ankle pain   • Lakesha's deformity of both heels   • Pain in joint, ankle and foot   • Primary osteoarthritis of both knees   • Low back pain   • Abdominal pain, generalized   • Chronic idiopathic constipation  "  • Hypertension   • Elevated serum creatinine   • Splenomegaly   • Chronic pain syndrome   • Lung nodule   • Hypertension   • Lab test negative for COVID-19 virus   • Thrombocytopenia (HCC)   • Iron deficiency   • Adverse effect of iron   • Encounter for assessment of peripherally inserted central venous catheter (PICC)   • Nausea and vomiting   • Weight loss, abnormal   • Gastroparesis   • E. coli UTI (urinary tract infection)   • Acute gastritis   • Hiatal hernia   • Hypomagnesemia   • Hypokalemia   • Gastro-esophageal reflux disease with esophagitis, without bleeding     Vitals:    Vitals:    02/23/23 0925   BP: 124/70   Pulse: 80   Temp: 98.9 °F (37.2 °C)   SpO2: 95%   Weight: 106 kg (233 lb)   Height: 162.6 cm (64\")     Body mass index is 39.99 kg/m².      Current Outpatient Medications:   •  acetaminophen (TYLENOL) 325 MG tablet, Take 650 mg by mouth Every 6 (Six) Hours As Needed for Mild Pain ., Disp: , Rfl:   •  bisacodyl (DULCOLAX) 10 MG suppository, Insert 10 mg into the rectum As Needed for Constipation., Disp: , Rfl:   •  cetirizine (zyrTEC) 10 MG tablet, Take 1 tablet by mouth Daily., Disp: 90 tablet, Rfl: 3  •  cyclobenzaprine (FLEXERIL) 10 MG tablet, Take 10 mg by mouth 2 (Two) Times a Day As Needed., Disp: , Rfl:   •  Docusate Sodium 100 MG capsule, Take 100 mg by mouth 2 (Two) Times a Day., Disp: , Rfl:   •  DULoxetine (CYMBALTA) 20 MG capsule, Take 1 capsule by mouth Daily., Disp: , Rfl:   •  folic acid (FOLVITE) 1 MG tablet, TAKE 1 TABLET BY MOUTH ON MONDAY, WEDNESDAY AND FRIDAY, Disp: 36 tablet, Rfl: 0  •  furosemide (LASIX) 20 MG tablet, Take 20 mg by mouth 2 (Two) Times a Day., Disp: , Rfl:   •  gabapentin (NEURONTIN) 800 MG tablet, Take 800 mg by mouth 3 (Three) Times a Day., Disp: , Rfl:   •  lactulose (CHRONULAC) 10 GM/15ML solution, Take 30 mL by mouth 2 (Two) Times a Day., Disp: , Rfl:   •  metoclopramide (REGLAN) 10 MG tablet, TAKE 1 TABLET BY MOUTH 4 TIMES DAILY BEFORE MEAL(S) AND AT " BEDTIME, Disp: 120 tablet, Rfl: 0  •  Naloxegol Oxalate (Movantik) 25 MG tablet, Take 1 tablet by mouth Daily., Disp: , Rfl:   •  nystatin (MYCOSTATIN) 235519 UNIT/GM powder, Apply 1 application topically to the appropriate area as directed 3 (Three) Times a Day As Needed (for itching in crevices)., Disp: 60 g, Rfl: 0  •  Omega-3 Fatty Acids (fish oil) 1000 MG capsule capsule, Take  by mouth 2 (Two) Times a Day With Meals., Disp: , Rfl:   •  omeprazole (priLOSEC) 40 MG capsule, Take 1 capsule by mouth once daily, Disp: 90 capsule, Rfl: 0  •  ondansetron (ZOFRAN) 8 MG tablet, Take 1 tablet by mouth Every 8 (Eight) Hours As Needed for Nausea or Vomiting. (Patient taking differently: Take 4 mg by mouth Every 8 (Eight) Hours As Needed for Nausea or Vomiting.), Disp: 30 tablet, Rfl: 0  •  ondansetron ODT (ZOFRAN-ODT) 4 MG disintegrating tablet, DISSOLVE 1 TABLET IN MOUTH EVERY 8 HOURS AS NEEDED FOR NAUSEA AND VOMITING, Disp: 15 tablet, Rfl: 0  •  oxyCODONE (ROXICODONE) 10 MG tablet, Take 10 mg by mouth., Disp: , Rfl:   •  polyethylene glycol (MIRALAX) 17 GM/SCOOP powder, Take 17 g by mouth Daily., Disp: , Rfl:   •  potassium chloride (MICRO-K) 10 MEQ CR capsule, Take 2 capsules by mouth Daily. (Patient taking differently: Take 10 mEq by mouth 2 (Two) Times a Day.), Disp: 30 capsule, Rfl: 1  •  spironolactone (ALDACTONE) 25 MG tablet, Take 1 tablet by mouth 2 (Two) Times a Day., Disp: 180 tablet, Rfl: 1  •  sucralfate (Carafate) 1 g tablet, Take 1 tablet by mouth 4 (Four) Times a Day., Disp: 120 tablet, Rfl: 2  •  traZODone (DESYREL) 100 MG tablet, Take 1 tablet by mouth Every Night., Disp: 30 tablet, Rfl: 2  •  vitamin B-12 (CYANOCOBALAMIN) 1000 MCG tablet, Take 1,000 mcg by mouth Daily., Disp: , Rfl:     Patient Active Problem List   Diagnosis   • Bilateral foot pain   • Right hip pain   • Acute pain of both knees   • Plantar fasciitis, bilateral   • Primary osteoarthritis of left knee   • MUSA (nonalcoholic  steatohepatitis)   • Tongue lesion   • Bilateral lower extremity edema   • Chronic pain of both knees   • Bilateral calcaneal spurs   • Swelling of both lower extremities   • Obesity with body mass index of 30.0-39.9   • Diastolic dysfunction   • Pancytopenia (HCC)   • Chronic fatigue   • Abdominal pain   • Other constipation   • GERD (gastroesophageal reflux disease)   • Depression   • Disease related peripheral neuropathy   • Epigastric pain   • Hyperlipidemia   • Restless leg syndrome   • CHAI on CPAP   • Carpal tunnel syndrome on left   • Carpal tunnel syndrome on right   • Bilateral wrist pain   • Numbness and tingling in both hands   • Elevated liver enzymes   • History of small bowel obstruction   • Cirrhosis of liver (HCC)   • Status post carpal tunnel release   • Bilateral ankle pain   • Lakesha's deformity of both heels   • Pain in joint, ankle and foot   • Primary osteoarthritis of both knees   • Low back pain   • Abdominal pain, generalized   • Chronic idiopathic constipation   • Hypertension   • Elevated serum creatinine   • Splenomegaly   • Chronic pain syndrome   • Lung nodule   • Hypertension   • Lab test negative for COVID-19 virus   • Thrombocytopenia (HCC)   • Iron deficiency   • Adverse effect of iron   • Encounter for assessment of peripherally inserted central venous catheter (PICC)   • Nausea and vomiting   • Weight loss, abnormal   • Gastroparesis   • E. coli UTI (urinary tract infection)   • Acute gastritis   • Hiatal hernia   • Hypomagnesemia   • Hypokalemia   • Gastro-esophageal reflux disease with esophagitis, without bleeding     Past Surgical History:   Procedure Laterality Date   • CARPAL TUNNEL RELEASE Left 8/22/2018    Procedure: CARPAL TUNNEL RELEASE - left;  Surgeon: Justus Lewis MD;  Location: [facility];  Service: Orthopedics   • CARPAL TUNNEL RELEASE Right 2/13/2019    Procedure: carpal tunnel release right hand with local/monitored anesthesia care;  Surgeon: Joshua  Justus Jovel MD;  Location: [facility] OR;  Service: Orthopedics   •  SECTION     • CHOLECYSTECTOMY     • COLONOSCOPY  2013   • COLONOSCOPY N/A 10/17/2018    Procedure: COLONOSCOPY;  Surgeon: Russell Del Rio MD;  Location: [facility] ENDOSCOPY;  Service: Gastroenterology   • COLONOSCOPY N/A 3/22/2021    Procedure: COLONOSCOPY;  Surgeon: Russell Del Rio MD;  Location: [facility] ENDOSCOPY;  Service: Gastroenterology;  Laterality: N/A;   • DIRECT LARYNGOSCOPY, ESOPHAGOSCOPY, BRONCHOSCOPY N/A 2017    Procedure: DIRECT LARYNGOSCOPY AND;  Surgeon: Live Bolton MD;  Location: [facility] OR;  Service:    • ENDOSCOPY  2013    Colon endoscopy 02307 (1) - Internal & external hemorrhoids found. Stool found.   • ENDOSCOPY  2013    EGD w/ tube 10676 (1) - Normal esophagus. Gastritis in stomach. Biopsy taken. Normal dudoenum. Biopsy taken.   • ENDOSCOPY N/A 10/17/2018    Procedure: ESOPHAGOGASTRODUODENOSCOPY--eval varices;  Surgeon: Russell Del Rio MD;  Location: [facility] ENDOSCOPY;  Service: Gastroenterology   • ENDOSCOPY N/A 3/22/2021    Procedure: ESOPHAGOGASTRODUODENOSCOPYURGNET;  Surgeon: Russell Del Rio MD;  Location: [facility] ENDOSCOPY;  Service: Gastroenterology;  Laterality: N/A;   • ENDOSCOPY N/A 2021    Procedure: ESOPHAGOGASTRODUODENOSCOPY;  Surgeon: Basia Saunders MD;  Location: [facility] ENDOSCOPY;  Service: Gastroenterology;  Laterality: N/A;   • FOOT SURGERY  2013    Foot/toes surgery procedure (1) - Arthroplasty of toes 4 and 5 of right foot.   • HERNIA REPAIR     • HERNIA REPAIR      hital   • HYSTERECTOMY     • LIVER BIOPSY     • NERVE BLOCK  2016    Injection for nerve block (1) - Lumbar medial branch block.   • OTHER SURGICAL HISTORY  2012    Inj(s) Tend-Sheath, Ligament, Single  (1) - [facility]    • OTHER SURGICAL HISTORY  2013    Small Joint Injection/Aspiration  (2) - [facility]    • OTHER SURGICAL HISTORY       bowel obstruction x2   • OTHER SURGICAL HISTORY      gland removed from neck   • SUBLINGUAL SALIVARY CYST EXCISION N/A 2017    Procedure: EXCISION OF LEFT  TONGUE LESION WITH CLOSURE;  Surgeon: Live Bolton MD;  Location: [facility];  Service:    • TUBAL ABDOMINAL LIGATION     • UPPER GASTROINTESTINAL ENDOSCOPY  2013   • UPPER GASTROINTESTINAL ENDOSCOPY  10/17/2018   • UPPER GASTROINTESTINAL ENDOSCOPY  2021     Social History     Socioeconomic History   • Marital status:    Tobacco Use   • Smoking status: Former     Packs/day: 2.00     Years: 15.00     Pack years: 30.00     Types: Cigarettes     Quit date:      Years since quittin.1   • Smokeless tobacco: Never   Vaping Use   • Vaping Use: Never used   Substance and Sexual Activity   • Alcohol use: No   • Drug use: Never   • Sexual activity: Defer     Birth control/protection: Surgical     Comment: Marital Status: .  Hysterectomy.     Family History   Problem Relation Age of Onset   • Cancer Other    • Diabetes Other    • Heart disease Other    • Hypertension Mother    • Cancer Mother    • Diabetes Mother    • Hypertension Father    • Heart attack Father    • Cancer Father    • Heart disease Father    • Thyroid disease Maternal Aunt    • Cancer Maternal Aunt    • No Known Problems Sister    • No Known Problems Brother    • Cancer Maternal Grandmother    • No Known Problems Sister      Lab on 2022   Component Date Value Ref Range Status   • Iron 2022 107  37 - 145 mcg/dL Final   • Iron Saturation 2022 30  20 - 50 % Final   • Transferrin 2022 238  200 - 360 mg/dL Final   • TIBC 2022 355  298 - 536 mcg/dL Final   • Ferritin 2022 180.60 (H)  13.00 - 150.00 ng/mL Final   • Folate 2022 >20.00  4.78 - 24.20 ng/mL Final   • Vitamin B-12 2022 1,302 (H)  211 - 946 pg/mL Final   • WBC 2022 4.53  3.40 - 10.80 10*3/mm3 Final   • RBC 2022 4.67  3.77 - 5.28 10*6/mm3 Final    • Hemoglobin 12/01/2022 13.4  12.0 - 15.9 g/dL Final   • Hematocrit 12/01/2022 39.5  34.0 - 46.6 % Final   • MCV 12/01/2022 84.6  79.0 - 97.0 fL Final   • MCH 12/01/2022 28.7  26.6 - 33.0 pg Final   • MCHC 12/01/2022 33.9  31.5 - 35.7 g/dL Final   • RDW 12/01/2022 15.9 (H)  12.3 - 15.4 % Final   • RDW-SD 12/01/2022 48.1  37.0 - 54.0 fl Final   • MPV 12/01/2022 10.8  6.0 - 12.0 fL Final   • Platelets 12/01/2022 66 (L)  140 - 450 10*3/mm3 Final   • Neutrophil % 12/01/2022 69.1  42.7 - 76.0 % Final   • Lymphocyte % 12/01/2022 21.2  19.6 - 45.3 % Final   • Monocyte % 12/01/2022 6.0  5.0 - 12.0 % Final   • Eosinophil % 12/01/2022 3.1  0.3 - 6.2 % Final   • Basophil % 12/01/2022 0.4  0.0 - 1.5 % Final   • Immature Grans % 12/01/2022 0.2  0.0 - 0.5 % Final   • Neutrophils, Absolute 12/01/2022 3.13  1.70 - 7.00 10*3/mm3 Final   • Lymphocytes, Absolute 12/01/2022 0.96  0.70 - 3.10 10*3/mm3 Final   • Monocytes, Absolute 12/01/2022 0.27  0.10 - 0.90 10*3/mm3 Final   • Eosinophils, Absolute 12/01/2022 0.14  0.00 - 0.40 10*3/mm3 Final   • Basophils, Absolute 12/01/2022 0.02  0.00 - 0.20 10*3/mm3 Final   • Immature Grans, Absolute 12/01/2022 0.01  0.00 - 0.05 10*3/mm3 Final   • nRBC 12/01/2022 0.0  0.0 - 0.2 /100 WBC Final   • Anisocytosis 12/01/2022 Slight/1+  None Seen Final   • Ovalocytes 12/01/2022 Slight/1+  None Seen Final   • WBC Morphology 12/01/2022 Normal  Normal Final   • Platelet Estimate 12/01/2022 Decreased  Normal Final   Lab on 10/28/2022   Component Date Value Ref Range Status   • Ammonia 10/28/2022 54 (H)  11 - 51 umol/L Final   • Glucose 10/28/2022 89  65 - 99 mg/dL Final   • BUN 10/28/2022 5 (L)  8 - 23 mg/dL Final   • Creatinine 10/28/2022 0.89  0.57 - 1.00 mg/dL Final   • Sodium 10/28/2022 140  136 - 145 mmol/L Final   • Potassium 10/28/2022 4.4  3.5 - 5.2 mmol/L Final    Slight hemolysis detected by analyzer. Results may be affected.   • Chloride 10/28/2022 106  98 - 107 mmol/L Final   • CO2 10/28/2022  23.7  22.0 - 29.0 mmol/L Final   • Calcium 10/28/2022 8.9  8.6 - 10.5 mg/dL Final   • Total Protein 10/28/2022 6.6  6.0 - 8.5 g/dL Final   • Albumin 10/28/2022 3.30 (L)  3.50 - 5.20 g/dL Final   • ALT (SGPT) 10/28/2022 28  1 - 33 U/L Final   • AST (SGOT) 10/28/2022 47 (H)  1 - 32 U/L Final   • Alkaline Phosphatase 10/28/2022 159 (H)  39 - 117 U/L Final   • Total Bilirubin 10/28/2022 1.6 (H)  0.0 - 1.2 mg/dL Final   • Globulin 10/28/2022 3.3  gm/dL Final   • A/G Ratio 10/28/2022 1.0  g/dL Final   • BUN/Creatinine Ratio 10/28/2022 5.6 (L)  7.0 - 25.0 Final   • Anion Gap 10/28/2022 10.3  5.0 - 15.0 mmol/L Final   • eGFR 10/28/2022 73.4  >60.0 mL/min/1.73 Final    National Kidney Foundation and American Society of Nephrology (ASN) Task Force recommended calculation based on the Chronic Kidney Disease Epidemiology Collaboration (CKD-EPI) equation refit without adjustment for race.   • Protime 10/28/2022 18.4 (H)  11.1 - 15.3 Seconds Final   • INR 10/28/2022 1.53 (H)  0.80 - 1.20 Final   • WBC 10/28/2022 4.63  3.40 - 10.80 10*3/mm3 Final   • RBC 10/28/2022 4.84  3.77 - 5.28 10*6/mm3 Final   • Hemoglobin 10/28/2022 14.4  12.0 - 15.9 g/dL Final   • Hematocrit 10/28/2022 41.2  34.0 - 46.6 % Final   • MCV 10/28/2022 85.1  79.0 - 97.0 fL Final   • MCH 10/28/2022 29.8  26.6 - 33.0 pg Final   • MCHC 10/28/2022 35.0  31.5 - 35.7 g/dL Final   • RDW 10/28/2022 16.1 (H)  12.3 - 15.4 % Final   • RDW-SD 10/28/2022 50.2  37.0 - 54.0 fl Final   • Platelets 10/28/2022 69 (L)  140 - 450 10*3/mm3 Final   • Neutrophil % 10/28/2022 67.5  42.7 - 76.0 % Final   • Lymphocyte % 10/28/2022 23.3  19.6 - 45.3 % Final   • Monocyte % 10/28/2022 5.4  5.0 - 12.0 % Final   • Eosinophil % 10/28/2022 3.0  0.3 - 6.2 % Final   • Basophil % 10/28/2022 0.6  0.0 - 1.5 % Final   • Neutrophils, Absolute 10/28/2022 3.12  1.70 - 7.00 10*3/mm3 Final   • Lymphocytes, Absolute 10/28/2022 1.08  0.70 - 3.10 10*3/mm3 Final   • Monocytes, Absolute 10/28/2022 0.25  0.10 -  0.90 10*3/mm3 Final   • Eosinophils, Absolute 10/28/2022 0.14  0.00 - 0.40 10*3/mm3 Final   • Basophils, Absolute 10/28/2022 0.03  0.00 - 0.20 10*3/mm3 Final   Lab on 09/27/2022   Component Date Value Ref Range Status   • ALPHA-FETOPROTEIN 09/27/2022 3.11  0 - 8.3 ng/mL Final   • Protime 09/27/2022 18.7 (H)  11.1 - 15.3 Seconds Final   • INR 09/27/2022 1.56 (H)  0.80 - 1.20 Final   • Glucose 09/27/2022 107 (H)  65 - 99 mg/dL Final   • BUN 09/27/2022 7 (L)  8 - 23 mg/dL Final   • Creatinine 09/27/2022 0.89  0.57 - 1.00 mg/dL Final   • Sodium 09/27/2022 139  136 - 145 mmol/L Final   • Potassium 09/27/2022 3.6  3.5 - 5.2 mmol/L Final   • Chloride 09/27/2022 103  98 - 107 mmol/L Final   • CO2 09/27/2022 26.1  22.0 - 29.0 mmol/L Final   • Calcium 09/27/2022 8.8  8.6 - 10.5 mg/dL Final   • Total Protein 09/27/2022 6.4  6.0 - 8.5 g/dL Final   • Albumin 09/27/2022 3.40 (L)  3.50 - 5.20 g/dL Final   • ALT (SGPT) 09/27/2022 28  1 - 33 U/L Final   • AST (SGOT) 09/27/2022 49 (H)  1 - 32 U/L Final   • Alkaline Phosphatase 09/27/2022 153 (H)  39 - 117 U/L Final   • Total Bilirubin 09/27/2022 1.4 (H)  0.0 - 1.2 mg/dL Final   • Globulin 09/27/2022 3.0  gm/dL Final   • A/G Ratio 09/27/2022 1.1  g/dL Final   • BUN/Creatinine Ratio 09/27/2022 7.9  7.0 - 25.0 Final   • Anion Gap 09/27/2022 9.9  5.0 - 15.0 mmol/L Final   • eGFR 09/27/2022 73.4  >60.0 mL/min/1.73 Final    National Kidney Foundation and American Society of Nephrology (ASN) Task Force recommended calculation based on the Chronic Kidney Disease Epidemiology Collaboration (CKD-EPI) equation refit without adjustment for race.   Lab on 09/01/2022   Component Date Value Ref Range Status   • Iron 09/01/2022 123  37 - 145 mcg/dL Final   • Iron Saturation 09/01/2022 36  20 - 50 % Final   • Transferrin 09/01/2022 230  200 - 360 mg/dL Final   • TIBC 09/01/2022 343  298 - 536 mcg/dL Final   • Ferritin 09/01/2022 166.60 (H)  13.00 - 150.00 ng/mL Final   • Vitamin B-12 09/01/2022 1,295  (H)  211 - 946 pg/mL Final   • Folate 09/01/2022 12.00  4.78 - 24.20 ng/mL Final   • WBC 09/01/2022 4.36  3.40 - 10.80 10*3/mm3 Final   • RBC 09/01/2022 4.69  3.77 - 5.28 10*6/mm3 Final   • Hemoglobin 09/01/2022 13.7  12.0 - 15.9 g/dL Final   • Hematocrit 09/01/2022 39.9  34.0 - 46.6 % Final   • MCV 09/01/2022 85.1  79.0 - 97.0 fL Final   • MCH 09/01/2022 29.2  26.6 - 33.0 pg Final   • MCHC 09/01/2022 34.3  31.5 - 35.7 g/dL Final   • RDW 09/01/2022 15.5 (H)  12.3 - 15.4 % Final   • RDW-SD 09/01/2022 47.6  37.0 - 54.0 fl Final   • MPV 09/01/2022 10.5  6.0 - 12.0 fL Final   • Platelets 09/01/2022 58 (L)  140 - 450 10*3/mm3 Final   • Neutrophil % 09/01/2022 68.5  42.7 - 76.0 % Final   • Lymphocyte % 09/01/2022 21.1  19.6 - 45.3 % Final   • Monocyte % 09/01/2022 6.0  5.0 - 12.0 % Final   • Eosinophil % 09/01/2022 3.7  0.3 - 6.2 % Final   • Basophil % 09/01/2022 0.5  0.0 - 1.5 % Final   • Immature Grans % 09/01/2022 0.2  0.0 - 0.5 % Final   • Neutrophils, Absolute 09/01/2022 2.99  1.70 - 7.00 10*3/mm3 Final   • Lymphocytes, Absolute 09/01/2022 0.92  0.70 - 3.10 10*3/mm3 Final   • Monocytes, Absolute 09/01/2022 0.26  0.10 - 0.90 10*3/mm3 Final   • Eosinophils, Absolute 09/01/2022 0.16  0.00 - 0.40 10*3/mm3 Final   • Basophils, Absolute 09/01/2022 0.02  0.00 - 0.20 10*3/mm3 Final   • Immature Grans, Absolute 09/01/2022 0.01  0.00 - 0.05 10*3/mm3 Final   • nRBC 09/01/2022 0.0  0.0 - 0.2 /100 WBC Final      XR Ankle 3+ View Left  Narrative: Three view left ankle    HISTORY: Pain following injury    AP, lateral and oblique views obtained.    COMPARISON: March 21, 2019    FINDINGS:   No acute fracture or dislocation.  Soft tissue swelling about the ankle.  Old avulsions medial and lateral malleoli.  Degenerative narrowing tibial talar joint.  Small spurs distal tibia.  Calcaneal spurs.  Degenerative changes dorsal aspect navicular cuneiform joint and  cuneiform metatarsal joints.  No other osseous or articular  abnormality.  Impression: CONCLUSION:  As above    [id number]    Electronically signed by:  Walker Stevens MD  10/6/2022 10:35 AM  CDT Workstation: [id number]      [email or web address]  Immunization History   Administered Date(s) Administered   • COVID-19 (MODERNA) 1st, 2nd, 3rd Dose Only 06/16/2021, 08/13/2021   • Flu Vaccine Quad PF >36MO 10/02/2018   • FluLaval/Fluzone >6mos 11/04/2019, 10/01/2020, 10/28/2022   • Hepatitis A 10/04/2007, 04/04/2008   • Hepatitis B 10/04/2007, 11/08/2007, 04/04/2008   • Influenza, Unspecified 09/28/2021, 10/28/2022   • Shingrix 11/04/2019, 02/11/2020     The following portions of the patient's history were reviewed and updated as appropriate: allergies, current medications, past family history, past medical history, past social history, past surgical history and problem list.    PHQ-9 Total Score:             Physical Exam  Constitutional:       Appearance: Normal appearance.   HENT:      Head: Normocephalic and atraumatic.      Right Ear: External ear normal.      Left Ear: External ear normal.   Eyes:      General:         Right eye: No discharge.         Left eye: No discharge.      Conjunctiva/sclera: Conjunctivae normal.   Cardiovascular:      Rate and Rhythm: Normal rate and regular rhythm.      Pulses: Normal pulses.      Heart sounds: Normal heart sounds. No murmur heard.  Pulmonary:      Effort: Pulmonary effort is normal. No respiratory distress.      Breath sounds: Normal breath sounds.   Abdominal:      General: There is no distension.      Palpations: Abdomen is soft.      Tenderness: There is no abdominal tenderness.   Musculoskeletal:      Cervical back: Normal range of motion.      Right lower leg: No edema.      Left lower leg: No edema.   Lymphadenopathy:      Cervical: No cervical adenopathy.   Neurological:      Mental Status: She is alert. Mental status is at baseline.   Psychiatric:         Mood and Affect: Mood normal.         Behavior: Behavior normal.         Assessment &  Plan    Diagnosis Plan   1. Hospital discharge follow-up        2. Dysuria  Urinalysis With Culture If Indicated - Urine, Clean Catch      3. Burning with urination  Urinalysis With Culture If Indicated - Urine, Clean Catch      4. Mixed hyperlipidemia  CBC & Differential    Comprehensive Metabolic Panel    Lipid Panel      5. Skin cancer screening  Ambulatory Referral to Dermatology         Orders Placed This Encounter   Procedures   • Urinalysis With Culture If Indicated - Urine, Clean Catch     Order Specific Question:   Release to patient     Answer:   Routine Release   • Comprehensive Metabolic Panel     Order Specific Question:   Release to patient     Answer:   Immediate   • Lipid Panel   • CBC Auto Differential   • Ambulatory Referral to Dermatology     Referral Priority:   Routine     Referral Type:   Consultation     Referral Reason:   Specialty Services Required     Requested Specialty:   Dermatology     Number of Visits Requested:   1   • CBC & Differential     Order Specific Question:   Manual Differential     Answer:   No     Hospital discharge follow-up; exam benign, vital stable, patient at baseline, reassuring.  Has approved follow-up with gastroenterology, hematology.  Will obtain labs above, treat as indicated.  In addition, patient inquiring about dermatology referral for skin cancer screening, will refer.  Follow-up in 2 months or sooner if needed.    Total time examining evaluating the patient, completing orders and counseling was 39 minutes.         This document has been electronically signed by Omar Chadwick MD on February 23, 2023 12:25 CST    EMR Dragon/Transciption Disclaimer: Some of this note may be an electronic transcription/translation of spoken language to printed text.  The electronic translation of spoken language may permit erroneous, or at times, nonsensical words or phrases to be inadvertently transcribed. Although I have reviewed the note for such errors, some may still exist.

## 2023-03-21 RX ORDER — DULOXETIN HYDROCHLORIDE 20 MG/1
20 CAPSULE, DELAYED RELEASE ORAL DAILY
Qty: 30 CAPSULE | Refills: 11 | Status: SHIPPED | OUTPATIENT
Start: 2023-03-21

## 2023-03-29 ENCOUNTER — LAB (OUTPATIENT)
Dept: LAB | Facility: HOSPITAL | Age: 63
End: 2023-03-29
Payer: MEDICAID

## 2023-03-29 ENCOUNTER — OFFICE VISIT (OUTPATIENT)
Dept: GASTROENTEROLOGY | Facility: CLINIC | Age: 63
End: 2023-03-29
Payer: MEDICAID

## 2023-03-29 VITALS
DIASTOLIC BLOOD PRESSURE: 70 MMHG | WEIGHT: 239.2 LBS | OXYGEN SATURATION: 94 % | SYSTOLIC BLOOD PRESSURE: 122 MMHG | HEART RATE: 91 BPM | HEIGHT: 64 IN | BODY MASS INDEX: 40.84 KG/M2

## 2023-03-29 DIAGNOSIS — R10.84 GENERALIZED ABDOMINAL PAIN: ICD-10-CM

## 2023-03-29 DIAGNOSIS — R18.8 OTHER ASCITES: ICD-10-CM

## 2023-03-29 DIAGNOSIS — K72.10 END STAGE LIVER DISEASE: Primary | ICD-10-CM

## 2023-03-29 DIAGNOSIS — E61.1 IRON DEFICIENCY: ICD-10-CM

## 2023-03-29 DIAGNOSIS — R18.8 CIRRHOSIS OF LIVER WITH ASCITES, UNSPECIFIED HEPATIC CIRRHOSIS TYPE: ICD-10-CM

## 2023-03-29 DIAGNOSIS — R16.1 SPLENOMEGALY: ICD-10-CM

## 2023-03-29 DIAGNOSIS — K74.60 CIRRHOSIS OF LIVER WITH ASCITES, UNSPECIFIED HEPATIC CIRRHOSIS TYPE: ICD-10-CM

## 2023-03-29 DIAGNOSIS — K74.69 OTHER CIRRHOSIS OF LIVER: ICD-10-CM

## 2023-03-29 DIAGNOSIS — D69.6 THROMBOCYTOPENIA: ICD-10-CM

## 2023-03-29 DIAGNOSIS — R91.1 LUNG NODULE: ICD-10-CM

## 2023-03-29 DIAGNOSIS — K75.81 NASH (NONALCOHOLIC STEATOHEPATITIS): ICD-10-CM

## 2023-03-29 LAB
ANISOCYTOSIS BLD QL: NORMAL
APTT PPP: 41.4 SECONDS (ref 20–40.3)
BASOPHILS # BLD AUTO: 0.02 10*3/MM3 (ref 0–0.2)
BASOPHILS NFR BLD AUTO: 0.4 % (ref 0–1.5)
DEPRECATED RDW RBC AUTO: 57.6 FL (ref 37–54)
EOSINOPHIL # BLD AUTO: 0.18 10*3/MM3 (ref 0–0.4)
EOSINOPHIL NFR BLD AUTO: 3.6 % (ref 0.3–6.2)
ERYTHROCYTE [DISTWIDTH] IN BLOOD BY AUTOMATED COUNT: 17.6 % (ref 12.3–15.4)
FERRITIN SERPL-MCNC: 244.6 NG/ML (ref 13–150)
FOLATE SERPL-MCNC: >20 NG/ML (ref 4.78–24.2)
HCT VFR BLD AUTO: 43.6 % (ref 34–46.6)
HGB BLD-MCNC: 14.4 G/DL (ref 12–15.9)
IMM GRANULOCYTES # BLD AUTO: 0.01 10*3/MM3 (ref 0–0.05)
IMM GRANULOCYTES NFR BLD AUTO: 0.2 % (ref 0–0.5)
INR PPP: 1.54 (ref 0.8–1.2)
IRON 24H UR-MRATE: 180 MCG/DL (ref 37–145)
IRON SATN MFR SERPL: 63 % (ref 20–50)
LYMPHOCYTES # BLD AUTO: 1.23 10*3/MM3 (ref 0.7–3.1)
LYMPHOCYTES NFR BLD AUTO: 24.4 % (ref 19.6–45.3)
MCH RBC QN AUTO: 29.7 PG (ref 26.6–33)
MCHC RBC AUTO-ENTMCNC: 33 G/DL (ref 31.5–35.7)
MCV RBC AUTO: 89.9 FL (ref 79–97)
MONOCYTES # BLD AUTO: 0.29 10*3/MM3 (ref 0.1–0.9)
MONOCYTES NFR BLD AUTO: 5.7 % (ref 5–12)
NEUTROPHILS NFR BLD AUTO: 3.32 10*3/MM3 (ref 1.7–7)
NEUTROPHILS NFR BLD AUTO: 65.7 % (ref 42.7–76)
NRBC BLD AUTO-RTO: 0 /100 WBC (ref 0–0.2)
PLATELET # BLD AUTO: 62 10*3/MM3 (ref 140–450)
PMV BLD AUTO: 10.9 FL (ref 6–12)
PROTHROMBIN TIME: 18.5 SECONDS (ref 11.1–15.3)
RBC # BLD AUTO: 4.85 10*6/MM3 (ref 3.77–5.28)
SMALL PLATELETS BLD QL SMEAR: NORMAL
TIBC SERPL-MCNC: 285 MCG/DL (ref 298–536)
TRANSFERRIN SERPL-MCNC: 191 MG/DL (ref 200–360)
VIT B12 BLD-MCNC: >2000 PG/ML (ref 211–946)
WBC MORPH BLD: NORMAL
WBC NRBC COR # BLD: 5.05 10*3/MM3 (ref 3.4–10.8)

## 2023-03-29 PROCEDURE — 36415 COLL VENOUS BLD VENIPUNCTURE: CPT | Performed by: PHYSICIAN ASSISTANT

## 2023-03-29 PROCEDURE — 3078F DIAST BP <80 MM HG: CPT | Performed by: PHYSICIAN ASSISTANT

## 2023-03-29 PROCEDURE — 1160F RVW MEDS BY RX/DR IN RCRD: CPT | Performed by: PHYSICIAN ASSISTANT

## 2023-03-29 PROCEDURE — 84466 ASSAY OF TRANSFERRIN: CPT

## 2023-03-29 PROCEDURE — 82607 VITAMIN B-12: CPT

## 2023-03-29 PROCEDURE — 83540 ASSAY OF IRON: CPT

## 2023-03-29 PROCEDURE — 1159F MED LIST DOCD IN RCRD: CPT | Performed by: PHYSICIAN ASSISTANT

## 2023-03-29 PROCEDURE — 99214 OFFICE O/P EST MOD 30 MIN: CPT | Performed by: PHYSICIAN ASSISTANT

## 2023-03-29 PROCEDURE — 3074F SYST BP LT 130 MM HG: CPT | Performed by: PHYSICIAN ASSISTANT

## 2023-03-29 PROCEDURE — 85730 THROMBOPLASTIN TIME PARTIAL: CPT | Performed by: PHYSICIAN ASSISTANT

## 2023-03-29 PROCEDURE — 85007 BL SMEAR W/DIFF WBC COUNT: CPT

## 2023-03-29 PROCEDURE — 82728 ASSAY OF FERRITIN: CPT

## 2023-03-29 PROCEDURE — 85610 PROTHROMBIN TIME: CPT | Performed by: PHYSICIAN ASSISTANT

## 2023-03-29 PROCEDURE — 85025 COMPLETE CBC W/AUTO DIFF WBC: CPT

## 2023-03-29 PROCEDURE — 82746 ASSAY OF FOLIC ACID SERUM: CPT

## 2023-03-29 RX ORDER — ALBUMIN (HUMAN) 12.5 G/50ML
25 SOLUTION INTRAVENOUS ONCE
Status: CANCELLED | OUTPATIENT
Start: 2023-03-29 | End: 2023-03-29

## 2023-03-29 NOTE — PROGRESS NOTES
Chief Complaint   Patient presents with    Follow-up     ED ([facility])-- Went 2 times yesterday    Abdominal Pain       ENDO PROCEDURE ORDERED:    Francesco Shannon is a 62 y.o. female. she is here today for follow-up.    History of Present Illness    The patient is seen on a recheck following ER visit yesterday x2 at [facility].  Patient presented with edema and abdominal pain.  Patient had laboratories at [facility] 03/28/2023.  CMP showed a chloride 109, calcium 8.2, protein 5.6, albumin 2.7, total bilirubin 2.7, alkaline phosphatase 155, AST 56, otherwise normal.  Magnesium was low at 1.7.  CBC showed 54,000 platelets, otherwise normal.  Urinalysis showed multiple abnormalities.  The note indicates she had some sort of imaging, but did not show an obstruction, but it did show increased stool.  It appears to have been a plain film of the abdomen.  Patient states she went back again when she did not improve and they did a CT scan.  We have called multiple times, the CT has not been read and we have not been able to get a report.  She was last seen by me 10/04/2022 with known cirrhosis F4/S1/N2.  She has not kept a followup until today.  She did have laboratories on 02/23/2023 that showed normal urinalysis and cholesterol.  CBC showed 28,000 platelets.  CMP showed a glucose 105, albumin 3.3, total bilirubin 1.7, alkaline phosphatase 181, AST 53, GFR 69.2.  Cholesterol panel noted.      Patient presents with 8 out of 10 abdominal pain.  She is accompanied by her .  She states she has had a lot of cramping in the upper abdomen.  Her abdomen became much more distended about 2-3 days ago rather abruptly.  Her weight is up 24.2 pounds since last visit.  She is on Lasix 20 mg b.i.d. and Aldactone 25 mg b.i.d.  She is also on Movantik and MiraLAX as well as lactulose and Dulcolax for her constipation.    She is on Prilosec for chronic GERD.  Last EGD showed a hiatal hernia  "09/16/2021.  Last colonoscopy showed hemorrhoids 03/22/2021.      ASSESSMENT AND PLAN:  Patient with abrupt onset abdominal pain with increased abdominal pain and ascites.  Suspect possible portal thrombus.  Recommend large volume paracentesis, 8L max, to be done by radiology.  We will try to do that today.  We will follow with a Doppler of the hepatic vessels.  We will order stat PT/PTT.  We will plan followup in 1 week, sooner if needed.  Further pending clinical course and the results of the above. We will still try to get a copy of her CT scan done yesterday.      Note: Did finally receive CT dated 3/28/23. CT abd/pelvis with contrast. Nodular hepatic contour similar to previous. Moderate enlarged spleen with varices. Moderate ascites.     The following portions of the patient's history were reviewed and updated as appropriate:   Past Medical History:   Diagnosis Date    Acid reflux     Altered bowel function     Arthropathy of lumbar facet joint     Bleeding disorder (HCC)     C. difficile diarrhea 2015    Chronic idiopathic constipation     Colonic inertia     Constipation     Corns and callus     Depression     Disease related peripheral neuropathy     Epigastric pain     Fatty liver     Hammer toe     Headache     Hiatal hernia     History of transfusion     Hyperlipidemia     Knee pain     Localized, primary osteoarthritis of the ankle and foot     Localized, primary osteoarthritis of the ankle and/or foot    Mendoza's metatarsalgia     Mendoza's metatarsalgia - 2nd interspace on right    Nausea and vomiting     Neuralgia and neuritis     Neuralgia, neuritis, and radiculitis, unspecified    Neuropathy     Obstructive sleep apnea     Obstructive sleep apnea (adult) (pediatric)     CHAI on CPAP     \"C-Pap at night  (unconfirmed)\"    Osteoarthritis     Pain in foot     Pain in unspecified foot - sees a podiatrist    Pain in joint, ankle and foot     Joint pain in ankle and foot       Pain radiating to back     " Pain radiating to lumbar region of back    Pelvic floor dysfunction     Plantar fasciitis     PONV (postoperative nausea and vomiting)     Restless leg syndrome     Secondary hypertension     Secondary hypertension, unspecified    Sinusitis     Sleep apnea     Tongue anomaly     lesion     Past Surgical History:   Procedure Laterality Date    CARPAL TUNNEL RELEASE Left 2018    Procedure: CARPAL TUNNEL RELEASE - left;  Surgeon: Justus Lewis MD;  Location: [facility] OR;  Service: Orthopedics    CARPAL TUNNEL RELEASE Right 2019    Procedure: carpal tunnel release right hand with local/monitored anesthesia care;  Surgeon: Justus Lewis MD;  Location: [facility] OR;  Service: Orthopedics     SECTION      CHOLECYSTECTOMY      COLONOSCOPY  2013    COLONOSCOPY N/A 10/17/2018    Procedure: COLONOSCOPY;  Surgeon: Russell Del Rio MD;  Location: [facility] ENDOSCOPY;  Service: Gastroenterology    COLONOSCOPY N/A 3/22/2021    Procedure: COLONOSCOPY;  Surgeon: Russell Del Rio MD;  Location: [facility] ENDOSCOPY;  Service: Gastroenterology;  Laterality: N/A;    DIRECT LARYNGOSCOPY, ESOPHAGOSCOPY, BRONCHOSCOPY N/A 2017    Procedure: DIRECT LARYNGOSCOPY AND;  Surgeon: Live Bolton MD;  Location: [facility] OR;  Service:     ENDOSCOPY  2013    Colon endoscopy 75988 (1) - Internal & external hemorrhoids found. Stool found.    ENDOSCOPY  2013    EGD w/ tube 00290 (1) - Normal esophagus. Gastritis in stomach. Biopsy taken. Normal dudoenum. Biopsy taken.    ENDOSCOPY N/A 10/17/2018    Procedure: ESOPHAGOGASTRODUODENOSCOPY--eval varices;  Surgeon: Russell Del Rio MD;  Location: [facility] ENDOSCOPY;  Service: Gastroenterology    ENDOSCOPY N/A 3/22/2021    Procedure: ESOPHAGOGASTRODUODENOSCOPYURGNET;  Surgeon: Russell Del Rio MD;  Location: [facility] ENDOSCOPY;  Service: Gastroenterology;  Laterality: N/A;    ENDOSCOPY N/A 2021    Procedure: ESOPHAGOGASTRODUODENOSCOPY;  Surgeon: Chip  MD Basia;  Location: [facility] ENDOSCOPY;  Service: Gastroenterology;  Laterality: N/A;    FOOT SURGERY  2013    Foot/toes surgery procedure (1) - Arthroplasty of toes 4 and 5 of right foot.    HERNIA REPAIR      HERNIA REPAIR      hital    HYSTERECTOMY      LIVER BIOPSY      NERVE BLOCK  2016    Injection for nerve block (1) - Lumbar medial branch block.    OTHER SURGICAL HISTORY  2012    Inj(s) Tend-Sheath, Ligament, Single  (1) - [facility]     OTHER SURGICAL HISTORY  2013    Small Joint Injection/Aspiration  (2) - [facility]TER NICK[facility] ([facility])     OTHER SURGICAL HISTORY      bowel obstruction x2    OTHER SURGICAL HISTORY      gland removed from neck    SUBLINGUAL SALIVARY CYST EXCISION N/A 2017    Procedure: EXCISION OF LEFT  TONGUE LESION WITH CLOSURE;  Surgeon: Live Bolton MD;  Location: [facility] OR;  Service:     TUBAL ABDOMINAL LIGATION      UPPER GASTROINTESTINAL ENDOSCOPY  2013    UPPER GASTROINTESTINAL ENDOSCOPY  10/17/2018    UPPER GASTROINTESTINAL ENDOSCOPY  2021     Family History   Problem Relation Age of Onset    Cancer Other     Diabetes Other     Heart disease Other     Hypertension Mother     Cancer Mother     Diabetes Mother     Hypertension Father     Heart attack Father     Cancer Father     Heart disease Father     Thyroid disease Maternal Aunt     Cancer Maternal Aunt     No Known Problems Sister     No Known Problems Brother     Cancer Maternal Grandmother     No Known Problems Sister      OB History          1    Para   1    Term                AB        Living   1         SAB        IAB        Ectopic        Molar        Multiple        Live Births                  Allergies   Allergen Reactions    Morphine GI Intolerance    Tape Rash     Patient has rash/blishers on site after tape    Other      Pt states that taking steroids either in pill or injection form make her have blisters in her mouth and  she feels like she is on fire on the inside/ has hx of c diff and possible MRSA      Corticosteroids Rash    Kenalog  [Triamcinolone Acetonide] Rash    Sulfa Antibiotics Rash     Sulfa (Sulfonamide Antibiotics)     Social History     Socioeconomic History    Marital status:    Tobacco Use    Smoking status: Former     Packs/day: 2.00     Years: 15.00     Pack years: 30.00     Types: Cigarettes     Quit date:      Years since quittin.2    Smokeless tobacco: Never   Vaping Use    Vaping Use: Never used   Substance and Sexual Activity    Alcohol use: No    Drug use: Yes     Types: Oxycodone    Sexual activity: Defer     Birth control/protection: Surgical     Comment: Marital Status: .  Hysterectomy.     Current Medications:  Prior to Admission medications    Medication Sig Start Date End Date Taking? Authorizing Provider   acetaminophen (TYLENOL) 325 MG tablet Take 2 tablets by mouth Every 6 (Six) Hours As Needed for Mild Pain.   Yes Promise Tovar MD   bisacodyl (DULCOLAX) 10 MG suppository Insert 1 suppository into the rectum As Needed for Constipation.   Yes Promise Tovar MD   cetirizine (zyrTEC) 10 MG tablet Take 1 tablet by mouth Daily. 22  Yes Omar Chadwick MD   cyclobenzaprine (FLEXERIL) 10 MG tablet Take 1 tablet by mouth 2 (Two) Times a Day As Needed. 8/3/22  Yes Promise Tovar MD   Docusate Sodium 100 MG capsule Take 100 mg by mouth 2 (Two) Times a Day.   Yes Promise Tovar MD   DULoxetine (CYMBALTA) 20 MG capsule Take 1 capsule by mouth Daily. 3/21/23  Yes Omar Chadwick MD   folic acid (FOLVITE) 1 MG tablet TAKE 1 TABLET BY MOUTH ON MONDAY, WEDNESDAY AND 23  Yes Lokesh Goodwin MD   furosemide (LASIX) 20 MG tablet Take 1 tablet by mouth 2 (Two) Times a Day.   Yes Promise Tovar MD   gabapentin (NEURONTIN) 800 MG tablet Take 1 tablet by mouth 3 (Three) Times a Day. 10/19/21  Yes Promise Tovar MD   lactulose (CHRONULAC) 10  GM/15ML solution Take 30 mL by mouth 2 (Two) Times a Day. 2/12/23  Yes Promise Tovar MD   metoclopramide (REGLAN) 10 MG tablet TAKE 1 TABLET BY MOUTH 4 TIMES DAILY BEFORE MEAL(S) AND AT BEDTIME 2/3/23  Yes Omar Chadwick MD   Naloxegol Oxalate (Movantik) 25 MG tablet Take 1 tablet by mouth Daily. 6/20/22  Yes Hua Westfall MD   nystatin (MYCOSTATIN) 163666 UNIT/GM powder Apply 1 application topically to the appropriate area as directed 3 (Three) Times a Day As Needed (for itching in crevices). 9/2/22  Yes Omar Chadwick MD   Omega-3 Fatty Acids (fish oil) 1000 MG capsule capsule Take  by mouth 2 (Two) Times a Day With Meals.   Yes Promise Tovar MD   omeprazole (priLOSEC) 40 MG capsule Take 1 capsule by mouth once daily 1/11/23  Yes Omar Chadwick MD   ondansetron (ZOFRAN) 8 MG tablet Take 1 tablet by mouth Every 8 (Eight) Hours As Needed for Nausea or Vomiting.  Patient taking differently: Take 4 mg by mouth Every 8 (Eight) Hours As Needed for Nausea or Vomiting. 1/26/21  Yes Blaine Perales PA-C   ondansetron ODT (ZOFRAN-ODT) 4 MG disintegrating tablet DISSOLVE 1 TABLET IN MOUTH EVERY 8 HOURS AS NEEDED FOR NAUSEA AND VOMITING 11/23/22  Yes Blaine Perales PA-C   oxyCODONE (ROXICODONE) 10 MG tablet Take 1 tablet by mouth. 9/30/22  Yes Promise Tovar MD   polyethylene glycol (MIRALAX) 17 GM/SCOOP powder Take 17 g by mouth Daily.   Yes Promise Tovar MD   potassium chloride (MICRO-K) 10 MEQ CR capsule Take 2 capsules by mouth Daily.  Patient taking differently: Take 1 capsule by mouth 2 (Two) Times a Day. 9/24/21  Yes Fidencio Mathews MD   spironolactone (ALDACTONE) 25 MG tablet Take 1 tablet by mouth 2 (Two) Times a Day. 10/28/22  Yes Omar Chadwick MD   sucralfate (Carafate) 1 g tablet Take 1 tablet by mouth 4 (Four) Times a Day. 10/19/21  Yes Blaine Perales PA-C   traZODone (DESYREL) 100 MG tablet Take 1 tablet by mouth Every Night. 10/17/22  Yes Blaine Perales  "MERISSA GONZALEZ   vitamin B-12 (CYANOCOBALAMIN) 1000 MCG tablet Take 1 tablet by mouth Daily.   Yes Provider, Promise, MD     Review of Systems  Review of Systems   Constitutional: Positive for unexpected weight change.   HENT: Negative for trouble swallowing.    Gastrointestinal: Positive for abdominal pain.          Objective    /70 (BP Location: Right arm, Patient Position: Sitting, Cuff Size: Large Adult)   Pulse 91   Ht 162.6 cm (64\")   Wt 109 kg (239 lb 3.2 oz)   LMP  (LMP Unknown)   SpO2 94%   BMI 41.06 kg/m²   Physical Exam  Vitals and nursing note reviewed.   Constitutional:       General: She is not in acute distress.     Appearance: She is well-developed. She is obese. She is ill-appearing.   HENT:      Head: Normocephalic and atraumatic.   Eyes:      Pupils: Pupils are equal, round, and reactive to light.   Cardiovascular:      Rate and Rhythm: Normal rate and regular rhythm.      Heart sounds: Normal heart sounds.   Pulmonary:      Effort: Pulmonary effort is normal.      Breath sounds: Normal breath sounds.   Abdominal:      General: Bowel sounds are normal. There is distension. There is no abdominal bruit.      Palpations: Abdomen is soft. Abdomen is not rigid. There is no shifting dullness or mass.      Tenderness: There is abdominal tenderness. There is no guarding or rebound.      Hernia: No hernia is present. There is no hernia in the ventral area.      Comments: Moderate ascites, diffuse    Musculoskeletal:         General: Normal range of motion.      Cervical back: Normal range of motion.   Skin:     General: Skin is warm and dry.   Neurological:      Mental Status: She is alert and oriented to person, place, and time.   Psychiatric:         Behavior: Behavior normal.         Thought Content: Thought content normal.         Judgment: Judgment normal.       Assessment & Plan      1. End stage liver disease (HCC)    2. Cirrhosis of liver with ascites, unspecified hepatic cirrhosis type " (HCC)    3. Generalized abdominal pain    4. Other ascites    .   Diagnoses and all orders for this visit:    1. End stage liver disease (HCC) (Primary)  -     Protime-INR  -     US Abdominal Vascular Limited  -     Obtain Informed Consent; Standing  -     US Paracentesis  -     Body Fluid Cell Count With Differential - Body Fluid, Peritoneum; Standing  -     Albumin, Fluid - Body Fluid, Peritoneum; Standing  -     Lactate Dehydrogenase, Body Fluid - Body Fluid, Peritoneum; Standing  -     Non-gynecologic Cytology; Standing  -     Protein, Body Fluid - Body Fluid, Peritoneum; Standing  -     Body Fluid Culture - Body Fluid, Peritoneum; Standing  -     INR - Miscellaneous Test; Standing  -     albumin human 25 % IV SOLN 25 g  -     Protime-INR  -     APTT    2. Cirrhosis of liver with ascites, unspecified hepatic cirrhosis type (HCC)  -     Protime-INR  -     US Abdominal Vascular Limited  -     Obtain Informed Consent; Standing  -     US Paracentesis  -     Body Fluid Cell Count With Differential - Body Fluid, Peritoneum; Standing  -     Albumin, Fluid - Body Fluid, Peritoneum; Standing  -     Lactate Dehydrogenase, Body Fluid - Body Fluid, Peritoneum; Standing  -     Non-gynecologic Cytology; Standing  -     Protein, Body Fluid - Body Fluid, Peritoneum; Standing  -     Body Fluid Culture - Body Fluid, Peritoneum; Standing  -     INR - Miscellaneous Test; Standing  -     albumin human 25 % IV SOLN 25 g  -     Protime-INR  -     APTT    3. Generalized abdominal pain  -     Protime-INR  -     US Abdominal Vascular Limited  -     Obtain Informed Consent; Standing  -     US Paracentesis  -     Body Fluid Cell Count With Differential - Body Fluid, Peritoneum; Standing  -     Albumin, Fluid - Body Fluid, Peritoneum; Standing  -     Lactate Dehydrogenase, Body Fluid - Body Fluid, Peritoneum; Standing  -     Non-gynecologic Cytology; Standing  -     Protein, Body Fluid - Body Fluid, Peritoneum; Standing  -     Body Fluid  Culture - Body Fluid, Peritoneum; Standing  -     INR - Miscellaneous Test; Standing  -     albumin human 25 % IV SOLN 25 g  -     Protime-INR  -     APTT    4. Other ascites  -     Protime-INR  -     US Abdominal Vascular Limited  -     Obtain Informed Consent; Standing  -     US Paracentesis  -     Body Fluid Cell Count With Differential - Body Fluid, Peritoneum; Standing  -     Albumin, Fluid - Body Fluid, Peritoneum; Standing  -     Lactate Dehydrogenase, Body Fluid - Body Fluid, Peritoneum; Standing  -     Non-gynecologic Cytology; Standing  -     Protein, Body Fluid - Body Fluid, Peritoneum; Standing  -     Body Fluid Culture - Body Fluid, Peritoneum; Standing  -     INR - Miscellaneous Test; Standing  -     albumin human 25 % IV SOLN 25 g  -     Protime-INR  -     APTT        Orders placed during this encounter include:  Orders Placed This Encounter   Procedures    US Abdominal Vascular Limited     Scheduling Instructions:      Doppler hepatic vessels     Order Specific Question:   Reason for Exam:     Answer:   new onset ascites, abd pain, poss portal thrombus    US Paracentesis     Order Specific Question:   Release to patient     Answer:   Routine Release     Order Specific Question:   Reason for Exam:     Answer:   ESLD, abd pain, ascites, poss portal thrombus    Protime-INR    Protime-INR     Order Specific Question:   Release to patient     Answer:   Routine Release    APTT     Order Specific Question:   Patient Receiving Heparin Therapy?     Answer:   No     Order Specific Question:   Release to patient     Answer:   Routine Release       Medications prescribed:  No orders of the defined types were placed in this encounter.      Requested Prescriptions      No prescriptions requested or ordered in this encounter       Review and/or summary of lab tests, radiology, procedures, medications. Review and summary of old records and obtaining of history. The risks and benefits of my recommendations, as well  as other treatment options were discussed with the patient today. Questions were answered.    Follow-up: Return in about 1 week (around 4/5/2023), or if symptoms worsen or fail to improve.     * Surgery not found *      This document has been electronically signed by Blaine Perales PA-C on March 29, 2023 13:11 CDT      Results for orders placed or performed in visit on 02/23/23   Urinalysis With Culture If Indicated - Urine, Clean Catch    Specimen: Urine, Clean Catch   Result Value Ref Range    Color, UA Yellow Yellow, Straw    Appearance, UA Clear Clear    pH, UA 7.0 5.0 - 8.0    Specific Gravity, UA 1.008 1.005 - 1.030    Glucose, UA Negative Negative    Ketones, UA Negative Negative    Bilirubin, UA Negative Negative    Blood, UA Negative Negative    Protein, UA Negative Negative    Leuk Esterase, UA Negative Negative    Nitrite, UA Negative Negative    Urobilinogen, UA 1.0 E.U./dL 0.2 - 1.0 E.U./dL   CBC Auto Differential    Specimen: Blood   Result Value Ref Range    WBC 4.07 3.40 - 10.80 10*3/mm3    RBC 4.76 3.77 - 5.28 10*6/mm3    Hemoglobin 13.9 12.0 - 15.9 g/dL    Hematocrit 40.1 34.0 - 46.6 %    MCV 84.2 79.0 - 97.0 fL    MCH 29.2 26.6 - 33.0 pg    MCHC 34.7 31.5 - 35.7 g/dL    RDW 15.4 12.3 - 15.4 %    RDW-SD 46.4 37.0 - 54.0 fl    MPV 11.3 6.0 - 12.0 fL    Platelets 28 (C) 140 - 450 10*3/mm3    Neutrophil % 68.5 42.7 - 76.0 %    Lymphocyte % 21.4 19.6 - 45.3 %    Monocyte % 6.9 5.0 - 12.0 %    Eosinophil % 2.5 0.3 - 6.2 %    Basophil % 0.5 0.0 - 1.5 %    Neutrophils, Absolute 2.79 1.70 - 7.00 10*3/mm3    Lymphocytes, Absolute 0.87 0.70 - 3.10 10*3/mm3    Monocytes, Absolute 0.28 0.10 - 0.90 10*3/mm3    Eosinophils, Absolute 0.10 0.00 - 0.40 10*3/mm3    Basophils, Absolute 0.02 0.00 - 0.20 10*3/mm3   Lipid Panel    Specimen: Blood   Result Value Ref Range    Total Cholesterol 142 0 - 200 mg/dL    Triglycerides 60 0 - 150 mg/dL    HDL Cholesterol 56 40 - 60 mg/dL    LDL Cholesterol  73 0 - 100 mg/dL     VLDL Cholesterol 13 5 - 40 mg/dL    LDL/HDL Ratio 1.32    Comprehensive Metabolic Panel    Specimen: Blood   Result Value Ref Range    Glucose 105 (H) 65 - 99 mg/dL    BUN 5 (L) 8 - 23 mg/dL    Creatinine 0.93 0.57 - 1.00 mg/dL    Sodium 136 136 - 145 mmol/L    Potassium 4.3 3.5 - 5.2 mmol/L    Chloride 103 98 - 107 mmol/L    CO2 27.0 22.0 - 29.0 mmol/L    Calcium 8.9 8.6 - 10.5 mg/dL    Total Protein 6.4 6.0 - 8.5 g/dL    Albumin 3.3 (L) 3.5 - 5.2 g/dL    ALT (SGPT) 30 1 - 33 U/L    AST (SGOT) 53 (H) 1 - 32 U/L    Alkaline Phosphatase 181 (H) 39 - 117 U/L    Total Bilirubin 1.7 (H) 0.0 - 1.2 mg/dL    Globulin 3.1 gm/dL    A/G Ratio 1.1 g/dL    BUN/Creatinine Ratio 5.4 (L) 7.0 - 25.0    Anion Gap 6.0 5.0 - 15.0 mmol/L    eGFR 69.2 >60.0 mL/min/1.73   Results for orders placed or performed in visit on 12/01/22   Scan Slide    Specimen: Hand, Right; Blood   Result Value Ref Range    Anisocytosis Slight/1+ None Seen    Ovalocytes Slight/1+ None Seen    WBC Morphology Normal Normal    Platelet Estimate Decreased Normal   CBC Auto Differential    Specimen: Hand, Right; Blood   Result Value Ref Range    WBC 4.53 3.40 - 10.80 10*3/mm3    RBC 4.67 3.77 - 5.28 10*6/mm3    Hemoglobin 13.4 12.0 - 15.9 g/dL    Hematocrit 39.5 34.0 - 46.6 %    MCV 84.6 79.0 - 97.0 fL    MCH 28.7 26.6 - 33.0 pg    MCHC 33.9 31.5 - 35.7 g/dL    RDW 15.9 (H) 12.3 - 15.4 %    RDW-SD 48.1 37.0 - 54.0 fl    MPV 10.8 6.0 - 12.0 fL    Platelets 66 (L) 140 - 450 10*3/mm3    Neutrophil % 69.1 42.7 - 76.0 %    Lymphocyte % 21.2 19.6 - 45.3 %    Monocyte % 6.0 5.0 - 12.0 %    Eosinophil % 3.1 0.3 - 6.2 %    Basophil % 0.4 0.0 - 1.5 %    Immature Grans % 0.2 0.0 - 0.5 %    Neutrophils, Absolute 3.13 1.70 - 7.00 10*3/mm3    Lymphocytes, Absolute 0.96 0.70 - 3.10 10*3/mm3    Monocytes, Absolute 0.27 0.10 - 0.90 10*3/mm3    Eosinophils, Absolute 0.14 0.00 - 0.40 10*3/mm3    Basophils, Absolute 0.02 0.00 - 0.20 10*3/mm3    Immature Grans, Absolute 0.01 0.00 -  0.05 10*3/mm3    nRBC 0.0 0.0 - 0.2 /100 WBC   Iron and TIBC    Specimen: Hand, Right; Blood   Result Value Ref Range    Iron 107 37 - 145 mcg/dL    Iron Saturation 30 20 - 50 %    Transferrin 238 200 - 360 mg/dL    TIBC 355 298 - 536 mcg/dL   Folate    Specimen: Hand, Right; Blood   Result Value Ref Range    Folate >20.00 4.78 - 24.20 ng/mL   Ferritin    Specimen: Hand, Right; Blood   Result Value Ref Range    Ferritin 180.60 (H) 13.00 - 150.00 ng/mL   Vitamin B12    Specimen: Hand, Right; Blood   Result Value Ref Range    Vitamin B-12 1,302 (H) 211 - 946 pg/mL   Results for orders placed or performed in visit on 10/28/22   CBC Auto Differential    Specimen: Blood   Result Value Ref Range    WBC 4.63 3.40 - 10.80 10*3/mm3    RBC 4.84 3.77 - 5.28 10*6/mm3    Hemoglobin 14.4 12.0 - 15.9 g/dL    Hematocrit 41.2 34.0 - 46.6 %    MCV 85.1 79.0 - 97.0 fL    MCH 29.8 26.6 - 33.0 pg    MCHC 35.0 31.5 - 35.7 g/dL    RDW 16.1 (H) 12.3 - 15.4 %    RDW-SD 50.2 37.0 - 54.0 fl    Platelets 69 (L) 140 - 450 10*3/mm3    Neutrophil % 67.5 42.7 - 76.0 %    Lymphocyte % 23.3 19.6 - 45.3 %    Monocyte % 5.4 5.0 - 12.0 %    Eosinophil % 3.0 0.3 - 6.2 %    Basophil % 0.6 0.0 - 1.5 %    Neutrophils, Absolute 3.12 1.70 - 7.00 10*3/mm3    Lymphocytes, Absolute 1.08 0.70 - 3.10 10*3/mm3    Monocytes, Absolute 0.25 0.10 - 0.90 10*3/mm3    Eosinophils, Absolute 0.14 0.00 - 0.40 10*3/mm3    Basophils, Absolute 0.03 0.00 - 0.20 10*3/mm3     *Note: Due to a large number of results and/or encounters for the requested time period, some results have not been displayed. A complete set of results can be found in Results Review.

## 2023-03-30 ENCOUNTER — HOSPITAL ENCOUNTER (OUTPATIENT)
Dept: ULTRASOUND IMAGING | Facility: HOSPITAL | Age: 63
Discharge: HOME | End: 2023-03-30
Payer: MEDICAID

## 2023-03-30 VITALS
TEMPERATURE: 96.9 F | RESPIRATION RATE: 18 BRPM | SYSTOLIC BLOOD PRESSURE: 137 MMHG | OXYGEN SATURATION: 96 % | DIASTOLIC BLOOD PRESSURE: 83 MMHG | HEART RATE: 83 BPM

## 2023-03-30 DIAGNOSIS — K72.10 END STAGE LIVER DISEASE: ICD-10-CM

## 2023-03-30 DIAGNOSIS — R18.8 OTHER ASCITES: ICD-10-CM

## 2023-03-30 DIAGNOSIS — R10.84 GENERALIZED ABDOMINAL PAIN: ICD-10-CM

## 2023-03-30 DIAGNOSIS — K74.60 CIRRHOSIS OF LIVER WITH ASCITES, UNSPECIFIED HEPATIC CIRRHOSIS TYPE: ICD-10-CM

## 2023-03-30 DIAGNOSIS — R18.8 CIRRHOSIS OF LIVER WITH ASCITES, UNSPECIFIED HEPATIC CIRRHOSIS TYPE: ICD-10-CM

## 2023-03-30 LAB
APPEARANCE FLD: CLEAR
COLOR FLD: YELLOW
MONOS+MACROS NFR FLD: 88 %
NEUTROPHILS NFR FLD MANUAL: 12 %
RBC # FLD AUTO: 1000 /MM3 (ref 0–0)
SPECIMEN VOL FLD: 1100 ML
WBC # FLD: 498 /MM3 (ref 0–5)

## 2023-03-30 PROCEDURE — 89051 BODY FLUID CELL COUNT: CPT | Performed by: PHYSICIAN ASSISTANT

## 2023-03-30 PROCEDURE — 76942 ECHO GUIDE FOR BIOPSY: CPT

## 2023-03-30 PROCEDURE — 87070 CULTURE OTHR SPECIMN AEROBIC: CPT | Performed by: PHYSICIAN ASSISTANT

## 2023-03-30 PROCEDURE — 93976 VASCULAR STUDY: CPT

## 2023-03-30 PROCEDURE — 82042 OTHER SOURCE ALBUMIN QUAN EA: CPT | Performed by: PHYSICIAN ASSISTANT

## 2023-03-30 PROCEDURE — 83615 LACTATE (LD) (LDH) ENZYME: CPT | Performed by: PHYSICIAN ASSISTANT

## 2023-03-30 PROCEDURE — 87205 SMEAR GRAM STAIN: CPT | Performed by: PHYSICIAN ASSISTANT

## 2023-03-30 PROCEDURE — 87015 SPECIMEN INFECT AGNT CONCNTJ: CPT | Performed by: PHYSICIAN ASSISTANT

## 2023-03-30 PROCEDURE — 84157 ASSAY OF PROTEIN OTHER: CPT | Performed by: PHYSICIAN ASSISTANT

## 2023-03-30 RX ORDER — ALBUMIN (HUMAN) 12.5 G/50ML
25 SOLUTION INTRAVENOUS ONCE
Status: DISCONTINUED | OUTPATIENT
Start: 2023-03-30 | End: 2023-03-31 | Stop reason: HOSPADM

## 2023-03-30 NOTE — POST-PROCEDURE NOTE
Pre-Op Diagnosis: Ascites  Post-Op Diagnosis: Same  Procedure: US guided paracentesis  Anesthesia: 2% local lidocaine  EBL: Minimal  Specimens: 1.1Liters cloudy yellow fluid.  Findings: see on pacs  Complications: No immediate  Disposition:  Patient tolerated the procedure well

## 2023-03-30 NOTE — PRE-PROCEDURE NOTE
Patient's history and physical exam were reviewed, and no changes were noted.  The risks and benefits of paracentesis were discussed with the patient, and the patient had ample opportunity to ask questions.  Informed consent was obtained.

## 2023-03-31 LAB
ALBUMIN FLD-MCNC: 0.6 G/DL
LDH FLD-CCNC: 57 U/L
PROT FLD-MCNC: <1 G/DL

## 2023-04-03 LAB — REF LAB TEST METHOD: NORMAL

## 2023-04-04 ENCOUNTER — HOSPITAL ENCOUNTER (EMERGENCY)
Facility: HOSPITAL | Age: 63
Discharge: HOME | End: 2023-04-04
Attending: STUDENT IN AN ORGANIZED HEALTH CARE EDUCATION/TRAINING PROGRAM | Admitting: STUDENT IN AN ORGANIZED HEALTH CARE EDUCATION/TRAINING PROGRAM
Payer: MEDICAID

## 2023-04-04 ENCOUNTER — APPOINTMENT (OUTPATIENT)
Dept: GENERAL RADIOLOGY | Facility: HOSPITAL | Age: 63
End: 2023-04-04
Payer: MEDICAID

## 2023-04-04 VITALS
WEIGHT: 242.3 LBS | HEART RATE: 86 BPM | HEIGHT: 64 IN | RESPIRATION RATE: 18 BRPM | SYSTOLIC BLOOD PRESSURE: 129 MMHG | OXYGEN SATURATION: 94 % | BODY MASS INDEX: 41.37 KG/M2 | DIASTOLIC BLOOD PRESSURE: 63 MMHG | TEMPERATURE: 98.2 F

## 2023-04-04 DIAGNOSIS — N30.00 ACUTE CYSTITIS WITHOUT HEMATURIA: ICD-10-CM

## 2023-04-04 DIAGNOSIS — L03.115 CELLULITIS OF RIGHT LOWER EXTREMITY: ICD-10-CM

## 2023-04-04 DIAGNOSIS — K59.00 CONSTIPATION, UNSPECIFIED CONSTIPATION TYPE: Primary | ICD-10-CM

## 2023-04-04 LAB
ALBUMIN SERPL-MCNC: 2.6 G/DL (ref 3.5–5.2)
ALBUMIN/GLOB SERPL: 0.9 G/DL
ALP SERPL-CCNC: 165 U/L (ref 39–117)
ALT SERPL W P-5'-P-CCNC: 26 U/L (ref 1–33)
ANION GAP SERPL CALCULATED.3IONS-SCNC: 8 MMOL/L (ref 5–15)
AST SERPL-CCNC: 45 U/L (ref 1–32)
BACTERIA FLD CULT: NORMAL
BACTERIA UR QL AUTO: NORMAL /HPF
BASOPHILS # BLD AUTO: 0.03 10*3/MM3 (ref 0–0.2)
BASOPHILS NFR BLD AUTO: 0.7 % (ref 0–1.5)
BILIRUB SERPL-MCNC: 2.1 MG/DL (ref 0–1.2)
BILIRUB UR QL STRIP: ABNORMAL
BUN SERPL-MCNC: 9 MG/DL (ref 8–23)
BUN/CREAT SERPL: 10.2 (ref 7–25)
CALCIUM SPEC-SCNC: 8.5 MG/DL (ref 8.6–10.5)
CHLORIDE SERPL-SCNC: 107 MMOL/L (ref 98–107)
CLARITY UR: CLEAR
CO2 SERPL-SCNC: 25 MMOL/L (ref 22–29)
COLOR UR: ABNORMAL
CREAT SERPL-MCNC: 0.88 MG/DL (ref 0.57–1)
D-LACTATE SERPL-SCNC: 1.5 MMOL/L (ref 0.5–2)
DEPRECATED RDW RBC AUTO: 57.1 FL (ref 37–54)
EGFRCR SERPLBLD CKD-EPI 2021: 73.9 ML/MIN/1.73
EOSINOPHIL # BLD AUTO: 0.11 10*3/MM3 (ref 0–0.4)
EOSINOPHIL NFR BLD AUTO: 2.7 % (ref 0.3–6.2)
ERYTHROCYTE [DISTWIDTH] IN BLOOD BY AUTOMATED COUNT: 17.3 % (ref 12.3–15.4)
GLOBULIN UR ELPH-MCNC: 2.9 GM/DL
GLUCOSE SERPL-MCNC: 104 MG/DL (ref 65–99)
GLUCOSE UR STRIP-MCNC: NEGATIVE MG/DL
GRAM STN SPEC: NORMAL
GRAM STN SPEC: NORMAL
HCT VFR BLD AUTO: 38.6 % (ref 34–46.6)
HGB BLD-MCNC: 12.6 G/DL (ref 12–15.9)
HGB UR QL STRIP.AUTO: NEGATIVE
HOLD SPECIMEN: NORMAL
HYALINE CASTS UR QL AUTO: NORMAL /LPF
IMM GRANULOCYTES # BLD AUTO: 0.02 10*3/MM3 (ref 0–0.05)
IMM GRANULOCYTES NFR BLD AUTO: 0.5 % (ref 0–0.5)
KETONES UR QL STRIP: ABNORMAL
LEUKOCYTE ESTERASE UR QL STRIP.AUTO: ABNORMAL
LYMPHOCYTES # BLD AUTO: 1.02 10*3/MM3 (ref 0.7–3.1)
LYMPHOCYTES NFR BLD AUTO: 24.6 % (ref 19.6–45.3)
MCH RBC QN AUTO: 29.6 PG (ref 26.6–33)
MCHC RBC AUTO-ENTMCNC: 32.6 G/DL (ref 31.5–35.7)
MCV RBC AUTO: 90.6 FL (ref 79–97)
MONOCYTES # BLD AUTO: 0.31 10*3/MM3 (ref 0.1–0.9)
MONOCYTES NFR BLD AUTO: 7.5 % (ref 5–12)
NEUTROPHILS NFR BLD AUTO: 2.66 10*3/MM3 (ref 1.7–7)
NEUTROPHILS NFR BLD AUTO: 64 % (ref 42.7–76)
NITRITE UR QL STRIP: POSITIVE
NRBC BLD AUTO-RTO: 0 /100 WBC (ref 0–0.2)
PH UR STRIP.AUTO: 5.5 [PH] (ref 5–9)
PLATELET # BLD AUTO: 55 10*3/MM3 (ref 140–450)
PMV BLD AUTO: 12.1 FL (ref 6–12)
POTASSIUM SERPL-SCNC: 3.8 MMOL/L (ref 3.5–5.2)
PROT SERPL-MCNC: 5.5 G/DL (ref 6–8.5)
PROT UR QL STRIP: ABNORMAL
RBC # BLD AUTO: 4.26 10*6/MM3 (ref 3.77–5.28)
RBC # UR STRIP: NORMAL /HPF
RBC MORPH BLD: NORMAL
REF LAB TEST METHOD: NORMAL
SMALL PLATELETS BLD QL SMEAR: NORMAL
SODIUM SERPL-SCNC: 140 MMOL/L (ref 136–145)
SP GR UR STRIP: 1.03 (ref 1–1.03)
SQUAMOUS #/AREA URNS HPF: NORMAL /HPF
UROBILINOGEN UR QL STRIP: ABNORMAL
WBC # UR STRIP: NORMAL /HPF
WBC MORPH BLD: NORMAL
WBC NRBC COR # BLD: 4.15 10*3/MM3 (ref 3.4–10.8)
WHOLE BLOOD HOLD COAG: NORMAL
WHOLE BLOOD HOLD COAG: NORMAL
WHOLE BLOOD HOLD SPECIMEN: NORMAL

## 2023-04-04 PROCEDURE — 87086 URINE CULTURE/COLONY COUNT: CPT

## 2023-04-04 PROCEDURE — 87088 URINE BACTERIA CULTURE: CPT

## 2023-04-04 PROCEDURE — 80053 COMPREHEN METABOLIC PANEL: CPT | Performed by: STUDENT IN AN ORGANIZED HEALTH CARE EDUCATION/TRAINING PROGRAM

## 2023-04-04 PROCEDURE — 87186 SC STD MICRODIL/AGAR DIL: CPT

## 2023-04-04 PROCEDURE — 25010000002 HYDROMORPHONE 1 MG/ML SOLUTION

## 2023-04-04 PROCEDURE — 96374 THER/PROPH/DIAG INJ IV PUSH: CPT

## 2023-04-04 PROCEDURE — 85025 COMPLETE CBC W/AUTO DIFF WBC: CPT | Performed by: STUDENT IN AN ORGANIZED HEALTH CARE EDUCATION/TRAINING PROGRAM

## 2023-04-04 PROCEDURE — 81001 URINALYSIS AUTO W/SCOPE: CPT

## 2023-04-04 PROCEDURE — 36415 COLL VENOUS BLD VENIPUNCTURE: CPT

## 2023-04-04 PROCEDURE — 99283 EMERGENCY DEPT VISIT LOW MDM: CPT

## 2023-04-04 PROCEDURE — 83605 ASSAY OF LACTIC ACID: CPT

## 2023-04-04 PROCEDURE — 96375 TX/PRO/DX INJ NEW DRUG ADDON: CPT

## 2023-04-04 PROCEDURE — 85007 BL SMEAR W/DIFF WBC COUNT: CPT | Performed by: STUDENT IN AN ORGANIZED HEALTH CARE EDUCATION/TRAINING PROGRAM

## 2023-04-04 PROCEDURE — 25010000002 ONDANSETRON PER 1 MG

## 2023-04-04 PROCEDURE — 74018 RADEX ABDOMEN 1 VIEW: CPT

## 2023-04-04 RX ORDER — ONDANSETRON 2 MG/ML
4 INJECTION INTRAMUSCULAR; INTRAVENOUS EVERY 6 HOURS PRN
Status: DISCONTINUED | OUTPATIENT
Start: 2023-04-04 | End: 2023-04-04 | Stop reason: HOSPADM

## 2023-04-04 RX ORDER — CEPHALEXIN 500 MG/1
500 CAPSULE ORAL DAILY
Qty: 10 CAPSULE | Refills: 0 | Status: SHIPPED | OUTPATIENT
Start: 2023-04-04 | End: 2023-04-04 | Stop reason: SDUPTHER

## 2023-04-04 RX ORDER — CEPHALEXIN 500 MG/1
500 CAPSULE ORAL 4 TIMES DAILY
Qty: 40 CAPSULE | Refills: 0 | Status: SHIPPED | OUTPATIENT
Start: 2023-04-04 | End: 2023-04-14

## 2023-04-04 RX ORDER — ONDANSETRON 4 MG/1
TABLET, ORALLY DISINTEGRATING ORAL
Qty: 15 TABLET | Refills: 0 | Status: SHIPPED | OUTPATIENT
Start: 2023-04-04

## 2023-04-04 RX ADMIN — ONDANSETRON 4 MG: 2 INJECTION INTRAMUSCULAR; INTRAVENOUS at 19:18

## 2023-04-04 RX ADMIN — HYDROMORPHONE HYDROCHLORIDE 1 MG: 1 INJECTION, SOLUTION INTRAMUSCULAR; INTRAVENOUS; SUBCUTANEOUS at 19:18

## 2023-04-04 NOTE — ED PROVIDER NOTES
Subjective   History of Present Illness  Amira Shannon is a 62 year-old female with CMH of hypertension, CHAI, hyperlipidemia, bowel obstruction, ascites and chronic constipation who presents with constipation.  No bowel movement for 1.5 weeks, associated with nausea but no vomiting.  Had similar episode in January of this year and was diagnosed with bowel obstruction.  Pain is localized to the abdomen 7/10.  She also endorsed burning and painful urination.  She denies shortness of breath, dizziness, or chest pain.        Review of Systems   Constitutional: Negative.    HENT: Negative.    Respiratory: Negative.  Negative for shortness of breath.    Cardiovascular: Positive for leg swelling. Negative for palpitations.   Gastrointestinal: Positive for abdominal distention, abdominal pain, constipation and nausea. Negative for diarrhea and vomiting.   Genitourinary: Positive for dysuria.   Musculoskeletal: Negative.    Neurological: Negative.  Negative for dizziness.   Psychiatric/Behavioral: Negative for behavioral problems and suicidal ideas.       Past Medical History:   Diagnosis Date   • Acid reflux    • Altered bowel function    • Arthropathy of lumbar facet joint    • Bleeding disorder    • C. difficile diarrhea 2015   • Chronic idiopathic constipation    • Colonic inertia    • Constipation    • Corns and callus    • Depression    • Disease related peripheral neuropathy    • Epigastric pain    • Fatty liver    • Hammer toe    • Headache    • Hiatal hernia    • History of transfusion    • Hyperlipidemia    • Knee pain    • Localized, primary osteoarthritis of the ankle and foot     Localized, primary osteoarthritis of the ankle and/or foot   • Mendoza's metatarsalgia     Mendoza's metatarsalgia - 2nd interspace on right   • Nausea and vomiting    • Neuralgia and neuritis     Neuralgia, neuritis, and radiculitis, unspecified   • Neuropathy    • Obstructive sleep apnea     Obstructive sleep apnea (adult)  "(pediatric)    • CHAI on CPAP     \"C-Pap at night  (unconfirmed)\"   • Osteoarthritis    • Pain in foot     Pain in unspecified foot - sees a podiatrist   • Pain in joint, ankle and foot     Joint pain in ankle and foot      • Pain radiating to back     Pain radiating to lumbar region of back   • Pelvic floor dysfunction    • Plantar fasciitis    • PONV (postoperative nausea and vomiting)    • Restless leg syndrome    • Secondary hypertension     Secondary hypertension, unspecified   • Sinusitis    • Sleep apnea    • Tongue anomaly     lesion       Allergies   Allergen Reactions   • Morphine GI Intolerance   • Tape Rash     Patient has rash/blishers on site after tape   • Other      Pt states that taking steroids either in pill or injection form make her have blisters in her mouth and she feels like she is on fire on the inside/ has hx of c diff and possible MRSA     • Corticosteroids Rash   • Kenalog  [Triamcinolone Acetonide] Rash   • Sulfa Antibiotics Rash     Sulfa (Sulfonamide Antibiotics)       Past Surgical History:   Procedure Laterality Date   • CARPAL TUNNEL RELEASE Left 2018    Procedure: CARPAL TUNNEL RELEASE - left;  Surgeon: Justus Lewis MD;  Location: [facility] OR;  Service: Orthopedics   • CARPAL TUNNEL RELEASE Right 2019    Procedure: carpal tunnel release right hand with local/monitored anesthesia care;  Surgeon: Justus Lewis MD;  Location: [facility] OR;  Service: Orthopedics   •  SECTION     • CHOLECYSTECTOMY     • COLONOSCOPY  2013   • COLONOSCOPY N/A 10/17/2018    Procedure: COLONOSCOPY;  Surgeon: Russell Del Rio MD;  Location: [facility] ENDOSCOPY;  Service: Gastroenterology   • COLONOSCOPY N/A 3/22/2021    Procedure: COLONOSCOPY;  Surgeon: Russell Del Rio MD;  Location: [facility] ENDOSCOPY;  Service: Gastroenterology;  Laterality: N/A;   • DIRECT LARYNGOSCOPY, ESOPHAGOSCOPY, BRONCHOSCOPY N/A 2017    Procedure: DIRECT LARYNGOSCOPY AND;  Surgeon: Live BLANCO" MD Hoang;  Location: [facility] OR;  Service:    • ENDOSCOPY  07/01/2013    Colon endoscopy 87326 (1) - Internal & external hemorrhoids found. Stool found.   • ENDOSCOPY  07/01/2013    EGD w/ tube 87056 (1) - Normal esophagus. Gastritis in stomach. Biopsy taken. Normal dudoenum. Biopsy taken.   • ENDOSCOPY N/A 10/17/2018    Procedure: ESOPHAGOGASTRODUODENOSCOPY--eval varices;  Surgeon: Russell Del Rio MD;  Location: [facility] ENDOSCOPY;  Service: Gastroenterology   • ENDOSCOPY N/A 3/22/2021    Procedure: ESOPHAGOGASTRODUODENOSCOPYURGNET;  Surgeon: Russell Del Rio MD;  Location: [facility] ENDOSCOPY;  Service: Gastroenterology;  Laterality: N/A;   • ENDOSCOPY N/A 9/16/2021    Procedure: ESOPHAGOGASTRODUODENOSCOPY;  Surgeon: Basia Saunders MD;  Location: [facility] ENDOSCOPY;  Service: Gastroenterology;  Laterality: N/A;   • FOOT SURGERY  02/26/2013    Foot/toes surgery procedure (1) - Arthroplasty of toes 4 and 5 of right foot.   • HERNIA REPAIR     • HERNIA REPAIR      hital   • HYSTERECTOMY     • LIVER BIOPSY     • NERVE BLOCK  07/25/2016    Injection for nerve block (1) - Lumbar medial branch block.   • OTHER SURGICAL HISTORY  12/03/2012    Inj(s) Tend-Sheath, Ligament, Single 20550 (1) - [facility]    • OTHER SURGICAL HISTORY  06/24/2013    Small Joint Injection/Aspiration 20600 (2) - [facility] ([facility])    • OTHER SURGICAL HISTORY  2011    bowel obstruction x2   • OTHER SURGICAL HISTORY      gland removed from neck   • SUBLINGUAL SALIVARY CYST EXCISION N/A 8/1/2017    Procedure: EXCISION OF LEFT  TONGUE LESION WITH CLOSURE;  Surgeon: Live Bolton MD;  Location: [facility] OR;  Service:    • TUBAL ABDOMINAL LIGATION     • UPPER GASTROINTESTINAL ENDOSCOPY  07/01/2013   • UPPER GASTROINTESTINAL ENDOSCOPY  10/17/2018   • UPPER GASTROINTESTINAL ENDOSCOPY  03/22/2021       Family History   Problem Relation Age of Onset   • Cancer Other    • Diabetes Other    • Heart disease Other    • Hypertension  "Mother    • Cancer Mother    • Diabetes Mother    • Hypertension Father    • Heart attack Father    • Cancer Father    • Heart disease Father    • Thyroid disease Maternal Aunt    • Cancer Maternal Aunt    • No Known Problems Sister    • No Known Problems Brother    • Cancer Maternal Grandmother    • No Known Problems Sister        Social History     Socioeconomic History   • Marital status:    Tobacco Use   • Smoking status: Former     Packs/day: 2.00     Years: 15.00     Pack years: 30.00     Types: Cigarettes     Quit date:      Years since quittin.2   • Smokeless tobacco: Never   Vaping Use   • Vaping Use: Never used   Substance and Sexual Activity   • Alcohol use: No   • Drug use: Yes     Types: Oxycodone   • Sexual activity: Defer     Birth control/protection: Surgical     Comment: Marital Status: .  Hysterectomy.           Objective      Vitals:    23 1726 23 1811 23 1830 23   BP: 143/83 144/67 144/70 129/63   BP Location:    Right arm   Patient Position:    Lying   Pulse: 88 84 86 86   Resp: 18   18   Temp: 98.2 °F (36.8 °C)      SpO2: 94% 94% 94% 94%   Weight: 110 kg (242 lb 4.8 oz)      Height: 162.6 cm (64\")                Physical Exam  Vitals reviewed.   Constitutional:       Appearance: Normal appearance. She is obese.   HENT:      Head: Normocephalic and atraumatic.      Right Ear: Tympanic membrane normal.      Nose: Nose normal.      Mouth/Throat:      Mouth: Mucous membranes are moist.   Eyes:      Extraocular Movements: Extraocular movements intact.      Pupils: Pupils are equal, round, and reactive to light.   Cardiovascular:      Rate and Rhythm: Normal rate and regular rhythm.   Pulmonary:      Effort: Pulmonary effort is normal.      Breath sounds: Normal breath sounds.   Abdominal:      General: Bowel sounds are increased. There is distension.      Palpations: Abdomen is soft.      Tenderness: There is generalized abdominal tenderness. "   Musculoskeletal:         General: Normal range of motion.      Cervical back: Normal range of motion.   Skin:     General: Skin is warm and dry.      Capillary Refill: Capillary refill takes less than 2 seconds.      Comments: Small wound to right leg.   Neurological:      Mental Status: She is alert and oriented to person, place, and time.   Psychiatric:         Mood and Affect: Mood normal.         Procedures        ED Course    Results for orders placed or performed during the hospital encounter of 04/04/23   Comprehensive Metabolic Panel    Specimen: Blood   Result Value Ref Range    Glucose 104 (H) 65 - 99 mg/dL    BUN 9 8 - 23 mg/dL    Creatinine 0.88 0.57 - 1.00 mg/dL    Sodium 140 136 - 145 mmol/L    Potassium 3.8 3.5 - 5.2 mmol/L    Chloride 107 98 - 107 mmol/L    CO2 25.0 22.0 - 29.0 mmol/L    Calcium 8.5 (L) 8.6 - 10.5 mg/dL    Total Protein 5.5 (L) 6.0 - 8.5 g/dL    Albumin 2.6 (L) 3.5 - 5.2 g/dL    ALT (SGPT) 26 1 - 33 U/L    AST (SGOT) 45 (H) 1 - 32 U/L    Alkaline Phosphatase 165 (H) 39 - 117 U/L    Total Bilirubin 2.1 (H) 0.0 - 1.2 mg/dL    Globulin 2.9 gm/dL    A/G Ratio 0.9 g/dL    BUN/Creatinine Ratio 10.2 7.0 - 25.0    Anion Gap 8.0 5.0 - 15.0 mmol/L    eGFR 73.9 >60.0 mL/min/1.73   CBC Auto Differential    Specimen: Blood   Result Value Ref Range    WBC 4.15 3.40 - 10.80 10*3/mm3    RBC 4.26 3.77 - 5.28 10*6/mm3    Hemoglobin 12.6 12.0 - 15.9 g/dL    Hematocrit 38.6 34.0 - 46.6 %    MCV 90.6 79.0 - 97.0 fL    MCH 29.6 26.6 - 33.0 pg    MCHC 32.6 31.5 - 35.7 g/dL    RDW 17.3 (H) 12.3 - 15.4 %    RDW-SD 57.1 (H) 37.0 - 54.0 fl    MPV 12.1 (H) 6.0 - 12.0 fL    Platelets 55 (L) 140 - 450 10*3/mm3    Neutrophil % 64.0 42.7 - 76.0 %    Lymphocyte % 24.6 19.6 - 45.3 %    Monocyte % 7.5 5.0 - 12.0 %    Eosinophil % 2.7 0.3 - 6.2 %    Basophil % 0.7 0.0 - 1.5 %    Immature Grans % 0.5 0.0 - 0.5 %    Neutrophils, Absolute 2.66 1.70 - 7.00 10*3/mm3    Lymphocytes, Absolute 1.02 0.70 - 3.10 10*3/mm3     Monocytes, Absolute 0.31 0.10 - 0.90 10*3/mm3    Eosinophils, Absolute 0.11 0.00 - 0.40 10*3/mm3    Basophils, Absolute 0.03 0.00 - 0.20 10*3/mm3    Immature Grans, Absolute 0.02 0.00 - 0.05 10*3/mm3    nRBC 0.0 0.0 - 0.2 /100 WBC   Lactic Acid, Plasma    Specimen: Blood   Result Value Ref Range    Lactate 1.5 0.5 - 2.0 mmol/L   Scan Slide    Specimen: Blood   Result Value Ref Range    RBC Morphology Normal Normal    WBC Morphology Normal Normal    Platelet Estimate Decreased Normal   Urinalysis With Culture If Indicated - Urine, Clean Catch    Specimen: Urine, Clean Catch   Result Value Ref Range    Color, UA Dark Yellow Yellow, Straw, Dark Yellow, Mikala    Appearance, UA Clear Clear    pH, UA 5.5 5.0 - 9.0    Specific Gravity, UA 1.032 (H) 1.003 - 1.030    Glucose, UA Negative Negative    Ketones, UA Trace (A) Negative    Bilirubin, UA Small (1+) (A) Negative    Blood, UA Negative Negative    Protein, UA Trace (A) Negative    Leuk Esterase, UA Trace (A) Negative    Nitrite, UA Positive (A) Negative    Urobilinogen, UA >=8.0 E.U./dL (A) 0.2 - 1.0 E.U./dL   Urinalysis, Microscopic Only - Urine, Clean Catch    Specimen: Urine, Clean Catch   Result Value Ref Range    RBC, UA None Seen None Seen /HPF    WBC, UA 0-2 None Seen, 0-2, 3-5 /HPF    Bacteria, UA None Seen None Seen /HPF    Squamous Epithelial Cells, UA 0-2 None Seen, 0-2 /HPF    Hyaline Casts, UA None Seen None Seen /LPF    Methodology Manual Light Microscopy    Gold Top - SST   Result Value Ref Range    Extra Tube Hold for add-ons.    Light Blue Top   Result Value Ref Range    Extra Tube Hold for add-ons.              XR Abdomen KUB   Final Result   1. Findings suggestive of moderate constipation without evidence of acute   abdominopelvic process.                 Medical Decision Making  Amira Shannon is a 62 year-old female here with chronic constipation. No BM x 1.5 weeks, associated with nausea, no vomiting. KUB significant for moderate constipation  without evidence of abdominopelvic process. Normal CBC, lactate,CMP. Urinalysis positive for nitrite & trace leukocytes. Pending culture. Dilaudid and ondansetron administered. Discharged on magnesium citrate for constipation and keflex for UTI & skin infections. Follow up with PCP in 2 days.    Amount and/or Complexity of Data Reviewed  Labs: ordered.  Radiology: ordered.      Risk  Prescription drug management.          Final diagnoses:   Constipation, unspecified constipation type   Acute cystitis without hematuria   Cellulitis of right lower extremity       ED Disposition  ED Disposition     ED Disposition   Discharge    Condition   Stable    Comment   --             Omar Chadwick MD  [address] [address]  [phone]    Call in 1 day  for follow up         Medication List      New Prescriptions    cephalexin 500 MG capsule  Commonly known as: Keflex  Take 1 capsule by mouth 4 (Four) Times a Day for 10 days.     magnesium citrate solution  Take 296 mL by mouth 1 (One) Time for 1 dose. Take 1 bottle every 4 hours as needed. Up to 3 bottles. Dispense total of 3 bottles        Changed    ondansetron 8 MG tablet  Commonly known as: ZOFRAN  Take 1 tablet by mouth Every 8 (Eight) Hours As Needed for Nausea or Vomiting.  What changed: how much to take     potassium chloride 10 MEQ CR capsule  Commonly known as: MICRO-K  Take 2 capsules by mouth Daily.  What changed:   · how much to take  · when to take this           Where to Get Your Medications      These medications were sent to [facility] [address] [place] [address] - [address] - [phone]  - [phone]   [address] [id number]    Phone: [phone]   · cephalexin 500 MG capsule  · magnesium citrate solution          Kamille Ratliff MD  Resident  04/04/23 2040

## 2023-04-05 LAB — HOLD SPECIMEN: NORMAL

## 2023-04-05 NOTE — DISCHARGE INSTRUCTIONS
some mag citrate and take 1 bottle right away and repeat every 4 hours until you clear your bowels.  Take your antibiotics as directed for your urinary tract infection and your skin infection.  Stay hydrated.  Call your primary care tomorrow for immediate follow-up.  Return to ED as needed.

## 2023-04-06 LAB — BACTERIA SPEC AEROBE CULT: ABNORMAL

## 2023-04-14 ENCOUNTER — OFFICE VISIT (OUTPATIENT)
Dept: ONCOLOGY | Facility: CLINIC | Age: 63
End: 2023-04-14
Payer: MEDICAID

## 2023-04-14 ENCOUNTER — LAB (OUTPATIENT)
Dept: ONCOLOGY | Facility: HOSPITAL | Age: 63
End: 2023-04-14
Payer: MEDICAID

## 2023-04-14 VITALS
RESPIRATION RATE: 18 BRPM | OXYGEN SATURATION: 97 % | DIASTOLIC BLOOD PRESSURE: 62 MMHG | SYSTOLIC BLOOD PRESSURE: 138 MMHG | HEART RATE: 82 BPM | BODY MASS INDEX: 36.39 KG/M2 | WEIGHT: 212 LBS

## 2023-04-14 DIAGNOSIS — R91.1 LUNG NODULE: Chronic | ICD-10-CM

## 2023-04-14 DIAGNOSIS — K74.69 OTHER CIRRHOSIS OF LIVER: Chronic | ICD-10-CM

## 2023-04-14 DIAGNOSIS — K75.81 NASH (NONALCOHOLIC STEATOHEPATITIS): Chronic | ICD-10-CM

## 2023-04-14 DIAGNOSIS — R16.1 SPLENOMEGALY: Chronic | ICD-10-CM

## 2023-04-14 DIAGNOSIS — E61.1 IRON DEFICIENCY: Primary | Chronic | ICD-10-CM

## 2023-04-14 DIAGNOSIS — D69.6 THROMBOCYTOPENIA: Chronic | ICD-10-CM

## 2023-04-14 NOTE — PROGRESS NOTES
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  Complains of chronic abdominal pain and constipation.  Complains of chronic swelling of lower extremity and back pain.  Complains of easy bruising.  Denies any bleeding.  Denies any new lymph node enlargement.              Past Medical History, Past Surgical History, Social History, Family History have been reviewed and are without significant changes except as mentioned.    Review of Systems   A comprehensive 14 point review of systems was performed and was negative except as mentioned in HPI.    Medications:  The current medication list was reviewed in the EMR    ALLERGIES:    Allergies   Allergen Reactions   • Morphine GI Intolerance   • Tape Rash     Patient has rash/blishers on site after tape   • Other      Pt states that taking steroids either in pill or injection form make her have blisters in her mouth and she feels like she is on fire on the inside/ has hx of c diff and possible MRSA     • Corticosteroids Rash   • Kenalog  [Triamcinolone Acetonide] Rash   • Sulfa Antibiotics Rash     Sulfa (Sulfonamide Antibiotics)       Objective      Vitals:    04/14/23 0944   BP: 138/62   Pulse: 82   Resp: 18   SpO2: 97%   Weight: 96.2 kg (212 lb)   PainSc:   9         7/21/2021     1:28 PM   Current Status   ECOG score 2       Physical Exam  Pulmonary:      Breath sounds: Normal breath sounds.   Musculoskeletal:      Right lower leg: Edema present.      Left lower leg: Edema present.   Neurological:      Mental Status: She is alert and oriented to person, place, and time.            RECENT LABS:  Glucose   Date Value Ref Range Status   04/04/2023 104 (H) 65 - 99 mg/dL Final     Sodium   Date Value Ref Range Status   04/04/2023 140 136 - 145 mmol/L Final     Potassium   Date Value Ref Range Status   04/04/2023 3.8 3.5 - 5.2 mmol/L Final     Comment:     Slight hemolysis detected by analyzer. Results may be affected.     CO2   Date Value Ref Range Status   04/04/2023 25.0 22.0 - 29.0 mmol/L Final     Chloride   Date Value Ref Range Status   04/04/2023 107 98 - 107 mmol/L Final     Anion Gap   Date Value Ref Range Status   04/04/2023 8.0 5.0 - 15.0 mmol/L Final     Creatinine   Date Value Ref Range Status   04/04/2023 0.88 0.57 - 1.00 mg/dL Final     BUN   Date Value Ref Range Status   04/04/2023 9 8 - 23 mg/dL Final     BUN/Creatinine Ratio   Date Value Ref Range Status   04/04/2023 10.2 7.0 - 25.0 Final     Calcium   Date Value Ref Range Status   04/04/2023 8.5 (L) 8.6 - 10.5 mg/dL Final     eGFR Non  Amer   Date Value Ref Range Status   09/24/2021 70 >60 mL/min/1.73 Final     Alkaline Phosphatase   Date Value Ref Range Status   04/04/2023 165 (H) 39 - 117 U/L Final     Total Protein   Date Value Ref Range Status   04/04/2023 5.5 (L) 6.0 - 8.5 g/dL Final     ALT (SGPT)   Date Value Ref Range Status   04/04/2023 26 1 - 33 U/L Final     AST (SGOT)   Date Value Ref Range Status   04/04/2023 45 (H) 1 - 32 U/L Final     Comment:     Slight hemolysis detected by analyzer. Results may be affected.     Total Bilirubin   Date Value Ref Range Status   04/04/2023 2.1 (H) 0.0 - 1.2 mg/dL Final     Albumin   Date Value Ref Range Status   04/04/2023 2.6 (L) 3.5 - 5.2 g/dL Final     Globulin   Date Value Ref Range Status   04/04/2023 2.9 gm/dL Final     Lab Results   Component Value Date    WBC 4.15 04/04/2023    HGB 12.6 04/04/2023    HCT 38.6 04/04/2023    MCV 90.6 04/04/2023    PLT 55 (L) 04/04/2023     Lab Results   Component Value Date    NEUTROABS 2.66 04/04/2023    IRON 180 (H)  2023    IRON 107 2022    IRON 123 2022    TIBC 285 (L) 2023    TIBC 355 2022    TIBC 343 2022    LABIRON 63 (H) 2023    LABIRON 30 2022    LABIRON 36 2022    FERRITIN 244.60 (H) 2023    FERRITIN 180.60 (H) 2022    FERRITIN 166.60 (H) 2022    ZHQMIAHL86 >2,000 (H) 2023    TYBXMLXT98 1,302 (H) 2022    OOGTJOCB75 1,295 (H) 2022    FOLATE >20.00 2023    FOLATE >20.00 2022    FOLATE 12.00 2022     Lab Results   Component Value Date    AFPTM 2.5 2018    AFPTM 99 2018    REFLABREPO  2023     [facility]  [address] [place] [address]  Phone: [phone] or [phone]  Fax: [phone]  Miguel Schuster M.D., Medical Director    PATIENT NAME                                 LABORATORY NO.  [id number]   XUAN COLEMAN.                        [id number]  [id number]                                     AGE                SEX     N            CLIENT REF #  [facility]                       62       1960   F       [id number]    [id number]  [place]                                   REQUESTING M.D.       ATTENDING [place] [address]                         DATE COLLECTED        DATE RECEIVED          DATE REPORTED  2023    DIAGNOSIS:  ABDOMINAL FLUID:  Negative for malignant cells.    MICROSCOPIC DESCRIPTION:  Mesothelial cells and lymphocytes are present    RLL    Professional interpretation rendered by Miguel Schuster M.D., F.C.A.P. at [facility]  [employer], [address] [place] [address].    CLINICAL HISTORY:  Eng stage liver disease    SPECIMENS SUBMITTED:  ABDOMINAL FLUID    GROSS SPECIMEN DESCRIPTION:  50cc of clear yellow fluid with scant sediment in fixative    REVIEWED, DIAGNOSED AND  ELECTRONICALLY  SIGNED BY:    Miguel Schuster M.D., F.C.A.P.  CPT CODES:  81766           PATHOLOGY:  * Cannot find OR log *         RADIOLOGY DATA :  No radiology results for the last 7 days        Assessment & Plan     1.  Thrombocytopenia:  - Secondary to cirrhosis of liver with splenomegaly  - Recent platelet count is 55,000  - Patient denies any bleeding.  Will monitor with CBC for now  - We will have patient return to clinic in 3 months with repeat CBC, iron studies, ferritin, B12 and folate to be done on that day.    2.  Lung nodule:  - CT of chest in August 2022 showed calcified granuloma without any suspicious lung nodule.    3.  Iron deficiency anemia:  - Due to inability to tolerate iron by mouth patient received intravenous Venofer in July 2021  - Recent hemoglobin is 12.6.  - Remains on B12 and folic acid 3 times a week    4.  Cirrhosis of liver with splenomegaly  - From nonalcoholic steatohepatitis  - Recommend continue following up with gastroenterology clinic    5.  Health maintenance: Patient does not smoke.  Had a colonoscopy in March 2021    6.  Advance care planning:  - CODE STATUS and resuscitation were discussed with patient today.  Patient wants to be DNR.                 PHQ-9 Total Score: 1   -Patient is not homicidal or suicidal.  No acute intervention required.    Amira Shannon reports a pain score of 9.  Given her pain assessment as noted, treatment options were discussed and the following options were decided upon as a follow-up plan to address the patient's pain: Currently being followed by pain management.         Lokesh Goodwin MD  4/14/2023  09:55 CDT        Part of this note may be an electronic transcription/translation of spoken language to printed text using the Dragon Dictation System.          CC:

## 2023-04-17 ENCOUNTER — TELEPHONE (OUTPATIENT)
Dept: FAMILY MEDICINE CLINIC | Facility: CLINIC | Age: 63
End: 2023-04-17
Payer: MEDICAID

## 2023-04-17 NOTE — TELEPHONE ENCOUNTER
Patient called and stated was seen at [facility] ED and needs to schedule a follow up' patient was seen at [facility] ED and did not schedule that follow up.  Records have been requested for review

## 2023-04-18 ENCOUNTER — TELEPHONE (OUTPATIENT)
Dept: FAMILY MEDICINE CLINIC | Facility: CLINIC | Age: 63
End: 2023-04-18
Payer: MEDICAID

## 2023-04-18 NOTE — TELEPHONE ENCOUNTER
Patient called to see if records have been received yet and also to let the provider know that it feels like her stomach is stretching.     Call back number is [phone]

## 2023-04-18 NOTE — TELEPHONE ENCOUNTER
Patient called to see if records have been received yet and also to let the provider know that it feels like her stomach is stretching.    Call back number is [phone]

## 2023-04-20 ENCOUNTER — TELEPHONE (OUTPATIENT)
Dept: FAMILY MEDICINE CLINIC | Facility: CLINIC | Age: 63
End: 2023-04-20
Payer: MEDICAID

## 2023-05-09 ENCOUNTER — OFFICE VISIT (OUTPATIENT)
Dept: FAMILY MEDICINE CLINIC | Facility: CLINIC | Age: 63
End: 2023-05-09
Payer: MEDICAID

## 2023-05-09 VITALS
TEMPERATURE: 98.2 F | WEIGHT: 245.7 LBS | OXYGEN SATURATION: 93 % | SYSTOLIC BLOOD PRESSURE: 138 MMHG | HEART RATE: 91 BPM | HEIGHT: 64 IN | BODY MASS INDEX: 41.95 KG/M2 | DIASTOLIC BLOOD PRESSURE: 82 MMHG

## 2023-05-09 DIAGNOSIS — G62.89 DISEASE RELATED PERIPHERAL NEUROPATHY: ICD-10-CM

## 2023-05-09 DIAGNOSIS — K74.69 OTHER CIRRHOSIS OF LIVER: Primary | Chronic | ICD-10-CM

## 2023-05-09 DIAGNOSIS — L30.4 INTERTRIGO: ICD-10-CM

## 2023-05-09 PROCEDURE — 99214 OFFICE O/P EST MOD 30 MIN: CPT | Performed by: STUDENT IN AN ORGANIZED HEALTH CARE EDUCATION/TRAINING PROGRAM

## 2023-05-09 PROCEDURE — 1159F MED LIST DOCD IN RCRD: CPT | Performed by: STUDENT IN AN ORGANIZED HEALTH CARE EDUCATION/TRAINING PROGRAM

## 2023-05-09 PROCEDURE — 3075F SYST BP GE 130 - 139MM HG: CPT | Performed by: STUDENT IN AN ORGANIZED HEALTH CARE EDUCATION/TRAINING PROGRAM

## 2023-05-09 PROCEDURE — 3079F DIAST BP 80-89 MM HG: CPT | Performed by: STUDENT IN AN ORGANIZED HEALTH CARE EDUCATION/TRAINING PROGRAM

## 2023-05-09 PROCEDURE — 1160F RVW MEDS BY RX/DR IN RCRD: CPT | Performed by: STUDENT IN AN ORGANIZED HEALTH CARE EDUCATION/TRAINING PROGRAM

## 2023-05-09 RX ORDER — OXYCODONE HYDROCHLORIDE 10 MG/1
10 TABLET ORAL
COMMUNITY
Start: 2023-05-04

## 2023-05-09 RX ORDER — NYSTATIN 100000 [USP'U]/G
1 POWDER TOPICAL 3 TIMES DAILY PRN
Qty: 60 G | Refills: 0 | Status: SHIPPED | OUTPATIENT
Start: 2023-05-09

## 2023-05-09 NOTE — PROGRESS NOTES
Subjective:  Amira Shannon is a 62 y.o. female who presents for Follow-up.    Cirrhosis/splenomegaly/thrombocytopenia; patient currently being monitored by gastroenterology, hematology.  Recent labs, studies were reassuring, patient states that she still has chronic stomach pain, back pain, leg swelling.  Currently being seen by pain management in [place] however is trying to transfer to [facility] due to transportation issues.  On Roxidone 10 mg twice daily.  Was undergoing evaluation for possible radiofrequency ablation.  Patient states that chronic pain continues to be an issue, but denies any fever, chills, skin breakdown, shortness of breath.    Patient Active Problem List   Diagnosis   • Bilateral foot pain   • Right hip pain   • Acute pain of both knees   • Plantar fasciitis, bilateral   • Primary osteoarthritis of left knee   • MUSA (nonalcoholic steatohepatitis)   • Tongue lesion   • Bilateral lower extremity edema   • Chronic pain of both knees   • Bilateral calcaneal spurs   • Swelling of both lower extremities   • Obesity with body mass index of 30.0-39.9   • Diastolic dysfunction   • Pancytopenia   • Chronic fatigue   • Abdominal pain   • Other constipation   • GERD (gastroesophageal reflux disease)   • Depression   • Disease related peripheral neuropathy   • Epigastric pain   • Hyperlipidemia   • Restless leg syndrome   • CHAI on CPAP   • Carpal tunnel syndrome on left   • Carpal tunnel syndrome on right   • Bilateral wrist pain   • Numbness and tingling in both hands   • Elevated liver enzymes   • History of small bowel obstruction   • Cirrhosis of liver   • Status post carpal tunnel release   • Bilateral ankle pain   • Lakesha's deformity of both heels   • Pain in joint, ankle and foot   • Primary osteoarthritis of both knees   • Low back pain   • Abdominal pain, generalized   • Chronic idiopathic constipation   • Hypertension   • Elevated serum creatinine   • Splenomegaly   •  "Chronic pain syndrome   • Lung nodule   • Hypertension   • Lab test negative for COVID-19 virus   • Thrombocytopenia   • Iron deficiency   • Adverse effect of iron   • Encounter for assessment of peripherally inserted central venous catheter (PICC)   • Nausea and vomiting   • Weight loss, abnormal   • Gastroparesis   • E. coli UTI (urinary tract infection)   • Acute gastritis   • Hiatal hernia   • Hypomagnesemia   • Hypokalemia   • Gastro-esophageal reflux disease with esophagitis, without bleeding     Vitals:    Vitals:    05/09/23 0829   BP: 138/82   BP Location: Right arm   Patient Position: Sitting   Cuff Size: Large Adult   Pulse: 91   Temp: 98.2 °F (36.8 °C)   TempSrc: Oral   SpO2: 93%   Weight: 111 kg (245 lb 11.2 oz)   Height: 162.6 cm (64\")     Body mass index is 42.17 kg/m².      Current Outpatient Medications:   •  acetaminophen (TYLENOL) 325 MG tablet, Take 2 tablets by mouth Every 6 (Six) Hours As Needed for Mild Pain., Disp: , Rfl:   •  bisacodyl (DULCOLAX) 10 MG suppository, Insert 1 suppository into the rectum As Needed for Constipation., Disp: , Rfl:   •  cetirizine (zyrTEC) 10 MG tablet, Take 1 tablet by mouth Daily., Disp: 90 tablet, Rfl: 3  •  cyclobenzaprine (FLEXERIL) 10 MG tablet, Take 1 tablet by mouth 2 (Two) Times a Day As Needed., Disp: , Rfl:   •  Docusate Sodium 100 MG capsule, Take 100 mg by mouth 2 (Two) Times a Day., Disp: , Rfl:   •  DULoxetine (CYMBALTA) 20 MG capsule, Take 1 capsule by mouth Daily., Disp: 30 capsule, Rfl: 11  •  folic acid (FOLVITE) 1 MG tablet, TAKE 1 TABLET BY MOUTH ON MONDAY, WEDNESDAY AND FRIDAY, Disp: 36 tablet, Rfl: 0  •  furosemide (LASIX) 20 MG tablet, Take 1 tablet by mouth 2 (Two) Times a Day., Disp: , Rfl:   •  gabapentin (NEURONTIN) 800 MG tablet, Take 1 tablet by mouth 3 (Three) Times a Day., Disp: , Rfl:   •  lactulose (CHRONULAC) 10 GM/15ML solution, Take 30 mL by mouth 2 (Two) Times a Day., Disp: , Rfl:   •  metoclopramide (REGLAN) 10 MG tablet, " TAKE 1 TABLET BY MOUTH 4 TIMES DAILY BEFORE MEAL(S) AND AT BEDTIME, Disp: 120 tablet, Rfl: 0  •  Naloxegol Oxalate (Movantik) 25 MG tablet, Take 1 tablet by mouth Daily., Disp: , Rfl:   •  nystatin (MYCOSTATIN) 771184 UNIT/GM powder, Apply 1 application topically to the appropriate area as directed 3 (Three) Times a Day As Needed (for itching in crevices)., Disp: 60 g, Rfl: 0  •  Omega-3 Fatty Acids (fish oil) 1000 MG capsule capsule, Take  by mouth 2 (Two) Times a Day With Meals., Disp: , Rfl:   •  omeprazole (priLOSEC) 40 MG capsule, Take 1 capsule by mouth once daily, Disp: 90 capsule, Rfl: 0  •  ondansetron (ZOFRAN) 8 MG tablet, Take 1 tablet by mouth Every 8 (Eight) Hours As Needed for Nausea or Vomiting. (Patient taking differently: Take 4 mg by mouth Every 8 (Eight) Hours As Needed for Nausea or Vomiting.), Disp: 30 tablet, Rfl: 0  •  oxyCODONE (ROXICODONE) 10 MG tablet, Take 1 tablet by mouth., Disp: , Rfl:   •  oxyCODONE (ROXICODONE) 10 MG tablet, Take 1 tablet by mouth., Disp: , Rfl:   •  polyethylene glycol (MIRALAX) 17 GM/SCOOP powder, Take 17 g by mouth Daily., Disp: , Rfl:   •  potassium chloride (MICRO-K) 10 MEQ CR capsule, Take 2 capsules by mouth Daily. (Patient taking differently: Take 1 capsule by mouth 2 (Two) Times a Day.), Disp: 30 capsule, Rfl: 1  •  spironolactone (ALDACTONE) 25 MG tablet, Take 1 tablet by mouth 2 (Two) Times a Day., Disp: 180 tablet, Rfl: 1  •  sucralfate (Carafate) 1 g tablet, Take 1 tablet by mouth 4 (Four) Times a Day., Disp: 120 tablet, Rfl: 2  •  traZODone (DESYREL) 100 MG tablet, Take 1 tablet by mouth Every Night., Disp: 30 tablet, Rfl: 2  •  vitamin B-12 (CYANOCOBALAMIN) 1000 MCG tablet, Take 1 tablet by mouth Daily., Disp: , Rfl:   •  ondansetron ODT (ZOFRAN-ODT) 4 MG disintegrating tablet, DISSOLVE 1 TABLET IN MOUTH EVERY 8 HOURS AS NEEDED FOR NAUSEA AND VOMITING (Patient not taking: Reported on 5/9/2023), Disp: 15 tablet, Rfl: 0    Patient Active Problem List    Diagnosis   • Bilateral foot pain   • Right hip pain   • Acute pain of both knees   • Plantar fasciitis, bilateral   • Primary osteoarthritis of left knee   • MUSA (nonalcoholic steatohepatitis)   • Tongue lesion   • Bilateral lower extremity edema   • Chronic pain of both knees   • Bilateral calcaneal spurs   • Swelling of both lower extremities   • Obesity with body mass index of 30.0-39.9   • Diastolic dysfunction   • Pancytopenia   • Chronic fatigue   • Abdominal pain   • Other constipation   • GERD (gastroesophageal reflux disease)   • Depression   • Disease related peripheral neuropathy   • Epigastric pain   • Hyperlipidemia   • Restless leg syndrome   • CHAI on CPAP   • Carpal tunnel syndrome on left   • Carpal tunnel syndrome on right   • Bilateral wrist pain   • Numbness and tingling in both hands   • Elevated liver enzymes   • History of small bowel obstruction   • Cirrhosis of liver   • Status post carpal tunnel release   • Bilateral ankle pain   • Lakesha's deformity of both heels   • Pain in joint, ankle and foot   • Primary osteoarthritis of both knees   • Low back pain   • Abdominal pain, generalized   • Chronic idiopathic constipation   • Hypertension   • Elevated serum creatinine   • Splenomegaly   • Chronic pain syndrome   • Lung nodule   • Hypertension   • Lab test negative for COVID-19 virus   • Thrombocytopenia   • Iron deficiency   • Adverse effect of iron   • Encounter for assessment of peripherally inserted central venous catheter (PICC)   • Nausea and vomiting   • Weight loss, abnormal   • Gastroparesis   • E. coli UTI (urinary tract infection)   • Acute gastritis   • Hiatal hernia   • Hypomagnesemia   • Hypokalemia   • Gastro-esophageal reflux disease with esophagitis, without bleeding     Past Surgical History:   Procedure Laterality Date   • CARPAL TUNNEL RELEASE Left 8/22/2018    Procedure: CARPAL TUNNEL RELEASE - left;  Surgeon: Justus Lewis MD;  Location: [facility] OR;   Service: Orthopedics   • CARPAL TUNNEL RELEASE Right 2019    Procedure: carpal tunnel release right hand with local/monitored anesthesia care;  Surgeon: Justus Lewis MD;  Location: [facility] OR;  Service: Orthopedics   •  SECTION     • CHOLECYSTECTOMY     • COLONOSCOPY  2013   • COLONOSCOPY N/A 10/17/2018    Procedure: COLONOSCOPY;  Surgeon: Russell Del Rio MD;  Location: [facility] ENDOSCOPY;  Service: Gastroenterology   • COLONOSCOPY N/A 3/22/2021    Procedure: COLONOSCOPY;  Surgeon: Russell Del Rio MD;  Location: [facility] ENDOSCOPY;  Service: Gastroenterology;  Laterality: N/A;   • DIRECT LARYNGOSCOPY, ESOPHAGOSCOPY, BRONCHOSCOPY N/A 2017    Procedure: DIRECT LARYNGOSCOPY AND;  Surgeon: Live Bolton MD;  Location: [facility] OR;  Service:    • ENDOSCOPY  2013    Colon endoscopy 55408 (1) - Internal & external hemorrhoids found. Stool found.   • ENDOSCOPY  2013    EGD w/ tube 10216 (1) - Normal esophagus. Gastritis in stomach. Biopsy taken. Normal dudoenum. Biopsy taken.   • ENDOSCOPY N/A 10/17/2018    Procedure: ESOPHAGOGASTRODUODENOSCOPY--eval varices;  Surgeon: Russell Del Rio MD;  Location: [facility] ENDOSCOPY;  Service: Gastroenterology   • ENDOSCOPY N/A 3/22/2021    Procedure: ESOPHAGOGASTRODUODENOSCOPYURGNET;  Surgeon: Russell Del Rio MD;  Location: [facility] ENDOSCOPY;  Service: Gastroenterology;  Laterality: N/A;   • ENDOSCOPY N/A 2021    Procedure: ESOPHAGOGASTRODUODENOSCOPY;  Surgeon: Basia Saunders MD;  Location: [facility] ENDOSCOPY;  Service: Gastroenterology;  Laterality: N/A;   • FOOT SURGERY  2013    Foot/toes surgery procedure (1) - Arthroplasty of toes 4 and 5 of right foot.   • HERNIA REPAIR     • HERNIA REPAIR      hital   • HYSTERECTOMY     • LIVER BIOPSY     • NERVE BLOCK  2016    Injection for nerve block (1) - Lumbar medial branch block.   • OTHER SURGICAL HISTORY  2012    Inj(s) Tend-Sheath, Ligament, Single 18646 (1) - [facility]  ([facility]    • OTHER SURGICAL HISTORY  2013    Small Joint Injection/Aspiration  (2) - [facility] ([facility])    • OTHER SURGICAL HISTORY      bowel obstruction x2   • OTHER SURGICAL HISTORY      gland removed from neck   • SUBLINGUAL SALIVARY CYST EXCISION N/A 2017    Procedure: EXCISION OF LEFT  TONGUE LESION WITH CLOSURE;  Surgeon: Live Bolton MD;  Location: [facility];  Service:    • TUBAL ABDOMINAL LIGATION     • UPPER GASTROINTESTINAL ENDOSCOPY  2013   • UPPER GASTROINTESTINAL ENDOSCOPY  10/17/2018   • UPPER GASTROINTESTINAL ENDOSCOPY  2021     Social History     Socioeconomic History   • Marital status:    Tobacco Use   • Smoking status: Former     Packs/day: 2.00     Years: 15.00     Pack years: 30.00     Types: Cigarettes     Quit date:      Years since quittin.3   • Smokeless tobacco: Never   Vaping Use   • Vaping Use: Never used   Substance and Sexual Activity   • Alcohol use: No   • Drug use: Yes     Types: Oxycodone   • Sexual activity: Defer     Birth control/protection: Surgical     Comment: Marital Status: .  Hysterectomy.     Family History   Problem Relation Age of Onset   • Cancer Other    • Diabetes Other    • Heart disease Other    • Hypertension Mother    • Cancer Mother    • Diabetes Mother    • Hypertension Father    • Heart attack Father    • Cancer Father    • Heart disease Father    • Thyroid disease Maternal Aunt    • Cancer Maternal Aunt    • No Known Problems Sister    • No Known Problems Brother    • Cancer Maternal Grandmother    • No Known Problems Sister      Admission on 2023, Discharged on 2023   Component Date Value Ref Range Status   • Glucose 2023 104 (H)  65 - 99 mg/dL Final   • BUN 2023 9  8 - 23 mg/dL Final   • Creatinine 2023 0.88  0.57 - 1.00 mg/dL Final   • Sodium 2023 140  136 - 145 mmol/L Final   • Potassium 2023 3.8  3.5 - 5.2 mmol/L Final    Slight  hemolysis detected by analyzer. Results may be affected.   • Chloride 04/04/2023 107  98 - 107 mmol/L Final   • CO2 04/04/2023 25.0  22.0 - 29.0 mmol/L Final   • Calcium 04/04/2023 8.5 (L)  8.6 - 10.5 mg/dL Final   • Total Protein 04/04/2023 5.5 (L)  6.0 - 8.5 g/dL Final   • Albumin 04/04/2023 2.6 (L)  3.5 - 5.2 g/dL Final   • ALT (SGPT) 04/04/2023 26  1 - 33 U/L Final   • AST (SGOT) 04/04/2023 45 (H)  1 - 32 U/L Final    Slight hemolysis detected by analyzer. Results may be affected.   • Alkaline Phosphatase 04/04/2023 165 (H)  39 - 117 U/L Final   • Total Bilirubin 04/04/2023 2.1 (H)  0.0 - 1.2 mg/dL Final   • Globulin 04/04/2023 2.9  gm/dL Final   • A/G Ratio 04/04/2023 0.9  g/dL Final   • BUN/Creatinine Ratio 04/04/2023 10.2  7.0 - 25.0 Final   • Anion Gap 04/04/2023 8.0  5.0 - 15.0 mmol/L Final   • eGFR 04/04/2023 73.9  >60.0 mL/min/1.73 Final   • WBC 04/04/2023 4.15  3.40 - 10.80 10*3/mm3 Final   • RBC 04/04/2023 4.26  3.77 - 5.28 10*6/mm3 Final   • Hemoglobin 04/04/2023 12.6  12.0 - 15.9 g/dL Final   • Hematocrit 04/04/2023 38.6  34.0 - 46.6 % Final   • MCV 04/04/2023 90.6  79.0 - 97.0 fL Final   • MCH 04/04/2023 29.6  26.6 - 33.0 pg Final   • MCHC 04/04/2023 32.6  31.5 - 35.7 g/dL Final   • RDW 04/04/2023 17.3 (H)  12.3 - 15.4 % Final   • RDW-SD 04/04/2023 57.1 (H)  37.0 - 54.0 fl Final   • MPV 04/04/2023 12.1 (H)  6.0 - 12.0 fL Final   • Platelets 04/04/2023 55 (L)  140 - 450 10*3/mm3 Final   • Neutrophil % 04/04/2023 64.0  42.7 - 76.0 % Final   • Lymphocyte % 04/04/2023 24.6  19.6 - 45.3 % Final   • Monocyte % 04/04/2023 7.5  5.0 - 12.0 % Final   • Eosinophil % 04/04/2023 2.7  0.3 - 6.2 % Final   • Basophil % 04/04/2023 0.7  0.0 - 1.5 % Final   • Immature Grans % 04/04/2023 0.5  0.0 - 0.5 % Final   • Neutrophils, Absolute 04/04/2023 2.66  1.70 - 7.00 10*3/mm3 Final   • Lymphocytes, Absolute 04/04/2023 1.02  0.70 - 3.10 10*3/mm3 Final   • Monocytes, Absolute 04/04/2023 0.31  0.10 - 0.90 10*3/mm3 Final   •  Eosinophils, Absolute 04/04/2023 0.11  0.00 - 0.40 10*3/mm3 Final   • Basophils, Absolute 04/04/2023 0.03  0.00 - 0.20 10*3/mm3 Final   • Immature Grans, Absolute 04/04/2023 0.02  0.00 - 0.05 10*3/mm3 Final   • nRBC 04/04/2023 0.0  0.0 - 0.2 /100 WBC Final   • Lactate 04/04/2023 1.5  0.5 - 2.0 mmol/L Final   • Extra Tube 04/04/2023 Hold for add-ons.   Final    Auto resulted.   • Extra Tube 04/04/2023 Hold for add-ons.   Final    Auto resulted.   • Extra Tube 04/04/2023 Hold for add-ons.   Final    Auto resulted   • RBC Morphology 04/04/2023 Normal  Normal Final   • WBC Morphology 04/04/2023 Normal  Normal Final   • Platelet Estimate 04/04/2023 Decreased  Normal Final   • Color, UA 04/04/2023 Dark Yellow  Yellow, Straw, Dark Yellow, Mikala Final   • Appearance, UA 04/04/2023 Clear  Clear Final   • pH, UA 04/04/2023 5.5  5.0 - 9.0 Final   • Specific Gravity, UA 04/04/2023 1.032 (H)  1.003 - 1.030 Final    Result obtained by Refractometer   • Glucose, UA 04/04/2023 Negative  Negative Final   • Ketones, UA 04/04/2023 Trace (A)  Negative Final   • Bilirubin, UA 04/04/2023 Small (1+) (A)  Negative Final   • Blood, UA 04/04/2023 Negative  Negative Final   • Protein, UA 04/04/2023 Trace (A)  Negative Final   • Leuk Esterase, UA 04/04/2023 Trace (A)  Negative Final   • Nitrite, UA 04/04/2023 Positive (A)  Negative Final   • Urobilinogen, UA 04/04/2023 >=8.0 E.U./dL (A)  0.2 - 1.0 E.U./dL Final   • RBC, UA 04/04/2023 None Seen  None Seen /HPF Final   • WBC, UA 04/04/2023 0-2  None Seen, 0-2, 3-5 /HPF Final    Urine culture not indicated.   • Bacteria, UA 04/04/2023 None Seen  None Seen /HPF Final   • Squamous Epithelial Cells, UA 04/04/2023 0-2  None Seen, 0-2 /HPF Final   • Hyaline Casts, UA 04/04/2023 None Seen  None Seen /LPF Final   • Methodology 04/04/2023 Manual Light Microscopy   Final   • Extra Tube 04/04/2023 hold for add-on   Final    Auto resulted   • Extra Tube 04/04/2023 Hold for add-ons.   Final    Auto resulted.    • Extra Tube 2023 Hold for add-ons.   Final    Auto resulted.   • Extra Tube 2023 Hold for add-ons.   Final    Auto resulted   • Urine Culture 2023 25,000 CFU/mL Escherichia coli (A)   Final   Hospital Outpatient Visit on 2023   Component Date Value Ref Range Status   • Reference Lab Report 2023    Final                    Value:[facility]  [address] [place] [address]  Phone: [phone] or [phone]  Fax: [phone]  Miguel Schuster M.D., Medical Director    PATIENT NAME                                 LABORATORY NO.  [id number]   XUAN COLEMAN.                        [id number]  [id number]                                     AGE                SEX     N            CLIENT REF #  [facility]                       62       1960   F       [id number]    [id number]  B[place]win                                   REQUESTING M.D.       ATTENDING M.D..        COPY TO..  [address] [place] [address]                         DATE COLLECTED        DATE RECEIVED          DATE REPORTED  2023    DIAGNOSIS:  ABDOMINAL FLUID:  Negative for malignant cells.    MICROSCOPIC DESCRIPTION:  Mesothelial cells and lymphocytes are                           present    RLL    Professional interpretation rendered by Miguel Schuster M.D., F.C.A.P. at [facility], [facility], [address] [place] [address].    CLINICAL HISTORY:  Eng stage liver disease    SPECIMENS SUBMITTED:  ABDOMINAL FLUID    GROSS SPECIMEN DESCRIPTION:  50cc of clear yellow fluid with scant sediment in fixative    REVIEWED, DIAGNOSED AND ELECTRONICALLY  SIGNED BY:    Miguel Schuster M.D., F.C.A.P.  CPT CODES:  23396     Hospital Outpatient Visit on 2023   Component Date Value Ref Range Status   • Albumin, Fluid 2023 0.60  g/dL Final   • Lactate  Dehydrogenase (LD), Fluid 03/30/2023 57  U/L Final   • Protein, Total, Fluid 03/30/2023 <1.0  g/dL Final   • Body Fluid Culture 03/30/2023 No growth at 5 days   Final   • Gram Stain 03/30/2023 Many (4+) WBCs seen   Final   • Gram Stain 03/30/2023 No organisms seen   Final   • WBC, Fluid 03/30/2023 498 (H)  0 - 5 /mm3 Final   • RBC, Fluid 03/30/2023 1,000 (H)  0 - 0 /mm3 Final   • Color, Fluid 03/30/2023 Yellow   Final   • Appearance, Fluid 03/30/2023 Clear  Clear Final   • Volume, Fluid 03/30/2023 1,100.0  mL Final   • Neutrophils, Fluid 03/30/2023 12  % Final   • Mononuclear, Fluid 03/30/2023 88  % Final   Lab on 03/29/2023   Component Date Value Ref Range Status   • Iron 03/29/2023 180 (H)  37 - 145 mcg/dL Final   • Iron Saturation 03/29/2023 63 (H)  20 - 50 % Final   • Transferrin 03/29/2023 191 (L)  200 - 360 mg/dL Final   • TIBC 03/29/2023 285 (L)  298 - 536 mcg/dL Final   • Ferritin 03/29/2023 244.60 (H)  13.00 - 150.00 ng/mL Final   • Folate 03/29/2023 >20.00  4.78 - 24.20 ng/mL Final   • Vitamin B-12 03/29/2023 >2,000 (H)  211 - 946 pg/mL Final   • WBC 03/29/2023 5.05  3.40 - 10.80 10*3/mm3 Final   • RBC 03/29/2023 4.85  3.77 - 5.28 10*6/mm3 Final   • Hemoglobin 03/29/2023 14.4  12.0 - 15.9 g/dL Final   • Hematocrit 03/29/2023 43.6  34.0 - 46.6 % Final   • MCV 03/29/2023 89.9  79.0 - 97.0 fL Final   • MCH 03/29/2023 29.7  26.6 - 33.0 pg Final   • MCHC 03/29/2023 33.0  31.5 - 35.7 g/dL Final   • RDW 03/29/2023 17.6 (H)  12.3 - 15.4 % Final   • RDW-SD 03/29/2023 57.6 (H)  37.0 - 54.0 fl Final   • MPV 03/29/2023 10.9  6.0 - 12.0 fL Final   • Platelets 03/29/2023 62 (L)  140 - 450 10*3/mm3 Final   • Neutrophil % 03/29/2023 65.7  42.7 - 76.0 % Final   • Lymphocyte % 03/29/2023 24.4  19.6 - 45.3 % Final   • Monocyte % 03/29/2023 5.7  5.0 - 12.0 % Final   • Eosinophil % 03/29/2023 3.6  0.3 - 6.2 % Final   • Basophil % 03/29/2023 0.4  0.0 - 1.5 % Final   • Immature Grans % 03/29/2023 0.2  0.0 - 0.5 % Final   •  Neutrophils, Absolute 03/29/2023 3.32  1.70 - 7.00 10*3/mm3 Final   • Lymphocytes, Absolute 03/29/2023 1.23  0.70 - 3.10 10*3/mm3 Final   • Monocytes, Absolute 03/29/2023 0.29  0.10 - 0.90 10*3/mm3 Final   • Eosinophils, Absolute 03/29/2023 0.18  0.00 - 0.40 10*3/mm3 Final   • Basophils, Absolute 03/29/2023 0.02  0.00 - 0.20 10*3/mm3 Final   • Immature Grans, Absolute 03/29/2023 0.01  0.00 - 0.05 10*3/mm3 Final   • nRBC 03/29/2023 0.0  0.0 - 0.2 /100 WBC Final   • Anisocytosis 03/29/2023 Slight/1+  None Seen Final   • WBC Morphology 03/29/2023 Normal  Normal Final   • Platelet Estimate 03/29/2023 Decreased  Normal Final   Office Visit on 03/29/2023   Component Date Value Ref Range Status   • Protime 03/29/2023 18.5 (H)  11.1 - 15.3 Seconds Final   • INR 03/29/2023 1.54 (H)  0.80 - 1.20 Final   • PTT 03/29/2023 41.4 (H)  20.0 - 40.3 seconds Final   Office Visit on 02/23/2023   Component Date Value Ref Range Status   • Color, UA 02/23/2023 Yellow  Yellow, Straw Final   • Appearance, UA 02/23/2023 Clear  Clear Final   • pH, UA 02/23/2023 7.0  5.0 - 8.0 Final   • Specific Gravity, UA 02/23/2023 1.008  1.005 - 1.030 Final   • Glucose, UA 02/23/2023 Negative  Negative Final   • Ketones, UA 02/23/2023 Negative  Negative Final   • Bilirubin, UA 02/23/2023 Negative  Negative Final   • Blood, UA 02/23/2023 Negative  Negative Final   • Protein, UA 02/23/2023 Negative  Negative Final   • Leuk Esterase, UA 02/23/2023 Negative  Negative Final   • Nitrite, UA 02/23/2023 Negative  Negative Final   • Urobilinogen, UA 02/23/2023 1.0 E.U./dL  0.2 - 1.0 E.U./dL Final   • Glucose 02/23/2023 105 (H)  65 - 99 mg/dL Final   • BUN 02/23/2023 5 (L)  8 - 23 mg/dL Final   • Creatinine 02/23/2023 0.93  0.57 - 1.00 mg/dL Final   • Sodium 02/23/2023 136  136 - 145 mmol/L Final   • Potassium 02/23/2023 4.3  3.5 - 5.2 mmol/L Final   • Chloride 02/23/2023 103  98 - 107 mmol/L Final   • CO2 02/23/2023 27.0  22.0 - 29.0 mmol/L Final   • Calcium  02/23/2023 8.9  8.6 - 10.5 mg/dL Final   • Total Protein 02/23/2023 6.4  6.0 - 8.5 g/dL Final   • Albumin 02/23/2023 3.3 (L)  3.5 - 5.2 g/dL Final   • ALT (SGPT) 02/23/2023 30  1 - 33 U/L Final   • AST (SGOT) 02/23/2023 53 (H)  1 - 32 U/L Final   • Alkaline Phosphatase 02/23/2023 181 (H)  39 - 117 U/L Final   • Total Bilirubin 02/23/2023 1.7 (H)  0.0 - 1.2 mg/dL Final   • Globulin 02/23/2023 3.1  gm/dL Final   • A/G Ratio 02/23/2023 1.1  g/dL Final   • BUN/Creatinine Ratio 02/23/2023 5.4 (L)  7.0 - 25.0 Final   • Anion Gap 02/23/2023 6.0  5.0 - 15.0 mmol/L Final   • eGFR 02/23/2023 69.2  >60.0 mL/min/1.73 Final   • Total Cholesterol 02/23/2023 142  0 - 200 mg/dL Final   • Triglycerides 02/23/2023 60  0 - 150 mg/dL Final   • HDL Cholesterol 02/23/2023 56  40 - 60 mg/dL Final   • LDL Cholesterol  02/23/2023 73  0 - 100 mg/dL Final   • VLDL Cholesterol 02/23/2023 13  5 - 40 mg/dL Final   • LDL/HDL Ratio 02/23/2023 1.32   Final   • WBC 02/23/2023 4.07  3.40 - 10.80 10*3/mm3 Final   • RBC 02/23/2023 4.76  3.77 - 5.28 10*6/mm3 Final   • Hemoglobin 02/23/2023 13.9  12.0 - 15.9 g/dL Final   • Hematocrit 02/23/2023 40.1  34.0 - 46.6 % Final   • MCV 02/23/2023 84.2  79.0 - 97.0 fL Final   • MCH 02/23/2023 29.2  26.6 - 33.0 pg Final   • MCHC 02/23/2023 34.7  31.5 - 35.7 g/dL Final   • RDW 02/23/2023 15.4  12.3 - 15.4 % Final   • RDW-SD 02/23/2023 46.4  37.0 - 54.0 fl Final   • MPV 02/23/2023 11.3  6.0 - 12.0 fL Final   • Platelets 02/23/2023 28 (C)  140 - 450 10*3/mm3 Final   • Neutrophil % 02/23/2023 68.5  42.7 - 76.0 % Final   • Lymphocyte % 02/23/2023 21.4  19.6 - 45.3 % Final   • Monocyte % 02/23/2023 6.9  5.0 - 12.0 % Final   • Eosinophil % 02/23/2023 2.5  0.3 - 6.2 % Final   • Basophil % 02/23/2023 0.5  0.0 - 1.5 % Final   • Neutrophils, Absolute 02/23/2023 2.79  1.70 - 7.00 10*3/mm3 Final   • Lymphocytes, Absolute 02/23/2023 0.87  0.70 - 3.10 10*3/mm3 Final   • Monocytes, Absolute 02/23/2023 0.28  0.10 - 0.90 10*3/mm3  Final   • Eosinophils, Absolute 02/23/2023 0.10  0.00 - 0.40 10*3/mm3 Final   • Basophils, Absolute 02/23/2023 0.02  0.00 - 0.20 10*3/mm3 Final   Lab on 12/01/2022   Component Date Value Ref Range Status   • Iron 12/01/2022 107  37 - 145 mcg/dL Final   • Iron Saturation 12/01/2022 30  20 - 50 % Final   • Transferrin 12/01/2022 238  200 - 360 mg/dL Final   • TIBC 12/01/2022 355  298 - 536 mcg/dL Final   • Ferritin 12/01/2022 180.60 (H)  13.00 - 150.00 ng/mL Final   • Folate 12/01/2022 >20.00  4.78 - 24.20 ng/mL Final   • Vitamin B-12 12/01/2022 1,302 (H)  211 - 946 pg/mL Final   • WBC 12/01/2022 4.53  3.40 - 10.80 10*3/mm3 Final   • RBC 12/01/2022 4.67  3.77 - 5.28 10*6/mm3 Final   • Hemoglobin 12/01/2022 13.4  12.0 - 15.9 g/dL Final   • Hematocrit 12/01/2022 39.5  34.0 - 46.6 % Final   • MCV 12/01/2022 84.6  79.0 - 97.0 fL Final   • MCH 12/01/2022 28.7  26.6 - 33.0 pg Final   • MCHC 12/01/2022 33.9  31.5 - 35.7 g/dL Final   • RDW 12/01/2022 15.9 (H)  12.3 - 15.4 % Final   • RDW-SD 12/01/2022 48.1  37.0 - 54.0 fl Final   • MPV 12/01/2022 10.8  6.0 - 12.0 fL Final   • Platelets 12/01/2022 66 (L)  140 - 450 10*3/mm3 Final   • Neutrophil % 12/01/2022 69.1  42.7 - 76.0 % Final   • Lymphocyte % 12/01/2022 21.2  19.6 - 45.3 % Final   • Monocyte % 12/01/2022 6.0  5.0 - 12.0 % Final   • Eosinophil % 12/01/2022 3.1  0.3 - 6.2 % Final   • Basophil % 12/01/2022 0.4  0.0 - 1.5 % Final   • Immature Grans % 12/01/2022 0.2  0.0 - 0.5 % Final   • Neutrophils, Absolute 12/01/2022 3.13  1.70 - 7.00 10*3/mm3 Final   • Lymphocytes, Absolute 12/01/2022 0.96  0.70 - 3.10 10*3/mm3 Final   • Monocytes, Absolute 12/01/2022 0.27  0.10 - 0.90 10*3/mm3 Final   • Eosinophils, Absolute 12/01/2022 0.14  0.00 - 0.40 10*3/mm3 Final   • Basophils, Absolute 12/01/2022 0.02  0.00 - 0.20 10*3/mm3 Final   • Immature Grans, Absolute 12/01/2022 0.01  0.00 - 0.05 10*3/mm3 Final   • nRBC 12/01/2022 0.0  0.0 - 0.2 /100 WBC Final   • Anisocytosis 12/01/2022  Slight/1+  None Seen Final   • Ovalocytes 12/01/2022 Slight/1+  None Seen Final   • WBC Morphology 12/01/2022 Normal  Normal Final   • Platelet Estimate 12/01/2022 Decreased  Normal Final      XR Abdomen KUB  Narrative: XR ABDOMEN KUB    HISTORY: constipation, abd pain    COMPARISON: 9/18/2021    FINDINGS:  There is a moderate amount of stool in the colon without evidence of GI tract  obstruction. No pathologic calcification or organomegaly is visualized.    Evaluation for free intraperitoneal air is limited on a supine radiograph, but  there are no definite findings of free intraperitoneal air.    The osseous structures demonstrate no acute abnormality.  Impression: 1. Findings suggestive of moderate constipation without evidence of acute  abdominopelvic process.      [email or web address]  Immunization History   Administered Date(s) Administered   • COVID-19 (MODERNA) 1st,2nd,3rd Dose Monovalent 06/16/2021, 08/13/2021   • Flu Vaccine Quad PF >36MO 10/02/2018   • FluLaval/Fluzone >6mos 11/04/2019, 10/01/2020, 10/28/2022   • Hepatitis A 10/04/2007, 04/04/2008   • Hepatitis B Adult/Adolescent IM 10/04/2007, 11/08/2007, 04/04/2008   • Influenza, Unspecified 09/28/2021, 10/28/2022   • Shingrix 11/04/2019, 02/11/2020     The following portions of the patient's history were reviewed and updated as appropriate: allergies, current medications, past family history, past medical history, past social history, past surgical history and problem list.    PHQ-9 Total Score:           Physical Exam  Constitutional:       Appearance: Normal appearance.   HENT:      Head: Normocephalic and atraumatic.      Right Ear: External ear normal.      Left Ear: External ear normal.   Eyes:      General:         Right eye: No discharge.         Left eye: No discharge.      Conjunctiva/sclera: Conjunctivae normal.   Cardiovascular:      Rate and Rhythm: Normal rate and regular rhythm.      Pulses: Normal pulses.      Heart sounds: Normal heart sounds.  No murmur heard.  Pulmonary:      Effort: Pulmonary effort is normal. No respiratory distress.      Breath sounds: Normal breath sounds.   Abdominal:      Palpations: Abdomen is rigid.      Tenderness: There is abdominal tenderness.   Musculoskeletal:      Cervical back: Normal range of motion.      Right lower leg: Edema present.      Left lower leg: Edema present.   Lymphadenopathy:      Cervical: No cervical adenopathy.   Neurological:      Mental Status: She is alert. Mental status is at baseline.   Psychiatric:         Mood and Affect: Mood normal.         Behavior: Behavior normal.       Assessment & Plan    Diagnosis Plan   1. Other cirrhosis of liver  Ambulatory Referral to Pain Management      2. Disease related peripheral neuropathy  Ambulatory Referral to Pain Management      3. Intertrigo  nystatin (MYCOSTATIN) 315238 UNIT/GM powder         Orders Placed This Encounter   Procedures   • Ambulatory Referral to Pain Management     Referral Priority:   Routine     Referral Type:   Pain Management     Referral Reason:   Specialty Services Required     Requested Specialty:   Pain Medicine     Number of Visits Requested:   1     Cirrhosis; Pt with worsening and more frequent ascites, has appropriate f/u with Gastroenterology. Pt transferring care to [facility] to hopefully solve non-adherence issue 2/2 transport. Pt with poor prognosis, needs new referral to local pain management, consider palliative care at f/u.  Discussed with patient at length, given strict ER/return precautions.    Total time examining evaluating the patient, completing orders and counseling was 31 minutes.       This document has been electronically signed by Omar Chadwick MD on May 9, 2023 09:45 CDT    EMR Dragon/Transciption Disclaimer: Some of this note may be an electronic transcription/translation of spoken language to printed text.  The electronic translation of spoken language may permit erroneous, or at times, nonsensical words or phrases  to be inadvertently transcribed. Although I have reviewed the note for such errors, some may still exist.

## 2023-05-31 RX ORDER — FOLIC ACID 1 MG/1
TABLET ORAL
Qty: 36 TABLET | Refills: 0 | Status: SHIPPED | OUTPATIENT
Start: 2023-05-31

## 2023-05-31 NOTE — TELEPHONE ENCOUNTER
Rx Refill Note  Requested Prescriptions     Pending Prescriptions Disp Refills   • folic acid (FOLVITE) 1 MG tablet [Pharmacy Med Name: Folic Acid 1 MG Oral Tablet] 36 tablet 0     Sig: TAKE 1 TABLET BY MOUTH ON MONDAY, WEDNESDAY AND FRIDAY      Last office visit with prescribing clinician: 4/14/2023   Last telemedicine visit with prescribing clinician: Visit date not found   Next office visit with prescribing clinician: 7/21/2023                         Would you like a call back once the refill request has been completed: [] Yes [] No    If the office needs to give you a call back, can they leave a voicemail: [] Yes [] No    Ruiz Hernandez Rep  05/31/23, 08:06 CDT

## 2023-06-06 NOTE — TELEPHONE ENCOUNTER
Patient called stating she may be having some vertigo, getting dizziness and light headed. Wants to know if Dr. Chadwick would prescribe her something. Patient also wanted to inform provider that she has been having fluid pulled off of her liver and asked if someone has checked on her seeing pain management in [place] instead of [place].  Call back number: [phone]

## 2023-06-06 NOTE — TELEPHONE ENCOUNTER
Patient called stating she may be having some vertigo, getting dizziness and light headed. Wants to know if Dr. Chadwick would prescribe her something. Patient also wanted to inform provider that she has been having fluid pulled off of her liver and asked if someone has checked on her seeing pain management in [place] instead of [place].  Call back number: [phone]

## (undated) DEVICE — BLD SCLPL SURG PREM SS SZ15C

## (undated) DEVICE — 1314 FOAM STRIP NEEDLE COUNTER: Brand: DEVON

## (undated) DEVICE — BITEBLOCK ENDO W/STRAP 60F A/ LF DISP

## (undated) DEVICE — GOWN,AURORA,NOREINF,RAGLAN,XL,STERILE: Brand: MEDLINE

## (undated) DEVICE — GLV SURG TRIUMPH PF LTX 7 STRL

## (undated) DEVICE — UNDRPD BREATH 23X36 BG/10

## (undated) DEVICE — GLV SURG TRIUMPH LT PF LTX 8 STRL

## (undated) DEVICE — GOWN,PREVENTION PLUS,XLONG/XLARGE,STRL: Brand: MEDLINE

## (undated) DEVICE — BLD SCLPL BEAVR MINI STR 2BVL 180D LF

## (undated) DEVICE — SUT ETHLN 4/0 FS2 18IN 662H

## (undated) DEVICE — Device

## (undated) DEVICE — GLV SURG SENSICARE PI LF PF 7.5 GRN STRL

## (undated) DEVICE — SYR LUERLOK 10CC

## (undated) DEVICE — PENCL E/S HNDSWCH PUSHBTN HOLSTR 10FT

## (undated) DEVICE — CONTAINER,SPECIMEN,OR STERILE,4OZ: Brand: MEDLINE

## (undated) DEVICE — CONMED ACCESSORY ELECTRODE, NEEDLE ELECTRODE

## (undated) DEVICE — GLV SURG SENSICARE ALOE LF PF SZ7.5 GRN

## (undated) DEVICE — MARKR SKIN W/RULR AND LBL

## (undated) DEVICE — APPL CHLORAPREP W/TINT 26ML ORNG

## (undated) DEVICE — PK ENT LF 60

## (undated) DEVICE — DISPOSABLE TOURNIQUET CUFF SINGLE BLADDER, DUAL PORT AND QUICK CONNECT CONNECTOR: Brand: COLOR CUFF

## (undated) DEVICE — BNDG ELAS ELITE V/CLOSE 4IN 5YD LF STRL

## (undated) DEVICE — DEFOGGER!" ANTI FOG KIT: Brand: DEROYAL

## (undated) DEVICE — SINGLE-USE BIOPSY FORCEPS: Brand: RADIAL JAW 4

## (undated) DEVICE — GUARD TEETH

## (undated) DEVICE — KT CATH SXN MINI W/GLV LF 10F

## (undated) DEVICE — TRY IRR

## (undated) DEVICE — PAD UNDERCAST WYTEX 4IN 4YD LF STRL

## (undated) DEVICE — POSTN HD RING CUSH 9IN LF

## (undated) DEVICE — SYR LL TP 10ML STRL

## (undated) DEVICE — SPNG GZ WOVN 4X4IN 12PLY 10/BX STRL

## (undated) DEVICE — PENCL E/S HNDSWCH ROCKR CB

## (undated) DEVICE — SUT VIC 4/0 P3 18IN UD VCP494G

## (undated) DEVICE — SPLNT PLSTR CAST FAST 3IN

## (undated) DEVICE — GOWN,PREVENTION PLUS,XLARGE,STERILE: Brand: MEDLINE

## (undated) DEVICE — DRAPE,U/ SHT,SPLIT,PLAS,STERIL: Brand: MEDLINE

## (undated) DEVICE — GAUZE,SPONGE,4"X4",16PLY,XRAY,STRL,LF: Brand: MEDLINE

## (undated) DEVICE — GLV SURG TRIUMPH LT PF LTX 7.5 STRL

## (undated) DEVICE — SOL IRR NACL 0.9PCT BT 1000ML

## (undated) DEVICE — NDL HYPO ECLPS SFTY 25G 1 1/2IN

## (undated) DEVICE — ADAPT CANCER LUER STUB 18G

## (undated) DEVICE — TP SXN YANKR BLB TIP W/TBG 10F LF STRL

## (undated) DEVICE — GLV SURG SENSICARE POLYISPRN W/ALOE PF LF 6.5 GRN STRL

## (undated) DEVICE — SYR LUERLOK 20CC

## (undated) DEVICE — NDL HYPO SFTY GLD 22G 1 1/2IN

## (undated) DEVICE — CONMED COLONOSCOPE SHEATHED CYTOLOGY BRUSH, RING HANDLE, 3 MM X 230 CM: Brand: CONMED

## (undated) DEVICE — TOWEL,OR,DSP,ST,BLUE,DLX,4/PK,20PK/CS: Brand: MEDLINE

## (undated) DEVICE — SUT GUT CHRM 3/0 SH 27IN G122H

## (undated) DEVICE — DRSNG TELFA PAD NONADH STR 1S 3X8IN

## (undated) DEVICE — LIGHT HANDLE: Brand: DEVON

## (undated) DEVICE — GAUZE,SPONGE,FLUFF,6"X6.75",STRL,5/TRAY: Brand: MEDLINE

## (undated) DEVICE — PREP PVP-I 7.5P BT 4OZ

## (undated) DEVICE — SUT VIC 4/0 RB1 27IN J214H

## (undated) DEVICE — SHEET,DRAPE,53X77,STERILE: Brand: MEDLINE

## (undated) DEVICE — BLD PREP WECK

## (undated) DEVICE — GLV SURG TRIUMPH ORTHO W/ALOE PF LTX 6.5 STRL

## (undated) DEVICE — SPLNT ORTHOGLASS P/C 34X30IN LF

## (undated) DEVICE — DRAPE,EXTREMITY,89X128,STERILE: Brand: MEDLINE

## (undated) DEVICE — SUT VIC 3/0 SH 27IN J416H

## (undated) DEVICE — STERILE POLYISOPRENE POWDER-FREE SURGICAL GLOVES WITH EMOLLIENT COATING: Brand: PROTEXIS

## (undated) DEVICE — DENTAL NEEDLE,METAL HUB,27 G LONG: Brand: MONOJECT

## (undated) DEVICE — GLV SURG SENSICARE GREEN W/ALOE PF LF 6.5 STRL

## (undated) DEVICE — NDL SFTY GLD 21X1

## (undated) DEVICE — CODMAN® SURGICAL PATTIES 1/2" X 1" (1.27CM X 2.54CM): Brand: CODMAN®

## (undated) DEVICE — PREP SOL POVIDONE/IODINE BT 4OZ

## (undated) DEVICE — COVER,MAYO STAND,STERILE: Brand: MEDLINE

## (undated) DEVICE — PACK,UNIVERSAL,NO GOWNS: Brand: MEDLINE